# Patient Record
Sex: FEMALE | Race: WHITE | NOT HISPANIC OR LATINO | Employment: OTHER | URBAN - METROPOLITAN AREA
[De-identification: names, ages, dates, MRNs, and addresses within clinical notes are randomized per-mention and may not be internally consistent; named-entity substitution may affect disease eponyms.]

---

## 2017-04-07 DIAGNOSIS — F41.1 GENERALIZED ANXIETY DISORDER: ICD-10-CM

## 2017-04-07 NOTE — TELEPHONE ENCOUNTER
Last Written Prescription Date: 9-15-16  Last Fill Quantity: 90, # refills: 1  Last Office Visit with G primary care provider:  9-15-16        Last PHQ-9 score on record=   PHQ-9 SCORE 2/28/2017   Total Score MyChart 5 (Mild depression)   Total Score -       Thank you,  Olga Watson, [facility] [place]

## 2017-04-10 RX ORDER — CITALOPRAM HYDROBROMIDE 20 MG/1
TABLET ORAL
Qty: 90 TABLET | Refills: 1 | Status: SHIPPED | OUTPATIENT
Start: 2017-04-10 | End: 2017-06-26

## 2017-04-10 NOTE — TELEPHONE ENCOUNTER
Med for BRIAN.     Routing refill request to provider for review/approval because:  Out of RN protocol range.  Nalini Hector RN

## 2017-04-17 ENCOUNTER — OFFICE VISIT (OUTPATIENT)
Dept: FAMILY MEDICINE | Facility: CLINIC | Age: 47
End: 2017-04-17
Payer: COMMERCIAL

## 2017-04-17 VITALS
DIASTOLIC BLOOD PRESSURE: 74 MMHG | TEMPERATURE: 97.4 F | HEART RATE: 94 BPM | SYSTOLIC BLOOD PRESSURE: 120 MMHG | BODY MASS INDEX: 24.64 KG/M2 | HEIGHT: 67 IN | WEIGHT: 157 LBS | OXYGEN SATURATION: 99 %

## 2017-04-17 DIAGNOSIS — G43.011 INTRACTABLE MIGRAINE WITHOUT AURA AND WITH STATUS MIGRAINOSUS: Primary | ICD-10-CM

## 2017-04-17 PROCEDURE — 99214 OFFICE O/P EST MOD 30 MIN: CPT | Performed by: PHYSICIAN ASSISTANT

## 2017-04-17 RX ORDER — KETOROLAC TROMETHAMINE 30 MG/ML
30 INJECTION, SOLUTION INTRAMUSCULAR; INTRAVENOUS ONCE
Qty: 1 ML | Refills: 0 | OUTPATIENT
Start: 2017-04-17 | End: 2017-04-17

## 2017-04-17 RX ORDER — IBUPROFEN 800 MG/1
800 TABLET, FILM COATED ORAL
Qty: 60 TABLET | Refills: 1 | Status: SHIPPED | OUTPATIENT
Start: 2017-04-17 | End: 2019-08-22

## 2017-04-17 NOTE — PROGRESS NOTES
SUBJECTIVE:                                                    Kassie Ramirez is a 47 year old female who presents to clinic today for the following health issues:    Migraines  Kassie presents to clinic today for chief complaint of persistent migraines. Onset of symptoms was ~6 months ago averaging 1 migraine per month that lasts up to 4 days in length. Today she is suffering from a migraine has lasted for the past 6 days. Since onset of symptoms she has noticed that migraines seem to fluctuate around her menstrual period. Denies history of migraines personally or in family memebers. Describes migraine on right side of head with pain/ pressure radiating behind right eye. Notes some sensitivity to light and sound but denies visual disturbance, balance/ coordination issues, nausea, or dehydration. Migraines occasionally wake patient up from sleep with difficulty falling asleep to relieve migraine. Has tried OTC ibuprofen, naproxen, Excedrin, and vitamin B12 injection all without little relief of symptoms. Patient has never followed with neurology or had imaging done of her brain.    Problem list and histories reviewed & adjusted, as indicated.  Additional history: as documented    Patient Active Problem List   Diagnosis     CARDIOVASCULAR SCREENING; LDL GOAL LESS THAN 160     Anxiety attack     Generalized anxiety disorder     Controlled substance agreement signed     GERTRUDE (stress urinary incontinence, female)     Past Surgical History:   Procedure Laterality Date     C NONSPECIFIC PROCEDURE  1974    Umbilical Hernia Repair     HC HYSTEROS W PERMANENT FALLOPAIN IMPLANT  2009    seng - Dr. Cailin Raymundo      SLING TRANSPUBO WITHOUT ANTERIOR COLPORRHAPHY N/A 11/21/2016    Procedure: SLING TRANSPUBO WITHOUT ANTERIOR COLPORRHAPHY;  Surgeon: Hernesto Berrios MD;  Location:  OR       Social History   Substance Use Topics     Smoking status: Never Smoker     Smokeless tobacco: Never Used     Alcohol use No       Comment: 1 time per month     Family History   Problem Relation Age of Onset     Hypertension Mother      Depression Mother      Lipids Mother      CANCER Mother      Lung.       Cardiovascular Mother      Circulatory Mother      DIABETES Mother      HEART DISEASE Mother      CEREBROVASCULAR DISEASE Mother      Obesity Mother      C.A.D. Mother      Eye Disorder Father      Genetic Disorder Father      DIABETES Maternal Aunt      type 2     Hypertension Maternal Aunt      Eye Disorder Sister      Blind spots     Eye Disorder Sister      HEART DISEASE Maternal Grandfather      HEART DISEASE Sister      high cholesterol           Current Outpatient Prescriptions   Medication Sig Dispense Refill     ibuprofen (ADVIL/MOTRIN) 800 MG tablet Take 1 tablet (800 mg) by mouth 3 times daily (with meals) For at least 5 days 60 tablet 1     citalopram (CELEXA) 20 MG tablet Take 1 tablet by mouth daily 90 tablet 1     VITAMIN D, CHOLECALCIFEROL, PO Take 400 Units by mouth daily       cetirizine (ZYRTEC) 10 MG tablet Take 10 mg by mouth daily       Allergies   Allergen Reactions     Cold & Flu [Cold Defense Fighter]      See pseudoephedrine     Seasonal Allergies      Sudafed Cold-Cough [Dayquil Liquicaps]      Pseudoephedrine Rash     Rash then skins peels off        Reviewed and updated as needed this visit by clinical staff  Tobacco  Allergies  Meds  Problems  Med Hx  Surg Hx  Fam Hx  Soc Hx        Reviewed and updated as needed this visit by Provider  Tobacco  Allergies  Meds  Problems  Med Hx  Surg Hx  Fam Hx  Soc Hx          ROS:  Constitutional, HEENT, cardiovascular, pulmonary, GI, , musculoskeletal, neuro, skin, endocrine and psych systems are negative, except as otherwise noted.    This document serves as a record of the services and decisions personally performed and made by Mary Alice Colón PA-C. It was created on her behalf by Carolynn Garcia, a trained medical scribe. The creation of this document is based  "the provider's statements to the medical scribe.  Carolynn Garcia, April 17, 2017 3:14 PM    OBJECTIVE:                                                    /74  Pulse 94  Temp 97.4  F (36.3  C) (Tympanic)  Ht 5' 6.5\" (1.689 m)  Wt 157 lb (71.2 kg)  SpO2 99%  BMI 24.96 kg/m2  Body mass index is 24.96 kg/(m^2).     GENERAL: healthy, alert and no distress  EYES: Eyes grossly normal to inspection, PERRL, EOMI and conjunctivae and sclerae normal  HENT: ear canals and TM's normal, nose and mouth without ulcers or lesions  NECK: no adenopathy, no asymmetry, masses, or scars and thyroid normal to palpation  RESP: lungs clear to auscultation - no rales, rhonchi or wheezes  CV: regular rate and rhythm, normal S1 S2, no S3 or S4, no murmur, click or rub, no peripheral edema and peripheral pulses strong  NEURO: Normal strength and tone, mentation intact and speech normal, cranial nerves II-XII grossly intact, point to point motions intact, RRM intact, able to heel toe walk, able to hop on one foot, and negative Romberg   PSYCH: mentation appears normal, affect normal/bright    Ketorolac injected administered in clinic during visit today by MA.    Diagnostic Test Results:  none      ASSESSMENT/PLAN:                                                    Kassie was seen today for headache.    Diagnoses and all orders for this visit:    Intractable migraine without aura and with status migrainosus  Patient is stable and doing well after ketorolac injection. If unable to break migraine with ketorolac will have patient take 800 mg ibuprofen TID to manage migraines x 5 days beginning tonight. Advised patient to have MR done for further evaluation of symptoms due to worsening headaches from her \"baseline\".  If normal, will consider magnesium supplementation around her menstrual cycle.  -     MR Brain w/o & w Contrast; Future  -     ibuprofen (ADVIL/MOTRIN) 800 MG tablet; Take 1 tablet (800 mg) by mouth 3 times daily (with meals) " For at least 5 days  -     ketorolac (TORADOL) 30 MG/ML injection; Inject 1 mL (30 mg) into the muscle once for 1 dose    Greater than 25 minutes were spent with the patient. The majority of this time was coordinating care and counseling regarding the above diagnoses.    The information in this document, created by the medical scribe for me, accurately reflects the services I personally performed and the decisions made by me. I have reviewed and approved this document for accuracy prior to leaving the patient care area .  Mary Alice oClón PA-C April 17, 2017 3:14 PM    Mary Alice Colón PA-C  [facility] [place]

## 2017-04-17 NOTE — MR AVS SNAPSHOT
After Visit Summary   4/17/2017    Kassie Ramirez    MRN: [id number]           Patient Information     Date Of Birth          1970        Visit Information        Provider Department      4/17/2017 3:00 PM Mary Alice Colón PA-C [facility]        Today's Diagnoses     Intractable migraine without aura and with status migrainosus    -  1       Follow-ups after your visit        Future tests that were ordered for you today     Open Future Orders        Priority Expected Expires Ordered    MR Brain w/o & w Contrast Routine  4/17/2018 4/17/2017            Who to contact     If you have questions or need follow up information about today's clinic visit or your schedule please contact [facility] directly at [phone].  Normal or non-critical lab and imaging results will be communicated to you by V[employer], letter or phone within 4 business days after the clinic has received the results. If you do not hear from us within 7 days, please contact the clinic through V[employer]im[employer] or phone. If you have a critical or abnormal lab result, we will notify you by phone as soon as possible.  Submit refill requests through [employer] or call your pharmacy and they will forward the refill request to us. Please allow 3 business days for your refill to be completed.          Additional Information About Your Visit        MyChart Information     [employer] gives you secure access to your electronic health record. If you see a primary care provider, you can also send messages to your care team and make appointments. If you have questions, please call your primary care clinic.  If you do not have a primary care provider, please call [phone] and they will assist you.        Care EveryWhere ID     This is your Care EveryWhere ID. This could be used by other organizations to access your [facility] medical records  [id number]        Your Vitals Were     Pulse Temperature Height Pulse Oximetry  "BMI (Body Mass Index)       94 97.4  F (36.3  C) (Tympanic) 5' 6.5\" (1.689 m) 99% 24.96 kg/m2        Blood Pressure from Last 3 Encounters:   04/17/17 120/74   11/21/16 (!) 130/91   11/14/16 100/74    Weight from Last 3 Encounters:   04/17/17 157 lb (71.2 kg)   11/21/16 160 lb (72.6 kg)   11/14/16 160 lb 6 oz (72.7 kg)                 Today's Medication Changes          These changes are accurate as of: 4/17/17  3:21 PM.  If you have any questions, ask your nurse or doctor.               Start taking these medicines.        Dose/Directions    ibuprofen 800 MG tablet   Commonly known as:  ADVIL/MOTRIN   Used for:  Intractable migraine without aura and with status migrainosus   Started by:  Mary Alice Colón PA-C        Dose:  800 mg   Take 1 tablet (800 mg) by mouth 3 times daily (with meals) For at least 5 days   Quantity:  60 tablet   Refills:  1       ketorolac 30 MG/ML injection   Commonly known as:  TORADOL   Used for:  Intractable migraine without aura and with status migrainosus   Started by:  Mary Alice Colón PA-C        Dose:  30 mg   Inject 1 mL (30 mg) into the muscle once for 1 dose   Quantity:  1 mL   Refills:  0            Where to get your medicines      These medications were sent to [facility] [address]  [address] [place] [address]     Phone:  [phone]     ibuprofen 800 MG tablet         Some of these will need a paper prescription and others can be bought over the counter.  Ask your nurse if you have questions.     You don't need a prescription for these medications     ketorolac 30 MG/ML injection                Primary Care Provider    None Specified       No primary provider on file.        Thank you!     Thank you for choosing [facility]  for your care. Our goal is always to provide you with excellent care. Hearing back from our patients is one way we can continue to improve our services. Please " take a few minutes to complete the written survey that you may receive in the mail after your visit with us. Thank you!             Your Updated Medication List - Protect others around you: Learn how to safely use, store and throw away your medicines at www.disposemymeds.org.          This list is accurate as of: 4/17/17  3:21 PM.  Always use your most recent med list.                   Brand Name Dispense Instructions for use    cetirizine 10 MG tablet    zyrTEC     Take 10 mg by mouth daily       citalopram 20 MG tablet    celeXA    90 tablet    Take 1 tablet by mouth daily       ibuprofen 800 MG tablet    ADVIL/MOTRIN    60 tablet    Take 1 tablet (800 mg) by mouth 3 times daily (with meals) For at least 5 days       ketorolac 30 MG/ML injection    TORADOL    1 mL    Inject 1 mL (30 mg) into the muscle once for 1 dose       VITAMIN D (CHOLECALCIFEROL) PO      Take 400 Units by mouth daily

## 2017-04-17 NOTE — NURSING NOTE
The following medication was given:     MEDICATION: Ketorolac Tromethamine 60MG/2ML (30 mg/mL) (Toradol)  ROUTE: IM  SITE: Deltoid - Left  DOSE: 1 mL  LOT #: [id number]  :  [employer]  NDC: 79661-814-22  EXPIRATION DATE:  06/2018  Mahi Mehta CMA

## 2017-04-17 NOTE — NURSING NOTE
"Chief Complaint   Patient presents with     Headache     migraine monthly, lasts 4 days usually. this one has lasted 6 days. ~6 months. Has been taking OTC meds with no relief.        Initial /74  Pulse 94  Temp 97.4  F (36.3  C) (Tympanic)  Ht 5' 6.5\" (1.689 m)  Wt 157 lb (71.2 kg)  SpO2 99%  BMI 24.96 kg/m2 Estimated body mass index is 24.96 kg/(m^2) as calculated from the following:    Height as of this encounter: 5' 6.5\" (1.689 m).    Weight as of this encounter: 157 lb (71.2 kg).  Medication Reconciliation: complete   Mahi Mehta CMA      "

## 2017-04-25 PROBLEM — G43.011 INTRACTABLE MIGRAINE WITHOUT AURA AND WITH STATUS MIGRAINOSUS: Status: ACTIVE | Noted: 2017-04-25

## 2017-04-27 ENCOUNTER — HOSPITAL ENCOUNTER (OUTPATIENT)
Dept: MRI IMAGING | Facility: CLINIC | Age: 47
Discharge: HOME | End: 2017-04-27
Attending: PHYSICIAN ASSISTANT | Admitting: PHYSICIAN ASSISTANT
Payer: COMMERCIAL

## 2017-04-27 DIAGNOSIS — G43.011 INTRACTABLE MIGRAINE WITHOUT AURA AND WITH STATUS MIGRAINOSUS: ICD-10-CM

## 2017-04-27 PROCEDURE — A9585 GADOBUTROL INJECTION: HCPCS | Performed by: PHYSICIAN ASSISTANT

## 2017-04-27 PROCEDURE — 25500064 ZZH RX 255 OP 636: Performed by: PHYSICIAN ASSISTANT

## 2017-04-27 PROCEDURE — 70553 MRI BRAIN STEM W/O & W/DYE: CPT

## 2017-04-27 RX ORDER — GADOBUTROL 604.72 MG/ML
7.5 INJECTION INTRAVENOUS ONCE
Status: COMPLETED | OUTPATIENT
Start: 2017-04-27 | End: 2017-04-27

## 2017-04-27 RX ADMIN — GADOBUTROL 7.5 ML: 604.72 INJECTION INTRAVENOUS at 09:20

## 2017-04-28 DIAGNOSIS — G43.011 INTRACTABLE MIGRAINE WITHOUT AURA AND WITH STATUS MIGRAINOSUS: Primary | ICD-10-CM

## 2017-06-26 ENCOUNTER — OFFICE VISIT (OUTPATIENT)
Dept: FAMILY MEDICINE | Facility: CLINIC | Age: 47
End: 2017-06-26
Payer: COMMERCIAL

## 2017-06-26 VITALS
DIASTOLIC BLOOD PRESSURE: 68 MMHG | WEIGHT: 158 LBS | SYSTOLIC BLOOD PRESSURE: 104 MMHG | HEART RATE: 91 BPM | OXYGEN SATURATION: 98 % | TEMPERATURE: 97.8 F | BODY MASS INDEX: 25.12 KG/M2

## 2017-06-26 DIAGNOSIS — N95.9 MENOPAUSAL DISORDER: ICD-10-CM

## 2017-06-26 DIAGNOSIS — G43.829 MENSTRUAL MIGRAINE WITHOUT STATUS MIGRAINOSUS, NOT INTRACTABLE: ICD-10-CM

## 2017-06-26 DIAGNOSIS — F41.1 GENERALIZED ANXIETY DISORDER: ICD-10-CM

## 2017-06-26 DIAGNOSIS — Z78.0 MENOPAUSE: Primary | ICD-10-CM

## 2017-06-26 DIAGNOSIS — G43.011 INTRACTABLE MIGRAINE WITHOUT AURA AND WITH STATUS MIGRAINOSUS: ICD-10-CM

## 2017-06-26 PROCEDURE — 99214 OFFICE O/P EST MOD 30 MIN: CPT | Performed by: PHYSICIAN ASSISTANT

## 2017-06-26 RX ORDER — NORETHINDRONE ACETATE AND ETHINYL ESTRADIOL .02; 1 MG/1; MG/1
1 TABLET ORAL DAILY
Qty: 84 TABLET | Refills: 4 | Status: SHIPPED | OUTPATIENT
Start: 2017-06-26 | End: 2017-10-16

## 2017-06-26 RX ORDER — NORETHINDRONE ACETATE AND ETHINYL ESTRADIOL .02; 1 MG/1; MG/1
1 TABLET ORAL DAILY
Qty: 84 TABLET | Refills: 3 | Status: SHIPPED | OUTPATIENT
Start: 2017-06-26 | End: 2017-06-26

## 2017-06-26 RX ORDER — CITALOPRAM HYDROBROMIDE 20 MG/1
TABLET ORAL
Qty: 90 TABLET | Refills: 1 | Status: SHIPPED | OUTPATIENT
Start: 2017-06-26 | End: 2018-03-03

## 2017-06-26 RX ORDER — NORETHINDRONE ACETATE/ETHINYL ESTRADIOL 1MG-20MCG
KIT ORAL
Refills: 0 | COMMUNITY
Start: 2017-04-20 | End: 2017-06-26

## 2017-06-26 NOTE — PROGRESS NOTES
SUBJECTIVE:                                                    Kassie Ramirez is a 47 year old female who presents to clinic today for the following health issues:    Menopause Follow-Up  Kassie presents to clinic today with chief complaint of menopausal symptoms. She was last seen on 4/17 with chief complaint of migraines - precipitated by menstrual cycle. She also complained of moodiness and hot flashes. At that time she was advised to begin OTC magnesium supplement. The patient then followed up with [facility] [place] and was started on an oral contraceptive. She states that since starting birth control (which she takes continuously without placebo) and OTC supplement she has noticed symptoms have dramatically improved. States that in past 2 months has only had one headache and has not experienced any hot flashes. Headaches are well controlled with OTC ibuprofen. She has also noticed a decrease in her moodiness and easy agitation. She does also have PMH significant for depression and anxiety for which she continues to take 20 mg citalopram daily as prescribed.   Today Kassie does express some interest in other birth control options such as the patch versus the daily oral medication. She does not have FHX significant for breast CA but likely started on contraceptive versus hormone therapy due to cost. She does have PSH significant for essure procedure in 2009.     BRIAN-7 SCORE 4/14/2016 9/15/2016 2/28/2017   Total Score - - -   Total Score 1 (minimal anxiety) - 1 (minimal anxiety)   Total Score - 1 -     PHQ-9 SCORE 4/14/2016 9/15/2016 2/28/2017   Total Score - - -   Total Score MyChart 1 (Minimal depression) - 5 (Mild depression)   Total Score - 1 -       Problem list and histories reviewed & adjusted, as indicated.  Additional history: as documented    Patient Active Problem List   Diagnosis     CARDIOVASCULAR SCREENING; LDL GOAL LESS THAN 160     Anxiety attack     Generalized anxiety  disorder     Controlled substance agreement signed     GERTRUDE (stress urinary incontinence, female)     Intractable migraine without aura and with status migrainosus     Menstrual headache     Menopausal disorder     Past Surgical History:   Procedure Laterality Date     H KIT ESSURE  2009    essure - Dr. Cailin Raymundo      HERNIORRHAPHY UMBILICAL  1974     SLING TRANSPUBO WITHOUT ANTERIOR COLPORRHAPHY N/A 11/21/2016    Procedure: SLING TRANSPUBO WITHOUT ANTERIOR COLPORRHAPHY;  Surgeon: Hernesto Berrios MD;  Location:  OR       Social History   Substance Use Topics     Smoking status: Never Smoker     Smokeless tobacco: Never Used     Alcohol use No      Comment: 1 time per month     Family History   Problem Relation Age of Onset     Hypertension Mother      Depression Mother      Lipids Mother      CANCER Mother      Lung.       Cardiovascular Mother      Circulatory Mother      DIABETES Mother      HEART DISEASE Mother      CEREBROVASCULAR DISEASE Mother      Obesity Mother      C.A.D. Mother      Eye Disorder Father      Genetic Disorder Father      DIABETES Maternal Aunt      type 2     Hypertension Maternal Aunt      Eye Disorder Sister      Blind spots     Eye Disorder Sister      HEART DISEASE Maternal Grandfather      HEART DISEASE Sister      high cholesterol           Current Outpatient Prescriptions   Medication Sig Dispense Refill     norethindrone-ethinyl estradiol (HUE 1/20) 1-20 MG-MCG per tablet Take 1 tablet by mouth daily Only take active pills 84 tablet 4     Magnesium Oxide 140 MG CAPS Take 140 mg by mouth 2 times daily 120 capsule      citalopram (CELEXA) 20 MG tablet Take 1 tablet by mouth daily 90 tablet 1     ibuprofen (ADVIL/MOTRIN) 800 MG tablet Take 1 tablet (800 mg) by mouth 3 times daily (with meals) For at least 5 days 60 tablet 1     cetirizine (ZYRTEC) 10 MG tablet Take 10 mg by mouth daily       [DISCONTINUED] HUE 1/20 1-20 MG-MCG per tablet   0     [DISCONTINUED]  norethindrone-ethinyl estradiol (HUE 1/20) 1-20 MG-MCG per tablet Take 1 tablet by mouth daily 84 tablet 3     [DISCONTINUED] citalopram (CELEXA) 20 MG tablet Take 1 tablet by mouth daily 90 tablet 1     Allergies   Allergen Reactions     Cold & Flu [Cold Defense Fighter]      See pseudoephedrine     Seasonal Allergies      Sudafed Cold-Cough [Dayquil Liquicaps]      Pseudoephedrine Rash     Rash then skins peels off        Reviewed and updated as needed this visit by clinical staff  Tobacco  Allergies  Meds  Problems  Med Hx  Surg Hx  Fam Hx  Soc Hx        Reviewed and updated as needed this visit by Provider  Tobacco  Allergies  Meds  Problems  Med Hx  Surg Hx  Fam Hx  Soc Hx          ROS:  Constitutional, HEENT, cardiovascular, pulmonary, GI, , musculoskeletal, neuro, skin, endocrine and psych systems are negative, except as otherwise noted.    This document serves as a record of the services and decisions personally performed and made by Mary Alice Colón PA-C. It was created on her behalf by Carolynn Garcia, a trained medical scribe. The creation of this document is based the provider's statements to the medical scribe.  Carolynn Garcia, June 26, 2017 8:53 AM    OBJECTIVE:   /68  Pulse 91  Temp 97.8  F (36.6  C) (Oral)  Wt 158 lb (71.7 kg)  SpO2 98%  BMI 25.12 kg/m2  Body mass index is 25.12 kg/(m^2).     GENERAL: healthy, alert and no distress  RESP: lungs clear to auscultation - no rales, rhonchi or wheezes  CV: regular rate and rhythm, normal S1 S2, no S3 or S4, no murmur, click or rub, no peripheral edema and peripheral pulses strong  NEURO: Normal strength and tone, mentation intact and speech normal  PSYCH: mentation appears normal, affect normal/bright    Diagnostic Test Results:  none     ASSESSMENT/PLAN:   Kassie was seen today for menopausal sx.    Diagnoses and all orders for this visit:    Menopause, Intractable migraine without aura and with status migrainosus  Improved. Patient  is doing well since starting oral contraceptive and OTC magnesium supplement. Continue taking daily as prescribed. Will reevaluated and consider tapering off contraceptive in 1 year.  -     norethindrone-ethinyl estradiol (HUE 1/20) 1-20 MG-MCG per tablet; Take 1 tablet by mouth daily Only take active pills    Generalized anxiety disorder  Stable. Patient is doing well and taking citalopram daily as prescribed without side effect.   -     citalopram (CELEXA) 20 MG tablet; Take 1 tablet by mouth daily      The information in this document, created by the medical scribe for me, accurately reflects the services I personally performed and the decisions made by me. I have reviewed and approved this document for accuracy prior to leaving the patient care area .  Mary Alice Colón PA-C June 26, 2017 8:51 AM    Mary Alice Colón PA-C  [facility] [place]

## 2017-06-26 NOTE — NURSING NOTE
"Chief Complaint   Patient presents with     Menopausal Sx     going on about a year       Initial /68  Pulse 91  Temp 97.8  F (36.6  C) (Oral)  Wt 158 lb (71.7 kg)  SpO2 98%  BMI 25.12 kg/m2 Estimated body mass index is 25.12 kg/(m^2) as calculated from the following:    Height as of 4/17/17: 5' 6.5\" (1.689 m).    Weight as of this encounter: 158 lb (71.7 kg).  Medication Reconciliation: complete     Rachel Young MA      "

## 2017-06-26 NOTE — MR AVS SNAPSHOT
After Visit Summary   6/26/2017    Kassie Ramirez    MRN: [id number]           Patient Information     Date Of Birth          1970        Visit Information        Provider Department      6/26/2017 8:20 AM Mary Alice Colón PA-C [facility]        Today's Diagnoses     Menopause    -  1    Intractable migraine without aura and with status migrainosus        Generalized anxiety disorder           Follow-ups after your visit        Who to contact     If you have questions or need follow up information about today's clinic visit or your schedule please contact [facility] directly at [phone].  Normal or non-critical lab and imaging results will be communicated to you by Appratshart, letter or phone within 4 business days after the clinic has received the results. If you do not hear from us within 7 days, please contact the clinic through Appratshart or phone. If you have a critical or abnormal lab result, we will notify you by phone as soon as possible.  Submit refill requests through Music Messenger (MM) or call your pharmacy and they will forward the refill request to us. Please allow 3 business days for your refill to be completed.          Additional Information About Your Visit        MyChart Information     Music Messenger (MM) gives you secure access to your electronic health record. If you see a primary care provider, you can also send messages to your care team and make appointments. If you have questions, please call your primary care clinic.  If you do not have a primary care provider, please call [phone] and they will assist you.        Care EveryWhere ID     This is your Care EveryWhere ID. This could be used by other organizations to access your [facility] medical records  [id number]        Your Vitals Were     Pulse Temperature Pulse Oximetry BMI (Body Mass Index)          91 97.8  F (36.6  C) (Oral) 98% 25.12 kg/m2         Blood Pressure from Last 3 Encounters:   06/26/17  104/68   04/17/17 120/74   11/21/16 (!) 130/91    Weight from Last 3 Encounters:   06/26/17 158 lb (71.7 kg)   04/17/17 157 lb (71.2 kg)   11/21/16 160 lb (72.6 kg)              Today, you had the following     No orders found for display         Today's Medication Changes          These changes are accurate as of: 6/26/17  8:52 AM.  If you have any questions, ask your nurse or doctor.               Start taking these medicines.        Dose/Directions    norethindrone-ethinyl estradiol 1-20 MG-MCG per tablet   Commonly known as:  HUE 1/20   Used for:  Menopause   Started by:  Mary Alice Colón PA-C        Dose:  1 tablet   Take 1 tablet by mouth daily Only take active pills   Quantity:  84 tablet   Refills:  4         These medicines have changed or have updated prescriptions.        Dose/Directions    Magnesium Oxide 140 MG Caps   This may have changed:    - when to take this  - additional instructions   Used for:  Intractable migraine without aura and with status migrainosus   Changed by:  Mary Alice Colón PA-C        Dose:  140 mg   Take 140 mg by mouth 2 times daily   Quantity:  120 capsule   Refills:  0            Where to get your medicines      These medications were sent to [facility] [place] [id number]     Phone:  [phone]     citalopram 20 MG tablet    norethindrone-ethinyl estradiol 1-20 MG-MCG per tablet                Primary Care Provider Office Phone # Fax #    Mary Alice Colón PA-C [phone]       [address] [address]        Equal Access to Services     OBIE FOLEY AH: Hadii shelly green hadashalfreda Soomaali, waaxda luqadaha, qaybta kaalmada adeegyada, ranjan tan. So [facility] [phone].    ATENCIÓN: Si habla español, tiene a valdivia disposición servicios gratuitos de asistencia lingüística. Llame al [phone].    We  comply with applicable federal civil rights laws and [place] laws. We do not discriminate on the basis of race, color, national origin, age, disability sex, sexual orientation or gender identity.            Thank you!     Thank you for choosing [facility]  for your care. Our goal is always to provide you with excellent care. Hearing back from our patients is one way we can continue to improve our services. Please take a few minutes to complete the written survey that you may receive in the mail after your visit with us. Thank you!             Your Updated Medication List - Protect others around you: Learn how to safely use, store and throw away your medicines at www.disposemymeds.org.          This list is accurate as of: 6/26/17  8:52 AM.  Always use your most recent med list.                   Brand Name Dispense Instructions for use Diagnosis    cetirizine 10 MG tablet    zyrTEC     Take 10 mg by mouth daily        citalopram 20 MG tablet    celeXA    90 tablet    Take 1 tablet by mouth daily    Generalized anxiety disorder       ibuprofen 800 MG tablet    ADVIL/MOTRIN    60 tablet    Take 1 tablet (800 mg) by mouth 3 times daily (with meals) For at least 5 days    Intractable migraine without aura and with status migrainosus       Magnesium Oxide 140 MG Caps     120 capsule    Take 140 mg by mouth 2 times daily    Intractable migraine without aura and with status migrainosus       norethindrone-ethinyl estradiol 1-20 MG-MCG per tablet    HUE 1/20    84 tablet    Take 1 tablet by mouth daily Only take active pills    Menopause

## 2017-10-16 DIAGNOSIS — Z78.0 MENOPAUSE: ICD-10-CM

## 2017-10-16 NOTE — TELEPHONE ENCOUNTER
HUE 1/20 1-20 MG-MCG per tablet      Last Written Prescription Date: 6/26/17  Last Fill Quantity: 84,  # refills: 4   Last Office Visit with [facility], [facility] or [facility] prescribing provider: 6/26/17

## 2017-10-18 RX ORDER — NORETHINDRONE ACETATE/ETHINYL ESTRADIOL 1MG-20MCG
KIT ORAL
Qty: 84 TABLET | Refills: 2 | Status: SHIPPED | OUTPATIENT
Start: 2017-10-18 | End: 2018-07-25

## 2017-10-18 NOTE — TELEPHONE ENCOUNTER
This had been deactivated in [facility] pharmacy system.   Reordered per pharmacy request.    Nalini Hector RN  [facility]

## 2017-11-18 ENCOUNTER — HEALTH MAINTENANCE LETTER (OUTPATIENT)
Age: 47
End: 2017-11-18

## 2017-11-25 ENCOUNTER — HEALTH MAINTENANCE LETTER (OUTPATIENT)
Age: 47
End: 2017-11-25

## 2018-03-03 DIAGNOSIS — F41.1 GENERALIZED ANXIETY DISORDER: ICD-10-CM

## 2018-03-06 NOTE — TELEPHONE ENCOUNTER
"Requested Prescriptions   Pending Prescriptions Disp Refills     citalopram (CELEXA) 20 MG tablet [Pharmacy Med Name: CITALOPRAM HYDROBROMIDE 20MG TABS] 90 tablet 1        Last Written Prescription Date:  6.26.17  Last Fill Quantity: 90,  # refills: 1   Last office visit: 6/26/2017 with prescribing provider:  6.26.17   Future Office Visit:    PHQ-9 SCORE 9/1/2017   Total Score -   Total Score MyChart 2 (Minimal depression)   Total Score 2          Sig: TAKE ONE TABLET BY MOUTH EVERY DAY    SSRIs Protocol Passed    3/5/2018 11:10 AM       Passed - Recent (12 mo) or future (30 days) visit within the authorizing provider's specialty    Patient had office visit in the last year or has a visit in the next 30 days with authorizing provider.  See \"Patient Info\" tab in inbasket, or \"Choose Columns\" in Meds & Orders section of the refill encounter.            Passed - Patient is age 18 or older       Passed - No active pregnancy on record       Passed - No positive pregnancy test in last 12 months          "

## 2018-03-07 ENCOUNTER — MYC MEDICAL ADVICE (OUTPATIENT)
Dept: FAMILY MEDICINE | Facility: CLINIC | Age: 48
End: 2018-03-07

## 2018-03-08 RX ORDER — CITALOPRAM HYDROBROMIDE 20 MG/1
TABLET ORAL
Qty: 90 TABLET | Refills: 0 | Status: SHIPPED | OUTPATIENT
Start: 2018-03-08 | End: 2018-06-21

## 2018-03-08 ASSESSMENT — ANXIETY QUESTIONNAIRES
7. FEELING AFRAID AS IF SOMETHING AWFUL MIGHT HAPPEN: NOT AT ALL
7. FEELING AFRAID AS IF SOMETHING AWFUL MIGHT HAPPEN: NOT AT ALL
5. BEING SO RESTLESS THAT IT IS HARD TO SIT STILL: NOT AT ALL
6. BECOMING EASILY ANNOYED OR IRRITABLE: NOT AT ALL
GAD7 TOTAL SCORE: 0
3. WORRYING TOO MUCH ABOUT DIFFERENT THINGS: NOT AT ALL
4. TROUBLE RELAXING: NOT AT ALL
2. NOT BEING ABLE TO STOP OR CONTROL WORRYING: NOT AT ALL
1. FEELING NERVOUS, ANXIOUS, OR ON EDGE: NOT AT ALL
GAD7 TOTAL SCORE: 0
GAD7 TOTAL SCORE: 0

## 2018-03-08 ASSESSMENT — PATIENT HEALTH QUESTIONNAIRE - PHQ9
SUM OF ALL RESPONSES TO PHQ QUESTIONS 1-9: 2
SUM OF ALL RESPONSES TO PHQ QUESTIONS 1-9: 2
10. IF YOU CHECKED OFF ANY PROBLEMS, HOW DIFFICULT HAVE THESE PROBLEMS MADE IT FOR YOU TO DO YOUR WORK, TAKE CARE OF THINGS AT HOME, OR GET ALONG WITH OTHER PEOPLE: NOT DIFFICULT AT ALL

## 2018-03-08 NOTE — TELEPHONE ENCOUNTER
PHQ-9 sent via [employer]    Noreen Allison, BS, RN, N  [facility]) [phone]

## 2018-03-08 NOTE — TELEPHONE ENCOUNTER
PHQ-9 SCORE 2/28/2017 9/1/2017 3/8/2018   Total Score - - -   Total Score MyChart 5 (Mild depression) 2 (Minimal depression) 2 (Minimal depression)   Total Score - 2 2       BRIAN-7 SCORE 2/28/2017 9/1/2017 3/8/2018   Total Score - - -   Total Score 1 (minimal anxiety) 2 (minimal anxiety) 0 (minimal anxiety)   Total Score - 2 0         Prescription approved per [facility] [place].  Patient advised follow up is due in June 2018.    Noreen Allison, BS, RN, PHN  [facility]) [phone]

## 2018-03-09 ASSESSMENT — PATIENT HEALTH QUESTIONNAIRE - PHQ9: SUM OF ALL RESPONSES TO PHQ QUESTIONS 1-9: 2

## 2018-03-09 ASSESSMENT — ANXIETY QUESTIONNAIRES: GAD7 TOTAL SCORE: 0

## 2018-05-16 ENCOUNTER — OFFICE VISIT (OUTPATIENT)
Dept: FAMILY MEDICINE | Facility: CLINIC | Age: 48
End: 2018-05-16
Payer: COMMERCIAL

## 2018-05-16 VITALS
HEART RATE: 97 BPM | DIASTOLIC BLOOD PRESSURE: 70 MMHG | WEIGHT: 163.5 LBS | HEIGHT: 67 IN | BODY MASS INDEX: 25.66 KG/M2 | TEMPERATURE: 97.8 F | SYSTOLIC BLOOD PRESSURE: 106 MMHG | OXYGEN SATURATION: 98 %

## 2018-05-16 DIAGNOSIS — Z12.31 VISIT FOR SCREENING MAMMOGRAM: ICD-10-CM

## 2018-05-16 DIAGNOSIS — G43.009 MIGRAINE WITHOUT AURA AND WITHOUT STATUS MIGRAINOSUS, NOT INTRACTABLE: Primary | ICD-10-CM

## 2018-05-16 PROCEDURE — 99214 OFFICE O/P EST MOD 30 MIN: CPT | Performed by: PHYSICIAN ASSISTANT

## 2018-05-16 RX ORDER — SUMATRIPTAN 100 MG/1
100 TABLET, FILM COATED ORAL
Qty: 9 TABLET | Refills: 1 | Status: SHIPPED | OUTPATIENT
Start: 2018-05-16 | End: 2019-06-27

## 2018-05-16 NOTE — PROGRESS NOTES
SUBJECTIVE:   Kassie Ramirez is a 48 year old female who presents to clinic today for the following health issues:    Migraine  Kassie presents to clinic requesting a prescription of sumatriptan to treat her migraine symptoms. She reports that she currently gets migraine headaches approximately once per month (possibly with her menstrual cycle) and they can last up to four days. She is currently treating her headaches with ibuprofen 400-600 mg and tylenol but has not had sufficient relief from her symptoms. She has had a brain MRI from 04/2017 which was normal. She was seen for migraines last year and was prescribed oral contraceptives and magnesium which helped initially but her current migraines have been unchanged since when she was seen on 04/07/17. She has been treated by chiropractic medicine, massage therapy, and acupuncture without relief. Her sister has migraine headaches and reports that sumatriptan has worked well for her. She does not get aura with her migraines but is sensitive to light and sound.    Headaches symptoms:  Stable     Frequency: 1x/month     Duration of headaches: Up to four days    Able to do normal daily activities/work with migraines: No    Rescue/Relief medication:ibuprofen (Advil, Motrin) and Tylenol              Effectiveness: minor relief    Preventative medication: None    Neurologic complications: No new stroke-like symptoms, loss of vision or speech, numbness or weakness    In the past 4 weeks, how often have you gone to Urgent Care or the emergency room because of your headaches?  0    Problem list and histories reviewed & adjusted, as indicated.  Additional history: as documented    Patient Active Problem List   Diagnosis     CARDIOVASCULAR SCREENING; LDL GOAL LESS THAN 160     Anxiety attack     Generalized anxiety disorder     Controlled substance agreement signed     GERTRUDE (stress urinary incontinence, female)     Intractable migraine without aura and with status  migrainosus     Menstrual headache     Menopausal disorder     Past Surgical History:   Procedure Laterality Date     H KIT ESSURE  2009    essure - Dr. Cailin Raymundo      HERNIORRHAPHY UMBILICAL  1974     SLING TRANSPUBO WITHOUT ANTERIOR COLPORRHAPHY N/A 11/21/2016    Procedure: SLING TRANSPUBO WITHOUT ANTERIOR COLPORRHAPHY;  Surgeon: Hernesto Berrios MD;  Location:  OR       Social History   Substance Use Topics     Smoking status: Never Smoker     Smokeless tobacco: Never Used     Alcohol use No      Comment: 1 time per month     Family History   Problem Relation Age of Onset     Hypertension Mother      Depression Mother      Lipids Mother      CANCER Mother      Lung.       Cardiovascular Mother      Circulatory Mother      DIABETES Mother      HEART DISEASE Mother      CEREBROVASCULAR DISEASE Mother      Obesity Mother      C.A.D. Mother      Eye Disorder Father      Genetic Disorder Father      DIABETES Maternal Aunt      type 2     Hypertension Maternal Aunt      Eye Disorder Sister      Blind spots     Eye Disorder Sister      HEART DISEASE Maternal Grandfather      HEART DISEASE Sister      high cholesterol           Current Outpatient Prescriptions   Medication Sig Dispense Refill     cetirizine (ZYRTEC) 10 MG tablet Take 10 mg by mouth daily       citalopram (CELEXA) 20 MG tablet TAKE ONE TABLET BY MOUTH EVERY DAY 90 tablet 0     ibuprofen (ADVIL/MOTRIN) 800 MG tablet Take 1 tablet (800 mg) by mouth 3 times daily (with meals) For at least 5 days 60 tablet 1     HUE 1/20 1-20 MG-MCG per tablet TAKE ONE TABLET BY MOUTH EVERY DAY. TAKE ONLY ACTIVE TABLETS 84 tablet 2     Magnesium Oxide 140 MG CAPS Take 140 mg by mouth 2 times daily 120 capsule      SUMAtriptan (IMITREX) 100 MG tablet Take 1 tablet (100 mg) by mouth at onset of headache for migraine May repeat in 2 hours. Max 2 tablets/24 hours. 9 tablet 1     Allergies   Allergen Reactions     Cold & Flu [Cold Defense Fighter]      See  "pseudoephedrine     Seasonal Allergies      Sudafed Cold-Cough [Dayquil Liquicaps]      Pseudoephedrine Rash     Rash then skins peels off        Reviewed and updated as needed this visit by clinical staff  Tobacco  Allergies  Meds  Problems  Med Hx  Surg Hx  Fam Hx  Soc Hx        Reviewed and updated as needed this visit by Provider  Tobacco  Allergies  Meds  Problems  Med Hx  Surg Hx  Fam Hx  Soc Hx          ROS:  Constitutional, HEENT, cardiovascular, pulmonary, GI, , musculoskeletal, neuro, skin, endocrine and psych systems are negative, except as otherwise noted.    This document serves as a record of the services and decisions personally performed and made by Mary Alice Colón PA-C. It was created on her behalf by Marty Cates, a trained medical scribe. The creation of this document is based on the provider's statements to the medical scribe.  Marty Cates 9:49 AM May 16, 2018    OBJECTIVE:   /70  Pulse 97  Temp 97.8  F (36.6  C) (Tympanic)  Ht 5' 6.5\" (1.689 m)  Wt 163 lb 8 oz (74.2 kg)  SpO2 98%  BMI 25.99 kg/m2  Body mass index is 25.99 kg/(m^2).  GENERAL: healthy, alert and no distress  RESP: lungs clear to auscultation - no rales, rhonchi or wheezes  CV: regular rate and rhythm, normal S1 S2, no S3 or S4, no murmur, click or rub, no peripheral edema and peripheral pulses strong  SKIN: no suspicious lesions or rashes  NEURO: cranial nerves II-XII grossly intact  PSYCH: mentation appears normal, affect normal/bright    Diagnostic Test Results:  none     ASSESSMENT/PLAN:   Kassie was seen today for medication request.    Diagnoses and all orders for this visit:    Migraine without aura and without status migrainosus, not intractable  Start sumatriptan to treat migraine headaches as needed. Discussed guidelines of medication with patient and advised of possible side effect of drowsiness. Recommended cutting tablet in half if drowsiness occurs. Follow up if symptoms " persist or worsen.  -     SUMAtriptan (IMITREX) 100 MG tablet; Take 1 tablet (100 mg) by mouth at onset of headache for migraine May repeat in 2 hours. Max 2 tablets/24 hours.    Visit for screening mammogram  Mammogram ordered for future date, patient will schedule.  -     *MA Screening Digital Bilateral; Future    The information in this document, created by the medical scribe for me, accurately reflects the services I personally performed and the decisions made by me. I have reviewed and approved this document for accuracy prior to leaving the patient care area.  May 16, 2018 9:49 AM    Mary Alice Colón PA-C  [facility] [place]

## 2018-05-16 NOTE — MR AVS SNAPSHOT
After Visit Summary   5/16/2018    Kassie Ramirez    MRN: [id number]           Patient Information     Date Of Birth          1970        Visit Information        Provider Department      5/16/2018 9:40 AM Mary Alice Colón PA-C [facility]        Today's Diagnoses     Migraine without aura and without status migrainosus, not intractable    -  1    Visit for screening mammogram           Follow-ups after your visit        Follow-up notes from your care team     Return in about 1 month (around 6/16/2018) for Physical Exam, Routine Visit -PAP .      Future tests that were ordered for you today     Open Future Orders        Priority Expected Expires Ordered    *MA Screening Digital Bilateral Routine  5/16/2019 5/16/2018            Who to contact     If you have questions or need follow up information about today's clinic visit or your schedule please contact [facility] directly at [phone].  Normal or non-critical lab and imaging results will be communicated to you by MyChart, letter or phone within 4 business days after the clinic has received the results. If you do not hear from us within 7 days, please contact the clinic through PublikDemandhart or phone. If you have a critical or abnormal lab result, we will notify you by phone as soon as possible.  Submit refill requests through [employer] or call your pharmacy and they will forward the refill request to us. Please allow 3 business days for your refill to be completed.          Additional Information About Your Visit        MyChart Information     [employer] gives you secure access to your electronic health record. If you see a primary care provider, you can also send messages to your care team and make appointments. If you have questions, please call your primary care clinic.  If you do not have a primary care provider, please call [phone] and they will assist you.        Care EveryWhere ID     This is your Care  "EveryWhere ID. This could be used by other organizations to access your [facility] medical records  [id number]        Your Vitals Were     Pulse Temperature Height Pulse Oximetry BMI (Body Mass Index)       97 97.8  F (36.6  C) (Tympanic) 5' 6.5\" (1.689 m) 98% 25.99 kg/m2        Blood Pressure from Last 3 Encounters:   05/16/18 106/70   06/26/17 104/68   04/17/17 120/74    Weight from Last 3 Encounters:   05/16/18 163 lb 8 oz (74.2 kg)   06/26/17 158 lb (71.7 kg)   04/17/17 157 lb (71.2 kg)                 Today's Medication Changes          These changes are accurate as of 5/16/18 10:01 AM.  If you have any questions, ask your nurse or doctor.               Start taking these medicines.        Dose/Directions    SUMAtriptan 100 MG tablet   Commonly known as:  IMITREX   Used for:  Migraine without aura and without status migrainosus, not intractable   Started by:  Mary Alice Colón PA-C        Dose:  100 mg   Take 1 tablet (100 mg) by mouth at onset of headache for migraine May repeat in 2 hours. Max 2 tablets/24 hours.   Quantity:  9 tablet   Refills:  1            Where to get your medicines      These medications were sent to [facility] [place] [address]     Phone:  [phone]     SUMAtriptan 100 MG tablet                Primary Care Provider Office Phone # Fax #    Mary Alice Colón PA-C [phone]       [address] [place] [address]        Equal Access to Services     OBIE FOLEY AH: Dakota milner Solouis, waaxda luqadaha, qaybta kaalmada ranjan barros. So [facility] [phone].    ATENCIÓN: Si habla español, tiene a valdivia disposición servicios gratuitos de asistencia lingüística. Wang al [phone].    We comply with applicable federal civil rights laws and [place] laws. We do not discriminate on the basis of race, color, national origin, " age, disability, sex, sexual orientation, or gender identity.            Thank you!     Thank you for choosing [facility]  for your care. Our goal is always to provide you with excellent care. Hearing back from our patients is one way we can continue to improve our services. Please take a few minutes to complete the written survey that you may receive in the mail after your visit with us. Thank you!             Your Updated Medication List - Protect others around you: Learn how to safely use, store and throw away your medicines at www.disposemymeds.org.          This list is accurate as of 5/16/18 10:01 AM.  Always use your most recent med list.                   Brand Name Dispense Instructions for use Diagnosis    cetirizine 10 MG tablet    zyrTEC     Take 10 mg by mouth daily        citalopram 20 MG tablet    celeXA    90 tablet    TAKE ONE TABLET BY MOUTH EVERY DAY    Generalized anxiety disorder       ibuprofen 800 MG tablet    ADVIL/MOTRIN    60 tablet    Take 1 tablet (800 mg) by mouth 3 times daily (with meals) For at least 5 days    Intractable migraine without aura and with status migrainosus       HUE 1/20 1-20 MG-MCG per tablet   Generic drug:  norethindrone-ethinyl estradiol     84 tablet    TAKE ONE TABLET BY MOUTH EVERY DAY. TAKE ONLY ACTIVE TABLETS    Menopause       Magnesium Oxide 140 MG Caps     120 capsule    Take 140 mg by mouth 2 times daily    Intractable migraine without aura and with status migrainosus       SUMAtriptan 100 MG tablet    IMITREX    9 tablet    Take 1 tablet (100 mg) by mouth at onset of headache for migraine May repeat in 2 hours. Max 2 tablets/24 hours.    Migraine without aura and without status migrainosus, not intractable

## 2018-06-21 DIAGNOSIS — F41.1 GENERALIZED ANXIETY DISORDER: ICD-10-CM

## 2018-06-21 NOTE — TELEPHONE ENCOUNTER
"Requested Prescriptions   Pending Prescriptions Disp Refills     citalopram (CELEXA) 20 MG tablet [Pharmacy Med Name: CITALOPRAM HYDROBROMIDE 20MG TABS] 90 tablet 0    Last Written Prescription Date:  03/08/2018  Last Fill Quantity: 90 tablet,  # refills: 0   Last office visit: 5/16/2018 with prescribing provider:  Mary Alice Colón   Future Office Visit:     Sig: TAKE ONE TABLET BY MOUTH EVERY DAY    SSRIs Protocol Passed    6/21/2018  4:10 PM       Passed - Recent (12 mo) or future (30 days) visit within the authorizing provider's specialty    Patient had office visit in the last 12 months or has a visit in the next 30 days with authorizing provider or within the authorizing provider's specialty.  See \"Patient Info\" tab in inbasket, or \"Choose Columns\" in Meds & Orders section of the refill encounter.           Passed - Patient is age 18 or older       Passed - No active pregnancy on record       Passed - No positive pregnancy test in last 12 months        PHQ-9 SCORE 2/28/2017 9/1/2017 3/8/2018   Total Score - - -   Total Score MyChart 5 (Mild depression) 2 (Minimal depression) 2 (Minimal depression)   Total Score - 2 2     BRIAN-7 SCORE 2/28/2017 9/1/2017 3/8/2018   Total Score - - -   Total Score 1 (minimal anxiety) 2 (minimal anxiety) 0 (minimal anxiety)   Total Score - 2 0         "

## 2018-06-22 RX ORDER — CITALOPRAM HYDROBROMIDE 20 MG/1
TABLET ORAL
Qty: 90 TABLET | Refills: 0 | Status: SHIPPED | OUTPATIENT
Start: 2018-06-22 | End: 2018-07-25

## 2018-06-22 NOTE — TELEPHONE ENCOUNTER
Prescription approved per [facility] [place].  Nalini Hector RN  [facility]

## 2018-07-25 ENCOUNTER — OFFICE VISIT (OUTPATIENT)
Dept: FAMILY MEDICINE | Facility: CLINIC | Age: 48
End: 2018-07-25
Payer: COMMERCIAL

## 2018-07-25 VITALS
WEIGHT: 168.25 LBS | SYSTOLIC BLOOD PRESSURE: 112 MMHG | HEIGHT: 67 IN | DIASTOLIC BLOOD PRESSURE: 76 MMHG | TEMPERATURE: 98 F | HEART RATE: 97 BPM | BODY MASS INDEX: 26.41 KG/M2 | OXYGEN SATURATION: 98 %

## 2018-07-25 DIAGNOSIS — Z78.0 MENOPAUSE: ICD-10-CM

## 2018-07-25 DIAGNOSIS — G43.829 MENSTRUAL MIGRAINE WITHOUT STATUS MIGRAINOSUS, NOT INTRACTABLE: ICD-10-CM

## 2018-07-25 DIAGNOSIS — Z12.4 SCREENING FOR MALIGNANT NEOPLASM OF CERVIX: ICD-10-CM

## 2018-07-25 DIAGNOSIS — Z00.00 ROUTINE GENERAL MEDICAL EXAMINATION AT A HEALTH CARE FACILITY: Primary | ICD-10-CM

## 2018-07-25 DIAGNOSIS — Z13.0 SCREENING FOR DEFICIENCY ANEMIA: ICD-10-CM

## 2018-07-25 DIAGNOSIS — Z13.220 LIPID SCREENING: ICD-10-CM

## 2018-07-25 DIAGNOSIS — Z11.4 SCREENING FOR HIV (HUMAN IMMUNODEFICIENCY VIRUS): ICD-10-CM

## 2018-07-25 DIAGNOSIS — N39.3 SUI (STRESS URINARY INCONTINENCE, FEMALE): ICD-10-CM

## 2018-07-25 DIAGNOSIS — Z13.1 SCREENING FOR DIABETES MELLITUS: ICD-10-CM

## 2018-07-25 DIAGNOSIS — F41.1 GENERALIZED ANXIETY DISORDER: ICD-10-CM

## 2018-07-25 DIAGNOSIS — E78.2 MIXED HYPERLIPIDEMIA: ICD-10-CM

## 2018-07-25 DIAGNOSIS — R53.83 OTHER FATIGUE: ICD-10-CM

## 2018-07-25 DIAGNOSIS — Z12.31 VISIT FOR SCREENING MAMMOGRAM: ICD-10-CM

## 2018-07-25 PROCEDURE — 99396 PREV VISIT EST AGE 40-64: CPT | Performed by: PHYSICIAN ASSISTANT

## 2018-07-25 PROCEDURE — 87624 HPV HI-RISK TYP POOLED RSLT: CPT | Performed by: PHYSICIAN ASSISTANT

## 2018-07-25 PROCEDURE — 99213 OFFICE O/P EST LOW 20 MIN: CPT | Mod: 25 | Performed by: PHYSICIAN ASSISTANT

## 2018-07-25 PROCEDURE — G0145 SCR C/V CYTO,THINLAYER,RESCR: HCPCS | Performed by: PHYSICIAN ASSISTANT

## 2018-07-25 RX ORDER — CITALOPRAM HYDROBROMIDE 20 MG/1
20 TABLET ORAL DAILY
Qty: 90 TABLET | Refills: 0 | Status: SHIPPED | OUTPATIENT
Start: 2018-07-25 | End: 2019-01-12

## 2018-07-25 RX ORDER — NORETHINDRONE ACETATE AND ETHINYL ESTRADIOL .02; 1 MG/1; MG/1
TABLET ORAL
Qty: 84 TABLET | Refills: 4 | Status: SHIPPED | OUTPATIENT
Start: 2018-07-25 | End: 2019-06-27

## 2018-07-25 ASSESSMENT — ANXIETY QUESTIONNAIRES
2. NOT BEING ABLE TO STOP OR CONTROL WORRYING: NOT AT ALL
6. BECOMING EASILY ANNOYED OR IRRITABLE: SEVERAL DAYS
5. BEING SO RESTLESS THAT IT IS HARD TO SIT STILL: NOT AT ALL
IF YOU CHECKED OFF ANY PROBLEMS ON THIS QUESTIONNAIRE, HOW DIFFICULT HAVE THESE PROBLEMS MADE IT FOR YOU TO DO YOUR WORK, TAKE CARE OF THINGS AT HOME, OR GET ALONG WITH OTHER PEOPLE: NOT DIFFICULT AT ALL
GAD7 TOTAL SCORE: 2
7. FEELING AFRAID AS IF SOMETHING AWFUL MIGHT HAPPEN: NOT AT ALL
1. FEELING NERVOUS, ANXIOUS, OR ON EDGE: SEVERAL DAYS
3. WORRYING TOO MUCH ABOUT DIFFERENT THINGS: NOT AT ALL

## 2018-07-25 ASSESSMENT — PATIENT HEALTH QUESTIONNAIRE - PHQ9: 5. POOR APPETITE OR OVEREATING: NOT AT ALL

## 2018-07-25 NOTE — LETTER
August 1, 2018    Kassie Ramirez  [address] [facility] [address]    Dear Kassie,  We are happy to inform you that your PAP smear result from 07/25/18 is normal.  We are now able to do a follow up test on PAP smears. The DNA test is for HPV (Human Papilloma Virus). Cervical cancer is closely linked with certain types of HPV. Your results showed no evidence of high risk HPV.  Therefore we recommend you return in 5 years for your next pap smear and HPV test.  You will still need to return to the clinic every year for an annual exam and other preventive tests.  Please contact the clinic at [phone] with any questions.  Sincerely,    Mary Alice Colón PA-C/nelly

## 2018-07-25 NOTE — MR AVS SNAPSHOT
After Visit Summary   7/25/2018    Kassie Ramirez    MRN: [id number]           Patient Information     Date Of Birth          1970        Visit Information        Provider Department      7/25/2018 11:20 AM Mary Alice Colón PA-C [facility]        Today's Diagnoses     Routine general medical examination at a health care facility    -  1    Menopause        Other fatigue        Menstrual migraine without status migrainosus, not intractable        Generalized anxiety disorder        GERTRUDE (stress urinary incontinence, female)        Visit for screening mammogram        Screening for malignant neoplasm of cervix        Screening for HIV (human immunodeficiency virus)        Screening for deficiency anemia        Screening for diabetes mellitus        Lipid screening          Care Instructions      Preventive Health Recommendations  Female Ages 40 to 49    Yearly exam:     See your health care provider every year in order to  1. Review health changes.   2. Discuss preventive care.    3. Review your medicines if your doctor prescribed any.      Get a Pap test every three years (unless you have an abnormal result and your provider advises testing more often).      If you get Pap tests with HPV test, you only need to test every 5 years, unless you have an abnormal result. You do not need a Pap test if your uterus was removed (hysterectomy) and you have not had cancer.      You should be tested each year for STDs (sexually transmitted diseases), if you're at risk.     Ask your doctor if you should have a mammogram.      Have a colonoscopy (test for colon cancer) if someone in your family has had colon cancer or polyps before age 50.       Have a cholesterol test every 5 years.       Have a diabetes test (fasting glucose) after age 45. If you are at risk for diabetes, you should have this test every 3 years.    Shots: Get a flu shot each year. Get a tetanus shot every 10 years.      Nutrition:     Eat at least 5 servings of fruits and vegetables each day.    Eat whole-grain bread, whole-wheat pasta and brown rice instead of white grains and rice.    Get adequate Calcium and Vitamin D.      Lifestyle    Exercise at least 150 minutes a week (an average of 30 minutes a day, 5 days a week). This will help you control your weight and prevent disease.    Limit alcohol to one drink per day.    No smoking.     Wear sunscreen to prevent skin cancer.    See your dentist every six months for an exam and cleaning.          Follow-ups after your visit        Future tests that were ordered for you today     Open Future Orders        Priority Expected Expires Ordered    TSH with free T4 reflex Routine 10/23/2018 7/25/2019 7/25/2018    Ferritin Routine 10/23/2018 7/25/2019 7/25/2018    Vitamin B12 Routine 10/23/2018 7/25/2019 7/25/2018    Vitamin D Deficiency Routine 10/23/2018 7/25/2019 7/25/2018    MA SCREENING DIGITAL BILAT - Future  (s+30) Routine  7/25/2019 7/25/2018    HIV Screening Routine 10/23/2018 7/25/2019 7/25/2018    Comprehensive metabolic panel Routine 10/23/2018 7/25/2019 7/25/2018    Lipid panel reflex to direct LDL Fasting Routine 10/23/2018 7/25/2019 7/25/2018    CBC with platelets Routine 10/23/2018 7/25/2019 7/25/2018            Who to contact     If you have questions or need follow up information about today's clinic visit or your schedule please contact [facility] directly at [phone].  Normal or non-critical lab and imaging results will be communicated to you by MyChart, letter or phone within 4 business days after the clinic has received the results. If you do not hear from us within 7 days, please contact the clinic through angelcamhart or phone. If you have a critical or abnormal lab result, we will notify you by phone as soon as possible.  Submit refill requests through [employer] or call your pharmacy and they will forward the refill request to us. Please allow 3  "business days for your refill to be completed.          Additional Information About Your Visit        MyChart Information     [employer] gives you secure access to your electronic health record. If you see a primary care provider, you can also send messages to your care team and make appointments. If you have questions, please call your primary care clinic.  If you do not have a primary care provider, please call [phone] and they will assist you.        Care EveryWhere ID     This is your Care EveryWhere ID. This could be used by other organizations to access your [facility] medical records  [id number]        Your Vitals Were     Pulse Temperature Height Pulse Oximetry BMI (Body Mass Index)       97 98  F (36.7  C) (Tympanic) 5' 6.5\" (1.689 m) 98% 26.75 kg/m2        Blood Pressure from Last 3 Encounters:   07/25/18 112/76   05/16/18 106/70   06/26/17 104/68    Weight from Last 3 Encounters:   07/25/18 168 lb 4 oz (76.3 kg)   05/16/18 163 lb 8 oz (74.2 kg)   06/26/17 158 lb (71.7 kg)              We Performed the Following     HPV High Risk Types DNA Cervical     Pap imaged thin layer screen with HPV - recommended age 30 - 65 years (select HPV order below)          Today's Medication Changes          These changes are accurate as of 7/25/18 12:00 PM.  If you have any questions, ask your nurse or doctor.               These medicines have changed or have updated prescriptions.        Dose/Directions    citalopram 20 MG tablet   Commonly known as:  celeXA   This may have changed:  See the new instructions.   Used for:  Generalized anxiety disorder   Changed by:  Mary Alice Colón PA-C        Dose:  20 mg   Take 1 tablet (20 mg) by mouth daily   Quantity:  90 tablet   Refills:  0            Where to get your medicines      These medications were sent to [facility] - [place], [place] - [address] [place] [address]     Phone:  [phone]     citalopram " 20 MG tablet    norethindrone-ethinyl estradiol 1-20 MG-MCG per tablet                Primary Care Provider Office Phone # Fax #    Mary Alice Colón PA-C [phone] [address] [address]        Equal Access to Services     OBIE FOLEY : Hadii aad ku hadasho Soomaali, waaxda luqadaha, qaybta kaalmada adeegyada, waxay kristinain hayrandn oscar enriquezheidinicolasa tan. So [facility] [phone].    ATENCIÓN: Si habla español, tiene a valdivia disposición servicios gratuitos de asistencia lingüística. [facility] [phone].    We comply with applicable federal civil rights laws and [place] laws. We do not discriminate on the basis of race, color, national origin, age, disability, sex, sexual orientation, or gender identity.            Thank you!     Thank you for choosing [facility]  for your care. Our goal is always to provide you with excellent care. Hearing back from our patients is one way we can continue to improve our services. Please take a few minutes to complete the written survey that you may receive in the mail after your visit with us. Thank you!             Your Updated Medication List - Protect others around you: Learn how to safely use, store and throw away your medicines at www.disposemymeds.org.          This list is accurate as of 7/25/18 12:00 PM.  Always use your most recent med list.                   Brand Name Dispense Instructions for use Diagnosis    cetirizine 10 MG tablet    zyrTEC     Take 10 mg by mouth daily        citalopram 20 MG tablet    celeXA    90 tablet    Take 1 tablet (20 mg) by mouth daily    Generalized anxiety disorder       ibuprofen 800 MG tablet    ADVIL/MOTRIN    60 tablet    Take 1 tablet (800 mg) by mouth 3 times daily (with meals) For at least 5 days    Intractable migraine without aura and with status migrainosus       Magnesium Oxide 140 MG Caps     120 capsule    Take 140 mg by mouth 2 times daily    Intractable migraine without  aura and with status migrainosus       norethindrone-ethinyl estradiol 1-20 MG-MCG per tablet    HUE 1/20    84 tablet    TAKE ONE TABLET BY MOUTH EVERY DAY. TAKE ONLY ACTIVE TABLETS    Menopause       SUMAtriptan 100 MG tablet    IMITREX    9 tablet    Take 1 tablet (100 mg) by mouth at onset of headache for migraine May repeat in 2 hours. Max 2 tablets/24 hours.    Migraine without aura and without status migrainosus, not intractable

## 2018-07-26 ASSESSMENT — PATIENT HEALTH QUESTIONNAIRE - PHQ9: SUM OF ALL RESPONSES TO PHQ QUESTIONS 1-9: 6

## 2018-07-26 ASSESSMENT — ANXIETY QUESTIONNAIRES: GAD7 TOTAL SCORE: 2

## 2018-07-27 DIAGNOSIS — R53.83 OTHER FATIGUE: ICD-10-CM

## 2018-07-27 DIAGNOSIS — Z11.4 SCREENING FOR HIV (HUMAN IMMUNODEFICIENCY VIRUS): ICD-10-CM

## 2018-07-27 DIAGNOSIS — Z13.1 SCREENING FOR DIABETES MELLITUS: ICD-10-CM

## 2018-07-27 DIAGNOSIS — Z13.0 SCREENING FOR DEFICIENCY ANEMIA: ICD-10-CM

## 2018-07-27 DIAGNOSIS — Z78.0 MENOPAUSE: ICD-10-CM

## 2018-07-27 DIAGNOSIS — Z13.220 LIPID SCREENING: ICD-10-CM

## 2018-07-27 LAB
ALBUMIN SERPL-MCNC: 4 G/DL (ref 3.4–5)
ALP SERPL-CCNC: 57 U/L (ref 40–150)
ALT SERPL W P-5'-P-CCNC: 19 U/L (ref 0–50)
ANION GAP SERPL CALCULATED.3IONS-SCNC: 9 MMOL/L (ref 3–14)
AST SERPL W P-5'-P-CCNC: 11 U/L (ref 0–45)
BILIRUB SERPL-MCNC: 0.5 MG/DL (ref 0.2–1.3)
BUN SERPL-MCNC: 20 MG/DL (ref 7–30)
CALCIUM SERPL-MCNC: 9 MG/DL (ref 8.5–10.1)
CHLORIDE SERPL-SCNC: 103 MMOL/L (ref 94–109)
CHOLEST SERPL-MCNC: 233 MG/DL
CO2 SERPL-SCNC: 25 MMOL/L (ref 20–32)
COPATH REPORT: NORMAL
CREAT SERPL-MCNC: 1.02 MG/DL (ref 0.52–1.04)
DEPRECATED CALCIDIOL+CALCIFEROL SERPL-MC: 37 UG/L (ref 20–75)
ERYTHROCYTE [DISTWIDTH] IN BLOOD BY AUTOMATED COUNT: 12.5 % (ref 10–15)
FERRITIN SERPL-MCNC: 50 NG/ML (ref 8–252)
GFR SERPL CREATININE-BSD FRML MDRD: 58 ML/MIN/1.7M2
GLUCOSE SERPL-MCNC: 84 MG/DL (ref 70–99)
HCT VFR BLD AUTO: 42 % (ref 35–47)
HDLC SERPL-MCNC: 38 MG/DL
HGB BLD-MCNC: 14.6 G/DL (ref 11.7–15.7)
HIV 1+2 AB+HIV1 P24 AG SERPL QL IA: NONREACTIVE
LDLC SERPL CALC-MCNC: ABNORMAL MG/DL
LDLC SERPL DIRECT ASSAY-MCNC: 116 MG/DL
MCH RBC QN AUTO: 30.9 PG (ref 26.5–33)
MCHC RBC AUTO-ENTMCNC: 34.8 G/DL (ref 31.5–36.5)
MCV RBC AUTO: 89 FL (ref 78–100)
NONHDLC SERPL-MCNC: 195 MG/DL
PAP: NORMAL
PLATELET # BLD AUTO: 204 10E9/L (ref 150–450)
POTASSIUM SERPL-SCNC: 4.2 MMOL/L (ref 3.4–5.3)
PROT SERPL-MCNC: 7.4 G/DL (ref 6.8–8.8)
RBC # BLD AUTO: 4.72 10E12/L (ref 3.8–5.2)
SODIUM SERPL-SCNC: 137 MMOL/L (ref 133–144)
TRIGL SERPL-MCNC: 523 MG/DL
TSH SERPL DL<=0.005 MIU/L-ACNC: 2.04 MU/L (ref 0.4–4)
VIT B12 SERPL-MCNC: 457 PG/ML (ref 193–986)
WBC # BLD AUTO: 6.5 10E9/L (ref 4–11)

## 2018-07-27 PROCEDURE — 83721 ASSAY OF BLOOD LIPOPROTEIN: CPT | Performed by: PHYSICIAN ASSISTANT

## 2018-07-27 PROCEDURE — 36415 COLL VENOUS BLD VENIPUNCTURE: CPT | Performed by: PHYSICIAN ASSISTANT

## 2018-07-27 PROCEDURE — 82728 ASSAY OF FERRITIN: CPT | Performed by: PHYSICIAN ASSISTANT

## 2018-07-27 PROCEDURE — 84443 ASSAY THYROID STIM HORMONE: CPT | Performed by: PHYSICIAN ASSISTANT

## 2018-07-27 PROCEDURE — 80061 LIPID PANEL: CPT | Performed by: PHYSICIAN ASSISTANT

## 2018-07-27 PROCEDURE — 82607 VITAMIN B-12: CPT | Performed by: PHYSICIAN ASSISTANT

## 2018-07-27 PROCEDURE — 85027 COMPLETE CBC AUTOMATED: CPT | Performed by: PHYSICIAN ASSISTANT

## 2018-07-27 PROCEDURE — 80053 COMPREHEN METABOLIC PANEL: CPT | Performed by: PHYSICIAN ASSISTANT

## 2018-07-27 PROCEDURE — 82306 VITAMIN D 25 HYDROXY: CPT | Performed by: PHYSICIAN ASSISTANT

## 2018-07-27 PROCEDURE — 87389 HIV-1 AG W/HIV-1&-2 AB AG IA: CPT | Performed by: PHYSICIAN ASSISTANT

## 2018-07-30 PROBLEM — E78.2 MIXED HYPERLIPIDEMIA: Status: ACTIVE | Noted: 2018-07-30

## 2018-07-30 LAB
FINAL DIAGNOSIS: NORMAL
HPV HR 12 DNA CVX QL NAA+PROBE: NEGATIVE
HPV16 DNA SPEC QL NAA+PROBE: NEGATIVE
HPV18 DNA SPEC QL NAA+PROBE: NEGATIVE
SPECIMEN DESCRIPTION: NORMAL
SPECIMEN SOURCE CVX/VAG CYTO: NORMAL

## 2018-07-30 RX ORDER — OMEGA-3 FATTY ACIDS/FISH OIL 300-1000MG
2 CAPSULE ORAL 2 TIMES DAILY
Qty: 90 CAPSULE | COMMUNITY
Start: 2018-07-30 | End: 2019-06-27

## 2018-07-30 NOTE — PROGRESS NOTES
Triage: please call pt and discuss high cholesterol results and inquire on significant diet changes.  She should start fish oil 2 grams twice daily and recheck with a fasting lab only appt in 3 months.    Kassie  I have reviewed your recent labs. Here are the results:    -Liver and gallbladder tests are normal. (ALT,AST, Alk phos, bilirubin), kidney function is mildly decreased, but stable (Cr, GFR), Sodium is normal, Potassium is normal, Calcium is normal, Glucose is normal (diabetes screening test).   -Your cholesterol has significantly worsened since it was checked 2 years ago.  Any significant diet changes?  LDL(bad) cholesterol level is elevated, HDL(good) cholesterol level is low and your triglycerides are elevated which can increase your heart disease risk.  A diet high in fat and simple carbohydrates, genetics and being overweight can contribute to this. ADVISE: Exercise, a low fat, low carbohydrate diet, weight control, and omega-3 fatty acids (fish oil) 8793-3694 mg daily are helpful to improve this.  Rechecking your fasting cholesterol panel in 6 months is recommended (Lipid w/ LDL reflex, DX: hyperlipidemia)  -TSH (thyroid stimulating hormone) level is normal which indicates normal thyroid function.  -Normal red blood cell (hgb) levels, normal white blood cell count and normal platelet levels.  -Vitamin D level is low-normal, 1000 IU daily in diet or supplements is recommended.   -Ferritin (iron) level is normal.  -B12 level is normal  -HIV screening is normal.    For additional lab test information, labtestsonline.org is an excellent reference.    If you have any questions please do not hesitate to contact our office via phone ([phone]) or MyChart.    Mary Alice Colón, MS, PA-C  [facility]

## 2018-11-26 ENCOUNTER — HEALTH MAINTENANCE LETTER (OUTPATIENT)
Age: 48
End: 2018-11-26

## 2019-01-27 ENCOUNTER — OFFICE VISIT (OUTPATIENT)
Dept: URGENT CARE | Facility: URGENT CARE | Age: 49
End: 2019-01-27
Payer: COMMERCIAL

## 2019-01-27 VITALS
SYSTOLIC BLOOD PRESSURE: 132 MMHG | TEMPERATURE: 97.5 F | HEART RATE: 64 BPM | OXYGEN SATURATION: 96 % | DIASTOLIC BLOOD PRESSURE: 90 MMHG

## 2019-01-27 DIAGNOSIS — J20.9 ACUTE BRONCHITIS WITH SYMPTOMS > 10 DAYS: Primary | ICD-10-CM

## 2019-01-27 DIAGNOSIS — R05.9 COUGH: ICD-10-CM

## 2019-01-27 PROCEDURE — 99214 OFFICE O/P EST MOD 30 MIN: CPT | Performed by: FAMILY MEDICINE

## 2019-01-27 RX ORDER — BENZONATATE 200 MG/1
200 CAPSULE ORAL 3 TIMES DAILY PRN
Qty: 21 CAPSULE | Refills: 0 | Status: SHIPPED | OUTPATIENT
Start: 2019-01-27 | End: 2019-06-27

## 2019-01-27 RX ORDER — CEFDINIR 300 MG/1
300 CAPSULE ORAL 2 TIMES DAILY
Qty: 20 CAPSULE | Refills: 0 | Status: SHIPPED | OUTPATIENT
Start: 2019-01-27 | End: 2019-06-27

## 2019-01-27 ASSESSMENT — ENCOUNTER SYMPTOMS: COUGH: 1

## 2019-01-27 NOTE — PATIENT INSTRUCTIONS
1. Ibuprofen 400 mg 3 times per day  2. Increase fluids, electrolytes gatorade  3. RTC 48-72 hours if not improved.

## 2019-01-27 NOTE — PROGRESS NOTES
SUBJECTIVE:   Kassie Ramirez is a 48 year old female presenting with a chief complaint of   Chief Complaint   Patient presents with     Urgent Care     URI     Cold x3 weeks. Cough, low grade fever, fatigue, coughing phlegm. Sx x1 week       She is an established patient of [facility].    URI Adult    Onset of symptoms was 3 week(s) ago.  Course of illness is worsening.    Severity moderate  Current and Associated symptoms: runny nose, stuffy nose, cough - productive and fatigue  Treatment measures tried include Tylenol/Ibuprofen.  Predisposing factors include None.        Review of Systems   Respiratory: Positive for cough.    All other systems reviewed and are negative.      Past Medical History:   Diagnosis Date     Anxiety attack 9/16/2014     Encounter for Essure implantation 2009     Generalized anxiety disorder 9/16/2014    zoloft = flat emotions     Menopausal disorder     started on OCPs by menopause center 3/2017 (takes active continuously)     Menstrual headache     helped by OCPs and magnesium     GERTRUDE (stress urinary incontinence, female)     sling procedure 2016     Family History   Problem Relation Age of Onset     Hypertension Mother      Depression Mother      Lipids Mother      Cardiovascular Mother      Circulatory Mother      Diabetes Mother      Heart Disease Mother      Cerebrovascular Disease Mother      Obesity Mother      C.A.D. Mother      Lung Cancer Mother         smoker     Eye Disorder Father         cone dystrophy     Macular Degeneration Father      Diabetes Maternal Aunt         type 2     Hypertension Maternal Aunt      Heart Disease Maternal Grandfather      Heart Disease Sister         high cholesterol       Macular Degeneration Sister      Current Outpatient Medications   Medication Sig Dispense Refill     benzonatate (TESSALON) 200 MG capsule Take 1 capsule (200 mg) by mouth 3 times daily as needed for cough 21 capsule 0     cefdinir (OMNICEF) 300 MG capsule Take 1 capsule (300  mg) by mouth 2 times daily for 10 days 20 capsule 0     cetirizine (ZYRTEC) 10 MG tablet Take 10 mg by mouth daily       citalopram (CELEXA) 20 MG tablet TAKE ONE TABLET BY MOUTH EVERY DAY 90 tablet 0     ibuprofen (ADVIL/MOTRIN) 800 MG tablet Take 1 tablet (800 mg) by mouth 3 times daily (with meals) For at least 5 days 60 tablet 1     norethindrone-ethinyl estradiol (HUE 1/20) 1-20 MG-MCG per tablet TAKE ONE TABLET BY MOUTH EVERY DAY. TAKE ONLY ACTIVE TABLETS 84 tablet 4     omega 3 1000 MG CAPS Take 2 g by mouth 2 times daily 90 capsule      Magnesium Oxide 140 MG CAPS Take 140 mg by mouth 2 times daily 120 capsule      SUMAtriptan (IMITREX) 100 MG tablet Take 1 tablet (100 mg) by mouth at onset of headache for migraine May repeat in 2 hours. Max 2 tablets/24 hours. (Patient not taking: Reported on 1/27/2019) 9 tablet 1     Social History     Tobacco Use     Smoking status: Never Smoker     Smokeless tobacco: Never Used   Substance Use Topics     Alcohol use: No     Alcohol/week: 0.0 oz     Comment: 1 time per month       OBJECTIVE  /90 (BP Location: Right arm, Patient Position: Chair, Cuff Size: Adult Regular)   Pulse 64   Temp 97.5  F (36.4  C) (Oral)   SpO2 96%     Physical Exam   Constitutional: She is oriented to person, place, and time. She appears well-developed.   HENT:   Head: Normocephalic.   Right Ear: External ear normal.   Nose: Nose normal.   Eyes: EOM are normal. Pupils are equal, round, and reactive to light.   Neck: Normal range of motion. Neck supple.   Cardiovascular: Normal rate and regular rhythm.   Pulmonary/Chest: Effort normal.   Coarse breath sounds bilaterally with rhonchi throughout lung fields   Abdominal: Soft. Bowel sounds are normal.   Musculoskeletal: Normal range of motion.   Neurological: She is alert and oriented to person, place, and time.   Skin: Skin is warm.   Nursing note and vitals reviewed.      Labs:  No results found for this or any previous visit (from the  past 24 hour(s)).    X-Ray was not done.    ASSESSMENT:      ICD-10-CM    1. Acute bronchitis with symptoms > 10 days J20.9 benzonatate (TESSALON) 200 MG capsule     cefdinir (OMNICEF) 300 MG capsule   2. Cough R05       Has been ill for several weeks and not seeming to improve.  Exam consistent with bronchitis however could certainly be an early pneumonia she does have abnormal breath sounds in addition her O2 sats are 96%  Medical Decision Making:    Differential Diagnosis:  URI Adult/Peds:  Bronchitis-viral, Bronchospasm, Croup, Pneumonia, Sinusitis, Viral syndrome and Viral upper respiratory illness    Serious Comorbid Conditions:  Adult:  None    PLAN: Fluids    URI Adult:  Tylenol and Ibuprofen    Followup:    If not improving or if condition worsens, follow up with your Primary Care Provider    Patient Instructions   1. Ibuprofen 400 mg 3 times per day  2. Increase fluids, electrolytes gatorade  3. RTC 48-72 hours if not improved.

## 2019-04-25 DIAGNOSIS — F41.1 GENERALIZED ANXIETY DISORDER: ICD-10-CM

## 2019-04-25 RX ORDER — CITALOPRAM HYDROBROMIDE 20 MG/1
TABLET ORAL
Qty: 90 TABLET | Refills: 0 | Status: SHIPPED | OUTPATIENT
Start: 2019-04-25 | End: 2019-08-22

## 2019-04-25 NOTE — TELEPHONE ENCOUNTER
"Requested Prescriptions   Pending Prescriptions Disp Refills     citalopram (CELEXA) 20 MG tablet [Pharmacy Med Name: CITALOPRAM HYDROBROMIDE 20MG TABS] 90 tablet 0     Sig: TAKE ONE TABLET BY MOUTH EVERY DAY       Last Refill:   citalopram (CELEXA) 20 MG tablet 90 tablet 0 1/16/2019  No   Sig: TAKE ONE TABLET BY MOUTH EVERY DAY     SSRIs Protocol Passed - 4/25/2019  8:04 AM        Passed - Recent (12 mo) or future (30 days) visit within the authorizing provider's specialty     Patient had office visit in the last 12 months or has a visit in the next 30 days with authorizing provider or within the authorizing provider's specialty.  See \"Patient Info\" tab in inbasket, or \"Choose Columns\" in Meds & Orders section of the refill encounter.      LOV: 07/25/2018          Passed - Medication is active on med list        Passed - Patient is age 18 or older        Passed - No active pregnancy on record        Passed - No positive pregnancy test in last 12 months        PHQ-9 SCORE 3/8/2018 7/25/2018 1/15/2019   PHQ-9 Total Score - - -   PHQ-9 Total Score MyChart 2 (Minimal depression) - 2 (Minimal depression)   PHQ-9 Total Score 2 6 2     BRIAN-7 SCORE 3/8/2018 7/25/2018 1/15/2019   Total Score - - -   Total Score 0 (minimal anxiety) - 1 (minimal anxiety)   Total Score 0 2 1     Refilled per RN Protocol.   Due for PX after 07/25/2019    Twyla Hudson RN  [facility]    "

## 2019-06-24 ENCOUNTER — NURSE TRIAGE (OUTPATIENT)
Dept: FAMILY MEDICINE | Facility: CLINIC | Age: 49
End: 2019-06-24

## 2019-06-24 NOTE — TELEPHONE ENCOUNTER
Requesting a same day today with LP for crackling in lungs.      Please triage and advise.       Shonda Molina

## 2019-06-26 NOTE — TELEPHONE ENCOUNTER
Attempt #2  Called patient @ # below - Left a non-detailed message with a female to have patient call back and speak with any triage nurse.    Twyla Hudson RN  [facility]

## 2019-06-27 ENCOUNTER — OFFICE VISIT (OUTPATIENT)
Dept: FAMILY MEDICINE | Facility: CLINIC | Age: 49
End: 2019-06-27
Payer: COMMERCIAL

## 2019-06-27 ENCOUNTER — ANCILLARY PROCEDURE (OUTPATIENT)
Dept: GENERAL RADIOLOGY | Facility: CLINIC | Age: 49
End: 2019-06-27
Attending: FAMILY MEDICINE
Payer: COMMERCIAL

## 2019-06-27 VITALS
WEIGHT: 172 LBS | HEART RATE: 107 BPM | BODY MASS INDEX: 27 KG/M2 | TEMPERATURE: 97.9 F | SYSTOLIC BLOOD PRESSURE: 102 MMHG | HEIGHT: 67 IN | OXYGEN SATURATION: 96 % | DIASTOLIC BLOOD PRESSURE: 76 MMHG

## 2019-06-27 DIAGNOSIS — R05.9 COUGH: Primary | ICD-10-CM

## 2019-06-27 DIAGNOSIS — R05.9 COUGH: ICD-10-CM

## 2019-06-27 DIAGNOSIS — T17.908A ASPIRATION INTO AIRWAY, INITIAL ENCOUNTER: ICD-10-CM

## 2019-06-27 PROCEDURE — 71046 X-RAY EXAM CHEST 2 VIEWS: CPT | Mod: FY

## 2019-06-27 PROCEDURE — 99214 OFFICE O/P EST MOD 30 MIN: CPT | Performed by: FAMILY MEDICINE

## 2019-06-27 RX ORDER — AZITHROMYCIN 250 MG/1
TABLET, FILM COATED ORAL
Qty: 6 TABLET | Refills: 0 | Status: SHIPPED | OUTPATIENT
Start: 2019-06-27 | End: 2019-08-22

## 2019-06-27 ASSESSMENT — MIFFLIN-ST. JEOR: SCORE: 1429.88

## 2019-06-27 NOTE — PATIENT INSTRUCTIONS
Return in about 1 month (around 7/27/2019) for recheck on aspiration , Physical Exam, Lab Work.                Thank you for choosing [facility]  for your Health Care. It was a pleasure seeing you at your visit today. Please contact us with any questions or concerns you may have.                   Felisha Simpson MD                                  To reach your [facility] care team after hours call:   [phone]    Our clinic hours are:     Monday- 7:30 am - 7:00 pm                             Tuesday through Friday- 7:30 am - 5:00 pm                                        Saturday- 8:00 am - 12:00 pm                  Phone:  [phone]    Our pharmacy hours are:     Monday  8:00 am to 7:00 pm      Tuesday through Friday 8:00am to 6:00pm                        Saturday - 9:00 am to 1:00 pm      Sunday : Closed.              Phone:  [phone]      There is also information available at our web site:  [email or web address]    If your provider ordered any lab tests and you do not receive the results within 10 business days, please call the clinic.    If you need a medication refill please contact your pharmacy.  Please allow 2 business days for your refill to be completed.    Our clinic offers telephone visits and e visits.  Please ask one of your team members to explain more.      Use [email or web address] (secure email communication and access to your chart) to send your primary care provider a message or make an appointment. Ask someone on your Team how to sign up for griddigt.

## 2019-06-27 NOTE — TELEPHONE ENCOUNTER
Called patient @ # below -     Stated she aspirated on some rice ~6 weeks ago and she thinks some rice are still in her lungs.   Patient now has a cough (nonproductive) and anytime she lies down she can hear crackling in her lungs.     DENIES: fevers, CP, SOB, Difficulty Breathing, coughing up blood, dizziness/lightheadedness, HA, difficulty swallowing    Advised OV - scheduled for 1040am today with MD NASIM    Advised patient that if new or worsening symptoms appear (reviewed new & worsening symptoms) to call the clinic or be seen in the the ER  Patient stated an understanding and agreed with plan.    Twyla Hudson RN  [facility]

## 2019-06-27 NOTE — PROGRESS NOTES
SUBJECTIVE:                                                    Kassie Ramirez is a 49 year old female who presents to clinic today for the following health issues:    Triaged today by Mikki Hudson RN  Stated she aspirated on some rice ~6 weeks ago and she thinks some rice are still in her lungs.   Patient now has a cough (nonproductive), but moist  and anytime she lies down she can hear crackling in her lungs.      DENIES: fevers, CP, SOB, Difficulty Breathing, coughing up blood, dizziness/lightheadedness, HA, difficulty swallowing.   No fevers, chills or sweats.     Patient Active Problem List   Diagnosis     CARDIOVASCULAR SCREENING; LDL GOAL LESS THAN 160     Anxiety attack     Generalized anxiety disorder     Controlled substance agreement signed     GERTRUDE (stress urinary incontinence, female)     Intractable migraine without aura and with status migrainosus     Menstrual headache     Menopausal disorder     Mixed hyperlipidemia       Current Outpatient Medications   Medication Sig Dispense Refill     cetirizine (ZYRTEC) 10 MG tablet Take 10 mg by mouth daily       citalopram (CELEXA) 20 MG tablet TAKE ONE TABLET BY MOUTH EVERY DAY (Patient taking differently: TAKING 1/2 TABLET DAILY) 90 tablet 0     ibuprofen (ADVIL/MOTRIN) 800 MG tablet Take 1 tablet (800 mg) by mouth 3 times daily (with meals) For at least 5 days 60 tablet 1     Magnesium Oxide 140 MG CAPS Take 140 mg by mouth 2 times daily 120 capsule           Allergies   Allergen Reactions     Cold & Flu [Cold Defense Fighter]      See pseudoephedrine     Seasonal Allergies      Sudafed Cold-Cough [Dayquil Liquicaps]      Pseudoephedrine Rash     Rash then skins peels off             Problem list and histories reviewed & adjusted, as indicated.  Additional history: as documented    Reviewed and updated as needed this visit by clinical staff  Tobacco  Allergies  Meds  Med Hx  Surg Hx  Fam Hx  Soc Hx      Reviewed and updated as needed this visit by  "Provider         ROS:   ROS: 12 point ROS neg other than the symptoms noted above    OBJECTIVE:                                                    /76 (BP Location: Left arm, Patient Position: Chair, Cuff Size: Adult Regular)   Pulse 107   Temp 97.9  F (36.6  C) (Oral)   Ht 1.689 m (5' 6.5\")   Wt 78 kg (172 lb)   LMP  (LMP Unknown)   SpO2 96%   BMI 27.35 kg/m    Body mass index is 27.35 kg/m .   GENERAL: healthy, alert, well nourished, well hydrated, no distress  HENT: ear canals- normal; TMs- normal; Nose- normal; Mouth- no ulcers, no lesions  NECK: no tenderness, no adenopathy, no asymmetry, no masses, no stiffness; thyroid- normal to palpation  RESP: lungs clear to auscultation - no rales, no rhonchi, no wheezes, even with forced expiration and cough   CV: regular rates and rhythm, normal S1 S2, no S3 or S4 and no murmur, no click or rub -  ABDOMEN: soft, no tenderness, no  hepatosplenomegaly, no masses, normal bowel sounds  MS: extremities- no gross deformities noted, no edema    Diagnostic test results:  Diagnostic Test Results:  Labs reviewed in Epic  cxr pa/lat: ? Some right peribronchial infiltrate /inflammation with some whitening of the right > left peribronchial tissues wiliam on lateral view with ? Some mild extension to the RML.      No results found for this or any previous visit (from the past 24 hour(s)).     ASSESSMENT/PLAN:                                                        ICD-10-CM    1. Cough R05 XR Chest 2 Views   2. Aspiration into airway, initial encounter T17.908A azithromycin (ZITHROMAX) 250 MG tablet     PULMONARY MEDICINE REFERRAL     Please, call or return to clinic or go to the ER immediately if signs or symptoms worsen or fail to improve as anticipated.   Suspect some degree of aspiration.  Will cover with azithromycin for now since not alcohol related. And rice is relatively inert.     Discussed with pt that if symptoms don't resolve with azithromycin that pt may need " bronchoscopy.  She is prepared for that. She'll call for pulmonary medicine appt and will cancel it if symptoms fully resolve in the next 1-2 weeks.    See Patient Instructions  Return in about 1 month (around 7/27/2019) for recheck on aspiration , Physical Exam, Lab Work.with Mary Alice Colón PA-C , pt's PCP.          Felisha Simpson MD    [facility]

## 2019-07-30 ENCOUNTER — MYC MEDICAL ADVICE (OUTPATIENT)
Dept: FAMILY MEDICINE | Facility: CLINIC | Age: 49
End: 2019-07-30

## 2019-08-20 ENCOUNTER — TRANSFERRED RECORDS (OUTPATIENT)
Dept: HEALTH INFORMATION MANAGEMENT | Facility: CLINIC | Age: 49
End: 2019-08-20

## 2019-08-22 ENCOUNTER — OFFICE VISIT (OUTPATIENT)
Dept: FAMILY MEDICINE | Facility: CLINIC | Age: 49
End: 2019-08-22
Payer: COMMERCIAL

## 2019-08-22 ENCOUNTER — TRANSFERRED RECORDS (OUTPATIENT)
Dept: HEALTH INFORMATION MANAGEMENT | Facility: CLINIC | Age: 49
End: 2019-08-22

## 2019-08-22 VITALS
WEIGHT: 170 LBS | BODY MASS INDEX: 26.68 KG/M2 | SYSTOLIC BLOOD PRESSURE: 136 MMHG | HEART RATE: 105 BPM | OXYGEN SATURATION: 98 % | TEMPERATURE: 98.1 F | HEIGHT: 67 IN | DIASTOLIC BLOOD PRESSURE: 82 MMHG

## 2019-08-22 DIAGNOSIS — Z01.818 PREOP GENERAL PHYSICAL EXAM: Primary | ICD-10-CM

## 2019-08-22 DIAGNOSIS — R93.89 ABNORMAL CT OF THE CHEST: ICD-10-CM

## 2019-08-22 DIAGNOSIS — R06.02 SOB (SHORTNESS OF BREATH): ICD-10-CM

## 2019-08-22 LAB
ERYTHROCYTE [DISTWIDTH] IN BLOOD BY AUTOMATED COUNT: 12.4 % (ref 10–15)
HCG UR QL: NEGATIVE
HCT VFR BLD AUTO: 39 % (ref 35–47)
HGB BLD-MCNC: 13.7 G/DL (ref 11.7–15.7)
MCH RBC QN AUTO: 30.8 PG (ref 26.5–33)
MCHC RBC AUTO-ENTMCNC: 35.1 G/DL (ref 31.5–36.5)
MCV RBC AUTO: 88 FL (ref 78–100)
PLATELET # BLD AUTO: 167 10E9/L (ref 150–450)
RBC # BLD AUTO: 4.45 10E12/L (ref 3.8–5.2)
WBC # BLD AUTO: 5.7 10E9/L (ref 4–11)

## 2019-08-22 PROCEDURE — 99214 OFFICE O/P EST MOD 30 MIN: CPT | Performed by: PHYSICIAN ASSISTANT

## 2019-08-22 PROCEDURE — 81025 URINE PREGNANCY TEST: CPT | Performed by: PHYSICIAN ASSISTANT

## 2019-08-22 PROCEDURE — 85027 COMPLETE CBC AUTOMATED: CPT | Performed by: PHYSICIAN ASSISTANT

## 2019-08-22 PROCEDURE — 93000 ELECTROCARDIOGRAM COMPLETE: CPT | Performed by: PHYSICIAN ASSISTANT

## 2019-08-22 PROCEDURE — 80053 COMPREHEN METABOLIC PANEL: CPT | Performed by: PHYSICIAN ASSISTANT

## 2019-08-22 PROCEDURE — 36415 COLL VENOUS BLD VENIPUNCTURE: CPT | Performed by: PHYSICIAN ASSISTANT

## 2019-08-22 RX ORDER — ALBUTEROL SULFATE 0.83 MG/ML
2.5 SOLUTION RESPIRATORY (INHALATION) EVERY 6 HOURS PRN
Qty: 30 VIAL | Refills: 1 | Status: ON HOLD | OUTPATIENT
Start: 2019-08-22 | End: 2019-09-19

## 2019-08-22 ASSESSMENT — MIFFLIN-ST. JEOR: SCORE: 1420.8

## 2019-08-22 NOTE — PROGRESS NOTES
[address] [address]  [phone]  Dept: [phone]    PRE-OP EVALUATION:  Today's date: 2019    Kassie Ramirez (: 1970) presents for pre-operative evaluation assessment as requested by Dr. Mitchell.  She requires evaluation and anesthesia risk assessment prior to undergoing surgery/procedure for treatment of chronic cough/abnormal CT scan .    Proposed Surgery/ Procedure: Endobronchial Ultrasound/bronchoscopy  Date of Surgery/ Procedure: 2019  Time of Surgery/ Procedure: 10am  Hospital/Surgical Facility: [facility]   Fax number for surgical facility: [phone]  Primary Physician: Mary Alice Colón  Type of Anesthesia Anticipated: to be determined    Patient has a Health Care Directive or Living Will:  YES, will bring in a copy.     1. NO - Do you have a history of heart attack, stroke, stent, bypass or surgery on an artery in the head, neck, heart or legs?  2. YES - DO YOU EVER HAVE ANY PAIN OR DISCOMFORT IN YOUR CHEST? Currently from coughing/fullness sensation of chest  3. NO - Do you have a history of  Heart Failure?  4. NO - Are you troubled by shortness of breath when: walking on the level, up a slight hill or at night?  5. NO - Do you currently have a cold, bronchitis or other respiratory infection?  6. YES - DO YOU HAVE A COUGH, SHORTNESS OF BREATH OR WHEEZING? Cough and Wheezing   7. NO - Do you sometimes get pains in the calves of your legs when you walk?  8. NO - Do you or anyone in your family have previous history of blood clots?  9. NO - Do you or does anyone in your family have a serious bleeding problem such as prolonged bleeding following surgeries or cuts?  10. NO - Have you ever had problems with anemia or been told to take iron pills?  11. NO - Have you had any abnormal blood loss such as black, tarry or bloody stools, or abnormal vaginal bleeding?  12. NO - Have you ever had a blood  "transfusion?  13. NO - Have you or any of your relatives ever had problems with anesthesia?  14. NO - Do you have sleep apnea, excessive snoring or daytime drowsiness?  15. NO - Do you have any prosthetic heart valves?  16. NO - Do you have prosthetic joints?  17. NO - Is there any chance that you may be pregnant?      HPI:     HPI related to upcoming procedure: Patient reports that earlier this summer she had an aspiration of rice which caused a constant cough.  She was evaluated by our office and treated with azithromycin.  She had slight improvement of her symptoms at that time.  She was evaluated by pulmonology due to persistence of her symptoms and CT showed lymph node enlargement that is concerning for possible sarcoidosis versus malignancy.  She is pursuing endoscopic bronchial ultrasound/bronchoscopy for further evaluation.  She has never been a smoker but was exposed to secondhand smoke as a child and [place] farm with many chemical exposures.  No family history of sarcoidosis.    During her evaluation her \"asthma test \"was very abnormal.  She states that she had a good response to a nebulizer treatment but was never offered one to use at home.  She would like this for symptomatic relief if possible.      MEDICAL HISTORY:     Patient Active Problem List    Diagnosis Date Noted     Mixed hyperlipidemia 07/30/2018     Priority: Medium     Menstrual headache      Priority: Medium     helped by OCPs and magnesium       Menopausal disorder      Priority: Medium     started on OCPs by menopause center 3/2017 (takes active continuously)       Intractable migraine without aura and with status migrainosus 04/25/2017     Priority: Medium     GERTRUDE (stress urinary incontinence, female)      Priority: Medium     Dr Berrios       Controlled substance agreement signed 03/13/2015     Priority: Medium     Patient is followed by Rachel Jones NP, NP for ongoing prescription of a controlled medicine. "   Medicine: Xanax 0.5mg  Maximum use per month: 20tablets/2 months.    Expected duration: undermined.    Medication agreement on file: YES - signed: 9/2014.  Clinic visit recommended: every 6 months             CARDIOVASCULAR SCREENING; LDL GOAL LESS THAN 160 09/16/2014     Priority: Medium     Anxiety attack 09/16/2014     Priority: Medium     Generalized anxiety disorder 09/16/2014     Priority: Medium      Past Medical History:   Diagnosis Date     Anxiety attack 9/16/2014     Encounter for Essure implantation 2009     Generalized anxiety disorder 9/16/2014    zoloft = flat emotions     Menopausal disorder     started on OCPs by menopause center 3/2017 (takes active continuously)     Menstrual headache     helped by OCPs and magnesium     GERTRUDE (stress urinary incontinence, female)     sling procedure 2016     Past Surgical History:   Procedure Laterality Date     H KIT ESSURE  2009    essure - Dr. Cailin Raymundo      HERNIORRHAPHY UMBILICAL  1974     SLING TRANSPUBO WITHOUT ANTERIOR COLPORRHAPHY N/A 11/21/2016    Procedure: SLING TRANSPUBO WITHOUT ANTERIOR COLPORRHAPHY;  Surgeon: Hernesto Berrios MD;  Location:  OR     Current Outpatient Medications   Medication Sig Dispense Refill     albuterol (PROVENTIL) (2.5 MG/3ML) 0.083% neb solution Take 1 vial (2.5 mg) by nebulization every 6 hours as needed for shortness of breath / dyspnea or wheezing 30 vial 1     cetirizine (ZYRTEC) 10 MG tablet Take 10 mg by mouth daily       order for DME Equipment being ordered: Nebulizer 1 each 0     OTC products: None, except as noted above    Allergies   Allergen Reactions     Cold & Flu [Cold Defense Fighter]      See pseudoephedrine     Seasonal Allergies      Sudafed Cold-Cough [Dayquil Liquicaps]      Pseudoephedrine Rash     Rash then skins peels off       Latex Allergy: NO    Social History     Tobacco Use     Smoking status: Never Smoker     Smokeless tobacco: Never Used   Substance Use Topics     Alcohol use: No      "Alcohol/week: 0.0 oz     Comment: 1 time per month     History   Drug Use No       REVIEW OF SYSTEMS:   Constitutional, neuro, ENT, endocrine, pulmonary, cardiac, gastrointestinal, genitourinary, musculoskeletal, integument and psychiatric systems are negative, except as otherwise noted.    EXAM:   /82 (BP Location: Right arm, Cuff Size: Adult Regular)   Pulse 105   Temp 98.1  F (36.7  C) (Oral)   Ht 1.689 m (5' 6.5\")   Wt 77.1 kg (170 lb)   SpO2 98%   BMI 27.03 kg/m      GENERAL APPEARANCE: healthy, alert and no distress     EYES: EOMI, PERRL     HENT: ear canals and TM's normal and nose and mouth without ulcers or lesions     NECK: no adenopathy, no asymmetry, masses, or scars and thyroid normal to palpation     RESP: lungs clear to auscultation - no rales, rhonchi or wheezes     CV: regular rates and rhythm, normal S1 S2, no S3 or S4 and no murmur, click or rub     ABDOMEN:  soft, nontender, no HSM or masses and bowel sounds normal     MS: extremities normal- no gross deformities noted, no evidence of inflammation in joints, FROM in all extremities.     SKIN: no suspicious lesions or rashes     NEURO: Normal strength and tone, sensory exam grossly normal, mentation intact and speech normal     PSYCH: mentation appears normal. and affect normal/bright     LYMPHATICS: No cervical adenopathy    DIAGNOSTICS:     EKG: appears normal, NSR, normal axis, normal intervals, no acute ST/T changes c/w ischemia, no LVH by voltage criteria, there are no prior tracings available  Labs Resulted Today:   Results for orders placed or performed in visit on 08/22/19   CBC with platelets   Result Value Ref Range    WBC 5.7 4.0 - 11.0 10e9/L    RBC Count 4.45 3.8 - 5.2 10e12/L    Hemoglobin 13.7 11.7 - 15.7 g/dL    Hematocrit 39.0 35.0 - 47.0 %    MCV 88 78 - 100 fl    MCH 30.8 26.5 - 33.0 pg    MCHC 35.1 31.5 - 36.5 g/dL    RDW 12.4 10.0 - 15.0 %    Platelet Count 167 150 - 450 10e9/L   Comprehensive metabolic panel "   Result Value Ref Range    Sodium 140 133 - 144 mmol/L    Potassium 4.2 3.4 - 5.3 mmol/L    Chloride 109 94 - 109 mmol/L    Carbon Dioxide PENDING 20 - 32 mmol/L    Anion Gap PENDING 3 - 14 mmol/L    Glucose PENDING 70 - 99 mg/dL    Urea Nitrogen PENDING 7 - 30 mg/dL    Creatinine PENDING 0.52 - 1.04 mg/dL    GFR Estimate PENDING >60 mL/min/[1.73_m2]    GFR Estimate If Black PENDING >60 mL/min/[1.73_m2]    Calcium PENDING 8.5 - 10.1 mg/dL    Bilirubin Total PENDING 0.2 - 1.3 mg/dL    Albumin PENDING 3.4 - 5.0 g/dL    Protein Total PENDING 6.8 - 8.8 g/dL    Alkaline Phosphatase PENDING 40 - 150 U/L    ALT PENDING 0 - 50 U/L    AST PENDING 0 - 45 U/L   HCG Qual, Urine ([id number])   Result Value Ref Range    HCG Qual Urine Negative NEG^Negative       Recent Labs   Lab Test 07/27/18  0921 11/14/16  1009   HGB 14.6 14.1    207    140   POTASSIUM 4.2 3.9   CR 1.02 0.96        IMPRESSION:   Reason for surgery/procedure: Abnormal CT scan and chronic cough  Diagnosis/reason for consult: Preoperative clearance    The proposed surgical procedure is considered INTERMEDIATE risk.    REVISED CARDIAC RISK INDEX  The patient has the following serious cardiovascular risks for perioperative complications such as (MI, PE, VFib and 3  AV Block):  No serious cardiac risks  INTERPRETATION: 0 risks: Class I (very low risk - 0.4% complication rate)    The patient has the following additional risks for perioperative complications:  No identified additional risks    Kassie was seen today for pre-op exam.    Diagnoses and all orders for this visit:    Preop general physical exam  -     CBC with platelets  -     Comprehensive metabolic panel  -     HCG Qual, Urine ([id number])  -     EKG 12-lead complete w/read - Clinics    SOB (shortness of breath)  We will prescribe the patient albuterol for symptomatic relief of her cough as well as a home nebulizer machine.  -     order for DME; Equipment being ordered: Nebulizer  -     albuterol  (PROVENTIL) (2.5 MG/3ML) 0.083% neb solution; Take 1 vial (2.5 mg) by nebulization every 6 hours as needed for shortness of breath / dyspnea or wheezing    Abnormal CT of the chest          RECOMMENDATIONS:     --Patient is on no chronic medications    APPROVAL GIVEN to proceed with proposed procedure, without further diagnostic evaluation       Signed Electronically by: ALEXANDER oHugh MD, FAAFP    [address]  [address] [phone] Fax      Copy of this evaluation report is provided to requesting physician.    [place] Preop Guidelines    Revised Cardiac Risk Index

## 2019-08-23 LAB
ALBUMIN SERPL-MCNC: 3.9 G/DL (ref 3.4–5)
ALP SERPL-CCNC: 76 U/L (ref 40–150)
ALT SERPL W P-5'-P-CCNC: 31 U/L (ref 0–50)
ANION GAP SERPL CALCULATED.3IONS-SCNC: 8 MMOL/L (ref 3–14)
AST SERPL W P-5'-P-CCNC: 21 U/L (ref 0–45)
BILIRUB SERPL-MCNC: 0.6 MG/DL (ref 0.2–1.3)
BUN SERPL-MCNC: 18 MG/DL (ref 7–30)
CALCIUM SERPL-MCNC: 9 MG/DL (ref 8.5–10.1)
CHLORIDE SERPL-SCNC: 109 MMOL/L (ref 94–109)
CO2 SERPL-SCNC: 23 MMOL/L (ref 20–32)
CREAT SERPL-MCNC: 1.16 MG/DL (ref 0.52–1.04)
GFR SERPL CREATININE-BSD FRML MDRD: 55 ML/MIN/{1.73_M2}
GLUCOSE SERPL-MCNC: 90 MG/DL (ref 70–99)
POTASSIUM SERPL-SCNC: 4.2 MMOL/L (ref 3.4–5.3)
PROT SERPL-MCNC: 6.8 G/DL (ref 6.8–8.8)
SODIUM SERPL-SCNC: 140 MMOL/L (ref 133–144)

## 2019-08-26 NOTE — RESULT ENCOUNTER NOTE
Kassie  I have reviewed your recent labs. Here are the results:    -Normal red blood cell (hgb) levels, normal white blood cell count and normal platelet levels.  -Liver and gallbladder tests are normal (ALT,AST, Alk phos, bilirubin), kidney function is decreased, but stable(Cr, GFR), sodium is normal, potassium is normal, calcium is normal, glucose is normal.    Good luck with your procedure!        If you have any questions please do not hesitate to contact our office via phone ([phone]) or [employer].    Mary Alice Colón, MS, PA-C  [facility]

## 2019-08-28 ENCOUNTER — TRANSFERRED RECORDS (OUTPATIENT)
Dept: HEALTH INFORMATION MANAGEMENT | Facility: CLINIC | Age: 49
End: 2019-08-28

## 2019-08-30 ENCOUNTER — MYC MEDICAL ADVICE (OUTPATIENT)
Dept: FAMILY MEDICINE | Facility: CLINIC | Age: 49
End: 2019-08-30

## 2019-08-30 NOTE — TELEPHONE ENCOUNTER
Routing to PCP for further review/recommendations/orders.    Twyla Hudson RN  [facility]

## 2019-09-03 NOTE — TELEPHONE ENCOUNTER
Called patient.  She heard from Dr. Horton personally.  Is on a prednisone burst with plan for f/u later this month for a breathing test.  Do not think it is cancer at this point in time.  No PET per Dr. Horton.    Advised pt to let me know if she runs into issues with scheduling in the future or if she wants a different referral.      Mary Alice Colón MS, PA-C

## 2019-09-18 ENCOUNTER — TELEPHONE (OUTPATIENT)
Dept: FAMILY MEDICINE | Facility: CLINIC | Age: 49
End: 2019-09-18

## 2019-09-18 ENCOUNTER — APPOINTMENT (OUTPATIENT)
Dept: CARDIOLOGY | Facility: CLINIC | Age: 49
End: 2019-09-18
Attending: PHYSICIAN ASSISTANT
Payer: COMMERCIAL

## 2019-09-18 ENCOUNTER — APPOINTMENT (OUTPATIENT)
Dept: GENERAL RADIOLOGY | Facility: CLINIC | Age: 49
End: 2019-09-18
Attending: EMERGENCY MEDICINE
Payer: COMMERCIAL

## 2019-09-18 ENCOUNTER — HOSPITAL ENCOUNTER (OUTPATIENT)
Facility: CLINIC | Age: 49
Setting detail: OBSERVATION
Discharge: HOME | End: 2019-09-19
Attending: EMERGENCY MEDICINE | Admitting: HOSPITALIST
Payer: COMMERCIAL

## 2019-09-18 ENCOUNTER — APPOINTMENT (OUTPATIENT)
Dept: CT IMAGING | Facility: CLINIC | Age: 49
End: 2019-09-18
Attending: PHYSICIAN ASSISTANT
Payer: COMMERCIAL

## 2019-09-18 DIAGNOSIS — R79.89 ELEVATED TROPONIN: ICD-10-CM

## 2019-09-18 DIAGNOSIS — I47.10 SVT (SUPRAVENTRICULAR TACHYCARDIA) (H): ICD-10-CM

## 2019-09-18 DIAGNOSIS — D86.9 SARCOIDOSIS: Primary | ICD-10-CM

## 2019-09-18 DIAGNOSIS — R93.89 ABNORMAL CT OF THE CHEST: ICD-10-CM

## 2019-09-18 DIAGNOSIS — R06.02 SOB (SHORTNESS OF BREATH): Primary | ICD-10-CM

## 2019-09-18 LAB
ANION GAP SERPL CALCULATED.3IONS-SCNC: 6 MMOL/L (ref 3–14)
BASOPHILS # BLD AUTO: 0.1 10E9/L (ref 0–0.2)
BASOPHILS NFR BLD AUTO: 1 %
BUN SERPL-MCNC: 13 MG/DL (ref 7–30)
CALCIUM SERPL-MCNC: 9.2 MG/DL (ref 8.5–10.1)
CHLORIDE SERPL-SCNC: 108 MMOL/L (ref 94–109)
CO2 SERPL-SCNC: 26 MMOL/L (ref 20–32)
CREAT SERPL-MCNC: 0.96 MG/DL (ref 0.52–1.04)
D DIMER PPP FEU-MCNC: 1.4 UG/ML FEU (ref 0–0.5)
DIFFERENTIAL METHOD BLD: NORMAL
EOSINOPHIL # BLD AUTO: 0.5 10E9/L (ref 0–0.7)
EOSINOPHIL NFR BLD AUTO: 6.1 %
ERYTHROCYTE [DISTWIDTH] IN BLOOD BY AUTOMATED COUNT: 12.8 % (ref 10–15)
GFR SERPL CREATININE-BSD FRML MDRD: 69 ML/MIN/{1.73_M2}
GLUCOSE SERPL-MCNC: 122 MG/DL (ref 70–99)
HCG SERPL QL: NEGATIVE
HCT VFR BLD AUTO: 42.4 % (ref 35–47)
HGB BLD-MCNC: 14.3 G/DL (ref 11.7–15.7)
IMM GRANULOCYTES # BLD: 0 10E9/L (ref 0–0.4)
IMM GRANULOCYTES NFR BLD: 0.2 %
INTERPRETATION ECG - MUSE: NORMAL
LYMPHOCYTES # BLD AUTO: 1 10E9/L (ref 0.8–5.3)
LYMPHOCYTES NFR BLD AUTO: 12.3 %
MAGNESIUM SERPL-MCNC: 2.2 MG/DL (ref 1.6–2.3)
MCH RBC QN AUTO: 30.2 PG (ref 26.5–33)
MCHC RBC AUTO-ENTMCNC: 33.7 G/DL (ref 31.5–36.5)
MCV RBC AUTO: 90 FL (ref 78–100)
MONOCYTES # BLD AUTO: 0.9 10E9/L (ref 0–1.3)
MONOCYTES NFR BLD AUTO: 10.7 %
NEUTROPHILS # BLD AUTO: 5.6 10E9/L (ref 1.6–8.3)
NEUTROPHILS NFR BLD AUTO: 69.7 %
NRBC # BLD AUTO: 0 10*3/UL
NRBC BLD AUTO-RTO: 0 /100
PLATELET # BLD AUTO: 160 10E9/L (ref 150–450)
POTASSIUM SERPL-SCNC: 4.1 MMOL/L (ref 3.4–5.3)
RADIOLOGIST FLAGS: ABNORMAL
RBC # BLD AUTO: 4.73 10E12/L (ref 3.8–5.2)
SODIUM SERPL-SCNC: 140 MMOL/L (ref 133–144)
TROPONIN I SERPL-MCNC: 0.08 UG/L (ref 0–0.04)
TROPONIN I SERPL-MCNC: 0.09 UG/L (ref 0–0.04)
TROPONIN I SERPL-MCNC: 0.11 UG/L (ref 0–0.04)
TSH SERPL DL<=0.005 MIU/L-ACNC: 2.06 MU/L (ref 0.4–4)
WBC # BLD AUTO: 8.1 10E9/L (ref 4–11)

## 2019-09-18 PROCEDURE — 84484 ASSAY OF TROPONIN QUANT: CPT | Performed by: PHYSICIAN ASSISTANT

## 2019-09-18 PROCEDURE — 83735 ASSAY OF MAGNESIUM: CPT | Performed by: EMERGENCY MEDICINE

## 2019-09-18 PROCEDURE — 25000131 ZZH RX MED GY IP 250 OP 636 PS 637: Performed by: PHYSICIAN ASSISTANT

## 2019-09-18 PROCEDURE — 99285 EMERGENCY DEPT VISIT HI MDM: CPT | Mod: 25

## 2019-09-18 PROCEDURE — 93306 TTE W/DOPPLER COMPLETE: CPT

## 2019-09-18 PROCEDURE — 96361 HYDRATE IV INFUSION ADD-ON: CPT

## 2019-09-18 PROCEDURE — 93005 ELECTROCARDIOGRAM TRACING: CPT | Mod: 76

## 2019-09-18 PROCEDURE — 25000128 H RX IP 250 OP 636: Performed by: HOSPITALIST

## 2019-09-18 PROCEDURE — 25000128 H RX IP 250 OP 636: Performed by: EMERGENCY MEDICINE

## 2019-09-18 PROCEDURE — 85025 COMPLETE CBC W/AUTO DIFF WBC: CPT | Performed by: EMERGENCY MEDICINE

## 2019-09-18 PROCEDURE — G0378 HOSPITAL OBSERVATION PER HR: HCPCS

## 2019-09-18 PROCEDURE — 84484 ASSAY OF TROPONIN QUANT: CPT | Performed by: EMERGENCY MEDICINE

## 2019-09-18 PROCEDURE — 71046 X-RAY EXAM CHEST 2 VIEWS: CPT

## 2019-09-18 PROCEDURE — 80048 BASIC METABOLIC PNL TOTAL CA: CPT | Performed by: EMERGENCY MEDICINE

## 2019-09-18 PROCEDURE — 84703 CHORIONIC GONADOTROPIN ASSAY: CPT | Performed by: EMERGENCY MEDICINE

## 2019-09-18 PROCEDURE — 71275 CT ANGIOGRAPHY CHEST: CPT

## 2019-09-18 PROCEDURE — 96374 THER/PROPH/DIAG INJ IV PUSH: CPT

## 2019-09-18 PROCEDURE — 85379 FIBRIN DEGRADATION QUANT: CPT | Performed by: EMERGENCY MEDICINE

## 2019-09-18 PROCEDURE — 93005 ELECTROCARDIOGRAM TRACING: CPT

## 2019-09-18 PROCEDURE — 99220 ZZC INITIAL OBSERVATION CARE,LEVL III: CPT | Performed by: PHYSICIAN ASSISTANT

## 2019-09-18 PROCEDURE — 84443 ASSAY THYROID STIM HORMONE: CPT | Performed by: EMERGENCY MEDICINE

## 2019-09-18 PROCEDURE — 93306 TTE W/DOPPLER COMPLETE: CPT | Mod: 26 | Performed by: INTERNAL MEDICINE

## 2019-09-18 PROCEDURE — 36415 COLL VENOUS BLD VENIPUNCTURE: CPT | Performed by: PHYSICIAN ASSISTANT

## 2019-09-18 RX ORDER — ADENOSINE 3 MG/ML
6 INJECTION, SOLUTION INTRAVENOUS ONCE
Status: COMPLETED | OUTPATIENT
Start: 2019-09-18 | End: 2019-09-18

## 2019-09-18 RX ORDER — AMOXICILLIN 250 MG
1 CAPSULE ORAL 2 TIMES DAILY PRN
Status: DISCONTINUED | OUTPATIENT
Start: 2019-09-18 | End: 2019-09-19 | Stop reason: HOSPADM

## 2019-09-18 RX ORDER — IOPAMIDOL 755 MG/ML
65 INJECTION, SOLUTION INTRAVASCULAR ONCE
Status: COMPLETED | OUTPATIENT
Start: 2019-09-18 | End: 2019-09-18

## 2019-09-18 RX ORDER — AMOXICILLIN 250 MG
2 CAPSULE ORAL 2 TIMES DAILY PRN
Status: DISCONTINUED | OUTPATIENT
Start: 2019-09-18 | End: 2019-09-19 | Stop reason: HOSPADM

## 2019-09-18 RX ORDER — NALOXONE HYDROCHLORIDE 0.4 MG/ML
.1-.4 INJECTION, SOLUTION INTRAMUSCULAR; INTRAVENOUS; SUBCUTANEOUS
Status: DISCONTINUED | OUTPATIENT
Start: 2019-09-18 | End: 2019-09-19 | Stop reason: HOSPADM

## 2019-09-18 RX ORDER — POLYETHYLENE GLYCOL 3350 17 G/17G
17 POWDER, FOR SOLUTION ORAL DAILY PRN
Status: DISCONTINUED | OUTPATIENT
Start: 2019-09-18 | End: 2019-09-19 | Stop reason: HOSPADM

## 2019-09-18 RX ORDER — PREDNISONE 20 MG/1
40 TABLET ORAL DAILY
Status: DISCONTINUED | OUTPATIENT
Start: 2019-09-18 | End: 2019-09-19 | Stop reason: HOSPADM

## 2019-09-18 RX ORDER — ACETAMINOPHEN 325 MG/1
650 TABLET ORAL EVERY 4 HOURS PRN
Status: DISCONTINUED | OUTPATIENT
Start: 2019-09-18 | End: 2019-09-19 | Stop reason: HOSPADM

## 2019-09-18 RX ORDER — ACETAMINOPHEN 650 MG/1
650 SUPPOSITORY RECTAL EVERY 4 HOURS PRN
Status: DISCONTINUED | OUTPATIENT
Start: 2019-09-18 | End: 2019-09-19 | Stop reason: HOSPADM

## 2019-09-18 RX ORDER — ONDANSETRON 2 MG/ML
4 INJECTION INTRAMUSCULAR; INTRAVENOUS EVERY 6 HOURS PRN
Status: DISCONTINUED | OUTPATIENT
Start: 2019-09-18 | End: 2019-09-19 | Stop reason: HOSPADM

## 2019-09-18 RX ORDER — ONDANSETRON 4 MG/1
4 TABLET, ORALLY DISINTEGRATING ORAL EVERY 6 HOURS PRN
Status: DISCONTINUED | OUTPATIENT
Start: 2019-09-18 | End: 2019-09-19 | Stop reason: HOSPADM

## 2019-09-18 RX ADMIN — IOPAMIDOL 65 ML: 755 INJECTION, SOLUTION INTRAVENOUS at 14:26

## 2019-09-18 RX ADMIN — ADENOSINE 6 MG: 3 INJECTION, SOLUTION INTRAVENOUS at 08:56

## 2019-09-18 RX ADMIN — PREDNISONE 40 MG: 20 TABLET ORAL at 16:15

## 2019-09-18 RX ADMIN — SODIUM CHLORIDE 1000 ML: 9 INJECTION, SOLUTION INTRAVENOUS at 09:59

## 2019-09-18 ASSESSMENT — MIFFLIN-ST. JEOR: SCORE: 1428.74

## 2019-09-18 ASSESSMENT — ENCOUNTER SYMPTOMS
ABDOMINAL PAIN: 0
UNEXPECTED WEIGHT CHANGE: 0
SHORTNESS OF BREATH: 1
COUGH: 1
PALPITATIONS: 1
DIARRHEA: 0
CONSTIPATION: 0
DYSURIA: 0
WEAKNESS: 0
HEADACHES: 0

## 2019-09-18 NOTE — PLAN OF CARE
"PRIMARY DIAGNOSIS: SOB/PALPITATIONS  OUTPATIENT/OBSERVATION GOALS TO BE MET BEFORE DISCHARGE:  1. Ruled out acute coronary syndrome (negative or stable Troponin):  No - continue serial troponin's.  2. Pain Status: Pain free.  3. Appropriate provocative testing performed: Yes  - Stress Test Procedure: Echo  - Interpretation of cardiac rhythm per telemetry tech: Sinus Tachy    4. Cleared by Consultants (if applicable):No.  5. Return to near baseline physical activity: Yes  Discharge Planner Nurse   Safe discharge environment identified: Yes  Barriers to discharge: No       Entered by: Alaina Kwon 09/18/2019 4:00 PM  /81 (BP Location: Right arm)   Pulse 105   Temp 98.6  F (37  C) (Oral)   Resp 18   Ht 1.702 m (5' 7\")   Wt 77.1 kg (170 lb)   LMP 09/11/2019   SpO2 96%   BMI 26.63 kg/m    A&Ox4. Tachycardic, HR in low 100s. Continues remote tele. Continues serial troponin's. VSS otherwise. LS clear/equal bilaterally. Infrequent dry cough. Up independently in room. Denies SOB. Denies nausea. BS active x4, tolerating regular diet, passing flatus. Voiding in adequate amt's. ECHO results pending. Po Prednisone started. Pt. reports feeling fatigued, wishes to rest at this time. Cardiology to consult. Will continue to monitor.    Please review provider order for any additional goals.   Nurse to notify provider when observation goals have been met and patient is ready for discharge.    "

## 2019-09-18 NOTE — ED PROVIDER NOTES
History     Chief Complaint:    Palpitations    The history is provided by the EMS personnel and the patient.      Kassie Ramirez is a 49 year old female with a history of hyperlipidemia and anxiety who arrived via EMS and presents with palpitations and shortness of breath. Per EMS, the patient has been experiencing shortness of breath and palpitations over the last 4 months. She has had a lung biopsy done, but results were normal. Last night her symptoms returned and she also felt pain in her left rib cage. Her symptoms reappeared again this morning around 0600. The patient's symptoms tend to come and go; they are not constant. These episodes typically last approximately 20 minutes at a time. The patient also has a cough, but it is not productive.She has never been hospitalized for something like this before. The patient denies any headaches, vision problems, abdominal pain, diarrhea, constipation, limb weakness, dysuria, leg swelling, recent weight loss, or bowel or urination problems. Her last menstrual cycle was a week ago and she denies any chance of pregnancy. The patient also denies any recent international travel. Of note: the patient aspirated some esther back in February. She was on prednisone for awhile, which did help her symptoms. In addition, her father had an autoimmune disease.    Allergies:  Cold & Flu [Cold Defense Fighter]  Seasonal Allergies  Sudafed Cold-Cough [Dayquil Liquicaps]  Pseudoephedrine    Medications:    Celexa  Proventil  Zyrtec    Past Medical History:    Mixed hyperlipidemia  Anxiety attack  Essure implantation  Generalized anxiety disorder  Menopausal disorder  Stress urinary incontinence  Menstrual headache    Past Surgical History:    Essure implantation  Herniorrhaphy umbilical  Sling transpubo without anterior colporrhaphy  Endobronchial ultrasound  Endobronchial ultrasound    Family History:    Hypertension - mother  Depression - mother  Lipids - mother  Cardiovascular -  "mother  Circulatory - mother  Diabetes - mother  Heart disease - mother  Cerebrovascular disease - mother  Obesity - mother  Coronary artery disease - mother  Lung cancer - mother  Cone dystrophy - father  Macular degeneration - father  Heart disease - sister  High cholesterol - sister  Macular degeneration - sister    Social History:  Negative for tobacco use.  Positive for alcohol use - 1 time/month.  Negative for drug use.  Marital Status:   [2]     Review of Systems   Constitutional: Negative for unexpected weight change.   Eyes: Negative for visual disturbance.   Respiratory: Positive for cough and shortness of breath.    Cardiovascular: Positive for chest pain and palpitations. Negative for leg swelling.   Gastrointestinal: Negative for abdominal pain, constipation and diarrhea.        Denies bowel issues   Genitourinary: Negative for dysuria.        Denies urinary issues   Neurological: Negative for weakness (limb) and headaches.   All other systems reviewed and are negative.      Physical Exam     Patient Vitals for the past 24 hrs:   BP Temp Temp src Pulse Heart Rate Resp SpO2 Height Weight   09/18/19 0945 109/74 -- -- 109 109 28 98 % -- --   09/18/19 0930 104/74 -- -- 110 109 22 100 % -- --   09/18/19 0900 -- -- -- 112 111 14 99 % -- --   09/18/19 0845 107/88 -- -- 170 168 15 97 % -- --   09/18/19 0833 (!) 126/94 97.5  F (36.4  C) Oral 174 174 12 98 % 1.702 m (5' 7\") 77.1 kg (170 lb)     Physical Exam  General: Patient is awake, alert and interactive when I enter the room  Head: The scalp, face, and head appear normal  Eyes: The pupils are equal, round, and reactive to light. Conjunctivae and sclerae are normal  ENT: External acoustic canals are normal. The oropharynx is normal without erythema. Uvula is in the midline  Neck: Normal range of motion. No anterior cervical lymphadenopathy noted  CV: Tachycardic rate. S1/S2. No murmurs.   Resp: Lungs are clear without wheezes or rales. No respiratory " distress.   GI: Abdomen is soft, no rigidity, guarding, or rebound. No distension. No tenderness to palpation in any quadrant.   MS: Normal tone. Joints grossly normal without effusions. No asymmetric leg swelling, calf or thigh tenderness.    Skin: No rash or lesions noted. Normal capillary refill noted  Neuro: Speech is normal and fluent. Face is symmetric. Moving all extremities.   Psych:  Normal affect.  Appropriate interactions.    Emergency Department Course     ECG (8:1:54):  Rate 175 bpm. NH interval 118. QRS duration 84. QT/QTc 266/467. P-R-T axes 4 51 -3. Abnormal ECG. Probable supraventricular tachycardia. Inferior and anterior T wave abnormality is nonspecific. Interpreted at 0830 by Leroy Harding MD.    ECG (8:37:52):  Rate 174 bpm. NH interval *. QRS duration 74. QT/QTc 266/452. P-R-T axes * 31 -25. Supraventricular tachycardia. Nonspecific ST and T wave abnormality. Abnormal ECG. No significant change compared to EKG dated 8/22/19. Interpreted at 0910 by Leroy Harding MD.    ECG (8:56:38):  Rate 114 bpm. NH interval 138. QRS duration 64. QT/QTc 316/435. P-R-T axes 40 52 17. Sinus tachycardia. Nonspecific ST and T wave abnormality. Abnormal ECG. No significant change compared to EKG dated 8/22/19. Interpreted at 0910 by Leroy Harding MD.    ECG (8:57:24):  Rate 111 bpm. NH interval 146. QRS duration 66. QT/QTc 328/446. P-R-T axes 41 32 -14. Sinus tachycardia. Nonspecific ST and T wave abnormality. Abnormal ECG. No significant change compared to EKG dated 8/22/19. Interpreted at 0910 by Leroy Harding MD.    Imaging:  Radiology findings were communicated with the patient who voiced understanding of the findings.    XR  Chest 2 Views:   Recommend CT chest with IV contrast to evaluate an  enlarged appearing left hilum and rule out hilar adenopathy. Heart  size and pulmonary vessels within normal limits. No acute appearing  infiltrate or pleural effusion, as per  radiology.     Laboratory:  Laboratory findings were communicated with the patient who voiced understanding of the findings.    CBC: AWNL. (WBC 8.1, HGB 14.3, )   BMP: Glucose 122 H o/w WNL (Creatinine 0.96)  Troponin I (0933): 0.079 H  TSH with free T4 reflex: 2.06  Magnesium: 2.2  HCG qualitative: Negative  D dimer quantitative: 1.4 H    Interventions:  0856: Adenocard 6 mg IV   0959: 0.9% sodium chloride BOLUS 1 L IV    Emergency Department Course:  Past medical records, nursing notes, and vitals reviewed.    0831: I performed an exam of the patient as documented above.     EKG obtained in the ED, see results above.     IV was inserted and blood was drawn for laboratory testing, results above.    The patient was sent for a chest x-ray while in the emergency department, results above.     0857: Chemical cardioversion with adenosine successful.    0932: Patient rechecked and updated.  Patient is feeling improved and her heart rate is around 105.    0952: I discussed the findings of the x-ray with radiology.    0956: Patient rechecked and updated.    1005: I spoke with imaging regarding and urgent result.    I personally reviewed the laboratory, imaging, and EKG results with the Patient and answered all related questions prior to admission.     Findings and plan explained to the Patient who consents to admission.     1035: Discussed the patient with WARREN Jackson, who will admit the patient to a observation bed for further monitoring, evaluation, and treatment.     Impression & Plan     Medical Decision Making:  Patient is a 49-year-old female who presents to the emergency department today with racing heartbeat and palpitations that began last night.  Initial EKG reveals the patient is in supraventricular tachycardia at a rate of 170.  The patient was placed on the cardiac monitor and defibrillator pads were placed.  IV access was established.  Patient was then chemically cardioverted with  adenosine successfully.  Blood work revealed a elevated troponin.  But otherwise her electrolytes were within normal limits.  Given that the patient has a elevated troponin and the patient has been undergoing work-up for possible malignancy I feel the best course of action to admit the patient overnight for observation.  The patient did have a chest x-ray which revealed a significant perihilar mass which is been known about her previous work-ups.  I did call and discussed the case with the reading radiologist and referred them to previous CT chest with IV contrast done on August 20 with subsequent bronchoscopy which revealed a negative biopsy at that time.  At this point during her evaluation I did not feel strongly about pursuing a CT with contrast.  However later during her evaluation her d-dimer did become positive at this point further investigation given her dysrhythmia and abnormal blood test and a CT of her chest to evaluate for PE was warranted.  This is negative.  Patient will be admitted for observation for further evaluation and treatment.    Discharge Diagnosis:    ICD-10-CM    1. SVT (supraventricular tachycardia) (H) I47.1 D dimer quantitative     D dimer quantitative     CANCELED: D dimer quantitative   2. Elevated troponin R74.8      Disposition:  Admitted to observation bed.    Scribe Disclosure:  Hazel BOLAÑOS, am serving as a scribe at 8:48 AM on 9/18/2019 to document services personally performed by Leroy Harding MD based on my observations and the provider's statements to me.     Leroy Harding MD  9/18/2019   [facility] EMERGENCY DEPARTMENT       Leroy Harding MD  09/18/19 8472

## 2019-09-18 NOTE — ED NOTES
[facility]  ED Nurse Handoff Report    Kassie Ramirez is a 49 year old female   ED Chief complaint: Palpitations  . ED Diagnosis:   Final diagnoses:   SVT (supraventricular tachycardia) (H)   Elevated troponin     Allergies:   Allergies   Allergen Reactions     Cold & Flu [Cold Defense Fighter]      See pseudoephedrine     Seasonal Allergies      Sudafed Cold-Cough [Dayquil Liquicaps]      Pseudoephedrine Rash     Rash then skins peels off        Code Status: Full Code  Activity level - Baseline/Home:  Independent. Activity Level - Current:   Independent. Lift room needed: No. Bariatric: No   Needed: No   Isolation: No. Infection: Not Applicable.     Vital Signs:   Vitals:    09/18/19 0845 09/18/19 0900 09/18/19 0930 09/18/19 0945   BP: 107/88  104/74 109/74   Pulse: 170 112 110 109   Resp: 15 14 22 28   Temp:       TempSrc:       SpO2: 97% 99% 100% 98%   Weight:       Height:           Cardiac Rhythm:  ,      Pain level:    Patient confused: No. Patient Falls Risk: Yes.   Elimination Status: not yet   Patient Report - Initial Complaint: palpitations/svt. Focused Assessment:  49 year old female with a history of hyperlipidemia and anxiety who arrived via EMS and presents with with palpitations and shortness of breath. Per EMS, the patient has been experiencing shortness of breath and palpitations over the last 4 months. She has had a lung biopsy done, but results were normal. Last night her symptoms returned and she also felt pain in her left rib cage. Her symptoms reappeared again this morning around 0600. The patient's symptoms tend to come and go; they are not constant. These episodes typically last approximately 20 minutes at a time. The patient also has a cough, but it is not productive.She has never been hospitalized for something like this before. The patient denies any headaches, vision problems, abdominal pain, diarrhea, constipation, limb weakness, dysuria, leg swelling, recent  weight loss, or bowel or urination problems. Her last menstrual cycle was a week ago and she denies any chance of pregnancy. The patient also denies any recent international travel. Of note: the patient aspirated some esther back in February. She was on prednisone for awhile, which did help her symptoms. In addition, her father had an autoimmune disease.  Tests Performed: labs, imaging. Abnormal Results:   Labs Ordered and Resulted from Time of ED Arrival Up to the Time of Departure from the ED   BASIC METABOLIC PANEL - Abnormal; Notable for the following components:       Result Value    Glucose 122 (*)     All other components within normal limits   TROPONIN I - Abnormal; Notable for the following components:    Troponin I ES 0.079 (*)     All other components within normal limits   CBC WITH PLATELETS DIFFERENTIAL   TSH WITH FREE T4 REFLEX   MAGNESIUM   HCG QUALITATIVE   D DIMER QUANTITATIVE   CARDIAC CONTINUOUS MONITORING   PULSE OXIMETRY NURSING   PERIPHERAL IV CATHETER   PATIENT CARE ORDER     XR Chest 2 Views   Preliminary Result   Abnormal   IMPRESSION: Recommend CT chest with IV contrast to evaluate an   enlarged appearing left hilum and rule out hilar adenopathy. Heart   size and pulmonary vessels within normal limits. No acute appearing   infiltrate or pleural effusion.      [Access Center: Prominent left hilum. CT chest with IV contrast   recommended for further evaluation.]      This report will be copied to the [place] to ensure a   provider acknowledges the finding. Access Center is available Monday   through Friday 8am-3:30 pm.         Treatments provided: see mar  Family Comments: at bedside  OBS brochure/video discussed/provided to patient:  yes  ED Medications:   Medications   0.9% sodium chloride BOLUS (1,000 mLs Intravenous New Bag 9/18/19 8561)   adenosine (ADENOCARD) injection 6 mg (6 mg Intravenous Given 9/18/19 4296)     Drips infusing:  No  For the majority of the shift, the  patient's behavior Green. Interventions performed were na.     Severe Sepsis OR Septic Shock Diagnosis Present: No      ED Nurse Name/Phone Number: Snehal Dobbs RN,   10:02 AM    RECEIVING UNIT ED HANDOFF REVIEW    Above ED Nurse Handoff Report was reviewed: Yes  Reviewed by: Remington Romo RN on September 18, 2019 at 11:47 AM

## 2019-09-18 NOTE — ED NOTES
Bed: ED33  Expected date: 9/18/19  Expected time: 8:13 AM  Means of arrival: Ambulance  Comments:  [place] ?[phone] 49 F SVT

## 2019-09-18 NOTE — H&P
History and Physical     Kassie Ramirez MRN# [id number]   YOB: 1970 Age: 49 year old      Date of Admission:  9/18/2019    Primary care provider: Mary Alice Colón          Assessment and Plan:   Kassie Ramirez is a 49 year old female with a PMH significant for known non cancerous hilar adenopathy  being worked up by a pulmonologist with suspicion for sarcoidosis and generalized anxiety disorder who presents with palpitations and difficulty breathing with SVT.    Patient was discussed with Dr. Harding, who was provider in ED. Chart review of ED work up was reviewed as well as chart review of Care Everywhere, previous visits and admissions.     #SVT  Patient recalls multiple intermittent episodes of heart palpitations associated with shortness of breath and left-sided chest discomfort.  She has associated these with her recently diagnosed lung disease (possibly sarcoidosis) but episodes have been more frequent and longer in duration.  In the ED her initial heart rate was 174 with pressure of 126/94 and she was given adenosine 6 mg with conversion to a sinus tachycardia.  Her electrolytes and TSH are normal.  D-dimer is slightly elevated at 1.4 with tachycardia and chest discomfort.  She has not been ill or started any new medications recently.  She is currently being worked up by pulmonary at the [facility] for possible sarcoidosis.  -Monitor on telemetry  -Trend troponin  -Obtain echocardiogram  -CT PE study given d dimer elevation, persistent tachycardia and chest discomfort (CT PE negative)  -Recheck electrolytes including magnesium in a.m.  -Consider event monitor at discharge    #Non cancerous hilar adenopathy   Patient reports being recently diagnosed with hilar adenopathy in February of this year.  She presented with cough and shortness of breath thought to be pneumonia initially treated with antibiotics without improvement.  She is being followed by pulmonary (  Yoan Horton at [facility]- [phone]). Recent lung biopsy was negative for cancer but there is concern for sarcoidosis.  She was started on oral steroids with improvement of her cough, shortness of breath and chest tightness but stopped these 1 week ago with recurrent symptoms.  She is scheduled to see them in 2 weeks. Chest x ray shows enlarged left hilum.  Possibly cardiac involvement with SVT??  -I have after message for the lung [place] [facility] to call me back  -Plan for follow-up in 2 weeks    Spoke with Dr. Horton who recommended restarting Prednisone 40 mg daily until he can see her on 9/27. Recommended Cardiology input given concern for cardiac sarcoid so consult placed              Social: No concerns  Code: Discussed with patientand they have chosen full code  VTE prophylaxis: Ambulation  Disposition: Observation                    Chief Complaint:   Chest pain and shortness of breath due to SVT         History of Present Illness:   Kassie Ramirez is a 49 year old female who presents with palpitations and shortness of breath.  She states last night she noted intermittent episodes of increased heart palpitations and shortness of breath.  This was associated with left-sided chest discomfort.  She went to sleep and this morning awoke to continued but worsened symptoms.  These episodes usually last about 20 minutes and then self resolved.  Since February of this year she has had an ongoing cough and difficulty breathing.  She was found to have hilar adenopathy which were biopsied and negative for cancer.  She was treated with oral steroids and has been followed by a pulmonologist at [facility] with follow-up scheduled in 2 weeks.  She stopped the steroids about 1 week ago and since stopping has noted recurrent left-sided chest discomfort, shortness of breath and nonproductive cough. She does not smoke cigarettes, drink alcohol, or drink excessive amounts of  caffeine.              Past Medical History:     Past Medical History:   Diagnosis Date     Anxiety attack 9/16/2014     Encounter for Essure implantation 2009     Generalized anxiety disorder 9/16/2014    zoloft = flat emotions     Menopausal disorder     started on OCPs by menopause center 3/2017 (takes active continuously)     Menstrual headache     helped by OCPs and magnesium     GERTRUDE (stress urinary incontinence, female)     sling procedure 2016               Past Surgical History:     Past Surgical History:   Procedure Laterality Date     H KIT ESSURE  2009    essure - Dr. 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     Lifestyle     Physical activity:     Days per week: Not on file     Minutes per session: Not on file     Stress: Not on file   Relationships     Social connections:     Talks on phone: Not on file     Gets together: Not on file     Attends Zoroastrianism  service: Not on file     Active member of club or organization: Not on file     Attends meetings of clubs or organizations: Not on file     Relationship status: Not on file     Intimate partner violence:     Fear of current or ex partner: Not on file     Emotionally abused: Not on file     Physically abused: Not on file     Forced sexual activity: Not on file   Other Topics Concern     Parent/sibling w/ CABG, MI or angioplasty before 65F 55M? No      Service Not Asked     Blood Transfusions Not Asked     Caffeine Concern Yes     Comment: MOnster energy drink.   Te - 3 cups.        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                 Family History:     Family History   Problem Relation Age of Onset     Hypertension Mother      Depression Mother      Lipids Mother      Cardiovascular Mother      Circulatory Mother      Diabetes Mother      Heart Disease Mother      Cerebrovascular Disease Mother      Obesity Mother      C.A.D. Mother      Lung Cancer Mother         smoker     Eye Disorder Father         cone dystrophy     Macular Degeneration Father      Diabetes Maternal Aunt         type 2     Hypertension Maternal Aunt      Heart Disease Maternal Grandfather      Heart Disease Sister         high cholesterol       Macular Degeneration Sister               Allergies:      Allergies   Allergen Reactions     Cold & Flu [Cold Defense Fighter]      See pseudoephedrine     Seasonal Allergies      Sudafed Cold-Cough [Dayquil Liquicaps]      Pseudoephedrine Rash     Rash then skins peels off                Medications:     Prior to Admission medications    Medication Sig Last Dose Taking? Auth Provider   albuterol (PROVENTIL) (2.5  "MG/3ML) 0.083% neb solution Take 1 vial (2.5 mg) by nebulization every 6 hours as needed for shortness of breath / dyspnea or wheezing prn Yes Mary Alice Colón PA-C   cetirizine (ZYRTEC) 10 MG tablet Take 10 mg by mouth daily as needed  prn Yes Reported, Patient   order for DME Equipment being ordered: Nebulizer   Mary Alice Colón PA-C              Review of Systems:   A Comprehensive greater than 10 system review of systems was carried out.  Pertinent positives and negatives are noted above.  Otherwise negative for contributory information.            Physical Exam:   Blood pressure 111/75, pulse 105, temperature 97.6  F (36.4  C), temperature source Oral, resp. rate 20, height 1.702 m (5' 7\"), weight 77.1 kg (170 lb), last menstrual period 09/11/2019, SpO2 99 %, not currently breastfeeding.  Exam:  GENERAL:  Comfortable.  PSYCH: pleasant, oriented, No acute distress.  HEENT:  PERRLA. Normal conjunctiva, normal hearing, nasal mucosa and Oropharynx are normal.  NECK:  Supple, no neck vein distention, adenopathy or bruits, normal thyroid.  HEART:  Normal S1, S2 with no murmur, no pericardial rub, gallops or S3 or S4.  LUNGS:  Dry cough. Clear to auscultation, normal Respiratory effort. No wheezing, rales or ronchi.  ABDOMEN:  Soft, no hepatosplenomegaly, normal bowel sounds. Non-tender, non distended.   EXTREMITIES:  No pedal edema, +2 pulses bilateral and equal.  SKIN:  Dry to touch, No rash, wound or ulcerations.  NEUROLOGIC:  CN 2-12 grossly intact,  sensation is intact with no focal deficits.               Data:     Recent Labs   Lab 09/18/19  0845   WBC 8.1   HGB 14.3   HCT 42.4   MCV 90        Recent Labs   Lab 09/18/19  0845      POTASSIUM 4.1   CHLORIDE 108   CO2 26   ANIONGAP 6   *   BUN 13   CR 0.96   GFRESTIMATED 69   GFRESTBLACK 80   AFSHAN 9.2   MAG 2.2     Recent Labs   Lab 09/18/19  0845   TROPI 0.079*         Recent Results (from the past 24 hour(s))   XR Chest 2 Views "   Result Value    Radiologist flags Prominent left hilum. CT chest with IV contrast (Urgent)    Narrative    CHEST TWO VIEWS September 18, 2019 9:17 AM     HISTORY: Shortness of breath.    COMPARISON: 6/27/2019 chest x-ray.      Impression    IMPRESSION: Recommend CT chest with IV contrast to evaluate an  enlarged appearing left hilum and rule out hilar adenopathy. Heart  size and pulmonary vessels within normal limits. No acute appearing  infiltrate or pleural effusion.    [Access Center: Prominent left hilum. CT chest with IV contrast  recommended for further evaluation.]    This report will be copied to the [place] [facility] [place] to ensure a  provider acknowledges the finding. Access Center is available Monday  through Friday 8am-3:30 pm.          Demetra Hurley PA-C

## 2019-09-18 NOTE — TELEPHONE ENCOUNTER
CTC on file for spouse, Florian    Attempt #1  Called # below - Left a non-detailed message to call back and speak with any triage nurse.    Twyla Hudson RN  [facility]

## 2019-09-18 NOTE — PLAN OF CARE
PRIMARY DIAGNOSIS: Palpitations/SOB  OUTPATIENT/OBSERVATION GOALS TO BE MET BEFORE DISCHARGE:  ADLs back to baseline: Yes    Activity and level of assistance: Ambulating independently.    Pain status: Pain free.    Return to near baseline physical activity: Yes     Discharge Planner Nurse   Safe discharge environment identified: Yes  Barriers to discharge: Yes       Entered by: Remington Romo 09/18/2019 2:37 PM     Please review provider order for any additional goals.   Nurse to notify provider when observation goals have been met and patient is ready for discharge.    RN - HR tachycardic - via telemetry. Other vitals stable. Pt denies pain. Troponin value increasing next value due to be drawn at 1645. Pt receiving bedside echo and chest CT. Plan to continue ongoing monitoring.

## 2019-09-18 NOTE — ED TRIAGE NOTES
Patient presents palpitations that got bad around 6 am. Blood sugar 113. Patient reports symptoms have been going on for the past 4 months with CHERIE GAMA at bedside.

## 2019-09-18 NOTE — PHARMACY-ADMISSION MEDICATION HISTORY
Admission medication history interview status for this patient is complete. See [facility] admission navigator for allergy information, prior to admission medications and immunization status.     Medication history interview source(s):Patient  Medication history resources (including written lists, pill bottles, clinic record): Care Everywhere records  Primary pharmacy: [facility]    Changes made to PTA medication list:  Added:  None  Deleted:  None  Changed:  Zyrtec to prn    Actions taken by pharmacist (provider contacted, etc):None     Additional medication history information:None    Medication reconciliation/reorder completed by provider prior to medication history? No    Do you take OTC medications (eg tylenol, ibuprofen, fish oil, eye/ear drops, etc)?  Yes, as listed.    For patients on insulin therapy:  No    Prior to Admission medications    Medication Sig Last Dose Taking? Auth Provider   albuterol (PROVENTIL) (2.5 MG/3ML) 0.083% neb solution Take 1 vial (2.5 mg) by nebulization every 6 hours as needed for shortness of breath / dyspnea or wheezing prn Yes Mary Alice Colón PA-C   cetirizine (ZYRTEC) 10 MG tablet Take 10 mg by mouth daily as needed  prn Yes Reported, Patient   order for DME Equipment being ordered: Nebulizer   Mary Alice Colón PA-C

## 2019-09-18 NOTE — TELEPHONE ENCOUNTER
Reason for call:  Patient reporting a symptom    Symptom or request: Patient is in Hospital     Duration (how long have symptoms been present): ongoing     Have you been treated for this before? Yes    Additional comments: patient  called because wife is in the ER and is getting admitted soon and would like to talk to Dr Colón about wife`s ongoing health and to update her.     Phone Number patient can be reached at:  Home number on file [phone] (home)    Best Time:  Any     Can we leave a detailed message on this number:  YES    Call taken on 9/18/2019 at 10:23 AM by Amanda Hartmann

## 2019-09-18 NOTE — PROGRESS NOTES
ROOM # 203  Living Situation: Home  : Florian - [phone]    Activity level at baseline: Ind  Activity level on admit: Ind      Patient registered to observation; given Patient Bill of Rights; given the opportunity to ask questions about observation status and their plan of care.  Patient has been oriented to the observation room, bathroom and call light is in place.    Discussed discharge goals and expectations with patient/family.

## 2019-09-19 ENCOUNTER — APPOINTMENT (OUTPATIENT)
Dept: CARDIOLOGY | Facility: CLINIC | Age: 49
End: 2019-09-19
Attending: PHYSICIAN ASSISTANT
Payer: COMMERCIAL

## 2019-09-19 VITALS
TEMPERATURE: 96.6 F | RESPIRATION RATE: 16 BRPM | HEART RATE: 89 BPM | OXYGEN SATURATION: 94 % | BODY MASS INDEX: 26.71 KG/M2 | SYSTOLIC BLOOD PRESSURE: 130 MMHG | DIASTOLIC BLOOD PRESSURE: 90 MMHG | HEIGHT: 67 IN | WEIGHT: 170.19 LBS

## 2019-09-19 LAB
ANION GAP SERPL CALCULATED.3IONS-SCNC: 4 MMOL/L (ref 3–14)
BUN SERPL-MCNC: 13 MG/DL (ref 7–30)
CALCIUM SERPL-MCNC: 9 MG/DL (ref 8.5–10.1)
CHLORIDE SERPL-SCNC: 106 MMOL/L (ref 94–109)
CO2 SERPL-SCNC: 26 MMOL/L (ref 20–32)
CREAT SERPL-MCNC: 0.64 MG/DL (ref 0.52–1.04)
ERYTHROCYTE [DISTWIDTH] IN BLOOD BY AUTOMATED COUNT: 12.4 % (ref 10–15)
GFR SERPL CREATININE-BSD FRML MDRD: >90 ML/MIN/{1.73_M2}
GLUCOSE SERPL-MCNC: 131 MG/DL (ref 70–99)
HCT VFR BLD AUTO: 40.5 % (ref 35–47)
HGB BLD-MCNC: 13.5 G/DL (ref 11.7–15.7)
MAGNESIUM SERPL-MCNC: 2.4 MG/DL (ref 1.6–2.3)
MCH RBC QN AUTO: 30.3 PG (ref 26.5–33)
MCHC RBC AUTO-ENTMCNC: 33.3 G/DL (ref 31.5–36.5)
MCV RBC AUTO: 91 FL (ref 78–100)
PLATELET # BLD AUTO: 135 10E9/L (ref 150–450)
POTASSIUM SERPL-SCNC: 3.8 MMOL/L (ref 3.4–5.3)
POTASSIUM SERPL-SCNC: ABNORMAL MMOL/L (ref 3.4–5.3)
RBC # BLD AUTO: 4.46 10E12/L (ref 3.8–5.2)
SODIUM SERPL-SCNC: 136 MMOL/L (ref 133–144)
WBC # BLD AUTO: 7.5 10E9/L (ref 4–11)

## 2019-09-19 PROCEDURE — 83735 ASSAY OF MAGNESIUM: CPT | Performed by: PHYSICIAN ASSISTANT

## 2019-09-19 PROCEDURE — 0296T ZIO PATCH HOLTER ADULT PEDIATRIC GREATER THAN 48 HRS: CPT

## 2019-09-19 PROCEDURE — 0298T ZIO PATCH HOLTER ADULT PEDIATRIC GREATER THAN 48 HRS: CPT | Performed by: INTERNAL MEDICINE

## 2019-09-19 PROCEDURE — G0378 HOSPITAL OBSERVATION PER HR: HCPCS

## 2019-09-19 PROCEDURE — 36415 COLL VENOUS BLD VENIPUNCTURE: CPT | Performed by: PHYSICIAN ASSISTANT

## 2019-09-19 PROCEDURE — 84132 ASSAY OF SERUM POTASSIUM: CPT | Performed by: HOSPITALIST

## 2019-09-19 PROCEDURE — 80048 BASIC METABOLIC PNL TOTAL CA: CPT | Performed by: PHYSICIAN ASSISTANT

## 2019-09-19 PROCEDURE — 25000132 ZZH RX MED GY IP 250 OP 250 PS 637: Performed by: INTERNAL MEDICINE

## 2019-09-19 PROCEDURE — 25000131 ZZH RX MED GY IP 250 OP 636 PS 637: Performed by: PHYSICIAN ASSISTANT

## 2019-09-19 PROCEDURE — 99217 ZZC OBSERVATION CARE DISCHARGE: CPT | Performed by: PHYSICIAN ASSISTANT

## 2019-09-19 PROCEDURE — 85027 COMPLETE CBC AUTOMATED: CPT | Performed by: PHYSICIAN ASSISTANT

## 2019-09-19 PROCEDURE — 99204 OFFICE O/P NEW MOD 45 MIN: CPT | Mod: 25 | Performed by: INTERNAL MEDICINE

## 2019-09-19 PROCEDURE — 36415 COLL VENOUS BLD VENIPUNCTURE: CPT | Performed by: HOSPITALIST

## 2019-09-19 RX ORDER — ALBUTEROL SULFATE 0.83 MG/ML
2.5 SOLUTION RESPIRATORY (INHALATION) EVERY 6 HOURS PRN
COMMUNITY
Start: 2019-09-19 | End: 2019-11-27

## 2019-09-19 RX ORDER — CARVEDILOL 3.12 MG/1
3.12 TABLET ORAL 2 TIMES DAILY WITH MEALS
Status: DISCONTINUED | OUTPATIENT
Start: 2019-09-19 | End: 2019-09-19 | Stop reason: HOSPADM

## 2019-09-19 RX ORDER — CARVEDILOL 3.12 MG/1
3.12 TABLET ORAL 2 TIMES DAILY WITH MEALS
Qty: 60 TABLET | Refills: 0 | Status: SHIPPED | OUTPATIENT
Start: 2019-09-19 | End: 2019-10-16

## 2019-09-19 RX ORDER — CETIRIZINE HYDROCHLORIDE 10 MG/1
10 TABLET ORAL DAILY PRN
COMMUNITY
Start: 2019-09-19 | End: 2019-11-27

## 2019-09-19 RX ORDER — PREDNISONE 20 MG/1
40 TABLET ORAL DAILY
Qty: 20 TABLET | Refills: 0 | Status: SHIPPED | OUTPATIENT
Start: 2019-09-20 | End: 2019-11-22

## 2019-09-19 RX ADMIN — CARVEDILOL 3.12 MG: 3.12 TABLET, FILM COATED ORAL at 11:49

## 2019-09-19 RX ADMIN — PREDNISONE 40 MG: 20 TABLET ORAL at 08:13

## 2019-09-19 ASSESSMENT — MIFFLIN-ST. JEOR: SCORE: 1429.63

## 2019-09-19 NOTE — PLAN OF CARE
PRIMARY DIAGNOSIS: CHEST PAIN  OUTPATIENT/OBSERVATION GOALS TO BE MET BEFORE DISCHARGE:  1. Ruled out acute coronary syndrome (negative or stable Troponin):  Trops:0.079/0.114/0.088  2. Pain Status: Pain free.  3. Appropriate provocative testing performed: Yes  - Stress Test Procedure: none   - Interpretation of cardiac rhythm per telemetry tech: ST 100s    4. Cleared by Consultants (if applicable):No  5. Return to near baseline physical activity: Yes  Pt is A&Ox4. VSS. Temp: 99.4  F (37.4  C) Temp src: Oral BP: 131/80 Pulse: 103 Heart Rate: 105 Resp: 18 SpO2: 92 % O2 Device: None (Room air)     Pt denies any pain. Pt very fatigued. Ind in room. Cardiology consult for AM. Will continue to monitor.   Discharge Planner Nurse   Safe discharge environment identified: Yes  Barriers to discharge: Yes       Entered by: Lizet Reilly 09/18/2019 9:41 PM     Please review provider order for any additional goals.   Nurse to notify provider when observation goals have been met and patient is ready for discharge.

## 2019-09-19 NOTE — PLAN OF CARE
"PRIMARY DIAGNOSIS: SOB/PALPITATIONS  OUTPATIENT/OBSERVATION GOALS TO BE MET BEFORE DISCHARGE:  1. Ruled out acute coronary syndrome (negative or stable Troponin): Yes.  2. Pain Status: Pain free.  3. Appropriate provocative testing performed: Yes  - Stress Test Procedure: Echo  - Interpretation of cardiac rhythm per telemetry tech: SR, Sinus Tachy, HR 97.  4. Cleared by Consultants (if applicable): No. Cardiology to consult.   5. Return to near baseline physical activity: Yes.    Discharge Planner Nurse   Safe discharge environment identified: Yes  Barriers to discharge: No    BP (!) 128/94 (BP Location: Right arm)   Pulse 91   Temp 95.7  F (35.4  C) (Oral)   Resp 16   Ht 1.702 m (5' 7\")   Wt 77.2 kg (170 lb 3.1 oz)   LMP 09/11/2019   SpO2 96%   BMI 26.66 kg/m      A&Ox4. HR in the 90s this AM. Continues remote tele.VSS. LS clear/equal bilaterally. Frequent dry cough. Up independently in room. Denies SOB. Denies nausea. Denies pain. BS active x4, tolerating regular diet, passing flatus. Voiding in adequate amt's. Continues po Prednisone. Cardiology to consult. Will continue to monitor.      Please review provider order for any additional goals.   Nurse to notify provider when observation goals have been met and patient is ready for discharge.  "

## 2019-09-19 NOTE — TELEPHONE ENCOUNTER
Referral placed for [facility] pulmonology.  Please call to see how far out they are scheduled.  If needed I will do a peer to peer call.      Mary Alice Colón MS, PA-C

## 2019-09-19 NOTE — PROGRESS NOTES
Pt reported being very cold but sweating. Upon assessment, pt is afebrile at 96.2F. Pt now taking shower.

## 2019-09-19 NOTE — CONSULTS
Cardiology Consultation      Kassie Ramirez MRN# [id number]   YOB: 1970 Age: 49 year old   Date of Admission: 9/18/2019     Reason for consult:  Assess for possible sarcoid, SVT           Assessment and Plan:     1. 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    We will try a small dose of carvedilol 3.125 mg twice daily if tolerated.  We may titrate this up as an outpatient or consider electrophysiology study if she does not tolerate medical therapy or has continued symptoms.      2. Assess for possible cardiac sarcoid    No gross evidence for cardiac involvement on her echocardiogram.  We did discuss that the sensitivity for early disease is limited with that test modality.    Have ordered outpatient cardiac MRI and follow-up the 2-week [facility] practice provider appointment in cardiology clinic.      3. Minor troponin elevation in the setting of sustained tachycardia    Reassuring for absence of severe obstructive coronary artery disease given the trivial troponin in the setting of sustained tachycardia.      We will follow-up patient as an outpatient.  Please call with questions.               Chief Complaint:   Palpitations           History of Present Illness:   This patient is a 49 year old female admitted with supraventricular tachycardia with a heart rate of 174 bpm.  I have reviewed the ECG from 8:37 AM 9/18/2019 and it does appear to be AVNRT.  It did break with adenosine.  The patient states that she has had paroxysms of this rhythm previously, but this did not susan so she sought further medical attention.    Of note, she has been actively undergoing work-up for lymphadenopathy suspected to be sarcoid.  Biopsy has not shown malignancy.    Are currently being consulted for possibility of cardiac sarcoid.  I do not note any  "ventricular dysfunction (systolic or diastolic) that is overtly concerning for advanced cardiac sarcoid or infiltrative cardiomyopathy.  However, the echocardiogram was technically difficult.    Troponins were trivially elevated and consistent with her tachycardia.  She has been having dyspnea that improves with steroid use.    Lab Results   Component Value Date    TROPI 0.088 (H) 2019    TROPI 0.114 (H) 2019    TROPI 0.079 (H) 2019              Physical Exam:   Vitals were reviewed  Blood pressure (!) 128/94, pulse 91, temperature 95.7  F (35.4  C), temperature source Oral, resp. rate 16, height 1.702 m (5' 7\"), weight 77.2 kg (170 lb 3.1 oz), last menstrual period 2019, SpO2 96 %, not currently breastfeeding.  Temperatures:  Current - Temp: 95.7  F (35.4  C); Max - Temp  Av  F (36.1  C)  Min: 95.7  F (35.4  C)  Max: 99.4  F (37.4  C)  Respiration range: Resp  Av.9  Min: 9  Max: 27  Pulse range: Pulse  Av.6  Min: 87  Max: 112  Blood pressure range: Systolic (24hrs), Av , Min:111 , Max:135   ; Diastolic (24hrs), Av, Min:75, Max:94    Pulse oximetry range: SpO2  Av.2 %  Min: 92 %  Max: 99 %    Intake/Output Summary (Last 24 hours) at 2019 1029  Last data filed at 2019 0800  Gross per 24 hour   Intake 360 ml   Output --   Net 360 ml     Constitutional:   awake, alert, cooperative, no apparent distress, and appears stated age     Eyes:   Lids and lashes normal, pupils equal, round and reactive to light, extra ocular muscles intact, sclera clear, conjunctiva normal     Neck:   supple, symmetrical, trachea midline, no JVD     Back:   symmetric     Lungs:   No crackles     Cardiovascular:   RRR, borderline tachy. No significant murmurs     Abdomen:   benign     Musculoskeletal:   motor strength is 5 out of 5 all extremities bilaterally     Neurologic:   Grossly nonfocal     Skin:   normal skin color, texture, turgor             Past Medical History:   I have " reviewed this patient's past medical history  Past Medical History:   Diagnosis Date     Anxiety attack 9/16/2014     Encounter for Essure implantation 2009     Generalized anxiety disorder 9/16/2014    zoloft = flat emotions     Menopausal disorder     started on OCPs by menopause center 3/2017 (takes active continuously)     Menstrual headache     helped by OCPs and magnesium     GERTRUDE (stress urinary incontinence, female)     sling procedure 2016             Past Surgical History:   I have reviewed this patient's past surgical history  Past Surgical History:   Procedure Laterality Date     H KIT ESSURE  2009    essure - Dr. aCilin Raymundo      HERNIORRHAPHY UMBILICAL  1974     SLING TRANSPUBO WITHOUT ANTERIOR COLPORRHAPHY N/A 11/21/2016    Procedure: SLING TRANSPUBO WITHOUT ANTERIOR COLPORRHAPHY;  Surgeon: Hernesto Berrios MD;  Location:  OR               Social History:   I have reviewed this patient's social history  Social History     Tobacco Use     Smoking status: Never Smoker     Smokeless tobacco: Never Used   Substance Use Topics     Alcohol use: No     Alcohol/week: 0.0 oz     Comment: 1 time per month             Family History:   I have reviewed this patient's family history  Family History   Problem Relation Age of Onset     Hypertension Mother      Depression Mother      Lipids Mother      Cardiovascular Mother      Circulatory Mother      Diabetes Mother      Heart Disease Mother      Cerebrovascular Disease Mother      Obesity Mother      C.A.D. Mother      Lung Cancer Mother         smoker     Eye Disorder Father         cone dystrophy     Macular Degeneration Father      Diabetes Maternal Aunt         type 2     Hypertension Maternal Aunt      Heart Disease Maternal Grandfather      Heart Disease Sister         high cholesterol       Macular Degeneration Sister              Allergies:     Allergies   Allergen Reactions     Cold & Flu [Cold Defense Fighter]      See pseudoephedrine     Seasonal  Allergies      Sudafed Cold-Cough [Dayquil Liquicaps]      Pseudoephedrine Rash     Rash then skins peels off              Medications:   I have reviewed this patient's current medications  Medications Prior to Admission   Medication Sig Dispense Refill Last Dose     albuterol (PROVENTIL) (2.5 MG/3ML) 0.083% neb solution Take 1 vial (2.5 mg) by nebulization every 6 hours as needed for shortness of breath / dyspnea or wheezing 30 vial 1 prn     cetirizine (ZYRTEC) 10 MG tablet Take 10 mg by mouth daily as needed    prn     order for DME Equipment being ordered: Nebulizer 1 each 0      Current Facility-Administered Medications Ordered in Epic   Medication Dose Route Frequency Last Rate Last Dose     acetaminophen (TYLENOL) Suppository 650 mg  650 mg Rectal Q4H PRN         acetaminophen (TYLENOL) tablet 650 mg  650 mg Oral Q4H PRN         carvedilol (COREG) tablet 3.125 mg  3.125 mg Oral BID w/meals         melatonin tablet 1 mg  1 mg Oral At Bedtime PRN         naloxone (NARCAN) injection 0.1-0.4 mg  0.1-0.4 mg Intravenous Q2 Min PRN         ondansetron (ZOFRAN-ODT) ODT tab 4 mg  4 mg Oral Q6H PRN        Or     ondansetron (ZOFRAN) injection 4 mg  4 mg Intravenous Q6H PRN         polyethylene glycol (MIRALAX/GLYCOLAX) Packet 17 g  17 g Oral Daily PRN         predniSONE (DELTASONE) tablet 40 mg  40 mg Oral Daily   40 mg at 09/19/19 0813     senna-docusate (SENOKOT-S/PERICOLACE) 8.6-50 MG per tablet 1 tablet  1 tablet Oral BID PRN        Or     senna-docusate (SENOKOT-S/PERICOLACE) 8.6-50 MG per tablet 2 tablet  2 tablet Oral BID PRN         No current [facility]-ordered outpatient medications on file.             Review of Systems:   The 10 point Review of Systems is negative other than noted in the HPI            Data:   All laboratory data reviewed  Results for orders placed or performed during the hospital encounter of 09/18/19 (from the past 24 hour(s))   Troponin I   Result Value Ref Range    Troponin I ES 0.114 (H)  0.000 - 0.045 ug/L   Echocardiogram Complete    Narrative    [id number]  [id number]  [id number]  [id number]^ANDRAE^DONNA^ZEE           [facility]  Echocardiography Laboratory  [address] [place] [address]        Name: BABAR VARGHESE  MRN: [id number]  : 1970  Study Date: 2019 01:25 PM  Age: 49 yrs  Gender: Female  Patient Location: [facility]  Reason For Study: Tachycardia  Ordering Physician: DONNA ABEBE  Performed By: Jacinta Hahn     BSA: 1.9 m2  Height: 67 in  Weight: 170 lb  HR: 101  BP: 111/75 mmHg  _____________________________________________________________________________  __        Procedure  Complete Portable Echo Adult.  _____________________________________________________________________________  __        Interpretation Summary     Left ventricular systolic function is normal.  The visual ejection fraction is estimated at 55-60%.  The study was technically difficult. There is no comparison study available.  _____________________________________________________________________________  __        Left Ventricle  The left ventricle is normal in size. There is concentric remodeling present.  Left ventricular systolic function is normal. The visual ejection fraction is  estimated at 55-60%. Diastolic Doppler findings (E/E' ratio and/or other  parameters) suggest left ventricular filling pressures are indeterminate. No  regional wall motion abnormalities noted.     Right Ventricle  The right ventricle is normal size. The right ventricular systolic function is  normal.     Atria  Normal left atrial size. Right atrial size is normal.     Mitral Valve  There is trace mitral regurgitation.        Tricuspid Valve  There is trace tricuspid regurgitation. IVC diameter <2.1 cm collapsing >50%  with sniff suggests a normal RA pressure of 3 mmHg. Right ventricular systolic  pressure could not be approximated due to inadequate tricuspid regurgitation.     Aortic  Valve  There is mild trileaflet aortic sclerosis. No aortic stenosis is present.     Pulmonic Valve  The pulmonic valve is not well visualized.     Vessels  The aortic root is normal size.     Pericardium  There is no pericardial effusion.        Rhythm  Sinus rhythm was noted.  _____________________________________________________________________________  __  MMode/2D Measurements & Calculations     IVSd: 1.1 cm  LVIDd: 4.1 cm  LVIDs: 2.6 cm  LVPWd: 1.2 cm  FS: 36.0 %  LV mass(C)d: 164.0 grams  LV mass(C)dI: 86.9 grams/m2  Ao root diam: 2.8 cm  LA dimension: 3.6 cm  asc Aorta Diam: 3.3 cm  LA/Ao: 1.3  LVOT diam: 2.0 cm  LVOT area: 3.0 cm2  LA Volume (BP): 42.8 ml  LA Volume Index (BP): 22.6 ml/m2  RWT: 0.60           Doppler Measurements & Calculations  MV E max shaheen: 82.7 cm/sec  MV max P.6 mmHg  MV mean PG: 3.1 mmHg  MV V2 VTI: 18.1 cm  MVA(VTI): 2.2 cm2  MV P1/2t max shaheen: 131.2 cm/sec  MV P1/2t: 67.2 msec  MVA(P1/2t): 3.3 cm2  MV dec slope: 571.9 cm/sec2  MV dec time: 0.14 sec  Ao V2 max: 127.6 cm/sec  Ao max P.0 mmHg  Ao V2 mean: 100.0 cm/sec  Ao mean P.3 mmHg  Ao V2 VTI: 23.9 cm  GEORGES(I,D): 1.6 cm2  GEORGES(V,D): 2.1 cm2  LV V1 max PG: 3.2 mmHg  LV V1 max: 88.8 cm/sec  LV V1 VTI: 13.0 cm  CO(LVOT): 4.0 l/min  CI(LVOT): 2.1 l/min/m2  SV(LVOT): 39.2 ml  SI(LVOT): 20.8 ml/m2  PA acc time: 0.13 sec  AV Shaheen Ratio (DI): 0.70  GEORGES Index (cm2/m2): 0.87  E/E' av.7  Lateral E/e': 7.3  Medial E/e': 12.1              _____________________________________________________________________________  __        Report approved by: Eyal Lucio 2019 04:15 PM      CT Chest Pulmonary Embolism w Contrast    Narrative    CT CHEST PULMONARY EMBOLISM WITH CONTRAST 2019 2:35 PM    HISTORY: PE suspected, intermediate probability, positive D-dimer.  Rule out pulmonary embolus. Cough. Palpitations and shortness of  breath. Pain in left rib cage.    TECHNIQUE: Axial images from thoracic inlet to diaphragm. 65  mL  Isovue-370. Radiation dose for this scan was reduced using automated  exposure control, adjustment of the mA and/or kV according to patient  size, or iterative reconstruction technique.    COMPARISON: 9/18/2019 chest x-ray    FINDINGS:   Chest: Prominent subcarinal and left hilar adenopathy, mild right  hilar thickening. The subcarinal adenopathy measures up to 3 cm in AP  diameter posterior to the right pulmonary artery on series 5 image 55.  Trace amount of pleural fluid. No evidence for acute pulmonary embolus  or thoracic aortic dissection. Calcified right hilar nodes and  perihilar nodule consistent with granulomatous disease.    There is a focal irregular rounded opacity at the posterior left  costophrenic angle series 5 image 99, 1.5 cm in size. This could  represent an indeterminate lung nodule or lung cancer or focal area of  infection or inflammation. Patchy lucencies and groundglass  opacities/mosaic attenuation pattern could be scattered groundglass  atelectasis or infiltrate versus scattered air trapping. There is a  indeterminate 1.1 cm nodular opacity in the posterior medial right  lower lobe series 5 image 85.    No aggressive bone lesions. Prominent splenomegaly.       Impression    IMPRESSION:  1. No evidence for acute pulmonary embolus.  2. Two irregular ill-defined nodular opacities in the lung at left  lung base and posterior medial right lower lobe, 1.5 and 1.1 cm in  size. Differential includes benign versus malignant indeterminate  pulmonary nodules. Focal areas of infection or inflammation not  excluded.  3. Prominent mediastinal and left hilar adenopathy greatest in  subcarinal region. Concerning for malignancy although nonspecific.  There is no hilar adenopathy.  4. Splenomegaly.    CEASAR GARIBAY MD   Troponin I   Result Value Ref Range    Troponin I ES 0.088 (H) 0.000 - 0.045 ug/L   Basic metabolic panel   Result Value Ref Range    Sodium 136 133 - 144 mmol/L    Potassium Canceled,  Test credited 3.4 - 5.3 mmol/L    Chloride 106 94 - 109 mmol/L    Carbon Dioxide 26 20 - 32 mmol/L    Anion Gap 4 3 - 14 mmol/L    Glucose 131 (H) 70 - 99 mg/dL    Urea Nitrogen 13 7 - 30 mg/dL    Creatinine 0.64 0.52 - 1.04 mg/dL    GFR Estimate >90 >60 mL/min/[1.73_m2]    GFR Estimate If Black >90 >60 mL/min/[1.73_m2]    Calcium 9.0 8.5 - 10.1 mg/dL   CBC with platelets   Result Value Ref Range    WBC 7.5 4.0 - 11.0 10e9/L    RBC Count 4.46 3.8 - 5.2 10e12/L    Hemoglobin 13.5 11.7 - 15.7 g/dL    Hematocrit 40.5 35.0 - 47.0 %    MCV 91 78 - 100 fl    MCH 30.3 26.5 - 33.0 pg    MCHC 33.3 31.5 - 36.5 g/dL    RDW 12.4 10.0 - 15.0 %    Platelet Count 135 (L) 150 - 450 10e9/L   Magnesium   Result Value Ref Range    Magnesium 2.4 (H) 1.6 - 2.3 mg/dL   Potassium   Result Value Ref Range    Potassium 3.8 3.4 - 5.3 mmol/L       Lab Results   Component Value Date    CHOL 233 07/27/2018     Lab Results   Component Value Date    HDL 38 07/27/2018     Lab Results   Component Value Date    LDL  07/27/2018     Cannot estimate LDL when triglyceride exceeds 400 mg/dL     07/27/2018     Lab Results   Component Value Date    TRIG 523 07/27/2018     Lab Results   Component Value Date    CHOLHDLRATIO 3.6 09/24/2015     TSH   Date Value Ref Range Status   09/18/2019 2.06 0.40 - 4.00 mU/L Final

## 2019-09-19 NOTE — PLAN OF CARE
PRIMARY DIAGNOSIS: CHEST PAIN  OUTPATIENT/OBSERVATION GOALS TO BE MET BEFORE DISCHARGE:  1. Ruled out acute coronary syndrome (negative or stable Troponin):  Trops:0.079/0.114/0.088  2. Pain Status: Pain free.  3. Appropriate provocative testing performed: Yes  - Stress Test Procedure: none   - Interpretation of cardiac rhythm per telemetry tech: SR 80-90s    4. Cleared by Consultants (if applicable):No  5. Return to near baseline physical activity: Yes  Pt is A&Ox4. VSS. Temp: 95.7  F (35.4  C) Temp src: Oral BP: 129/89 Pulse: 87 Heart Rate: 91 Resp: 16 SpO2: 96 % O2 Device: None (Room air)     Pt denies any pain. Pt very fatigued. Ind in room. Cardiology consult for AM. Will continue to monitor.   Discharge Planner Nurse   Safe discharge environment identified: Yes  Barriers to discharge: Yes       Entered by: Lizet Reilly 09/19/2019 4:59 AM     Please review provider order for any additional goals.   Nurse to notify provider when observation goals have been met and patient is ready for discharge.

## 2019-09-19 NOTE — PLAN OF CARE
Patient's After Visit Summary was reviewed with patient .   Patient verbalized understanding of After Visit Summary, recommended follow up and was given an opportunity to ask questions.   Discharge medications sent home with patient/family: YES   Discharged with spouse      OBSERVATION patient END time: 3512

## 2019-09-19 NOTE — PLAN OF CARE
PRIMARY DIAGNOSIS: CHEST PAIN  OUTPATIENT/OBSERVATION GOALS TO BE MET BEFORE DISCHARGE:  1. Ruled out acute coronary syndrome (negative or stable Troponin):  Trops:0.079/0.114/0.088  2. Pain Status: Pain free.  3. Appropriate provocative testing performed: Yes  - Stress Test Procedure: none   - Interpretation of cardiac rhythm per telemetry tech: SR 80-90s    4. Cleared by Consultants (if applicable):No  5. Return to near baseline physical activity: Yes  Pt is A&Ox4. VSS. Temp: 95.8  F (35.4  C) Temp src: Oral BP: 117/80 Pulse: 87 Heart Rate: 105 Resp: 16 SpO2: 95 % O2 Device: None (Room air)     Pt denies any pain. Pt very fatigued. Ind in room. Cardiology consult for AM. Will continue to monitor.   Discharge Planner Nurse   Safe discharge environment identified: Yes  Barriers to discharge: Yes       Entered by: Lizet Reilly 09/19/2019 1:12 AM     Please review provider order for any additional goals.   Nurse to notify provider when observation goals have been met and patient is ready for discharge.

## 2019-09-19 NOTE — TELEPHONE ENCOUNTER
Called Pulmonology clinic per providers request.  First available to be seen is December 2nd.      Routing to provider for review.    CARLOS BarlowN, RN  [employer] [facility]

## 2019-09-19 NOTE — TELEPHONE ENCOUNTER
Please contact pt and advise that I am out of the office until Tuesday, however, I will try to reach someone tomorrow to see if we can get her in sooner.    I did review the ER notes.      If needed, I can see her in a provider approval spot next week.      Mary Alice Colón, MS, PA-C

## 2019-09-19 NOTE — DISCHARGE SUMMARY
[facility] Observation Unit Discharge Summary          Kassie Ramirez MRN# [id number]   Age: 49 year old YOB: 1970     Date of Admission:  9/18/2019  Date of Discharge::  9/19/2019  Admitting Physician:  Jovany Jackson MD  Discharge Physician:  Shawnee Zuñiga PA-C  Primary Physician: Mary Alice Colón     Primary Discharge Diagnoses:   SVT     Secondary Discharge Diagnoses:   Hilar Adenopathy     Hospital Course:   For detail history, please refer to H & P from 9/18/2019. In brief, this is a 49 year old female with a PMH significant for known non cancerous hilar adenopathy currently being worked up by a pulmonologist with suspicion for sarcoidosis and generalized anxiety disorder who presented to the ED on 9/18/19 with palpitations and difficulty breathing with SVT.     #SVT  Patient recalls multiple intermittent episodes of heart palpitations associated with shortness of breath and left-sided chest discomfort.  She has associated these with her recently diagnosed lung disease (possibly sarcoidosis) but episodes have been more frequent and longer in duration.  In the ED her initial heart rate was 174 with pressure of 126/94 and she was given adenosine 6 mg with conversion to a sinus tachycardia.  Her electrolytes and TSH were normal.  D-dimer is slightly elevated at 1.4 with tachycardia and chest discomfort.  She has not been ill or started any new medications recently.  She is currently being worked up by pulmonary at the [facility] for possible sarcoidosis.  A CT chest was ordered which was negative for PE, but did reveal hilar adenopathy and pulmonary nodules.  Serial troponin levels trended down to 0.088.  Telemetry monitoring revealed NSR 80-90s.  Echocardiogram with normal LV/RV function with EF 55-60%.  Cardiology was consulted who recommended starting Coreg 3.125mg bid.  She was discharged with a Zio patch monitor.  Patient was instructed to follow up  with Cardiology in clinic at that time they will consider EP consult if she does not tolerate medical therapy.  Cardiology did not feel there was any evidence of cardiac sarcoid on patient's echocardiogram.  An outpatient cardiac MRI was ordered for her to complete.  Troponin elevation was felt to be 2/2 tachycardia.  No further stress testing was recommended.     #Non cancerous hilar adenopathy   Patient reports being recently diagnosed with hilar adenopathy in February of this year.  She presented with cough and shortness of breath thought to be pneumonia initially treated with antibiotics without improvement.  She is being followed by pulmonary (Dr. Yoan Horton at [facility]- [phone]). Recent lung biopsy was negative for cancer but there is concern for sarcoidosis.  She was started on oral steroids with improvement of her cough, shortness of breath and chest tightness but stopped these 1 week ago with recurrent symptoms.  She is scheduled to see them in 2 weeks. Chest x ray shows enlarged left hilum.  A CT chest was ordered which was negative for PE, but did reveal hilar adenopathy and pulmonary nodules.  Patient was discussed with her primary Pulmonologist, Dr. Horton who recommended restarting Prednisone 40 mg daily until he can see her on 9/27.     Procedures/Imaging:     Results for orders placed or performed during the hospital encounter of 09/18/19   XR Chest 2 Views     Value    Radiologist flags Prominent left hilum. CT chest with IV contrast (Urgent)    Narrative    CHEST TWO VIEWS September 18, 2019 9:17 AM     HISTORY: Shortness of breath.    COMPARISON: 6/27/2019 chest x-ray.      Impression    IMPRESSION: Recommend CT chest with IV contrast to evaluate an  enlarged appearing left hilum and rule out hilar adenopathy. Heart  size and pulmonary vessels within normal limits. No acute appearing  infiltrate or pleural effusion.    [Access Center: Prominent left hilum. CT chest with IV  contrast  recommended for further evaluation.]    This report will be copied to the [facility] to ensure a  provider acknowledges the finding. Access Center is available Monday  through Friday 8am-3:30 pm.     CEASAR GARIBAY MD   CT Chest Pulmonary Embolism w Contrast    Narrative    CT CHEST PULMONARY EMBOLISM WITH CONTRAST 9/18/2019 2:35 PM    HISTORY: PE suspected, intermediate probability, positive D-dimer.  Rule out pulmonary embolus. Cough. Palpitations and shortness of  breath. Pain in left rib cage.    TECHNIQUE: Axial images from thoracic inlet to diaphragm. 65 mL  Isovue-370. Radiation dose for this scan was reduced using automated  exposure control, adjustment of the mA and/or kV according to patient  size, or iterative reconstruction technique.    COMPARISON: 9/18/2019 chest x-ray    FINDINGS:   Chest: Prominent subcarinal and left hilar adenopathy, mild right  hilar thickening. The subcarinal adenopathy measures up to 3 cm in AP  diameter posterior to the right pulmonary artery on series 5 image 55.  Trace amount of pleural fluid. No evidence for acute pulmonary embolus  or thoracic aortic dissection. Calcified right hilar nodes and  perihilar nodule consistent with granulomatous disease.    There is a focal irregular rounded opacity at the posterior left  costophrenic angle series 5 image 99, 1.5 cm in size. This could  represent an indeterminate lung nodule or lung cancer or focal area of  infection or inflammation. Patchy lucencies and groundglass  opacities/mosaic attenuation pattern could be scattered groundglass  atelectasis or infiltrate versus scattered air trapping. There is a  indeterminate 1.1 cm nodular opacity in the posterior medial right  lower lobe series 5 image 85.    No aggressive bone lesions. Prominent splenomegaly.       Impression    IMPRESSION:  1. No evidence for acute pulmonary embolus.  2. Two irregular ill-defined nodular opacities in the lung at left  lung base  "and posterior medial right lower lobe, 1.5 and 1.1 cm in  size. Differential includes benign versus malignant indeterminate  pulmonary nodules. Focal areas of infection or inflammation not  excluded.  3. Prominent mediastinal and left hilar adenopathy greatest in  subcarinal region. Concerning for malignancy although nonspecific.  There is no hilar adenopathy.  4. Splenomegaly.    CEASAR GARIBAY MD     Consultations:   Cardiology    Code Status:   Full    Allergies:     Allergies   Allergen Reactions     Cold & Flu [Cold Defense Fighter]      See pseudoephedrine     Seasonal Allergies      Sudafed Cold-Cough [Dayquil Liquicaps]      Pseudoephedrine Rash     Rash then skins peels off         Subjective:   Patient denies chest pain, shortness of breath, palpitations overnight.  She denies abdominal pain, nausea, emesis, lightheadedness with ambulation.    Physical Exam:   Blood pressure 137/85, pulse 89, temperature 96.6  F (35.9  C), temperature source Oral, resp. rate 16, height 1.702 m (5' 7\"), weight 77.2 kg (170 lb 3.1 oz), last menstrual period 09/11/2019, SpO2 94 %, not currently breastfeeding.  General: Alert, interactive, NAD, sitting up in bed, pleasant and cooperative.  HEENT: AT/NC, sclera anicteric, PERRL, EOMI  Resp: clear to auscultation bilaterally, no crackles or wheezes  Cardiac: regular rate and rhythm, no murmur  Abdomen: Soft, nontender, nondistended. +BS.  No rebound or guarding.  Extremities: No LE edema  Skin: Warm and dry, no jaundice or rash  Neuro: Alert & oriented x 3, no focal deficits, moves all extremities equally     Discharge Medicatios:        Discharge Medication List as of 9/19/2019  3:41 PM      START taking these medications    Details   carvedilol (COREG) 3.125 MG tablet Take 1 tablet (3.125 mg) by mouth 2 times daily (with meals), Disp-60 tablet, R-0, E-Prescribe      predniSONE (DELTASONE) 20 MG tablet Take 2 tablets (40 mg) by mouth daily for 10 days, Disp-20 tablet, R-0, " E-Prescribe         CONTINUE these medications which have NOT CHANGED    Details   albuterol (PROVENTIL) (2.5 MG/3ML) 0.083% neb solution Take 1 vial (2.5 mg) by nebulization every 6 hours as needed for shortness of breath / dyspnea or wheezing, Historical      cetirizine (ZYRTEC) 10 MG tablet Take 1 tablet (10 mg) by mouth daily as needed for allergies, Historical      order for DME Equipment being ordered: NebulizerDisp-1 each, R-0, Local Print             Instructions Given to Patient as Discharge:     Discharge Procedure Orders   MRI Cardiac w/contrast and flow   Standing Status: Future Standing Exp. Date: 09/19/20     Order Specific Question Answer Comments   Priority Routine    Clinical Info for the Interpreting Provider Possible lung sarcoid, looking to assess if cardiac involvement.    Is the patient pregnant? No    Does the patient have a cochlear implant, pacemaker or ICD wires? If so, please contact the MRI department where the scan will be performed to verify compatibility before proceeding. NO - order as requested    Does the patient have claustrophobia? No    Procedure to be scheduled at [facility] [10]      Follow-Up with Cardiac Advanced Practice Provider   Standing Status: Future Standing Exp. Date: 09/19/20   Referral Priority: Routine   Number of Visits Requested: 1     Follow-up and recommended labs and tests    Order Comments: Please follow up with Pulmonary next week as scheduled.    Please follow up with Cardiology in clinic in two weeks.     Activity   Order Comments: Your activity upon discharge: activity as tolerated     Order Specific Question Answer Comments   Is discharge order? Yes      When to contact your care team   Order Comments: Notify for fever >101.5; black or bloody stools; severe, persistent, or worsening nausea, vomiting, abdominal pain or distention, diarrhea, constipation; chest pain, difficulty breathing, swelling; dizziness, weakness, lightheadedness,  fainting.  Proceed to the nearest emergency room for any urgent concerns.     Reason for your hospital stay   Order Comments: You were hospitalized due to an elevated heart rate which improved with IV medication.  Cardiology was consulted and you were started on Coreg.  A heart monitor was placed which you will wear for two weeks.  Please follow up with Cardiology in clinic in two weeks.  A cardiac MRI was ordered for you to complete as an outpatient.  Your case was discussed with your Pulmonologist upon admission and he recommended starting Prednisone 40mg daily until you are seen at follow up next week in clinic.  Further Prednisone dosing and any taper will be done by your Pulmonologist next week as we discussed.     Diet   Order Comments: Follow this diet upon discharge: Orders Placed This Encounter      Regular Diet Adult     Order Specific Question Answer Comments   Is discharge order? Yes        Pending Tests at Discharge:   Zio patch monitor    Discharge Disposition:   Discharged to home     Shawnee Zuñiga MS, PA-C  Hospitalist Service  Pager [phone]    >30 minutes was spent in discharge planning, care coordination, physical examination and medication reconciliation on the date of discharge, 9/19/2019

## 2019-09-19 NOTE — PLAN OF CARE
"PRIMARY DIAGNOSIS: SOB/PALPITATIONS  OUTPATIENT/OBSERVATION GOALS TO BE MET BEFORE DISCHARGE:  1. Ruled out acute coronary syndrome (negative or stable Troponin):  Yes  2. Pain Status: Pain free.  3. Appropriate provocative testing performed: Yes  - Stress Test Procedure: Echo  - Interpretation of cardiac rhythm per telemetry tech: SR, Sinus Tachy HR in 90s.     4. Cleared by Consultants (if applicable):Yes  5. Return to near baseline physical activity: Yes  Discharge Planner Nurse   Safe discharge environment identified: Yes  Barriers to discharge: No       Entered by: Alaina Kwon 09/19/2019 12:00 PM  /89 (BP Location: Right arm)   Pulse 92   Temp 96.4  F (35.8  C) (Oral)   Resp 16   Ht 1.702 m (5' 7\")   Wt 77.2 kg (170 lb 3.1 oz)   LMP 09/11/2019   SpO2 96%   BMI 26.66 kg/m    A&Ox4. HR in the 90s. Continues remote tele.VSS. LS clear/equal bilaterally. Frequent dry cough. Up independently in room. Denies SOB. Denies nausea. Denies pain. BS active x4, tolerating regular diet, passing flatus. Voiding in adequate amt's. Continues po Prednisone. Cardiology consulted, see note. Started on po carvedilol. Will continue to monitor.    Will continue to monitor.    Please review provider order for any additional goals.   Nurse to notify provider when observation goals have been met and patient is ready for discharge.    "

## 2019-09-19 NOTE — TELEPHONE ENCOUNTER
Reason for Call:  Other returning call    Detailed comments:  Florian is returning a call left for him for Janette REAL on file. His cell number is below.    Phone Number Patient can be reached at: Other phone number:  [phone]    Best Time: Anytime    Can we leave a detailed message on this number? YES    Call taken on 9/19/2019 at 8:13 AM by Malinda Harrison

## 2019-09-19 NOTE — TELEPHONE ENCOUNTER
Florian called and discussed wife's condition.   Negative of heart attack, CT negative PE    Florian inquiring about new pulmonology. Looking for a pulmonologist somewhere within the [place] network.    Florian would like any kind of input from Mary Alice Colón.  Awaiting call back for above note.    Herberth Felton RN   [facility]

## 2019-09-20 ENCOUNTER — TELEPHONE (OUTPATIENT)
Dept: FAMILY MEDICINE | Facility: CLINIC | Age: 49
End: 2019-09-20

## 2019-09-20 NOTE — TELEPHONE ENCOUNTER
Pt  Florian contacted and noted that Pt was discharge's yesterday at 5pm and will discuss with her options. Advised Mary Alice will be back on Wednesday and can have an Acute spot.   Pt  will call back to schedule after discussion.    Herberth Felton RN   [facility]

## 2019-09-20 NOTE — TELEPHONE ENCOUNTER
"  ED for acute condition Discharge Protocol    \"Hi, my name is Herberth Felton RN, a registered nurse, and I am calling from [facility].  I am calling to follow up and see how things are going for you after your recent emergency visit.\"    Tell me how you are doing now that you are home?\"  stated Pt was better      Discharge Instructions    \"Let's review your discharge instructions.  What is/are the follow-up recommendations?  Pt. Response: Cardiology was  consulted and you were started on Coreg. A heart monitor was placed which you will wear for two weeks.  Please follow up with Cardiology in clinic in two weeks. A cardiac MRI was ordered for you to complete as an  outpatient. Your case was discussed with your Pulmonologist upon admission and he recommended starting  Prednisone 40mg daily until you are seen at follow up next week in clinic. Further Prednisone dosing and any  taper will be done by your Pulmonologist next week as we discussed.    \"Has an appointment with your primary care provider been scheduled?\"  No (needed - schedule appointment and remind to bring meds) -   stated they would call back post discussion    Medications    \"Tell me what changed about your medicines when you discharged?\"    START taking:  carvedilol (COREG)  predniSONE (DELTASONE)  Start taking on: 9/20/2019  CHANGE how you take:  albuterol (PROVENTIL)  albuterol (PROVENTIL) (2.5 MG/3ML) 0.083% neb solution   9/19/2019  --   Sig - Route: Take 1 vial (2.5 mg) by nebulization every 6 hours as needed for shortness of breath / dyspnea or wheezing - Nebulization       cetirizine (zyrTEC)  cetirizine (ZYRTEC) 10 MG tablet   9/19/2019  --   Sig - Route: Take 1 tablet (10 mg) by mouth daily as needed for allergies - Oral     Post Discharge Medication Reconciliation Status: discharge medications reconciled and changed, per note/orders (see AVS).        \"What questions do you have about your " "medications?\"   None        Call Summary    \"What questions or concerns do you have about your recent visit and your follow-up care?\"     none    \"If you have questions or things don't continue to improve, we encourage you contact us through the main clinic number (give number).  Even if the clinic is not open, triage nurses are available 24/7 to help you.     We would like you to know that our clinic has extended hours (provide information).  We also have urgent care (provide details on closest location and hours/contact info)\"    \"Thank you for your time and take care!\"    Herberth Felton RN   [facility]          "

## 2019-09-20 NOTE — TELEPHONE ENCOUNTER
Pt was DC'd from [facility] on 9/19/2019 after being treated for Svt (Supraventricular Tachycardia) (H), Sarcoidosis.  Next scheduled appt with PCP is not scheduled.  Please call patient.  Thank you!  Maru Easley

## 2019-09-23 ENCOUNTER — TELEPHONE (OUTPATIENT)
Dept: CARDIOLOGY | Facility: CLINIC | Age: 49
End: 2019-09-23

## 2019-09-23 ENCOUNTER — TELEPHONE (OUTPATIENT)
Dept: FAMILY MEDICINE | Facility: CLINIC | Age: 49
End: 2019-09-23

## 2019-09-23 NOTE — TELEPHONE ENCOUNTER
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 Of note, she has been actively undergoing work-up for lymphadenopathy suspected to be sarcoid. Biopsy has not shown malignancy. Pt seen by Dr. Finn while IP and started on low dose Coreg. OP cMRI with GERTRUDIS f/u ordered. Writer attempted to call patient to discuss any post hospital d/c questions she may have, review medication changes, and confirm f/u appts, but no answer. VM left and pt instructed to call back with any medication questions, if experiencing any palpitations, chest pain, SOB, lightheadedness or concerns. RN left reminder for patient that she needs to schedule for the OP cMRI and cardiology GERTRUDIS f/u as ordered and scheduling phone number was provided. Patient advised to call clinic with any cardiac related questions or concerns prior to this gertrudis't. and my phone number was provided. CRESENCIO Alegria RN.

## 2019-09-23 NOTE — TELEPHONE ENCOUNTER
Patient notified by phone.  She verbalized understanding and agreed with plan.    Noreen Allison, BS, RN, PHN  [facility]) [phone]

## 2019-09-23 NOTE — TELEPHONE ENCOUNTER
Please call patient.  I spoke with Dr.ABBIE ZELAYA at the [facility] who is going to try to get Kassie into their Sarcoidosis subspecialists to better define her diagnosis & get a 2nd opinion.  However, keep her f/u appt with Dr. Horton until we better define this relationship.  She thought it would be less than 48 hour before she should receive a call.  If she hasn't heard from them by that time please let me know.          Mary Alice Colón MS, PA-C

## 2019-09-27 ENCOUNTER — TRANSFERRED RECORDS (OUTPATIENT)
Dept: HEALTH INFORMATION MANAGEMENT | Facility: CLINIC | Age: 49
End: 2019-09-27

## 2019-10-09 ENCOUNTER — HOSPITAL ENCOUNTER (OUTPATIENT)
Dept: CARDIOLOGY | Facility: CLINIC | Age: 49
Discharge: HOME | End: 2019-10-09
Attending: INTERNAL MEDICINE | Admitting: INTERNAL MEDICINE
Payer: COMMERCIAL

## 2019-10-09 DIAGNOSIS — I47.10 SVT (SUPRAVENTRICULAR TACHYCARDIA) (H): ICD-10-CM

## 2019-10-09 DIAGNOSIS — R79.89 ELEVATED TROPONIN: ICD-10-CM

## 2019-10-09 DIAGNOSIS — D86.9 SARCOIDOSIS: ICD-10-CM

## 2019-10-09 PROCEDURE — 75561 CARDIAC MRI FOR MORPH W/DYE: CPT | Mod: 26 | Performed by: INTERNAL MEDICINE

## 2019-10-09 PROCEDURE — 25500064 ZZH RX 255 OP 636: Performed by: INTERNAL MEDICINE

## 2019-10-09 PROCEDURE — A9585 GADOBUTROL INJECTION: HCPCS | Performed by: INTERNAL MEDICINE

## 2019-10-09 PROCEDURE — 75561 CARDIAC MRI FOR MORPH W/DYE: CPT

## 2019-10-09 RX ORDER — GADOBUTROL 604.72 MG/ML
5-65 INJECTION INTRAVENOUS ONCE
Status: COMPLETED | OUTPATIENT
Start: 2019-10-09 | End: 2019-10-09

## 2019-10-09 RX ORDER — METHYLPREDNISOLONE SODIUM SUCCINATE 125 MG/2ML
125 INJECTION, POWDER, LYOPHILIZED, FOR SOLUTION INTRAMUSCULAR; INTRAVENOUS
Status: DISCONTINUED | OUTPATIENT
Start: 2019-10-09 | End: 2019-10-10 | Stop reason: HOSPADM

## 2019-10-09 RX ORDER — ACYCLOVIR 200 MG/1
0-1 CAPSULE ORAL
Status: DISCONTINUED | OUTPATIENT
Start: 2019-10-09 | End: 2019-10-10 | Stop reason: HOSPADM

## 2019-10-09 RX ORDER — DIPHENHYDRAMINE HCL 25 MG
25 CAPSULE ORAL
Status: DISCONTINUED | OUTPATIENT
Start: 2019-10-09 | End: 2019-10-10 | Stop reason: HOSPADM

## 2019-10-09 RX ORDER — DIAZEPAM 5 MG
5 TABLET ORAL EVERY 30 MIN PRN
Status: DISCONTINUED | OUTPATIENT
Start: 2019-10-09 | End: 2019-10-10 | Stop reason: HOSPADM

## 2019-10-09 RX ORDER — DIPHENHYDRAMINE HYDROCHLORIDE 50 MG/ML
25-50 INJECTION INTRAMUSCULAR; INTRAVENOUS
Status: DISCONTINUED | OUTPATIENT
Start: 2019-10-09 | End: 2019-10-10 | Stop reason: HOSPADM

## 2019-10-09 RX ORDER — ONDANSETRON 2 MG/ML
4 INJECTION INTRAMUSCULAR; INTRAVENOUS
Status: DISCONTINUED | OUTPATIENT
Start: 2019-10-09 | End: 2019-10-10 | Stop reason: HOSPADM

## 2019-10-09 RX ADMIN — GADOBUTROL 10 ML: 604.72 INJECTION INTRAVENOUS at 12:20

## 2019-10-09 NOTE — PROGRESS NOTES
Cardiac Core protocol  T2, T2FS  T1 , T1FS  Contrast administered per weight based protocol.  Per Dr Hunt

## 2019-10-16 ENCOUNTER — OFFICE VISIT (OUTPATIENT)
Dept: CARDIOLOGY | Facility: CLINIC | Age: 49
End: 2019-10-16
Attending: INTERNAL MEDICINE
Payer: COMMERCIAL

## 2019-10-16 VITALS
WEIGHT: 171.1 LBS | SYSTOLIC BLOOD PRESSURE: 118 MMHG | HEIGHT: 67 IN | BODY MASS INDEX: 26.85 KG/M2 | HEART RATE: 88 BPM | DIASTOLIC BLOOD PRESSURE: 82 MMHG

## 2019-10-16 DIAGNOSIS — I47.10 SVT (SUPRAVENTRICULAR TACHYCARDIA) (H): ICD-10-CM

## 2019-10-16 DIAGNOSIS — D86.9 SARCOIDOSIS: ICD-10-CM

## 2019-10-16 DIAGNOSIS — R79.89 ELEVATED TROPONIN: ICD-10-CM

## 2019-10-16 PROCEDURE — 99213 OFFICE O/P EST LOW 20 MIN: CPT | Performed by: NURSE PRACTITIONER

## 2019-10-16 RX ORDER — CARVEDILOL 3.12 MG/1
3.12 TABLET ORAL 2 TIMES DAILY WITH MEALS
Qty: 180 TABLET | Refills: 3 | Status: SHIPPED | OUTPATIENT
Start: 2019-10-16 | End: 2019-11-27

## 2019-10-16 ASSESSMENT — MIFFLIN-ST. JEOR: SCORE: 1425.79

## 2019-10-16 NOTE — PROGRESS NOTES
HPI and Plan:   See dictation  #[id number]    Orders Placed This Encounter   Procedures     Follow-Up with Cardiologist       Orders Placed This Encounter   Medications     carvedilol (COREG) 3.125 MG tablet     Sig: Take 1 tablet (3.125 mg) by mouth 2 times daily (with meals)     Dispense:  180 tablet     Refill:  3       Medications Discontinued During This Encounter   Medication Reason     carvedilol (COREG) 3.125 MG tablet          Encounter Diagnoses   Name Primary?     SVT (supraventricular tachycardia) (H)      Elevated troponin      Sarcoidosis        CURRENT MEDICATIONS:  Current Outpatient Medications   Medication Sig Dispense Refill     carvedilol (COREG) 3.125 MG tablet Take 1 tablet (3.125 mg) by mouth 2 times daily (with meals) 180 tablet 3     cetirizine (ZYRTEC) 10 MG tablet Take 1 tablet (10 mg) by mouth daily as needed for allergies       predniSONE (DELTASONE) 20 MG tablet Take 2 tablets (40 mg) by mouth daily for 10 days (Patient taking differently: Take 30 mg by mouth daily ) 20 tablet 0     albuterol (PROVENTIL) (2.5 MG/3ML) 0.083% neb solution Take 1 vial (2.5 mg) by nebulization every 6 hours as needed for shortness of breath / dyspnea or wheezing       order for DME Equipment being ordered: Nebulizer (Patient not taking: Reported on 10/16/2019) 1 each 0       ALLERGIES     Allergies   Allergen Reactions     Cold & Flu [Cold Defense Fighter]      See pseudoephedrine     Seasonal Allergies      Sudafed Cold-Cough [Dayquil Liquicaps]      Pseudoephedrine Rash     Rash then skins peels off        PAST MEDICAL HISTORY:  Past Medical History:   Diagnosis Date     Anxiety attack 9/16/2014     Encounter for Essure implantation 2009     Generalized anxiety disorder 9/16/2014    zoloft = flat emotions     Menopausal disorder     started on OCPs by menopause center 3/2017 (takes active continuously)     Menstrual headache     helped by OCPs and magnesium     GERTRUDE (stress urinary incontinence, female)      sling procedure 2016     SVT (supraventricular tachycardia) (H)        PAST SURGICAL HISTORY:  Past Surgical History:   Procedure Laterality Date     H KIT CHRISTIANO  2009    christiano - Dr. Cailin Raymundo      HERNIORRHAPHY UMBILICAL  1974     SLING TRANSPUBO WITHOUT ANTERIOR COLPORRHAPHY N/A 11/21/2016    Procedure: SLING TRANSPUBO WITHOUT ANTERIOR COLPORRHAPHY;  Surgeon: Hernesto Berrios MD;  Location: [facility] OR       FAMILY HISTORY:  Family History   Problem Relation Age of Onset     Hypertension Mother      Depression Mother      Lipids Mother      Cardiovascular Mother      Circulatory Mother      Diabetes Mother      Heart Disease Mother      Cerebrovascular Disease Mother      Obesity Mother      C.A.D. 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     Lifestyle     Physical activity:     Days per week: None      "Minutes per session: None     Stress: None   Relationships     Social connections:     Talks on phone: None     Gets together: None     Attends Hoahaoism service: None     Active member of club or organization: None     Attends meetings of clubs or organizations: None     Relationship status: None     Intimate partner violence:     Fear of current or ex partner: None     Emotionally abused: None     Physically abused: None     Forced sexual activity: None   Other Topics Concern     Parent/sibling w/ CABG, MI or angioplasty before 65F 55M? No      Service Not Asked     Blood Transfusions Not Asked     Caffeine Concern Yes     Comment: MOnster energy drink.   Te - 3 cups.        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         Review of Systems:  Skin:  Negative       Eyes:  Positive for glasses readers  ENT:  Negative      Respiratory:  Negative       Cardiovascular:    chest pain;Positive for;fatigue occ chest pain  Gastroenterology: Negative      Genitourinary:  Negative      Musculoskeletal:  Negative      Neurologic:  Negative      Psychiatric:  Negative      Heme/Lymph/Imm:  Negative      Endocrine:  Negative        Physical Exam:  Vitals: /82 (BP Location: Right arm, Patient Position: Sitting, Cuff Size: Adult Regular)   Pulse 88   Ht 1.689 m (5' 6.5\")   Wt 77.6 kg (171 lb 1.6 oz)   Breastfeeding? No   BMI 27.20 kg/m      Constitutional:  cooperative;in no acute distress        Skin:  warm and dry to the touch          Head:  normocephalic        Eyes:  pupils equal and round        Lymph:      ENT:  no pallor or cyanosis, dentition good        Neck:  JVP normal;no carotid bruit        Respiratory:  clear to " auscultation;normal respiratory excursion         Cardiac: regular rhythm;normal S1 and S2     no presence of murmur          pulses full and equal                                        GI:  abdomen soft;BS normoactive        Extremities and Muscular Skeletal:  no edema              Neurological:  affect appropriate        Psych:  Alert and Oriented x 3          CC  Malachi Finn MD  [address] [place]  [place] [address]

## 2019-10-16 NOTE — LETTER
10/16/2019      Mary Alice Colón PA-C  [address] [facility] [address]      RE: Kassie OLVIN Ramirez       Dear Colleague,    I had the pleasure of seeing Kassie Ramirez in the [facility].    Service Date: 10/16/2019      HISTORY OF PRESENT ILLNESS:  This is a 49-year-old female who presents to [facility] today for a followup visit.  She is a patient of Dr. Finn's seen in our clinic for supraventricular tachycardia.      In the last few months, Kassie has had a prolonged cough.  A chest CT showed left  hilar lymphadenopathy, and she has undergone a pulmonary evaluation including a bronchoscopy.  This was found to be noncancerous but likely sarcoidosis and was placed on a steroid taper.      On 09/18/2019, she admitted to the hospital with 24 hours of prolonged palpitations with associated shortness of breath and left-sided chest pain.  In the ER, telemetry showed supraventricular tachycardia at 170 beats per minute.  She converted to sinus rhythm with adenosine.  Her troponin was mildly elevated (peaked at 0.1) which was felt to be due to the sustained tachycardia.  She was placed on low-dose carvedilol.  Echocardiogram demonstrated left ventricular ejection fraction of 55%-60% without any regional wall motion abnormalities, no significant diastolic dysfunction, normal right ventricular function and size, and no valvular pathology.  She was set up for outpatient 2 week heart monitor and cardiac MRI.  She returns today for reassessment and review of these test results.      Kassie comes in with her  today.  She feels like she has tolerated carvedilol.  She denies any significant palpitations.  She has no chest pain or shortness of breath.  Her cough has resolved.  She is on a prolonged steroid taper now and has followup with Pulmonary in 6 weeks.  She denies orthopnea, lightheadedness, dizziness or peripheral edema.      I reviewed her 2-week monitor done  on 09/19/2019.  This showed sinus rhythm with a heart rate of 89 beats per minute, minimum of 58 beats per minute, maximum 150 beats per minute.  There was no evidence of supraventricular tachycardia.  She did have rare premature atrial contractions and premature ventricular contractions.      I reviewed her cardiac MRI done on 10/09/2019.  This showed normal left ventricular ejection fraction estimated at 59% without regional wall motion abnormalities.  Normal right ventricular function and size, no significant valvular disease.  Her late gadolinium was negative for myocardial infarction, fibrosis or infiltrative disease.  They did note a lung mass measuring at 3.9 x 2 cm posterior to the left atrium.  Commentator stated it was likely a large hilar lymph node, similar to her chest CT.      PHYSICAL EXAMINATION:  Her blood pressure today is 118/82, heart rate 88 beats per minute and is regular.  Her lungs are clear.  There is no significant peripheral edema.  Weight is stable at 171 pounds, placing her BMI at 27.      IMPRESSION AND PLAN:   1.  Recently diagnosed pulmonary sarcoidosis.  She is on a prolonged steroid taper, being followed by Pulmonary closely.  Recent cardiac MRI showed no evidence of MI, fibrosis, or infiltrative disease.  Her left ventricular ejection fraction is normal.   2.  Paroxysmal supraventricular tachycardia.  She was converted to sinus rhythm with adenosine in the emergency room last month.  She is now on dose carvedilol and has appeared to be tolerating this medication.  Followup Holter monitor showed no evidence of supraventricular tachycardia.  At this time, we will continue current medical regimen.  I have recommended her to follow up with Dr. Finn in 01/2020.  She will notify our clinic with any prolonged palpitations.      Thank you for allowing me to participate in this patient's care.         ALEX SWANSON, CNP             D: 10/16/2019   T: 10/16/2019   MT: JORDYN      Name:      BABAR VARGHESE   MRN:      [id number]        Account:      [id number]   :      1970           Service Date: 10/16/2019      Document: [id number]           Outpatient Encounter Medications as of 10/16/2019   Medication Sig Dispense Refill     carvedilol (COREG) 3.125 MG tablet Take 1 tablet (3.125 mg) by mouth 2 times daily (with meals) 180 tablet 3     cetirizine (ZYRTEC) 10 MG tablet Take 1 tablet (10 mg) by mouth daily as needed for allergies       [] predniSONE (DELTASONE) 20 MG tablet Take 2 tablets (40 mg) by mouth daily for 10 days (Patient taking differently: Take 30 mg by mouth daily ) 20 tablet 0     albuterol (PROVENTIL) (2.5 MG/3ML) 0.083% neb solution Take 1 vial (2.5 mg) by nebulization every 6 hours as needed for shortness of breath / dyspnea or wheezing       order for DME Equipment being ordered: Nebulizer (Patient not taking: Reported on 10/16/2019) 1 each 0     [DISCONTINUED] carvedilol (COREG) 3.125 MG tablet Take 1 tablet (3.125 mg) by mouth 2 times daily (with meals) 60 tablet 0     No facility-administered encounter medications on file as of 10/16/2019.        Again, thank you for allowing me to participate in the care of your patient.      Sincerely,    ALEX Staples [facility]

## 2019-10-16 NOTE — PROGRESS NOTES
Service Date: 10/16/2019      HISTORY OF PRESENT ILLNESS:  This is a 49-year-old female who presents to [facility] today for a followup visit.  She is a patient of Dr. Finn's seen in our clinic for supraventricular tachycardia.      In the last few months, Kassie has had a prolonged cough.  A chest CT showed left  hilar lymphadenopathy, and she has undergone a pulmonary evaluation including a bronchoscopy.  This was found to be noncancerous but likely sarcoidosis and was placed on a steroid taper.      On 09/18/2019, she admitted to the hospital with 24 hours of prolonged palpitations with associated shortness of breath and left-sided chest pain.  In the ER, telemetry showed supraventricular tachycardia at 170 beats per minute.  She converted to sinus rhythm with adenosine.  Her troponin was mildly elevated (peaked at 0.1) which was felt to be due to the sustained tachycardia.  She was placed on low-dose carvedilol.  Echocardiogram demonstrated left ventricular ejection fraction of 55%-60% without any regional wall motion abnormalities, no significant diastolic dysfunction, normal right ventricular function and size, and no valvular pathology.  She was set up for outpatient 2 week heart monitor and cardiac MRI.  She returns today for reassessment and review of these test results.      Kassie comes in with her  today.  She feels like she has tolerated carvedilol.  She denies any significant palpitations.  She has no chest pain or shortness of breath.  Her cough has resolved.  She is on a prolonged steroid taper now and has followup with Pulmonary in 6 weeks.  She denies orthopnea, lightheadedness, dizziness or peripheral edema.      I reviewed her 2-week monitor done on 09/19/2019.  This showed sinus rhythm with a heart rate of 89 beats per minute, minimum of 58 beats per minute, maximum 150 beats per minute.  There was no evidence of supraventricular tachycardia.  She did have rare premature atrial  contractions and premature ventricular contractions.      I reviewed her cardiac MRI done on 10/09/2019.  This showed normal left ventricular ejection fraction estimated at 59% without regional wall motion abnormalities.  Normal right ventricular function and size, no significant valvular disease.  Her late gadolinium was negative for myocardial infarction, fibrosis or infiltrative disease.  They did note a lung mass measuring at 3.9 x 2 cm posterior to the left atrium.  Commentator stated it was likely a large hilar lymph node, similar to her chest CT.      PHYSICAL EXAMINATION:  Her blood pressure today is 118/82, heart rate 88 beats per minute and is regular.  Her lungs are clear.  There is no significant peripheral edema.  Weight is stable at 171 pounds, placing her BMI at 27.      IMPRESSION AND PLAN:   1.  Recently diagnosed pulmonary sarcoidosis.  She is on a prolonged steroid taper, being followed by Pulmonary closely.  Recent cardiac MRI showed no evidence of MI, fibrosis, or infiltrative disease.  Her left ventricular ejection fraction is normal.   2.  Paroxysmal supraventricular tachycardia.  She was converted to sinus rhythm with adenosine in the emergency room last month.  She is now on dose carvedilol and has appeared to be tolerating this medication.  Followup Holter monitor showed no evidence of supraventricular tachycardia.  At this time, we will continue current medical regimen.  I have recommended her to follow up with Dr. Finn in 2020.  She will notify our clinic with any prolonged palpitations.      Thank you for allowing me to participate in this patient's care.         ALEX SWANSON, CNP             D: 10/16/2019   T: 10/16/2019   MT: JORDYN      Name:     BABAR VARGHESE   MRN:      [id number]        Account:      [id number]   :      1970           Service Date: 10/16/2019      Document: [id number]

## 2019-10-16 NOTE — LETTER
10/16/2019    Mary Alice Colón PA-C  [address] [facility] [address]    RE: Kassie Ramirez       Dear Colleague,    I had the pleasure of seeing Kassie Ramirez in the [facility].    HPI and Plan:   See dictation  #[id number]    Orders Placed This Encounter   Procedures     Follow-Up with Cardiologist       Orders Placed This Encounter   Medications     carvedilol (COREG) 3.125 MG tablet     Sig: Take 1 tablet (3.125 mg) by mouth 2 times daily (with meals)     Dispense:  180 tablet     Refill:  3       Medications Discontinued During This Encounter   Medication Reason     carvedilol (COREG) 3.125 MG tablet          Encounter Diagnoses   Name Primary?     SVT (supraventricular tachycardia) (H)      Elevated troponin      Sarcoidosis        CURRENT MEDICATIONS:  Current Outpatient Medications   Medication Sig Dispense Refill     carvedilol (COREG) 3.125 MG tablet Take 1 tablet (3.125 mg) by mouth 2 times daily (with meals) 180 tablet 3     cetirizine (ZYRTEC) 10 MG tablet Take 1 tablet (10 mg) by mouth daily as needed for allergies       predniSONE (DELTASONE) 20 MG tablet Take 2 tablets (40 mg) by mouth daily for 10 days (Patient taking differently: Take 30 mg by mouth daily ) 20 tablet 0     albuterol (PROVENTIL) (2.5 MG/3ML) 0.083% neb solution Take 1 vial (2.5 mg) by nebulization every 6 hours as needed for shortness of breath / dyspnea or wheezing       order for DME Equipment being ordered: Nebulizer (Patient not taking: Reported on 10/16/2019) 1 each 0       ALLERGIES     Allergies   Allergen Reactions     Cold & Flu [Cold Defense Fighter]      See pseudoephedrine     Seasonal Allergies      Sudafed Cold-Cough [Dayquil Liquicaps]      Pseudoephedrine Rash     Rash then skins peels off        PAST MEDICAL HISTORY:  Past Medical History:   Diagnosis Date     Anxiety attack 9/16/2014     Encounter for Essure implantation 2009     Generalized anxiety disorder 9/16/2014     zoloft = flat emotions     Menopausal disorder     started on OCPs by menopause center 3/2017 (takes active continuously)     Menstrual headache     helped by OCPs and magnesium     GERTRUDE (stress urinary incontinence, female)     sling procedure 2016     SVT (supraventricular tachycardia) (H)        PAST SURGICAL HISTORY:  Past Surgical History:   Procedure Laterality Date     H KIT ESSURE  2009    essure - Dr. Cailin Raymundo      HERNIORRHAPHY UMBILICAL  1974     SLING TRANSPUBO WITHOUT ANTERIOR COLPORRHAPHY N/A 11/21/2016    Procedure: SLING TRANSPUBO WITHOUT ANTERIOR COLPORRHAPHY;  Surgeon: Hernesto Berrios MD;  Location: [facility] OR       FAMILY HISTORY:  Family History   Problem Relation Age of Onset     Hypertension Mother      Depression Mother      Lipids Mother      Cardiovascular Mother      Circulatory Mother      Diabetes Mother      Heart Disease Mother      Cerebrovascular Disease Mother      Obesity Mother      C.A.D. 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     Lifestyle     Physical activity:     Days per week: None     Minutes per session: None     Stress: None   Relationships     Social connections:     Talks on phone: None     Gets together: None     Attends Restoration service: None     Active member of club or organization: None     Attends meetings of clubs or organizations: None     Relationship status: None     Intimate partner violence:     Fear of current or ex partner: None     Emotionally abused: None     Physically abused: None     Forced sexual activity: None   Other Topics Concern     Parent/sibling w/ CABG, MI or angioplasty before 65F 55M? No      Service Not Asked     Blood Transfusions Not Asked     Caffeine Concern Yes     Comment: MOnster energy drink.   Te - 3 cups.        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         Review of Systems:  Skin:  Negative       Eyes:  Positive for glasses readers  ENT:  Negative      Respiratory:  Negative       Cardiovascular:    chest pain;Positive for;fatigue occ chest pain  Gastroenterology: Negative      Genitourinary:  Negative      Musculoskeletal:  Negative      Neurologic:  Negative      Psychiatric:  Negative      Heme/Lymph/Imm:  Negative      Endocrine:  Negative        Physical Exam:  Vitals: /82 (BP Location: Right arm, Patient Position: Sitting, Cuff Size: Adult Regular)   Pulse 88   Ht 1.689 m (5' 6.5\")   Wt 77.6 kg (171 lb 1.6 oz)   Breastfeeding? No   BMI 27.20 kg/m       Constitutional:  cooperative;in no acute distress        Skin:  " warm and dry to the touch          Head:  normocephalic        Eyes:  pupils equal and round        Lymph:      ENT:  no pallor or cyanosis, dentition good        Neck:  JVP normal;no carotid bruit        Respiratory:  clear to auscultation;normal respiratory excursion         Cardiac: regular rhythm;normal S1 and S2     no presence of murmur          pulses full and equal                                        GI:  abdomen soft;BS normoactive        Extremities and Muscular Skeletal:  no edema              Neurological:  affect appropriate        Psych:  Alert and Oriented x 3          CC  Malachi Finn MD  [address]  [place] [address]                    Thank you for allowing me to participate in the care of your patient.      Sincerely,     ALEX Staples [facility] [place]    cc:   Malachi Finn MD  [address]  [place] [address]

## 2019-10-17 ENCOUNTER — VIRTUAL VISIT (OUTPATIENT)
Dept: FAMILY MEDICINE | Facility: OTHER | Age: 49
End: 2019-10-17

## 2019-10-17 NOTE — PROGRESS NOTES
"Date: 10/17/2019 16:05:32  Clinician: Rossana Cardoza  Clinician NPI: [id number]  Patient: Kassie Ramirez  Patient : 1970  Patient Address: [facility], [place] [address]  Patient Phone: [phone]  Visit Protocol: UTI  Patient Summary:  Kassie is a 49 year old ( : 1970 ) female who initiated a Visit for a presumed bladder infection. When asked the question \"Please sign me up to receive news, health information and promotions. \", Kassie responded \"No\".   Her symptoms started 4-6 days ago and consist of urinary frequency, urgency, urinary incontinence, vaginal itching, dysuria, foul-smelling urine, feeling as if the bladder is never empty, and abdominal pain.   Symptom details     Urine color: Her urine does not have any color.     Abdominal pain: The pain is mild (1-3 on a 10 point pain scale).      Denied symptoms include chills, vaginal discharge, vomiting, nausea, and flank pain. She does not feel feverish.   Over-the-counter medications or home remedies used to relieve the current symptoms as reported by the patient (free text): Advil, cranberry juice   Precipitating events  Kassie denies having a sexually transmitted disease.  Pertinent medical history  Kassie has had a bladder infection before but has not had any in the past 12 months. Her current symptoms are similar to her previous bladder infection symptoms.   Sulfamethoxazole-trimethoprim (Bactrim DS) has been effective in treating her past bladder infections.   She has experienced problems or side effects with the following antibiotics in the past: ciprofloxacin (Cipro).  Problems or side effects as reported by the patient (free text): Nausea   Kassie has not been prescribed antibiotics to prevent frequent or repeated bladder infections in the past and does not get yeast infections when she takes antibiotics.   Kassie does not have a history of kidney stones. She has not used a catheter or been a patient in " a hospital or nursing home in the past 2 weeks.   Kassie does not smoke or use smokeless tobacco.   She denies pregnancy and denies breastfeeding. She has menstruated in the past month.   Additional information as reported by the patient (free text): Taking 30 mg prednisone Daily for sarcoidosis     MEDICATIONS: prednisone oral, ALLERGIES: Sudafed  Clinician Response:  Dear Kassie,  Based on the information you have provided, you likely have an acute urinary tract infection, also called a bladder infection. Bladder infections occur when bacteria from the outside of the body enters the urinary tract. Any part of the urinary system can be infected, but the bladder is the most common.  Medication information  I am prescribing:     Sulfamethoxazole-trimethoprim (Bactrim DS) 800-160 mg oral tablet. Take 1 tablet by mouth every 12 hours for 3 days. There are no refills with this prescription.   Certain antibiotics such as Bactrim and Ciprofloxacin can cause your skin to be more sensitive to the sun. Exposure to the sun when taking these antibiotics may cause skin rash, itching, redness, or a severe sunburn. Be sure to avoid prolonged or direct exposure to the sun, especially between 10 am and 3 pm, if possible. Wear protective clothing and use sunscreen of SPF 30 or higher when you are outdoors.  The medication I prescribed for your bladder infection is an antibiotic. Continue taking the medication until it is gone even if you feel better. If you get an upset stomach while taking antibiotics, taking the medication with food can help.   Yeast infections can be a common side effect of antibiotics. The most common symptom of a yeast infection is itchiness in and around the vagina. Other signs and symptoms include burning, redness, or a thick, white vaginal discharge that looks like cottage cheese and does not have a bad smell.  Self care  Urination helps to flush bacteria from the urinary tract. For this reason, drinking  water and urinating often helps relieve some urinary symptoms and can decrease your risk of getting bladder infections in the future.  Other steps you can take to prevent future bladder infections include:     Wipe front to back after using the bathroom    Urinate after sexual intercourse    Avoid using deodorant sprays, douches, or powders in the vaginal area     When to seek care  Please make an appointment to be seen in a clinic or urgent care if any of the following occur:     You develop new symptoms or your symptoms become worse    You have medication side effects that make it difficult to take them as prescribed    Your symptoms do not improve within 1-2 days of starting treatment    You have symptoms of a bladder infection that return shortly after completing treatment     It is possible to have an allergic reaction to an antibiotic even if you have not had one in the past. If you notice a new rash, significant swelling, or difficulty breathing, stop taking this medication immediately and go to a clinic or urgent care.   Diagnosis: Acute uncomplicated bladder infection  Diagnosis ICD: N39.0  Prescription: sulfamethoxazole-trimethoprim (Bactrim DS) 800-160 mg oral tablet 6 tablet, 3 days supply. Take 1 tablet by mouth every 12 hours for 3 days. Refills: 0, Refill as needed: no, Allow substitutions: yes  Pharmacy: [id number] - [phone] - [address] [address]

## 2019-11-06 ENCOUNTER — HEALTH MAINTENANCE LETTER (OUTPATIENT)
Age: 49
End: 2019-11-06

## 2019-11-15 ENCOUNTER — TRANSFERRED RECORDS (OUTPATIENT)
Dept: HEALTH INFORMATION MANAGEMENT | Facility: CLINIC | Age: 49
End: 2019-11-15

## 2019-11-18 ENCOUNTER — TRANSFERRED RECORDS (OUTPATIENT)
Dept: HEALTH INFORMATION MANAGEMENT | Facility: CLINIC | Age: 49
End: 2019-11-18

## 2019-11-20 ENCOUNTER — HOSPITAL ENCOUNTER (EMERGENCY)
Facility: CLINIC | Age: 49
Discharge: HOME | End: 2019-11-20
Attending: EMERGENCY MEDICINE | Admitting: EMERGENCY MEDICINE
Payer: COMMERCIAL

## 2019-11-20 VITALS
HEART RATE: 108 BPM | RESPIRATION RATE: 22 BRPM | OXYGEN SATURATION: 95 % | BODY MASS INDEX: 26.68 KG/M2 | WEIGHT: 170 LBS | SYSTOLIC BLOOD PRESSURE: 140 MMHG | HEIGHT: 67 IN | DIASTOLIC BLOOD PRESSURE: 102 MMHG | TEMPERATURE: 98 F

## 2019-11-20 DIAGNOSIS — D86.9 SARCOIDOSIS: ICD-10-CM

## 2019-11-20 LAB
ANION GAP SERPL CALCULATED.3IONS-SCNC: 8 MMOL/L (ref 3–14)
BASOPHILS # BLD AUTO: 0.1 10E9/L (ref 0–0.2)
BASOPHILS NFR BLD AUTO: 1 %
BUN SERPL-MCNC: 21 MG/DL (ref 7–30)
CALCIUM SERPL-MCNC: 9.1 MG/DL (ref 8.5–10.1)
CHLORIDE SERPL-SCNC: 104 MMOL/L (ref 94–109)
CO2 SERPL-SCNC: 26 MMOL/L (ref 20–32)
CREAT SERPL-MCNC: 0.98 MG/DL (ref 0.52–1.04)
DIFFERENTIAL METHOD BLD: ABNORMAL
EOSINOPHIL # BLD AUTO: 0.4 10E9/L (ref 0–0.7)
EOSINOPHIL NFR BLD AUTO: 3.9 %
ERYTHROCYTE [DISTWIDTH] IN BLOOD BY AUTOMATED COUNT: 15.8 % (ref 10–15)
GFR SERPL CREATININE-BSD FRML MDRD: 67 ML/MIN/{1.73_M2}
GLUCOSE SERPL-MCNC: 84 MG/DL (ref 70–99)
HCT VFR BLD AUTO: 37.8 % (ref 35–47)
HGB BLD-MCNC: 13.1 G/DL (ref 11.7–15.7)
IMM GRANULOCYTES # BLD: 0.2 10E9/L (ref 0–0.4)
IMM GRANULOCYTES NFR BLD: 2.5 %
INTERPRETATION ECG - MUSE: NORMAL
LYMPHOCYTES # BLD AUTO: 2.6 10E9/L (ref 0.8–5.3)
LYMPHOCYTES NFR BLD AUTO: 28.3 %
MCH RBC QN AUTO: 31.4 PG (ref 26.5–33)
MCHC RBC AUTO-ENTMCNC: 34.7 G/DL (ref 31.5–36.5)
MCV RBC AUTO: 91 FL (ref 78–100)
MONOCYTES # BLD AUTO: 0.6 10E9/L (ref 0–1.3)
MONOCYTES NFR BLD AUTO: 6.6 %
NEUTROPHILS # BLD AUTO: 5.3 10E9/L (ref 1.6–8.3)
NEUTROPHILS NFR BLD AUTO: 57.7 %
NRBC # BLD AUTO: 0 10*3/UL
NRBC BLD AUTO-RTO: 0 /100
PLATELET # BLD AUTO: 180 10E9/L (ref 150–450)
POTASSIUM SERPL-SCNC: 3.2 MMOL/L (ref 3.4–5.3)
RBC # BLD AUTO: 4.17 10E12/L (ref 3.8–5.2)
SODIUM SERPL-SCNC: 138 MMOL/L (ref 133–144)
WBC # BLD AUTO: 9.2 10E9/L (ref 4–11)

## 2019-11-20 PROCEDURE — 25000125 ZZHC RX 250: Performed by: EMERGENCY MEDICINE

## 2019-11-20 PROCEDURE — 94640 AIRWAY INHALATION TREATMENT: CPT

## 2019-11-20 PROCEDURE — 99284 EMERGENCY DEPT VISIT MOD MDM: CPT | Mod: 25

## 2019-11-20 PROCEDURE — 80048 BASIC METABOLIC PNL TOTAL CA: CPT | Performed by: EMERGENCY MEDICINE

## 2019-11-20 PROCEDURE — 93005 ELECTROCARDIOGRAM TRACING: CPT

## 2019-11-20 PROCEDURE — 85025 COMPLETE CBC W/AUTO DIFF WBC: CPT | Performed by: EMERGENCY MEDICINE

## 2019-11-20 PROCEDURE — 25000132 ZZH RX MED GY IP 250 OP 250 PS 637: Performed by: EMERGENCY MEDICINE

## 2019-11-20 RX ORDER — CODEINE PHOSPHATE AND GUAIFENESIN 10; 100 MG/5ML; MG/5ML
1-2 SOLUTION ORAL EVERY 4 HOURS PRN
Qty: 120 ML | Refills: 0 | Status: SHIPPED | OUTPATIENT
Start: 2019-11-20 | End: 2019-11-27

## 2019-11-20 RX ORDER — ALPRAZOLAM 0.5 MG
0.5 TABLET ORAL 3 TIMES DAILY PRN
Qty: 12 TABLET | Refills: 0 | Status: SHIPPED | OUTPATIENT
Start: 2019-11-20 | End: 2019-11-27

## 2019-11-20 RX ORDER — IPRATROPIUM BROMIDE AND ALBUTEROL SULFATE 2.5; .5 MG/3ML; MG/3ML
3 SOLUTION RESPIRATORY (INHALATION) ONCE
Status: COMPLETED | OUTPATIENT
Start: 2019-11-20 | End: 2019-11-20

## 2019-11-20 RX ORDER — IBUPROFEN 600 MG/1
600 TABLET, FILM COATED ORAL ONCE
Status: COMPLETED | OUTPATIENT
Start: 2019-11-20 | End: 2019-11-20

## 2019-11-20 RX ADMIN — IPRATROPIUM BROMIDE AND ALBUTEROL SULFATE 3 ML: .5; 3 SOLUTION RESPIRATORY (INHALATION) at 09:24

## 2019-11-20 RX ADMIN — IBUPROFEN 600 MG: 600 TABLET ORAL at 10:16

## 2019-11-20 ASSESSMENT — ENCOUNTER SYMPTOMS
FREQUENCY: 1
SHORTNESS OF BREATH: 1
FEVER: 0
COUGH: 1
DYSURIA: 1

## 2019-11-20 ASSESSMENT — MIFFLIN-ST. JEOR: SCORE: 1428.74

## 2019-11-20 NOTE — ED AVS SNAPSHOT
Emergency Department  [address] [place] [id number]  Phone:  [phone]  Fax:  [phone]                                    Kassie Ramirez   MRN: [id number]    Department:   Emergency Department   Date of Visit:  11/20/2019           After Visit Summary Signature Page    I have received my discharge instructions, and my questions have been answered. I have discussed any challenges I see with this plan with the nurse or doctor.    ..........................................................................................................................................  Patient/Patient Representative Signature      ..........................................................................................................................................  Patient Representative Print Name and Relationship to Patient    ..................................................               ................................................  Date                                   Time    ..........................................................................................................................................  Reviewed by Signature/Title    ...................................................              ..............................................  Date                                               Time          22EPIC Rev 08/18

## 2019-11-20 NOTE — ED PROVIDER NOTES
History     Chief Complaint:    Shortness of Breath      HPI   Kassie Ramirez is a 49 year old female who presents to the ED for evaluation of shortness of breath. The patient states that she has been having progressively worsening shortness of breath for the past three months; noting she used to walk three miles a day but now gets winded walking up stairs. She has had extensive workup including chest CT, most recent CT as noted below, bronchoscopy, echocardiogram, cardiac MRI, and Zio patch monitoring and her symptoms are thought to be related to sarcoidosis. Over the past couple of days in particular, she reports that he shortness of breath has continued to worsen which ultimately prompted her visit to the ED. She also complains of cough, hemorrhoids, rectal bleeding, and UTI symptoms including frequency, urgency, dysuria. She denies fevers. She reports no improvement in her shortness of breath with albuterol inhaler treatment.     CT Chest without contrast 11/18/19:   IMPRESSION:    1. Interval worsening of bulky mediastinal and bilateral hilar lymphadenopathy.     2. Near complete interval resolution of the previously seen lower lobe opacities. However, there are new interstitial opacities in the right upper lobe.     3. No other significant findings are changes. as per radiology.    CT Chest with contrast 8/28/19:   IMPRESSION:  1. No significant change in subcarinal and left hilar lymphadenopathy which splays the zaalea. No associated vascular narrowing. Findings remain indeterminate and may represent lymphoma, acute noncalcified granulomatous disease, or sarcoidosis. Metastatic disease considered less likely.   2. Stable right infrahilar soft tissue nodule which may represent a lymph node.   3. Stable focal opacities in the right middle lobe and left lung base which may represent infiltrates or focal atelectasis.   4. Evidence of prior granulomatous disease.   5. Probable splenomegaly, however the spleen  "is only partially visualized on this exam. as per radiology.    Allergies:  Cold & Flu [Cold Defense Fighter]  Seasonal Allergies  Sudafed Cold-Cough [Dayquil Liquicaps]  Pseudoephedrine      Medications:    Albuterol neb  Carvedilol  Cetirizine   Prednisone    Past Medical History:    Anxiety attack   Generalized anxiety disorder   Menopausal disorder  Essure implant  Menstrual headache   Stress urinary incontinence female   SVT   Migraine  Hyperlipidemia      Past Surgical History:    Kit Essure   Herniorrhaphy umbilical   Sling transpubo without anterior colporrhaphy     Family History:    Mother - depression, hypertension, lipids, cardiovascular, circulatory, diabetes, heart disease, obesity, coronary artery disease, lung cancer   Father - eye disorder  Sister - lipids, macular degeneration     Social History:  The patient was accompanied to the ED by herself.  Smoking Status: Never  Smokeless Tobacco: Never  Alcohol Use: No   Marital Status:       Review of Systems   Constitutional: Negative for fever.   Respiratory: Positive for cough and shortness of breath.    Genitourinary: Positive for dysuria, frequency and urgency.   All other systems reviewed and are negative.      Physical Exam     Patient Vitals for the past 24 hrs:   BP Temp Temp src Pulse Heart Rate Resp SpO2 Height Weight   11/20/19 1142 -- -- -- -- 107 22 -- -- --   11/20/19 1011 -- -- -- -- 112 15 95 % -- --   11/20/19 1000 -- -- -- -- 107 22 93 % -- --   11/20/19 0900 (!) 140/102 -- -- 108 117 15 99 % -- --   11/20/19 0851 (!) 129/103 98  F (36.7  C) Oral -- 110 16 99 % 1.702 m (5' 7\") 77.1 kg (170 lb)       Physical Exam  Constitutional: White female. Supine.   HENT: No signs of trauma.   Eyes: EOM are normal. Pupils are equal, round, and reactive to light. Eyes closed.   Neck: Normal range of motion. No JVD present. No cervical adenopathy.  Cardiovascular: Regular rhythm.  Exam reveals no gallop and no friction rub.    No murmur " heard.  Pulmonary/Chest: Bilateral breath sounds normal. No wheezes, rhonchi or rales. No stridor or respiratory distress. Mild expiratory delay.   Abdominal: Soft. No tenderness. No rebound or guarding.   Musculoskeletal: No edema. No tenderness.   Lymphadenopathy: No lymphadenopathy.   Neurological: Alert and oriented to person, place, and time. Normal strength. Coordination normal.   Skin: Skin is warm and dry. No rash noted. No erythema.   Psych: appears anxious, no overt psychosis   Emergency Department Course     ECG:  Indication: SOB  Time: 0935  Vent. Rate 106 bpm. WV interval 136. QRS duration 76. QT/QTc 352/467. P-R-T axis 39 26 22.  Sinus tachycardia. Possible left atrial enlargement.    Laboratory:  Laboratory findings were communicated with the patient who voiced understanding of the findings.    CBC: WBC: 9.2, HGB: 13.1, PLT: 180  BMP: Potassium: 3.2, o/w WNL (Creatinine: 0.98)     Interventions:  0924 Duoneb 3 mL Nebulization   1016 Advil 600 mg PO    Emergency Department Course:  Past medical records, nursing notes, and vitals reviewed.    0859 I performed an exam of the patient as documented above.     0935 EKG obtained in the ED, see results above.     IV was inserted and blood was drawn for laboratory testing, results above. Medicine administered as noted above.     0941 I consulted with Dr. Horton, pulmonary, regarding the patient's history and presentation here in the emergency department.     1128 I rechecked the patient and discussed the results of her workup thus far.     Findings and plan explained to the Patient. Patient discharged home with instructions regarding supportive care, medications, and reasons to return. The importance of close follow-up was reviewed. The patient was prescribed Xanax and Robitussin.     I personally reviewed the laboratory results with the Patient and answered all related questions prior to discharge.     Impression & Plan   Medical Decision Making:  This is a 49  year old woman who over the last several months has been having pulmonary issues. She has had two CT scans and bronchoscopy and has been diagnosed as sarcoid. She sees Dr. Horton, pulmonology, at [facility]. Patient just had a CT scan two days ago. She was worried about this and feels that she cannot do anything as she gets more short of breath when she tries to take care of her children. She has no production to her cough. No fever. She was recently started on steroid inhalers to see if that would help. On exam, she has a cough, mild tachycardia. I reviewed her lab reports and CT scans. We obtained an EKG and lab work here. Gave her one neb which did not do much. I have talked with Dr. Horton who states that she is failing her steroid therapy and will have to be moved to second line, either methotrexate or azathioprine but he does not want to start this here but requires rheumatology or [place] evaluation. He will call the patient either today or early tomorrow to help arrange this. In the meantime I will give her some PRN Xanax to use when she gets anxious and Robitussin cough medicine.     Discharge Diagnosis:    ICD-10-CM    1. Sarcoidosis D86.9        Disposition:  Discharged to home.    Discharge Medications:  Discharge Medication List as of 11/20/2019 11:36 AM      START taking these medications    Details   ALPRAZolam (XANAX) 0.5 MG tablet Take 1 tablet (0.5 mg) by mouth 3 times daily as needed for anxiety, Disp-12 tablet, R-0, Local PrintDO NOT DRIVE IF USING THIS MEDICATION!      guaiFENesin-codeine (ROBITUSSIN AC) 100-10 MG/5ML solution Take 5-10 mLs by mouth every 4 hours as needed for cough, Disp-120 mL, R-0, Local Print              Scribe Disclosure:  I,  Brian Winston, am serving as a scribe on 11/20/2019 at 8:59 AM to personally document services performed by David Lynn MD based on my observations and the provider's statements to me.       Melanie Block  11/20/2019    EMERGENCY DEPARTMENT        David Lynn MD  11/20/19 [id number]

## 2019-11-20 NOTE — ED TRIAGE NOTES
"\"I am having trouble breathing. I have a sarcoid that is worsening.\"  Exp wheezing in triage and 1-3 word sentences.   "

## 2019-11-21 ENCOUNTER — MYC MEDICAL ADVICE (OUTPATIENT)
Dept: FAMILY MEDICINE | Facility: CLINIC | Age: 49
End: 2019-11-21

## 2019-11-21 DIAGNOSIS — D86.9 SARCOIDOSIS: ICD-10-CM

## 2019-11-22 RX ORDER — PREDNISONE 20 MG/1
40 TABLET ORAL DAILY
Qty: 60 TABLET | Refills: 0 | Status: ON HOLD | OUTPATIENT
Start: 2019-11-22 | End: 2019-12-01

## 2019-11-22 NOTE — TELEPHONE ENCOUNTER
PLEASE SCHEDULE patient with me at 1040 for a 40 minute appt  next Wednesday.  This technically will double book me but it should be fine with the appts around her .      If the 840 am appt would work better (not my first choice, however!)  please block my same day for catch up.    Thanks!      Mary Alice Colón MS, PA-C

## 2019-11-22 NOTE — TELEPHONE ENCOUNTER
Forwarded to LP.  Please review patient's Mychart message and advise.    Noreen Allison, BS, RN, PHN  [facility]) [phone]

## 2019-11-22 NOTE — TELEPHONE ENCOUNTER
Attempted to call patient to schedule.  No answer.  Left message advising patient she is scheduled 11/27 at 10:40am.  Please call back if this does not work.    If patient calls back please see note below for alternate time option.    Fadia Cárdenas

## 2019-11-26 NOTE — PROGRESS NOTES
SUBJECTIVE:   Kassie Ramirez is a 49 year old female who presents to clinic today for the following health issues:      Brief summary of today  49 year old female with progressively worsening respiratory symptoms x 5 months a with repeat CT chest on 11/20/2019 showing an enlarging mediastinal mass when compared to 8/20/2019. Patient is s/p endobrachial US w/FNA on 8/28/2019 that was non-diagnostic. Surgical biopsy via cervical mediastinoscopy performed to establish diagnosis of enlarging mediastinal mass and worsening respiratory symptoms.  To date, has been treated for suspected sarcoidosis with prednisone for the last 2 to 3 months.  Her breathing does improve with steroid treatment, however, she is unable to taper off of this medication.  She had a mediastinal mass biopsy on 11/25.  As of today, she is waiting for results, but they told her it was very suspicious for lymphoma based on the intraoperative findings.  Of note, A PET scan was ordered by Dr. Horton on 8/27/19 but it was never scheduled.  She does have an appointment with [facility] on 12/6/2019.    Per op report from 11/25: mediastinal mass which was extended over the distal trachea/azalea and encircling the right and left mainstem bronchi.  Frozen section was consistent with favoring lymphoma.     Called Dr. Olvera with [facility] and preliminary pathology is EBV + diffuse large B-cell lymphoma    Symptomatically, she is very short of breath.  She feels very limited in performing any day-to-day tasks even ones that require minimal exertion.  Her shortness of breath status has slightly worsened since her biopsy on 11/25/2019, however she does not feel that it is progressively worsening.    Alprazolam does help her sleep.  She has used Robitussin with codeine with some benefit a few times as well..  Alprazolam for anxiety is helpful. She currently is taking 20 mg once daily of prednisone.     Timeline of history:      6/27/2019: Sadia presented to her primary care clinic on 6/27/2019 with a cough x 6 weeks that was suspect for aspiration after consuming rice 6 weeks prior.  Chest x-ray did show some right peribronchial infiltrate/inflammation with some mild extension into the right middle lobe.  She was treated with azithromycin and advised to follow-up with pulmonology.  She noted transient improvement of her symptoms with azithromycin.    8/20/2019 she was seen by Dr. Horton with [facility] for consultation due to persistent cough despite antibiotic treatment.   Pulmonary function tests were completed that showed minimal obstructive defect and no significant response to bronchodilators.  Exhaled nitric oxide was markedly elevated and consistent with the presence of significant airway inflammation.  CT scan was ordered based on this finding.    8/20/2019: CT scan of the lungs was completed at [facility] which showed left hilar and subcarinal mediastinal adenopathy with narrowing of the left lower lobe bronchus and a nonspecific patchy area of nodular consolidation in the medial right lower lobe.    8/27/2019: PET scan was ordered by Dr. Horton and is scanned into her [facility] chart but this was never scheduled and the patient recalls hearing about this study but did not hear anything in follow-up regarding it.  To date, this has not been completed.    8/28/2019: Bronchoscopy with endobronchial ultrasound and fine-needle aspiration was complete completed and showed nonnecrotizing granulomatous inflammation with prominent eosinophilia and negative for malignancy.  The patient reported an ongoing cough and she was treated with a 12-day prednisone taper pending final culture results.    9/18/2019: Patient was hospitalized at [facility] for supraventricular tachycardia which was successfully terminated with adenosine.  Echocardiogram showed normal LVEF.  Patient was seen in consultation by  cardiology who recommended starting Coreg and discharged with a ZIO patch monitor.  Plan was to have an outpatient cardiac MRI to evaluate for cardiac sarcoidosis.  She was discharged on 40 mg of prednisone daily.    9/27/2019: Follow-up appointment with Dr. Horton at [facility].  Cough had persistent.  Was unable to taper down off of 40 mg of prednisone without return of shortness of breath/coughing.  Repeat spirometry was normal and improved when compared to prior study.  Dr. Horton was suspicious for pulmonary sarcoidosis based on her response to prednisone.  His plan was to continue on prednisone with a slow taper and repeat CT scan in 2 months.    10/16/2019: Patient had cardiology consultation after her cardiac MRI due to concerns of cardiac sarcoidosis.  Overall, cardiac MRI showed no evidence of MI, fibrosis, or infiltrative disease.  LVEF was normal.  Follow-up Holter monitor showed no evidence of SVT.    11/18/2019: Repeat CT scan of the chest was performed at [facility].  This showed interval worsening of bulky mediastinal and bilateral lateral hilar lymphadenopathy.    11/20/2019: Patient was seen in the emergency room for progressively worsening shortness of breath.  Dr. Horton was consulted and reported that she was failing steroid therapy and would have to be moved to a second line therapy, either methotrexate but he wanted to refer her to rheumatology to initiate this.  She was given as needed alprazolam as well as Robitussin with codeine which did not help her to be able to sleep somewhat.  Nebulizers did not make any change in her symptoms.    11/25/2019: Patient underwent cervical mediastinoscopy with mediastinal mass biopsy at [facility] with Dr. Kim of [place] [facility].  Per op report from 11/25: mediastinal mass which was extended over the distal trachea/azalea and encircling the right and left mainstem bronchi.  I did call the pathology department at  [facility] and did receive a verbal preliminary report today: Called Dr. Olvera with [facility] and preliminary pathology is EBV + diffuse large B-cell lymphoma            Problem list and histories reviewed & adjusted, as indicated.  Additional history: as documented    Patient Active Problem List   Diagnosis     Anxiety attack     Generalized anxiety disorder     Controlled substance agreement signed     GERTRUDE (stress urinary incontinence, female)     Intractable migraine without aura and with status migrainosus     Menstrual headache     Menopausal disorder     Mixed hyperlipidemia     SVT (supraventricular tachycardia) (H)     Sarcoidosis     Mediastinal mass     Diffuse large B-cell lymphoma of extranodal site (H) - per preliminary pathology from [facility] 11/27/19 - mediastinal mass wrapped around azalea and bilateral main stem bronchi     Past Surgical History:   Procedure Laterality Date     H KIT ESSURE  2009    essure - Dr. Cailin Raymundo      HERNIORRHAPHY UMBILICAL  1974     SLING TRANSPUBO WITHOUT ANTERIOR COLPORRHAPHY N/A 11/21/2016    Procedure: SLING TRANSPUBO WITHOUT ANTERIOR COLPORRHAPHY;  Surgeon: Hernesto Berrios MD;  Location:  OR       Social History     Tobacco Use     Smoking status: Never Smoker     Smokeless tobacco: Never Used   Substance Use Topics     Alcohol use: No     Alcohol/week: 0.0 standard drinks     Comment: 1 time per month     Family History   Problem Relation Age of Onset     Hypertension Mother      Depression Mother      Lipids Mother      Cardiovascular Mother      Circulatory Mother      Diabetes Mother      Heart Disease Mother      Cerebrovascular Disease Mother      Obesity Mother      C.A.D. Mother      Lung Cancer Mother         smoker     Eye Disorder Father         cone dystrophy     Macular Degeneration Father      Diabetes Maternal Aunt         type 2     Hypertension Maternal Aunt      Heart Disease Maternal Grandfather       "Heart Disease Sister         high cholesterol       Macular Degeneration Sister          Current Outpatient Medications   Medication Sig Dispense Refill     ALPRAZolam (XANAX) 0.5 MG tablet Take 1 tablet (0.5 mg) by mouth 3 times daily as needed for anxiety 40 tablet 0     guaiFENesin-codeine (ROBITUSSIN AC) 100-10 MG/5ML solution Take 5-10 mLs by mouth every 4 hours as needed for cough 120 mL 0     predniSONE (DELTASONE) 20 MG tablet Take 2 tablets (40 mg) by mouth daily 60 tablet 0     Allergies   Allergen Reactions     Cold & Flu [Cold Defense Fighter]      See pseudoephedrine     Seasonal Allergies      Sudafed Cold-Cough [Dayquil Liquicaps]      Pseudoephedrine Rash     Rash then skins peels off        Reviewed and updated as needed this visit by clinical staff  Tobacco  Allergies  Meds  Med Hx  Surg Hx  Fam Hx  Soc Hx      Reviewed and updated as needed this visit by Provider         ROS:  Constitutional, HEENT, cardiovascular, pulmonary, GI, , musculoskeletal, neuro, skin, endocrine and psych systems are negative, except as otherwise noted.    OBJECTIVE:   /82 (BP Location: Right arm, Cuff Size: Adult Regular)   Pulse 122   Temp 98.6  F (37  C) (Oral)   Ht 1.702 m (5' 7\")   Wt 78.5 kg (173 lb)   LMP 11/06/2019   SpO2 97%   BMI 27.10 kg/m   Body mass index is 27.1 kg/m .    GENERAL: tired and tearful   EYES: Eyes grossly normal to inspection, PERRL and conjunctivae and sclerae normal  HENT: ear canals and TM's normal and nose and mouth without ulcers or lesions  NECK: no adenopathy  RESP: coarse breath sounds throughout, but most notable anteriorly and superiorly  CV: regular rate and rhythm, normal S1 S2, no S3 or S4, no murmur, click or rub, no peripheral edema and peripheral pulses strong  MS: no gross musculoskeletal defects noted, no edema  SKIN: no suspicious lesions or rashes  NEURO: Normal strength and tone, mentation intact and speech normal  PSYCH: mentation appears normal, " affect normal    11/25/19:    MEDIASTINUM, BIOPSY:  EBV-positive diffuse large B-cell lymphoma (NOS), characterized by:  1. Immunoglobulin-negative large B-cells by immunostains and flow cytometry  2. Non-germinal center B-cell-like phenotype (CD10-, BCL6+, MUM1+)  3. EBV-positive lymphoma cells by in situ hybridization  4. FISH studies (c-myc, bcl6, bcl2) are appended   ASSESSMENT/PLAN:   Kassie was seen today for recheck.    Diagnoses and all orders for this visit:    Diffuse large B-cell lymphoma of extranodal site (H) - per preliminary pathology from [facility] 11/27/19 - mediastinal mass wrapped around azalea and bilateral main stem bronchi, Mediastinal mass  Called and discussed preliminary pathology that I received from [facility] with [facility] oncology both Dr. Castro and Dr. Martínez who recommended that Sadia be evaluated in the [facility] emergency room and directly admitted to facilitate [facility] due to her profound symptomatology.  I did call Sadia and she agreed with this plan and I also called the ER physician on staff at the [facility] and discussed the case.  Patient will proceed there immediately.  Plan to transition care to the [facility] clinic once stabilized.    SVT (supraventricular tachycardia) (H)  No episodes of recurrence.    Situational anxiety  Alprazolam helpful.  Refilled today.  -     ALPRAZolam (XANAX) 0.5 MG tablet; Take 1 tablet (0.5 mg) by mouth 3 times daily as needed for anxiety      Return today (on 11/27/2019) for [facility] ER immediately.     [address] [address]  [email or web address]   Office: [phone]           Mary Alice Colón, MS, PA-C

## 2019-11-27 ENCOUNTER — HOSPITAL ENCOUNTER (INPATIENT)
Facility: CLINIC | Age: 49
LOS: 4 days | Discharge: HOME | End: 2019-12-01
Attending: EMERGENCY MEDICINE | Admitting: INTERNAL MEDICINE
Payer: COMMERCIAL

## 2019-11-27 ENCOUNTER — APPOINTMENT (OUTPATIENT)
Dept: GENERAL RADIOLOGY | Facility: CLINIC | Age: 49
End: 2019-11-27
Attending: EMERGENCY MEDICINE
Payer: COMMERCIAL

## 2019-11-27 ENCOUNTER — OFFICE VISIT (OUTPATIENT)
Dept: FAMILY MEDICINE | Facility: CLINIC | Age: 49
End: 2019-11-27
Payer: COMMERCIAL

## 2019-11-27 VITALS
DIASTOLIC BLOOD PRESSURE: 82 MMHG | WEIGHT: 173 LBS | TEMPERATURE: 98.6 F | HEART RATE: 122 BPM | OXYGEN SATURATION: 97 % | BODY MASS INDEX: 27.15 KG/M2 | SYSTOLIC BLOOD PRESSURE: 130 MMHG | HEIGHT: 67 IN

## 2019-11-27 DIAGNOSIS — C83.398 DIFFUSE LARGE B-CELL LYMPHOMA OF EXTRANODAL SITE: Primary | ICD-10-CM

## 2019-11-27 DIAGNOSIS — J98.59 MEDIASTINAL MASS: ICD-10-CM

## 2019-11-27 DIAGNOSIS — I47.10 SVT (SUPRAVENTRICULAR TACHYCARDIA) (H): ICD-10-CM

## 2019-11-27 DIAGNOSIS — C83.398 DIFFUSE LARGE B-CELL LYMPHOMA OF EXTRANODAL SITE: ICD-10-CM

## 2019-11-27 DIAGNOSIS — F41.8 SITUATIONAL ANXIETY: ICD-10-CM

## 2019-11-27 PROBLEM — J98.09: Status: ACTIVE | Noted: 2019-11-27

## 2019-11-27 PROBLEM — D86.9 SARCOIDOSIS: Status: ACTIVE | Noted: 2019-11-27

## 2019-11-27 LAB
ALBUMIN SERPL-MCNC: 3.2 G/DL (ref 3.4–5)
ALBUMIN SERPL-MCNC: 3.5 G/DL (ref 3.4–5)
ALP SERPL-CCNC: 76 U/L (ref 40–150)
ALP SERPL-CCNC: 82 U/L (ref 40–150)
ALT SERPL W P-5'-P-CCNC: 28 U/L (ref 0–50)
ALT SERPL W P-5'-P-CCNC: 32 U/L (ref 0–50)
ANION GAP SERPL CALCULATED.3IONS-SCNC: 8 MMOL/L (ref 3–14)
ANION GAP SERPL CALCULATED.3IONS-SCNC: 8 MMOL/L (ref 3–14)
APTT PPP: 26 SEC (ref 22–37)
AST SERPL W P-5'-P-CCNC: 19 U/L (ref 0–45)
AST SERPL W P-5'-P-CCNC: 19 U/L (ref 0–45)
BASOPHILS # BLD AUTO: 0.1 10E9/L (ref 0–0.2)
BASOPHILS # BLD AUTO: 0.1 10E9/L (ref 0–0.2)
BASOPHILS NFR BLD AUTO: 0.9 %
BASOPHILS NFR BLD AUTO: 1.1 %
BILIRUB SERPL-MCNC: 1 MG/DL (ref 0.2–1.3)
BILIRUB SERPL-MCNC: 1.1 MG/DL (ref 0.2–1.3)
BUN SERPL-MCNC: 24 MG/DL (ref 7–30)
BUN SERPL-MCNC: 25 MG/DL (ref 7–30)
CALCIUM SERPL-MCNC: 8.5 MG/DL (ref 8.5–10.1)
CALCIUM SERPL-MCNC: 9.3 MG/DL (ref 8.5–10.1)
CHLORIDE SERPL-SCNC: 105 MMOL/L (ref 94–109)
CHLORIDE SERPL-SCNC: 108 MMOL/L (ref 94–109)
CO2 BLDCOV-SCNC: 28 MMOL/L (ref 21–28)
CO2 SERPL-SCNC: 26 MMOL/L (ref 20–32)
CO2 SERPL-SCNC: 30 MMOL/L (ref 20–32)
CREAT SERPL-MCNC: 0.99 MG/DL (ref 0.52–1.04)
CREAT SERPL-MCNC: 1.21 MG/DL (ref 0.52–1.04)
DIFFERENTIAL METHOD BLD: ABNORMAL
DIFFERENTIAL METHOD BLD: ABNORMAL
EOSINOPHIL # BLD AUTO: 0.4 10E9/L (ref 0–0.7)
EOSINOPHIL # BLD AUTO: 0.5 10E9/L (ref 0–0.7)
EOSINOPHIL NFR BLD AUTO: 4 %
EOSINOPHIL NFR BLD AUTO: 5 %
ERYTHROCYTE [DISTWIDTH] IN BLOOD BY AUTOMATED COUNT: 15.7 % (ref 10–15)
ERYTHROCYTE [DISTWIDTH] IN BLOOD BY AUTOMATED COUNT: 16 % (ref 10–15)
FIBRINOGEN PPP-MCNC: 330 MG/DL (ref 200–420)
GFR SERPL CREATININE-BSD FRML MDRD: 52 ML/MIN/{1.73_M2}
GFR SERPL CREATININE-BSD FRML MDRD: 67 ML/MIN/{1.73_M2}
GLUCOSE SERPL-MCNC: 118 MG/DL (ref 70–99)
GLUCOSE SERPL-MCNC: 122 MG/DL (ref 70–99)
HCT VFR BLD AUTO: 36.1 % (ref 35–47)
HCT VFR BLD AUTO: 39.8 % (ref 35–47)
HGB BLD-MCNC: 12 G/DL (ref 11.7–15.7)
HGB BLD-MCNC: 13.4 G/DL (ref 11.7–15.7)
IMM GRANULOCYTES # BLD: 0.3 10E9/L (ref 0–0.4)
IMM GRANULOCYTES # BLD: 0.4 10E9/L (ref 0–0.4)
IMM GRANULOCYTES NFR BLD: 3.4 %
IMM GRANULOCYTES NFR BLD: 3.6 %
INR PPP: 1.02 (ref 0.86–1.14)
LACTATE BLD-SCNC: 2.3 MMOL/L (ref 0.7–2.1)
LYMPHOCYTES # BLD AUTO: 2 10E9/L (ref 0.8–5.3)
LYMPHOCYTES # BLD AUTO: 2.1 10E9/L (ref 0.8–5.3)
LYMPHOCYTES NFR BLD AUTO: 20.4 %
LYMPHOCYTES NFR BLD AUTO: 21.7 %
MAGNESIUM SERPL-MCNC: 2.1 MG/DL (ref 1.6–2.3)
MAGNESIUM SERPL-MCNC: 2.5 MG/DL (ref 1.6–2.3)
MCH RBC QN AUTO: 31.5 PG (ref 26.5–33)
MCH RBC QN AUTO: 31.8 PG (ref 26.5–33)
MCHC RBC AUTO-ENTMCNC: 33.2 G/DL (ref 31.5–36.5)
MCHC RBC AUTO-ENTMCNC: 33.7 G/DL (ref 31.5–36.5)
MCV RBC AUTO: 95 FL (ref 78–100)
MCV RBC AUTO: 95 FL (ref 78–100)
MONOCYTES # BLD AUTO: 0.7 10E9/L (ref 0–1.3)
MONOCYTES # BLD AUTO: 0.8 10E9/L (ref 0–1.3)
MONOCYTES NFR BLD AUTO: 7.2 %
MONOCYTES NFR BLD AUTO: 8 %
NEUTROPHILS # BLD AUTO: 5.7 10E9/L (ref 1.6–8.3)
NEUTROPHILS # BLD AUTO: 6.6 10E9/L (ref 1.6–8.3)
NEUTROPHILS NFR BLD AUTO: 61.6 %
NEUTROPHILS NFR BLD AUTO: 63.1 %
NRBC # BLD AUTO: 0 10*3/UL
NRBC # BLD AUTO: 0 10*3/UL
NRBC BLD AUTO-RTO: 0 /100
NRBC BLD AUTO-RTO: 0 /100
PCO2 BLDV: 32 MM HG (ref 40–50)
PH BLDV: 7.55 PH (ref 7.32–7.43)
PHOSPHATE SERPL-MCNC: 3.1 MG/DL (ref 2.5–4.5)
PLATELET # BLD AUTO: 226 10E9/L (ref 150–450)
PLATELET # BLD AUTO: 256 10E9/L (ref 150–450)
PO2 BLDV: 26 MM HG (ref 25–47)
POTASSIUM SERPL-SCNC: 3.1 MMOL/L (ref 3.4–5.3)
POTASSIUM SERPL-SCNC: 3.4 MMOL/L (ref 3.4–5.3)
PROCALCITONIN SERPL-MCNC: <0.05 NG/ML
PROT SERPL-MCNC: 6 G/DL (ref 6.8–8.8)
PROT SERPL-MCNC: 6.7 G/DL (ref 6.8–8.8)
RBC # BLD AUTO: 3.81 10E12/L (ref 3.8–5.2)
RBC # BLD AUTO: 4.21 10E12/L (ref 3.8–5.2)
SAO2 % BLDV FROM PO2: 57 %
SODIUM SERPL-SCNC: 142 MMOL/L (ref 133–144)
SODIUM SERPL-SCNC: 143 MMOL/L (ref 133–144)
URATE SERPL-MCNC: 7.1 MG/DL (ref 2.6–6)
WBC # BLD AUTO: 10.4 10E9/L (ref 4–11)
WBC # BLD AUTO: 9.2 10E9/L (ref 4–11)

## 2019-11-27 PROCEDURE — 83735 ASSAY OF MAGNESIUM: CPT | Performed by: INTERNAL MEDICINE

## 2019-11-27 PROCEDURE — 12000001 ZZH R&B MED SURG/OB UMMC

## 2019-11-27 PROCEDURE — 84550 ASSAY OF BLOOD/URIC ACID: CPT | Performed by: INTERNAL MEDICINE

## 2019-11-27 PROCEDURE — 96375 TX/PRO/DX INJ NEW DRUG ADDON: CPT | Performed by: EMERGENCY MEDICINE

## 2019-11-27 PROCEDURE — 25000132 ZZH RX MED GY IP 250 OP 250 PS 637: Performed by: INTERNAL MEDICINE

## 2019-11-27 PROCEDURE — 85730 THROMBOPLASTIN TIME PARTIAL: CPT | Performed by: INTERNAL MEDICINE

## 2019-11-27 PROCEDURE — 82803 BLOOD GASES ANY COMBINATION: CPT

## 2019-11-27 PROCEDURE — 85025 COMPLETE CBC W/AUTO DIFF WBC: CPT | Performed by: INTERNAL MEDICINE

## 2019-11-27 PROCEDURE — 99285 EMERGENCY DEPT VISIT HI MDM: CPT | Mod: 25 | Performed by: EMERGENCY MEDICINE

## 2019-11-27 PROCEDURE — 87040 BLOOD CULTURE FOR BACTERIA: CPT | Performed by: EMERGENCY MEDICINE

## 2019-11-27 PROCEDURE — 83735 ASSAY OF MAGNESIUM: CPT | Performed by: EMERGENCY MEDICINE

## 2019-11-27 PROCEDURE — 99215 OFFICE O/P EST HI 40 MIN: CPT | Performed by: PHYSICIAN ASSISTANT

## 2019-11-27 PROCEDURE — 36415 COLL VENOUS BLD VENIPUNCTURE: CPT | Performed by: INTERNAL MEDICINE

## 2019-11-27 PROCEDURE — 25800030 ZZH RX IP 258 OP 636: Performed by: EMERGENCY MEDICINE

## 2019-11-27 PROCEDURE — 85610 PROTHROMBIN TIME: CPT | Performed by: EMERGENCY MEDICINE

## 2019-11-27 PROCEDURE — 25000128 H RX IP 250 OP 636: Performed by: EMERGENCY MEDICINE

## 2019-11-27 PROCEDURE — 80053 COMPREHEN METABOLIC PANEL: CPT | Performed by: INTERNAL MEDICINE

## 2019-11-27 PROCEDURE — 71045 X-RAY EXAM CHEST 1 VIEW: CPT

## 2019-11-27 PROCEDURE — 85025 COMPLETE CBC W/AUTO DIFF WBC: CPT | Performed by: EMERGENCY MEDICINE

## 2019-11-27 PROCEDURE — 80053 COMPREHEN METABOLIC PANEL: CPT | Performed by: EMERGENCY MEDICINE

## 2019-11-27 PROCEDURE — 25800030 ZZH RX IP 258 OP 636: Performed by: INTERNAL MEDICINE

## 2019-11-27 PROCEDURE — 83605 ASSAY OF LACTIC ACID: CPT

## 2019-11-27 PROCEDURE — 84100 ASSAY OF PHOSPHORUS: CPT | Performed by: INTERNAL MEDICINE

## 2019-11-27 PROCEDURE — 85384 FIBRINOGEN ACTIVITY: CPT | Performed by: INTERNAL MEDICINE

## 2019-11-27 PROCEDURE — 96365 THER/PROPH/DIAG IV INF INIT: CPT | Performed by: EMERGENCY MEDICINE

## 2019-11-27 PROCEDURE — 93005 ELECTROCARDIOGRAM TRACING: CPT | Performed by: EMERGENCY MEDICINE

## 2019-11-27 PROCEDURE — 99223 1ST HOSP IP/OBS HIGH 75: CPT | Mod: AI | Performed by: INTERNAL MEDICINE

## 2019-11-27 PROCEDURE — 93010 ELECTROCARDIOGRAM REPORT: CPT | Mod: Z6 | Performed by: EMERGENCY MEDICINE

## 2019-11-27 PROCEDURE — 84145 PROCALCITONIN (PCT): CPT | Performed by: EMERGENCY MEDICINE

## 2019-11-27 PROCEDURE — 81001 URINALYSIS AUTO W/SCOPE: CPT | Performed by: EMERGENCY MEDICINE

## 2019-11-27 RX ORDER — KETOROLAC TROMETHAMINE 30 MG/ML
30 INJECTION, SOLUTION INTRAMUSCULAR; INTRAVENOUS ONCE
Status: COMPLETED | OUTPATIENT
Start: 2019-11-27 | End: 2019-11-27

## 2019-11-27 RX ORDER — LORAZEPAM 0.5 MG/1
.5-1 TABLET ORAL EVERY 6 HOURS PRN
Status: DISCONTINUED | OUTPATIENT
Start: 2019-11-27 | End: 2019-11-30

## 2019-11-27 RX ORDER — AMOXICILLIN 250 MG
2 CAPSULE ORAL 2 TIMES DAILY
Status: DISCONTINUED | OUTPATIENT
Start: 2019-11-27 | End: 2019-12-01 | Stop reason: HOSPADM

## 2019-11-27 RX ORDER — SODIUM CHLORIDE 9 MG/ML
INJECTION, SOLUTION INTRAVENOUS CONTINUOUS
Status: DISCONTINUED | OUTPATIENT
Start: 2019-11-27 | End: 2019-11-30

## 2019-11-27 RX ORDER — OXYCODONE HYDROCHLORIDE 5 MG/1
5-10 TABLET ORAL EVERY 4 HOURS PRN
Status: DISCONTINUED | OUTPATIENT
Start: 2019-11-27 | End: 2019-12-01 | Stop reason: HOSPADM

## 2019-11-27 RX ORDER — PREDNISONE 50 MG/1
100 TABLET ORAL DAILY
Status: DISCONTINUED | OUTPATIENT
Start: 2019-11-27 | End: 2019-11-28

## 2019-11-27 RX ORDER — ALPRAZOLAM 0.5 MG
0.5 TABLET ORAL 3 TIMES DAILY PRN
Status: DISCONTINUED | OUTPATIENT
Start: 2019-11-27 | End: 2019-12-01 | Stop reason: HOSPADM

## 2019-11-27 RX ORDER — ACETAMINOPHEN 325 MG/1
650 TABLET ORAL EVERY 4 HOURS PRN
Status: DISCONTINUED | OUTPATIENT
Start: 2019-11-27 | End: 2019-12-01 | Stop reason: HOSPADM

## 2019-11-27 RX ORDER — HYDROMORPHONE HYDROCHLORIDE 1 MG/ML
0.5 INJECTION, SOLUTION INTRAMUSCULAR; INTRAVENOUS; SUBCUTANEOUS ONCE
Status: DISCONTINUED | OUTPATIENT
Start: 2019-11-27 | End: 2019-11-27

## 2019-11-27 RX ORDER — POTASSIUM CL/LIDO/0.9 % NACL 10MEQ/0.1L
10 INTRAVENOUS SOLUTION, PIGGYBACK (ML) INTRAVENOUS
Status: DISCONTINUED | OUTPATIENT
Start: 2019-11-27 | End: 2019-12-01 | Stop reason: HOSPADM

## 2019-11-27 RX ORDER — POTASSIUM CHLORIDE 750 MG/1
20-40 TABLET, EXTENDED RELEASE ORAL
Status: DISCONTINUED | OUTPATIENT
Start: 2019-11-27 | End: 2019-12-01 | Stop reason: HOSPADM

## 2019-11-27 RX ORDER — LIDOCAINE 40 MG/G
CREAM TOPICAL
Status: DISCONTINUED | OUTPATIENT
Start: 2019-11-27 | End: 2019-11-27

## 2019-11-27 RX ORDER — ONDANSETRON 2 MG/ML
8 INJECTION INTRAMUSCULAR; INTRAVENOUS EVERY 8 HOURS PRN
Status: DISCONTINUED | OUTPATIENT
Start: 2019-11-27 | End: 2019-12-01 | Stop reason: HOSPADM

## 2019-11-27 RX ORDER — ONDANSETRON 8 MG/1
8 TABLET, ORALLY DISINTEGRATING ORAL EVERY 8 HOURS PRN
Status: DISCONTINUED | OUTPATIENT
Start: 2019-11-27 | End: 2019-12-01 | Stop reason: HOSPADM

## 2019-11-27 RX ORDER — ALPRAZOLAM 0.5 MG
0.5 TABLET ORAL 3 TIMES DAILY PRN
Qty: 40 TABLET | Refills: 0 | Status: SHIPPED | OUTPATIENT
Start: 2019-11-27 | End: 2019-12-15

## 2019-11-27 RX ORDER — ALLOPURINOL 300 MG/1
300 TABLET ORAL DAILY
Status: DISCONTINUED | OUTPATIENT
Start: 2019-11-27 | End: 2019-12-01 | Stop reason: HOSPADM

## 2019-11-27 RX ORDER — HEPARIN SODIUM,PORCINE 10 UNIT/ML
2-5 VIAL (ML) INTRAVENOUS
Status: COMPLETED | OUTPATIENT
Start: 2019-11-27 | End: 2019-12-01

## 2019-11-27 RX ORDER — ONDANSETRON 8 MG/1
8 TABLET, FILM COATED ORAL EVERY 8 HOURS PRN
Status: DISCONTINUED | OUTPATIENT
Start: 2019-11-27 | End: 2019-12-01 | Stop reason: HOSPADM

## 2019-11-27 RX ORDER — LORAZEPAM 2 MG/ML
.5-1 INJECTION INTRAMUSCULAR EVERY 6 HOURS PRN
Status: DISCONTINUED | OUTPATIENT
Start: 2019-11-27 | End: 2019-11-30

## 2019-11-27 RX ORDER — POLYETHYLENE GLYCOL 3350 17 G/17G
17 POWDER, FOR SOLUTION ORAL DAILY PRN
Status: DISCONTINUED | OUTPATIENT
Start: 2019-11-27 | End: 2019-12-01 | Stop reason: HOSPADM

## 2019-11-27 RX ORDER — AMOXICILLIN 250 MG
1 CAPSULE ORAL 2 TIMES DAILY
Status: DISCONTINUED | OUTPATIENT
Start: 2019-11-27 | End: 2019-12-01 | Stop reason: HOSPADM

## 2019-11-27 RX ORDER — POTASSIUM CHLORIDE 7.45 MG/ML
10 INJECTION INTRAVENOUS
Status: DISCONTINUED | OUTPATIENT
Start: 2019-11-27 | End: 2019-12-01 | Stop reason: HOSPADM

## 2019-11-27 RX ORDER — POTASSIUM CHLORIDE 29.8 MG/ML
20 INJECTION INTRAVENOUS
Status: DISCONTINUED | OUTPATIENT
Start: 2019-11-27 | End: 2019-12-01 | Stop reason: HOSPADM

## 2019-11-27 RX ORDER — IBUPROFEN 200 MG
800 TABLET ORAL EVERY 6 HOURS PRN
COMMUNITY
End: 2019-12-15

## 2019-11-27 RX ORDER — POTASSIUM CHLORIDE 1.5 G/1.58G
20-40 POWDER, FOR SOLUTION ORAL
Status: DISCONTINUED | OUTPATIENT
Start: 2019-11-27 | End: 2019-12-01 | Stop reason: HOSPADM

## 2019-11-27 RX ORDER — LIDOCAINE 40 MG/G
CREAM TOPICAL
Status: DISCONTINUED | OUTPATIENT
Start: 2019-11-27 | End: 2019-11-30

## 2019-11-27 RX ORDER — PROCHLORPERAZINE MALEATE 5 MG
5 TABLET ORAL EVERY 6 HOURS PRN
Status: DISCONTINUED | OUTPATIENT
Start: 2019-11-27 | End: 2019-11-30

## 2019-11-27 RX ORDER — MAGNESIUM SULFATE HEPTAHYDRATE 40 MG/ML
4 INJECTION, SOLUTION INTRAVENOUS EVERY 4 HOURS PRN
Status: DISCONTINUED | OUTPATIENT
Start: 2019-11-27 | End: 2019-12-01 | Stop reason: HOSPADM

## 2019-11-27 RX ORDER — NALOXONE HYDROCHLORIDE 0.4 MG/ML
.1-.4 INJECTION, SOLUTION INTRAMUSCULAR; INTRAVENOUS; SUBCUTANEOUS
Status: DISCONTINUED | OUTPATIENT
Start: 2019-11-27 | End: 2019-11-30

## 2019-11-27 RX ORDER — POTASSIUM CL/LIDO/0.9 % NACL 10MEQ/0.1L
10 INTRAVENOUS SOLUTION, PIGGYBACK (ML) INTRAVENOUS ONCE
Status: COMPLETED | OUTPATIENT
Start: 2019-11-27 | End: 2019-11-27

## 2019-11-27 RX ADMIN — ALPRAZOLAM 0.5 MG: 0.5 TABLET ORAL at 21:25

## 2019-11-27 RX ADMIN — ACETAMINOPHEN 650 MG: 325 TABLET, FILM COATED ORAL at 21:25

## 2019-11-27 RX ADMIN — SODIUM CHLORIDE 1000 ML: 9 INJECTION, SOLUTION INTRAVENOUS at 17:21

## 2019-11-27 RX ADMIN — ALLOPURINOL 300 MG: 300 TABLET ORAL at 21:26

## 2019-11-27 RX ADMIN — KETOROLAC TROMETHAMINE 30 MG: 30 INJECTION, SOLUTION INTRAMUSCULAR; INTRAVENOUS at 17:22

## 2019-11-27 RX ADMIN — Medication 10 MEQ: at 17:22

## 2019-11-27 RX ADMIN — SODIUM CHLORIDE: 9 INJECTION, SOLUTION INTRAVENOUS at 21:35

## 2019-11-27 ASSESSMENT — ACTIVITIES OF DAILY LIVING (ADL)
FALL_HISTORY_WITHIN_LAST_SIX_MONTHS: NO
TOILETING: 0-->INDEPENDENT
COGNITION: 0 - NO COGNITION ISSUES REPORTED
AMBULATION: 0-->INDEPENDENT
DRESS: 0-->INDEPENDENT
BATHING: 0-->INDEPENDENT
RETIRED_EATING: 0-->INDEPENDENT
RETIRED_COMMUNICATION: 0-->UNDERSTANDS/COMMUNICATES WITHOUT DIFFICULTY
TRANSFERRING: 0-->INDEPENDENT
SWALLOWING: 0-->SWALLOWS FOODS/LIQUIDS WITHOUT DIFFICULTY

## 2019-11-27 ASSESSMENT — ENCOUNTER SYMPTOMS
SHORTNESS OF BREATH: 1
FEVER: 0
DIFFICULTY URINATING: 0
ARTHRALGIAS: 0
NECK STIFFNESS: 0
COUGH: 1
COLOR CHANGE: 0
CONFUSION: 0
EYE REDNESS: 0
HEADACHES: 0
ABDOMINAL PAIN: 0

## 2019-11-27 ASSESSMENT — MIFFLIN-ST. JEOR: SCORE: 1442.35

## 2019-11-27 NOTE — ED NOTES
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  Past Medical Hx:   Past Medical History:   Diagnosis Date     Anxiety attack 9/16/2014     Encounter for Essure implantation 2009     Generalized anxiety disorder 9/16/2014    zoloft = flat emotions     Menopausal disorder     started on OCPs by menopause center 3/2017 (takes active continuously)     Menstrual headache     helped by OCPs and magnesium     GERTRUDE (stress urinary incontinence, female)     sling procedure 2016     SVT (supraventricular tachycardia) (H)       Baseline Mental status: WDL  Current Mental Status changes: at basesline    Infection present or suspected this encounter: no  Sepsis suspected: No  Isolation type: No active isolations     Activity level - Baseline/Home:  Independent  Activity Level - Current:   Independent    Bariatric equipment needed?: No    In the ED these meds were given:   Medications   sodium chloride (PF) 0.9% PF flush 3 mL (has no administration in time range)   sodium chloride (PF) 0.9% PF flush 3 mL (has no administration in time range)   0.9% sodium chloride BOLUS (has no administration in time range)   potassium chloride 10 mEq in 100 mL intermittent infusion with 10 mg lidocaine (has no administration in time range)    ketorolac (TORADOL) injection 30 mg (has no administration in time range)       Drips running?  No    Home pump  No    Current LDAs  Peripheral IV 11/20/19 Right (Active)   Number of days: 7       Peripheral IV 11/27/19 Left (Active)   Line Status Saline locked 11/27/2019  4:10 PM   Number of days: 0       Incision/Surgical Site 11/21/16 Vagina (Active)   Number of days: 1101       Labs results:   Labs Ordered and Resulted from Time of ED Arrival Up to the Time of Departure from the ED   COMPREHENSIVE METABOLIC PANEL - Abnormal; Notable for the following components:       Result Value    Potassium 3.1 (*)     Glucose 118 (*)     Creatinine 1.21 (*)     GFR Estimate 52 (*)     Protein Total 6.7 (*)     All other components within normal limits   CBC WITH PLATELETS DIFFERENTIAL - Abnormal; Notable for the following components:    RDW 15.7 (*)     All other components within normal limits   MAGNESIUM - Abnormal; Notable for the following components:    Magnesium 2.5 (*)     All other components within normal limits   ISTAT  GASES LACTATE BETTIE POCT - Abnormal; Notable for the following components:    Ph Venous 7.55 (*)     PCO2 Venous 32 (*)     Lactic Acid 2.3 (*)     All other components within normal limits   INR   PROCALCITONIN   ROUTINE UA WITH MICROSCOPIC   PERIPHERAL IV CATHETER   PULSE OXIMETRY NURSING   CARDIAC CONTINUOUS MONITORING   NURSING DRAW AND HOLD   ISTAT CG4 GASES LACTATE BETTIE NURSING POCT   BLOOD CULTURE   BLOOD CULTURE       Imaging Studies: No results found for this or any previous visit (from the past 24 hour(s)).    Recent vital signs:   BP (!) 128/98   Pulse 128   Temp 98.2  F (36.8  C) (Oral)   Resp 28   Wt 78 kg (172 lb)   LMP 11/06/2019   SpO2 92%   BMI 26.94 kg/m              Cardiac Rhythm: Other  Pt needs tele? No  Skin/wound Issues: None    Code Status: Full Code    Pain control: good    Nausea control: pt had none    Abnormal labs/tests/findings requiring intervention: low  K+    Family present during ED course? Yes   Family Comments/Social Situation comments:     Tasks needing completion: None    John Ibarra, RN  [phone] [place] ED  [phone] [facility] ED

## 2019-11-27 NOTE — PROGRESS NOTES
Received call from primary care.  This is a 49-year-old woman who has been having worsening shortness of breath for the last several weeks.  Recent CT scan showed mediastinal adenopathy.  Pathologic diagnosis is pending.    --As per patient's primary care provider, patient is very symptomatic.  I recommended to provider to send the patient to emergency room.  I also recommended the initiation of call  toER at [facility].  The patient will likely need further work-up including hematology consult and possible radiation.

## 2019-11-27 NOTE — PHARMACY-ADMISSION MEDICATION HISTORY
Admission medication history for the November 27, 2019 admission is complete.     Interview sources:  patient, [employer]    Reliability of source: good - patient knew current medications without prompting    Medication compliance: moderate - patient has been adherent to prednisone, however, she was prescribed carvedilol 3.125 mg BID on 9/19/19 but states that she never started taking it    Changes made to PTA medication list (reason)  Added:  - ibuprofen (pt states she has been taking 800 mg q6h around the clock for pain associated with her biopsy)  Deleted:   - Robitussin AC PRN (not taking per pt)  Changed: N/A    Additional medication history information:   - Patient states she is weaning off of her prednisone. She was prescribed 40 mg daily, was on 30 mg daily for some time, and is currently takes 20 mg daily which has been her dose for the last 3 days.  - Patient was prescribed carvedilol 3.125 mg BID on 9/19/19, but states she never started taking it  - Patient has only been using Xanax PRN sleep    Prior to Admission Medications   Prescriptions Last Dose Informant Patient Reported? Taking?   ALPRAZolam (XANAX) 0.5 MG tablet Past Week  No Yes   Sig: Take 1 tablet (0.5 mg) by mouth 3 times daily as needed for anxiety   ibuprofen (ADVIL/MOTRIN) 200 MG tablet 11/27/2019 at AM  Yes Yes   Sig: Take 800 mg by mouth every 6 hours as needed for mild pain   predniSONE (DELTASONE) 20 MG tablet 11/27/2019  No Yes   Sig: Take 2 tablets (40 mg) by mouth daily      Facility-Administered Medications: None       Time spent: 20 minutes    Medication history completed by:   Hermelindo Kim, Sheri  [facility]  Emergency Department: Ascom *[id number]

## 2019-11-27 NOTE — ED PROVIDER NOTES
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     Kassie Ramirez is a 49 year old female who has had progressively worsening respiratory symptoms including shortness of breath and a nonproductive cough over the last 5 months.  Patient had a repeat CT chest done on 11/20/2019 that showed an enlarging mediastinal mass when compared to imaging on 8/20/2019.  Patient underwent surgical biopsy which returned as diffuse large B-cell lymphoma.  The preliminary pathology report.  Please see patient's PCP clinic note from today for more extensive history of patient's work-up over the last 5 months.  During the course of her work-up, the patient has had PE studies that have been negative for PE.    Patient was very short of breath in clinic that has been worsening on a day-to-day basis.  Patient was sent here to the Emergency Department by her PCP for admission and to start treatment.  The patient's  reports that over the last few days the patient's shortness of breath has been significantly worsening to the point that she is unable to even walk up the stairs at home without becoming significantly short of breath.  The patient is even having difficulty talking secondary to shortness of breath in the room.  The patient reports that she has had a nonproductive cough as well over the same timeline of her shortness of breath.  She denies any leg pain or leg swelling and she denies any fevers.    I have reviewed the Medications, Allergies, Past Medical and Surgical History, and Social History in the [employer] system.    Past Medical History:   Diagnosis Date     Anxiety attack 9/16/2014     Encounter for William  implantation 2009     Generalized anxiety disorder 9/16/2014    zoloft = flat emotions     Menopausal disorder     started on OCPs by menopause center 3/2017 (takes active continuously)     Menstrual headache     helped by OCPs and magnesium     GERTRUDE (stress urinary incontinence, female)     sling procedure 2016     SVT (supraventricular tachycardia) (H)        Past Surgical History:   Procedure Laterality Date     H KIT ESSURE  2009    essure - Dr. Cailin Raymundo      HERNIORRHAPHY UMBILICAL  1974     SLING TRANSPUBO WITHOUT ANTERIOR COLPORRHAPHY N/A 11/21/2016    Procedure: SLING TRANSPUBO WITHOUT ANTERIOR COLPORRHAPHY;  Surgeon: Hernesto Berrios MD;  Location: [facility] OR       Family History   Problem Relation Age of Onset     Hypertension Mother      Depression Mother      Lipids Mother      Cardiovascular Mother      Circulatory Mother      Diabetes Mother      Heart Disease Mother      Cerebrovascular Disease Mother      Obesity Mother      C.A.D. Mother      Lung Cancer Mother         smoker     Eye Disorder Father         cone dystrophy     Macular Degeneration Father      Diabetes Maternal Aunt         type 2     Hypertension Maternal Aunt      Heart Disease Maternal Grandfather      Heart Disease Sister         high cholesterol       Macular Degeneration Sister        Social History     Tobacco Use     Smoking status: Never Smoker     Smokeless tobacco: Never Used   Substance Use Topics     Alcohol use: No     Alcohol/week: 0.0 standard drinks     Comment: 1 time per month       Current Facility-Administered Medications   Medication     potassium chloride 10 mEq in 100 mL intermittent infusion with 10 mg lidocaine     sodium chloride (PF) 0.9% PF flush 3 mL     sodium chloride (PF) 0.9% PF flush 3 mL     Current Outpatient Medications   Medication     ALPRAZolam (XANAX) 0.5 MG tablet     ibuprofen (ADVIL/MOTRIN) 200 MG tablet     predniSONE (DELTASONE) 20 MG tablet        Allergies   Allergen Reactions     Cold  & 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   HENT: Negative for congestion.    Eyes: Negative for redness.   Respiratory: Positive for cough (nonproductive) and shortness of breath.    Cardiovascular: Negative for chest pain and leg swelling.   Gastrointestinal: Negative for abdominal pain.   Genitourinary: Negative for difficulty urinating.   Musculoskeletal: Negative for arthralgias and neck stiffness.   Skin: Negative for color change.   Neurological: Negative for headaches.   Psychiatric/Behavioral: Negative for confusion.       Physical Exam   BP: (!) 128/98  Pulse: 128  Temp: 98.2  F (36.8  C)  Resp: 28  Weight: 78 kg (172 lb)  SpO2: 92 %      Physical Exam  Vitals signs and nursing note reviewed.   Constitutional:       General: She is not in acute distress.     Appearance: She is ill-appearing. She is not diaphoretic.   HENT:      Head: Atraumatic.      Mouth/Throat:      Pharynx: No oropharyngeal exudate.   Eyes:      General: No scleral icterus.     Pupils: Pupils are equal, round, and reactive to light.   Cardiovascular:      Rate and Rhythm: Tachycardia present.      Heart sounds: Normal heart sounds.   Pulmonary:      Effort: Tachypnea present. No respiratory distress.      Comments: Coarse breath sounds  Abdominal:      General: Bowel sounds are normal.      Palpations: Abdomen is soft.      Tenderness: There is no abdominal tenderness.   Musculoskeletal: Normal range of motion.         General: No tenderness.      Right lower leg: She exhibits no tenderness. No edema.      Left lower leg: She exhibits no tenderness. No edema.   Skin:     General: Skin is warm.      Capillary Refill: Capillary refill takes less than 2 seconds.      Findings: No rash.   Neurological:      Mental Status: She is alert.          ED Course   3:53 PM  The patient was seen and examined by Alexander Bunn MD in Room ED02.        Procedures             EKG Interpretation:      Interpreted by ALEXANDER BUNN MD, MD  Time reviewed: 1601  Symptoms at time of EKG: SOA   Rhythm: sinus tachycardia  Rate: 122  Axis: Normal  Ectopy: none  Conduction: normal  ST Segments/ T Waves: No acute ischemic changes  Q Waves: none  Comparison to prior: Unchanged    Clinical Impression: sinus tachycardia    Results for orders placed or performed during the hospital encounter of 11/27/19   Comprehensive metabolic panel     Status: Abnormal   Result Value Ref Range    Sodium 143 133 - 144 mmol/L    Potassium 3.1 (L) 3.4 - 5.3 mmol/L    Chloride 105 94 - 109 mmol/L    Carbon Dioxide 30 20 - 32 mmol/L    Anion Gap 8 3 - 14 mmol/L    Glucose 118 (H) 70 - 99 mg/dL    Urea Nitrogen 25 7 - 30 mg/dL    Creatinine 1.21 (H) 0.52 - 1.04 mg/dL    GFR Estimate 52 (L) >60 mL/min/[1.73_m2]    GFR Estimate If Black 61 >60 mL/min/[1.73_m2]    Calcium 9.3 8.5 - 10.1 mg/dL    Bilirubin Total 1.1 0.2 - 1.3 mg/dL    Albumin 3.5 3.4 - 5.0 g/dL    Protein Total 6.7 (L) 6.8 - 8.8 g/dL    Alkaline Phosphatase 82 40 - 150 U/L    ALT 32 0 - 50 U/L    AST 19 0 - 45 U/L   CBC with platelets differential     Status: Abnormal   Result Value Ref Range    WBC 10.4 4.0 - 11.0 10e9/L    RBC Count 4.21 3.8 - 5.2 10e12/L    Hemoglobin 13.4 11.7 - 15.7 g/dL    Hematocrit 39.8 35.0 - 47.0 %    MCV 95 78 - 100 fl    MCH 31.8 26.5 - 33.0 pg    MCHC 33.7 31.5 - 36.5 g/dL    RDW 15.7 (H) 10.0 - 15.0 %    Platelet Count 256 150 - 450 10e9/L    Diff Method Automated Method     % Neutrophils 63.1 %    % Lymphocytes 20.4 %    % Monocytes 8.0 %    % Eosinophils 4.0 %    % Basophils 0.9 %    % Immature Granulocytes 3.6 %    Nucleated RBCs 0 0 /100    Absolute Neutrophil 6.6 1.6 - 8.3 10e9/L    Absolute Lymphocytes 2.1 0.8 - 5.3 10e9/L    Absolute Monocytes 0.8 0.0 - 1.3 10e9/L    Absolute Eosinophils 0.4 0.0 -  0.7 10e9/L    Absolute Basophils 0.1 0.0 - 0.2 10e9/L    Abs Immature Granulocytes 0.4 0 - 0.4 10e9/L    Absolute Nucleated RBC 0.0    INR     Status: None   Result Value Ref Range    INR 1.02 0.86 - 1.14   Procalcitonin     Status: None   Result Value Ref Range    Procalcitonin <0.05 ng/ml   Magnesium     Status: Abnormal   Result Value Ref Range    Magnesium 2.5 (H) 1.6 - 2.3 mg/dL   ISTAT gases lactate krish POCT     Status: Abnormal   Result Value Ref Range    Ph Venous 7.55 (H) 7.32 - 7.43 pH    PCO2 Venous 32 (L) 40 - 50 mm Hg    PO2 Venous 26 25 - 47 mm Hg    Bicarbonate Venous 28 21 - 28 mmol/L    O2 Sat Venous 57 %    Lactic Acid 2.3 (H) 0.7 - 2.1 mmol/L      XR Chest Port 1 View   Preliminary Result   IMPRESSION: There is near-complete atelectasis of the right upper   lobe, new since the previous exam. This finding may be related to   progression of right hilar and mediastinal adenopathy described on the   prior CT scan. CT may be helpful for further evaluation. The left lung   is clear. Tortuous calcified thoracic aorta. Pulmonary vascularity is   within normal limits.          Critical Care time:      The Lactic acid level is elevated due to dehydration, RENAY, cancer, at this time there is no sign of severe sepsis or septic shock.       Assessments & Plan (with Medical Decision Making)   49 year old female to the emergency department for admission to the oncology service to initiate treatment for a mediastinal mass identified as diffuse B-cell lymphoma by biopsy this week.  The patient arrives to the emerge part with tachycardia.  She has oxygen saturations of 92-93%.  She has some coarse breath sounds and is tachypneic.  The patient did have a triage lactate level performed which was mildly elevated but suspect it is related to dehydration.  Patient is not febrile and has a low procalcitonin level.  She also has no symptoms of infectious process/sepsis.  Patient also has some mild acute kidney injury.  Her  acid level is mildly decreased as well.  Some potassium supplementation was provided intravenously.  She was also given.  The patient's chest radiograph reveals atelectasis of her right upper lobe.  The patient will be admitted to the oncology service for further evaluation and management.    I have reviewed the nursing notes.    I have reviewed the findings, diagnosis, plan and need for follow up with the patient.    New Prescriptions    No medications on file       Final diagnoses:   Diffuse large B-cell lymphoma of extranodal site (H)     I, Je Quigley, am serving as a trained medical scribe to document services personally performed by Alexander Forbes MD, based on the provider's statements to me.   IAlexander MD, was physically present and have reviewed and verified the accuracy of this note documented by Je Quigley.    11/27/2019   [facility], [place], EMERGENCY DEPARTMENT     Alexander Forbes MD  11/27/19 [id number]

## 2019-11-28 ENCOUNTER — APPOINTMENT (OUTPATIENT)
Dept: CARDIOLOGY | Facility: CLINIC | Age: 49
End: 2019-11-28
Attending: INTERNAL MEDICINE
Payer: COMMERCIAL

## 2019-11-28 ENCOUNTER — APPOINTMENT (OUTPATIENT)
Dept: GENERAL RADIOLOGY | Facility: CLINIC | Age: 49
End: 2019-11-28
Attending: INTERNAL MEDICINE
Payer: COMMERCIAL

## 2019-11-28 LAB
ALBUMIN SERPL-MCNC: 3.2 G/DL (ref 3.4–5)
ALBUMIN UR-MCNC: NEGATIVE MG/DL
ALP SERPL-CCNC: 74 U/L (ref 40–150)
ALT SERPL W P-5'-P-CCNC: 26 U/L (ref 0–50)
ANION GAP SERPL CALCULATED.3IONS-SCNC: 6 MMOL/L (ref 3–14)
APPEARANCE UR: ABNORMAL
APTT PPP: 30 SEC (ref 22–37)
AST SERPL W P-5'-P-CCNC: 24 U/L (ref 0–45)
BASOPHILS # BLD AUTO: 0.1 10E9/L (ref 0–0.2)
BASOPHILS NFR BLD AUTO: 1.2 %
BILIRUB DIRECT SERPL-MCNC: 0.2 MG/DL (ref 0–0.2)
BILIRUB SERPL-MCNC: 1.2 MG/DL (ref 0.2–1.3)
BILIRUB UR QL STRIP: NEGATIVE
BUN SERPL-MCNC: 22 MG/DL (ref 7–30)
CALCIUM SERPL-MCNC: 8.4 MG/DL (ref 8.5–10.1)
CALCIUM SERPL-MCNC: 8.5 MG/DL (ref 8.5–10.1)
CALCIUM SERPL-MCNC: 9.1 MG/DL (ref 8.5–10.1)
CHLORIDE SERPL-SCNC: 108 MMOL/L (ref 94–109)
CO2 SERPL-SCNC: 29 MMOL/L (ref 20–32)
COLOR UR AUTO: YELLOW
CREAT SERPL-MCNC: 0.68 MG/DL (ref 0.52–1.04)
CREAT SERPL-MCNC: 1.07 MG/DL (ref 0.52–1.04)
CREAT UR-MCNC: 114 MG/DL
DIFFERENTIAL METHOD BLD: ABNORMAL
EOSINOPHIL # BLD AUTO: 0.5 10E9/L (ref 0–0.7)
EOSINOPHIL NFR BLD AUTO: 6 %
ERYTHROCYTE [DISTWIDTH] IN BLOOD BY AUTOMATED COUNT: 16.3 % (ref 10–15)
FIBRINOGEN PPP-MCNC: 369 MG/DL (ref 200–420)
FRACT EXCRET NA UR+SERPL-RTO: 1.1 %
GFR SERPL CREATININE-BSD FRML MDRD: 61 ML/MIN/{1.73_M2}
GFR SERPL CREATININE-BSD FRML MDRD: >90 ML/MIN/{1.73_M2}
GLUCOSE SERPL-MCNC: 98 MG/DL (ref 70–99)
GLUCOSE UR STRIP-MCNC: NEGATIVE MG/DL
HCG UR QL: NEGATIVE
HCT VFR BLD AUTO: 37.2 % (ref 35–47)
HGB BLD-MCNC: 12.3 G/DL (ref 11.7–15.7)
HGB UR QL STRIP: NEGATIVE
IMM GRANULOCYTES # BLD: 0.3 10E9/L (ref 0–0.4)
IMM GRANULOCYTES NFR BLD: 3.9 %
INR PPP: 1.03 (ref 0.86–1.14)
KETONES UR STRIP-MCNC: NEGATIVE MG/DL
LACTATE BLD-SCNC: 3.6 MMOL/L (ref 0.7–2)
LDH SERPL L TO P-CCNC: 450 U/L (ref 81–234)
LEUKOCYTE ESTERASE UR QL STRIP: NEGATIVE
LYMPHOCYTES # BLD AUTO: 1.4 10E9/L (ref 0.8–5.3)
LYMPHOCYTES NFR BLD AUTO: 16.9 %
MCH RBC QN AUTO: 31.5 PG (ref 26.5–33)
MCHC RBC AUTO-ENTMCNC: 33.1 G/DL (ref 31.5–36.5)
MCV RBC AUTO: 95 FL (ref 78–100)
MONOCYTES # BLD AUTO: 0.6 10E9/L (ref 0–1.3)
MONOCYTES NFR BLD AUTO: 7.1 %
MUCOUS THREADS #/AREA URNS LPF: PRESENT /LPF
NEUTROPHILS # BLD AUTO: 5.4 10E9/L (ref 1.6–8.3)
NEUTROPHILS NFR BLD AUTO: 64.9 %
NITRATE UR QL: NEGATIVE
NRBC # BLD AUTO: 0 10*3/UL
NRBC BLD AUTO-RTO: 0 /100
PH UR STRIP: 6.5 PH (ref 5–7)
PHOSPHATE SERPL-MCNC: 3.2 MG/DL (ref 2.5–4.5)
PHOSPHATE SERPL-MCNC: 3.6 MG/DL (ref 2.5–4.5)
PHOSPHATE SERPL-MCNC: 4.4 MG/DL (ref 2.5–4.5)
PLATELET # BLD AUTO: 227 10E9/L (ref 150–450)
POTASSIUM SERPL-SCNC: 3.6 MMOL/L (ref 3.4–5.3)
POTASSIUM SERPL-SCNC: 3.6 MMOL/L (ref 3.4–5.3)
POTASSIUM SERPL-SCNC: 3.9 MMOL/L (ref 3.4–5.3)
PROT SERPL-MCNC: 6.1 G/DL (ref 6.8–8.8)
RBC # BLD AUTO: 3.9 10E12/L (ref 3.8–5.2)
RBC #/AREA URNS AUTO: 1 /HPF (ref 0–2)
SODIUM SERPL-SCNC: 144 MMOL/L (ref 133–144)
SODIUM UR-SCNC: 165 MMOL/L
SOURCE: ABNORMAL
SP GR UR STRIP: 1.02 (ref 1–1.03)
SQUAMOUS #/AREA URNS AUTO: <1 /HPF (ref 0–1)
URATE SERPL-MCNC: 4.3 MG/DL (ref 2.6–6)
URATE SERPL-MCNC: 5.2 MG/DL (ref 2.6–6)
URATE SERPL-MCNC: 6.4 MG/DL (ref 2.6–6)
UROBILINOGEN UR STRIP-MCNC: NORMAL MG/DL (ref 0–2)
WBC # BLD AUTO: 8.3 10E9/L (ref 4–11)
WBC #/AREA URNS AUTO: 1 /HPF (ref 0–5)

## 2019-11-28 PROCEDURE — 86665 EPSTEIN-BARR CAPSID VCA: CPT | Performed by: INTERNAL MEDICINE

## 2019-11-28 PROCEDURE — 86696 HERPES SIMPLEX TYPE 2 TEST: CPT | Performed by: INTERNAL MEDICINE

## 2019-11-28 PROCEDURE — 40000986 XR CHEST PORT 1 VW

## 2019-11-28 PROCEDURE — 83605 ASSAY OF LACTIC ACID: CPT | Performed by: INTERNAL MEDICINE

## 2019-11-28 PROCEDURE — 36415 COLL VENOUS BLD VENIPUNCTURE: CPT | Performed by: INTERNAL MEDICINE

## 2019-11-28 PROCEDURE — C1751 CATH, INF, PER/CENT/MIDLINE: HCPCS

## 2019-11-28 PROCEDURE — 86706 HEP B SURFACE ANTIBODY: CPT | Performed by: INTERNAL MEDICINE

## 2019-11-28 PROCEDURE — 80048 BASIC METABOLIC PNL TOTAL CA: CPT | Performed by: INTERNAL MEDICINE

## 2019-11-28 PROCEDURE — 86644 CMV ANTIBODY: CPT | Performed by: INTERNAL MEDICINE

## 2019-11-28 PROCEDURE — 87389 HIV-1 AG W/HIV-1&-2 AB AG IA: CPT | Performed by: INTERNAL MEDICINE

## 2019-11-28 PROCEDURE — 84132 ASSAY OF SERUM POTASSIUM: CPT | Performed by: INTERNAL MEDICINE

## 2019-11-28 PROCEDURE — 93308 TTE F-UP OR LMTD: CPT

## 2019-11-28 PROCEDURE — 86803 HEPATITIS C AB TEST: CPT | Performed by: INTERNAL MEDICINE

## 2019-11-28 PROCEDURE — 25800030 ZZH RX IP 258 OP 636: Performed by: INTERNAL MEDICINE

## 2019-11-28 PROCEDURE — 36592 COLLECT BLOOD FROM PICC: CPT | Performed by: INTERNAL MEDICINE

## 2019-11-28 PROCEDURE — 81001 URINALYSIS AUTO W/SCOPE: CPT | Performed by: INTERNAL MEDICINE

## 2019-11-28 PROCEDURE — 86695 HERPES SIMPLEX TYPE 1 TEST: CPT | Performed by: INTERNAL MEDICINE

## 2019-11-28 PROCEDURE — 82565 ASSAY OF CREATININE: CPT | Performed by: INTERNAL MEDICINE

## 2019-11-28 PROCEDURE — 84100 ASSAY OF PHOSPHORUS: CPT | Performed by: INTERNAL MEDICINE

## 2019-11-28 PROCEDURE — 83615 LACTATE (LD) (LDH) ENZYME: CPT | Performed by: INTERNAL MEDICINE

## 2019-11-28 PROCEDURE — 82310 ASSAY OF CALCIUM: CPT | Performed by: INTERNAL MEDICINE

## 2019-11-28 PROCEDURE — 25000132 ZZH RX MED GY IP 250 OP 250 PS 637: Performed by: INTERNAL MEDICINE

## 2019-11-28 PROCEDURE — 93321 DOPPLER ECHO F-UP/LMTD STD: CPT | Mod: 26 | Performed by: INTERNAL MEDICINE

## 2019-11-28 PROCEDURE — 85025 COMPLETE CBC W/AUTO DIFF WBC: CPT | Performed by: INTERNAL MEDICINE

## 2019-11-28 PROCEDURE — 27211417 ZZ H KIT, VPS RHYTHM STYLET

## 2019-11-28 PROCEDURE — 85384 FIBRINOGEN ACTIVITY: CPT | Performed by: INTERNAL MEDICINE

## 2019-11-28 PROCEDURE — 85610 PROTHROMBIN TIME: CPT | Performed by: INTERNAL MEDICINE

## 2019-11-28 PROCEDURE — 84550 ASSAY OF BLOOD/URIC ACID: CPT | Performed by: INTERNAL MEDICINE

## 2019-11-28 PROCEDURE — 84300 ASSAY OF URINE SODIUM: CPT | Performed by: INTERNAL MEDICINE

## 2019-11-28 PROCEDURE — 80076 HEPATIC FUNCTION PANEL: CPT | Performed by: INTERNAL MEDICINE

## 2019-11-28 PROCEDURE — 85730 THROMBOPLASTIN TIME PARTIAL: CPT | Performed by: INTERNAL MEDICINE

## 2019-11-28 PROCEDURE — 25000128 H RX IP 250 OP 636: Performed by: INTERNAL MEDICINE

## 2019-11-28 PROCEDURE — 93308 TTE F-UP OR LMTD: CPT | Mod: 26 | Performed by: INTERNAL MEDICINE

## 2019-11-28 PROCEDURE — 36569 INSJ PICC 5 YR+ W/O IMAGING: CPT

## 2019-11-28 PROCEDURE — 12000001 ZZH R&B MED SURG/OB UMMC

## 2019-11-28 PROCEDURE — 82570 ASSAY OF URINE CREATININE: CPT | Performed by: INTERNAL MEDICINE

## 2019-11-28 PROCEDURE — 81025 URINE PREGNANCY TEST: CPT | Performed by: INTERNAL MEDICINE

## 2019-11-28 PROCEDURE — 93325 DOPPLER ECHO COLOR FLOW MAPG: CPT | Mod: 26 | Performed by: INTERNAL MEDICINE

## 2019-11-28 PROCEDURE — 87340 HEPATITIS B SURFACE AG IA: CPT | Performed by: INTERNAL MEDICINE

## 2019-11-28 RX ORDER — LIDOCAINE 40 MG/G
CREAM TOPICAL
Status: DISCONTINUED | OUTPATIENT
Start: 2019-11-28 | End: 2019-12-01 | Stop reason: HOSPADM

## 2019-11-28 RX ORDER — LIDOCAINE 40 MG/G
CREAM TOPICAL
Status: DISCONTINUED | OUTPATIENT
Start: 2019-11-28 | End: 2019-11-30

## 2019-11-28 RX ADMIN — OMEPRAZOLE 40 MG: 20 CAPSULE, DELAYED RELEASE ORAL at 07:52

## 2019-11-28 RX ADMIN — ALLOPURINOL 300 MG: 300 TABLET ORAL at 07:52

## 2019-11-28 RX ADMIN — ACETAMINOPHEN 650 MG: 325 TABLET, FILM COATED ORAL at 07:22

## 2019-11-28 RX ADMIN — ALPRAZOLAM 0.5 MG: 0.5 TABLET ORAL at 07:51

## 2019-11-28 RX ADMIN — SODIUM CHLORIDE 150 MG: 9 INJECTION, SOLUTION INTRAVENOUS at 11:15

## 2019-11-28 RX ADMIN — SODIUM CHLORIDE: 9 INJECTION, SOLUTION INTRAVENOUS at 14:23

## 2019-11-28 RX ADMIN — SODIUM CHLORIDE: 9 INJECTION, SOLUTION INTRAVENOUS at 07:25

## 2019-11-28 ASSESSMENT — PAIN DESCRIPTION - DESCRIPTORS: DESCRIPTORS: ACHING

## 2019-11-28 ASSESSMENT — ACTIVITIES OF DAILY LIVING (ADL)
ADLS_ACUITY_SCORE: 10

## 2019-11-28 NOTE — H&P
[facility], [place] -- History and Physical -- Hematology / Oncology  Date of Admission:  11/27/2019 -- Date of Service (when I saw the patient): 11/27/19    ASSESSMENT & PLAN  Kassie Ramirez is a 49 year old woman with progressively worsening respiratory symptoms over the past 5 months, who is now found on recent mediastinoscopy to have likely DLBCL, and with recent imaging findings concerning for carinal and bronchial constriction, who presents with hypoxia and tachycardia, progressive dyspnea on exertion.     Mediastinal adenopathy  Likely DLBCL  Dyspnea  Presented to PCP 6/27/19 with 6 weeks of cough. CXR with R peribronchial infiltrate/inflammation with mild extension into RML. Treated with azithromycin and referred to pulmonology. Had some improvement in symptoms. Seen by pulmonology 8/20/19 for persistent cough, PFTs with minimal obstructive defect and no significant response to bronchodilators. Exhaled NO was markedly elevated and c/w significant airway inflammation. CT chest done at [facility] showed L hilar and subcarinal mediastinal adenopathy with narrowing of the left lower lobe bronchus and a nonspecific patchy area of nodular consolidation in the medial RLL. PET Scan ordered 8/27/19 but never completed. Bronchoscopy and EUS done 8/28/18 with FNA showing nonnecrotizing granulomatous inflammation with prominent eosinophilia and negative for malignancy. Received prednisone taper over 12 days for ongoing cough. Hospitalized 9/18/19 at [facility] for SVT terminated with adenosine, Echo with normal LVEF. 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 She was started on prednisone 40mg daily, but was unable to taper off the steroids without return of SOB/cough. PFTs improved at visit 9/27/19 with pulm and prednisone continued with slow taper and repeat CT planned for 2 months. Later the cardiac MRI 10/2019 showed  no e/p MI, fibrosis, or infiltrative disease, LVEF normal, and follow up holter monitor showed no e/o SVT. Repeat CT chest 11/18/19 at [facility] showed interval worsening of bulky mediastinal and bilateral hilar lymphadenopathy. Presented to ED 11/20 for worsening SOB. Pulmonology recommended rheumatology consultation for consideration for methotrexate therapy as second line therapy for steroid-resistant sarcoidosis. She was discharged from the ED to continue workup as outpt. She has been using alprazolam and dextromethorphan with codeine with some benefit. She underwent cervical mediastinoscopy with mediastinal mass biopsy at [facility]. Per op report, mediastinal mass has extended over the distal trachea/azalea and encircling the right and left mainstem bronchi. Dr. 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     She was referred to the ED by her PCP after discussion with oncology here. In the ED found to have hypokalemia, rising Cr 1.21, lactic acid 2.3, negative procal, respiratory alkalosis on VBG. CBC normal. CXR with near complete atelectasis of the RUL new since 9/2019.    At this time she has likely lymphoma with worsening respiratory status and likely airway obstruction in the RUL from mass effect on bronchi. We will treat empirically with high dose steroids while awaiting final pathology in order to formulate treatment plan.  - Per Dr. Hamilton, start prednisone 100mg daily starting tonight.    - Will hold off on rituximab for now  - Will monitor closely for any worsening respiratory compromise. If worsens, will d/w on call hematology, but may need anesthesia eval and higher dose of steroids  - Day team to request images from CT chest from [facility] for overread  - Supplemental O2 as needed  - Continue alprazolam and Robitussin with codeine PRN  - Draw infectious exposure workup, including HIV  - Recheck Echo in AM  - Oxycodone for pain control, hold off on  nsaids    RENAY  Baseline Cr around 0.64, but Cr has increased to 0.98 on 11/20 and 1.21 on 11/27.   - Check FENa and UA  - Rule out tumor lysis as below  - Monitor Cr and lytes  - IV fluids at 100 ml/hr     Risk for tumor lysis  Risk for DIC   - Add on phos, uric acid, PTT, fibrinogen for TLS/DIC screening at admission  - Uric acid 7.1, will monitor closely  - Start allopurinol prophylactically  - Follow TLS/DIC q8 for now    FEN  - IVFs: NS @100 ml/hr  - Diet: regular as tolerated  - Lytes repleted as per sliding scale, caution with RENAY    PPX  - DVT: mechanical for now in case of procedures  - Bowel: senna-colace, miralax PRN  - ID: per day team  - GI: start omeprazole with steroids    Lines/Drains: PIVs, will need PICC line in next 1-2 days  Consults: TBD  CODE: Full  DISPO: inpatient > 2 nights for work up for new likely lymphoma as well as start of treatment, and management of mediastinal mass causing respiratory compromise.    CHIEF COMPLAINT/REASON FOR ADMIT: progressive shortness of breath    HISTORY OF PRESENT ILLNESS  History obtained from chart review and discussion with the patient.     Kassie Ramirez is a 49 year old woman with progressively worsening respiratory symptoms over the past 5 months, who is now found on recent mediastinoscopy to have likely DLBCL, and with recent imaging findings concerning for carinal and bronchial constriction, who presents with hypoxia and tachycardia, progressive dyspnea on exertion.     Presented to PCP 6/27/19 with 6 weeks of cough. CXR with R peribronchial infiltrate/inflammation with mild extension into RML. Treated with azithromycin and referred to pulmonology. Had some improvement in symptoms. Seen by pulmonology 8/20/19 for persistent cough, PFTs with minimal obstructive defect and no significant response to bronchodilators. Exhaled NO was markedly elevated and c/w significant airway inflammation. CT chest done at [facility] showed L hilar and subcarinal mediastinal  adenopathy with narrowing of the left lower lobe bronchus and a nonspecific patchy area of nodular consolidation in the medial RLL. PET Scan ordered 8/27/19 but never completed. Bronchoscopy and EUS done 8/28/18 with FNA showing nonnecrotizing granulomatous inflammation with prominent eosinophilia and negative for malignancy. Received prednisone taper over 12 days for ongoing cough. Hospitalized 9/18/19 at [facility] for SVT terminated with adenosine, Echo with normal LVEF. 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 She was started on prednisone 40mg daily, but was unable to taper off the steroids without return of SOB/cough. PFTs improved at visit 9/27/19 with pulm and prednisone continued with slow taper and repeat CT planned for 2 months. Later the cardiac MRI 10/2019 showed no e/p MI, fibrosis, or infiltrative disease, LVEF normal, and follow up holter monitor showed no e/o SVT. Repeat CT chest 11/18/19 at [facility] showed interval worsening of bulky mediastinal and bilateral hilar lymphadenopathy. Presented to ED 11/20 for worsening SOB. Pulmonology recommended rheumatology consultation for consideration for methotrexate therapy as second line therapy for steroid-resistant sarcoidosis. She was discharged from the ED to continue workup as outpt. She has been using alprazolam and dextromethorphan with codeine with some benefit. She underwent cervical mediastinoscopy with mediastinal mass biopsy at [facility]. Per op report, mediastinal mass has extended over the distal trachea/azalea and encircling the right and left mainstem bronchi. Dr. 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     She was referred to the ED by her PCP after discussion with oncology here. In the ED found to have hypokalemia, rising Cr 1.21, lactic acid 2.3, negative procal, respiratory alkalosis on VBG. CBC normal.  CXR with near complete atelectasis of the RUL new since 9/2019.    Currently she is concerned about her difficulty breathing, although she is comfortable at rest with O2. She does find it difficult to use her voice, but she denies any change in her voice recently. No LE edema. No fever, chills. No weight loss, significant night sweats. No diarrhea or constipation.     PMH  Past Medical History:   Diagnosis Date     Anxiety attack 9/16/2014     Encounter for Essure implantation 2009     Generalized anxiety disorder 9/16/2014    zoloft = flat emotions     Menopausal disorder     started on OCPs by menopause center 3/2017 (takes active continuously)     Menstrual headache     helped by OCPs and magnesium     GERTRUDE (stress urinary incontinence, female)     sling procedure 2016     SVT (supraventricular tachycardia) (H)        PSH  Past Surgical History:   Procedure Laterality Date     H KIT ESSURE  2009    essure - Dr. Cailin Raymundo      HERNIORRHAPHY UMBILICAL  1974     SLING TRANSPUBO WITHOUT ANTERIOR COLPORRHAPHY N/A 11/21/2016    Procedure: SLING TRANSPUBO WITHOUT ANTERIOR COLPORRHAPHY;  Surgeon: Hernesto Berrios MD;  Location: [facility] OR       Prior to Admission MEDICATIONS  Prior to Admission Medications   Prescriptions Last Dose Informant Patient Reported? Taking?   ALPRAZolam (XANAX) 0.5 MG tablet Past Week  No Yes   Sig: Take 1 tablet (0.5 mg) by mouth 3 times daily as needed for anxiety   ibuprofen (ADVIL/MOTRIN) 200 MG tablet 11/27/2019 at AM  Yes Yes   Sig: Take 800 mg by mouth every 6 hours as needed for mild pain   predniSONE (DELTASONE) 20 MG tablet 11/27/2019  No Yes   Sig: Take 2 tablets (40 mg) by mouth daily      Facility-Administered Medications: None     Allergies   Allergies   Allergen Reactions     Cold & Flu [Cold Defense Fighter]      See pseudoephedrine     Seasonal Allergies      Sudafed Cold-Cough [Dayquil Liquicaps]      Pseudoephedrine Rash     Rash then skins peels off        Social  History  Social History     Socioeconomic History     Marital status:      Spouse name: Florian     Number of children: 2     Years of education: 16      Highest education level: Not on file   Occupational History     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     Lifestyle     Physical activity:     Days per week: Not on file     Minutes per session: Not on file     Stress: Not on file   Relationships     Social connections:     Talks on phone: Not on file     Gets together: Not on file     Attends Hoahaoism service: Not on file     Active member of club or organization: Not on file     Attends meetings of clubs or organizations: Not on file     Relationship status: Not on file     Intimate partner violence:     Fear of current or ex partner: Not on file     Emotionally abused: Not on file     Physically abused: Not on file     Forced sexual activity: Not on file   Other Topics Concern     Parent/sibling w/ CABG, MI or angioplasty before 65F 55M? No      Service Not Asked     Blood Transfusions Not Asked     Caffeine Concern Yes     Comment: MOnster energy drink.   Te - 3 cups.        Occupational Exposure Not Asked     Hobby Hazards Not Asked     Sleep Concern No     Stress Concern No     Weight Concern Not Asked     Special Diet Not Asked     Back Care Not Asked     Exercise Not Asked     Bike Helmet Not  Asked     Seat Belt Not Asked     Self-Exams Yes   Social History Narrative    Stay-at-home mom.         Family History  Family History   Problem Relation Age of Onset     Hypertension Mother      Depression Mother      Lipids Mother      Cardiovascular Mother      Circulatory Mother      Diabetes Mother      Heart Disease Mother      Cerebrovascular Disease Mother      Obesity Mother      C.A.D. Mother      Lung Cancer Mother         smoker     Eye Disorder Father         cone dystrophy     Macular Degeneration Father      Diabetes Maternal Aunt         type 2     Hypertension Maternal Aunt      Heart Disease Maternal Grandfather      Heart Disease Sister         high cholesterol       Macular Degeneration Sister      - Family history reviewed & no other pertinent oncologic/hematologic malignancy noted    ROS  Comprehensive ROS was performed and was negative unless otherwise noted in above HPI.    Physical Exam  Temp:  [98.2  F (36.8  C)-98.6  F (37  C)] 98.4  F (36.9  C)  Pulse:  [122-128] 128  Heart Rate:  [112-126] 112  Resp:  [20-28] 22  BP: (122-130)/(78-98) 122/87  SpO2:  [90 %-97 %] 90 %  172 lbs 0 oz    Constitutional: Awake, alert, cooperative, sitting up in bed, speaks in quiet voice because of breathlessness but no increased work of breathing at rest .  Eyes: PERRL, EOMI, sclera clear, conjunctiva normal.  ENT: Normocephalic, without obvious abnormality, sinuses nontender on palpation, oral pharynx with moist mucus membranes  Respiratory: Non-labored breathing, diminished left upper lobe, clear on right, wheezes bilaterally, right > left.   Cardiovascular: RRR, no murmur noted.  GI: + bowel sounds, soft, non-distended, non-tender, no masses palpated, no hepatosplenomegaly.  Skin: No concerning lesions or rash on exposed areas.  Musculoskeletal: No edema marta LEs.  Neurologic: Awake, alert & oriented x3.  Cranial nerves II-XII are grossly intact.   Psych: appropriate affect    DATA  Results for orders  placed or performed during the hospital encounter of 11/27/19 (from the past 24 hour(s))   Comprehensive metabolic panel   Result Value Ref Range    Sodium 143 133 - 144 mmol/L    Potassium 3.1 (L) 3.4 - 5.3 mmol/L    Chloride 105 94 - 109 mmol/L    Carbon Dioxide 30 20 - 32 mmol/L    Anion Gap 8 3 - 14 mmol/L    Glucose 118 (H) 70 - 99 mg/dL    Urea Nitrogen 25 7 - 30 mg/dL    Creatinine 1.21 (H) 0.52 - 1.04 mg/dL    GFR Estimate 52 (L) >60 mL/min/[1.73_m2]    GFR Estimate If Black 61 >60 mL/min/[1.73_m2]    Calcium 9.3 8.5 - 10.1 mg/dL    Bilirubin Total 1.1 0.2 - 1.3 mg/dL    Albumin 3.5 3.4 - 5.0 g/dL    Protein Total 6.7 (L) 6.8 - 8.8 g/dL    Alkaline Phosphatase 82 40 - 150 U/L    ALT 32 0 - 50 U/L    AST 19 0 - 45 U/L   CBC with platelets differential   Result Value Ref Range    WBC 10.4 4.0 - 11.0 10e9/L    RBC Count 4.21 3.8 - 5.2 10e12/L    Hemoglobin 13.4 11.7 - 15.7 g/dL    Hematocrit 39.8 35.0 - 47.0 %    MCV 95 78 - 100 fl    MCH 31.8 26.5 - 33.0 pg    MCHC 33.7 31.5 - 36.5 g/dL    RDW 15.7 (H) 10.0 - 15.0 %    Platelet Count 256 150 - 450 10e9/L    Diff Method Automated Method     % Neutrophils 63.1 %    % Lymphocytes 20.4 %    % Monocytes 8.0 %    % Eosinophils 4.0 %    % Basophils 0.9 %    % Immature Granulocytes 3.6 %    Nucleated RBCs 0 0 /100    Absolute Neutrophil 6.6 1.6 - 8.3 10e9/L    Absolute Lymphocytes 2.1 0.8 - 5.3 10e9/L    Absolute Monocytes 0.8 0.0 - 1.3 10e9/L    Absolute Eosinophils 0.4 0.0 - 0.7 10e9/L    Absolute Basophils 0.1 0.0 - 0.2 10e9/L    Abs Immature Granulocytes 0.4 0 - 0.4 10e9/L    Absolute Nucleated RBC 0.0    INR   Result Value Ref Range    INR 1.02 0.86 - 1.14   Procalcitonin   Result Value Ref Range    Procalcitonin <0.05 ng/ml   Magnesium   Result Value Ref Range    Magnesium 2.5 (H) 1.6 - 2.3 mg/dL   ISTAT gases lactate krish POCT   Result Value Ref Range    Ph Venous 7.55 (H) 7.32 - 7.43 pH    PCO2 Venous 32 (L) 40 - 50 mm Hg    PO2 Venous 26 25 - 47 mm Hg     Bicarbonate Venous 28 21 - 28 mmol/L    O2 Sat Venous 57 %    Lactic Acid 2.3 (H) 0.7 - 2.1 mmol/L   Blood culture   Result Value Ref Range    Specimen Description Blood Left Arm     Special Requests Aerobic and anaerobic bottles received     Culture Micro PENDING    XR Chest Port 1 View    Narrative    CHEST ONE VIEW PORTABLE   11/27/2019 5:12 PM     HISTORY:  Shortness of breath.    COMPARISON: Chest radiograph and chest CT performed 9/18/2019.      Impression    IMPRESSION: There is near-complete atelectasis of the right upper  lobe, new since the previous exam. This finding may be related to  progression of right hilar and mediastinal adenopathy described on the  prior CT scan. CT may be helpful for further evaluation. The left lung  is clear. Tortuous calcified thoracic aorta. Pulmonary vascularity is  within normal limits.    RYLEE PEREZ MD       PCP & Hematologist/Oncologist  Mary Alice Euceda MD  Hematology/Oncology  November 27, 2019

## 2019-11-28 NOTE — PLAN OF CARE
[id number]: [facility] and placement was verified by CXR. ECHO completed. Was wearing oxygen for comfort but states it isn't helpful so has been on RA with sats in the 90's. Dry frequent cough. SOB at rest and LORENZ. Telemetry started per MD order. Sinus tach- 's. 1 dose of Solumedrol given per orders. PET ordered and hope to complete tomorrow. Pt and  anxious to get going with chemotherapy. TLS labs q 8hrs.

## 2019-11-28 NOTE — PROGRESS NOTES
Sepsis Evaluation Progress Note    I was called to see Kassie Ramirez due to abnormal vital signs triggering the Sepsis SIRS screening alert. She is not known to have an infection.     Physical Exam   Vital Signs:  Temp: 99.1  F (37.3  C) Temp src: Oral BP: (!) 145/85 Pulse: 113 Heart Rate: 124 Resp: 24 SpO2: 95 % O2 Device: None (Room air) Oxygen Delivery: 2 LPM    Lab:  Lactic Acid   Date Value Ref Range Status   11/27/2019 2.3 (H) 0.7 - 2.1 mmol/L Final     Lactate for Sepsis Protocol   Date Value Ref Range Status   11/28/2019 3.6 (H) 0.7 - 2.0 mmol/L Final     Comment:     Significant value called to and read back by  KING BURROUGHS ON 11/28 AT 1446 BY YW         The patient is at baseline mental status.     The rest of their physical exam is significant for significant improvement in her voice, she is sitting up comfortably in bed, not on O2, feeling much better compared to yesterday or even compared to this morning. No increased wob.    Assessment & Plan   NO EVIDENCE OF SEPSIS at this time.  Vital sign, physical exam, and lab findings are likely due to malignancy, tumor lysis after steroids.    Disposition: The patient will remain on the current unit. We will continue to monitor this patient closely.  Aaron Euceda MD    Sepsis Criteria   Sepsis: 2+ SIRS criteria due to infection  Severe Sepsis: Sepsis AND 1+ new sign of acute organ dysfunction (Note: lactate >2 is organ dysfunction)  Septic Shock: Sepsis AND hypotension despite volume resuscitation with 30 ml/kg crystalloid

## 2019-11-28 NOTE — PLAN OF CARE
Pt admitted today from ED at 1920. Tachycardic per baseline. Afebrile and OVSS. Pt was put on 2L for per pt's request to promote comfort. Has back and chest pain and acetaminophen given with relief in pain. Xanax also given per pt's request. Pt did not want to take scheduled prednisone tonight as prednisone makes her tongue and feet numb. MD is aware of this. Pt and  are coping with new diagnosis of cancer and pt's  has many questions and anxious to get treatment started ASAP. Will get PICC placement for cancer treatment. Potassium replaced at ED. Awaiting lab results. Needs UA. Up by stand by assist.

## 2019-11-28 NOTE — PROGRESS NOTES
[facility], [place]    Hematology / Oncology Progress Note    Date of Service (when I saw the patient): 11/28/2019       Brief A/P:  48 yo F previously healthy, presented with SOB in June 2019 and lymphadenopathy on CAP in Aug 2019. Treated multiple times for possible sarcoidosis with transient improvement, but progressively worsening of SOB in past 2 months. Repeat CT on 11/18 worsening bulky mediastinal adenopathy. Mediastinal biopsy with EBV+ DLBCL NOS, Non GCB. FISH pending. Transferred to [facility] on 11/27 for further management. She is SOB with exertion and hoarse, but saturating well on RA. WE discussed diagnosis and that we are waiting for FISH to decide on chemotherapy treatment plan. Solumedrol today 2 mg/kg, if any worsening clinical status plan to add Rituxan but higher risk for TLS with both. Discussed Rituxan including possible infusion reactions. Discussed side effects of high dose steroids. Cr up slightly, on 100 ml/hr IVF and allopurinol. Trend TLS labs q8h. Echo normal EF. PICC placed. PET scan. Get CT imaging pushed over. Plan to start chemo tomorrow (likely DA-REPOCH).       Deedee Hamilton MD   of Medicine  Hematology, Oncology and Transplantation   Pager: [phone]        Assessment & Plan   Kassie Ramirez is a 49 year old female who was admitted on 11/27/2019.     Mediastinal adenopathy  Likely DLBCL  Dyspnea    CXR 11/27/19      Presented to PCP 6/27/19 with 6 weeks of cough. CXR with R peribronchial infiltrate/inflammation with mild extension into RML. Treated with azithromycin and referred to pulmonology. See additional details of workup from H&P.    At this time she has likely lymphoma with worsening respiratory status and likely airway obstruction in the RUL from mass effect on bronchi. We will treat empirically with high dose steroids while awaiting final pathology in order to formulate treatment plan.  - Pt declined prednisone due to prior  reaction. Will switch to IV Solu-Medrol 150 mg x 1 today.  - Consider addition of Rituximab soon  - Will monitor closely for any worsening respiratory compromise. If worsens, will d/w on call hematology, but may need anesthesia eval and higher dose of steroids  - Supplemental O2 as needed  - Continue alprazolam and Robitussin with codeine PRN  - Draw infectious exposure workup, including HIV  - Baseline PET and TTE ordered  - Oxycodone for pain control, hold off on nsaids     ID  - Start prophylaxis regimen once chemotherapy plan finalized    RENAY  Baseline Cr around 0.64, but Cr has increased to 0.98 on 11/20 and 1.21 on 11/27.   - Check FENa and UA  - Rule out tumor lysis as below  - Monitor Cr and lytes  - IV fluids at 100 ml/hr      Risk for tumor lysis  Risk for DIC   - Add on phos, uric acid, PTT, fibrinogen for TLS/DIC screening at admission  - Uric acid 7.1, will monitor closely  - Start allopurinol prophylactically  - Follow TLS/DIC q8 for now     FEN  - IVFs: NS @100 ml/hr  - Diet: regular as tolerated  - Lytes repleted as per sliding scale, caution with RENAY     PPX  - DVT: mechanical for now in case of procedures  - Bowel: senna-colace, miralax PRN  - GI: start omeprazole with steroids     Lines/Drains: PIVs, will need PICC line  Consults: TBD  CODE: Full  DISPO: inpatient > 2 nights for work up for new likely lymphoma as well as start of treatment, and management of mediastinal mass causing respiratory compromise      Ofelia Sams    Interval History   Kassie notes shortness of breath has been worsening over past several days.  Declined prednisone as she was concerned about prior reaction and would like to review with attending first.  No fevers.  Able to converse.  No new pain.    Physical Exam   Temp: 99.5  F (37.5  C) Temp src: Oral BP: (!) 142/87 Pulse: 112 Heart Rate: 112 Resp: 20 SpO2: 97 % O2 Device: Nasal cannula Oxygen Delivery: 2 LPM  Vitals:    11/27/19 1538 11/28/19 0830   Weight: 78 kg  (172 lb) 78 kg (171 lb 14.4 oz)     Vital Signs with Ranges  Temp:  [97.7  F (36.5  C)-99.5  F (37.5  C)] 99.5  F (37.5  C)  Pulse:  [] 112  Heart Rate:  [112-126] 112  Resp:  [16-28] 20  BP: (122-142)/(78-98) 142/87  SpO2:  [90 %-97 %] 97 %  I/O last 3 completed shifts:  In: 940 [I.V.:940]  Out: 600 [Urine:600]    Constitutional: Fatigue  Eyes: Sclerae anicteric  ENT: No JVD  Respiratory: Increased WOB, audible expiratory rumble transmitted from upper airways bilaterally  Cardiovascular: tachy, no murmur  GI: soft, no palpable masses  Lymph/Hematologic: no bruising  Skin: No rash  Musculoskeletal: normal muscle mass  Neurologic: non-focal  Neuropsychiatric: tearful, appropriate    Medications     - MEDICATION INSTRUCTIONS -       sodium chloride 100 mL/hr at 11/28/19 0725       allopurinol  300 mg Oral Daily     methylPREDNISolone  150 mg Intravenous Once     omeprazole  40 mg Oral QAM AC     senna-docusate  1 tablet Oral BID    Or     senna-docusate  2 tablet Oral BID     sodium chloride (PF)  3 mL Intravenous Q8H       Data   Results for orders placed or performed during the hospital encounter of 11/27/19 (from the past 24 hour(s))   Comprehensive metabolic panel   Result Value Ref Range    Sodium 143 133 - 144 mmol/L    Potassium 3.1 (L) 3.4 - 5.3 mmol/L    Chloride 105 94 - 109 mmol/L    Carbon Dioxide 30 20 - 32 mmol/L    Anion Gap 8 3 - 14 mmol/L    Glucose 118 (H) 70 - 99 mg/dL    Urea Nitrogen 25 7 - 30 mg/dL    Creatinine 1.21 (H) 0.52 - 1.04 mg/dL    GFR Estimate 52 (L) >60 mL/min/[1.73_m2]    GFR Estimate If Black 61 >60 mL/min/[1.73_m2]    Calcium 9.3 8.5 - 10.1 mg/dL    Bilirubin Total 1.1 0.2 - 1.3 mg/dL    Albumin 3.5 3.4 - 5.0 g/dL    Protein Total 6.7 (L) 6.8 - 8.8 g/dL    Alkaline Phosphatase 82 40 - 150 U/L    ALT 32 0 - 50 U/L    AST 19 0 - 45 U/L   CBC with platelets differential   Result Value Ref Range    WBC 10.4 4.0 - 11.0 10e9/L    RBC Count 4.21 3.8 - 5.2 10e12/L    Hemoglobin 13.4  11.7 - 15.7 g/dL    Hematocrit 39.8 35.0 - 47.0 %    MCV 95 78 - 100 fl    MCH 31.8 26.5 - 33.0 pg    MCHC 33.7 31.5 - 36.5 g/dL    RDW 15.7 (H) 10.0 - 15.0 %    Platelet Count 256 150 - 450 10e9/L    Diff Method Automated Method     % Neutrophils 63.1 %    % Lymphocytes 20.4 %    % Monocytes 8.0 %    % Eosinophils 4.0 %    % Basophils 0.9 %    % Immature Granulocytes 3.6 %    Nucleated RBCs 0 0 /100    Absolute Neutrophil 6.6 1.6 - 8.3 10e9/L    Absolute Lymphocytes 2.1 0.8 - 5.3 10e9/L    Absolute Monocytes 0.8 0.0 - 1.3 10e9/L    Absolute Eosinophils 0.4 0.0 - 0.7 10e9/L    Absolute Basophils 0.1 0.0 - 0.2 10e9/L    Abs Immature Granulocytes 0.4 0 - 0.4 10e9/L    Absolute Nucleated RBC 0.0    INR   Result Value Ref Range    INR 1.02 0.86 - 1.14   Procalcitonin   Result Value Ref Range    Procalcitonin <0.05 ng/ml   Magnesium   Result Value Ref Range    Magnesium 2.5 (H) 1.6 - 2.3 mg/dL   ISTAT gases lactate krish POCT   Result Value Ref Range    Ph Venous 7.55 (H) 7.32 - 7.43 pH    PCO2 Venous 32 (L) 40 - 50 mm Hg    PO2 Venous 26 25 - 47 mm Hg    Bicarbonate Venous 28 21 - 28 mmol/L    O2 Sat Venous 57 %    Lactic Acid 2.3 (H) 0.7 - 2.1 mmol/L   Blood culture   Result Value Ref Range    Specimen Description Blood Left Arm     Special Requests Aerobic and anaerobic bottles received     Culture Micro No growth after 2 hours    XR Chest Port 1 View    Narrative    CHEST ONE VIEW PORTABLE   11/27/2019 5:12 PM     HISTORY:  Shortness of breath.    COMPARISON: Chest radiograph and chest CT performed 9/18/2019.      Impression    IMPRESSION: There is near-complete atelectasis of the right upper  lobe, new since the previous exam. This finding may be related to  progression of right hilar and mediastinal adenopathy described on the  prior CT scan. CT may be helpful for further evaluation. The left lung  is clear. Tortuous calcified thoracic aorta. Pulmonary vascularity is  within normal limits.    RYLEE PEREZ MD    Blood culture   Result Value Ref Range    Specimen Description Blood Left Arm     Special Requests Aerobic and anaerobic bottles received     Culture Micro PENDING    CBC with platelets differential   Result Value Ref Range    WBC 9.2 4.0 - 11.0 10e9/L    RBC Count 3.81 3.8 - 5.2 10e12/L    Hemoglobin 12.0 11.7 - 15.7 g/dL    Hematocrit 36.1 35.0 - 47.0 %    MCV 95 78 - 100 fl    MCH 31.5 26.5 - 33.0 pg    MCHC 33.2 31.5 - 36.5 g/dL    RDW 16.0 (H) 10.0 - 15.0 %    Platelet Count 226 150 - 450 10e9/L    Diff Method Automated Method     % Neutrophils 61.6 %    % Lymphocytes 21.7 %    % Monocytes 7.2 %    % Eosinophils 5.0 %    % Basophils 1.1 %    % Immature Granulocytes 3.4 %    Nucleated RBCs 0 0 /100    Absolute Neutrophil 5.7 1.6 - 8.3 10e9/L    Absolute Lymphocytes 2.0 0.8 - 5.3 10e9/L    Absolute Monocytes 0.7 0.0 - 1.3 10e9/L    Absolute Eosinophils 0.5 0.0 - 0.7 10e9/L    Absolute Basophils 0.1 0.0 - 0.2 10e9/L    Abs Immature Granulocytes 0.3 0 - 0.4 10e9/L    Absolute Nucleated RBC 0.0    Comprehensive metabolic panel   Result Value Ref Range    Sodium 142 133 - 144 mmol/L    Potassium 3.4 3.4 - 5.3 mmol/L    Chloride 108 94 - 109 mmol/L    Carbon Dioxide 26 20 - 32 mmol/L    Anion Gap 8 3 - 14 mmol/L    Glucose 122 (H) 70 - 99 mg/dL    Urea Nitrogen 24 7 - 30 mg/dL    Creatinine 0.99 0.52 - 1.04 mg/dL    GFR Estimate 67 >60 mL/min/[1.73_m2]    GFR Estimate If Black 77 >60 mL/min/[1.73_m2]    Calcium 8.5 8.5 - 10.1 mg/dL    Bilirubin Total 1.0 0.2 - 1.3 mg/dL    Albumin 3.2 (L) 3.4 - 5.0 g/dL    Protein Total 6.0 (L) 6.8 - 8.8 g/dL    Alkaline Phosphatase 76 40 - 150 U/L    ALT 28 0 - 50 U/L    AST 19 0 - 45 U/L   Magnesium   Result Value Ref Range    Magnesium 2.1 1.6 - 2.3 mg/dL   Phosphorus   Result Value Ref Range    Phosphorus 3.1 2.5 - 4.5 mg/dL   Uric acid   Result Value Ref Range    Uric Acid 7.1 (H) 2.6 - 6.0 mg/dL   Partial thromboplastin time   Result Value Ref Range    PTT 26 22 - 37 sec    Fibrinogen activity   Result Value Ref Range    Fibrinogen 330 200 - 420 mg/dL   UA with Microscopic reflex to Culture   Result Value Ref Range    Color Urine Yellow     Appearance Urine Slightly Cloudy     Glucose Urine Negative NEG^Negative mg/dL    Bilirubin Urine Negative NEG^Negative    Ketones Urine Negative NEG^Negative mg/dL    Specific Gravity Urine 1.018 1.003 - 1.035    Blood Urine Negative NEG^Negative    pH Urine 6.5 5.0 - 7.0 pH    Protein Albumin Urine Negative NEG^Negative mg/dL    Urobilinogen mg/dL Normal 0.0 - 2.0 mg/dL    Nitrite Urine Negative NEG^Negative    Leukocyte Esterase Urine Negative NEG^Negative    Source Midstream Urine     WBC Urine 1 0 - 5 /HPF    RBC Urine 1 0 - 2 /HPF    Squamous Epithelial /HPF Urine <1 0 - 1 /HPF    Mucous Urine Present (A) NEG^Negative /LPF   Fractional excretion of sodium   Result Value Ref Range    Creatinine Urine 114 mg/dL    Sodium Urine mmol/L 165 mmol/L    %FENA 1.1 %   HCG qualitative urine   Result Value Ref Range    HCG Qual Urine Negative NEG^Negative   CBC with platelets differential   Result Value Ref Range    WBC 8.3 4.0 - 11.0 10e9/L    RBC Count 3.90 3.8 - 5.2 10e12/L    Hemoglobin 12.3 11.7 - 15.7 g/dL    Hematocrit 37.2 35.0 - 47.0 %    MCV 95 78 - 100 fl    MCH 31.5 26.5 - 33.0 pg    MCHC 33.1 31.5 - 36.5 g/dL    RDW 16.3 (H) 10.0 - 15.0 %    Platelet Count 227 150 - 450 10e9/L    Diff Method Automated Method     % Neutrophils 64.9 %    % Lymphocytes 16.9 %    % Monocytes 7.1 %    % Eosinophils 6.0 %    % Basophils 1.2 %    % Immature Granulocytes 3.9 %    Nucleated RBCs 0 0 /100    Absolute Neutrophil 5.4 1.6 - 8.3 10e9/L    Absolute Lymphocytes 1.4 0.8 - 5.3 10e9/L    Absolute Monocytes 0.6 0.0 - 1.3 10e9/L    Absolute Eosinophils 0.5 0.0 - 0.7 10e9/L    Absolute Basophils 0.1 0.0 - 0.2 10e9/L    Abs Immature Granulocytes 0.3 0 - 0.4 10e9/L    Absolute Nucleated RBC 0.0    Basic metabolic panel   Result Value Ref Range    Sodium 144 133 -  144 mmol/L    Potassium 3.6 3.4 - 5.3 mmol/L    Chloride 108 94 - 109 mmol/L    Carbon Dioxide 29 20 - 32 mmol/L    Anion Gap 6 3 - 14 mmol/L    Glucose 98 70 - 99 mg/dL    Urea Nitrogen 22 7 - 30 mg/dL    Creatinine 1.07 (H) 0.52 - 1.04 mg/dL    GFR Estimate 61 >60 mL/min/[1.73_m2]    GFR Estimate If Black 70 >60 mL/min/[1.73_m2]    Calcium 8.5 8.5 - 10.1 mg/dL   Uric acid   Result Value Ref Range    Uric Acid 6.4 (H) 2.6 - 6.0 mg/dL   Lactate Dehydrogenase   Result Value Ref Range    Lactate Dehydrogenase 450 (H) 81 - 234 U/L   Hepatic panel   Result Value Ref Range    Bilirubin Direct 0.2 0.0 - 0.2 mg/dL    Bilirubin Total 1.2 0.2 - 1.3 mg/dL    Albumin 3.2 (L) 3.4 - 5.0 g/dL    Protein Total 6.1 (L) 6.8 - 8.8 g/dL    Alkaline Phosphatase 74 40 - 150 U/L    ALT 26 0 - 50 U/L    AST 24 0 - 45 U/L   Phosphorus   Result Value Ref Range    Phosphorus 3.6 2.5 - 4.5 mg/dL   INR   Result Value Ref Range    INR 1.03 0.86 - 1.14   Fibrinogen activity   Result Value Ref Range    Fibrinogen 369 200 - 420 mg/dL   Partial thromboplastin time   Result Value Ref Range    PTT 30 22 - 37 sec

## 2019-11-28 NOTE — PROVIDER NOTIFICATION
Sepsis protocol triggered: Lactic acid resulted at 3.6, previous level was 2.3. Spoke with Dr. Euceda about the results. VSS except for tachycardia. No intervention at this time.

## 2019-11-28 NOTE — PROGRESS NOTES
Nursing Focus: Admission  D: Arrived at 1920 from ED. Patient accompanied by . Admitted for shortness of breath.    I: Admission process began.  Patient oriented to room, enviroment, call light.  Md. notified of patients arrival on unit.     A: Vital signs stable, afebrile.  Patient stable at this time.  Complaining of chest pains.     P: Implement plan of care when available. Continue to monitor patient. Nursing interventions as appropriate. Notify md with changes in pt status.    Per Mobility Level Guideline;  Bed/chair alarm No.  Patient may ambulate stand by assist  Patient requires the following assistive equipment: none  PT/OT consult orders in place No

## 2019-11-28 NOTE — PLAN OF CARE
00:00-07:00  am  AF HR remains in 100's  BP slight elevated but stable at 130's/80's  Good O2 sat 96-98% on 2L/NC  Slight LORENZ but denied SOB at resting unless having intermittent anxiety   Xanax given at bedtime and appears to be resting most of the night  Tylenol given for chest discomfort with adequate  relief   Up ad fallon  Voiding spontaneously well, good UOP .  No nausea/vomiting  Plan for PICC placement and Echo today  No acute respiratory distress overnight  Appears to be resting/sleeping comfortably  Continue w/POC, ongoing teaching, and offer support as newly diagnosis

## 2019-11-29 ENCOUNTER — APPOINTMENT (OUTPATIENT)
Dept: PET IMAGING | Facility: CLINIC | Age: 49
End: 2019-11-29
Attending: INTERNAL MEDICINE
Payer: COMMERCIAL

## 2019-11-29 LAB
ALBUMIN SERPL-MCNC: 3 G/DL (ref 3.4–5)
ALP SERPL-CCNC: 69 U/L (ref 40–150)
ALT SERPL W P-5'-P-CCNC: 25 U/L (ref 0–50)
ANION GAP SERPL CALCULATED.3IONS-SCNC: 9 MMOL/L (ref 3–14)
APTT PPP: 29 SEC (ref 22–37)
AST SERPL W P-5'-P-CCNC: 22 U/L (ref 0–45)
BASOPHILS # BLD AUTO: 0 10E9/L (ref 0–0.2)
BASOPHILS NFR BLD AUTO: 0.3 %
BILIRUB SERPL-MCNC: 1.2 MG/DL (ref 0.2–1.3)
BUN SERPL-MCNC: 14 MG/DL (ref 7–30)
CALCIUM SERPL-MCNC: 8.6 MG/DL (ref 8.5–10.1)
CALCIUM SERPL-MCNC: 9.1 MG/DL (ref 8.5–10.1)
CALCIUM SERPL-MCNC: 9.3 MG/DL (ref 8.5–10.1)
CHLORIDE SERPL-SCNC: 108 MMOL/L (ref 94–109)
CMV IGG SERPL QL IA: 4.7 AI (ref 0–0.8)
CO2 SERPL-SCNC: 25 MMOL/L (ref 20–32)
CREAT SERPL-MCNC: 0.73 MG/DL (ref 0.52–1.04)
CREAT SERPL-MCNC: 0.74 MG/DL (ref 0.52–1.04)
CREAT SERPL-MCNC: 0.83 MG/DL (ref 0.52–1.04)
DIFFERENTIAL METHOD BLD: ABNORMAL
EBV VCA IGG SER QL IA: 5.9 AI (ref 0–0.8)
EOSINOPHIL # BLD AUTO: 0 10E9/L (ref 0–0.7)
EOSINOPHIL NFR BLD AUTO: 0 %
ERYTHROCYTE [DISTWIDTH] IN BLOOD BY AUTOMATED COUNT: 15.1 % (ref 10–15)
FIBRINOGEN PPP-MCNC: 361 MG/DL (ref 200–420)
GFR SERPL CREATININE-BSD FRML MDRD: 82 ML/MIN/{1.73_M2}
GFR SERPL CREATININE-BSD FRML MDRD: >90 ML/MIN/{1.73_M2}
GFR SERPL CREATININE-BSD FRML MDRD: >90 ML/MIN/{1.73_M2}
GLUCOSE SERPL-MCNC: 120 MG/DL (ref 70–99)
HBV SURFACE AB SERPL IA-ACNC: 3.02 M[IU]/ML
HBV SURFACE AG SERPL QL IA: NONREACTIVE
HCT VFR BLD AUTO: 32.9 % (ref 35–47)
HCV AB SERPL QL IA: NONREACTIVE
HGB BLD-MCNC: 11 G/DL (ref 11.7–15.7)
HIV 1+2 AB+HIV1 P24 AG SERPL QL IA: NONREACTIVE
HSV1 IGG SERPL QL IA: 2.7 AI (ref 0–0.8)
HSV2 IGG SERPL QL IA: 1.4 AI (ref 0–0.8)
IMM GRANULOCYTES # BLD: 0.3 10E9/L (ref 0–0.4)
IMM GRANULOCYTES NFR BLD: 3.6 %
INR PPP: 1.05 (ref 0.86–1.14)
LDH SERPL L TO P-CCNC: 416 U/L (ref 81–234)
LYMPHOCYTES # BLD AUTO: 0.8 10E9/L (ref 0.8–5.3)
LYMPHOCYTES NFR BLD AUTO: 11.3 %
MCH RBC QN AUTO: 31.1 PG (ref 26.5–33)
MCHC RBC AUTO-ENTMCNC: 33.4 G/DL (ref 31.5–36.5)
MCV RBC AUTO: 93 FL (ref 78–100)
MONOCYTES # BLD AUTO: 0.3 10E9/L (ref 0–1.3)
MONOCYTES NFR BLD AUTO: 4.6 %
NEUTROPHILS # BLD AUTO: 5.8 10E9/L (ref 1.6–8.3)
NEUTROPHILS NFR BLD AUTO: 80.2 %
NRBC # BLD AUTO: 0 10*3/UL
NRBC BLD AUTO-RTO: 0 /100
PHOSPHATE SERPL-MCNC: 3.1 MG/DL (ref 2.5–4.5)
PHOSPHATE SERPL-MCNC: 3.4 MG/DL (ref 2.5–4.5)
PHOSPHATE SERPL-MCNC: 4 MG/DL (ref 2.5–4.5)
PLATELET # BLD AUTO: 236 10E9/L (ref 150–450)
POTASSIUM SERPL-SCNC: 3.3 MMOL/L (ref 3.4–5.3)
POTASSIUM SERPL-SCNC: 3.8 MMOL/L (ref 3.4–5.3)
POTASSIUM SERPL-SCNC: 3.8 MMOL/L (ref 3.4–5.3)
PROT SERPL-MCNC: 5.9 G/DL (ref 6.8–8.8)
RBC # BLD AUTO: 3.54 10E12/L (ref 3.8–5.2)
SODIUM SERPL-SCNC: 141 MMOL/L (ref 133–144)
URATE SERPL-MCNC: 4 MG/DL (ref 2.6–6)
URATE SERPL-MCNC: 4.4 MG/DL (ref 2.6–6)
URATE SERPL-MCNC: 4.5 MG/DL (ref 2.6–6)
WBC # BLD AUTO: 7.2 10E9/L (ref 4–11)

## 2019-11-29 PROCEDURE — 25000132 ZZH RX MED GY IP 250 OP 250 PS 637: Performed by: PHYSICIAN ASSISTANT

## 2019-11-29 PROCEDURE — 82565 ASSAY OF CREATININE: CPT | Performed by: INTERNAL MEDICINE

## 2019-11-29 PROCEDURE — 78816 PET IMAGE W/CT FULL BODY: CPT | Mod: PI

## 2019-11-29 PROCEDURE — 25000128 H RX IP 250 OP 636: Performed by: INTERNAL MEDICINE

## 2019-11-29 PROCEDURE — 25800030 ZZH RX IP 258 OP 636: Performed by: INTERNAL MEDICINE

## 2019-11-29 PROCEDURE — 85610 PROTHROMBIN TIME: CPT | Performed by: INTERNAL MEDICINE

## 2019-11-29 PROCEDURE — 85384 FIBRINOGEN ACTIVITY: CPT | Performed by: INTERNAL MEDICINE

## 2019-11-29 PROCEDURE — A9552 F18 FDG: HCPCS | Performed by: INTERNAL MEDICINE

## 2019-11-29 PROCEDURE — 83615 LACTATE (LD) (LDH) ENZYME: CPT | Performed by: INTERNAL MEDICINE

## 2019-11-29 PROCEDURE — 84100 ASSAY OF PHOSPHORUS: CPT | Performed by: INTERNAL MEDICINE

## 2019-11-29 PROCEDURE — 25000132 ZZH RX MED GY IP 250 OP 250 PS 637: Performed by: INTERNAL MEDICINE

## 2019-11-29 PROCEDURE — 40000802 ZZH SITE CHECK

## 2019-11-29 PROCEDURE — 80053 COMPREHEN METABOLIC PANEL: CPT | Performed by: INTERNAL MEDICINE

## 2019-11-29 PROCEDURE — 85025 COMPLETE CBC W/AUTO DIFF WBC: CPT | Performed by: INTERNAL MEDICINE

## 2019-11-29 PROCEDURE — 3E04305 INTRODUCTION OF OTHER ANTINEOPLASTIC INTO CENTRAL VEIN, PERCUTANEOUS APPROACH: ICD-10-PCS | Performed by: PHYSICIAN ASSISTANT

## 2019-11-29 PROCEDURE — 84132 ASSAY OF SERUM POTASSIUM: CPT | Performed by: INTERNAL MEDICINE

## 2019-11-29 PROCEDURE — 12000001 ZZH R&B MED SURG/OB UMMC

## 2019-11-29 PROCEDURE — 70491 CT SOFT TISSUE NECK W/DYE: CPT

## 2019-11-29 PROCEDURE — 84550 ASSAY OF BLOOD/URIC ACID: CPT | Performed by: INTERNAL MEDICINE

## 2019-11-29 PROCEDURE — 85730 THROMBOPLASTIN TIME PARTIAL: CPT | Performed by: INTERNAL MEDICINE

## 2019-11-29 PROCEDURE — 34300033 ZZH RX 343: Performed by: INTERNAL MEDICINE

## 2019-11-29 PROCEDURE — 82310 ASSAY OF CALCIUM: CPT | Performed by: INTERNAL MEDICINE

## 2019-11-29 PROCEDURE — 36592 COLLECT BLOOD FROM PICC: CPT | Performed by: INTERNAL MEDICINE

## 2019-11-29 PROCEDURE — 71260 CT THORAX DX C+: CPT

## 2019-11-29 RX ORDER — DEXTROSE MONOHYDRATE 50 MG/ML
10-20 INJECTION, SOLUTION INTRAVENOUS
Status: CANCELLED | OUTPATIENT
Start: 2019-11-30

## 2019-11-29 RX ORDER — ALLOPURINOL 300 MG/1
300 TABLET ORAL DAILY
Status: CANCELLED
Start: 2019-11-29

## 2019-11-29 RX ORDER — IOPAMIDOL 755 MG/ML
10-135 INJECTION, SOLUTION INTRAVASCULAR ONCE
Status: COMPLETED | OUTPATIENT
Start: 2019-11-29 | End: 2019-11-29

## 2019-11-29 RX ORDER — DIPHENHYDRAMINE HCL 50 MG
50 CAPSULE ORAL ONCE
Status: COMPLETED | OUTPATIENT
Start: 2019-11-29 | End: 2019-11-29

## 2019-11-29 RX ORDER — ACYCLOVIR 400 MG/1
200 TABLET ORAL 2 TIMES DAILY
Status: DISCONTINUED | OUTPATIENT
Start: 2019-11-29 | End: 2019-11-29

## 2019-11-29 RX ORDER — ACETAMINOPHEN 325 MG/1
650 TABLET ORAL ONCE
Status: COMPLETED | OUTPATIENT
Start: 2019-11-29 | End: 2019-11-29

## 2019-11-29 RX ORDER — ACYCLOVIR 400 MG/1
400 TABLET ORAL 2 TIMES DAILY
Status: DISCONTINUED | OUTPATIENT
Start: 2019-11-29 | End: 2019-12-01 | Stop reason: HOSPADM

## 2019-11-29 RX ADMIN — RITUXIMAB 700 MG: 10 INJECTION, SOLUTION INTRAVENOUS at 17:07

## 2019-11-29 RX ADMIN — POTASSIUM CHLORIDE 20 MEQ: 29.8 INJECTION, SOLUTION INTRAVENOUS at 14:27

## 2019-11-29 RX ADMIN — SODIUM CHLORIDE: 9 INJECTION, SOLUTION INTRAVENOUS at 00:27

## 2019-11-29 RX ADMIN — ACYCLOVIR 400 MG: 400 TABLET ORAL at 19:45

## 2019-11-29 RX ADMIN — ALLOPURINOL 300 MG: 300 TABLET ORAL at 10:11

## 2019-11-29 RX ADMIN — METHYLPREDNISOLONE SODIUM SUCCINATE 150 MG: 125 INJECTION, POWDER, LYOPHILIZED, FOR SOLUTION INTRAMUSCULAR; INTRAVENOUS at 13:14

## 2019-11-29 RX ADMIN — ALPRAZOLAM 0.5 MG: 0.5 TABLET ORAL at 14:31

## 2019-11-29 RX ADMIN — FLUDEOXYGLUCOSE F-18 9.88 MCI.: 500 INJECTION, SOLUTION INTRAVENOUS at 08:31

## 2019-11-29 RX ADMIN — DIPHENHYDRAMINE HYDROCHLORIDE 50 MG: 50 CAPSULE ORAL at 16:33

## 2019-11-29 RX ADMIN — ACETAMINOPHEN 650 MG: 325 TABLET, FILM COATED ORAL at 02:38

## 2019-11-29 RX ADMIN — OMEPRAZOLE 40 MG: 20 CAPSULE, DELAYED RELEASE ORAL at 10:11

## 2019-11-29 RX ADMIN — SODIUM CHLORIDE: 9 INJECTION, SOLUTION INTRAVENOUS at 10:16

## 2019-11-29 RX ADMIN — IOPAMIDOL 104 ML: 755 INJECTION, SOLUTION INTRAVENOUS at 08:38

## 2019-11-29 RX ADMIN — ACETAMINOPHEN 650 MG: 325 TABLET, FILM COATED ORAL at 16:33

## 2019-11-29 RX ADMIN — POTASSIUM CHLORIDE 20 MEQ: 29.8 INJECTION, SOLUTION INTRAVENOUS at 15:57

## 2019-11-29 ASSESSMENT — MIFFLIN-ST. JEOR: SCORE: 1432.83

## 2019-11-29 ASSESSMENT — ACTIVITIES OF DAILY LIVING (ADL)
ADLS_ACUITY_SCORE: 10

## 2019-11-29 ASSESSMENT — PAIN DESCRIPTION - DESCRIPTORS: DESCRIPTORS: SORE

## 2019-11-29 NOTE — PLAN OF CARE
Alert and oriented X 4. Diastolic BP in 90s. Heart rate 90s at rest. Tachycardic to 120s with exertion. Denies SOB at rest. Denies chest pain, dizziness or palpitations. Infrequent dry cough. On cardiac telemetry and has been in NSR most of the night. C/o sore throat. Acetaminophen given with relief. Sleeping in between cares. PET CT planned today.

## 2019-11-29 NOTE — PROGRESS NOTES
[facility] [place]  Hematology / Oncology Progress Note    Date of Service (when I saw the patient): 11/29/2019       Assessment & Plan   Kassie Ramirez is a 49 year old woman with progressively worsening respiratory symptoms over the past 5 months, who is now found on recent mediastinoscopy to have likely DLBCL, and with recent imaging findings concerning for carinal and bronchial constriction, who presents with hypoxia and tachycardia, progressive dyspnea on exertion.       Plan for today  - Start Rituxan  - FISH and slides requested from [facility]    Mediastinal adenopathy  Likely DLBCL  Dyspnea    CXR 11/27/19      Presented to PCP 6/27/19 with 6 weeks of cough. CXR with R peribronchial infiltrate/inflammation with mild extension into RML. Treated with azithromycin and referred to pulmonology. See additional details of workup from H&P.    At this time she has likely lymphoma with worsening respiratory status and likely airway obstruction in the RUL from mass effect on bronchi. Treated empirically with high dose steroids while awaiting final pathology in order to formulate treatment plan.  - Pt declined prednisone due to prior reaction. Given IV Solu-Medrol 150 mg x 1 11/28  - PICC placed 11/28  - Will start Rituximab, D1 = 11/29/19  - Further treatment plan pending, awaiting FISH results from [facility] which should be resulted on 12/2. Will likely start R-CHOP  - Will monitor closely for any worsening respiratory compromise. 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 0.73 on 11/29  - FeNa 1.1%, suggests prerenal azotemia improved with IVF  - Rule out tumor lysis as below  - Monitor Cr and lytes  - IV fluids at 100 ml/hr      Risk for tumor lysis  Risk for DIC   - Uric acid 7.1, will monitor closely  - Allopurinol  - Follow TLS/DIC q8 for now     FEN  - IVFs: NS @100 ml/hr  - Diet: regular as tolerated  - Lytes repleted as per sliding scale, caution with RENAY     PPX  - DVT: mechanical for now in case of procedures  - Bowel: senna-colace, miralax PRN  - GI: start omeprazole with steroids     Lines/Drains: PICC line 11/28  Consults: TBD  CODE: Full  DISPO: inpatient > 2 nights for work up for new likely lymphoma as well as start of treatment, and management of mediastinal mass causing respiratory compromise    Gaye Zamorano PA-C  Hematology/Oncology  Pager # [phone]  Phone # [phone]     Interval History   Kassie notes shortness of breath has improved with steroids. Ongoing dry cough. No fevers or chills. Patient denies skin changes, vision changes, headache, nasal congestion, chest pain, extremity swelling, difficulty urinating, constipation, diarrhea, or pain.       Physical Exam   Temp: 98.8  F (37.1  C) Temp src: Oral BP: (!) 141/96 Pulse: 105 Heart Rate: 107 Resp: 18 SpO2: 94 % O2 Device: None (Room air)    Vitals:    11/27/19 1538 11/28/19 0830 11/29/19 1003   Weight: 78 kg (172 lb) 78 kg (171 lb 14.4 oz) 77.5 kg (170 lb 14.4 oz)     Vital Signs with Ranges  Temp:  [97.5  F (36.4  C)-98.9  F (37.2  C)] 98.8  F (37.1  C)  Pulse:  [] 105  Heart Rate:  [] 107  Resp:  [18-20] 18  BP: (112-149)/() 141/96  SpO2:  [92 %-98 %] 94 %  I/O last 3 completed shifts:  In: 2860 [P.O.:420; I.V.:2440]  Out: 2415 [Urine:2415]    Constitutional: Fatigue  Eyes: Sclerae anicteric  ENT: No JVD  Respiratory: Increased WOB, audible expiratory rumble transmitted from upper  airways bilaterally  Cardiovascular: tachy, no murmur  GI: soft, no palpable masses  Lymph/Hematologic: no bruising  Skin: No rash  Musculoskeletal: normal muscle mass  Neurologic: non-focal  Neuropsychiatric: tearful, appropriate    Medications     - MEDICATION INSTRUCTIONS -       sodium chloride 100 mL/hr at 11/29/19 1016       acetaminophen  650 mg Oral Once     allopurinol  300 mg Oral Daily     diphenhydrAMINE  50 mg Oral Once     influenza vaccine adult (product based on age)  0.5 mL Intramuscular Prior to discharge     methylPREDNISolone  150 mg Intravenous Once     omeprazole  40 mg Oral QAM AC     riTUXimab (RITUXAN) infusion  375 mg/m2 (Order-Specific) Intravenous Once     senna-docusate  1 tablet Oral BID    Or     senna-docusate  2 tablet Oral BID     sodium chloride (PF)  10 mL Intracatheter Q7 Days     sodium chloride (PF)  10 mL Intracatheter Q7 Days     sodium chloride (PF)  3 mL Intravenous Q8H       Data   Results for orders placed or performed during the hospital encounter of 11/27/19 (from the past 24 hour(s))   Lactate for Sepsis Protocol   Result Value Ref Range    Lactate for Sepsis Protocol 3.6 (H) 0.7 - 2.0 mmol/L   Uric acid   Result Value Ref Range    Uric Acid 4.3 2.6 - 6.0 mg/dL   Phosphorus   Result Value Ref Range    Phosphorus 4.4 2.5 - 4.5 mg/dL   Creatinine   Result Value Ref Range    Creatinine 0.68 0.52 - 1.04 mg/dL    GFR Estimate >90 >60 mL/min/[1.73_m2]    GFR Estimate If Black >90 >60 mL/min/[1.73_m2]   Calcium   Result Value Ref Range    Calcium 9.1 8.5 - 10.1 mg/dL   Potassium   Result Value Ref Range    Potassium 3.9 3.4 - 5.3 mmol/L   CBC with platelets differential   Result Value Ref Range    WBC 7.2 4.0 - 11.0 10e9/L    RBC Count 3.54 (L) 3.8 - 5.2 10e12/L    Hemoglobin 11.0 (L) 11.7 - 15.7 g/dL    Hematocrit 32.9 (L) 35.0 - 47.0 %    MCV 93 78 - 100 fl    MCH 31.1 26.5 - 33.0 pg    MCHC 33.4 31.5 - 36.5 g/dL    RDW 15.1 (H) 10.0 - 15.0 %    Platelet Count 236 150 - 450  10e9/L    Diff Method Automated Method     % Neutrophils 80.2 %    % Lymphocytes 11.3 %    % Monocytes 4.6 %    % Eosinophils 0.0 %    % Basophils 0.3 %    % Immature Granulocytes 3.6 %    Nucleated RBCs 0 0 /100    Absolute Neutrophil 5.8 1.6 - 8.3 10e9/L    Absolute Lymphocytes 0.8 0.8 - 5.3 10e9/L    Absolute Monocytes 0.3 0.0 - 1.3 10e9/L    Absolute Eosinophils 0.0 0.0 - 0.7 10e9/L    Absolute Basophils 0.0 0.0 - 0.2 10e9/L    Abs Immature Granulocytes 0.3 0 - 0.4 10e9/L    Absolute Nucleated RBC 0.0    INR   Result Value Ref Range    INR 1.05 0.86 - 1.14   Lactate Dehydrogenase   Result Value Ref Range    Lactate Dehydrogenase 416 (H) 81 - 234 U/L   Partial thromboplastin time   Result Value Ref Range    PTT 29 22 - 37 sec   Fibrinogen activity   Result Value Ref Range    Fibrinogen 361 200 - 420 mg/dL   Comprehensive metabolic panel   Result Value Ref Range    Sodium 141 133 - 144 mmol/L    Potassium 3.8 3.4 - 5.3 mmol/L    Chloride 108 94 - 109 mmol/L    Carbon Dioxide 25 20 - 32 mmol/L    Anion Gap 9 3 - 14 mmol/L    Glucose 120 (H) 70 - 99 mg/dL    Urea Nitrogen 14 7 - 30 mg/dL    Creatinine 0.73 0.52 - 1.04 mg/dL    GFR Estimate >90 >60 mL/min/[1.73_m2]    GFR Estimate If Black >90 >60 mL/min/[1.73_m2]    Calcium 9.3 8.5 - 10.1 mg/dL    Bilirubin Total 1.2 0.2 - 1.3 mg/dL    Albumin 3.0 (L) 3.4 - 5.0 g/dL    Protein Total 5.9 (L) 6.8 - 8.8 g/dL    Alkaline Phosphatase 69 40 - 150 U/L    ALT 25 0 - 50 U/L    AST 22 0 - 45 U/L   Uric acid   Result Value Ref Range    Uric Acid 4.5 2.6 - 6.0 mg/dL   Phosphorus   Result Value Ref Range    Phosphorus 4.0 2.5 - 4.5 mg/dL   PET Oncology Whole Body    Narrative    Combined Report of:    PET and CT on  11/29/2019 10:25 AM :    1. PET of the neck, chest, abdomen, and pelvis.  2. PET CT Fusion for Attenuation Correction and Anatomical  Localization:    3. Diagnostic CT scan of the chest, abdomen, and pelvis with  intravenous contrast for interpretation.  3. CT of the  chest, abdomen and pelvis obtained for diagnostic  interpretation.  4. 3D MIP and PET-CT fused images were processed on an independent  workstation and archived to PACS and reviewed by a radiologist.    Technique:    1. PET: The patient received 9.98 mCi of F-18-FDG; the serum glucose  was 105 prior to administration, body weight was 78 kg. Images were  evaluated in the axial, sagittal, and coronal planes as well as the  rotational whole body MIP. Images were acquired from the Vertex to the  Feet.    UPTAKE WAS MEASURED AT 60 MINUTES.     BACKGROUND:  Liver SUV max= 3.85,   Aorta Blood SUV Max: 3.25.     2. CT: Volumetric acquisition for clinical interpretation of the  chest, abdomen, and pelvis acquired at 3 mm sections . The chest,  abdomen, and pelvis were evaluated at 5 mm sections in bone, soft  tissue, and lung windows.      The patient received 100 cc. Of Isovue 370 intravenously for the  examination.    --    3. 3D MIP and PET-CT fused images were processed on an independent  workstation and archived to PACS and reviewed by a radiologist.    INDICATION: new diagnosis of diffuse large b cell lymphoma    ADDITIONAL INFORMATION OBTAINED FROM EMR: none    COMPARISON: 9/18/2019    FINDINGS:     HEAD/NECK:  Please refer to same day head and neck pet CT.    CHEST:  Enlarging soft tissue mass measuring today 12.4 x 5.5 x 13 cm encasing  and narrowing the proximal bronchi and bilateral pulmonary arteries,  pulmonary veins and superior vena cava. There is complete obstruction  the right superior lobar bronchus with right upper lobe collapse and  narrowing of the  pulmonary artery. There is increased  metabolic activity at the level of the mass with SUV max up to 34.92.  Satellite lymphadenopathy in the superior mediastinum , the largest  measuring 3.7 x 3.8 cm in a right paratracheal position with SUV max  of 12.95. Additional lymphadenopathy just above the previous one  measuring 1.5 x 2.1 cm with SUV  max of 30.30 and left to the aortic  arch with SUV max of 12.17 and 24.92. Pulmonary opacity and  groundglass  in the right mid liver lobe, lingula and left upper lobe.  Interval resolution of largest left pulmonary nodule noted on prior  study. Stable smaller right posterior medial pulmonary nodule  measuring 1.1 cm. Left brachial picc with distal tip at the cavoatrial  junction.    ABDOMEN AND PELVIS:  The liver, gallbladder, kidneys, adrenal glands and pancreas are  within normal limits. Splenomegaly. Uterus and urinary bladder are  normal. Minimal increased FDG uptake at the ileo-cecal junction with  SUV max of 6.32 and at the ano-rectal junction with SUV max 5.12.  Nonenlarged retroperitoneal lymphadenopathy with SUV max of 11.35.     LOWER EXTREMITIES:   No abnormal masses or hypermetabolic lesions.    BONES:   There are no suspicious lytic or blastic osseous lesions.  There is no  abnormal FDG uptake in the skeleton.        Impression    IMPRESSION: In this patient with recently diagnosed DLBCL there is  progressive disease by CT criteria:  1. Enlarging soft tissue hypermetabolic mass encasing and narrowing  all the secondary  bronchi, pulmonary arteries and veins.    2. Complete obstruction of the right superior lobar bronchus with  right upper lobe collapse.  3. Multiple satellite mediastinal lymphadenopathy.  4. Pulmonary opacity and groundglass in the right mid liver lobe,  lingula and left upper lobe likely inflammatory/infective. Interval  resolution of largest left pulmonary nodule noted on prior study.  Stable smaller right posterior medial pulmonary nodule measuring 1.1  cm  5. Retroperitoneal hypermetabolic lymphadenopathy.  6. Minimal increased metabolism at the ileo-cecal junction, consider  direct visualization or close follow up.   7. Please refer to same day head and neck PET CT.      Lugano Criteria for Lymphoma response to therapy methodology  Reported as: ex.  Lugano Criteria: Progressive  disease    PET response  Used for FDG avid Lymphomas (Hodgkins & Diffuse Large B-Cell)  Complete                     <=Liver  (Deauville 1-3)  Partial                          > Liver & decreased from baseline   (Deauville 4-5)  Stable/No response     > Liver & no change from baseline  (Deauville  4-5)  Progressive                 > Liver & Increased or new FDG avid  lesion (Deauville 4-5)    CT response  Used for variable uptake Lymphomas (Follicular, Marginal zone, CLL,  Mantle Cell)  Complete                     all nodes <1.5 cm  (longest diameter)  Partial                          Shrank > 50%        (SPD*)  Stable/No response      Shrank <50%         (SPD*)  Progressive disease      New or Increased size of lesions    *SPD = (sum of up to six lesions (longest x shortest diameter)    Source: Xavi et al.  Imaging for Staging and Response Assessment in  Lymphoma.  Radiology August 2015.   Criteria

## 2019-11-29 NOTE — PROVIDER NOTIFICATION
DATE/TIME  (DOT-TD, DOT-NOW) CHEMO CHECK ACTIVITY (REGIMEN & DOSE CHECK, DAY, DOSE #, NAME OF CHEMO #1)  CHEMO DRUG #2  CHEMO DRUG #3 NAME OF RN #1 (USE DOT-ME HERE) NAME OF RN#2 (2ND RN TO LOG IN SEPARATELY)   11/29/2019  12:21 PM   Rituxan Protocol   Viraphet KING Wilkins RN     11/29/2019  4:24 PM   Rituxan double check   KING Milligan, KING     11/30/2019  10:28 AM   Protocol check   Abbey Thurman, KING Dorsey, KING   11/30/2019  3:58 PM   Vincristine Double Check Doxorubicin Double Check Cytoxan double check KING Grubbs

## 2019-11-29 NOTE — PROGRESS NOTES
"CLINICAL NUTRITION SERVICES - ASSESSMENT NOTE     Nutrition Prescription    RECOMMENDATIONS FOR MDs/PROVIDERS TO ORDER:  None at this time     Malnutrition Status:    Unable to determine due to pt unavailable during visits    Recommendations already ordered by Registered Dietitian (RD):  PRN supplements    Future/Additional Recommendations:  Encourage patient to consume at least 75% of meals TID or the equivalent with snacks/supplements.  If consuming less than this offer supplements, scheduled snacks, and/or consider ordering calorie counts to assess PO intake adequacy.  Monitor weight trends.      REASON FOR ASSESSMENT  Kassie Ramirez is a/an 49 year old female assessed by the dietitian for Admission Nutrition Risk Screen for reduced oral intake over the last month    NUTRITION HISTORY  Obtained info from chart, pt unavailable during visits. Per chart, pt with 5 month history of progressively worsening respiratory symptoms, now with likely DLBCL plan to get PET CT today and then start chemo once plan finalized.     CURRENT NUTRITION ORDERS  Diet: Regular  Intake/Tolerance: regular diet since admit late evening 11/27. 2 meals ordered yesterday and 1 so far today.    LABS  Labs reviewed    MEDICATIONS  Medications reviewed  - IVF @ 100 mL/hr    ANTHROPOMETRICS  Height: 170.2 cm (5' 7\")  Most Recent Weight: 78 kg (171 lb 14.4 oz)    IBW: 61.4 kg   BMI: Overweight BMI 25-29.9  Weight History: stable weight over the past year+  Wt Readings from Last 10 Encounters:   11/28/19 78 kg (171 lb 14.4 oz)   11/27/19 78.5 kg (173 lb)   11/20/19 77.1 kg (170 lb)   10/16/19 77.6 kg (171 lb 1.6 oz)   09/19/19 77.2 kg (170 lb 3.1 oz)   08/22/19 77.1 kg (170 lb)   06/27/19 78 kg (172 lb)   07/25/18 76.3 kg (168 lb 4 oz)   05/16/18 74.2 kg (163 lb 8 oz)   06/26/17 71.7 kg (158 lb)      Dosing Weight: 66 kg (adjusted based on actual wt 78 kg and IBW)    ASSESSED NUTRITION NEEDS  Estimated Energy Needs: [id number] kcals/day (25 " - 30 - 35 kcals/kg )  Justification: Maintenance (25-30 kcal/kg); Increased needs if/when begin chemo (30-35 kcal/kg)  Estimated Protein Needs: 79-99 grams protein/day (1.2 - 1.5 grams of pro/kg)  Justification: Increased needs with acute illness (and if/when begin chemo)  Estimated Fluid Needs: 7749-6437 mL/day (1 mL/kcal)   Justification: Maintenance, or other per provider pending fluid status    PHYSICAL FINDINGS  See malnutrition section below.    MALNUTRITION  % Intake: Unable to assess  % Weight Loss: None noted  Subcutaneous Fat Loss: Unable to assess  Muscle Loss: Unable to assess  Fluid Accumulation/Edema: None noted per chart review  Malnutrition Diagnosis: Unable to determine due to pt unavailable during visits    NUTRITION DIAGNOSIS  Predicted inadequate nutrient intake (protein-energy) related to potential for inadequate PO intake with new cancer diagnosis/treatment, especially with higher estimated needs once/if begin chemo     INTERVENTIONS  Implementation  Nutrition Education: Unable to complete due to pt unavailable   Medical food supplement therapy: PRN    Goals  Patient to consume % of nutritionally adequate meal trays TID, or the equivalent with supplements/snacks.     Monitoring/Evaluation  Progress toward goals will be monitored and evaluated per protocol.     Erika Hernandez, RD, LD   7D RD Pager: [phone]

## 2019-11-29 NOTE — PLAN OF CARE
[id number]. Mildly tachycardic. Afebrile and OVSS. Pt reporting feeling more comfortable this shift and able to breathe easier after solumedrol given on previous shift. On RA and sating well. Tele monitoring continued with sinus tachycardia and some PVCs noted. Voiding adequately. Hoping to get PET scan tomorrow to consider possible chemo treatments. Up with stand by assist.

## 2019-11-29 NOTE — PLAN OF CARE
Pt NPO this am and Whole Body PET scan completed. Plan to start Rituxan chemo this evening. Per pharmacy, Rituxan available early this evening. Pt getting another dose of Solu-Medrol, she state that her breathing is better with the steriod. Telemetry monitoring. Pt in sinus tachy with 's. Solu-Medrol given.    Potassium level 3.3 with afternoon lab. Pt will need potassium replacement.     Per Mobility Level Guideline;  Bed/chair alarm Yes.  Patient may ambulate independently  Patient requires the following assistive equipment: none   PT/OT consult orders in place No

## 2019-11-30 LAB
APTT PPP: 25 SEC (ref 22–37)
BASOPHILS # BLD AUTO: 0 10E9/L (ref 0–0.2)
BASOPHILS NFR BLD AUTO: 0.1 %
CALCIUM SERPL-MCNC: 8.5 MG/DL (ref 8.5–10.1)
CALCIUM SERPL-MCNC: 8.8 MG/DL (ref 8.5–10.1)
CALCIUM SERPL-MCNC: 9 MG/DL (ref 8.5–10.1)
CREAT SERPL-MCNC: 0.74 MG/DL (ref 0.52–1.04)
CREAT SERPL-MCNC: 0.78 MG/DL (ref 0.52–1.04)
CREAT SERPL-MCNC: 0.81 MG/DL (ref 0.52–1.04)
DIFFERENTIAL METHOD BLD: ABNORMAL
EOSINOPHIL # BLD AUTO: 0 10E9/L (ref 0–0.7)
EOSINOPHIL NFR BLD AUTO: 0 %
ERYTHROCYTE [DISTWIDTH] IN BLOOD BY AUTOMATED COUNT: 15.2 % (ref 10–15)
FIBRINOGEN PPP-MCNC: 306 MG/DL (ref 200–420)
GFR SERPL CREATININE-BSD FRML MDRD: 84 ML/MIN/{1.73_M2}
GFR SERPL CREATININE-BSD FRML MDRD: 89 ML/MIN/{1.73_M2}
GFR SERPL CREATININE-BSD FRML MDRD: >90 ML/MIN/{1.73_M2}
HCT VFR BLD AUTO: 31.2 % (ref 35–47)
HGB BLD-MCNC: 10.3 G/DL (ref 11.7–15.7)
IMM GRANULOCYTES # BLD: 0.2 10E9/L (ref 0–0.4)
IMM GRANULOCYTES NFR BLD: 2.2 %
INR PPP: 1.12 (ref 0.86–1.14)
LYMPHOCYTES # BLD AUTO: 0.8 10E9/L (ref 0.8–5.3)
LYMPHOCYTES NFR BLD AUTO: 11.2 %
MCH RBC QN AUTO: 31.1 PG (ref 26.5–33)
MCHC RBC AUTO-ENTMCNC: 33 G/DL (ref 31.5–36.5)
MCV RBC AUTO: 94 FL (ref 78–100)
MONOCYTES # BLD AUTO: 0.3 10E9/L (ref 0–1.3)
MONOCYTES NFR BLD AUTO: 4.5 %
NEUTROPHILS # BLD AUTO: 5.6 10E9/L (ref 1.6–8.3)
NEUTROPHILS NFR BLD AUTO: 82 %
NRBC # BLD AUTO: 0 10*3/UL
NRBC BLD AUTO-RTO: 0 /100
PHOSPHATE SERPL-MCNC: 2.5 MG/DL (ref 2.5–4.5)
PHOSPHATE SERPL-MCNC: 2.8 MG/DL (ref 2.5–4.5)
PHOSPHATE SERPL-MCNC: 4.1 MG/DL (ref 2.5–4.5)
PLATELET # BLD AUTO: 217 10E9/L (ref 150–450)
POTASSIUM SERPL-SCNC: 3.5 MMOL/L (ref 3.4–5.3)
POTASSIUM SERPL-SCNC: 3.6 MMOL/L (ref 3.4–5.3)
POTASSIUM SERPL-SCNC: 3.8 MMOL/L (ref 3.4–5.3)
RBC # BLD AUTO: 3.31 10E12/L (ref 3.8–5.2)
URATE SERPL-MCNC: 4.1 MG/DL (ref 2.6–6)
WBC # BLD AUTO: 6.9 10E9/L (ref 4–11)

## 2019-11-30 PROCEDURE — 84100 ASSAY OF PHOSPHORUS: CPT | Performed by: INTERNAL MEDICINE

## 2019-11-30 PROCEDURE — 82310 ASSAY OF CALCIUM: CPT | Performed by: INTERNAL MEDICINE

## 2019-11-30 PROCEDURE — 25800030 ZZH RX IP 258 OP 636: Performed by: INTERNAL MEDICINE

## 2019-11-30 PROCEDURE — 85730 THROMBOPLASTIN TIME PARTIAL: CPT | Performed by: INTERNAL MEDICINE

## 2019-11-30 PROCEDURE — 25000128 H RX IP 250 OP 636: Performed by: INTERNAL MEDICINE

## 2019-11-30 PROCEDURE — 25000132 ZZH RX MED GY IP 250 OP 250 PS 637: Performed by: PHYSICIAN ASSISTANT

## 2019-11-30 PROCEDURE — 85025 COMPLETE CBC W/AUTO DIFF WBC: CPT | Performed by: INTERNAL MEDICINE

## 2019-11-30 PROCEDURE — 84132 ASSAY OF SERUM POTASSIUM: CPT | Performed by: INTERNAL MEDICINE

## 2019-11-30 PROCEDURE — 84550 ASSAY OF BLOOD/URIC ACID: CPT | Performed by: INTERNAL MEDICINE

## 2019-11-30 PROCEDURE — 25000131 ZZH RX MED GY IP 250 OP 636 PS 637: Performed by: INTERNAL MEDICINE

## 2019-11-30 PROCEDURE — 85384 FIBRINOGEN ACTIVITY: CPT | Performed by: INTERNAL MEDICINE

## 2019-11-30 PROCEDURE — 25000132 ZZH RX MED GY IP 250 OP 250 PS 637: Performed by: INTERNAL MEDICINE

## 2019-11-30 PROCEDURE — 36592 COLLECT BLOOD FROM PICC: CPT | Performed by: INTERNAL MEDICINE

## 2019-11-30 PROCEDURE — 12000001 ZZH R&B MED SURG/OB UMMC

## 2019-11-30 PROCEDURE — 82565 ASSAY OF CREATININE: CPT | Performed by: INTERNAL MEDICINE

## 2019-11-30 PROCEDURE — 85610 PROTHROMBIN TIME: CPT | Performed by: INTERNAL MEDICINE

## 2019-11-30 RX ORDER — PROCHLORPERAZINE MALEATE 10 MG
10 TABLET ORAL EVERY 6 HOURS PRN
Status: DISCONTINUED | OUTPATIENT
Start: 2019-11-30 | End: 2019-12-01 | Stop reason: HOSPADM

## 2019-11-30 RX ORDER — NALOXONE HYDROCHLORIDE 0.4 MG/ML
.1-.4 INJECTION, SOLUTION INTRAMUSCULAR; INTRAVENOUS; SUBCUTANEOUS
Status: DISCONTINUED | OUTPATIENT
Start: 2019-11-30 | End: 2019-12-01 | Stop reason: HOSPADM

## 2019-11-30 RX ORDER — ALBUTEROL SULFATE 90 UG/1
1-2 AEROSOL, METERED RESPIRATORY (INHALATION)
Status: DISCONTINUED | OUTPATIENT
Start: 2019-11-30 | End: 2019-12-01 | Stop reason: HOSPADM

## 2019-11-30 RX ORDER — ONDANSETRON 8 MG/1
16 TABLET, FILM COATED ORAL ONCE
Status: DISCONTINUED | OUTPATIENT
Start: 2019-11-30 | End: 2019-11-30

## 2019-11-30 RX ORDER — PREDNISONE 50 MG/1
100 TABLET ORAL DAILY
Status: DISCONTINUED | OUTPATIENT
Start: 2019-11-30 | End: 2019-12-01 | Stop reason: HOSPADM

## 2019-11-30 RX ORDER — MEPERIDINE HYDROCHLORIDE 25 MG/ML
25 INJECTION INTRAMUSCULAR; INTRAVENOUS; SUBCUTANEOUS EVERY 30 MIN PRN
Status: DISCONTINUED | OUTPATIENT
Start: 2019-11-30 | End: 2019-12-01 | Stop reason: HOSPADM

## 2019-11-30 RX ORDER — EPINEPHRINE 1 MG/ML
0.3 INJECTION, SOLUTION, CONCENTRATE INTRAVENOUS EVERY 5 MIN PRN
Status: DISCONTINUED | OUTPATIENT
Start: 2019-11-30 | End: 2019-12-01 | Stop reason: HOSPADM

## 2019-11-30 RX ORDER — SODIUM CHLORIDE 9 MG/ML
1000 INJECTION, SOLUTION INTRAVENOUS CONTINUOUS PRN
Status: DISCONTINUED | OUTPATIENT
Start: 2019-11-30 | End: 2019-12-01 | Stop reason: HOSPADM

## 2019-11-30 RX ORDER — LEVOFLOXACIN 750 MG/1
750 TABLET, FILM COATED ORAL DAILY
Status: DISCONTINUED | OUTPATIENT
Start: 2019-11-30 | End: 2019-12-01 | Stop reason: HOSPADM

## 2019-11-30 RX ORDER — ALBUTEROL SULFATE 0.83 MG/ML
2.5 SOLUTION RESPIRATORY (INHALATION)
Status: DISCONTINUED | OUTPATIENT
Start: 2019-11-30 | End: 2019-12-01 | Stop reason: HOSPADM

## 2019-11-30 RX ORDER — METHYLPREDNISOLONE SODIUM SUCCINATE 125 MG/2ML
125 INJECTION, POWDER, LYOPHILIZED, FOR SOLUTION INTRAMUSCULAR; INTRAVENOUS
Status: DISCONTINUED | OUTPATIENT
Start: 2019-11-30 | End: 2019-12-01 | Stop reason: HOSPADM

## 2019-11-30 RX ORDER — ONDANSETRON 8 MG/1
16 TABLET, FILM COATED ORAL ONCE
Status: COMPLETED | OUTPATIENT
Start: 2019-11-30 | End: 2019-11-30

## 2019-11-30 RX ORDER — LORAZEPAM 2 MG/ML
.5-1 INJECTION INTRAMUSCULAR EVERY 6 HOURS PRN
Status: DISCONTINUED | OUTPATIENT
Start: 2019-11-30 | End: 2019-12-01 | Stop reason: HOSPADM

## 2019-11-30 RX ORDER — DIPHENHYDRAMINE HYDROCHLORIDE 50 MG/ML
50 INJECTION INTRAMUSCULAR; INTRAVENOUS
Status: DISCONTINUED | OUTPATIENT
Start: 2019-11-30 | End: 2019-12-01 | Stop reason: HOSPADM

## 2019-11-30 RX ORDER — LORAZEPAM 0.5 MG/1
.5-1 TABLET ORAL EVERY 6 HOURS PRN
Status: DISCONTINUED | OUTPATIENT
Start: 2019-11-30 | End: 2019-12-01 | Stop reason: HOSPADM

## 2019-11-30 RX ADMIN — FOSAPREPITANT 150 MG: 150 INJECTION, POWDER, LYOPHILIZED, FOR SOLUTION INTRAVENOUS at 15:40

## 2019-11-30 RX ADMIN — PREDNISONE 100 MG: 50 TABLET ORAL at 15:39

## 2019-11-30 RX ADMIN — SENNOSIDES AND DOCUSATE SODIUM 1 TABLET: 8.6; 5 TABLET ORAL at 19:49

## 2019-11-30 RX ADMIN — ALLOPURINOL 300 MG: 300 TABLET ORAL at 08:23

## 2019-11-30 RX ADMIN — ACYCLOVIR 400 MG: 400 TABLET ORAL at 19:49

## 2019-11-30 RX ADMIN — LEVOFLOXACIN 750 MG: 750 TABLET, FILM COATED ORAL at 15:39

## 2019-11-30 RX ADMIN — CYCLOPHOSPHAMIDE 1440 MG: 2 INJECTION, POWDER, FOR SOLUTION INTRAVENOUS; ORAL at 17:42

## 2019-11-30 RX ADMIN — SODIUM CHLORIDE: 9 INJECTION, SOLUTION INTRAVENOUS at 00:03

## 2019-11-30 RX ADMIN — SODIUM CHLORIDE 96 MG: 9 INJECTION, SOLUTION INTRAVENOUS at 16:11

## 2019-11-30 RX ADMIN — ALPRAZOLAM 0.5 MG: 0.5 TABLET ORAL at 21:55

## 2019-11-30 RX ADMIN — ACYCLOVIR 400 MG: 400 TABLET ORAL at 08:23

## 2019-11-30 RX ADMIN — ONDANSETRON HYDROCHLORIDE 16 MG: 8 TABLET, FILM COATED ORAL at 15:39

## 2019-11-30 RX ADMIN — VINCRISTINE SULFATE 2 MG: 1 INJECTION, SOLUTION INTRAVENOUS at 17:19

## 2019-11-30 RX ADMIN — OMEPRAZOLE 40 MG: 20 CAPSULE, DELAYED RELEASE ORAL at 08:23

## 2019-11-30 RX ADMIN — SODIUM CHLORIDE: 9 INJECTION, SOLUTION INTRAVENOUS at 09:44

## 2019-11-30 ASSESSMENT — ACTIVITIES OF DAILY LIVING (ADL)
ADLS_ACUITY_SCORE: 10

## 2019-11-30 ASSESSMENT — MIFFLIN-ST. JEOR: SCORE: 1427.84

## 2019-11-30 NOTE — PLAN OF CARE
[id number]: VSS on RA. Afebrile. Up independently. Denies pain and nausea. Rituxan finished infusing without incident - tolerated well. Declined evening senna. LS on R side remain coarse. Pt on tele.  at bedside this evening, no acute events. Continue to monitor w/ POC.     Per Mobility Level Guideline;  Bed/chair alarm No.  Patient may ambulate independently  Patient requires the following assistive equipment: none  PT/OT consult orders in place No

## 2019-11-30 NOTE — PLAN OF CARE
Tachycardic, on Tele. Denies pain. Pt with consistent non productive cough. Tolerating small amounts of regular diet. Voids spont with adequate UOP. Rituximab infusion currently infusing @ 150 ml/hr through PICC. Pt tolerating infusion well with brisk blood return noted throughout infusion. Significant other at bedside, supportive with cares. Cont. POC.

## 2019-11-30 NOTE — PLAN OF CARE
Kassie is doing well Day 2 Cycle 1 R-CHOP.  Tolerated Rituxan well and no s/s of TLS.  Plan to receive CHOP this afternoon.  Teaching done regarding chemotherapy and side effects, written material provided.  PICC site with scant bleeding and no pain.  Up to shower and eating well.  If pt remains stable and feels well maybe go home in am.  Per Mobility Level Guideline;  Bed/chair alarm No.  Patient may ambulate independently  Patient requires the following assistive equipment: NA  PT/OT consult orders in place No

## 2019-11-30 NOTE — PLAN OF CARE
Alert and oriented X 4. /94 (Right Arm). OVSS. Denies SOB, pain, nausea or abdominal discomfort. On cardiac telemetry and has been in NSR most of this shift. She reports that cough frequency has decreased. Ambulating to and from bathroom independently. Voiding spontaneously with adequate urine output. Sleeping in between cares.

## 2019-11-30 NOTE — PROGRESS NOTES
"[facility]IENT PROGRESS NOTE  11/30/19  7:17 AM      SUBJECTIVE:  Breathing much better today.  Slept flat last night for the first time in months.  Wheezing has improved.    A complete review of systems was performed and was negative with the exception of pertinent positives noted above.    OBJECTIVE DATA:  Blood pressure (!) 142/94, pulse 94, temperature 97.9  F (36.6  C), temperature source Oral, resp. rate 16, height 1.702 m (5' 7\"), weight 77.5 kg (170 lb 14.4 oz), last menstrual period 11/06/2019, SpO2 95 %, not currently breastfeeding.  -- General: no acute distress  -- HEENT: mucous membranes moist, no erythema; pupils equally round, reactive  -- Cardiovascular: regular rate and rhythm, no murmurs; no peripheral edema or JVD  -- Pulmonary: lungs clear bilaterally, equal diaphragmatic excursion, no wheezes or crackles  -- Gastrointestinal: abdomen soft, non-tender, non-distended; bowel sounds present; no organomegaly  -- Musculoskeletal: no swelling or erythema of joints  -- Integumentary: no rashes  -- Neurologic: alert and oriented to self, place, time; no gross abnormalities  -- Psychiatric: appropriate affect, cooperative    New, relevant results:  Creatinine 0.74  WBC 6.9  Hgb 10.3  Plt 217    ASSESSMENT:  Ms. Ramirez is a 49-year-old woman with new diagnosis of DLBCL manifesting as chronic cough, shortness of breath and large mediastinal mass who was admitted for treatment.  Her pathology was performed in the [facility] system and FISH will not return until Monday 12/2/2019, so we will start treatment today with R-CHOP.    R-CHOP regimen is as follows:  Rituximab 375 mg/m2 (given yesterday)  Doxorubicin 50 mg/m2  Vincristine 2 mg  Cyclophosphamide 750mg/m2   **All given once on Day 1.  Prednisone 100 mg daily, days 1-5    Plan by problem:  Diffuse large B-cell lymphoma, EBV+, non-GCB: R-CHOP today; IPI score is 2.  She is on prophylactic acyclovir and we will also start fluconazole and " levofloxacin today.  No signs of TLS.  Acute kidney injury, resolved    Can likely discharge tomorrow if feeling well.  Will need bone marrow biopsy Monday 12/2/2019 if still here, or early next week in clinic.    FULL CODE    Patient seen and discussed with Dr. Hamilton.    Bhavna Culp MD  Hematology-Oncology-Transplant Fellow

## 2019-12-01 VITALS
TEMPERATURE: 95.3 F | RESPIRATION RATE: 16 BRPM | HEART RATE: 97 BPM | OXYGEN SATURATION: 95 % | SYSTOLIC BLOOD PRESSURE: 154 MMHG | DIASTOLIC BLOOD PRESSURE: 95 MMHG | HEIGHT: 67 IN | BODY MASS INDEX: 26.27 KG/M2 | WEIGHT: 167.4 LBS

## 2019-12-01 LAB
APTT PPP: 22 SEC (ref 22–37)
BASOPHILS # BLD AUTO: 0 10E9/L (ref 0–0.2)
BASOPHILS NFR BLD AUTO: 0.1 %
CALCIUM SERPL-MCNC: 8.8 MG/DL (ref 8.5–10.1)
CALCIUM SERPL-MCNC: 9.4 MG/DL (ref 8.5–10.1)
CREAT SERPL-MCNC: 0.79 MG/DL (ref 0.52–1.04)
CREAT SERPL-MCNC: 0.87 MG/DL (ref 0.52–1.04)
DIFFERENTIAL METHOD BLD: ABNORMAL
EOSINOPHIL # BLD AUTO: 0 10E9/L (ref 0–0.7)
EOSINOPHIL NFR BLD AUTO: 0.2 %
ERYTHROCYTE [DISTWIDTH] IN BLOOD BY AUTOMATED COUNT: 15 % (ref 10–15)
FIBRINOGEN PPP-MCNC: 314 MG/DL (ref 200–420)
GFR SERPL CREATININE-BSD FRML MDRD: 78 ML/MIN/{1.73_M2}
GFR SERPL CREATININE-BSD FRML MDRD: 87 ML/MIN/{1.73_M2}
HCT VFR BLD AUTO: 32.6 % (ref 35–47)
HGB BLD-MCNC: 11.1 G/DL (ref 11.7–15.7)
IMM GRANULOCYTES # BLD: 0.2 10E9/L (ref 0–0.4)
IMM GRANULOCYTES NFR BLD: 2.2 %
INR PPP: 1.18 (ref 0.86–1.14)
LYMPHOCYTES # BLD AUTO: 0.8 10E9/L (ref 0.8–5.3)
LYMPHOCYTES NFR BLD AUTO: 10.2 %
MCH RBC QN AUTO: 31.4 PG (ref 26.5–33)
MCHC RBC AUTO-ENTMCNC: 34 G/DL (ref 31.5–36.5)
MCV RBC AUTO: 92 FL (ref 78–100)
MONOCYTES # BLD AUTO: 0.4 10E9/L (ref 0–1.3)
MONOCYTES NFR BLD AUTO: 4.3 %
NEUTROPHILS # BLD AUTO: 6.7 10E9/L (ref 1.6–8.3)
NEUTROPHILS NFR BLD AUTO: 83 %
NRBC # BLD AUTO: 0 10*3/UL
NRBC BLD AUTO-RTO: 0 /100
PHOSPHATE SERPL-MCNC: 2.8 MG/DL (ref 2.5–4.5)
PHOSPHATE SERPL-MCNC: 3.4 MG/DL (ref 2.5–4.5)
PLATELET # BLD AUTO: 183 10E9/L (ref 150–450)
POTASSIUM SERPL-SCNC: 3.4 MMOL/L (ref 3.4–5.3)
POTASSIUM SERPL-SCNC: 3.5 MMOL/L (ref 3.4–5.3)
RBC # BLD AUTO: 3.53 10E12/L (ref 3.8–5.2)
URATE SERPL-MCNC: 4 MG/DL (ref 2.6–6)
URATE SERPL-MCNC: 4.2 MG/DL (ref 2.6–6)
WBC # BLD AUTO: 8.1 10E9/L (ref 4–11)

## 2019-12-01 PROCEDURE — 36592 COLLECT BLOOD FROM PICC: CPT | Performed by: INTERNAL MEDICINE

## 2019-12-01 PROCEDURE — 82310 ASSAY OF CALCIUM: CPT | Performed by: INTERNAL MEDICINE

## 2019-12-01 PROCEDURE — 85384 FIBRINOGEN ACTIVITY: CPT | Performed by: INTERNAL MEDICINE

## 2019-12-01 PROCEDURE — 40000802 ZZH SITE CHECK

## 2019-12-01 PROCEDURE — 84550 ASSAY OF BLOOD/URIC ACID: CPT | Performed by: INTERNAL MEDICINE

## 2019-12-01 PROCEDURE — 85025 COMPLETE CBC W/AUTO DIFF WBC: CPT | Performed by: INTERNAL MEDICINE

## 2019-12-01 PROCEDURE — 25000131 ZZH RX MED GY IP 250 OP 636 PS 637: Performed by: INTERNAL MEDICINE

## 2019-12-01 PROCEDURE — 85610 PROTHROMBIN TIME: CPT | Performed by: INTERNAL MEDICINE

## 2019-12-01 PROCEDURE — 25000132 ZZH RX MED GY IP 250 OP 250 PS 637: Performed by: INTERNAL MEDICINE

## 2019-12-01 PROCEDURE — 84100 ASSAY OF PHOSPHORUS: CPT | Performed by: INTERNAL MEDICINE

## 2019-12-01 PROCEDURE — 82565 ASSAY OF CREATININE: CPT | Performed by: INTERNAL MEDICINE

## 2019-12-01 PROCEDURE — 25000132 ZZH RX MED GY IP 250 OP 250 PS 637: Performed by: PHYSICIAN ASSISTANT

## 2019-12-01 PROCEDURE — 85730 THROMBOPLASTIN TIME PARTIAL: CPT | Performed by: INTERNAL MEDICINE

## 2019-12-01 PROCEDURE — 25000128 H RX IP 250 OP 636: Performed by: INTERNAL MEDICINE

## 2019-12-01 PROCEDURE — 84132 ASSAY OF SERUM POTASSIUM: CPT | Performed by: INTERNAL MEDICINE

## 2019-12-01 RX ORDER — LEVOFLOXACIN 750 MG/1
750 TABLET, FILM COATED ORAL DAILY
Qty: 6 TABLET | Refills: 0 | Status: SHIPPED | OUTPATIENT
Start: 2019-12-01 | End: 2019-12-15

## 2019-12-01 RX ORDER — ACYCLOVIR 400 MG/1
400 TABLET ORAL 2 TIMES DAILY
Qty: 60 TABLET | Refills: 0 | Status: SHIPPED | OUTPATIENT
Start: 2019-12-01 | End: 2019-12-27

## 2019-12-01 RX ORDER — LORAZEPAM 0.5 MG/1
.5-1 TABLET ORAL EVERY 6 HOURS PRN
Qty: 30 TABLET | Refills: 0 | Status: SHIPPED | OUTPATIENT
Start: 2019-12-01 | End: 2020-01-17

## 2019-12-01 RX ORDER — ALLOPURINOL 300 MG/1
300 TABLET ORAL DAILY
Qty: 30 TABLET | Refills: 0 | Status: ON HOLD | OUTPATIENT
Start: 2019-12-01 | End: 2019-12-24

## 2019-12-01 RX ORDER — ONDANSETRON 8 MG/1
8 TABLET, ORALLY DISINTEGRATING ORAL EVERY 8 HOURS PRN
Qty: 30 TABLET | Refills: 0 | Status: SHIPPED | OUTPATIENT
Start: 2019-12-01 | End: 2019-12-27

## 2019-12-01 RX ORDER — PREDNISONE 50 MG/1
100 TABLET ORAL DAILY
Qty: 6 TABLET | Refills: 0 | Status: SHIPPED | OUTPATIENT
Start: 2019-12-01 | End: 2019-12-15

## 2019-12-01 RX ORDER — OMEPRAZOLE 40 MG/1
40 CAPSULE, DELAYED RELEASE ORAL
Qty: 30 CAPSULE | Refills: 0 | Status: SHIPPED | OUTPATIENT
Start: 2019-12-02 | End: 2019-12-27

## 2019-12-01 RX ORDER — PROCHLORPERAZINE MALEATE 10 MG
10 TABLET ORAL EVERY 6 HOURS PRN
Qty: 30 TABLET | Refills: 0 | Status: ON HOLD | OUTPATIENT
Start: 2019-12-01 | End: 2020-02-24

## 2019-12-01 RX ADMIN — ACYCLOVIR 400 MG: 400 TABLET ORAL at 10:02

## 2019-12-01 RX ADMIN — ONDANSETRON HYDROCHLORIDE 8 MG: 8 TABLET, FILM COATED ORAL at 09:04

## 2019-12-01 RX ADMIN — Medication 5 ML: at 13:36

## 2019-12-01 RX ADMIN — OXYCODONE HYDROCHLORIDE 5 MG: 5 TABLET ORAL at 05:57

## 2019-12-01 RX ADMIN — SENNOSIDES AND DOCUSATE SODIUM 2 TABLET: 8.6; 5 TABLET ORAL at 10:03

## 2019-12-01 RX ADMIN — ACETAMINOPHEN 650 MG: 325 TABLET, FILM COATED ORAL at 04:19

## 2019-12-01 RX ADMIN — PREDNISONE 100 MG: 50 TABLET ORAL at 10:02

## 2019-12-01 RX ADMIN — LEVOFLOXACIN 750 MG: 750 TABLET, FILM COATED ORAL at 10:02

## 2019-12-01 RX ADMIN — ALLOPURINOL 300 MG: 300 TABLET ORAL at 10:03

## 2019-12-01 RX ADMIN — OMEPRAZOLE 40 MG: 20 CAPSULE, DELAYED RELEASE ORAL at 09:06

## 2019-12-01 ASSESSMENT — PAIN DESCRIPTION - DESCRIPTORS
DESCRIPTORS: HEADACHE

## 2019-12-01 ASSESSMENT — MIFFLIN-ST. JEOR: SCORE: 1416.95

## 2019-12-01 ASSESSMENT — ACTIVITIES OF DAILY LIVING (ADL)
ADLS_ACUITY_SCORE: 10

## 2019-12-01 NOTE — PLAN OF CARE
[id number]:  Vitals stable on room air. Good urine output. Good oral intake, denies nausea. PRN xanax given for sleep/anxiety. PRN tylenol given for headache. PRN oxy given because tylenol did not decrease headache pain. Pt hopes to discharge today.     Per Mobility Level Guideline;  Bed/chair alarm No.  Patient may ambulate independently  Patient requires the following assistive equipment: none   PT/OT consult orders in place No

## 2019-12-01 NOTE — PLAN OF CARE
Kassie is Day 2 Cycle 1 s/p CHOP.  Did have nausea this am and received Zofran with relief.  Continues to c/o headache but declined Tylenol or Oxycodone.  Pt said Motrin works best to get rid of her headaches.  Explained why Motrin is not given while people are on chemotherapy.  Also said she feels tired and unwell after the Prednisone.  Slept for an hour  then felt a little better.  Plan on discharge later this afternoon. PICC removed with scant bleeding.  Did lay in bed 30 minutes after PICC removal.

## 2019-12-01 NOTE — DISCHARGE INSTRUCTIONS
[facility] Triage and after hours / weekends / holidays:  [phone]    Please call the triage or after hours line if you experience a temperature greater than or equal to 100.5, shaking chills, have uncontrolled nausea, vomiting and/or diarrhea, dizziness, shortness of breath, chest pain, bleeding, unexplained bruising, or if you have any other new/concerning symptoms, questions or concerns.      If you are having any concerning symptoms or wish to speak to a provider before your next infusion visit, please call your care coordinator or triage to notify them so we can adequately serve you.     If you need a refill on a narcotic prescription or other medication, please call before your infusion appointment.             Discharge Instructions for Chemotherapy  Your healthcare provider prescribed a type of medicine therapy for you called chemotherapy. Healthcare providers prescribe chemotherapy for many different types of illnesses, including cancer. There are many types of chemotherapy. This sheet provides general guidelines on how you can take care of yourself after your chemotherapy.  Mouth care  Don t be discouraged if you get mouth sores, even if you are following all your healthcare provider s instructions. Many people get mouth sores as a side effect of chemotherapy. Here s what you can do to prevent mouth sores:    Keep your mouth clean. Brush your teeth with a soft-bristle toothbrush after every meal.    Ask if you should use a toothpaste with fluoride, or a mixture of 1 teaspoon of salt in 8-ounces of water to brush your teeth.     Use an oral swab or special soft toothbrush if your gums bleed during regular brushing.    Don't use dental floss if it causes your gums to bleed.    Use any mouthwashes given to you as directed.    If you can t tolerate regular methods, use salt and baking soda to clean your mouth. Mix 1 teaspoon of salt and 1 teaspoon of baking soda in 1 quart of warm water. Swish and  spit.    If you wear dentures, you may be told to wear them only when you eat, ask your healthcare provider. Clean dentures twice a day and soak in antimicrobial solution when you aren't wearing them. Rinse your mouth after each meal.     Watch your mouth and tongue for white patches. This may be a sign of a type of yeast infection (thrush), a common side effect of chemotherapy. Be sure to tell your healthcare provider about these patches. Medicine can be prescribed to treat it.  Other home care  Here's what else you can do:    Try to exercise. Exercise keeps you strong and keeps your heart and lungs active. Walking and yoga are good types of exercise.     Keep clean. During chemotherapy, your body can t fight infection very well. Take short baths or showers.  ? Wash your hands before you eat and after going to the bathroom.  ? Use moisturizing soap. Chemotherapy can make your skin dry.  ? Apply moisturizing lotion several times a day to help relieve dry skin.  ? Don t take very hot or very cold showers or baths.    Don t be surprised if your chemotherapy causes slight burns to your skin--usually on the hands and feet. Some medicines used in high doses cause this to happen. Ask for a special cream to help relieve the burn and protect your skin.    Avoid people who are sick with illnesses and diseases you could catch, such as colds, flu, measles, or chicken pox as well as people who have recently had vaccinations for these illnesses.     Let your healthcare provider know if your throat is sore. You may have an infection that needs treatment.    Remember, many patients feel sick and lose their appetites during treatment. Eat small meals several times a day to keep your strength up:  ? Choose bland foods with little taste or smell if you are reacting strongly to food.  ? Be sure to cook all food thoroughly. This kills bacteria and helps you avoid infection.  ? Eat foods that are soft. Soft foods are less likely to  cause stomach irritation.  ? Try to eat a variety of foods for a well-balanced diet. Drink plenty of fluids and eat foods with fiber to avoid constipation.   When to call your healthcare provider  Call your healthcare provider right away if you have any of the following:    Unexplained bleeding    Trouble concentrating    Ongoing fatigue    Shortness of breath, wheezing, trouble breathing, or bad cough    Rapid, irregular heartbeat, or chest pain    Dizziness, lightheadedness    Constant feeling of being cold    Hives or a cut or rash that swells, turns red, feels hot or painful, or begins to ooze    Burning when you urinate    Fever of 100.4 F (38 C) or higher, or as directed by your healthcare provider   Date Last Reviewed: 5/1/2016 2000-2018 [employer]. [address] [place] [address], [place] [address]. All rights reserved. This information is not intended as a substitute for professional medical care. Always follow your healthcare professional's instructions.

## 2019-12-01 NOTE — PLAN OF CARE
([id number]) Afebrile, VSS. Dose #1 Doxorubicin, Vincristine and Cytoxan infused to PICC without complication. Pt visiting with spouse and offers no c/o any kind. Possible discharge tomorrow.

## 2019-12-01 NOTE — DISCHARGE SUMMARY
"[facility], [place]    Discharge Summary  Malignant Hematology Service    Date of Admission:  11/27/2019  Date of Discharge:  12/1/2019  Disposition: Home  Discharging Provider: Bhavna Culp  Date of Service: 12/01/19    Admission Diagnoses:  Mediastinal lymphadenopathy, likely DLBCL  Dyspnea  Acute kidney injury    Discharge Diagnoses:  Diffuse large b-cell lymphoma  Dyspnea, improving  Acute kidney injury, resolved    Presentation:  From H&P dated 11/27/2019:    \"Kassie Ramirez is a 49 year old woman with progressively worsening respiratory symptoms over the past 5 months, who is now found on recent mediastinoscopy to have likely DLBCL, and with recent imaging findings concerning for carinal and bronchial constriction, who presents with hypoxia and tachycardia, progressive dyspnea on exertion.      Presented to PCP 6/27/19 with 6 weeks of cough. CXR with R peribronchial infiltrate/inflammation with mild extension into RML. Treated with azithromycin and referred to pulmonology. Had some improvement in symptoms. Seen by pulmonology 8/20/19 for persistent cough, PFTs with minimal obstructive defect and no significant response to bronchodilators. Exhaled NO was markedly elevated and c/w significant airway inflammation. CT chest done at [facility] showed L hilar and subcarinal mediastinal adenopathy with narrowing of the left lower lobe bronchus and a nonspecific patchy area of nodular consolidation in the medial RLL. PET Scan ordered 8/27/19 but never completed. Bronchoscopy and EUS done 8/28/18 with FNA showing nonnecrotizing granulomatous inflammation with prominent eosinophilia and negative for malignancy. Received prednisone taper over 12 days for ongoing cough. Hospitalized 9/18/19 at [facility] for SVT terminated with adenosine, Echo with normal LVEF. 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 She was started " "on prednisone 40mg daily, but was unable to taper off the steroids without return of SOB/cough. PFTs improved at visit 9/27/19 with pulm and prednisone continued with slow taper and repeat CT planned for 2 months. Later the cardiac MRI 10/2019 showed no e/p MI, fibrosis, or infiltrative disease, LVEF normal, and follow up holter monitor showed no e/o SVT. Repeat CT chest 11/18/19 at [facility] showed interval worsening of bulky mediastinal and bilateral hilar lymphadenopathy. Presented to ED 11/20 for worsening SOB. Pulmonology recommended rheumatology consultation for consideration for methotrexate therapy as second line therapy for steroid-resistant sarcoidosis. She was discharged from the ED to continue workup as outpt. She has been using alprazolam and dextromethorphan with codeine with some benefit. She underwent cervical mediastinoscopy with mediastinal mass biopsy at [facility]. Per op report, mediastinal mass has extended over the distal trachea/azalea and encircling the right and left mainstem bronchi. Dr. Olvera with [facility] was contacted and preliminary pathology at time of admission is EBV+ DLBCL.      She was referred to the ED by her PCP after discussion with oncology here. In the ED found to have hypokalemia, rising Cr 1.21, lactic acid 2.3, negative procal, respiratory alkalosis on VBG. CBC normal. CXR with near complete atelectasis of the RUL new since 9/2019.     Currently she is concerned about her difficulty breathing, although she is comfortable at rest with O2. She does find it difficult to use her voice, but she denies any change in her voice recently. No LE edema. No fever, chills. No weight loss, significant night sweats. No diarrhea or constipation.\"    Hospital course:  Diagnosis of DLBCL was confirmed with the outside institution and she started on high-dose steroids immediately while we were waiting for FISH results.  We learned that FISH will not be back until Monday 12/2/2019, so " "then proceeded with R-CHOP cycle #1, which she tolerated well.    Pending Results:  These results will be followed up by Dr. Hamilton.  Unresulted Labs Ordered in the Past 30 Days of this Admission     Date and Time Order Name Status Description    11/27/2019 1619 Blood culture Preliminary     11/27/2019 1619 Blood culture Preliminary           Code status:  Full code    Physical exam:  Blood pressure (!) 140/90, pulse 110, temperature 97  F (36.1  C), temperature source Oral, resp. rate 18, height 1.702 m (5' 7\"), weight 77 kg (169 lb 12.8 oz), last menstrual period 11/06/2019, SpO2 96 %, not currently breastfeeding.  -- General: no acute distress; well-developed and well-nourished   -- HEENT: mucous membranes moist, no erythema; pupils equally round, reactive  -- Lymph: no lymphadenopathy in the cervical, submandibular, supraclavicular, axillary, or inguinal areas  -- Cardiovascular: regular rate and rhythm, no murmurs; no peripheral edema or JVD  -- Pulmonary: lungs clear bilaterally, equal diaphragmatic excursion, no wheezes or crackles  -- Gastrointestinal: abdomen soft, non-tender, non-distended; bowel sounds present; no organomegaly  -- Musculoskeletal: no swelling or erythema of joints  -- Integumentary: no rashes  -- Neurologic: alert and oriented to self, place, time; cranial nerves II-XII intact and symmetric; strength and sensation intact and symmetric; gait normal; reflexes symmetric in BLE and BUE  -- Psychiatric: appropriate affect, cooperative    Consultations:  MEDICATION HISTORY IP PHARMACY CONSULT  VASCULAR ACCESS ADULT IP CONSULT    Discharge orders:     Reason for your hospital stay    You were in the hospital for treatment of Diffuse Large B-Cell Lymphoma (DLBCL).     Adult [facility] Follow-up and recommended labs and tests    Appointments on [place] and/or [facility] (with [facility] provider or service). Call [phone] if you haven't heard regarding these appointments within 1-2 days of " discharge.     Activity    Your activity upon discharge: activity as tolerated     Full Code     Diet    Follow this diet upon discharge: Regular       Discharge medications:  Current Discharge Medication List      CONTINUE these medications which have NOT CHANGED    Details   ALPRAZolam (XANAX) 0.5 MG tablet Take 1 tablet (0.5 mg) by mouth 3 times daily as needed for anxiety  Qty: 40 tablet, Refills: 0    Associated Diagnoses: Situational anxiety      ibuprofen (ADVIL/MOTRIN) 200 MG tablet Take 800 mg by mouth every 6 hours as needed for mild pain      predniSONE (DELTASONE) 20 MG tablet Take 2 tablets (40 mg) by mouth daily  Qty: 60 tablet, Refills: 0    Associated Diagnoses: Sarcoidosis             Allergies:  Allergies   Allergen Reactions     Cold & Flu [Cold Defense Fighter]      See pseudoephedrine     Seasonal Allergies      Sudafed Cold-Cough [Dayquil Liquicaps]      Pseudoephedrine Rash     Rash then skins peels off        Patient voiced understanding and agreement with the plan.    Bhavna Culp MD  Hematology-Oncology-Transplant Fellow

## 2019-12-01 NOTE — PLAN OF CARE
Focus: Discharge  D: Discharge orders prepared and signed by provider. Discharge medications ready at hospital discharge pharmacy. PICC line removed by previous RN.    I: Reviewed discharge instructions with patient and her spouse, Florian. Assisted patient to gather personal belongings.  A: Patient and her spouse verbalized understanding of discharge instructions and readiness to discharge to home.  P: Patient left 7D at 1545. Discharge to home and follow up in clinic outpatient.

## 2019-12-02 ENCOUNTER — TELEPHONE (OUTPATIENT)
Dept: FAMILY MEDICINE | Facility: CLINIC | Age: 49
End: 2019-12-02

## 2019-12-02 DIAGNOSIS — C83.398 DIFFUSE LARGE B-CELL LYMPHOMA OF EXTRANODAL SITE: Primary | ICD-10-CM

## 2019-12-02 NOTE — TELEPHONE ENCOUNTER
"Called patient @   Telephone Information:   Mobile [phone]     ED/Discharge Protocol    \"Hi, my name is Twyla Hudson RN, a registered nurse, and I am calling on behalf of SHELBI Colón's office at [place].  I am calling to follow up and see how things are going for you after your recent visit.\"    \"I see that you were in the (ER/UC/IP) on [facility] for inpatient hospital stay on 12/1 for diffuse large B-cell lymphoma of extranodal site.    How are you doing now that you are home?\" Doing OK - little nauseous, tired.   DENIES: SOB, Difficulty Breathing, Dizziness/Lightheadedness, Numbness/Tingling, HA, Vision/Hearing Changes, vomiting, Palpitations, Fevers      Is patient experiencing symptoms that may require a hospital visit?  No    Discharge Instructions    \"Let's review your discharge instructions.  What is/are the follow-up recommendations?  Pt. Response: Follow-up with oncology    \"Were you instructed to make a follow-up appointment?\"  Pt. Response: No.       \"When you see the provider, I would recommend that you bring your discharge instructions with you.    Medications    \"How many new medications are you on since your hospitalization/ED visit?\"    2 or more - [facility] MTM referral needed  \"How many of your current medicines changed (dose, timing, name, etc.) while you were in the hospital/ED visit?\"   2 or more - [facility] MTM referral needed  \"Do you have questions about your medications?\"   No  \"Were you newly diagnosed with heart failure, COPD, diabetes or did you have a heart attack?\"   No  For patients on insulin: \"Did you start on insulin in the hospital or did you have your insulin dose changed?\"   No  Post Discharge Medication Reconciliation Status: discharge medications reconciled, continue medications without change.    Was MTM referral placed (*Make sure to put transitions as reason for referral)?   No    Call Summary    \"Do you have any questions or concerns about your condition or " "care plan at the moment?\"    No  Triage nurse advice given: Advised patient that if new or worsening symptoms appear (reviewed new & worsening symptoms) to call the clinic or be seen in the the ER  Patient stated an understanding and agreed with plan.    Patient was in ER 3 in the past year (assess appropriateness of ER visits.)      \"If you have questions or things don't continue to improve, we encourage you contact us through the main clinic number,  [phone].  Even if the clinic is not open, triage nurses are available 24/7 to help you.     We would like you to know that our clinic has extended hours (provide information).  We also have urgent care (provide details on closest location and hours/contact info)\"      \"Thank you for your time and take care!\"        Twyla Hudson RN  [facility]  "

## 2019-12-02 NOTE — TELEPHONE ENCOUNTER
Patient discharged from [facility] for inpatient hospital stay on 12/1 for diffuse large B-cell lymphoma of extranodal site.    Please contact patient to follow up; no appointment scheduled at this time.    ER / IP:  1/1    Care Coordination:  manuela Cárdenas

## 2019-12-03 ENCOUNTER — APPOINTMENT (OUTPATIENT)
Dept: LAB | Facility: CLINIC | Age: 49
End: 2019-12-03
Attending: INTERNAL MEDICINE
Payer: COMMERCIAL

## 2019-12-03 ENCOUNTER — OFFICE VISIT (OUTPATIENT)
Dept: ONCOLOGY | Facility: CLINIC | Age: 49
End: 2019-12-03
Attending: INTERNAL MEDICINE
Payer: COMMERCIAL

## 2019-12-03 VITALS
OXYGEN SATURATION: 97 % | TEMPERATURE: 97.9 F | DIASTOLIC BLOOD PRESSURE: 88 MMHG | HEART RATE: 115 BPM | BODY MASS INDEX: 26.06 KG/M2 | RESPIRATION RATE: 18 BRPM | WEIGHT: 166.4 LBS | SYSTOLIC BLOOD PRESSURE: 130 MMHG

## 2019-12-03 DIAGNOSIS — C83.398 DIFFUSE LARGE B-CELL LYMPHOMA OF EXTRANODAL SITE: ICD-10-CM

## 2019-12-03 DIAGNOSIS — C83.398 DIFFUSE LARGE B-CELL LYMPHOMA OF EXTRANODAL SITE: Primary | ICD-10-CM

## 2019-12-03 LAB
BACTERIA SPEC CULT: NO GROWTH
BACTERIA SPEC CULT: NO GROWTH
BASOPHILS # BLD AUTO: 0 10E9/L (ref 0–0.2)
BASOPHILS NFR BLD AUTO: 0 %
DIFFERENTIAL METHOD BLD: ABNORMAL
EOSINOPHIL # BLD AUTO: 0 10E9/L (ref 0–0.7)
EOSINOPHIL NFR BLD AUTO: 0 %
ERYTHROCYTE [DISTWIDTH] IN BLOOD BY AUTOMATED COUNT: 14.1 % (ref 10–15)
HCT VFR BLD AUTO: 35.5 % (ref 35–47)
HGB BLD-MCNC: 12.6 G/DL (ref 11.7–15.7)
IMM GRANULOCYTES # BLD: 0 10E9/L (ref 0–0.4)
IMM GRANULOCYTES NFR BLD: 0.2 %
LYMPHOCYTES # BLD AUTO: 0.4 10E9/L (ref 0.8–5.3)
LYMPHOCYTES NFR BLD AUTO: 5.5 %
Lab: NORMAL
Lab: NORMAL
MCH RBC QN AUTO: 31.8 PG (ref 26.5–33)
MCHC RBC AUTO-ENTMCNC: 35.5 G/DL (ref 31.5–36.5)
MCV RBC AUTO: 90 FL (ref 78–100)
MONOCYTES # BLD AUTO: 0.1 10E9/L (ref 0–1.3)
MONOCYTES NFR BLD AUTO: 1.1 %
NEUTROPHILS # BLD AUTO: 6 10E9/L (ref 1.6–8.3)
NEUTROPHILS NFR BLD AUTO: 93.2 %
NRBC # BLD AUTO: 0 10*3/UL
NRBC BLD AUTO-RTO: 0 /100
PLATELET # BLD AUTO: 293 10E9/L (ref 150–450)
PLATELET # BLD EST: ABNORMAL 10*3/UL
RBC # BLD AUTO: 3.96 10E12/L (ref 3.8–5.2)
SPECIMEN SOURCE: NORMAL
SPECIMEN SOURCE: NORMAL
WBC # BLD AUTO: 6.4 10E9/L (ref 4–11)

## 2019-12-03 PROCEDURE — 40001004 ZZHCL STATISTIC FLOW INT 9-15 ABY TC 88188: Performed by: INTERNAL MEDICINE

## 2019-12-03 PROCEDURE — 00000161 ZZHCL STATISTIC H-SPHEME PROCESS B/S: Performed by: INTERNAL MEDICINE

## 2019-12-03 PROCEDURE — 88185 FLOWCYTOMETRY/TC ADD-ON: CPT | Performed by: INTERNAL MEDICINE

## 2019-12-03 PROCEDURE — 88280 CHROMOSOME KARYOTYPE STUDY: CPT | Performed by: INTERNAL MEDICINE

## 2019-12-03 PROCEDURE — 88305 TISSUE EXAM BY PATHOLOGIST: CPT | Performed by: INTERNAL MEDICINE

## 2019-12-03 PROCEDURE — 88313 SPECIAL STAINS GROUP 2: CPT | Performed by: INTERNAL MEDICINE

## 2019-12-03 PROCEDURE — 85025 COMPLETE CBC W/AUTO DIFF WBC: CPT | Performed by: PHYSICIAN ASSISTANT

## 2019-12-03 PROCEDURE — 88275 CYTOGENETICS 100-300: CPT | Performed by: INTERNAL MEDICINE

## 2019-12-03 PROCEDURE — 88271 CYTOGENETICS DNA PROBE: CPT | Performed by: INTERNAL MEDICINE

## 2019-12-03 PROCEDURE — 88161 CYTOPATH SMEAR OTHER SOURCE: CPT | Performed by: INTERNAL MEDICINE

## 2019-12-03 PROCEDURE — 38222 DX BONE MARROW BX & ASPIR: CPT | Mod: 26 | Performed by: PATHOLOGY

## 2019-12-03 PROCEDURE — 38222 DX BONE MARROW BX & ASPIR: CPT | Performed by: INTERNAL MEDICINE

## 2019-12-03 PROCEDURE — 88184 FLOWCYTOMETRY/ TC 1 MARKER: CPT | Performed by: INTERNAL MEDICINE

## 2019-12-03 PROCEDURE — 40000951 ZZHCL STATISTIC BONE MARROW INTERP TC 85097: Performed by: INTERNAL MEDICINE

## 2019-12-03 PROCEDURE — 88237 TISSUE CULTURE BONE MARROW: CPT | Performed by: INTERNAL MEDICINE

## 2019-12-03 PROCEDURE — 38222 DX BONE MARROW BX & ASPIR: CPT | Mod: ZF | Performed by: PHYSICIAN ASSISTANT

## 2019-12-03 PROCEDURE — 88264 CHROMOSOME ANALYSIS 20-25: CPT | Performed by: INTERNAL MEDICINE

## 2019-12-03 PROCEDURE — 36415 COLL VENOUS BLD VENIPUNCTURE: CPT

## 2019-12-03 PROCEDURE — 88311 DECALCIFY TISSUE: CPT | Performed by: INTERNAL MEDICINE

## 2019-12-03 PROCEDURE — 40000611 ZZHCL STATISTIC MORPHOLOGY W/INTERP HEMEPATH TC 85060: Performed by: INTERNAL MEDICINE

## 2019-12-03 ASSESSMENT — PAIN SCALES - GENERAL: PAINLEVEL: NO PAIN (0)

## 2019-12-03 NOTE — PROGRESS NOTES
BMT ONC Adult Bone Marrow Biopsy Procedure Note  December 3, 2019  /88 (BP Location: Right arm, Patient Position: Sitting, Cuff Size: Adult Regular)   Pulse 115   Temp 97.9  F (36.6  C) (Oral)   Resp 18   Wt 75.5 kg (166 lb 6.4 oz)   LMP 11/06/2019   SpO2 97%   BMI 26.06 kg/m       Learning needs assessment complete within 12 months? YES    DIAGNOSIS: DLBCL staging     PROCEDURE: Unilateral Bone Marrow Biopsy and Unilateral Aspirate    LOCATION: [facility] 2nd Floor    Patient s identification was positively verified by verbal identification and invasive procedure safety checklist was completed. Informed consent was obtained. Patient declined premedication. Patient was placed in the prone position and prepped and draped in a sterile manner. Approximately 20 cc of 1% Lidocaine was used over the left posterior iliac spine. Following this a 3 mm incision was made. Trephine bone marrow core(s) was (were) obtained from the [facility]. Bone marrow aspirates were obtained from the [facility]. Aspirates were sent for morphology, immunophenotyping and cytogenetics. A total of approximately 15 ml of marrow was aspirated. Following this procedure a sterile dressing was applied to the bone marrow biopsy site(s). The patient was placed in the supine position to maintain pressure on the biopsy site. Post-procedure wound care instructions were given.     Complications: NO    Pre-procedural pain: 0 out of 10 on the numeric pain rating scale.     Procedural pain: 3 out of 10 on the numeric pain rating scale.     Post-procedural pain assessment: 0 out of 10 on the numeric pain rating scale.     Interventions: NO    Length of procedure:21 minutes to 45 minutes    Procedure performed by: Fay Michaels PA-C

## 2019-12-03 NOTE — NURSING NOTE
"Oncology Rooming Note    December 3, 2019 3:26 PM   Kassie Ramirez is a 49 year old female who presents for:    Chief Complaint   Patient presents with     Bone Marrow Biopsy     Pt here for BMBx h/o Diffuse large B-cell lymphoma. VS taken. Labs drawn in clinic      Initial Vitals: /88 (BP Location: Right arm, Patient Position: Sitting, Cuff Size: Adult Regular)   Pulse 115   Temp 97.9  F (36.6  C) (Oral)   Resp 18   Wt 75.5 kg (166 lb 6.4 oz)   LMP 11/06/2019   SpO2 97%   BMI 26.06 kg/m   Estimated body mass index is 26.06 kg/m  as calculated from the following:    Height as of 11/29/19: 1.702 m (5' 7\").    Weight as of this encounter: 75.5 kg (166 lb 6.4 oz). Body surface area is 1.89 meters squared.  No Pain (0) Comment: Data Unavailable   Patient's last menstrual period was 11/06/2019.  Allergies reviewed: Yes  Medications reviewed: Yes    Medications: Medication refills not needed today.  Pharmacy name entered into [employer]:    [facility] - [place], [place] - [address] [place] [address] DRUG STORE #[id number] [place] [id number] AT [address]    Clinical concerns: Patient declines Versed for biopsy. Denies any blood thinners.  Fay Michaels PA-C was notified.     Labs collected from venipuncture by MA.       BMT Teaching Flowsheet   Teaching Topic: post biopsy instructions    Person(s) involved in teaching: Patient and spouse  Motivation Level  Asks Questions: Yes  Eager to Learn: Yes  Cooperative: Yes  Receptive (willing/able to accept information): Yes    Patient demonstrates understanding of the following:   - Reason for the appointment, diagnosis and treatment plan: Yes  - Knowledge of proper use of medications and conditions for which they are ordered (with special attention to potential side effects or drug interactions): Yes  - Which situations necessitate calling provider and whom to contact: Yes    Teaching concerns addressed: " reviewed activity restrictions if received premeds, potential for bleeding and actions to take if develops any of the issues below    Proper use and care of (medical equipment, care aids, etc.) Yes  Pain management techniques: Yes  Patient instructed on hand hygiene: Yes  How and/when to access community resources: Yes    Infection Control:  Patient demonstrates understanding of the following:   Surgical procedure site care taught NA  Signs and symptoms of infection taught Yes  Wound care taught Yes  Central venous catheter care taught NA    Instructional Materials Used/Given: verbal, print out of post biopsy instructions.     Time spent with patient: 10 minutes.    Specific Concerns: KINGSLEY Adams RN

## 2019-12-03 NOTE — LETTER
12/3/2019       RE: Kassie Ramirez  [address]  [place] [address]     Dear Colleague,    Thank you for referring your patient, Kassie Ramirez, to the [facility]. Please see a copy of my visit note below.    BMT ONC Adult Bone Marrow Biopsy Procedure Note  December 3, 2019  /88 (BP Location: Right arm, Patient Position: Sitting, Cuff Size: Adult Regular)   Pulse 115   Temp 97.9  F (36.6  C) (Oral)   Resp 18   Wt 75.5 kg (166 lb 6.4 oz)   LMP 11/06/2019   SpO2 97%   BMI 26.06 kg/m        Learning needs assessment complete within 12 months? YES    DIAGNOSIS: DLBCL staging     PROCEDURE: Unilateral Bone Marrow Biopsy and Unilateral Aspirate    LOCATION: [facility] 2nd Floor    Patient s identification was positively verified by verbal identification and invasive procedure safety checklist was completed. Informed consent was obtained. Patient declined premedication. Patient was placed in the prone position and prepped and draped in a sterile manner. Approximately 20 cc of 1% Lidocaine was used over the left posterior iliac spine. Following this a 3 mm incision was made. Trephine bone marrow core(s) was (were) obtained from the IC. Bone marrow aspirates were obtained from the [facility]. Aspirates were sent for morphology, immunophenotyping and cytogenetics. A total of approximately 15 ml of marrow was aspirated. Following this procedure a sterile dressing was applied to the bone marrow biopsy site(s). The patient was placed in the supine position to maintain pressure on the biopsy site. Post-procedure wound care instructions were given.     Complications: NO    Pre-procedural pain: 0 out of 10 on the numeric pain rating scale.     Procedural pain: 3 out of 10 on the numeric pain rating scale.     Post-procedural pain assessment: 0 out of 10 on the numeric pain rating scale.     Interventions: NO    Length of procedure:21 minutes to 45 minutes  Procedure performed by: Fay  ALEXANDER Michaels    Again, thank you for allowing me to participate in the care of your patient.      Sincerely,    Fay Michaels PA-C

## 2019-12-04 LAB — COPATH REPORT: NORMAL

## 2019-12-05 ENCOUNTER — TELEPHONE (OUTPATIENT)
Dept: ONCOLOGY | Facility: CLINIC | Age: 49
End: 2019-12-05

## 2019-12-05 LAB — COPATH REPORT: NORMAL

## 2019-12-05 PROCEDURE — 00000346 ZZHCL STATISTIC REVIEW OUTSIDE SLIDES TC 88321: Performed by: INTERNAL MEDICINE

## 2019-12-05 NOTE — TELEPHONE ENCOUNTER
Kassie was recently hospitalized and diagnosed with DLBCL and received RCHOP (admit from 11/27-12/1). She had a bone marrow biopsy on 12/3. Called to see how she's been doing after her first cycle of chemotherapy and her bone marrow biopsy. She has an appt with Dr. Mark He scheduled on Monday, 12/9 and plan to discuss transfer of care to Dr. Castro at the [facility].

## 2019-12-05 NOTE — TELEPHONE ENCOUNTER
Kassie returned my phone call and is feeling fairly well after her chemotherapy, with the biggest complaint being a metallic taste. Discussed trying sour and salty foods, and could also try cold foods vs hot foods as some patients have said this can be helpful. She thought this might be helpful as she's noticed salty foods are more tasteful. Kassie has nausea mostly in the morning but this is relieved by her antiemetics. She thinks her meds will last until her appointment but I provided her the triage number and she will call for refills if she needs them in the interim.    She is aware of the potential transfer to the [facility]; Sahil Hamilton and Matthew have discussed her case and treatment plan and she is comfortable transferring care there. She would like to discuss the practicalities of this transfer but is happy to have care closer to home. No further concerns at this time and will call back if she needs anything.

## 2019-12-10 DIAGNOSIS — C83.398 DIFFUSE LARGE B-CELL LYMPHOMA OF EXTRANODAL SITE: Primary | ICD-10-CM

## 2019-12-10 LAB — COPATH REPORT: NORMAL

## 2019-12-11 ENCOUNTER — APPOINTMENT (OUTPATIENT)
Dept: LAB | Facility: CLINIC | Age: 49
End: 2019-12-11
Attending: INTERNAL MEDICINE
Payer: COMMERCIAL

## 2019-12-11 ENCOUNTER — OFFICE VISIT (OUTPATIENT)
Dept: TRANSPLANT | Facility: CLINIC | Age: 49
End: 2019-12-11
Attending: INTERNAL MEDICINE
Payer: COMMERCIAL

## 2019-12-11 VITALS
SYSTOLIC BLOOD PRESSURE: 121 MMHG | HEART RATE: 124 BPM | WEIGHT: 169.5 LBS | DIASTOLIC BLOOD PRESSURE: 76 MMHG | OXYGEN SATURATION: 98 % | TEMPERATURE: 96.1 F | BODY MASS INDEX: 26.55 KG/M2 | RESPIRATION RATE: 24 BRPM

## 2019-12-11 DIAGNOSIS — C83.398 DIFFUSE LARGE B-CELL LYMPHOMA OF EXTRANODAL SITE: ICD-10-CM

## 2019-12-11 LAB
ALBUMIN SERPL-MCNC: 3.6 G/DL (ref 3.4–5)
ALP SERPL-CCNC: 102 U/L (ref 40–150)
ALT SERPL W P-5'-P-CCNC: 32 U/L (ref 0–50)
ANION GAP SERPL CALCULATED.3IONS-SCNC: 6 MMOL/L (ref 3–14)
ANISOCYTOSIS BLD QL SMEAR: SLIGHT
AST SERPL W P-5'-P-CCNC: 18 U/L (ref 0–45)
BASOPHILS # BLD AUTO: 0.1 10E9/L (ref 0–0.2)
BASOPHILS NFR BLD AUTO: 6.6 %
BILIRUB SERPL-MCNC: 1 MG/DL (ref 0.2–1.3)
BUN SERPL-MCNC: 13 MG/DL (ref 7–30)
CALCIUM SERPL-MCNC: 9.2 MG/DL (ref 8.5–10.1)
CHLORIDE SERPL-SCNC: 104 MMOL/L (ref 94–109)
CO2 SERPL-SCNC: 26 MMOL/L (ref 20–32)
CREAT SERPL-MCNC: 0.83 MG/DL (ref 0.52–1.04)
DACRYOCYTES BLD QL SMEAR: SLIGHT
DIFFERENTIAL METHOD BLD: ABNORMAL
EOSINOPHIL # BLD AUTO: 0 10E9/L (ref 0–0.7)
EOSINOPHIL NFR BLD AUTO: 4.2 %
ERYTHROCYTE [DISTWIDTH] IN BLOOD BY AUTOMATED COUNT: 14.8 % (ref 10–15)
GFR SERPL CREATININE-BSD FRML MDRD: 82 ML/MIN/{1.73_M2}
GLUCOSE SERPL-MCNC: 143 MG/DL (ref 70–99)
HCT VFR BLD AUTO: 30.5 % (ref 35–47)
HGB BLD-MCNC: 10.5 G/DL (ref 11.7–15.7)
LYMPHOCYTES # BLD AUTO: 0.5 10E9/L (ref 0.8–5.3)
LYMPHOCYTES NFR BLD AUTO: 60 %
MCH RBC QN AUTO: 31.9 PG (ref 26.5–33)
MCHC RBC AUTO-ENTMCNC: 34.4 G/DL (ref 31.5–36.5)
MCV RBC AUTO: 93 FL (ref 78–100)
MONOCYTES # BLD AUTO: 0 10E9/L (ref 0–1.3)
MONOCYTES NFR BLD AUTO: 5.2 %
MYELOCYTES # BLD: 0 10E9/L
MYELOCYTES NFR BLD MANUAL: 0.5 %
NEUTROPHILS # BLD AUTO: 0.2 10E9/L (ref 1.6–8.3)
NEUTROPHILS NFR BLD AUTO: 23.5 %
NRBC # BLD AUTO: 0 10*3/UL
NRBC BLD AUTO-RTO: 2 /100
PLATELET # BLD AUTO: 169 10E9/L (ref 150–450)
POIKILOCYTOSIS BLD QL SMEAR: SLIGHT
POLYCHROMASIA BLD QL SMEAR: SLIGHT
POTASSIUM SERPL-SCNC: 3.2 MMOL/L (ref 3.4–5.3)
PROT SERPL-MCNC: 6.9 G/DL (ref 6.8–8.8)
RBC # BLD AUTO: 3.29 10E12/L (ref 3.8–5.2)
SODIUM SERPL-SCNC: 136 MMOL/L (ref 133–144)
WBC # BLD AUTO: 0.8 10E9/L (ref 4–11)

## 2019-12-11 PROCEDURE — 80053 COMPREHEN METABOLIC PANEL: CPT | Performed by: INTERNAL MEDICINE

## 2019-12-11 PROCEDURE — 36415 COLL VENOUS BLD VENIPUNCTURE: CPT

## 2019-12-11 PROCEDURE — G0463 HOSPITAL OUTPT CLINIC VISIT: HCPCS

## 2019-12-11 PROCEDURE — 85025 COMPLETE CBC W/AUTO DIFF WBC: CPT | Performed by: INTERNAL MEDICINE

## 2019-12-11 RX ORDER — FLUCONAZOLE 200 MG/1
200 TABLET ORAL DAILY
Qty: 7 TABLET | Refills: 0 | Status: ON HOLD | OUTPATIENT
Start: 2019-12-11 | End: 2020-01-10

## 2019-12-11 RX ORDER — LEVOFLOXACIN 250 MG/1
250 TABLET, FILM COATED ORAL DAILY
Qty: 7 TABLET | Refills: 0 | Status: ON HOLD | OUTPATIENT
Start: 2019-12-11 | End: 2019-12-24

## 2019-12-11 ASSESSMENT — PAIN SCALES - GENERAL: PAINLEVEL: MILD PAIN (3)

## 2019-12-11 NOTE — NURSING NOTE
"Oncology Rooming Note    December 11, 2019 12:23 PM   Kassie Ramirez is a 49 year old female who presents for:    Chief Complaint   Patient presents with     Blood Draw     vitals and venipuncture done by [facility]     Oncology Clinic Visit     Patient with diffuse large B-cell lymphoma      Initial Vitals: /76 (BP Location: Right arm, Patient Position: Sitting, Cuff Size: Adult Regular)   Pulse 124   Temp 96.1  F (35.6  C) (Oral)   Resp 24   Wt 76.9 kg (169 lb 8 oz)   SpO2 98%   BMI 26.55 kg/m   Estimated body mass index is 26.55 kg/m  as calculated from the following:    Height as of 11/29/19: 1.702 m (5' 7\").    Weight as of this encounter: 76.9 kg (169 lb 8 oz). Body surface area is 1.91 meters squared.  Mild Pain (3) Comment: Data Unavailable   No LMP recorded.  Allergies reviewed: Yes  Medications reviewed: Yes    Medications: Medication refills not needed today.  Pharmacy name entered into [facility]:    [facility] - [place], [place] - [address] [place] [address] DRUG STORE #[id number] [facility] [place] [address] AT [place] [address] & [facility]    Clinical concerns:       Venessa Goodrich CMA              "

## 2019-12-11 NOTE — NURSING NOTE
Chief Complaint   Patient presents with     Blood Draw     vitals and venipuncture done by KIMMY Lopez CMA on 12/11/2019 at 11:53 AM

## 2019-12-11 NOTE — PROGRESS NOTES
[facility] Clinic Consultation       Kassie Ramirez is a 49 year old female with DLBCL, EBV+ non-GCB, stage III.       Hematologic history:  Kassie Ramirez is a 48 yo F who presented with worsening dyspnea over months, found to have large mediastinal mass with biopsy +DLBCL. Presented to PCP 6/27/19 with 6 weeks of cough. CXR with R peribronchial infiltrate/inflammation with mild extension into RML. Treated with azithromycin and referred to pulmonology. Had some improvement in symptoms. Seen by pulmonology 8/20/19 for persistent cough, PFTs with minimal obstructive defect and no significant response to bronchodilators. Exhaled NO was markedly elevated and c/w significant airway inflammation. CT chest done at [facility] showed L hilar and subcarinal mediastinal adenopathy with narrowing of the left lower lobe bronchus and a nonspecific patchy area of nodular consolidation in the medial RLL. PET Scan ordered 8/27/19 but never completed. Bronchoscopy and EUS done 8/28/18 with FNA showing nonnecrotizing granulomatous inflammation with prominent eosinophilia and negative for malignancy. Received prednisone taper over 12 days for ongoing cough. Hospitalized 9/18/19 at [facility] for SVT terminated with adenosine, Echo with normal LVEF. 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 She was started on prednisone 40mg daily, but was unable to taper off the steroids without return of SOB/cough. PFTs improved at visit 9/27/19 with pulm and prednisone continued with slow taper and repeat CT planned for 2 months. Later the cardiac MRI 10/2019 showed no e/p MI, fibrosis, or infiltrative disease, LVEF normal, and follow up holter monitor showed no e/o SVT. Repeat CT chest 11/18/19 at [facility] showed interval worsening of bulky mediastinal and bilateral hilar lymphadenopathy. Presented to ED 11/20 for worsening SOB. Pulmonology recommended rheumatology consultation for  consideration for methotrexate therapy as second line therapy for steroid-resistant sarcoidosis. She was discharged from the ED to continue workup as outpt. She has been using alprazolam and dextromethorphan with codeine with some benefit. She underwent cervical mediastinoscopy with mediastinal mass biopsy at [facility]. Per op report, mediastinal mass has extended over the distal trachea/azalea and encircling the right and left mainstem bronchi. Pathology consistent with EBV+ DLBCL.      Flow cytometric immunophenotyping reported a homogenous B-cell population   characterized by: bright CD19, CD20, FMC7, CD22, CD38, and CD45 and lacking CD2, CD3, CD5, CD10, CD43, , CD23, , CD79b, , and both surface and cytoplasmic kappa and lambda. FISH studies were negative for rearrangements involving MYC, BCL6 and IGH/BCL2. PET scan on 11/29 with enlarging soft tissue mass 12.4 x 5.5 x 13 cm mass encasing and narrowing proximal bronchi and bilateral pulmonary arteries, pulmonary veins, and SVC. SUV max 34.92. Also lymphadenopathy in superior mediastinum and retroperitoneum. Pulmonary opacity and GGO in RUL,  Lingula, and LEONEL. Splenomegaly.     She received cycle #1 R-CHOP inpatient at [facility], which she tolerated well.     Date Treatment Response Toxicities/Complications   11/29/2019 [facility] cycle #1                               HPI:  Please see my entry above for hematologic history.      Kassie reports nausea, intermittent sharp chest pain, fatigue since starting chemo. Breathing is better. She had some flushing with prednisone, but no other symptoms. Finished course of antibiotics. No fevers or cold symptoms.       ASSESSMENT AND PLAN:  48 yo F with no PMH, recently diagnosed with stage III DLBCL.    # DLBCL- stage III Non-GCB and EBV+. Large mediastinal mass causing respiratory compromise. . IPI score of 2 (low-intermediate). FISH neg for high risk translocations. Non-GCB overall confers poorer prognosis, but  standard of care remains multiagent chemotherapy with curative intent. EBV+ is not clearly associated with worse prognosis in [place] studies. Tolerated 1st cycle of R-CHOP well. Plan 6 cycles of R-CHOP with interim PET scan after 2-3 cycles. Consider adding Neulasta to future cycles due to severe neutropenia. She is intermediate risk for CNS recurrence by CNS-IPI score, but non-GCB confers higher risk so would recommend adding CNS prophylaxis with IT chemo for future cycles. She lives in [place], so will plan to transition care to Dr. Castro at [facility].     # Post-obstructive PNA- completed course of Levaquin.    Ppx: ACV, PPI. Start ppx levaquin and fluc due to low ANC, can be held when ANC recovers. Counseled pt on neutropenic precautions.       Deedee Hamilton MD   of Medicine  Hematology, Oncology and Transplantation   Pager: [phone]          ROS:    10 point ROS neg other than the symptoms noted above in the HPI.        Past Medical History:   Diagnosis Date     Anxiety attack 9/16/2014     Encounter for Essure implantation 2009     Generalized anxiety disorder 9/16/2014    zoloft = flat emotions     Menopausal disorder     started on OCPs by menopause center 3/2017 (takes active continuously)     Menstrual headache     helped by OCPs and magnesium     GERTRUDE (stress urinary incontinence, female)     sling procedure 2016     SVT (supraventricular tachycardia) (H)        Past Surgical History:   Procedure Laterality Date     H KIT ESSURE  2009    essure - Dr. Cailin Raymundo      HERNIORRHAPHY UMBILICAL  1974     SLING TRANSPUBO WITHOUT ANTERIOR COLPORRHAPHY N/A 11/21/2016    Procedure: SLING TRANSPUBO WITHOUT ANTERIOR COLPORRHAPHY;  Surgeon: Hernesto Berrios MD;  Location: [facility] OR       Family History   Problem Relation Age of Onset     Hypertension Mother      Depression Mother      Lipids Mother      Cardiovascular Mother      Circulatory Mother      Diabetes Mother      Heart Disease  Mother      Cerebrovascular Disease Mother      Obesity Mother      C.A.D. Mother      Lung Cancer Mother         smoker     Eye Disorder Father         cone dystrophy     Macular Degeneration Father      Diabetes Maternal Aunt         type 2     Hypertension Maternal Aunt      Heart Disease Maternal Grandfather      Heart Disease Sister         high cholesterol       Macular Degeneration Sister        Social History     Tobacco Use     Smoking status: Never Smoker     Smokeless tobacco: Never Used   Substance Use Topics     Alcohol use: No     Alcohol/week: 0.0 standard drinks     Comment: 1 time per month     Drug use: No          Allergies   Allergen Reactions     Cold & Flu [Cold Defense Fighter]      See pseudoephedrine     Seasonal Allergies      Sudafed Cold-Cough [Dayquil Liquicaps]      Pseudoephedrine Rash     Rash then skins peels off         Current Outpatient Medications   Medication Sig Dispense Refill     acyclovir (ZOVIRAX) 400 MG tablet Take 1 tablet (400 mg) by mouth 2 times daily 60 tablet 0     allopurinol (ZYLOPRIM) 300 MG tablet Take 1 tablet (300 mg) by mouth daily 30 tablet 0     ALPRAZolam (XANAX) 0.5 MG tablet Take 1 tablet (0.5 mg) by mouth 3 times daily as needed for anxiety 40 tablet 0     ibuprofen (ADVIL/MOTRIN) 200 MG tablet Take 800 mg by mouth every 6 hours as needed for mild pain       LORazepam (ATIVAN) 0.5 MG tablet Take 1-2 tablets (0.5-1 mg) by mouth every 6 hours as needed (Breakthrough Nausea / Vomiting) 30 tablet 0     omeprazole (PRILOSEC) 40 MG DR capsule Take 1 capsule (40 mg) by mouth every morning (before breakfast) 30 capsule 0     ondansetron (ZOFRAN-ODT) 8 MG ODT tab Take 1 tablet (8 mg) by mouth every 8 hours as needed 30 tablet 0     prochlorperazine (COMPAZINE) 10 MG tablet Take 1 tablet (10 mg) by mouth every 6 hours as needed (Breakthrough Nausea/Vomiting) 30 tablet 0         Physical Exam:     Vital Signs: There were no vitals taken for this visit.        KPS:  90    General Appearance: alert and no distress  Eyes: PERRL, conjunctiva and lids normal, sclera nonicteric  Ears/Nose/M/Throat: Oral mucosa and posterior oropharynx normal, moist mucous membranes  Neck supple, non-tender, free range of motion, no adenopathy  Cardio/Vascular:regular rate and rhythm, normal S1 and S2, no murmur  Resp Effort And Auscultation: Normal - Clear to auscultation without rales, rhonchi, or wheezing.  GI: soft, nontender, bowel sounds present in all four quadrants, no hepatosplenomegaly  Lymphatics:no significant enlargement of lymph nodes globally   Musculoskeletal: Musculoskeletal normal  Edema: none  Skin: Skin color, texture, turgor normal. No rashes or lesions.  Neurologic: Gait normal. Reflexes normal and symmetric. Sensation grossly WNL.  Psych/Affect: Mood and affect are appropriate.  Vascular Access:  None       Kassie understood the above assessment and recommendations.  Multiple questions answered.  No barriers to learning identified.       Total time: 30 minutes  Counseling time: 20 minutes  Prolonged service:  no    Deedee Hamilton MD    ------------------------------------------------------------------------------------------------------------------------------------------------    Patient Care Team       Relationship Specialty Notifications Start End    Mary Alice Colón PA-C PCP - General Physician Assistant  4/17/17     Phone: [phone] Fax: [phone] [address] [facility] [address]    Mary Alice Colón PA-C Assigned PCP   9/1/19     Phone: [phone] Fax: [phone] [address] [facility] [address]

## 2019-12-14 ENCOUNTER — NURSE TRIAGE (OUTPATIENT)
Dept: NURSING | Facility: CLINIC | Age: 49
End: 2019-12-14

## 2019-12-14 ENCOUNTER — TELEPHONE (OUTPATIENT)
Dept: ONCOLOGY | Facility: CLINIC | Age: 49
End: 2019-12-14

## 2019-12-15 ENCOUNTER — APPOINTMENT (OUTPATIENT)
Dept: GENERAL RADIOLOGY | Facility: CLINIC | Age: 49
End: 2019-12-15
Attending: EMERGENCY MEDICINE
Payer: COMMERCIAL

## 2019-12-15 ENCOUNTER — HOSPITAL ENCOUNTER (INPATIENT)
Facility: CLINIC | Age: 49
LOS: 3 days | Discharge: ANOTHER HOSPITAL | End: 2019-12-18
Attending: EMERGENCY MEDICINE | Admitting: INTERNAL MEDICINE
Payer: COMMERCIAL

## 2019-12-15 DIAGNOSIS — D70.9 NEUTROPENIC FEVER (H): ICD-10-CM

## 2019-12-15 DIAGNOSIS — R50.81 NEUTROPENIC FEVER (H): ICD-10-CM

## 2019-12-15 DIAGNOSIS — N28.9 RENAL INSUFFICIENCY: ICD-10-CM

## 2019-12-15 DIAGNOSIS — J18.9 PNEUMONIA DUE TO INFECTIOUS ORGANISM, UNSPECIFIED LATERALITY, UNSPECIFIED PART OF LUNG: ICD-10-CM

## 2019-12-15 LAB
ALBUMIN SERPL-MCNC: 3 G/DL (ref 3.4–5)
ALBUMIN UR-MCNC: 100 MG/DL
ALP SERPL-CCNC: 70 U/L (ref 40–150)
ALT SERPL W P-5'-P-CCNC: 34 U/L (ref 0–50)
ANION GAP SERPL CALCULATED.3IONS-SCNC: 7 MMOL/L (ref 3–14)
ANION GAP SERPL CALCULATED.3IONS-SCNC: 9 MMOL/L (ref 3–14)
ANISOCYTOSIS BLD QL SMEAR: SLIGHT
APPEARANCE UR: CLEAR
AST SERPL W P-5'-P-CCNC: 41 U/L (ref 0–45)
BACTERIA #/AREA URNS HPF: ABNORMAL /HPF
BASOPHILS # BLD AUTO: 0 10E9/L (ref 0–0.2)
BASOPHILS NFR BLD AUTO: 2 %
BILIRUB SERPL-MCNC: 1.2 MG/DL (ref 0.2–1.3)
BILIRUB UR QL STRIP: NEGATIVE
BUN SERPL-MCNC: 11 MG/DL (ref 7–30)
BUN SERPL-MCNC: 9 MG/DL (ref 7–30)
CALCIUM SERPL-MCNC: 8.1 MG/DL (ref 8.5–10.1)
CALCIUM SERPL-MCNC: 8.7 MG/DL (ref 8.5–10.1)
CHLORIDE SERPL-SCNC: 101 MMOL/L (ref 94–109)
CHLORIDE SERPL-SCNC: 111 MMOL/L (ref 94–109)
CO2 SERPL-SCNC: 23 MMOL/L (ref 20–32)
CO2 SERPL-SCNC: 25 MMOL/L (ref 20–32)
COLOR UR AUTO: YELLOW
CREAT SERPL-MCNC: 1.07 MG/DL (ref 0.52–1.04)
CREAT SERPL-MCNC: 1.2 MG/DL (ref 0.52–1.04)
DACRYOCYTES BLD QL SMEAR: SLIGHT
DIFFERENTIAL METHOD BLD: ABNORMAL
EOSINOPHIL # BLD AUTO: 0 10E9/L (ref 0–0.7)
EOSINOPHIL NFR BLD AUTO: 1 %
ERYTHROCYTE [DISTWIDTH] IN BLOOD BY AUTOMATED COUNT: 16.2 % (ref 10–15)
FLUAV+FLUBV AG SPEC QL: NEGATIVE
FLUAV+FLUBV AG SPEC QL: NEGATIVE
GFR SERPL CREATININE-BSD FRML MDRD: 53 ML/MIN/{1.73_M2}
GFR SERPL CREATININE-BSD FRML MDRD: 61 ML/MIN/{1.73_M2}
GLUCOSE SERPL-MCNC: 130 MG/DL (ref 70–99)
GLUCOSE SERPL-MCNC: 144 MG/DL (ref 70–99)
GLUCOSE UR STRIP-MCNC: NEGATIVE MG/DL
GRAM STN SPEC: NORMAL
HCT VFR BLD AUTO: 28.5 % (ref 35–47)
HGB BLD-MCNC: 9.6 G/DL (ref 11.7–15.7)
HGB UR QL STRIP: ABNORMAL
KETONES UR STRIP-MCNC: NEGATIVE MG/DL
LACTATE BLD-SCNC: 2.7 MMOL/L (ref 0.7–2)
LACTATE BLD-SCNC: 3.1 MMOL/L (ref 0.7–2)
LACTATE SERPL-SCNC: 1.9 MMOL/L (ref 0.4–2)
LEUKOCYTE ESTERASE UR QL STRIP: NEGATIVE
LYMPHOCYTES # BLD AUTO: 0.5 10E9/L (ref 0.8–5.3)
LYMPHOCYTES NFR BLD AUTO: 40 %
Lab: NORMAL
MACROCYTES BLD QL SMEAR: PRESENT
MCH RBC QN AUTO: 31.8 PG (ref 26.5–33)
MCHC RBC AUTO-ENTMCNC: 33.7 G/DL (ref 31.5–36.5)
MCV RBC AUTO: 94 FL (ref 78–100)
METAMYELOCYTES # BLD: 0 10E9/L
METAMYELOCYTES NFR BLD MANUAL: 1 %
MICROCYTES BLD QL SMEAR: PRESENT
MONOCYTES # BLD AUTO: 0.2 10E9/L (ref 0–1.3)
MONOCYTES NFR BLD AUTO: 18 %
MUCOUS THREADS #/AREA URNS LPF: PRESENT /LPF
MYELOCYTES # BLD: 0 10E9/L
MYELOCYTES NFR BLD MANUAL: 1 %
NEUTROPHILS # BLD AUTO: 0.4 10E9/L (ref 1.6–8.3)
NEUTROPHILS NFR BLD AUTO: 37 %
NITRATE UR QL: NEGATIVE
NT-PROBNP SERPL-MCNC: 92 PG/ML (ref 0–450)
PH UR STRIP: 6 PH (ref 5–7)
PLATELET # BLD AUTO: 270 10E9/L (ref 150–450)
PLATELET # BLD EST: ABNORMAL 10*3/UL
POTASSIUM SERPL-SCNC: 3.3 MMOL/L (ref 3.4–5.3)
POTASSIUM SERPL-SCNC: 3.3 MMOL/L (ref 3.4–5.3)
POTASSIUM SERPL-SCNC: 3.8 MMOL/L (ref 3.4–5.3)
PROT SERPL-MCNC: 6.7 G/DL (ref 6.8–8.8)
RBC # BLD AUTO: 3.02 10E12/L (ref 3.8–5.2)
RBC #/AREA URNS AUTO: 15 /HPF (ref 0–2)
SODIUM SERPL-SCNC: 135 MMOL/L (ref 133–144)
SODIUM SERPL-SCNC: 141 MMOL/L (ref 133–144)
SOURCE: ABNORMAL
SP GR UR STRIP: 1.02 (ref 1–1.03)
SPECIMEN SOURCE: NORMAL
SPECIMEN SOURCE: NORMAL
SQUAMOUS #/AREA URNS AUTO: 39 /HPF (ref 0–1)
UROBILINOGEN UR STRIP-MCNC: NEGATIVE MG/DL (ref 0–2)
VARIANT LYMPHS BLD QL SMEAR: PRESENT
WBC # BLD AUTO: 1.2 10E9/L (ref 4–11)
WBC #/AREA URNS AUTO: 18 /HPF (ref 0–5)

## 2019-12-15 PROCEDURE — 99223 1ST HOSP IP/OBS HIGH 75: CPT | Performed by: INTERNAL MEDICINE

## 2019-12-15 PROCEDURE — 25000128 H RX IP 250 OP 636: Performed by: INTERNAL MEDICINE

## 2019-12-15 PROCEDURE — 25000132 ZZH RX MED GY IP 250 OP 250 PS 637: Performed by: INTERNAL MEDICINE

## 2019-12-15 PROCEDURE — 80048 BASIC METABOLIC PNL TOTAL CA: CPT | Performed by: INTERNAL MEDICINE

## 2019-12-15 PROCEDURE — 96367 TX/PROPH/DG ADDL SEQ IV INF: CPT

## 2019-12-15 PROCEDURE — 25800030 ZZH RX IP 258 OP 636: Performed by: INTERNAL MEDICINE

## 2019-12-15 PROCEDURE — 99285 EMERGENCY DEPT VISIT HI MDM: CPT | Mod: 25

## 2019-12-15 PROCEDURE — 87040 BLOOD CULTURE FOR BACTERIA: CPT | Performed by: EMERGENCY MEDICINE

## 2019-12-15 PROCEDURE — 25000132 ZZH RX MED GY IP 250 OP 250 PS 637: Performed by: EMERGENCY MEDICINE

## 2019-12-15 PROCEDURE — 25800030 ZZH RX IP 258 OP 636: Performed by: EMERGENCY MEDICINE

## 2019-12-15 PROCEDURE — 25000128 H RX IP 250 OP 636: Performed by: EMERGENCY MEDICINE

## 2019-12-15 PROCEDURE — 83605 ASSAY OF LACTIC ACID: CPT | Performed by: EMERGENCY MEDICINE

## 2019-12-15 PROCEDURE — 87070 CULTURE OTHR SPECIMN AEROBIC: CPT | Performed by: INTERNAL MEDICINE

## 2019-12-15 PROCEDURE — 80053 COMPREHEN METABOLIC PANEL: CPT | Performed by: EMERGENCY MEDICINE

## 2019-12-15 PROCEDURE — 12000000 ZZH R&B MED SURG/OB

## 2019-12-15 PROCEDURE — 96365 THER/PROPH/DIAG IV INF INIT: CPT

## 2019-12-15 PROCEDURE — 36415 COLL VENOUS BLD VENIPUNCTURE: CPT | Performed by: INTERNAL MEDICINE

## 2019-12-15 PROCEDURE — 99223 1ST HOSP IP/OBS HIGH 75: CPT | Mod: AI | Performed by: INTERNAL MEDICINE

## 2019-12-15 PROCEDURE — 36415 COLL VENOUS BLD VENIPUNCTURE: CPT | Performed by: EMERGENCY MEDICINE

## 2019-12-15 PROCEDURE — 84132 ASSAY OF SERUM POTASSIUM: CPT | Performed by: INTERNAL MEDICINE

## 2019-12-15 PROCEDURE — 81001 URINALYSIS AUTO W/SCOPE: CPT | Performed by: EMERGENCY MEDICINE

## 2019-12-15 PROCEDURE — 83605 ASSAY OF LACTIC ACID: CPT | Performed by: INTERNAL MEDICINE

## 2019-12-15 PROCEDURE — 83880 ASSAY OF NATRIURETIC PEPTIDE: CPT | Performed by: EMERGENCY MEDICINE

## 2019-12-15 PROCEDURE — 85025 COMPLETE CBC W/AUTO DIFF WBC: CPT | Performed by: EMERGENCY MEDICINE

## 2019-12-15 PROCEDURE — 87205 SMEAR GRAM STAIN: CPT | Performed by: INTERNAL MEDICINE

## 2019-12-15 PROCEDURE — 87804 INFLUENZA ASSAY W/OPTIC: CPT | Performed by: EMERGENCY MEDICINE

## 2019-12-15 PROCEDURE — 96361 HYDRATE IV INFUSION ADD-ON: CPT

## 2019-12-15 PROCEDURE — 71046 X-RAY EXAM CHEST 2 VIEWS: CPT

## 2019-12-15 RX ORDER — VANCOMYCIN HYDROCHLORIDE 1 G/200ML
1000 INJECTION, SOLUTION INTRAVENOUS ONCE
Status: COMPLETED | OUTPATIENT
Start: 2019-12-15 | End: 2019-12-15

## 2019-12-15 RX ORDER — AMOXICILLIN 250 MG
1 CAPSULE ORAL 2 TIMES DAILY PRN
Status: DISCONTINUED | OUTPATIENT
Start: 2019-12-15 | End: 2019-12-18 | Stop reason: HOSPADM

## 2019-12-15 RX ORDER — POLYETHYLENE GLYCOL 3350 17 G/17G
17 POWDER, FOR SOLUTION ORAL DAILY PRN
Status: DISCONTINUED | OUTPATIENT
Start: 2019-12-15 | End: 2019-12-18 | Stop reason: HOSPADM

## 2019-12-15 RX ORDER — LIDOCAINE 40 MG/G
CREAM TOPICAL
Status: DISCONTINUED | OUTPATIENT
Start: 2019-12-15 | End: 2019-12-18 | Stop reason: HOSPADM

## 2019-12-15 RX ORDER — LORAZEPAM 0.5 MG/1
.5-1 TABLET ORAL EVERY 6 HOURS PRN
Status: DISCONTINUED | OUTPATIENT
Start: 2019-12-15 | End: 2019-12-18 | Stop reason: HOSPADM

## 2019-12-15 RX ORDER — ACYCLOVIR 400 MG/1
400 TABLET ORAL 2 TIMES DAILY
Status: DISCONTINUED | OUTPATIENT
Start: 2019-12-15 | End: 2019-12-18 | Stop reason: HOSPADM

## 2019-12-15 RX ORDER — POTASSIUM CHLORIDE 7.45 MG/ML
10 INJECTION INTRAVENOUS
Status: DISCONTINUED | OUTPATIENT
Start: 2019-12-15 | End: 2019-12-18 | Stop reason: HOSPADM

## 2019-12-15 RX ORDER — POTASSIUM CHLORIDE 29.8 MG/ML
20 INJECTION INTRAVENOUS
Status: DISCONTINUED | OUTPATIENT
Start: 2019-12-15 | End: 2019-12-18 | Stop reason: HOSPADM

## 2019-12-15 RX ORDER — ONDANSETRON 2 MG/ML
4 INJECTION INTRAMUSCULAR; INTRAVENOUS EVERY 6 HOURS PRN
Status: DISCONTINUED | OUTPATIENT
Start: 2019-12-15 | End: 2019-12-18 | Stop reason: HOSPADM

## 2019-12-15 RX ORDER — GUAIFENESIN/DEXTROMETHORPHAN 100-10MG/5
10 SYRUP ORAL EVERY 4 HOURS PRN
Status: DISCONTINUED | OUTPATIENT
Start: 2019-12-15 | End: 2019-12-18 | Stop reason: HOSPADM

## 2019-12-15 RX ORDER — IBUPROFEN 200 MG
200 TABLET ORAL ONCE
Status: COMPLETED | OUTPATIENT
Start: 2019-12-15 | End: 2019-12-15

## 2019-12-15 RX ORDER — SODIUM CHLORIDE 9 MG/ML
INJECTION, SOLUTION INTRAVENOUS CONTINUOUS
Status: DISCONTINUED | OUTPATIENT
Start: 2019-12-15 | End: 2019-12-18 | Stop reason: HOSPADM

## 2019-12-15 RX ORDER — POTASSIUM CHLORIDE 1500 MG/1
20-40 TABLET, EXTENDED RELEASE ORAL
Status: DISCONTINUED | OUTPATIENT
Start: 2019-12-15 | End: 2019-12-18 | Stop reason: HOSPADM

## 2019-12-15 RX ORDER — ALLOPURINOL 300 MG/1
300 TABLET ORAL DAILY
Status: DISCONTINUED | OUTPATIENT
Start: 2019-12-16 | End: 2019-12-18 | Stop reason: HOSPADM

## 2019-12-15 RX ORDER — ACETAMINOPHEN 325 MG/1
325 TABLET ORAL ONCE
Status: COMPLETED | OUTPATIENT
Start: 2019-12-15 | End: 2019-12-15

## 2019-12-15 RX ORDER — PROCHLORPERAZINE 25 MG
25 SUPPOSITORY, RECTAL RECTAL EVERY 12 HOURS PRN
Status: DISCONTINUED | OUTPATIENT
Start: 2019-12-15 | End: 2019-12-18 | Stop reason: HOSPADM

## 2019-12-15 RX ORDER — POTASSIUM CL/LIDO/0.9 % NACL 10MEQ/0.1L
10 INTRAVENOUS SOLUTION, PIGGYBACK (ML) INTRAVENOUS
Status: DISCONTINUED | OUTPATIENT
Start: 2019-12-15 | End: 2019-12-18 | Stop reason: HOSPADM

## 2019-12-15 RX ORDER — ACETAMINOPHEN 500 MG
500 TABLET ORAL EVERY 4 HOURS PRN
Status: DISCONTINUED | OUTPATIENT
Start: 2019-12-15 | End: 2019-12-15

## 2019-12-15 RX ORDER — ONDANSETRON 4 MG/1
4 TABLET, ORALLY DISINTEGRATING ORAL EVERY 6 HOURS PRN
Status: DISCONTINUED | OUTPATIENT
Start: 2019-12-15 | End: 2019-12-18 | Stop reason: HOSPADM

## 2019-12-15 RX ORDER — POTASSIUM CHLORIDE 1.5 G/1.58G
20-40 POWDER, FOR SOLUTION ORAL
Status: DISCONTINUED | OUTPATIENT
Start: 2019-12-15 | End: 2019-12-18 | Stop reason: HOSPADM

## 2019-12-15 RX ORDER — ACETAMINOPHEN 325 MG/1
975 TABLET ORAL EVERY 6 HOURS
Status: COMPLETED | OUTPATIENT
Start: 2019-12-16 | End: 2019-12-16

## 2019-12-15 RX ORDER — ALBUTEROL SULFATE 0.83 MG/ML
3 SOLUTION RESPIRATORY (INHALATION)
Status: DISCONTINUED | OUTPATIENT
Start: 2019-12-15 | End: 2019-12-18 | Stop reason: HOSPADM

## 2019-12-15 RX ORDER — FLUCONAZOLE 200 MG/1
200 TABLET ORAL DAILY
Status: DISCONTINUED | OUTPATIENT
Start: 2019-12-15 | End: 2019-12-18 | Stop reason: HOSPADM

## 2019-12-15 RX ORDER — NALOXONE HYDROCHLORIDE 0.4 MG/ML
.1-.4 INJECTION, SOLUTION INTRAMUSCULAR; INTRAVENOUS; SUBCUTANEOUS
Status: DISCONTINUED | OUTPATIENT
Start: 2019-12-15 | End: 2019-12-18 | Stop reason: HOSPADM

## 2019-12-15 RX ORDER — AMOXICILLIN 250 MG
2 CAPSULE ORAL 2 TIMES DAILY PRN
Status: DISCONTINUED | OUTPATIENT
Start: 2019-12-15 | End: 2019-12-18 | Stop reason: HOSPADM

## 2019-12-15 RX ORDER — PROCHLORPERAZINE MALEATE 5 MG
10 TABLET ORAL EVERY 6 HOURS PRN
Status: DISCONTINUED | OUTPATIENT
Start: 2019-12-15 | End: 2019-12-18 | Stop reason: HOSPADM

## 2019-12-15 RX ORDER — ACETAMINOPHEN 325 MG/1
650 TABLET ORAL EVERY 4 HOURS PRN
Status: DISCONTINUED | OUTPATIENT
Start: 2019-12-15 | End: 2019-12-15

## 2019-12-15 RX ADMIN — TAZOBACTAM SODIUM AND PIPERACILLIN SODIUM 3.38 G: 375; 3 INJECTION, SOLUTION INTRAVENOUS at 08:12

## 2019-12-15 RX ADMIN — SODIUM CHLORIDE: 9 INJECTION, SOLUTION INTRAVENOUS at 13:01

## 2019-12-15 RX ADMIN — ACETAMINOPHEN 325 MG: 325 TABLET, FILM COATED ORAL at 19:44

## 2019-12-15 RX ADMIN — ACETAMINOPHEN 650 MG: 325 TABLET, FILM COATED ORAL at 18:46

## 2019-12-15 RX ADMIN — VANCOMYCIN HYDROCHLORIDE 1000 MG: 1 INJECTION, SOLUTION INTRAVENOUS at 09:04

## 2019-12-15 RX ADMIN — TAZOBACTAM SODIUM AND PIPERACILLIN SODIUM 3.38 G: 375; 3 INJECTION, SOLUTION INTRAVENOUS at 19:45

## 2019-12-15 RX ADMIN — GUAIFENESIN AND DEXTROMETHORPHAN 10 ML: 100; 10 SYRUP ORAL at 13:02

## 2019-12-15 RX ADMIN — TAZOBACTAM SODIUM AND PIPERACILLIN SODIUM 3.38 G: 375; 3 INJECTION, SOLUTION INTRAVENOUS at 13:01

## 2019-12-15 RX ADMIN — VANCOMYCIN HYDROCHLORIDE 1500 MG: 10 INJECTION, POWDER, LYOPHILIZED, FOR SOLUTION INTRAVENOUS at 14:41

## 2019-12-15 RX ADMIN — ACETAMINOPHEN 650 MG: 325 TABLET, FILM COATED ORAL at 13:02

## 2019-12-15 RX ADMIN — ENOXAPARIN SODIUM 40 MG: 40 INJECTION SUBCUTANEOUS at 13:01

## 2019-12-15 RX ADMIN — POTASSIUM CHLORIDE 20 MEQ: 1500 TABLET, EXTENDED RELEASE ORAL at 17:28

## 2019-12-15 RX ADMIN — IBUPROFEN 200 MG: 200 TABLET, FILM COATED ORAL at 19:43

## 2019-12-15 RX ADMIN — SODIUM CHLORIDE 1000 ML: 9 INJECTION, SOLUTION INTRAVENOUS at 07:41

## 2019-12-15 RX ADMIN — FILGRASTIM 480 MCG: 480 INJECTION, SOLUTION INTRAVENOUS; SUBCUTANEOUS at 17:09

## 2019-12-15 RX ADMIN — FLUCONAZOLE 200 MG: 200 TABLET ORAL at 19:43

## 2019-12-15 RX ADMIN — LORAZEPAM 0.5 MG: 0.5 TABLET ORAL at 17:28

## 2019-12-15 RX ADMIN — ACYCLOVIR 400 MG: 400 TABLET ORAL at 21:32

## 2019-12-15 RX ADMIN — POTASSIUM CHLORIDE 40 MEQ: 1500 TABLET, EXTENDED RELEASE ORAL at 14:46

## 2019-12-15 RX ADMIN — SODIUM CHLORIDE, POTASSIUM CHLORIDE, SODIUM LACTATE AND CALCIUM CHLORIDE 1000 ML: 600; 310; 30; 20 INJECTION, SOLUTION INTRAVENOUS at 08:36

## 2019-12-15 RX ADMIN — ACETAMINOPHEN 500 MG: 500 TABLET, FILM COATED ORAL at 08:31

## 2019-12-15 ASSESSMENT — ENCOUNTER SYMPTOMS
VOMITING: 0
NAUSEA: 0
COUGH: 1
ABDOMINAL PAIN: 0
FATIGUE: 1
WEAKNESS: 0
DIZZINESS: 0
DIAPHORESIS: 1
FEVER: 1
LIGHT-HEADEDNESS: 0
HEADACHES: 0
MYALGIAS: 1
SHORTNESS OF BREATH: 0
DIARRHEA: 0
CHILLS: 1
NUMBNESS: 0

## 2019-12-15 ASSESSMENT — ACTIVITIES OF DAILY LIVING (ADL)
ADLS_ACUITY_SCORE: 12

## 2019-12-15 NOTE — ED PROVIDER NOTES
History     Chief Complaint:  Fever    HPI  Kassie Ramirez is a 49 year old female who presents to the emergency department today for evaluation of fever and cough. The patient was recently diagnosed with diffuse large B-cell lymphoma and hospitalized 11/27-12/2 at the [facility]. Her diagnosis of DLBCL was confirmed and she was started on high-dose steroids during hospitalization while awaiting FISH results. She also proceeded with R-CHOP cycle #1 during this hospitalization as well. The patient presents today due to worsening cough and fever. Her cough began as nonproductive 3 days ago but has progressively worsened and is now productive today. Her temporal temperature last night was 100.7 and this morning it was 100.4. She has not taken any Tylenol at home.  Patient completed about 7 days of levaquin, but was changed to250 mg daily levofloxacin and fluconazole 200 mg daily on 12/11.  Patient just saw her oncologist several days ago and is due to see Dr Cisneros next week.    A few days ago she was having diarrhea but this has since resolved. She is still urinating normally. She denies any abdominal pain, nausea, vomiting, chest pain, shortness of breath, or any other symptoms. She did not receive the flu vaccine this year. Her 's daughter did have the stomach bug 1.5 weeks ago. No other known ill contact. On 12/11, her white count was 0.8 and absolute neutrophil was 0.2.    Most recent discharge summary below:    [facility] [place]     Discharge Summary  Malignant Hematology Service     Date of Admission:  11/27/2019  Date of Discharge:  12/1/2019  Disposition: Home  Discharging Provider: Bhavna Culp  Date of Service: 12/01/19     Admission Diagnoses:  Mediastinal lymphadenopathy, likely DLBCL  Dyspnea  Acute kidney injury     Discharge Diagnoses:  Diffuse large b-cell lymphoma  Dyspnea, improving  Acute kidney injury, resolved    Allergies:  Cold & Flu [Cold Defense  Fighter]  Seasonal Allergies  Sudafed Cold-Cough [Dayquil Liquicaps]  Pseudoephedrine    Medications:    acyclovir (ZOVIRAX) 400 MG tablet  allopurinol (ZYLOPRIM) 300 MG tablet  ALPRAZolam (XANAX) 0.5 MG tablet  fluconazole (DIFLUCAN) 200 MG tablet  levofloxacin (LEVAQUIN) 250 MG tablet  LORazepam (ATIVAN) 0.5 MG tablet  omeprazole (PRILOSEC) 40 MG DR capsule  ondansetron (ZOFRAN-ODT) 8 MG ODT tab  prochlorperazine (COMPAZINE) 10 MG tablet    Past Medical History:    Diffuse large B-cell lymphoma of extranodal site  Menopausal disorder  Menstrual headache  Stress urinary incontinence   SVT  BRIAN  Mixed hyperlipidemia  Mediastinal mass  Bronchial compression     Past Surgical History:    Kit essure  Umbilical herniorrhaphy   Sling transpubo without anterior colporrhaphy     Family History:    The patient's family history includes C.A.D. in her mother; Cardiovascular in her mother; Cerebrovascular Disease in her mother; Circulatory in her mother; Depression in her mother; Diabetes in her maternal aunt and mother; Eye Disorder in her father; Heart Disease in her maternal grandfather, mother, and sister; Hypertension in her maternal aunt and mother; Lipids in her mother; Lung Cancer in her mother; Macular Degeneration in her father and sister; Obesity in her mother.    Social History:  The patient reports that she has never smoked. She has never used smokeless tobacco. She reports that she does not drink alcohol or use drugs.   PCP: Mary Alice Colón  Marital Status:     Review of Systems   Constitutional: Positive for chills, diaphoresis, fatigue and fever.   HENT: Negative.    Respiratory: Positive for cough. Negative for shortness of breath.    Cardiovascular: Negative for chest pain.   Gastrointestinal: Negative for abdominal pain, diarrhea, nausea and vomiting.   Genitourinary: Negative.    Musculoskeletal: Positive for myalgias (generalized).   Skin: Negative for rash.   Neurological: Negative for  dizziness, weakness, light-headedness, numbness and headaches.   All other systems reviewed and are negative.  Cough, fever and chills.  Pt neutropenic, chemo 11/30 for lymphoma.    Physical Exam     Patient Vitals for the past 24 hrs:   BP Temp Temp src Pulse Heart Rate Resp SpO2   12/15/19 0830 116/69 -- -- 116 120 (!) 31 96 %   12/15/19 0815 106/69 100.7  F (38.2  C) Oral 123 122 21 94 %   12/15/19 0800 110/71 -- -- 121 121 (!) 40 96 %   12/15/19 0754 -- -- -- -- 122 22 94 %   12/15/19 0747 106/74 -- -- 125 126 29 (!) 89 %   12/15/19 0745 119/75 -- -- -- 123 27 94 %   12/15/19 0715 116/74 -- -- 129 130 21 95 %   12/15/19 0705 125/76 -- -- 130 130 27 97 %   12/15/19 0700 124/78 -- -- 129 130 28 97 %   12/15/19 0631 116/67 98.2  F (36.8  C) Oral 124 124 20 97 %     Physical Exam  GEN: patient appears tired,  at the bedside  HEAD: atraumatic, normocephalic  EYES: pupils reactive,  conjunctivae normal  ENT: TMs flat and white bilaterally, oropharynx normal with no erythema or exudate, mucus membranes dry  NECK: no cervical LAD,  No meningeal signs  RESPIRATORY: no tachypnea, breath sounds clear to auscultation (no rales, wheezes, rhonchi), mild tachypnea  CVS: normal S1/S2, no murmurs/rubs/gallops, tachycardia  ABDOMEN: soft, nontender, no masses or organomegaly, no rebound, decreased bowel sounds  BACK: no costovertebral angle tenderness  EXTREMITIES: intact pulses x 2 radial pulses, no edema  MUSCULOSKELETAL: no deformities  SKIN: warm and dry, no acute rashes   NEURO: GCS 15, cranial nerves intact.  Motor- moves all 4 extremities  Overall symmetrical exam  HEME: no bruising or petechiae/contusions  LYMPH: no lymphadenopathy    Emergency Department Course     Imaging:  Radiology findings were communicated with the patient who voiced understanding of the findings.    Chest XR,  PA & LAT  IMPRESSION: Right upper lobe collapse has resolved. Prominent  bilateral perihilar and lower lobe infiltrates. Differential  diagnosis  includes congestive heart failure and pneumonitis/pneumonia.  Results per radiology.    Laboratory:  Laboratory findings were communicated with the patient who voiced understanding of the findings.    CBC: WBC 1.2 (L), HGB 9.6 (L),   CMP: Potassium 3.3 (L), Glucose 144 (H), Creatinine 1.20 (H), GFR 53 (L), Albumin 3.0 (L), Protein total 6.7 (L) o/w WNL     0656 - Lactic Acid: 3.1 (H)  0831 - Lactic Acid: 1.9  Blood Culture x2: Pending    BNP: 92  UA: Large blood, Protein albumin 100, WBC 18 (H), RBC 15 (H), Many bacteria, Squamous epithelial 39 (H), Mucous preseent, o/w Negative  Urine culture: pending    Influenza A/B antigen: Negative    Interventions:  0741: NS 1L IV Bolus  0812: Zosyn 3.375 g IV infusion  0831: Tylenol 500mg tablet PO  0836: Lactated ringers 1,000mL IV Bolus  LR maintenance at 100cc/hr    Emergency Department Course:  Past medical records, nursing notes, and vitals reviewed.  0641: I performed an exam of the patient and obtained history, as documented above. GCS 15.    IV inserted and blood drawn.    The patient was sent for a xray while in the emergency department, findings above.    0806: I rechecked the patient.    Findings and plan explained to the Patient and spouse who consents to admission.     0838: Discussed the patient with Dr. Alvarado, who will admit the patient to a [facility] bed for further monitoring, evaluation, and treatment.     0934: I rechecked the patient.     10:02 AM awaiting on admission    /71   Pulse 112   Temp 100.7  F (38.2  C) (Oral)   Resp 25   SpO2 96%   Patient and  updated  Discussed results with patient.  Gave patient copies of all results (applicable labs, CT scans and/or ultrasounds).  Answered questions.    Impression & Plan    CMS Diagnoses:   The patient has signs of Severe Sepsis  as evidenced by:    1. 2 SIRS criteria, AND  2. Suspected infection, AND   3. Organ dysfunction: Lactic Acid > 2.0    Time severe sepsis diagnosis  confirmed:0656  12/15/19  as this was the time when Lactate resulted, and the level was > 2.0    3 Hour Severe Sepsis Bundle Completion:  1. Initial Lactic Acid Result:   Recent Labs   Lab Test 12/15/19  0831 12/15/19  0656 11/27/19  1608   LACT 1.9 3.1* 2.3*     2. Blood Cultures before Antibiotics: Yes  3. Broad Spectrum Antibiotics Administered:  yes       Anti-infectives (From admission through now)    Start     Dose/Rate Route Frequency Ordered Stop    12/15/19 0840  vancomycin (VANCOCIN) 1000 mg in dextrose 5% 200 mL PREMIX      1,000 mg  200 mL/hr over 1 Hours Intravenous ONCE 12/15/19 0839      12/15/19 0739  piperacillin-tazobactam (ZOSYN) infusion 3.375 g      3.375 g  100 mL/hr over 30 Minutes Intravenous EVERY 6 HOURS 12/15/19 0738            4. Volume of IV Fluid administered in ED: 2 L     REMINDER: Please use septic shock SmartPhrase for Lactate > 4 or a patient  requiring vasopressors after initial fluid bolus (meaning persistent hypotension)      If one the following conditions is present, a 30cc/kg bolus is recommended as part of the 6 hour bundle (IBW can be used for BMI >30, or document refusal/contraindication)    1.   initial hypotension  defined as 2 bps < 90 or map < 65 in the 6hrs before or 6hrs  after time zero.    2.  Lactate >4.                Severe Sepsis reassessment:  1. Repeat Lactic Acid Level: 1.9  2. MAP>65 after initial IVF bolus, will continue to monitor fluid status and vital signs      Medical Decision Making:  Kassie Ramirez is a very pleasant 49 year old female who was recently diagnosed with lymphoma at the [facility] and she is switching to Dr. Cisneros (oncology) locally to provide continuation of care. She did receive a dose of chemotherapy last week and she's had increasing cough and vomiting the last couple days. When she first presented, her heart rate was elevated at 124. She was 98.2 for a temperature with normal blood pressure but as she stays here her pulse is going  down but her temperature is 100.7. IV was placed and labs were sent and 2 peripheral blood cultures were sent, 1 with fluid resuscitation x2 L.   Antipyretics were administered.    Chest xray shows that she used to have an infiltrate in the right upper lobe and it has resolved. She does have prominent perihilar and lower lobe infiltrates consistent with no pneumonia.  pro-BNP does not show any evidence of CHF. Her lactic acid is elevated at 3.1 and we are rechecking. Her CMP is normal except her creatinine is elevated, consistent with dehydration. White cell count is actually improving. In the past she has been 0.8 and today it is 1.2. Her absolute neutrophils are 0.4 which is improved from her previus one at 0.2. she is urinating for us. We have given her zosyn for broad spectrum sepsis coverage and she is also given vancomycin since she was recently in the hospital and this could be a community-acquired pneumonia and/or bacteremia. Her heart rate is improving. We are treating her fever. Case has been discussed with the hospitalist Dr. Alvarado.     Repeat lactic acid just under 2.0 and improving.  2L of fluids administered and now high-flow maintenance fluids.  UA with no obvious infection.  Noted renal insufficiency.    Critical Care time was 20 minutes for this patient excluding procedures. Repeat lactic acid level improving.  Patient feeling better.    Diagnosis:    ICD-10-CM    1. Neutropenic fever (H) D70.9 UA with Microscopic    R50.81 Influenza A/B antigen     Blood culture ONE site     Nt probnp inpatient     Nt probnp inpatient     Lactic acid     CANCELED: Nt probnp inpatient   2. Renal insufficiency N28.9    3. Pneumonia due to infectious organism, unspecified laterality, unspecified part of lung J18.9        Disposition:   Admitted.      Scribe Disclosure:  Shawnee BOLAÑOS, am serving as a scribe at 6:41 AM on 12/15/2019 to document services personally performed by Rosy Nayak MD based on my  observations and the provider's statements to me.    [facility] EMERGENCY DEPARTMENT       Rosy Nayak MD  12/16/19 3710

## 2019-12-15 NOTE — TELEPHONE ENCOUNTER
is calling and states patient has had a cough for 3 days now.  states the cough is productive as of today. Patient does complain of some chest pain, but normally does have chest pain in that area due to cancerous markos. Triage guidelines recommend to call pcp immediately. Triager called out to answering service, spoke with Bhavna Culp regarding patient's symptoms. She advised for patient to be seen in clinic on Monday or Tuesday of this week, and she states she will send a message for the clinic to schedule an appointment for patient. Provider states that if patient develops a fever greater than 100.4, then she should go to ED. If patient has not heard back from  on Monday and patient continues to have a productive cough, then patient should do a walk in visit at the [facility] on Monday. Patient can have Robitussin DM for the cough and tylenol for any discomfort per Bhavna Culp. Triager called out to  and related advice to him. Caller verbalized and understands directives.    Reason for Disposition    [1] Continuous (nonstop) coughing interferes with work or school AND [2] no improvement using cough treatment per Care Advice    Additional Information    Negative: Severe difficulty breathing (e.g., struggling for each breath, speaks in single words)    Negative: Bluish (or gray) lips or face now    Negative: [1] Difficulty breathing AND [2] exposure to flames, smoke, or fumes    Negative: [1] Stridor AND [2] difficulty breathing    Negative: Sounds like a life-threatening emergency to the triager    Negative: [1] Previous asthma attacks AND [2] this feels like asthma attack    Negative: Dry (non-productive) cough (i.e., no sputum or minimal clear sputum)    Negative: Chest pain  (Exception: MILD central chest pain, present only when coughing)    Negative: Difficulty breathing    Negative: Patient sounds very sick or weak to the triager    Negative: [1] Coughed up blood AND  [2] > 1 tablespoon (15 ml) (Exception: blood-tinged sputum)    Negative: Fever > 103 F (39.4 C)    Negative: [1] Fever > 101 F (38.3 C) AND [2] age > 60    Negative: [1] Fever > 100.0 F (37.8 C) AND [2] bedridden (e.g., nursing home patient, CVA, chronic illness, recovering from surgery)    Negative: [1] Fever > 100.0 F (37.8 C) AND [2] diabetes mellitus or weak immune system (e.g., HIV positive, cancer chemo, splenectomy, chronic steroids)    Negative: Wheezing is present    Negative: SEVERE coughing spells (e.g., whooping sound after coughing, vomiting after coughing)    Protocols used: COUGH - ACUTE [id number]

## 2019-12-15 NOTE — PROGRESS NOTES
Patient alert and oriented.  Up independently in room.  PRN tylenol and robitussin prn for chest/coughing discomfort with relief of symptoms.  Sputum culture needs collection.  Lung sounds dim/clear posterior.  Bowel sounds active and audible.  Voiding well.  Patient reports last loose stool yesterday.  Potassium replacement initiated.  Intake and outputs monitored, hat in place.  Will continue to monitor.    Addendum:  Care coordinator order placed to address information regarding advanced directives.

## 2019-12-15 NOTE — LETTER
Transition Communication Hand-off for Care Transitions to Next Level of Care Provider    Name: Kassie Ramirez  : 1970  MRN #: [id number]  Primary Care Provider: Mary Alice Colón  Primary Care MD Name: colón  Primary Clinic: [address] [place] [id number]  Primary Care Clinic Name: [facility]  Reason for Hospitalization:  Renal insufficiency [N28.9]  Neutropenic fever (H) [D70.9, R50.81]  Pneumonia due to infectious organism, unspecified laterality, unspecified part of lung [J18.9]  Admit Date/Time: 12/15/2019  6:33 AM  Discharge Date: Transfer to [facility] 19  Payor Source: Payor: BCBS / Plan: [place]BS [place] / Product Type: Indemnity /     Readmission Assessment Measure (KAIN) Risk Score/category: Elevated           Reason for Communication Hand-off Referral: Multiple providers/specialties    Follow-up plan:    Future Appointments   Date Time Provider Department Center   2019  8:15 AM RH LAB DRAW 1 [facility] RID   2019  8:45 AM Castro Castro MD [facility]   2019  9:30 AM RH INFUSION CHAIR 6 [facility] R[facility]       [place] Recommendations:   Pt is readmitted after 2 weeks with PNA.  We did discuss the PNA action care plan.  She is aware of when to contact her MD.  She said she didn't have a pleasant stay at the .  She pleasantly declines need for patient advocate.  KAIN ELEVATED.  Pt will start following with Dr Castro with [facility] in [place] for her B Cell lymphoma.  She inquired about resources for her losing hair.  I gave her a few hats.  I also gave her the [employer] contact number and a packet of info on wigs.     Patient was transferred to [facility] for pulmonary consult and evaluation including bronchoscopy.     KING Mcnally MA/RN Case Manager  Inpatient Care Coordination  [facility]   [phone]    AVS/Discharge Summary is the source of truth; this is a helpful guide for  improved communication of patient story

## 2019-12-15 NOTE — CONSULTS
Consult Date:  12/15/2019      This consult has been requested by Dr. Alvarado for neutropenic fever.      Ms. Ramirez is a 49-year-old female with stage III non-GCB diffuse large B-cell lymphoma.  She received first cycle of chemotherapy with R-CHOP on 11/29/2019.  She did not get any Neulasta with it.      The patient has been admitted to the hospital because of neutropenic fever.  For the last 3-4 days, she has not been feeling well.  She has been more weak and tired.  She has a cough.  With cough, she gets some chest discomfort.  Some shortness of breath because of the cough.      The patient was recently seen in Hematology/Oncology Clinic by Dr. Deedee Hamilton on 12/11/2019.  Labs on that day had revealed WBC of 0.8 with ANC of 0.2.  The patient was started on Levaquin and fluconazole.      The patient came to the emergency room today and had multiple investigations done.   -WBC of 1.2 with ANC of 0.4.  Normal platelets.  Hemoglobin of 9.6.   -Creatinine mildly elevated at 1.20.   -Elevated lactic acid.   -UA is abnormal with WBC and RBC.  Leukocyte esterase is negative.   -Chest x-ray reveals bilateral perihilar and lower lobe infiltrates.      The patient's lymphoma has significant chest involvement.  PET scan on 11/29/2019 had revealed hypermetabolic mass encasing and narrowing all secondary bronchi pulmonary arteries and vein and complete obstruction of right superior lobe bronchus with right upper lobe collapse.  There is mediastinal lymphadenopathy.      REVIEW OF SYSTEMS:  She feels weak.  No headache.  Some dizziness.  No chest pain except when she coughs.  No abdominal pain, nausea or vomiting.  Appetite is fair.  No urinary or bowel complaints.  No bleeding.      All other review of systems negative.      ALLERGIES:  Reviewed.      MEDICATIONS:  Reviewed.      PAST MEDICAL HISTORY:   1.  Lymphoma as described above.   2.  Anxiety.   3.  Hernia repair.   4.  Colporrhaphy.         SOCIAL HISTORY:   -No history  of smoking.   -Occasional alcohol use.      FAMILY HISTORY:   -Parents are .   -She has 1 sister in good health.   -She has 2 children.      PHYSICAL EXAMINATION:   GENERAL:  She is alert, oriented x3.   VITAL SIGNS:  Reviewed.  ECOG PS of 2.   FACE:  No swelling.   EYES:  No icterus.   THROAT:  No ulcer or thrush.   NECK:  Supple, no lymphadenopathy or thyromegaly.   LUNGS:  Decreased air entry at the bases.  Few crackles.   HEART:  Regular.   GI:  Soft and nontender.  No mass.   EXTREMITIES:  No edema.  No calf swelling or tenderness.   SKIN:  No petechiae.  No rash.      LABORATORY DATA:  Reviewed.      ASSESSMENT:   1.  A 49-year-old female on chemotherapy admitted with neutropenic fever.  Her infection is secondary to pneumonia.   2.  Stage III diffuse large B-cell lymphoma, status post first cycle of R-CHOP on 2019.   3.  Neutropenia secondary to chemotherapy.   4.  Pneumonia due to neutropenia and also due to lymphoma causing bronchial obstruction.   5.  Normocytic anemia from chemotherapy.      RECOMMENDATIONS:   1.  The patient has been admitted with neutropenic fever.  This is secondary to pneumonia.  She has been started on Zosyn and vancomycin.  If patient remains stable, vancomycin can be discontinued tomorrow.  She is on prophylactic acyclovir and fluconazole, which should be continued. I explained to the patient that her symptoms will start improving in the next 24-48 hours.   2.  The patient has neutropenia.  We will start her on Neupogen.  With next cycle of chemotherapy, she should receive Neulasta.   3.  She is anemic.  For workup, we will get some labs including iron studies, vitamin B12 and folate.   4.  The patient had a few questions, which were all answered.  Oncology will continue to follow her.      Thanks for the consult.         BRETT BLOUNT MD             D: 12/15/2019   T: 12/15/2019   MT: JUANITA      Name:     BABAR VARGHESE   MRN:      [id number]        Account:        [id number]   :      1970           Consult Date:  12/15/2019      Document: [id number]

## 2019-12-15 NOTE — H&P
[facility]  Hospitalist Admission Note  Name: Kassie Ramirez    MRN: [id number]  YOB: 1970    Age: 49 year old  Date of admission: 12/15/2019  Primary care provider: Mary Alice Colón    Chief Complaint:  Fever, cough    Assessment and Plan:   Sepsis secondary to HCAP, neutropenic fever: Previous LLL infiltrate and cough treated with 5 or 7 days levofloxacin then due to persistent symptoms put on 250 mg daily levofloxacin and fluconazole 200 mg daily on 12/11.  Now presenting with 2 days of fevers at home in the 100-101 range, worsening cough productive of whitish sputum, shortness of breath, and chest x-ray with bilateral lobe infiltrates consistent with pneumonia.  Given recent admission concern for healthcare associated pneumonia and will need to initiate cover for this.  Rapid influenza antigen negative.  Urinalysis with 18 WBCs, but no urine so doubt UTI.  Neutropenia with ANC 0.4.  Initially with sepsis with tachycardia, fever, neutropenia, and elevated lactic acid at 3.1 that improved to 1.9 following 2 L of normal saline.  Started on vancomycin and Zosyn.  -Continue vancomycin, pharmacy to dose and Zosyn IV  -Blood cultures obtained  -Obtain sputum culture  -Infectious disease consult tomorrow  -Continue the fluconazole, however unclear that this is necessary.  Defer to ID if this can be stopped or not  -Daily CBC with differential for neutropenia  -Supplemental oxygen as needed  -Albuterol nebs if wheezing as needed  -Guaifenesin-dextromethorphan as needed for cough    Diffuse large B cell lymphoma: Had a mediastinal mass biopsied at [facility] that came back positive for diffuse large B-cell lymphoma.  Initially treated with high-dose steroids.  Started chemotherapy with R-CHOP at the [facility] during the 11/27 through 12/1 admission.  Has follow-up with [facility] with Dr. Castro.  -[facility] consultation  -Continue PTA allopurinol and  acyclovir    Anemia: Hg 9.6 with baseline 13-14 that has slowly been trending down the past month.  Denies bleeding.  -daily cbc    RENAY: Creatinine 1.20 with baseline 0.8.  Suspect prerenal etiology with decreased oral intake the past 2 days in the setting of infection.  -Normal saline infusion overnight  -BMP tomorrow    Hypokalemia: Potassium 3.3.  Has had poor oral intake.  -Potassium replacement protocol    Anxiety: Continue lorazepam as needed.      DVT Prophylaxis: Enoxaparin (Lovenox) SQ  Code Status: Full Code  FEN: Regular diet, normal saline at 100 ml/hr  Discharge Dispo: Home  Estimated Disch Date / # of Days until Disch: Acute inpatient for neutropenic fever and H CAP.  Anticipate minimal 2-3 night hospitalization for IV antibiotics      History of Present Illness:  Kassie Ramirez is a 49 year old female with PMH including anxiety and recently diagnosed diffuse large B-cell lymphoma who underwent first dose of R-CHOP following high-dose steroids 2 weeks ago during hospitalization of the [facility] who was also recently treated with a course of oral levofloxacin for pneumonia who presents with worsening cough, shortness of breath, and fevers.  She underwent her first round of chemotherapy [facility] and started after he did fairly well from the standpoint.  She has had some chronic issues with cough and shortness of breath and even had near collapse of her right upper lobe in the past.  Initially they thought this might be sarcoidosis.  Then eventually found to have a mediastinal mass that was biopsied at [facility] and confirmed to be diffuse B-cell lymphoma.  She says she had a left lower lobe infiltrate so she was treated with 5 or 7 days of oral levofloxacin and then due to symptoms persisting oral levofloxacin was continued at 250 mg daily dosing on 12/11.  She was also started on fluconazole 200 mg daily on 12/11.  She takes both allopurinol and  acyclovir since starting chemotherapy.  Now for the past 3 days her cough has been much more frequent almost continuous throughout the day.  It is now more productive of whitish sputum.  She has been progressively short of breath on exertion.  She was having fevers in the range of 100.5-100.7 at home.  Denies any significant chest pain or chest pressure symptoms.  Has not had any nausea, vomiting, or diarrhea.  Overall oral intake for food and fluids is been low the past 2 days.  Has not noted any dysuria or urinary frequency.  Has not had a rash.  Has a mild headache currently.    History obtained from patient, medical record, and from Dr. Nayak in the emergency department.  Fever to 100.7 degrees.  Blood pressure low 110s systolic.  Tachycardic in the 120s improved slightly following 2 L of normal saline bolus.  Initial lactic acid 3.1 consistent with sepsis down to 1.9 after fluids.  Initial labs show a potassium of 3.3, creatinine elevation at 1.20, normal LFTs except for albumin 3.0, leukopenia at 1.2 with neutropenia and ANC of 0.4, hemoglobin 9.6, platelet count 270.  Rapid influenza antigen testing negative.  Urinalysis with 18 WBCs and large blood, but negative leukocyte esterase and nitrates.  Chest x-ray showing worsening bilateral perihilar and lower lobe infiltrates concerning for pneumonia.  She was given vancomycin and Zosyn along with acetaminophen.  Admit inpatient.     Past Medical History reviewed:  Diffuse large B-cell lymphoma  Anxiety    Past Surgical History reviewed:  Past Surgical History:   Procedure Laterality Date     H KIT ESSURE  2009    [facility] - Dr. Cailin Raymundo      HERNIORRHAPHY UMBILICAL  1974     SLING TRANSPUBO WITHOUT ANTERIOR COLPORRHAPHY N/A 11/21/2016    Procedure: SLING TRANSPUBO WITHOUT ANTERIOR COLPORRHAPHY;  Surgeon: Hernesto Berrios MD;  Location:  OR     Social History reviewed:  Social History     Tobacco Use     Smoking status: Never Smoker     Smokeless  tobacco: Never Used   Substance Use Topics     Alcohol use: No     Alcohol/week: 0.0 standard drinks     Comment: 1 time per month     Social History     Social History Narrative    Stay-at-home mom.       Family History reviewed:  Family History   Problem Relation Age of Onset     Hypertension Mother      Depression Mother      Lipids Mother      Cardiovascular Mother      Circulatory Mother      Diabetes Mother      Heart Disease Mother      Cerebrovascular Disease Mother      Obesity Mother      C.A.D. Mother      Lung Cancer Mother         smoker     Eye Disorder Father         cone dystrophy     Macular Degeneration Father      Diabetes Maternal Aunt         type 2     Hypertension Maternal Aunt      Heart Disease Maternal Grandfather      Heart Disease Sister         high cholesterol       Macular Degeneration Sister      Allergies:  Allergies   Allergen Reactions     Cold & Flu [Cold Defense Fighter]      See pseudoephedrine     Seasonal Allergies      Sudafed Cold-Cough [Dayquil Liquicaps]      Pseudoephedrine Rash     Rash then skins peels off      Medications:  Prior to Admission medications    Medication Sig Last Dose Taking? Auth Provider   acyclovir (ZOVIRAX) 400 MG tablet Take 1 tablet (400 mg) by mouth 2 times daily 12/15/2019 at am Yes Bhavna Culp MD   allopurinol (ZYLOPRIM) 300 MG tablet Take 1 tablet (300 mg) by mouth daily 12/15/2019 at am Yes Bhavna Culp MD   fluconazole (DIFLUCAN) 200 MG tablet Take 1 tablet (200 mg) by mouth daily 12/14/2019 at pm Yes Deedee Hamilton MD   levofloxacin (LEVAQUIN) 250 MG tablet Take 1 tablet (250 mg) by mouth daily for 7 days 12/14/2019 at Unknown time Yes Deedee Hamilton MD   LORazepam (ATIVAN) 0.5 MG tablet Take 1-2 tablets (0.5-1 mg) by mouth every 6 hours as needed (Breakthrough Nausea / Vomiting) prn Yes Bhavna Culp MD   omeprazole (PRILOSEC) 40 MG DR capsule Take 1 capsule (40 mg) by mouth every morning (before breakfast) 12/15/2019 at am Yes  Bhavna Culp MD   ondansetron (ZOFRAN-ODT) 8 MG ODT tab Take 1 tablet (8 mg) by mouth every 8 hours as needed prn Yes Bhavna Culp MD   prochlorperazine (COMPAZINE) 10 MG tablet Take 1 tablet (10 mg) by mouth every 6 hours as needed (Breakthrough Nausea/Vomiting) prn Yes Bhavna Culp MD   SENNA PO Take 1 tablet by mouth every 72 hours as needed prn Yes Unknown, Entered By History     Review of Systems:  A Comprehensive greater than 10 system review of systems was carried out.  Pertinent positives and negatives are noted above.  Otherwise negative.     Physical Exam:  Blood pressure 133/67, pulse 109, temperature 97.5  F (36.4  C), temperature source Oral, resp. rate 22, SpO2 91 %, not currently breastfeeding.  Wt Readings from Last 1 Encounters:   12/11/19 76.9 kg (169 lb 8 oz)     Exam:  Constitutional: Awake, NAD, but appears unwell  Eyes: sclera white   HEENT: atraumatic, dry mucous membranes  Respiratory: Bilateral crackles, no wheeze  Cardiovascular: Regular tachycardia without murmur  GI: non-tender, not distended, bowel sounds present  Skin: no rash or lesions, acyanotic  Musculoskeletal/extremities: atraumatic, no major deformities. No edema  Neurologic: A&O, speech clear, strength and light touch sensation grossly normal  Psychiatric: calm, cooperative, normal affect    Lab and imaging data personally reviewed:  Labs:  No results for input(s): PH, PHV, PO2, PO2V, SAT, PCO2, PCO2V, HCO3, HCO3V in the last 168 hours.  Recent Labs   Lab 12/15/19  0656 12/11/19  1151   WBC 1.2* 0.8*   HGB 9.6* 10.5*   HCT 28.5* 30.5*   MCV 94 93    169     Recent Labs   Lab 12/15/19  0656 12/11/19  1151    136   POTASSIUM 3.3* 3.2*   CHLORIDE 101 104   CO2 25 26   ANIONGAP 9 6   * 143*   BUN 11 13   CR 1.20* 0.83   GFRESTIMATED 53* 82   GFRESTBLACK 61 >90   AFSHAN 8.7 9.2   PROTTOTAL 6.7* 6.9   ALBUMIN 3.0* 3.6   BILITOTAL 1.2 1.0   ALKPHOS 70 102   AST 41 18   ALT 34 32     Recent Labs   Lab  12/15/19  0831 12/15/19  0656   LACT 1.9 3.1*     Recent Labs   Lab 12/15/19  0823   COLOR Yellow   APPEARANCE Clear   URINEGLC Negative   URINEBILI Negative   URINEKETONE Negative   SG 1.025   UBLD Large*   URINEPH 6.0   PROTEIN 100*   NITRITE Negative   LEUKEST Negative   RBCU 15*   WBCU 18*     Rapid influenza antigen negative    Imaging:  Recent Results (from the past 24 hour(s))   Chest XR,  PA & LAT    Narrative    CHEST TWO VIEWS  12/15/2019 7:33 AM     HISTORY: Fever.    COMPARISON: 11/28/2019 chest x-ray.      Impression    IMPRESSION: Right upper lobe collapse has resolved. Prominent  bilateral perihilar and lower lobe infiltrates. Differential diagnosis  includes congestive heart failure and pneumonitis/pneumonia.    MD Todd FIERRO MD  Hospitalist  [facility]

## 2019-12-15 NOTE — PHARMACY-ADMISSION MEDICATION HISTORY
Admission medication history interview status for this patient is complete. See [facility] admission navigator for allergy information, prior to admission medications and immunization status.     Medication history interview source(s):Patient and Family  Medication history resources (including written lists, pill bottles, clinic record): paper med list  Primary pharmacy: [facility] in [place].    Changes made to PTA medication list:  Added: senna prn  Deleted: xanax, ibuprofen, levaquin 750mg, prednisone  Changed: none    Actions taken by pharmacist (provider contacted, etc):None     Additional medication history information:None    Medication reconciliation/reorder completed by provider prior to medication history?  No     Do you take OTC medications (eg tylenol, ibuprofen, fish oil, eye/ear drops, etc)? No     For patients on insulin therapy: No      Prior to Admission medications    Medication Sig Last Dose Taking? Auth Provider   acyclovir (ZOVIRAX) 400 MG tablet Take 1 tablet (400 mg) by mouth 2 times daily 12/15/2019 at am Yes Bhavna Culp MD   allopurinol (ZYLOPRIM) 300 MG tablet Take 1 tablet (300 mg) by mouth daily 12/15/2019 at am Yes Bhavna Culp MD   fluconazole (DIFLUCAN) 200 MG tablet Take 1 tablet (200 mg) by mouth daily 12/14/2019 at pm Yes Deedee Hamilton MD   levofloxacin (LEVAQUIN) 250 MG tablet Take 1 tablet (250 mg) by mouth daily for 7 days 12/14/2019 at Unknown time Yes Deedee Hamilton MD   LORazepam (ATIVAN) 0.5 MG tablet Take 1-2 tablets (0.5-1 mg) by mouth every 6 hours as needed (Breakthrough Nausea / Vomiting) prn Yes Bhavna Culp MD   omeprazole (PRILOSEC) 40 MG DR capsule Take 1 capsule (40 mg) by mouth every morning (before breakfast) 12/15/2019 at am Yes Bhavna Culp MD   ondansetron (ZOFRAN-ODT) 8 MG ODT tab Take 1 tablet (8 mg) by mouth every 8 hours as needed prn Yes Bhavna Culp MD   prochlorperazine (COMPAZINE) 10 MG tablet Take 1 tablet (10 mg) by mouth  every 6 hours as needed (Breakthrough Nausea/Vomiting) prn Yes Bhavna Culp MD   SENNA PO Take 1 tablet by mouth every 72 hours as needed prn Yes Unknown, Entered By History

## 2019-12-15 NOTE — ED NOTES
[facility]  ED Nurse Handoff Report    Kassie Ramirez is a 49 year old female   ED Chief complaint: No chief complaint on file.  . ED Diagnosis:   Final diagnoses:   Neutropenic fever (H)   Renal insufficiency   Pneumonia due to infectious organism, unspecified laterality, unspecified part of lung     Allergies:   Allergies   Allergen Reactions     Cold & Flu [Cold Defense Fighter]      See pseudoephedrine     Seasonal Allergies      Sudafed Cold-Cough [Dayquil Liquicaps]      Pseudoephedrine Rash     Rash then skins peels off        Code Status: Full Code  Activity level - Baseline/Home:  Independent. Activity Level - Current:   Stand by Assist. Lift room needed: No. Bariatric: No   Needed: No   Isolation: yes - neutropenic. Infection: Not Applicable.     Vital Signs:   Vitals:    12/15/19 0800 12/15/19 0815 12/15/19 0830 12/15/19 0900   BP: 110/71 106/69 116/69 112/67   Pulse: 121 123 116 121   Resp: (!) 40 21 (!) 31 30   Temp:  100.7  F (38.2  C)     TempSrc:  Oral     SpO2: 96% 94% 96% 94%       Cardiac Rhythm:  ST,      Pain level:  0/10  Patient confused: No. Patient Falls Risk: Yes.   Elimination Status: Has voided   Patient Report - Initial Complaint: Cough, fever and chills.  Pt neutropenic, chemo 11/30 for lymphoma.  Focused Assessment:  Respiratory - Respiratory WDL: -WDL except; cough; rhythm/pattern  Rhythm/Pattern, Respiratory: dyspnea on exertion  Breath Sounds: All Fields  Cough Frequency: frequent  Cough Type: good; productive  Secretions Amount: small  Secretions Color: cloudy  Secretions Characteristics: thick   Respiratory Secretions Assessment - Secretions Amount: small  Secretions Color: cloudy  Secretions Characteristics: thick   Head To Toe Assessment - All Lung Fields Breath Sounds: Posterior:; crackles   Musculoskeletal - Musculoskeletal WDL: -WDL except; mobility  General Mobility: generalized weakness   Tests Performed: Labs and UA   Abnormal Results:    Abnormal Labs Reviewed   CBC WITH PLATELETS DIFFERENTIAL - Abnormal; Notable for the following components:       Result Value    WBC 1.2 (*)     RBC Count 3.02 (*)     Hemoglobin 9.6 (*)     Hematocrit 28.5 (*)     RDW 16.2 (*)     Absolute Neutrophil 0.4 (*)     Absolute Lymphocytes 0.5 (*)     All other components within normal limits   COMPREHENSIVE METABOLIC PANEL - Abnormal; Notable for the following components:    Potassium 3.3 (*)     Glucose 144 (*)     Creatinine 1.20 (*)     GFR Estimate 53 (*)     Albumin 3.0 (*)     Protein Total 6.7 (*)     All other components within normal limits   LACTIC ACID WHOLE BLOOD - Abnormal; Notable for the following components:    Lactic Acid 3.1 (*)     All other components within normal limits   ROUTINE UA WITH MICROSCOPIC - Abnormal; Notable for the following components:    Blood Urine Large (*)     Protein Albumin Urine 100 (*)     WBC Urine 18 (*)     RBC Urine 15 (*)     Bacteria Urine Many (*)     Squamous Epithelial /HPF Urine 39 (*)     Mucous Urine Present (*)     All other components within normal limits     Treatments provided: 1L NS, 1L LR, Zosyn, Vanco and APAP  Family Comments:  at bedside. Calm, pleasant and helpful.  OBS brochure/video discussed/provided to patient:  N/A  ED Medications:   Medications   lactated ringers BOLUS 1,000 mL (1,000 mLs Intravenous New Bag 12/15/19 0836)   piperacillin-tazobactam (ZOSYN) infusion 3.375 g (0 g Intravenous Stopped 12/15/19 0836)   acetaminophen (TYLENOL) tablet 500 mg (500 mg Oral Given 12/15/19 0831)   vancomycin (VANCOCIN) 1000 mg in dextrose 5% 200 mL PREMIX (1,000 mg Intravenous New Bag 12/15/19 0904)   0.9% sodium chloride BOLUS (0 mLs Intravenous Stopped 12/15/19 0833)     Drips infusing:  No  For the majority of the shift, the patient's behavior Green. Interventions performed were N/A.     Severe Sepsis OR Septic Shock Diagnosis Present: No      ED Nurse Name/Phone Number: Rosibel James RN,   9:31  AM  RECEIVING UNIT ED HANDOFF REVIEW    Above ED Nurse Handoff Report was reviewed: Yes  Reviewed by: Hortencia Liu RN on December 15, 2019 at 9:52 AM

## 2019-12-15 NOTE — TELEPHONE ENCOUNTER
Received a call from RN triage re: patient having productive cough.  She has not been febrile.  Next appointment is 12/27.  I will message clinic schedulers to see if we can get her into clinic Monday 12/16 for NICK visit.  In the meantime, I advised the RN to have her come to the Emergency Department if she develops fever of 100.4F or greater.  She can do to clinic if this happens during clinic hours.    Bhavna Culp MD  Hematology-Oncology-Transplant Fellow

## 2019-12-15 NOTE — PHARMACY-VANCOMYCIN DOSING SERVICE
Pharmacy Vancomycin Initial Note  Date of Service December 15, 2019  Patient's  1970  49 year old, female    Indication: Healthcare-Associated Pneumonia    Current estimated CrCl = Estimated Creatinine Clearance: 60.6 mL/min (A) (based on SCr of 1.2 mg/dL (H)).    Creatinine for last 3 days  12/15/2019:  6:56 AM Creatinine 1.20 mg/dL    Recent Vancomycin Level(s) for last 3 days  No results found for requested labs within last 72 hours.      Vancomycin IV Administrations (past 72 hours)                   vancomycin (VANCOCIN) 1000 mg in dextrose 5% 200 mL PREMIX (mg) 1,000 mg New Bag 12/15/19 0904                Nephrotoxins and other renal medications (From now, onward)    Start     Dose/Rate Route Frequency Ordered Stop    12/15/19 2100  acyclovir (ZOVIRAX) tablet 400 mg      400 mg Oral 2 TIMES DAILY 12/15/19 1123      12/15/19 1400  piperacillin-tazobactam (ZOSYN) infusion 3.375 g      3.375 g  100 mL/hr over 30 Minutes Intravenous EVERY 6 HOURS 12/15/19 1123      12/15/19 1400  vancomycin 1500 mg in 0.9% NaCl 250 ml intermittent infusion 1,500 mg      1,500 mg  over 90 Minutes Intravenous EVERY 12 HOURS 12/15/19 1205            Contrast Orders - past 72 hours (72h ago, onward)    None            Plan:  1.  Start vancomycin  1500 mg IV q12h.   2.  Goal Trough Level: 15-20 mg/L.  3.  Pharmacy will check trough levels as appropriate in 1-3 Days.    4. Serum creatinine levels will be ordered daily for the first week of therapy and at least twice weekly for subsequent weeks.    5. [place] method utilized to dose vancomycin therapy: Method 2.    Edda Miller [facility]

## 2019-12-16 ENCOUNTER — APPOINTMENT (OUTPATIENT)
Dept: CT IMAGING | Facility: CLINIC | Age: 49
End: 2019-12-16
Attending: HOSPITALIST
Payer: COMMERCIAL

## 2019-12-16 PROBLEM — N17.9 ACUTE KIDNEY FAILURE, UNSPECIFIED (H): Status: ACTIVE | Noted: 2019-12-16

## 2019-12-16 LAB
ANION GAP SERPL CALCULATED.3IONS-SCNC: 8 MMOL/L (ref 3–14)
ANISOCYTOSIS BLD QL SMEAR: SLIGHT
BASOPHILS # BLD AUTO: 0 10E9/L (ref 0–0.2)
BASOPHILS NFR BLD AUTO: 0 %
BUN SERPL-MCNC: 10 MG/DL (ref 7–30)
CALCIUM SERPL-MCNC: 8.4 MG/DL (ref 8.5–10.1)
CHLORIDE SERPL-SCNC: 113 MMOL/L (ref 94–109)
CO2 SERPL-SCNC: 23 MMOL/L (ref 20–32)
CREAT SERPL-MCNC: 1.28 MG/DL (ref 0.52–1.04)
DIFFERENTIAL METHOD BLD: ABNORMAL
EOSINOPHIL # BLD AUTO: 0.2 10E9/L (ref 0–0.7)
EOSINOPHIL NFR BLD AUTO: 5 %
ERYTHROCYTE [DISTWIDTH] IN BLOOD BY AUTOMATED COUNT: 16.8 % (ref 10–15)
FERRITIN SERPL-MCNC: 885 NG/ML (ref 8–252)
FOLATE SERPL-MCNC: 26 NG/ML
GFR SERPL CREATININE-BSD FRML MDRD: 49 ML/MIN/{1.73_M2}
GLUCOSE SERPL-MCNC: 102 MG/DL (ref 70–99)
HCT VFR BLD AUTO: 27.2 % (ref 35–47)
HGB BLD-MCNC: 8.7 G/DL (ref 11.7–15.7)
IRON SATN MFR SERPL: 21 % (ref 15–46)
IRON SERPL-MCNC: 39 UG/DL (ref 35–180)
LACTATE BLD-SCNC: 3.2 MMOL/L (ref 0.7–2)
LDH SERPL L TO P-CCNC: 596 U/L (ref 81–234)
LYMPHOCYTES # BLD AUTO: 0.8 10E9/L (ref 0.8–5.3)
LYMPHOCYTES NFR BLD AUTO: 23 %
MACROCYTES BLD QL SMEAR: PRESENT
MCH RBC QN AUTO: 31.3 PG (ref 26.5–33)
MCHC RBC AUTO-ENTMCNC: 32 G/DL (ref 31.5–36.5)
MCV RBC AUTO: 98 FL (ref 78–100)
METAMYELOCYTES # BLD: 0.1 10E9/L
METAMYELOCYTES NFR BLD MANUAL: 4 %
MICROCYTES BLD QL SMEAR: PRESENT
MONOCYTES # BLD AUTO: 0.2 10E9/L (ref 0–1.3)
MONOCYTES NFR BLD AUTO: 6 %
MYELOCYTES # BLD: 0.1 10E9/L
MYELOCYTES NFR BLD MANUAL: 2 %
NEUTROPHILS # BLD AUTO: 2.1 10E9/L (ref 1.6–8.3)
NEUTROPHILS NFR BLD AUTO: 60 %
OVALOCYTES BLD QL SMEAR: SLIGHT
PLATELET # BLD AUTO: 201 10E9/L (ref 150–450)
PLATELET # BLD EST: ABNORMAL 10*3/UL
POTASSIUM SERPL-SCNC: 3.7 MMOL/L (ref 3.4–5.3)
PROCALCITONIN SERPL-MCNC: 6.67 NG/ML
RBC # BLD AUTO: 2.78 10E12/L (ref 3.8–5.2)
RETICS # AUTO: 72 10E9/L (ref 25–95)
RETICS/RBC NFR AUTO: 2.6 % (ref 0.5–2)
SODIUM SERPL-SCNC: 144 MMOL/L (ref 133–144)
TIBC SERPL-MCNC: 190 UG/DL (ref 240–430)
VIT B12 SERPL-MCNC: 1100 PG/ML (ref 193–986)
WBC # BLD AUTO: 3.5 10E9/L (ref 4–11)

## 2019-12-16 PROCEDURE — 12000000 ZZH R&B MED SURG/OB

## 2019-12-16 PROCEDURE — 83540 ASSAY OF IRON: CPT | Performed by: INTERNAL MEDICINE

## 2019-12-16 PROCEDURE — 93005 ELECTROCARDIOGRAM TRACING: CPT

## 2019-12-16 PROCEDURE — 86606 ASPERGILLUS ANTIBODY: CPT | Performed by: INTERNAL MEDICINE

## 2019-12-16 PROCEDURE — 40000275 ZZH STATISTIC RCP TIME EA 10 MIN

## 2019-12-16 PROCEDURE — 87798 DETECT AGENT NOS DNA AMP: CPT | Performed by: INTERNAL MEDICINE

## 2019-12-16 PROCEDURE — 99233 SBSQ HOSP IP/OBS HIGH 50: CPT | Performed by: PHYSICIAN ASSISTANT

## 2019-12-16 PROCEDURE — 83615 LACTATE (LD) (LDH) ENZYME: CPT | Performed by: INTERNAL MEDICINE

## 2019-12-16 PROCEDURE — 82728 ASSAY OF FERRITIN: CPT | Performed by: INTERNAL MEDICINE

## 2019-12-16 PROCEDURE — 84145 PROCALCITONIN (PCT): CPT | Performed by: INTERNAL MEDICINE

## 2019-12-16 PROCEDURE — 25000128 H RX IP 250 OP 636: Performed by: INTERNAL MEDICINE

## 2019-12-16 PROCEDURE — 85025 COMPLETE CBC W/AUTO DIFF WBC: CPT | Performed by: INTERNAL MEDICINE

## 2019-12-16 PROCEDURE — 25800030 ZZH RX IP 258 OP 636: Performed by: INTERNAL MEDICINE

## 2019-12-16 PROCEDURE — 86698 HISTOPLASMA ANTIBODY: CPT | Performed by: INTERNAL MEDICINE

## 2019-12-16 PROCEDURE — 25800030 ZZH RX IP 258 OP 636: Performed by: HOSPITALIST

## 2019-12-16 PROCEDURE — 82607 VITAMIN B-12: CPT | Performed by: INTERNAL MEDICINE

## 2019-12-16 PROCEDURE — 80048 BASIC METABOLIC PNL TOTAL CA: CPT | Performed by: INTERNAL MEDICINE

## 2019-12-16 PROCEDURE — 85045 AUTOMATED RETICULOCYTE COUNT: CPT | Performed by: INTERNAL MEDICINE

## 2019-12-16 PROCEDURE — 36415 COLL VENOUS BLD VENIPUNCTURE: CPT | Performed by: INTERNAL MEDICINE

## 2019-12-16 PROCEDURE — 83550 IRON BINDING TEST: CPT | Performed by: INTERNAL MEDICINE

## 2019-12-16 PROCEDURE — 99233 SBSQ HOSP IP/OBS HIGH 50: CPT | Performed by: INTERNAL MEDICINE

## 2019-12-16 PROCEDURE — 71250 CT THORAX DX C-: CPT

## 2019-12-16 PROCEDURE — 86635 COCCIDIOIDES ANTIBODY: CPT | Performed by: INTERNAL MEDICINE

## 2019-12-16 PROCEDURE — 25000132 ZZH RX MED GY IP 250 OP 250 PS 637: Performed by: INTERNAL MEDICINE

## 2019-12-16 PROCEDURE — 86612 BLASTOMYCES ANTIBODY: CPT | Performed by: INTERNAL MEDICINE

## 2019-12-16 PROCEDURE — 82746 ASSAY OF FOLIC ACID SERUM: CPT | Performed by: INTERNAL MEDICINE

## 2019-12-16 PROCEDURE — 83605 ASSAY OF LACTIC ACID: CPT | Performed by: INTERNAL MEDICINE

## 2019-12-16 RX ADMIN — GUAIFENESIN AND DEXTROMETHORPHAN 10 ML: 100; 10 SYRUP ORAL at 20:56

## 2019-12-16 RX ADMIN — LORAZEPAM 0.5 MG: 0.5 TABLET ORAL at 15:03

## 2019-12-16 RX ADMIN — ACYCLOVIR 400 MG: 400 TABLET ORAL at 08:51

## 2019-12-16 RX ADMIN — ACYCLOVIR 400 MG: 400 TABLET ORAL at 20:16

## 2019-12-16 RX ADMIN — TAZOBACTAM SODIUM AND PIPERACILLIN SODIUM 3.38 G: 375; 3 INJECTION, SOLUTION INTRAVENOUS at 08:51

## 2019-12-16 RX ADMIN — FLUCONAZOLE 200 MG: 200 TABLET ORAL at 19:57

## 2019-12-16 RX ADMIN — SODIUM CHLORIDE: 9 INJECTION, SOLUTION INTRAVENOUS at 19:58

## 2019-12-16 RX ADMIN — ONDANSETRON HYDROCHLORIDE 4 MG: 2 INJECTION, SOLUTION INTRAMUSCULAR; INTRAVENOUS at 18:56

## 2019-12-16 RX ADMIN — TAZOBACTAM SODIUM AND PIPERACILLIN SODIUM 3.38 G: 375; 3 INJECTION, SOLUTION INTRAVENOUS at 14:32

## 2019-12-16 RX ADMIN — TAZOBACTAM SODIUM AND PIPERACILLIN SODIUM 3.38 G: 375; 3 INJECTION, SOLUTION INTRAVENOUS at 20:14

## 2019-12-16 RX ADMIN — ALLOPURINOL 300 MG: 300 TABLET ORAL at 08:51

## 2019-12-16 RX ADMIN — VANCOMYCIN HYDROCHLORIDE 1500 MG: 10 INJECTION, POWDER, LYOPHILIZED, FOR SOLUTION INTRAVENOUS at 15:07

## 2019-12-16 RX ADMIN — ENOXAPARIN SODIUM 40 MG: 40 INJECTION SUBCUTANEOUS at 11:17

## 2019-12-16 RX ADMIN — SODIUM CHLORIDE 1000 ML: 9 INJECTION, SOLUTION INTRAVENOUS at 17:33

## 2019-12-16 RX ADMIN — ACETAMINOPHEN 975 MG: 325 TABLET, FILM COATED ORAL at 14:36

## 2019-12-16 RX ADMIN — SODIUM CHLORIDE: 9 INJECTION, SOLUTION INTRAVENOUS at 01:50

## 2019-12-16 RX ADMIN — OMEPRAZOLE 40 MG: 20 CAPSULE, DELAYED RELEASE ORAL at 06:30

## 2019-12-16 RX ADMIN — TAZOBACTAM SODIUM AND PIPERACILLIN SODIUM 3.38 G: 375; 3 INJECTION, SOLUTION INTRAVENOUS at 01:50

## 2019-12-16 RX ADMIN — VANCOMYCIN HYDROCHLORIDE 1500 MG: 10 INJECTION, POWDER, LYOPHILIZED, FOR SOLUTION INTRAVENOUS at 02:57

## 2019-12-16 RX ADMIN — ACETAMINOPHEN 975 MG: 325 TABLET, FILM COATED ORAL at 00:45

## 2019-12-16 RX ADMIN — LORAZEPAM 0.5 MG: 0.5 TABLET ORAL at 01:04

## 2019-12-16 RX ADMIN — ACETAMINOPHEN 975 MG: 325 TABLET, FILM COATED ORAL at 06:32

## 2019-12-16 ASSESSMENT — ACTIVITIES OF DAILY LIVING (ADL)
ADLS_ACUITY_SCORE: 12

## 2019-12-16 NOTE — CONSULTS
Care Transition Initial Assessment - RN        Met with: Patient and .  DATA   Active Problems:    Neutropenic fever (H)    Acute kidney failure, unspecified (H)       Cognitive Status: awake, alert and oriented.  Primary Care Clinic [facility]  Primary Care MD Name: christiano  Contact information and PCP information verified: Yes  Lives With: spouse         Description of Support System: Supportive   Who is your support system?:        Insurance concerns: No Insurance issues identified  ASSESSMENT  Patient currently receives the following services:  none        Identified issues/concerns regarding health management: Pt is readmitted after 2 weeks with PNA.  We did discuss the PNA action care plan.  She is aware of when to contact her MD.  She said she didn't have a pleasant stay at the .  She pleasantly declines need for patient advocate.  KAIN ELEVATED.  Pt will start following with Dr Castro with [facility] in [place] for her B Cell lymphoma.  She inquired about resources for her losing hair.  I gave her a few hats.  I also gave her the [employer] contact number . I left a vm for Kasandra AUGUST at [facility] as well for a packet of info on wigs. I will give a hand off to her primary care clinic and ONC clinic at time of discharge.      PLAN    Patient given options and choices for discharge yes .  Patient/family is agreeable to the plan?  Yes:   Patient anticipates discharging to home with  .        Patient anticipates needs for home equipment: No  Transportation/person available to transport on day of discharge  is her  and he is aware.   Plan/Disposition: Home   Appointments: TBD      Care  (CTS) will continue to follow as needed.    Adelina Anaya RN BSN   Inpatient Care Coordination  [facility]  [phone]

## 2019-12-16 NOTE — CONSULTS
ID consult dictated IMP 1 48 yo profoundly immunosuppressed female with fever and likely pulm infection    REC sputum PJP, LDH, same zosyn discontinue vanco, if not rapidly better CT and bronch if CT directes there early on

## 2019-12-16 NOTE — CONSULTS
CLINICAL NUTRITION SERVICES  -  ASSESSMENT NOTE    Recommendations Ordered by Registered Dietitian (RD):   Continue diet as ordered  Ordered strawberry smoothie with added beneprotein   Ordered Boost Plus (vanilla)   Malnutrition:   % Weight Loss:  Weight loss does not meet criteria for malnutrition   % Intake:  </= 50% for >/= 5 days (severe malnutrition) - based on diet recall  Subcutaneous Fat Loss:  Orbital region mild depletion and Upper arm region mild depletion  Muscle Loss:  Temporal region mild depletion, Clavicle bone region mild depletion and Anterior thigh region mild depletion  Fluid Retention:  None noted    Malnutrition Diagnosis: Non-Severe malnutrition  In Context of:  Acute illness or injury  Chronic illness or disease      REASON FOR ASSESSMENT  Kassie Ramirez is a 49 year old female seen by Registered Dietitian for Admission Nutrition Risk Screen for reduced oral intake over the last month and RN Consult - Reduced oral intake over last month.    NUTRITION HISTORY  - Information obtained from patient and chart   - Pt admitted for fever and cough found to be sepsis secondary to HCAP, neutropenic fever  - History of anxiety and recently diagnosed diffuse large B-cell lymphoma   - Prior to admission pt stated that she has not had a good appetite for the last week. During this time she was eating a couple of bites of food during the day to consume something.  Typical intake:   - Breakfast: toast and a banana  - Lunch: soup, noodles and/or some type of protein (beans or chicken)   - Dinner: similar to lunch   - Pt endorses taking high protein ensure at home about every other day  - NKFA    CURRENT NUTRITION ORDERS  Diet Order:     Regular     Current Intake/Tolerance:  Per the flowsheets, pt consuming 0-75% of meals ordered during this admission. Per the meal ordering system, pt has ordered meals consistently TID since admission on 12/15. Pt states that her appetite is still minimal.     NUTRITION  "FOCUSED PHYSICAL ASSESSMENT FOR DIAGNOSING MALNUTRITION)  Yes    Observed:    Muscle wasting (refer to documentation in Malnutrition section) and Subcutaneous fat loss (refer to documentation in Malnutrition section)    Obtained from Chart/Interdisciplinary Team:  - Last BM recorded on 12/17    ANTHROPOMETRICS  Height: 5' 7\" --> taken 11/29/19  Weight: 77.4  kg   BMI of 26.74 kg/m^2  Weight Status:  Overweight BMI 25-29.9  Weight History: weight appears fairly stable with slight variations. Pt has not noticed any weight loss.   Wt Readings from Last 10 Encounters:   12/17/19 77.4 kg (170 lb 11.2 oz)   12/11/19 76.9 kg (169 lb 8 oz)   12/03/19 75.5 kg (166 lb 6.4 oz)   12/01/19 75.9 kg (167 lb 6.4 oz)   11/27/19 78.5 kg (173 lb)   11/20/19 77.1 kg (170 lb)   10/16/19 77.6 kg (171 lb 1.6 oz)   09/19/19 77.2 kg (170 lb 3.1 oz)   08/22/19 77.1 kg (170 lb)   06/27/19 78 kg (172 lb)     LABS  Labs reviewed    Electrolytes  Potassium (mmol/L)   Date Value   12/16/2019 3.7   12/15/2019 3.8   12/15/2019 3.3 (L)     Phosphorus (mg/dL)   Date Value   12/01/2019 2.8   12/01/2019 3.4   11/30/2019 2.8   11/30/2019 2.5   11/30/2019 4.1    Blood Glucose  Glucose (mg/dL)   Date Value   12/16/2019 102 (H)   12/15/2019 130 (H)   12/15/2019 144 (H)   12/11/2019 143 (H)   11/29/2019 120 (H)    Inflammatory Markers  WBC (10e9/L)   Date Value   12/16/2019 3.5 (L)   12/15/2019 1.2 (L)   12/11/2019 0.8 (LL)     Albumin (g/dL)   Date Value   12/15/2019 3.0 (L)   12/11/2019 3.6   11/29/2019 3.0 (L)      Magnesium (mg/dL)   Date Value   11/27/2019 2.1   11/27/2019 2.5 (H)   09/19/2019 2.4 (H)     Sodium (mmol/L)   Date Value   12/16/2019 144   12/15/2019 141   12/15/2019 135    Renal  Urea Nitrogen (mg/dL)   Date Value   12/16/2019 10   12/15/2019 9   12/15/2019 11   12/11/2019 13   11/29/2019 14     Creatinine (mg/dL)   Date Value   12/16/2019 1.28 (H)   12/15/2019 1.07 (H)   12/15/2019 1.20 (H)   12/11/2019 0.83   12/01/2019 0.87     " Additional  Triglycerides (mg/dL)   Date Value   07/27/2018 523 (H)   09/24/2015 123   09/16/2014 129     Ketones Urine (mg/dL)   Date Value   12/15/2019 Negative        MEDICATIONS  Medications reviewed    IVF: NaCl @ 100 mL/hr     ASSESSED NUTRITION NEEDS PER APPROVED PRACTICE GUIDELINES:    Dosing Weight 76.9 kg --> need updated weight  Estimated Energy Needs: 1503-9644 kcals (25-30 Kcal/Kg)  Justification: maintenance  Estimated Protein Needs:  grams protein (1.2-1.5 g pro/Kg)  Justification: hypercatabolism with critical illness  Estimated Fluid Needs: >1 mL/Kcal  Justification: maintenance    MALNUTRITION:  % Weight Loss:  Weight loss does not meet criteria for malnutrition   % Intake:  </= 50% for >/= 5 days (severe malnutrition) - based on diet recall  Subcutaneous Fat Loss:  Orbital region mild depletion and Upper arm region mild depletion  Muscle Loss:  Temporal region mild depletion, Clavicle bone region mild depletion and Anterior thigh region mild depletion  Fluid Retention:  None noted    Malnutrition Diagnosis: Non-Severe malnutrition  In Context of:  Acute illness or injury  Chronic illness or disease    NUTRITION DIAGNOSIS:  Inadequate oral intake related to poor appetite as evidenced by pt likely consuming <50% of nutritional needs for >5 days at home and during this admission.    NUTRITION INTERVENTIONS  Recommendations / Nutrition Prescription  Continue diet as ordered  Ordered strawberry smoothie with added beneprotein   Ordered Boost Plus (vanilla)    Implementation  Nutrition education: Provided education on the different types of oral nutritional supplements available. Encouraged her to continue ordering and trying foods with a focus on protein.   Medical Food Supplement: as above   Collaboration and Referral of Nutrition care: Discussed pt during interdisciplinary rounds this morning     Nutrition Goals  Pt to consume >50% of meals ordered + >/=2 oral nutritional supplements per day      MONITORING AND EVALUATION:  Progress towards goals will be monitored and evaluated per protocol and Practice Guidelines    Brigitte Desai RD, LD  Clinical Dietitian

## 2019-12-16 NOTE — ACP (ADVANCE CARE PLANNING)
[facility] HEALTH SERVICES Progress Note   [facility] Advance Directive Education    Responded to a consult order for Advance Care Planning (ACP) education for Kassie Ramirez.  ACP education and materials provided.  Kassie reported that she would likely name her spouse as her primary healthcare agent but was uncertain about a first alternate.    Oriented her to  services.    She shared that her father  last February, after being his primary caregiver for 16 years.  Offered grief and emotional support through reflective listening and validation of feelings.  Kassie reflected on her father's personality and shared a few memories of him.    Kassie reported that she was recently diagnosed with large B-cell lymphoma and processed her thoughts and feelings about starting her cancer treatment.  She acknowledged that it is difficult for her to be a recipient of care when she has been a caregiver for so many years.    This author and other chaplains remain available per pt/family request.    Nabor Ruggiero M.Div., [facility]  Staff   Pager [phone]

## 2019-12-16 NOTE — PLAN OF CARE
Patient hospitalized for Fever related to pneumonia on 12/15. IV vancomycin, IV zosyn, and PO diflucan.    Pertinent assessments: Alert and oriented. Denies pain. Lung sounds with fine crackles and diminished, LORENZ, frequent cough. Patient declining cough medicine. Bowel sounds active and audible, LBM this evening, passing gas, denies nausea. Regular diet ordered, no appetite. Voiding without difficulty, menses started this evening. Up SBA. Last vitals: /79 (BP Location: Left arm)   Pulse 109   Temp 100.4  F (38  C) (Oral)   Resp 20   SpO2 96%  2L O2 via oxymask.    Major Shift Events: Tmax 105.0 oral, see previous charting and notes regarding intervention course. Last temp 100.4. Tylenol now scheduled. IS instruction provided and use encouraged. Patient started on subcutaneous Neupogen  Plan (Upcoming Events): Nutrition, ID, Hem/onc consulted.   Discharge/Transfer Needs: TBD. Patient comes from home with .

## 2019-12-16 NOTE — PROGRESS NOTES
[facility]  Hospitalist Progress Note  Todd Alvarado MD 12/16/19    Reason for Stay (Diagnosis): Sepsis, HCAP, neutropenic fever         Assessment and Plan:      Summary of Stay: Kassie Ramirez is a 49 year old female with PMH including anxiety and recently diagnosed diffuse large B-cell lymphoma who underwent first dose of R-CHOP following high-dose steroids 2 weeks ago during hospitalization of the [facility] who was also recently treated with a course of oral levofloxacin for pneumonia who presents with worsening cough, shortness of breath, and fevers and was admitted on 12/15/2019 with neutropenic fever and bilateral chest infiltrates concerning for HCAP.  Started on broad-spectrum IV vancomycin and Zosyn.  Also with mild RENAY and receiving IV fluids for this.  [facility] onc[facility] and infectious disease have been consulted.  Very high fever to 105 degrees first night.    Problem List/Assessment and Plan:   Sepsis secondary to HCAP, neutropenic fever: Previous LLL infiltrate and cough treated with 5 or 7 days levofloxacin then due to persistent symptoms put on 250 mg daily levofloxacin and fluconazole 200 mg daily on 12/11.  Now presenting with 2 days of fevers at home in the 100-101 range, worsening cough productive of whitish sputum, shortness of breath, and chest x-ray with bilateral lobe infiltrates consistent with pneumonia.  Given recent admission concern for healthcare associated pneumonia and will need to initiate cover for this.  Rapid influenza antigen negative.  Urinalysis with 18 WBCs, but no urine so doubt UTI.  Neutropenia with ANC 0.4.  Initially with sepsis with tachycardia, fever, neutropenia, and elevated lactic acid at 3.1 that improved to 1.9 following 2 L of normal saline.  Started on vancomycin and Zosyn.  Fever to 105  F overnight.  -Continue vancomycin, pharmacy to dose  -Continue IV Zosyn  -Blood cultures obtained, NGTD  -Urine culture, NGTD  -Infectious  disease consult   -Continue the fluconazole, however unclear that this is necessary.  Defer to ID if this can be stopped or not  -Daily CBC with differential for neutropenia  -Supplemental oxygen as needed  -Albuterol nebs if wheezing as needed  -Guaifenesin-dextromethorphan as needed for cough  -supplemental O2 as needed  -received 1 dose of neupogen and no longer neutropenic     Diffuse large B cell lymphoma: Had a mediastinal mass biopsied at [facility] that came back positive for diffuse large B-cell lymphoma.  Initially treated with high-dose steroids.  Started chemotherapy with R-CHOP at the [facility] during the 11/27 through 12/1 admission.  Has follow-up with [place] [facility] with Dr. Castro.  -[place] onc[facility] consultation  -Continue PTA allopurinol and acyclovir     Anemia: Initial hg 9.6 with baseline 13-14 that has slowly been trending down the past month.  Denies bleeding.  -daily cbc     Mild RENAY: Creatinine 1.20 with baseline 0.8.  Suspect prerenal etiology with decreased oral intake the past 2 days in the setting of infection.  -Normal saline infusion   -BMP tomorrow     Hypokalemia: Potassium 3.3 initially.  Has had poor oral intake.  -Potassium replacement protocol     Anxiety: Continue lorazepam as needed.        DVT Prophylaxis: Enoxaparin (Lovenox) SQ  Code Status: Full Code  FEN: Regular diet, normal saline at 100 ml/hr  Discharge Dispo: Home  Estimated Disch Date / # of Days until Disch: 2-4 days pending resolution of fevers and improvement in respiratory status        Interval History (Subjective):      Still coughing frequently.  Robitussin to suppress the cough, but made it feel like she could not breathe as well.  Still short of breath with minimal exertion.  Febrile as high as 105  F overnight.  Still feels quite unwell overall.  Does endorse worsening shortness of breath when laying flat.                  Physical Exam:      Last Vital Signs:  /68 (BP Location: Left arm)    Pulse 109   Temp 95.9  F (35.5  C) (Oral)   Resp 18   SpO2 93%       Intake/Output Summary (Last 24 hours) at 12/16/2019 1249  Last data filed at 12/16/2019 0627  Gross per 24 hour   Intake 2648 ml   Output 950 ml   Net 1698 ml       Constitutional: Awake, appears unwell although not in distress  Eyes: sclera white   HEENT: MMM  Respiratory: Bilateral crackles without wheeze  Cardiovascular: Regular tachycardia.  No murmur   GI: non-tender, not distended, bowel sounds present  Skin: no rash  Musculoskeletal/extremities: No significant edema  Neurologic: A&O  Psychiatric: calm, cooperative         Medications:      All current medications were reviewed with changes reflected in problem list.         Data:      All new lab and imaging data was reviewed.   Labs:  Recent Labs   Lab 12/15/19  0830 12/15/19  0656   CULT No growth after 19 hours No growth after 19 hours     Recent Labs   Lab 12/16/19  0804 12/15/19  1948 12/15/19  1337 12/15/19  0656    141  --  135   POTASSIUM 3.7 3.8 3.3* 3.3*   CHLORIDE 113* 111*  --  101   CO2 23 23  --  25   ANIONGAP 8 7  --  9   * 130*  --  144*   BUN 10 9  --  11   CR 1.28* 1.07*  --  1.20*   GFRESTIMATED 49* 61  --  53*   GFRESTBLACK 57* 70  --  61   AFSHAN 8.4* 8.1*  --  8.7     Recent Labs   Lab 12/16/19  0804 12/15/19  0656 12/11/19  1151   WBC 3.5* 1.2* 0.8*   HGB 8.7* 9.6* 10.5*   HCT 27.2* 28.5* 30.5*   MCV 98 94 93    270 169      Imaging:   None today      Todd Alvarado MD

## 2019-12-16 NOTE — PROVIDER NOTIFICATION
md paged at 1935 regarding temp of 104.7. Dr. Monreal returned paged, will add additional tylenol, ibuprofen, and labs. Nursing will monitor temps.

## 2019-12-16 NOTE — PLAN OF CARE
A&O  VSS  O2 @ 1L NC, sating mid upper 90's  Up independently  Regular diet, no appetite  IV infusing NS @ 100/hr  LS diminished with fine crackles, LORENZ  BS active, +gas. LBM 12/16/19, diarrhea  Skin: incision to lower throat/upper chest area WDL  Pt having menses  No c/o pain   Nausea/dry heaves during coughing fits, declined antiemetic  Tx: Fluconazole, Zosyn, Vanco, Lovenox, HEPA filter  Will cont to monitor

## 2019-12-16 NOTE — PROGRESS NOTES
Sepsis Evaluation Progress Note    I was called to see Kassie Ramirez due to abnormal vital signs triggering the Sepsis SIRS screening alert. She is known to have an infection.     Physical Exam   Vital Signs:  Temp: 100.2  F (37.9  C) Temp src: Oral BP: 120/74   Heart Rate: 119 Resp: 20 SpO2: 93 % O2 Device: Nasal cannula Oxygen Delivery: 1.5 LPM    Lab:  Lactic Acid   Date Value Ref Range Status   12/15/2019 2.7 (H) 0.7 - 2.0 mmol/L Final     Lactate for Sepsis Protocol   Date Value Ref Range Status   12/16/2019 3.2 (H) 0.7 - 2.0 mmol/L Final     Comment:     Significant value called to and read back by   DONNA MURPHY ([id number]) BARB 12/16/19 AT 1634 BY TB         The patient is at baseline mental status.     The rest of their physical exam is significant for     Assessment & Plan   Kassie Ramirez meets SIRS criteria but does NOT have a lactate >2 or other evidence of acute organ damage.  These vital sign, lab and physical exam findings are consistent with SEPSIS.    Sepsis Time-Zero (time Sepsis diagnosis confirmed): 5:27  12/16/19    Anti-infectives (From now, onward)    Start     Dose/Rate Route Frequency Ordered Stop    12/15/19 2100  acyclovir (ZOVIRAX) tablet 400 mg      400 mg Oral 2 TIMES DAILY 12/15/19 1123      12/15/19 1400  piperacillin-tazobactam (ZOSYN) infusion 3.375 g      3.375 g  100 mL/hr over 30 Minutes Intravenous EVERY 6 HOURS 12/15/19 1123      12/15/19 1130  fluconazole (DIFLUCAN) tablet 200 mg      200 mg Oral DAILY 12/15/19 1123          Current antibiotic coverage is appropriate for source of infection.     Disposition: The patient will remain on the current unit. We will continue to monitor this patient closely. CT chest ordered because of worsening symptoms, findings noted, infectious vs inflammatory, or pulmonary edema, she does not appear overtly volume overloaded will hold of diuresis. Vancomycin has been added, will defer ID to add antifungal/aspergillus coverage with voriconazole.      Cammie Crowder MD    Sepsis Criteria   Sepsis: 2+ SIRS criteria due to infection  Severe Sepsis: Sepsis AND 1+ new sign of acute organ dysfunction (Note: lactate >2 is organ dysfunction)  Septic Shock: Sepsis AND hypotension despite volume resuscitation with 30 ml/kg crystalloid

## 2019-12-16 NOTE — PROGRESS NOTES
Cross cover    Admit for neutropenic fever/HCAP, in setting of initiation of R-CHOP therapy for large B cell lymphoma.  pta had been on oral levofloxacin and fluconazole    Called for temp 105, despite apap 650 mg earlier.  Admit BMP noted and creat 1.2.      Currently septic with fever/tachypnea/tachycardia.  ON pip-tazo/vanco for broad coverage.  ID consulted     Plan:  Treat fever.  Give apap 325 mg x 1 then 975 mg q 6 x 3 scheduled.  Give ibuprofen 200 mg x 1 (would like to minimize in setting of mild RENAY on admission). Recheck bmp and lactic acid.      ? If some of the fever is related to underlying lymphoma

## 2019-12-16 NOTE — PROGRESS NOTES
[facility]    Hematology/Oncology Follow-up Note      Today's Date: 12/16/19  Date of Admission:  12/15/2019  Reason for Consult: DLBCL, admitted for Neutropenic fever      ASSESSMENT/PLAN:  Kassie Ramirez is a 49 year old female with:     DLBCL: Stage III, recently diagnosed and admitted from 11/21-12/1/2019 to initiate first cycle of R-CHOP chemotherapy at the [facility].  Now with neutropenic fever, see below.  She will be following up with Dr. Castro as an outpatient and is scheduled to see him for cycle 2 on 12/27/2019.  We will add Neulasta to cycle 2.    --Continue allopurinol and acyclovir  --We will follow through this hospital stay    Neutropenic fever: Noted to be neutropenic on 12/11/19 and given levofloxacin and fluconazole.  Febrile as high as 105 degrees last night and she was given Neupogen on 12/15/2019, ANC has now normalized to 2.1.  Concern for hospital-acquired pneumonia and receiving antibiotics, IVF and ID has been consulted.    --Neupogen can be discontinued    Anemia: Hemoglobin dropped to 8.7, likely secondary to chemotherapy.  Ferritin is elevated 885, but this could be due to her lymphoma.  Iron is normal at 39 and saturation index is normal at 21.  Consider blood transfusion if hemoglobin <8.0 or symptomatic or bleeding.  --Daily CBC with differential    RENAY: Likely prerenal and getting IV fluids today.  Hospital team following.    Discussed with Dr Castro and he agreed with the plan above.         INTERIM HISTORY: Patient seen in her room, resting comfortably in bed.  She continues to have shortness of breath and is wearing oxygen.  LORENZ and she is ambulating around the room.  Happy to hear about her ANC.  Fever of 105 last night and no fever this morning.  Continues with IV fluids and appetite has been poor.       MEDICATIONS:  Current Facility-Administered Medications   Medication     acetaminophen (TYLENOL) tablet 975 mg     acyclovir (ZOVIRAX) tablet 400 mg      albuterol (PROVENTIL) neb solution 2.5 mg     allopurinol (ZYLOPRIM) tablet 300 mg     enoxaparin ANTICOAGULANT (LOVENOX) injection 40 mg     fluconazole (DIFLUCAN) tablet 200 mg     guaiFENesin-dextromethorphan (ROBITUSSIN DM) 100-10 MG/5ML syrup 10 mL     lidocaine (LMX4) cream     lidocaine 1 % 0.1-1 mL     LORazepam (ATIVAN) tablet 0.5-1 mg     melatonin tablet 1 mg     naloxone (NARCAN) injection 0.1-0.4 mg     omeprazole (priLOSEC) CR capsule 40 mg     ondansetron (ZOFRAN-ODT) ODT tab 4 mg    Or     ondansetron (ZOFRAN) injection 4 mg     piperacillin-tazobactam (ZOSYN) infusion 3.375 g     polyethylene glycol (MIRALAX/GLYCOLAX) Packet 17 g     potassium chloride (KLOR-CON) Packet 20-40 mEq     potassium chloride 10 mEq in 100 mL intermittent infusion with 10 mg lidocaine     potassium chloride 10 mEq in 100 mL sterile water intermittent infusion (premix)     potassium chloride 20 mEq in 50 mL intermittent infusion     potassium chloride ER (K-DUR/KLOR-CON M) CR tablet 20-40 mEq     prochlorperazine (COMPAZINE) injection 10 mg    Or     prochlorperazine (COMPAZINE) tablet 10 mg    Or     prochlorperazine (COMPAZINE) Suppository 25 mg     senna-docusate (SENOKOT-S/PERICOLACE) 8.6-50 MG per tablet 1 tablet    Or     senna-docusate (SENOKOT-S/PERICOLACE) 8.6-50 MG per tablet 2 tablet     sodium chloride (PF) 0.9% PF flush 3 mL     sodium chloride (PF) 0.9% PF flush 3 mL     sodium chloride 0.9% infusion     vancomycin 1500 mg in 0.9% NaCl 250 ml intermittent infusion 1,500 mg         ALLERGIES:  Allergies   Allergen Reactions     Cold & Flu [Cold Defense Fighter]      See pseudoephedrine     Seasonal Allergies      Sudafed Cold-Cough [Dayquil Liquicaps]      Pseudoephedrine Rash     Rash then skins peels off          PHYSICAL EXAM:  Vital signs:  Temp: 95.9  F (35.5  C) Temp src: Oral BP: 103/68   Heart Rate: 103 Resp: 18 SpO2: 93 % O2 Device: Nasal cannula Oxygen Delivery: 1 LPM      Estimated body mass index is  "26.55 kg/m  as calculated from the following:    Height as of 11/29/19: 1.702 m (5' 7\").    Weight as of 12/11/19: 76.9 kg (169 lb 8 oz).      GENERAL:  Female, in no acute distress.  Alert and oriented x3.   HEENT:  Normocephalic, atraumatic.   LUNGS:  NC in place, mild labored breathing  SKIN: No rash on exposed skin   PSYCH: Mood stable      LABS:  CBC RESULTS:   Recent Labs   Lab Test 12/16/19  0804   WBC 3.5*   RBC 2.78*   HGB 8.7*   HCT 27.2*   MCV 98   MCH 31.3   MCHC 32.0   RDW 16.8*          Recent Labs   Lab Test 12/16/19  0804 12/15/19  1948    141   POTASSIUM 3.7 3.8   CHLORIDE 113* 111*   CO2 23 23   ANIONGAP 8 7   * 130*   BUN 10 9   CR 1.28* 1.07*   AFSHAN 8.4* 8.1*           Padmaja Sanchez PA-C  Hematology/Oncology  [facility]      "

## 2019-12-16 NOTE — PLAN OF CARE
VSS- afebrile overnight, Hr tachy. Requiring 3L to maintain SPO2. Prn ativan x1 for anxiety. Frequent cough, declined cough syrup. Generalized fatigue. Continues on ivf, zosyn, vanco and fluconazole. Ambulating SBA/independently. Regular diet, no appetite.

## 2019-12-17 LAB
ABO + RH BLD: NORMAL
ABO + RH BLD: NORMAL
ANION GAP SERPL CALCULATED.3IONS-SCNC: 9 MMOL/L (ref 3–14)
ANISOCYTOSIS BLD QL SMEAR: SLIGHT
BACTERIA SPEC CULT: NORMAL
BASOPHILS # BLD AUTO: 0.1 10E9/L (ref 0–0.2)
BASOPHILS NFR BLD AUTO: 1 %
BLD GP AB SCN SERPL QL: NORMAL
BLD PROD TYP BPU: NORMAL
BLD PROD TYP BPU: NORMAL
BLD UNIT ID BPU: 0
BLOOD BANK CMNT PATIENT-IMP: NORMAL
BLOOD PRODUCT CODE: NORMAL
BPU ID: NORMAL
BUN SERPL-MCNC: 8 MG/DL (ref 7–30)
C DIFF TOX B STL QL: NEGATIVE
CALCIUM SERPL-MCNC: 8.1 MG/DL (ref 8.5–10.1)
CHLORIDE SERPL-SCNC: 113 MMOL/L (ref 94–109)
CO2 SERPL-SCNC: 20 MMOL/L (ref 20–32)
CREAT SERPL-MCNC: 1.15 MG/DL (ref 0.52–1.04)
DIFFERENTIAL METHOD BLD: ABNORMAL
EOSINOPHIL # BLD AUTO: 0.1 10E9/L (ref 0–0.7)
EOSINOPHIL NFR BLD AUTO: 1 %
ERYTHROCYTE [DISTWIDTH] IN BLOOD BY AUTOMATED COUNT: 16.2 % (ref 10–15)
GFR SERPL CREATININE-BSD FRML MDRD: 56 ML/MIN/{1.73_M2}
GLUCOSE SERPL-MCNC: 91 MG/DL (ref 70–99)
HCT VFR BLD AUTO: 22.6 % (ref 35–47)
HGB BLD-MCNC: 7.6 G/DL (ref 11.7–15.7)
HYPOCHROMIA BLD QL: PRESENT
LYMPHOCYTES # BLD AUTO: 0.4 10E9/L (ref 0.8–5.3)
LYMPHOCYTES NFR BLD AUTO: 6 %
MACROCYTES BLD QL SMEAR: PRESENT
MCH RBC QN AUTO: 31.8 PG (ref 26.5–33)
MCHC RBC AUTO-ENTMCNC: 33.6 G/DL (ref 31.5–36.5)
MCV RBC AUTO: 95 FL (ref 78–100)
METAMYELOCYTES # BLD: 0.1 10E9/L
METAMYELOCYTES NFR BLD MANUAL: 2 %
MICROCYTES BLD QL SMEAR: PRESENT
MONOCYTES # BLD AUTO: 0.4 10E9/L (ref 0–1.3)
MONOCYTES NFR BLD AUTO: 6 %
MYELOCYTES # BLD: 0.1 10E9/L
MYELOCYTES NFR BLD MANUAL: 2 %
NEUTROPHILS # BLD AUTO: 5.2 10E9/L (ref 1.6–8.3)
NEUTROPHILS NFR BLD AUTO: 82 %
NUM BPU REQUESTED: 1
PLATELET # BLD AUTO: 222 10E9/L (ref 150–450)
PLATELET # BLD EST: ABNORMAL 10*3/UL
POTASSIUM SERPL-SCNC: 3.2 MMOL/L (ref 3.4–5.3)
POTASSIUM SERPL-SCNC: 4.4 MMOL/L (ref 3.4–5.3)
RBC # BLD AUTO: 2.39 10E12/L (ref 3.8–5.2)
SODIUM SERPL-SCNC: 142 MMOL/L (ref 133–144)
SPECIMEN EXP DATE BLD: NORMAL
SPECIMEN SOURCE: NORMAL
SPECIMEN SOURCE: NORMAL
TRANSFUSION STATUS PATIENT QL: NORMAL
TRANSFUSION STATUS PATIENT QL: NORMAL
VANCOMYCIN SERPL-MCNC: 21.1 MG/L
WBC # BLD AUTO: 6.3 10E9/L (ref 4–11)

## 2019-12-17 PROCEDURE — 86900 BLOOD TYPING SEROLOGIC ABO: CPT | Performed by: PHYSICIAN ASSISTANT

## 2019-12-17 PROCEDURE — 99233 SBSQ HOSP IP/OBS HIGH 50: CPT | Performed by: PHYSICIAN ASSISTANT

## 2019-12-17 PROCEDURE — 86850 RBC ANTIBODY SCREEN: CPT | Performed by: PHYSICIAN ASSISTANT

## 2019-12-17 PROCEDURE — 36415 COLL VENOUS BLD VENIPUNCTURE: CPT | Performed by: INTERNAL MEDICINE

## 2019-12-17 PROCEDURE — 25000132 ZZH RX MED GY IP 250 OP 250 PS 637: Performed by: INTERNAL MEDICINE

## 2019-12-17 PROCEDURE — 86923 COMPATIBILITY TEST ELECTRIC: CPT | Performed by: PHYSICIAN ASSISTANT

## 2019-12-17 PROCEDURE — 12000000 ZZH R&B MED SURG/OB

## 2019-12-17 PROCEDURE — 25000128 H RX IP 250 OP 636: Performed by: INTERNAL MEDICINE

## 2019-12-17 PROCEDURE — 85025 COMPLETE CBC W/AUTO DIFF WBC: CPT | Performed by: INTERNAL MEDICINE

## 2019-12-17 PROCEDURE — 99233 SBSQ HOSP IP/OBS HIGH 50: CPT | Performed by: INTERNAL MEDICINE

## 2019-12-17 PROCEDURE — 80048 BASIC METABOLIC PNL TOTAL CA: CPT | Performed by: INTERNAL MEDICINE

## 2019-12-17 PROCEDURE — 25800030 ZZH RX IP 258 OP 636: Performed by: INTERNAL MEDICINE

## 2019-12-17 PROCEDURE — 86901 BLOOD TYPING SEROLOGIC RH(D): CPT | Performed by: PHYSICIAN ASSISTANT

## 2019-12-17 PROCEDURE — 87449 NOS EACH ORGANISM AG IA: CPT | Performed by: INTERNAL MEDICINE

## 2019-12-17 PROCEDURE — 84132 ASSAY OF SERUM POTASSIUM: CPT | Performed by: INTERNAL MEDICINE

## 2019-12-17 PROCEDURE — 36415 COLL VENOUS BLD VENIPUNCTURE: CPT | Performed by: PHYSICIAN ASSISTANT

## 2019-12-17 PROCEDURE — 87493 C DIFF AMPLIFIED PROBE: CPT | Performed by: INTERNAL MEDICINE

## 2019-12-17 PROCEDURE — 80202 ASSAY OF VANCOMYCIN: CPT | Performed by: INTERNAL MEDICINE

## 2019-12-17 PROCEDURE — P9016 RBC LEUKOCYTES REDUCED: HCPCS | Performed by: PHYSICIAN ASSISTANT

## 2019-12-17 RX ORDER — ACETAMINOPHEN 650 MG/1
650 SUPPOSITORY RECTAL EVERY 4 HOURS PRN
Status: DISCONTINUED | OUTPATIENT
Start: 2019-12-17 | End: 2019-12-18 | Stop reason: HOSPADM

## 2019-12-17 RX ORDER — CEFAZOLIN SODIUM 1 G/50ML
1250 SOLUTION INTRAVENOUS EVERY 12 HOURS
Status: DISCONTINUED | OUTPATIENT
Start: 2019-12-18 | End: 2019-12-18

## 2019-12-17 RX ORDER — ACETAMINOPHEN 325 MG/1
650 TABLET ORAL EVERY 4 HOURS PRN
Status: DISCONTINUED | OUTPATIENT
Start: 2019-12-17 | End: 2019-12-18 | Stop reason: HOSPADM

## 2019-12-17 RX ADMIN — LORAZEPAM 1 MG: 0.5 TABLET ORAL at 22:19

## 2019-12-17 RX ADMIN — POTASSIUM CHLORIDE 20 MEQ: 1500 TABLET, EXTENDED RELEASE ORAL at 10:15

## 2019-12-17 RX ADMIN — ACYCLOVIR 400 MG: 400 TABLET ORAL at 08:18

## 2019-12-17 RX ADMIN — ACYCLOVIR 400 MG: 400 TABLET ORAL at 21:53

## 2019-12-17 RX ADMIN — TAZOBACTAM SODIUM AND PIPERACILLIN SODIUM 3.38 G: 375; 3 INJECTION, SOLUTION INTRAVENOUS at 22:18

## 2019-12-17 RX ADMIN — SODIUM CHLORIDE: 9 INJECTION, SOLUTION INTRAVENOUS at 11:12

## 2019-12-17 RX ADMIN — ALLOPURINOL 300 MG: 300 TABLET ORAL at 08:18

## 2019-12-17 RX ADMIN — OMEPRAZOLE 40 MG: 20 CAPSULE, DELAYED RELEASE ORAL at 06:39

## 2019-12-17 RX ADMIN — POTASSIUM CHLORIDE 40 MEQ: 1500 TABLET, EXTENDED RELEASE ORAL at 08:17

## 2019-12-17 RX ADMIN — TAZOBACTAM SODIUM AND PIPERACILLIN SODIUM 3.38 G: 375; 3 INJECTION, SOLUTION INTRAVENOUS at 08:17

## 2019-12-17 RX ADMIN — ENOXAPARIN SODIUM 40 MG: 40 INJECTION SUBCUTANEOUS at 10:46

## 2019-12-17 RX ADMIN — ACETAMINOPHEN 650 MG: 325 TABLET, FILM COATED ORAL at 18:29

## 2019-12-17 RX ADMIN — VANCOMYCIN HYDROCHLORIDE 1500 MG: 10 INJECTION, POWDER, LYOPHILIZED, FOR SOLUTION INTRAVENOUS at 16:02

## 2019-12-17 RX ADMIN — TAZOBACTAM SODIUM AND PIPERACILLIN SODIUM 3.38 G: 375; 3 INJECTION, SOLUTION INTRAVENOUS at 02:04

## 2019-12-17 RX ADMIN — ACETAMINOPHEN 650 MG: 325 TABLET, FILM COATED ORAL at 03:58

## 2019-12-17 RX ADMIN — TAZOBACTAM SODIUM AND PIPERACILLIN SODIUM 3.38 G: 375; 3 INJECTION, SOLUTION INTRAVENOUS at 14:32

## 2019-12-17 RX ADMIN — VANCOMYCIN HYDROCHLORIDE 1500 MG: 10 INJECTION, POWDER, LYOPHILIZED, FOR SOLUTION INTRAVENOUS at 03:07

## 2019-12-17 RX ADMIN — FLUCONAZOLE 200 MG: 200 TABLET ORAL at 21:53

## 2019-12-17 ASSESSMENT — ACTIVITIES OF DAILY LIVING (ADL)
ADLS_ACUITY_SCORE: 13
ADLS_ACUITY_SCORE: 13
ADLS_ACUITY_SCORE: 12
ADLS_ACUITY_SCORE: 13
ADLS_ACUITY_SCORE: 12
ADLS_ACUITY_SCORE: 12

## 2019-12-17 NOTE — PROGRESS NOTES
[facility]  Hospitalist Progress Note  Todd Alvarado MD 12/17/19    Reason for Stay (Diagnosis): Sepsis, HCAP, neutropenic fever         Assessment and Plan:      Summary of Stay: Kassie Ramirez is a 49 year old female with PMH including anxiety and recently diagnosed diffuse large B-cell lymphoma who underwent first dose of R-CHOP following high-dose steroids 2 weeks ago during hospitalization of the [facility] who was also recently treated with a course of oral levofloxacin for pneumonia who presents with worsening cough, shortness of breath, and fevers and was admitted on 12/15/2019 with neutropenic fever and bilateral chest infiltrates concerning for HCAP.  Started on broad-spectrum IV vancomycin and Zosyn.  Also with mild RENAY and receiving IV fluids for this.  [facility] and infectious disease have been consulted.  Very high fever to 105 degrees first night.  Still with intermittent fever and worsening hypoxia.  Requiring intermittent IV fluid boluses for elevated lactic acid.  CT chest obtained on 12/16 that shows extensive bilateral airspace opacities, either atypical infection versus pulmonary edema versus drug reaction.  Continuing on vancomycin and Zosyn.  Awaiting Fungitell testing and if positive likely start empiric Septra for PJP pneumonia.  If not improving by tomorrow we will need to consider transfer to either [facility] or [facility] for pulmonology consult and bronchoscopy.    Problem List/Assessment and Plan:   Sepsis secondary to HCAP, neutropenic fever: Previous LLL infiltrate and cough treated with 5 or 7 days levofloxacin then due to persistent symptoms put on 250 mg daily levofloxacin and fluconazole 200 mg daily on 12/11.  Now presenting with 2 days of fevers at home in the 100-101 range, worsening cough productive of whitish sputum, shortness of breath, and chest x-ray with bilateral lobe infiltrates consistent with pneumonia.   Given recent admission concern for healthcare associated pneumonia and will need to initiate cover for this.  Rapid influenza antigen negative.  Urinalysis with 18 WBCs, but no urine so doubt UTI.  Neutropenia with ANC 0.4.  Initially with sepsis with tachycardia, fever, neutropenia, and elevated lactic acid at 3.1 that improved to 1.9 following 2 L of normal saline.  Started on vancomycin and Zosyn.  Fever to 105  F overnight.  -Continue vancomycin, pharmacy to dose  -Continue IV Zosyn  -ID consulted  -Blood cultures NGTD  -Urine culture NGTD  -Sputum culture NGTD  -Continue the fluconazole, however unclear that this is necessary.  Defer to ID if this can be stopped or not  -LDH elevated but this may be from her lymphoma, Fungitell testing pending and if positive would initiate empiric Septra  -received 1 dose of neupogen and no longer neutropenic  -Having some loose stools so checking C. difficile PCR  -If not improving by tomorrow or certainly if worsening condition/hypoxia may need bronchoscopy and transfer to facility where there is this capability    Acute hypoxemic respiratory failure: Persistent hypoxia requiring 2 to 3 L of oxygen via nasal cannula.  Desaturations with movement.  CT chest from 12/16 shows diffuse bilateral pulmonary opacities which may be secondary to atypical infection versus edema versus drug reaction.  Given the fact that she is febrile and has elevated procalcitonin in the setting of recent chemotherapy, lymphoma, and neutropenia would point towards an infectious etiology.  She has required IV fluids for her persistent tachycardia and intermittent elevated lactic acid, so she may be developing some pulmonary edema however would avoid diuretics secondary to her sepsis.  -Continue supplemental oxygen   -Albuterol nebs if wheezing as needed  -Guaifenesin-dextromethorphan as needed for cough  -As above bronchoscopy may be needed to evaluate for atypical infection versus drug reaction versus  inflammatory process.     Diffuse large B cell lymphoma: Had a mediastinal mass biopsied at [facility] that came back positive for diffuse large B-cell lymphoma.  Initially treated with high-dose steroids.  Started chemotherapy with R-CHOP at the [facility] during the 11/27 through 12/1 admission.  Has follow-up with [facility] with Dr. Castro.  -[facility] consultation  -Continue PTA allopurinol and acyclovir     Anemia: Initial hg 9.6 with baseline 13-14 that has slowly been trending down the past month.  Denies bleeding.  -Hemoglobin now 7.6, 1 unit RBC transfusion ordered today by heme-onc.     Mild RENAY: Creatinine 1.20 with baseline 0.8.  Suspect prerenal etiology with decreased oral intake the past 2 days in the setting of infection.  -Normal saline infusion   -BMP tomorrow     Hypokalemia: Potassium 3.3 initially.  Has had poor oral intake.  -Potassium replacement protocol     Anxiety: Continue lorazepam as needed.        DVT Prophylaxis: Enoxaparin (Lovenox) SQ  Code Status: Full Code  FEN: Regular diet, normal saline at 100 ml/hr  Discharge Dispo: Home  Estimated Disch Date / # of Days until Disch: Likely multiple more day hospitalization.  If not improving by tomorrow we will need to consider transfer to either [facility] or [facility] for pulmonology consult and bronchoscopy.        Interval History (Subjective):      Still coughing frequently.  Robitussin to suppress the cough, but made it feel like she could not breathe as well.  Still short of breath with minimal exertion.  Febrile as high as 105  F overnight.  Still feels quite unwell overall.  Does endorse worsening shortness of breath when laying flat.                  Physical Exam:      Last Vital Signs:  /80 (BP Location: Right arm)   Pulse 105   Temp 99  F (37.2  C) (Oral)   Resp 18   Wt 77.4 kg (170 lb 11.2 oz)   SpO2 95%   BMI 26.74 kg/m        Intake/Output Summary (Last 24 hours) at  12/16/2019 1249  Last data filed at 12/16/2019 0627  Gross per 24 hour   Intake 2648 ml   Output 950 ml   Net 1698 ml       Constitutional: Awake, appears unwell although not in distress  Eyes: sclera white   HEENT: MMM  Respiratory: Bilateral crackles without wheeze  Cardiovascular: Regular tachycardia.  No murmur   GI: non-tender, not distended, bowel sounds present  Skin: no rash  Musculoskeletal/extremities: No significant edema  Neurologic: A&O  Psychiatric: calm, cooperative         Medications:      All current medications were reviewed with changes reflected in problem list.         Data:      All new lab and imaging data was reviewed.   Labs:  Recent Labs   Lab 12/15/19  1730 12/15/19  0830 12/15/19  0656   CULT Light growth  Normal ara   No growth after 2 days No growth after 2 days     Recent Labs   Lab 12/17/19  1436 12/17/19  0715 12/16/19  0804 12/15/19  1948   NA  --  142 144 141   POTASSIUM 4.4 3.2* 3.7 3.8   CHLORIDE  --  113* 113* 111*   CO2  --  20 23 23   ANIONGAP  --  9 8 7   GLC  --  91 102* 130*   BUN  --  8 10 9   CR  --  1.15* 1.28* 1.07*   GFRESTIMATED  --  56* 49* 61   GFRESTBLACK  --  64 57* 70   AFSHAN  --  8.1* 8.4* 8.1*     Recent Labs   Lab 12/17/19  0715 12/16/19  0804 12/15/19  0656   WBC 6.3 3.5* 1.2*   HGB 7.6* 8.7* 9.6*   HCT 22.6* 27.2* 28.5*   MCV 95 98 94    201 270      Imaging:   Recent Results (from the past 24 hour(s))   CT Chest w/o Contrast    Narrative    EXAM: CT CHEST W/O CONTRAST  LOCATION: [facility]  DATE/TIME: 12/16/2019 7:23 PM    INDICATION: Acute respiratory illness, > 40 years old, pneumonia, lymphoma, hypoxia, fever.  COMPARISON: PET CT 11/29/2019.  TECHNIQUE: CT chest without IV contrast. Multiplanar reformats were obtained. Dose reduction techniques were used.  CONTRAST: None.    FINDINGS:   LUNGS AND PLEURA: New extensive airspace disease, groundglass opacity, and interstitial edema throughout both hemithoraces with least disease in  right upper lobe. Evidence for remote granulomatous disease as on prior. Trace left effusion.    MEDIASTINUM/AXILLAE: Bulky confluent adenopathy of overall less volume. Accurate measurements difficult to obtain without IV contrast.    UPPER ABDOMEN: No change.    MUSCULOSKELETAL: Degenerative disease. No concerning bone lesions. No definite soft tissue masses.      Impression    IMPRESSION:   1.  Evidence for decrease in volume of patient's extensive adenopathy consistent with response to therapy for patient's lymphoma.  2.  Extensive areas of airspace disease throughout both hemithoraces with interstitial edema. Findings likely infectious or inflammatory with differential including pulmonary edema, drug reaction, among others.             Todd Alvarado MD

## 2019-12-17 NOTE — CONSULTS
Consult Date:  12/16/2019      INFECTIOUS DISEASE CONSULTATION       LOCATION:  Room 525, [facility].      REFERRING PHYSICIAN:  Dr. Alvarado.        IMPRESSION:   1.  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  No CT scan to date.  Significant fever and illness currently.   2.  Neutropenia, now resolved.  Doubt this is simple neutropenic fever, more likely a more complicated pneumonia.   3.  Lymphoma.      RECOMMENDATIONS:   1.  Significant risk and worry for opportunistic major lung infection here.  For now, Zosyn alone.  Await improvement in fever and respiratory symptoms, but very low threshold for further workup starting with a CT of the chest.  If CT of the chest shows atypical infiltrative process, probably add atypical coverage and, more importantly, larger workup probably relatively rapidly to bronchoscopy.  If instead she rapidly improves, simply treat with conventional antibiotics.   2.  Get multiple lab studies and sputum, LDH, Fungitell, fungal serology panel, etc.      HISTORY:  This 49-year-old female is seen in consultation due to pneumonia and neutropenic fever.  The patient has a history of symptoms going back to April, at which time she had a cough and respiratory symptoms, eventually had imaging which showed lymphadenopathy.  A biopsy attempt was made without a clear diagnosis and was treated as though it was a sarcoidosis with high-dose steroids generally over 40 mg, always over 20 mg for many months.  On this, she was not any preventative Septra and did not have any obvious pneumonia.  Really did not particularly get better on this treatment.  Eventually  hospitalized around Thanksgiving time with progressive symptoms and some low-grade fever at that time.  At that point, biopsy showed lymphoma.  Was started on chemotherapy with R-CHOP, which of course has also included steroids but off the high-dose steroids.  Seemed to be stable on that and was home, then started having cough and respiratory symptoms the last week, significant shortness of breath, and major fever into the 104-105 range.  At presentation, her white count was only 800 with some neutrophils present but is now up and still having major hypoxia.  Today only chest x-ray done with some slight infiltrate.      PAST MEDICAL HISTORY:  The high-dose steroids for months.  History of lymphoma, now diagnosed.      SOCIAL AND FAMILY HISTORY:  Otherwise unremarkable.  No travel.  No one else she has been around that has been ill.  No significant alcohol or tobacco use.  No animal exposures of note.      MEDICATIONS:  As listed.  Getting preventative fluconazole and Levaquin.      REVIEW OF SYSTEMS:  Largely as above.  Ongoing cough, not really improved to date major fevers, chills and sweats, which are improved today.      PHYSICAL EXAMINATION:   GENERAL:  The patient appears her stated age.  Looks mildly ill.   VITAL SIGNS:  She is hypoxic, respiratory rate of 28.  Temperature as high as 104, now 101.   HEENT:  No thrush or intraoral lesions.  Pupils reactive.   NECK:  Supple and nontender without lymphadenopathy.   HEART:  Regular rhythm, no particular murmur but quite tachycardic in the 120s.   LUNGS:  Crackles bilaterally, slight congestion, right lung in particular.   ABDOMEN:  Soft and nontender.   EXTREMITIES:  No rash or skin lesions.      LABORATORY:  Chest x-ray with possible infiltrate.  White count initially 800, but now up to 3500.      Thank you very much for the consultation.  I will follow the patient with you.         DANIELA VARGAS MD             D: 12/16/2019   T: 12/16/2019   MT: WT       Name:     BABAR VARGHESE   MRN:      [id number]        Account:       [id number]   :      1970           Consult Date:  2019      Document: [id number]       cc: Mary Alice Colón PA-C

## 2019-12-17 NOTE — PROVIDER NOTIFICATION
Pt lactic was 3.2 and procalcitonin 6.67 this evening. Do you want a repeat check in AM?       Call back received. Per MD, no interventions needed at this time.

## 2019-12-17 NOTE — PLAN OF CARE
Ambulatory Status:  Pt up SBA/ad fallon.  VS: afebrile this shift  Resp: LS diminished with fine crackles; 2.5L NC  GI:  Some nausea, denies intervention.  Poor appetite and on regular diet.  BS active.  Passing flatus.  Last BM 12/17; diarrhea- MD notified and now awaiting c diff sample.  :  WDL  Skin:  Intact  Tx:  IV vanco and zosyn; to have 1 unit PRBC this evening  Labs:  hgb 7.6; WBC 6.3; K 3.2- replaced and recheck 4.4  Disposition:  TBD

## 2019-12-17 NOTE — PHARMACY-VANCOMYCIN DOSING SERVICE
Pharmacy Vancomycin Initial Note  Date of Service 2019  Patient's  1970  49 year old, female    Indication: Community Acquired Pneumonia and Sepsis    Current estimated CrCl = Estimated Creatinine Clearance: 56.8 mL/min (A) (based on SCr of 1.28 mg/dL (H)).    Creatinine for last 3 days  12/15/2019:  6:56 AM Creatinine 1.20 mg/dL;  7:48 PM Creatinine 1.07 mg/dL  2019:  8:04 AM Creatinine 1.28 mg/dL    Recent Vancomycin Level(s) for last 3 days  No results found for requested labs within last 72 hours.      Vancomycin IV Administrations (past 72 hours)                   vancomycin 1500 mg in 0.9% NaCl 250 ml intermittent infusion 1,500 mg (mg) 1,500 mg Given 19 1507     1,500 mg Given  0257     1,500 mg Given 12/15/19 1441    vancomycin (VANCOCIN) 1000 mg in dextrose 5% 200 mL PREMIX (mg) 1,000 mg New Bag 12/15/19 0904                Nephrotoxins and other renal medications (From now, onward)    Start     Dose/Rate Route Frequency Ordered Stop    19 0300  vancomycin 1500 mg in 0.9% NaCl 250 ml intermittent infusion 1,500 mg      1,500 mg  over 90 Minutes Intravenous EVERY 12 HOURS 12/16/19 2010      12/15/19 2100  acyclovir (ZOVIRAX) tablet 400 mg      400 mg Oral 2 TIMES DAILY 12/15/19 1123      12/15/19 1400  piperacillin-tazobactam (ZOSYN) infusion 3.375 g      3.375 g  100 mL/hr over 30 Minutes Intravenous EVERY 6 HOURS 12/15/19 1123            Contrast Orders - past 72 hours (72h ago, onward)    None                Plan:  1.  Continue vancomycin  1,500 mg IV q12h now that vancomycin is to continue.   2.  Goal Trough Level: 15-20 mg/L   3.  Pharmacy will check trough levels as appropriate in 1-3 Days.    4. Serum creatinine levels will be ordered daily for the first week of therapy and at least twice weekly for subsequent weeks.    5. Hughes method utilized to dose vancomycin therapy: Method 1    Beatriz Ruff [facility]

## 2019-12-17 NOTE — PROGRESS NOTES
An EKG was completed on the pt, with no complications noted during the procedure.    Joaquim Gold, RT on 12/16/2019 at 9:35 PM

## 2019-12-17 NOTE — PLAN OF CARE
Tmax 101.1 - tylenol and ice packs given. 3L oxygen to maintain SPO2. All other VSS. LORENZ. Lung sounds diminished with fine crackles. Frequent productive cough. IVF. Independent/SBA. Denies pain. Denies nausea. IV zosyn and vanco infused.

## 2019-12-17 NOTE — PROGRESS NOTES
[facility]  Infectious Disease Progress Note          Assessment and Plan:   IMPRESSION:   1.  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  No CT scan to date.  Significant fever and illness currently.   2.  Neutropenia, now resolved.  Highly Doubt this is simple neutropenic fever, more likely a more complicated pneumonia problem.   3.  Lymphoma.  4 New diarrhea await C diff      RECOMMENDATIONS:   1.  Significant risk and worry for opportunistic major lung infection here.  For now, Zosyn alone(already atypical course).  Await improvement in fever and respiratory symptoms, very abnormal CT of the chest.  Very likely OI or ? Drug effect, low threshold for transfer to facility with pulm and possible  bronchoscopy.  If instead she rapidly improves, simply treat with conventional antibiotics(not so far occuring.   2.  Get multiple lab studies and sputum, LDH, Fungitell, fungal serology panel, etc. LDH high but has lymhoma, await fungitel ,if elevated septra empiric for PJP  3 await Cdiff for diarrhea check             Interval History:   no new complaints and about same; new diarrhea              Medications:       acyclovir  400 mg Oral BID     allopurinol  300 mg Oral Daily     enoxaparin ANTICOAGULANT  40 mg Subcutaneous Q24H     fluconazole  200 mg Oral Daily     omeprazole  40 mg Oral QAM AC     piperacillin-tazobactam  3.375 g Intravenous Q6H     sodium chloride (PF)  3 mL Intracatheter Q8H     vancomycin (VANCOCIN) IV  1,500 mg Intravenous Q12H                  Physical Exam:   Blood pressure 116/80, pulse 100, temperature 96.9  F (36.1  C), temperature source Oral,  resp. rate 20, weight 77.4 kg (170 lb 11.2 oz), SpO2 95 %, not currently breastfeeding.  Wt Readings from Last 2 Encounters:   12/17/19 77.4 kg (170 lb 11.2 oz)   12/11/19 76.9 kg (169 lb 8 oz)     Vital Signs with Ranges  Temp:  [96.9  F (36.1  C)-101.1  F (38.4  C)] 96.9  F (36.1  C)  Pulse:  [100-110] 100  Heart Rate:  [] 96  Resp:  [18-20] 20  BP: (116-135)/(74-80) 116/80  SpO2:  [93 %-98 %] 95 %    Constitutional: Awake, alert, cooperative, no apparent distress   Lungs: Congestion to auscultation bilaterally, no crackles or wheezing   Cardiovascular: Regular rate and rhythm, normal S1 and S2, and no murmur noted   Abdomen: Normal bowel sounds, soft, non-distended, non-tender   Skin: No rashes, no cyanosis, no edema   Other:           Data:   All microbiology laboratory data reviewed.  Recent Labs   Lab Test 12/17/19  0715 12/16/19  0804 12/15/19  0656   WBC 6.3 3.5* 1.2*   HGB 7.6* 8.7* 9.6*   HCT 22.6* 27.2* 28.5*   MCV 95 98 94    201 270     Recent Labs   Lab Test 12/17/19  0715 12/16/19  0804 12/15/19  1948   CR 1.15* 1.28* 1.07*     No lab results found.  Recent Labs   Lab Test 12/15/19  1730 12/15/19  0830 12/15/19  0656 11/27/19  1823 11/27/19  1708 07/26/13  1010 11/13/12  1702   CULT Light growth  Normal ara   No growth after 2 days No growth after 2 days No growth No growth >100,000 colonies/mL Escherichia coli Normal skin ara

## 2019-12-17 NOTE — PLAN OF CARE
T-max 100.6, tachy during shift. Pt. Reports dyspnea, refuses nebs as she does not feel they help her.  1.5L NC. Robitussin given for cough. Pt. Very fatigued. Provider notified of critical labs-see notes.  PIV infusing NS 100mL/hr. Regular diet-low appetite. PRN zofran given for some nausea/dryheaving following cough. Trying to get sputum culture still. Hepa filter in room. Pt. Resting in bed, will continue to monitor.

## 2019-12-17 NOTE — PROGRESS NOTES
[facility]    Hematology/Oncology Follow-up Note      Today's Date: 12/17/19  Date of Admission:  12/15/2019  Reason for Consult: DLBCL, admitted for Neutropenic fever        ASSESSMENT/PLAN:  Kassie Ramirez is a 49 year old female with:      DLBCL: Stage III, recently diagnosed and admitted from 11/21-12/1/2019 to initiate first cycle of R-CHOP chemotherapy at the [facility].  Now with neutropenic fever, see below.  She will be following up with Dr. Castro as an outpatient and is scheduled to see him for cycle 2 on 12/27/2019.  We will add Neulasta to cycle 2.    --Continue allopurinol and acyclovir  --We will follow through this hospital stay     Neutropenic fever: Noted to be neutropenic on 12/11/19 and given levofloxacin and fluconazole.  Febrile as high as 105 degrees 2 days ago and she was given Neupogen on 12/15/2019, ANC has now normalized to 5.2 and Neupogen was discontinued yesterday. Concern for hospital-acquired pneumonia and receiving antibiotics, IVF and ID following as well     Anemia: Hemoglobin dropped to 7.6, likely secondary to chemotherapy. Ferritin is elevated 885, but this could be due to her lymphoma.  Iron is normal at 39 and saturation index is normal at 21. She is now more symptomatic today and will order a blood transfusion.   --1 unit PRBCs ordered, blood consent signed, ok per pt and   --Daily CBC with differential     RENAY: Likely prerenal and getting IV fluids today. Improved today.  Hospital team following.    Discussed with Dr Up and she agreed with the plan above.       INTERIM HISTORY: Kassie was seen in her room, sitting up in bed with her  (Florian) at bedside. SOB has worsened today with little exertion. Increased weakness and fatigue as well. Appetite still poor. No bleeding or black stools.        MEDICATIONS:  Current Facility-Administered Medications   Medication     acetaminophen (TYLENOL) tablet 650 mg    Or     acetaminophen (TYLENOL)  Suppository 650 mg     acyclovir (ZOVIRAX) tablet 400 mg     albuterol (PROVENTIL) neb solution 2.5 mg     allopurinol (ZYLOPRIM) tablet 300 mg     enoxaparin ANTICOAGULANT (LOVENOX) injection 40 mg     fluconazole (DIFLUCAN) tablet 200 mg     guaiFENesin-dextromethorphan (ROBITUSSIN DM) 100-10 MG/5ML syrup 10 mL     lidocaine (LMX4) cream     lidocaine 1 % 0.1-1 mL     LORazepam (ATIVAN) tablet 0.5-1 mg     melatonin tablet 1 mg     naloxone (NARCAN) injection 0.1-0.4 mg     omeprazole (priLOSEC) CR capsule 40 mg     ondansetron (ZOFRAN-ODT) ODT tab 4 mg    Or     ondansetron (ZOFRAN) injection 4 mg     piperacillin-tazobactam (ZOSYN) infusion 3.375 g     polyethylene glycol (MIRALAX/GLYCOLAX) Packet 17 g     potassium chloride (KLOR-CON) Packet 20-40 mEq     potassium chloride 10 mEq in 100 mL intermittent infusion with 10 mg lidocaine     potassium chloride 10 mEq in 100 mL sterile water intermittent infusion (premix)     potassium chloride 20 mEq in 50 mL intermittent infusion     potassium chloride ER (K-DUR/KLOR-CON M) CR tablet 20-40 mEq     prochlorperazine (COMPAZINE) injection 10 mg    Or     prochlorperazine (COMPAZINE) tablet 10 mg    Or     prochlorperazine (COMPAZINE) Suppository 25 mg     senna-docusate (SENOKOT-S/PERICOLACE) 8.6-50 MG per tablet 1 tablet    Or     senna-docusate (SENOKOT-S/PERICOLACE) 8.6-50 MG per tablet 2 tablet     sodium chloride (PF) 0.9% PF flush 3 mL     sodium chloride (PF) 0.9% PF flush 3 mL     sodium chloride 0.9% infusion     vancomycin 1500 mg in 0.9% NaCl 250 ml intermittent infusion 1,500 mg           ALLERGIES:  Allergies   Allergen Reactions     Cold & Flu [Cold Defense Fighter]      See pseudoephedrine     Seasonal Allergies      Sudafed Cold-Cough [Dayquil Liquicaps]      Pseudoephedrine Rash     Rash then skins peels off          PHYSICAL EXAM:  Vital signs:  Temp: 96.9  F (36.1  C) Temp src: Oral BP: 116/80 Pulse: 100 Heart Rate: 96 Resp: 20 SpO2: 95 % O2 Device:  "None (Room air) Oxygen Delivery: 2 LPM   Weight: 77.4 kg (170 lb 11.2 oz)  Estimated body mass index is 26.74 kg/m  as calculated from the following:    Height as of 11/29/19: 1.702 m (5' 7\").    Weight as of this encounter: 77.4 kg (170 lb 11.2 oz).    GENERAL:  Female, in no acute distress.  Alert and oriented x3.   HEENT:  Normocephalic, atraumatic.   LUNGS:  NC in place, mild labored breathing  SKIN: No rash on exposed skin   PSYCH: Mood stable, appears tired      LABS:  CBC RESULTS:   Recent Labs   Lab Test 12/17/19  0715   WBC 6.3   RBC 2.39*   HGB 7.6*   HCT 22.6*   MCV 95   MCH 31.8   MCHC 33.6   RDW 16.2*          Recent Labs   Lab Test 12/17/19  0715 12/16/19  0804    144   POTASSIUM 3.2* 3.7   CHLORIDE 113* 113*   CO2 20 23   ANIONGAP 9 8   GLC 91 102*   BUN 8 10   CR 1.15* 1.28*   AFSHAN 8.1* 8.4*         PATHOLOGY:      IMAGING:            Padmaja Sanchez PA-C  Hematology/Oncology  [facility]      "

## 2019-12-18 ENCOUNTER — HOSPITAL ENCOUNTER (INPATIENT)
Facility: CLINIC | Age: 49
LOS: 6 days | Discharge: HOME | End: 2019-12-24
Attending: INTERNAL MEDICINE | Admitting: STUDENT IN AN ORGANIZED HEALTH CARE EDUCATION/TRAINING PROGRAM
Payer: COMMERCIAL

## 2019-12-18 VITALS
RESPIRATION RATE: 28 BRPM | BODY MASS INDEX: 26.94 KG/M2 | WEIGHT: 172 LBS | OXYGEN SATURATION: 92 % | SYSTOLIC BLOOD PRESSURE: 119 MMHG | HEART RATE: 121 BPM | DIASTOLIC BLOOD PRESSURE: 80 MMHG | TEMPERATURE: 98.7 F

## 2019-12-18 DIAGNOSIS — B59: Primary | ICD-10-CM

## 2019-12-18 PROBLEM — J96.90 RESPIRATORY FAILURE (H): Status: ACTIVE | Noted: 2019-12-18

## 2019-12-18 LAB
1,3 BETA GLUCAN SER-MCNC: >500 PG/ML
ANION GAP SERPL CALCULATED.3IONS-SCNC: 9 MMOL/L (ref 3–14)
ANISOCYTOSIS BLD QL SMEAR: SLIGHT
B-D GLUCAN INTERPRETATION (1,3): POSITIVE
BASE DEFICIT BLDV-SCNC: 0.3 MMOL/L
BASOPHILS # BLD AUTO: 0 10E9/L (ref 0–0.2)
BASOPHILS NFR BLD AUTO: 0 %
BUN SERPL-MCNC: 7 MG/DL (ref 7–30)
CALCIUM SERPL-MCNC: 8.3 MG/DL (ref 8.5–10.1)
CHLORIDE SERPL-SCNC: 109 MMOL/L (ref 94–109)
CO2 SERPL-SCNC: 21 MMOL/L (ref 20–32)
CREAT SERPL-MCNC: 0.95 MG/DL (ref 0.52–1.04)
CREAT SERPL-MCNC: 1.04 MG/DL (ref 0.52–1.04)
DIFFERENTIAL METHOD BLD: ABNORMAL
EOSINOPHIL # BLD AUTO: 0 10E9/L (ref 0–0.7)
EOSINOPHIL NFR BLD AUTO: 0 %
ERYTHROCYTE [DISTWIDTH] IN BLOOD BY AUTOMATED COUNT: 15.9 % (ref 10–15)
GFR SERPL CREATININE-BSD FRML MDRD: 63 ML/MIN/{1.73_M2}
GFR SERPL CREATININE-BSD FRML MDRD: 70 ML/MIN/{1.73_M2}
GLUCOSE SERPL-MCNC: 107 MG/DL (ref 70–99)
HCO3 BLDV-SCNC: 24 MMOL/L (ref 21–28)
HCT VFR BLD AUTO: 26.9 % (ref 35–47)
HGB BLD-MCNC: 9.2 G/DL (ref 11.7–15.7)
INTERPRETATION ECG - MUSE: NORMAL
LACTATE BLD-SCNC: 1 MMOL/L (ref 0.7–2)
LACTATE BLD-SCNC: 1.3 MMOL/L (ref 0.7–2)
LYMPHOCYTES # BLD AUTO: 1.2 10E9/L (ref 0.8–5.3)
LYMPHOCYTES NFR BLD AUTO: 16 %
MACROCYTES BLD QL SMEAR: PRESENT
MCH RBC QN AUTO: 31.7 PG (ref 26.5–33)
MCHC RBC AUTO-ENTMCNC: 34.2 G/DL (ref 31.5–36.5)
MCV RBC AUTO: 93 FL (ref 78–100)
METAMYELOCYTES # BLD: 0.2 10E9/L
METAMYELOCYTES NFR BLD MANUAL: 3 %
MICROCYTES BLD QL SMEAR: PRESENT
MONOCYTES # BLD AUTO: 0.5 10E9/L (ref 0–1.3)
MONOCYTES NFR BLD AUTO: 7 %
MYELOCYTES # BLD: 0.1 10E9/L
MYELOCYTES NFR BLD MANUAL: 1 %
NEUTROPHILS # BLD AUTO: 5.6 10E9/L (ref 1.6–8.3)
NEUTROPHILS NFR BLD AUTO: 72 %
NRBC # BLD AUTO: 0.1 10*3/UL
NRBC BLD AUTO-RTO: 1 /100
O2/TOTAL GAS SETTING VFR VENT: 3 %
OXYHGB MFR BLDV: 73 %
PCO2 BLDV: 36 MM HG (ref 40–50)
PH BLDV: 7.43 PH (ref 7.32–7.43)
PLATELET # BLD AUTO: 231 10E9/L (ref 150–450)
PLATELET # BLD AUTO: 249 10E9/L (ref 150–450)
PLATELET # BLD EST: ABNORMAL 10*3/UL
PO2 BLDV: 40 MM HG (ref 25–47)
POLYCHROMASIA BLD QL SMEAR: SLIGHT
POTASSIUM SERPL-SCNC: 3.7 MMOL/L (ref 3.4–5.3)
RBC # BLD AUTO: 2.9 10E12/L (ref 3.8–5.2)
SODIUM SERPL-SCNC: 139 MMOL/L (ref 133–144)
TOXIC GRANULES BLD QL SMEAR: PRESENT
VARIANT LYMPHS BLD QL SMEAR: PRESENT
WBC # BLD AUTO: 7.7 10E9/L (ref 4–11)

## 2019-12-18 PROCEDURE — 99223 1ST HOSP IP/OBS HIGH 75: CPT | Mod: AI | Performed by: PHYSICIAN ASSISTANT

## 2019-12-18 PROCEDURE — 85025 COMPLETE CBC W/AUTO DIFF WBC: CPT | Performed by: INTERNAL MEDICINE

## 2019-12-18 PROCEDURE — 36415 COLL VENOUS BLD VENIPUNCTURE: CPT | Performed by: INTERNAL MEDICINE

## 2019-12-18 PROCEDURE — 25800030 ZZH RX IP 258 OP 636: Performed by: INTERNAL MEDICINE

## 2019-12-18 PROCEDURE — 25000128 H RX IP 250 OP 636: Performed by: INTERNAL MEDICINE

## 2019-12-18 PROCEDURE — 99232 SBSQ HOSP IP/OBS MODERATE 35: CPT | Performed by: PHYSICIAN ASSISTANT

## 2019-12-18 PROCEDURE — 83605 ASSAY OF LACTIC ACID: CPT | Performed by: INTERNAL MEDICINE

## 2019-12-18 PROCEDURE — 25000128 H RX IP 250 OP 636: Performed by: PHYSICIAN ASSISTANT

## 2019-12-18 PROCEDURE — 40000556 ZZH STATISTIC PERIPHERAL IV START W US GUIDANCE

## 2019-12-18 PROCEDURE — 25800030 ZZH RX IP 258 OP 636: Performed by: PHYSICIAN ASSISTANT

## 2019-12-18 PROCEDURE — 25000132 ZZH RX MED GY IP 250 OP 250 PS 637: Performed by: PHYSICIAN ASSISTANT

## 2019-12-18 PROCEDURE — 80048 BASIC METABOLIC PNL TOTAL CA: CPT | Performed by: INTERNAL MEDICINE

## 2019-12-18 PROCEDURE — 36415 COLL VENOUS BLD VENIPUNCTURE: CPT | Performed by: PHYSICIAN ASSISTANT

## 2019-12-18 PROCEDURE — 25000132 ZZH RX MED GY IP 250 OP 250 PS 637: Performed by: INTERNAL MEDICINE

## 2019-12-18 PROCEDURE — 85049 AUTOMATED PLATELET COUNT: CPT | Performed by: PHYSICIAN ASSISTANT

## 2019-12-18 PROCEDURE — 83605 ASSAY OF LACTIC ACID: CPT | Performed by: PHYSICIAN ASSISTANT

## 2019-12-18 PROCEDURE — 82565 ASSAY OF CREATININE: CPT | Performed by: INTERNAL MEDICINE

## 2019-12-18 PROCEDURE — 82805 BLOOD GASES W/O2 SATURATION: CPT | Performed by: INTERNAL MEDICINE

## 2019-12-18 PROCEDURE — 99239 HOSP IP/OBS DSCHRG MGMT >30: CPT | Performed by: INTERNAL MEDICINE

## 2019-12-18 PROCEDURE — 82565 ASSAY OF CREATININE: CPT | Performed by: PHYSICIAN ASSISTANT

## 2019-12-18 PROCEDURE — 12000000 ZZH R&B MED SURG/OB

## 2019-12-18 RX ORDER — ACETAMINOPHEN 325 MG/1
650 TABLET ORAL EVERY 4 HOURS PRN
Status: DISCONTINUED | OUTPATIENT
Start: 2019-12-18 | End: 2019-12-24 | Stop reason: HOSPADM

## 2019-12-18 RX ORDER — ONDANSETRON 2 MG/ML
4 INJECTION INTRAMUSCULAR; INTRAVENOUS EVERY 6 HOURS PRN
Status: DISCONTINUED | OUTPATIENT
Start: 2019-12-18 | End: 2019-12-24 | Stop reason: HOSPADM

## 2019-12-18 RX ORDER — LIDOCAINE 40 MG/G
CREAM TOPICAL
Status: DISCONTINUED | OUTPATIENT
Start: 2019-12-18 | End: 2019-12-24 | Stop reason: HOSPADM

## 2019-12-18 RX ORDER — OXYCODONE HYDROCHLORIDE 5 MG/1
5-10 TABLET ORAL
Status: DISCONTINUED | OUTPATIENT
Start: 2019-12-18 | End: 2019-12-24 | Stop reason: HOSPADM

## 2019-12-18 RX ORDER — PIPERACILLIN SODIUM, TAZOBACTAM SODIUM 3; .375 G/15ML; G/15ML
3.38 INJECTION, POWDER, LYOPHILIZED, FOR SOLUTION INTRAVENOUS EVERY 6 HOURS
Status: DISCONTINUED | OUTPATIENT
Start: 2019-12-18 | End: 2019-12-19

## 2019-12-18 RX ORDER — LORAZEPAM 0.5 MG/1
.5-1 TABLET ORAL EVERY 6 HOURS PRN
Status: DISCONTINUED | OUTPATIENT
Start: 2019-12-18 | End: 2019-12-19

## 2019-12-18 RX ORDER — FLUCONAZOLE 100 MG/1
200 TABLET ORAL DAILY
Status: DISCONTINUED | OUTPATIENT
Start: 2019-12-18 | End: 2019-12-24 | Stop reason: HOSPADM

## 2019-12-18 RX ORDER — ACETAMINOPHEN 650 MG/1
650 SUPPOSITORY RECTAL EVERY 4 HOURS PRN
Status: DISCONTINUED | OUTPATIENT
Start: 2019-12-18 | End: 2019-12-24 | Stop reason: HOSPADM

## 2019-12-18 RX ORDER — GUAIFENESIN/DEXTROMETHORPHAN 100-10MG/5
5 SYRUP ORAL EVERY 4 HOURS PRN
Status: DISCONTINUED | OUTPATIENT
Start: 2019-12-18 | End: 2019-12-24 | Stop reason: HOSPADM

## 2019-12-18 RX ORDER — NALOXONE HYDROCHLORIDE 0.4 MG/ML
.1-.4 INJECTION, SOLUTION INTRAMUSCULAR; INTRAVENOUS; SUBCUTANEOUS
Status: DISCONTINUED | OUTPATIENT
Start: 2019-12-18 | End: 2019-12-24 | Stop reason: HOSPADM

## 2019-12-18 RX ORDER — ONDANSETRON 4 MG/1
4 TABLET, ORALLY DISINTEGRATING ORAL EVERY 6 HOURS PRN
Status: DISCONTINUED | OUTPATIENT
Start: 2019-12-18 | End: 2019-12-24 | Stop reason: HOSPADM

## 2019-12-18 RX ORDER — ALBUTEROL SULFATE 0.83 MG/ML
2.5 SOLUTION RESPIRATORY (INHALATION)
Status: DISCONTINUED | OUTPATIENT
Start: 2019-12-18 | End: 2019-12-24 | Stop reason: HOSPADM

## 2019-12-18 RX ORDER — PROCHLORPERAZINE 25 MG
25 SUPPOSITORY, RECTAL RECTAL EVERY 12 HOURS PRN
Status: DISCONTINUED | OUTPATIENT
Start: 2019-12-18 | End: 2019-12-24 | Stop reason: HOSPADM

## 2019-12-18 RX ORDER — SODIUM CHLORIDE 9 MG/ML
INJECTION, SOLUTION INTRAVENOUS CONTINUOUS
Status: DISCONTINUED | OUTPATIENT
Start: 2019-12-18 | End: 2019-12-22

## 2019-12-18 RX ORDER — PROCHLORPERAZINE MALEATE 10 MG
10 TABLET ORAL EVERY 6 HOURS PRN
Status: DISCONTINUED | OUTPATIENT
Start: 2019-12-18 | End: 2019-12-24 | Stop reason: HOSPADM

## 2019-12-18 RX ORDER — ACYCLOVIR 400 MG/1
400 TABLET ORAL 2 TIMES DAILY
Status: DISCONTINUED | OUTPATIENT
Start: 2019-12-18 | End: 2019-12-24 | Stop reason: HOSPADM

## 2019-12-18 RX ORDER — ALLOPURINOL 300 MG/1
300 TABLET ORAL DAILY
Status: DISCONTINUED | OUTPATIENT
Start: 2019-12-19 | End: 2019-12-21

## 2019-12-18 RX ADMIN — VANCOMYCIN HYDROCHLORIDE 1250 MG: 5 INJECTION, POWDER, LYOPHILIZED, FOR SOLUTION INTRAVENOUS at 09:55

## 2019-12-18 RX ADMIN — FLUCONAZOLE 200 MG: 200 TABLET ORAL at 20:05

## 2019-12-18 RX ADMIN — PIPERACILLIN AND TAZOBACTAM 3.38 G: 3; .375 INJECTION, POWDER, FOR SOLUTION INTRAVENOUS at 23:00

## 2019-12-18 RX ADMIN — ACYCLOVIR 400 MG: 400 TABLET ORAL at 20:05

## 2019-12-18 RX ADMIN — OMEPRAZOLE 40 MG: 20 CAPSULE, DELAYED RELEASE ORAL at 06:00

## 2019-12-18 RX ADMIN — ENOXAPARIN SODIUM 40 MG: 40 INJECTION SUBCUTANEOUS at 11:54

## 2019-12-18 RX ADMIN — SULFAMETHOXAZOLE AND TRIMETHOPRIM 400 MG: 80; 16 INJECTION, SOLUTION, CONCENTRATE INTRAVENOUS at 20:05

## 2019-12-18 RX ADMIN — LORAZEPAM 0.5 MG: 0.5 TABLET ORAL at 13:41

## 2019-12-18 RX ADMIN — TAZOBACTAM SODIUM AND PIPERACILLIN SODIUM 3.38 G: 375; 3 INJECTION, SOLUTION INTRAVENOUS at 11:53

## 2019-12-18 RX ADMIN — SODIUM CHLORIDE: 9 INJECTION, SOLUTION INTRAVENOUS at 16:31

## 2019-12-18 RX ADMIN — SODIUM CHLORIDE: 9 INJECTION, SOLUTION INTRAVENOUS at 06:00

## 2019-12-18 RX ADMIN — SULFAMETHOXAZOLE AND TRIMETHOPRIM 400 MG: 80; 16 INJECTION, SOLUTION, CONCENTRATE INTRAVENOUS at 12:38

## 2019-12-18 RX ADMIN — PIPERACILLIN AND TAZOBACTAM 3.38 G: 3; .375 INJECTION, POWDER, FOR SOLUTION INTRAVENOUS at 18:24

## 2019-12-18 RX ADMIN — Medication 1 LOZENGE: at 16:35

## 2019-12-18 RX ADMIN — ACYCLOVIR 400 MG: 400 TABLET ORAL at 09:49

## 2019-12-18 RX ADMIN — ALLOPURINOL 300 MG: 300 TABLET ORAL at 09:49

## 2019-12-18 RX ADMIN — TAZOBACTAM SODIUM AND PIPERACILLIN SODIUM 3.38 G: 375; 3 INJECTION, SOLUTION INTRAVENOUS at 04:07

## 2019-12-18 ASSESSMENT — ACTIVITIES OF DAILY LIVING (ADL)
ADLS_ACUITY_SCORE: 12
ADLS_ACUITY_SCORE: 12
ADLS_ACUITY_SCORE: 13
ADLS_ACUITY_SCORE: 14
ADLS_ACUITY_SCORE: 12

## 2019-12-18 NOTE — PHARMACY-VANCOMYCIN DOSING SERVICE
Pharmacy Vancomycin Note  Date of Service 2019  Patient's  1970   49 year old, female    Indication: Community Acquired Pneumonia, Healthcare-Associated Pneumonia and Sepsis  Goal Trough Level: 15-20 mg/L  Day of Therapy: 3  Current Vancomycin regimen:  1500 mg IV q12h    Current estimated CrCl = Estimated Creatinine Clearance: 63.4 mL/min (A) (based on SCr of 1.15 mg/dL (H)).    Creatinine for last 3 days  12/15/2019:  6:56 AM Creatinine 1.20 mg/dL;  7:48 PM Creatinine 1.07 mg/dL  2019:  8:04 AM Creatinine 1.28 mg/dL  2019:  7:15 AM Creatinine 1.15 mg/dL    Recent Vancomycin Levels (past 3 days)  2019:  2:36 PM Vancomycin Level 21.1 mg/L    Vancomycin IV Administrations (past 72 hours)                   vancomycin 1500 mg in 0.9% NaCl 250 ml intermittent infusion 1,500 mg (mg) 1,500 mg Given 19 1602     1,500 mg Given  0307    vancomycin 1500 mg in 0.9% NaCl 250 ml intermittent infusion 1,500 mg (mg) 1,500 mg Given 19 1507     1,500 mg Given  0257     1,500 mg Given 12/15/19 1441                Nephrotoxins and other renal medications (From now, onward)    Start     Dose/Rate Route Frequency Ordered Stop    19 0900  vancomycin (VANCOCIN) 1,250 mg in sodium chloride 0.9 % 250 mL intermittent infusion      1,250 mg  over 90 Minutes Intravenous EVERY 12 HOURS 12/17/19 2027      12/15/19 2100  acyclovir (ZOVIRAX) tablet 400 mg      400 mg Oral 2 TIMES DAILY 12/15/19 1123      12/15/19 1400  piperacillin-tazobactam (ZOSYN) infusion 3.375 g      3.375 g  100 mL/hr over 30 Minutes Intravenous EVERY 6 HOURS 12/15/19 1123               Contrast Orders - past 72 hours (72h ago, onward)    None          Interpretation of levels and current regimen:  Trough level is  Supratherapeutic    Has serum creatinine changed > 50% in last 72 hours: No    Urine output:  unable to determine    Renal Function: Stable    Plan:  1.  Decrease Dose to 1250 mg q12 - will give 5 extra hours  on interval to lower trough below 20  2.  Pharmacy will check trough levels as appropriate in 1-3 Days.    3. Serum creatinine levels will be ordered daily for the first week of therapy and at least twice weekly for subsequent weeks.      Kenya Dangelo RPH        .

## 2019-12-18 NOTE — PROGRESS NOTES
Transition Communication Hand-off for Care Transitions to Next Level of Care Provider    Name: Kassie Ramirez  : 1970  MRN #: [id number]  Primary Care Provider: Mary Alice Colón  Primary Care MD Name: colón  Primary Clinic: [address] [place] [id number]  Primary Care Clinic Name: [facility]  Reason for Hospitalization:  Renal insufficiency [N28.9]  Neutropenic fever (H) [D70.9, R50.81]  Pneumonia due to infectious organism, unspecified laterality, unspecified part of lung [J18.9]  Admit Date/Time: 12/15/2019  6:33 AM  Discharge Date: Transfer to [facility] 19  Payor Source: Payor: BCBS / Plan: [employer] [place] / Product Type: Indemnity /     Readmission Assessment Measure (KAIN) Risk Score/category: Elevated           Reason for Communication Hand-off Referral: Multiple providers/specialties    Follow-up plan:    Future Appointments   Date Time Provider Department Center   2019  8:15 AM [facility] LAB DRAW 1 [facility] RID   2019  8:45 AM Castro Castro MD [facility] [place] [facility] R[facility]   2019  9:30 AM [facility] INFUSION CHAIR 6 [facility] RID       [place] Recommendations:   Pt is readmitted after 2 weeks with PNA.  We did discuss the PNA action care plan.  She is aware of when to contact her MD.  She said she didn't have a pleasant stay at the .  She pleasantly declines need for patient advocate.  KAIN ELEVATED.  Pt will start following with Dr Castro with [facility] in [place] for her B Cell lymphoma.  She inquired about resources for her losing hair.  I gave her a few hats.  I also gave her the [employer] contact number and a packet of info on wigs.     Patient was transferred to [facility] for pulmonary consult and evaluation including bronchoscopy.     KING Mcnally MA/RN Case Manager  Inpatient Care Coordination  [facility]   [phone]    AVS/Discharge Summary is the source of truth; this is a helpful guide for  improved communication of patient story

## 2019-12-18 NOTE — DISCHARGE SUMMARY
[facility]  Discharge Summary  Hospitalist      Date of Admission:  12/15/2019  Date of Discharge:  12/18/2019  Provider:  Alyson Greer MD  Date of Service (when I last saw the patient): 12/18/19      Primary Provider: Mary Alice Colón          Discharge Diagnosis:   Discharge Diagnoses   Acute hypoxemic respiratory failure  Sepsis suspected H CAP versus neutropenic fever patient not neutropenic at this time  Elevated fungi tell suspect PJP infection  Worsening hypoxemia with no clinical improvement progressive symptoms needs pulmonary evaluation and possibly bronchoscopy  Patient to be transferred to [facility] for pulmonary consult and evaluation including bronchoscopy  Patient transfer for services not available at [facility]      Other medical issues:  Past Medical History:   Diagnosis Date     Anxiety attack 9/16/2014     Diffuse large B-cell lymphoma (H)     Diagnosed 11/2019     Encounter for Essure implantation 2009     Generalized anxiety disorder 9/16/2014    zoloft = flat emotions     Menopausal disorder     started on OCPs by menopause center 3/2017 (takes active continuously)     Menstrual headache     helped by OCPs and magnesium     GERTRUDE (stress urinary incontinence, female)     sling procedure 2016     SVT (supraventricular tachycardia) (H)           History of Present Illness   Kassie Ramirez is an 49 year old female who presented with shortness of breath and fever..  Please see the admission history and physical for full details.    Hospital Course     Kassie Ramirez was admitted on 12/15/2019. She  is a 49 year old female with PMH including anxiety and recently diagnosed diffuse large B-cell lymphoma who underwent first dose of R-CHOP following high-dose steroids 2 weeks ago during hospitalization of the [facility] who was also recently treated with a course of oral levofloxacin for pneumonia who presents with worsening  cough, shortness of breath, and fevers and was admitted on 12/15/2019 with neutropenic fever and bilateral chest infiltrates concerning for HCAP.  Started on broad-spectrum IV vancomycin and Zosyn.  Also with mild RENAY and receiving IV fluids for this.  [facility] and infectious disease have been consulted.  Very high fever to 105 degrees first night.  Still with intermittent fever and worsening hypoxia.  Requiring intermittent IV fluid boluses for elevated lactic acid.  CT chest obtained on 12/16 that shows extensive bilateral airspace opacities, either atypical infection versus pulmonary edema versus drug reaction.  Continuing on vancomycin and Zosyn.  Fungitell testing positive today, per ID started empiric Septra for PJP pneumonia.  Patient clinically not improving and has been requiring more supplemental O2 even at rest.  We will need to consider transfer to either [facility] or [facility] for pulmonology consult and possibly bronchoscopy.  Discussed with accepting hospitalist Dr. Fajardo patient will be transferred to [facility] for further evaluation.  Patient does not want to be transferred to the [facility] given proximity to her home.      The following problems were addressed during her hospitalization:    Sepsis secondary to HCAP, neutropenic fever: Previous LLL infiltrate and cough treated with 5 or 7 days levofloxacin then due to persistent symptoms put on 250 mg daily levofloxacin and fluconazole 200 mg daily on 12/11.  Presented on 15th with 2 days of fevers at home in the 100-101 range, worsening cough productive of whitish sputum, shortness of breath, and chest x-ray with bilateral lobe infiltrates consistent with pneumonia.  Given recent admission concern for healthcare associated pneumonia and will need to initiate cover for this.  Rapid influenza antigen negative.  Urinalysis with 18 WBCs, but no urine so doubt UTI.  Neutropenia with ANC 0.4.   Initially with sepsis with tachycardia, fever, neutropenia, and elevated lactic acid at 3.1 that improved to 1.9 following 2 L of normal saline.  Started on vancomycin and Zosyn.    Remains febrile.  -Initially treated with vancomycin discontinued today per ID  -Continue IV Zosyn  -ID consulted and appreciate antibiotic management  -Blood cultures NGTD  -Urine culture NGTD  -Sputum culture NGTD  -Continue the fluconazole, however unclear that this is necessary.  Defer to ID if this can be stopped or not  -LDH elevated but this may be from her lymphoma, Fungitell positive today and was started on Septra  -received 1 dose of neupogen and no longer neutropenic  -Having some loose stools C. difficile negative  -Clinically not improving and has worsening condition/hypoxia may need bronchoscopy she will be transferred to [facility].    Acute hypoxemic respiratory failure: Persistent hypoxia requiring 2 to 3 L of oxygen via nasal cannula.  Desaturations with movement.  CT chest from 12/16 shows diffuse bilateral pulmonary opacities which may be secondary to atypical infection versus edema versus drug reaction.  Given the fact that she is febrile and has elevated procalcitonin in the setting of recent chemotherapy, lymphoma, and neutropenia would point towards an infectious etiology.  She has required IV fluids for her persistent tachycardia and intermittent elevated lactic acid, so she may be developing some pulmonary edema however would avoid diuretics secondary to her sepsis.  -Continue supplemental oxygen   -Albuterol nebs if wheezing as needed  -Guaifenesin-dextromethorphan as needed for cough  -As above bronchoscopy may be needed to evaluate for atypical infection versus drug reaction versus inflammatory process.     Diffuse large B cell lymphoma: Had a mediastinal mass biopsied at [facility] that came back positive for diffuse large B-cell lymphoma.  Initially treated with high-dose steroids.  Started  chemotherapy with R-CHOP at the [facility] during the 11/27 through 12/1 admission.  Has follow-up with [facility] with Dr. Castro.  -[facility] consultation  -Continue PTA allopurinol and acyclovir     Anemia: Initial hg 9.6 with baseline 13-14 that has slowly been trending down the past month.  Denies bleeding.  -Hemoglobin now 7.6, 1 unit RBC transfusion ordered today by heme-onc.     Mild RENAY: Creatinine 1.20 with baseline 0.8.  Suspect prerenal etiology with decreased oral intake the past 2 days in the setting of infection.  -Normal saline infusion   -Creatinine improving 1.04 today     Hypokalemia: Potassium 3.3 initially.  Has had poor oral intake.  -Potassium replacement protocol     Anxiety: Continue lorazepam as needed    Significant Results and Procedures   As noted above    Pending Results   Unresulted Labs Ordered in the Past 30 Days of this Admission     Date and Time Order Name Status Description    12/16/2019 1645 Pneumocystis jirovecil by PCR In process     12/16/2019 1641 Fungal Antibodies In process     12/15/2019 0701 Blood culture ONE site Preliminary     12/15/2019 0640 Blood culture ONE site Preliminary     12/3/2019 1513 CHROMOSOME BONE MARROW In process     12/3/2019 1513 FISH In process           Code Status   Full Code       Primary Care Physician   Mary Alice Colón    Physical Exam   Temp: 100.4  F (38  C) Temp src: Oral BP: (!) 141/92 Pulse: 121 Heart Rate: 107 Resp: (!) 32 SpO2: 93 % O2 Device: Oxymizer cannula Oxygen Delivery: 5 LPM  Vitals:    12/17/19 0600 12/18/19 0400   Weight: 77.4 kg (170 lb 11.2 oz) 78 kg (172 lb)     Vital Signs with Ranges  Temp:  [96.8  F (36  C)-101.5  F (38.6  C)] 100.4  F (38  C)  Pulse:  [105-121] 121  Heart Rate:  [] 107  Resp:  [16-32] 32  BP: (116-141)/(80-92) 141/92  SpO2:  [82 %-95 %] 93 %  I/O last 3 completed shifts:  In: 2096 [I.V.:1771]  Out: 1400 [Urine:1400]    Constitutional: Awake, appears unwell although not in  distress  Eyes: sclera white   HEENT: MMM  Respiratory: Bilateral crackles without wheeze  Cardiovascular: Regular tachycardia.  No murmur   GI: non-tender, not distended, bowel sounds present  Skin: no rash  Musculoskeletal/extremities: No significant edema  Neurologic: A&O  Psychiatric: calm, cooperative      Discharge Disposition   Transferred to [facility]    Consultations This Hospital Stay   PHARMACY TO DOSE VANCO  HEMATOLOGY & ONCOLOGY IP CONSULT  CARE TRANSITION RN/SW IP CONSULT  ADVANCE DIRECTIVE IP CONSULT  NUTRITION SERVICES ADULT IP CONSULT  INFECTIOUS DISEASES IP CONSULT  CARE COORDINATOR IP CONSULT  PHARMACY TO DOSE VANCO    Time Spent on this Encounter   I, Alyson Greer MD, personally saw the patient today and spent greater than 30 minutes discharging this patient.    Discharge Orders   No discharge procedures on file.  Discharge Medications   Current Discharge Medication List      CONTINUE these medications which have NOT CHANGED    Details   acyclovir (ZOVIRAX) 400 MG tablet Take 1 tablet (400 mg) by mouth 2 times daily  Qty: 60 tablet, Refills: 0    Associated Diagnoses: Diffuse large B-cell lymphoma of extranodal site (H)      allopurinol (ZYLOPRIM) 300 MG tablet Take 1 tablet (300 mg) by mouth daily  Qty: 30 tablet, Refills: 0    Associated Diagnoses: Diffuse large B-cell lymphoma of extranodal site (H)      fluconazole (DIFLUCAN) 200 MG tablet Take 1 tablet (200 mg) by mouth daily  Qty: 7 tablet, Refills: 0    Associated Diagnoses: Diffuse large B-cell lymphoma of extranodal site (H)      levofloxacin (LEVAQUIN) 250 MG tablet Take 1 tablet (250 mg) by mouth daily for 7 days  Qty: 7 tablet, Refills: 0    Associated Diagnoses: Diffuse large B-cell lymphoma of extranodal site (H)      LORazepam (ATIVAN) 0.5 MG tablet Take 1-2 tablets (0.5-1 mg) by mouth every 6 hours as needed (Breakthrough Nausea / Vomiting)  Qty: 30 tablet, Refills: 0    Associated Diagnoses: Diffuse large B-cell  lymphoma of extranodal site (H)      omeprazole (PRILOSEC) 40 MG DR capsule Take 1 capsule (40 mg) by mouth every morning (before breakfast)  Qty: 30 capsule, Refills: 0    Associated Diagnoses: Diffuse large B-cell lymphoma of extranodal site (H)      ondansetron (ZOFRAN-ODT) 8 MG ODT tab Take 1 tablet (8 mg) by mouth every 8 hours as needed  Qty: 30 tablet, Refills: 0    Associated Diagnoses: Diffuse large B-cell lymphoma of extranodal site (H)      prochlorperazine (COMPAZINE) 10 MG tablet Take 1 tablet (10 mg) by mouth every 6 hours as needed (Breakthrough Nausea/Vomiting)  Qty: 30 tablet, Refills: 0    Associated Diagnoses: Diffuse large B-cell lymphoma of extranodal site (H)      SENNA PO Take 1 tablet by mouth every 72 hours as needed           Allergies   Allergies   Allergen Reactions     Cold & Flu [Cold Defense Fighter]      See pseudoephedrine     Seasonal Allergies      Sudafed Cold-Cough [Dayquil Liquicaps]      Pseudoephedrine Rash     Rash then skins peels off      Data   Most Recent 3 CBC's:  Recent Labs   Lab Test 12/18/19  0712 12/17/19  0715 12/16/19  0804   WBC 7.7 6.3 3.5*   HGB 9.2* 7.6* 8.7*   MCV 93 95 98    222 201      Most Recent 3 BMP's:  Recent Labs   Lab Test 12/18/19  0712 12/17/19  1436 12/17/19  0715 12/16/19  0804     --  142 144   POTASSIUM 3.7 4.4 3.2* 3.7   CHLORIDE 109  --  113* 113*   CO2 21  --  20 23   BUN 7  --  8 10   CR 1.04  --  1.15* 1.28*   ANIONGAP 9  --  9 8   AFSHAN 8.3*  --  8.1* 8.4*   *  --  91 102*     Most Recent 2 LFT's:  Recent Labs   Lab Test 12/15/19  0656 12/11/19  1151   AST 41 18   ALT 34 32   ALKPHOS 70 102   BILITOTAL 1.2 1.0     Most Recent INR's and Anticoagulation Dosing History:  Anticoagulation Dose History     Recent Dosing and Labs Latest Ref Rng & Units 11/27/2019 11/28/2019 11/29/2019 11/30/2019 12/1/2019    INR 0.86 - 1.14 1.02 1.03 1.05 1.12 1.18(H)        Most Recent 3 Troponin's:  Recent Labs   Lab Test 09/18/19  2314  09/18/19  1318 09/18/19  0845   TROPI 0.088* 0.114* 0.079*     Most Recent Cholesterol Panel:  Recent Labs   Lab Test 07/27/18  0921   CHOL 233*   LDL Cannot estimate LDL when triglyceride exceeds 400 mg/dL  116*   HDL 38*   TRIG 523*     Most Recent 6 Bacteria Isolates From Any Culture (See EPIC Reports for Culture Details):  Recent Labs   Lab Test 12/15/19  1730 12/15/19  0830 12/15/19  0656 11/27/19  1823 11/27/19  1708 07/26/13  1010   CULT Light growth  Normal ara   No growth after 3 days No growth after 3 days No growth No growth >100,000 colonies/mL Escherichia coli     Most Recent TSH, T4 and A1c Labs:  Recent Labs   Lab Test 09/18/19  0845   TSH 2.06     Results for orders placed or performed during the hospital encounter of 12/15/19   Chest XR,  PA & LAT    Narrative    CHEST TWO VIEWS  12/15/2019 7:33 AM     HISTORY: Fever.    COMPARISON: 11/28/2019 chest x-ray.      Impression    IMPRESSION: Right upper lobe collapse has resolved. Prominent  bilateral perihilar and lower lobe infiltrates. Differential diagnosis  includes congestive heart failure and pneumonitis/pneumonia.    DANIELA LUGO MD   CT Chest w/o Contrast    Narrative    EXAM: CT CHEST W/O CONTRAST  LOCATION: [facility]  DATE/TIME: 12/16/2019 7:23 PM    INDICATION: Acute respiratory illness, > 40 years old, pneumonia, lymphoma, hypoxia, fever.  COMPARISON: PET CT 11/29/2019.  TECHNIQUE: CT chest without IV contrast. Multiplanar reformats were obtained. Dose reduction techniques were used.  CONTRAST: None.    FINDINGS:   LUNGS AND PLEURA: New extensive airspace disease, groundglass opacity, and interstitial edema throughout both hemithoraces with least disease in right upper lobe. Evidence for remote granulomatous disease as on prior. Trace left effusion.    MEDIASTINUM/AXILLAE: Bulky confluent adenopathy of overall less volume. Accurate measurements difficult to obtain without IV contrast.    UPPER ABDOMEN: No  change.    MUSCULOSKELETAL: Degenerative disease. No concerning bone lesions. No definite soft tissue masses.      Impression    IMPRESSION:   1.  Evidence for decrease in volume of patient's extensive adenopathy consistent with response to therapy for patient's lymphoma.  2.  Extensive areas of airspace disease throughout both hemithoraces with interstitial edema. Findings likely infectious or inflammatory with differential including pulmonary edema, drug reaction, among others.               Disclaimer: This note consists of symbols derived from keyboarding, dictation and/or voice recognition software. As a result, there may be errors in the script that have gone undetected. Please consider this when interpreting information found in this chart.

## 2019-12-18 NOTE — PROGRESS NOTES
[facility]  Infectious Disease Progress Note          Assessment and Plan:   IMPRESSION:   1.  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  No CT scan to date.  Significant fever and illness currently.   2.  Neutropenia, now resolved.  Highly Doubt this is simple neutropenic fever, more likely a more complicated pneumonia problem.   3.  Lymphoma.  4 New diarrhea await C diff      RECOMMENDATIONS:   1.  Significant risk and worry for opportunistic major lung infection here.  For now, Zosyn alone(already atypical course).DC vanco no need for this  Not improving O2 worse, needs facility with pulm available [facility] plan  2.  Get multiple lab studies and sputum, LDH, Fungitell, fungal serology panel, etc. LDH high( has lymhoma which also elevates but is now higher despite tx), over 500 fungitel ,presumptive PJP, septra empiric for PJP IV initially  3 Neg Cdiff for diarrhea check sx tx  4 Discussed in detail will follow at [facility]  5 Likely bronch, no productivity reordered PJP sputum if we prove that dx no bronch need  6 Probably start steroids also but await pulm opinion /plan             Interval History:;   no new complaints but not better new diarrhea not C diff              Medications:       acyclovir  400 mg Oral BID     allopurinol  300 mg Oral Daily     enoxaparin ANTICOAGULANT  40 mg Subcutaneous Q24H     fluconazole  200 mg Oral Daily     omeprazole  40 mg Oral QAM AC     piperacillin-tazobactam  3.375 g Intravenous Q6H     sodium chloride (PF)  3 mL Intracatheter Q8H     sulfamethoxazole-trimethoprim (BACTRIM/SEPTRA) intermittent infusion  400 mg Intravenous Q8H                   Physical Exam:   Blood pressure (!) 141/92, pulse 121, temperature 100.4  F (38  C), temperature source Oral, resp. rate (!) 32, weight 78 kg (172 lb), SpO2 93 %, not currently breastfeeding.  Wt Readings from Last 2 Encounters:   12/18/19 78 kg (172 lb)   12/11/19 76.9 kg (169 lb 8 oz)     Vital Signs with Ranges  Temp:  [96.8  F (36  C)-101.5  F (38.6  C)] 100.4  F (38  C)  Pulse:  [105-121] 121  Heart Rate:  [] 107  Resp:  [16-32] 32  BP: (116-141)/(80-92) 141/92  SpO2:  [82 %-95 %] 93 %    Constitutional: Awake, alert, cooperative, no apparent distress   Lungs: Congestion to auscultation bilaterally, no crackles or wheezing   Cardiovascular: Regular rate and rhythm, normal S1 and S2, and no murmur noted   Abdomen: Normal bowel sounds, soft, non-distended, non-tender   Skin: No rashes, no cyanosis, no edema   Other:           Data:   All microbiology laboratory data reviewed.  Recent Labs   Lab Test 12/18/19  0712 12/17/19  0715 12/16/19  0804   WBC 7.7 6.3 3.5*   HGB 9.2* 7.6* 8.7*   HCT 26.9* 22.6* 27.2*   MCV 93 95 98    222 201     Recent Labs   Lab Test 12/18/19  0712 12/17/19  0715 12/16/19  0804   CR 1.04 1.15* 1.28*     No lab results found.  Recent Labs   Lab Test 12/15/19  1730 12/15/19  0830 12/15/19  0656 11/27/19  1823 11/27/19  1708 07/26/13  1010 11/13/12  1702   CULT Light growth  Normal ara   No growth after 3 days No growth after 3 days No growth No growth >100,000 colonies/mL Escherichia coli Normal skin ara

## 2019-12-18 NOTE — PROGRESS NOTES
[facility]    Hematology/Oncology Follow-up Note      Today's Date: 12/18/19  Date of Admission:  12/15/2019  Reason for Consult: DLBCL, admitted for Neutropenic fever        ASSESSMENT/PLAN:  Kassie Ramirez is a 49 year old female with:      DLBCL: Stage III, recently diagnosed and admitted from 11/21-12/1/2019 to initiate first cycle of R-CHOP chemotherapy at the [facility].  Neutropenic fever resolved.  She will be following up with Dr. Castro as an outpatient and is scheduled to see him for cycle 2 on 12/27/2019.  We will add Neulasta to cycle 2.    --Continue allopurinol and acyclovir  --Plan to transfer to [facility] for Pulm, see below     Neutropenic fever: Noted to be neutropenic on 12/11/19 and given levofloxacin and fluconazole. Tmax 105 a few days ago.  Neupogen was given and discontinued, ANC today 5.6.  She did have a fever at 101 this morning and has been feeling unwell.  Cultures no growth to date, likely complicated pneumonia and ID following.    Respiratory failure: Persistent hypoxia and likely complicated pneumonia, continues to be febrile.  Bronchoscopy has been discussed and will need to be transferred to [facility].  Hospitalist has initiated transfer and pending bed placement.    Anemia: Hemoglobin dropped to 7.6, likely secondary to chemotherapy. Ferritin is elevated 885, but this could be due to her lymphoma.  Iron is normal at 39 and saturation index is normal at 21.  1 unit of blood given 12/17 and hemoglobin has improved to 9.2.  Fatigue has improved, but shortness of breath persistent.  Hemoglobin may drop again.  --Daily CBC with differential  --Transfuse if hemoglobin <8.0 or symptomatic     RENAY: Resolved, creatinine 1.04.      INTERIM HISTORY: Patient was seen in her room, resting comfortably in bed.  With high flow oxygen in place and continues to have shortness of breath at rest and exertion.   is in the room with her.  Fatigue is better after the blood  transfusion, however shortness of breath persist.  Fever of 101 this morning and does have chills occasionally.  Appetite continues to be poor.       MEDICATIONS:  Current Facility-Administered Medications   Medication     acetaminophen (TYLENOL) tablet 650 mg    Or     acetaminophen (TYLENOL) Suppository 650 mg     acyclovir (ZOVIRAX) tablet 400 mg     albuterol (PROVENTIL) neb solution 2.5 mg     allopurinol (ZYLOPRIM) tablet 300 mg     enoxaparin ANTICOAGULANT (LOVENOX) injection 40 mg     fluconazole (DIFLUCAN) tablet 200 mg     guaiFENesin-dextromethorphan (ROBITUSSIN DM) 100-10 MG/5ML syrup 10 mL     lidocaine (LMX4) cream     lidocaine 1 % 0.1-1 mL     LORazepam (ATIVAN) tablet 0.5-1 mg     melatonin tablet 1 mg     naloxone (NARCAN) injection 0.1-0.4 mg     omeprazole (priLOSEC) CR capsule 40 mg     ondansetron (ZOFRAN-ODT) ODT tab 4 mg    Or     ondansetron (ZOFRAN) injection 4 mg     piperacillin-tazobactam (ZOSYN) infusion 3.375 g     polyethylene glycol (MIRALAX/GLYCOLAX) Packet 17 g     potassium chloride (KLOR-CON) Packet 20-40 mEq     potassium chloride 10 mEq in 100 mL intermittent infusion with 10 mg lidocaine     potassium chloride 10 mEq in 100 mL sterile water intermittent infusion (premix)     potassium chloride 20 mEq in 50 mL intermittent infusion     potassium chloride ER (K-DUR/KLOR-CON M) CR tablet 20-40 mEq     prochlorperazine (COMPAZINE) injection 10 mg    Or     prochlorperazine (COMPAZINE) tablet 10 mg    Or     prochlorperazine (COMPAZINE) Suppository 25 mg     senna-docusate (SENOKOT-S/PERICOLACE) 8.6-50 MG per tablet 1 tablet    Or     senna-docusate (SENOKOT-S/PERICOLACE) 8.6-50 MG per tablet 2 tablet     sodium chloride (PF) 0.9% PF flush 3 mL     sodium chloride (PF) 0.9% PF flush 3 mL     sodium chloride 0.9% infusion     sulfamethoxazole-trimethoprim (BACTRIM) 400 mg in D5W 500 mL intermittent infusion         ALLERGIES:  Allergies   Allergen Reactions     Cold & Flu [Cold  "Defense Fighter]      See pseudoephedrine     Seasonal Allergies      Sudafed Cold-Cough [Dayquil Liquicaps]      Pseudoephedrine Rash     Rash then skins peels off          PHYSICAL EXAM:  Vital signs:  Temp: 100.4  F (38  C) Temp src: Oral BP: (!) 141/92 Pulse: 121 Heart Rate: 107 Resp: (!) 32 SpO2: 93 % O2 Device: Oxymizer cannula Oxygen Delivery: 5 LPM   Weight: 78 kg (172 lb)  Estimated body mass index is 26.94 kg/m  as calculated from the following:    Height as of 11/29/19: 1.702 m (5' 7\").    Weight as of this encounter: 78 kg (172 lb).    GENERAL:  Female, in no acute distress.  Alert and oriented x3.   HEENT:  Normocephalic, atraumatic.   LUNGS:HF O2 in place, mild labored breathing  SKIN: No rash on exposed skin   PSYCH: Mood stable, appears tired      LABS:  CBC RESULTS:   Recent Labs   Lab Test 12/18/19  0712   WBC 7.7   RBC 2.90*   HGB 9.2*   HCT 26.9*   MCV 93   MCH 31.7   MCHC 34.2   RDW 15.9*          Recent Labs   Lab Test 12/18/19  0712 12/17/19  1436 12/17/19  0715     --  142   POTASSIUM 3.7 4.4 3.2*   CHLORIDE 109  --  113*   CO2 21  --  20   ANIONGAP 9  --  9   *  --  91   BUN 7  --  8   CR 1.04  --  1.15*   AFSHAN 8.3*  --  8.1*           Padmaja Sanchez PA-C  Hematology/Oncology  [facility]      "

## 2019-12-18 NOTE — PLAN OF CARE
Tmax 101 - asymptomatic, pt denied tylenol. 3L oxygen to maintain SPO2. All other VSS. LORENZ. Lung sounds diminished with fine crackles. Frequent productive cough. IVF. Independent/SBA. Denies pain. Denies nausea. IV zosyn infused.

## 2019-12-18 NOTE — H&P
[facility]    History and Physical - Hospitalist Service       Date of Admission:  12/18/2019    Assessment & Plan   Kassie Ramirez is a 49 year old female admitted on 12/18/2019. She she was transferred from [facility] for pulmonology consultation due to persistent hypoxia and fever as well as concern for opportunistic lung infection,    Acute Hypoxic Respiratory Failure  Bilateral Infiltrates with concern for opportunistic lung infection  Sepsis, resolved: Presented to [facility] 12/15 with fever and worsening respiratory symptoms.  Had recently completed a course of Levaquin for postobstructive pneumonia and had been placed on Levaquin and Diflucan by oncology for neutropenia.  Chest x-ray on admission with bilateral perihilar and lobar infiltrates concerning for pneumonia. Febrile with LA 3 on admission. She was admitted and initiated on Zosyn and vancomycin and infectious disease was consulted. Prior to her dx of B cell lymphoma she was several months of high dose steroids due to concern for sarcoidosis. She was found to elevated Fungitel and initiated on Septra 12/18 due to concern for PJP. Blood and sputum cultures NGTD. Due to persistent fever/hypoxia transfer to [facility] for pulmonology consult and consideration for possible bronch  - Admit inpatient  - Continue IV Zosyn Septra. Ok to d/c Vancomycin per ID note  - Continue oral diflucan  - ID consult  - Pulmonary consult.  - ? Steroids will defer to Pulmonology  - Continue supplemental O2, cough suppressant and prn albuterol      Diffuse Large B Cell Lymphoma  Neutropenia, resolved: Had a mediastinal mass biopsied at [facility] that came back positive for diffuse large B-cell lymphoma.  Initially treated with high-dose steroids.  Started chemotherapy with R-CHOP at the [facility] during the 11/27 through 12/1 admission.  Has follow-up with [place] oncology with Dr. Castro.  - ANC 0.4 on admission. Received Neupogen 12/15 with  improvement ANC 5.6 today  - Daily CBC   - Oncology consult    Anemia: Previously baseline 13-14. Downtrending since RCHOP  - S/p 1 unit PRBC 12/18  - Monitor CBC    RENAY: Baseline Cr 0.8  - Cr trend 1.28--10.4  - Continue IVF  - Monitor    Anxiety  - Continue PRN ativan       Diet: Combination Diet Regular Diet Adult    DVT Prophylaxis: Enoxaparin (Lovenox) SQ  Phillips Catheter: not present  Code Status: Full Code      Disposition Plan   Expected discharge: Admit inpatient  Entered: Moira Law PA-C 12/18/2019, 4:08 PM     The patient's care was discussed with the Bedside Nurse, Patient and Patient's Family.    Moira Law PA-C  [facility]    ______________________________________________________________________    Chief Complaint   Fever and Hypoxia    History is obtained from the patient  Patient,  and chart review    History of Present Illness   Kassie Ramirez is a 49 year old female with a recent diagnosis of diffuse large B-cell lymphoma status post R-CHOP 11/29 who was transferred from [facility] for ongoing treatment and evaluation of acute hypoxic respiratory failure and concern for opportunistic lung infection.    Patient dealt with multiple respiratory issues over the spring and summer months.  She was on several courses of antibiotics and steroids.  At one point there was concern she may have sarcoidosis and was treated with high-dose steroids.  She was recently diagnosed with diffuse large B-cell lymphoma following biopsy of a mediastinal mass.  She underwent R CHOP during a recent hospitalization at the [facility] 11/27-12/1.  She initially tolerated her chemotherapy well though did develop neutropenia.  She was treated with a course of Levaquin and was seen in follow-up 12/11.  She had persistent URI symptoms and neutropenia so was placed on daily Levaquin and Diflucan.  Unfortunately, her respiratory status worsened and she developed persistent fever.  She  presented to [facility] on 12/15    On arrival she was febrile, hypoxic with a lactic acid of 3.0.  She was also noted to be neutropenic.  She was initiated on vancomycin and Zosyn as well as IV fluid hydration and admitted inpatient.  She was seen by oncology and given Neupogen with resolution of neutropenia.  Infectious disease is also been consulted.  Her sputum cultures and blood cultures have returned negative.  She was continued on broad-spectrum antibiotics with minimal improvement, in fact worsening of her respiratory status and persistent fever. Fungitell testing returned positive today and she was initiated on empiric Spetra due to concern for PJP pneumonia. Due to failed clinical improvement transfer to [facility] for pulmonology consult was requested    Presently, she is evaluated in her hospital room. She is febrile up to 100.8. Currently requiring 5L o2. Feels breathless with talking. Occasional chest pain with cough. Denies N/V. Appetite is poor    Review of Systems    The 10 point Review of Systems is negative other than noted in the HPI or here.     Past Medical History    I have reviewed this patient's medical history and updated it with pertinent information if needed.   Past Medical History:   Diagnosis Date     Anxiety attack 9/16/2014     Diffuse large B-cell lymphoma (H)     Diagnosed 11/2019     Encounter for Essure implantation 2009     Generalized anxiety disorder 9/16/2014    zoloft = flat emotions     Menopausal disorder     started on OCPs by menopause center 3/2017 (takes active continuously)     Menstrual headache     helped by OCPs and magnesium     GERTRUDE (stress urinary incontinence, female)     sling procedure 2016     SVT (supraventricular tachycardia) (H)        Past Surgical History   I have reviewed this patient's surgical history and updated it with pertinent information if needed.  Past Surgical History:   Procedure Laterality Date     H KIT CHRISTIAON  2009    [address]  Cailin Block      HERNIORRHAPHY UMBILICAL  1974     SLING TRANSPUBO WITHOUT ANTERIOR COLPORRHAPHY N/A 11/21/2016    Procedure: SLING TRANSPUBO WITHOUT ANTERIOR COLPORRHAPHY;  Surgeon: Hernesto Berrios MD;  Location: [facility] OR       Social History   I have reviewed this patient's social history and updated it with pertinent information if needed.  Social History     Tobacco Use     Smoking status: Never Smoker     Smokeless tobacco: Never Used   Substance Use Topics     Alcohol use: No     Alcohol/week: 0.0 standard drinks     Comment: 1 time per month     Drug use: No       Family History   I have reviewed this patient's family history and updated it with pertinent information if needed.   Family History   Problem Relation Age of Onset     Hypertension Mother      Depression Mother      Lipids Mother      Cardiovascular Mother      Circulatory Mother      Diabetes Mother      Heart Disease Mother      Cerebrovascular Disease Mother      Obesity Mother      C.A.D. Mother      Lung Cancer Mother         smoker     Eye Disorder Father         cone dystrophy     Macular Degeneration Father      Diabetes Maternal Aunt         type 2     Hypertension Maternal Aunt      Heart Disease Maternal Grandfather      Heart Disease Sister         high cholesterol       Macular Degeneration Sister        Prior to Admission Medications   Prior to Admission Medications   Prescriptions Last Dose Informant Patient Reported? Taking?   LORazepam (ATIVAN) 0.5 MG tablet   No No   Sig: Take 1-2 tablets (0.5-1 mg) by mouth every 6 hours as needed (Breakthrough Nausea / Vomiting)   SENNA PO   Yes No   Sig: Take 1 tablet by mouth every 72 hours as needed   acyclovir (ZOVIRAX) 400 MG tablet   No No   Sig: Take 1 tablet (400 mg) by mouth 2 times daily   allopurinol (ZYLOPRIM) 300 MG tablet   No No   Sig: Take 1 tablet (300 mg) by mouth daily   fluconazole (DIFLUCAN) 200 MG tablet   No No   Sig: Take 1 tablet (200 mg) by mouth daily   levofloxacin  (LEVAQUIN) 250 MG tablet   No No   Sig: Take 1 tablet (250 mg) by mouth daily for 7 days   omeprazole (PRILOSEC) 40 MG DR capsule   No No   Sig: Take 1 capsule (40 mg) by mouth every morning (before breakfast)   ondansetron (ZOFRAN-ODT) 8 MG ODT tab   No No   Sig: Take 1 tablet (8 mg) by mouth every 8 hours as needed   prochlorperazine (COMPAZINE) 10 MG tablet   No No   Sig: Take 1 tablet (10 mg) by mouth every 6 hours as needed (Breakthrough Nausea/Vomiting)      Facility-Administered Medications: None     Allergies   Allergies   Allergen Reactions     Cold & Flu [Cold Defense Fighter]      See pseudoephedrine     Seasonal Allergies      Sudafed Cold-Cough [Dayquil Liquicaps]      Pseudoephedrine Rash     Rash then skins peels off        Physical Exam   Vital Signs: Temp: (P) 100.8  F (38.2  C) Temp src: (P) Axillary BP: (P) 130/58   Heart Rate: (P) 110   SpO2: (P) 96 % O2 Device: (P) Oxymizer cannula Oxygen Delivery: (P) 6 LPM  Weight: 0 lbs 0 oz    Constitutional: Alert, laying in bed, ill appearing  HEENT: Head normocephalic, atraumatic. Eyes sclera non icteric. Mucous membranes are mosit  Respiratory: Appears dyspneic. Can speak in full sentences. Diffuse course breath sounds. No wheezing  Cardiovascular: RRR no murmurs   GI: Non distended, normal bowels sounds, no tenderness or guarding  MSK: LE with trace edema. Dorsalis pedis pulse palpated bilaterally.   Skin/Integumen: Clear  Neuro: CN II-XII grossly intact  Psych:  Alert and oriented x 3. Normal affect      Data   Data reviewed today: I reviewed all medications, new labs and imaging results over the last 24 hours. I personally reviewed no images or EKG's today.    Recent Labs   Lab 12/18/19  0712 12/17/19  1436 12/17/19  0715 12/16/19  0804  12/15/19  0656   WBC 7.7  --  6.3 3.5*  --  1.2*   HGB 9.2*  --  7.6* 8.7*  --  9.6*   MCV 93  --  95 98  --  94     --  222 201  --  270     --  142 144   < > 135   POTASSIUM 3.7 4.4 3.2* 3.7   < > 3.3*    CHLORIDE 109  --  113* 113*   < > 101   CO2 21  --  20 23   < > 25   BUN 7  --  8 10   < > 11   CR 1.04  --  1.15* 1.28*   < > 1.20*   ANIONGAP 9  --  9 8   < > 9   AFSHAN 8.3*  --  8.1* 8.4*   < > 8.7   *  --  91 102*   < > 144*   ALBUMIN  --   --   --   --   --  3.0*   PROTTOTAL  --   --   --   --   --  6.7*   BILITOTAL  --   --   --   --   --  1.2   ALKPHOS  --   --   --   --   --  70   ALT  --   --   --   --   --  34   AST  --   --   --   --   --  41    < > = values in this interval not displayed.     No results found for this or any previous visit (from the past 24 hour(s)).

## 2019-12-18 NOTE — PLAN OF CARE
Full code.  Neutropenic precautions.  Enteric panel negative.  VSS on 2.5L NC. T max 101.3.  PRN Tylenol given.  Hgb: 7.6. 1 unit of blood given. Oncology following.  K replaced to 4.4.  Continue iv Zosyn and Vancomycin.  Tolerating a regular diet.  Up with stand by assist to independent.  Alert and oriented.  Discharge pending.

## 2019-12-18 NOTE — PROGRESS NOTES
Pt transferring to [facility] for pulmonology team. Pt sent via stretcher transport. Pt remains on 5LPM via oximyzer. Report given to EMS team. Pt's spouse aware of unit and room pt transferring to at [facility]. All belongings packed and sent with patient's spouse at time of discharge.

## 2019-12-18 NOTE — PROGRESS NOTES
Chart reviewed and consult noted for  CT chest reviewed showing diffuse bilateral infiltrates    Keep NPO past midnight  Will discuss with Dr. Horton who is taking over tomorrow about bronchoscopy and BAL  No steroids at this time    Pennie Mitchell M.D.  [facility]  Office: [phone]  Pager: [phone]

## 2019-12-19 ENCOUNTER — APPOINTMENT (OUTPATIENT)
Dept: GENERAL RADIOLOGY | Facility: CLINIC | Age: 49
End: 2019-12-19
Attending: PHYSICIAN ASSISTANT
Payer: COMMERCIAL

## 2019-12-19 ENCOUNTER — PATIENT OUTREACH (OUTPATIENT)
Dept: CARE COORDINATION | Facility: CLINIC | Age: 49
End: 2019-12-19

## 2019-12-19 DIAGNOSIS — R50.81 NEUTROPENIC FEVER (H): ICD-10-CM

## 2019-12-19 DIAGNOSIS — D70.9 NEUTROPENIC FEVER (H): ICD-10-CM

## 2019-12-19 DIAGNOSIS — C83.398 DIFFUSE LARGE B-CELL LYMPHOMA OF EXTRANODAL SITE: Primary | ICD-10-CM

## 2019-12-19 DIAGNOSIS — C83.398 DIFFUSE LARGE B-CELL LYMPHOMA OF EXTRANODAL SITE: ICD-10-CM

## 2019-12-19 LAB
ANION GAP SERPL CALCULATED.3IONS-SCNC: 4 MMOL/L (ref 3–14)
ANISOCYTOSIS BLD QL SMEAR: SLIGHT
APTT PPP: 42 SEC (ref 22–37)
ASPERGILLUS AB TITR SER CF: NORMAL {TITER}
B DERMAT AB SER-ACNC: 0.1 IV
BASE EXCESS BLDA CALC-SCNC: 1.1 MMOL/L
BASOPHILS # BLD AUTO: 0 10E9/L (ref 0–0.2)
BASOPHILS NFR BLD AUTO: 0 %
BLASTS # BLD: 0.1 10E9/L
BLASTS BLD QL SMEAR: 1 %
BUN SERPL-MCNC: 5 MG/DL (ref 7–30)
CALCIUM SERPL-MCNC: 8.2 MG/DL (ref 8.5–10.1)
CHLORIDE SERPL-SCNC: 105 MMOL/L (ref 94–109)
CO2 SERPL-SCNC: 27 MMOL/L (ref 20–32)
COCCIDIOIDES AB TITR SER CF: NORMAL {TITER}
CREAT SERPL-MCNC: 1.01 MG/DL (ref 0.52–1.04)
DIFFERENTIAL METHOD BLD: ABNORMAL
EOSINOPHIL # BLD AUTO: 0 10E9/L (ref 0–0.7)
EOSINOPHIL NFR BLD AUTO: 0 %
ERYTHROCYTE [DISTWIDTH] IN BLOOD BY AUTOMATED COUNT: 15.9 % (ref 10–15)
GFR SERPL CREATININE-BSD FRML MDRD: 65 ML/MIN/{1.73_M2}
GLUCOSE SERPL-MCNC: 116 MG/DL (ref 70–99)
H CAPSUL MYC AB TITR SER CF: NORMAL {TITER}
H CAPSUL YST AB TITR SER CF: NORMAL {TITER}
HCO3 BLD-SCNC: 24 MMOL/L (ref 21–28)
HCT VFR BLD AUTO: 25.8 % (ref 35–47)
HGB BLD-MCNC: 8.8 G/DL (ref 11.7–15.7)
LYMPHOCYTES # BLD AUTO: 1.1 10E9/L (ref 0.8–5.3)
LYMPHOCYTES NFR BLD AUTO: 15 %
MCH RBC QN AUTO: 30.9 PG (ref 26.5–33)
MCHC RBC AUTO-ENTMCNC: 34.1 G/DL (ref 31.5–36.5)
MCV RBC AUTO: 91 FL (ref 78–100)
METAMYELOCYTES # BLD: 0.9 10E9/L
METAMYELOCYTES NFR BLD MANUAL: 13 %
MONOCYTES # BLD AUTO: 0.3 10E9/L (ref 0–1.3)
MONOCYTES NFR BLD AUTO: 4 %
MYELOCYTES # BLD: 0.2 10E9/L
MYELOCYTES NFR BLD MANUAL: 3 %
NEUTROPHILS # BLD AUTO: 4.6 10E9/L (ref 1.6–8.3)
NEUTROPHILS NFR BLD AUTO: 64 %
O2/TOTAL GAS SETTING VFR VENT: ABNORMAL %
OVALOCYTES BLD QL SMEAR: SLIGHT
OXYHGB MFR BLD: 98 % (ref 92–100)
P JIROVECII DNA SPEC QL NAA+PROBE: DETECTED
PCO2 BLD: 30 MM HG (ref 35–45)
PH BLD: 7.51 PH (ref 7.35–7.45)
PLATELET # BLD AUTO: 210 10E9/L (ref 150–450)
PLATELET # BLD EST: ABNORMAL 10*3/UL
PO2 BLD: 130 MM HG (ref 80–105)
POTASSIUM SERPL-SCNC: 2.7 MMOL/L (ref 3.4–5.3)
POTASSIUM SERPL-SCNC: 3.5 MMOL/L (ref 3.4–5.3)
RBC # BLD AUTO: 2.85 10E12/L (ref 3.8–5.2)
SODIUM SERPL-SCNC: 136 MMOL/L (ref 133–144)
SPECIMEN SOURCE: ABNORMAL
WBC # BLD AUTO: 7.2 10E9/L (ref 4–11)

## 2019-12-19 PROCEDURE — 25800030 ZZH RX IP 258 OP 636: Performed by: PHYSICIAN ASSISTANT

## 2019-12-19 PROCEDURE — 36415 COLL VENOUS BLD VENIPUNCTURE: CPT | Performed by: PHYSICIAN ASSISTANT

## 2019-12-19 PROCEDURE — 12000047 ZZH R&B IMCU

## 2019-12-19 PROCEDURE — 85730 THROMBOPLASTIN TIME PARTIAL: CPT | Performed by: INTERNAL MEDICINE

## 2019-12-19 PROCEDURE — 25000128 H RX IP 250 OP 636: Performed by: PHYSICIAN ASSISTANT

## 2019-12-19 PROCEDURE — 99207 ZZC APP CREDIT; MD BILLING SHARED VISIT: CPT | Performed by: PHYSICIAN ASSISTANT

## 2019-12-19 PROCEDURE — 85025 COMPLETE CBC W/AUTO DIFF WBC: CPT | Performed by: PHYSICIAN ASSISTANT

## 2019-12-19 PROCEDURE — 12000000 ZZH R&B MED SURG/OB

## 2019-12-19 PROCEDURE — 84132 ASSAY OF SERUM POTASSIUM: CPT | Performed by: INTERNAL MEDICINE

## 2019-12-19 PROCEDURE — 36415 COLL VENOUS BLD VENIPUNCTURE: CPT | Performed by: INTERNAL MEDICINE

## 2019-12-19 PROCEDURE — 99233 SBSQ HOSP IP/OBS HIGH 50: CPT | Performed by: INTERNAL MEDICINE

## 2019-12-19 PROCEDURE — 36600 WITHDRAWAL OF ARTERIAL BLOOD: CPT

## 2019-12-19 PROCEDURE — 80048 BASIC METABOLIC PNL TOTAL CA: CPT | Performed by: PHYSICIAN ASSISTANT

## 2019-12-19 PROCEDURE — 99232 SBSQ HOSP IP/OBS MODERATE 35: CPT | Performed by: INTERNAL MEDICINE

## 2019-12-19 PROCEDURE — 40000275 ZZH STATISTIC RCP TIME EA 10 MIN

## 2019-12-19 PROCEDURE — 71045 X-RAY EXAM CHEST 1 VIEW: CPT

## 2019-12-19 PROCEDURE — 99207 ZZC CDG-CHARGE REQUIRED MANUAL ENTRY: CPT | Performed by: INTERNAL MEDICINE

## 2019-12-19 PROCEDURE — 82805 BLOOD GASES W/O2 SATURATION: CPT | Performed by: PHYSICIAN ASSISTANT

## 2019-12-19 PROCEDURE — 25000132 ZZH RX MED GY IP 250 OP 250 PS 637: Performed by: PHYSICIAN ASSISTANT

## 2019-12-19 RX ORDER — METHYLPREDNISOLONE SODIUM SUCCINATE 125 MG/2ML
125 INJECTION, POWDER, LYOPHILIZED, FOR SOLUTION INTRAMUSCULAR; INTRAVENOUS
Status: CANCELLED
Start: 2019-12-27

## 2019-12-19 RX ORDER — MEPERIDINE HYDROCHLORIDE 25 MG/ML
25 INJECTION INTRAMUSCULAR; INTRAVENOUS; SUBCUTANEOUS
Status: CANCELLED
Start: 2019-12-27

## 2019-12-19 RX ORDER — ALBUTEROL SULFATE 0.83 MG/ML
2.5 SOLUTION RESPIRATORY (INHALATION)
Status: CANCELLED | OUTPATIENT
Start: 2019-12-27

## 2019-12-19 RX ORDER — PREDNISONE 20 MG/1
40 TABLET ORAL 2 TIMES DAILY
Status: DISCONTINUED | OUTPATIENT
Start: 2019-12-20 | End: 2019-12-24 | Stop reason: HOSPADM

## 2019-12-19 RX ORDER — LORAZEPAM 2 MG/ML
.5-1 INJECTION INTRAMUSCULAR EVERY 4 HOURS PRN
Status: DISCONTINUED | OUTPATIENT
Start: 2019-12-19 | End: 2019-12-24 | Stop reason: HOSPADM

## 2019-12-19 RX ORDER — LORAZEPAM 2 MG/ML
0.5 INJECTION INTRAMUSCULAR EVERY 4 HOURS PRN
Status: CANCELLED | OUTPATIENT
Start: 2019-12-27

## 2019-12-19 RX ORDER — POTASSIUM CHLORIDE 29.8 MG/ML
20 INJECTION INTRAVENOUS
Status: DISCONTINUED | OUTPATIENT
Start: 2019-12-19 | End: 2019-12-24 | Stop reason: HOSPADM

## 2019-12-19 RX ORDER — METHYLPREDNISOLONE SODIUM SUCCINATE 125 MG/2ML
60 INJECTION, POWDER, LYOPHILIZED, FOR SOLUTION INTRAMUSCULAR; INTRAVENOUS ONCE
Status: COMPLETED | OUTPATIENT
Start: 2019-12-19 | End: 2019-12-19

## 2019-12-19 RX ORDER — POTASSIUM CHLORIDE 1.5 G/1.58G
20-40 POWDER, FOR SOLUTION ORAL
Status: DISCONTINUED | OUTPATIENT
Start: 2019-12-19 | End: 2019-12-24 | Stop reason: HOSPADM

## 2019-12-19 RX ORDER — ALBUTEROL SULFATE 90 UG/1
1-2 AEROSOL, METERED RESPIRATORY (INHALATION)
Status: CANCELLED
Start: 2019-12-27

## 2019-12-19 RX ORDER — SODIUM CHLORIDE 9 MG/ML
1000 INJECTION, SOLUTION INTRAVENOUS CONTINUOUS PRN
Status: CANCELLED
Start: 2019-12-27

## 2019-12-19 RX ORDER — ACETAMINOPHEN 325 MG/1
650 TABLET ORAL ONCE
Status: CANCELLED | OUTPATIENT
Start: 2019-12-27

## 2019-12-19 RX ORDER — POTASSIUM CHLORIDE 7.45 MG/ML
10 INJECTION INTRAVENOUS
Status: DISCONTINUED | OUTPATIENT
Start: 2019-12-19 | End: 2019-12-24 | Stop reason: HOSPADM

## 2019-12-19 RX ORDER — LIDOCAINE HYDROCHLORIDE 40 MG/ML
4 SOLUTION TOPICAL
Status: CANCELLED | OUTPATIENT
Start: 2019-12-19

## 2019-12-19 RX ORDER — EPINEPHRINE 1 MG/ML
0.3 INJECTION, SOLUTION INTRAMUSCULAR; SUBCUTANEOUS EVERY 5 MIN PRN
Status: CANCELLED | OUTPATIENT
Start: 2019-12-27

## 2019-12-19 RX ORDER — FUROSEMIDE 10 MG/ML
40 INJECTION INTRAMUSCULAR; INTRAVENOUS ONCE
Status: COMPLETED | OUTPATIENT
Start: 2019-12-19 | End: 2019-12-19

## 2019-12-19 RX ORDER — POTASSIUM CHLORIDE 1500 MG/1
20-40 TABLET, EXTENDED RELEASE ORAL
Status: DISCONTINUED | OUTPATIENT
Start: 2019-12-19 | End: 2019-12-24 | Stop reason: HOSPADM

## 2019-12-19 RX ORDER — DIPHENHYDRAMINE HCL 25 MG
50 CAPSULE ORAL ONCE
Status: CANCELLED
Start: 2019-12-27

## 2019-12-19 RX ORDER — MORPHINE SULFATE 2 MG/ML
1-2 INJECTION, SOLUTION INTRAMUSCULAR; INTRAVENOUS EVERY 4 HOURS PRN
Status: DISCONTINUED | OUTPATIENT
Start: 2019-12-19 | End: 2019-12-24 | Stop reason: HOSPADM

## 2019-12-19 RX ORDER — POTASSIUM CL/LIDO/0.9 % NACL 10MEQ/0.1L
10 INTRAVENOUS SOLUTION, PIGGYBACK (ML) INTRAVENOUS
Status: DISCONTINUED | OUTPATIENT
Start: 2019-12-19 | End: 2019-12-24 | Stop reason: HOSPADM

## 2019-12-19 RX ORDER — DOXORUBICIN HYDROCHLORIDE 2 MG/ML
50 INJECTION, SOLUTION INTRAVENOUS ONCE
Status: CANCELLED | OUTPATIENT
Start: 2019-12-27

## 2019-12-19 RX ORDER — PREDNISONE 20 MG/1
20 TABLET ORAL DAILY
Status: DISCONTINUED | OUTPATIENT
Start: 2019-12-30 | End: 2019-12-24 | Stop reason: HOSPADM

## 2019-12-19 RX ORDER — PALONOSETRON 0.05 MG/ML
0.25 INJECTION, SOLUTION INTRAVENOUS ONCE
Status: CANCELLED | OUTPATIENT
Start: 2019-12-27

## 2019-12-19 RX ORDER — DIPHENHYDRAMINE HYDROCHLORIDE 50 MG/ML
50 INJECTION INTRAMUSCULAR; INTRAVENOUS
Status: CANCELLED
Start: 2019-12-27

## 2019-12-19 RX ORDER — MEPERIDINE HYDROCHLORIDE 25 MG/ML
25 INJECTION INTRAMUSCULAR; INTRAVENOUS; SUBCUTANEOUS EVERY 30 MIN PRN
Status: CANCELLED | OUTPATIENT
Start: 2019-12-27

## 2019-12-19 RX ORDER — LIDOCAINE 40 MG/G
CREAM TOPICAL
Status: CANCELLED | OUTPATIENT
Start: 2019-12-19

## 2019-12-19 RX ORDER — NALOXONE HYDROCHLORIDE 0.4 MG/ML
.1-.4 INJECTION, SOLUTION INTRAMUSCULAR; INTRAVENOUS; SUBCUTANEOUS
Status: CANCELLED | OUTPATIENT
Start: 2019-12-27

## 2019-12-19 RX ORDER — PREDNISONE 20 MG/1
40 TABLET ORAL DAILY
Status: DISCONTINUED | OUTPATIENT
Start: 2019-12-25 | End: 2019-12-24 | Stop reason: HOSPADM

## 2019-12-19 RX ORDER — NITROGLYCERIN 0.4 MG/1
0.4 TABLET SUBLINGUAL EVERY 5 MIN PRN
Status: DISCONTINUED | OUTPATIENT
Start: 2019-12-19 | End: 2019-12-21

## 2019-12-19 RX ORDER — EPINEPHRINE 0.3 MG/.3ML
0.3 INJECTION SUBCUTANEOUS EVERY 5 MIN PRN
Status: CANCELLED | OUTPATIENT
Start: 2019-12-27

## 2019-12-19 RX ORDER — LIDOCAINE 40 MG/G
CREAM TOPICAL
Status: DISCONTINUED | OUTPATIENT
Start: 2019-12-19 | End: 2019-12-19

## 2019-12-19 RX ADMIN — MORPHINE SULFATE 2 MG: 2 INJECTION, SOLUTION INTRAMUSCULAR; INTRAVENOUS at 18:20

## 2019-12-19 RX ADMIN — ENOXAPARIN SODIUM 40 MG: 40 INJECTION SUBCUTANEOUS at 15:50

## 2019-12-19 RX ADMIN — SULFAMETHOXAZOLE AND TRIMETHOPRIM 400 MG: 80; 16 INJECTION, SOLUTION, CONCENTRATE INTRAVENOUS at 03:34

## 2019-12-19 RX ADMIN — FUROSEMIDE 40 MG: 10 INJECTION, SOLUTION INTRAVENOUS at 09:25

## 2019-12-19 RX ADMIN — SODIUM CHLORIDE: 9 INJECTION, SOLUTION INTRAVENOUS at 03:34

## 2019-12-19 RX ADMIN — POTASSIUM CHLORIDE 40 MEQ: 1500 TABLET, EXTENDED RELEASE ORAL at 14:47

## 2019-12-19 RX ADMIN — PIPERACILLIN AND TAZOBACTAM 3.38 G: 3; .375 INJECTION, POWDER, FOR SOLUTION INTRAVENOUS at 05:43

## 2019-12-19 RX ADMIN — POTASSIUM CHLORIDE 40 MEQ: 1500 TABLET, EXTENDED RELEASE ORAL at 16:55

## 2019-12-19 RX ADMIN — METHYLPREDNISOLONE SODIUM SUCCINATE 62.5 MG: 125 INJECTION, POWDER, FOR SOLUTION INTRAMUSCULAR; INTRAVENOUS at 09:25

## 2019-12-19 RX ADMIN — ACYCLOVIR 400 MG: 400 TABLET ORAL at 21:43

## 2019-12-19 RX ADMIN — SULFAMETHOXAZOLE AND TRIMETHOPRIM 400 MG: 80; 16 INJECTION, SOLUTION, CONCENTRATE INTRAVENOUS at 13:59

## 2019-12-19 RX ADMIN — SULFAMETHOXAZOLE AND TRIMETHOPRIM 400 MG: 80; 16 INJECTION, SOLUTION, CONCENTRATE INTRAVENOUS at 20:41

## 2019-12-19 RX ADMIN — FLUCONAZOLE 200 MG: 200 TABLET ORAL at 21:43

## 2019-12-19 RX ADMIN — PIPERACILLIN AND TAZOBACTAM 3.38 G: 3; .375 INJECTION, POWDER, FOR SOLUTION INTRAVENOUS at 11:41

## 2019-12-19 RX ADMIN — MORPHINE SULFATE 2 MG: 2 INJECTION, SOLUTION INTRAMUSCULAR; INTRAVENOUS at 12:08

## 2019-12-19 RX ADMIN — LORAZEPAM 1 MG: 2 INJECTION INTRAMUSCULAR; INTRAVENOUS at 08:01

## 2019-12-19 ASSESSMENT — ACTIVITIES OF DAILY LIVING (ADL)
SWALLOWING: 0-->SWALLOWS FOODS/LIQUIDS WITHOUT DIFFICULTY
ADLS_ACUITY_SCORE: 14
TOILETING: 0-->INDEPENDENT
RETIRED_COMMUNICATION: 0-->UNDERSTANDS/COMMUNICATES WITHOUT DIFFICULTY
ADLS_ACUITY_SCORE: 14
ADLS_ACUITY_SCORE: 14
RETIRED_EATING: 0-->INDEPENDENT
ADLS_ACUITY_SCORE: 14
AMBULATION: 0-->INDEPENDENT
FALL_HISTORY_WITHIN_LAST_SIX_MONTHS: NO
TRANSFERRING: 0-->INDEPENDENT
DRESS: 0-->INDEPENDENT
COGNITION: 0 - NO COGNITION ISSUES REPORTED
BATHING: 0-->INDEPENDENT

## 2019-12-19 NOTE — PROGRESS NOTES
"SPIRITUAL HEALTH SERVICES Progress Note  [facility]    Brief intro visit to pt, per her transfer from [facility] to [facility].  When I introduced myself, pt's  said, \"We don't really need that.\"  SH team will be available if needs of pt/family change.                                                                                                                                                 Jennifer Mahmood M.A.  Staff   Pager [phone]  Phone [phone]      "

## 2019-12-19 NOTE — PROGRESS NOTES
Clinic Care Coordination Contact    Situation: Patient chart reviewed by care coordinator.  Patient was admitted to [facility] 12/15-12/18 for hypoxemia.  Patient had worsening hypoemia, suspect PJP infection.  Patient transferred to [facility] for pulmonary consult and bronchoscopy.      Background: PMH including anxiety and recently diagnosed diffuse large B-cell lymphoma who underwent first dose of R-CHOP following high-dose steroids 2 weeks ago during hospitalization of the [facility] who was also recently treated with a course of oral levofloxacin for pneumonia.    Assessment: No clinic care coordination needs identified at this time as patient is still receiving in patient treatment at [facility].    Plan/Recommendations: Rn CC will await notification of discharge and assess needs at that time.    Miryam Hadley RN  Care Coordination  Phone:  [phone]  Email: [email or web address] [place] [facility]

## 2019-12-19 NOTE — PLAN OF CARE
Dx:PCP, respiratory distress Hx:Anxiety, Lymphoma Vs:stable on 6-7L NC. Tele:Sinus rhythm. A/O:x4. Labs:K 2.7 replaced, Cdiff neg, Sputum culture + for PCP. CWMS:WDL. Pain level/controlled with:controlled with IV morphine. Up with:Ax1. Tolerating regular diet. No N/V. Passing gas. Phillips in place with adequate output. Is:using at bedside. GI: Ax4. Resp:crackles bilateral lower lobes. Plan: Will continue monitor. On IV abx and prednisone.

## 2019-12-19 NOTE — PROGRESS NOTES
[facility]    Medicine Progress Note - Hospitalist Service       Date of Admission:  12/18/2019  Assessment & Plan   Acute Hypoxic Respiratory Failure  Bilateral Infiltrates with concern for opportunistic lung infection  Sepsis, resolved: Presented to [facility] 12/15 with fever and worsening respiratory symptoms.  Had recently completed a course of Levaquin for postobstructive pneumonia and had been placed on Levaquin 250 mg and Diflucan by oncology 12/11 due to neutropenia and ongoing symptoms.  Chest x-ray on admission with bilateral perihilar and lobar infiltrates concerning for pneumonia. Febrile with LA 3 on admission. She was admitted and initiated on Zosyn and vancomycin and infectious disease was consulted. Prior to her dx of B cell lymphoma she was several months of high dose steroids due to concern for sarcoidosis. She was found to elevated Fungitel and initiated on Septra 12/18 due to concern for PJP. Blood and sputum cultures NGTD. Due to persistent fever/hypoxia transfer to [facility] for pulmonology consult and consideration for possible bronch  - Continue IV Zosyn Septra. Ok to d/c Vancomycin per ID note  - Continue oral diflucan  - ID consult  - Pulmonary consult. Potential plan for bronchoscopy. Holding steroids for now  - Worsening respiratory status 12/19. Stat CXR and ABG. Reduce fluids to 75 ml/hr. Trial IV ativan/morphine for SOB. Low threshold to move to higher level of care.   - Continue supplemental O2, cough suppressant and prn albuterol    Addendum:  - ABG PH 7.51. PO2 130 on 8L.   - CXR with edema vs infiltrate  - Work of breathing improved with IV ativan. O2 now stable on 6L  - Discussed with ICU. Did not feel patient met ICU criteria. Would not intubated for semi elective bronchoscopy.   - Discussed with Pulm if respiratory status remains stable on 6L can proceed with bronchoscopy at 3 pm  - Will give Lasix 40 mg IV x 1  - Solumedrol 60 mg IV x 1 and further recs per  "Pulm following bronch  - Transfer to [facility] for close monitoring. High risk for decompensation.  -  Florian updated via phone     > 60 min spent on this followup visit with > 50% spent on floor discussing plan of care with patient, RN, ICU attending, Pulm.         Diffuse Large B Cell Lymphoma  Neutropenia, resolved: Had a mediastinal mass biopsied at [facility] that came back positive for diffuse large B-cell lymphoma.  Initially treated with high-dose steroids.  Started chemotherapy with R-CHOP at the [facility] during the 11/27 through 12/1 admission.  Has follow-up with [place] oncology with Dr. Castro.  - ANC 0.4 on admission. Received Neupogen 12/15 with improvement ANC 5.6 today  - Daily CBC   - Oncology consult     Anemia: Previously baseline 13-14. Downtrending since RCHOP  - S/p 1 unit PRBC 12/18  - Monitor CBC     RENAY: Baseline Cr 0.8  - Cr trend 1.28--1.04--1.01  - Reduce IVF  - Monitor     Anxiety  - Continue PRN ativan      Diet: NPO for Medical/Clinical Reasons Except for: No Exceptions    DVT Prophylaxis: Enoxaparin (Lovenox) SQ  Phillips Catheter: not present  Code Status: Full Code      Disposition Plan   Expected discharge: Anticipate 2+ days  Entered: Moira Law PA-C 12/19/2019, 8:02 AM       The patient's care was discussed with the Bedside Nurse and Patient.    Moira Law PA-C  Hospitalist Service  [facility]    ______________________________________________________________________    Interval History   Feeling more SOB. Needing 6-7L Oximyzer to maintain O2 sats. Complains of \"pockets of chest pain\"      Data reviewed today: I reviewed all medications, new labs and imaging results over the last 24 hours. I personally reviewed no images or EKG's today.    Physical Exam   Vital Signs: Temp: 98.2  F (36.8  C) Temp src: Oral BP: 126/78   Heart Rate: 102 Resp: (!) 31 SpO2: 90 % O2 Device: Simple face mask Oxygen Delivery: 6 LPM  Weight: 0 lbs 0 " oz  Constitutional: Alert, tachypneic. Ill appearing  HEENT: Head normocephalic, atraumatic. Eyes sclera non icteric.   Respiratory: Increased respiratory effort. Course breath sounds throughout. Crackles R base.   Cardiovascular: Tachycardic, regular  GI: Non distended, normal bowels sounds, no tenderness or guarding  MSK: LE 1+ edema. Dorsalis pedis pulse palpated bilaterally.   Skin/Integumen: Clear  Neuro: CN II-XII grossly intact  Psych:  Alert and oriented x 3. Normal affect      Data   Recent Labs   Lab 12/19/19  0726 12/18/19  1909 12/18/19  0712 12/17/19  1436 12/17/19  0715  12/15/19  0656   WBC 7.2  --  7.7  --  6.3   < > 1.2*   HGB 8.8*  --  9.2*  --  7.6*   < > 9.6*   MCV 91  --  93  --  95   < > 94    231 249  --  222   < > 270     --  139  --  142   < > 135   POTASSIUM 2.7*  --  3.7 4.4 3.2*   < > 3.3*   CHLORIDE 105  --  109  --  113*   < > 101   CO2 27  --  21  --  20   < > 25   BUN 5*  --  7  --  8   < > 11   CR 1.01 0.95 1.04  --  1.15*   < > 1.20*   ANIONGAP 4  --  9  --  9   < > 9   AFSHAN 8.2*  --  8.3*  --  8.1*   < > 8.7   *  --  107*  --  91   < > 144*   ALBUMIN  --   --   --   --   --   --  3.0*   PROTTOTAL  --   --   --   --   --   --  6.7*   BILITOTAL  --   --   --   --   --   --  1.2   ALKPHOS  --   --   --   --   --   --  70   ALT  --   --   --   --   --   --  34   AST  --   --   --   --   --   --  41    < > = values in this interval not displayed.     Recent Results (from the past 24 hour(s))   XR Chest Port 1 View    Narrative    CHEST PORTABLE ONE VIEW  12/19/2019 8:15 AM     HISTORY: Shortness of breath, hypoxia.    COMPARISON: Chest CT on 12/16/2019      Impression    IMPRESSION: Single portable AP view of the chest was obtained.  Cardiomediastinal silhouette is within normal limits. Diffuse  bilateral perihilar predominant airspace pulmonary opacities, could  represent infection, pulmonary edema versus a drug reaction, not  significantly changed as compared to  12/16/2019 and slightly worsened  as compared to 12/15/2019.

## 2019-12-19 NOTE — PROGRESS NOTES
Service Date: 12/19/2019      SUBJECTIVE:  Ms. Ramirez is a 49-year-old female with stage III diffuse large B-cell lymphoma status post first cycle of R-CHOP on 11/29/2019.  The patient was admitted to [facility] on 11/15/2009 with neutropenic fever.  Her ANC was 0.4 on admission.  She was given Neupogen and her neutropenia has resolved.      Chest x-ray on admission had revealed bilateral pneumonia.  She had a CT chest done on 12/16/2019 which revealed extensive lymphadenopathy from lymphoma.  Lymphadenopathy was improving.  There was an extensive area of airspace disease throughout both lungs.      Because of chest lymphadenopathy and extensive airspace disease throughout the lungs, patient has been short of breath and requiring high amount of oxygen.  The patient is being followed by Infectious Disease and has been on antibiotics. There is suspicion of PJP. Bronchoscopy was recommended.  The patient was transferred from [facility] to [facility] for pulmonary evaluation and bronchoscopy.      I met with the patient.  was at bedside.  Main problem has been continuing shortness of breath.  The patient also feels weak.  She had a temperature of 102.2 last evening. Some chest discomfort.  Appetite is not good.  Overall, she does not feel good.      PHYSICAL EXAMINATION:   GENERAL:  She was alert and oriented x 3. She is short of breath.   VITAL SIGNS:  Reviewed.   Rest of the systems not examined.      LABORATORY DATA:  Reviewed.      ASSESSMENT:   1.  A 49-year-old female with stage III diffuse large B-cell lymphoma status post first cycle of R-CHOP on 11/29/2019.   2.  Shortness of breath secondary to chest lymphadenopathy and bilateral extensive airspace disease.  The patient is at risk for PJP pneumonia because of immunosuppression.   3.  Neutropenia, resolved.   4.  Fever secondary to pneumonia.   5.  Normocytic anemia from chemotherapy, medication and ongoing infection.      PLAN:   1.   The patient's main problem is shortness of breath.  Pulmonary and ID are following.  She has pneumonia.  She was getting bronchoscopy today for diagnostic purposes.  Treatment as per ID.   2.  The patient had neutropenic fever.  Neutropenia has resolved.  Fever is due to pneumonia.  She is on antibiotics, antifungal and antiviral.  ID following   3.  For lymphoma, she is due for chemotherapy this week.  Because of her acute illness, we will delay the chemotherapy to next week.   4.  The patient had a few questions, which were all answered.  No new recommendation from Oncology. Oncology will see her intermittently.Please call us with any questions or concerns.  Case was discussed with Dr. Maurer.      Total time spent 25 was minutes, more than 50% of the time was spent in counseling and coordination of care.      ADDENDUM:  Sputum is positive for PJP.  She will be treated for PJP pneumonia as per ID.         BRETT BLOUNT MD             D: 2019   T: 2019   MT: ZHANG      Name:     BABAR VARGHESE   MRN:      [id number]        Account:      [id number]   :      1970           Service Date: 2019      Document: [id number]

## 2019-12-19 NOTE — PLAN OF CARE
Patient is A&Ox4. VSS ex tachycardic, afebrile. On 5L oxygen via oxymizer cannula. Denies pain. Up SBA to bedside commode. NPO for bronchoscopy 12/19. R PIV infusing NS at 100 ml/hr. Edema in upper and lower extremities +2. Desating with minimal activity. Lactic acid fired, 1.3. RLL sounding with crackles and LLL coarse lung sounds. Calls appropriately.

## 2019-12-19 NOTE — PROGRESS NOTES
[facility]  Infectious Disease Progress Note          Assessment and Plan:   IMPRESSION:   1.  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  No CT scan to date.  Significant fever and illness currently.   2.  Neutropenia, now resolved.  Highly Doubt this is simple neutropenic fever, more likely a more complicated pneumonia problem.   3.  Lymphoma.  4 New diarrhea await C diff      RECOMMENDATIONS:   1.  Significant risk and worry for opportunistic major lung infection here.  For now, Zosyn alone(already had atypical course).DC vanco no need for this  Not improving O2 worsening, needs facility with pulm , now at [facility], pulm assistance appreciated and plans noted  2.   multiple lab studies and not producing spiutum, . LDH high( has lymhoma which also elevates but is now higher than prior despite tx), over 500 fungitel ,presumptive PJP, septra empiric for PJP IV initially  3 Neg Cdiff for diarrhea check,Ok  sx tx    4 Likely bronch, no productivity reordered PJP sputum if we prove that dx no bronch need  5 Probably start steroids empirically post bronch if PJP proven, inaddition to cytology add PJP PCR to bronch specimens(recently noted not on routine orders)             Interval History:;   no new complaints but O2 worse new diarrhea not C diff still fever saorj high dose bactrim IV              Medications:       acyclovir  400 mg Oral BID     allopurinol  300 mg Oral Daily     enoxaparin ANTICOAGULANT  40 mg Subcutaneous Q24H     fluconazole  200 mg Oral Daily     omeprazole  40 mg Oral QAM AC     piperacillin-tazobactam  3.375 g Intravenous Q6H     sodium chloride (PF)  3  mL Intracatheter Q8H     sulfamethoxazole-trimethoprim (BACTRIM/SEPTRA) intermittent infusion  400 mg Intravenous Q8H                  Physical Exam:   Blood pressure 126/78, temperature 98.2  F (36.8  C), temperature source Oral, resp. rate (!) 31, SpO2 90 %, not currently breastfeeding.  Wt Readings from Last 2 Encounters:   12/18/19 78 kg (172 lb)   12/11/19 76.9 kg (169 lb 8 oz)     Vital Signs with Ranges  Temp:  [97.7  F (36.5  C)-102.2  F (39  C)] 98.2  F (36.8  C)  Heart Rate:  [] 102  Resp:  [24-31] 31  BP: (119-137)/(58-84) 126/78  SpO2:  [82 %-96 %] 90 %    Constitutional: Awake, alert, cooperative, no apparent distress   Lungs: Congestion to auscultation bilaterally, no crackles or wheezing   Cardiovascular: Regular rate and rhythm, normal S1 and S2, and no murmur noted   Abdomen: Normal bowel sounds, soft, non-distended, non-tender   Skin: No rashes, no cyanosis, no edema   Other:           Data:   All microbiology laboratory data reviewed.  Recent Labs   Lab Test 12/19/19  0726 12/18/19  1909 12/18/19  0712 12/17/19  0715   WBC 7.2  --  7.7 6.3   HGB 8.8*  --  9.2* 7.6*   HCT 25.8*  --  26.9* 22.6*   MCV 91  --  93 95    231 249 222     Recent Labs   Lab Test 12/19/19  0726 12/18/19  1909 12/18/19  0712   CR 1.01 0.95 1.04     No lab results found.  Recent Labs   Lab Test 12/15/19  1730 12/15/19  0830 12/15/19  0656 11/27/19  1823 11/27/19  1708 07/26/13  1010 11/13/12  1702   CULT Light growth  Normal ara   No growth after 4 days No growth after 4 days No growth No growth >100,000 colonies/mL Escherichia coli Normal skin ara

## 2019-12-19 NOTE — CONSULTS
Consult Date:  12/19/2019      PULMONARY CONSULTATION      REASON FOR CONSULTATION:  Pneumonia, respiratory failure.      HISTORY OF PRESENT ILLNESS:  The patient is a 49-year-old female nonsmoker with a history of B-cell lymphoma on chemotherapy, who was admitted to [facility] yesterday with shortness of breath, hypoxia and pulmonary infiltrates.  The patient was originally seen in our clinic in August of this year.  She presented with a persistent cough since May, which she attributed to possible aspiration of rice.  Chest x-ray done 06/27/2019 was clear.  CT scan of the chest 08/20/2019 showed left hilar and subcarinal mediastinal adenopathy with narrowing of the left lower lobe bronchus and a nonspecific patchy area of nodular consolidation in the medial right lower lobe.  Bronchoscopy with EBUS was done 08/28/2019, which showed non-necrotizing granulomatous inflammation with prominent eosinophilia in 2 separate lymph node stations, negative for malignancy.  She was diagnosed with sarcoidosis and started on prednisone.  She was seen in followup by my partner, Dr. Mitchell on 11/15/2019 for evaluation of worsening cough and shortness of breath with the tapering of the prednisone.  Repeat CT scan of the chest 11/18/2019 showed interval worsening of the bulky mediastinal and bilateral hilar lymphadenopathy, near complete resolution of the lower lobe opacities, with new interstitial opacities in the right upper lobe.  It was unclear whether the patient had sarcoidosis, not responding to steroids or another process was involved.  She was referred to Thoracic Surgery and underwent mediastinoscopy on 11/25/2019 and was diagnosed with EBV-positive diffuse large B-cell lymphoma.  She was subsequently seen by Oncology and started on chemotherapy with R-CHOP at the [facility].  She presented to [facility] on 12/15/2019 with fever, shortness of breath and hypoxia.  Chest x-ray on admission showed  prominent bilateral perihilar and lower lobe infiltrates.  CT scan of the chest 12/16 showed decrease and extensive adenopathy with extensive areas of airspace disease bilaterally.  She was transferred to [facility] yesterday for pulmonary evaluation and possible bronchoscopy.  We are now consulted regarding further evaluation and management.      CURRENT MEDICATIONS:  Include acyclovir, Diflucan, Zosyn and Bactrim per Infectious Disease, allopurinol, Lovenox, Prilosec.      PAST MEDICAL HISTORY:   1.  B-cell lymphoma.   2.  History of supraventricular tachycardia.   3.  Anxiety.      ALLERGIES:  PSEUDOEPHEDRINE.      SOCIAL HISTORY:  The patient is a nonsmoker.      FAMILY HISTORY AND REVIEW OF SYSTEMS:  Noncontributory.      PHYSICAL EXAMINATION:   GENERAL:  Reveals a pleasant, somewhat fatigued appearing middle-aged female in no acute distress.   VITAL SIGNS:  She is afebrile, respiratory rate is 24-30, pulse is , blood pressure 121/85 and oxygen saturation is 92% to 94% on 6 liters per minute by simple face mask.   HEENT:  Remarkable for dry mucous membranes.   NECK:  Supple.   LUNGS:  Revealed decreased breath sounds, scattered crackles, but no wheezes.   CARDIOVASCULAR:  Revealed regular rate and rhythm.   ABDOMEN:  Obese, soft and nontender.   EXTREMITIES:  Showed trace pedal edema, but no cyanosis or clubbing.      LABORATORY DATA:  Electrolytes were normal with the exception of a potassium of 2.7, BUN 5, creatinine 1.01.  Blood gas pH 7.51, pCO2 of 30, pO2 of 130, white count 7.2, hemoglobin 8.8, hematocrit 25.8, platelet count is 210,000.        Imaging studies are as summarized above.      ASSESSMENT:  A 49-year-old female nonsmoker with recent diagnosis of B-cell lymphoma, previously on prednisone for diagnosis of sarcoidosis, now admitted with fever, infiltrates and hypoxemia.  Differential diagnosis includes opportunistic infection, specifically Pneumocystis, as well as other organisms,  chemotherapy toxicity or potentially progression of her lymphoma.  Her respiratory status is currently tenuous at 6 liters per minute oxygen to maintain an SaO2 in the low 90s.  I agree with the need for bronchoscopy, we will attempt to complete this afternoon if her respiratory status permits.      PLAN:   1.  Titrate oxygen to keep SaO2 greater than 90%.   2.  Antibiotics, acyclovir, Diflucan, Zosyn and Bactrim per ID.   3.  Steroids, Solu-Medrol.   4.  Diuresis, Lasix is ordered.   5.  Hold Lovenox for today.   6.  Bronchoscopy is scheduled for 3:30 p.m. today and endoscopy if respiratory status permits.        Case was discussed with Dr. Maurer.  The situation was reviewed at length with the patient.  I appreciate the opportunity to participate in her care.         OMAR MARIN MD             D: 2019   T: 2019   MT: RACHANA      Name:     BABAR VARGHESE   MRN:      [id number]        Account:       [id number]   :      1970           Consult Date:  2019      Document: [id number]       cc: Mary Alice Manzo MD

## 2019-12-19 NOTE — PLAN OF CARE
Transfer from [facility] 1500. Pt A/O x4, tachy, tmax 100.8. 5L 2 oxymyzer to maintain sats .90%. Cont pulse ox. Humidifier. Upper lobes clear, LLL dim/crackles, RLL fine int crackles. Needs sputum culture-cough white thick but non productive-sticks in throat. Pt cough easily exacerbated with taking of deep breathing/activity. Up SBA to BSC-gets to fatigued to ambulate to BR. Currently has menopausal menses. UE/BLE edema +2, BS present BM today. NPO bronch tomorrow, otherwise regular diet-poor appetite-takes boost shakes. R PIV infusing. Int ABX. Plan: Pulm consult for suspected PJP PNU. Bronchoscopy tomorrow

## 2019-12-19 NOTE — PROGRESS NOTES
PULMONARY CONSULT NOTE    Full consult dictated.    Active Problems:    Respiratory failure (H)           Assessment and Plan:      49-year-old female non-smoker with a history of B-cell lymphoma on chemotherapy admitted with shortness of breath, hypoxia and pulmonary infiltrates.  The patient was originally seen in August 2019. She presented with a persistent cough since May 2019 which she attributed to a possible aspiration of rice.  Chest x-ray done 6/27/2019 was clear.  CT scan of the chest 8/20/2019 showed left hilar and subcarinal mediastinal adenopathy with narrowing of the left lower lobe bronchus and a nonspecific patchy area of nodular consolidation in the medial right lower lobe.  Bronchoscopy with EBUS 8/28/2019 showed nonnecrotizing granulomatous inflammation with prominent eosinophilia, negative for malignancy.  She was diagnosed with sarcoidosis and started on prednisone.  She was seen in follow-up by my partner Dr. Mitchell on 11/15/2019 for evaluation of worsening cough and shortness of breath with tapering of the prednisone.  Repeat CT scan of the chest 11/18/2019 showed interval worsening of the bulky mediastinal and bilateral hilar lymphadenopathy, near complete resolution of the lower lobe opacities, with new interstitial opacities in the right upper lobe.  It was unclear whether the patient had sarcoidosis not responding to steroids or there was another process involved.  She was referred to thoracic surgery and underwent mediastinoscopy on 11/25/2019 and was diagnosed with EBV positive diffuse large B-cell lymphoma.  Patient was seen by oncology and started chemotherapy with R-CHOP at the [facility].  She presented to [facility] on 12/15/2019 with fever, shortness of breath and hypoxia.  Chest x-ray on admission showed prominent bilateral perihilar and lower lobe infiltrates.  CT scan of the chest 12/16 showed decrease in extensive adenopathy with extensive areas of airspace  disease bilaterally.  She is now transferred to [facility] for pulmonary evaluation and possible bronchoscopy.  Differential diagnosis includes opportunistic infection, chemotherapy toxicity or progression of lymphoma.  Respiratory status is currently tenuous 6 L/min O2 to maintain an SaO2 of 93 to 95%.  I agree with the need for bronchoscopy, will attempt to complete this afternoon if her respiratory status permits.    Diagnoses  Abnl CT/CXR  R91.8  Adenopathy  R59.9  Hypoxemia  R09.02  Pneumonia unspec J18.9  Resp fail acute J96.00  SOB   R06.02    PLAN:  1. Adjust oxygen, keep SaO2 > 90%  2. Antibiotics - Acyclovir, Diflucan, Zosyn, Bactrim per ID.  3. Steroids - Solumedrol  4. Diuresis - Lasix as ordered.  5. Hold Lovenox for today.  6. Bronchoscopy is scheduled for 3:30 PM today in Endoscopy if respiratory status permits.    Case discussed with Dr. Maurer.    Situation reviewed at length with patient.    Thanks, will follow.    Addendum: Sputum Pneumocystis jirovecii PCR positive. Bronchoscopy cancelled. Case discussed with Dr. Maurer. Updated patient and family at bedside.      Rajat Horton MD    [facility] / [facility]  [phone] (pager)  [phone] (office)

## 2019-12-19 NOTE — PROVIDER NOTIFICATION
Pt status change- requiring 8-9L Oxymask to maintain sats >91%.  Contacted Moira Law PA-C- ordered STAT ABG, CXR, fluid decrease and PRN Ativan/Morphine

## 2019-12-19 NOTE — PROGRESS NOTES
Pt A/O but quiet, sleepy. Intial safety check demonstrates sats 80' on 5l NC. 02 increased to 8L NC with increase of 032 saturation 90%. Requires Oxymask 6-7L sats 92-95%.Resps remain 30-35 activity intolerant. Tacy 100-110's. Unable to deep breath or talk without significant SOB and coughing. LS remain coarse in bases-LLL dim.  Phillips placed with great UOP. Solumedrol and IV lasix given. Edmea to BLE/UE.   at beside. Gave update to  kristan about pt status and plant o transfer to [facility] with possible bronch this afternoon if resp status permits.    Report called to [facility]

## 2019-12-20 ENCOUNTER — APPOINTMENT (OUTPATIENT)
Dept: GENERAL RADIOLOGY | Facility: CLINIC | Age: 49
End: 2019-12-20
Attending: HOSPITALIST
Payer: COMMERCIAL

## 2019-12-20 LAB
ANION GAP SERPL CALCULATED.3IONS-SCNC: 8 MMOL/L (ref 3–14)
BUN SERPL-MCNC: 8 MG/DL (ref 7–30)
CALCIUM SERPL-MCNC: 8.5 MG/DL (ref 8.5–10.1)
CHLORIDE SERPL-SCNC: 107 MMOL/L (ref 94–109)
CO2 SERPL-SCNC: 24 MMOL/L (ref 20–32)
CREAT SERPL-MCNC: 0.81 MG/DL (ref 0.52–1.04)
CREAT SERPL-MCNC: 0.82 MG/DL (ref 0.52–1.04)
ERYTHROCYTE [DISTWIDTH] IN BLOOD BY AUTOMATED COUNT: 15.7 % (ref 10–15)
GFR SERPL CREATININE-BSD FRML MDRD: 84 ML/MIN/{1.73_M2}
GFR SERPL CREATININE-BSD FRML MDRD: 84 ML/MIN/{1.73_M2}
GLUCOSE SERPL-MCNC: 163 MG/DL (ref 70–99)
HCT VFR BLD AUTO: 28.1 % (ref 35–47)
HGB BLD-MCNC: 9.7 G/DL (ref 11.7–15.7)
LACTATE BLD-SCNC: 2.7 MMOL/L (ref 0.7–2)
LACTATE BLD-SCNC: 3.2 MMOL/L (ref 0.7–2)
MCH RBC QN AUTO: 31.2 PG (ref 26.5–33)
MCHC RBC AUTO-ENTMCNC: 34.5 G/DL (ref 31.5–36.5)
MCV RBC AUTO: 90 FL (ref 78–100)
PLATELET # BLD AUTO: 235 10E9/L (ref 150–450)
POTASSIUM SERPL-SCNC: 3.5 MMOL/L (ref 3.4–5.3)
RBC # BLD AUTO: 3.11 10E12/L (ref 3.8–5.2)
SODIUM SERPL-SCNC: 139 MMOL/L (ref 133–144)
WBC # BLD AUTO: 9.4 10E9/L (ref 4–11)

## 2019-12-20 PROCEDURE — 80048 BASIC METABOLIC PNL TOTAL CA: CPT | Performed by: PHYSICIAN ASSISTANT

## 2019-12-20 PROCEDURE — 25000128 H RX IP 250 OP 636: Performed by: PHYSICIAN ASSISTANT

## 2019-12-20 PROCEDURE — 82565 ASSAY OF CREATININE: CPT | Performed by: PHYSICIAN ASSISTANT

## 2019-12-20 PROCEDURE — 25800030 ZZH RX IP 258 OP 636: Performed by: PHYSICIAN ASSISTANT

## 2019-12-20 PROCEDURE — 85027 COMPLETE CBC AUTOMATED: CPT | Performed by: PHYSICIAN ASSISTANT

## 2019-12-20 PROCEDURE — 99231 SBSQ HOSP IP/OBS SF/LOW 25: CPT | Performed by: INTERNAL MEDICINE

## 2019-12-20 PROCEDURE — 36415 COLL VENOUS BLD VENIPUNCTURE: CPT | Performed by: PHYSICIAN ASSISTANT

## 2019-12-20 PROCEDURE — 36415 COLL VENOUS BLD VENIPUNCTURE: CPT | Performed by: INTERNAL MEDICINE

## 2019-12-20 PROCEDURE — 25000132 ZZH RX MED GY IP 250 OP 250 PS 637: Performed by: PHYSICIAN ASSISTANT

## 2019-12-20 PROCEDURE — G0463 HOSPITAL OUTPT CLINIC VISIT: HCPCS

## 2019-12-20 PROCEDURE — 71045 X-RAY EXAM CHEST 1 VIEW: CPT

## 2019-12-20 PROCEDURE — 12000000 ZZH R&B MED SURG/OB

## 2019-12-20 PROCEDURE — 36415 COLL VENOUS BLD VENIPUNCTURE: CPT | Performed by: HOSPITALIST

## 2019-12-20 PROCEDURE — 25800030 ZZH RX IP 258 OP 636: Performed by: HOSPITALIST

## 2019-12-20 PROCEDURE — 99233 SBSQ HOSP IP/OBS HIGH 50: CPT | Performed by: INTERNAL MEDICINE

## 2019-12-20 PROCEDURE — 12000047 ZZH R&B IMCU

## 2019-12-20 PROCEDURE — 87040 BLOOD CULTURE FOR BACTERIA: CPT | Performed by: HOSPITALIST

## 2019-12-20 PROCEDURE — 25800030 ZZH RX IP 258 OP 636: Performed by: INTERNAL MEDICINE

## 2019-12-20 PROCEDURE — 25000131 ZZH RX MED GY IP 250 OP 636 PS 637: Performed by: PHYSICIAN ASSISTANT

## 2019-12-20 PROCEDURE — 83605 ASSAY OF LACTIC ACID: CPT | Performed by: HOSPITALIST

## 2019-12-20 PROCEDURE — 83605 ASSAY OF LACTIC ACID: CPT | Performed by: INTERNAL MEDICINE

## 2019-12-20 RX ADMIN — LORAZEPAM 1 MG: 2 INJECTION INTRAMUSCULAR; INTRAVENOUS at 23:02

## 2019-12-20 RX ADMIN — PREDNISONE 40 MG: 20 TABLET ORAL at 09:04

## 2019-12-20 RX ADMIN — MORPHINE SULFATE 2 MG: 2 INJECTION, SOLUTION INTRAMUSCULAR; INTRAVENOUS at 19:29

## 2019-12-20 RX ADMIN — FLUCONAZOLE 200 MG: 200 TABLET ORAL at 20:00

## 2019-12-20 RX ADMIN — SODIUM CHLORIDE 250 ML: 9 INJECTION, SOLUTION INTRAVENOUS at 21:40

## 2019-12-20 RX ADMIN — PREDNISONE 40 MG: 20 TABLET ORAL at 20:00

## 2019-12-20 RX ADMIN — ACYCLOVIR 400 MG: 400 TABLET ORAL at 20:00

## 2019-12-20 RX ADMIN — SODIUM CHLORIDE: 9 INJECTION, SOLUTION INTRAVENOUS at 23:03

## 2019-12-20 RX ADMIN — SULFAMETHOXAZOLE AND TRIMETHOPRIM 400 MG: 80; 16 INJECTION, SOLUTION, CONCENTRATE INTRAVENOUS at 20:19

## 2019-12-20 RX ADMIN — SULFAMETHOXAZOLE AND TRIMETHOPRIM 400 MG: 80; 16 INJECTION, SOLUTION, CONCENTRATE INTRAVENOUS at 12:12

## 2019-12-20 RX ADMIN — SULFAMETHOXAZOLE AND TRIMETHOPRIM 400 MG: 80; 16 INJECTION, SOLUTION, CONCENTRATE INTRAVENOUS at 03:43

## 2019-12-20 RX ADMIN — ACYCLOVIR 400 MG: 400 TABLET ORAL at 09:03

## 2019-12-20 RX ADMIN — ALLOPURINOL 300 MG: 300 TABLET ORAL at 09:03

## 2019-12-20 RX ADMIN — OMEPRAZOLE 40 MG: 20 CAPSULE, DELAYED RELEASE ORAL at 07:09

## 2019-12-20 RX ADMIN — ENOXAPARIN SODIUM 40 MG: 40 INJECTION SUBCUTANEOUS at 16:16

## 2019-12-20 ASSESSMENT — ACTIVITIES OF DAILY LIVING (ADL)
ADLS_ACUITY_SCORE: 14

## 2019-12-20 NOTE — PLAN OF CARE
Pt is alert and oriented x 4, AVSS continue to need 10 liters with oxymask to keep sats > 90. Lungs diminished. Weak cough. Slept most the evening/night.  Denied pain. Continue on antibiotics as ordered.  Phillips patent. Good urine output.

## 2019-12-20 NOTE — PROGRESS NOTES
[facility]  Infectious Disease Progress Note          Assessment and Plan:   IMPRESSION:   1.  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  No CT scan to date.  Significant fever and illness currently.   2.  Neutropenia, now resolved.  Highly Doubt this is simple neutropenic fever, more likely a more complicated pneumonia problem.   3.  Lymphoma.  4 New diarrhea await C diff      RECOMMENDATIONS:   1.  PJP + on sputum defines illness, compromised host so aleays possible multiple dx but for now assume dx made, all fits this dx  2 septra and steroids, T down and O2 stable looks better less tachy             Interval History:;   no new complaints  O2 better new diarrhea not C diff resolving fever saroj high dose bactrim IV PJP +              Medications:       acyclovir  400 mg Oral BID     allopurinol  300 mg Oral Daily     enoxaparin ANTICOAGULANT  40 mg Subcutaneous Q24H     fluconazole  200 mg Oral Daily     omeprazole  40 mg Oral QAM AC     predniSONE  40 mg Oral BID    Followed by     [START ON 12/25/2019] predniSONE  40 mg Oral Daily    Followed by     [START ON 12/30/2019] predniSONE  20 mg Oral Daily     sodium chloride (PF)  3 mL Intracatheter Q8H     sulfamethoxazole-trimethoprim (BACTRIM/SEPTRA) intermittent infusion  400 mg Intravenous Q8H                  Physical Exam:   Blood pressure 131/87, pulse 82, temperature 97.8  F (36.6  C), temperature source Axillary, resp. rate (!) 64, weight 76.8 kg (169 lb 5 oz), SpO2 91 %, not currently breastfeeding.  Wt Readings from Last 2 Encounters:   12/20/19 76.8 kg (169 lb 5 oz)   12/18/19 78 kg (172 lb)     Vital Signs  with Ranges  Temp:  [97.6  F (36.4  C)-98.1  F (36.7  C)] 97.8  F (36.6  C)  Pulse:  [] 82  Heart Rate:  [] 90  Resp:  [20-64] 64  BP: (111-132)/(74-94) 131/87  SpO2:  [64 %-96 %] 91 %    Constitutional: Awake, alert, cooperative, no apparent distress   Lungs: Congestion to auscultation bilaterally, no crackles or wheezing   Cardiovascular: Regular rate and rhythm, normal S1 and S2, and no murmur noted   Abdomen: Normal bowel sounds, soft, non-distended, non-tender   Skin: No rashes, no cyanosis, no edema   Other:           Data:   All microbiology laboratory data reviewed.  Recent Labs   Lab Test 12/20/19  0624 12/19/19  0726 12/18/19  1909 12/18/19  0712   WBC 9.4 7.2  --  7.7   HGB 9.7* 8.8*  --  9.2*   HCT 28.1* 25.8*  --  26.9*   MCV 90 91  --  93    210 231 249     Recent Labs   Lab Test 12/20/19  0624 12/19/19  0726 12/18/19  1909   CR 0.82 1.01 0.95     No lab results found.  Recent Labs   Lab Test 12/15/19  1730 12/15/19  0830 12/15/19  0656 11/27/19  1823 11/27/19  1708 07/26/13  1010 11/13/12  1702   CULT Light growth  Normal ara   No growth after 5 days No growth after 5 days No growth No growth >100,000 colonies/mL Escherichia coli Normal skin ara

## 2019-12-20 NOTE — PROGRESS NOTES
PULMONOLOGY PROGRESS NOTE  Date of service: 2019  [facility]    CC/Reason for Visit: pneumonia, respiratory failure.    SUBJECTIVE      HPI: Events of last night reviewed. Stable night. No new respiratory problems. States breathing is about the same today. Appetite poor.    ROS: A Problem Pertinent review of systems was negative except for items noted in HPI.    Past Medical, Family, and Social/Substance History has been reviewed: No interval changes.    OBJECTIVE   BP (!) 132/94   Pulse 78   Temp 97.8  F (36.6  C) (Axillary)   Resp 24   Wt 76.8 kg (169 lb 5 oz)   SpO2 96%   BMI 26.52 kg/m   10 L/min Oxymask.    Temp (24hrs), Av  F (36.7  C), Min:97.6  F (36.4  C), Max:99.2  F (37.3  C)       Intake/Output Summary (Last 24 hours) at 2019 0750  Last data filed at 2019 0600  Gross per 24 hour   Intake --   Output 4000 ml   Net -4000 ml     Patient Vitals for the past 96 hrs:   Weight   19 0622 76.8 kg (169 lb 5 oz)       CONSTITUTIONAL/GENERAL APPEARANCE: Alert. No Apparent Distress.  PSYCHIATRIC: Pleasant and appropriate mood and affect. Oriented x 3.  EARS, NOSE,THROAT,MOUTH: External ears and nose overall normal. Normal oral mucosa.   NECK: Neck appearance normal. No neck masses and the thyroid is not enlarged.   RESPIRATORY: Non-labored effort. Decreased BS, sl coarse bilaterally.  CARDIOVASCULAR: S1, S2, regular rate and rhythm.      LABORATORY ASSESSMENT      Recent Labs   Lab 19  0803 19  0712   PH 7.51*  --    PCO2 30*  --    PO2 130*  --    HCO3 24  --    O2PER Oximizer 3     Recent Labs   Lab 19  0624 19  0726   WBC 9.4 7.2   RBC 3.11* 2.85*   HGB 9.7* 8.8*   HCT 28.1* 25.8*   MCV 90 91   MCH 31.2 30.9   MCHC 34.5 34.1   RDW 15.7* 15.9*    210     Recent Labs   Lab 19  0624 19  2106 19  0726 19  0712     --  136  --  139   POTASSIUM 3.5 3.5 2.7*  --  3.7   CHLORIDE 107  --  105  --   109   CO2 24  --  27  --  21   ANIONGAP 8  --  4  --  9   BUN 8  --  5*  --  7   CR 0.82  --  1.01 0.95 1.04   GFRESTIMATED 84  --  65 70 63   GFRESTBLACK >90  --  75 81 73   AFSHAN 8.5  --  8.2*  --  8.3*     No lab results found in last 7 days.  No lab results found in last 7 days.    Additional labs and/or comments: Sputum Pneumocystis jirovecii PCR - positive.    IMAGING      CXR 12/19 -  IMPRESSION: Single portable AP view of the chest was obtained.  Cardiomediastinal silhouette is within normal limits. Diffuse  bilateral perihilar predominant airspace pulmonary opacities, could  represent infection, pulmonary edema versus a drug reaction, not  significantly changed as compared to 12/16/2019 and slightly worsened  as compared to 12/15/2019.    CT Chest 12/16 -  IMPRESSION:   1.  Evidence for decrease in volume of patient's extensive adenopathy consistent with response to therapy for patient's lymphoma.  2.  Extensive areas of airspace disease throughout both hemithoraces with interstitial edema. Findings likely infectious or inflammatory with differential including pulmonary edema, drug reaction, among others.    PFT & OTHER TESTING       ASSESSMENT / PLAN      Active Problems:    Respiratory failure (H)      ASSESSMENT: 48 yo female non-smoker with B-cell lymphoma on chemotherapy admitted with shortness of breath, hypoxia and pulmonary infiltrates.  The patient was originally seen in August 2019. She presented with a persistent cough since May 2019 which she attributed to a possible aspiration of rice.  Chest x-ray done 6/27/2019 was clear.  CT scan of the chest 8/20/2019 showed left hilar and subcarinal mediastinal adenopathy with narrowing of the left lower lobe bronchus and a nonspecific patchy area of nodular consolidation in the medial right lower lobe.  Bronchoscopy with EBUS 8/28/2019 showed nonnecrotizing granulomatous inflammation with prominent eosinophilia, negative for malignancy.  She was diagnosed  with sarcoidosis and started on prednisone.  She was seen in follow-up by my partner Dr. Mitchell on 11/15/2019 for evaluation of worsening cough and shortness of breath with tapering of the prednisone.  Repeat CT scan of the chest 11/18/2019 showed interval worsening of the bulky mediastinal and bilateral hilar lymphadenopathy, near complete resolution of the lower lobe opacities, with new interstitial opacities in the right upper lobe.  It was unclear whether the patient had sarcoidosis not responding to steroids or there was another process involved.  She was referred to thoracic surgery and underwent mediastinoscopy on 11/25/2019 and was diagnosed with EBV positive diffuse large B-cell lymphoma.  Patient was seen by oncology and started chemotherapy with R-CHOP at the [facility].  She presented to [facility] on 12/15/2019 with fever, shortness of breath and hypoxia.  Chest x-ray on admission showed prominent bilateral perihilar and lower lobe infiltrates.  CT scan of the chest 12/16 showed decrease in extensive adenopathy with extensive areas of airspace disease bilaterally.  She was transferred to [facility] for pulmonary evaluation and scheduled for bronchoscopy, but sputum Pneumocystis jirovecii PCR found to be positive, and so procedure was cancelled and she was started on PCP rx. Respiratory status remains somewhat tenuous on 10 lpm by Oxymask, would continue present rx for now.    Diagnoses  Abnl CT/CXR  R91.8  Adenopathy  R59.9  Hypoxemia  R09.02  Pneumonia unspec J18.9  Resp fail acute J96.00  SOB   R06.02    PLAN:  1. Adjust oxygen, keep SaO2 > 90%  2. Antibiotics - Acyclovir, Diflucan, Bactrim per ID.  3. Steroids - Prednisone.  4. Follow CXR.      Rajat Horton MD    [facility] / [facility]  [phone] (pager)  [phone] (office)

## 2019-12-20 NOTE — CONSULTS
CLINICAL NUTRITION SERVICES  -  ASSESSMENT NOTE      Future/Additional Recommendations:     Reviewed current diet order.  Provided handout of available supplements pt may order.  Encouraged her to eat small, frequent meals.  Discussed daily maco/pro goals.   may also bring food in for her.     Malnutrition:   % Weight Loss:  None noted  % Intake:  </= 50% for >/= 5 days (severe malnutrition)  Subcutaneous Fat Loss:(per NFPA 12/16) Orbital region mild depletion and Upper arm region mild depletion   Muscle Loss: (per NFPA 12/16) Temporal region mild depletion, Clavicle bone region mild depletion and Anterior thigh region mild depletion  Fluid Retention:  None noted    Malnutrition Diagnosis: Non-Severe malnutrition  In Context of:  Acute illness or injury         REASON FOR ASSESSMENT  Kassie Ramirez is a 49 year old female seen by Registered Dietitian for Admission Nutrition Risk Screen for reduced oral intake over the last month      NUTRITION HISTORY  Chart reviewed  Full Nutrition Assessment completed (12/16) at [facility]  - Prior to admission pt stated that she has not had a good appetite for the last week. During this time she was eating a couple of bites of food during the day to consume something.  Typical intake:   - Breakfast: toast and a banana  - Lunch: soup, noodles and/or some type of protein (beans or chicken)   - Dinner: similar to lunch   - Pt endorses taking high protein ensure at home about every other day      CURRENT NUTRITION ORDERS  Diet Order:     Regular    Visited with pt this afternoon  Pt reports decreased appetite  Ate fruit, yogurt, toast for breakfast - hasn't ordered any lunch yet  Notes that she is very interested in trying some supplements  Currently with O2 on - needs to eat small amounts at a time 2' to SOB      NUTRITION FOCUSED PHYSICAL ASSESSMENT FOR DIAGNOSING MALNUTRITION)  Yes              Observed:    (NFPA completed 12/16) - muscle and fat loss    Obtained from  "Chart/Interdisciplinary Team:  Stage III diffuse large B-cell lymphoma status post first cycle of R-CHOP on 11/29/2019.   12/20: WOCN - Right coccyx/ upper buttock with shallow blanchable abrasions, nearly healed.  Left side with intact blanchable erythema.  Wounds possibly related to frequent use of bedpan or commode.  Perianal area with newly resurfaced ulcerations likely related to moisture from frequent loose stools and friction.    ANTHROPOMETRICS  Height: 5'7\"  Weight:(12/20) 76.8 kg / 169 lbs 5.01 oz  Body mass index is 26.52 kg/m .  Weight Status:  Overweight BMI 25-29.9  IBW: 61.4 kg  % IBW: 125%  Weight History: wt appears stable  Wt Readings from Last 10 Encounters:   12/20/19 76.8 kg (169 lb 5 oz)   12/18/19 78 kg (172 lb)   12/11/19 76.9 kg (169 lb 8 oz)   12/03/19 75.5 kg (166 lb 6.4 oz)   12/01/19 75.9 kg (167 lb 6.4 oz)   11/27/19 78.5 kg (173 lb)   11/20/19 77.1 kg (170 lb)   10/16/19 77.6 kg (171 lb 1.6 oz)   09/19/19 77.2 kg (170 lb 3.1 oz)   08/22/19 77.1 kg (170 lb)         LABS  Labs reviewed    MEDICATIONS  Medications reviewed      ASSESSED NUTRITION NEEDS PER APPROVED PRACTICE GUIDELINES:    Dosing Weight 65.2 kg (adjusted wt for overwt)  Estimated Energy Needs: 2013-6114 kcals (25-30 Kcal/Kg)  Justification: overweight  Estimated Protein Needs: 78-98 grams protein (1.2-1.5 g pro/Kg)  Justification: preservation of lean body mass  Estimated Fluid Needs: 8938-0148  mL (1 mL/Kcal)  Justification: maintenance    MALNUTRITION:  % Weight Loss:  None noted  % Intake:  </= 50% for >/= 5 days (severe malnutrition)  Subcutaneous Fat Loss:(per NFPA 12/16) Orbital region mild depletion and Upper arm region mild depletion   Muscle Loss: (per NFPA 12/16) Temporal region mild depletion, Clavicle bone region mild depletion and Anterior thigh region mild depletion  Fluid Retention:  None noted    Malnutrition Diagnosis: Non-Severe malnutrition  In Context of:  Acute illness or injury    NUTRITION " DIAGNOSIS:  Inadequate oral intake related to decreased appetite as evidenced by pt has only eaten a small amount since admit      NUTRITION INTERVENTIONS  Recommendations / Nutrition Prescription  Regular diet  Nutrition supplements  .      Implementation  Nutrition education ---> Reviewed current diet order.  Provided handout of available supplements pt may order.  Encouraged her to eat small, frequent meals.  Discussed daily maco/pro goals.   may also bring food in for her.    .      Nutrition Goals  Pt to consume 75% meals TID.  .      MONITORING AND EVALUATION:  Progress towards goals will be monitored and evaluated per protocol and Practice Guidelines

## 2019-12-20 NOTE — PROGRESS NOTES
Pt has improving oxygenation since started on PCP treatment yesterday.    She is comfortable sitting in chair and transfer to bed.    Afrebile, O2 sat is good. VSS  Gen: AAOX3, NAD  HEENT: Supple neck, moist oral mucosa, no pallor  Resp: Crackles bilaterally in all the lung fields, normal effort of breathing  CVS: RRR, no murmur  Abd/GI: Soft, non-tender. BS- normoactive.    Skin: Warm, dry no rashes  MSK: trace pedal edema  Neuro- CN- intact. No focal deficits.     Data: reviewed.    Stage III diffuse large B-cell lymphoma status post first cycle of R-CHOP on 11/29/2019.    Further tx plan is discussed with pt. Tentative plan next cycle of RCHOP later next week.   She has plan transferring her onc tx to [facility].   Dr. Castro is rounding this weekend, who stations at [facility], will facilitate this.    Total time spent 15 minutes, more than 10 minutes spent in counseling regarding her infectious disease condition lymphoma treatment plan.

## 2019-12-20 NOTE — PROGRESS NOTES
[facility]    Medicine Progress Note - Hospitalist Service        Date of Admission:  12/18/2019  3:27 PM    Assessment & Plan:     Kassie Ramirez is a 49 year old female admitted on 12/18/2019. She she was transferred from [facility] for pulmonology consultation due to persistent hypoxia and fever as well as concern for opportunistic lung infection.     Acute Hypoxic Respiratory Failure  Bilateral Pulmonary infiltarates   Sepsis due to PJP pneumonia  -Presented to [facility] 12/15 with fever and worsening respiratory symptoms.  Had recently completed a course of Levaquin for postobstructive pneumonia and had been placed on Levaquin 250 mg and Diflucan by oncology 12/11 due to neutropenia and ongoing symptoms.  Chest x-ray on admission with bilateral perihilar and lobar infiltrates concerning for pneumonia. Febrile with LA 3 on admission. She was admitted and initiated on Zosyn and vancomycin and infectious disease was consulted. Prior to her dx of B cell lymphoma she was several months of high dose steroids due to concern for sarcoidosis. Blood and sputum cultures NGTD. Due to persistent fever/hypoxia transfer to [facility] for pulmonology consult and consideration for possible bronch  -Initially was planned for bronchoscopy however canceled as sputum now positive for PJP  -Continue Septra.   -Continue prednisone for hypoxia associated with PCP pneumonia  -Infectious disease and pulmonary following  -Respiratory status stable today, oxygen stable at 6 L via mask  -Wean oxygen as able.  -Continue C status for today      Diffuse Large B Cell Lymphoma  Neutropenia, resolved:   -Had a mediastinal mass biopsied at [facility] that came back positive for diffuse large B-cell lymphoma.    -Initially treated with high-dose steroids.  Started chemotherapy with R-CHOP at the [facility] during the 11/27 through 12/1 admission.  Has follow-up with [facility] with Dr. Castro.  - ANC 0.4 on  "admission. Received Neupogen 12/15 with improvement in ANC   -Oncology following     Anemia:   -Previously baseline 13-14.   -Downtrending since RCHOP  -S/p 1 unit PRBC 12/18  -Hb stable     RENAY:   -Baseline Cr 0.8  -Cr worsened to 1.28; but now improved  -IVF @KVO  -Encourage PO intake     Anxiety  -Continue PRN ativan     Diet: Regular Diet Adult     DVT Prophylaxis: Enoxaparin (Lovenox) SQ   Phillips Catheter: in place, indication: Strict 1-2 Hour I&O  Code Status: Full Code     Disposition Plan    Expected discharge: 2 - 3 days, recommended to TBD once hypoxia and overall clinical situation improves    Entered: Sung Maurer MD 12/20/2019, 9:19 AM        The patient's care was discussed with the Bedside Nurse and Patient.    Sung Maurer MD  Hospitalist Service  [facility]    ______________________________________________________________________    Interval History   Overall feels better.  Dyspnea improved.  Oxygen stable at 6 L.  Appetite poor.  Afebrile.  Endorses dry cough.    Data reviewed today: I reviewed all medications, new labs and imaging results over the last 24 hours. I personally reviewed no images or EKG's today.    Physical Exam   Vital signs:  Temp: 97.8  F (36.6  C) Temp src: Axillary BP: (!) 132/94 Pulse: 78 Heart Rate: 80 Resp: 24 SpO2: 96 % O2 Device: Oxymask Oxygen Delivery: 10 LPM   Weight: 76.8 kg (169 lb 5 oz)  Estimated body mass index is 26.52 kg/m  as calculated from the following:    Height as of 11/29/19: 1.702 m (5' 7\").    Weight as of this encounter: 76.8 kg (169 lb 5 oz).      Wt Readings from Last 2 Encounters:   12/20/19 76.8 kg (169 lb 5 oz)   12/18/19 78 kg (172 lb)       Gen: AAOX3, NAD  HEENT: Supple neck, moist oral mucosa, no pallor  Resp: Crackles bilaterally in all the lung fields, normal effort of breathing  CVS: RRR, no murmur  Abd/GI: Soft, non-tender. BS- normoactive.    Skin: Warm, dry no rashes  MSK: trace pedal edema  Neuro- CN- intact. No " focal deficits.       Data   Recent Labs   Lab 12/20/19  0624 12/19/19  2106 12/19/19  0726 12/18/19  1909 12/18/19  0712  12/15/19  0656   WBC 9.4  --  7.2  --  7.7   < > 1.2*   HGB 9.7*  --  8.8*  --  9.2*   < > 9.6*   MCV 90  --  91  --  93   < > 94     --  210 231 249   < > 270     --  136  --  139   < > 135   POTASSIUM 3.5 3.5 2.7*  --  3.7   < > 3.3*   CHLORIDE 107  --  105  --  109   < > 101   CO2 24  --  27  --  21   < > 25   BUN 8  --  5*  --  7   < > 11   CR 0.82  --  1.01 0.95 1.04   < > 1.20*   ANIONGAP 8  --  4  --  9   < > 9   AFSHAN 8.5  --  8.2*  --  8.3*   < > 8.7   *  --  116*  --  107*   < > 144*   ALBUMIN  --   --   --   --   --   --  3.0*   PROTTOTAL  --   --   --   --   --   --  6.7*   BILITOTAL  --   --   --   --   --   --  1.2   ALKPHOS  --   --   --   --   --   --  70   ALT  --   --   --   --   --   --  34   AST  --   --   --   --   --   --  41    < > = values in this interval not displayed.       No results found for this or any previous visit (from the past 24 hour(s)).  Medications     sodium chloride 75 mL/hr at 12/19/19 0925       acyclovir  400 mg Oral BID     allopurinol  300 mg Oral Daily     enoxaparin ANTICOAGULANT  40 mg Subcutaneous Q24H     fluconazole  200 mg Oral Daily     omeprazole  40 mg Oral QAM AC     predniSONE  40 mg Oral BID    Followed by     [START ON 12/25/2019] predniSONE  40 mg Oral Daily    Followed by     [START ON 12/30/2019] predniSONE  20 mg Oral Daily     sodium chloride (PF)  3 mL Intracatheter Q8H     sulfamethoxazole-trimethoprim (BACTRIM/SEPTRA) intermittent infusion  400 mg Intravenous Q8H

## 2019-12-20 NOTE — PROGRESS NOTES
[facility] Nurse Inpatient Wound Assessment   Reason for consultation: Evaluate and treat coccyx/buttocks      Assessment  Right coccyx/ upper buttock with shallow blanchable abrasions, nearly healed.  Left side with intact blanchable erythema.  Wounds possibly related to frequent use of bedpan or commode.  Perianal area with newly resurfaced ulcerations likely related to moisture from frequent loose stools and friction.  Pt able to reposition herself in bed but does not tolerate lying flat.  No obvious pressure injuries.      Treatment Plan  Perineal cares:  BID and prn:  1.  Cleanse with barrier wipes or Mono lotion.   2.  Apply thin glaze of barrier paste prn.    3.  Leave open to air, no dressings needed.   4.  Remind and assist pt to reposition every 1-2 hrs, side to side. Minimize time sitting on bedpan or commode.  Use chair cushion #[id number] if up to chair.  HOB as low as tolerated.     Orders Written  WO Nurse follow-up plan: signing off  Nursing to notify the Provider(s) and re-consult the [facility] Nurse if wound(s) deteriorates or new skin concern.    Patient History  According to provider note(s):  Kassie Ramirez is a 49 year old female admitted on 12/18/2019. She she was transferred from [facility] for pulmonology consultation due to persistent hypoxia and fever as well as concern for opportunistic lung infection.  Ms. Ramirez is a 49-year-old female with stage III diffuse large B-cell lymphoma status post first cycle of R-CHOP on 11/29/2019.  The patient was admitted to [facility] on 11/15/2009 with neutropenic fever.  Objective Data  Containment of urine/stool: mostly continent of bowel; indwelling catheter    Active Diet Order:  Orders Placed This Encounter      Regular Diet Adult    Output:   I/O last 3 completed shifts:  In: -   Out: 4000 [Urine:4000]    Risk Assessment:   Sensory Perception: 4-->no impairment  Moisture: 3-->occasionally moist  Activity:  3-->walks occasionally  Mobility: 3-->slightly limited  Nutrition: 3-->adequate  Friction and Shear: 3-->no apparent problem  Can Score: 19                          Labs:   Recent Labs   Lab 12/20/19  0624  12/15/19  0656   ALBUMIN  --   --  3.0*   HGB 9.7*   < > 9.6*   WBC 9.4   < > 1.2*    < > = values in this interval not displayed.       Physical Exam  Skin inspection: focused buttocks    Wound Location:  Perianal area and upper buttocks/coccyx  Date of last photo:  12-20-19      Wound History: pt reports frequent loose stools  Measurements (length x width x depth, in cm):  Scattered nickel-size superficial areas of blanchable dark pink erythema and newly resurfaced epidermis  Palpation of the wound bed: normal   Periwound skin: intact  Color: normal and consistent with surrounding tissue  Temperature: normal   Drainage: none  Description of drainage: none  Odor: none  Pain: minimal    Interventions  Current support surface: Standard  Atmos Air mattress  Current off-loading measures: Pillows  Visual inspection of wound(s) completed  Wound Care: done per plan of care  Supplies: reviewed  Education provided: importance of repositioning, plan of care, wound progress, Moisture management and Off-loading pressure  Discussed plan of care with Patient and Nurse    Karen Escalona RN

## 2019-12-21 LAB
BACTERIA SPEC CULT: NO GROWTH
BACTERIA SPEC CULT: NO GROWTH
ERYTHROCYTE [DISTWIDTH] IN BLOOD BY AUTOMATED COUNT: 15.6 % (ref 10–15)
HCT VFR BLD AUTO: 26 % (ref 35–47)
HGB BLD-MCNC: 9 G/DL (ref 11.7–15.7)
Lab: NORMAL
Lab: NORMAL
MCH RBC QN AUTO: 31.5 PG (ref 26.5–33)
MCHC RBC AUTO-ENTMCNC: 34.6 G/DL (ref 31.5–36.5)
MCV RBC AUTO: 91 FL (ref 78–100)
PLATELET # BLD AUTO: 221 10E9/L (ref 150–450)
RBC # BLD AUTO: 2.86 10E12/L (ref 3.8–5.2)
SPECIMEN SOURCE: NORMAL
SPECIMEN SOURCE: NORMAL
WBC # BLD AUTO: 10.6 10E9/L (ref 4–11)

## 2019-12-21 PROCEDURE — 25000131 ZZH RX MED GY IP 250 OP 636 PS 637: Performed by: PHYSICIAN ASSISTANT

## 2019-12-21 PROCEDURE — 12000000 ZZH R&B MED SURG/OB

## 2019-12-21 PROCEDURE — 36415 COLL VENOUS BLD VENIPUNCTURE: CPT | Performed by: INTERNAL MEDICINE

## 2019-12-21 PROCEDURE — 99232 SBSQ HOSP IP/OBS MODERATE 35: CPT | Performed by: INTERNAL MEDICINE

## 2019-12-21 PROCEDURE — 85027 COMPLETE CBC AUTOMATED: CPT | Performed by: INTERNAL MEDICINE

## 2019-12-21 PROCEDURE — 25800030 ZZH RX IP 258 OP 636: Performed by: PHYSICIAN ASSISTANT

## 2019-12-21 PROCEDURE — 99233 SBSQ HOSP IP/OBS HIGH 50: CPT | Performed by: INTERNAL MEDICINE

## 2019-12-21 PROCEDURE — 25000132 ZZH RX MED GY IP 250 OP 250 PS 637: Performed by: PHYSICIAN ASSISTANT

## 2019-12-21 PROCEDURE — 25000128 H RX IP 250 OP 636: Performed by: PHYSICIAN ASSISTANT

## 2019-12-21 RX ADMIN — PREDNISONE 40 MG: 20 TABLET ORAL at 08:16

## 2019-12-21 RX ADMIN — ACYCLOVIR 400 MG: 400 TABLET ORAL at 08:15

## 2019-12-21 RX ADMIN — SULFAMETHOXAZOLE AND TRIMETHOPRIM 400 MG: 80; 16 INJECTION, SOLUTION, CONCENTRATE INTRAVENOUS at 03:55

## 2019-12-21 RX ADMIN — PREDNISONE 40 MG: 20 TABLET ORAL at 20:57

## 2019-12-21 RX ADMIN — ACYCLOVIR 400 MG: 400 TABLET ORAL at 20:57

## 2019-12-21 RX ADMIN — ALLOPURINOL 300 MG: 300 TABLET ORAL at 08:16

## 2019-12-21 RX ADMIN — SULFAMETHOXAZOLE AND TRIMETHOPRIM 400 MG: 80; 16 INJECTION, SOLUTION, CONCENTRATE INTRAVENOUS at 21:12

## 2019-12-21 RX ADMIN — ENOXAPARIN SODIUM 40 MG: 40 INJECTION SUBCUTANEOUS at 17:47

## 2019-12-21 RX ADMIN — FLUCONAZOLE 200 MG: 200 TABLET ORAL at 20:57

## 2019-12-21 RX ADMIN — SULFAMETHOXAZOLE AND TRIMETHOPRIM 400 MG: 80; 16 INJECTION, SOLUTION, CONCENTRATE INTRAVENOUS at 12:03

## 2019-12-21 RX ADMIN — OMEPRAZOLE 40 MG: 20 CAPSULE, DELAYED RELEASE ORAL at 08:16

## 2019-12-21 ASSESSMENT — ACTIVITIES OF DAILY LIVING (ADL)
ADLS_ACUITY_SCORE: 14

## 2019-12-21 NOTE — PLAN OF CARE
VSS, O2 weened down @ 3L with oxymask, .Tele NSR . A/Ox 4. Skin integrity maintained. Pain controlled with morphine x1. Up with SBA, independently repositioning in bed. Reg diet. BS +. Flatus + Phillips catheter with good urine output. LS diminished, dry cough. IS to 750. AARON continued, IVF @ 30cc/hr. CTM.

## 2019-12-21 NOTE — PLAN OF CARE
Nasal cannula on at 4 liters at 0800. Up to BSC had BM DENIED sob  Heart rate to 105 with activity. Lung sounds bbrales. Up in chair for 2 hours. Sats maintained 95-98. Nasal cannula O2 to 3 liters .  Appetite improving inspirometer use encourage

## 2019-12-21 NOTE — PROGRESS NOTES
[facility]  Infectious Disease Progress Note          Assessment and Plan:   IMPRESSION:   1.  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  No CT scan to date.  Significant fever and illness currently.   2.  Neutropenia, now resolved.  Highly Doubt this is simple neutropenic fever, more likely a more complicated pneumonia problem.   3.  Lymphoma.  4 New diarrhea await C diff      RECOMMENDATIONS:   1.  PJP + on sputum defines illness, compromised host so aleays possible multiple dx but for now assume dx made, all fits this dx  2 septra and steroids, T down and O2 stable looks better less tachy             Interval History:;   no new complaints  O2 better new diarrhea not C diff resolving fever saroj high dose bactrim IV PJP +              Medications:       acyclovir  400 mg Oral BID     enoxaparin ANTICOAGULANT  40 mg Subcutaneous Q24H     fluconazole  200 mg Oral Daily     omeprazole  40 mg Oral QAM AC     predniSONE  40 mg Oral BID    Followed by     [START ON 12/25/2019] predniSONE  40 mg Oral Daily    Followed by     [START ON 12/30/2019] predniSONE  20 mg Oral Daily     sodium chloride (PF)  3 mL Intracatheter Q8H     sulfamethoxazole-trimethoprim (BACTRIM/SEPTRA) intermittent infusion  400 mg Intravenous Q8H                  Physical Exam:   Blood pressure 133/82, pulse 78, temperature 97.7  F (36.5  C), temperature source Oral, resp. rate 24, weight 76.8 kg (169 lb 5 oz), SpO2 98 %, not currently breastfeeding.  Wt Readings from Last 2 Encounters:   12/20/19 76.8 kg (169 lb 5 oz)   12/18/19 78 kg (172 lb)     Vital Signs with Ranges  Temp:  [97.3  F (36.3  C)-98   F (36.7  C)] 97.7  F (36.5  C)  Pulse:  [] 78  Heart Rate:  [] 76  Resp:  [18-32] 24  BP: (115-141)/() 133/82  SpO2:  [80 %-98 %] 98 %    Constitutional: Awake, alert, cooperative, no apparent distress   Lungs: Congestion to auscultation bilaterally, no crackles or wheezing   Cardiovascular: Regular rate and rhythm, normal S1 and S2, and no murmur noted   Abdomen: Normal bowel sounds, soft, non-distended, non-tender   Skin: No rashes, no cyanosis, no edema   Other:           Data:   All microbiology laboratory data reviewed.  Recent Labs   Lab Test 12/21/19  0601 12/20/19  0624 12/19/19  0726   WBC 10.6 9.4 7.2   HGB 9.0* 9.7* 8.8*   HCT 26.0* 28.1* 25.8*   MCV 91 90 91    235 210     Recent Labs   Lab Test 12/20/19  1938 12/20/19  0624 12/19/19  0726   CR 0.81 0.82 1.01     No lab results found.  Recent Labs   Lab Test 12/20/19  2243 12/20/19  2241 12/15/19  1730 12/15/19  0830 12/15/19  0656 11/27/19  1823 11/27/19  1708 07/26/13  1010 11/13/12  1702   CULT No growth after 2 hours No growth after 2 hours Light growth  Normal ara   No growth No growth No growth No growth >100,000 colonies/mL Escherichia coli Normal skin ara

## 2019-12-21 NOTE — PROGRESS NOTES
[facility]    Hematology/Oncology Follow-up Note      Today's Date: 19  Date of Admission:  12/15/2019  Reason for Consult: DLBCL, admitted for Neutropenic fever        ASSESSMENT/PLAN:  Kassie Ramirez is a 49 year old female with:      DLBCL: Stage III, recently diagnosed and admitted from -2019 to initiate first cycle of R-CHOP chemotherapy at the [facility].  Now with neutropenic fever, see below.  She will be following up with Dr. Castro as an outpatient and is scheduled to see him for cycle 2 on 2019.  We will add Neulasta to cycle 2.    --Has completed over 3 weeks of allopurinol and does not need any more at this time   I have discontinued this  - Continue acyclovir  --We will follow through this hospital stay  --She is scheduled to see me in a week to restart cycle 2 of chemotherapy  -- Her current CT scan for SOB did show good treatment response.      Neutropenic fever: Noted to be neutropenic at admission with high grade fevers   Afebrile  Temp (24hrs), Av.4  F (36.3  C), Min:95.8  F (35.4  C), Max:98  F (36.7  C)  ANC appropriately elevated in response to infection    (she did receive neupogen earlier in admission)  PJP +ve in sputum concerning for infection;  on bactrim, steroids,   Her oxygen requirement is gradually decreasing   If she declines would consider echocardiogram, bronchoscopic evaluation   Off broad spectrum antibiotics   ID following        Anemia:   -- Hgb stable at 9 g/dl  -- Daily CBC with differential     Over 35 min of time spent with more than 50% time spent in counseling and coordinating care.      INTERIM HISTORY: Kassie was seen in her room in presence of her hu     She notes that her breathing has significantly improved. She has no other complains. Her  was interested in knowing the fraction of tumor shrinkage.     MEDICATIONS:  Current Facility-Administered Medications   Medication     acetaminophen (TYLENOL) Suppository 650 mg      acetaminophen (TYLENOL) tablet 650 mg     acyclovir (ZOVIRAX) tablet 400 mg     albuterol (PROVENTIL) neb solution 2.5 mg     allopurinol (ZYLOPRIM) tablet 300 mg     benzocaine-menthol (CHLORASEPTIC) 6-10 MG lozenge 1 lozenge     enoxaparin ANTICOAGULANT (LOVENOX) injection 40 mg     fluconazole (DIFLUCAN) tablet 200 mg     guaiFENesin-dextromethorphan (ROBITUSSIN DM) 100-10 MG/5ML syrup 5 mL     lidocaine (LMX4) cream     lidocaine 1 % 0.1-1 mL     LORazepam (ATIVAN) injection 0.5-1 mg     melatonin tablet 1 mg     morphine (PF) injection 1-2 mg     naloxone (NARCAN) injection 0.1-0.4 mg     nitroGLYcerin (NITROSTAT) sublingual tablet 0.4 mg     omeprazole (priLOSEC) CR capsule 40 mg     ondansetron (ZOFRAN-ODT) ODT tab 4 mg    Or     ondansetron (ZOFRAN) injection 4 mg     oxyCODONE (ROXICODONE) tablet 5-10 mg     potassium chloride (KLOR-CON) Packet 20-40 mEq     potassium chloride 10 mEq in 100 mL intermittent infusion with 10 mg lidocaine     potassium chloride 10 mEq in 100 mL sterile water intermittent infusion (premix)     potassium chloride 20 mEq in 50 mL intermittent infusion     potassium chloride ER (K-DUR/KLOR-CON M) CR tablet 20-40 mEq     predniSONE (DELTASONE) tablet 40 mg    Followed by     [START ON 12/25/2019] predniSONE (DELTASONE) tablet 40 mg    Followed by     [START ON 12/30/2019] predniSONE (DELTASONE) tablet 20 mg     prochlorperazine (COMPAZINE) injection 10 mg    Or     prochlorperazine (COMPAZINE) tablet 10 mg    Or     prochlorperazine (COMPAZINE) Suppository 25 mg     sodium chloride (PF) 0.9% PF flush 3 mL     sodium chloride (PF) 0.9% PF flush 3 mL     sodium chloride 0.9% infusion     sulfamethoxazole-trimethoprim (BACTRIM) 400 mg in D5W 500 mL intermittent infusion           ALLERGIES:  Allergies   Allergen Reactions     Cold & Flu [Cold Defense Fighter]      See pseudoephedrine     Seasonal Allergies      Sudafed Cold-Cough [Dayquil Liquicaps]      Pseudoephedrine Rash      "Rash then skins peels off          PHYSICAL EXAM:  Vital signs:  Temp: 97.7  F (36.5  C) Temp src: Oral BP: 126/84 Pulse: 100 Heart Rate: 74 Resp: 18 SpO2: 94 % O2 Device: Nasal cannula Oxygen Delivery: 3 LPM   Weight: 76.8 kg (169 lb 5 oz)  Estimated body mass index is 26.52 kg/m  as calculated from the following:    Height as of 11/29/19: 1.702 m (5' 7\").    Weight as of this encounter: 76.8 kg (169 lb 5 oz).    GENERAL:  Female, in no acute distress.  Alert and oriented x3.   HEENT:  Normocephalic, atraumatic.   LUNGS:  NC in place, mild labored breathing  SKIN: No rash on exposed skin   PSYCH: Mood stable, appears tired      LABS:  Recent Labs   Lab Test 12/20/19  1938 12/20/19  0624 12/19/19  2106 12/19/19  0726 12/18/19  1909 12/18/19  0712 12/17/19  1436 12/17/19  0715 12/16/19  0804   NA  --  139  --  136  --  139  --  142 144   POTASSIUM  --  3.5 3.5 2.7*  --  3.7 4.4 3.2* 3.7   CHLORIDE  --  107  --  105  --  109  --  113* 113*   CO2  --  24  --  27  --  21  --  20 23   ANIONGAP  --  8  --  4  --  9  --  9 8   BUN  --  8  --  5*  --  7  --  8 10   CR 0.81 0.82  --  1.01 0.95 1.04  --  1.15* 1.28*   GLC  --  163*  --  116*  --  107*  --  91 102*   AFSHAN  --  8.5  --  8.2*  --  8.3*  --  8.1* 8.4*     Recent Labs   Lab Test 12/01/19  1340 12/01/19  0641 11/30/19  2137 11/30/19  1341 11/30/19  0522  11/27/19  2216 11/27/19  1605 09/19/19  0706 09/18/19  0845   MAG  --   --   --   --   --   --  2.1 2.5* 2.4* 2.2   PHOS 2.8 3.4 2.8 2.5 4.1   < > 3.1  --   --   --     < > = values in this interval not displayed.     Recent Labs   Lab Test 12/21/19  0601 12/20/19  0624 12/19/19  0726 12/18/19  1909 12/18/19  0712 12/17/19  0715 12/16/19  0804 12/15/19  0656   WBC 10.6 9.4 7.2  --  7.7 6.3 3.5* 1.2*   HGB 9.0* 9.7* 8.8*  --  9.2* 7.6* 8.7* 9.6*    235 210 231 249 222 201 270   MCV 91 90 91  --  93 95 98 94   NEUTROPHIL  --   --  64.0  --  72.0 82.0 60.0 37.0     Recent Labs   Lab Test 12/16/19  1743 " 12/15/19  0656 12/11/19  1151 11/29/19  0526 11/28/19  0537   BILITOTAL  --  1.2 1.0 1.2 1.2   ALKPHOS  --  70 102 69 74   ALT  --  34 32 25 26   AST  --  41 18 22 24   ALBUMIN  --  3.0* 3.6 3.0* 3.2*   *  --   --  416* 450*     TSH   Date Value Ref Range Status   09/18/2019 2.06 0.40 - 4.00 mU/L Final   07/27/2018 2.04 0.40 - 4.00 mU/L Final   09/16/2014 1.62 0.40 - 4.00 mU/L Final     Comment:     Effective 7/30/2014, the reference range for this assay has changed to reflect   new instrumentation/methodology.       No results for input(s): CEA in the last 27362 hours.  Results for orders placed or performed during the hospital encounter of 12/18/19   XR Chest Port 1 View    Narrative    CHEST PORTABLE ONE VIEW  12/19/2019 8:15 AM     HISTORY: Shortness of breath, hypoxia.    COMPARISON: Chest CT on 12/16/2019      Impression    IMPRESSION: Single portable AP view of the chest was obtained.  Cardiomediastinal silhouette is within normal limits. Diffuse  bilateral perihilar predominant airspace pulmonary opacities, could  represent infection, pulmonary edema versus a drug reaction, not  significantly changed as compared to 12/16/2019 and slightly worsened  as compared to 12/15/2019.    ÁNGEL SALDAÑA MD   XR Chest Port 1 View    Narrative    CHEST ONE VIEW PORTABLE  12/20/2019 9:10 PM     HISTORY: Tachypnea    COMPARISON: 12/19/2019.      Impression    IMPRESSION: Improvement of aeration since the prior exam. Patchy  bilateral airspace opacities appear improved but incompletely  resolved. Stable cardiac silhouette.    JOSUE HORTA MD

## 2019-12-21 NOTE — PLAN OF CARE
Oxy mask 5 liters by the 1400. Able transfer to commode this after noon with assist of one. Still sob but states her breathing is little less labored.  Does not want to try nasal cannula at this time. Lung sounds crackles and diminished.  umaña 1000 cc out. Co of loss of appetite but is taking supplements well. Denies pain. No bm passing flatus. Hypertensive at times. Sinus rhythm. NS infusing.

## 2019-12-21 NOTE — PROGRESS NOTES
Sepsis Evaluation Progress Note    I was called to see Kassie Ramirez due to abnormal vital signs triggering the Sepsis SIRS screening alert. She is known to have an infection.     Physical Exam   Vital Signs:  Temp: 97.6  F (36.4  C) Temp src: Oral BP: 128/83 Pulse: 82 Heart Rate: 104 Resp: (!) 32 SpO2: (!) 80 % O2 Device: Oxymask Oxygen Delivery: 8 LPM    Lab:  Lactic Acid   Date Value Ref Range Status   12/18/2019 1.0 0.7 - 2.0 mmol/L Final     Lactate for Sepsis Protocol   Date Value Ref Range Status   12/20/2019 2.7 (H) 0.7 - 2.0 mmol/L Final     Comment:     Significant value called to and read back by  PAKO MCCLELLAND ON 33 AT 2034 EJV         The patient is at baseline mental status.     The rest of their physical exam is significant for tachypnea.     Assessment & Plan   NO EVIDENCE OF SEPSIS at this time.  Vital sign, physical exam, and lab findings are likely due to acute respiratory failure.    Disposition: The patient will remain on the current unit. We will continue to monitor this patient closely.  Nasir Kim,     Sepsis Criteria   Sepsis: 2+ SIRS criteria due to infection  Severe Sepsis: Sepsis AND 1+ new sign of acute organ dysfunction (Note: lactate >2 is organ dysfunction)  Septic Shock: Sepsis AND hypotension despite volume resuscitation with 30 ml/kg crystalloid

## 2019-12-21 NOTE — PROGRESS NOTES
PULMONOLOGY PROGRESS NOTE  Date of service: 2019  [facility]    CC/Reason for Visit: pneumonia, respiratory failure.    SUBJECTIVE      HPI: Events reviewed since last seen. Stable night. No new respiratory problems. States breathing is better today.    ROS: A Problem Pertinent review of systems was negative except for items noted in HPI.    Past Medical, Family, and Social/Substance History has been reviewed: No interval changes.    OBJECTIVE   /86 (BP Location: Left arm)   Pulse 81   Temp 97.5  F (36.4  C) (Oral)   Resp 25   Wt 76.8 kg (169 lb 5 oz)   SpO2 94%   BMI 26.52 kg/m   3 L/NC.    Temp (24hrs), Av  F (36.7  C), Min:97.6  F (36.4  C), Max:99.2  F (37.3  C)       Intake/Output Summary (Last 24 hours) at 2019 0750  Last data filed at 2019 0600  Gross per 24 hour   Intake --   Output 4000 ml   Net -4000 ml     Patient Vitals for the past 96 hrs:   Weight   19 0622 76.8 kg (169 lb 5 oz)       CONSTITUTIONAL/GENERAL APPEARANCE: Alert. No Apparent Distress.  PSYCHIATRIC: Pleasant and appropriate mood and affect. Oriented x 3.  EARS, NOSE,THROAT,MOUTH: External ears and nose overall normal. Normal oral mucosa.   NECK: Neck appearance normal. No neck masses and the thyroid is not enlarged.   RESPIRATORY: Non-labored effort. Decreased BS, fairly clear today.  CARDIOVASCULAR: S1, S2, regular rate and rhythm.      LABORATORY ASSESSMENT      Recent Labs   Lab 19  0803 19  0712   PH 7.51*  --    PCO2 30*  --    PO2 130*  --    HCO3 24  --    O2PER Oximizer 3     Recent Labs   Lab 19  0601 19  0624   WBC 10.6 9.4   RBC 2.86* 3.11*   HGB 9.0* 9.7*   HCT 26.0* 28.1*   MCV 91 90   MCH 31.5 31.2   MCHC 34.6 34.5   RDW 15.6* 15.7*    235     Recent Labs   Lab 19  1938 19  0624 19  2106 19  0726  19  0712   NA  --  139  --  136  --  139   POTASSIUM  --  3.5 3.5 2.7*  --  3.7   CHLORIDE  --  107  --  105  --   109   CO2  --  24  --  27  --  21   ANIONGAP  --  8  --  4  --  9   BUN  --  8  --  5*  --  7   CR 0.81 0.82  --  1.01   < > 1.04   GFRESTIMATED 84 84  --  65   < > 63   GFRESTBLACK >90 >90  --  75   < > 73   AFSHAN  --  8.5  --  8.2*  --  8.3*    < > = values in this interval not displayed.     No lab results found in last 7 days.  No lab results found in last 7 days.    Additional labs and/or comments: Sputum Pneumocystis jirovecii PCR - positive.    IMAGING      CXR 12/19 -  IMPRESSION: Single portable AP view of the chest was obtained.  Cardiomediastinal silhouette is within normal limits. Diffuse  bilateral perihilar predominant airspace pulmonary opacities, could  represent infection, pulmonary edema versus a drug reaction, not  significantly changed as compared to 12/16/2019 and slightly worsened  as compared to 12/15/2019.    CT Chest 12/16 -  IMPRESSION:   1.  Evidence for decrease in volume of patient's extensive adenopathy consistent with response to therapy for patient's lymphoma.  2.  Extensive areas of airspace disease throughout both hemithoraces with interstitial edema. Findings likely infectious or inflammatory with differential including pulmonary edema, drug reaction, among others.    PFT & OTHER TESTING       ASSESSMENT / PLAN      Active Problems:    Respiratory failure (H)      ASSESSMENT: 50 yo female non-smoker with B-cell lymphoma on chemotherapy admitted with shortness of breath, hypoxia and pulmonary infiltrates.  The patient was originally seen in August 2019. She presented with a persistent cough since May 2019 which she attributed to a possible aspiration of rice.  Chest x-ray done 6/27/2019 was clear.  CT scan of the chest 8/20/2019 showed left hilar and subcarinal mediastinal adenopathy with narrowing of the left lower lobe bronchus and a nonspecific patchy area of nodular consolidation in the medial right lower lobe.  Bronchoscopy with EBUS 8/28/2019 showed nonnecrotizing granulomatous  inflammation with prominent eosinophilia, negative for malignancy.  She was diagnosed with sarcoidosis and started on prednisone.  She was seen in follow-up by my partner Dr. Mitchell on 11/15/2019 for evaluation of worsening cough and shortness of breath with tapering of the prednisone.  Repeat CT scan of the chest 11/18/2019 showed interval worsening of the bulky mediastinal and bilateral hilar lymphadenopathy, near complete resolution of the lower lobe opacities, with new interstitial opacities in the right upper lobe.  It was unclear whether the patient had sarcoidosis not responding to steroids or there was another process involved.  She was referred to thoracic surgery and underwent mediastinoscopy on 11/25/2019 and was diagnosed with EBV positive diffuse large B-cell lymphoma.  Patient was seen by oncology and started chemotherapy with R-CHOP at the [facility].  She presented to [facility] on 12/15/2019 with fever, shortness of breath and hypoxia.  Chest x-ray on admission showed prominent bilateral perihilar and lower lobe infiltrates.  CT scan of the chest 12/16 showed decrease in extensive adenopathy with extensive areas of airspace disease bilaterally.  She was transferred to [facility] for pulmonary evaluation and scheduled for bronchoscopy, but sputum Pneumocystis jirovecii PCR found to be positive, and so procedure was cancelled and she was started on PCP rx. Patient clinically improved today, with decreased O2 requirements. Would continue present rx for now.    Diagnoses  Abnl CT/CXR  R91.8  Adenopathy  R59.9  Hypoxemia  R09.02  Pneumonia unspec J18.9  Resp fail acute J96.00  SOB   R06.02    PLAN:  1. Adjust oxygen, keep SaO2 > 90%  2. Antibiotics - Acyclovir, Diflucan, Bactrim per ID.  3. Steroids - Prednisone.  4. Increase activity and IS.  5. Recheck CXR on Monday 12/23.    No further suggestions at this time. Will see pt in follow-up on Monday. Please call if needed over the  WE.      Rajat Horton MD    [facility] / [facility]  [phone] (pager)  [phone] (office)

## 2019-12-22 PROCEDURE — 25000131 ZZH RX MED GY IP 250 OP 636 PS 637: Performed by: PHYSICIAN ASSISTANT

## 2019-12-22 PROCEDURE — 99233 SBSQ HOSP IP/OBS HIGH 50: CPT | Performed by: INTERNAL MEDICINE

## 2019-12-22 PROCEDURE — 25000128 H RX IP 250 OP 636: Performed by: PHYSICIAN ASSISTANT

## 2019-12-22 PROCEDURE — 25800030 ZZH RX IP 258 OP 636: Performed by: INTERNAL MEDICINE

## 2019-12-22 PROCEDURE — 25000132 ZZH RX MED GY IP 250 OP 250 PS 637: Performed by: PHYSICIAN ASSISTANT

## 2019-12-22 PROCEDURE — 12000000 ZZH R&B MED SURG/OB

## 2019-12-22 PROCEDURE — 99232 SBSQ HOSP IP/OBS MODERATE 35: CPT | Performed by: INTERNAL MEDICINE

## 2019-12-22 PROCEDURE — 25800030 ZZH RX IP 258 OP 636: Performed by: PHYSICIAN ASSISTANT

## 2019-12-22 RX ADMIN — PREDNISONE 40 MG: 20 TABLET ORAL at 08:11

## 2019-12-22 RX ADMIN — SULFAMETHOXAZOLE AND TRIMETHOPRIM 400 MG: 80; 16 INJECTION, SOLUTION, CONCENTRATE INTRAVENOUS at 20:55

## 2019-12-22 RX ADMIN — SULFAMETHOXAZOLE AND TRIMETHOPRIM 400 MG: 80; 16 INJECTION, SOLUTION, CONCENTRATE INTRAVENOUS at 12:42

## 2019-12-22 RX ADMIN — ACYCLOVIR 400 MG: 400 TABLET ORAL at 08:11

## 2019-12-22 RX ADMIN — SODIUM CHLORIDE: 9 INJECTION, SOLUTION INTRAVENOUS at 14:10

## 2019-12-22 RX ADMIN — OMEPRAZOLE 40 MG: 20 CAPSULE, DELAYED RELEASE ORAL at 06:54

## 2019-12-22 RX ADMIN — SULFAMETHOXAZOLE AND TRIMETHOPRIM 400 MG: 80; 16 INJECTION, SOLUTION, CONCENTRATE INTRAVENOUS at 04:30

## 2019-12-22 RX ADMIN — FLUCONAZOLE 200 MG: 200 TABLET ORAL at 20:55

## 2019-12-22 RX ADMIN — ACYCLOVIR 400 MG: 400 TABLET ORAL at 20:55

## 2019-12-22 RX ADMIN — ENOXAPARIN SODIUM 40 MG: 40 INJECTION SUBCUTANEOUS at 16:20

## 2019-12-22 RX ADMIN — PREDNISONE 40 MG: 20 TABLET ORAL at 20:55

## 2019-12-22 ASSESSMENT — ACTIVITIES OF DAILY LIVING (ADL)
ADLS_ACUITY_SCORE: 14

## 2019-12-22 NOTE — PROGRESS NOTES
[facility]    Hematology/Oncology Follow-up Note      Today's Date: 19  Date of Admission:  12/15/2019  Reason for Consult: DLBCL, admitted for Neutropenic fever        ASSESSMENT/PLAN:  Kassie Ramirez is a 49 year old female with:      DLBCL: Stage III, recently diagnosed and admitted from -2019 to initiate first cycle of R-CHOP chemotherapy at the [facility].  Now with neutropenic fever, see below.  She will be following up with Dr. Castro as an outpatient and is scheduled to see him for cycle 2 on 2019.  We will add Neulasta to cycle 2.    --Has completed over 3 weeks of allopurinol and does not need any more at this time   I have discontinued this  - Continue acyclovir  --We will follow through this hospital stay  --She is scheduled to see me in Friday to restart cycle 2 of chemotherapy  -- Her current CT scan for SOB did show excellent treatment response with the first cycle. I reviewed actual images with patient and her  who was in the room.     Neutropenic fever: Noted to be neutropenic at admission with high grade fevers   Afebrile  Temp (24hrs), Av.4  F (36.3  C), Min:95.8  F (35.4  C), Max:98  F (36.7  C)  ANC appropriately elevated in response to infection    (she did receive neupogen earlier in admission)  PJP +ve in sputum concerning for infection;  on bactrim, steroids,    Off all supplemental oxygen   Off broad spectrum antibiotics   ID following    Would need to know duration of antibiotics.    We would have to decide appropriate time to restart chemotherapy.    She is being treated with curative intent and we cannot wait for full treatment completion, however we cannot jeopardize her clinical recovery either        Over 35 min of time spent with more than 50% time spent in counseling and coordinating care.      INTERIM HISTORY: Kassie was seen in her room in presence of her hu     She notes that her breathing has significantly improved. She is off  all supplemental oxygen.  She has no other complains.       MEDICATIONS:  Current Facility-Administered Medications   Medication     acetaminophen (TYLENOL) Suppository 650 mg     acetaminophen (TYLENOL) tablet 650 mg     acyclovir (ZOVIRAX) tablet 400 mg     albuterol (PROVENTIL) neb solution 2.5 mg     benzocaine-menthol (CHLORASEPTIC) 6-10 MG lozenge 1 lozenge     enoxaparin ANTICOAGULANT (LOVENOX) injection 40 mg     fluconazole (DIFLUCAN) tablet 200 mg     guaiFENesin-dextromethorphan (ROBITUSSIN DM) 100-10 MG/5ML syrup 5 mL     lidocaine (LMX4) cream     lidocaine 1 % 0.1-1 mL     LORazepam (ATIVAN) injection 0.5-1 mg     melatonin tablet 1 mg     morphine (PF) injection 1-2 mg     naloxone (NARCAN) injection 0.1-0.4 mg     omeprazole (priLOSEC) CR capsule 40 mg     ondansetron (ZOFRAN-ODT) ODT tab 4 mg    Or     ondansetron (ZOFRAN) injection 4 mg     oxyCODONE (ROXICODONE) tablet 5-10 mg     potassium chloride (KLOR-CON) Packet 20-40 mEq     potassium chloride 10 mEq in 100 mL intermittent infusion with 10 mg lidocaine     potassium chloride 10 mEq in 100 mL sterile water intermittent infusion (premix)     potassium chloride 20 mEq in 50 mL intermittent infusion     potassium chloride ER (K-DUR/KLOR-CON M) CR tablet 20-40 mEq     predniSONE (DELTASONE) tablet 40 mg    Followed by     [START ON 12/25/2019] predniSONE (DELTASONE) tablet 40 mg    Followed by     [START ON 12/30/2019] predniSONE (DELTASONE) tablet 20 mg     prochlorperazine (COMPAZINE) injection 10 mg    Or     prochlorperazine (COMPAZINE) tablet 10 mg    Or     prochlorperazine (COMPAZINE) Suppository 25 mg     sodium chloride (PF) 0.9% PF flush 3 mL     sodium chloride (PF) 0.9% PF flush 3 mL     sodium chloride 0.9% infusion     sulfamethoxazole-trimethoprim (BACTRIM) 400 mg in D5W 500 mL intermittent infusion           ALLERGIES:  Allergies   Allergen Reactions     Cold & Flu [Cold Defense Fighter]      See pseudoephedrine     Seasonal  "Allergies      Sudafed Cold-Cough [Dayquil Liquicaps]      Pseudoephedrine Rash     Rash then skins peels off          PHYSICAL EXAM:  Vital signs:  Temp: 95.4  F (35.2  C) Temp src: Oral BP: (!) 135/90 Pulse: 80 Heart Rate: 86 Resp: 18 SpO2: 93 % O2 Device: Nasal cannula Oxygen Delivery: 1.5 LPM   Weight: 76.8 kg (169 lb 5 oz)  Estimated body mass index is 26.52 kg/m  as calculated from the following:    Height as of 11/29/19: 1.702 m (5' 7\").    Weight as of this encounter: 76.8 kg (169 lb 5 oz).    GENERAL:  Female, in no acute distress.  Alert and oriented x3.   HEENT:  Normocephalic, atraumatic.   LUNGS:  NC in place, mild labored breathing  SKIN: No rash on exposed skin   PSYCH: Mood stable, appears tired      LABS: No new labs from today  Recent Labs   Lab Test 12/20/19  1938 12/20/19  0624 12/19/19  2106 12/19/19  0726 12/18/19  1909 12/18/19  0712 12/17/19  1436 12/17/19  0715 12/16/19  0804   NA  --  139  --  136  --  139  --  142 144   POTASSIUM  --  3.5 3.5 2.7*  --  3.7 4.4 3.2* 3.7   CHLORIDE  --  107  --  105  --  109  --  113* 113*   CO2  --  24  --  27  --  21  --  20 23   ANIONGAP  --  8  --  4  --  9  --  9 8   BUN  --  8  --  5*  --  7  --  8 10   CR 0.81 0.82  --  1.01 0.95 1.04  --  1.15* 1.28*   GLC  --  163*  --  116*  --  107*  --  91 102*   AFSHAN  --  8.5  --  8.2*  --  8.3*  --  8.1* 8.4*     Recent Labs   Lab Test 12/01/19  1340 12/01/19  0641 11/30/19  2137 11/30/19  1341 11/30/19  0522  11/27/19  2216 11/27/19  1605 09/19/19  0706 09/18/19  0845   MAG  --   --   --   --   --   --  2.1 2.5* 2.4* 2.2   PHOS 2.8 3.4 2.8 2.5 4.1   < > 3.1  --   --   --     < > = values in this interval not displayed.     Recent Labs   Lab Test 12/21/19  0601 12/20/19  0624 12/19/19  0726 12/18/19  1909 12/18/19  0712 12/17/19  0715 12/16/19  0804 12/15/19  0656   WBC 10.6 9.4 7.2  --  7.7 6.3 3.5* 1.2*   HGB 9.0* 9.7* 8.8*  --  9.2* 7.6* 8.7* 9.6*    235 210 231 249 222 201 270   MCV 91 90 91  --  93 " 95 98 94   NEUTROPHIL  --   --  64.0  --  72.0 82.0 60.0 37.0     Recent Labs   Lab Test 12/16/19  1743 12/15/19  0656 12/11/19  1151 11/29/19  0526 11/28/19  0537   BILITOTAL  --  1.2 1.0 1.2 1.2   ALKPHOS  --  70 102 69 74   ALT  --  34 32 25 26   AST  --  41 18 22 24   ALBUMIN  --  3.0* 3.6 3.0* 3.2*   *  --   --  416* 450*     TSH   Date Value Ref Range Status   09/18/2019 2.06 0.40 - 4.00 mU/L Final   07/27/2018 2.04 0.40 - 4.00 mU/L Final   09/16/2014 1.62 0.40 - 4.00 mU/L Final     Comment:     Effective 7/30/2014, the reference range for this assay has changed to reflect   new instrumentation/methodology.       No results for input(s): CEA in the last 79807 hours.  Results for orders placed or performed during the hospital encounter of 12/18/19   XR Chest Port 1 View    Narrative    CHEST PORTABLE ONE VIEW  12/19/2019 8:15 AM     HISTORY: Shortness of breath, hypoxia.    COMPARISON: Chest CT on 12/16/2019      Impression    IMPRESSION: Single portable AP view of the chest was obtained.  Cardiomediastinal silhouette is within normal limits. Diffuse  bilateral perihilar predominant airspace pulmonary opacities, could  represent infection, pulmonary edema versus a drug reaction, not  significantly changed as compared to 12/16/2019 and slightly worsened  as compared to 12/15/2019.    ÁNGEL SALDAÑA MD   XR Chest Port 1 View    Narrative    CHEST ONE VIEW PORTABLE  12/20/2019 9:10 PM     HISTORY: Tachypnea    COMPARISON: 12/19/2019.      Impression    IMPRESSION: Improvement of aeration since the prior exam. Patchy  bilateral airspace opacities appear improved but incompletely  resolved. Stable cardiac silhouette.    JOSUE HORTA MD

## 2019-12-22 NOTE — PROGRESS NOTES
[facility]  Infectious Disease Progress Note          Assessment and Plan:   IMPRESSION:   1.  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  No CT scan to date.  Significant fever and illness currently.   2.  Neutropenia, now resolved.  Highly Doubt this is simple neutropenic fever, more likely a more complicated pneumonia problem.   3.  Lymphoma.  4 New diarrhea await C diff      RECOMMENDATIONS:   1.  PJP + on sputum defines illness, compromised host so aleays possible multiple dx but for now assume dx made, all fits this dx  2 septra and steroids, T down and O2 stable looks better less tachy             Interval History:;   no new complaints  O2 better new diarrhea not C diff resolving fever saroj high dose bactrim IV PJP +              Medications:       acyclovir  400 mg Oral BID     enoxaparin ANTICOAGULANT  40 mg Subcutaneous Q24H     fluconazole  200 mg Oral Daily     omeprazole  40 mg Oral QAM AC     predniSONE  40 mg Oral BID    Followed by     [START ON 12/25/2019] predniSONE  40 mg Oral Daily    Followed by     [START ON 12/30/2019] predniSONE  20 mg Oral Daily     sodium chloride (PF)  3 mL Intracatheter Q8H     sulfamethoxazole-trimethoprim (BACTRIM/SEPTRA) intermittent infusion  400 mg Intravenous Q8H                  Physical Exam:   Blood pressure (!) 135/90, pulse 80, temperature 95.4  F (35.2  C), temperature source Oral, resp. rate 18, weight 76.8 kg (169 lb 5 oz), SpO2 93 %, not currently breastfeeding.  Wt Readings from Last 2 Encounters:   12/20/19 76.8 kg (169 lb 5 oz)   12/18/19 78 kg (172 lb)     Vital Signs with Ranges  Temp:  [95.4  F (35.2   C)-97.5  F (36.4  C)] 95.4  F (35.2  C)  Pulse:  [80-88] 80  Heart Rate:  [80-86] 86  Resp:  [18-26] 18  BP: (131-136)/(83-90) 135/90  SpO2:  [90 %-96 %] 93 %    Constitutional: Awake, alert, cooperative, no apparent distress   Lungs: Congestion to auscultation bilaterally, no crackles or wheezing   Cardiovascular: Regular rate and rhythm, normal S1 and S2, and no murmur noted   Abdomen: Normal bowel sounds, soft, non-distended, non-tender   Skin: No rashes, no cyanosis, no edema   Other:           Data:   All microbiology laboratory data reviewed.  Recent Labs   Lab Test 12/21/19  0601 12/20/19  0624 12/19/19  0726   WBC 10.6 9.4 7.2   HGB 9.0* 9.7* 8.8*   HCT 26.0* 28.1* 25.8*   MCV 91 90 91    235 210     Recent Labs   Lab Test 12/20/19  1938 12/20/19  0624 12/19/19  0726   CR 0.81 0.82 1.01     No lab results found.  Recent Labs   Lab Test 12/20/19  2243 12/20/19  2241 12/15/19  1730 12/15/19  0830 12/15/19  0656 11/27/19  1823 11/27/19  1708 07/26/13  1010 11/13/12  1702   CULT No growth after 1 day No growth after 1 day Light growth  Normal ara   No growth No growth No growth No growth >100,000 colonies/mL Escherichia coli Normal skin ara

## 2019-12-22 NOTE — PLAN OF CARE
/ox4. VSS on 2L O2 via NC. Tele: NSR. Denies pain. LS clear to upper lobes, fine crackles to lower lobes. BS active, +flatus. Phillips patent. Up with assist of 1 gait belt. IVF infusing NS at 30mL/hr. On Intermittent abx. Continue to monitor.

## 2019-12-22 NOTE — PROGRESS NOTES
[facility]    Medicine Progress Note - Hospitalist Service        Date of Admission:  12/18/2019  3:27 PM    Assessment & Plan:     Kassie Ramirez is a 49 year old female with history of large cell lymphoma, recent R-CHOP chemotherapy with high dose steroids, without prophylactic sulfa, who was transferred from [facility] on 12/18/2019 for pulmonology consultation due to persistent hypoxia and fever as well as concern for opportunistic lung infection.     Acute Hypoxic Respiratory Failure  Bilateral Pulmonary infiltarates   Sepsis due to PJP pneumonia  Immunocompromised, s/p R-CHOP chemo for large cell lymphoma, and prior to her dx, s/p several months of high dose steroids due to concern for sarcoidosis.    She presented on 12/15 with fever and worsening respiratory symptoms.  Had recently completed a course of Levaquin for postobstructive pneumonia and had been placed on Levaquin 250 mg and Diflucan by oncology 12/11 due to neutropenia and ongoing symptoms. On admission, febrile with lactic acid of 3, CXR showed bilateral perihilar and lobar infiltrate.   * Initially placed on Zosyn and vancomycin and infectious disease was consulted.  Blood and sputum cultures NGTD. Due to persistent fever/hypoxia transfer to [facility] for pulmonology consult and consideration for possible bronch  * 12/16 Sputum positive for PJP.   * 12/17 1,3 Beta D Glucan fungitell  * ID and pulmonary consulted    -Continue IV Septra + steroids per ID recommendations  -Currently off O2, HR nl on 12/22/19.   -Discussed with ID Dr. Berry. We can consider home on PO medications tomorrow, depending on course    Weakness/deconditioned state secondary to acute illness.   -Remove umaña with voiding trial, up and walking with assist, PT evaluation, as patient feels quite weak.     Loose stools - c.diff negative. Likely secondary to antibiotics. Not severe.   - ID aware and has not started probiotics. ? Whether this is due to her  immunocompromised state. Will discuss with them prior to discharge.       Diffuse Large B Cell Lymphoma  Neutropenia, resolved:    Had a mediastinal mass biopsied at [facility] that came back positive for diffuse large B-cell lymphoma. Started R-CHOP at the [facility] during the 11/27 through 12/1 admission.  Has follow-up with [facility] with Dr. Castro. ANC 0.4 on admission. Received Neupogen 12/15 with improvement in ANC.   * Oncology following  - Continue PTA fluconazole and acyclovir for prophylaxis   - Very good result from first round of chemotherapy and intent is for cure.   - If ID agrees, will likely resume chemotherapy on Friday, 12/27.      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 Downtrending since RCHOP  * S/p 1 unit PRBC 12/18    Recent Labs   Lab 12/21/19  0601 12/20/19  0624 12/19/19  0726 12/18/19  0712 12/17/19  0715 12/16/19  0804   HGB 9.0* 9.7* 8.8* 9.2* 7.6* 8.7*        RENAY, RESOLVED Baseline Cr 0.8. Max Cr 1.28.   Recent Labs   Lab 12/20/19  1938 12/20/19  0624 12/19/19  0726 12/18/19  1909 12/18/19  0712 12/17/19  0715   CR 0.81 0.82 1.01 0.95 1.04 1.15*        Anxiety  -Continue PRN ativan     Diet: Regular Diet Adult  Snacks/Supplements Adult: Other; pt may order nutrition supplements prn; With Meals     DVT Prophylaxis: Enoxaparin (Lovenox) SQ   Phillips Catheter: in place, indication: Strict 1-2 Hour I&O  Code Status: Full Code     Disposition Plan    Expected discharge: 1-2, recommended to TBD, PT evaluation in the AM, up and walking today. Possibly home tomorrow on orals vs TCU.     Entered: Flores Fisher MD 12/22/2019, 4:20 PM        The patient's care was discussed with the Bedside Nurse and Patient. And ID team.     Sung Maurer MD  Hospitalist Service  [facility]    ______________________________________________________________________    Interval History   Off oxygen, no longer tachycardic. Has some loose stools, not  "severe. C.diff negative. Feels really weak and asks for therapy evaluation prior to discharge. Still has cathter in place due to weakness and immobility at presentation.     Data reviewed today: I reviewed all medications, new labs and imaging results over the last 24 hours. I personally reviewed no images or EKG's today.    Physical Exam   Vital signs:  Temp: 97.4  F (36.3  C) Temp src: Oral BP: (!) 140/90 Pulse: 86 Heart Rate: 86 Resp: 18 SpO2: 90 % O2 Device: None (Room air) Oxygen Delivery: 1.5 LPM   Weight: 76.8 kg (169 lb 5 oz)  Estimated body mass index is 26.52 kg/m  as calculated from the following:    Height as of 11/29/19: 1.702 m (5' 7\").    Weight as of this encounter: 76.8 kg (169 lb 5 oz).      Wt Readings from Last 2 Encounters:   12/20/19 76.8 kg (169 lb 5 oz)   12/18/19 78 kg (172 lb)       Gen: AAOX3, NAD  HEENT: Supple neck, moist oral mucosa, no pallor  Resp: Lungs clear except for crackles in the baseline.   CVS: RRR, no murmur  Abd/GI: Soft, non-tender. BS- normoactive.    Skin: Warm, dry no rashes  MSK: trace pedal edema  Neuro- CN- intact. No focal deficits.       Data   Recent Labs   Lab 12/21/19  0601 12/20/19  1938 12/20/19  0624 12/19/19  2106 12/19/19  0726  12/18/19  0712   WBC 10.6  --  9.4  --  7.2  --  7.7   HGB 9.0*  --  9.7*  --  8.8*  --  9.2*   MCV 91  --  90  --  91  --  93     --  235  --  210   < > 249   NA  --   --  139  --  136  --  139   POTASSIUM  --   --  3.5 3.5 2.7*  --  3.7   CHLORIDE  --   --  107  --  105  --  109   CO2  --   --  24  --  27  --  21   BUN  --   --  8  --  5*  --  7   CR  --  0.81 0.82  --  1.01   < > 1.04   ANIONGAP  --   --  8  --  4  --  9   AFSHAN  --   --  8.5  --  8.2*  --  8.3*   GLC  --   --  163*  --  116*  --  107*    < > = values in this interval not displayed.       No results found for this or any previous visit (from the past 24 hour(s)).  Medications       acyclovir  400 mg Oral BID     enoxaparin ANTICOAGULANT  40 mg Subcutaneous " Q24H     fluconazole  200 mg Oral Daily     omeprazole  40 mg Oral QAM AC     predniSONE  40 mg Oral BID    Followed by     [START ON 12/25/2019] predniSONE  40 mg Oral Daily    Followed by     [START ON 12/30/2019] predniSONE  20 mg Oral Daily     sodium chloride (PF)  3 mL Intracatheter Q8H     sulfamethoxazole-trimethoprim (BACTRIM/SEPTRA) intermittent infusion  400 mg Intravenous Q8H

## 2019-12-23 ENCOUNTER — APPOINTMENT (OUTPATIENT)
Dept: GENERAL RADIOLOGY | Facility: CLINIC | Age: 49
End: 2019-12-23
Attending: INTERNAL MEDICINE
Payer: COMMERCIAL

## 2019-12-23 ENCOUNTER — APPOINTMENT (OUTPATIENT)
Dept: PHYSICAL THERAPY | Facility: CLINIC | Age: 49
End: 2019-12-23
Attending: INTERNAL MEDICINE
Payer: COMMERCIAL

## 2019-12-23 LAB
ANION GAP SERPL CALCULATED.3IONS-SCNC: 10 MMOL/L (ref 3–14)
BUN SERPL-MCNC: 16 MG/DL (ref 7–30)
CALCIUM SERPL-MCNC: 9.2 MG/DL (ref 8.5–10.1)
CHLORIDE SERPL-SCNC: 101 MMOL/L (ref 94–109)
CO2 SERPL-SCNC: 24 MMOL/L (ref 20–32)
COPATH REPORT: NORMAL
CREAT SERPL-MCNC: 0.88 MG/DL (ref 0.52–1.04)
ERYTHROCYTE [DISTWIDTH] IN BLOOD BY AUTOMATED COUNT: 15.4 % (ref 10–15)
GFR SERPL CREATININE-BSD FRML MDRD: 76 ML/MIN/{1.73_M2}
GLUCOSE SERPL-MCNC: 167 MG/DL (ref 70–99)
HCT VFR BLD AUTO: 31.3 % (ref 35–47)
HGB BLD-MCNC: 10.5 G/DL (ref 11.7–15.7)
MCH RBC QN AUTO: 30.8 PG (ref 26.5–33)
MCHC RBC AUTO-ENTMCNC: 33.5 G/DL (ref 31.5–36.5)
MCV RBC AUTO: 92 FL (ref 78–100)
PLATELET # BLD AUTO: 371 10E9/L (ref 150–450)
POTASSIUM SERPL-SCNC: 4.1 MMOL/L (ref 3.4–5.3)
RBC # BLD AUTO: 3.41 10E12/L (ref 3.8–5.2)
SODIUM SERPL-SCNC: 135 MMOL/L (ref 133–144)
WBC # BLD AUTO: 19 10E9/L (ref 4–11)

## 2019-12-23 PROCEDURE — 80048 BASIC METABOLIC PNL TOTAL CA: CPT | Performed by: INTERNAL MEDICINE

## 2019-12-23 PROCEDURE — 25000132 ZZH RX MED GY IP 250 OP 250 PS 637: Performed by: PHYSICIAN ASSISTANT

## 2019-12-23 PROCEDURE — 36415 COLL VENOUS BLD VENIPUNCTURE: CPT | Performed by: INTERNAL MEDICINE

## 2019-12-23 PROCEDURE — 12000000 ZZH R&B MED SURG/OB

## 2019-12-23 PROCEDURE — 97161 PT EVAL LOW COMPLEX 20 MIN: CPT | Mod: GP

## 2019-12-23 PROCEDURE — 97116 GAIT TRAINING THERAPY: CPT | Mod: GP

## 2019-12-23 PROCEDURE — 71046 X-RAY EXAM CHEST 2 VIEWS: CPT

## 2019-12-23 PROCEDURE — 99232 SBSQ HOSP IP/OBS MODERATE 35: CPT | Performed by: INTERNAL MEDICINE

## 2019-12-23 PROCEDURE — 25800030 ZZH RX IP 258 OP 636: Performed by: PHYSICIAN ASSISTANT

## 2019-12-23 PROCEDURE — 25000131 ZZH RX MED GY IP 250 OP 636 PS 637: Performed by: PHYSICIAN ASSISTANT

## 2019-12-23 PROCEDURE — 25000128 H RX IP 250 OP 636: Performed by: PHYSICIAN ASSISTANT

## 2019-12-23 PROCEDURE — 85027 COMPLETE CBC AUTOMATED: CPT | Performed by: INTERNAL MEDICINE

## 2019-12-23 RX ADMIN — PREDNISONE 40 MG: 20 TABLET ORAL at 08:27

## 2019-12-23 RX ADMIN — LORAZEPAM 1 MG: 2 INJECTION INTRAMUSCULAR; INTRAVENOUS at 00:00

## 2019-12-23 RX ADMIN — ACYCLOVIR 400 MG: 400 TABLET ORAL at 20:58

## 2019-12-23 RX ADMIN — FLUCONAZOLE 200 MG: 200 TABLET ORAL at 20:57

## 2019-12-23 RX ADMIN — SULFAMETHOXAZOLE AND TRIMETHOPRIM 400 MG: 80; 16 INJECTION, SOLUTION, CONCENTRATE INTRAVENOUS at 19:49

## 2019-12-23 RX ADMIN — OMEPRAZOLE 40 MG: 20 CAPSULE, DELAYED RELEASE ORAL at 06:46

## 2019-12-23 RX ADMIN — PREDNISONE 40 MG: 20 TABLET ORAL at 20:58

## 2019-12-23 RX ADMIN — ENOXAPARIN SODIUM 40 MG: 40 INJECTION SUBCUTANEOUS at 15:40

## 2019-12-23 RX ADMIN — ACYCLOVIR 400 MG: 400 TABLET ORAL at 08:27

## 2019-12-23 RX ADMIN — SULFAMETHOXAZOLE AND TRIMETHOPRIM 400 MG: 80; 16 INJECTION, SOLUTION, CONCENTRATE INTRAVENOUS at 04:08

## 2019-12-23 RX ADMIN — SULFAMETHOXAZOLE AND TRIMETHOPRIM 400 MG: 80; 16 INJECTION, SOLUTION, CONCENTRATE INTRAVENOUS at 12:27

## 2019-12-23 ASSESSMENT — ACTIVITIES OF DAILY LIVING (ADL)
ADLS_ACUITY_SCORE: 14

## 2019-12-23 NOTE — PLAN OF CARE
A&O. VSS. Weaned off of oxygen, now on room air. Ambulated frequently throughout the day, SBA. PIV SL. Phillips catheter removed, voiding well. No further bowel movements today. Tele NSR.  Plan for PT eval tomorrow to assess for discharge.

## 2019-12-23 NOTE — PROGRESS NOTES
12/23/19 1330   Quick Adds   Type of Visit Initial PT Evaluation   Living Environment   Lives With spouse;child(kimberly), dependent   Living Arrangements house   Home Accessibility stairs to enter home   Number of Stairs, Main Entrance 5   Stair Railings, Main Entrance railings on both sides of stairs   Transportation Anticipated family or friend will provide   Living Environment Comment 2 level home with bedroom and bathroom on main level   Self-Care   Usual Activity Tolerance good   Current Activity Tolerance fair   Equipment Currently Used at Home none   Activity/Exercise/Self-Care Comment Independent with all mobility at [facility].    Functional Level Prior   Ambulation 0-->independent   Transferring 0-->independent   Toileting 0-->independent   Bathing 0-->independent   Communication 0-->understands/communicates without difficulty   Fall history within last six months no   General Information   Onset of Illness/Injury or Date of Surgery - Date 12/15/19   Referring Physician Flores Fisher MD   Patient/Family Goals Statement to go home   Pertinent History of Current Problem (include personal factors and/or comorbidities that impact the POC) Patient is 50 YO F admitted with acute respiratory failure, sepsis and pneumonia. Patient has been transitioned to room air. PMH:  Diffuse Large B Cell Lymphoma undergoing chemo   Precautions/Limitations fall precautions;immunosuppressed   Weight-Bearing Status - LUE full weight-bearing   Weight-Bearing Status - RUE full weight-bearing   Weight-Bearing Status - LLE full weight-bearing   Weight-Bearing Status - RLE full weight-bearing   General Info Comments No activity orders   Cognitive Status Examination   Orientation orientation to person, place and time   Level of Consciousness alert   Follows Commands and Answers Questions 100% of the time;able to follow multistep instructions   Personal Safety and Judgment intact   Memory intact   Pain Assessment   Patient Currently in  "Pain No   Integumentary/Edema   Integumentary/Edema no deficits were identifed   Posture    Posture Not impaired   Range of Motion (ROM)   ROM Comment B LE AROM WFL   Strength   Strength Comments Generalized weakness, no focal weakness in B LEs, demonstrated anti-gravity strength on all extermities   Bed Mobility   Bed Mobility Comments Independent supine <> sit   Transfer Skills   Transfer Comments Independent sit <> stand from bed   Gait   Gait Comments Patient ambulated 10' with gait belt donned and min assist, loss of balance x1 with stepping strategy noted and min assist from PT.    Balance   Balance Comments Good sitting balance and static standing balance; Unsteady when ambulating with multiple imbalances.    Sensory Examination   Sensory Perception no deficits were identified   Coordination   Coordination no deficits were identified   General Therapy Interventions   Planned Therapy Interventions balance training;gait training;progressive activity/exercise   Clinical Impression   Criteria for Skilled Therapeutic Intervention yes, treatment indicated   PT Diagnosis impaired ambulation   Influenced by the following impairments impaired balance, generalized weakness, decreased activity tolerance   Functional limitations due to impairments increased difficulty ambulating   Clinical Presentation Evolving/Changing   Clinical Presentation Rationale fluctuating O2 sats   Clinical Decision Making (Complexity) Low complexity   Therapy Frequency Daily   Predicted Duration of Therapy Intervention (days/wks) 4 days   Anticipated Discharge Disposition Home with Assist   Risk & Benefits of therapy have been explained Yes   Patient, Family & other staff in agreement with plan of care Yes   [facility] [employer]-PAC TM \"6 Clicks\"   2016, Trustees of [facility], under license to [employer].  All rights reserved.   6 Clicks Short Forms Basic Mobility Inpatient Short Form   [facility] AM-PAC  \"6 Clicks\" V.2 Basic " Mobility Inpatient Short Form   1. Turning from your back to your side while in a flat bed without using bedrails? 4 - None   2. Moving from lying on your back to sitting on the side of a flat bed without using bedrails? 4 - None   3. Moving to and from a bed to a chair (including a wheelchair)? 4 - None   4. Standing up from a chair using your arms (e.g., wheelchair, or bedside chair)? 4 - None   5. To walk in hospital room? 3 - A Little   6. Climbing 3-5 steps with a railing? 3 - A Little   Basic Mobility Raw Score (Score out of 24.Lower scores equate to lower levels of function) 22   Total Evaluation Time   Total Evaluation Time (Minutes) 13

## 2019-12-23 NOTE — PLAN OF CARE
[id number]:  Pt is A/ox4. VSS on room air. Tele NSR. Up standby assist with walker. Dypsneic with exertion at times. Ativan given 1x. Pt still to see pt.

## 2019-12-23 NOTE — PROGRESS NOTES
Service Date: 2019      SUBJECTIVE:  Mrs. Ramirez is a 49-year-old female with diffuse large B-cell lymphoma, status post first cycle of R-CHOP on 2019.  The patient was admitted with neutropenic fever, which has resolved.  She has been short of breath.  She has PJP pneumonia.  She is on Bactrim and steroid.      The patient is improving.  Her shortness of breath is better.  She is off oxygen.  She has been able to ambulate some.  Overall, she feels better.      No headache.  No chest pain.  No nausea or vomiting.  Appetite is fair.  No urinary or bowel complaints.      PHYSICAL EXAMINATION:   GENERAL:  She was alert and oriented x3.   VITAL SIGNS:  Reviewed.   The rest of the systems not examined.      LABORATORY DATA:  Reviewed.      ASSESSMENT:    1.  A 49-year-old female with diffuse large B-cell lymphoma on chemotherapy with R-CHOP.   2.  Pneumocystis jirovecci pneumonia.   3.  Leukocytosis from steroid.      PLAN:   1.  The patient is better.  Shortness of breath is better.  As her pneumonia improves, her symptoms will continue to improve.  She will continue on Bactrim and steroid as per ID and pulmonologist.   2.  For lymphoma, she will continue on chemotherapy.  She is going to see Dr. Castro in Hematology/Oncology Clinic for resuming chemotherapy.   3.  She had a few questions, which were all answered.       We will sign off.        Please call us with any questions.         BRETT BLOUNT MD             D: 2019   T: 2019   MT: RACHANA      Name:     BABAR RAMIREZ   MRN:      [id number]        Account:      [id number]   :      1970           Service Date: 2019      Document: [id number]

## 2019-12-23 NOTE — PROVIDER NOTIFICATION
Care Coordination:    Following for discharge needs. Met with patient and explained role. Pt anticipates discharge to home with family. Currently on RA. PT says home with family.  Pt has follow up appts set for 12/27.  Pt voices no needs for discharge.   CC to follow if needs arise.        Darling Doyle RN BSN  Inpatient Care Coordination  [facility]  [phone]

## 2019-12-23 NOTE — PROGRESS NOTES
[facility]  Infectious Disease Progress Note          Assessment and Plan:   IMPRESSION:   1.  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  No CT scan to date.  Significant fever and illness currently.   2.  Neutropenia, now resolved.  Highly Doubt this is simple neutropenic fever, more likely a more complicated pneumonia problem.   3.  Lymphoma.     RECOMMENDATIONS:   1.  PJP + on sputum defines illness, compromised host so aleays possible multiple dx but for now assume dx made, all fits this dx  2. Septra ( 21 days course, 2 DS tab TID ) followed by secondary prophylaxis with 1 DS tab daily,  and steroids, T down and O2 stable looks better less tachy             Interval History:;   no new complaints  O2 better new diarrhea not C diff resolving fever saroj high dose bactrim IV PJP +              Medications:       acyclovir  400 mg Oral BID     enoxaparin ANTICOAGULANT  40 mg Subcutaneous Q24H     fluconazole  200 mg Oral Daily     omeprazole  40 mg Oral QAM AC     predniSONE  40 mg Oral BID    Followed by     [START ON 12/25/2019] predniSONE  40 mg Oral Daily    Followed by     [START ON 12/30/2019] predniSONE  20 mg Oral Daily     sodium chloride (PF)  3 mL Intracatheter Q8H     sulfamethoxazole-trimethoprim (BACTRIM/SEPTRA) intermittent infusion  400 mg Intravenous Q8H                  Physical Exam:   Blood pressure 132/83, pulse 89, temperature 97.1  F (36.2  C), temperature source Oral, resp. rate 14, weight 76.8 kg (169 lb 5 oz), SpO2 94 %, not currently breastfeeding.  Wt Readings from Last 2 Encounters:   12/20/19 76.8 kg (169 lb 5 oz)   12/18/19 78 kg (172  lb)     Vital Signs with Ranges  Temp:  [96  F (35.6  C)-98.7  F (37.1  C)] 97.1  F (36.2  C)  Pulse:  [81-89] 89  Heart Rate:  [74] 74  Resp:  [14-18] 14  BP: (131-140)/(83-90) 132/83  SpO2:  [90 %-94 %] 94 %    Constitutional: Awake, alert, cooperative, no apparent distress   Lungs: Congestion to auscultation bilaterally, no crackles or wheezing   Cardiovascular: Regular rate and rhythm, normal S1 and S2, and no murmur noted   Abdomen: Normal bowel sounds, soft, non-distended, non-tender   Skin: No rashes, no cyanosis, no edema   Other:           Data:   All microbiology laboratory data reviewed.  Recent Labs   Lab Test 12/23/19  0655 12/21/19  0601 12/20/19  0624   WBC 19.0* 10.6 9.4   HGB 10.5* 9.0* 9.7*   HCT 31.3* 26.0* 28.1*   MCV 92 91 90    221 235     Recent Labs   Lab Test 12/23/19  0655 12/20/19  1938 12/20/19  0624   CR 0.88 0.81 0.82     No lab results found.  Recent Labs   Lab Test 12/20/19  2243 12/20/19  2241 12/15/19  1730 12/15/19  0830 12/15/19  0656 11/27/19  1823 11/27/19  1708 07/26/13  1010 11/13/12  1702   CULT No growth after 2 days No growth after 2 days Light growth  Normal ara   No growth No growth No growth No growth >100,000 colonies/mL Escherichia coli Normal skin ara

## 2019-12-23 NOTE — PLAN OF CARE
A/ox4. VSS on RA. Denies pain/SOB. LS dim. BS active, +flatus. Voiding via bathroom. Up with SBA. Slight pink/redness to buttocks, applying barrier cream. Regular diet- tolerating. PIV-SL. On intermittent IV bactrim. Discharge pending PT evaluation.

## 2019-12-23 NOTE — PROGRESS NOTES
PULMONOLOGY PROGRESS NOTE  Date of service: 2019  [facility]    CC/Reason for Visit: pneumonia, respiratory failure.    SUBJECTIVE      HPI: Events of WE reviewed. Stable night. No new respiratory problems. States breathing is better today. Has been weaned off O2.    ROS: A Problem Pertinent review of systems was negative except for items noted in HPI.    Past Medical, Family, and Social/Substance History has been reviewed: No interval changes.    OBJECTIVE   /83 (BP Location: Left arm)   Pulse 89   Temp 97.1  F (36.2  C) (Oral)   Resp 14   Wt 76.8 kg (169 lb 5 oz)   SpO2 94%   BMI 26.52 kg/m   room air.    Temp (24hrs), Av  F (36.7  C), Min:97.6  F (36.4  C), Max:99.2  F (37.3  C)       Intake/Output Summary (Last 24 hours) at 2019 0750  Last data filed at 2019 0600  Gross per 24 hour   Intake --   Output 4000 ml   Net -4000 ml     Patient Vitals for the past 96 hrs:   Weight   19 0622 76.8 kg (169 lb 5 oz)       CONSTITUTIONAL/GENERAL APPEARANCE: Alert. No Apparent Distress.  PSYCHIATRIC: Pleasant and appropriate mood and affect. Oriented x 3.  EARS, NOSE,THROAT,MOUTH: External ears and nose overall normal. Normal oral mucosa.   NECK: Neck appearance normal. No neck masses and the thyroid is not enlarged.   RESPIRATORY: Non-labored effort. Decreased BS, fairly clear bilaterally.  CARDIOVASCULAR: S1, S2, regular rate and rhythm.      LABORATORY ASSESSMENT      Recent Labs   Lab 19  0803 19  0712   PH 7.51*  --    PCO2 30*  --    PO2 130*  --    HCO3 24  --    O2PER Oximizer 3     Recent Labs   Lab 19  0655 19  0601   WBC 19.0* 10.6   RBC 3.41* 2.86*   HGB 10.5* 9.0*   HCT 31.3* 26.0*   MCV 92 91   MCH 30.8 31.5   MCHC 33.5 34.6   RDW 15.4* 15.6*    221     Recent Labs   Lab 19  0655 19  1938 19  0624 196 19  0726     --  139  --  136   POTASSIUM 4.1  --  3.5 3.5 2.7*   CHLORIDE 101  --   107  --  105   CO2 24  --  24  --  27   ANIONGAP 10  --  8  --  4   BUN 16  --  8  --  5*   CR 0.88 0.81 0.82  --  1.01   GFRESTIMATED 76 84 84  --  65   GFRESTBLACK 88 >90 >90  --  75   AFSHAN 9.2  --  8.5  --  8.2*     No lab results found in last 7 days.  No lab results found in last 7 days.    Additional labs and/or comments: Sputum Pneumocystis jirovecii PCR - positive.    IMAGING      CXR 12/23 -  IMPRESSION: Since December 20, 2019, heart size is normal. Scattered  bibasilar opacities have slightly improved, though there is residual.  No pleural effusion, pneumothorax, or abnormal area of consolidation.    CXR 12/19 -  IMPRESSION: Single portable AP view of the chest was obtained.  Cardiomediastinal silhouette is within normal limits. Diffuse  bilateral perihilar predominant airspace pulmonary opacities, could  represent infection, pulmonary edema versus a drug reaction, not  significantly changed as compared to 12/16/2019 and slightly worsened  as compared to 12/15/2019.    CT Chest 12/16 -  IMPRESSION:   1.  Evidence for decrease in volume of patient's extensive adenopathy consistent with response to therapy for patient's lymphoma.  2.  Extensive areas of airspace disease throughout both hemithoraces with interstitial edema. Findings likely infectious or inflammatory with differential including pulmonary edema, drug reaction, among others.    PFT & OTHER TESTING       ASSESSMENT / PLAN      Active Problems:    Respiratory failure (H)      ASSESSMENT: 48 yo female non-smoker with B-cell lymphoma on chemotherapy admitted with shortness of breath, hypoxia and pulmonary infiltrates.  The patient was originally seen in August 2019. She presented with a persistent cough since May 2019 which she attributed to a possible aspiration of rice.  Chest x-ray done 6/27/2019 was clear.  CT scan of the chest 8/20/2019 showed left hilar and subcarinal mediastinal adenopathy with narrowing of the left lower lobe bronchus and a  nonspecific patchy area of nodular consolidation in the medial right lower lobe.  Bronchoscopy with EBUS 8/28/2019 showed nonnecrotizing granulomatous inflammation with prominent eosinophilia, negative for malignancy.  She was diagnosed with sarcoidosis and started on prednisone.  She was seen in follow-up by my partner Dr. Mitchell on 11/15/2019 for evaluation of worsening cough and shortness of breath with tapering of the prednisone.  Repeat CT scan of the chest 11/18/2019 showed interval worsening of the bulky mediastinal and bilateral hilar lymphadenopathy, near complete resolution of the lower lobe opacities, with new interstitial opacities in the right upper lobe.  It was unclear whether the patient had sarcoidosis not responding to steroids or there was another process involved.  She was referred to thoracic surgery and underwent mediastinoscopy on 11/25/2019 and was diagnosed with EBV positive diffuse large B-cell lymphoma.  Patient was seen by oncology and started chemotherapy with R-CHOP at the [facility].  She presented to [facility] on 12/15/2019 with fever, shortness of breath and hypoxia.  Chest x-ray on admission showed prominent bilateral perihilar and lower lobe infiltrates.  CT scan of the chest 12/16 showed decrease in extensive adenopathy with extensive areas of airspace disease bilaterally.  She was transferred to [facility] for pulmonary evaluation and scheduled for bronchoscopy, but sputum Pneumocystis jirovecii PCR found to be positive, and so procedure was cancelled and she was started on PCP rx. Patient is now clinically and radiographically improved. Respiratory status stable on room air.    Diagnoses  Abnl CT/CXR  R91.8  Adenopathy  R59.9  Hypoxemia  R09.02  Pneumonia unspec J18.9  Resp fail acute J96.00  SOB   R06.02    PLAN:  1. Antibiotics - Acyclovir, Diflucan, Bactrim per ID.  2. Steroids - Prednisone.  3. Increase activity and IS.  4. D/C planning.    No  further suggestions at this time. Will sign off, please call if needed.      Rajat Horton MD    [facility]  [phone] (pager)  [phone] (office)

## 2019-12-23 NOTE — PLAN OF CARE
Orders received, chart reviewed. Patient is 50 YO F admitted with acute respiratory failure and pneumonia. Patient is undergoing chemotherapy for Diffuse Large B Cell Lymphoma. She is independent with all mobility at baseline. She lives in a 2 story home, with bedroom set up on the main level, 5 steps to enter with B railings.     Discharge Planner PT   Patient plan for discharge: home with spouse  Current status: PT Eval completed and treatment initiated. Patient performing bed mobility and transfers independently. Patient presents to PT requiring CGA/min assist when ambulating due to unsteadiness and lateral path deviations. Patient with SpO2 dropping to 87% on room air at lowest with activity, improved with seated rest. At end of session, SpO2 = 93% on room air.   Barriers to return to prior living situation: decreased endurance, unsteady gait, assist for gait  Recommendations for discharge: home with +1 assist from spouse for gait and stairs  Rationale for recommendations: Patient has supportive spouse that will be able to provide assist in the home. Anticipate patient will progress independence with mobility with skilled PT intervention to safely return home at discharge.        Entered by: Lynne Dixon 12/23/2019 1:56 PM

## 2019-12-24 VITALS
BODY MASS INDEX: 26.52 KG/M2 | OXYGEN SATURATION: 95 % | WEIGHT: 169.31 LBS | DIASTOLIC BLOOD PRESSURE: 90 MMHG | SYSTOLIC BLOOD PRESSURE: 135 MMHG | RESPIRATION RATE: 16 BRPM | TEMPERATURE: 96 F | HEART RATE: 90 BPM

## 2019-12-24 LAB
CREAT SERPL-MCNC: 0.85 MG/DL (ref 0.52–1.04)
GFR SERPL CREATININE-BSD FRML MDRD: 80 ML/MIN/{1.73_M2}
PLATELET # BLD AUTO: 390 10E9/L (ref 150–450)

## 2019-12-24 PROCEDURE — 25000128 H RX IP 250 OP 636: Performed by: PHYSICIAN ASSISTANT

## 2019-12-24 PROCEDURE — 25800030 ZZH RX IP 258 OP 636: Performed by: PHYSICIAN ASSISTANT

## 2019-12-24 PROCEDURE — 82565 ASSAY OF CREATININE: CPT | Performed by: PHYSICIAN ASSISTANT

## 2019-12-24 PROCEDURE — 85049 AUTOMATED PLATELET COUNT: CPT | Performed by: PHYSICIAN ASSISTANT

## 2019-12-24 PROCEDURE — 25000131 ZZH RX MED GY IP 250 OP 636 PS 637: Performed by: PHYSICIAN ASSISTANT

## 2019-12-24 PROCEDURE — 25000132 ZZH RX MED GY IP 250 OP 250 PS 637: Performed by: INTERNAL MEDICINE

## 2019-12-24 PROCEDURE — 36415 COLL VENOUS BLD VENIPUNCTURE: CPT | Performed by: PHYSICIAN ASSISTANT

## 2019-12-24 PROCEDURE — 25000132 ZZH RX MED GY IP 250 OP 250 PS 637: Performed by: PHYSICIAN ASSISTANT

## 2019-12-24 PROCEDURE — 99239 HOSP IP/OBS DSCHRG MGMT >30: CPT | Performed by: INTERNAL MEDICINE

## 2019-12-24 RX ORDER — PREDNISONE 20 MG/1
20 TABLET ORAL DAILY
Qty: 9 TABLET | Refills: 0 | Status: ON HOLD | OUTPATIENT
Start: 2019-12-30 | End: 2020-01-10

## 2019-12-24 RX ORDER — SULFAMETHOXAZOLE/TRIMETHOPRIM 800-160 MG
1 TABLET ORAL DAILY
Qty: 30 TABLET | Refills: 0 | Status: ON HOLD | OUTPATIENT
Start: 2019-12-24 | End: 2020-01-10

## 2019-12-24 RX ORDER — SULFAMETHOXAZOLE/TRIMETHOPRIM 800-160 MG
2 TABLET ORAL 3 TIMES DAILY
Qty: 84 TABLET | Refills: 0 | Status: ON HOLD | OUTPATIENT
Start: 2019-12-24 | End: 2020-01-10

## 2019-12-24 RX ORDER — SULFAMETHOXAZOLE/TRIMETHOPRIM 800-160 MG
2 TABLET ORAL 3 TIMES DAILY
Status: DISCONTINUED | OUTPATIENT
Start: 2019-12-24 | End: 2019-12-24 | Stop reason: HOSPADM

## 2019-12-24 RX ORDER — PREDNISONE 20 MG/1
40 TABLET ORAL DAILY
Qty: 6 TABLET | Refills: 0 | Status: SHIPPED | OUTPATIENT
Start: 2019-12-25 | End: 2019-12-27

## 2019-12-24 RX ADMIN — OMEPRAZOLE 40 MG: 20 CAPSULE, DELAYED RELEASE ORAL at 07:49

## 2019-12-24 RX ADMIN — SULFAMETHOXAZOLE AND TRIMETHOPRIM 2 TABLET: 800; 160 TABLET ORAL at 10:56

## 2019-12-24 RX ADMIN — SULFAMETHOXAZOLE AND TRIMETHOPRIM 400 MG: 80; 16 INJECTION, SOLUTION, CONCENTRATE INTRAVENOUS at 04:03

## 2019-12-24 RX ADMIN — ACYCLOVIR 400 MG: 400 TABLET ORAL at 07:49

## 2019-12-24 RX ADMIN — PREDNISONE 40 MG: 20 TABLET ORAL at 07:48

## 2019-12-24 ASSESSMENT — ACTIVITIES OF DAILY LIVING (ADL)
ADLS_ACUITY_SCORE: 14

## 2019-12-24 NOTE — PROGRESS NOTES
Patient discharged at 1:11 PM to home. IV was discontinued. Pain at time of discharge was 0/10. Belongings returned to patient.  Discharge instructions and medications reviewed with patient.  Patient verbalized understanding and all questions were answered.  Prescriptions given to patient.  At time of discharge, patient condition was stable and left the unit via wheel chair, to be transported home by family.

## 2019-12-24 NOTE — PROGRESS NOTES
[facility]    Medicine Progress Note - Hospitalist Service        Date of Admission:  12/18/2019  3:27 PM    Assessment & Plan:     Kassie Ramirez is a 49 year old female with history of large cell lymphoma, recent R-CHOP chemotherapy with high dose steroids, without prophylactic sulfa, who was transferred from [facility] on 12/18/2019 for pulmonology consultation due to persistent hypoxia and fever as well as concern for opportunistic lung infection.     Acute Hypoxic Respiratory Failure  Bilateral Pulmonary infiltarates   Sepsis due to PJP pneumonia  Immunocompromised, s/p R-CHOP chemo for large cell lymphoma, and prior to her dx, s/p several months of high dose steroids due to concern for sarcoidosis.    She presented on 12/15 with fever and worsening respiratory symptoms.  Had recently completed a course of Levaquin for postobstructive pneumonia and had been placed on Levaquin 250 mg and Diflucan by oncology 12/11 due to neutropenia and ongoing symptoms. On admission, febrile with lactic acid of 3, CXR showed bilateral perihilar and lobar infiltrate.   * Initially placed on Zosyn and vancomycin and infectious disease was consulted.  Blood and sputum cultures NGTD. Due to persistent fever/hypoxia transfer to [facility] for pulmonology consult and consideration for possible bronch  * 12/16 Sputum positive for PJP.   * 12/17 1,3 Beta D Glucan fungitell  * ID and pulmonary consulted    - Continue IV Septra + steroids per ID recommendations  - Remains off O2 since 12/22. Still SOB with transient desaturation with activity.   - Discussed with ID Dr. Berry. Plan to discharge tomorrow on oral bactrim DS BID x 21 days, then once daily for prophylaxis.  - On 4/5 days of 40 mg BID > discharge on prednisone 40 mg daily x 5 days then 20 mg for 11 days. Patient will get chemotherapy on Friday, 12/27 with 5 days of prednsione. So will discharge on prednisone 40 mg daily until Friday, then order 20 mg daily to resume  after chemo until she completes 21 day course.      Weakness/deconditioned state secondary to acute illness.   - Phillips removed on 12/22.   - Per PT evaluation she can return home with +1 assist.     Loose stools - c.diff negative. Likely secondary to antibiotics. Not severe.   - ID aware and has not started probiotics. ? Whether this is due to her immunocompromised state. Will discuss with them prior to discharge.       Diffuse Large B Cell Lymphoma  Neutropenia, resolved:    Had a mediastinal mass biopsied at [facility] that came back positive for diffuse large B-cell lymphoma. Started R-CHOP at the [facility] during the 11/27 through 12/1 admission.  Has follow-up with [facility] with Dr. Castro. ANC 0.4 on admission. Received Neupogen 12/15 with improvement in ANC.   * Oncology following    - Continue PTA fluconazole and acyclovir for prophylaxis   - Very good result from first round of chemotherapy and intent is for cure.   - If ID agrees, will likely resume chemotherapy on Friday, 12/27.     Leukocytosis - secondary to recovery from chemo, neupogen and steroids.   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 Downtrending since [facility]  * S/p 1 unit PRBC 12/18    Recent Labs   Lab 12/23/19  0655 12/21/19  0601 12/20/19  0624 12/19/19  0726 12/18/19  0712 12/17/19  0715   HGB 10.5* 9.0* 9.7* 8.8* 9.2* 7.6*        RENAY, RESOLVED Baseline Cr 0.8. Max Cr 1.28.   Recent Labs   Lab 12/23/19  0655 12/20/19  1938 12/20/19  0624 12/19/19  0726 12/18/19  1909 12/18/19  0712   CR 0.88 0.81 0.82 1.01 0.95 1.04        Anxiety  -Continue PRN ativan     Diet: Regular Diet Adult  Snacks/Supplements Adult: Other; pt may order nutrition supplements prn; With Meals     DVT Prophylaxis: Enoxaparin (Lovenox) SQ   Phillips Catheter: not present  Code Status:  "Full Code     Disposition Plan    Expected discharge: Tomorrow, recommended to home with 1+ assist.   Entered: Flores Fisher MD 12/23/2019, 6:36 PM        The patient's care was discussed with the Bedside Nurse and Patient. And ID team.     Sung Maurer MD  Hospitalist Service  [facility]    ______________________________________________________________________    Interval History   Patient continues to feel quite weak and SOB with activity. Prefer to go home tomorrow, rather than today. No chest pain, remains off oxygen, afebrile. No N/V.     Data reviewed today: I reviewed all medications, new labs and imaging results over the last 24 hours. I personally reviewed no images or EKG's today.    Physical Exam   Vital signs:  Temp: 95.8  F (35.4  C) Temp src: Oral BP: (!) 129/90 Pulse: 90 Heart Rate: 74 Resp: 14 SpO2: 95 % O2 Device: None (Room air) Oxygen Delivery: 1.5 LPM   Weight: 76.8 kg (169 lb 5 oz)  Estimated body mass index is 26.52 kg/m  as calculated from the following:    Height as of 11/29/19: 1.702 m (5' 7\").    Weight as of this encounter: 76.8 kg (169 lb 5 oz).      Wt Readings from Last 2 Encounters:   12/20/19 76.8 kg (169 lb 5 oz)   12/18/19 78 kg (172 lb)       Gen: AAOX3, NAD  HEENT: Supple neck, moist oral mucosa, no pallor  Resp: Lungs clear except for crackles in the baseline.   CVS: RRR, no murmur  Abd/GI: Soft, non-tender. BS- normoactive.    Skin: Warm, dry no rashes  MSK: trace pedal edema  Neuro- CN- intact. No focal deficits.       Data   Recent Labs   Lab 12/23/19  0655 12/21/19  0601 12/20/19  1938 12/20/19  0624 12/19/19 2106 12/19/19  0726   WBC 19.0* 10.6  --  9.4  --  7.2   HGB 10.5* 9.0*  --  9.7*  --  8.8*   MCV 92 91  --  90  --  91    221  --  235  --  210     --   --  139  --  136   POTASSIUM 4.1  --   --  3.5 3.5 2.7*   CHLORIDE 101  --   --  107  --  105   CO2 24  --   --  24  --  27   BUN 16  --   --  8  --  5*   CR 0.88  --  0.81 0.82  " --  1.01   ANIONGAP 10  --   --  8  --  4   AFSHAN 9.2  --   --  8.5  --  8.2*   *  --   --  163*  --  116*       Recent Results (from the past 24 hour(s))   XR Chest 2 Views    Narrative    CHEST TWO VIEWS  12/23/2019 7:45 AM     HISTORY: 49-year-old woman with PCP pneumonia.       Impression    IMPRESSION: Since December 20, 2019, heart size is normal. Scattered  bibasilar opacities have slightly improved, though there is residual.  No pleural effusion, pneumothorax, or abnormal area of consolidation.    HARDIK MAURICIO MD     Medications       acyclovir  400 mg Oral BID     enoxaparin ANTICOAGULANT  40 mg Subcutaneous Q24H     fluconazole  200 mg Oral Daily     omeprazole  40 mg Oral QAM AC     predniSONE  40 mg Oral BID    Followed by     [START ON 12/25/2019] predniSONE  40 mg Oral Daily    Followed by     [START ON 12/30/2019] predniSONE  20 mg Oral Daily     sodium chloride (PF)  3 mL Intracatheter Q8H     sulfamethoxazole-trimethoprim (BACTRIM/SEPTRA) intermittent infusion  400 mg Intravenous Q8H

## 2019-12-24 NOTE — PLAN OF CARE
A/O x 4. Denies pain and nausea.  VSS on RA, although she does de-sat to high 80's on longer walks in halls. Independent in room.  Tele NSR.  Tolerating regular diet, good appetite. Will likely discharge  Home tomorrow on PO abx. Continue to monitor.

## 2019-12-24 NOTE — PLAN OF CARE
PT: Attempted to see pt for PT. Pt sitting up in recliner chair, dressed. Pt politely declines ambulation, strengthening, or stair trial stating she is anticipating discharge soon and states she will have plenty of support from her family/friends. Provided pt with [facility] Cancer Rehab handout and education.     Physical Therapy Discharge Summary    Reason for therapy discharge:    Discharged to home.    Progress towards therapy goal(s). See goals on Care Plan in [facility] electronic health record for goal details.  Goals not met.  Barriers to achieving goals:   discharge from facility.    Therapy recommendation(s):    Continue home exercise program.

## 2019-12-24 NOTE — DISCHARGE SUMMARY
[facility]  Hospitalist Discharge Summary       Date of Admission:  12/18/2019  Date of Discharge:  12/24/2019  Discharging Provider: Flores Fisher MD      Discharge Diagnoses   Acute Hypoxic Respiratory Failure  Bilateral Pulmonary infiltarates   Sepsis due to PJP pneumonia  Diffuse Large B Cell Lymphoma  Immunocompromised from chemo and prolonged steroids     Follow-ups Needed After Discharge   Follow-up Appointments     Follow-up and recommended labs and tests       Follow up with Dr. Castro on Friday, 12/27 for chemotherapy.   You should have K, Cr and CBC through Dr. Castro on 12/27 and the next week   for med monitoring.   Follow up with Dr. Manzo, ID doctor in 2-3 weeks.             Unresulted Labs Ordered in the Past 30 Days of this Admission     Date and Time Order Name Status Description    12/20/2019 2044 Blood culture Preliminary     12/20/2019 2044 Blood culture Preliminary     12/3/2019 1513 FISH In process       These results will be followed up by Dr. Castro and Hospitalist group will be notified if cultures turn positive. No growth to date.     Discharge Disposition   Discharged to home  Condition at discharge: Good    Hospital Course    Kassie Ramirez is a 49 year old female with history of large cell lymphoma, recent R-CHOP chemotherapy with high dose steroids, without prophylactic sulfa, who was transferred from [facility] on 12/18/2019 for pulmonology consultation due to persistent hypoxia and fever as well as concern for opportunistic lung infection.     Acute Hypoxic Respiratory Failure  Bilateral Pulmonary infiltarates   Sepsis due to PJP pneumonia  Immunocompromised, s/p R-CHOP chemo for large cell lymphoma, and prior to her dx, s/p several months of high dose steroids due to concern for sarcoidosis.     She presented on 12/15 with fever and worsening respiratory symptoms.  Had recently completed a course of Levaquin for postobstructive pneumonia and had been placed on Levaquin 250  mg and Diflucan by oncology 12/11 due to neutropenia and ongoing symptoms. On admission, febrile with lactic acid of 3, CXR showed bilateral perihilar and lobar infiltrate.   * Initially placed on Zosyn and vancomycin and infectious disease was consulted.  Blood and sputum cultures NGTD. Due to persistent fever/hypoxia transfer to [facility] for pulmonology consult and consideration for possible bronch  * 12/16 Sputum positive for PJP.   * 12/17 1,3 Beta D Glucan fungitell  * ID and pulmonary consulted     - Continue IV Septra + steroids per ID recommendations  - Remains off O2 since 12/22.  - Discussed with ID Dr. Berry. Discharge on oral bactrim DS TID to complete 21 day course (14 more days) then once daily for prophylaxis.  - She should follow up with ID, Dr. Manzo within 2-3 weeks.   - On 4/5 days of 40 mg BID > discharge on prednisone 40 mg daily x 5 days then 20 mg for 11 days. Patient will get chemotherapy on Friday, 12/27 with 5 days of prednsione. So will discharge on prednisone 40 mg daily until Friday, then order 20 mg daily to resume after chemo until she completes 21 day course. \  - NOTE: She has been on prolonged steroids prior to this and she be tapered off per Dr. Castro after she completes course for PJP.  I defer this to Dr. Castro as she is receiving steroids with chemotherapy.      Weakness/deconditioned state secondary to acute illness.   - Phillips removed on 12/22.   - Per PT evaluation she can return home with +1 assist.      Loose stools, RESOLVED - c.diff negative. Likely secondary to antibiotics. Not severe.       Diffuse Large B Cell Lymphoma  Neutropenia, resolved:    Had a mediastinal mass biopsied at [facility] that came back positive for diffuse large B-cell lymphoma. Started R-CHOP at the [facility] during the 11/27 through 12/1 admission.  Has follow-up with [place] oncology with Dr. Castro. ANC 0.4 on admission. Received Neupogen 12/15 with improvement in ANC.   * Oncology following     -  Continue PTA fluconazole and acyclovir for prophylaxis   - Very good result from first round of chemotherapy and intent is for cure.   - Plan to resume chemotherapy on Friday, 12/27.      Leukocytosis - secondary to recovery from chemo, neupogen and steroids.   Recent Labs   Lab 12/23/19  0655 12/21/19  0601 12/20/19  0624 12/19/19  0726 12/18/19  0712   WBC 19.0* 10.6 9.4 7.2 7.7      Mild hyperglycemia with steroids  Recent Labs   Lab 12/23/19  0655 12/20/19  0624 12/19/19  0726 12/18/19  0712   * 163* 116* 107*       - tapering steroids at discharge.   - Consider HA1c down the road.      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 Downtrending since RCHOP  * S/p 1 unit PRBC 12/18     Recent Labs   Lab 12/23/19  0655 12/21/19  0601 12/20/19  0624 12/19/19  0726 12/18/19  0712   HGB 10.5* 9.0* 9.7* 8.8* 9.2*         RENAY, RESOLVED Baseline Cr 0.8. Max Cr 1.28.   Recent Labs   Lab 12/24/19  0748 12/23/19  0655 12/20/19  1938 12/20/19  0624 12/19/19  0726 12/18/19  1909   CR 0.85 0.88 0.81 0.82 1.01 0.95        Anxiety  -Continue PRN ativan       Consultations This Hospital Stay   PULMONARY IP CONSULT  INFECTIOUS DISEASES IP CONSULT  HEMATOLOGY & ONCOLOGY IP CONSULT  WOUND OSTOMY CONTINENCE NURSE  IP CONSULT  PHYSICAL THERAPY ADULT IP CONSULT    Code Status   Full Code    Time Spent on this Encounter   I, Flores Fisher MD, personally saw the patient today and spent greater than 30 minutes discharging this patient.       Flores Fisher MD  [facility]  ______________________________________________________________________    Physical Exam   Vital Signs: Temp: 96  F (35.6  C) Temp src: Oral BP: (!) 135/90 Pulse: 90 Heart Rate: 85 Resp: 16 SpO2: 95 % O2 Device: None (Room air)    Weight: 169 lbs 5.01 oz  Constitutional:  NAD,   Neuropsyche:  alert and oriented, answers questions appropriately.   Respiratory:  Breathing comfortably, good air exchange, no wheezes, no  crackles.   Cardiovascular:  Regular rate and rhythm, no edema.  GI:  soft, NT/ND, BS normal  Skin/Integumen:  No acute rash, bruising or sign of bleeding.           Primary Care Physician   Mary Alice Colón    Discharge Orders      Reason for your hospital stay    You were admitted with PJP pneumonia. You are being treated with a 21 day course of antibiotics in addition to steroids. Dr. Castro should prescribe a steroid taper once you complete course of steroids prescribed at discharge.     Follow-up and recommended labs and tests     Follow up with Dr. Castro on Friday, 12/27 for chemotherapy.   You should have K, Cr and CBC through Dr. Castro on 12/27 and the next week for med monitoring.   Follow up with Dr. Manzo, ID doctor in 2-3 weeks.     Activity    Your activity upon discharge: activity as tolerated     Diet    Regular diet with supplements as recommended by nutrition.       Significant Results and Procedures   Most Recent 3 CBC's:  Recent Labs   Lab Test 12/24/19  0748 12/23/19  0655 12/21/19  0601 12/20/19  0624   WBC  --  19.0* 10.6 9.4   HGB  --  10.5* 9.0* 9.7*   MCV  --  92 91 90    371 221 235     Most Recent 3 BMP's:  Recent Labs   Lab Test 12/24/19  0748 12/23/19  0655 12/20/19  1938 12/20/19  0624 12/19/19  2106 12/19/19  0726   NA  --  135  --  139  --  136   POTASSIUM  --  4.1  --  3.5 3.5 2.7*   CHLORIDE  --  101  --  107  --  105   CO2  --  24  --  24  --  27   BUN  --  16  --  8  --  5*   CR 0.85 0.88 0.81 0.82  --  1.01   ANIONGAP  --  10  --  8  --  4   AFSHAN  --  9.2  --  8.5  --  8.2*   GLC  --  167*  --  163*  --  116*   ,   Results for orders placed or performed during the hospital encounter of 12/18/19   XR Chest Port 1 View    Narrative    CHEST PORTABLE ONE VIEW  12/19/2019 8:15 AM     HISTORY: Shortness of breath, hypoxia.    COMPARISON: Chest CT on 12/16/2019      Impression    IMPRESSION: Single portable AP view of the chest was obtained.  Cardiomediastinal silhouette is within  normal limits. Diffuse  bilateral perihilar predominant airspace pulmonary opacities, could  represent infection, pulmonary edema versus a drug reaction, not  significantly changed as compared to 12/16/2019 and slightly worsened  as compared to 12/15/2019.    ÁNGEL SALDAÑA MD   XR Chest Port 1 View    Narrative    CHEST ONE VIEW PORTABLE  12/20/2019 9:10 PM     HISTORY: Tachypnea    COMPARISON: 12/19/2019.      Impression    IMPRESSION: Improvement of aeration since the prior exam. Patchy  bilateral airspace opacities appear improved but incompletely  resolved. Stable cardiac silhouette.    JOSUE HORTA MD   XR Chest 2 Views    Narrative    CHEST TWO VIEWS  12/23/2019 7:45 AM     HISTORY: 49-year-old woman with PCP pneumonia.       Impression    IMPRESSION: Since December 20, 2019, heart size is normal. Scattered  bibasilar opacities have slightly improved, though there is residual.  No pleural effusion, pneumothorax, or abnormal area of consolidation.    HARDIK MAURICIO MD       Discharge Medications   Current Discharge Medication List      START taking these medications    Details   !! predniSONE (DELTASONE) 20 MG tablet Take 2 tablets (40 mg) by mouth daily for 3 doses  Qty: 6 tablet, Refills: 0    Associated Diagnoses: CAP (community acquired pneumonia) due to Pneumocystis jirovecii (H)      !! predniSONE (DELTASONE) 20 MG tablet Take 1 tablet (20 mg) by mouth daily for 9 days Following 5 days of steroids with chemotherapy. Then start steroid taper per Dr. Castro.  Qty: 9 tablet, Refills: 0    Associated Diagnoses: CAP (community acquired pneumonia) due to Pneumocystis jirovecii (H)      !! sulfamethoxazole-trimethoprim (BACTRIM DS/SEPTRA DS) 800-160 MG tablet Take 2 tablets by mouth 3 times daily for 14 days  Qty: 84 tablet, Refills: 0    Associated Diagnoses: CAP (community acquired pneumonia) due to Pneumocystis jirovecii (H)      !! sulfamethoxazole-trimethoprim (BACTRIM DS/SEPTRA DS) 800-160 MG tablet Take  1 tablet by mouth daily Starting 1/7.  Qty: 30 tablet, Refills: 0    Comments: (start after you complete 3x/daily course for prevention of recurrence)  Associated Diagnoses: CAP (community acquired pneumonia) due to Pneumocystis jirovecii (H)       !! - Potential duplicate medications found. Please discuss with provider.      CONTINUE these medications which have NOT CHANGED    Details   acyclovir (ZOVIRAX) 400 MG tablet Take 1 tablet (400 mg) by mouth 2 times daily  Qty: 60 tablet, Refills: 0    Associated Diagnoses: Diffuse large B-cell lymphoma of extranodal site (H)      fluconazole (DIFLUCAN) 200 MG tablet Take 1 tablet (200 mg) by mouth daily  Qty: 7 tablet, Refills: 0    Associated Diagnoses: Diffuse large B-cell lymphoma of extranodal site (H)      LORazepam (ATIVAN) 0.5 MG tablet Take 1-2 tablets (0.5-1 mg) by mouth every 6 hours as needed (Breakthrough Nausea / Vomiting)  Qty: 30 tablet, Refills: 0    Associated Diagnoses: Diffuse large B-cell lymphoma of extranodal site (H)      omeprazole (PRILOSEC) 40 MG DR capsule Take 1 capsule (40 mg) by mouth every morning (before breakfast)  Qty: 30 capsule, Refills: 0    Associated Diagnoses: Diffuse large B-cell lymphoma of extranodal site (H)      ondansetron (ZOFRAN-ODT) 8 MG ODT tab Take 1 tablet (8 mg) by mouth every 8 hours as needed  Qty: 30 tablet, Refills: 0    Associated Diagnoses: Diffuse large B-cell lymphoma of extranodal site (H)      prochlorperazine (COMPAZINE) 10 MG tablet Take 1 tablet (10 mg) by mouth every 6 hours as needed (Breakthrough Nausea/Vomiting)  Qty: 30 tablet, Refills: 0    Associated Diagnoses: Diffuse large B-cell lymphoma of extranodal site (H)      SENNA PO Take 1 tablet by mouth every 72 hours as needed         STOP taking these medications       allopurinol (ZYLOPRIM) 300 MG tablet Comments:   Reason for Stopping:         levofloxacin (LEVAQUIN) 250 MG tablet Comments:   Reason for Stopping:             Allergies   Allergies    Allergen Reactions     Cold & Flu [Cold Defense Fighter]      See pseudoephedrine     Seasonal Allergies      Sudafed Cold-Cough [Dayquil Liquicaps]      Pseudoephedrine Rash     Rash then skins peels off

## 2019-12-24 NOTE — PLAN OF CARE
No acute changes overnight. A&O. VSS. Denies pain. No SOB reported, LS clear but dim in bases. Tele NSR. Still on IV abx, plan is to discharge today on PO bactrim.

## 2019-12-26 ENCOUNTER — TELEPHONE (OUTPATIENT)
Dept: FAMILY MEDICINE | Facility: CLINIC | Age: 49
End: 2019-12-26

## 2019-12-26 DIAGNOSIS — M10.9 GOUT, UNSPECIFIED CAUSE, UNSPECIFIED CHRONICITY, UNSPECIFIED SITE: Primary | ICD-10-CM

## 2019-12-26 RX ORDER — ALLOPURINOL 300 MG/1
300 TABLET ORAL DAILY
Status: ON HOLD | COMMUNITY
Start: 2019-12-26 | End: 2020-01-10

## 2019-12-26 NOTE — TELEPHONE ENCOUNTER
"Called #   Telephone Information:   Mobile [phone]     Hospital/TCU/ED for chronic condition Discharge Protocol    \"Hi, my name is Herberth Felton RN, a registered nurse, and I am calling from [facility].  I am calling to follow up and see how things are going for you after your recent emergency visit/hospital/TCU stay.\"    Tell me how you are doing now that you are home?\" better      Discharge Instructions    \"Let's review your discharge instructions.  What is/are the follow-up recommendations?  Pt. Response: follow up with oncology tomorrow.      \"Has an appointment with your primary care provider been scheduled?\"   No (schedule appointment)  Pt has appointment with Oncology tomorrow    \"When you see the provider, I would recommend that you bring your medications with you.\"    Medications    \"Tell me what changed about your medicines when you discharged?\"    Changes to chronic meds?    0-1    \"What questions do you have about your medications?\"    None     New diagnoses of heart failure, COPD, diabetes, or MI?    No              Post Discharge Medication Reconciliation Status: discharge medications reconciled and changed, per note/orders (see AVS).    Was MTM referral placed (*Make sure to put transitions as reason for referral)?   No    Call Summary    \"What questions or concerns do you have about your recent visit and your follow-up care?\"     none    \"If you have questions or things don't continue to improve, we encourage you contact us through the main clinic number (give number).  Even if the clinic is not open, triage nurses are available 24/7 to help you.     We would like you to know that our clinic has extended hours (provide information).  We also have urgent care (provide details on closest location and hours/contact info)\"      \"Thank you for your time and take care!\"        Herberth Felton RN   [facility]    "

## 2019-12-26 NOTE — TELEPHONE ENCOUNTER
Patient discharged from [facility] for inpatient hospital stay on 12/24 for community acquired pneumonia due to pneumocystis jirovecii.    Please contact patient to follow up; no appointment scheduled at this time.    ER / IP:  1/3    Care Coordination:  mitch Cárdenas

## 2019-12-27 ENCOUNTER — HOSPITAL ENCOUNTER (OUTPATIENT)
Facility: CLINIC | Age: 49
Setting detail: SPECIMEN
Discharge: HOME | End: 2019-12-27
Attending: INTERNAL MEDICINE | Admitting: INTERNAL MEDICINE
Payer: COMMERCIAL

## 2019-12-27 ENCOUNTER — INFUSION THERAPY VISIT (OUTPATIENT)
Dept: INFUSION THERAPY | Facility: CLINIC | Age: 49
End: 2019-12-27
Attending: INTERNAL MEDICINE
Payer: COMMERCIAL

## 2019-12-27 ENCOUNTER — ONCOLOGY VISIT (OUTPATIENT)
Dept: ONCOLOGY | Facility: CLINIC | Age: 49
End: 2019-12-27
Attending: INTERNAL MEDICINE
Payer: COMMERCIAL

## 2019-12-27 VITALS
HEART RATE: 104 BPM | BODY MASS INDEX: 24.15 KG/M2 | DIASTOLIC BLOOD PRESSURE: 83 MMHG | SYSTOLIC BLOOD PRESSURE: 110 MMHG | OXYGEN SATURATION: 96 % | TEMPERATURE: 97 F | RESPIRATION RATE: 16 BRPM | WEIGHT: 154.19 LBS

## 2019-12-27 DIAGNOSIS — R50.81 NEUTROPENIC FEVER (H): Primary | ICD-10-CM

## 2019-12-27 DIAGNOSIS — M10.9 GOUT, UNSPECIFIED CAUSE, UNSPECIFIED CHRONICITY, UNSPECIFIED SITE: ICD-10-CM

## 2019-12-27 DIAGNOSIS — B59: ICD-10-CM

## 2019-12-27 DIAGNOSIS — D70.9 NEUTROPENIC FEVER (H): ICD-10-CM

## 2019-12-27 DIAGNOSIS — D70.9 NEUTROPENIC FEVER (H): Primary | ICD-10-CM

## 2019-12-27 DIAGNOSIS — C83.398 DIFFUSE LARGE B-CELL LYMPHOMA OF EXTRANODAL SITE: Primary | ICD-10-CM

## 2019-12-27 DIAGNOSIS — C83.398 DIFFUSE LARGE B-CELL LYMPHOMA OF EXTRANODAL SITE: ICD-10-CM

## 2019-12-27 DIAGNOSIS — R50.81 NEUTROPENIC FEVER (H): ICD-10-CM

## 2019-12-27 LAB
ALBUMIN SERPL-MCNC: 3.7 G/DL (ref 3.4–5)
ALP SERPL-CCNC: 74 U/L (ref 40–150)
ALT SERPL W P-5'-P-CCNC: 37 U/L (ref 0–50)
ANION GAP SERPL CALCULATED.3IONS-SCNC: 10 MMOL/L (ref 3–14)
ANISOCYTOSIS BLD QL SMEAR: SLIGHT
AST SERPL W P-5'-P-CCNC: 36 U/L (ref 0–45)
BACTERIA SPEC CULT: NO GROWTH
BACTERIA SPEC CULT: NO GROWTH
BASOPHILS # BLD AUTO: 0 10E9/L (ref 0–0.2)
BASOPHILS NFR BLD AUTO: 0 %
BILIRUB SERPL-MCNC: 0.6 MG/DL (ref 0.2–1.3)
BUN SERPL-MCNC: 24 MG/DL (ref 7–30)
CALCIUM SERPL-MCNC: 9.6 MG/DL (ref 8.5–10.1)
CHLORIDE SERPL-SCNC: 100 MMOL/L (ref 94–109)
CO2 SERPL-SCNC: 22 MMOL/L (ref 20–32)
CREAT SERPL-MCNC: 1.07 MG/DL (ref 0.52–1.04)
DACRYOCYTES BLD QL SMEAR: SLIGHT
DIFFERENTIAL METHOD BLD: ABNORMAL
EOSINOPHIL # BLD AUTO: 0.1 10E9/L (ref 0–0.7)
EOSINOPHIL NFR BLD AUTO: 1 %
ERYTHROCYTE [DISTWIDTH] IN BLOOD BY AUTOMATED COUNT: 16.3 % (ref 10–15)
GFR SERPL CREATININE-BSD FRML MDRD: 61 ML/MIN/{1.73_M2}
GLUCOSE SERPL-MCNC: 80 MG/DL (ref 70–99)
HCT VFR BLD AUTO: 36.4 % (ref 35–47)
HGB BLD-MCNC: 12.2 G/DL (ref 11.7–15.7)
LYMPHOCYTES # BLD AUTO: 1.3 10E9/L (ref 0.8–5.3)
LYMPHOCYTES NFR BLD AUTO: 13 %
Lab: NORMAL
Lab: NORMAL
MCH RBC QN AUTO: 31.1 PG (ref 26.5–33)
MCHC RBC AUTO-ENTMCNC: 33.5 G/DL (ref 31.5–36.5)
MCV RBC AUTO: 93 FL (ref 78–100)
METAMYELOCYTES # BLD: 0.8 10E9/L
METAMYELOCYTES NFR BLD MANUAL: 8 %
MONOCYTES # BLD AUTO: 0.2 10E9/L (ref 0–1.3)
MONOCYTES NFR BLD AUTO: 2 %
MYELOCYTES # BLD: 0.2 10E9/L
MYELOCYTES NFR BLD MANUAL: 2 %
NEUTROPHILS # BLD AUTO: 7.6 10E9/L (ref 1.6–8.3)
NEUTROPHILS NFR BLD AUTO: 74 %
OVALOCYTES BLD QL SMEAR: SLIGHT
PLATELET # BLD AUTO: 413 10E9/L (ref 150–450)
PLATELET # BLD EST: ABNORMAL 10*3/UL
POTASSIUM SERPL-SCNC: 4.1 MMOL/L (ref 3.4–5.3)
PROT SERPL-MCNC: 6.9 G/DL (ref 6.8–8.8)
RBC # BLD AUTO: 3.92 10E12/L (ref 3.8–5.2)
SODIUM SERPL-SCNC: 132 MMOL/L (ref 133–144)
SPECIMEN SOURCE: NORMAL
SPECIMEN SOURCE: NORMAL
WBC # BLD AUTO: 10.3 10E9/L (ref 4–11)

## 2019-12-27 PROCEDURE — 25000132 ZZH RX MED GY IP 250 OP 250 PS 637: Performed by: INTERNAL MEDICINE

## 2019-12-27 PROCEDURE — 96413 CHEMO IV INFUSION 1 HR: CPT

## 2019-12-27 PROCEDURE — 99215 OFFICE O/P EST HI 40 MIN: CPT | Performed by: INTERNAL MEDICINE

## 2019-12-27 PROCEDURE — 25800030 ZZH RX IP 258 OP 636: Performed by: INTERNAL MEDICINE

## 2019-12-27 PROCEDURE — 96375 TX/PRO/DX INJ NEW DRUG ADDON: CPT

## 2019-12-27 PROCEDURE — 36415 COLL VENOUS BLD VENIPUNCTURE: CPT

## 2019-12-27 PROCEDURE — 96411 CHEMO IV PUSH ADDL DRUG: CPT

## 2019-12-27 PROCEDURE — 96417 CHEMO IV INFUS EACH ADDL SEQ: CPT

## 2019-12-27 PROCEDURE — G0463 HOSPITAL OUTPT CLINIC VISIT: HCPCS | Mod: 25

## 2019-12-27 PROCEDURE — 96377 APPLICATON ON-BODY INJECTOR: CPT | Mod: 59

## 2019-12-27 PROCEDURE — 80053 COMPREHEN METABOLIC PANEL: CPT | Performed by: INTERNAL MEDICINE

## 2019-12-27 PROCEDURE — 96367 TX/PROPH/DG ADDL SEQ IV INF: CPT

## 2019-12-27 PROCEDURE — 25000128 H RX IP 250 OP 636: Performed by: INTERNAL MEDICINE

## 2019-12-27 PROCEDURE — 96415 CHEMO IV INFUSION ADDL HR: CPT

## 2019-12-27 PROCEDURE — 85025 COMPLETE CBC W/AUTO DIFF WBC: CPT | Performed by: INTERNAL MEDICINE

## 2019-12-27 RX ORDER — SODIUM CHLORIDE 9 MG/ML
1000 INJECTION, SOLUTION INTRAVENOUS CONTINUOUS PRN
Status: CANCELLED
Start: 2020-01-10

## 2019-12-27 RX ORDER — EPINEPHRINE 1 MG/ML
0.3 INJECTION, SOLUTION INTRAMUSCULAR; SUBCUTANEOUS EVERY 5 MIN PRN
Status: CANCELLED | OUTPATIENT
Start: 2020-01-10

## 2019-12-27 RX ORDER — PROCHLORPERAZINE MALEATE 5 MG
10 TABLET ORAL EVERY 6 HOURS PRN
Status: CANCELLED
Start: 2020-01-10

## 2019-12-27 RX ORDER — DEXAMETHASONE 0.5 MG/1
8 TABLET ORAL DAILY
Status: CANCELLED | OUTPATIENT
Start: 2020-01-11

## 2019-12-27 RX ORDER — LORAZEPAM 0.5 MG/1
.5-1 TABLET ORAL EVERY 6 HOURS PRN
Status: CANCELLED
Start: 2020-01-10

## 2019-12-27 RX ORDER — PALONOSETRON 0.05 MG/ML
0.25 INJECTION, SOLUTION INTRAVENOUS ONCE
Status: COMPLETED | OUTPATIENT
Start: 2019-12-27 | End: 2019-12-27

## 2019-12-27 RX ORDER — MEPERIDINE HYDROCHLORIDE 25 MG/ML
25 INJECTION INTRAMUSCULAR; INTRAVENOUS; SUBCUTANEOUS EVERY 30 MIN PRN
Status: CANCELLED | OUTPATIENT
Start: 2019-12-27

## 2019-12-27 RX ORDER — DIPHENHYDRAMINE HYDROCHLORIDE 50 MG/ML
50 INJECTION INTRAMUSCULAR; INTRAVENOUS
Status: CANCELLED
Start: 2019-12-27

## 2019-12-27 RX ORDER — METHYLPREDNISOLONE SODIUM SUCCINATE 125 MG/2ML
125 INJECTION, POWDER, LYOPHILIZED, FOR SOLUTION INTRAMUSCULAR; INTRAVENOUS
Status: CANCELLED
Start: 2020-01-10

## 2019-12-27 RX ORDER — LORAZEPAM 2 MG/ML
.5-1 INJECTION INTRAMUSCULAR EVERY 6 HOURS PRN
Status: CANCELLED | OUTPATIENT
Start: 2020-01-10

## 2019-12-27 RX ORDER — EPINEPHRINE 1 MG/ML
0.3 INJECTION, SOLUTION INTRAMUSCULAR; SUBCUTANEOUS EVERY 5 MIN PRN
Status: CANCELLED | OUTPATIENT
Start: 2019-12-27

## 2019-12-27 RX ORDER — DEXAMETHASONE 0.5 MG/1
12 TABLET ORAL ONCE
Status: CANCELLED | OUTPATIENT
Start: 2020-01-10

## 2019-12-27 RX ORDER — SODIUM BICARBONATE 84 MG/ML
50 INJECTION, SOLUTION INTRAVENOUS
Status: CANCELLED | OUTPATIENT
Start: 2020-01-10

## 2019-12-27 RX ORDER — DIPHENHYDRAMINE HYDROCHLORIDE 50 MG/ML
50 INJECTION INTRAMUSCULAR; INTRAVENOUS
Status: CANCELLED
Start: 2020-01-10

## 2019-12-27 RX ORDER — OMEPRAZOLE 40 MG/1
40 CAPSULE, DELAYED RELEASE ORAL
Qty: 90 CAPSULE | Refills: 3 | Status: ON HOLD | OUTPATIENT
Start: 2019-12-27 | End: 2020-01-10

## 2019-12-27 RX ORDER — MEPERIDINE HYDROCHLORIDE 25 MG/ML
25 INJECTION INTRAMUSCULAR; INTRAVENOUS; SUBCUTANEOUS EVERY 30 MIN PRN
Status: CANCELLED | OUTPATIENT
Start: 2020-01-10

## 2019-12-27 RX ORDER — DOXORUBICIN HYDROCHLORIDE 2 MG/ML
100 INJECTION, SOLUTION INTRAVENOUS ONCE
Status: COMPLETED | OUTPATIENT
Start: 2019-12-27 | End: 2019-12-27

## 2019-12-27 RX ORDER — ONDANSETRON 8 MG/1
8 TABLET, ORALLY DISINTEGRATING ORAL EVERY 8 HOURS PRN
Qty: 45 TABLET | Refills: 0 | Status: ON HOLD | OUTPATIENT
Start: 2019-12-27 | End: 2020-06-14

## 2019-12-27 RX ORDER — ALBUTEROL SULFATE 90 UG/1
1-2 AEROSOL, METERED RESPIRATORY (INHALATION)
Status: CANCELLED
Start: 2020-01-10

## 2019-12-27 RX ORDER — SODIUM CHLORIDE 9 MG/ML
1000 INJECTION, SOLUTION INTRAVENOUS CONTINUOUS PRN
Status: CANCELLED
Start: 2019-12-27

## 2019-12-27 RX ORDER — DIPHENHYDRAMINE HCL 25 MG
50 CAPSULE ORAL ONCE
Status: CANCELLED
Start: 2019-12-27

## 2019-12-27 RX ORDER — LEUCOVORIN CALCIUM 350 MG/17.5ML
25 INJECTION, POWDER, LYOPHILIZED, FOR SOLUTION INTRAMUSCULAR; INTRAVENOUS EVERY 6 HOURS
Status: CANCELLED | OUTPATIENT
Start: 2020-01-11

## 2019-12-27 RX ORDER — NALOXONE HYDROCHLORIDE 0.4 MG/ML
.1-.4 INJECTION, SOLUTION INTRAMUSCULAR; INTRAVENOUS; SUBCUTANEOUS
Status: CANCELLED | OUTPATIENT
Start: 2019-12-27

## 2019-12-27 RX ORDER — LEUCOVORIN CALCIUM 350 MG/17.5ML
50 INJECTION, POWDER, LYOPHILIZED, FOR SOLUTION INTRAMUSCULAR; INTRAVENOUS ONCE
Status: CANCELLED | OUTPATIENT
Start: 2020-01-11

## 2019-12-27 RX ORDER — METHYLPREDNISOLONE SODIUM SUCCINATE 125 MG/2ML
125 INJECTION, POWDER, LYOPHILIZED, FOR SOLUTION INTRAMUSCULAR; INTRAVENOUS
Status: CANCELLED
Start: 2019-12-27

## 2019-12-27 RX ORDER — ACYCLOVIR 400 MG/1
400 TABLET ORAL 2 TIMES DAILY
Qty: 60 TABLET | Refills: 3 | Status: ON HOLD | OUTPATIENT
Start: 2019-12-27 | End: 2020-06-14

## 2019-12-27 RX ORDER — MEPERIDINE HYDROCHLORIDE 25 MG/ML
25 INJECTION INTRAMUSCULAR; INTRAVENOUS; SUBCUTANEOUS
Status: CANCELLED
Start: 2019-12-27

## 2019-12-27 RX ORDER — ACETAMINOPHEN 325 MG/1
650 TABLET ORAL ONCE
Status: CANCELLED
Start: 2019-12-27

## 2019-12-27 RX ORDER — ALBUTEROL SULFATE 0.83 MG/ML
2.5 SOLUTION RESPIRATORY (INHALATION)
Status: CANCELLED | OUTPATIENT
Start: 2020-01-10

## 2019-12-27 RX ORDER — ONDANSETRON 4 MG/1
16 TABLET, FILM COATED ORAL ONCE
Status: CANCELLED | OUTPATIENT
Start: 2020-01-10

## 2019-12-27 RX ORDER — PREDNISONE 50 MG/1
50 TABLET ORAL 2 TIMES DAILY
Qty: 10 TABLET | Refills: 0 | Status: ON HOLD | OUTPATIENT
Start: 2019-12-27 | End: 2020-01-10

## 2019-12-27 RX ORDER — NALOXONE HYDROCHLORIDE 0.4 MG/ML
.1-.4 INJECTION, SOLUTION INTRAMUSCULAR; INTRAVENOUS; SUBCUTANEOUS
Status: CANCELLED | OUTPATIENT
Start: 2020-01-10

## 2019-12-27 RX ORDER — ALBUTEROL SULFATE 0.83 MG/ML
2.5 SOLUTION RESPIRATORY (INHALATION)
Status: CANCELLED | OUTPATIENT
Start: 2019-12-27

## 2019-12-27 RX ORDER — ACETAMINOPHEN 325 MG/1
650 TABLET ORAL ONCE
Status: COMPLETED | OUTPATIENT
Start: 2019-12-27 | End: 2019-12-27

## 2019-12-27 RX ORDER — PREDNISONE 5 MG/1
TABLET ORAL
Qty: 26 TABLET | Refills: 0 | Status: ON HOLD | OUTPATIENT
Start: 2019-12-27 | End: 2020-01-13

## 2019-12-27 RX ORDER — ALBUTEROL SULFATE 90 UG/1
1-2 AEROSOL, METERED RESPIRATORY (INHALATION)
Status: CANCELLED
Start: 2019-12-27

## 2019-12-27 RX ORDER — LORAZEPAM 2 MG/ML
0.5 INJECTION INTRAMUSCULAR EVERY 4 HOURS PRN
Status: CANCELLED
Start: 2019-12-27

## 2019-12-27 RX ORDER — PALONOSETRON 0.05 MG/ML
0.25 INJECTION, SOLUTION INTRAVENOUS ONCE
Status: CANCELLED
Start: 2019-12-27

## 2019-12-27 RX ORDER — EPINEPHRINE 0.3 MG/.3ML
0.3 INJECTION SUBCUTANEOUS EVERY 5 MIN PRN
Status: CANCELLED | OUTPATIENT
Start: 2019-12-27

## 2019-12-27 RX ORDER — DOXORUBICIN HYDROCHLORIDE 2 MG/ML
50 INJECTION, SOLUTION INTRAVENOUS ONCE
Status: CANCELLED | OUTPATIENT
Start: 2019-12-27

## 2019-12-27 RX ADMIN — ACETAMINOPHEN 650 MG: 325 TABLET ORAL at 09:55

## 2019-12-27 RX ADMIN — PALONOSETRON HYDROCHLORIDE 0.25 MG: 0.25 INJECTION, SOLUTION INTRAVENOUS at 10:04

## 2019-12-27 RX ADMIN — DIPHENHYDRAMINE HYDROCHLORIDE 50 MG: 50 INJECTION INTRAMUSCULAR; INTRAVENOUS at 10:21

## 2019-12-27 RX ADMIN — SODIUM CHLORIDE 250 ML: 9 INJECTION, SOLUTION INTRAVENOUS at 09:54

## 2019-12-27 RX ADMIN — CYCLOPHOSPHAMIDE 1500 MG: 1 INJECTION, POWDER, FOR SOLUTION INTRAVENOUS; ORAL at 12:48

## 2019-12-27 RX ADMIN — PEGFILGRASTIM 6 MG: KIT SUBCUTANEOUS at 13:44

## 2019-12-27 RX ADMIN — RITUXIMAB 700 MG: 10 INJECTION, SOLUTION INTRAVENOUS at 11:00

## 2019-12-27 RX ADMIN — DOXORUBICIN HYDROCHLORIDE 100 MG: 2 INJECTION, SOLUTION INTRAVENOUS at 12:31

## 2019-12-27 RX ADMIN — FOSAPREPITANT 150 MG: 150 INJECTION, POWDER, LYOPHILIZED, FOR SOLUTION INTRAVENOUS at 10:39

## 2019-12-27 RX ADMIN — VINCRISTINE SULFATE 2 MG: 1 INJECTION, SOLUTION INTRAVENOUS at 12:34

## 2019-12-27 ASSESSMENT — PAIN SCALES - GENERAL: PAINLEVEL: NO PAIN (0)

## 2019-12-27 NOTE — PROGRESS NOTES
Nursing Note:  Kassie Ramirez presents today for PIV labs.    Patient seen by provider today: Yes: Dr. Castro   present during visit today: Not Applicable.    Note: N/A.    Intravenous Access:  Lab draw site right forearm, Needle type IV, Gauge 22.  Labs drawn without difficulty.  Peripheral IV placed.    Discharge Plan:   [facility].    Zonia Monroy RN

## 2019-12-27 NOTE — PROGRESS NOTES
Infusion Nursing Note:  Kassie Ramirez presents today for [facility].    Patient seen by provider today: Yes: Dr Castro   present during visit today: Not Applicable.    Note: PIV inserted in FT. Pt states area proximal to insertion is uncomfortable but tolerable. Positive blood return, flushes easily, tolerates rapid bolus with no swelling.  Warm pack applied to site,  Pt indicates feels better with heat.    Intravenous Access:  Peripheral IV placed.    Treatment Conditions:  Lab Results   Component Value Date    HGB 12.2 12/27/2019     Lab Results   Component Value Date    WBC 10.3 12/27/2019      Lab Results   Component Value Date    ANEU 7.6 12/27/2019     Lab Results   Component Value Date     12/27/2019      Lab Results   Component Value Date     12/27/2019                   Lab Results   Component Value Date    POTASSIUM 4.1 12/27/2019           Lab Results   Component Value Date    MAG 2.1 11/27/2019            Lab Results   Component Value Date    CR 1.07 12/27/2019                   Lab Results   Component Value Date    AFSHAN 9.6 12/27/2019                Lab Results   Component Value Date    BILITOTAL 0.6 12/27/2019           Lab Results   Component Value Date    ALBUMIN 3.7 12/27/2019                    Lab Results   Component Value Date    ALT 37 12/27/2019           Lab Results   Component Value Date    AST 36 12/27/2019       Results reviewed, labs MET treatment parameters, ok to proceed with treatment.      Post Infusion Assessment:  ONPRO  Was placed on patient's: back of left arm.    Was placed at 1344 PM    ONPRO injector device Lot number: [id number]    Patient education included: what patient can expect after application, what colored lights mean on the device, when to remove device, when and where to call with questions or issues, all patients questions answered and that Neulasta administration will occur at 1640 12/28.    Patient tolerated administration well.    Patient  tolerated infusion without incident.  Patient tolerated injection without incident.  Blood return noted pre and post infusion.  Blood return noted during administration every 2-3 cc.Adriamycin  Site patent and intact, free from redness, edema or discomfort.  No evidence of extravasations.  Access discontinued per protocol.       Discharge Plan:   Prescription refills given for prednisone, she will  at downstairs pharmacy  Discharge instructions reviewed with: Patient.  AVS to patient via [employer].  Patient will return 1/17 for next appointment.   Patient discharged in stable condition accompanied by: self.  Departure Mode: Ambulatory.    Rossana Aguilar RN

## 2019-12-27 NOTE — NURSING NOTE
"Oncology Rooming Note    December 27, 2019 8:27 AM   Kassie Ramirez is a 49 year old female who presents for:    Chief Complaint   Patient presents with     Oncology Clinic Visit     Diffuse large B-cell lymphoma of extranodal site     Initial Vitals: /83   Pulse 104   Temp 97  F (36.1  C) (Tympanic)   Resp 16   Wt 69.9 kg (154 lb 3 oz)   SpO2 96%   BMI 24.15 kg/m   Estimated body mass index is 24.15 kg/m  as calculated from the following:    Height as of 11/29/19: 1.702 m (5' 7\").    Weight as of this encounter: 69.9 kg (154 lb 3 oz). Body surface area is 1.82 meters squared.  No Pain (0) Comment: Data Unavailable   No LMP recorded.  Allergies reviewed: Yes  Medications reviewed: Yes    Medications: Medication refills not needed today.  Pharmacy name entered into [facility]:    [facility] - [place], [place] - [address] [place] [address] DRUG STORE #[id number] [facility] [address] [address] [place] [address] & [facility]    Clinical concerns: follow up       Mary Alice Whitaker CMA              "

## 2019-12-27 NOTE — PROGRESS NOTES
[facility]  HEMATOLOGY AND ONCOLOGY    FOLLOW-UP VISIT NOTE    PATIENT NAME: Kassie Ramirez MRN # [id number]  DATE OF VISIT: Dec 27, 2019 YOB: 1970    REFERRING PROVIDER: No referring provider defined for this encounter.    CANCER TYPE: DLBCL, EBV+ non-GCB, stage III.  STAGE: III    TREATMENT SUMMARY:  DLBCL- stage III Non-GCB and EBV+. Large mediastinal mass causing respiratory compromise. . IPI score of 2 (low-intermediate). FISH neg for high risk translocations. Non-GCB overall confers poorer prognosis, but standard of care remains multiagent chemotherapy with curative intent. Plan 6 cycles of R-CHOP with interim PET scan after 2-3 cycles with neulasta support. She is intermediate risk for CNS recurrence by CNS-IPI score, but recommend CNS prophylaxis for non-GCB.  Started 1st cycle of R-CHOP on 11/29/19 well.     CURRENT INTERVENTIONS:  MR-CHOP    SUBJECTIVE   Kassie Ramirez is being followed for DLBCL, EBV+ non-GCB, stage III intermediate risk disease    Kassie was admitted last week with increasing SOB and was in acute hypoxemic respiratory failure and had neutropenic fevers. She was diagnosed with PJV pneumonia, sepsis. She responded well to bactrim and steroids and has been discharged home off all oxygen. She appeared quite comfortable off oxygen and notes that her SOB has remarkably improved. She has brought all her medications for my review.       PAST MEDICAL HISTORY     Past Medical History:   Diagnosis Date     Anxiety attack 9/16/2014     Diffuse large B-cell lymphoma (H)     Diagnosed 11/2019     Encounter for Essure implantation 2009     Generalized anxiety disorder 9/16/2014    zoloft = flat emotions     Menopausal disorder     started on OCPs by menopause center 3/2017 (takes active continuously)     Menstrual headache     helped by OCPs and magnesium     GERTRUDE (stress urinary incontinence, female)     sling procedure 2016     SVT (supraventricular tachycardia) (H)           CURRENT OUTPATIENT MEDICATIONS     Current Outpatient Medications   Medication Sig     acyclovir (ZOVIRAX) 400 MG tablet Take 1 tablet (400 mg) by mouth 2 times daily     allopurinol (ZYLOPRIM) 300 MG tablet Take 1 tablet (300 mg) by mouth daily     fluconazole (DIFLUCAN) 200 MG tablet Take 1 tablet (200 mg) by mouth daily     LORazepam (ATIVAN) 0.5 MG tablet Take 1-2 tablets (0.5-1 mg) by mouth every 6 hours as needed (Breakthrough Nausea / Vomiting)     omeprazole (PRILOSEC) 40 MG DR capsule Take 1 capsule (40 mg) by mouth every morning (before breakfast)     ondansetron (ZOFRAN-ODT) 8 MG ODT tab Take 1 tablet (8 mg) by mouth every 8 hours as needed     predniSONE (DELTASONE) 20 MG tablet Take 2 tablets (40 mg) by mouth daily for 3 doses     [START ON 12/30/2019] predniSONE (DELTASONE) 20 MG tablet Take 1 tablet (20 mg) by mouth daily for 9 days Following 5 days of steroids with chemotherapy. Then start steroid taper per Dr. Castro.     prochlorperazine (COMPAZINE) 10 MG tablet Take 1 tablet (10 mg) by mouth every 6 hours as needed (Breakthrough Nausea/Vomiting)     SENNA PO Take 1 tablet by mouth every 72 hours as needed     sulfamethoxazole-trimethoprim (BACTRIM DS/SEPTRA DS) 800-160 MG tablet Take 2 tablets by mouth 3 times daily for 14 days     sulfamethoxazole-trimethoprim (BACTRIM DS/SEPTRA DS) 800-160 MG tablet Take 1 tablet by mouth daily Starting 1/7.     No current facility-administered medications for this visit.         ALLERGIES      Allergies   Allergen Reactions     Cold & Flu [Cold Defense Fighter]      See pseudoephedrine     Seasonal Allergies      Sudafed Cold-Cough [Dayquil Liquicaps]      Pseudoephedrine Rash     Rash then skins peels off         REVIEW OF SYSTEMS   As above in the HPI, o/w complete 12-point ROS was negative.     PHYSICAL EXAM   /83   Pulse 104   Temp 97  F (36.1  C) (Tympanic)   Resp 16   Wt 69.9 kg (154 lb 3 oz)   SpO2 96%   BMI 24.15 kg/m    GEN:  NAD  HEENT: PERRL, EOMI, no icterus, injection or pallor. Oropharynx is clear.  LYMPHATICs: no cervical or supraclavicular lymphadenopathy; no other abn lymphadenopathy  PULMONARY: clear with good air entry bilaterally  CARDIOVASCULAR: regular, no murmurs, rubs, or gallops  GASTROINTESTINAL: soft, non-tender, non-distended, normal bowel sounds, no hepatosplenomegaly by percussion or palpation  MUSCULOSKELTAL: warm, well perfused, no edema  NEURO: awake, alert and oriented to time place and person, cranial nerves intact - II - XII, no focal neurologic deficits  SKIN: no rashes     LABORATORY AND IMAGING STUDIES     Recent Labs   Lab Test 12/24/19  0748 12/23/19  0655 12/20/19  1938 12/20/19  0624 12/19/19  2106 12/19/19  0726  12/18/19  0712  12/17/19  0715   NA  --  135  --  139  --  136  --  139  --  142   POTASSIUM  --  4.1  --  3.5 3.5 2.7*  --  3.7   < > 3.2*   CHLORIDE  --  101  --  107  --  105  --  109  --  113*   CO2  --  24  --  24  --  27  --  21  --  20   ANIONGAP  --  10  --  8  --  4  --  9  --  9   BUN  --  16  --  8  --  5*  --  7  --  8   CR 0.85 0.88 0.81 0.82  --  1.01   < > 1.04  --  1.15*   GLC  --  167*  --  163*  --  116*  --  107*  --  91   AFSHAN  --  9.2  --  8.5  --  8.2*  --  8.3*  --  8.1*    < > = values in this interval not displayed.     Recent Labs   Lab Test 12/01/19  1340 12/01/19  0641 11/30/19  2137 11/30/19  1341 11/30/19  0522  11/27/19  2216 11/27/19  1605 09/19/19  0706 09/18/19  0845   MAG  --   --   --   --   --   --  2.1 2.5* 2.4* 2.2   PHOS 2.8 3.4 2.8 2.5 4.1   < > 3.1  --   --   --     < > = values in this interval not displayed.     Recent Labs   Lab Test 12/24/19  0748 12/23/19  0655 12/21/19  0601 12/20/19  0624 12/19/19  0726  12/18/19  0712 12/17/19  0715 12/16/19  0804 12/15/19  0656   WBC  --  19.0* 10.6 9.4 7.2  --  7.7 6.3 3.5* 1.2*   HGB  --  10.5* 9.0* 9.7* 8.8*  --  9.2* 7.6* 8.7* 9.6*    371 221 235 210   < > 249 222 201 270   MCV  --  92 91 90 91  --   93 95 98 94   NEUTROPHIL  --   --   --   --  64.0  --  72.0 82.0 60.0 37.0    < > = values in this interval not displayed.     Recent Labs   Lab Test 12/16/19  1743 12/15/19  0656 12/11/19  1151 11/29/19  0526 11/28/19  0537   BILITOTAL  --  1.2 1.0 1.2 1.2   ALKPHOS  --  70 102 69 74   ALT  --  34 32 25 26   AST  --  41 18 22 24   ALBUMIN  --  3.0* 3.6 3.0* 3.2*   *  --   --  416* 450*     TSH   Date Value Ref Range Status   09/18/2019 2.06 0.40 - 4.00 mU/L Final   07/27/2018 2.04 0.40 - 4.00 mU/L Final   09/16/2014 1.62 0.40 - 4.00 mU/L Final     Comment:     Effective 7/30/2014, the reference range for this assay has changed to reflect   new instrumentation/methodology.       No results for input(s): CEA in the last 75582 hours.  Results for orders placed or performed during the hospital encounter of 12/18/19   XR Chest Port 1 View    Narrative    CHEST PORTABLE ONE VIEW  12/19/2019 8:15 AM     HISTORY: Shortness of breath, hypoxia.    COMPARISON: Chest CT on 12/16/2019      Impression    IMPRESSION: Single portable AP view of the chest was obtained.  Cardiomediastinal silhouette is within normal limits. Diffuse  bilateral perihilar predominant airspace pulmonary opacities, could  represent infection, pulmonary edema versus a drug reaction, not  significantly changed as compared to 12/16/2019 and slightly worsened  as compared to 12/15/2019.    ÁNGEL SALDAÑA MD   XR Chest Port 1 View    Narrative    CHEST ONE VIEW PORTABLE  12/20/2019 9:10 PM     HISTORY: Tachypnea    COMPARISON: 12/19/2019.      Impression    IMPRESSION: Improvement of aeration since the prior exam. Patchy  bilateral airspace opacities appear improved but incompletely  resolved. Stable cardiac silhouette.    JOSUE HORTA MD   XR Chest 2 Views    Narrative    CHEST TWO VIEWS  12/23/2019 7:45 AM     HISTORY: 49-year-old woman with PCP pneumonia.       Impression    IMPRESSION: Since December 20, 2019, heart size is normal.  Scattered  bibasilar opacities have slightly improved, though there is residual.  No pleural effusion, pneumothorax, or abnormal area of consolidation.    HARDIK MAURICIO MD         ASSESSMENT AND PLAN   DLBCL, EBV+ non-GCB, stage III  PJV pneumonia causing acute respiratory failure improved on bactrim and prednisone  Neutropenic fevers with first cycle or R-CHOP    Kassie has remarkably improved since my evaluation while she was inpatient last week. She is breathing comfortably and has no complains.     She continues on bactrim DS 1 bid and prednisone 40 mg daily. The plan is for 3 weeks of additional bactrim. I would start taper of her prednisone. She will get 5 days of prednisone 100 mg daily as part of R-CHOP. This will complicate our taper. I have suggested that she take 40 mg for a week, then taper it by 10 mg weekly to 10 mg, then drop down to 5 mg and then 5 mg every other day.     We will resume R-CHOP today. She had received neulasta with the last cycle. I hope that she would not have neutropenic fevers with this cycle.     I have refilled her ondansetron ODT tabs for her. She would not need more than week of allopurinol. I have refilled her acyclovir.     I reviewed CNS prophylaxis with her. I reviewed high dose cytarabine - intrathecal vs high dose methotrexate. Neither of them are appealing or exciting. She prefers high dose methotrexate. She will get this on day 15 of cycles 2, 4 and 6. I reviewed the toxicity from high dose methotrexate with her, inpatient regimen. She will need to get a 24 hr urine sample for us to calculate her creatinine clearance.     There is a significant drug drug interaction of her methotrexate with her bactrim. The plan was to switch to a prophylactic dosing after 3 weeks of therapy. She would need high dose methotrexate in 2 weeks. We will switch from bactrim to atovaquone for PCP treatment.     Additional chemotherapy teaching done by my nurse - Erin Hernandez.     Over 45  min of direct face to face time spent with patient with more than 50% time spent in counseling and coordinating care.

## 2019-12-27 NOTE — LETTER
12/27/2019         RE: Kassie Ramirez  [address]  [facility] [address]        Dear Colleague,    Thank you for referring your patient, Kassie Ramirez, to the [facility]. Please see a copy of my visit note below.    [facility]  HEMATOLOGY AND ONCOLOGY    FOLLOW-UP VISIT NOTE    PATIENT NAME: Kassie Ramirez MRN # [id number]  DATE OF VISIT: Dec 27, 2019 YOB: 1970    REFERRING PROVIDER: No referring provider defined for this encounter.    CANCER TYPE: DLBCL, EBV+ non-GCB, stage III.  STAGE: III    TREATMENT SUMMARY:  DLBCL- stage III Non-GCB and EBV+. Large mediastinal mass causing respiratory compromise. . IPI score of 2 (low-intermediate). FISH neg for high risk translocations. Non-GCB overall confers poorer prognosis, but standard of care remains multiagent chemotherapy with curative intent. Plan 6 cycles of R-CHOP with interim PET scan after 2-3 cycles with neulasta support. She is intermediate risk for CNS recurrence by CNS-IPI score, but recommend CNS prophylaxis for non-GCB.  Started 1st cycle of R-CHOP on 11/29/19 well.     CURRENT INTERVENTIONS:  MR-CHOP    SUBJECTIVE   Kassie Ramirez is being followed for DLBCL, EBV+ non-GCB, stage III intermediate risk disease    Kassie was admitted last week with increasing SOB and was in acute hypoxemic respiratory failure and had neutropenic fevers. She was diagnosed with PJV pneumonia, sepsis. She responded well to bactrim and steroids and has been discharged home off all oxygen. She appeared quite comfortable off oxygen and notes that her SOB has remarkably improved. She has brought all her medications for my review.       PAST MEDICAL HISTORY     Past Medical History:   Diagnosis Date     Anxiety attack 9/16/2014     Diffuse large B-cell lymphoma (H)     Diagnosed 11/2019     Encounter for Essure implantation 2009     Generalized anxiety disorder 9/16/2014    zoloft = flat emotions     Menopausal  disorder     started on OCPs by menopause center 3/2017 (takes active continuously)     Menstrual headache     helped by OCPs and magnesium     GERTRUDE (stress urinary incontinence, female)     sling procedure 2016     SVT (supraventricular tachycardia) (H)          CURRENT OUTPATIENT MEDICATIONS     Current Outpatient Medications   Medication Sig     acyclovir (ZOVIRAX) 400 MG tablet Take 1 tablet (400 mg) by mouth 2 times daily     allopurinol (ZYLOPRIM) 300 MG tablet Take 1 tablet (300 mg) by mouth daily     fluconazole (DIFLUCAN) 200 MG tablet Take 1 tablet (200 mg) by mouth daily     LORazepam (ATIVAN) 0.5 MG tablet Take 1-2 tablets (0.5-1 mg) by mouth every 6 hours as needed (Breakthrough Nausea / Vomiting)     omeprazole (PRILOSEC) 40 MG DR capsule Take 1 capsule (40 mg) by mouth every morning (before breakfast)     ondansetron (ZOFRAN-ODT) 8 MG ODT tab Take 1 tablet (8 mg) by mouth every 8 hours as needed     predniSONE (DELTASONE) 20 MG tablet Take 2 tablets (40 mg) by mouth daily for 3 doses     [START ON 12/30/2019] predniSONE (DELTASONE) 20 MG tablet Take 1 tablet (20 mg) by mouth daily for 9 days Following 5 days of steroids with chemotherapy. Then start steroid taper per Dr. Castro.     prochlorperazine (COMPAZINE) 10 MG tablet Take 1 tablet (10 mg) by mouth every 6 hours as needed (Breakthrough Nausea/Vomiting)     SENNA PO Take 1 tablet by mouth every 72 hours as needed     sulfamethoxazole-trimethoprim (BACTRIM DS/SEPTRA DS) 800-160 MG tablet Take 2 tablets by mouth 3 times daily for 14 days     sulfamethoxazole-trimethoprim (BACTRIM DS/SEPTRA DS) 800-160 MG tablet Take 1 tablet by mouth daily Starting 1/7.     No current facility-administered medications for this visit.         ALLERGIES      Allergies   Allergen Reactions     Cold & Flu [Cold Defense Fighter]      See pseudoephedrine     Seasonal Allergies      Sudafed Cold-Cough [Dayquil Liquicaps]      Pseudoephedrine Rash     Rash then skins peels off          REVIEW OF SYSTEMS   As above in the HPI, o/w complete 12-point ROS was negative.     PHYSICAL EXAM   /83   Pulse 104   Temp 97  F (36.1  C) (Tympanic)   Resp 16   Wt 69.9 kg (154 lb 3 oz)   SpO2 96%   BMI 24.15 kg/m     GEN: NAD  HEENT: PERRL, EOMI, no icterus, injection or pallor. Oropharynx is clear.  LYMPHATICs: no cervical or supraclavicular lymphadenopathy; no other abn lymphadenopathy  PULMONARY: clear with good air entry bilaterally  CARDIOVASCULAR: regular, no murmurs, rubs, or gallops  GASTROINTESTINAL: soft, non-tender, non-distended, normal bowel sounds, no hepatosplenomegaly by percussion or palpation  MUSCULOSKELTAL: warm, well perfused, no edema  NEURO: awake, alert and oriented to time place and person, cranial nerves intact - II - XII, no focal neurologic deficits  SKIN: no rashes     LABORATORY AND IMAGING STUDIES     Recent Labs   Lab Test 12/24/19  0748 12/23/19  0655 12/20/19  1938 12/20/19  0624 12/19/19  2106 12/19/19  0726  12/18/19  0712  12/17/19  0715   NA  --  135  --  139  --  136  --  139  --  142   POTASSIUM  --  4.1  --  3.5 3.5 2.7*  --  3.7   < > 3.2*   CHLORIDE  --  101  --  107  --  105  --  109  --  113*   CO2  --  24  --  24  --  27  --  21  --  20   ANIONGAP  --  10  --  8  --  4  --  9  --  9   BUN  --  16  --  8  --  5*  --  7  --  8   CR 0.85 0.88 0.81 0.82  --  1.01   < > 1.04  --  1.15*   GLC  --  167*  --  163*  --  116*  --  107*  --  91   AFSHAN  --  9.2  --  8.5  --  8.2*  --  8.3*  --  8.1*    < > = values in this interval not displayed.     Recent Labs   Lab Test 12/01/19  1340 12/01/19  0641 11/30/19  2137 11/30/19  1341 11/30/19  0522  11/27/19  2216 11/27/19  1605 09/19/19  0706 09/18/19  0845   MAG  --   --   --   --   --   --  2.1 2.5* 2.4* 2.2   PHOS 2.8 3.4 2.8 2.5 4.1   < > 3.1  --   --   --     < > = values in this interval not displayed.     Recent Labs   Lab Test 12/24/19  0748 12/23/19  0655 12/21/19  0601 12/20/19  0624 12/19/19  0726   12/18/19  0712 12/17/19  0715 12/16/19  0804 12/15/19  0656   WBC  --  19.0* 10.6 9.4 7.2  --  7.7 6.3 3.5* 1.2*   HGB  --  10.5* 9.0* 9.7* 8.8*  --  9.2* 7.6* 8.7* 9.6*    371 221 235 210   < > 249 222 201 270   MCV  --  92 91 90 91  --  93 95 98 94   NEUTROPHIL  --   --   --   --  64.0  --  72.0 82.0 60.0 37.0    < > = values in this interval not displayed.     Recent Labs   Lab Test 12/16/19  1743 12/15/19  0656 12/11/19  1151 11/29/19  0526 11/28/19  0537   BILITOTAL  --  1.2 1.0 1.2 1.2   ALKPHOS  --  70 102 69 74   ALT  --  34 32 25 26   AST  --  41 18 22 24   ALBUMIN  --  3.0* 3.6 3.0* 3.2*   *  --   --  416* 450*     TSH   Date Value Ref Range Status   09/18/2019 2.06 0.40 - 4.00 mU/L Final   07/27/2018 2.04 0.40 - 4.00 mU/L Final   09/16/2014 1.62 0.40 - 4.00 mU/L Final     Comment:     Effective 7/30/2014, the reference range for this assay has changed to reflect   new instrumentation/methodology.       No results for input(s): CEA in the last 49053 hours.  Results for orders placed or performed during the hospital encounter of 12/18/19   XR Chest Port 1 View    Narrative    CHEST PORTABLE ONE VIEW  12/19/2019 8:15 AM     HISTORY: Shortness of breath, hypoxia.    COMPARISON: Chest CT on 12/16/2019      Impression    IMPRESSION: Single portable AP view of the chest was obtained.  Cardiomediastinal silhouette is within normal limits. Diffuse  bilateral perihilar predominant airspace pulmonary opacities, could  represent infection, pulmonary edema versus a drug reaction, not  significantly changed as compared to 12/16/2019 and slightly worsened  as compared to 12/15/2019.    ÁNGEL SALDAÑA MD   XR Chest Port 1 View    Narrative    CHEST ONE VIEW PORTABLE  12/20/2019 9:10 PM     HISTORY: Tachypnea    COMPARISON: 12/19/2019.      Impression    IMPRESSION: Improvement of aeration since the prior exam. Patchy  bilateral airspace opacities appear improved but incompletely  resolved. Stable  cardiac silhouette.    JOSUE HORTA MD   XR Chest 2 Views    Narrative    CHEST TWO VIEWS  12/23/2019 7:45 AM     HISTORY: 49-year-old woman with PCP pneumonia.       Impression    IMPRESSION: Since December 20, 2019, heart size is normal. Scattered  bibasilar opacities have slightly improved, though there is residual.  No pleural effusion, pneumothorax, or abnormal area of consolidation.    HARDIK MAURICIO MD         ASSESSMENT AND PLAN   DLBCL, EBV+ non-GCB, stage III  PJV pneumonia causing acute respiratory failure improved on bactrim and prednisone  Neutropenic fevers with first cycle or R-CHOP    Kassie has remarkably improved since my evaluation while she was inpatient last week. She is breathing comfortably and has no complains.     She continues on bactrim DS 1 bid and prednisone 40 mg daily. The plan is for 3 weeks of additional bactrim. I would start taper of her prednisone. She will get 5 days of prednisone 100 mg daily as part of R-CHOP. This will complicate our taper. I have suggested that she take 40 mg for a week, then taper it by 10 mg weekly to 10 mg, then drop down to 5 mg and then 5 mg every other day.     We will resume R-CHOP today. She had received neulasta with the last cycle. I hope that she would not have neutropenic fevers with this cycle.     I have refilled her ondansetron ODT tabs for her. She would not need more than week of allopurinol. I have refilled her acyclovir.     I reviewed CNS prophylaxis with her. I reviewed high dose cytarabine - intrathecal vs high dose methotrexate. Neither of them are appealing or exciting. She prefers high dose methotrexate. She will get this on day 15 of cycles 2, 4 and 6. I reviewed the toxicity from high dose methotrexate with her, inpatient regimen. She will need to get a 24 hr urine sample for us to calculate her creatinine clearance.     There is a significant drug drug interaction of her methotrexate with her bactrim. The plan was to switch to a  prophylactic dosing after 3 weeks of therapy. She would need high dose methotrexate in 2 weeks. We will switch from bactrim to atovaquone for PCP treatment.     Additional chemotherapy teaching done by my nurse - Erin Hernandez.     Over 45 min of direct face to face time spent with patient with more than 50% time spent in counseling and coordinating care.        Again, thank you for allowing me to participate in the care of your patient.        Sincerely,        Castro Castro MD

## 2020-01-02 NOTE — PROGRESS NOTES
Clinic Care Coordination Contact  Care Team Conversations    Patient was discharged from [facility] on 12/24.  Patient improved and was discharged home to self care.  Patient has had close f/u with oncology.  No clinic care coordination needs identified at this time.    Miryam Hadley RN  Care Coordination  Phone:  [phone]  Email: [email or web address], [place] and [facility]

## 2020-01-06 LAB — COPATH REPORT: NORMAL

## 2020-01-06 NOTE — PROGRESS NOTES
[facility]   Procedure: port placement  Arrival date: 1/9  Arrival time: 930  NPO explained: y                 Does patient have transportation available pre and post procedure?   y  Check-in procedure explained: y  Allergies reviewed: y  Blood thinners: n  Labs:   H&P:  epic  Does patient require ?    No    If so, language?      services called to order ?    date requested:    arrival time requested:    Name of individual from  services assisting with scheduling appointment:    Previous sedation:  Last oral intake:    solid: ________  Liquids: _______

## 2020-01-08 ENCOUNTER — HOSPITAL ENCOUNTER (OUTPATIENT)
Dept: LAB | Facility: CLINIC | Age: 50
Discharge: HOME | End: 2020-01-08
Attending: INTERNAL MEDICINE | Admitting: INTERNAL MEDICINE
Payer: COMMERCIAL

## 2020-01-08 DIAGNOSIS — J98.59 MEDIASTINAL MASS: Primary | ICD-10-CM

## 2020-01-08 DIAGNOSIS — C83.398 DIFFUSE LARGE B-CELL LYMPHOMA OF EXTRANODAL SITE: ICD-10-CM

## 2020-01-08 LAB
COLLECT DURATION TIME UR: 24 H
CREAT 24H UR-MRATE: 0.91 G/(24.H) (ref 0.8–1.8)
CREAT CL 24H UR+SERPL-VRATE: 61 ML/MIN
CREAT CL/1.73 SQ M 24H UR+SERPL-ARVRAT: 58 ML/MIN/1.7M2 (ref 100–160)
CREAT SERPL-MCNC: 1.04 MG/DL (ref 0.52–1.04)
CREAT SERPL-MCNC: 1.04 MG/DL (ref 0.52–1.04)
CREAT UR-MCNC: 36 MG/DL
GFR SERPL CREATININE-BSD FRML MDRD: 63 ML/MIN/{1.73_M2}
HEIGHT IN CM: 170 CM
SPECIMEN VOL UR: 2525 ML

## 2020-01-08 PROCEDURE — 36415 COLL VENOUS BLD VENIPUNCTURE: CPT | Performed by: INTERNAL MEDICINE

## 2020-01-08 PROCEDURE — 82565 ASSAY OF CREATININE: CPT | Performed by: INTERNAL MEDICINE

## 2020-01-08 PROCEDURE — 00000095 ZZHCL STATISTIC CREATININE CLEARANCE: Performed by: INTERNAL MEDICINE

## 2020-01-08 PROCEDURE — 82570 ASSAY OF URINE CREATININE: CPT | Performed by: INTERNAL MEDICINE

## 2020-01-08 PROCEDURE — 81050 URINALYSIS VOLUME MEASURE: CPT | Performed by: INTERNAL MEDICINE

## 2020-01-09 ENCOUNTER — HOSPITAL ENCOUNTER (OUTPATIENT)
Facility: CLINIC | Age: 50
Discharge: HOME | End: 2020-01-09
Attending: RADIOLOGY | Admitting: RADIOLOGY
Payer: COMMERCIAL

## 2020-01-09 ENCOUNTER — PATIENT OUTREACH (OUTPATIENT)
Dept: ONCOLOGY | Facility: CLINIC | Age: 50
End: 2020-01-09

## 2020-01-09 ENCOUNTER — TELEPHONE (OUTPATIENT)
Facility: CLINIC | Age: 50
End: 2020-01-09

## 2020-01-09 ENCOUNTER — APPOINTMENT (OUTPATIENT)
Dept: INTERVENTIONAL RADIOLOGY/VASCULAR | Facility: CLINIC | Age: 50
End: 2020-01-09
Attending: INTERNAL MEDICINE
Payer: COMMERCIAL

## 2020-01-09 ENCOUNTER — HOSPITAL ENCOUNTER (OUTPATIENT)
Facility: CLINIC | Age: 50
End: 2020-01-09
Attending: INTERNAL MEDICINE | Admitting: INTERNAL MEDICINE
Payer: COMMERCIAL

## 2020-01-09 VITALS
OXYGEN SATURATION: 96 % | DIASTOLIC BLOOD PRESSURE: 84 MMHG | TEMPERATURE: 97.7 F | SYSTOLIC BLOOD PRESSURE: 120 MMHG | RESPIRATION RATE: 14 BRPM | HEART RATE: 104 BPM

## 2020-01-09 DIAGNOSIS — C83.398 DIFFUSE LARGE B-CELL LYMPHOMA OF EXTRANODAL SITE: ICD-10-CM

## 2020-01-09 PROCEDURE — 77001 FLUOROGUIDE FOR VEIN DEVICE: CPT

## 2020-01-09 PROCEDURE — 99152 MOD SED SAME PHYS/QHP 5/>YRS: CPT

## 2020-01-09 PROCEDURE — 25000125 ZZHC RX 250: Performed by: RADIOLOGY

## 2020-01-09 PROCEDURE — 27210820 ZZH WOUND GLUE CR3

## 2020-01-09 PROCEDURE — 25000128 H RX IP 250 OP 636

## 2020-01-09 PROCEDURE — C1788 PORT, INDWELLING, IMP: HCPCS

## 2020-01-09 PROCEDURE — 25000128 H RX IP 250 OP 636: Performed by: RADIOLOGY

## 2020-01-09 PROCEDURE — 27210908 ZZH NEEDLE CR4

## 2020-01-09 PROCEDURE — 76937 US GUIDE VASCULAR ACCESS: CPT

## 2020-01-09 PROCEDURE — 36561 INSERT TUNNELED CV CATH: CPT

## 2020-01-09 RX ORDER — LIDOCAINE 40 MG/G
CREAM TOPICAL
Status: DISCONTINUED | OUTPATIENT
Start: 2020-01-09 | End: 2020-01-09 | Stop reason: HOSPADM

## 2020-01-09 RX ORDER — DEXTROSE MONOHYDRATE 25 G/50ML
25-50 INJECTION, SOLUTION INTRAVENOUS
Status: CANCELLED | OUTPATIENT
Start: 2020-01-09

## 2020-01-09 RX ORDER — DEXTROSE MONOHYDRATE 25 G/50ML
25-50 INJECTION, SOLUTION INTRAVENOUS
Status: DISCONTINUED | OUTPATIENT
Start: 2020-01-09 | End: 2020-01-09 | Stop reason: HOSPADM

## 2020-01-09 RX ORDER — CEFAZOLIN SODIUM 2 G/100ML
INJECTION, SOLUTION INTRAVENOUS
Status: DISCONTINUED
Start: 2020-01-09 | End: 2020-01-09 | Stop reason: HOSPADM

## 2020-01-09 RX ORDER — LIDOCAINE HYDROCHLORIDE 10 MG/ML
INJECTION, SOLUTION EPIDURAL; INFILTRATION; INTRACAUDAL; PERINEURAL
Status: DISCONTINUED
Start: 2020-01-09 | End: 2020-01-09 | Stop reason: HOSPADM

## 2020-01-09 RX ORDER — LIDOCAINE HYDROCHLORIDE AND EPINEPHRINE 10; 10 MG/ML; UG/ML
INJECTION, SOLUTION INFILTRATION; PERINEURAL
Status: DISCONTINUED
Start: 2020-01-09 | End: 2020-01-09 | Stop reason: HOSPADM

## 2020-01-09 RX ORDER — NALOXONE HYDROCHLORIDE 0.4 MG/ML
.1-.4 INJECTION, SOLUTION INTRAMUSCULAR; INTRAVENOUS; SUBCUTANEOUS
Status: DISCONTINUED | OUTPATIENT
Start: 2020-01-09 | End: 2020-01-09 | Stop reason: HOSPADM

## 2020-01-09 RX ORDER — FENTANYL CITRATE 50 UG/ML
25-50 INJECTION, SOLUTION INTRAMUSCULAR; INTRAVENOUS EVERY 5 MIN PRN
Status: DISCONTINUED | OUTPATIENT
Start: 2020-01-09 | End: 2020-01-09 | Stop reason: HOSPADM

## 2020-01-09 RX ORDER — NICOTINE POLACRILEX 4 MG
15-30 LOZENGE BUCCAL
Status: DISCONTINUED | OUTPATIENT
Start: 2020-01-09 | End: 2020-01-09 | Stop reason: HOSPADM

## 2020-01-09 RX ORDER — CEFAZOLIN SODIUM 2 G/100ML
2 INJECTION, SOLUTION INTRAVENOUS
Status: COMPLETED | OUTPATIENT
Start: 2020-01-09 | End: 2020-01-09

## 2020-01-09 RX ORDER — NICOTINE POLACRILEX 4 MG
15-30 LOZENGE BUCCAL
Status: CANCELLED | OUTPATIENT
Start: 2020-01-09

## 2020-01-09 RX ORDER — HEPARIN SODIUM 1000 [USP'U]/ML
INJECTION, SOLUTION INTRAVENOUS; SUBCUTANEOUS
Status: COMPLETED
Start: 2020-01-09 | End: 2020-01-09

## 2020-01-09 RX ORDER — FLUMAZENIL 0.1 MG/ML
0.2 INJECTION, SOLUTION INTRAVENOUS
Status: DISCONTINUED | OUTPATIENT
Start: 2020-01-09 | End: 2020-01-09 | Stop reason: HOSPADM

## 2020-01-09 RX ORDER — FENTANYL CITRATE 50 UG/ML
INJECTION, SOLUTION INTRAMUSCULAR; INTRAVENOUS
Status: DISCONTINUED
Start: 2020-01-09 | End: 2020-01-09 | Stop reason: HOSPADM

## 2020-01-09 RX ADMIN — HEPARIN SODIUM 1000 UNITS: 1000 INJECTION INTRAVENOUS; SUBCUTANEOUS at 10:40

## 2020-01-09 RX ADMIN — FENTANYL CITRATE 50 MCG: 50 INJECTION, SOLUTION INTRAMUSCULAR; INTRAVENOUS at 10:24

## 2020-01-09 RX ADMIN — MIDAZOLAM 1 MG: 1 INJECTION INTRAMUSCULAR; INTRAVENOUS at 10:24

## 2020-01-09 RX ADMIN — LIDOCAINE HYDROCHLORIDE 14 ML: 10 INJECTION, SOLUTION EPIDURAL; INFILTRATION; INTRACAUDAL; PERINEURAL at 10:26

## 2020-01-09 RX ADMIN — CEFAZOLIN SODIUM 2 G: 2 INJECTION, SOLUTION INTRAVENOUS at 10:17

## 2020-01-09 NOTE — DISCHARGE INSTRUCTIONS
Going Home after Your Port Is Inserted  _______________________________________    Patient Name: Kassie Ramirez  Today's Date: January 9, 2020    The doctor who inserted your port was: Dr. Donohue at [facility]     Have your port site and/or neck wound checked on:  Next clinic appointment      Your port may be accessed right away. A nurse needs to flush your port every 30 days or after each use.     When you get home:    You should have an adult with you for the first 6 hours.    No driving or drinking alcohol until tomorrow. You may still have side effects from the medicine you received today (you may feel drowsy, unsteady or forgetful).     You may go back to your regular diet today.     If you take aspirin or Plavix, you may begin taking it again tomorrow. You may restart all other medicines today. Use pain medicine as directed.    Avoid heavy lifting or the overuse of your shoulder for three days.    Caring for the port site and/or neck wound:    Keep the port site clean and dry. Cover the site with plastic before taking a shower. Do this until the site has healed.     Keep the bandage on your port site for three days. Change it if it gets wet or dirty. After three days, you may use Band-Aids until the wound has healed.    For a neck wound, leave the tape on for three days. You may cover it with a Band-Aid for comfort.     If you have oozing or bleeding from the port site or from the cut in your neck:     Put direct pressure on the wound for 5 to 10 minutes with a gauze pad.  If you still have bleeding after 10 minutes, call your doctor.    If you are bleeding a lot and can't control it with direct pressure, call 911.    Call your doctor at  Oncology clinic if you have:    Bleeding from a wound after 10 minutes of direct pressure.    Swelling in your neck or over your port site.    Signs of infection: a fever over 100 F (37.8 C) under the tongue; the site is red, tender or  draining.

## 2020-01-09 NOTE — IP AVS SNAPSHOT
MRN:[id number]                      After Visit Summary   1/9/2020    Kassie Ramirez    MRN: [id number]           Visit Information        Department      1/9/2020  9:07 AM [facility]          Review of your medicines      UNREVIEWED medicines. Ask your doctor about these medicines       Dose / Directions   acyclovir 400 MG tablet  Commonly known as:  ZOVIRAX  Indication:  Prophylaxis of Herpes Simplex  Used for:  Diffuse large B-cell lymphoma of extranodal site (H) - per preliminary pathology from [facility] 11/27/19 - mediastinal mass wrapped around azalea and bilateral main stem bronchi      Dose:  400 mg  Take 1 tablet (400 mg) by mouth 2 times daily  Quantity:  60 tablet  Refills:  3     allopurinol 300 MG tablet  Commonly known as:  ZYLOPRIM  Used for:  Gout, unspecified cause, unspecified chronicity, unspecified site      Dose:  300 mg  Take 1 tablet (300 mg) by mouth daily  Refills:  0     fluconazole 200 MG tablet  Commonly known as:  DIFLUCAN  Used for:  Diffuse large B-cell lymphoma of extranodal site (H) - per preliminary pathology from [facility] 11/27/19 - mediastinal mass wrapped around azalea and bilateral main stem bronchi      Dose:  200 mg  Take 1 tablet (200 mg) by mouth daily  Quantity:  7 tablet  Refills:  0     LORazepam 0.5 MG tablet  Commonly known as:  ATIVAN  Used for:  Diffuse large B-cell lymphoma of extranodal site (H)      Dose:  0.5-1 mg  Take 1-2 tablets (0.5-1 mg) by mouth every 6 hours as needed (Breakthrough Nausea / Vomiting)  Quantity:  30 tablet  Refills:  0     omeprazole 40 MG DR capsule  Commonly known as:  priLOSEC  Used for:  Diffuse large B-cell lymphoma of extranodal site (H) - per preliminary pathology from [facility] 11/27/19 - mediastinal mass wrapped around azalea and bilateral main stem bronchi      Dose:  40 mg  Take 1 capsule (40 mg) by mouth every morning (before breakfast)  Quantity:  90  capsule  Refills:  3     ondansetron 8 MG ODT tab  Commonly known as:  ZOFRAN-ODT  Used for:  Diffuse large B-cell lymphoma of extranodal site (H) - per preliminary pathology from [facility] 11/27/19 - mediastinal mass wrapped around azalea and bilateral main stem bronchi      Dose:  8 mg  Take 1 tablet (8 mg) by mouth every 8 hours as needed  Quantity:  45 tablet  Refills:  0     * predniSONE 5 MG tablet  Commonly known as:  DELTASONE  Used for:  CAP (community acquired pneumonia) due to Pneumocystis jirovecii (H), Diffuse large B-cell lymphoma of extranodal site (H) - per preliminary pathology from [facility] 11/27/19 - mediastinal mass wrapped around azalea and bilateral main stem bronchi, Neutropenic fever (H), Gout, unspecified cause, unspecified chronicity, unspecified site      Start taking on:  December 27, 2019  Take 2 tablets (10 mg) by mouth daily for 7 days, THEN 1 tablet (5 mg) daily for 7 days, THEN 1 tablet (5 mg) every other day for 10 days.  Quantity:  26 tablet  Refills:  0     * predniSONE 50 MG tablet  Commonly known as:  DELTASONE  Used for:  Diffuse large B-cell lymphoma of extranodal site (H) - per preliminary pathology from [facility] 11/27/19 - mediastinal mass wrapped around azalea and bilateral main stem bronchi  Ask about: Should I take this medication?      Dose:  50 mg  Take 1 tablet (50 mg) by mouth 2 times daily for 5 days Take on Days 1 through 5. Take first dose in AM prior to chemotherapy.  Quantity:  10 tablet  Refills:  0     * predniSONE 20 MG tablet  Commonly known as:  DELTASONE  Used for:  CAP (community acquired pneumonia) due to Pneumocystis jirovecii (H)  Ask about: Should I take this medication?      Dose:  20 mg  Take 1 tablet (20 mg) by mouth daily for 9 days Following 5 days of steroids with chemotherapy. Then start steroid taper per Dr. Castro.  Quantity:  9 tablet  Refills:  0     prochlorperazine 10 MG tablet  Commonly known as:   COMPAZINE  Used for:  Diffuse large B-cell lymphoma of extranodal site (H)      Dose:  10 mg  Take 1 tablet (10 mg) by mouth every 6 hours as needed (Breakthrough Nausea/Vomiting)  Quantity:  30 tablet  Refills:  0     SENNA PO      Dose:  1 tablet  Take 1 tablet by mouth every 72 hours as needed  Refills:  0     * sulfamethoxazole-trimethoprim 800-160 MG tablet  Commonly known as:  BACTRIM DS/SEPTRA DS  Indication:  Pneumocystis  Used for:  CAP (community acquired pneumonia) due to Pneumocystis jirovecii (H)  Ask about: Should I take this medication?      Dose:  2 tablet  Take 2 tablets by mouth 3 times daily for 14 days  Quantity:  84 tablet  Refills:  0     * sulfamethoxazole-trimethoprim 800-160 MG tablet  Commonly known as:  BACTRIM DS/SEPTRA DS  Used for:  CAP (community acquired pneumonia) due to Pneumocystis jirovecii (H)      Dose:  1 tablet  Take 1 tablet by mouth daily Starting 1/7.  Quantity:  30 tablet  Refills:  0         * This list has 5 medication(s) that are the same as other medications prescribed for you. Read the directions carefully, and ask your doctor or other care provider to review them with you.                  Protect others around you: Learn how to safely use, store and throw away your medicines at www.disposemymeds.org.       Follow-ups after your visit       Your next 10 appointments already scheduled    Jan 09, 2020 10:30 AM CST  IR CHEST PORT PLACEMENT > 5 YRS OF AGE with RHIR11, CATHIRTEAM, RH IR RAD  [facility] ([facility]) [address] [place] [address] [phone]   The day before the exam:    You may eat your regular diet.    You are encouraged to drink at least 8 eight ounce glasses of clear liquids.    Please wear loose clothing, such as a sweat suit or jogging clothes. Avoid snaps, zippers and other metal. We may ask you to undress and put on a hospital gown.    Please bring any scans or X-rays taken at other  [facility], if similar tests were done. Also bring a list of your medicines, including vitamins, minerals and over-the-counter drugs. It is safest to leave personal items at home.    Someone will need to drive you to and from the hospital.    Tell your doctor in advance:    If you have allergies to x-ray contrast or iodine.    If you are or may be pregnant.    If you are taking Coumadin (or any other blood thinners) 5 days prior to the exam for any special instructions.    If you are diabetic to determine if your insulin needs have to be adjusted for the exam.    Your doctor will:    Need to do a history and physical within 30 days before this procedure.    Obtain necessary laboratory tests prior to the exam (Basic Metabolic Panel, CBCP, PTT and INR)    (No labs needed if you are having a tunneled catheter exchange or removal)    If you were given sedation, you cannot drive for 24 hours after the procedure, and an adult must be with you until then.    If you have any questions, please call the Imaging Department where you will have your exam. Directions, parking instructions, and other information are available on our website, [email or web address].     Jan 17, 2020  8:45 AM CST  Lab with Infusion with RH LAB DRAW 1  [facility] ([facility]) [facility]  [address]  [facility] [address]  [phone]      Jan 17, 2020  9:30 AM CST  Return Visit with Padmaja Sharpe PA-C  [facility] ([facility]) [facility]  [address]  [facility] [address]  [phone]      Jan 17, 2020 10:00 AM CST  Level 5 with RH INFUSION CHAIR 7  [facility] ([facility]) [facility]  [address]  [facility] [address]  [phone]      Feb 04, 2020 10:00 AM CST  (Arrive by 9:45 AM)  PET ONCOLOGY  WHOLE BODY with RHPET1  [facility] PET/CT ([facility]) [address]  [address]  [facility] [address] [phone]   How do I prepare for my exam? (Food and drink instructions)  Patients should eat a low carb, high protein meal 7 hours (or the last meal you eat) before the scan. Low carb, high protein meals consist of lean meats, seafood, beans, soy, low-fat dairy, eggs, nuts & seeds.    6 hours before your scan: Stop all food and liquids (except water). For inpatients, this includes both oral and IV (or intravenous) fluids containing dextrose.   Do not chew gum or suck on mints. If you need to take medicine with food, you may take it with a few crackers.     How do I prepare for my exam? (Other instructions)  If you have diabetes: Have your exam early in the morning. Your blood glucose will go up as the day goes by. Your glucose level must be 180 or less at the start of the exam. Please take any oral diabetic medication you need to ensure this blood glucose level is below 180, but no insulin 4 hours prior to the exam.     * If you are taking insulin in the morning take with breakfast by 6 am and schedule procedure between 12 and 2:15 pm.   * If you are taking insulin at night take nightly dose, fast overnight, schedule procedure before 10 am.   * If you take insulin both morning and night take morning dose by 6am and schedule procedure between 12 and 2:15 pm.    24 hours before your scan: Don t do any heavy exercise. (No jogging, aerobics or other workouts.) Exercise will make your pictures less accurate.     What should I wear? Please wear comfortable clothes.    How long does the exam take? Most scans will take between 2-3 hours.    What should I bring: Please bring a list of your medicines (including vitamins, minerals and over-the-counter drugs). Leave your valuables at home.    Do I need a : No  is needed.    What do I need to tell my doctor?  Tell  your doctor if you are breastfeeding or if there is any chance you may be pregnant.   If you have problems lying in small spaces (claustrophobia). If you do, your doctor may give you medicine to help you relax.    What should I do after the exam: No restrictions, you may resume normal activities.    What is this test: Your doctor has ordered a PET scan (positron emission tomography). This will take pictures of the cells and organs in your body. Your doctor may have also ordered a CT scan (computed tomography). This is another way to take pictures of your cells and organs. It is done at the same time as the PET scan. The pictures will help us understand your problem so we can make a treatment plan that fits your needs.    Who should I call with questions: Please call your Imaging Department at your exam site with any questions. Directions, parking instructions, and other information are available on our website, [email or web address].     Feb 07, 2020  7:45 AM CST  Lab with Infusion with RH LAB DRAW 1  [facility] ([facility]) [facility]  [address]  [facility] [address]  [phone]      Feb 07, 2020  8:30 AM CST  Return Visit with Castro Castro MD  [facility] ([facility]) [facility]  [address]  [facility] [address]  [phone]      Feb 07, 2020  9:00 AM CST  Level 5 with RH INFUSION CHAIR 9  [facility] ([facility]) [facility]  [address]  [facility] [address]  [phone]         Care Instructions       Further instructions from your care team         Going Home after Your Port Is Inserted  _______________________________________    Patient Name: Kassie Ramirez  Today's Date: January 9, 2020    The doctor who inserted your port was: Dr. Donohue at [facility]  [facility]     Have your port site and/or neck wound checked on:  Next clinic appointment      Your port may be accessed right away. A nurse needs to flush your port every 30 days or after each use.     When you get home:    You should have an adult with you for the first 6 hours.    No driving or drinking alcohol until tomorrow. You may still have side effects from the medicine you received today (you may feel drowsy, unsteady or forgetful).     You may go back to your regular diet today.     If you take aspirin or Plavix, you may begin taking it again tomorrow. You may restart all other medicines today. Use pain medicine as directed.    Avoid heavy lifting or the overuse of your shoulder for three days.    Caring for the port site and/or neck wound:    Keep the port site clean and dry. Cover the site with plastic before taking a shower. Do this until the site has healed.     Keep the bandage on your port site for three days. Change it if it gets wet or dirty. After three days, you may use Band-Aids until the wound has healed.    For a neck wound, leave the tape on for three days. You may cover it with a Band-Aid for comfort.     If you have oozing or bleeding from the port site or from the cut in your neck:     Put direct pressure on the wound for 5 to 10 minutes with a gauze pad.  If you still have bleeding after 10 minutes, call your doctor.    If you are bleeding a lot and can't control it with direct pressure, call 911.    Call your doctor at  Oncology clinic if you have:    Bleeding from a wound after 10 minutes of direct pressure.    Swelling in your neck or over your port site.    Signs of infection: a fever over 100 F (37.8 C) under the tongue; the site is red, tender or draining.      Additional Information About Your Visit       [employer] Information    [employer] gives you secure access to your electronic health record. If you see a primary care provider, you can also send messages to your care  team and make appointments. If you have questions, please call your primary care clinic.  If you do not have a primary care provider, please call [phone] and they will assist you.       Care EveryWhere ID    This is your Care EveryWhere ID. This could be used by other organizations to access your [place] medical records  [id number]       Your Vitals Were  Most recent update: 1/9/2020  9:32 AM    Blood Pressure   120/84 (BP Location: Left arm)    Pulse   104    Temperature   97.7  F (36.5  C) (Temporal)    Respirations   14    Pulse Oximetry   99%          Primary Care Provider Office Phone # Fax #    Mary Alice Colón PA-C [phone]      Equal Access to Services    OBIE FOLEY : Hadii shelly green hadasho Sosenaali, waaxda luqadaha, qaybta kaalmada adeegyada, ranjan cannon . [facility] [phone].    ATENCIÓN: Si habla español, tiene a valdivia disposición servicios gratuitos de asistencia lingüística. [facility] [phone].    We comply with applicable federal and state civil rights laws, including the [place] Human Rights Act. We do not discriminate on the basis of race, color, creed, Jew, national origin, marital status, age, disability, sex, sexual orientation, or gender identity.       Thank you!    Thank you for choosing [facility] for your care. Our goal is always to provide you with excellent care. Hearing back from our patients is one way we can continue to improve our services. Please take a few minutes to complete the written survey that you may receive in the mail after you visit. If you would like to speak to someone directly about your visit please contact Patient Relations at [phone]. Thank you!              Medication List      ASK your doctor about these medications          Morning Afternoon Evening Bedtime As Needed    acyclovir 400 MG tablet  Also known as:  ZOVIRAX  INSTRUCTIONS:  Take 1 tablet (400 mg) by mouth 2 times  daily  Reason for med:  Prophylaxis of Herpes Simplex                     allopurinol 300 MG tablet  Also known as:  ZYLOPRIM  INSTRUCTIONS:  Take 1 tablet (300 mg) by mouth daily                     fluconazole 200 MG tablet  Also known as:  DIFLUCAN  INSTRUCTIONS:  Take 1 tablet (200 mg) by mouth daily                     LORazepam 0.5 MG tablet  Also known as:  ATIVAN  INSTRUCTIONS:  Take 1-2 tablets (0.5-1 mg) by mouth every 6 hours as needed (Breakthrough Nausea / Vomiting)                     omeprazole 40 MG DR capsule  Also known as:  priLOSEC  INSTRUCTIONS:  Take 1 capsule (40 mg) by mouth every morning (before breakfast)                     ondansetron 8 MG ODT tab  Also known as:  ZOFRAN-ODT  INSTRUCTIONS:  Take 1 tablet (8 mg) by mouth every 8 hours as needed                     * predniSONE 5 MG tablet  Also known as:  DELTASONE  Start taking on:  December 27, 2019  INSTRUCTIONS:  Take 2 tablets (10 mg) by mouth daily for 7 days, THEN 1 tablet (5 mg) daily for 7 days, THEN 1 tablet (5 mg) every other day for 10 days.                     * predniSONE 50 MG tablet  Also known as:  DELTASONE  INSTRUCTIONS:  Take 1 tablet (50 mg) by mouth 2 times daily for 5 days Take on Days 1 through 5. Take first dose in AM prior to chemotherapy.  Ask about: Should I take this medication?                     * predniSONE 20 MG tablet  Also known as:  DELTASONE  INSTRUCTIONS:  Take 1 tablet (20 mg) by mouth daily for 9 days Following 5 days of steroids with chemotherapy. Then start steroid taper per Dr. Castro.  Ask about: Should I take this medication?                     prochlorperazine 10 MG tablet  Also known as:  COMPAZINE  INSTRUCTIONS:  Take 1 tablet (10 mg) by mouth every 6 hours as needed (Breakthrough Nausea/Vomiting)                     SENNA PO  INSTRUCTIONS:  Take 1 tablet by mouth every 72 hours as needed                     * sulfamethoxazole-trimethoprim 800-160 MG tablet  Also known as:  BACTRIM DS/SEPTRA  DS  INSTRUCTIONS:  Take 2 tablets by mouth 3 times daily for 14 days  Reason for med:  Pneumocystis  Ask about: Should I take this medication?                     * sulfamethoxazole-trimethoprim 800-160 MG tablet  Also known as:  BACTRIM DS/SEPTRA DS  INSTRUCTIONS:  Take 1 tablet by mouth daily Starting 1/7.  Doctor's comments:  (start after you complete 3x/daily course for prevention of recurrence)                        * This list has 5 medication(s) that are the same as other medications prescribed for you. Read the directions carefully, and ask your doctor or other care provider to review them with you.

## 2020-01-09 NOTE — TELEPHONE ENCOUNTER
3-Hospitalist Huddle Documentation    Acuity/Preferred Bed Type: Oncology MS5  Infection Concerns: none  Additional Specialist Needed Or Specialist Already Contacted:   None  Timely Treatments Needed: Yes: High dose methotrexate infusion planned  Important things to know/address during hospitalization: sitter not needed       Kassie Ramirez is being followed for Diffuse Large B Cell Lymphoma, EBV+ non-GCB, stage III intermediate risk disease who is coming in for high dose methotrexate  infusion as per Dr. Castro. He has order set in place. Hospitalist to enter admitting orders and ONC consult.

## 2020-01-09 NOTE — PROGRESS NOTES
Tamr received the call from patient placement confirming Kassie's admission for tomorrow (1/9/20) morning at 0800 at [facility] for inpatient infusion.     Tamr called Kassie and instructed her to go to the  for check in at 0800 tomorrow morning and she will get her bed assignment on the 5th floor. Patient verbalized understanding and is in agreement with the plan.     Maru Kim RN, BSN, MAOM  Patient Care Coordinator  [facility]  [phone]

## 2020-01-09 NOTE — PRE-PROCEDURE
GENERAL PRE-PROCEDURE:     Verbal consent obtained?: Yes    Risks and benefits: Risks, benefits and alternatives were discussed    DC Plan: Appropriate discharge home plan in place for patients who are going home after procedure   Consent given by:  Patient  Patient states understanding of procedure being performed: Yes    Patient's understanding of procedure matches consent: Yes    Procedure consent matches procedure scheduled: Yes    Appropriately NPO:  Not NPO, but emergent condition outweighs risk  ASA Class:  Class 1- healthy patient  Mallampati  :  Grade 1- soft palate, uvula, tonsillar pillars, and posterior pharyngeal wall visible  Lungs:  Lungs clear with good breath sounds bilaterally  Heart:  Normal heart sounds and rate  History & Physical reviewed:  History and physical reviewed and no updates needed  Statement of review:  I have reviewed the lab findings, diagnostic data, medications, and the plan for sedation

## 2020-01-09 NOTE — PROGRESS NOTES
"Writer received call from RN in Cath lab reporting Kassie was just in to get her port placed and the patient was wondering about getting her \"infusion\" tomorrow.     Patient reports she had labs done and wondering if they are \"good enough\" if she still needs the infusion tomorrow.       Per Dr. Castro's clinic note from 12/27/19, he recommended the patient get scheduled for inpatient infusion of high dose methotraxate on 01/10/20. Per chart review, there are no planned admissions scheduled. Patient's creatinine clearance resulted 01/08/20.    Writer routing to Dr. Castro to notify and request recommendations.    Maru Kim RN, BSN, [facility]  Patient Care Coordinator  [facility]  [phone]      "

## 2020-01-09 NOTE — IP AVS SNAPSHOT
[facility]  [address]  [facility] [address]  Phone:  [phone]                                    After Visit Summary   1/9/2020    Kassie Ramirez    MRN: [id number]           After Visit Summary Signature Page    I have received my discharge instructions, and my questions have been answered. I have discussed any challenges I see with this plan with the nurse or doctor.    ..........................................................................................................................................  Patient/Patient Representative Signature      ..........................................................................................................................................  Patient Representative Print Name and Relationship to Patient    ..................................................               ................................................  Date                                   Time    ..........................................................................................................................................  Reviewed by Signature/Title    ...................................................              ..............................................  Date                                               Time          22EPIC Rev 08/18

## 2020-01-09 NOTE — PROGRESS NOTES
Writer called Kassie and notified her that writer is working on her admission to [facility] for her infusion. Writer is waiting to hear confirmation from patient placement.     Kassie reports she would be able to arrive to [facility] at 8am, as recommended by the 5th floor charge nurse, Adelina.     Writer will call Kassie back once patient placement calls.    Maru Kim RN, BSN, [facility]  Patient Care Coordinator  [facility]  [phone]

## 2020-01-10 ENCOUNTER — HOSPITAL ENCOUNTER (INPATIENT)
Facility: CLINIC | Age: 50
LOS: 3 days | Discharge: HOME | End: 2020-01-13
Attending: INTERNAL MEDICINE | Admitting: INTERNAL MEDICINE
Payer: COMMERCIAL

## 2020-01-10 DIAGNOSIS — C83.398 DIFFUSE LARGE B-CELL LYMPHOMA OF EXTRANODAL SITE: Primary | ICD-10-CM

## 2020-01-10 PROBLEM — C85.90 LYMPHOMA (H): Status: ACTIVE | Noted: 2020-01-10

## 2020-01-10 LAB
ALBUMIN SERPL-MCNC: 3.2 G/DL (ref 3.4–5)
ALP SERPL-CCNC: 100 U/L (ref 40–150)
ALT SERPL W P-5'-P-CCNC: 36 U/L (ref 0–50)
ANION GAP SERPL CALCULATED.3IONS-SCNC: 7 MMOL/L (ref 3–14)
ANISOCYTOSIS BLD QL SMEAR: SLIGHT
AST SERPL W P-5'-P-CCNC: 22 U/L (ref 0–45)
BASOPHILS # BLD AUTO: 0 10E9/L (ref 0–0.2)
BASOPHILS NFR BLD AUTO: 0 %
BILIRUB SERPL-MCNC: 0.4 MG/DL (ref 0.2–1.3)
BUN SERPL-MCNC: 19 MG/DL (ref 7–30)
CALCIUM SERPL-MCNC: 9.2 MG/DL (ref 8.5–10.1)
CHLORIDE SERPL-SCNC: 103 MMOL/L (ref 94–109)
CO2 SERPL-SCNC: 28 MMOL/L (ref 20–32)
CREAT SERPL-MCNC: 0.85 MG/DL (ref 0.52–1.04)
DACRYOCYTES BLD QL SMEAR: SLIGHT
DIFFERENTIAL METHOD BLD: ABNORMAL
EOSINOPHIL # BLD AUTO: 0 10E9/L (ref 0–0.7)
EOSINOPHIL NFR BLD AUTO: 0 %
ERYTHROCYTE [DISTWIDTH] IN BLOOD BY AUTOMATED COUNT: 16.4 % (ref 10–15)
GFR SERPL CREATININE-BSD FRML MDRD: 80 ML/MIN/{1.73_M2}
GLUCOSE SERPL-MCNC: 104 MG/DL (ref 70–99)
HCT VFR BLD AUTO: 23.5 % (ref 35–47)
HGB BLD-MCNC: 7.7 G/DL (ref 11.7–15.7)
LACTATE BLD-SCNC: 4 MMOL/L (ref 0.7–2)
LYMPHOCYTES # BLD AUTO: 0.7 10E9/L (ref 0.8–5.3)
LYMPHOCYTES NFR BLD AUTO: 5 %
MCH RBC QN AUTO: 32 PG (ref 26.5–33)
MCHC RBC AUTO-ENTMCNC: 32.8 G/DL (ref 31.5–36.5)
MCV RBC AUTO: 98 FL (ref 78–100)
METAMYELOCYTES # BLD: 0.9 10E9/L
METAMYELOCYTES NFR BLD MANUAL: 7 %
MICROCYTES BLD QL SMEAR: PRESENT
MONOCYTES # BLD AUTO: 0.4 10E9/L (ref 0–1.3)
MONOCYTES NFR BLD AUTO: 3 %
MYELOCYTES # BLD: 0.3 10E9/L
MYELOCYTES NFR BLD MANUAL: 2 %
NEUTROPHILS # BLD AUTO: 11 10E9/L (ref 1.6–8.3)
NEUTROPHILS NFR BLD AUTO: 83 %
NRBC # BLD AUTO: 0.1 10*3/UL
NRBC BLD AUTO-RTO: 1 /100
PH UR STRIP: 8 PH (ref 5–7)
PLATELET # BLD AUTO: 82 10E9/L (ref 150–450)
PLATELET # BLD EST: ABNORMAL 10*3/UL
POTASSIUM SERPL-SCNC: 3.8 MMOL/L (ref 3.4–5.3)
PROT SERPL-MCNC: 5.8 G/DL (ref 6.8–8.8)
RBC # BLD AUTO: 2.41 10E12/L (ref 3.8–5.2)
SODIUM SERPL-SCNC: 138 MMOL/L (ref 133–144)
TOXIC GRANULES BLD QL SMEAR: PRESENT
WBC # BLD AUTO: 13.3 10E9/L (ref 4–11)

## 2020-01-10 PROCEDURE — 85025 COMPLETE CBC W/AUTO DIFF WBC: CPT | Performed by: INTERNAL MEDICINE

## 2020-01-10 PROCEDURE — 25000131 ZZH RX MED GY IP 250 OP 636 PS 637: Performed by: INTERNAL MEDICINE

## 2020-01-10 PROCEDURE — 12000000 ZZH R&B MED SURG/OB

## 2020-01-10 PROCEDURE — 83605 ASSAY OF LACTIC ACID: CPT | Performed by: INTERNAL MEDICINE

## 2020-01-10 PROCEDURE — 99223 1ST HOSP IP/OBS HIGH 75: CPT | Mod: AI | Performed by: INTERNAL MEDICINE

## 2020-01-10 PROCEDURE — 25000128 H RX IP 250 OP 636: Performed by: INTERNAL MEDICINE

## 2020-01-10 PROCEDURE — 25800029 ZZH RX IP 258 OP 250: Performed by: INTERNAL MEDICINE

## 2020-01-10 PROCEDURE — 25000125 ZZHC RX 250: Performed by: INTERNAL MEDICINE

## 2020-01-10 PROCEDURE — 99223 1ST HOSP IP/OBS HIGH 75: CPT | Performed by: INTERNAL MEDICINE

## 2020-01-10 PROCEDURE — 80053 COMPREHEN METABOLIC PANEL: CPT | Performed by: INTERNAL MEDICINE

## 2020-01-10 PROCEDURE — 81003 URINALYSIS AUTO W/O SCOPE: CPT | Performed by: INTERNAL MEDICINE

## 2020-01-10 PROCEDURE — 25800030 ZZH RX IP 258 OP 636: Performed by: INTERNAL MEDICINE

## 2020-01-10 PROCEDURE — 3E03305 INTRODUCTION OF OTHER ANTINEOPLASTIC INTO PERIPHERAL VEIN, PERCUTANEOUS APPROACH: ICD-10-PCS | Performed by: INTERNAL MEDICINE

## 2020-01-10 PROCEDURE — 25000132 ZZH RX MED GY IP 250 OP 250 PS 637: Performed by: INTERNAL MEDICINE

## 2020-01-10 RX ORDER — ONDANSETRON 2 MG/ML
4 INJECTION INTRAMUSCULAR; INTRAVENOUS EVERY 6 HOURS PRN
Status: DISCONTINUED | OUTPATIENT
Start: 2020-01-10 | End: 2020-01-10

## 2020-01-10 RX ORDER — SODIUM BICARBONATE 84 MG/ML
50 INJECTION, SOLUTION INTRAVENOUS
Status: DISCONTINUED | OUTPATIENT
Start: 2020-01-10 | End: 2020-01-13 | Stop reason: HOSPADM

## 2020-01-10 RX ORDER — METHYLPREDNISOLONE SODIUM SUCCINATE 125 MG/2ML
125 INJECTION, POWDER, LYOPHILIZED, FOR SOLUTION INTRAMUSCULAR; INTRAVENOUS
Status: DISCONTINUED | OUTPATIENT
Start: 2020-01-10 | End: 2020-01-13 | Stop reason: HOSPADM

## 2020-01-10 RX ORDER — PROCHLORPERAZINE 25 MG
25 SUPPOSITORY, RECTAL RECTAL EVERY 12 HOURS PRN
Status: DISCONTINUED | OUTPATIENT
Start: 2020-01-10 | End: 2020-01-10

## 2020-01-10 RX ORDER — LIDOCAINE 40 MG/G
CREAM TOPICAL
Status: DISCONTINUED | OUTPATIENT
Start: 2020-01-10 | End: 2020-01-13 | Stop reason: HOSPADM

## 2020-01-10 RX ORDER — POLYETHYLENE GLYCOL 3350 17 G/17G
17 POWDER, FOR SOLUTION ORAL DAILY PRN
Status: DISCONTINUED | OUTPATIENT
Start: 2020-01-10 | End: 2020-01-13 | Stop reason: HOSPADM

## 2020-01-10 RX ORDER — ALBUTEROL SULFATE 90 UG/1
1-2 AEROSOL, METERED RESPIRATORY (INHALATION)
Status: DISCONTINUED | OUTPATIENT
Start: 2020-01-10 | End: 2020-01-13 | Stop reason: HOSPADM

## 2020-01-10 RX ORDER — LORAZEPAM 0.5 MG/1
.5-1 TABLET ORAL EVERY 6 HOURS PRN
Status: DISCONTINUED | OUTPATIENT
Start: 2020-01-10 | End: 2020-01-13 | Stop reason: HOSPADM

## 2020-01-10 RX ORDER — LEUCOVORIN CALCIUM 350 MG/17.5ML
50 INJECTION, POWDER, LYOPHILIZED, FOR SOLUTION INTRAMUSCULAR; INTRAVENOUS ONCE
Status: COMPLETED | OUTPATIENT
Start: 2020-01-11 | End: 2020-01-11

## 2020-01-10 RX ORDER — ATOVAQUONE 750 MG/5ML
750 SUSPENSION ORAL AT BEDTIME
Status: DISCONTINUED | OUTPATIENT
Start: 2020-01-10 | End: 2020-01-10

## 2020-01-10 RX ORDER — DEXAMETHASONE 4 MG/1
8 TABLET ORAL DAILY
Status: COMPLETED | OUTPATIENT
Start: 2020-01-11 | End: 2020-01-12

## 2020-01-10 RX ORDER — ACYCLOVIR 400 MG/1
400 TABLET ORAL 2 TIMES DAILY
Status: DISCONTINUED | OUTPATIENT
Start: 2020-01-10 | End: 2020-01-13 | Stop reason: HOSPADM

## 2020-01-10 RX ORDER — EPINEPHRINE 1 MG/ML
0.3 INJECTION, SOLUTION, CONCENTRATE INTRAVENOUS EVERY 5 MIN PRN
Status: DISCONTINUED | OUTPATIENT
Start: 2020-01-10 | End: 2020-01-13 | Stop reason: HOSPADM

## 2020-01-10 RX ORDER — ONDANSETRON 4 MG/1
16 TABLET, ORALLY DISINTEGRATING ORAL ONCE
Status: COMPLETED | OUTPATIENT
Start: 2020-01-10 | End: 2020-01-10

## 2020-01-10 RX ORDER — HEPARIN SODIUM (PORCINE) LOCK FLUSH IV SOLN 100 UNIT/ML 100 UNIT/ML
5 SOLUTION INTRAVENOUS
Status: DISCONTINUED | OUTPATIENT
Start: 2020-01-10 | End: 2020-01-13 | Stop reason: HOSPADM

## 2020-01-10 RX ORDER — NALOXONE HYDROCHLORIDE 0.4 MG/ML
.1-.4 INJECTION, SOLUTION INTRAMUSCULAR; INTRAVENOUS; SUBCUTANEOUS
Status: DISCONTINUED | OUTPATIENT
Start: 2020-01-10 | End: 2020-01-13 | Stop reason: HOSPADM

## 2020-01-10 RX ORDER — ACETAMINOPHEN 325 MG/1
650 TABLET ORAL EVERY 4 HOURS PRN
Status: DISCONTINUED | OUTPATIENT
Start: 2020-01-10 | End: 2020-01-13 | Stop reason: HOSPADM

## 2020-01-10 RX ORDER — SULFAMETHOXAZOLE/TRIMETHOPRIM 800-160 MG
1 TABLET ORAL AT BEDTIME
Status: ON HOLD | COMMUNITY
End: 2020-01-13

## 2020-01-10 RX ORDER — HEPARIN SODIUM,PORCINE 10 UNIT/ML
5-10 VIAL (ML) INTRAVENOUS
Status: DISCONTINUED | OUTPATIENT
Start: 2020-01-10 | End: 2020-01-13 | Stop reason: HOSPADM

## 2020-01-10 RX ORDER — DEXAMETHASONE 4 MG/1
12 TABLET ORAL ONCE
Status: COMPLETED | OUTPATIENT
Start: 2020-01-10 | End: 2020-01-10

## 2020-01-10 RX ORDER — OMEPRAZOLE 40 MG/1
40 CAPSULE, DELAYED RELEASE ORAL DAILY PRN
Status: ON HOLD | COMMUNITY
End: 2020-01-13

## 2020-01-10 RX ORDER — DIPHENHYDRAMINE HYDROCHLORIDE 50 MG/ML
50 INJECTION INTRAMUSCULAR; INTRAVENOUS
Status: DISCONTINUED | OUTPATIENT
Start: 2020-01-10 | End: 2020-01-13 | Stop reason: HOSPADM

## 2020-01-10 RX ORDER — ALBUTEROL SULFATE 0.83 MG/ML
2.5 SOLUTION RESPIRATORY (INHALATION)
Status: DISCONTINUED | OUTPATIENT
Start: 2020-01-10 | End: 2020-01-13 | Stop reason: HOSPADM

## 2020-01-10 RX ORDER — LEUCOVORIN CALCIUM 350 MG/17.5ML
25 INJECTION, POWDER, LYOPHILIZED, FOR SOLUTION INTRAMUSCULAR; INTRAVENOUS EVERY 6 HOURS
Status: DISCONTINUED | OUTPATIENT
Start: 2020-01-11 | End: 2020-01-13 | Stop reason: HOSPADM

## 2020-01-10 RX ORDER — ONDANSETRON 4 MG/1
4 TABLET, ORALLY DISINTEGRATING ORAL EVERY 6 HOURS PRN
Status: DISCONTINUED | OUTPATIENT
Start: 2020-01-10 | End: 2020-01-10

## 2020-01-10 RX ORDER — LORAZEPAM 2 MG/ML
.5-1 INJECTION INTRAMUSCULAR EVERY 6 HOURS PRN
Status: DISCONTINUED | OUTPATIENT
Start: 2020-01-10 | End: 2020-01-13 | Stop reason: HOSPADM

## 2020-01-10 RX ORDER — AMOXICILLIN 250 MG
1 CAPSULE ORAL 2 TIMES DAILY PRN
Status: DISCONTINUED | OUTPATIENT
Start: 2020-01-10 | End: 2020-01-13 | Stop reason: HOSPADM

## 2020-01-10 RX ORDER — HEPARIN SODIUM,PORCINE 10 UNIT/ML
5-10 VIAL (ML) INTRAVENOUS EVERY 24 HOURS
Status: DISCONTINUED | OUTPATIENT
Start: 2020-01-10 | End: 2020-01-13 | Stop reason: HOSPADM

## 2020-01-10 RX ORDER — LORAZEPAM 0.5 MG/1
.5-1 TABLET ORAL EVERY 6 HOURS PRN
Status: DISCONTINUED | OUTPATIENT
Start: 2020-01-10 | End: 2020-01-10

## 2020-01-10 RX ORDER — PROCHLORPERAZINE MALEATE 5 MG
10 TABLET ORAL EVERY 6 HOURS PRN
Status: DISCONTINUED | OUTPATIENT
Start: 2020-01-10 | End: 2020-01-10

## 2020-01-10 RX ORDER — ATOVAQUONE 750 MG/5ML
1500 SUSPENSION ORAL DAILY
Status: DISCONTINUED | OUTPATIENT
Start: 2020-01-10 | End: 2020-01-13 | Stop reason: HOSPADM

## 2020-01-10 RX ORDER — AMOXICILLIN 250 MG
2 CAPSULE ORAL 2 TIMES DAILY PRN
Status: DISCONTINUED | OUTPATIENT
Start: 2020-01-10 | End: 2020-01-13 | Stop reason: HOSPADM

## 2020-01-10 RX ORDER — PROCHLORPERAZINE MALEATE 5 MG
10 TABLET ORAL EVERY 6 HOURS PRN
Status: DISCONTINUED | OUTPATIENT
Start: 2020-01-10 | End: 2020-01-13 | Stop reason: HOSPADM

## 2020-01-10 RX ORDER — SODIUM CHLORIDE 9 MG/ML
1000 INJECTION, SOLUTION INTRAVENOUS CONTINUOUS PRN
Status: DISCONTINUED | OUTPATIENT
Start: 2020-01-10 | End: 2020-01-13 | Stop reason: HOSPADM

## 2020-01-10 RX ORDER — NALOXONE HYDROCHLORIDE 0.4 MG/ML
.1-.4 INJECTION, SOLUTION INTRAMUSCULAR; INTRAVENOUS; SUBCUTANEOUS
Status: DISCONTINUED | OUTPATIENT
Start: 2020-01-10 | End: 2020-01-10

## 2020-01-10 RX ORDER — MEPERIDINE HYDROCHLORIDE 25 MG/ML
25 INJECTION INTRAMUSCULAR; INTRAVENOUS; SUBCUTANEOUS EVERY 30 MIN PRN
Status: DISCONTINUED | OUTPATIENT
Start: 2020-01-10 | End: 2020-01-13 | Stop reason: HOSPADM

## 2020-01-10 RX ADMIN — DEXAMETHASONE 12 MG: 4 TABLET ORAL at 14:59

## 2020-01-10 RX ADMIN — ONDANSETRON 16 MG: 4 TABLET, ORALLY DISINTEGRATING ORAL at 14:59

## 2020-01-10 RX ADMIN — SODIUM BICARBONATE: 84 INJECTION, SOLUTION INTRAVENOUS at 15:01

## 2020-01-10 RX ADMIN — SODIUM BICARBONATE: 84 INJECTION, SOLUTION INTRAVENOUS at 20:21

## 2020-01-10 RX ADMIN — ACETAMINOPHEN 650 MG: 325 TABLET, FILM COATED ORAL at 18:42

## 2020-01-10 RX ADMIN — SODIUM BICARBONATE: 84 INJECTION, SOLUTION INTRAVENOUS at 10:30

## 2020-01-10 RX ADMIN — LIDOCAINE: 40 CREAM TOPICAL at 09:50

## 2020-01-10 RX ADMIN — ACYCLOVIR 400 MG: 400 TABLET ORAL at 20:25

## 2020-01-10 RX ADMIN — METHOTREXATE SODIUM 6.37 G: 25 INJECTION, SOLUTION INTRA-ARTERIAL; INTRAMUSCULAR; INTRATHECAL; INTRAVENOUS at 16:13

## 2020-01-10 RX ADMIN — LORAZEPAM 0.5 MG: 0.5 TABLET ORAL at 12:40

## 2020-01-10 RX ADMIN — ATOVAQUONE 1500 MG: 750 SUSPENSION ORAL at 18:42

## 2020-01-10 RX ADMIN — HEPARIN, PORCINE (PF) 10 UNIT/ML INTRAVENOUS SYRINGE 5 ML: at 19:53

## 2020-01-10 ASSESSMENT — ACTIVITIES OF DAILY LIVING (ADL)
ADLS_ACUITY_SCORE: 12
ADLS_ACUITY_SCORE: 12
ADLS_ACUITY_SCORE: 14

## 2020-01-10 ASSESSMENT — MIFFLIN-ST. JEOR: SCORE: 1379.3

## 2020-01-10 NOTE — CONSULTS
[facility]    Hematology / Oncology Consultation     Date of Admission:  1/10/2020  Date of Consult (When I saw the patient): 01/10/20    Assessment & Plan   Kassie Ramirez is a 49 year old female who was admitted on 1/10/2020. I was asked to see the patient for DLBCL, EBV+ non-GCB, stage III.    Kassie presented with worsening SOB and was noted to have a large mediastinal mass causing respiratory compromise. This was diagnosed to be DLBCL, EBV+ non-GCB, stage III, intermediate risk disease. She was admitted with worsening SOB after her first cycle of R-CHOP and was diagnosed with PJV pneumonia. She has almost completed her planned 3 weeks of bactrim therapy. She has completed cycle 2 of RCHOP and has tolerated it well.     She has intermediate risk disease for CNS recurrence and has been admitted for high dose methotrexate for CNS prophylaxis.     She will get infusional methotrexate at 3.5 g/m  with leucovorin rescue. She will stay inpatient for alkalizing urine and leucovorin till methotrexate levels are 0.05 or less. I have reviewed the expected side effects on methotrexate. Mucositis is the commonest problem on this medication. I do not expect that she will have any difficulty.    Her PLT counts were lower at 82k down from 413k on restarting her 2nd cycle of R-CHOP. I will proceed with chemotherapy as planned with curative intent. We should follow CBC daily. Transfuse for PLT < 10k while inpatient or <20k if planning/anticipating discharge within 24 hrs.    ANC 11k and more than adequate.   Hgb is 7.7 g/dl - low on chemotherapy, surprising drop from 12/27/19.  Has chest pain across anteriorly which has been responding to her lorazepam. This sounds muscular pain based on response to lorazepam. I am not sure about the cause for this pain. Okay to try tramadol if lorazepam not helpful. Avoid NSAID's due to severe thrombocytopenia on chemotherapy.   Discontinue bactrim. Has completed therapy and  serious drug drug interaction with methotrexate.      Appreciate help in managing her during the course of this admission. We will continue to follow along. Please feel free to reach me with any questions.     Over 65 min of time spent with more than 50% time spent in counseling and coordinating care.      Casrto Castro MD    Code Status    Prior    Reason for Consult   Reason for consult: I was asked by Dr. Tiera Verma to evaluate this patient for DLBCL.    Primary Care Physician   Mary Alice Colón    Chief Complaint   Admission for chemotherapy    History is obtained from the patient    History of Present Illness   Kassie Ramirez is a 49 year old female who has been admitted for high dose methotrexate chemotherapy.     Cassi was in room alone. She is doing well. She has been admitted for planned chemotherapy with high dose methotrexate.     Past Medical History   I have reviewed this patient's medical history and updated it with pertinent information if needed.   Past Medical History:   Diagnosis Date     Anxiety attack 9/16/2014     Diffuse large B-cell lymphoma (H)     Diagnosed 11/2019     Encounter for Essure implantation 2009     Generalized anxiety disorder 9/16/2014    zoloft = flat emotions     Menopausal disorder     started on OCPs by menopause center 3/2017 (takes active continuously)     Menstrual headache     helped by OCPs and magnesium     GERTRUDE (stress urinary incontinence, female)     sling procedure 2016     SVT (supraventricular tachycardia) (H)        Past Surgical History   I have reviewed this patient's surgical history and updated it with pertinent information if needed.  Past Surgical History:   Procedure Laterality Date     H KIT ESSURE  2009    essure - Dr. Cailin Raymundo      HERNIORRHAPHY UMBILICAL  1974     IR CHEST PORT PLACEMENT > 5 YRS OF AGE  1/9/2020     SLING TRANSPUBO WITHOUT ANTERIOR COLPORRHAPHY N/A 11/21/2016    Procedure: SLING TRANSPUBO WITHOUT ANTERIOR COLPORRHAPHY;   Surgeon: Hernesto Berrios MD;  Location:  OR       Prior to Admission Medications   Prior to Admission Medications   Prescriptions Last Dose Informant Patient Reported? Taking?   LORazepam (ATIVAN) 0.5 MG tablet   No No   Sig: Take 1-2 tablets (0.5-1 mg) by mouth every 6 hours as needed (Breakthrough Nausea / Vomiting)   SENNA PO   Yes No   Sig: Take 1 tablet by mouth every 72 hours as needed   acyclovir (ZOVIRAX) 400 MG tablet   No No   Sig: Take 1 tablet (400 mg) by mouth 2 times daily   allopurinol (ZYLOPRIM) 300 MG tablet   Yes No   Sig: Take 1 tablet (300 mg) by mouth daily   fluconazole (DIFLUCAN) 200 MG tablet   No No   Sig: Take 1 tablet (200 mg) by mouth daily   omeprazole (PRILOSEC) 40 MG DR capsule   No No   Sig: Take 1 capsule (40 mg) by mouth every morning (before breakfast)   ondansetron (ZOFRAN-ODT) 8 MG ODT tab   No No   Sig: Take 1 tablet (8 mg) by mouth every 8 hours as needed   predniSONE (DELTASONE) 20 MG tablet   No No   Sig: Take 1 tablet (20 mg) by mouth daily for 9 days Following 5 days of steroids with chemotherapy. Then start steroid taper per Dr. Castro.   predniSONE (DELTASONE) 5 MG tablet   No No   Sig: Take 2 tablets (10 mg) by mouth daily for 7 days, THEN 1 tablet (5 mg) daily for 7 days, THEN 1 tablet (5 mg) every other day for 10 days.   predniSONE (DELTASONE) 50 MG tablet   No No   Sig: Take 1 tablet (50 mg) by mouth 2 times daily for 5 days Take on Days 1 through 5. Take first dose in AM prior to chemotherapy.   prochlorperazine (COMPAZINE) 10 MG tablet   No No   Sig: Take 1 tablet (10 mg) by mouth every 6 hours as needed (Breakthrough Nausea/Vomiting)   sulfamethoxazole-trimethoprim (BACTRIM DS/SEPTRA DS) 800-160 MG tablet   No No   Sig: Take 2 tablets by mouth 3 times daily for 14 days   sulfamethoxazole-trimethoprim (BACTRIM DS/SEPTRA DS) 800-160 MG tablet   No No   Sig: Take 1 tablet by mouth daily Starting 1/7.      Facility-Administered Medications: None     Allergies    Allergies   Allergen Reactions     Cold & Flu [Cold Defense Fighter]      See pseudoephedrine     Seasonal Allergies      Sudafed Cold-Cough [Dayquil Liquicaps]      Pseudoephedrine Rash     Rash then skins peels off        Social History   I have reviewed this patient's social history and updated it with pertinent information if needed. Kassie Ramirez  reports that she has never smoked. She has never used smokeless tobacco. She reports that she does not drink alcohol or use drugs.    Family History   I have reviewed this patient's family history and updated it with pertinent information if needed.   Family History   Problem Relation Age of Onset     Hypertension Mother      Depression Mother      Lipids Mother      Cardiovascular Mother      Circulatory Mother      Diabetes Mother      Heart Disease Mother      Cerebrovascular Disease Mother      Obesity Mother      C.A.D. Mother      Lung Cancer Mother         smoker     Eye Disorder Father         cone dystrophy     Macular Degeneration Father      Diabetes Maternal Aunt         type 2     Hypertension Maternal Aunt      Heart Disease Maternal Grandfather      Heart Disease Sister         high cholesterol       Macular Degeneration Sister        Review of Systems   The 10 point Review of Systems is negative other than noted in the HPI or here.      Physical Exam   Temp: 96.9  F (36.1  C) Temp src: Oral BP: 129/87   Heart Rate: 116 Resp: 16        Vital Signs with Ranges  Temp:  [96.9  F (36.1  C)-97.7  F (36.5  C)] 96.9  F (36.1  C)  Pulse:  [101-106] 104  Heart Rate:  [116] 116  Resp:  [13-16] 16  BP: (114-129)/(65-87) 129/87  SpO2:  [95 %-100 %] 96 %  159 lbs 1.6 oz    Port site not infected  Comfortably laying in bed in no distress  Breathing comfortably on room air.     Data   Recent Labs   Lab Test 01/10/20  1028 01/08/20  0714 01/08/20  0600 12/27/19  0820 12/24/19  0748 12/23/19  0655  12/20/19  0624 12/19/19  2106 12/19/19  0726     --   --   132*  --  135  --  139  --  136   POTASSIUM 3.8  --   --  4.1  --  4.1  --  3.5 3.5 2.7*   CHLORIDE 103  --   --  100  --  101  --  107  --  105   CO2 28  --   --  22  --  24  --  24  --  27   ANIONGAP 7  --   --  10  --  10  --  8  --  4   BUN 19  --   --  24  --  16  --  8  --  5*   CR 0.85 1.04 1.04 1.07* 0.85 0.88   < > 0.82  --  1.01   *  --   --  80  --  167*  --  163*  --  116*   AFSHAN 9.2  --   --  9.6  --  9.2  --  8.5  --  8.2*    < > = values in this interval not displayed.     Recent Labs   Lab Test 12/01/19  1340 12/01/19  0641 11/30/19  2137 11/30/19  1341 11/30/19  0522  11/27/19  2216 11/27/19  1605 09/19/19  0706 09/18/19  0845   MAG  --   --   --   --   --   --  2.1 2.5* 2.4* 2.2   PHOS 2.8 3.4 2.8 2.5 4.1   < > 3.1  --   --   --     < > = values in this interval not displayed.     Recent Labs   Lab Test 01/10/20  1028 12/27/19  0820 12/24/19  0748 12/23/19  0655 12/21/19  0601 12/20/19  0624 12/19/19  0726  12/18/19  0712 12/17/19  0715   WBC 13.3* 10.3  --  19.0* 10.6 9.4 7.2  --  7.7 6.3   HGB 7.7* 12.2  --  10.5* 9.0* 9.7* 8.8*  --  9.2* 7.6*   PLT 82* 413 390 371 221 235 210   < > 249 222   MCV 98 93  --  92 91 90 91  --  93 95   NEUTROPHIL 83.0 74.0  --   --   --   --  64.0  --  72.0 82.0    < > = values in this interval not displayed.     Recent Labs   Lab Test 01/10/20  1028 12/27/19  0820 12/16/19  1743 12/15/19  0656  11/29/19  0526 11/28/19  0537   BILITOTAL 0.4 0.6  --  1.2   < > 1.2 1.2   ALKPHOS 100 74  --  70   < > 69 74   ALT 36 37  --  34   < > 25 26   AST 22 36  --  41   < > 22 24   ALBUMIN 3.2* 3.7  --  3.0*   < > 3.0* 3.2*   LDH  --   --  596*  --   --  416* 450*    < > = values in this interval not displayed.     TSH   Date Value Ref Range Status   09/18/2019 2.06 0.40 - 4.00 mU/L Final   07/27/2018 2.04 0.40 - 4.00 mU/L Final   09/16/2014 1.62 0.40 - 4.00 mU/L Final     Comment:     Effective 7/30/2014, the reference range for this assay has changed to reflect   new  instrumentation/methodology.

## 2020-01-10 NOTE — H&P
[facility]  Hospitalist Admission Note  Name: Kassie Ramirez    MRN: [id number]  YOB: 1970    Age: 49 year old  Date of admission: 1/10/2020  Primary care provider: Mary Alice Colón    Chief Complaint:  High Dose Methotrexate for CNS Prophylaxis    Assessment and Plan:     Kassie Ramirez is a 49 year old female with PMH including DLBCL, EBV+ non-GCB Stage III with course complicated by neutropenic fever and diagnosis of PJV pneumonia who has completed 2 cycles of R-CHOP chemotherapy who was admitted on 1/10/2020 for high-dose methotrexate infusion for CNS prophylaxis.    1. DLBCL, EBV+ non-GCB Stage III: Patient follows with Dr. Ryan with [place] [facility].  She has completed cycle 2 of R-CHOP and has tolerated it well.  She has intermediate risk disease for CNS recurrence and is currently admitted for high-dose methotrexate for CNS prophylaxis.  - Oncology consulted, orders have been placed for methotrexate with leucovorin rescue  - Patient will remain inpatient for alkalizing urine and leucovorin until methotrexate levels are 0.05 or less    2.  Thrombocytopenia: Likely due to chemotherapy.  CBC will be followed daily.  No bleeding.  - Transfuse for platelets less than 10,000 or less than 20,000 if anticipating discharge within 24 hours    3.  Recent diagnosis of PJV pneumonia: Hospitalized at [facility] from 12/18-12/24 with acute hypoxic respiratory failure and sepsis due to PJV pneumonia.  Patient was treated with Bactrim.  She was to complete a 21-day course then decrease to once daily for prophylaxis.  She was also on a prednisone taper.  She had been on prolonged steroids prior to that admission taper off steroids. Her prednisone is being tapered, when she saw Dr. RYAN in clinic on 12/27 she needed to take 5 days of prednisone 100 mg daily as part of her R-CHOP.  After that Dr. Ryan had recommended to take 40 mg daily for a week then taper by 10 mg weekly to 10 mg  then dropped to 5 mg then 5 mg every other day.  - As part of her chemotherapy she was given Decadron 12 mg prior to methotrexate dose with subsequent plans for dexamethasone 8 mg daily starting 1/11 for 2 days  - I suspect she will resume steroid taper upon discharge but defer this to Dr. Castro  - She cannot take Bactrim as there is a significant interaction with methotrexate, this is been changed to atovaquone prophylactic dose (believe she needs to be on this with high-dose steroids)    Diet: Regular Diet Adult    DVT Prophylaxis: Pneumatic Compression Devices  Phillips Catheter: not present  Code Status: Full Code      Disposition Plan   Expected discharge: 2 - 3 days, recommended to prior living arrangement once chemotherapy completed and stable counts.  Entered: Tirea Verma MD 01/10/2020, 11:11 AM     The patient's care was discussed with the Bedside Nurse and Patient.    Tiera Verma MD  [facility]      History of Present Illness:  Kassie Ramirez is a 49 year old female with PMH including DLBCL, EBV+ non-GCB Stage III with course complicated by neutropenic fever and diagnosis of PJV pneumonia who has completed 2 cycles of R-CHOP chemotherapy who was admitted on 1/10/2020 for high-dose methotrexate infusion for CNS prophylaxis.  Patient was a direct admission from oncology clinic to begin high-dose methotrexate chemotherapy.    The patient reports that she is currently feeling well.  She does feel fatigued since her treatment for pneumocystis pneumonia.  She is no longer on oxygen.  She is no longer coughing or short of breath.  She denies nausea or vomiting and has been eating well.  No fevers or chills.    She is being admitted for high-dose methotrexate chemotherapy for her lymphoma.  See above for details.     Past Medical History:  Past Medical History:   Diagnosis Date     Anxiety attack 9/16/2014     Diffuse large B-cell lymphoma (H)     Diagnosed 11/2019     Encounter for William  implantation 2009     Generalized anxiety disorder 9/16/2014    zoloft = flat emotions     Menopausal disorder     started on OCPs by menopause center 3/2017 (takes active continuously)     Menstrual headache     helped by OCPs and magnesium     GERTRUDE (stress urinary incontinence, female)     sling procedure 2016     SVT (supraventricular tachycardia) (H)      Past Surgical History:  Past Surgical History:   Procedure Laterality Date     H KIT ESSURE  2009    essure - Dr. Cailin Raymundo      HERNIORRHAPHY UMBILICAL  1974     IR CHEST PORT PLACEMENT > 5 YRS OF AGE  1/9/2020     SLING TRANSPUBO WITHOUT ANTERIOR COLPORRHAPHY N/A 11/21/2016    Procedure: SLING TRANSPUBO WITHOUT ANTERIOR COLPORRHAPHY;  Surgeon: Hernesto Berrios MD;  Location: [facility] OR     Social History:  Social History     Tobacco Use     Smoking status: Never Smoker     Smokeless tobacco: Never Used   Substance Use Topics     Alcohol use: No     Alcohol/week: 0.0 standard drinks     Comment: 1 time per month     Social History     Social History Narrative    Stay-at-home mom.       Family History:  Family History   Problem Relation Age of Onset     Hypertension Mother      Depression Mother      Lipids Mother      Cardiovascular Mother      Circulatory Mother      Diabetes Mother      Heart Disease Mother      Cerebrovascular Disease Mother      Obesity Mother      C.A.D. Mother      Lung Cancer Mother         smoker     Eye Disorder Father         cone dystrophy     Macular Degeneration Father      Diabetes Maternal Aunt         type 2     Hypertension Maternal Aunt      Heart Disease Maternal Grandfather      Heart Disease Sister         high cholesterol       Macular Degeneration Sister      Allergies:  Allergies   Allergen Reactions     Cold & Flu [Cold Defense Fighter]      See pseudoephedrine     Seasonal Allergies      Sudafed Cold-Cough [Dayquil Liquicaps]      Pseudoephedrine Rash     Rash then skins peels off      Medications:  Medications Prior  "to Admission   Medication Sig Dispense Refill Last Dose     acyclovir (ZOVIRAX) 400 MG tablet Take 1 tablet (400 mg) by mouth 2 times daily 60 tablet 3 1/10/2020 at am     LORazepam (ATIVAN) 0.5 MG tablet Take 1-2 tablets (0.5-1 mg) by mouth every 6 hours as needed (Breakthrough Nausea / Vomiting) 30 tablet 0 Past Week at Unknown time     omeprazole (PRILOSEC) 40 MG DR capsule Take 40 mg by mouth daily as needed   Past Week at Unknown time     ondansetron (ZOFRAN-ODT) 8 MG ODT tab Take 1 tablet (8 mg) by mouth every 8 hours as needed 45 tablet 0 Past Week at Unknown time     predniSONE (DELTASONE) 5 MG tablet Take 2 tablets (10 mg) by mouth daily for 7 days, THEN 1 tablet (5 mg) daily for 7 days, THEN 1 tablet (5 mg) every other day for 10 days. 26 tablet 0 1/10/2020 at 5mg     prochlorperazine (COMPAZINE) 10 MG tablet Take 1 tablet (10 mg) by mouth every 6 hours as needed (Breakthrough Nausea/Vomiting) 30 tablet 0 Past Month at Unknown time     SENNA PO Take 1 tablet by mouth as needed    Taking     sulfamethoxazole-trimethoprim (BACTRIM DS/SEPTRA DS) 800-160 MG tablet Take 1 tablet by mouth At Bedtime   1/9/2020 at Unknown time     Review of Systems:  A Comprehensive greater than 10 system review of systems was carried out.  Pertinent positives and negatives are noted above.  Otherwise negative for contributory information.     Physical Exam:  Blood pressure 129/87, temperature 96.9  F (36.1  C), temperature source Oral, resp. rate 16, height 1.702 m (5' 7\"), weight 72.2 kg (159 lb 1.6 oz), not currently breastfeeding.  Wt Readings from Last 1 Encounters:   01/10/20 72.2 kg (159 lb 1.6 oz)     Exam:   General: Alert, awake, no acute distress.  HEENT: NC/AT, eyes anicteric and without injection, EOMI, face symmetric.  Dentition WNL, MMM.  Cardiac: RRR, normal S1, S2.  No murmurs/g/r.  No LE edema  Pulmonary: Normal chest rise, normal work of breathing.  Lungs CTAB without crackles or wheezing  Abdomen: soft, " non-tender, non-distended.  Normoactive BS.  No guarding or rebound tenderness.  Extremities: no deformities.  Warm, well perfused.  Skin: no rashes or lesions noted.  Warm and Dry.  Neuro: No focal deficits noted.  Speech clear.  Coordination and strength grossly normal.  Psych: Appropriate affect. Alert and oriented x3    Data Reviewed Today:  Imaging:  Results for orders placed or performed during the hospital encounter of 01/09/20   IR Chest Port Placement > 5 Yrs of Age    Narrative    PORT PLACEMENT 1/9/2020 10:50 AM    HISTORY: Neoplasm    COMPARISON: None.    DESCRIPTION OF PROCEDURE: After obtaining informed consent, the  patient was placed in a supine position on the fluoroscopy table. The  neck was prepped and draped in the usual sterile manner.     Ultrasound was used to evaluate and document patency of the internal  jugular vein. One percent lidocaine was injected for local anesthesia.  Under sterile ultrasound guidance, a puncture was made into the  internal jugular vein. A permanent copy image was obtained for the  patient's records.    A 6 Hungarian peel-away sheath was placed into the vein. The chest and  neck were anesthetized with lidocaine. A 2 cm skin incision was made  in the chest. A pocket for a port was created using blunt dissection.  The pocket was washed with antibiotic-laden saline. The pocket was  packed with antibiotic-laden gauze. A tunnel for the port was created  from the pocket to the neck. The port catheter was pulled through, and  the attached port was then placed into the pocket. The port was  secured in the pocket using 2 Ethilon around the port nozzle. The  catheter was measured to appropriate length and was placed through the  peel-away sheath. The tip was positioned in the mid to high right  atrium. The pocket was closed with three 3-0 Vicryl deep interrupted  stitches and Dermabond. The neck incision site was closed with  Dermabond.    The port was accessed, aspirated, flushed  with saline and heparin  locked. A dressing was applied.    A fluoroscopic image was saved to document tip of catheter in  satisfactory position.     I determined this patient to be an appropriate candidate for the  planned sedation and procedure and reassessed the patient immediately  prior to sedation and procedure. Moderate intravenous conscious  sedation was supervised by me. The patient was independently monitored  by a registered nurse assigned to the Department of radiology using  automated blood pressure, EKG and pulse oximetry. The patient  tolerated the procedure well. There were no immediate postprocedure  complications. The patient's vital signs were monitored by radiology  nursing staff under my supervision and remained stable throughout the  study. Radiation dose for this scan was reduced using automated  exposure control, adjustment of the mA and/or kV according to patient  size, or iterative reconstruction technique.    Maximal Sterile Barrier Technique Utilized: Cap AND mask AND sterile  gown AND sterile gloves AND sterile full body drape AND hand hygiene  AND skin preparation 2% chlorhexidine for cutaneous antisepsis (or  acceptable alternative antiseptics).     Sterile Ultrasound Technique Utilized ?Sterile gel AND sterile probe  covers.    MEDICATIONS: 1 mg Versed, 50 mg fentanyl    SEDATION TIME: 30 minutes    FLUOROSCOPY TIME: .6 minutes    RADIATION DOSE: 4 mGy    NUMBER OF SPOT FLUOROSCOPIC IMAGES:      Impression    IMPRESSION: Power port placed as above. The port is ready to use.    EDWINA SIMMONS MD     Labs:  Recent Labs   Lab 01/10/20  1028   WBC 13.3*   HGB 7.7*   HCT 23.5*   MCV 98   PLT 82*     Recent Labs   Lab 01/10/20  1028 01/08/20  0714 01/08/20  0600     --   --    POTASSIUM 3.8  --   --    CHLORIDE 103  --   --    CO2 28  --   --    ANIONGAP 7  --   --    *  --   --    BUN 19  --   --    CR 0.85 1.04 1.04   GFRESTIMATED 80  --  63   GFRESTBLACK >90  --  73   AFSHAN  9.2  --   --        Tiera Verma MD  Hospitalist  [facility]

## 2020-01-10 NOTE — PHARMACY
January 10, 2020  Ok to proceed with high dose methotrexate chemo with platelet count of 82 and hemoglobin of 7.7 per telephone order from Dr. Castro.  [place], [facility]

## 2020-01-10 NOTE — PHARMACY-ADMISSION MEDICATION HISTORY
Admission medication history interview status for this patient is complete. See [facility] admission navigator for allergy information, prior to admission medications and immunization status.     Medication history interview source(s):Patient  Medication history resources (including written lists, pill bottles, clinic record):None  Primary pharmacy:    Changes made to PTA medication list:  Added: none  Deleted: allopurinol, fluconazole  Changed: Bactrim to daily at HS, omeprazole to prn(took the first couple weeks of chemo), prednisone(finished 10mg dose yesterday, started 5mg daily this morning), senna to prn    Actions taken by pharmacist (provider contacted, etc):None     Additional medication history information:None    Medication reconciliation/reorder completed by provider prior to medication history?  No     Do you take OTC medications (eg tylenol, ibuprofen, fish oil, eye/ear drops, etc)? Yes     For patients on insulin therapy: No    Prior to Admission medications    Medication Sig Last Dose Taking? Auth Provider   acyclovir (ZOVIRAX) 400 MG tablet Take 1 tablet (400 mg) by mouth 2 times daily 1/10/2020 at am Yes Castro Castro MD   LORazepam (ATIVAN) 0.5 MG tablet Take 1-2 tablets (0.5-1 mg) by mouth every 6 hours as needed (Breakthrough Nausea / Vomiting)  Yes Bhavna Culp MD   omeprazole (PRILOSEC) 40 MG DR capsule Take 40 mg by mouth daily as needed Past Week at Unknown time Yes Unknown, Entered By History   ondansetron (ZOFRAN-ODT) 8 MG ODT tab Take 1 tablet (8 mg) by mouth every 8 hours as needed  Yes Castro Castro MD   predniSONE (DELTASONE) 5 MG tablet Take 2 tablets (10 mg) by mouth daily for 7 days, THEN 1 tablet (5 mg) daily for 7 days, THEN 1 tablet (5 mg) every other day for 10 days. 1/10/2020 at 5mg Yes Castro Castro MD   prochlorperazine (COMPAZINE) 10 MG tablet Take 1 tablet (10 mg) by mouth every 6 hours as needed (Breakthrough Nausea/Vomiting)  Yes Bhavna Culp  MD   SENNA PO Take 1 tablet by mouth as needed   Yes Unknown, Entered By History   sulfamethoxazole-trimethoprim (BACTRIM DS/SEPTRA DS) 800-160 MG tablet Take 1 tablet by mouth At Bedtime 1/9/2020 at Unknown time Yes Unknown, Entered By History

## 2020-01-11 LAB
ABO + RH BLD: NORMAL
ABO + RH BLD: NORMAL
ALBUMIN SERPL-MCNC: 2.8 G/DL (ref 3.4–5)
ALP SERPL-CCNC: 85 U/L (ref 40–150)
ALT SERPL W P-5'-P-CCNC: 43 U/L (ref 0–50)
ANION GAP SERPL CALCULATED.3IONS-SCNC: 8 MMOL/L (ref 3–14)
ANISOCYTOSIS BLD QL SMEAR: SLIGHT
ANISOCYTOSIS BLD QL SMEAR: SLIGHT
AST SERPL W P-5'-P-CCNC: 23 U/L (ref 0–45)
BASOPHILS # BLD AUTO: 0 10E9/L (ref 0–0.2)
BASOPHILS # BLD AUTO: 0 10E9/L (ref 0–0.2)
BASOPHILS NFR BLD AUTO: 0 %
BASOPHILS NFR BLD AUTO: 0 %
BILIRUB SERPL-MCNC: 0.7 MG/DL (ref 0.2–1.3)
BLD GP AB SCN SERPL QL: NORMAL
BLD PROD TYP BPU: NORMAL
BLOOD BANK CMNT PATIENT-IMP: NORMAL
BUN SERPL-MCNC: 14 MG/DL (ref 7–30)
CALCIUM SERPL-MCNC: 7.9 MG/DL (ref 8.5–10.1)
CHLORIDE SERPL-SCNC: 103 MMOL/L (ref 94–109)
CO2 SERPL-SCNC: 30 MMOL/L (ref 20–32)
CREAT SERPL-MCNC: 0.81 MG/DL (ref 0.52–1.04)
DACRYOCYTES BLD QL SMEAR: SLIGHT
DACRYOCYTES BLD QL SMEAR: SLIGHT
DIFFERENTIAL METHOD BLD: ABNORMAL
DIFFERENTIAL METHOD BLD: ABNORMAL
EOSINOPHIL # BLD AUTO: 0 10E9/L (ref 0–0.7)
EOSINOPHIL # BLD AUTO: 0 10E9/L (ref 0–0.7)
EOSINOPHIL NFR BLD AUTO: 0 %
EOSINOPHIL NFR BLD AUTO: 0 %
ERYTHROCYTE [DISTWIDTH] IN BLOOD BY AUTOMATED COUNT: 15.9 % (ref 10–15)
ERYTHROCYTE [DISTWIDTH] IN BLOOD BY AUTOMATED COUNT: 16.1 % (ref 10–15)
FIBRINOGEN PPP-MCNC: 182 MG/DL (ref 200–420)
GFR SERPL CREATININE-BSD FRML MDRD: 84 ML/MIN/{1.73_M2}
GLUCOSE SERPL-MCNC: 94 MG/DL (ref 70–99)
HCT VFR BLD AUTO: 21 % (ref 35–47)
HCT VFR BLD AUTO: 22.3 % (ref 35–47)
HGB BLD-MCNC: 6.9 G/DL (ref 11.7–15.7)
HGB BLD-MCNC: 7.3 G/DL (ref 11.7–15.7)
LACTATE BLD-SCNC: 2.6 MMOL/L (ref 0.7–2)
LDH SERPL L TO P-CCNC: 347 U/L (ref 81–234)
LYMPHOCYTES # BLD AUTO: 0.2 10E9/L (ref 0.8–5.3)
LYMPHOCYTES # BLD AUTO: 0.5 10E9/L (ref 0.8–5.3)
LYMPHOCYTES NFR BLD AUTO: 1 %
LYMPHOCYTES NFR BLD AUTO: 4 %
MAGNESIUM SERPL-MCNC: 2 MG/DL (ref 1.6–2.3)
MCH RBC QN AUTO: 31.9 PG (ref 26.5–33)
MCH RBC QN AUTO: 32.2 PG (ref 26.5–33)
MCHC RBC AUTO-ENTMCNC: 32.7 G/DL (ref 31.5–36.5)
MCHC RBC AUTO-ENTMCNC: 32.9 G/DL (ref 31.5–36.5)
MCV RBC AUTO: 97 FL (ref 78–100)
MCV RBC AUTO: 98 FL (ref 78–100)
METAMYELOCYTES # BLD: 0.1 10E9/L
METAMYELOCYTES # BLD: 0.2 10E9/L
METAMYELOCYTES NFR BLD MANUAL: 0.5 %
METAMYELOCYTES NFR BLD MANUAL: 1 %
MICROCYTES BLD QL SMEAR: PRESENT
MICROCYTES BLD QL SMEAR: PRESENT
MONOCYTES # BLD AUTO: 0 10E9/L (ref 0–1.3)
MONOCYTES # BLD AUTO: 0.1 10E9/L (ref 0–1.3)
MONOCYTES NFR BLD AUTO: 0 %
MONOCYTES NFR BLD AUTO: 1 %
MTX SERPL-SCNC: 13.11 UMOL/L
MYELOCYTES # BLD: 0.2 10E9/L
MYELOCYTES NFR BLD MANUAL: 1.5 %
NEUTROPHILS # BLD AUTO: 12.1 10E9/L (ref 1.6–8.3)
NEUTROPHILS # BLD AUTO: 16.7 10E9/L (ref 1.6–8.3)
NEUTROPHILS NFR BLD AUTO: 93 %
NEUTROPHILS NFR BLD AUTO: 98 %
NRBC # BLD AUTO: 0.1 10*3/UL
NRBC BLD AUTO-RTO: 1 /100
NUM BPU REQUESTED: 1
PH UR STRIP: 8.5 PH (ref 5–7)
PHOSPHATE SERPL-MCNC: 3.1 MG/DL (ref 2.5–4.5)
PLATELET # BLD AUTO: 114 10E9/L (ref 150–450)
PLATELET # BLD AUTO: 93 10E9/L (ref 150–450)
PLATELET # BLD EST: ABNORMAL 10*3/UL
PLATELET # BLD EST: ABNORMAL 10*3/UL
POLYCHROMASIA BLD QL SMEAR: SLIGHT
POLYCHROMASIA BLD QL SMEAR: SLIGHT
POTASSIUM SERPL-SCNC: 3.2 MMOL/L (ref 3.4–5.3)
POTASSIUM SERPL-SCNC: 3.8 MMOL/L (ref 3.4–5.3)
PROT SERPL-MCNC: 5.2 G/DL (ref 6.8–8.8)
RBC # BLD AUTO: 2.14 10E12/L (ref 3.8–5.2)
RBC # BLD AUTO: 2.29 10E12/L (ref 3.8–5.2)
RETICS # AUTO: 67.2 10E9/L (ref 25–95)
RETICS # AUTO: 76.9 10E9/L (ref 25–95)
RETICS/RBC NFR AUTO: 3.2 % (ref 0.5–2)
RETICS/RBC NFR AUTO: 3.3 % (ref 0.5–2)
SODIUM SERPL-SCNC: 141 MMOL/L (ref 133–144)
SPECIMEN EXP DATE BLD: NORMAL
TOXIC GRANULES BLD QL SMEAR: PRESENT
TOXIC GRANULES BLD QL SMEAR: PRESENT
URATE SERPL-MCNC: 5.6 MG/DL (ref 2.6–6)
WBC # BLD AUTO: 13 10E9/L (ref 4–11)
WBC # BLD AUTO: 17 10E9/L (ref 4–11)

## 2020-01-11 PROCEDURE — 40000847 ZZHCL STATISTIC MORPHOLOGY W/INTERP HISTOLOGY TC 85060: Performed by: INTERNAL MEDICINE

## 2020-01-11 PROCEDURE — 25800029 ZZH RX IP 258 OP 250: Performed by: INTERNAL MEDICINE

## 2020-01-11 PROCEDURE — 83605 ASSAY OF LACTIC ACID: CPT | Performed by: INTERNAL MEDICINE

## 2020-01-11 PROCEDURE — 25000128 H RX IP 250 OP 636: Performed by: INTERNAL MEDICINE

## 2020-01-11 PROCEDURE — 85045 AUTOMATED RETICULOCYTE COUNT: CPT | Performed by: INTERNAL MEDICINE

## 2020-01-11 PROCEDURE — 25000132 ZZH RX MED GY IP 250 OP 250 PS 637: Performed by: INTERNAL MEDICINE

## 2020-01-11 PROCEDURE — 99233 SBSQ HOSP IP/OBS HIGH 50: CPT | Performed by: INTERNAL MEDICINE

## 2020-01-11 PROCEDURE — 83010 ASSAY OF HAPTOGLOBIN QUANT: CPT | Performed by: INTERNAL MEDICINE

## 2020-01-11 PROCEDURE — 84550 ASSAY OF BLOOD/URIC ACID: CPT | Performed by: INTERNAL MEDICINE

## 2020-01-11 PROCEDURE — 86901 BLOOD TYPING SEROLOGIC RH(D): CPT | Performed by: INTERNAL MEDICINE

## 2020-01-11 PROCEDURE — 80299 QUANTITATIVE ASSAY DRUG: CPT | Performed by: INTERNAL MEDICINE

## 2020-01-11 PROCEDURE — 84132 ASSAY OF SERUM POTASSIUM: CPT | Performed by: INTERNAL MEDICINE

## 2020-01-11 PROCEDURE — 85004 AUTOMATED DIFF WBC COUNT: CPT | Performed by: INTERNAL MEDICINE

## 2020-01-11 PROCEDURE — 84100 ASSAY OF PHOSPHORUS: CPT | Performed by: INTERNAL MEDICINE

## 2020-01-11 PROCEDURE — 86923 COMPATIBILITY TEST ELECTRIC: CPT | Performed by: INTERNAL MEDICINE

## 2020-01-11 PROCEDURE — 85384 FIBRINOGEN ACTIVITY: CPT | Performed by: INTERNAL MEDICINE

## 2020-01-11 PROCEDURE — 25000131 ZZH RX MED GY IP 250 OP 636 PS 637: Performed by: INTERNAL MEDICINE

## 2020-01-11 PROCEDURE — 83735 ASSAY OF MAGNESIUM: CPT | Performed by: INTERNAL MEDICINE

## 2020-01-11 PROCEDURE — 85025 COMPLETE CBC W/AUTO DIFF WBC: CPT | Performed by: INTERNAL MEDICINE

## 2020-01-11 PROCEDURE — 86900 BLOOD TYPING SEROLOGIC ABO: CPT | Performed by: INTERNAL MEDICINE

## 2020-01-11 PROCEDURE — 99231 SBSQ HOSP IP/OBS SF/LOW 25: CPT | Performed by: INTERNAL MEDICINE

## 2020-01-11 PROCEDURE — 80053 COMPREHEN METABOLIC PANEL: CPT | Performed by: INTERNAL MEDICINE

## 2020-01-11 PROCEDURE — 83615 LACTATE (LD) (LDH) ENZYME: CPT | Performed by: INTERNAL MEDICINE

## 2020-01-11 PROCEDURE — 85060 BLOOD SMEAR INTERPRETATION: CPT | Performed by: INTERNAL MEDICINE

## 2020-01-11 PROCEDURE — 12000000 ZZH R&B MED SURG/OB

## 2020-01-11 PROCEDURE — 25000125 ZZHC RX 250: Performed by: INTERNAL MEDICINE

## 2020-01-11 PROCEDURE — 85027 COMPLETE CBC AUTOMATED: CPT | Performed by: INTERNAL MEDICINE

## 2020-01-11 PROCEDURE — 86850 RBC ANTIBODY SCREEN: CPT | Performed by: INTERNAL MEDICINE

## 2020-01-11 PROCEDURE — 81003 URINALYSIS AUTO W/O SCOPE: CPT | Performed by: INTERNAL MEDICINE

## 2020-01-11 RX ORDER — POTASSIUM CL/LIDO/0.9 % NACL 10MEQ/0.1L
10 INTRAVENOUS SOLUTION, PIGGYBACK (ML) INTRAVENOUS
Status: DISCONTINUED | OUTPATIENT
Start: 2020-01-11 | End: 2020-01-13 | Stop reason: HOSPADM

## 2020-01-11 RX ORDER — POTASSIUM CHLORIDE 7.45 MG/ML
10 INJECTION INTRAVENOUS
Status: DISCONTINUED | OUTPATIENT
Start: 2020-01-11 | End: 2020-01-13 | Stop reason: HOSPADM

## 2020-01-11 RX ORDER — POTASSIUM CHLORIDE 1.5 G/1.58G
20-40 POWDER, FOR SOLUTION ORAL
Status: DISCONTINUED | OUTPATIENT
Start: 2020-01-11 | End: 2020-01-13 | Stop reason: HOSPADM

## 2020-01-11 RX ORDER — MAGNESIUM SULFATE HEPTAHYDRATE 40 MG/ML
4 INJECTION, SOLUTION INTRAVENOUS EVERY 4 HOURS PRN
Status: DISCONTINUED | OUTPATIENT
Start: 2020-01-11 | End: 2020-01-13 | Stop reason: HOSPADM

## 2020-01-11 RX ORDER — POTASSIUM CHLORIDE 1500 MG/1
20-40 TABLET, EXTENDED RELEASE ORAL
Status: DISCONTINUED | OUTPATIENT
Start: 2020-01-11 | End: 2020-01-13 | Stop reason: HOSPADM

## 2020-01-11 RX ORDER — POTASSIUM CHLORIDE 29.8 MG/ML
20 INJECTION INTRAVENOUS
Status: DISCONTINUED | OUTPATIENT
Start: 2020-01-11 | End: 2020-01-13 | Stop reason: HOSPADM

## 2020-01-11 RX ADMIN — ATOVAQUONE 1500 MG: 750 SUSPENSION ORAL at 09:01

## 2020-01-11 RX ADMIN — LEUCOVORIN CALCIUM 50 MG: 350 INJECTION, POWDER, LYOPHILIZED, FOR SOLUTION INTRAMUSCULAR; INTRAVENOUS at 16:11

## 2020-01-11 RX ADMIN — SODIUM BICARBONATE: 84 INJECTION, SOLUTION INTRAVENOUS at 13:43

## 2020-01-11 RX ADMIN — ACYCLOVIR 400 MG: 400 TABLET ORAL at 09:01

## 2020-01-11 RX ADMIN — SODIUM BICARBONATE: 84 INJECTION, SOLUTION INTRAVENOUS at 18:03

## 2020-01-11 RX ADMIN — LEUCOVORIN CALCIUM 25 MG: 350 INJECTION, POWDER, LYOPHILIZED, FOR SOLUTION INTRAMUSCULAR; INTRAVENOUS at 22:35

## 2020-01-11 RX ADMIN — POTASSIUM CHLORIDE 40 MEQ: 1500 TABLET, EXTENDED RELEASE ORAL at 13:54

## 2020-01-11 RX ADMIN — DEXAMETHASONE 8 MG: 4 TABLET ORAL at 09:01

## 2020-01-11 RX ADMIN — POTASSIUM CHLORIDE 20 MEQ: 1500 TABLET, EXTENDED RELEASE ORAL at 18:03

## 2020-01-11 RX ADMIN — SODIUM BICARBONATE: 84 INJECTION, SOLUTION INTRAVENOUS at 22:32

## 2020-01-11 RX ADMIN — LORAZEPAM 0.5 MG: 0.5 TABLET ORAL at 22:37

## 2020-01-11 RX ADMIN — SODIUM BICARBONATE: 84 INJECTION, SOLUTION INTRAVENOUS at 09:00

## 2020-01-11 RX ADMIN — SODIUM BICARBONATE: 84 INJECTION, SOLUTION INTRAVENOUS at 00:37

## 2020-01-11 RX ADMIN — SODIUM BICARBONATE: 84 INJECTION, SOLUTION INTRAVENOUS at 04:14

## 2020-01-11 RX ADMIN — ACYCLOVIR 400 MG: 400 TABLET ORAL at 22:34

## 2020-01-11 ASSESSMENT — ACTIVITIES OF DAILY LIVING (ADL)
ADLS_ACUITY_SCORE: 12
COGNITION: 0 - NO COGNITION ISSUES REPORTED
TOILETING: 0-->INDEPENDENT
BATHING: 0-->INDEPENDENT
ADLS_ACUITY_SCORE: 12
ADLS_ACUITY_SCORE: 12
AMBULATION: 0-->INDEPENDENT
ADLS_ACUITY_SCORE: 12
RETIRED_COMMUNICATION: 0-->UNDERSTANDS/COMMUNICATES WITHOUT DIFFICULTY
ADLS_ACUITY_SCORE: 12
FALL_HISTORY_WITHIN_LAST_SIX_MONTHS: NO
DRESS: 0-->INDEPENDENT
ADLS_ACUITY_SCORE: 12
TRANSFERRING: 0-->INDEPENDENT
RETIRED_EATING: 0-->INDEPENDENT
SWALLOWING: 0-->SWALLOWS FOODS/LIQUIDS WITHOUT DIFFICULTY

## 2020-01-11 ASSESSMENT — MIFFLIN-ST. JEOR: SCORE: 1404.82

## 2020-01-11 NOTE — PROGRESS NOTES
Cross cover    I was called with a follow-up lactic acid level of 2.6 after having value at 4.0 just 3 hours ago.    At that time, the patient was seen by Dr. Dwyer, and I refer the reader to his note.  I think it makes sense that the patient is being treated currently with high-dose methotrexate and bicarbonate infusion for renal protection.  The elevated lactic acid level is presumably related to the bicarbonate infusion.    Spoke with the bedside nurse and reviewed the vital signs.  Patient is not demonstrating any change in status.  We will continue to follow clinically.    CARLOS

## 2020-01-11 NOTE — PLAN OF CARE
Pt direct admit from home for Chemo therapy  PORT placed yesterday- lidocaine and accessed this AM with no complications- was still very tender  Tolerating sodium bicarbonate well  Tolerating methotrexate well  Urine pH sent per orders  Independent in the room  Complains of some chest pain that is related to the lymphoma- Ativan helps her the most at home with this, gave 0.5 which did help  Headache after zofran- she reports that this does happen occasionally gave tylenol   Tolerating a diet with no nausea at this time

## 2020-01-11 NOTE — PLAN OF CARE
Patient alert and oriented x4. Up independent in room. Port infusing IV fluids, serial urine pH levels, 8.0, 8.5. Methotrexate level will be checked this AM. Trigger lactic protocol while chemo infusing, lactic level drawn after chemo finished, resulted 4.0, rechecked 4 hours late resulted 2.6, MD aware, see note. Denies pain throughout shift. Discharge pending.

## 2020-01-11 NOTE — CONSULTS
BRIEF NUTRITION NOTE      REASON FOR NUTRITION CONSULT:  RN consult: Takes boost at home would like to be able to continue as able. Here for chemo    ASSESSMENT:  Defer complete nutrition assessment. Briefly spoke with patient in lounge - oral nutrition supplements once per day at home - will add Vanilla Boost at 10 am. No other nutrition needs identified by patient.    FOLLOW UP:   Will follow per protocol      Marisol Sheth RDN, LD, CNSC  Pager - 3rd floor/ICU: [phone]  Pager - All other floors: [phone]  Pager - Weekend/holiday: [phone]  Office: [phone]

## 2020-01-11 NOTE — PROGRESS NOTES
01/11/20 0700   Significant Event   Significant Event Critical result/value  (Hgb 6.9 )   checo GAMA

## 2020-01-11 NOTE — PROGRESS NOTES
[facility]  Hospitalist Progress Note  Marty Quintero MD 01/11/20    Reason for Stay (Diagnosis): High-dose methotrexate for diffuse large B-cell lymphoma         Assessment and Plan:      Summary of Stay: Kassie Ramirez is a 49 year old female with PMH including DLBCL, EBV+ non-GCB Stage III with course complicated by neutropenic fever and diagnosis of PJV pneumonia who has completed 2 cycles of R-CHOP chemotherapy who was admitted on 1/10/2020 for high-dose methotrexate infusion for CNS prophylaxis.  Oncology is directing this care.  Her hemoglobin has drifted without evidence of overt blood loss.  Transfusing 1 unit on 1/11.     1. DLBCL, EBV+ non-GCB Stage III: Patient follows with Dr. Ryan with [facility].  She has completed cycle 2 of R-CHOP and has tolerated it well.  She has intermediate risk disease for CNS recurrence and is currently admitted for high-dose methotrexate for CNS prophylaxis.  - Oncology consulted, orders have been placed for methotrexate with leucovorin rescue  - Patient will remain inpatient for alkalizing urine and leucovorin until methotrexate levels are 0.05 or less     2.  Thrombocytopenia, anemia: Likely due to chemotherapy.  CBC will be followed daily.  No bleeding.  - Transfuse for platelets less than 10,000 or less than 20,000 if anticipating discharge within 24 hours  --Transfusing 1 unit red cells on 1/11 for hemoglobin of 6.9.     3.  Recent diagnosis of PJV pneumonia: Hospitalized at [facility] from 12/18-12/24 with acute hypoxic respiratory failure and sepsis due to PJV pneumonia.  Patient was treated with Bactrim.  She was to complete a 21-day course then decrease to once daily for prophylaxis.  She was also on a prednisone taper.  She had been on prolonged steroids prior to that admission taper off steroids. Her prednisone is being tapered, when she saw Dr. RYAN in clinic on 12/27 she needed to take 5 days of prednisone 100 mg daily as  "part of her R-CHOP.  After that Dr. Castro had recommended to take 40 mg daily for a week then taper by 10 mg weekly to 10 mg then dropped to 5 mg then 5 mg every other day.  - Steroids as per heme-onc.  Currently on Decadron  - She cannot take Bactrim as there is a significant interaction with methotrexate, this is been changed to atovaquone prophylactic dose (believe she needs to be on this with high-dose steroids)    4.  Hypokalemia: Replace per protocol.     Diet: Regular Diet Adult    DVT Prophylaxis: Pneumatic Compression Devices  Phillips Catheter: not present  Code Status: Full Code             Interval History (Subjective):      I assumed care today, history reviewed  Hemoglobin down to 6.9.  Patient denies any bleeding issues.  Discussed transfusion and she is agreeable.  Unclear if she needs irradiated blood.  I have requested that her oncology group be paged to clarify this.  Blood ordered.  Feels tired, otherwise no focal complaints                  Physical Exam:      Last Vital Signs:  BP (P) 128/86   Temp (P) 97.4  F (36.3  C) (Oral)   Resp (P) 18   Ht 1.702 m (5' 7.01\")   Wt 74.7 kg (164 lb 11.2 oz)   SpO2 (P) 97%   BMI 25.79 kg/m      I/O last 3 completed shifts:  In: 3654 [I.V.:3654]  Out: 3400 [Urine:3400]    General: Alert, awake, no acute distress.  HEENT: NC/AT, eyes anicteric, external occular movements intact, face symmetric.  Dentition WNL, MM moist.  Cardiac: RRR, S1, S2.  No murmurs appreciated.  Pulmonary: Normal chest rise, normal work of breathing.  Lungs CTA BL  Abdomen: soft, non-tender, non-distended.  Bowel Sounds Present.  No guarding.  Extremities: no deformities.  Warm, well perfused.  Skin: no rashes or lesions noted.  Warm and Dry.  Neuro: No focal deficits noted.  Speech clear.  Coordination and strength grossly normal.  Psych: Appropriate affect.         Medications:      All current medications were reviewed with changes reflected in problem list.         Data:      All new " lab and imaging data was reviewed.   Labs:  Recent Labs   Lab 01/11/20  0615      POTASSIUM 3.2*   CHLORIDE 103   CO2 30   ANIONGAP 8   GLC 94   BUN 14   CR 0.81   GFRESTIMATED 84   GFRESTBLACK >90   AFSHAN 7.9*     Recent Labs   Lab 01/11/20  0615   WBC 13.0*   HGB 6.9*   HCT 21.0*   MCV 98   PLT 93*      Imaging:   Recent Results (from the past 48 hour(s))   IR Chest Port Placement > 5 Yrs of Age    Narrative    PORT PLACEMENT 1/9/2020 10:50 AM    HISTORY: Neoplasm    COMPARISON: None.    DESCRIPTION OF PROCEDURE: After obtaining informed consent, the  patient was placed in a supine position on the fluoroscopy table. The  neck was prepped and draped in the usual sterile manner.     Ultrasound was used to evaluate and document patency of the internal  jugular vein. One percent lidocaine was injected for local anesthesia.  Under sterile ultrasound guidance, a puncture was made into the  internal jugular vein. A permanent copy image was obtained for the  patient's records.    A 6 Kazakh peel-away sheath was placed into the vein. The chest and  neck were anesthetized with lidocaine. A 2 cm skin incision was made  in the chest. A pocket for a port was created using blunt dissection.  The pocket was washed with antibiotic-laden saline. The pocket was  packed with antibiotic-laden gauze. A tunnel for the port was created  from the pocket to the neck. The port catheter was pulled through, and  the attached port was then placed into the pocket. The port was  secured in the pocket using 2 Ethilon around the port nozzle. The  catheter was measured to appropriate length and was placed through the  peel-away sheath. The tip was positioned in the mid to high right  atrium. The pocket was closed with three 3-0 Vicryl deep interrupted  stitches and Dermabond. The neck incision site was closed with  Dermabond.    The port was accessed, aspirated, flushed with saline and heparin  locked. A dressing was applied.    A fluoroscopic  image was saved to document tip of catheter in  satisfactory position.     I determined this patient to be an appropriate candidate for the  planned sedation and procedure and reassessed the patient immediately  prior to sedation and procedure. Moderate intravenous conscious  sedation was supervised by me. The patient was independently monitored  by a registered nurse assigned to the Department of radiology using  automated blood pressure, EKG and pulse oximetry. The patient  tolerated the procedure well. There were no immediate postprocedure  complications. The patient's vital signs were monitored by radiology  nursing staff under my supervision and remained stable throughout the  study. Radiation dose for this scan was reduced using automated  exposure control, adjustment of the mA and/or kV according to patient  size, or iterative reconstruction technique.    Maximal Sterile Barrier Technique Utilized: Cap AND mask AND sterile  gown AND sterile gloves AND sterile full body drape AND hand hygiene  AND skin preparation 2% chlorhexidine for cutaneous antisepsis (or  acceptable alternative antiseptics).     Sterile Ultrasound Technique Utilized ?Sterile gel AND sterile probe  covers.    MEDICATIONS: 1 mg Versed, 50 mg fentanyl    SEDATION TIME: 30 minutes    FLUOROSCOPY TIME: .6 minutes    RADIATION DOSE: 4 mGy    NUMBER OF SPOT FLUOROSCOPIC IMAGES:      Impression    IMPRESSION: Power port placed as above. The port is ready to use.    MD Marty CLAYTON MD , MD.

## 2020-01-11 NOTE — PROGRESS NOTES
[facility]    Hematology / Oncology Consultation     Date of Admission:  1/10/2020  Date of  Folow up : 01/11/20        HPI: This is a 49 year old woman with Stage III DLBCL, EBV+ non-GCB. She has completed cycle 2 of RCHOP and has tolerated it well.     She has intermediate risk disease for CNS recurrence and has been admitted for high dose methotrexate for CNS prophylaxis.     The pt completed high does methotrexate on 1/10/2020 evening.  which she tolerated well.    She denies nausea, vomiting, blood in nstool, fatigue, sob.    Past Medical History   I have reviewed this patient's medical history and updated it with pertinent information if needed.   Past Medical History:   Diagnosis Date     Anxiety attack 9/16/2014     Diffuse large B-cell lymphoma (H)     Diagnosed 11/2019     Encounter for Essure implantation 2009     Generalized anxiety disorder 9/16/2014    zoloft = flat emotions     Menopausal disorder     started on OCPs by menopause center 3/2017 (takes active continuously)     Menstrual headache     helped by OCPs and magnesium     GERTRUDE (stress urinary incontinence, female)     sling procedure 2016     SVT (supraventricular tachycardia) (H)        Past Surgical History   I have reviewed this patient's surgical history and updated it with pertinent information if needed.  Past Surgical History:   Procedure Laterality Date     H KIT ESSURE  2009    essure - Dr. Cailin Raymundo      HERNIORRHAPHY UMBILICAL  1974     IR CHEST PORT PLACEMENT > 5 YRS OF AGE  1/9/2020     SLING TRANSPUBO WITHOUT ANTERIOR COLPORRHAPHY N/A 11/21/2016    Procedure: SLING TRANSPUBO WITHOUT ANTERIOR COLPORRHAPHY;  Surgeon: Hernesto Berrios MD;  Location: [facility] OR       Prior to Admission Medications   Prior to Admission Medications   Prescriptions Last Dose Informant Patient Reported? Taking?   LORazepam (ATIVAN) 0.5 MG tablet   No Yes   Sig: Take 1-2 tablets (0.5-1 mg) by mouth every 6 hours as needed (Breakthrough  "Nausea / Vomiting)   SENNA PO   Yes Yes   Sig: Take 1 tablet by mouth as needed    acyclovir (ZOVIRAX) 400 MG tablet 1/10/2020 at am  No Yes   Sig: Take 1 tablet (400 mg) by mouth 2 times daily   omeprazole (PRILOSEC) 40 MG DR capsule Past Week at Unknown time  Yes Yes   Sig: Take 40 mg by mouth daily as needed   ondansetron (ZOFRAN-ODT) 8 MG ODT tab   No Yes   Sig: Take 1 tablet (8 mg) by mouth every 8 hours as needed   predniSONE (DELTASONE) 5 MG tablet 1/10/2020 at 5mg  No Yes   Sig: Take 2 tablets (10 mg) by mouth daily for 7 days, THEN 1 tablet (5 mg) daily for 7 days, THEN 1 tablet (5 mg) every other day for 10 days.   prochlorperazine (COMPAZINE) 10 MG tablet   No Yes   Sig: Take 1 tablet (10 mg) by mouth every 6 hours as needed (Breakthrough Nausea/Vomiting)   sulfamethoxazole-trimethoprim (BACTRIM DS/SEPTRA DS) 800-160 MG tablet 1/9/2020 at Unknown time  Yes Yes   Sig: Take 1 tablet by mouth At Bedtime      Facility-Administered Medications: None     Allergies   Allergies   Allergen Reactions     Cold & Flu [Cold Defense Fighter]      See pseudoephedrine     Seasonal Allergies      Sudafed Cold-Cough [Dayquil Liquicaps]      Pseudoephedrine Rash     Rash then skins peels off    Review of Systems   The 10 point Review of Systems is negative other than noted in the HPI or here.      Physical Exam   BP (P) 128/86   Temp (P) 97.4  F (36.3  C) (Oral)   Resp (P) 18   Ht 1.702 m (5' 7.01\")   Wt 74.7 kg (164 lb 11.2 oz)   SpO2 (P) 97%   BMI 25.79 kg/m    Port site not infected  Comfortably laying in bed in no distress  Breathing comfortably on room air.     Data   Results for BABAR VARGHESE (MRN [id number]) as of 1/11/2020 20:39   Ref. Range 1/11/2020 02:55 1/11/2020 06:15   Sodium Latest Ref Range: 133 - 144 mmol/L  141   Potassium Latest Ref Range: 3.4 - 5.3 mmol/L  3.2 (L)   Chloride Latest Ref Range: 94 - 109 mmol/L  103   Carbon Dioxide Latest Ref Range: 20 - 32 mmol/L  30   Urea Nitrogen Latest Ref " Range: 7 - 30 mg/dL  14   Creatinine Latest Ref Range: 0.52 - 1.04 mg/dL  0.81   GFR Estimate Latest Ref Range: >60 mL/min/1.73_m2  84   GFR Estimate If Black Latest Ref Range: >60 mL/min/1.73_m2  >90   Calcium Latest Ref Range: 8.5 - 10.1 mg/dL  7.9 (L)   Anion Gap Latest Ref Range: 3 - 14 mmol/L  8   Magnesium Latest Ref Range: 1.6 - 2.3 mg/dL  2.0   Phosphorus Latest Ref Range: 2.5 - 4.5 mg/dL  3.1   Albumin Latest Ref Range: 3.4 - 5.0 g/dL  2.8 (L)   Protein Total Latest Ref Range: 6.8 - 8.8 g/dL  5.2 (L)   Bilirubin Total Latest Ref Range: 0.2 - 1.3 mg/dL  0.7   Alkaline Phosphatase Latest Ref Range: 40 - 150 U/L  85   ALT Latest Ref Range: 0 - 50 U/L  43   AST Latest Ref Range: 0 - 45 U/L  23   Glucose Latest Ref Range: 70 - 99 mg/dL  94   WBC Latest Ref Range: 4.0 - 11.0 10e9/L  13.0 (H)   Hemoglobin Latest Ref Range: 11.7 - 15.7 g/dL  6.9 (LL)   Hematocrit Latest Ref Range: 35.0 - 47.0 %  21.0 (L)   Platelet Count Latest Ref Range: 150 - 450 10e9/L  93 (L)   RBC Count Latest Ref Range: 3.8 - 5.2 10e12/L  2.14 (L)   MCV Latest Ref Range: 78 - 100 fl  98   MCH Latest Ref Range: 26.5 - 33.0 pg  32.2   MCHC Latest Ref Range: 31.5 - 36.5 g/dL  32.9   RDW Latest Ref Range: 10.0 - 15.0 %  15.9 (H)   Diff Method Unknown  Manual Differential   % Neutrophils Latest Units: %  93.0   % Lymphocytes Latest Units: %  4.0   % Monocytes Latest Units: %  1.0   % Eosinophils Latest Units: %  0.0   % Basophils Latest Units: %  0.0   % Metamyelocytes Latest Units: %  0.5   % Myelocytes Latest Units: %  1.5   Nucleated RBCs Latest Ref Range: 0 /100  1 (H)   Absolute Neutrophil Latest Ref Range: 1.6 - 8.3 10e9/L  12.1 (H)   Absolute Lymphocytes Latest Ref Range: 0.8 - 5.3 10e9/L  0.5 (L)   Absolute Monocytes Latest Ref Range: 0.0 - 1.3 10e9/L  0.1   Absolute Eosinophils Latest Ref Range: 0.0 - 0.7 10e9/L  0.0   Absolute Basophils Latest Ref Range: 0.0 - 0.2 10e9/L  0.0   Absolute Metamyelocytes Latest Ref Range: 0 10e9/L  0.1 (H)    Absolute Myelocytes Latest Ref Range: 0 10e9/L  0.2 (H)   Absolute Nucleated RBC Unknown  0.1   Anisocytosis Unknown  Slight   Polychromasia Unknown  Slight   Teardrop Cells Unknown  Slight   Microcytes Unknown  Present   Toxic Granulation Unknown  Present   Platelet Estimate Unknown  Automated count confirmed.  Platelet morphology is normal.   % Retic Latest Ref Range: 0.5 - 2.0 %  3.2 (H)   Absolute Retic Latest Ref Range: 25 - 95 10e9/L  67.2       Current Facility-Administered Medications   Medication     acetaminophen (TYLENOL) tablet 650 mg     acyclovir (ZOVIRAX) tablet 400 mg     albuterol (PROAIR HFA/PROVENTIL HFA/VENTOLIN HFA) 108 (90 Base) MCG/ACT inhaler 1-2 puff     albuterol (PROVENTIL) neb solution 2.5 mg     atovaquone (MEPRON) suspension 1,500 mg     dexamethasone (DECADRON) tablet 8 mg     diphenhydrAMINE (BENADRYL) injection 50 mg     EPINEPHrine PF (ADRENALIN) injection 0.3 mg     heparin 100 UNIT/ML injection 5 mL     heparin lock flush 10 UNIT/ML injection 5-10 mL     heparin lock flush 10 UNIT/ML injection 5-10 mL     leucovorin calcium injection 25 mg     lidocaine (LMX4) cream     lidocaine (LMX4) cream     lidocaine 1 % 0.1-1 mL     lidocaine 1 % 0.1-1 mL     LORazepam (ATIVAN) injection 0.5-1 mg     LORazepam (ATIVAN) tablet 0.5-1 mg     magnesium sulfate 4 g in 100 mL sterile water (premade)     MEDICATION INSTRUCTION     melatonin tablet 1 mg     meperidine (DEMEROL) injection 25 mg     methylPREDNISolone sodium succinate (solu-MEDROL) injection 125 mg     NaCl 0.45 % 1,000 mL with sodium bicarbonate 100 mEq/L infusion     naloxone (NARCAN) injection 0.1-0.4 mg     polyethylene glycol (MIRALAX/GLYCOLAX) Packet 17 g     potassium chloride (KLOR-CON) Packet 20-40 mEq     potassium chloride 10 mEq in 100 mL intermittent infusion with 10 mg lidocaine     potassium chloride 10 mEq in 100 mL sterile water intermittent infusion (premix)     potassium chloride 20 mEq in 50 mL intermittent  infusion     potassium chloride ER (K-DUR/KLOR-CON M) CR tablet 20-40 mEq     potassium phosphate 15 mmol in D5W 250 mL intermittent infusion     potassium phosphate 20 mmol in D5W 250 mL intermittent infusion     potassium phosphate 20 mmol in D5W 500 mL intermittent infusion     potassium phosphate 25 mmol in D5W 500 mL intermittent infusion     prochlorperazine (COMPAZINE) injection 10 mg     prochlorperazine (COMPAZINE) tablet 10 mg     senna-docusate (SENOKOT-S/PERICOLACE) 8.6-50 MG per tablet 1 tablet    Or     senna-docusate (SENOKOT-S/PERICOLACE) 8.6-50 MG per tablet 2 tablet     sodium bicarbonate 8.4 % intermittent infusion 50 mEq     sodium chloride (PF) 0.9% PF flush 10-20 mL     sodium chloride (PF) 0.9% PF flush 10-20 mL     sodium chloride (PF) 0.9% PF flush 3 mL     sodium chloride (PF) 0.9% PF flush 3 mL     sodium chloride 0.9% infusion       A/P:    1.  Stage III DLBCL , No GCB :  The pt is here to received her high dose methotrexate for CNS PPX which she tolerated well. Agree with supportive care. Follow  methorexate level as per protocol.    -- continue acyclovir   I discussed with pt that   Her degree of severe anemia is not correlated with her chemotherapy regimen ( Pt has received  Only 2 cycles of RCHOP. C2 day1 of RCHOP was 12/27/19).     -- I will order stool occult blood. If continue to have severe anemia, would recommend GI evaluation.    -- May need to modify chemo regimen too. Will inform Dr. Castro.

## 2020-01-11 NOTE — PROGRESS NOTES
MD text paged...Patient triggered lactic protocol while chemo running, drawn as soon as chemo finished, result of 4.0. Thanks.    Spoke with MD on phone, new orders placed.

## 2020-01-11 NOTE — PROGRESS NOTES
Sepsis Evaluation Progress Note    I was called to see Kassie Ramirez due to abnormal vital signs triggering the Sepsis SIRS screening alert. She is not known to have an infection.     Physical Exam   Vital Signs:  Temp: 98.6  F (37  C) Temp src: Oral BP: 117/81   Heart Rate: 107 Resp: 16 SpO2: 96 % O2 Device: None (Room air)      Lab:  Lactic Acid   Date Value Ref Range Status   01/10/2020 4.0 (HH) 0.7 - 2.0 mmol/L Final     Comment:     Critical Value called to and read back by  TANG SanchezRN) IN [facility] ON 01.10.2020 AT 2048 BY        Lactate for Sepsis Protocol   Date Value Ref Range Status   12/20/2019 2.7 (H) 0.7 - 2.0 mmol/L Final     Comment:     Significant value called to and read back by  PAKO MCCLELLAND ON 33 AT 2034 EJV         The patient is at baseline mental status.     The rest of their physical exam is significant for patient was getting obstructive infusion, currently on bicarb infusion, triggered and a lactic acid was checked and came back 4.    Assessment & Plan   NO EVIDENCE OF SEPSIS at this time.  Vital sign, physical exam, and lab findings are likely due to This is due to methotrexate infusion no sign of infection.    Disposition: The patient will remain on the current unit. We will continue to monitor this patient closely.  Felipe Dwyer MD    Sepsis Criteria   Sepsis: 2+ SIRS criteria due to infection  Severe Sepsis: Sepsis AND 1+ new sign of acute organ dysfunction (Note: lactate >2 is organ dysfunction)  Septic Shock: Sepsis AND hypotension despite volume resuscitation with 30 ml/kg crystalloid

## 2020-01-11 NOTE — PLAN OF CARE
Pt VSS  Denies pain   Up in the halls walking this shift  Tolerating IV fluid sodium bicarbonate  Feeling more sleepy today  1st dose of Leucovorin today  Continue with every 8 hrs urine pH and every day methotrexate level   Hgb this Am 6.8, recheck 7.3 going to hold off on the transfusion at this time due to only 1 line access and unable to stop sodium bicarbonate currently to run the blood  Denies nausea  PORT in place with great blood return

## 2020-01-11 NOTE — PROVIDER NOTIFICATION
Page to MD Dr WADE Martínez on-call for Oncology today to verify if blood transfusion needs to irradiated.   Call back from  who states that the blood does not need to be irradiated.

## 2020-01-11 NOTE — PROVIDER NOTIFICATION
MD text paged...  Pt had triggered lactic protocol last shift, result 4.0, MD on ordered recheck at 0000, recheck result 2.6.     Spoke with MD, see note.

## 2020-01-12 LAB
ANION GAP SERPL CALCULATED.3IONS-SCNC: 6 MMOL/L (ref 3–14)
ANISOCYTOSIS BLD QL SMEAR: SLIGHT
BASOPHILS # BLD AUTO: 0 10E9/L (ref 0–0.2)
BASOPHILS NFR BLD AUTO: 0 %
BLD PROD TYP BPU: NORMAL
BLD UNIT ID BPU: 0
BLOOD PRODUCT CODE: NORMAL
BPU ID: NORMAL
BUN SERPL-MCNC: 11 MG/DL (ref 7–30)
CALCIUM SERPL-MCNC: 8.2 MG/DL (ref 8.5–10.1)
CHLORIDE SERPL-SCNC: 106 MMOL/L (ref 94–109)
CO2 SERPL-SCNC: 31 MMOL/L (ref 20–32)
CREAT SERPL-MCNC: 0.73 MG/DL (ref 0.52–1.04)
DACRYOCYTES BLD QL SMEAR: SLIGHT
DIFFERENTIAL METHOD BLD: ABNORMAL
EOSINOPHIL # BLD AUTO: 0 10E9/L (ref 0–0.7)
EOSINOPHIL NFR BLD AUTO: 0 %
ERYTHROCYTE [DISTWIDTH] IN BLOOD BY AUTOMATED COUNT: 16.7 % (ref 10–15)
GFR SERPL CREATININE-BSD FRML MDRD: >90 ML/MIN/{1.73_M2}
GLUCOSE SERPL-MCNC: 121 MG/DL (ref 70–99)
HCT VFR BLD AUTO: 21.3 % (ref 35–47)
HEMOCCULT STL QL: NEGATIVE
HGB BLD-MCNC: 6.7 G/DL (ref 11.7–15.7)
LYMPHOCYTES # BLD AUTO: 0.4 10E9/L (ref 0.8–5.3)
LYMPHOCYTES NFR BLD AUTO: 5 %
MCH RBC QN AUTO: 31.3 PG (ref 26.5–33)
MCHC RBC AUTO-ENTMCNC: 31.5 G/DL (ref 31.5–36.5)
MCV RBC AUTO: 100 FL (ref 78–100)
MICROCYTES BLD QL SMEAR: PRESENT
MONOCYTES # BLD AUTO: 0.1 10E9/L (ref 0–1.3)
MONOCYTES NFR BLD AUTO: 1 %
MTX SERPL-SCNC: 0.32 UMOL/L
MYELOCYTES # BLD: 0.1 10E9/L
MYELOCYTES NFR BLD MANUAL: 1 %
NEUTROPHILS # BLD AUTO: 7.5 10E9/L (ref 1.6–8.3)
NEUTROPHILS NFR BLD AUTO: 93 %
PH UR STRIP: 8 PH (ref 5–7)
PH UR STRIP: 8 PH (ref 5–7)
PH UR STRIP: 8.5 PH (ref 5–7)
PLATELET # BLD AUTO: 100 10E9/L (ref 150–450)
PLATELET # BLD EST: ABNORMAL 10*3/UL
POLYCHROMASIA BLD QL SMEAR: SLIGHT
POTASSIUM SERPL-SCNC: 2.9 MMOL/L (ref 3.4–5.3)
POTASSIUM SERPL-SCNC: 3.8 MMOL/L (ref 3.4–5.3)
RBC # BLD AUTO: 2.14 10E12/L (ref 3.8–5.2)
SODIUM SERPL-SCNC: 143 MMOL/L (ref 133–144)
TOXIC GRANULES BLD QL SMEAR: PRESENT
TRANSFUSION STATUS PATIENT QL: NORMAL
TRANSFUSION STATUS PATIENT QL: NORMAL
WBC # BLD AUTO: 8.1 10E9/L (ref 4–11)

## 2020-01-12 PROCEDURE — 25000131 ZZH RX MED GY IP 250 OP 636 PS 637: Performed by: INTERNAL MEDICINE

## 2020-01-12 PROCEDURE — 25800029 ZZH RX IP 258 OP 250: Performed by: INTERNAL MEDICINE

## 2020-01-12 PROCEDURE — 84132 ASSAY OF SERUM POTASSIUM: CPT | Performed by: INTERNAL MEDICINE

## 2020-01-12 PROCEDURE — 80299 QUANTITATIVE ASSAY DRUG: CPT | Performed by: INTERNAL MEDICINE

## 2020-01-12 PROCEDURE — 85025 COMPLETE CBC W/AUTO DIFF WBC: CPT | Performed by: INTERNAL MEDICINE

## 2020-01-12 PROCEDURE — 25000132 ZZH RX MED GY IP 250 OP 250 PS 637: Performed by: INTERNAL MEDICINE

## 2020-01-12 PROCEDURE — 99231 SBSQ HOSP IP/OBS SF/LOW 25: CPT | Performed by: INTERNAL MEDICINE

## 2020-01-12 PROCEDURE — 81003 URINALYSIS AUTO W/O SCOPE: CPT | Performed by: INTERNAL MEDICINE

## 2020-01-12 PROCEDURE — 12000000 ZZH R&B MED SURG/OB

## 2020-01-12 PROCEDURE — 99233 SBSQ HOSP IP/OBS HIGH 50: CPT | Performed by: INTERNAL MEDICINE

## 2020-01-12 PROCEDURE — 82272 OCCULT BLD FECES 1-3 TESTS: CPT | Performed by: INTERNAL MEDICINE

## 2020-01-12 PROCEDURE — 40000556 ZZH STATISTIC PERIPHERAL IV START W US GUIDANCE

## 2020-01-12 PROCEDURE — 25000128 H RX IP 250 OP 636: Performed by: INTERNAL MEDICINE

## 2020-01-12 PROCEDURE — P9016 RBC LEUKOCYTES REDUCED: HCPCS | Performed by: INTERNAL MEDICINE

## 2020-01-12 PROCEDURE — 25000125 ZZHC RX 250: Performed by: INTERNAL MEDICINE

## 2020-01-12 PROCEDURE — 80048 BASIC METABOLIC PNL TOTAL CA: CPT | Performed by: INTERNAL MEDICINE

## 2020-01-12 RX ADMIN — DEXAMETHASONE 8 MG: 4 TABLET ORAL at 09:07

## 2020-01-12 RX ADMIN — PROCHLORPERAZINE EDISYLATE 10 MG: 5 INJECTION, SOLUTION INTRAMUSCULAR; INTRAVENOUS at 09:07

## 2020-01-12 RX ADMIN — SODIUM BICARBONATE: 84 INJECTION, SOLUTION INTRAVENOUS at 02:20

## 2020-01-12 RX ADMIN — ATOVAQUONE 1500 MG: 750 SUSPENSION ORAL at 09:12

## 2020-01-12 RX ADMIN — LEUCOVORIN CALCIUM 25 MG: 350 INJECTION, POWDER, LYOPHILIZED, FOR SOLUTION INTRAMUSCULAR; INTRAVENOUS at 04:20

## 2020-01-12 RX ADMIN — LEUCOVORIN CALCIUM 25 MG: 350 INJECTION, POWDER, LYOPHILIZED, FOR SOLUTION INTRAMUSCULAR; INTRAVENOUS at 10:08

## 2020-01-12 RX ADMIN — SODIUM BICARBONATE: 84 INJECTION, SOLUTION INTRAVENOUS at 06:16

## 2020-01-12 RX ADMIN — ACYCLOVIR 400 MG: 400 TABLET ORAL at 22:11

## 2020-01-12 RX ADMIN — POTASSIUM CHLORIDE 40 MEQ: 1500 TABLET, EXTENDED RELEASE ORAL at 08:57

## 2020-01-12 RX ADMIN — LEUCOVORIN CALCIUM 25 MG: 350 INJECTION, POWDER, LYOPHILIZED, FOR SOLUTION INTRAMUSCULAR; INTRAVENOUS at 15:57

## 2020-01-12 RX ADMIN — SODIUM BICARBONATE: 84 INJECTION, SOLUTION INTRAVENOUS at 15:56

## 2020-01-12 RX ADMIN — ACYCLOVIR 400 MG: 400 TABLET ORAL at 08:57

## 2020-01-12 RX ADMIN — POTASSIUM CHLORIDE 40 MEQ: 1500 TABLET, EXTENDED RELEASE ORAL at 06:59

## 2020-01-12 RX ADMIN — SODIUM BICARBONATE: 84 INJECTION, SOLUTION INTRAVENOUS at 20:18

## 2020-01-12 RX ADMIN — LEUCOVORIN CALCIUM 25 MG: 350 INJECTION, POWDER, LYOPHILIZED, FOR SOLUTION INTRAMUSCULAR; INTRAVENOUS at 22:12

## 2020-01-12 RX ADMIN — SODIUM BICARBONATE: 84 INJECTION, SOLUTION INTRAVENOUS at 11:52

## 2020-01-12 ASSESSMENT — ACTIVITIES OF DAILY LIVING (ADL)
ADLS_ACUITY_SCORE: 12

## 2020-01-12 ASSESSMENT — MIFFLIN-ST. JEOR: SCORE: 1426.6

## 2020-01-12 NOTE — PLAN OF CARE
Up indep in room. No c/o pain. Feels better after blood given. Bicarb infusing at 250cc/hr. Appetite fair to good. Next urin pH at 1830.

## 2020-01-12 NOTE — PROGRESS NOTES
[facility]  Hospitalist Progress Note  Marty Quintero MD 01/12/2020    Reason for Stay (Diagnosis): High-dose methotrexate for diffuse large B-cell lymphoma         Assessment and Plan:      Summary of Stay: Kassie Ramirez is a 49 year old female with PMH including DLBCL, EBV+ non-GCB Stage III with course complicated by neutropenic fever and diagnosis of PJV pneumonia who has completed 2 cycles of R-CHOP chemotherapy who was admitted on 1/10/2020 for high-dose methotrexate infusion for CNS prophylaxis.  Oncology is directing this care.  Her hemoglobin has drifted without evidence of overt blood loss.  Transfusing 1 unit on 1/12.     1. DLBCL, EBV+ non-GCB Stage III: Patient follows with Dr. Ryan with [facility].  She has completed cycle 2 of R-CHOP and has tolerated it well.  She has intermediate risk disease for CNS recurrence and is currently admitted for high-dose methotrexate for CNS prophylaxis.  - Oncology consulted, orders have been placed for methotrexate with leucovorin rescue  - Patient will remain inpatient for alkalizing urine and leucovorin until methotrexate levels are 0.05 or less     2.  Thrombocytopenia, anemia: Likely due to chemotherapy.  CBC will be followed daily.  No bleeding.  - Transfuse for platelets less than 10,000 or less than 20,000 if anticipating discharge within 24 hours  --Transfusing 1 unit red cells on 1/12 for hemoglobin of 6.7.     3.  Recent diagnosis of PJV pneumonia: Hospitalized at [facility] from 12/18-12/24 with acute hypoxic respiratory failure and sepsis due to PJV pneumonia.  Patient was treated with Bactrim.  She was to complete a 21-day course then decrease to once daily for prophylaxis.  She was also on a prednisone taper.  She had been on prolonged steroids prior to that admission taper off steroids. Her prednisone is being tapered, when she saw Dr. RYAN in clinic on 12/27 she needed to take 5 days of prednisone 100 mg daily  "as part of her R-CHOP.  After that Dr. Castro had recommended to take 40 mg daily for a week then taper by 10 mg weekly to 10 mg then dropped to 5 mg then 5 mg every other day.  - Steroids as per heme-onc.  Currently on Decadron  - She cannot take Bactrim as there is a significant interaction with methotrexate, this is been changed to atovaquone prophylactic dose (believe she needs to be on this with high-dose steroids)    4.  Hypokalemia: Replace per protocol.     Diet: Regular Diet Adult    DVT Prophylaxis: Pneumatic Compression Devices  Phillips Catheter: not present  Code Status: Full Code             Interval History (Subjective):        Hemoglobin down to 6.7. didn't get blood yesterday, will transfuse today.   Potassium quite low, replacing.  Feels tired/fatigued, otherwise no focal complaints                  Physical Exam:      Last Vital Signs:  /89 (BP Location: Left arm)   Temp 98.4  F (36.9  C) (Oral)   Resp 20   Ht 1.702 m (5' 7.01\")   Wt 76.9 kg (169 lb 8 oz)   SpO2 97%   BMI 26.54 kg/m      I/O last 3 completed shifts:  In: 1713 [I.V.:1713]  Out: 4900 [Urine:4900]    General: Alert, awake, no acute distress.  Looks worn out.  HEENT: NC/AT, eyes anicteric  Cardiac: RRR, S1, S2.  No murmurs appreciated.  Pulmonary: Normal chest rise, normal work of breathing.  Lungs CTA BL  Extremities: no deformities.  Warm, well perfused.  Skin: no rashes or lesions noted.  Warm and Dry.  Neuro: No focal deficits noted.  Speech clear.  Coordination and strength grossly normal.  Psych: Appropriate affect.         Medications:      All current medications were reviewed with changes reflected in problem list.         Data:      All new lab and imaging data was reviewed.   Labs:  Recent Labs   Lab 01/12/20  0617      POTASSIUM 2.9*   CHLORIDE 106   CO2 31   ANIONGAP 6   *   BUN 11   CR 0.73   GFRESTIMATED >90   GFRESTBLACK >90   AFSHAN 8.2*     Recent Labs   Lab 01/12/20  0617   WBC 8.1   HGB 6.7*   HCT " 21.3*      *      Imaging:   Recent Results (from the past 48 hour(s))   IR Chest Port Placement > 5 Yrs of Age    Narrative    PORT PLACEMENT 1/9/2020 10:50 AM    HISTORY: Neoplasm    COMPARISON: None.    DESCRIPTION OF PROCEDURE: After obtaining informed consent, the  patient was placed in a supine position on the fluoroscopy table. The  neck was prepped and draped in the usual sterile manner.     Ultrasound was used to evaluate and document patency of the internal  jugular vein. One percent lidocaine was injected for local anesthesia.  Under sterile ultrasound guidance, a puncture was made into the  internal jugular vein. A permanent copy image was obtained for the  patient's records.    A 6 English peel-away sheath was placed into the vein. The chest and  neck were anesthetized with lidocaine. A 2 cm skin incision was made  in the chest. A pocket for a port was created using blunt dissection.  The pocket was washed with antibiotic-laden saline. The pocket was  packed with antibiotic-laden gauze. A tunnel for the port was created  from the pocket to the neck. The port catheter was pulled through, and  the attached port was then placed into the pocket. The port was  secured in the pocket using 2 Ethilon around the port nozzle. The  catheter was measured to appropriate length and was placed through the  peel-away sheath. The tip was positioned in the mid to high right  atrium. The pocket was closed with three 3-0 Vicryl deep interrupted  stitches and Dermabond. The neck incision site was closed with  Dermabond.    The port was accessed, aspirated, flushed with saline and heparin  locked. A dressing was applied.    A fluoroscopic image was saved to document tip of catheter in  satisfactory position.     I determined this patient to be an appropriate candidate for the  planned sedation and procedure and reassessed the patient immediately  prior to sedation and procedure. Moderate intravenous  conscious  sedation was supervised by me. The patient was independently monitored  by a registered nurse assigned to the Department of radiology using  automated blood pressure, EKG and pulse oximetry. The patient  tolerated the procedure well. There were no immediate postprocedure  complications. The patient's vital signs were monitored by radiology  nursing staff under my supervision and remained stable throughout the  study. Radiation dose for this scan was reduced using automated  exposure control, adjustment of the mA and/or kV according to patient  size, or iterative reconstruction technique.    Maximal Sterile Barrier Technique Utilized: Cap AND mask AND sterile  gown AND sterile gloves AND sterile full body drape AND hand hygiene  AND skin preparation 2% chlorhexidine for cutaneous antisepsis (or  acceptable alternative antiseptics).     Sterile Ultrasound Technique Utilized ?Sterile gel AND sterile probe  covers.    MEDICATIONS: 1 mg Versed, 50 mg fentanyl    SEDATION TIME: 30 minutes    FLUOROSCOPY TIME: .6 minutes    RADIATION DOSE: 4 mGy    NUMBER OF SPOT FLUOROSCOPIC IMAGES:      Impression    IMPRESSION: Power port placed as above. The port is ready to use.    MD Marty CLAYTON MD , MD.

## 2020-01-12 NOTE — PROGRESS NOTES
[facility]    Hematology / Oncology Consultation     Date of Admission:  1/10/2020  Date of  Folow up : 01/12//20        HPI: This is a 49 year old woman with Stage III DLBCL, EBV+ non-GCB. She has completed cycle 2 of RCHOP and has tolerated it well.     She has intermediate risk disease for CNS recurrence and has been admitted for high dose methotrexate for CNS prophylaxis.     The pt completed high does methotrexate on 1/10/2020 evening.  which she tolerated well.  Interval Hx: The pt reports significant improvement in fatigue after blood transfusion today. Stool occult was negative.    Past Medical History     Past Medical History:   Diagnosis Date     Anxiety attack 9/16/2014     Diffuse large B-cell lymphoma (H)     Diagnosed 11/2019     Encounter for Essure implantation 2009     Generalized anxiety disorder 9/16/2014    zoloft = flat emotions     Menopausal disorder     started on OCPs by menopause center 3/2017 (takes active continuously)     Menstrual headache     helped by OCPs and magnesium     GERTRUDE (stress urinary incontinence, female)     sling procedure 2016     SVT (supraventricular tachycardia) (H)        Past Surgical History   I have reviewed this patient's surgical history and updated it with pertinent information if needed.  Past Surgical History:   Procedure Laterality Date     H KIT ESSURE  2009    essrickey - Dr. Cailin Raymundo      HERNIORRHAPHY UMBILICAL  1974     IR CHEST PORT PLACEMENT > 5 YRS OF AGE  1/9/2020     SLING TRANSPUBO WITHOUT ANTERIOR COLPORRHAPHY N/A 11/21/2016    Procedure: SLING TRANSPUBO WITHOUT ANTERIOR COLPORRHAPHY;  Surgeon: Hernesto Berrios MD;  Location: [facility] OR       Prior to Admission Medications   Prior to Admission Medications   Prescriptions Last Dose Informant Patient Reported? Taking?   LORazepam (ATIVAN) 0.5 MG tablet   No Yes   Sig: Take 1-2 tablets (0.5-1 mg) by mouth every 6 hours as needed (Breakthrough Nausea / Vomiting)   SENNA PO   Yes Yes  "  Sig: Take 1 tablet by mouth as needed    acyclovir (ZOVIRAX) 400 MG tablet 1/10/2020 at am  No Yes   Sig: Take 1 tablet (400 mg) by mouth 2 times daily   omeprazole (PRILOSEC) 40 MG DR capsule Past Week at Unknown time  Yes Yes   Sig: Take 40 mg by mouth daily as needed   ondansetron (ZOFRAN-ODT) 8 MG ODT tab   No Yes   Sig: Take 1 tablet (8 mg) by mouth every 8 hours as needed   predniSONE (DELTASONE) 5 MG tablet 1/10/2020 at 5mg  No Yes   Sig: Take 2 tablets (10 mg) by mouth daily for 7 days, THEN 1 tablet (5 mg) daily for 7 days, THEN 1 tablet (5 mg) every other day for 10 days.   prochlorperazine (COMPAZINE) 10 MG tablet   No Yes   Sig: Take 1 tablet (10 mg) by mouth every 6 hours as needed (Breakthrough Nausea/Vomiting)   sulfamethoxazole-trimethoprim (BACTRIM DS/SEPTRA DS) 800-160 MG tablet 1/9/2020 at Unknown time  Yes Yes   Sig: Take 1 tablet by mouth At Bedtime      Facility-Administered Medications: None     Allergies   Allergies   Allergen Reactions     Cold & Flu [Cold Defense Fighter]      See pseudoephedrine     Seasonal Allergies      Sudafed Cold-Cough [Dayquil Liquicaps]      Pseudoephedrine Rash     Rash then skins peels off    Review of Systems   The 10 point Review of Systems is negative other than noted in the HPI or here.      Physical Exam   /85   Temp 96.7  F (35.9  C) (Oral)   Resp 18   Ht 1.702 m (5' 7.01\")   Wt 76.9 kg (169 lb 8 oz)   SpO2 96%   BMI 26.54 kg/m    Port site not infected  Comfortably laying in bed in no distress  Breathing comfortably on room air.     Data    Results for BABAR VARGHESE (MRN [id number]) as of 1/12/2020 20:28   Ref. Range 1/12/2020 06:17 1/12/2020 10:55   Sodium Latest Ref Range: 133 - 144 mmol/L 143    Potassium Latest Ref Range: 3.4 - 5.3 mmol/L 2.9 (L)    Chloride Latest Ref Range: 94 - 109 mmol/L 106    Carbon Dioxide Latest Ref Range: 20 - 32 mmol/L 31    Urea Nitrogen Latest Ref Range: 7 - 30 mg/dL 11    Creatinine Latest Ref Range: " 0.52 - 1.04 mg/dL 0.73    GFR Estimate Latest Ref Range: >60 mL/min/1.73_m2 >90    GFR Estimate If Black Latest Ref Range: >60 mL/min/1.73_m2 >90    Calcium Latest Ref Range: 8.5 - 10.1 mg/dL 8.2 (L)    Anion Gap Latest Ref Range: 3 - 14 mmol/L 6    Glucose Latest Ref Range: 70 - 99 mg/dL 121 (H)    WBC Latest Ref Range: 4.0 - 11.0 10e9/L 8.1    Hemoglobin Latest Ref Range: 11.7 - 15.7 g/dL 6.7 (LL)    Hematocrit Latest Ref Range: 35.0 - 47.0 % 21.3 (L)    Platelet Count Latest Ref Range: 150 - 450 10e9/L 100 (L)    RBC Count Latest Ref Range: 3.8 - 5.2 10e12/L 2.14 (L)    MCV Latest Ref Range: 78 - 100 fl 100    MCH Latest Ref Range: 26.5 - 33.0 pg 31.3    MCHC Latest Ref Range: 31.5 - 36.5 g/dL 31.5    RDW Latest Ref Range: 10.0 - 15.0 % 16.7 (H)    Diff Method Unknown Manual Differential    % Neutrophils Latest Units: % 93.0    % Lymphocytes Latest Units: % 5.0    % Monocytes Latest Units: % 1.0    % Eosinophils Latest Units: % 0.0    % Basophils Latest Units: % 0.0    % Myelocytes Latest Units: % 1.0    Absolute Neutrophil Latest Ref Range: 1.6 - 8.3 10e9/L 7.5    Absolute Lymphocytes Latest Ref Range: 0.8 - 5.3 10e9/L 0.4 (L)    Absolute Monocytes Latest Ref Range: 0.0 - 1.3 10e9/L 0.1    Absolute Eosinophils Latest Ref Range: 0.0 - 0.7 10e9/L 0.0    Absolute Basophils Latest Ref Range: 0.0 - 0.2 10e9/L 0.0    Absolute Myelocytes Latest Ref Range: 0 10e9/L 0.1 (H)    Anisocytosis Unknown Slight    Polychromasia Unknown Slight    Teardrop Cells Unknown Slight    Microcytes Unknown Present    Toxic Granulation Unknown Present    Platelet Estimate Unknown Automated count confirmed.  Platelet morphology is normal.    pH Urine Latest Ref Range: 5.0 - 7.0 pH  8.0 (H)   Methotrexate Level Latest Units: umol/L 0.32        Current Facility-Administered Medications   Medication     acetaminophen (TYLENOL) tablet 650 mg     acyclovir (ZOVIRAX) tablet 400 mg     albuterol (PROAIR HFA/PROVENTIL HFA/VENTOLIN HFA) 108 (90  Base) MCG/ACT inhaler 1-2 puff     albuterol (PROVENTIL) neb solution 2.5 mg     atovaquone (MEPRON) suspension 1,500 mg     diphenhydrAMINE (BENADRYL) injection 50 mg     EPINEPHrine PF (ADRENALIN) injection 0.3 mg     heparin 100 UNIT/ML injection 5 mL     heparin lock flush 10 UNIT/ML injection 5-10 mL     heparin lock flush 10 UNIT/ML injection 5-10 mL     leucovorin calcium injection 25 mg     lidocaine (LMX4) cream     lidocaine (LMX4) cream     lidocaine 1 % 0.1-1 mL     lidocaine 1 % 0.1-1 mL     LORazepam (ATIVAN) injection 0.5-1 mg     LORazepam (ATIVAN) tablet 0.5-1 mg     magnesium sulfate 4 g in 100 mL sterile water (premade)     MEDICATION INSTRUCTION     melatonin tablet 1 mg     meperidine (DEMEROL) injection 25 mg     methylPREDNISolone sodium succinate (solu-MEDROL) injection 125 mg     NaCl 0.45 % 1,000 mL with sodium bicarbonate 100 mEq/L infusion     naloxone (NARCAN) injection 0.1-0.4 mg     polyethylene glycol (MIRALAX/GLYCOLAX) Packet 17 g     potassium chloride (KLOR-CON) Packet 20-40 mEq     potassium chloride 10 mEq in 100 mL intermittent infusion with 10 mg lidocaine     potassium chloride 10 mEq in 100 mL sterile water intermittent infusion (premix)     potassium chloride 20 mEq in 50 mL intermittent infusion     potassium chloride ER (K-DUR/KLOR-CON M) CR tablet 20-40 mEq     potassium phosphate 15 mmol in D5W 250 mL intermittent infusion     potassium phosphate 20 mmol in D5W 250 mL intermittent infusion     potassium phosphate 20 mmol in D5W 500 mL intermittent infusion     potassium phosphate 25 mmol in D5W 500 mL intermittent infusion     prochlorperazine (COMPAZINE) injection 10 mg     prochlorperazine (COMPAZINE) tablet 10 mg     senna-docusate (SENOKOT-S/PERICOLACE) 8.6-50 MG per tablet 1 tablet    Or     senna-docusate (SENOKOT-S/PERICOLACE) 8.6-50 MG per tablet 2 tablet     sodium bicarbonate 8.4 % intermittent infusion 50 mEq     sodium chloride (PF) 0.9% PF flush 10-20 mL      sodium chloride (PF) 0.9% PF flush 10-20 mL     sodium chloride (PF) 0.9% PF flush 3 mL     sodium chloride (PF) 0.9% PF flush 3 mL     sodium chloride 0.9% infusion       A/P:    1.  Stage III DLBCL , No GCB :  The pt is here to received her high dose methotrexate for CNS PPX which she tolerated well. Agree with supportive care.  Day 4  of High dose methotrexate today. Follow  methorexate level as per protocol.    -- continue acyclovir     I discussed with pt that  her degree of severe anemia is not correlated with her chemotherapy regimen ( Pt has received  Only 2 cycles of RCHOP. C2 day1 of RCHOP was 12/27/19 and today is Cycle 1 day 4 of high dos methotexare).     -- If  She continue to have severe anemia,  May need GI evaluation.    -- May need to modify chemo regimen  ( completion of total of 6 cycles of R CHOP and high dose mrthotrexate as CNS PPX  After completion of  6 cycles of RCHOP . Will inform Dr. Castro.

## 2020-01-13 VITALS
WEIGHT: 169.5 LBS | OXYGEN SATURATION: 97 % | HEIGHT: 67 IN | HEART RATE: 94 BPM | RESPIRATION RATE: 24 BRPM | TEMPERATURE: 96.3 F | SYSTOLIC BLOOD PRESSURE: 135 MMHG | BODY MASS INDEX: 26.6 KG/M2 | DIASTOLIC BLOOD PRESSURE: 79 MMHG

## 2020-01-13 LAB
ANISOCYTOSIS BLD QL SMEAR: SLIGHT
BASOPHILS # BLD AUTO: 0 10E9/L (ref 0–0.2)
BASOPHILS NFR BLD AUTO: 0 %
COPATH REPORT: NORMAL
DACRYOCYTES BLD QL SMEAR: SLIGHT
DIFFERENTIAL METHOD BLD: ABNORMAL
EOSINOPHIL # BLD AUTO: 0 10E9/L (ref 0–0.7)
EOSINOPHIL NFR BLD AUTO: 0 %
ERYTHROCYTE [DISTWIDTH] IN BLOOD BY AUTOMATED COUNT: 17 % (ref 10–15)
HAPTOGLOB SERPL-MCNC: 155 MG/DL (ref 32–197)
HCT VFR BLD AUTO: 22.6 % (ref 35–47)
HGB BLD-MCNC: 7.3 G/DL (ref 11.7–15.7)
LYMPHOCYTES # BLD AUTO: 0.6 10E9/L (ref 0.8–5.3)
LYMPHOCYTES NFR BLD AUTO: 12 %
MCH RBC QN AUTO: 30.8 PG (ref 26.5–33)
MCHC RBC AUTO-ENTMCNC: 32.3 G/DL (ref 31.5–36.5)
MCV RBC AUTO: 95 FL (ref 78–100)
MICROCYTES BLD QL SMEAR: PRESENT
MONOCYTES # BLD AUTO: 0.1 10E9/L (ref 0–1.3)
MONOCYTES NFR BLD AUTO: 1 %
MTX SERPL-SCNC: 0.07 UMOL/L
NEUTROPHILS # BLD AUTO: 4.4 10E9/L (ref 1.6–8.3)
NEUTROPHILS NFR BLD AUTO: 87 %
OVALOCYTES BLD QL SMEAR: SLIGHT
PH UR STRIP: 8 PH (ref 5–7)
PH UR STRIP: 8.5 PH (ref 5–7)
PLATELET # BLD AUTO: 111 10E9/L (ref 150–450)
PLATELET # BLD EST: ABNORMAL 10*3/UL
POLYCHROMASIA BLD QL SMEAR: SLIGHT
RBC # BLD AUTO: 2.37 10E12/L (ref 3.8–5.2)
TOXIC GRANULES BLD QL SMEAR: PRESENT
WBC # BLD AUTO: 5 10E9/L (ref 4–11)

## 2020-01-13 PROCEDURE — 99233 SBSQ HOSP IP/OBS HIGH 50: CPT | Performed by: INTERNAL MEDICINE

## 2020-01-13 PROCEDURE — 81003 URINALYSIS AUTO W/O SCOPE: CPT | Performed by: INTERNAL MEDICINE

## 2020-01-13 PROCEDURE — 25800029 ZZH RX IP 258 OP 250: Performed by: INTERNAL MEDICINE

## 2020-01-13 PROCEDURE — 25000128 H RX IP 250 OP 636: Performed by: INTERNAL MEDICINE

## 2020-01-13 PROCEDURE — 80299 QUANTITATIVE ASSAY DRUG: CPT | Performed by: INTERNAL MEDICINE

## 2020-01-13 PROCEDURE — 99239 HOSP IP/OBS DSCHRG MGMT >30: CPT | Performed by: INTERNAL MEDICINE

## 2020-01-13 PROCEDURE — 25000132 ZZH RX MED GY IP 250 OP 250 PS 637: Performed by: INTERNAL MEDICINE

## 2020-01-13 PROCEDURE — 25000125 ZZHC RX 250: Performed by: INTERNAL MEDICINE

## 2020-01-13 PROCEDURE — 85025 COMPLETE CBC W/AUTO DIFF WBC: CPT | Performed by: INTERNAL MEDICINE

## 2020-01-13 RX ORDER — FAMOTIDINE 20 MG/1
20 TABLET, FILM COATED ORAL 2 TIMES DAILY
Qty: 60 TABLET | Refills: 0 | Status: ON HOLD | OUTPATIENT
Start: 2020-01-13 | End: 2020-02-21

## 2020-01-13 RX ORDER — ATOVAQUONE 750 MG/5ML
1500 SUSPENSION ORAL DAILY
Qty: 210 ML | Refills: 0 | Status: SHIPPED | OUTPATIENT
Start: 2020-01-14 | End: 2020-01-29

## 2020-01-13 RX ADMIN — HEPARIN 5 ML: 100 SYRINGE at 14:39

## 2020-01-13 RX ADMIN — ATOVAQUONE 1500 MG: 750 SUSPENSION ORAL at 09:25

## 2020-01-13 RX ADMIN — ACYCLOVIR 400 MG: 400 TABLET ORAL at 09:25

## 2020-01-13 RX ADMIN — LEUCOVORIN CALCIUM 25 MG: 350 INJECTION, POWDER, LYOPHILIZED, FOR SOLUTION INTRAMUSCULAR; INTRAVENOUS at 04:25

## 2020-01-13 RX ADMIN — LEUCOVORIN CALCIUM 25 MG: 350 INJECTION, POWDER, LYOPHILIZED, FOR SOLUTION INTRAMUSCULAR; INTRAVENOUS at 10:30

## 2020-01-13 RX ADMIN — SODIUM BICARBONATE: 84 INJECTION, SOLUTION INTRAVENOUS at 04:25

## 2020-01-13 RX ADMIN — SODIUM BICARBONATE: 84 INJECTION, SOLUTION INTRAVENOUS at 08:56

## 2020-01-13 RX ADMIN — SODIUM BICARBONATE: 84 INJECTION, SOLUTION INTRAVENOUS at 00:25

## 2020-01-13 ASSESSMENT — ACTIVITIES OF DAILY LIVING (ADL)
ADLS_ACUITY_SCORE: 12

## 2020-01-13 NOTE — CONSULTS
Introduced self and service to pt and  after receiving consult to provide resources related to talking to children about cancer/hospitalizations. Kassie and her  shared that they have been talking to the kids, ages 11 and 12, about the cancer and the treatments. The kids, at this point, seem to be handling the journey well, but parents are appropriately concerned for their. This Child Life Specialist encouraged them in their communication with the kids and provided them with some resources to support their family. This child life specialist also encouraged parents to seek counseling services for the kids to they have a 3rd party support person for the emotions and fears they are facing. Parents are both very receptive. Child Life will be available for future support for the kids as needed.

## 2020-01-13 NOTE — DISCHARGE SUMMARY
[facility] [place]  Discharge Summary  Name: Kassie Ramirez    MRN: [id number]  YOB: 1970    Age: 49 year old  Date of Discharge:  1/13/2020  Date of Admission: 1/10/2020  Primary Care Provider: Mary Alice Colón  Discharge Physician:  Cruzito Quintero MD  Discharging Service:  Hospitalist      Hospital Course/Discharge Diagnoses:  Kassie Ramirez is a 49 year old female with PMH including DLBCL, EBV+ non-GCB Stage III with course complicated by neutropenic fever and diagnosis of PJV pneumonia who has completed 2 cycles of R-CHOP chemotherapy who was admitted on 1/10/2020 for high-dose methotrexate infusion for CNS prophylaxis.  Oncology is directing this care.  Her hemoglobin has drifted without evidence of overt blood loss.  Transfused 1 unit on 1/12 with significant symptomatic benefit.    Today she has been cleared for discharge home by oncology.  Dr. castro felt that we could stop her prednisone as she had tapered down to 5 mg previously.  Due to methotrexate use with transition from Bactrim to atovaquone for PCP prophylaxis as noted below.  Also, due to medication interaction her PPI was changed to an H2 blocker.     1. DLBCL, EBV+ non-GCB Stage III: Patient follows with Dr. Castro with [place] [facility].  She has completed cycle 2 of R-CHOP and has tolerated it well.  She has intermediate risk disease for CNS recurrence and was admitted for high-dose methotrexate for CNS prophylaxis.     2.  Thrombocytopenia, anemia: Likely due to chemotherapy.  CBC will be followed daily.  No bleeding.  - Transfuse for platelets less than 10,000 or less than 20,000 if anticipating discharge within 24 hours  --Transfused 1 unit red cells on 1/12 for hemoglobin of 6.7.     3.  Recent diagnosis of PJV pneumonia: Hospitalized at [facility] from 12/18-12/24 with acute hypoxic respiratory failure and sepsis due to PJV pneumonia.  Patient was treated with Bactrim.  She was to complete a 21-day course  then decrease to once daily for prophylaxis.  She was also on a prednisone taper.  She had been on prolonged steroids prior to that admission taper off steroids. Her prednisone is being tapered, when she saw Dr. CASTRO in clinic on 12/27 she needed to take 5 days of prednisone 100 mg daily as part of her R-CHOP.  After that Dr. Castro had recommended to take 40 mg daily for a week then taper by 10 mg weekly to 10 mg then dropped to 5 mg then 5 mg every other day.  - Steroids as per heme-onc.    Stopping steroids upon discharge.  - She cannot take Bactrim as there is a significant interaction with methotrexate, this is been changed to atovaquone prophylactic dose (believe she needs to be on this with high-dose steroids)     4.  Hypokalemia: Replaced per protocol.       Discharge Disposition:  Discharged to home     Allergies:  Allergies   Allergen Reactions     Cold & Flu [Cold Defense Fighter]      See pseudoephedrine     Seasonal Allergies      Sudafed Cold-Cough [Dayquil Liquicaps]      Pseudoephedrine Rash     Rash then skins peels off         Discharge Medications:        Review of your medicines      START taking      Dose / Directions   atovaquone 750 MG/5ML suspension  Commonly known as:  MEPRON  Indication:  pjp  Used for:  Diffuse large B-cell lymphoma of extranodal site (H) - per preliminary pathology from [facility] 11/27/19 - mediastinal mass wrapped around azalea and bilateral main stem bronchi      Dose:  1,500 mg  Start taking on:  January 14, 2020  Take 10 mLs (1,500 mg) by mouth daily Please discuss with your oncologist if/when it is okay to transition back to Bactrim.  Quantity:  210 mL  Refills:  0     famotidine 20 MG tablet  Commonly known as:  PEPCID  Used for:  Diffuse large B-cell lymphoma of extranodal site (H) - per preliminary pathology from [facility] 11/27/19 - mediastinal mass wrapped around azalea and bilateral main stem bronchi      Dose:  20 mg  Take 1 tablet (20 mg) by  mouth 2 times daily  Quantity:  60 tablet  Refills:  0        CONTINUE these medicines which have NOT CHANGED      Dose / Directions   acyclovir 400 MG tablet  Commonly known as:  ZOVIRAX  Indication:  Prophylaxis of Herpes Simplex  Used for:  Diffuse large B-cell lymphoma of extranodal site (H) - per preliminary pathology from [facility] 11/27/19 - mediastinal mass wrapped around azalea and bilateral main stem bronchi      Dose:  400 mg  Take 1 tablet (400 mg) by mouth 2 times daily  Quantity:  60 tablet  Refills:  3     LORazepam 0.5 MG tablet  Commonly known as:  ATIVAN  Used for:  Diffuse large B-cell lymphoma of extranodal site (H)      Dose:  0.5-1 mg  Take 1-2 tablets (0.5-1 mg) by mouth every 6 hours as needed (Breakthrough Nausea / Vomiting)  Quantity:  30 tablet  Refills:  0     ondansetron 8 MG ODT tab  Commonly known as:  ZOFRAN-ODT  Used for:  Diffuse large B-cell lymphoma of extranodal site (H) - per preliminary pathology from [facility] 11/27/19 - mediastinal mass wrapped around azalea and bilateral main stem bronchi      Dose:  8 mg  Take 1 tablet (8 mg) by mouth every 8 hours as needed  Quantity:  45 tablet  Refills:  0     prochlorperazine 10 MG tablet  Commonly known as:  COMPAZINE  Used for:  Diffuse large B-cell lymphoma of extranodal site (H)      Dose:  10 mg  Take 1 tablet (10 mg) by mouth every 6 hours as needed (Breakthrough Nausea/Vomiting)  Quantity:  30 tablet  Refills:  0     SENNA PO      Dose:  1 tablet  Take 1 tablet by mouth as needed  Refills:  0        STOP taking    omeprazole 40 MG DR capsule  Commonly known as:  priLOSEC        predniSONE 5 MG tablet  Commonly known as:  DELTASONE        sulfamethoxazole-trimethoprim 800-160 MG tablet  Commonly known as:  BACTRIM DS/SEPTRA DS              Where to get your medicines      These medications were sent to [facility] [place], [place] - [id number] [facility]  "[id number]    Phone:  [phone]     atovaquone 750 MG/5ML suspension    famotidine 20 MG tablet         Condition on Discharge:  Discharge condition: Stable       Code status on discharge: Full Code     History of Illness:  See detailed admission note for full details.    Physical Exam:  Vital signs:  Temp: 96.3  F (35.7  C) Temp src: Axillary BP: 135/79 Pulse: 94 Heart Rate: 86 Resp: 24 SpO2: 97 % O2 Device: None (Room air)   Height: 170.2 cm (5' 7.01\") Weight: 76.9 kg (169 lb 8 oz)  Estimated body mass index is 26.54 kg/m  as calculated from the following:    Height as of this encounter: 1.702 m (5' 7.01\").    Weight as of this encounter: 76.9 kg (169 lb 8 oz).    Wt Readings from Last 1 Encounters:   01/12/20 76.9 kg (169 lb 8 oz)     General: Alert, awake, no acute distress.  HEENT: NC/AT, eyes anicteric, external occular movements intact, face symmetric.  Dentition WNL, MM moist.  Cardiac: RRR, S1, S2.  No murmurs appreciated.  Pulmonary: Normal chest rise, normal work of breathing.  Lungs CTA BL  Abdomen: soft, non-tender, non-distended.  Bowel Sounds Present.  No guarding.  Extremities: no deformities.  Warm, well perfused.  Skin: no rashes or lesions noted.  Warm and Dry.  Neuro: No focal deficits noted.  Speech clear.  Coordination and strength grossly normal.  Psych: Appropriate affect.    Procedures other than Imaging:  No results found for this or any previous visit (from the past 24 hour(s)).    Imaging:  Results for orders placed or performed during the hospital encounter of 01/09/20   IR Chest Port Placement > 5 Yrs of Age    Narrative    PORT PLACEMENT 1/9/2020 10:50 AM    HISTORY: Neoplasm    COMPARISON: None.    DESCRIPTION OF PROCEDURE: After obtaining informed consent, the  patient was placed in a supine position on the fluoroscopy table. The  neck was prepped and draped in the usual sterile manner.     Ultrasound was used to evaluate and document patency of the internal  jugular vein. One percent " lidocaine was injected for local anesthesia.  Under sterile ultrasound guidance, a puncture was made into the  internal jugular vein. A permanent copy image was obtained for the  patient's records.    A 6 Azeri peel-away sheath was placed into the vein. The chest and  neck were anesthetized with lidocaine. A 2 cm skin incision was made  in the chest. A pocket for a port was created using blunt dissection.  The pocket was washed with antibiotic-laden saline. The pocket was  packed with antibiotic-laden gauze. A tunnel for the port was created  from the pocket to the neck. The port catheter was pulled through, and  the attached port was then placed into the pocket. The port was  secured in the pocket using 2 Ethilon around the port nozzle. The  catheter was measured to appropriate length and was placed through the  peel-away sheath. The tip was positioned in the mid to high right  atrium. The pocket was closed with three 3-0 Vicryl deep interrupted  stitches and Dermabond. The neck incision site was closed with  Dermabond.    The port was accessed, aspirated, flushed with saline and heparin  locked. A dressing was applied.    A fluoroscopic image was saved to document tip of catheter in  satisfactory position.     I determined this patient to be an appropriate candidate for the  planned sedation and procedure and reassessed the patient immediately  prior to sedation and procedure. Moderate intravenous conscious  sedation was supervised by me. The patient was independently monitored  by a registered nurse assigned to the Department of radiology using  automated blood pressure, EKG and pulse oximetry. The patient  tolerated the procedure well. There were no immediate postprocedure  complications. The patient's vital signs were monitored by radiology  nursing staff under my supervision and remained stable throughout the  study. Radiation dose for this scan was reduced using automated  exposure control, adjustment of the  mA and/or kV according to patient  size, or iterative reconstruction technique.    Maximal Sterile Barrier Technique Utilized: Cap AND mask AND sterile  gown AND sterile gloves AND sterile full body drape AND hand hygiene  AND skin preparation 2% chlorhexidine for cutaneous antisepsis (or  acceptable alternative antiseptics).     Sterile Ultrasound Technique Utilized ?Sterile gel AND sterile probe  covers.    MEDICATIONS: 1 mg Versed, 50 mg fentanyl    SEDATION TIME: 30 minutes    FLUOROSCOPY TIME: .6 minutes    RADIATION DOSE: 4 mGy    NUMBER OF SPOT FLUOROSCOPIC IMAGES:      Impression    IMPRESSION: Power port placed as above. The port is ready to use.    EDWINA SIMMONS MD        Consultations:  Consultation during this admission received from oncology.       Recent Lab Results:  Recent Labs   Lab 01/13/20  0640 01/12/20  0617 01/11/20  1044   WBC 5.0 8.1 17.0*   HGB 7.3* 6.7* 7.3*   HCT 22.6* 21.3* 22.3*   MCV 95 100 97   * 100* 114*          Lab Results   Component Value Date     01/12/2020     01/11/2020     01/10/2020    Lab Results   Component Value Date    CHLORIDE 106 01/12/2020    CHLORIDE 103 01/11/2020    CHLORIDE 103 01/10/2020    Lab Results   Component Value Date    BUN 11 01/12/2020    BUN 14 01/11/2020    BUN 19 01/10/2020      Lab Results   Component Value Date    POTASSIUM 3.8 01/12/2020    POTASSIUM 2.9 01/12/2020    POTASSIUM 3.8 01/11/2020    Lab Results   Component Value Date    CO2 31 01/12/2020    CO2 30 01/11/2020    CO2 28 01/10/2020    Lab Results   Component Value Date    CR 0.73 01/12/2020    CR 0.81 01/11/2020    CR 0.85 01/10/2020             Pending Results:    Unresulted Labs Ordered in the Past 30 Days of this Admission     No orders found from 12/11/2019 to 1/11/2020.           Discharge Instructions and Follow-Up:   Discharge Procedure Orders   Reason for your hospital stay   Order Comments: You were admitted for methotrexate infusion.  You did require  a blood transfusion while you were here.     Follow-up and recommended labs and tests    Order Comments: Follow-up with your oncology clinic later this week as planned.     Activity   Order Comments: Your activity upon discharge: activity as tolerated     Order Specific Question Answer Comments   Is discharge order? Yes      Diet   Order Comments: Follow this diet upon discharge: Orders Placed This Encounter      Snacks/Supplements Adult: Boost Plus; Between Meals      Regular Diet Adult     Order Specific Question Answer Comments   Is discharge order? Yes        Total time spent in face to face contact with the patient and coordinating discharge was:  50 Minutes.

## 2020-01-13 NOTE — PLAN OF CARE
Patient alert and oriented x4. Up independent in room. Denies pain and nausea. Urine pH resulted 8.0. Continuing leucovorin pushes. Methotrexate level will be drawn this morning. Possible discharge if criteria met.

## 2020-01-13 NOTE — PLAN OF CARE
AVS reviewed with patient and spouse. All questions answered. Filled scripts for Mepron and Pepcid sent with patient at discharge. Port heparin locked and de-accessed. Plan to have chemo infusion on Friday. All belongings packed and sent with patient at time of discharge. Pt declined wheelchair escort to personal vehicle. Pt's spouse to drive patient home. Pt stable at time of discharge.

## 2020-01-13 NOTE — PROGRESS NOTES
"SPIRITUAL HEALTH SERVICES  SPIRITUAL ASSESSMENT Progress Note  [facility] S[facility] 5    PRIMARY FOCUS:     Emotional/spiritual/Yarsanism distress    Support for coping    ILLNESS CIRCUMSTANCES:   Saw pt Kassie per staff referral for emotional support for pt.  This author is familiar with pt from a previous admission.  Assessed emotional/spiritual needs and resources while offering reflective conversation, which integrated elements of illness and family narratives.     Context of Serious Illness/Symptom(s) - Kassie reported that she was admitted for \"preventative chemo\" for lymphoma.    Persons/Resources Involved - She named her , friends, and neighbors.  Kassie has two children, ages 13 (daughter) and 11 (son).     DISTRESS:     Emotional/Existential/Relational Distress -   o Kassie shared that she has told more people about her diagnosis since her last admission, and neighbors have been supportive by helping with meals.  o Kassie expressed concern about her children because both of their grandmothers and a great aunt  of cancer: \"They have not had good experiences with cancer, and I want them to have a good outcome.\"  She is planning to meet with the Family/Child Life Specialist.  Kassie described her children as \"prefectionists\" and she has talked with them about their expectations of themselves in the midst of her treatment.  o Her father  about a year ago; Kassie was his primary caregiver the last 16 years of his life.    Spiritual/Restorationist Distress - none expressed.    Social/Cultural/Economic Distress - none identified.    SPIRIT (Coping):     Zoroastrian/Ai - Samaritan    Spiritual Practice(s) - not discussed; pt declined communion when [facility]  [facility] shortly after this author left her room.    Emotional/Existential/Relational Connections - none mentioned beyond family and friends.    SENSE-MAKING:    Goals of Care - Kassie reported that she will have two more " inpatient chemo infusions.  Reviewed how she can request further  support during future admissions.    Meaning/Sense-Making - Kassie reflected briefly on how she is coping with her cancer treatment and named significant people within her support network.    PLAN: This author and other chaplains remain available per pt/family request.    Nabor Ruggiero M.Div., [facility]  Staff   Pager [phone]

## 2020-01-14 ENCOUNTER — TELEPHONE (OUTPATIENT)
Dept: FAMILY MEDICINE | Facility: CLINIC | Age: 50
End: 2020-01-14

## 2020-01-14 NOTE — TELEPHONE ENCOUNTER
Patient admitted for chemo treatment, no triage call necessary as patient is being followed by oncology.    CARLOS BarlowN, RN  [employer] Triage

## 2020-01-14 NOTE — TELEPHONE ENCOUNTER
Patient discharged from [facility] for inpatient hospital stay on 1/13 for diffuse large b cell lymphoma of extranodal site.    Please contact patient to follow up; no appointment scheduled at this time.    ER / IP:  1/4    Care Coordination:  Not a candidate      Fadia Cárdenas

## 2020-01-14 NOTE — PROGRESS NOTES
[facility]  Hematology-Oncology Follow-up Note      Today's Date: 01/13/20  Date of Admission:  1/10/2020  Reason for Consult: Diffuse large B cell lymphoma      ASSESSMENT:  Kassie Ramirez is a 49 year old female with DLBCL, EBV+ non-GCB, stage III intermediate risk disease admitted for intermediate dose methotrexate    Kassie has tolerated this well and without difficulty. She has cytopenias as expected. She did receive a PRBC transfusion yesterday. Her Hgb is 7.3 today.     She has been tapered off the steroids started during treatment of her PJV pneumonia. I would not restart her prednisone at this time. We will take her off bactrim and switch to atovaquone given the drug interaction with methotrexate     She is scheduled to see Padmaja in clinic this Friday for her cycle 3 of outpatient chemotherapy. We will continue as long as she is feeling well.     I do not feel that she needs any further PRBC's for now. We will be following this as outpatient. She does not have LORENZ, though she has not been active as inpatient. Hopefully she should be able to handle this current hemoglobin given relatively young age.     Recent Labs   Lab Test 01/13/20  0640 01/12/20  0617 01/11/20  0615   METHOTREXATE 0.07 0.32 13.11       RECOMMENDATIONS:  - Her methotrexate level is down to 0.07 and okay to discharge home  - Encouraged increased oral intake at home  - She will be followed on Friday for continued outpatient chemotherapy   - She does not need PRBC for her Hgb of 7.3 g/dl  - She does not need prednisone at this time.   - She will be discharged on atovaquone for PJV pneumonia prophylaxis  - She knows to call us if feeling unwell.     I have explained above to patient and extensively reviewed it with her nurse Mayra and also spoke to Dr. Quintero.     Over 35 min of time spent with more than 50% time spent in counseling and coordinating care.      Castro Castro  Adj Assistant  "Professor,  Division of Hematology, Oncology & Transplantation  [facility].      INTERIM HISTORY:  She is feeling well. She has no new concerns. She was seen in the room with her  and nurse - Mayra.      MEDICATIONS:  No current facility-administered medications for this encounter.      Current Outpatient Medications   Medication     acyclovir (ZOVIRAX) 400 MG tablet     [START ON 2020] atovaquone (MEPRON) 750 MG/5ML suspension     famotidine (PEPCID) 20 MG tablet     LORazepam (ATIVAN) 0.5 MG tablet     ondansetron (ZOFRAN-ODT) 8 MG ODT tab     prochlorperazine (COMPAZINE) 10 MG tablet     SENNA PO       ALLERGIES:  Allergies   Allergen Reactions     Cold & Flu [Cold Defense Fighter]      See pseudoephedrine     Seasonal Allergies      Sudafed Cold-Cough [Dayquil Liquicaps]      Pseudoephedrine Rash     Rash then skins peels off          PHYSICAL EXAM:  Vital signs:  Temp: 96.3  F (35.7  C) Temp src: Axillary BP: 135/79 Pulse: 94 Heart Rate: 86 Resp: 24 SpO2: 97 % O2 Device: None (Room air)   Height: 170.2 cm (5' 7.01\") Weight: 76.9 kg (169 lb 8 oz)  Estimated body mass index is 26.54 kg/m  as calculated from the following:    Height as of this encounter: 1.702 m (5' 7.01\").    Weight as of this encounter: 76.9 kg (169 lb 8 oz).    ECO  GENERAL/CONSTITUTIONAL: No acute distress.  EYES: Pupils are equal, round, and react to light and accommodation. Extraocular movements intact.  No scleral icterus.  ENT/MOUTH: Neck supple. Oropharynx clear, no mucositis.  LYMPH: No anterior cervical, posterior cervical, supraclavicular, axillary or inguinal adenopathy.   RESPIRATORY: Clear to auscultation bilaterally. No crackles or wheezing.   CARDIOVASCULAR: Regular rate and rhythm without murmurs, gallops, or rubs.  GASTROINTESTINAL: No hepatosplenomegaly, masses, or tenderness. The patient has normal bowel sounds. No guarding.  No distention.  MUSCULOSKELETAL: Warm and well-perfused, no cyanosis, " clubbing, or edema.  NEUROLOGIC: Cranial nerves II-XII are intact. Alert, oriented, answers questions appropriately.  INTEGUMENTARY: No rashes or jaundice.  GAIT: Steady, does not use assistive device      LABS:  Recent Labs   Lab Test 01/12/20  1605 01/12/20  0617 01/11/20  2030 01/11/20  0615 01/10/20  1028 01/08/20  0714 01/08/20  0600 12/27/19  0820  12/23/19  0655   NA  --  143  --  141 138  --   --  132*  --  135   POTASSIUM 3.8 2.9* 3.8 3.2* 3.8  --   --  4.1  --  4.1   CHLORIDE  --  106  --  103 103  --   --  100  --  101   CO2  --  31  --  30 28  --   --  22  --  24   ANIONGAP  --  6  --  8 7  --   --  10  --  10   BUN  --  11  --  14 19  --   --  24  --  16   CR  --  0.73  --  0.81 0.85 1.04 1.04 1.07*   < > 0.88   GLC  --  121*  --  94 104*  --   --  80  --  167*   AFSHAN  --  8.2*  --  7.9* 9.2  --   --  9.6  --  9.2    < > = values in this interval not displayed.     Recent Labs   Lab Test 01/11/20  0615 12/01/19  1340 12/01/19  0641 11/30/19  2137 11/30/19  1341  11/27/19  2216 11/27/19  1605 09/19/19  0706 09/18/19  0845   MAG 2.0  --   --   --   --   --  2.1 2.5* 2.4* 2.2   PHOS 3.1 2.8 3.4 2.8 2.5   < > 3.1  --   --   --     < > = values in this interval not displayed.     Recent Labs   Lab Test 01/13/20  0640 01/12/20  0617 01/11/20  1044 01/11/20  0615 01/10/20  1028   WBC 5.0 8.1 17.0* 13.0* 13.3*   HGB 7.3* 6.7* 7.3* 6.9* 7.7*   * 100* 114* 93* 82*   MCV 95 100 97 98 98   NEUTROPHIL 87.0 93.0 98.0 93.0 83.0     Recent Labs   Lab Test 01/11/20  1044 01/11/20  0615 01/10/20  1028 12/27/19  0820 12/16/19  1743  11/29/19  0526   BILITOTAL  --  0.7 0.4 0.6  --    < > 1.2   ALKPHOS  --  85 100 74  --    < > 69   ALT  --  43 36 37  --    < > 25   AST  --  23 22 36  --    < > 22   ALBUMIN  --  2.8* 3.2* 3.7  --    < > 3.0*   *  --   --   --  596*  --  416*    < > = values in this interval not displayed.     TSH   Date Value Ref Range Status   09/18/2019 2.06 0.40 - 4.00 mU/L Final    07/27/2018 2.04 0.40 - 4.00 mU/L Final   09/16/2014 1.62 0.40 - 4.00 mU/L Final     Comment:     Effective 7/30/2014, the reference range for this assay has changed to reflect   new instrumentation/methodology.         PATHOLOGY:  Lab Results   Component Value Date    PATH  01/11/2020     Patient Name: BABAR VARGHESE  MR#: [id number]  Specimen #: [id number]  Collected: 1/11/2020  Received: 1/13/2020  Reported: 1/13/2020 10:20  Ordering Phy(s): KIRTI RAYA  Additional Phy(s): KD HAHN    For improved result formatting, select 'View Enhanced Report Format' under   Linked Documents section.    TEST(S):  Peripheral Smear Morphology    FINAL DIAGNOSIS:  Peripheral blood.  - Leukocytosis with absolute neutrophilia with mild left shift.    Reticulocytosis, normochromic normocytic  anemia, and thrombocytopenia.    COMMENT:  Morphologically, there is no diagnostic evidence of hemolysis, however,   correlation with clinical findings and  currently pending serologic studies (haptoglobin, etc.) is required.    Given the clinical history, the anemia  may be multifactorial.  Clinical correlation is required.    Electronically signed out by:    Pawel Casarez M.D.    CLINICAL HISTORY:  Diffuse large B-cell lymphoma.  Progressive anemia.  Completed cycle 2   RCHOP.  On high dose methotrexate.  ?  Hemolysis.    PERIPHERAL BLOOD DATA:    PERIPHERAL BLOOD DATA  Patient Value (Reference Range >18 year old female)  13.0 . . . WBC   (4.0-11.0 x 10*9/L)  2.14 . . . RBC   (3.8-5.2 x 10*12/L)  6.9 . . . HGB   (11.7-15.7 g/dL)  21.0 . . . HCT   (35.0-47.0 %)  98 . . . MCV   (78-100fL)  32.2 . .  .MCH   (26.5-33.0 pg)  32.9 . . . MCHC   (31.5-36.5 g/dL)  15.9 . . . RDW   (10.0-15.0 %)  93 . . . PLT   (150-450 x 10*9/L)  3.2 . . . Retic   (0.5-2.0%)    PERIPHERAL BLOOD DIFFERENTIAL  (Reference ranges >18 year old female)    Percent  93. . Neutrophils, segmented and bands   (40 - 75)  4. . Lymphocytes   (20 -  48)  1. . Monocytes   (0 - 12)  0. . Eosinophils   (0 - 6)  0. . Basophils   (0 - 2)  1. . Metamyelocytes  1. . Myelocytes    Absolute  12.1. . Neutrophils, segmented and bands    (1.6 - 8.3 x 10*9/L)  0.5. . Lymphocytes    (0.8 - 5.3 x 10*9/L)  0.1. . Monocytes    (0 -1.3 x 10*9/L)  0. . Eosinophils    (0 - 0.7 x 10*9/L)  0. . Basophils    (0 - 0.2 x 10*9/L)    PERIPHERAL MORPHOLOGY:    ERYTHROCYTES: Show mild anisopoikilocytosis.  There are small number of   teardrop forms.  There is no  significant fragmentation.    LEUKOCYTES: Granulocytes appear predominantly mature and segmented with a   mild left shift.  Lymphocytes are  small.  Monocytes appear mature.  No obvious dysplastic changes are seen.    No blasts are identified.    PLATELETS: Appear reduced without artifactual clumping or satellite   formation.    CPT Codes:  A: [id number]    COLLECTION SITE:  Client:  [facility]  Location:  [facility] ()         IMAGING:  Results for orders placed or performed during the hospital encounter of 01/09/20   IR Chest Port Placement > 5 Yrs of Age    Narrative    PORT PLACEMENT 1/9/2020 10:50 AM    HISTORY: Neoplasm    COMPARISON: None.    DESCRIPTION OF PROCEDURE: After obtaining informed consent, the  patient was placed in a supine position on the fluoroscopy table. The  neck was prepped and draped in the usual sterile manner.     Ultrasound was used to evaluate and document patency of the internal  jugular vein. One percent lidocaine was injected for local anesthesia.  Under sterile ultrasound guidance, a puncture was made into the  internal jugular vein. A permanent copy image was obtained for the  patient's records.    A 6 Cuban peel-away sheath was placed into the vein. The chest and  neck were anesthetized with lidocaine. A 2 cm skin incision was made  in the chest. A pocket for a port was created using blunt dissection.  The pocket was washed with antibiotic-laden saline. The pocket was  packed with  antibiotic-laden gauze. A tunnel for the port was created  from the pocket to the neck. The port catheter was pulled through, and  the attached port was then placed into the pocket. The port was  secured in the pocket using 2 Ethilon around the port nozzle. The  catheter was measured to appropriate length and was placed through the  peel-away sheath. The tip was positioned in the mid to high right  atrium. The pocket was closed with three 3-0 Vicryl deep interrupted  stitches and Dermabond. The neck incision site was closed with  Dermabond.    The port was accessed, aspirated, flushed with saline and heparin  locked. A dressing was applied.    A fluoroscopic image was saved to document tip of catheter in  satisfactory position.     I determined this patient to be an appropriate candidate for the  planned sedation and procedure and reassessed the patient immediately  prior to sedation and procedure. Moderate intravenous conscious  sedation was supervised by me. The patient was independently monitored  by a registered nurse assigned to the Department of radiology using  automated blood pressure, EKG and pulse oximetry. The patient  tolerated the procedure well. There were no immediate postprocedure  complications. The patient's vital signs were monitored by radiology  nursing staff under my supervision and remained stable throughout the  study. Radiation dose for this scan was reduced using automated  exposure control, adjustment of the mA and/or kV according to patient  size, or iterative reconstruction technique.    Maximal Sterile Barrier Technique Utilized: Cap AND mask AND sterile  gown AND sterile gloves AND sterile full body drape AND hand hygiene  AND skin preparation 2% chlorhexidine for cutaneous antisepsis (or  acceptable alternative antiseptics).     Sterile Ultrasound Technique Utilized ?Sterile gel AND sterile probe  covers.    MEDICATIONS: 1 mg Versed, 50 mg fentanyl    SEDATION TIME: 30  minutes    FLUOROSCOPY TIME: .6 minutes    RADIATION DOSE: 4 mGy    NUMBER OF SPOT FLUOROSCOPIC IMAGES:      Impression    IMPRESSION: Power port placed as above. The port is ready to use.    EDWINA SIMMONS MD

## 2020-01-15 DIAGNOSIS — C83.398 DIFFUSE LARGE B-CELL LYMPHOMA OF EXTRANODAL SITE: ICD-10-CM

## 2020-01-15 DIAGNOSIS — Z53.9 ERRONEOUS ENCOUNTER--DISREGARD: Primary | ICD-10-CM

## 2020-01-15 RX ORDER — METHYLPREDNISOLONE SODIUM SUCCINATE 125 MG/2ML
125 INJECTION, POWDER, LYOPHILIZED, FOR SOLUTION INTRAMUSCULAR; INTRAVENOUS
Status: CANCELLED
Start: 2020-02-07

## 2020-01-15 RX ORDER — MEPERIDINE HYDROCHLORIDE 25 MG/ML
25 INJECTION INTRAMUSCULAR; INTRAVENOUS; SUBCUTANEOUS EVERY 30 MIN PRN
Status: CANCELLED | OUTPATIENT
Start: 2020-02-07

## 2020-01-15 RX ORDER — SODIUM CHLORIDE 9 MG/ML
1000 INJECTION, SOLUTION INTRAVENOUS CONTINUOUS PRN
Status: CANCELLED
Start: 2020-02-21

## 2020-01-15 RX ORDER — METHYLPREDNISOLONE SODIUM SUCCINATE 125 MG/2ML
125 INJECTION, POWDER, LYOPHILIZED, FOR SOLUTION INTRAMUSCULAR; INTRAVENOUS
Status: CANCELLED
Start: 2020-02-21

## 2020-01-15 RX ORDER — DEXAMETHASONE 0.5 MG/1
8 TABLET ORAL DAILY
Status: CANCELLED | OUTPATIENT
Start: 2020-02-22

## 2020-01-15 RX ORDER — DIPHENHYDRAMINE HYDROCHLORIDE 50 MG/ML
50 INJECTION INTRAMUSCULAR; INTRAVENOUS
Status: CANCELLED
Start: 2020-02-07

## 2020-01-15 RX ORDER — NALOXONE HYDROCHLORIDE 0.4 MG/ML
.1-.4 INJECTION, SOLUTION INTRAMUSCULAR; INTRAVENOUS; SUBCUTANEOUS
Status: CANCELLED | OUTPATIENT
Start: 2020-01-17

## 2020-01-15 RX ORDER — NALOXONE HYDROCHLORIDE 0.4 MG/ML
.1-.4 INJECTION, SOLUTION INTRAMUSCULAR; INTRAVENOUS; SUBCUTANEOUS
Status: CANCELLED | OUTPATIENT
Start: 2020-02-07

## 2020-01-15 RX ORDER — ALBUTEROL SULFATE 0.83 MG/ML
2.5 SOLUTION RESPIRATORY (INHALATION)
Status: CANCELLED | OUTPATIENT
Start: 2020-02-07

## 2020-01-15 RX ORDER — MEPERIDINE HYDROCHLORIDE 25 MG/ML
25 INJECTION INTRAMUSCULAR; INTRAVENOUS; SUBCUTANEOUS EVERY 30 MIN PRN
Status: CANCELLED | OUTPATIENT
Start: 2020-01-17

## 2020-01-15 RX ORDER — NALOXONE HYDROCHLORIDE 0.4 MG/ML
.1-.4 INJECTION, SOLUTION INTRAMUSCULAR; INTRAVENOUS; SUBCUTANEOUS
Status: CANCELLED | OUTPATIENT
Start: 2020-02-21

## 2020-01-15 RX ORDER — ALBUTEROL SULFATE 0.83 MG/ML
2.5 SOLUTION RESPIRATORY (INHALATION)
Status: CANCELLED | OUTPATIENT
Start: 2020-01-17

## 2020-01-15 RX ORDER — EPINEPHRINE 1 MG/ML
0.3 INJECTION, SOLUTION INTRAMUSCULAR; SUBCUTANEOUS EVERY 5 MIN PRN
Status: CANCELLED | OUTPATIENT
Start: 2020-01-17

## 2020-01-15 RX ORDER — ALBUTEROL SULFATE 90 UG/1
1-2 AEROSOL, METERED RESPIRATORY (INHALATION)
Status: CANCELLED
Start: 2020-01-17

## 2020-01-15 RX ORDER — DIPHENHYDRAMINE HYDROCHLORIDE 50 MG/ML
50 INJECTION INTRAMUSCULAR; INTRAVENOUS
Status: CANCELLED
Start: 2020-01-17

## 2020-01-15 RX ORDER — LORAZEPAM 2 MG/ML
0.5 INJECTION INTRAMUSCULAR EVERY 4 HOURS PRN
Status: CANCELLED
Start: 2020-01-17

## 2020-01-15 RX ORDER — PROCHLORPERAZINE MALEATE 5 MG
10 TABLET ORAL EVERY 6 HOURS PRN
Status: CANCELLED
Start: 2020-02-21

## 2020-01-15 RX ORDER — EPINEPHRINE 0.3 MG/.3ML
0.3 INJECTION SUBCUTANEOUS EVERY 5 MIN PRN
Status: CANCELLED | OUTPATIENT
Start: 2020-01-17

## 2020-01-15 RX ORDER — ACETAMINOPHEN 325 MG/1
650 TABLET ORAL ONCE
Status: CANCELLED
Start: 2020-02-07

## 2020-01-15 RX ORDER — DOXORUBICIN HYDROCHLORIDE 2 MG/ML
50 INJECTION, SOLUTION INTRAVENOUS ONCE
Status: CANCELLED | OUTPATIENT
Start: 2020-01-17

## 2020-01-15 RX ORDER — DIPHENHYDRAMINE HCL 25 MG
50 CAPSULE ORAL ONCE
Status: CANCELLED
Start: 2020-02-07

## 2020-01-15 RX ORDER — LEUCOVORIN CALCIUM 350 MG/17.5ML
25 INJECTION, POWDER, LYOPHILIZED, FOR SOLUTION INTRAMUSCULAR; INTRAVENOUS EVERY 6 HOURS
Status: CANCELLED | OUTPATIENT
Start: 2020-02-22

## 2020-01-15 RX ORDER — EPINEPHRINE 0.3 MG/.3ML
0.3 INJECTION SUBCUTANEOUS EVERY 5 MIN PRN
Status: CANCELLED | OUTPATIENT
Start: 2020-02-07

## 2020-01-15 RX ORDER — EPINEPHRINE 1 MG/ML
0.3 INJECTION, SOLUTION INTRAMUSCULAR; SUBCUTANEOUS EVERY 5 MIN PRN
Status: CANCELLED | OUTPATIENT
Start: 2020-02-07

## 2020-01-15 RX ORDER — PALONOSETRON 0.05 MG/ML
0.25 INJECTION, SOLUTION INTRAVENOUS ONCE
Status: CANCELLED
Start: 2020-02-07

## 2020-01-15 RX ORDER — DEXAMETHASONE 0.5 MG/1
12 TABLET ORAL ONCE
Status: CANCELLED | OUTPATIENT
Start: 2020-02-21

## 2020-01-15 RX ORDER — SODIUM BICARBONATE 84 MG/ML
50 INJECTION, SOLUTION INTRAVENOUS
Status: CANCELLED | OUTPATIENT
Start: 2020-02-21

## 2020-01-15 RX ORDER — EPINEPHRINE 1 MG/ML
0.3 INJECTION, SOLUTION INTRAMUSCULAR; SUBCUTANEOUS EVERY 5 MIN PRN
Status: CANCELLED | OUTPATIENT
Start: 2020-02-21

## 2020-01-15 RX ORDER — METHYLPREDNISOLONE SODIUM SUCCINATE 125 MG/2ML
125 INJECTION, POWDER, LYOPHILIZED, FOR SOLUTION INTRAMUSCULAR; INTRAVENOUS
Status: CANCELLED
Start: 2020-01-17

## 2020-01-15 RX ORDER — LORAZEPAM 2 MG/ML
0.5 INJECTION INTRAMUSCULAR EVERY 4 HOURS PRN
Status: CANCELLED
Start: 2020-02-07

## 2020-01-15 RX ORDER — ACETAMINOPHEN 325 MG/1
650 TABLET ORAL ONCE
Status: CANCELLED
Start: 2020-01-17

## 2020-01-15 RX ORDER — ONDANSETRON 4 MG/1
16 TABLET, FILM COATED ORAL ONCE
Status: CANCELLED | OUTPATIENT
Start: 2020-02-21

## 2020-01-15 RX ORDER — ALBUTEROL SULFATE 90 UG/1
1-2 AEROSOL, METERED RESPIRATORY (INHALATION)
Status: CANCELLED
Start: 2020-02-07

## 2020-01-15 RX ORDER — LORAZEPAM 2 MG/ML
.5-1 INJECTION INTRAMUSCULAR EVERY 6 HOURS PRN
Status: CANCELLED | OUTPATIENT
Start: 2020-02-21

## 2020-01-15 RX ORDER — LORAZEPAM 0.5 MG/1
.5-1 TABLET ORAL EVERY 6 HOURS PRN
Status: CANCELLED
Start: 2020-02-21

## 2020-01-15 RX ORDER — SODIUM CHLORIDE 9 MG/ML
1000 INJECTION, SOLUTION INTRAVENOUS CONTINUOUS PRN
Status: CANCELLED
Start: 2020-02-07

## 2020-01-15 RX ORDER — DOXORUBICIN HYDROCHLORIDE 2 MG/ML
50 INJECTION, SOLUTION INTRAVENOUS ONCE
Status: CANCELLED | OUTPATIENT
Start: 2020-02-07

## 2020-01-15 RX ORDER — LEUCOVORIN CALCIUM 350 MG/17.5ML
50 INJECTION, POWDER, LYOPHILIZED, FOR SOLUTION INTRAMUSCULAR; INTRAVENOUS ONCE
Status: CANCELLED | OUTPATIENT
Start: 2020-02-22

## 2020-01-15 RX ORDER — DIPHENHYDRAMINE HCL 25 MG
50 CAPSULE ORAL ONCE
Status: CANCELLED
Start: 2020-01-17

## 2020-01-15 RX ORDER — MEPERIDINE HYDROCHLORIDE 25 MG/ML
25 INJECTION INTRAMUSCULAR; INTRAVENOUS; SUBCUTANEOUS EVERY 30 MIN PRN
Status: CANCELLED | OUTPATIENT
Start: 2020-02-21

## 2020-01-15 RX ORDER — SODIUM CHLORIDE 9 MG/ML
1000 INJECTION, SOLUTION INTRAVENOUS CONTINUOUS PRN
Status: CANCELLED
Start: 2020-01-17

## 2020-01-15 RX ORDER — DIPHENHYDRAMINE HYDROCHLORIDE 50 MG/ML
50 INJECTION INTRAMUSCULAR; INTRAVENOUS
Status: CANCELLED
Start: 2020-02-21

## 2020-01-15 RX ORDER — MEPERIDINE HYDROCHLORIDE 25 MG/ML
25 INJECTION INTRAMUSCULAR; INTRAVENOUS; SUBCUTANEOUS
Status: CANCELLED
Start: 2020-01-17

## 2020-01-15 RX ORDER — ALBUTEROL SULFATE 0.83 MG/ML
2.5 SOLUTION RESPIRATORY (INHALATION)
Status: CANCELLED | OUTPATIENT
Start: 2020-02-21

## 2020-01-15 RX ORDER — MEPERIDINE HYDROCHLORIDE 25 MG/ML
25 INJECTION INTRAMUSCULAR; INTRAVENOUS; SUBCUTANEOUS
Status: CANCELLED
Start: 2020-02-07

## 2020-01-15 RX ORDER — PALONOSETRON 0.05 MG/ML
0.25 INJECTION, SOLUTION INTRAVENOUS ONCE
Status: CANCELLED
Start: 2020-01-17

## 2020-01-15 RX ORDER — ALBUTEROL SULFATE 90 UG/1
1-2 AEROSOL, METERED RESPIRATORY (INHALATION)
Status: CANCELLED
Start: 2020-02-21

## 2020-01-16 NOTE — PROGRESS NOTES
Oncology/Hematology Visit Note    Jan 17, 2020    Reason for visit: Follow up DLBCL     Oncology HPI: Kassie Ramirez is a 49 year old female with DLBCL.  She developed a cough in early 2019 and symptoms were progressive for over 5 months.  CXR and CT chest did reveal some lymphadenopathy and she was initially treated with antibiotics.  Bronchoscopy revealed nonnecrotizing granulomatous inflammation, but negative for malignancy.  Follow-up CT November 2019 revealed worsening of the mediastinal and bilateral hilar lymphadenopathy.  Mediastinoscopy obtained and preliminary pathology was EBV positive DLBCL.  FISH was negative for high risk translocations.  She presented to the ED on 11/27/2019 with worsening SOB and was admitted at the [facility] and R CHOP cycle 1 was initiated on 11/29/2019.  She was admitted at [facility] on 12/15/2019 for neutropenic fever with sepsis and suspecting hospital-acquired pneumonia, PJV pneumonia, blood cultures were negative.  She established care with Dr. Castro on 12/27/2019 and she received MR-CHOP cycle 2 same day.  Plan for a total of 6 cycles with Neulasta support of MR-CHOP with PET scan after 2-3 cycles.    She is here today for MR CHOP cycle 3.     Interval History: Sadia is here unaccompanied today.  She has completed 2 cycles of chemotherapy now and she is overall improving.  She still has some heaviness in pain in her chest with shortness of breath, but significantly improved since starting chemo.  She is unable to take ibuprofen and Tylenol has not been helping.  She denies any pleuritic chest pain or hemoptysis, but she is looking for some more relief in the pressure in her chest.  She denies fever, chills, new cough, bleeding, vomiting, diarrhea.    Review of Systems:  See interval hx. Denies fevers, chills, HA, dizziness, n/t, changes in vision, cough, sore throat, abdominal pain, N/V, diarrhea, changes in urination, bleeding, bruising, rash.     PMHx and  Social Hx reviewed per EPIC.      Medications:  Current Outpatient Medications   Medication Sig Dispense Refill     acyclovir (ZOVIRAX) 400 MG tablet Take 1 tablet (400 mg) by mouth 2 times daily 60 tablet 3     atovaquone (MEPRON) 750 MG/5ML suspension Take 10 mLs (1,500 mg) by mouth daily Please discuss with your oncologist if/when it is okay to transition back to Bactrim. 210 mL 0     famotidine (PEPCID) 20 MG tablet Take 1 tablet (20 mg) by mouth 2 times daily 60 tablet 0     LORazepam (ATIVAN) 0.5 MG tablet Take 1-2 tablets (0.5-1 mg) by mouth every 6 hours as needed (Breakthrough Nausea / Vomiting) 30 tablet 0     ondansetron (ZOFRAN-ODT) 8 MG ODT tab Take 1 tablet (8 mg) by mouth every 8 hours as needed 45 tablet 0     prochlorperazine (COMPAZINE) 10 MG tablet Take 1 tablet (10 mg) by mouth every 6 hours as needed (Breakthrough Nausea/Vomiting) 30 tablet 0     SENNA PO Take 1 tablet by mouth as needed          Allergies   Allergen Reactions     Cold & Flu [Cold Defense Fighter]      See pseudoephedrine     Seasonal Allergies      Sudafed Cold-Cough [Dayquil Liquicaps]      Pseudoephedrine Rash     Rash then skins peels off        EXAM:    /89   Pulse 125   Temp 97  F (36.1  C) (Oral)   Resp 16   Wt 72.1 kg (159 lb)   SpO2 99%   BMI 24.90 kg/m      GENERAL:  Female, in no acute distress.  Alert and oriented x3.   HEENT:  Normocephalic, atraumatic.  PERRL, oropharynx clear with no sores or thrush.   LYMPH NODES:  No palpable pre/post-auricular, cervical, axillary lymphadenopathy appreciated.  CV:  RRR, No murmurs, gallops, or rubs.   LUNGS:  Clear to auscultation bilaterally.   ABDOMEN:  Soft, nontender and nondistended.  Bowel sounds heard x4.  No apparent hepatosplenomegaly.   EXTREMITIES:  No clubbing, cyanosis, or edema.   SKIN: No rash, port without erythema, pus or tenderness.   PSYCH: Mood stable      Labs:   Results for BABAR VARGHESE (MRN [id number]) as of 1/17/2020 13:45   1/17/2020  08:40   Sodium 139   Potassium 3.2 (L)   Chloride 105   Carbon Dioxide 26   Urea Nitrogen 18   Creatinine 1.11 (H)   GFR Estimate 58 (L)   GFR Estimate If Black 67   Calcium 9.0   Anion Gap 8   Albumin 3.5   Protein Total 6.3 (L)   Bilirubin Total 1.2   Alkaline Phosphatase 65   ALT 96 (H)   AST 29   Glucose 103 (H)   WBC 5.7   Hemoglobin 7.9 (L)   Hematocrit 23.2 (L)   Platelet Count 110 (L)   RBC Count 2.52 (L)   MCV 92   MCH 31.3   MCHC 34.1   RDW 16.5 (H)   Diff Method Automated Method   % Neutrophils 74.7   % Lymphocytes 10.8   % Monocytes 9.4   % Eosinophils 0.3   % Basophils 0.3   % Immature Granulocytes 4.5   Nucleated RBCs 0   Absolute Neutrophil 4.3   Absolute Lymphocytes 0.6 (L)   Absolute Monocytes 0.5   Absolute Eosinophils 0.0   Absolute Basophils 0.0   Abs Immature Granulocytes 0.3   Absolute Nucleated RBC 0.0       Imaging: n/a    Impression/Plan: Kassie Ramirez is a 49 year old female with DLBCL currently undergoing curative intent chemotherapy with MR-CHOP.    DLBCL: Stage III, EBV+, non-GCB, IPI score 2 (low-intermediate), FISH negative for high risk translocations.  R CHOP cycle 1 completed inpatient on 11/29/2019 and HD methotrexate was added to cycle 2 for CNS prophylaxis, given on 12/27/2019.  She has lost some of her hair and overall her breathing has significantly improved, but she continues to have some heaviness in her chest.  She is tolerating chemotherapy fairly well with fatigue.  We will continue to watch her closely.  Plan for PET scan after today's cycle to evaluate response.  She will call the clinic with any questions or concerns.  --2/4/2020 PET scan  --2/7/2020 Dr. Castro, MR-CHOP cycle 4    Heme: Hemoglobin 7.9 and secondary to chemotherapy.  This is actually improved from a few days ago.  No active bleeding.  Consider blood transfusion if hemoglobin <7.0 or bleeding.  Platelets down to 110, but this is stable.  Consider platelet transfusion if platelets <20 or bleeding.  WBC  and ANC normal today.  She will continue Neulasta support.    Fatigue: Secondary to chemotherapy.  She continues to overall feel better since starting chemotherapy.  We will continue to monitor closely.  If persistent, would consider cancer rehab.  Weight is about stable within the last month.    FEN: Hypokalemia at 3.2 and potassium replacement given in infusion today.  Prescription for potassium sent to pharmacy as well.  Electrolytes are otherwise normal.  Albumin has improved.    Renal: Creatinine has risen to 1.11 and will give a little IV fluids and infusion.  She will push fluids at home as well.        Chart documentation with Dragon Voice recognition Software. Although reviewed after completion, some words and grammatical errors may remain.      Padmaja Sanchez PA-C  Hematology/Oncology  [facility]

## 2020-01-17 ENCOUNTER — TELEPHONE (OUTPATIENT)
Dept: ONCOLOGY | Facility: CLINIC | Age: 50
End: 2020-01-17

## 2020-01-17 ENCOUNTER — HOSPITAL ENCOUNTER (OUTPATIENT)
Facility: CLINIC | Age: 50
Setting detail: SPECIMEN
Discharge: HOME | End: 2020-01-17
Attending: PHYSICIAN ASSISTANT | Admitting: INTERNAL MEDICINE
Payer: COMMERCIAL

## 2020-01-17 ENCOUNTER — ONCOLOGY VISIT (OUTPATIENT)
Dept: ONCOLOGY | Facility: CLINIC | Age: 50
End: 2020-01-17
Attending: PHYSICIAN ASSISTANT
Payer: COMMERCIAL

## 2020-01-17 ENCOUNTER — INFUSION THERAPY VISIT (OUTPATIENT)
Dept: INFUSION THERAPY | Facility: CLINIC | Age: 50
End: 2020-01-17
Attending: PHYSICIAN ASSISTANT
Payer: COMMERCIAL

## 2020-01-17 VITALS
HEART RATE: 125 BPM | OXYGEN SATURATION: 99 % | SYSTOLIC BLOOD PRESSURE: 122 MMHG | TEMPERATURE: 97 F | RESPIRATION RATE: 16 BRPM | BODY MASS INDEX: 24.9 KG/M2 | DIASTOLIC BLOOD PRESSURE: 89 MMHG | WEIGHT: 159 LBS

## 2020-01-17 DIAGNOSIS — C83.398 DIFFUSE LARGE B-CELL LYMPHOMA OF EXTRANODAL SITE: Primary | ICD-10-CM

## 2020-01-17 DIAGNOSIS — E87.6 HYPOKALEMIA: ICD-10-CM

## 2020-01-17 LAB
ALBUMIN SERPL-MCNC: 3.5 G/DL (ref 3.4–5)
ALP SERPL-CCNC: 65 U/L (ref 40–150)
ALT SERPL W P-5'-P-CCNC: 96 U/L (ref 0–50)
ANION GAP SERPL CALCULATED.3IONS-SCNC: 8 MMOL/L (ref 3–14)
AST SERPL W P-5'-P-CCNC: 29 U/L (ref 0–45)
BASOPHILS # BLD AUTO: 0 10E9/L (ref 0–0.2)
BASOPHILS NFR BLD AUTO: 0.3 %
BILIRUB SERPL-MCNC: 1.2 MG/DL (ref 0.2–1.3)
BUN SERPL-MCNC: 18 MG/DL (ref 7–30)
CALCIUM SERPL-MCNC: 9 MG/DL (ref 8.5–10.1)
CHLORIDE SERPL-SCNC: 105 MMOL/L (ref 94–109)
CO2 SERPL-SCNC: 26 MMOL/L (ref 20–32)
CREAT SERPL-MCNC: 1.11 MG/DL (ref 0.52–1.04)
DIFFERENTIAL METHOD BLD: ABNORMAL
EOSINOPHIL # BLD AUTO: 0 10E9/L (ref 0–0.7)
EOSINOPHIL NFR BLD AUTO: 0.3 %
ERYTHROCYTE [DISTWIDTH] IN BLOOD BY AUTOMATED COUNT: 16.5 % (ref 10–15)
GFR SERPL CREATININE-BSD FRML MDRD: 58 ML/MIN/{1.73_M2}
GLUCOSE SERPL-MCNC: 103 MG/DL (ref 70–99)
HCT VFR BLD AUTO: 23.2 % (ref 35–47)
HGB BLD-MCNC: 7.9 G/DL (ref 11.7–15.7)
IMM GRANULOCYTES # BLD: 0.3 10E9/L (ref 0–0.4)
IMM GRANULOCYTES NFR BLD: 4.5 %
LYMPHOCYTES # BLD AUTO: 0.6 10E9/L (ref 0.8–5.3)
LYMPHOCYTES NFR BLD AUTO: 10.8 %
MCH RBC QN AUTO: 31.3 PG (ref 26.5–33)
MCHC RBC AUTO-ENTMCNC: 34.1 G/DL (ref 31.5–36.5)
MCV RBC AUTO: 92 FL (ref 78–100)
MONOCYTES # BLD AUTO: 0.5 10E9/L (ref 0–1.3)
MONOCYTES NFR BLD AUTO: 9.4 %
NEUTROPHILS # BLD AUTO: 4.3 10E9/L (ref 1.6–8.3)
NEUTROPHILS NFR BLD AUTO: 74.7 %
NRBC # BLD AUTO: 0 10*3/UL
NRBC BLD AUTO-RTO: 0 /100
PLATELET # BLD AUTO: 110 10E9/L (ref 150–450)
POTASSIUM SERPL-SCNC: 3.2 MMOL/L (ref 3.4–5.3)
PROT SERPL-MCNC: 6.3 G/DL (ref 6.8–8.8)
RBC # BLD AUTO: 2.52 10E12/L (ref 3.8–5.2)
SODIUM SERPL-SCNC: 139 MMOL/L (ref 133–144)
WBC # BLD AUTO: 5.7 10E9/L (ref 4–11)

## 2020-01-17 PROCEDURE — 99214 OFFICE O/P EST MOD 30 MIN: CPT | Performed by: PHYSICIAN ASSISTANT

## 2020-01-17 PROCEDURE — 25000128 H RX IP 250 OP 636: Performed by: INTERNAL MEDICINE

## 2020-01-17 PROCEDURE — 25000128 H RX IP 250 OP 636: Performed by: PHYSICIAN ASSISTANT

## 2020-01-17 PROCEDURE — 80053 COMPREHEN METABOLIC PANEL: CPT | Performed by: INTERNAL MEDICINE

## 2020-01-17 PROCEDURE — G0463 HOSPITAL OUTPT CLINIC VISIT: HCPCS | Mod: 25

## 2020-01-17 PROCEDURE — 96367 TX/PROPH/DG ADDL SEQ IV INF: CPT

## 2020-01-17 PROCEDURE — 96411 CHEMO IV PUSH ADDL DRUG: CPT

## 2020-01-17 PROCEDURE — 96377 APPLICATON ON-BODY INJECTOR: CPT | Mod: XS

## 2020-01-17 PROCEDURE — 25000132 ZZH RX MED GY IP 250 OP 250 PS 637: Performed by: INTERNAL MEDICINE

## 2020-01-17 PROCEDURE — 25800030 ZZH RX IP 258 OP 636: Performed by: INTERNAL MEDICINE

## 2020-01-17 PROCEDURE — 96417 CHEMO IV INFUS EACH ADDL SEQ: CPT

## 2020-01-17 PROCEDURE — 85025 COMPLETE CBC W/AUTO DIFF WBC: CPT | Performed by: INTERNAL MEDICINE

## 2020-01-17 PROCEDURE — 96375 TX/PRO/DX INJ NEW DRUG ADDON: CPT

## 2020-01-17 PROCEDURE — 96413 CHEMO IV INFUSION 1 HR: CPT

## 2020-01-17 RX ORDER — HEPARIN SODIUM,PORCINE 10 UNIT/ML
5 VIAL (ML) INTRAVENOUS
Status: CANCELLED | OUTPATIENT
Start: 2020-01-17

## 2020-01-17 RX ORDER — OXYCODONE HYDROCHLORIDE 5 MG/1
2.5 TABLET ORAL EVERY 6 HOURS PRN
Qty: 30 TABLET | Refills: 0 | Status: ON HOLD | OUTPATIENT
Start: 2020-01-17 | End: 2020-02-21

## 2020-01-17 RX ORDER — ACETAMINOPHEN 325 MG/1
650 TABLET ORAL ONCE
Status: COMPLETED | OUTPATIENT
Start: 2020-01-17 | End: 2020-01-17

## 2020-01-17 RX ORDER — HEPARIN SODIUM (PORCINE) LOCK FLUSH IV SOLN 100 UNIT/ML 100 UNIT/ML
5 SOLUTION INTRAVENOUS
Status: CANCELLED | OUTPATIENT
Start: 2020-01-17

## 2020-01-17 RX ORDER — LORAZEPAM 0.5 MG/1
.5-1 TABLET ORAL EVERY 6 HOURS PRN
Qty: 30 TABLET | Refills: 0 | Status: SHIPPED | OUTPATIENT
Start: 2020-01-17 | End: 2020-02-19

## 2020-01-17 RX ORDER — PREDNISONE 50 MG/1
50 TABLET ORAL 2 TIMES DAILY
Qty: 10 TABLET | Refills: 0 | Status: ON HOLD | OUTPATIENT
Start: 2020-01-17 | End: 2020-02-21

## 2020-01-17 RX ORDER — DOXORUBICIN HYDROCHLORIDE 2 MG/ML
100 INJECTION, SOLUTION INTRAVENOUS ONCE
Status: COMPLETED | OUTPATIENT
Start: 2020-01-17 | End: 2020-01-17

## 2020-01-17 RX ORDER — HEPARIN SODIUM (PORCINE) LOCK FLUSH IV SOLN 100 UNIT/ML 100 UNIT/ML
5 SOLUTION INTRAVENOUS
Status: DISCONTINUED | OUTPATIENT
Start: 2020-01-17 | End: 2020-01-17 | Stop reason: HOSPADM

## 2020-01-17 RX ORDER — PALONOSETRON 0.05 MG/ML
0.25 INJECTION, SOLUTION INTRAVENOUS ONCE
Status: COMPLETED | OUTPATIENT
Start: 2020-01-17 | End: 2020-01-17

## 2020-01-17 RX ORDER — POTASSIUM CHLORIDE 1500 MG/1
20 TABLET, EXTENDED RELEASE ORAL DAILY
Qty: 30 TABLET | Refills: 3 | Status: SHIPPED | OUTPATIENT
Start: 2020-01-17 | End: 2020-02-07

## 2020-01-17 RX ADMIN — VINCRISTINE SULFATE 2 MG: 1 INJECTION, SOLUTION INTRAVENOUS at 11:26

## 2020-01-17 RX ADMIN — DIPHENHYDRAMINE HYDROCHLORIDE 50 MG: 50 INJECTION INTRAMUSCULAR; INTRAVENOUS at 11:07

## 2020-01-17 RX ADMIN — PALONOSETRON 0.25 MG: 0.05 INJECTION, SOLUTION INTRAVENOUS at 10:39

## 2020-01-17 RX ADMIN — CYCLOPHOSPHAMIDE 1500 MG: 500 INJECTION, POWDER, FOR SOLUTION INTRAVENOUS; ORAL at 11:29

## 2020-01-17 RX ADMIN — FOSAPREPITANT 150 MG: 150 INJECTION, POWDER, LYOPHILIZED, FOR SOLUTION INTRAVENOUS at 10:43

## 2020-01-17 RX ADMIN — RITUXIMAB 700 MG: 10 INJECTION, SOLUTION INTRAVENOUS at 12:19

## 2020-01-17 RX ADMIN — SODIUM CHLORIDE 250 ML: 9 INJECTION, SOLUTION INTRAVENOUS at 10:29

## 2020-01-17 RX ADMIN — ACETAMINOPHEN 650 MG: 325 TABLET ORAL at 10:27

## 2020-01-17 RX ADMIN — PEGFILGRASTIM 6 MG: KIT SUBCUTANEOUS at 13:47

## 2020-01-17 RX ADMIN — Medication 5 ML: at 13:48

## 2020-01-17 RX ADMIN — DOXORUBICIN HYDROCHLORIDE 100 MG: 2 INJECTION, SOLUTION INTRAVENOUS at 11:17

## 2020-01-17 ASSESSMENT — PAIN SCALES - GENERAL: PAINLEVEL: MODERATE PAIN (4)

## 2020-01-17 NOTE — LETTER
1/17/2020         RE: Kassie Ramirez  [address]  [facility] [address]        Dear Colleague,    Thank you for referring your patient, Kassie Ramirez, to the [facility]. Please see a copy of my visit note below.    Oncology/Hematology Visit Note    Jan 17, 2020    Reason for visit: Follow up DLBCL     Oncology HPI: Kassie Ramirez is a 49 year old female with DLBCL.  She developed a cough in early 2019 and symptoms were progressive for over 5 months.  CXR and CT chest did reveal some lymphadenopathy and she was initially treated with antibiotics.  Bronchoscopy revealed nonnecrotizing granulomatous inflammation, but negative for malignancy.  Follow-up CT November 2019 revealed worsening of the mediastinal and bilateral hilar lymphadenopathy.  Mediastinoscopy obtained and preliminary pathology was EBV positive DLBCL.  FISH was negative for high risk translocations.  She presented to the ED on 11/27/2019 with worsening SOB and was admitted at the [facility] and R CHOP cycle 1 was initiated on 11/29/2019.  She was admitted at [facility] on 12/15/2019 for neutropenic fever with sepsis and suspecting hospital-acquired pneumonia, PJV pneumonia, blood cultures were negative.  She established care with Dr. Castro on 12/27/2019 and she received MR-CHOP cycle 2 same day.  Plan for a total of 6 cycles with Neulasta support of MR-CHOP with PET scan after 2-3 cycles.    She is here today for MR CHOP cycle 3.     Interval History: Sadia is here unaccompanied today.  She has completed 2 cycles of chemotherapy now and she is overall improving.  She still has some heaviness in pain in her chest with shortness of breath, but significantly improved since starting chemo.  She is unable to take ibuprofen and Tylenol has not been helping.  She denies any pleuritic chest pain or hemoptysis, but she is looking for some more relief in the pressure in her chest.  She denies fever, chills, new cough,  bleeding, vomiting, diarrhea.    Review of Systems:  See interval hx. Denies fevers, chills, HA, dizziness, n/t, changes in vision, cough, sore throat, abdominal pain, N/V, diarrhea, changes in urination, bleeding, bruising, rash.     PMHx and Social Hx reviewed per EPIC.      Medications:  Current Outpatient Medications   Medication Sig Dispense Refill     acyclovir (ZOVIRAX) 400 MG tablet Take 1 tablet (400 mg) by mouth 2 times daily 60 tablet 3     atovaquone (MEPRON) 750 MG/5ML suspension Take 10 mLs (1,500 mg) by mouth daily Please discuss with your oncologist if/when it is okay to transition back to Bactrim. 210 mL 0     famotidine (PEPCID) 20 MG tablet Take 1 tablet (20 mg) by mouth 2 times daily 60 tablet 0     LORazepam (ATIVAN) 0.5 MG tablet Take 1-2 tablets (0.5-1 mg) by mouth every 6 hours as needed (Breakthrough Nausea / Vomiting) 30 tablet 0     ondansetron (ZOFRAN-ODT) 8 MG ODT tab Take 1 tablet (8 mg) by mouth every 8 hours as needed 45 tablet 0     prochlorperazine (COMPAZINE) 10 MG tablet Take 1 tablet (10 mg) by mouth every 6 hours as needed (Breakthrough Nausea/Vomiting) 30 tablet 0     SENNA PO Take 1 tablet by mouth as needed          Allergies   Allergen Reactions     Cold & Flu [Cold Defense Fighter]      See pseudoephedrine     Seasonal Allergies      Sudafed Cold-Cough [Dayquil Liquicaps]      Pseudoephedrine Rash     Rash then skins peels off        EXAM:    /89   Pulse 125   Temp 97  F (36.1  C) (Oral)   Resp 16   Wt 72.1 kg (159 lb)   SpO2 99%   BMI 24.90 kg/m       GENERAL:   Female, in no acute distress.  Alert and oriented x3.   HEENT:  Normocephalic, atraumatic.  PERRL, oropharynx clear with no sores or thrush.   LYMPH NODES:  No palpable pre/post-auricular, cervical, axillary lymphadenopathy appreciated.  CV:  RRR, No murmurs, gallops, or rubs.   LUNGS:  Clear to auscultation bilaterally.   ABDOMEN:  Soft, nontender and nondistended.  Bowel sounds heard x4.  No apparent  hepatosplenomegaly.   EXTREMITIES:  No clubbing, cyanosis, or edema.   SKIN: No rash, port without erythema, pus or tenderness.   PSYCH: Mood stable      Labs:   Results for BABAR VARGHESE (MRN [id number]) as of 1/17/2020 13:45   1/17/2020 08:40   Sodium 139   Potassium 3.2 (L)   Chloride 105   Carbon Dioxide 26   Urea Nitrogen 18   Creatinine 1.11 (H)   GFR Estimate 58 (L)   GFR Estimate If Black 67   Calcium 9.0   Anion Gap 8   Albumin 3.5   Protein Total 6.3 (L)   Bilirubin Total 1.2   Alkaline Phosphatase 65   ALT 96 (H)   AST 29   Glucose 103 (H)   WBC 5.7   Hemoglobin 7.9 (L)   Hematocrit 23.2 (L)   Platelet Count 110 (L)   RBC Count 2.52 (L)   MCV 92   MCH 31.3   MCHC 34.1   RDW 16.5 (H)   Diff Method Automated Method   % Neutrophils 74.7   % Lymphocytes 10.8   % Monocytes 9.4   % Eosinophils 0.3   % Basophils 0.3   % Immature Granulocytes 4.5   Nucleated RBCs 0   Absolute Neutrophil 4.3   Absolute Lymphocytes 0.6 (L)   Absolute Monocytes 0.5   Absolute Eosinophils 0.0   Absolute Basophils 0.0   Abs Immature Granulocytes 0.3   Absolute Nucleated RBC 0.0       Imaging: n/a    Impression/Plan: Babar Varghese is a 49 year old female with DLBCL currently undergoing curative intent chemotherapy with MR-CHOP.    DLBCL: Stage III, EBV+, non-GCB, IPI score 2 (low-intermediate), FISH negative for high risk translocations.  R CHOP cycle 1 completed inpatient on 11/29/2019 and HD methotrexate was added to cycle 2 for CNS prophylaxis, given on 12/27/2019.  She has lost some of her hair and overall her breathing has significantly improved, but she continues to have some heaviness in her chest.  She is tolerating chemotherapy fairly well with fatigue.  We will continue to watch her closely.  Plan for PET scan after today's cycle to evaluate response.  She will call the clinic with any questions or concerns.  --2/4/2020 PET scan  --2/7/2020 Dr. Castro, MR-CHOP cycle 4    Heme: Hemoglobin 7.9 and secondary to  chemotherapy.  This is actually improved from a few days ago.  No active bleeding.  Consider blood transfusion if hemoglobin <7.0 or bleeding.  Platelets down to 110, but this is stable.  Consider platelet transfusion if platelets <20 or bleeding.  WBC and ANC normal today.  She will continue Neulasta support.    Fatigue: Secondary to chemotherapy.  She continues to overall feel better since starting chemotherapy.  We will continue to monitor closely.  If persistent, would consider cancer rehab.  Weight is about stable within the last month.    FEN: Hypokalemia at 3.2 and potassium replacement given in infusion today.  Prescription for potassium sent to pharmacy as well.  Electrolytes are otherwise normal.  Albumin has improved.    Renal: Creatinine has risen to 1.11 and will give a little IV fluids and infusion.  She will push fluids at home as well.        Chart documentation with Dragon Voice recognition Software. Although reviewed after completion, some words and grammatical errors may remain.      Padmaja Sanchez PA-C  Hematology/Oncology  [facility]                  Again, thank you for allowing me to participate in the care of your patient.        Sincerely,        Padmaja Sanchez PA-C

## 2020-01-17 NOTE — PROGRESS NOTES
Infusion Nursing Note:  Kassie Ramirez presents today for port labs.     present during visit today: Not Applicable.    Note: N/A.    Intravenous Access:  Lab draw site R chest, Needle type jones, Gauge 20.  Labs drawn without difficulty.  Implanted Port.    Treatment Conditions:  NA    Post Lab Assessment:  Patient tolerated blood collection   Site patent and intact, free from redness, edema or discomfort.  No evidence of extravasations.  Access (remains for infusion use today)     Discharge Plan:   Patient and/or family verbalized understanding of  instructions and all questions answered.  Patient  to lobby in stable condition accompanied by: self.  Patient to see provider today: Yes: Padmaja  Departure Mode: Ambulatory.  Rossana Aguilar, RN, RN

## 2020-01-17 NOTE — PROGRESS NOTES
Infusion Nursing Note:  Kassie Ramirez presents today for Cycle 3, Day 1 RCHOP and on-pro.    Patient seen by provider today: Yes: WARREN Powers   present during visit today: Not Applicable.    Note: Potassium 3.2, script for Kdur filled today. Hgb 7.9, Padmaja aware, no further intervention needed today.     Rapid rituxan infused.     Intravenous Access:  Implanted Port.  Accessed in fast track.    Treatment Conditions:  Echo 11/27/19 EF 60-65%  Lab Results   Component Value Date    HGB 7.9 01/17/2020     Lab Results   Component Value Date    WBC 5.7 01/17/2020      Lab Results   Component Value Date    ANEU 4.3 01/17/2020     Lab Results   Component Value Date     01/17/2020      Lab Results   Component Value Date     01/17/2020                   Lab Results   Component Value Date    POTASSIUM 3.2 01/17/2020           Lab Results   Component Value Date    MAG 2.0 01/11/2020            Lab Results   Component Value Date    CR 1.11 01/17/2020                   Lab Results   Component Value Date    AFSHAN 9.0 01/17/2020                Lab Results   Component Value Date    BILITOTAL 1.2 01/17/2020           Lab Results   Component Value Date    ALBUMIN 3.5 01/17/2020                    Lab Results   Component Value Date    ALT 96 01/17/2020           Lab Results   Component Value Date    AST 29 01/17/2020       Results reviewed, labs MET treatment parameters, ok to proceed with treatment.      Post Infusion Assessment:  Patient tolerated infusion without incident.  Blood return noted pre and post infusion.  Blood return noted during administration every 3 ml during adriamycin administration and per protocol during VIncristine administration. .  Site patent and intact, free from redness, edema or discomfort.  No evidence of extravasations.  Access discontinued per protocol.     ONPRO  Was placed on patient's: back of right arm.    Was placed at 1355 PM    ONPRO injector device Lot number:  [id number]    Patient education included: all patients questions answered.    Patient tolerated administration well.        Discharge Plan:   Prescription refills given for Potassium, Ativan, Oxycodone and Prednisone.  Copy of AVS reviewed with patient and/or family.  Patient will return 2/7 for next appointment.  Patient discharged in stable condition accompanied by: self.  Departure Mode: Ambulatory.    Kassie Villarreal RN

## 2020-01-17 NOTE — NURSING NOTE
"Oncology Rooming Note    January 17, 2020 9:21 AM   Kassie Ramirez is a 49 year old female who presents for:    Chief Complaint   Patient presents with     Oncology Clinic Visit     Diffuse large B-cell lymphoma of extranodal site     Initial Vitals: /89   Pulse 125   Temp 97  F (36.1  C) (Oral)   Resp 16   Wt 72.1 kg (159 lb)   SpO2 99%   BMI 24.90 kg/m   Estimated body mass index is 24.9 kg/m  as calculated from the following:    Height as of 1/10/20: 1.702 m (5' 7.01\").    Weight as of this encounter: 72.1 kg (159 lb). Body surface area is 1.85 meters squared.  Moderate Pain (4) Comment: Data Unavailable   No LMP recorded.  Allergies reviewed: Yes  Medications reviewed: Yes    Medications: Medication refills not needed today.  Pharmacy name entered into TapZen:    [facility] [place], [place] - [address] [place] [address] DRUG STORE #[id number] [facility] [place] [address] [place] [address] & [facility]    Clinical concerns: f/u       Lynne Webster CMA              "

## 2020-01-17 NOTE — TELEPHONE ENCOUNTER
PA Initiation    Medication: Oxycodone 5mg tablets  Insurance Company: [id number]-[phone] - Phone [phone] Fax [phone]  Pharmacy Filling the Rx: [facility] [place], [place] - [address] [address]  Filling Pharmacy Phone:    Filling Pharmacy Fax:    Start Date: 1/17/2020

## 2020-01-22 NOTE — TELEPHONE ENCOUNTER
Prior Authorization Approval    Authorization Effective Date: 1/17/2020  Authorization Expiration Date: 7/17/2020  Medication: Oxycodone 5mg tablets  Approved Dose/Quantity: up to 12 tablets per day  Reference #: (Key: [id number])   Insurance Company: [employer] - Phone [phone] Fax [phone]  Expected CoPay: $5.10     CoPay Card Available: No    Foundation Assistance Needed:  n/a  Which Pharmacy is filling the prescription (Not needed for infusion/clinic administered): [facility] [address] [address]  Pharmacy Notified: Yes  Patient Notified: Yes

## 2020-01-23 ENCOUNTER — PATIENT OUTREACH (OUTPATIENT)
Dept: ONCOLOGY | Facility: CLINIC | Age: 50
End: 2020-01-23

## 2020-01-23 NOTE — PROGRESS NOTES
"Kassie called in to clinic reporting she has had a headache since yesterday. She has attempted to \"sleep it off\" and it has not dissipated.     Kassie describes the headache as constant pounding, from the \"ears, up to the top of the head.\" Kassie rated her pain an 8/10. She denies fever.     She did have some nausea, for which she took Zofran, and it alleviated it.     She also has been staying well hydrated. Kassie reports she normally takes ibuprofen for headaches but is wondering if that is okay to take.     Writer consulted with WARREN Giang. She recommended Kassie take 500mg Tylenol, 25mg Benadryl, and 10mg Compazine. She was instructed to call in to clinic again or consider going to the ED if her headache does not improve.     Kassie verbalized understanding and is in agreement with the plan.     Maru Kim RN, BSN, [facility]  Patient Care Coordinator  [facility]  [phone]      "

## 2020-01-28 ENCOUNTER — PATIENT OUTREACH (OUTPATIENT)
Dept: ONCOLOGY | Facility: CLINIC | Age: 50
End: 2020-01-28

## 2020-01-28 DIAGNOSIS — C83.398 DIFFUSE LARGE B-CELL LYMPHOMA OF EXTRANODAL SITE: Primary | ICD-10-CM

## 2020-01-28 NOTE — PROGRESS NOTES
Per WARREN Powers:  Please schedule her to see me with CBC/CMP and IVF. If infusion has openings tomorrow morning, I could see her from 8am-9am. Doubt a chemo will be added this late for tomorrow.   Lab orders have been entered. Pt has been scheduled for port labs at 7:45, appt with WARREN Powers, at 8:00, and infusion appt at 9:00.  Pt has been notified with this information, and pt agrees with plan. Advised pt that if her symptoms worsen, she should contact the clinic prior to her appt tomorrow. Patient verbalized understanding and agreement with plan.  Pt was instructed to call the clinic with any questions, concerns, or worsening symptoms.   Carmen Freire, RN, BSN, OCN, CBCN

## 2020-01-28 NOTE — PROGRESS NOTES
Pt called the clinic. States that she is concerned that she is anemic. States that last week she had severe fatigue. This week, the fatigue seems to have improved slightly. However, pt still reports shortness of breath with activity, especially when going up and down stairs. Pt also states that she called in last week with a headache. She took the tylenol, benadryl, and compazine as directed last week. She took those medications for a few days, and the headache went away. However, the headache is back again today. Pt rates headache pain at 5-6 out of 10. Denies nausea/vomiting. Denies visual changes. Pt does c/o dizziness if she gets up out of bed too fast. Pt also c/o mouth sores on the roof of her mouth. The sores seem to be getting better. Pt has been using baking soda/salt/water mouth rinse every 2 hours. However, the sores do make it more difficult to eat. She has been keeping up with fluid intake and drinking 6 ounces of water every hour.   Pt is wondering if she should be taking iron supplement? Or other recommendations from provider?  Will route to WARREN Powers, for advice. OK to leave a message if unable to reach pt.  Carmen Freire, RN, BSN, OCN, CBCN

## 2020-01-28 NOTE — PROGRESS NOTES
Oncology/Hematology Visit Note    Jan 29, 2020    Reason for visit: Follow up DLBCL     Oncology HPI: Kassie Ramirez is a 49 year old female with DLBCL.  She developed a cough in early 2019 and symptoms were progressive for over 5 months.  CXR and CT chest did reveal some lymphadenopathy and she was initially treated with antibiotics.  Bronchoscopy revealed nonnecrotizing granulomatous inflammation, but negative for malignancy.  Follow-up CT November 2019 revealed worsening of the mediastinal and bilateral hilar lymphadenopathy.  Mediastinoscopy obtained and preliminary pathology was EBV positive DLBCL.  FISH was negative for high risk translocations.  She presented to the ED on 11/27/2019 with worsening SOB and was admitted at the [facility] and R CHOP cycle 1 was initiated on 11/29/2019.  She was admitted at [facility] on 12/15/2019 for neutropenic fever with sepsis and suspecting hospital-acquired pneumonia, PJV pneumonia, blood cultures were negative.  She established care with Dr. Castro on 12/27/2019 and she received MR-CHOP cycle 2 same day. RCHOP C3 was 1/17/2020. Plan for a total of 6 cycles with Neulasta support of MR-CHOP with PET scan after 2-3 cycles.    She is here today as an add-on for headache, fatigue, SOB.     Interval History: Sadia is here unaccompanied today.  She has had increased fatigue and shortness of breath and sleeping a lot throughout the day.  She feels similar to when she needed blood in the hospital.  Minimal thing such as having a conversation, taking a shower and eating have made her short of breath.  She denies any fever, chills, nausea, vomiting, diarrhea or bleeding.  Appetite has been poor, however she has been trying to eat as much protein as possible and does boost/Ensure.    Review of Systems: See interval hx. Denies fevers, chills, HA, n/t, changes in vision, cough, sore throat, abdominal pain, N/V, diarrhea, changes in urination, bleeding, bruising, rash.       PMHx and Social Hx reviewed per EPIC.      Medications:  Current Outpatient Medications   Medication Sig Dispense Refill     acyclovir (ZOVIRAX) 400 MG tablet Take 1 tablet (400 mg) by mouth 2 times daily 60 tablet 3     famotidine (PEPCID) 20 MG tablet Take 1 tablet (20 mg) by mouth 2 times daily 60 tablet 0     LORazepam (ATIVAN) 0.5 MG tablet Take 1-2 tablets (0.5-1 mg) by mouth every 6 hours as needed (Breakthrough Nausea / Vomiting) 30 tablet 0     ondansetron (ZOFRAN-ODT) 8 MG ODT tab Take 1 tablet (8 mg) by mouth every 8 hours as needed 45 tablet 0     potassium chloride ER (K-DUR/KLOR-CON M) 20 MEQ CR tablet Take 1 tablet (20 mEq) by mouth daily 30 tablet 3     prochlorperazine (COMPAZINE) 10 MG tablet Take 1 tablet (10 mg) by mouth every 6 hours as needed (Breakthrough Nausea/Vomiting) 30 tablet 0     SENNA PO Take 1 tablet by mouth as needed          Allergies   Allergen Reactions     Cold & Flu [Cold Defense Fighter]      See pseudoephedrine     Seasonal Allergies      Sudafed Cold-Cough [Dayquil Liquicaps]      Pseudoephedrine Rash     Rash then skins peels off        EXAM:    /79   Pulse 125   Temp 98.7  F (37.1  C) (Tympanic)   Resp 16   Wt 70.8 kg (156 lb)   SpO2 100%   BMI 24.43 kg/m      GENERAL:  Female, in no acute distress.  Alert and oriented x3.   HEENT:  Normocephalic, atraumatic.  PERRL, oropharynx clear with no sores or thrush.   LYMPH NODES:  No palpable pre/post-auricular, cervical, axillary lymphadenopathy appreciated.  CV:  Tachycardic, No murmurs, gallops, or rubs.   LUNGS:  Clear to auscultation bilaterally.   ABDOMEN:  Soft, nontender and nondistended.  Bowel sounds heard x4.  No apparent hepatosplenomegaly.   EXTREMITIES:  No clubbing, cyanosis, or edema.   SKIN: No rash, port without erythema, pus or tenderness. Pale in color.  PSYCH: Tearful, appears tired      Labs:   Results for BABAR VARGHESE (MRN [id number]) as of 1/29/2020 08:48   1/29/2020 07:45   Sodium  140   Potassium 3.1 (L)   Chloride 108   Carbon Dioxide 26   Urea Nitrogen 9   Creatinine 0.92   GFR Estimate 72   GFR Estimate If Black 84   Calcium 9.5   Anion Gap 6   Albumin 3.5   Protein Total 6.2 (L)   Bilirubin Total 0.7   Alkaline Phosphatase 75   ALT 24   AST 23   Glucose 115 (H)   WBC 10.6   Hemoglobin 6.1 (LL)   Hematocrit 18.9 (L)   Platelet Count 165   RBC Count 1.97 (L)   MCV 96   MCH 31.0   MCHC 32.3   RDW 19.9 (H)   Diff Method Manual Differential   % Neutrophils 77.0   % Lymphocytes 10.0   % Monocytes 3.0   % Eosinophils 2.0   % Basophils 0.0   % Metamyelocytes 4.0   % Myelocytes 3.0   % Promyelocytes 1.0   Absolute Neutrophil 8.2   Absolute Lymphocytes 1.1   Absolute Monocytes 0.3   Absolute Eosinophils 0.2   Absolute Basophils 0.0   Absolute Metamyelocytes 0.4 (H)   Absolute Myelocytes 0.3 (H)   Absolute Promyelocytes 0.1 (H)   Anisocytosis Moderate   Polychromasia Slight   Toxic Granulation Present   Platelet Estimate Automated count confirmed.  Platelet morphology is normal.       Imaging: n/a    Impression/Plan: Kassie Ramirez is a 49 year old female with DLBCL currently undergoing curative intent chemotherapy with MR-CHOP.    DLBCL: Stage III, EBV+, non-GCB, IPI score 2 (low-intermediate), FISH negative for high risk translocations.  R CHOP cycle 1 completed inpatient on 11/29/2019 and HD methotrexate was added to cycle 2 for CNS prophylaxis, given on 1/20/2020.  She has struggled with fatigue more with the addition of methotrexate.  R CHOP cycle 3 on 1/17/2020 and she has had severe fatigue and persistent shortness of breath over the last week or so.  Hemoglobin is 6.1 and plan for 2 units of blood today.  This is likely the cause of her severe fatigue and shortness of breath.  We will need to watch her closely and I will see her on a weekly basis with CBC/CMP. We will continue to watch her closely.  Plan for PET scan after today's cycle to evaluate response.  She will call the clinic with  any questions or concerns.  --2/4/2020 Padmaja, CBC/CMP, PET scan  --2/7/2020 Dr. Castro, MR-CHOP cycle 4    Heme: Hemoglobin 6.1 and secondary to chemotherapy, plan for 2 units of blood today.  WBC, ANC and platelets are normal today.  Transfuse if hemoglobin <7.0 or platelet <20 or bleeding.  She will continue Neulasta support.    Fatigue: Secondary to chemotherapy and anemia, see above.    FEN: Hypokalemia at 3.1 and she has been taking K-Dur 20 meq daily, recommend she increase that to 40meq daily. We will check weekly.  Appetite has been poor, but she has been drinking boost and eating as much protein as possible.  We briefly discussed seeing Darling, however she would like to wait and see how she feels after blood transfusion.  I will see her next week.    Renal: Creatinine normalized to 0.92.  We will give some fluids in addition to blood today.    Mood: She was very tearful during our visit today, which is to be expected.  She has been feeling very overwhelmed with everything.  I suggested she meet with Madi Garcia and she is open to this and we will schedule next available.      Chart documentation with Dragon Voice recognition Software. Although reviewed after completion, some words and grammatical errors may remain.      Padmaja Sanchez PA-C  Hematology/Oncology  [facility]

## 2020-01-29 ENCOUNTER — INFUSION THERAPY VISIT (OUTPATIENT)
Dept: INFUSION THERAPY | Facility: CLINIC | Age: 50
End: 2020-01-29
Attending: PHYSICIAN ASSISTANT
Payer: COMMERCIAL

## 2020-01-29 ENCOUNTER — HOSPITAL ENCOUNTER (OUTPATIENT)
Facility: CLINIC | Age: 50
Setting detail: SPECIMEN
Discharge: HOME | End: 2020-01-29
Attending: PHYSICIAN ASSISTANT | Admitting: PHYSICIAN ASSISTANT
Payer: COMMERCIAL

## 2020-01-29 ENCOUNTER — NURSE TRIAGE (OUTPATIENT)
Dept: NURSING | Facility: CLINIC | Age: 50
End: 2020-01-29

## 2020-01-29 ENCOUNTER — ONCOLOGY VISIT (OUTPATIENT)
Dept: ONCOLOGY | Facility: CLINIC | Age: 50
End: 2020-01-29
Attending: PHYSICIAN ASSISTANT
Payer: COMMERCIAL

## 2020-01-29 VITALS
SYSTOLIC BLOOD PRESSURE: 113 MMHG | DIASTOLIC BLOOD PRESSURE: 79 MMHG | BODY MASS INDEX: 24.43 KG/M2 | OXYGEN SATURATION: 100 % | HEART RATE: 125 BPM | TEMPERATURE: 98.7 F | RESPIRATION RATE: 16 BRPM | WEIGHT: 156 LBS

## 2020-01-29 VITALS
DIASTOLIC BLOOD PRESSURE: 84 MMHG | SYSTOLIC BLOOD PRESSURE: 123 MMHG | TEMPERATURE: 98.6 F | RESPIRATION RATE: 18 BRPM | OXYGEN SATURATION: 98 %

## 2020-01-29 DIAGNOSIS — C83.398 DIFFUSE LARGE B-CELL LYMPHOMA OF EXTRANODAL SITE: Primary | ICD-10-CM

## 2020-01-29 DIAGNOSIS — D64.81 ANEMIA DUE TO CHEMOTHERAPY: ICD-10-CM

## 2020-01-29 DIAGNOSIS — C83.398 DIFFUSE LARGE B-CELL LYMPHOMA OF EXTRANODAL SITE: ICD-10-CM

## 2020-01-29 DIAGNOSIS — D64.9 SEVERE ANEMIA: ICD-10-CM

## 2020-01-29 DIAGNOSIS — T45.1X5A ANEMIA DUE TO CHEMOTHERAPY: ICD-10-CM

## 2020-01-29 LAB
ABO + RH BLD: NORMAL
ABO + RH BLD: NORMAL
ALBUMIN SERPL-MCNC: 3.5 G/DL (ref 3.4–5)
ALP SERPL-CCNC: 75 U/L (ref 40–150)
ALT SERPL W P-5'-P-CCNC: 24 U/L (ref 0–50)
ANION GAP SERPL CALCULATED.3IONS-SCNC: 6 MMOL/L (ref 3–14)
ANISOCYTOSIS BLD QL SMEAR: ABNORMAL
AST SERPL W P-5'-P-CCNC: 23 U/L (ref 0–45)
BASOPHILS # BLD AUTO: 0 10E9/L (ref 0–0.2)
BASOPHILS NFR BLD AUTO: 0 %
BILIRUB SERPL-MCNC: 0.7 MG/DL (ref 0.2–1.3)
BLD GP AB SCN SERPL QL: NORMAL
BLD PROD TYP BPU: NORMAL
BLD UNIT ID BPU: 0
BLD UNIT ID BPU: 0
BLOOD BANK CMNT PATIENT-IMP: NORMAL
BLOOD PRODUCT CODE: NORMAL
BLOOD PRODUCT CODE: NORMAL
BPU ID: NORMAL
BPU ID: NORMAL
BUN SERPL-MCNC: 9 MG/DL (ref 7–30)
CALCIUM SERPL-MCNC: 9.5 MG/DL (ref 8.5–10.1)
CHLORIDE SERPL-SCNC: 108 MMOL/L (ref 94–109)
CO2 SERPL-SCNC: 26 MMOL/L (ref 20–32)
CREAT SERPL-MCNC: 0.92 MG/DL (ref 0.52–1.04)
DIFFERENTIAL METHOD BLD: ABNORMAL
EOSINOPHIL # BLD AUTO: 0.2 10E9/L (ref 0–0.7)
EOSINOPHIL NFR BLD AUTO: 2 %
ERYTHROCYTE [DISTWIDTH] IN BLOOD BY AUTOMATED COUNT: 19.9 % (ref 10–15)
GFR SERPL CREATININE-BSD FRML MDRD: 72 ML/MIN/{1.73_M2}
GLUCOSE SERPL-MCNC: 115 MG/DL (ref 70–99)
HCT VFR BLD AUTO: 18.9 % (ref 35–47)
HGB BLD-MCNC: 6.1 G/DL (ref 11.7–15.7)
LYMPHOCYTES # BLD AUTO: 1.1 10E9/L (ref 0.8–5.3)
LYMPHOCYTES NFR BLD AUTO: 10 %
MCH RBC QN AUTO: 31 PG (ref 26.5–33)
MCHC RBC AUTO-ENTMCNC: 32.3 G/DL (ref 31.5–36.5)
MCV RBC AUTO: 96 FL (ref 78–100)
METAMYELOCYTES # BLD: 0.4 10E9/L
METAMYELOCYTES NFR BLD MANUAL: 4 %
MONOCYTES # BLD AUTO: 0.3 10E9/L (ref 0–1.3)
MONOCYTES NFR BLD AUTO: 3 %
MYELOCYTES # BLD: 0.3 10E9/L
MYELOCYTES NFR BLD MANUAL: 3 %
NEUTROPHILS # BLD AUTO: 8.2 10E9/L (ref 1.6–8.3)
NEUTROPHILS NFR BLD AUTO: 77 %
NUM BPU REQUESTED: 2
PLATELET # BLD AUTO: 165 10E9/L (ref 150–450)
PLATELET # BLD EST: ABNORMAL 10*3/UL
POLYCHROMASIA BLD QL SMEAR: SLIGHT
POTASSIUM SERPL-SCNC: 3.1 MMOL/L (ref 3.4–5.3)
PROMYELOCYTES # BLD MANUAL: 0.1 10E9/L
PROMYELOCYTES NFR BLD MANUAL: 1 %
PROT SERPL-MCNC: 6.2 G/DL (ref 6.8–8.8)
RBC # BLD AUTO: 1.97 10E12/L (ref 3.8–5.2)
SODIUM SERPL-SCNC: 140 MMOL/L (ref 133–144)
SPECIMEN EXP DATE BLD: NORMAL
TOXIC GRANULES BLD QL SMEAR: PRESENT
TRANSFUSION STATUS PATIENT QL: NORMAL
WBC # BLD AUTO: 10.6 10E9/L (ref 4–11)

## 2020-01-29 PROCEDURE — 86923 COMPATIBILITY TEST ELECTRIC: CPT | Performed by: PHYSICIAN ASSISTANT

## 2020-01-29 PROCEDURE — 36430 TRANSFUSION BLD/BLD COMPNT: CPT

## 2020-01-29 PROCEDURE — 85025 COMPLETE CBC W/AUTO DIFF WBC: CPT | Performed by: PHYSICIAN ASSISTANT

## 2020-01-29 PROCEDURE — 86900 BLOOD TYPING SEROLOGIC ABO: CPT | Performed by: PHYSICIAN ASSISTANT

## 2020-01-29 PROCEDURE — P9016 RBC LEUKOCYTES REDUCED: HCPCS

## 2020-01-29 PROCEDURE — 86901 BLOOD TYPING SEROLOGIC RH(D): CPT | Performed by: PHYSICIAN ASSISTANT

## 2020-01-29 PROCEDURE — G0463 HOSPITAL OUTPT CLINIC VISIT: HCPCS | Mod: 25

## 2020-01-29 PROCEDURE — 86850 RBC ANTIBODY SCREEN: CPT | Performed by: PHYSICIAN ASSISTANT

## 2020-01-29 PROCEDURE — 80053 COMPREHEN METABOLIC PANEL: CPT | Performed by: PHYSICIAN ASSISTANT

## 2020-01-29 PROCEDURE — 99214 OFFICE O/P EST MOD 30 MIN: CPT | Performed by: PHYSICIAN ASSISTANT

## 2020-01-29 RX ORDER — HEPARIN SODIUM,PORCINE 10 UNIT/ML
5 VIAL (ML) INTRAVENOUS
Status: CANCELLED | OUTPATIENT
Start: 2020-01-29

## 2020-01-29 RX ORDER — HEPARIN SODIUM (PORCINE) LOCK FLUSH IV SOLN 100 UNIT/ML 100 UNIT/ML
5 SOLUTION INTRAVENOUS
Status: CANCELLED | OUTPATIENT
Start: 2020-01-29

## 2020-01-29 ASSESSMENT — PAIN SCALES - GENERAL: PAINLEVEL: SEVERE PAIN (6)

## 2020-01-29 NOTE — PATIENT INSTRUCTIONS
Scheduled adry guallpa   Scheduled labs prior to pet scan ramu pt will arrive at 9:30  Scheduled appt with nicole guallpa   Did not have chair space for possible blood which nicole knew and we will deal with later

## 2020-01-29 NOTE — NURSING NOTE
"Oncology Rooming Note    January 29, 2020 8:00 AM   Kassie Ramirez is a 49 year old female who presents for:    Chief Complaint   Patient presents with     Oncology Clinic Visit     Diffuse large B-cell lymphoma, lymphoma     Initial Vitals: /79   Pulse 125   Temp 98.7  F (37.1  C) (Tympanic)   Resp 16   Wt 70.8 kg (156 lb)   SpO2 100%   BMI 24.43 kg/m   Estimated body mass index is 24.43 kg/m  as calculated from the following:    Height as of 1/10/20: 1.702 m (5' 7.01\").    Weight as of this encounter: 70.8 kg (156 lb). Body surface area is 1.83 meters squared.  Severe Pain (6) Comment: Data Unavailable   No LMP recorded.  Allergies reviewed: Yes  Medications reviewed: Yes    Medications: Medication refills not needed today.  Pharmacy name entered into Visual[employer]:    [facility] [place] - [place], [place] - [address] [place] [address] DRUG STORE #[id number] [facility] [address] [place] [address] & [facility]    Clinical concerns: follow up       Mary Alice Whitaker CMA              "

## 2020-01-29 NOTE — PROGRESS NOTES
Nursing Note:  Kassie Ramirez presents today for Port labs.    Patient seen by provider today: Yes: Padmaja KELLEY   present during visit today: Not Applicable.    Note: N/A.    Intravenous Access:  Lab draw site right port, Needle type Power, Gauge 20.  Labs drawn without difficulty.  Implanted Port.    Discharge Plan:   Patient was sent to [facility] for Padmaja KELLEY appointment.    Zonia Monroy RN

## 2020-01-29 NOTE — LETTER
1/29/2020         RE: Kassie Ramirez  [address]  [facility] [address]        Dear Colleague,    Thank you for referring your patient, Kassie Ramirez, to the [facility]. Please see a copy of my visit note below.    Oncology/Hematology Visit Note    Jan 29, 2020    Reason for visit: Follow up DLBCL     Oncology HPI: Kassie Ramirez is a 49 year old female with DLBCL.  She developed a cough in early 2019 and symptoms were progressive for over 5 months.  CXR and CT chest did reveal some lymphadenopathy and she was initially treated with antibiotics.  Bronchoscopy revealed nonnecrotizing granulomatous inflammation, but negative for malignancy.  Follow-up CT November 2019 revealed worsening of the mediastinal and bilateral hilar lymphadenopathy.  Mediastinoscopy obtained and preliminary pathology was EBV positive DLBCL.  FISH was negative for high risk translocations.  She presented to the ED on 11/27/2019 with worsening SOB and was admitted at the [facility] and R CHOP cycle 1 was initiated on 11/29/2019.  She was admitted at [facility] on 12/15/2019 for neutropenic fever with sepsis and suspecting hospital-acquired pneumonia, PJV pneumonia, blood cultures were negative.  She established care with Dr. Castro on 12/27/2019 and she received MR-CHOP cycle 2 same day. RCHOP C3 was 1/17/2020. Plan for a total of 6 cycles with Neulasta support of MR-CHOP with PET scan after 2-3 cycles.    She is here today as an add-on for headache, fatigue, SOB.     Interval History: Sadia is here unaccompanied today.  She has had increased fatigue and shortness of breath and sleeping a lot throughout the day.  She feels similar to when she needed blood in the hospital.  Minimal thing such as having a conversation, taking a shower and eating have made her short of breath.  She denies any fever, chills, nausea, vomiting, diarrhea or bleeding.  Appetite has been poor, however she has been trying to  eat as much protein as possible and does boost/Ensure.    Review of Systems: See interval hx. Denies fevers, chills, HA, n/t, changes in vision, cough, sore throat, abdominal pain, N/V, diarrhea, changes in urination, bleeding, bruising, rash.      PMHx and Social Hx reviewed per EPIC.      Medications:  Current Outpatient Medications   Medication Sig Dispense Refill     acyclovir (ZOVIRAX) 400 MG tablet Take 1 tablet (400 mg) by mouth 2 times daily 60 tablet 3     famotidine (PEPCID) 20 MG tablet Take 1 tablet (20 mg) by mouth 2 times daily 60 tablet 0     LORazepam (ATIVAN) 0.5 MG tablet Take 1-2 tablets (0.5-1 mg) by mouth every 6 hours as needed (Breakthrough Nausea / Vomiting) 30 tablet 0     ondansetron (ZOFRAN-ODT) 8 MG ODT tab Take 1 tablet (8 mg) by mouth every 8 hours as needed 45 tablet 0     potassium chloride ER (K-DUR/KLOR-CON M) 20 MEQ CR tablet Take 1 tablet (20 mEq) by mouth daily 30 tablet 3     prochlorperazine (COMPAZINE) 10 MG tablet Take 1 tablet (10 mg) by mouth every 6 hours as needed (Breakthrough Nausea/Vomiting) 30 tablet 0     SENNA PO Take 1 tablet by mouth as needed          Allergies   Allergen Reactions     Cold & Flu [Cold Defense Fighter]      See pseudoephedrine     Seasonal Allergies      Sudafed Cold-Cough [Dayquil Liquicaps]      Pseudoephedrine Rash     Rash then skins peels off        EXAM:    /79   Pulse 125   Temp 98.7  F (37.1  C) (Tympanic)   Resp 16   Wt 70.8 kg (156 lb)   SpO2 100%   BMI 24.43 kg/m       GENERAL:  Female, in no acute distress.  Alert and oriented x3.   HEENT:  Normocephalic, atraumatic.  PERRL, oropharynx clear with no sores or thrush.   LYMPH NODES:  No palpable pre/post-auricular, cervical, axillary lymphadenopathy appreciated.  CV:  RRR, No murmurs, gallops, or rubs.   LUNGS:  Clear to auscultation bilaterally.   ABDOMEN:  Soft, nontender and nondistended.  Bowel sounds heard x4.  No apparent hepatosplenomegaly.   EXTREMITIES:  No  clubbing, cyanosis, or edema.   SKIN: No rash, port without erythema, pus or tenderness.   PSYCH: Mood stable      Labs:   Results for BABAR VARGHESE (MRN [id number]) as of 1/29/2020 08:48   1/29/2020 07:45   Sodium 140   Potassium 3.1 (L)   Chloride 108   Carbon Dioxide 26   Urea Nitrogen 9   Creatinine 0.92   GFR Estimate 72   GFR Estimate If Black 84   Calcium 9.5   Anion Gap 6   Albumin 3.5   Protein Total 6.2 (L)   Bilirubin Total 0.7   Alkaline Phosphatase 75   ALT 24   AST 23   Glucose 115 (H)   WBC 10.6   Hemoglobin 6.1 (LL)   Hematocrit 18.9 (L)   Platelet Count 165   RBC Count 1.97 (L)   MCV 96   MCH 31.0   MCHC 32.3   RDW 19.9 (H)   Diff Method Manual Differential   % Neutrophils 77.0   % Lymphocytes 10.0   % Monocytes 3.0   % Eosinophils 2.0   % Basophils 0.0   % Metamyelocytes 4.0   % Myelocytes 3.0   % Promyelocytes 1.0   Absolute Neutrophil 8.2   Absolute Lymphocytes 1.1   Absolute Monocytes 0.3   Absolute Eosinophils 0.2   Absolute Basophils 0.0   Absolute Metamyelocytes 0.4 (H)   Absolute Myelocytes 0.3 (H)   Absolute Promyelocytes 0.1 (H)   Anisocytosis Moderate   Polychromasia Slight   Toxic Granulation Present   Platelet Estimate Automated count confirmed.  Platelet morphology is normal.       Imaging: n/a    Impression/Plan: Babar Varghese is a 49 year old female with DLBCL currently undergoing curative intent chemotherapy with MR-CHOP.    DLBCL: Stage III, EBV+, non-GCB, IPI score 2 (low-intermediate), FISH negative for high risk translocations.  R CHOP cycle 1 completed inpatient on 11/29/2019 and HD methotrexate was added to cycle 2 for CNS prophylaxis, given on 1/20/2020.  She has struggled with fatigue more with the addition of methotrexate.  R CHOP cycle 3 on 1/17/2020 and she has had severe fatigue and persistent shortness of breath over the last week or so.  Hemoglobin is 6.1 and plan for 2 units of blood today.  This is likely the cause of her severe fatigue and shortness of  breath.  We will need to watch her closely and I will see her on a weekly basis with CBC/CMP. We will continue to watch her closely.  Plan for PET scan after today's cycle to evaluate response.  She will call the clinic with any questions or concerns.  --2/4/2020 Padmaja, CBC/CMP, PET scan  --2/7/2020 Dr. Castro, MR-CHOP cycle 4    Heme: Hemoglobin 6.1 and secondary to chemotherapy, plan for 2 units of blood today.  WBC, ANC and platelets are normal today.  Transfuse if hemoglobin <7.0 or platelet <20 or bleeding.  She will continue Neulasta support.    Fatigue: Secondary to chemotherapy and anemia, see above.    FEN: Hypokalemia at 3.1 and she has been taking K-Dur 20 meq daily, recommend she increase that to 40meq daily. We will check weekly.  Appetite has been poor, but she has been drinking boost and eating as much protein as possible.  We briefly discussed seeing Darling, however she would like to wait and see how she feels after blood transfusion.  I will see her next week.    Renal: Creatinine normalized to 0.92.  We will give some fluids in addition to blood today.    Mood: She was very tearful during our visit today, which is to be expected.  She has been feeling very overwhelmed with everything.  I suggested she meet with Madi Garcia and she is open to this and we will schedule next available.      Chart documentation with Dragon Voice recognition Software. Although reviewed after completion, some words and grammatical errors may remain.      Padmaja Sanchez PA-C  Hematology/Oncology  [facility]                  Again, thank you for allowing me to participate in the care of your patient.        Sincerely,        Padmaja Sanchez PA-C

## 2020-01-29 NOTE — PROGRESS NOTES
Infusion Nursing Note:  Kassie Ramirez presents today for 2 untis PRBCs.    Patient seen by provider today: Yes: WARREN Powers   present during visit today: Not Applicable.    Note: N/A.    Intravenous Access:  Implanted Port.    Treatment Conditions:  Lab Results   Component Value Date    HGB 6.1 01/29/2020     Lab Results   Component Value Date    WBC 10.6 01/29/2020      Lab Results   Component Value Date    ANEU 8.2 01/29/2020     Lab Results   Component Value Date     01/29/2020      Results reviewed, labs MET treatment parameters, ok to proceed with treatment.  Blood transfusion consent signed 1/29/20.      Post Infusion Assessment:  Patient tolerated infusion without incident.  Blood return noted pre and post infusion.  Site patent and intact, free from redness, edema or discomfort.  No evidence of extravasations.  Access discontinued per protocol.       Discharge Plan:   Discharge instructions reviewed with: Patient.  Patient and/or family verbalized understanding of discharge instructions and all questions answered.  AVS to patient via [facility].  Patient will return 2/4/20 for next appointment.   Patient discharged in stable condition accompanied by: self.  Departure Mode: Ambulatory.    Alison Schroeder RN

## 2020-01-30 NOTE — PROGRESS NOTES
"Palliative Care Outpatient Clinic Consultation Note    Patient:  Kassie Ramirez    Chief Complaint:   Kassie Ramirez 49 year old female who is presenting to the palliative medicine clinic today unaccompanied at the request of Padamja Sanchez for a palliative care consultation secondary to Diffuse B cell Lymphoma.   The patient's primary care provider is:  Mary Alice Colón.     History of Present Illness:  This patient's cancer history was reviewed as per the chart.  In brief, Kassie Ramirez had evaluation for persistent cough in 2019, which was ultimately evaluated with a bronchoscopy (unrevealing), then follow up CT 11/2019 which showed progressive lymphadenopathy.  Mediastinoscopy was done which showed EBV positive DLBCL.  She received R-CHOP and then was admitted with sepsis.  She has since continued on R-CHOP.      For symptoms, she has had little appetite, fatigue which is markedly helped by transfusions, pain.     Pain has been in the mid-chest, \"like there was an orange in there,\" the anterior chest pain is getting better slowly since her mediastinoscopy in Nov, now nearly gone, now has a pain in the left lower \"lung\" posteriorly, achy, movement makes it worse. Mild.  No cough, fever, dyspnea.     Taking Ativan daily. Anxiety is escalated.     Also has history of SVT, reports that her HR was 170 a few nights ago, broke with vagal, that she was on a medication for this previously but that in the midst of \"all this cancer stuff,\" it seems to have gotten dropped.  She was well connected with Cardiology, has seen them in the past few months.     Patient's Disease Understanding: She knows that she has a plan for 6 R-CHOP, alternating with methotrexate, expected to have \"full recovery,\" will have PET prior to next scan.     Coping:  Has a history of anxiety for which she was taking Celexa and counseling up until a year ago.     Social History  Living Situation:  and 13 year old daughter and 11 " "year old son.   Support System: Many friends, in-laws.   Spiritual Background: \"Not so much,\"   Social History     Tobacco Use     Smoking status: Never Smoker     Smokeless tobacco: Never Used   Substance Use Topics     Alcohol use: No     Alcohol/week: 0.0 standard drinks     Comment: 1 time per month     Drug use: No       Family History  Patient's Involvement with Prior History of Serious Illness in Family: Father  16 years after sustaining a spinal cord injury, after electing DNR/DNI.     Advance Care Planning:  Advance Directive:    Not on file.       REVIEW OF SYSTEMS:   ROS: 10 point ROS neg other than the symptoms noted above in the HPI and here:  Palliative Symptom Review (0=no symptom/no concern, 1=mild, 2=moderate, 3=severe):      Pain: see above.       Fatigue: see above.       Nausea: 1- needing Zofran, Compazine on days 5-7 which helps enough      Constipation: 1 - senna every other day      Diarrhea: 0      Depressive Symptoms: 0      Anxiety: 2      Drowsiness: 2 - improved with transfusion      Poor Appetite: 2 - no appetite.       Shortness of Breath: 0           Physical Exam:   Vitals were reviewed  /87   Pulse 110   Temp 97.3  F (36.3  C) (Tympanic)   Resp 16   Wt 71.4 kg (157 lb 8 oz)   SpO2 98%   BMI 24.66 kg/m    General: Alert, comfortable appearing female in no acute distress.   Eyes: Pupils equal, sclera clear.   ENT: MMM.   Cardiac: Tachycardic, regular rhythm, no murmurs.    Resp: CTAB, unlabored on room air.   Back: Mild TTP of the left inferior posterior ribs, without any obvious abnormalities.   Abd: Soft, non-distended, non-tender to palpation, active bowel sounds.   Neuro: No facial asymmetry.  Spontaneous movements grossly non-focal.  Normal gait.   Psych: Alert, appropriately interactive, full affect, normal sensorium.   Skin: No jaundice or ecchymoses noted on limited examination, including to the back.         Data Reviewed:  LABS:   Lab Results   Component " "Value Date    WBC 10.6 01/29/2020    HGB 6.1 (LL) 01/29/2020    HCT 18.9 (L) 01/29/2020     01/29/2020     01/29/2020    POTASSIUM 3.1 (L) 01/29/2020    CHLORIDE 108 01/29/2020    CO2 26 01/29/2020    BUN 9 01/29/2020    CR 0.92 01/29/2020     (H) 01/29/2020    DD 1.4 (H) 09/18/2019    NTBNPI 92 12/15/2019    TROPI 0.088 (H) 09/18/2019    AST 23 01/29/2020    ALT 24 01/29/2020    ALKPHOS 75 01/29/2020    BILITOTAL 0.7 01/29/2020    INR 1.18 (H) 12/01/2019     IMAGING:   PET 11/29/19  \"IMPRESSION: In this patient with recently diagnosed DLBCL there is  extensive bulky disease by PET/CT criteria:  1. Enlarging soft tissue hypermetabolic mass encasing and narrowing  all the secondary  bronchi, pulmonary arteries and veins.    2. Complete obstruction of the right superior lobar bronchus with  right upper lobe collapse.  3. Multiple satellite mediastinal lymphadenopathy.  4. Pulmonary opacity and groundglass in the right mid liver lobe,  lingula and left upper lobe likely inflammatory/infective. Interval  resolution of largest left pulmonary nodule noted on prior study.  Stable smaller right posterior medial pulmonary nodule measuring 1.1  cm  5. Retroperitoneal hypermetabolic lymphadenopathy.  6. Minimal increased metabolism at the ileo-cecal junction, consider  direct visualization or close follow up.   7. See dedicated neuroradiology report for the results of the high  resolution PET CT of the neck. \"    Last EKG showed QTc of 468ms.     [place]  - Use of controlled substances consistent with history.     Impressions:    Kassie Ramirez is a 49 year old woman with DLBCL currently on R-CHOP therapy who has anxiety (present prior to cancer diagnosis, now escalated), and low appetite.  Her back pain is of unclear etiology to me after reviewing scans.  Given that it is reproducible, this is most likely MSK related.     She did describe an SVT event a few nights ago which resolved with a vagal maneuver.  " I suspect that her carvedilol was held during one of her hospitalizations and not restarted.  She has been asked to speak with her Cardiology team today regarding restarting a control medication for her SVT history.     Recommendations & Counselin. Sertraline 25mg daily.  After one week, increase to 50mg daily. Chosen despite history of responding to celexa because of QT prolongation on last EKG.   2. Marinol 2.5mg twice daily  3. Tylenol 1000mg up to three times daily for pain  4. Lidocaine patch, okay to use for 12 hours, take off for 12 hours  5. Please contact your Cardiologist today to discuss restarting your medications for SVT.         RTC 1-2 months for a follow up.     Thank you for involving me in the care of this patient.     Shawnee Jimenez MD / Palliative Medicine / Pager [phone] / After-Hours Answering Service [phone] / Main Palliative Clinic - [facility] [phone] / [facility] Inpatient Team Consult Pager [phone] (answered 8am-430pm M-F)    Additional History Reviewed:   Allergies   Allergen Reactions     Cold & Flu [Cold Defense Fighter]      See pseudoephedrine     Seasonal Allergies      Sudafed Cold-Cough [Dayquil Liquicaps]      Pseudoephedrine Rash     Rash then skins peels off      Current Outpatient Medications   Medication Sig Dispense Refill     acyclovir (ZOVIRAX) 400 MG tablet Take 1 tablet (400 mg) by mouth 2 times daily 60 tablet 3     famotidine (PEPCID) 20 MG tablet Take 1 tablet (20 mg) by mouth 2 times daily 60 tablet 0     LORazepam (ATIVAN) 0.5 MG tablet Take 1-2 tablets (0.5-1 mg) by mouth every 6 hours as needed (Breakthrough Nausea / Vomiting) 30 tablet 0     ondansetron (ZOFRAN-ODT) 8 MG ODT tab Take 1 tablet (8 mg) by mouth every 8 hours as needed 45 tablet 0     potassium chloride ER (K-DUR/KLOR-CON M) 20 MEQ CR tablet Take 1 tablet (20 mEq) by mouth daily 30 tablet 3     prochlorperazine (COMPAZINE) 10 MG tablet Take 1 tablet (10 mg) by  mouth every 6 hours as needed (Breakthrough Nausea/Vomiting) 30 tablet 0     SENNA PO Take 1 tablet by mouth as needed        Past Medical History:   Diagnosis Date     Anxiety attack 9/16/2014     Diffuse large B-cell lymphoma (H)     Diagnosed 11/2019     Encounter for Essure implantation 2009     Generalized anxiety disorder 9/16/2014    zoloft = flat emotions     Menopausal disorder     started on OCPs by menopause center 3/2017 (takes active continuously)     Menstrual headache     helped by OCPs and magnesium     GERTRUDE (stress urinary incontinence, female)     sling procedure 2016     SVT (supraventricular tachycardia) (H)      Past Surgical History:   Procedure Laterality Date     H KIT ESSURE  2009    essure - Dr. Cailin Raymundo      HERNIORRHAPHY UMBILICAL  1974     IR CHEST PORT PLACEMENT > 5 YRS OF AGE  1/9/2020     SLING TRANSPUBO WITHOUT ANTERIOR COLPORRHAPHY N/A 11/21/2016    Procedure: SLING TRANSPUBO WITHOUT ANTERIOR COLPORRHAPHY;  Surgeon: Hernesto Berrios MD;  Location: [facility] OR     Family History   Problem Relation Age of Onset     Hypertension Mother      Depression Mother      Lipids Mother      Cardiovascular Mother      Circulatory Mother      Diabetes Mother      Heart Disease Mother      Cerebrovascular Disease Mother      Obesity Mother      C.A.D. Mother      Lung Cancer Mother         smoker     Eye Disorder Father         cone dystrophy     Macular Degeneration Father      Diabetes Maternal Aunt         type 2     Hypertension Maternal Aunt      Heart Disease Maternal Grandfather      Heart Disease Sister         high cholesterol       Macular Degeneration Sister        Face to face time: 50 minutes, with >50% of time devoted to patient counseling.

## 2020-01-30 NOTE — TELEPHONE ENCOUNTER
"Kassie calling. She was in clinic today and received a blood transfusion.She is now tachycardic with her HR in the 120's. She reports that it was in the 170's, and she was experiencing \"shortness of breath, palpitations, it hurt to lay down\". She was on her way to the ED, but her HR dropped to the 120's and she decided to call the nurse line instead of going to the ED.     Paged on-call provider, Dr. Manzo, to call the patient back directly at: [phone].  FNA advised patient to phone back FNA in 20 minutes if no response from on call MD and patient agreed.    Arti Sauceda, RN, BSN  [facility]    Reason for Disposition    [1] Heart beating very rapidly (e.g., > 140 / minute) AND [2] not present now  (Exception: during exercise)    Additional Information    Negative: Passed out (i.e., lost consciousness, collapsed and was not responding)    Negative: Shock suspected (e.g., cold/pale/clammy skin, too weak to stand, low BP, rapid pulse)    Negative: Difficult to awaken or acting confused (e.g., disoriented, slurred speech)    Negative: Visible sweat on face or sweat dripping down face    Negative: Unable to walk, or can only walk with assistance (e.g., requires support)    Negative: [1] Received SHOCK from implantable cardiac defibrillator AND [2] persisting symptoms (i.e., palpitations, lightheadedness)    Negative: Sounds like a life-threatening emergency to the triager    Negative: Chest pain    Negative: Difficulty breathing    Negative: Dizziness, lightheadedness, or weakness    Negative: [1] Heart beating very rapidly (e.g., > 140 / minute) AND [2] present now  (Exception: during exercise)    Negative: Heart beating very slowly (e.g., < 50 / minute)  (Exception: athlete)    Negative: New or worsened shortness of breath with activity (dyspnea on exertion)    Negative: Patient sounds very sick or weak to the triager    Protocols used: HEART RATE AND HEARTBEAT [id number]      "

## 2020-01-31 ENCOUNTER — ONCOLOGY VISIT (OUTPATIENT)
Dept: ONCOLOGY | Facility: CLINIC | Age: 50
End: 2020-01-31
Attending: PHYSICIAN ASSISTANT
Payer: COMMERCIAL

## 2020-01-31 VITALS
TEMPERATURE: 97.3 F | OXYGEN SATURATION: 98 % | WEIGHT: 157.5 LBS | DIASTOLIC BLOOD PRESSURE: 87 MMHG | BODY MASS INDEX: 24.66 KG/M2 | RESPIRATION RATE: 16 BRPM | SYSTOLIC BLOOD PRESSURE: 128 MMHG | HEART RATE: 110 BPM

## 2020-01-31 DIAGNOSIS — R63.0 DECREASED APPETITE: ICD-10-CM

## 2020-01-31 DIAGNOSIS — F41.9 ANXIETY: ICD-10-CM

## 2020-01-31 DIAGNOSIS — C83.398 DIFFUSE LARGE B-CELL LYMPHOMA OF EXTRANODAL SITE: Primary | ICD-10-CM

## 2020-01-31 DIAGNOSIS — Z51.5 ENCOUNTER FOR PALLIATIVE CARE: ICD-10-CM

## 2020-01-31 PROCEDURE — 99215 OFFICE O/P EST HI 40 MIN: CPT | Performed by: INTERNAL MEDICINE

## 2020-01-31 PROCEDURE — G0463 HOSPITAL OUTPT CLINIC VISIT: HCPCS

## 2020-01-31 RX ORDER — SERTRALINE HYDROCHLORIDE 25 MG/1
TABLET, FILM COATED ORAL
Qty: 60 TABLET | Refills: 0 | Status: ON HOLD | OUTPATIENT
Start: 2020-01-31 | End: 2020-02-21

## 2020-01-31 RX ORDER — DRONABINOL 2.5 MG/1
2.5 CAPSULE ORAL
Qty: 60 CAPSULE | Refills: 0 | Status: ON HOLD | OUTPATIENT
Start: 2020-01-31 | End: 2020-02-24

## 2020-01-31 ASSESSMENT — PAIN SCALES - GENERAL: PAINLEVEL: MODERATE PAIN (4)

## 2020-01-31 NOTE — NURSING NOTE
"Oncology Rooming Note    January 31, 2020 8:01 AM   Kassie Ramirez is a 49 year old female who presents for:    Chief Complaint   Patient presents with     Oncology Clinic Visit     Diffuse large B-cell lymphoma of extranodal site, Lymphoma     Initial Vitals: /87   Pulse 110   Temp 97.3  F (36.3  C) (Tympanic)   Resp 16   Wt 71.4 kg (157 lb 8 oz)   SpO2 98%   BMI 24.66 kg/m   Estimated body mass index is 24.66 kg/m  as calculated from the following:    Height as of 1/10/20: 1.702 m (5' 7.01\").    Weight as of this encounter: 71.4 kg (157 lb 8 oz). Body surface area is 1.84 meters squared.  Moderate Pain (4) Comment: Data Unavailable   No LMP recorded.  Allergies reviewed: Yes  Medications reviewed: Yes    Medications: Medication refills not needed today.  Pharmacy name entered into [facility]:    [facility] - [place], [place] - [address] [place] [address] DRUG STORE #[id number] [facility] - [place], [place] [address] [facility] [address] [facility] access hospital    Clinical concerns: New Patient       Mary Alice Whitaker CMA              "

## 2020-01-31 NOTE — LETTER
"    1/31/2020         RE: Kassie Ramirez  [address]  [facility] [address]        Dear Colleague,    Thank you for referring your patient, Kassie Ramirez, to the [facility]. Please see a copy of my visit note below.    Palliative Care Outpatient Clinic Consultation Note    Patient:  Kassie Ramirez    Chief Complaint:   Kassie Ramirez 49 year old female who is presenting to the palliative medicine clinic today unaccompanied at the request of Padmaja Sanchez for a palliative care consultation secondary to Diffuse B cell Lymphoma.   The patient's primary care provider is:  Mary Alice Colón.     History of Present Illness:  This patient's cancer history was reviewed as per the chart.  In brief, Kassie Ramirez had evaluation for persistent cough in 2019, which was ultimately evaluated with a bronchoscopy (unrevealing), then follow up CT 11/2019 which showed progressive lymphadenopathy.  Mediastinoscopy was done which showed EBV positive DLBCL.  She received R-CHOP and then was admitted with sepsis.  She has since continued on R-CHOP.      For symptoms, she has had little appetite, fatigue which is markedly helped by transfusions, pain.     Pain has been in the mid-chest, \"like there was an orange in there,\" the anterior chest pain is getting better slowly since her mediastinoscopy in Nov, now nearly gone, now has a pain in the left lower \"lung\" posteriorly, achy, movement makes it worse. Mild.  No cough, fever, dyspnea.     Taking Ativan daily. Anxiety is escalated.     Also has history of SVT, reports that her HR was 170 a few nights ago, broke with vagal, that she was on a medication for this previously but that in the midst of \"all this cancer stuff,\" it seems to have gotten dropped.  She was well connected with Cardiology, has seen them in the past few months.     Patient's Disease Understanding: She knows that she has a plan for 6 R-CHOP, alternating with methotrexate, " "expected to have \"full recovery,\" will have PET prior to next scan.     Coping:  Has a history of anxiety for which she was taking Celexa and counseling up until a year ago.     Social History  Living Situation:  and 13 year old daughter and 11 year old son.   Support System: Many friends, in-laws.   Spiritual Background: \"Not so much,\"   Social History     Tobacco Use     Smoking status: Never Smoker     Smokeless tobacco: Never Used   Substance Use Topics     Alcohol use: No     Alcohol/week: 0.0 standard drinks     Comment: 1 time per month     Drug use: No       Family History  Patient's Involvement with Prior History of Serious Illness in Family: Father  16 years after sustaining a spinal cord injury, after electing DNR/DNI.     Advance Care Planning:  Advance Directive:    Not on file.       REVIEW OF SYSTEMS:   ROS: 10 point ROS neg other than the symptoms noted above in the HPI and here:  Palliative Symptom Review (0=no symptom/no concern, 1=mild, 2=moderate, 3=severe):      Pain: see above.       Fatigue: see above.       Nausea: 1- needing Zofran, Compazine on days 5-7 which helps enough      Constipation: 1 - senna every other day      Diarrhea: 0      Depressive Symptoms: 0      Anxiety: 2      Drowsiness: 2 - improved with transfusion      Poor Appetite: 2 - no appetite.       Shortness of Breath: 0           Physical Exam:   Vitals were reviewed  /87   Pulse 110   Temp 97.3  F (36.3  C) (Tympanic)   Resp 16   Wt 71.4 kg (157 lb 8 oz)   SpO2 98%   BMI 24.66 kg/m     General: Alert, comfortable appearing female in no acute distress.   Eyes: Pupils equal, sclera clear.   ENT: MMM.   Cardiac: Tachycardic, regular rhythm, no murmurs.    Resp: CTAB, unlabored on room air.   Back: Mild TTP of the left inferior posterior ribs, without any obvious abnormalities.   Abd: Soft, non-distended, non-tender to palpation, active bowel sounds.   Neuro: No facial asymmetry.  Spontaneous movements " "grossly non-focal.  Normal gait.   Psych: Alert, appropriately interactive, full affect, normal sensorium.   Skin: No jaundice or ecchymoses noted on limited examination, including to the back.         Data Reviewed:  LABS:   Lab Results   Component Value Date    WBC 10.6 01/29/2020    HGB 6.1 (LL) 01/29/2020    HCT 18.9 (L) 01/29/2020     01/29/2020     01/29/2020    POTASSIUM 3.1 (L) 01/29/2020    CHLORIDE 108 01/29/2020    CO2 26 01/29/2020    BUN 9 01/29/2020    CR 0.92 01/29/2020     (H) 01/29/2020    DD 1.4 (H) 09/18/2019    NTBNPI 92 12/15/2019    TROPI 0.088 (H) 09/18/2019    AST 23 01/29/2020    ALT 24 01/29/2020    ALKPHOS 75 01/29/2020    BILITOTAL 0.7 01/29/2020    INR 1.18 (H) 12/01/2019     IMAGING:   PET 11/29/19  \"IMPRESSION: In this patient with recently diagnosed DLBCL there is  extensive bulky disease by PET/CT criteria:  1. Enlarging soft tissue hypermetabolic mass encasing and narrowing  all the secondary  bronchi, pulmonary arteries and veins.    2. Complete obstruction of the right superior lobar bronchus with  right upper lobe collapse.  3. Multiple satellite mediastinal lymphadenopathy.  4. Pulmonary opacity and groundglass in the right mid liver lobe,  lingula and left upper lobe likely inflammatory/infective. Interval  resolution of largest left pulmonary nodule noted on prior study.  Stable smaller right posterior medial pulmonary nodule measuring 1.1  cm  5. Retroperitoneal hypermetabolic lymphadenopathy.  6. Minimal increased metabolism at the ileo-cecal junction, consider  direct visualization or close follow up.   7. See dedicated neuroradiology report for the results of the high  resolution PET CT of the neck. \"    Last EKG showed QTc of 468ms.     [place]  - Use of controlled substances consistent with history.     Impressions:    Kassie Ramirez is a 49 year old woman with DLBCL currently on R-CHOP therapy who has anxiety (present prior to cancer diagnosis, now " escalated), and low appetite.  Her back pain is of unclear etiology to me after reviewing scans.  Given that it is reproducible, this is most likely MSK related.     She did describe an SVT event a few nights ago which resolved with a vagal maneuver.  I suspect that her carvedilol was held during one of her hospitalizations and not restarted.  She has been asked to speak with her Cardiology team today regarding restarting a control medication for her SVT history.     Recommendations & Counselin. Sertraline 25mg daily.  After one week, increase to 50mg daily. Chosen despite history of responding to celexa because of QT prolongation on last EKG.   2. Marinol 2.5mg twice daily  3. Tylenol 1000mg up to three times daily for pain  4. Lidocaine patch, okay to use for 12 hours, take off for 12 hours  5. Please contact your Cardiologist today to discuss restarting your medications for SVT.         RTC 1-2 months for a follow up.     Thank you for involving me in the care of this patient.     Shawnee Jimenez MD / Palliative Medicine / Pager [phone] / After-Hours Answering Service [phone] / Main Palliative Clinic - [facility] [phone] / [facility] Inpatient Team Consult Pager [phone] (answered 8am-430pm M-F)    Additional History Reviewed:   Allergies   Allergen Reactions     Cold & Flu [Cold Defense Fighter]      See pseudoephedrine     Seasonal Allergies      Sudafed Cold-Cough [Dayquil Liquicaps]      Pseudoephedrine Rash     Rash then skins peels off      Current Outpatient Medications   Medication Sig Dispense Refill     acyclovir (ZOVIRAX) 400 MG tablet Take 1 tablet (400 mg) by mouth 2 times daily 60 tablet 3     famotidine (PEPCID) 20 MG tablet Take 1 tablet (20 mg) by mouth 2 times daily 60 tablet 0     LORazepam (ATIVAN) 0.5 MG tablet Take 1-2 tablets (0.5-1 mg) by mouth every 6 hours as needed (Breakthrough Nausea / Vomiting) 30 tablet 0     ondansetron (ZOFRAN-ODT) 8 MG ODT tab  Take 1 tablet (8 mg) by mouth every 8 hours as needed 45 tablet 0     potassium chloride ER (K-DUR/KLOR-CON M) 20 MEQ CR tablet Take 1 tablet (20 mEq) by mouth daily 30 tablet 3     prochlorperazine (COMPAZINE) 10 MG tablet Take 1 tablet (10 mg) by mouth every 6 hours as needed (Breakthrough Nausea/Vomiting) 30 tablet 0     SENNA PO Take 1 tablet by mouth as needed        Past Medical History:   Diagnosis Date     Anxiety attack 9/16/2014     Diffuse large B-cell lymphoma ([facility])     Diagnosed 11/2019     Encounter for Essure implantation 2009     Generalized anxiety disorder 9/16/2014    zoloft = flat emotions     Menopausal disorder     started on OCPs by menopause center 3/2017 (takes active continuously)     Menstrual headache     helped by OCPs and magnesium     GERTRUDE (stress urinary incontinence, female)     sling procedure 2016     SVT (supraventricular tachycardia) (H)      Past Surgical History:   Procedure Laterality Date     H KIT ESSURE  2009    essure - Dr. Cailin Raymundo      HERNIORRHAPHY UMBILICAL  1974     IR CHEST PORT PLACEMENT > 5 YRS OF AGE  1/9/2020     SLING TRANSPUBO WITHOUT ANTERIOR COLPORRHAPHY N/A 11/21/2016    Procedure: SLING TRANSPUBO WITHOUT ANTERIOR COLPORRHAPHY;  Surgeon: Hernesto Berrios MD;  Location: [facility] OR     Family History   Problem Relation Age of Onset     Hypertension Mother      Depression Mother      Lipids Mother      Cardiovascular Mother      Circulatory Mother      Diabetes Mother      Heart Disease Mother      Cerebrovascular Disease Mother      Obesity Mother      C.A.D. Mother      Lung Cancer Mother         smoker     Eye Disorder Father         cone dystrophy     Macular Degeneration Father      Diabetes Maternal Aunt         type 2     Hypertension Maternal Aunt      Heart Disease Maternal Grandfather      Heart Disease Sister         high cholesterol       Macular Degeneration Sister        Face to face time: 50 minutes, with >50% of time devoted to patient  counseling.     Again, thank you for allowing me to participate in the care of your patient.        Sincerely,        Shawnee Jimenez MD

## 2020-01-31 NOTE — PATIENT INSTRUCTIONS
Thank you for coming into the Palliative Care Clinic today.     1. Sertraline 25mg daily, increase to 50mg after 1 week.   2. Marinol 2.5mg twice daily  3. Tylenol 1000mg up to three times daily for pain  4. Lidocaine patch, okay to use for 12 hours, take off for 12 hours  5. Please contact your Cardiologist today to discuss restarting your medications for your fast heart rate.     Return to clinic in 1-2 months for a follow up.     You can reach the Palliative Care Team during business hours at the following number: [phone] (Palliative Clinic Nurse Line).     To reach the Palliative Care Provider on-call after-hours or on holidays and weekends, call: [phone].  Please note that we are not able to provide pain medication refills on evenings or weekends.     ===================================================================

## 2020-01-31 NOTE — PROGRESS NOTES
Oncology/Hematology Visit Note    Feb 4, 2020    Reason for visit: Follow up DLBCL     Oncology HPI: Kassie Ramirez is a 49 year old female with DLBCL.  She developed a cough in early 2019 and symptoms were progressive for over 5 months.  CXR and CT chest did reveal some lymphadenopathy and she was initially treated with antibiotics.  Bronchoscopy revealed nonnecrotizing granulomatous inflammation, but negative for malignancy.  Follow-up CT November 2019 revealed worsening of the mediastinal and bilateral hilar lymphadenopathy.  Mediastinoscopy obtained and preliminary pathology was EBV positive DLBCL.  FISH was negative for high risk translocations.  She presented to the ED on 11/27/2019 with worsening SOB and was admitted at the [facility] and R CHOP cycle 1 was initiated on 11/29/2019.  She was admitted at [facility] on 12/15/2019 for neutropenic fever with sepsis and suspecting hospital-acquired pneumonia, PJV pneumonia, blood cultures were negative.  She established care with Dr. Castro on 12/27/2019 and she received MR-CHOP cycle 2 same day. RCHOP C3 was 1/17/2020. Plan for a total of 6 cycles with Neulasta support of MR-CHOP with PET scan after 2-3 cycles.     She was seen in clinic on 1/29/2020 for severe fatigue and shortness of breath and noted to have a hemoglobin of 6.1.  She was given 2 units of blood. She is here today for close follow up.    Interval History: Sadia is here unaccompanied today. She is feeling much better today compared to last week when she received 2 units of blood.  Her fatigue is significantly better.  She continues to have some chest pain that is been ongoing for the last 3 weeks.  It is constant and worse with inspiration, movement, coughing.  Denies fever, chills, hemoptysis, palpitations, SOB, lower extremity swelling or pain.  Appetite slowly improving.  No other complaints.    Review of Systems: See interval hx. Denies fevers, chills, HA, dizziness, n/t, sore throat,  bdominal pain, N/V, diarrhea, changes in urination, bleeding, bruising, rash.      PMHx and Social Hx reviewed per EPIC.      Medications:  Current Outpatient Medications   Medication Sig Dispense Refill     acyclovir (ZOVIRAX) 400 MG tablet Take 1 tablet (400 mg) by mouth 2 times daily 60 tablet 3     dronabinol (MARINOL) 2.5 MG capsule Take 1 capsule (2.5 mg) by mouth 2 times daily (before meals) 60 capsule 0     famotidine (PEPCID) 20 MG tablet Take 1 tablet (20 mg) by mouth 2 times daily 60 tablet 0     LORazepam (ATIVAN) 0.5 MG tablet Take 1-2 tablets (0.5-1 mg) by mouth every 6 hours as needed (Breakthrough Nausea / Vomiting) 30 tablet 0     ondansetron (ZOFRAN-ODT) 8 MG ODT tab Take 1 tablet (8 mg) by mouth every 8 hours as needed 45 tablet 0     potassium chloride ER (K-DUR/KLOR-CON M) 20 MEQ CR tablet Take 1 tablet (20 mEq) by mouth daily 30 tablet 3     prochlorperazine (COMPAZINE) 10 MG tablet Take 1 tablet (10 mg) by mouth every 6 hours as needed (Breakthrough Nausea/Vomiting) 30 tablet 0     SENNA PO Take 1 tablet by mouth as needed        sertraline (ZOLOFT) 25 MG tablet Start 25mg daily.  After one week, increase to 50mg daily. 60 tablet 0       Allergies   Allergen Reactions     Cold & Flu [Cold Defense Fighter]      See pseudoephedrine     Seasonal Allergies      Sudafed Cold-Cough [Dayquil Liquicaps]      Pseudoephedrine Rash     Rash then skins peels off        EXAM:    /88   Pulse 106   Temp 97.8  F (36.6  C) (Tympanic)   Resp 16   Wt 72.1 kg (159 lb)   SpO2 95%   BMI 24.90 kg/m      GENERAL:  Female, in no acute distress.  Alert and oriented x3.   HEENT:  Normocephalic, atraumatic.  PERRL, oropharynx clear with no sores or thrush.   LYMPH NODES:  No palpable pre/post-auricular, cervical, axillary lymphadenopathy appreciated.  CV:  Tachycardic, No murmurs, gallops, or rubs.   LUNGS:  Clear to auscultation bilaterally.   ABDOMEN:  Soft, nontender and nondistended.  Bowel sounds heard  x4.  No apparent hepatosplenomegaly.   EXTREMITIES:  No clubbing, cyanosis, or edema.   SKIN: No rash, port without erythema, pus or tenderness. Pale in color.  PSYCH: Mood stable      Labs:   Results for BABAR VARGHESE (MRN [id number]) as of 2/4/2020 13:42   2/4/2020 09:19   Sodium 139   Potassium 4.3   Chloride 108   Carbon Dioxide 26   Urea Nitrogen 12   Creatinine 0.98   GFR Estimate 67   GFR Estimate If Black 78   Calcium 9.6   Anion Gap 5   Albumin 3.9   Protein Total 6.7 (L)   Bilirubin Total 1.1   Alkaline Phosphatase 77   ALT 38   AST 33   Glucose 101 (H)   WBC 5.3   Hemoglobin 9.6 (L)   Hematocrit 28.8 (L)   Platelet Count 133 (L)   RBC Count 3.12 (L)   MCV 92   MCH 30.8   MCHC 33.3   RDW 19.0 (H)   Diff Method Manual Differential   % Neutrophils 60.0   % Lymphocytes 19.0   % Monocytes 10.0   % Eosinophils 1.0   % Basophils 0.0   % Metamyelocytes 3.0   % Myelocytes 7.0   Absolute Neutrophil 3.2   Absolute Lymphocytes 1.0   Absolute Monocytes 0.5   Absolute Eosinophils 0.1   Absolute Basophils 0.0   Absolute Metamyelocytes 0.2 (H)   Absolute Myelocytes 0.4 (H)   Anisocytosis Moderate   Polychromasia Slight   Teardrop Cells Slight   Microcytes Present   Macrocytes Present   Platelet Estimate Automated count confirmed.  Platelet morphology is normal.       Imaging: PENDING    Impression/Plan: Babar Varghese is a 49 year old female with DLBCL currently undergoing curative intent chemotherapy with MR-CHOP.    DLBCL: Stage III, EBV+, non-GCB, IPI score 2 (low-intermediate), FISH negative for high risk translocations.  R CHOP cycle 1 completed inpatient on 11/29/2019 and HD methotrexate was added to cycle 2 for CNS prophylaxis, given on 1/20/2020.  She has struggled with fatigue more with the addition of methotrexate.  R CHOP cycle 3 on 1/17/2020 with severe fatigue and shortness of breath, hemoglobin 6.1 and she received 2 units of blood on 1/29/2020.  Fatigue is significantly improved.  She continues to  have some constant chest pain, see below.  PET scan done prior to this appointment.  She will return in a few days to discuss PET results with Dr. Castro. She will call the clinic with any questions or concerns.  --2/7/2020 Dr. Castro, MR-CHOP cycle 4    Heme: Hemoglobin 6.1 on 1/29/2020 and she received 2 units of blood.  Hemoglobin significantly improved to 9.6.  Platelets have dropped to 133, no active bleeding.  WBC and ANC are normal.  Plan for blood transfusion if hemoglobin <7.0, bleeding or severe fatigue.  Plan for platelet transfusion if platelets <20 or bleeding. She will continue Neulasta support.    CV: Chest pain and shortness of breath, which led to her lymphoma diagnosis. Shortness of breath has significantly improved since chemotherapy, but she does have persistent chest pain.  Worse with inspiration, movement, coughing and feels a fullness in her chest.  We discussed this in great detail today.  I suspect the lymphoma is still causing her some chest discomfort, however I would suspect it would be improved with chemotherapy.  She is tachycardic today at 106, but she has been as high as 110 and 125 over the last 3 weeks.  We discussed d-dimer with or without CT PE study in great detail.  We will obtain ultrasound of lower extremities to evaluate for DVT for now.  She would like to hold off on d-dimer for now, but if chest pain does not get better over the next few days, we will obtain d-dimer and if positive, we will need CT PE study.  She is to call the clinic or go to the emergency department if increased chest pain, new shortness of breath, leg pain or swelling or any worsening symptoms.   --Addendum 2/7/2020: Ultrasound negative for DVT.    Fatigue: Secondary to chemotherapy and anemia.  Significant improvement since her blood transfusion last week.    FEN: Potassium is normalized to 4.3 and she will continue taking this 40 mEq daily.  Appetite has also improved and her nutrition status has improved.   Electrolytes are otherwise normal.  Weight is stable.    Renal: Creatinine normal at 0.98.    Mood: She was very tearful during her visit last week when she was extremely fatigued, better today.  She has an appointment to meet Madi Garcia next week, 12/12.  She met with palliative care last week and started on sertraline 25 mg daily.      Chart documentation with Dragon Voice recognition Software. Although reviewed after completion, some words and grammatical errors may remain.      Padmaja Sanchez PA-C  Hematology/Oncology  [facility]

## 2020-02-03 DIAGNOSIS — C83.398 DIFFUSE LARGE B-CELL LYMPHOMA OF EXTRANODAL SITE: Primary | ICD-10-CM

## 2020-02-04 ENCOUNTER — HOSPITAL ENCOUNTER (OUTPATIENT)
Dept: PET IMAGING | Facility: CLINIC | Age: 50
Discharge: HOME | End: 2020-02-04
Attending: INTERNAL MEDICINE | Admitting: INTERNAL MEDICINE
Payer: COMMERCIAL

## 2020-02-04 ENCOUNTER — ONCOLOGY VISIT (OUTPATIENT)
Dept: ONCOLOGY | Facility: CLINIC | Age: 50
End: 2020-02-04
Attending: PHYSICIAN ASSISTANT
Payer: COMMERCIAL

## 2020-02-04 ENCOUNTER — HOSPITAL ENCOUNTER (OUTPATIENT)
Facility: CLINIC | Age: 50
Setting detail: SPECIMEN
End: 2020-02-04
Attending: PHYSICIAN ASSISTANT
Payer: COMMERCIAL

## 2020-02-04 VITALS
WEIGHT: 159 LBS | SYSTOLIC BLOOD PRESSURE: 125 MMHG | OXYGEN SATURATION: 95 % | TEMPERATURE: 97.8 F | BODY MASS INDEX: 24.9 KG/M2 | HEART RATE: 106 BPM | RESPIRATION RATE: 16 BRPM | DIASTOLIC BLOOD PRESSURE: 88 MMHG

## 2020-02-04 DIAGNOSIS — C83.398 DIFFUSE LARGE B-CELL LYMPHOMA OF EXTRANODAL SITE: ICD-10-CM

## 2020-02-04 DIAGNOSIS — C83.398 DIFFUSE LARGE B-CELL LYMPHOMA OF EXTRANODAL SITE: Primary | ICD-10-CM

## 2020-02-04 LAB
ALBUMIN SERPL-MCNC: 3.9 G/DL (ref 3.4–5)
ALP SERPL-CCNC: 77 U/L (ref 40–150)
ALT SERPL W P-5'-P-CCNC: 38 U/L (ref 0–50)
ANION GAP SERPL CALCULATED.3IONS-SCNC: 5 MMOL/L (ref 3–14)
ANISOCYTOSIS BLD QL SMEAR: ABNORMAL
AST SERPL W P-5'-P-CCNC: 33 U/L (ref 0–45)
BASOPHILS # BLD AUTO: 0 10E9/L (ref 0–0.2)
BASOPHILS NFR BLD AUTO: 0 %
BILIRUB SERPL-MCNC: 1.1 MG/DL (ref 0.2–1.3)
BUN SERPL-MCNC: 12 MG/DL (ref 7–30)
CALCIUM SERPL-MCNC: 9.6 MG/DL (ref 8.5–10.1)
CHLORIDE SERPL-SCNC: 108 MMOL/L (ref 94–109)
CO2 SERPL-SCNC: 26 MMOL/L (ref 20–32)
CREAT SERPL-MCNC: 0.98 MG/DL (ref 0.52–1.04)
DACRYOCYTES BLD QL SMEAR: SLIGHT
DIFFERENTIAL METHOD BLD: ABNORMAL
EOSINOPHIL # BLD AUTO: 0.1 10E9/L (ref 0–0.7)
EOSINOPHIL NFR BLD AUTO: 1 %
ERYTHROCYTE [DISTWIDTH] IN BLOOD BY AUTOMATED COUNT: 19 % (ref 10–15)
GFR SERPL CREATININE-BSD FRML MDRD: 67 ML/MIN/{1.73_M2}
GLUCOSE SERPL-MCNC: 101 MG/DL (ref 70–99)
HCT VFR BLD AUTO: 28.8 % (ref 35–47)
HGB BLD-MCNC: 9.6 G/DL (ref 11.7–15.7)
LYMPHOCYTES # BLD AUTO: 1 10E9/L (ref 0.8–5.3)
LYMPHOCYTES NFR BLD AUTO: 19 %
MACROCYTES BLD QL SMEAR: PRESENT
MCH RBC QN AUTO: 30.8 PG (ref 26.5–33)
MCHC RBC AUTO-ENTMCNC: 33.3 G/DL (ref 31.5–36.5)
MCV RBC AUTO: 92 FL (ref 78–100)
METAMYELOCYTES # BLD: 0.2 10E9/L
METAMYELOCYTES NFR BLD MANUAL: 3 %
MICROCYTES BLD QL SMEAR: PRESENT
MONOCYTES # BLD AUTO: 0.5 10E9/L (ref 0–1.3)
MONOCYTES NFR BLD AUTO: 10 %
MYELOCYTES # BLD: 0.4 10E9/L
MYELOCYTES NFR BLD MANUAL: 7 %
NEUTROPHILS # BLD AUTO: 3.2 10E9/L (ref 1.6–8.3)
NEUTROPHILS NFR BLD AUTO: 60 %
PLATELET # BLD AUTO: 133 10E9/L (ref 150–450)
PLATELET # BLD EST: ABNORMAL 10*3/UL
POLYCHROMASIA BLD QL SMEAR: SLIGHT
POTASSIUM SERPL-SCNC: 4.3 MMOL/L (ref 3.4–5.3)
PROT SERPL-MCNC: 6.7 G/DL (ref 6.8–8.8)
RBC # BLD AUTO: 3.12 10E12/L (ref 3.8–5.2)
SODIUM SERPL-SCNC: 139 MMOL/L (ref 133–144)
WBC # BLD AUTO: 5.3 10E9/L (ref 4–11)

## 2020-02-04 PROCEDURE — 25000128 H RX IP 250 OP 636: Performed by: INTERNAL MEDICINE

## 2020-02-04 PROCEDURE — G0463 HOSPITAL OUTPT CLINIC VISIT: HCPCS

## 2020-02-04 PROCEDURE — 36591 DRAW BLOOD OFF VENOUS DEVICE: CPT

## 2020-02-04 PROCEDURE — A9552 F18 FDG: HCPCS | Performed by: INTERNAL MEDICINE

## 2020-02-04 PROCEDURE — 99214 OFFICE O/P EST MOD 30 MIN: CPT | Performed by: PHYSICIAN ASSISTANT

## 2020-02-04 PROCEDURE — 34300033 ZZH RX 343: Performed by: INTERNAL MEDICINE

## 2020-02-04 PROCEDURE — 85025 COMPLETE CBC W/AUTO DIFF WBC: CPT | Performed by: PHYSICIAN ASSISTANT

## 2020-02-04 PROCEDURE — 80053 COMPREHEN METABOLIC PANEL: CPT | Performed by: PHYSICIAN ASSISTANT

## 2020-02-04 PROCEDURE — 74177 CT ABD & PELVIS W/CONTRAST: CPT

## 2020-02-04 RX ORDER — LIDOCAINE/PRILOCAINE 2.5 %-2.5%
CREAM (GRAM) TOPICAL PRN
Qty: 30 G | Refills: 3 | Status: ON HOLD | OUTPATIENT
Start: 2020-02-04 | End: 2020-06-14

## 2020-02-04 RX ORDER — HEPARIN SODIUM (PORCINE) LOCK FLUSH IV SOLN 100 UNIT/ML 100 UNIT/ML
500 SOLUTION INTRAVENOUS
Status: COMPLETED | OUTPATIENT
Start: 2020-02-04 | End: 2020-02-04

## 2020-02-04 RX ORDER — IOPAMIDOL 755 MG/ML
20-135 INJECTION, SOLUTION INTRAVASCULAR ONCE
Status: COMPLETED | OUTPATIENT
Start: 2020-02-04 | End: 2020-02-04

## 2020-02-04 RX ADMIN — IOPAMIDOL 96 ML: 755 INJECTION, SOLUTION INTRAVENOUS at 10:39

## 2020-02-04 RX ADMIN — FLUDEOXYGLUCOSE F-18 12.88 MCI.: 500 INJECTION, SOLUTION INTRAVENOUS at 10:40

## 2020-02-04 RX ADMIN — Medication 500 UNITS: at 10:41

## 2020-02-04 ASSESSMENT — PAIN SCALES - GENERAL: PAINLEVEL: NO PAIN (0)

## 2020-02-04 NOTE — NURSING NOTE
"Oncology Rooming Note    February 4, 2020 2:22 PM   Kassie Ramirez is a 49 year old female who presents for:    Chief Complaint   Patient presents with     Oncology Clinic Visit     Diffuse large B-cell lymphoma of extranodal site     Initial Vitals: /88   Pulse 106   Temp 97.8  F (36.6  C) (Tympanic)   Resp 16   Wt 72.1 kg (159 lb)   SpO2 95%   BMI 24.90 kg/m   Estimated body mass index is 24.9 kg/m  as calculated from the following:    Height as of 1/10/20: 1.702 m (5' 7.01\").    Weight as of this encounter: 72.1 kg (159 lb). Body surface area is 1.85 meters squared.  No Pain (0) Comment: Data Unavailable   No LMP recorded.  Allergies reviewed: Yes  Medications reviewed: Yes    Medications: Medication refills not needed today.  Pharmacy name entered into [employer]:    [facility] - [place], [place] - [address] [place] [address] DRUG STORE #[id number] [facility] [place] [address] [facility] [address] [place] [address] & [facility]    Clinical concerns: Follow Up       Morenita Rey CMA              "

## 2020-02-04 NOTE — LETTER
2/4/2020         RE: Kassie Ramirez  [address]  [facility] [address]        Dear Colleague,    Thank you for referring your patient, Kassie Ramirez, to the [facility]. Please see a copy of my visit note below.    Oncology/Hematology Visit Note    Feb 4, 2020    Reason for visit: Follow up DLBCL     Oncology HPI: Kassie Ramirez is a 49 year old female with DLBCL.  She developed a cough in early 2019 and symptoms were progressive for over 5 months.  CXR and CT chest did reveal some lymphadenopathy and she was initially treated with antibiotics.  Bronchoscopy revealed nonnecrotizing granulomatous inflammation, but negative for malignancy.  Follow-up CT November 2019 revealed worsening of the mediastinal and bilateral hilar lymphadenopathy.  Mediastinoscopy obtained and preliminary pathology was EBV positive DLBCL.  FISH was negative for high risk translocations.  She presented to the ED on 11/27/2019 with worsening SOB and was admitted at the [facility] and R CHOP cycle 1 was initiated on 11/29/2019.  She was admitted at [facility] on 12/15/2019 for neutropenic fever with sepsis and suspecting hospital-acquired pneumonia, PJV pneumonia, blood cultures were negative.  She established care with Dr. Castro on 12/27/2019 and she received MR-CHOP cycle 2 same day. RCHOP C3 was 1/17/2020. Plan for a total of 6 cycles with Neulasta support of MR-CHOP with PET scan after 2-3 cycles.     She was seen in clinic on 1/29/2020 for severe fatigue and shortness of breath and noted to have a hemoglobin of 6.1.  She was given 2 units of blood. She is here today for close follow up.    Interval History: Sadia is here unaccompanied today. She is feeling much better today compared to last week when she received 2 units of blood.  Her fatigue is significantly better.  She continues to have some chest pain that is been ongoing for the last 3 weeks.  It is constant and worse with inspiration,  movement, coughing.  Denies fever, chills, hemoptysis, palpitations, SOB, lower extremity swelling or pain.  Appetite slowly improving.  No other complaints.    Review of Systems: See interval hx. Denies fevers, chills, HA, dizziness, n/t, sore throat, bdominal pain, N/V, diarrhea, changes in urination, bleeding, bruising, rash.      PMHx and Social Hx reviewed per EPIC.      Medications:  Current Outpatient Medications   Medication Sig Dispense Refill     acyclovir (ZOVIRAX) 400 MG tablet Take 1 tablet (400 mg) by mouth 2 times daily 60 tablet 3     dronabinol (MARINOL) 2.5 MG capsule Take 1 capsule (2.5 mg) by mouth 2 times daily (before meals) 60 capsule 0     famotidine (PEPCID) 20 MG tablet Take 1 tablet (20 mg) by mouth 2 times daily 60 tablet 0     LORazepam (ATIVAN) 0.5 MG tablet Take 1-2 tablets (0.5-1 mg) by mouth every 6 hours as needed (Breakthrough Nausea / Vomiting) 30 tablet 0     ondansetron (ZOFRAN-ODT) 8 MG ODT tab Take 1 tablet (8 mg) by mouth every 8 hours as needed 45 tablet 0     potassium chloride ER (K-DUR/KLOR-CON M) 20 MEQ CR tablet Take 1 tablet (20 mEq) by mouth daily 30 tablet 3     prochlorperazine (COMPAZINE) 10 MG tablet Take 1 tablet (10 mg) by mouth every 6 hours as needed (Breakthrough Nausea/Vomiting) 30 tablet 0     SENNA PO Take 1 tablet by mouth as needed        sertraline (ZOLOFT) 25 MG tablet Start 25mg daily.  After one week, increase to 50mg daily. 60 tablet 0       Allergies   Allergen Reactions     Cold & Flu [Cold Defense Fighter]      See pseudoephedrine     Seasonal Allergies      Sudafed Cold-Cough [Dayquil Liquicaps]      Pseudoephedrine Rash     Rash then skins peels off        EXAM:    /88   Pulse 106   Temp 97.8  F (36.6  C) (Tympanic)   Resp 16   Wt 72.1 kg (159 lb)   SpO2 95%   BMI 24.90 kg/m       GENERAL:  Female, in no acute distress.  Alert and oriented x3.   HEENT:  Normocephalic, atraumatic.  PERRL, oropharynx clear with no sores or thrush.    LYMPH NODES:  No palpable pre/post-auricular, cervical, axillary lymphadenopathy appreciated.  CV:  Tachycardic, No murmurs, gallops, or rubs.   LUNGS:  Clear to auscultation bilaterally.   ABDOMEN:  Soft, nontender and nondistended.  Bowel sounds heard x4.  No apparent hepatosplenomegaly.   EXTREMITIES:  No clubbing, cyanosis, or edema.   SKIN: No rash, port without erythema, pus or tenderness. Pale in color.  PSYCH: Mood stable      Labs:   Results for BABAR VARGHESE (MRN [id number]) as of 2/4/2020 13:42   2/4/2020 09:19   Sodium 139   Potassium 4.3   Chloride 108   Carbon Dioxide 26   Urea Nitrogen 12   Creatinine 0.98   GFR Estimate 67   GFR Estimate If Black 78   Calcium 9.6   Anion Gap 5   Albumin 3.9   Protein Total 6.7 (L)   Bilirubin Total 1.1   Alkaline Phosphatase 77   ALT 38   AST 33   Glucose 101 (H)   WBC 5.3   Hemoglobin 9.6 (L)   Hematocrit 28.8 (L)   Platelet Count 133 (L)   RBC Count 3.12 (L)   MCV 92   MCH 30.8   MCHC 33.3   RDW 19.0 (H)   Diff Method Manual Differential   % Neutrophils 60.0   % Lymphocytes 19.0   % Monocytes 10.0   % Eosinophils 1.0   % Basophils 0.0   % Metamyelocytes 3.0   % Myelocytes 7.0   Absolute Neutrophil 3.2   Absolute Lymphocytes 1.0   Absolute Monocytes 0.5   Absolute Eosinophils 0.1   Absolute Basophils 0.0   Absolute Metamyelocytes 0.2 (H)   Absolute Myelocytes 0.4 (H)   Anisocytosis Moderate   Polychromasia Slight   Teardrop Cells Slight   Microcytes Present   Macrocytes Present   Platelet Estimate Automated count confirmed.  Platelet morphology is normal.       Imaging: PENDING    Impression/Plan: Babar Varghese is a 49 year old female with DLBCL currently undergoing curative intent chemotherapy with MR-CHOP.    DLBCL: Stage III, EBV+, non-GCB, IPI score 2 (low-intermediate), FISH negative for high risk translocations.  R CHOP cycle 1 completed inpatient on 11/29/2019 and HD methotrexate was added to cycle 2 for CNS prophylaxis, given on 1/20/2020.  She  has struggled with fatigue more with the addition of methotrexate.  R CHOP cycle 3 on 1/17/2020 with severe fatigue and shortness of breath, hemoglobin 6.1 and she received 2 units of blood on 1/29/2020.  Fatigue is significantly improved.  She continues to have some constant chest pain, see below.  PET scan done prior to this appointment.  She will return in a few days to discuss PET results with Dr. Castro. She will call the clinic with any questions or concerns.  --2/7/2020 Dr. Castro, MR-CHOP cycle 4    Heme: Hemoglobin 6.1 on 1/29/2020 and she received 2 units of blood.  Hemoglobin significantly improved to 9.6.  Platelets have dropped to 133, no active bleeding.  WBC and ANC are normal.  Plan for blood transfusion if hemoglobin <7.0, bleeding or severe fatigue.  Plan for platelet transfusion if platelets <20 or bleeding. She will continue Neulasta support.    CV: Significant chest pain or shortness of breath which led to her lymphoma diagnosis,'s shortness of breath has significantly improved since chemotherapy, but she does have persistent chest pain.  Worse with inspiration, movement, coughing and feels a fullness in her chest.  We discussed this in great detail today.  I suspect the lymphoma is still causing her some chest discomfort, however I would suspect it would be improved with chemotherapy.  She is tachycardic today at 106, but she has been as high as 110 and 125 over the last 3 weeks.  We discussed d-dimer with or without CT PE study in great detail.  We will obtain ultrasound of lower extremities to evaluate for DVT for now.  She would like to hold off on d-dimer for now, but if chest pain does not get better over the next few days, we will obtain d-dimer and if positive, we will need CT PE study.  She is to call the clinic or go to the emergency department if increased chest pain, new shortness of breath, leg pain or swelling or any worsening symptoms.     Fatigue: Secondary to chemotherapy and anemia.   Significant improvement since her blood transfusion last week.    FEN: Potassium is normalized to 4.3 and she will continue taking this 40 mEq daily.  Appetite has also improved and her nutrition status has improved.  Electrolytes are otherwise normal.  Weight is stable.    Renal: Creatinine normal at 0.98.    Mood: She was very tearful during her visit last week when she was extremely fatigued, better today.  She has an appointment to meet Madi Garcia next week, 12/12.  She met with palliative care last week and started on sertraline 25 mg daily.      Chart documentation with Dragon Voice recognition Software. Although reviewed after completion, some words and grammatical errors may remain.      Padmaja Sanchez PA-C  Hematology/Oncology  [facility]                  Again, thank you for allowing me to participate in the care of your patient.        Sincerely,        Padmaja Sanchez PA-C

## 2020-02-05 ENCOUNTER — TELEPHONE (OUTPATIENT)
Dept: ONCOLOGY | Facility: CLINIC | Age: 50
End: 2020-02-05

## 2020-02-05 NOTE — TELEPHONE ENCOUNTER
Prior Authorization Retail Medication Request    Medication/Dose: Lidocaine-prilocaine cream (emla cream)requires a prior authorization  ICD code (if different than what is on RX):  c8339  Previously Tried and Failed: unknown    Rationale: unknown    Insurance Name:  [employer]  Insurance ID:  [id number]      Thank You,  Dana Martinez [facility]  [phone]

## 2020-02-05 NOTE — TELEPHONE ENCOUNTER
PA Initiation    Medication: Lidocaine-prilocaine cream (emla cream)requires a prior authorization  Insurance Company: "[employer] - Phone [phone] Fax [phone]  Pharmacy Filling the Rx: [facility] - [place], [place] - [address] [place] [address]  Filling Pharmacy Phone:    Filling Pharmacy Fax:    Start Date: 2/5/2020

## 2020-02-06 NOTE — TELEPHONE ENCOUNTER
Prior Authorization Approval    Authorization Effective Date: 2/4/2020  Authorization Expiration Date: 2/4/2021  Medication: Lidocaine-prilocaine cream (emla cream) APPROVED 2/4/2020-2/4/2021  Approved Dose/Quantity: 30g for 30 days  Reference #: (Key: [id number])   Insurance Company: [employer] - Phone [phone] Fax [phone]  Expected CoPay:       CoPay Card Available:      Foundation Assistance Needed:    Which Pharmacy is filling the prescription (Not needed for infusion/clinic administered): [facility] - [place], [place] - [address] [place] [address]  Pharmacy Notified: Yes  Patient Notified: Yes

## 2020-02-07 ENCOUNTER — HOSPITAL ENCOUNTER (OUTPATIENT)
Facility: CLINIC | Age: 50
Setting detail: SPECIMEN
End: 2020-02-07
Attending: INTERNAL MEDICINE
Payer: COMMERCIAL

## 2020-02-07 ENCOUNTER — INFUSION THERAPY VISIT (OUTPATIENT)
Dept: INFUSION THERAPY | Facility: CLINIC | Age: 50
End: 2020-02-07
Attending: INTERNAL MEDICINE
Payer: COMMERCIAL

## 2020-02-07 ENCOUNTER — ONCOLOGY VISIT (OUTPATIENT)
Dept: ONCOLOGY | Facility: CLINIC | Age: 50
End: 2020-02-07
Attending: INTERNAL MEDICINE
Payer: COMMERCIAL

## 2020-02-07 ENCOUNTER — HOSPITAL ENCOUNTER (OUTPATIENT)
Dept: ULTRASOUND IMAGING | Facility: CLINIC | Age: 50
Discharge: HOME | End: 2020-02-07
Attending: PHYSICIAN ASSISTANT | Admitting: PHYSICIAN ASSISTANT
Payer: COMMERCIAL

## 2020-02-07 VITALS
WEIGHT: 156 LBS | SYSTOLIC BLOOD PRESSURE: 131 MMHG | OXYGEN SATURATION: 97 % | RESPIRATION RATE: 16 BRPM | HEART RATE: 105 BPM | DIASTOLIC BLOOD PRESSURE: 92 MMHG | BODY MASS INDEX: 24.43 KG/M2 | TEMPERATURE: 97 F

## 2020-02-07 DIAGNOSIS — C83.398 DIFFUSE LARGE B-CELL LYMPHOMA OF EXTRANODAL SITE: Primary | ICD-10-CM

## 2020-02-07 DIAGNOSIS — E87.6 HYPOKALEMIA: ICD-10-CM

## 2020-02-07 DIAGNOSIS — C83.398 DIFFUSE LARGE B-CELL LYMPHOMA OF EXTRANODAL SITE: ICD-10-CM

## 2020-02-07 LAB
ALBUMIN SERPL-MCNC: 3.8 G/DL (ref 3.4–5)
ALP SERPL-CCNC: 73 U/L (ref 40–150)
ALT SERPL W P-5'-P-CCNC: 43 U/L (ref 0–50)
ANION GAP SERPL CALCULATED.3IONS-SCNC: 7 MMOL/L (ref 3–14)
ANISOCYTOSIS BLD QL SMEAR: ABNORMAL
AST SERPL W P-5'-P-CCNC: 42 U/L (ref 0–45)
BASOPHILS # BLD AUTO: 0.1 10E9/L (ref 0–0.2)
BASOPHILS NFR BLD AUTO: 2 %
BILIRUB SERPL-MCNC: 1.2 MG/DL (ref 0.2–1.3)
BUN SERPL-MCNC: 9 MG/DL (ref 7–30)
CALCIUM SERPL-MCNC: 9.4 MG/DL (ref 8.5–10.1)
CHLORIDE SERPL-SCNC: 110 MMOL/L (ref 94–109)
CO2 SERPL-SCNC: 24 MMOL/L (ref 20–32)
CREAT SERPL-MCNC: 0.97 MG/DL (ref 0.52–1.04)
DACRYOCYTES BLD QL SMEAR: SLIGHT
DIFFERENTIAL METHOD BLD: ABNORMAL
EOSINOPHIL # BLD AUTO: 0 10E9/L (ref 0–0.7)
EOSINOPHIL NFR BLD AUTO: 0 %
ERYTHROCYTE [DISTWIDTH] IN BLOOD BY AUTOMATED COUNT: 19.2 % (ref 10–15)
GFR SERPL CREATININE-BSD FRML MDRD: 68 ML/MIN/{1.73_M2}
GLUCOSE SERPL-MCNC: 95 MG/DL (ref 70–99)
HCT VFR BLD AUTO: 27.2 % (ref 35–47)
HGB BLD-MCNC: 8.7 G/DL (ref 11.7–15.7)
HYPOCHROMIA BLD QL: PRESENT
LYMPHOCYTES # BLD AUTO: 0.8 10E9/L (ref 0.8–5.3)
LYMPHOCYTES NFR BLD AUTO: 17 %
MACROCYTES BLD QL SMEAR: PRESENT
MCH RBC QN AUTO: 30.4 PG (ref 26.5–33)
MCHC RBC AUTO-ENTMCNC: 32 G/DL (ref 31.5–36.5)
MCV RBC AUTO: 95 FL (ref 78–100)
METAMYELOCYTES # BLD: 0.1 10E9/L
METAMYELOCYTES NFR BLD MANUAL: 2 %
MICROCYTES BLD QL SMEAR: PRESENT
MONOCYTES # BLD AUTO: 0.4 10E9/L (ref 0–1.3)
MONOCYTES NFR BLD AUTO: 8 %
NEUTROPHILS # BLD AUTO: 3.2 10E9/L (ref 1.6–8.3)
NEUTROPHILS NFR BLD AUTO: 71 %
PLATELET # BLD AUTO: 136 10E9/L (ref 150–450)
PLATELET # BLD EST: ABNORMAL 10*3/UL
POTASSIUM SERPL-SCNC: 4.1 MMOL/L (ref 3.4–5.3)
PROT SERPL-MCNC: 6.4 G/DL (ref 6.8–8.8)
RBC # BLD AUTO: 2.86 10E12/L (ref 3.8–5.2)
SODIUM SERPL-SCNC: 140 MMOL/L (ref 133–144)
WBC # BLD AUTO: 4.5 10E9/L (ref 4–11)

## 2020-02-07 PROCEDURE — 96411 CHEMO IV PUSH ADDL DRUG: CPT

## 2020-02-07 PROCEDURE — 96413 CHEMO IV INFUSION 1 HR: CPT

## 2020-02-07 PROCEDURE — 99215 OFFICE O/P EST HI 40 MIN: CPT | Performed by: INTERNAL MEDICINE

## 2020-02-07 PROCEDURE — 25000132 ZZH RX MED GY IP 250 OP 250 PS 637: Performed by: INTERNAL MEDICINE

## 2020-02-07 PROCEDURE — 25800030 ZZH RX IP 258 OP 636: Performed by: INTERNAL MEDICINE

## 2020-02-07 PROCEDURE — 25000128 H RX IP 250 OP 636: Performed by: INTERNAL MEDICINE

## 2020-02-07 PROCEDURE — 93970 EXTREMITY STUDY: CPT

## 2020-02-07 PROCEDURE — G0463 HOSPITAL OUTPT CLINIC VISIT: HCPCS | Mod: 25

## 2020-02-07 PROCEDURE — 25000128 H RX IP 250 OP 636: Performed by: PHYSICIAN ASSISTANT

## 2020-02-07 PROCEDURE — 96375 TX/PRO/DX INJ NEW DRUG ADDON: CPT

## 2020-02-07 PROCEDURE — 80053 COMPREHEN METABOLIC PANEL: CPT | Performed by: INTERNAL MEDICINE

## 2020-02-07 PROCEDURE — 96367 TX/PROPH/DG ADDL SEQ IV INF: CPT

## 2020-02-07 PROCEDURE — 85025 COMPLETE CBC W/AUTO DIFF WBC: CPT | Performed by: INTERNAL MEDICINE

## 2020-02-07 PROCEDURE — 96417 CHEMO IV INFUS EACH ADDL SEQ: CPT

## 2020-02-07 PROCEDURE — 96415 CHEMO IV INFUSION ADDL HR: CPT

## 2020-02-07 PROCEDURE — 96372 THER/PROPH/DIAG INJ SC/IM: CPT

## 2020-02-07 RX ORDER — POTASSIUM CHLORIDE 1500 MG/1
40 TABLET, EXTENDED RELEASE ORAL DAILY
Qty: 60 TABLET | Refills: 3 | Status: SHIPPED | OUTPATIENT
Start: 2020-02-07 | End: 2020-07-07

## 2020-02-07 RX ORDER — PREDNISONE 50 MG/1
50 TABLET ORAL 2 TIMES DAILY
Qty: 10 TABLET | Refills: 0 | Status: ON HOLD | OUTPATIENT
Start: 2020-02-07 | End: 2020-02-21

## 2020-02-07 RX ORDER — HEPARIN SODIUM (PORCINE) LOCK FLUSH IV SOLN 100 UNIT/ML 100 UNIT/ML
5 SOLUTION INTRAVENOUS
Status: CANCELLED | OUTPATIENT
Start: 2020-02-07

## 2020-02-07 RX ORDER — HEPARIN SODIUM (PORCINE) LOCK FLUSH IV SOLN 100 UNIT/ML 100 UNIT/ML
5 SOLUTION INTRAVENOUS
Status: DISCONTINUED | OUTPATIENT
Start: 2020-02-07 | End: 2020-02-07 | Stop reason: HOSPADM

## 2020-02-07 RX ORDER — ACETAMINOPHEN 325 MG/1
650 TABLET ORAL ONCE
Status: COMPLETED | OUTPATIENT
Start: 2020-02-07 | End: 2020-02-07

## 2020-02-07 RX ORDER — PALONOSETRON 0.05 MG/ML
0.25 INJECTION, SOLUTION INTRAVENOUS ONCE
Status: COMPLETED | OUTPATIENT
Start: 2020-02-07 | End: 2020-02-07

## 2020-02-07 RX ORDER — DOXORUBICIN HYDROCHLORIDE 2 MG/ML
100 INJECTION, SOLUTION INTRAVENOUS ONCE
Status: COMPLETED | OUTPATIENT
Start: 2020-02-07 | End: 2020-02-07

## 2020-02-07 RX ORDER — HEPARIN SODIUM,PORCINE 10 UNIT/ML
5 VIAL (ML) INTRAVENOUS
Status: CANCELLED | OUTPATIENT
Start: 2020-02-07

## 2020-02-07 RX ADMIN — CYCLOPHOSPHAMIDE 1500 MG: 1 INJECTION, POWDER, FOR SOLUTION INTRAVENOUS; ORAL at 12:40

## 2020-02-07 RX ADMIN — VINCRISTINE SULFATE 2 MG: 1 INJECTION, SOLUTION INTRAVENOUS at 12:35

## 2020-02-07 RX ADMIN — DIPHENHYDRAMINE HYDROCHLORIDE 50 MG: 50 INJECTION INTRAMUSCULAR; INTRAVENOUS at 10:04

## 2020-02-07 RX ADMIN — FOSAPREPITANT 150 MG: 150 INJECTION, POWDER, LYOPHILIZED, FOR SOLUTION INTRAVENOUS at 10:18

## 2020-02-07 RX ADMIN — PALONOSETRON 0.25 MG: 0.05 INJECTION, SOLUTION INTRAVENOUS at 10:03

## 2020-02-07 RX ADMIN — Medication 5 ML: at 13:31

## 2020-02-07 RX ADMIN — DOXORUBICIN HYDROCHLORIDE 100 MG: 2 INJECTION, SOLUTION INTRAVENOUS at 12:30

## 2020-02-07 RX ADMIN — PEGFILGRASTIM 6 MG: KIT SUBCUTANEOUS at 13:32

## 2020-02-07 RX ADMIN — ACETAMINOPHEN 650 MG: 325 TABLET ORAL at 10:03

## 2020-02-07 RX ADMIN — SODIUM CHLORIDE 250 ML: 9 INJECTION, SOLUTION INTRAVENOUS at 09:53

## 2020-02-07 RX ADMIN — RITUXIMAB 700 MG: 10 INJECTION, SOLUTION INTRAVENOUS at 10:55

## 2020-02-07 ASSESSMENT — PAIN SCALES - GENERAL: PAINLEVEL: MILD PAIN (2)

## 2020-02-07 NOTE — LETTER
2/7/2020         RE: Kassie Ramirez  [address]  [facility] [address]        Dear Colleague,    Thank you for referring your patient, Kassie Ramirez, to the [facility]. Please see a copy of my visit note below.    [facility]  HEMATOLOGY AND ONCOLOGY    FOLLOW-UP VISIT NOTE    PATIENT NAME: Kassie Ramirez MRN # [id number]  DATE OF VISIT: Feb 7, 2020 YOB: 1970    REFERRING PROVIDER: No referring provider defined for this encounter.    CANCER TYPE: DLBCL, EBV+ non-GCB, stage III.  STAGE: III    TREATMENT SUMMARY:  DLBCL- stage III Non-GCB and EBV+. Large mediastinal mass causing respiratory compromise. . IPI score of 2 (low-intermediate). FISH neg for high risk translocations. Non-GCB overall confers poorer prognosis, but standard of care remains multiagent chemotherapy with curative intent. Plan 6 cycles of R-CHOP with interim PET scan after 2-3 cycles with neulasta support. She is intermediate risk for CNS recurrence by CNS-IPI score, but recommend CNS prophylaxis for non-GCB.  Started 1st cycle of R-CHOP on 11/29/19 well.     CURRENT INTERVENTIONS:  MR-CHOP    SUBJECTIVE   Kassie Ramirez is being followed for DLBCL, EBV+ non-GCB, stage III intermediate risk disease    Kassie is accompanied by her . She has completed 3 cycles of MR-CHOP and is being seen with labs and restaging scans.     She had a very hard time on high dose methotrexate during her admission and for days later on. She had no energy and slept for several days through.     She has substernal chest pain which has been present prior to this cancer diagnosis. Incidentally this has improved since yesterday.       PAST MEDICAL HISTORY     Past Medical History:   Diagnosis Date     Anxiety attack 9/16/2014     Diffuse large B-cell lymphoma (H)     Diagnosed 11/2019     Encounter for Essure implantation 2009     Generalized anxiety disorder 9/16/2014    zoloft = flat emotions      Menopausal disorder     started on OCPs by menopause center 3/2017 (takes active continuously)     Menstrual headache     helped by OCPs and magnesium     GERTRUDE (stress urinary incontinence, female)     sling procedure 2016     SVT (supraventricular tachycardia) (H)          CURRENT OUTPATIENT MEDICATIONS     Current Outpatient Medications   Medication Sig     acyclovir (ZOVIRAX) 400 MG tablet Take 1 tablet (400 mg) by mouth 2 times daily     dronabinol (MARINOL) 2.5 MG capsule Take 1 capsule (2.5 mg) by mouth 2 times daily (before meals)     famotidine (PEPCID) 20 MG tablet Take 1 tablet (20 mg) by mouth 2 times daily     lidocaine-prilocaine (EMLA) 2.5-2.5 % external cream Apply topically as needed for moderate pain     LORazepam (ATIVAN) 0.5 MG tablet Take 1-2 tablets (0.5-1 mg) by mouth every 6 hours as needed (Breakthrough Nausea / Vomiting)     ondansetron (ZOFRAN-ODT) 8 MG ODT tab Take 1 tablet (8 mg) by mouth every 8 hours as needed     potassium chloride ER (K-DUR/KLOR-CON M) 20 MEQ CR tablet Take 1 tablet (20 mEq) by mouth daily     prochlorperazine (COMPAZINE) 10 MG tablet Take 1 tablet (10 mg) by mouth every 6 hours as needed (Breakthrough Nausea/Vomiting)     SENNA PO Take 1 tablet by mouth as needed      sertraline (ZOLOFT) 25 MG tablet Start 25mg daily.  After one week, increase to 50mg daily.     No current facility-administered medications for this visit.         ALLERGIES      Allergies   Allergen Reactions     Cold & Flu [Cold Defense Fighter]      See pseudoephedrine     Seasonal Allergies      Sudafed Cold-Cough [Dayquil Liquicaps]      Pseudoephedrine Rash     Rash then skins peels off         REVIEW OF SYSTEMS   As above in the HPI, o/w complete 12-point ROS was negative.     PHYSICAL EXAM   BP (!) 131/92   Pulse 105   Temp 97  F (36.1  C) (Oral)   Resp 16   Wt 70.8 kg (156 lb)   SpO2 97%   BMI 24.43 kg/m     GEN: NAD  HEENT: PERRL, EOMI, no icterus, injection or pallor. Oropharynx is  clear.  LYMPHATICs: no cervical or supraclavicular lymphadenopathy; no other abn lymphadenopathy  PULMONARY: clear with good air entry bilaterally  CARDIOVASCULAR: regular, no murmurs, rubs, or gallops  GASTROINTESTINAL: soft, non-tender, non-distended, normal bowel sounds, no hepatosplenomegaly by percussion or palpation  MUSCULOSKELTAL: warm, well perfused, no edema  NEURO: awake, alert and oriented to time place and person, cranial nerves intact - II - XII, no focal neurologic deficits  SKIN: no rashes     LABORATORY AND IMAGING STUDIES     Recent Labs   Lab Test 02/07/20  0752 02/04/20  0919 01/29/20  0745 01/17/20  0840 01/12/20  1605 01/12/20  0617    139 140 139  --  143   POTASSIUM 4.1 4.3 3.1* 3.2* 3.8 2.9*   CHLORIDE 110* 108 108 105  --  106   CO2 24 26 26 26  --  31   ANIONGAP 7 5 6 8  --  6   BUN 9 12 9 18  --  11   CR 0.97 0.98 0.92 1.11*  --  0.73   GLC 95 101* 115* 103*  --  121*   AFSHAN 9.4 9.6 9.5 9.0  --  8.2*     Recent Labs   Lab Test 01/11/20  0615 12/01/19  1340 12/01/19  0641 11/30/19  2137 11/30/19  1341  11/27/19  2216 11/27/19  1605 09/19/19  0706 09/18/19  0845   MAG 2.0  --   --   --   --   --  2.1 2.5* 2.4* 2.2   PHOS 3.1 2.8 3.4 2.8 2.5   < > 3.1  --   --   --     < > = values in this interval not displayed.     Recent Labs   Lab Test 02/07/20  0752 02/04/20  0919 01/29/20  0745 01/17/20  0840 01/13/20  0640   WBC 4.5 5.3 10.6 5.7 5.0   HGB 8.7* 9.6* 6.1* 7.9* 7.3*   * 133* 165 110* 111*   MCV 95 92 96 92 95   NEUTROPHIL 71.0 60.0 77.0 74.7 87.0     Recent Labs   Lab Test 02/07/20  0752 02/04/20  0919 01/29/20  0745  01/11/20  1044  12/16/19  1743  11/29/19  0526   BILITOTAL 1.2 1.1 0.7   < >  --    < >  --    < > 1.2   ALKPHOS 73 77 75   < >  --    < >  --    < > 69   ALT 43 38 24   < >  --    < >  --    < > 25   AST 42 33 23   < >  --    < >  --    < > 22   ALBUMIN 3.8 3.9 3.5   < >  --    < >  --    < > 3.0*   LDH  --   --   --   --  347*  --  596*  --  416*    < > =  values in this interval not displayed.     TSH   Date Value Ref Range Status   09/18/2019 2.06 0.40 - 4.00 mU/L Final   07/27/2018 2.04 0.40 - 4.00 mU/L Final   09/16/2014 1.62 0.40 - 4.00 mU/L Final     Comment:     Effective 7/30/2014, the reference range for this assay has changed to reflect   new instrumentation/methodology.       No results for input(s): CEA in the last 31749 hours.  Results for orders placed or performed during the hospital encounter of 02/04/20   PET Oncology Whole Body    Narrative    PET/CT WHOLE BODYDIAGNOSTIC WITH IV CONTRAST February 4, 2020 11:22 AM      HISTORY: Diffuse large B-cell lymphoma, after three cycles of  chemotherapy to assess response. Diffuse large B-cell lymphoma of  extranodal site (H).    COMPARISON EXAMS: CT chest 12/16/2019, PET/CT 11/29/2019.  FAIRVIEW: None.  OTHER: None.    TECHNIQUE: 12.9 mCi of F-18 FDG were administered  intravenously.  Following the uneventful administration of 98 mL Isovue-370  intravenous contrast and oral contrast, a PET/CT scan was performed  through the head, neck, chest, abdomen, pelvis and legs. A diagnostic  CT scan was performed of the head, neck, chest, abdomen, pelvis and  legs. Coronal, sagittal and axial images were created and fused with  the contrast enhanced CT examination. Blood glucose: 104 mg/dL. The  CT, PET and fusion images are then evaluated on a IPXI  workstation. Radiation dose for this scan was reduced using automated  exposure control, adjustment of the mA and/or kV according to patient  size, or iterative reconstruction technique.    FINDINGS: Normal physiologic uptake is identified within the salivary  glands, myocardium, kidneys, ureters and bladder.  Scattered areas of  physiologic bowel uptake are also present.    Thoracic aorta: SUV max 2.8.  Liver: SUV max 4.5.    HEAD/NECK:  Lymph nodes: No pathologic activity.     Additional findings: None.    CHEST:  Lungs: Interval aeration of the previously collapsed  right upper lobe.  There is a newly identified flat nodular opacity at the posterior  right lower lobe measuring 2 x 0.6 cm series 5 image 237 (SUV max  3.5). Another new focal ground-glass nodular opacity lateral left  lower lobe is 1.2 x 1.2 cm series 5 image 217 (SUV max 1.9). The  remainder of the lungs shows no pathologic activity. Calcified  granuloma at the medial right upper lobe again identified.    Lymph nodes: Significant interval improvement demonstrated with  decreased sizes of multifocal conglomerate adenopathy previously  demonstrated throughout the mediastinum and hilar locations. For  example, conglomerate adenopathy at the subcarinal position is now 4.7  x 1 cm, previously approximately 8.2 x 3.9 cm series 5 image 189 (SUV  max 2.8, previously 38.1). Another example of improving adenopathy at  the right hilum measures 1.9 x 1.7 cm, previously 3.1 x 3 cm series 5  image 184 (SUV max 4.3, previously 33.2). No new hypermetabolic  adenopathy is identified. There are other smaller lymph nodes that  show some continued mildly elevated metabolism.    Additional findings: Right chest port venous catheter tip at the  cavoatrial region.    ABDOMEN/PELVIS:  Hepatobiliary: No pathologic activity. Hepatic hemangioma suggested  appearing stable medial right liver series 5 image 247.    Spleen: No pathologic activity. No focal splenic lesion. Splenomegaly  is 13.5 cm in AP dimension, previously 12.7 cm.    Pancreas: No pathologic activity. Normal.    Kidneys: No pathologic activity. Normal.    Adrenals: No pathologic activity. Normal.    Reproductive: No pathologic activity. Normal.    Gastrointestinal: No pathologic activity.    Lymph nodes: No pathologic activity. No enlarged lymph nodes are seen  in the abdomen or pelvis.    Additional findings: None.    LEGS: No pathologic activity.    SKELETON:   Marrow tracer uptake consistent with therapy limits detail assessment  for focal lesions.      Impression     IMPRESSION:  1. Significantly improved adenopathy throughout the mediastinum and  hilar locations. Previously noted areas of conglomerate adenopathy  mass is significantly smaller with decreased metabolism. Some of these  regions continue to show mild hypermetabolism in the chest.  2. No new areas of adenopathy.  3. Normal aeration of the right upper lobe.  4. Two new nodular areas seen at the right lower lobe and left lower  lobe that may relate to an infectious, inflammatory, or neoplastic  etiology. These show very mild hypermetabolism. Recommend surveillance  CT chest in three to six months.  5. Splenomegaly again noted.  6. No new adenopathy otherwise seen.    JOSUE HORTA MD          ASSESSMENT AND PLAN   DLBCL, EBV+ non-GCB, stage III  PJV pneumonia causing acute respiratory failure improved on bactrim and prednisone  Neutropenic fevers with first cycle or R-CHOP    Kassie is here with her . They wanted to know the percentage of improvement. I reviewed actual images from her PET-CT scan and we compared to previous images. She seems to have a near complete PET response, though enlarged lymph nodes persist.     She had a very difficult time with high dose methotrexate and was wondering if we could do it after the end of therapy. Ie explained them the study protocol which treated patients in the way she is being managed. I explained about dosing intensity being a critical component of chemotherapy. We will try to support her better with IV fluids, PRBC transfusions, medications to support her this time around.     She had a persistent chest pain which incidentally has improved yesterday. She had extensive cardiopulmonary work up done for her SOB and she does not have any obvious cardiac or residual pulmonary disease. She has been scheduled to have lower extremity US by Padmaja for her DVT/PE to explain her CP.     Her counts are adequate and we will proceed with chemotherapy as currently planned.     Over  45 min of direct face to face time spent with patient with more than 50% time spent in counseling and coordinating care.        Again, thank you for allowing me to participate in the care of your patient.        Sincerely,        Castro Castro MD

## 2020-02-07 NOTE — PROGRESS NOTES
Nursing Note:  Kassie Ramirez presents today for Port labs.    Patient seen by provider today: Yes: Dr Castro   present during visit today: Not Applicable.    Note: N/A.    Intravenous Access:  Labs drawn without difficulty.  Implanted Port.    Discharge Plan:   Patient was sent to lobby to wait for MD appointment.    Beatriz Herring RN

## 2020-02-07 NOTE — PROGRESS NOTES
Infusion Nursing Note:  Kassie Ramirez presents today for RCHOP and on-pro.    Patient seen by provider today: Yes: Dr. Castro   present during visit today: Not Applicable.    Note: patient going to ultrasound following in fusion to r/o blood clots in lower extremeties    Intravenous Access:  Implanted Port.    Treatment Conditions:  Lab Results   Component Value Date    HGB 8.7 02/07/2020     Lab Results   Component Value Date    WBC 4.5 02/07/2020      Lab Results   Component Value Date    ANEU 3.2 02/07/2020     Lab Results   Component Value Date     02/07/2020      Lab Results   Component Value Date     02/07/2020                   Lab Results   Component Value Date    POTASSIUM 4.1 02/07/2020           Lab Results   Component Value Date    MAG 2.0 01/11/2020            Lab Results   Component Value Date    CR 0.97 02/07/2020                   Lab Results   Component Value Date    AFSHAN 9.4 02/07/2020                Lab Results   Component Value Date    BILITOTAL 1.2 02/07/2020           Lab Results   Component Value Date    ALBUMIN 3.8 02/07/2020                    Lab Results   Component Value Date    ALT 43 02/07/2020           Lab Results   Component Value Date    AST 42 02/07/2020       Results reviewed, labs MET treatment parameters, ok to proceed with treatment.      Post Infusion Assessment:  Patient tolerated infusion without incident.  Blood return noted pre and post infusion.  Blood return noted per protocol during adriamyacin and vincristine administration.  Site patent and intact, free from redness, edema or discomfort.  No evidence of extravasations.  Access discontinued per protocol.  ONPRO  Was placed on patient's: back of right arm.    Was placed at 1330 PM    ONPRO injector device Lot number: [id number]    Patient education included: what patient can expect after application, what colored lights mean on the device, when to remove device, when and where to call with questions  or issues and all patients questions answered.    Patient tolerated administration well.  .   Discharge Plan:   Prescription refills given for prednisone,ativan, acyclovir and K-dur.  Copy of AVS reviewed with patient and/or family.  Patient will return 2/25 for IVF and  2/27 for next [facility] appointment.  Patient discharged in stable condition accompanied by: .  Departure Mode: Ambulatory.    Kassie Villarreal RN

## 2020-02-07 NOTE — PROGRESS NOTES
[facility]  HEMATOLOGY AND ONCOLOGY    FOLLOW-UP VISIT NOTE    PATIENT NAME: Kassie Ramirez MRN # [id number]  DATE OF VISIT: Feb 7, 2020 YOB: 1970    REFERRING PROVIDER: No referring provider defined for this encounter.    CANCER TYPE: DLBCL, EBV+ non-GCB, stage III.  STAGE: III    TREATMENT SUMMARY:  DLBCL- stage III Non-GCB and EBV+. Large mediastinal mass causing respiratory compromise. . IPI score of 2 (low-intermediate). FISH neg for high risk translocations. Non-GCB overall confers poorer prognosis, but standard of care remains multiagent chemotherapy with curative intent. Plan 6 cycles of R-CHOP with interim PET scan after 2-3 cycles with neulasta support. She is intermediate risk for CNS recurrence by CNS-IPI score, but recommend CNS prophylaxis for non-GCB.  Started 1st cycle of R-CHOP on 11/29/19 well.     CURRENT INTERVENTIONS:  MR-CHOP    SUBJECTIVE   Kassie Ramirez is being followed for DLBCL, EBV+ non-GCB, stage III intermediate risk disease    Kassie is accompanied by her . She has completed 3 cycles of MR-CHOP and is being seen with labs and restaging scans.     She had a very hard time on high dose methotrexate during her admission and for days later on. She had no energy and slept for several days through.     She has substernal chest pain which has been present prior to this cancer diagnosis. Incidentally this has improved since yesterday.       PAST MEDICAL HISTORY     Past Medical History:   Diagnosis Date     Anxiety attack 9/16/2014     Diffuse large B-cell lymphoma (H)     Diagnosed 11/2019     Encounter for Essure implantation 2009     Generalized anxiety disorder 9/16/2014    zoloft = flat emotions     Menopausal disorder     started on OCPs by menopause center 3/2017 (takes active continuously)     Menstrual headache     helped by OCPs and magnesium     GERTRUDE (stress urinary incontinence, female)     sling procedure 2016     SVT (supraventricular  tachycardia) (H)          CURRENT OUTPATIENT MEDICATIONS     Current Outpatient Medications   Medication Sig     acyclovir (ZOVIRAX) 400 MG tablet Take 1 tablet (400 mg) by mouth 2 times daily     dronabinol (MARINOL) 2.5 MG capsule Take 1 capsule (2.5 mg) by mouth 2 times daily (before meals)     famotidine (PEPCID) 20 MG tablet Take 1 tablet (20 mg) by mouth 2 times daily     lidocaine-prilocaine (EMLA) 2.5-2.5 % external cream Apply topically as needed for moderate pain     LORazepam (ATIVAN) 0.5 MG tablet Take 1-2 tablets (0.5-1 mg) by mouth every 6 hours as needed (Breakthrough Nausea / Vomiting)     ondansetron (ZOFRAN-ODT) 8 MG ODT tab Take 1 tablet (8 mg) by mouth every 8 hours as needed     potassium chloride ER (K-DUR/KLOR-CON M) 20 MEQ CR tablet Take 1 tablet (20 mEq) by mouth daily     prochlorperazine (COMPAZINE) 10 MG tablet Take 1 tablet (10 mg) by mouth every 6 hours as needed (Breakthrough Nausea/Vomiting)     SENNA PO Take 1 tablet by mouth as needed      sertraline (ZOLOFT) 25 MG tablet Start 25mg daily.  After one week, increase to 50mg daily.     No current facility-administered medications for this visit.         ALLERGIES      Allergies   Allergen Reactions     Cold & Flu [Cold Defense Fighter]      See pseudoephedrine     Seasonal Allergies      Sudafed Cold-Cough [Dayquil Liquicaps]      Pseudoephedrine Rash     Rash then skins peels off         REVIEW OF SYSTEMS   As above in the HPI, o/w complete 12-point ROS was negative.     PHYSICAL EXAM   BP (!) 131/92   Pulse 105   Temp 97  F (36.1  C) (Oral)   Resp 16   Wt 70.8 kg (156 lb)   SpO2 97%   BMI 24.43 kg/m    GEN: NAD  HEENT: PERRL, EOMI, no icterus, injection or pallor. Oropharynx is clear.  LYMPHATICs: no cervical or supraclavicular lymphadenopathy; no other abn lymphadenopathy  PULMONARY: clear with good air entry bilaterally  CARDIOVASCULAR: regular, no murmurs, rubs, or gallops  GASTROINTESTINAL: soft, non-tender, non-distended,  normal bowel sounds, no hepatosplenomegaly by percussion or palpation  MUSCULOSKELTAL: warm, well perfused, no edema  NEURO: awake, alert and oriented to time place and person, cranial nerves intact - II - XII, no focal neurologic deficits  SKIN: no rashes     LABORATORY AND IMAGING STUDIES     Recent Labs   Lab Test 02/07/20  0752 02/04/20  0919 01/29/20  0745 01/17/20  0840 01/12/20  1605 01/12/20  0617    139 140 139  --  143   POTASSIUM 4.1 4.3 3.1* 3.2* 3.8 2.9*   CHLORIDE 110* 108 108 105  --  106   CO2 24 26 26 26  --  31   ANIONGAP 7 5 6 8  --  6   BUN 9 12 9 18  --  11   CR 0.97 0.98 0.92 1.11*  --  0.73   GLC 95 101* 115* 103*  --  121*   AFSHAN 9.4 9.6 9.5 9.0  --  8.2*     Recent Labs   Lab Test 01/11/20  0615 12/01/19  1340 12/01/19  0641 11/30/19  2137 11/30/19  1341  11/27/19  2216 11/27/19  1605 09/19/19  0706 09/18/19  0845   MAG 2.0  --   --   --   --   --  2.1 2.5* 2.4* 2.2   PHOS 3.1 2.8 3.4 2.8 2.5   < > 3.1  --   --   --     < > = values in this interval not displayed.     Recent Labs   Lab Test 02/07/20  0752 02/04/20  0919 01/29/20  0745 01/17/20  0840 01/13/20  0640   WBC 4.5 5.3 10.6 5.7 5.0   HGB 8.7* 9.6* 6.1* 7.9* 7.3*   * 133* 165 110* 111*   MCV 95 92 96 92 95   NEUTROPHIL 71.0 60.0 77.0 74.7 87.0     Recent Labs   Lab Test 02/07/20  0752 02/04/20  0919 01/29/20  0745  01/11/20  1044  12/16/19  1743  11/29/19  0526   BILITOTAL 1.2 1.1 0.7   < >  --    < >  --    < > 1.2   ALKPHOS 73 77 75   < >  --    < >  --    < > 69   ALT 43 38 24   < >  --    < >  --    < > 25   AST 42 33 23   < >  --    < >  --    < > 22   ALBUMIN 3.8 3.9 3.5   < >  --    < >  --    < > 3.0*   LDH  --   --   --   --  347*  --  596*  --  416*    < > = values in this interval not displayed.     TSH   Date Value Ref Range Status   09/18/2019 2.06 0.40 - 4.00 mU/L Final   07/27/2018 2.04 0.40 - 4.00 mU/L Final   09/16/2014 1.62 0.40 - 4.00 mU/L Final     Comment:     Effective 7/30/2014, the reference range  for this assay has changed to reflect   new instrumentation/methodology.       No results for input(s): CEA in the last 87662 hours.  Results for orders placed or performed during the hospital encounter of 02/04/20   PET Oncology Whole Body    Narrative    PET/CT WHOLE BODYDIAGNOSTIC WITH IV CONTRAST February 4, 2020 11:22 AM      HISTORY: Diffuse large B-cell lymphoma, after three cycles of  chemotherapy to assess response. Diffuse large B-cell lymphoma of  extranodal site (H).    COMPARISON EXAMS: CT chest 12/16/2019, PET/CT 11/29/2019.  FAIRVIEW: None.  OTHER: None.    TECHNIQUE: 12.9 mCi of F-18 FDG were administered  intravenously.  Following the uneventful administration of 98 mL Isovue-370  intravenous contrast and oral contrast, a PET/CT scan was performed  through the head, neck, chest, abdomen, pelvis and legs. A diagnostic  CT scan was performed of the head, neck, chest, abdomen, pelvis and  legs. Coronal, sagittal and axial images were created and fused with  the contrast enhanced CT examination. Blood glucose: 104 mg/dL. The  CT, PET and fusion images are then evaluated on a [employer]  workstation. Radiation dose for this scan was reduced using automated  exposure control, adjustment of the mA and/or kV according to patient  size, or iterative reconstruction technique.    FINDINGS: Normal physiologic uptake is identified within the salivary  glands, myocardium, kidneys, ureters and bladder.  Scattered areas of  physiologic bowel uptake are also present.    Thoracic aorta: SUV max 2.8.  Liver: SUV max 4.5.    HEAD/NECK:  Lymph nodes: No pathologic activity.     Additional findings: None.    CHEST:  Lungs: Interval aeration of the previously collapsed right upper lobe.  There is a newly identified flat nodular opacity at the posterior  right lower lobe measuring 2 x 0.6 cm series 5 image 237 (SUV max  3.5). Another new focal ground-glass nodular opacity lateral left  lower lobe is 1.2 x 1.2 cm series 5  image 217 (SUV max 1.9). The  remainder of the lungs shows no pathologic activity. Calcified  granuloma at the medial right upper lobe again identified.    Lymph nodes: Significant interval improvement demonstrated with  decreased sizes of multifocal conglomerate adenopathy previously  demonstrated throughout the mediastinum and hilar locations. For  example, conglomerate adenopathy at the subcarinal position is now 4.7  x 1 cm, previously approximately 8.2 x 3.9 cm series 5 image 189 (SUV  max 2.8, previously 38.1). Another example of improving adenopathy at  the right hilum measures 1.9 x 1.7 cm, previously 3.1 x 3 cm series 5  image 184 (SUV max 4.3, previously 33.2). No new hypermetabolic  adenopathy is identified. There are other smaller lymph nodes that  show some continued mildly elevated metabolism.    Additional findings: Right chest port venous catheter tip at the  cavoatrial region.    ABDOMEN/PELVIS:  Hepatobiliary: No pathologic activity. Hepatic hemangioma suggested  appearing stable medial right liver series 5 image 247.    Spleen: No pathologic activity. No focal splenic lesion. Splenomegaly  is 13.5 cm in AP dimension, previously 12.7 cm.    Pancreas: No pathologic activity. Normal.    Kidneys: No pathologic activity. Normal.    Adrenals: No pathologic activity. Normal.    Reproductive: No pathologic activity. Normal.    Gastrointestinal: No pathologic activity.    Lymph nodes: No pathologic activity. No enlarged lymph nodes are seen  in the abdomen or pelvis.    Additional findings: None.    LEGS: No pathologic activity.    SKELETON:   Marrow tracer uptake consistent with therapy limits detail assessment  for focal lesions.      Impression    IMPRESSION:  1. Significantly improved adenopathy throughout the mediastinum and  hilar locations. Previously noted areas of conglomerate adenopathy  mass is significantly smaller with decreased metabolism. Some of these  regions continue to show mild  hypermetabolism in the chest.  2. No new areas of adenopathy.  3. Normal aeration of the right upper lobe.  4. Two new nodular areas seen at the right lower lobe and left lower  lobe that may relate to an infectious, inflammatory, or neoplastic  etiology. These show very mild hypermetabolism. Recommend surveillance  CT chest in three to six months.  5. Splenomegaly again noted.  6. No new adenopathy otherwise seen.    JOSUE HORTA MD          ASSESSMENT AND PLAN   DLBCL, EBV+ non-GCB, stage III  PJV pneumonia causing acute respiratory failure improved on bactrim and prednisone  Neutropenic fevers with first cycle or R-CHOP    Kassie is here with her . They wanted to know the percentage of improvement. I reviewed actual images from her PET-CT scan and we compared to previous images. She seems to have a near complete PET response, though enlarged lymph nodes persist.     She had a very difficult time with high dose methotrexate and was wondering if we could do it after the end of therapy. Ie explained them the study protocol which treated patients in the way she is being managed. I explained about dosing intensity being a critical component of chemotherapy. We will try to support her better with IV fluids, PRBC transfusions, medications to support her this time around.     She had a persistent chest pain which incidentally has improved yesterday. She had extensive cardiopulmonary work up done for her SOB and she does not have any obvious cardiac or residual pulmonary disease. She has been scheduled to have lower extremity US by Padmaja for her DVT/PE to explain her CP.     Her counts are adequate and we will proceed with chemotherapy as currently planned.     Over 45 min of direct face to face time spent with patient with more than 50% time spent in counseling and coordinating care.

## 2020-02-07 NOTE — NURSING NOTE
"Oncology Rooming Note    February 7, 2020 8:17 AM   Kassie Ramirez is a 49 year old female who presents for:    Chief Complaint   Patient presents with     Oncology Clinic Visit     Diffuse large B-cell lymphoma of extranodal site (H) - per preliminary pathology from [facility] 11/27/19 - mediastinal mass wrapped around azalea and bilateral main stem bronchi      Initial Vitals: BP (!) 131/92   Pulse 105   Temp 97  F (36.1  C) (Oral)   Resp 16   Wt 70.8 kg (156 lb)   SpO2 97%   BMI 24.43 kg/m   Estimated body mass index is 24.43 kg/m  as calculated from the following:    Height as of 1/10/20: 1.702 m (5' 7.01\").    Weight as of this encounter: 70.8 kg (156 lb). Body surface area is 1.83 meters squared.  Mild Pain (2) Comment: Data Unavailable   No LMP recorded.  Allergies reviewed: Yes  Medications reviewed: Yes    Medications: Medication refills not needed today.  Pharmacy name entered into Monthlys:    [facility] - [place], [place] - [address] [place] [address] DRUG STORE #[id number] [facility] [place] [address] AT [place] [address] & [facility]    Clinical concerns: f/u       Lynne Webster CMA              "

## 2020-02-18 ENCOUNTER — PATIENT OUTREACH (OUTPATIENT)
Dept: ONCOLOGY | Facility: CLINIC | Age: 50
End: 2020-02-18

## 2020-02-18 NOTE — PROGRESS NOTES
"S-(situation): Pt called to ask about 24 hour urine collection prior to In-Pt admission for High Dose Methotrexate.     B-(background): Diffuse Large B Cell Lymphoma. Pt receiving R-Chop with High dose methotrexate of cycles 2, 4 and 6. Cycle 4 days 14-17 due on 2/21-2/23/2020 In-Pt at [facility].    A-(assessment): This RNCC contacted admissions [facility] for pre-admit arranged with Nursing House Supervisor 2/21-2/23/2020. Admissions informed this RNCC to contact admissions Thursday morning 2/20/2020 for Hospitalist and Medical Oncologist Castro Castro MD for Provider to Provider pre-admission consultation. Nursing House Supervisor will hold bed on 5th floor. Pt is to report to front Welcome desk at [facility] at 8:30 am on 2/21/2020.      R-(recommendations): RNCC instructed Pt no need for 24 hour urine prior to HD methotrexate of cycles 4 or 6, only urine testing during In-Pt treatment as outlined in treatment plan for discharge planning. Pt verbalizes understanding. Writer informed Pt of plan, arrival time at [facility] for admission. Pt agrees with no further questions or concerns at this poiint in time. Pt encouraged to contact this RNCC if any arise in the interim.      Return call from Cristy at admissions. Provider to Provider report has been completed. The Hospitalist accepted Pt for In-Pt high dose \"methotrexate as scheduled.     RNCC contacted Pt to confirm admission tomorrow morning as previously planned. Pt verbalizes understanding and will report to admit desk as scheduled. No further questions or concerns.     Erin Hernandez, CARLOSN, RN, OCN  RN Cancer Care Coordinator  [facility]  [phone]      "

## 2020-02-19 DIAGNOSIS — C83.398 DIFFUSE LARGE B-CELL LYMPHOMA OF EXTRANODAL SITE: Primary | ICD-10-CM

## 2020-02-19 RX ORDER — LORAZEPAM 0.5 MG/1
.5-1 TABLET ORAL EVERY 6 HOURS PRN
Qty: 30 TABLET | Refills: 0 | Status: SHIPPED | OUTPATIENT
Start: 2020-02-19 | End: 2020-06-28

## 2020-02-19 NOTE — TELEPHONE ENCOUNTER
Pending Prescriptions:                       Disp   Refills    LORazepam 0.5 MG PO tablet                30 tab*0            Sig: Take 1-2 tablets (0.5-1 mg) by mouth every 6           hours as needed (Breakthrough Nausea / Vomiting)          Last Written Prescription Date:  01/17/2020  Last Fill Quantity: 30,   # refills: 0  Last Office Visit: 02/07/20220  Future Office visit:    Next 5 appointments (look out 90 days)    Feb 27, 2020  8:00 AM CST  Return Visit with Padmaja Sharpe PA-C  [facility] [address] [place] [address] [address]  [facility] [id number]  [phone]   Mar 13, 2020 10:40 AM CDT  Return Visit with Shawnee Parada MD  [facility] [address] [place] [address] [address]  [facility] [address]  [phone]           Routing refill request to provider    Maru Kim RN, BSN, MAOM  Patient Care Coordinator  [facility]  [phone]

## 2020-02-19 NOTE — TELEPHONE ENCOUNTER
Signed Prescriptions:                        Disp   Refills    LORazepam 0.5 MG PO tablet                 30 tab*0        Sig: Take 1-2 tablets (0.5-1 mg) by mouth every 6 hours as           needed (Breakthrough Nausea / Vomiting)  Authorizing Provider: JOSE RICE, RN, BSN, [facility]  Patient Care Coordinator  [facility]  [phone]

## 2020-02-21 ENCOUNTER — HOSPITAL ENCOUNTER (INPATIENT)
Facility: CLINIC | Age: 50
LOS: 3 days | Discharge: HOME | End: 2020-02-24
Attending: INTERNAL MEDICINE | Admitting: INTERNAL MEDICINE
Payer: COMMERCIAL

## 2020-02-21 DIAGNOSIS — C83.398 DIFFUSE LARGE B-CELL LYMPHOMA OF EXTRANODAL SITE: ICD-10-CM

## 2020-02-21 DIAGNOSIS — I47.10 SVT (SUPRAVENTRICULAR TACHYCARDIA) (H): ICD-10-CM

## 2020-02-21 DIAGNOSIS — I50.33 ACUTE ON CHRONIC HEART FAILURE WITH PRESERVED EJECTION FRACTION (HFPEF) (H): Primary | ICD-10-CM

## 2020-02-21 DIAGNOSIS — C83.30 DIFFUSE LARGE B-CELL LYMPHOMA, UNSPECIFIED BODY REGION (H): ICD-10-CM

## 2020-02-21 LAB
ALBUMIN SERPL-MCNC: 3.6 G/DL (ref 3.4–5)
ALP SERPL-CCNC: 78 U/L (ref 40–150)
ALT SERPL W P-5'-P-CCNC: 25 U/L (ref 0–50)
ANION GAP SERPL CALCULATED.3IONS-SCNC: 3 MMOL/L (ref 3–14)
ANISOCYTOSIS BLD QL SMEAR: ABNORMAL
AST SERPL W P-5'-P-CCNC: 21 U/L (ref 0–45)
BASOPHILS # BLD AUTO: 0.1 10E9/L (ref 0–0.2)
BASOPHILS NFR BLD AUTO: 2 %
BILIRUB SERPL-MCNC: 0.9 MG/DL (ref 0.2–1.3)
BUN SERPL-MCNC: 8 MG/DL (ref 7–30)
CALCIUM SERPL-MCNC: 9.4 MG/DL (ref 8.5–10.1)
CHLORIDE SERPL-SCNC: 110 MMOL/L (ref 94–109)
CO2 SERPL-SCNC: 27 MMOL/L (ref 20–32)
CREAT SERPL-MCNC: 0.92 MG/DL (ref 0.52–1.04)
DACRYOCYTES BLD QL SMEAR: SLIGHT
DIFFERENTIAL METHOD BLD: ABNORMAL
EOSINOPHIL # BLD AUTO: 0.1 10E9/L (ref 0–0.7)
EOSINOPHIL NFR BLD AUTO: 2 %
ERYTHROCYTE [DISTWIDTH] IN BLOOD BY AUTOMATED COUNT: 20.5 % (ref 10–15)
GFR SERPL CREATININE-BSD FRML MDRD: 72 ML/MIN/{1.73_M2}
GLUCOSE SERPL-MCNC: 99 MG/DL (ref 70–99)
HCT VFR BLD AUTO: 22 % (ref 35–47)
HGB BLD-MCNC: 7.2 G/DL (ref 11.7–15.7)
LYMPHOCYTES # BLD AUTO: 0.4 10E9/L (ref 0.8–5.3)
LYMPHOCYTES NFR BLD AUTO: 8 %
MACROCYTES BLD QL SMEAR: PRESENT
MCH RBC QN AUTO: 31.6 PG (ref 26.5–33)
MCHC RBC AUTO-ENTMCNC: 32.7 G/DL (ref 31.5–36.5)
MCV RBC AUTO: 97 FL (ref 78–100)
METAMYELOCYTES # BLD: 0.2 10E9/L
METAMYELOCYTES NFR BLD MANUAL: 4 %
MICROCYTES BLD QL SMEAR: PRESENT
MONOCYTES # BLD AUTO: 0.3 10E9/L (ref 0–1.3)
MONOCYTES NFR BLD AUTO: 5 %
NEUTROPHILS # BLD AUTO: 4.2 10E9/L (ref 1.6–8.3)
NEUTROPHILS NFR BLD AUTO: 79 %
OVALOCYTES BLD QL SMEAR: SLIGHT
PH UR STRIP: 7 PH (ref 5–7)
PH UR STRIP: 8 PH (ref 5–7)
PH UR STRIP: 8 PH (ref 5–7)
PLATELET # BLD AUTO: 109 10E9/L (ref 150–450)
PLATELET # BLD EST: ABNORMAL 10*3/UL
POTASSIUM SERPL-SCNC: 3.9 MMOL/L (ref 3.4–5.3)
PROT SERPL-MCNC: 6.1 G/DL (ref 6.8–8.8)
RBC # BLD AUTO: 2.28 10E12/L (ref 3.8–5.2)
SODIUM SERPL-SCNC: 140 MMOL/L (ref 133–144)
WBC # BLD AUTO: 5.3 10E9/L (ref 4–11)

## 2020-02-21 PROCEDURE — 25000131 ZZH RX MED GY IP 250 OP 636 PS 637: Performed by: INTERNAL MEDICINE

## 2020-02-21 PROCEDURE — 25800030 ZZH RX IP 258 OP 636: Performed by: INTERNAL MEDICINE

## 2020-02-21 PROCEDURE — 25800029 ZZH RX IP 258 OP 250: Performed by: INTERNAL MEDICINE

## 2020-02-21 PROCEDURE — 99222 1ST HOSP IP/OBS MODERATE 55: CPT | Mod: AI | Performed by: PHYSICIAN ASSISTANT

## 2020-02-21 PROCEDURE — 25000132 ZZH RX MED GY IP 250 OP 250 PS 637: Performed by: INTERNAL MEDICINE

## 2020-02-21 PROCEDURE — 80053 COMPREHEN METABOLIC PANEL: CPT | Performed by: PHYSICIAN ASSISTANT

## 2020-02-21 PROCEDURE — 25000125 ZZHC RX 250: Performed by: INTERNAL MEDICINE

## 2020-02-21 PROCEDURE — 99222 1ST HOSP IP/OBS MODERATE 55: CPT | Performed by: INTERNAL MEDICINE

## 2020-02-21 PROCEDURE — 12000000 ZZH R&B MED SURG/OB

## 2020-02-21 PROCEDURE — 3E03305 INTRODUCTION OF OTHER ANTINEOPLASTIC INTO PERIPHERAL VEIN, PERCUTANEOUS APPROACH: ICD-10-PCS | Performed by: INTERNAL MEDICINE

## 2020-02-21 PROCEDURE — 85025 COMPLETE CBC W/AUTO DIFF WBC: CPT | Performed by: PHYSICIAN ASSISTANT

## 2020-02-21 PROCEDURE — 25000128 H RX IP 250 OP 636: Performed by: INTERNAL MEDICINE

## 2020-02-21 PROCEDURE — 25000132 ZZH RX MED GY IP 250 OP 250 PS 637: Performed by: PHYSICIAN ASSISTANT

## 2020-02-21 PROCEDURE — 81003 URINALYSIS AUTO W/O SCOPE: CPT | Performed by: INTERNAL MEDICINE

## 2020-02-21 PROCEDURE — 25000125 ZZHC RX 250

## 2020-02-21 RX ORDER — DIPHENHYDRAMINE HYDROCHLORIDE 50 MG/ML
50 INJECTION INTRAMUSCULAR; INTRAVENOUS
Status: DISCONTINUED | OUTPATIENT
Start: 2020-02-21 | End: 2020-02-24 | Stop reason: HOSPADM

## 2020-02-21 RX ORDER — MEPERIDINE HYDROCHLORIDE 25 MG/ML
25 INJECTION INTRAMUSCULAR; INTRAVENOUS; SUBCUTANEOUS EVERY 30 MIN PRN
Status: DISCONTINUED | OUTPATIENT
Start: 2020-02-21 | End: 2020-02-24 | Stop reason: HOSPADM

## 2020-02-21 RX ORDER — ATOVAQUONE 750 MG/5ML
1500 SUSPENSION ORAL DAILY
Status: ON HOLD | COMMUNITY
End: 2020-02-24

## 2020-02-21 RX ORDER — LEUCOVORIN CALCIUM 350 MG/17.5ML
25 INJECTION, POWDER, LYOPHILIZED, FOR SOLUTION INTRAMUSCULAR; INTRAVENOUS EVERY 6 HOURS
Status: DISCONTINUED | OUTPATIENT
Start: 2020-02-22 | End: 2020-02-24 | Stop reason: HOSPADM

## 2020-02-21 RX ORDER — HEPARIN SODIUM,PORCINE 10 UNIT/ML
5-10 VIAL (ML) INTRAVENOUS EVERY 24 HOURS
Status: DISCONTINUED | OUTPATIENT
Start: 2020-02-21 | End: 2020-02-24 | Stop reason: HOSPADM

## 2020-02-21 RX ORDER — PROCHLORPERAZINE MALEATE 5 MG
10 TABLET ORAL EVERY 6 HOURS PRN
Status: DISCONTINUED | OUTPATIENT
Start: 2020-02-21 | End: 2020-02-21

## 2020-02-21 RX ORDER — SODIUM BICARBONATE 84 MG/ML
50 INJECTION, SOLUTION INTRAVENOUS
Status: DISCONTINUED | OUTPATIENT
Start: 2020-02-21 | End: 2020-02-24 | Stop reason: HOSPADM

## 2020-02-21 RX ORDER — LORAZEPAM 0.5 MG/1
.5-1 TABLET ORAL EVERY 6 HOURS PRN
Status: DISCONTINUED | OUTPATIENT
Start: 2020-02-21 | End: 2020-02-21

## 2020-02-21 RX ORDER — METHYLPREDNISOLONE SODIUM SUCCINATE 125 MG/2ML
125 INJECTION, POWDER, LYOPHILIZED, FOR SOLUTION INTRAMUSCULAR; INTRAVENOUS
Status: DISCONTINUED | OUTPATIENT
Start: 2020-02-21 | End: 2020-02-24 | Stop reason: HOSPADM

## 2020-02-21 RX ORDER — POTASSIUM CHLORIDE 1500 MG/1
40 TABLET, EXTENDED RELEASE ORAL DAILY
Status: DISCONTINUED | OUTPATIENT
Start: 2020-02-22 | End: 2020-02-24 | Stop reason: HOSPADM

## 2020-02-21 RX ORDER — LIDOCAINE 40 MG/G
CREAM TOPICAL
Status: COMPLETED
Start: 2020-02-21 | End: 2020-02-21

## 2020-02-21 RX ORDER — DEXAMETHASONE 4 MG/1
12 TABLET ORAL ONCE
Status: COMPLETED | OUTPATIENT
Start: 2020-02-21 | End: 2020-02-21

## 2020-02-21 RX ORDER — ONDANSETRON 4 MG/1
16 TABLET, ORALLY DISINTEGRATING ORAL ONCE
Status: COMPLETED | OUTPATIENT
Start: 2020-02-21 | End: 2020-02-21

## 2020-02-21 RX ORDER — ALBUTEROL SULFATE 90 UG/1
1-2 AEROSOL, METERED RESPIRATORY (INHALATION)
Status: DISCONTINUED | OUTPATIENT
Start: 2020-02-21 | End: 2020-02-24 | Stop reason: HOSPADM

## 2020-02-21 RX ORDER — HEPARIN SODIUM,PORCINE 10 UNIT/ML
5-10 VIAL (ML) INTRAVENOUS
Status: DISCONTINUED | OUTPATIENT
Start: 2020-02-21 | End: 2020-02-24 | Stop reason: HOSPADM

## 2020-02-21 RX ORDER — LIDOCAINE 40 MG/G
CREAM TOPICAL
Status: DISCONTINUED | OUTPATIENT
Start: 2020-02-21 | End: 2020-02-24 | Stop reason: HOSPADM

## 2020-02-21 RX ORDER — EPINEPHRINE 1 MG/ML
0.3 INJECTION, SOLUTION, CONCENTRATE INTRAVENOUS EVERY 5 MIN PRN
Status: DISCONTINUED | OUTPATIENT
Start: 2020-02-21 | End: 2020-02-24 | Stop reason: HOSPADM

## 2020-02-21 RX ORDER — ACYCLOVIR 400 MG/1
400 TABLET ORAL 2 TIMES DAILY
Status: DISCONTINUED | OUTPATIENT
Start: 2020-02-21 | End: 2020-02-24 | Stop reason: HOSPADM

## 2020-02-21 RX ORDER — DRONABINOL 2.5 MG/1
2.5 CAPSULE ORAL
Status: DISCONTINUED | OUTPATIENT
Start: 2020-02-21 | End: 2020-02-24 | Stop reason: HOSPADM

## 2020-02-21 RX ORDER — NALOXONE HYDROCHLORIDE 0.4 MG/ML
.1-.4 INJECTION, SOLUTION INTRAMUSCULAR; INTRAVENOUS; SUBCUTANEOUS
Status: DISCONTINUED | OUTPATIENT
Start: 2020-02-21 | End: 2020-02-24 | Stop reason: HOSPADM

## 2020-02-21 RX ORDER — LIDOCAINE 40 MG/G
CREAM TOPICAL
Status: DISCONTINUED | OUTPATIENT
Start: 2020-02-21 | End: 2020-02-22

## 2020-02-21 RX ORDER — FAMOTIDINE 20 MG/1
20 TABLET, FILM COATED ORAL 2 TIMES DAILY PRN
Status: ON HOLD | COMMUNITY
End: 2020-06-14

## 2020-02-21 RX ORDER — ALBUTEROL SULFATE 0.83 MG/ML
2.5 SOLUTION RESPIRATORY (INHALATION)
Status: DISCONTINUED | OUTPATIENT
Start: 2020-02-21 | End: 2020-02-24 | Stop reason: HOSPADM

## 2020-02-21 RX ORDER — SODIUM CHLORIDE 9 MG/ML
1000 INJECTION, SOLUTION INTRAVENOUS CONTINUOUS PRN
Status: DISCONTINUED | OUTPATIENT
Start: 2020-02-21 | End: 2020-02-24 | Stop reason: HOSPADM

## 2020-02-21 RX ORDER — LORAZEPAM 0.5 MG/1
.5-1 TABLET ORAL EVERY 6 HOURS PRN
Status: DISCONTINUED | OUTPATIENT
Start: 2020-02-21 | End: 2020-02-24 | Stop reason: HOSPADM

## 2020-02-21 RX ORDER — ONDANSETRON 4 MG/1
8 TABLET, ORALLY DISINTEGRATING ORAL EVERY 8 HOURS PRN
Status: DISCONTINUED | OUTPATIENT
Start: 2020-02-21 | End: 2020-02-23

## 2020-02-21 RX ORDER — DEXAMETHASONE 4 MG/1
8 TABLET ORAL DAILY
Status: COMPLETED | OUTPATIENT
Start: 2020-02-22 | End: 2020-02-23

## 2020-02-21 RX ORDER — NALOXONE HYDROCHLORIDE 0.4 MG/ML
.1-.4 INJECTION, SOLUTION INTRAMUSCULAR; INTRAVENOUS; SUBCUTANEOUS
Status: DISCONTINUED | OUTPATIENT
Start: 2020-02-21 | End: 2020-02-22

## 2020-02-21 RX ORDER — LEUCOVORIN CALCIUM 350 MG/17.5ML
50 INJECTION, POWDER, LYOPHILIZED, FOR SOLUTION INTRAMUSCULAR; INTRAVENOUS ONCE
Status: COMPLETED | OUTPATIENT
Start: 2020-02-22 | End: 2020-02-22

## 2020-02-21 RX ORDER — LORAZEPAM 2 MG/ML
.5-1 INJECTION INTRAMUSCULAR EVERY 6 HOURS PRN
Status: DISCONTINUED | OUTPATIENT
Start: 2020-02-21 | End: 2020-02-24 | Stop reason: HOSPADM

## 2020-02-21 RX ORDER — HEPARIN SODIUM (PORCINE) LOCK FLUSH IV SOLN 100 UNIT/ML 100 UNIT/ML
5 SOLUTION INTRAVENOUS
Status: DISCONTINUED | OUTPATIENT
Start: 2020-02-21 | End: 2020-02-24 | Stop reason: HOSPADM

## 2020-02-21 RX ORDER — PROCHLORPERAZINE MALEATE 5 MG
10 TABLET ORAL EVERY 6 HOURS PRN
Status: DISCONTINUED | OUTPATIENT
Start: 2020-02-21 | End: 2020-02-23

## 2020-02-21 RX ADMIN — DEXTRAN 70 AND HYPROMELLOSE 2910 1 DROP: 1; 3 SOLUTION/ DROPS OPHTHALMIC at 13:18

## 2020-02-21 RX ADMIN — ONDANSETRON 16 MG: 4 TABLET, ORALLY DISINTEGRATING ORAL at 13:56

## 2020-02-21 RX ADMIN — SODIUM BICARBONATE: 84 INJECTION, SOLUTION INTRAVENOUS at 19:58

## 2020-02-21 RX ADMIN — METHOTREXATE 6.37 G: 25 INJECTION, SOLUTION INTRA-ARTERIAL; INTRAMUSCULAR; INTRATHECAL; INTRAVENOUS at 16:31

## 2020-02-21 RX ADMIN — DEXAMETHASONE 12 MG: 4 TABLET ORAL at 13:55

## 2020-02-21 RX ADMIN — LIDOCAINE: 40 CREAM TOPICAL at 09:35

## 2020-02-21 RX ADMIN — SODIUM BICARBONATE: 84 INJECTION, SOLUTION INTRAVENOUS at 10:13

## 2020-02-21 RX ADMIN — ACYCLOVIR 400 MG: 400 TABLET ORAL at 19:59

## 2020-02-21 ASSESSMENT — ACTIVITIES OF DAILY LIVING (ADL)
ADLS_ACUITY_SCORE: 12

## 2020-02-21 ASSESSMENT — MIFFLIN-ST. JEOR: SCORE: 1343.92

## 2020-02-21 NOTE — CONSULTS
CTS identifies patient as high risk due to very high KAIN score. Currently admitted for diffuse large B-cell lymphoma, this is her 6th admission in 6 months. She has had 1 ED-only visit in 6 months. Per chart review, pt resides at home with her , Florian. Baseline mobility is not known.    Her PTA medications that can effect her KAIN score include Lorazepam. Her PMH that can effect her KAIN score include generalized anxiety disorder.      Will follow along with SW for DC planning. She has an appointment with Padmaja Sharpe on 2/27/20 at 8am.     Cristine Almanzar RN, BSN, CPHN, CTS  Inpatient Care Coordinator  [facility]  [phone]
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
## 2020-02-21 NOTE — H&P
[facility] Internal Medicine  History and Physical      Patient Name: Kassie Ramirez MRN# [id number]   Age: 49 year old YOB: 1970     Date of Admission:2/21/2020    Primary care provider: Mary Alice Colón  Date of Service: 2/21/2020         Assessment and Plan:   Kassie Ramirez is a 49 year old female with a history of Anxiety, PJV Pneumonia, Diffuse Large B-Cell Lymphoma (DLBCL) who presents as a direct admission at the request of Dr. Castro with Oncology for High Dose Methotrexate.    DLBCL, EBV+ non-GCB Stage III - initially diagnosed 11/2019.  Patient follows with Dr. Castro with [place] [facility].  She has completed cycle 4 of R-CHOP on 2/7/20 and has tolerated it well.  She has intermediate risk disease for CNS recurrence and is currently admitted for her second infusion high-dose methotrexate for CNS prophylaxis.  - Oncology consulted and have placed orders for methotrexate, leucovorin and decadron    Anemia/Thrombocytopenia - 2/2 chemotherapy.  Recheck cbc now.    Hx PJV Pneumonia - Hospitalized at [facility] from 12/18-12/24 with acute hypoxic respiratory failure and sepsis due to PJV pneumonia.  Patient not currently on steroids pta.  She was on Atovaqone in the past for prophylaxis.    - will defer to Oncology if PJV ppx needs to be resumed.    Anxiety - resume pta Sertraline    Awaiting formal pharmacy med rec at this time.    CODE: Full  Diet/IVF: regular  DVT ppx: scd    Patient discussed with Dr. German Zuñiga MS PA-C  Physician Assistant   Hospitalist Service  Pager: [phone]           Chief Complaint:   Fatigue         HPI:   49 year old female with a history of Anxiety, PJV Pneumonia, Diffuse Large B-Cell Lymphoma (DLBCL) who presents as a direct admission at the request of Dr. Castro with Oncology for High Dose Methotrexate.    Patient reports generalized fatigue, dry and watery eyes without changes in vision.  She has noticed some mild bilateral feet  neuropathy.  She denies chest pain, shortness of breath, abdominal pain, emesis, constipation, diarrhea, dysuria, fever, chills, rashes, recent illness.  Her last dose of chemotherapy was 2 weeks ago.           Past Medical History:     Past Medical History:   Diagnosis Date     Anxiety attack 9/16/2014     Diffuse large B-cell lymphoma (H)     Diagnosed 11/2019     Encounter for Essure implantation 2009     Generalized anxiety disorder 9/16/2014    zoloft = flat emotions     Menopausal disorder     started on OCPs by menopause center 3/2017 (takes active continuously)     Menstrual headache     helped by OCPs and magnesium     GERTRUDE (stress urinary incontinence, female)     sling procedure 2016     SVT (supraventricular tachycardia) (H)           Past Surgical History:     Past Surgical History:   Procedure Laterality Date     H KIT ESSURE  2009    essure - Dr. 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     Lifestyle     Physical activity:     Days per week: Not on file     Minutes per session: Not on file     Stress: Not on file   Relationships     Social connections:     Talks on phone: Not on file     Gets together: Not on file     Attends [employer] service: Not on file     Active member of club or organization: Not on file     Attends meetings of clubs or organizations: Not on file     Relationship status: Not on file     Intimate partner violence:     Fear of current or ex partner: Not on file     Emotionally abused: Not on file     Physically abused: Not on file     Forced sexual activity: Not on file   Other Topics Concern     Parent/sibling w/ CABG, MI or angioplasty before 65F 55M? No      Service Not Asked     Blood Transfusions Not Asked     Caffeine Concern Yes     Comment: MOnster energy drink.   Te - 3 cups.        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            Family History:     Family History   Problem Relation Age of Onset     Hypertension Mother      Depression Mother      Lipids Mother      Cardiovascular Mother      Circulatory Mother      Diabetes Mother      Heart Disease Mother      Cerebrovascular Disease Mother      Obesity Mother      C.A.D. Mother      Lung Cancer Mother         smoker     Eye Disorder Father         cone dystrophy     Macular Degeneration Father      Diabetes Maternal Aunt         type 2     Hypertension Maternal Aunt      Heart Disease Maternal Grandfather      Heart Disease Sister         high cholesterol       Macular Degeneration Sister           Allergies:      Allergies   Allergen  "Reactions     Cold & Flu [Cold Defense Fighter]      See pseudoephedrine     Seasonal Allergies      Sudafed Cold-Cough [Dayquil Liquicaps]      Pseudoephedrine Rash     Rash then skins peels off           Medications:     Prior to Admission medications    Medication Sig Last Dose Taking? Auth Provider   acyclovir (ZOVIRAX) 400 MG tablet Take 1 tablet (400 mg) by mouth 2 times daily   Castro Castro MD   dronabinol (MARINOL) 2.5 MG capsule Take 1 capsule (2.5 mg) by mouth 2 times daily (before meals)   Shawnee Jimenez MD   famotidine (PEPCID) 20 MG tablet Take 1 tablet (20 mg) by mouth 2 times daily   Marty Quintero MD   lidocaine-prilocaine (EMLA) 2.5-2.5 % external cream Apply topically as needed for moderate pain   Padmaja Stevens PA-C   LORazepam 0.5 MG PO tablet Take 1-2 tablets (0.5-1 mg) by mouth every 6 hours as needed (Breakthrough Nausea / Vomiting)   Padmaja Stveens PA-C   ondansetron (ZOFRAN-ODT) 8 MG ODT tab Take 1 tablet (8 mg) by mouth every 8 hours as needed   Castro Castro MD   potassium chloride ER (K-DUR/KLOR-CON M) 20 MEQ CR tablet Take 2 tablets (40 mEq) by mouth daily   Castro Castro MD   prochlorperazine (COMPAZINE) 10 MG tablet Take 1 tablet (10 mg) by mouth every 6 hours as needed (Breakthrough Nausea/Vomiting)   Bhavna Culp MD   SENNA PO Take 1 tablet by mouth as needed    Unknown, Entered By History   sertraline (ZOLOFT) 25 MG tablet Start 25mg daily.  After one week, increase to 50mg daily.   Shawnee Jimenez MD          Review of Systems:   A complete ROS was performed and is negative other than what is stated in the HPI.       Physical Exam:   Blood pressure 125/84, pulse 123, height 1.702 m (5' 7\"), weight 68.6 kg (151 lb 4.8 oz), SpO2 97 %, not currently breastfeeding. on room air  General: Alert, interactive, NAD, lying in bed  HEENT: AT/NC, sclera anicteric, PERRL, EOMI, no conjuctival erythema " noted  Chest/Resp: clear to auscultation bilaterally, no crackles or wheezes  Heart/CV: regular rate and rhythm, no murmur  Abdomen/GI: Soft, nontender, nondistended. +BS.  No rebound or guarding.  Extremities/MSK: No LE edema  Skin: Warm and dry  Neuro: Alert & oriented x 3, no focal deficits, moves all extremities equally         Labs:   ROUTINE ICU LABS (Last four results)  CMPNo lab results found in last 7 days.  CBCNo lab results found in last 7 days.  INRNo lab results found in last 7 days.  Arterial Blood GasNo lab results found in last 7 days.       Imaging/Procedures:

## 2020-02-21 NOTE — CONSULTS
[facility]    Hematology / Oncology Consultation     Date of Admission:  February 21, 2020   Date of Consult (When I saw the patient): February 21, 2020     Assessment & Plan   Kassie Ramriez is a 49 year old female who was admitted on 1/10/2020. I was asked to see the patient for DLBCL, EBV+ non-GCB, stage III.    Kassie presented with worsening SOB and was noted to have a large mediastinal mass causing respiratory compromise. This was diagnosed to be DLBCL, EBV+ non-GCB, stage III, intermediate risk disease. She was admitted with worsening SOB after her first cycle of R-CHOP and was diagnosed with PJV pneumonia. She has almost completed her planned 3 weeks of bactrim therapy. She has completed cycle 4 of M-RCHOP and has tolerated it well.     She has intermediate risk disease for CNS recurrence and has been admitted for high dose methotrexate for CNS prophylaxis.     She will get infusional methotrexate at 3.5 g/m  with leucovorin rescue. She will stay inpatient for alkalizing urine and leucovorin till methotrexate levels are 0.05 or less. I have reviewed the expected side effects on methotrexate. Mucositis is the commonest problem on this medication. I do not expect that she will have any difficulty.    Her PLT counts were lower at 109k and Hgb of 7.2 g/dl. I will proceed with chemotherapy as planned with curative intent. We should follow CBC daily. Transfuse for PLT < 10k while inpatient or <20k if planning/anticipating discharge within 24 hrs.    ANC 4.2k and more than adequate.   Hgb is 7.2 g/dl - low on chemotherapy, will plan to transfuse for Hgb < 7 g/dl. Likely she would need a transfusion this admission    Avoid NSAID's due to severe thrombocytopenia on chemotherapy.   She has been on atovaquone for treatment of PJV. She has completed therapy and can stop therapy.   We will continue to follow her methotrexate levels and discharge after levels below 0.05  Dr. Gabriel Del Rio from [facility]  Oncology is rounding over the weekend. I have reviewed her case with Dr. Del Rio and he is aware of admission. Please feel free to reach him or me with questions.      Appreciate help in managing her during the course of this admission. We will continue to follow along. Please feel free to reach me with any questions.     Over 65 min of time spent with more than 50% time spent in counseling and coordinating care.      Castro Castro MD    Code Status    Full Code    Reason for Consult   Reason for consult: I was asked by Dr. Shawnee Zuñiga to evaluate this patient for DLBCL.    Primary Care Physician   Mary Alice Colón    Chief Complaint   Admission for chemotherapy    History is obtained from the patient    History of Present Illness   Kassie Ramirez is a 49 year old female who has been admitted for high dose methotrexate chemotherapy.     Cassi was in room alone. She is doing well. She has been admitted for planned chemotherapy with high dose methotrexate. Other than her fatigue she has no new complains.     Past Medical History   I have reviewed this patient's medical history and updated it with pertinent information if needed.   Past Medical History:   Diagnosis Date     Anxiety attack 9/16/2014     Diffuse large B-cell lymphoma (H)     Diagnosed 11/2019     Encounter for Essure implantation 2009     Generalized anxiety disorder 9/16/2014    zoloft = flat emotions     Menopausal disorder     started on OCPs by menopause center 3/2017 (takes active continuously)     Menstrual headache     helped by OCPs and magnesium     GERTRUDE (stress urinary incontinence, female)     sling procedure 2016     SVT (supraventricular tachycardia) (H)        Past Surgical History   I have reviewed this patient's surgical history and updated it with pertinent information if needed.  Past Surgical History:   Procedure Laterality Date     H KIT ESSURE  2009    essure - Dr. Cailin Raymundo      HERNIORRHAPHY UMBILICAL  1974     IR  CHEST PORT PLACEMENT > 5 YRS OF AGE  1/9/2020     SLING TRANSPUBO WITHOUT ANTERIOR COLPORRHAPHY N/A 11/21/2016    Procedure: SLING TRANSPUBO WITHOUT ANTERIOR COLPORRHAPHY;  Surgeon: Hernesto Berrios MD;  Location:  OR       Prior to Admission Medications   Prior to Admission Medications   Prescriptions Last Dose Informant Patient Reported? Taking?   LORazepam 0.5 MG PO tablet Unknown at Unknown time  No No   Sig: Take 1-2 tablets (0.5-1 mg) by mouth every 6 hours as needed (Breakthrough Nausea / Vomiting)   SENNA PO Unknown at Unknown time  Yes No   Sig: Take 1 tablet by mouth as needed    acyclovir (ZOVIRAX) 400 MG tablet 2/21/2020 at am  No Yes   Sig: Take 1 tablet (400 mg) by mouth 2 times daily   atovaquone 750 MG/5ML PO suspension 2/20/2020 at Unknown time  Yes Yes   Sig: Take 1,500 mg by mouth daily   dronabinol (MARINOL) 2.5 MG capsule 2/20/2020 at pm  No Yes   Sig: Take 1 capsule (2.5 mg) by mouth 2 times daily (before meals)   famotidine 20 MG PO tablet Past Week at Unknown time  Yes Yes   Sig: Take 20 mg by mouth 2 times daily as needed   lidocaine-prilocaine (EMLA) 2.5-2.5 % external cream Unknown at Unknown time  No No   Sig: Apply topically as needed for moderate pain   ondansetron (ZOFRAN-ODT) 8 MG ODT tab Unknown at Unknown time  No No   Sig: Take 1 tablet (8 mg) by mouth every 8 hours as needed   potassium chloride ER (K-DUR/KLOR-CON M) 20 MEQ CR tablet 2/21/2020 at am  No Yes   Sig: Take 2 tablets (40 mEq) by mouth daily   prochlorperazine (COMPAZINE) 10 MG tablet Unknown at Unknown time  No No   Sig: Take 1 tablet (10 mg) by mouth every 6 hours as needed (Breakthrough Nausea/Vomiting)   sertraline 50 MG PO tablet 2/21/2020 at am  Yes Yes   Sig: Take 50 mg by mouth daily      Facility-Administered Medications: None     Allergies   Allergies   Allergen Reactions     Cold & Flu [Cold Defense Fighter]      See pseudoephedrine     Seasonal Allergies      Sudafed Cold-Cough [Dayquil Liquicaps]       Pseudoephedrine Rash     Rash then skins peels off        Social History   I have reviewed this patient's social history and updated it with pertinent information if needed. Kassie Ramirez  reports that she has never smoked. She has never used smokeless tobacco. She reports that she does not drink alcohol or use drugs.    Family History   I have reviewed this patient's family history and updated it with pertinent information if needed.   Family History   Problem Relation Age of Onset     Hypertension Mother      Depression Mother      Lipids Mother      Cardiovascular Mother      Circulatory Mother      Diabetes Mother      Heart Disease Mother      Cerebrovascular Disease Mother      Obesity Mother      C.A.D. Mother      Lung Cancer Mother         smoker     Eye Disorder Father         cone dystrophy     Macular Degeneration Father      Diabetes Maternal Aunt         type 2     Hypertension Maternal Aunt      Heart Disease Maternal Grandfather      Heart Disease Sister         high cholesterol       Macular Degeneration Sister        Review of Systems   The 10 point Review of Systems is negative other than noted in the HPI or here.      Physical Exam       BP: 125/84 Pulse: 123     SpO2: 97 %      Vital Signs with Ranges  Pulse:  [123] 123  BP: (125)/(84) 125/84  SpO2:  [97 %] 97 %  151 lbs 4.8 oz    Port site not infected  Comfortably laying in bed in no distress  Breathing comfortably on room air.     Data   Recent Labs   Lab Test 02/21/20  1025 02/07/20  0752 02/04/20  0919 01/29/20  0745 01/17/20  0840    140 139 140 139   POTASSIUM 3.9 4.1 4.3 3.1* 3.2*   CHLORIDE 110* 110* 108 108 105   CO2 27 24 26 26 26   ANIONGAP 3 7 5 6 8   BUN 8 9 12 9 18   CR 0.92 0.97 0.98 0.92 1.11*   GLC 99 95 101* 115* 103*   AFSHAN 9.4 9.4 9.6 9.5 9.0     Recent Labs   Lab Test 01/11/20  0615 12/01/19  1340 12/01/19  0641 11/30/19  2137 11/30/19  1341  11/27/19  2216 11/27/19  1605 09/19/19  0706 09/18/19  0845   MAG 2.0  --    --   --   --   --  2.1 2.5* 2.4* 2.2   PHOS 3.1 2.8 3.4 2.8 2.5   < > 3.1  --   --   --     < > = values in this interval not displayed.     Recent Labs   Lab Test 02/21/20  1025 02/07/20  0752 02/04/20 0919 01/29/20  0745 01/17/20  0840   WBC 5.3 4.5 5.3 10.6 5.7   HGB 7.2* 8.7* 9.6* 6.1* 7.9*   * 136* 133* 165 110*   MCV 97 95 92 96 92   NEUTROPHIL 79.0 71.0 60.0 77.0 74.7     Recent Labs   Lab Test 02/21/20  1025 02/07/20  0752 02/04/20 0919  01/11/20  1044  12/16/19  1743  11/29/19  0526   BILITOTAL 0.9 1.2 1.1   < >  --    < >  --    < > 1.2   ALKPHOS 78 73 77   < >  --    < >  --    < > 69   ALT 25 43 38   < >  --    < >  --    < > 25   AST 21 42 33   < >  --    < >  --    < > 22   ALBUMIN 3.6 3.8 3.9   < >  --    < >  --    < > 3.0*   LDH  --   --   --   --  347*  --  596*  --  416*    < > = values in this interval not displayed.     TSH   Date Value Ref Range Status   09/18/2019 2.06 0.40 - 4.00 mU/L Final   07/27/2018 2.04 0.40 - 4.00 mU/L Final   09/16/2014 1.62 0.40 - 4.00 mU/L Final     Comment:     Effective 7/30/2014, the reference range for this assay has changed to reflect   new instrumentation/methodology.       No results for input(s): CEA in the last 84125 hours.  Results for orders placed or performed during the hospital encounter of 02/07/20   US Lower Extremity Venous Duplex Bilateral    Narrative    US LOWER EXTREMITY VENOUS DUPLEX BILATERAL  2/7/2020 2:08 PM    HISTORY:  eval DVT, lymhoma dx; Diffuse large B-cell lymphoma of  extranodal site (H)    TECHNIQUE:  Venous Doppler US including color flow and Doppler  waveform analysis.    FINDINGS: No thrombus was observed and there was normal  compressibility, phasic flow, and augmentation.       Impression    IMPRESSION: No evidence of  right or left lower extremity deep venous  thrombosis.    DAWIT RDZ MD

## 2020-02-21 NOTE — PROGRESS NOTES
ONCOLOGY NOTE:    I called the floor this morning to update Dr. Castro that his patient was admitted.  Consult has been placed to [facility].

## 2020-02-21 NOTE — PHARMACY-ADMISSION MEDICATION HISTORY
Admission medication history interview status for this patient is complete. See [facility] admission navigator for allergy information, prior to admission medications and immunization status.     Medication history interview source(s):Patient  Medication history resources (including written lists, pill bottles, clinic record): [id number] dispense record  Primary pharmacy: [facility]    Changes made to PTA medication list:  Added: Atovaquone  Deleted:  prednisone + oxycodone  Changed: famotidine --> PRN, clarified sertraline dose    Actions taken by pharmacist (provider contacted, etc):None     Additional medication history information:None    Medication reconciliation/reorder completed by provider prior to medication history?  Yes     Do you take OTC medications (eg tylenol, ibuprofen, fish oil, eye/ear drops, etc)? No     For patients on insulin therapy: No        Prior to Admission medications    Medication Sig Last Dose Taking? Auth Provider   acyclovir (ZOVIRAX) 400 MG tablet Take 1 tablet (400 mg) by mouth 2 times daily 2020 at am Yes Castro Castro MD   atovaquone 750 MG/5ML PO suspension Take 1,500 mg by mouth daily 2020 at Unknown time Yes Unknown, Entered By History   dronabinol (MARINOL) 2.5 MG capsule Take 1 capsule (2.5 mg) by mouth 2 times daily (before meals) 2020 at pm Yes Shawnee Jimenez MD   famotidine 20 MG PO tablet Take 20 mg by mouth 2 times daily as needed Past Week at Unknown time Yes Unknown, Entered By History   potassium chloride ER (K-DUR/KLOR-CON M) 20 MEQ CR tablet Take 2 tablets (40 mEq) by mouth daily 2020 at am Yes Castro Castro MD   sertraline 50 MG PO tablet Take 50 mg by mouth daily 2020 at am Yes Unknown, Entered By History   lidocaine-prilocaine (EMLA) 2.5-2.5 % external cream Apply topically as needed for moderate pain Unknown at Unknown time  Sanchez Padmaja Sharpe, ALEXANDER   LORazepam 0.5 MG PO tablet Take 1-2 tablets (0.5-1 mg) by  mouth every 6 hours as needed (Breakthrough Nausea / Vomiting) Unknown at Unknown time  Padmaja Stevens, ALEXANDER   ondansetron (ZOFRAN-ODT) 8 MG ODT tab Take 1 tablet (8 mg) by mouth every 8 hours as needed Unknown at Unknown time  Castro Castro MD   prochlorperazine (COMPAZINE) 10 MG tablet Take 1 tablet (10 mg) by mouth every 6 hours as needed (Breakthrough Nausea/Vomiting) Unknown at Unknown time  Bhavna Culp MD   SENNA PO Take 1 tablet by mouth as needed  Unknown at Unknown time  Unknown, Entered By History

## 2020-02-22 LAB
ABO + RH BLD: NORMAL
ABO + RH BLD: NORMAL
ANION GAP SERPL CALCULATED.3IONS-SCNC: 2 MMOL/L (ref 3–14)
ANISOCYTOSIS BLD QL SMEAR: ABNORMAL
BASOPHILS # BLD AUTO: 0 10E9/L (ref 0–0.2)
BASOPHILS NFR BLD AUTO: 0 %
BLD GP AB SCN SERPL QL: NORMAL
BLD PROD TYP BPU: NORMAL
BLD PROD TYP BPU: NORMAL
BLD UNIT ID BPU: 0
BLOOD BANK CMNT PATIENT-IMP: NORMAL
BLOOD PRODUCT CODE: NORMAL
BPU ID: NORMAL
BUN SERPL-MCNC: 9 MG/DL (ref 7–30)
CALCIUM SERPL-MCNC: 8.6 MG/DL (ref 8.5–10.1)
CHLORIDE SERPL-SCNC: 107 MMOL/L (ref 94–109)
CO2 SERPL-SCNC: 33 MMOL/L (ref 20–32)
CREAT SERPL-MCNC: 0.84 MG/DL (ref 0.52–1.04)
DACRYOCYTES BLD QL SMEAR: SLIGHT
DIFFERENTIAL METHOD BLD: ABNORMAL
EOSINOPHIL # BLD AUTO: 0 10E9/L (ref 0–0.7)
EOSINOPHIL NFR BLD AUTO: 0 %
ERYTHROCYTE [DISTWIDTH] IN BLOOD BY AUTOMATED COUNT: 19.9 % (ref 10–15)
GFR SERPL CREATININE-BSD FRML MDRD: 81 ML/MIN/{1.73_M2}
GLUCOSE SERPL-MCNC: 100 MG/DL (ref 70–99)
HCT VFR BLD AUTO: 19.7 % (ref 35–47)
HGB BLD-MCNC: 6.6 G/DL (ref 11.7–15.7)
HGB BLD-MCNC: 7.7 G/DL (ref 11.7–15.7)
LYMPHOCYTES # BLD AUTO: 0.3 10E9/L (ref 0.8–5.3)
LYMPHOCYTES NFR BLD AUTO: 4 %
MACROCYTES BLD QL SMEAR: PRESENT
MCH RBC QN AUTO: 31.9 PG (ref 26.5–33)
MCHC RBC AUTO-ENTMCNC: 33.5 G/DL (ref 31.5–36.5)
MCV RBC AUTO: 95 FL (ref 78–100)
METAMYELOCYTES # BLD: 0.1 10E9/L
METAMYELOCYTES NFR BLD MANUAL: 2 %
MICROCYTES BLD QL SMEAR: PRESENT
MONOCYTES # BLD AUTO: 0.2 10E9/L (ref 0–1.3)
MONOCYTES NFR BLD AUTO: 3 %
MTX SERPL-SCNC: 25.75 UMOL/L
MYELOCYTES # BLD: 0.1 10E9/L
MYELOCYTES NFR BLD MANUAL: 2 %
NEUTROPHILS # BLD AUTO: 6.1 10E9/L (ref 1.6–8.3)
NEUTROPHILS NFR BLD AUTO: 89 %
NUM BPU REQUESTED: 1
OVALOCYTES BLD QL SMEAR: SLIGHT
PH UR STRIP: 8 PH (ref 5–7)
PH UR STRIP: 8.5 PH (ref 5–7)
PLATELET # BLD AUTO: 121 10E9/L (ref 150–450)
PLATELET # BLD EST: ABNORMAL 10*3/UL
POIKILOCYTOSIS BLD QL SMEAR: SLIGHT
POTASSIUM SERPL-SCNC: 3.1 MMOL/L (ref 3.4–5.3)
POTASSIUM SERPL-SCNC: 3.8 MMOL/L (ref 3.4–5.3)
RBC # BLD AUTO: 2.07 10E12/L (ref 3.8–5.2)
SODIUM SERPL-SCNC: 142 MMOL/L (ref 133–144)
SPECIMEN EXP DATE BLD: NORMAL
TOXIC GRANULES BLD QL SMEAR: PRESENT
TRANSFUSION STATUS PATIENT QL: NORMAL
TRANSFUSION STATUS PATIENT QL: NORMAL
WBC # BLD AUTO: 6.9 10E9/L (ref 4–11)

## 2020-02-22 PROCEDURE — 25000128 H RX IP 250 OP 636: Performed by: INTERNAL MEDICINE

## 2020-02-22 PROCEDURE — 12000000 ZZH R&B MED SURG/OB

## 2020-02-22 PROCEDURE — 80048 BASIC METABOLIC PNL TOTAL CA: CPT | Performed by: INTERNAL MEDICINE

## 2020-02-22 PROCEDURE — 25000125 ZZHC RX 250: Performed by: INTERNAL MEDICINE

## 2020-02-22 PROCEDURE — 25000131 ZZH RX MED GY IP 250 OP 636 PS 637: Performed by: INTERNAL MEDICINE

## 2020-02-22 PROCEDURE — 80299 QUANTITATIVE ASSAY DRUG: CPT | Performed by: PHYSICIAN ASSISTANT

## 2020-02-22 PROCEDURE — 25800029 ZZH RX IP 258 OP 250: Performed by: INTERNAL MEDICINE

## 2020-02-22 PROCEDURE — 86850 RBC ANTIBODY SCREEN: CPT | Performed by: INTERNAL MEDICINE

## 2020-02-22 PROCEDURE — 99232 SBSQ HOSP IP/OBS MODERATE 35: CPT | Performed by: INTERNAL MEDICINE

## 2020-02-22 PROCEDURE — 81003 URINALYSIS AUTO W/O SCOPE: CPT | Performed by: INTERNAL MEDICINE

## 2020-02-22 PROCEDURE — 86901 BLOOD TYPING SEROLOGIC RH(D): CPT | Performed by: INTERNAL MEDICINE

## 2020-02-22 PROCEDURE — 85025 COMPLETE CBC W/AUTO DIFF WBC: CPT | Performed by: INTERNAL MEDICINE

## 2020-02-22 PROCEDURE — 25000132 ZZH RX MED GY IP 250 OP 250 PS 637: Performed by: INTERNAL MEDICINE

## 2020-02-22 PROCEDURE — 85018 HEMOGLOBIN: CPT | Performed by: INTERNAL MEDICINE

## 2020-02-22 PROCEDURE — P9016 RBC LEUKOCYTES REDUCED: HCPCS | Performed by: INTERNAL MEDICINE

## 2020-02-22 PROCEDURE — 99233 SBSQ HOSP IP/OBS HIGH 50: CPT | Performed by: INTERNAL MEDICINE

## 2020-02-22 PROCEDURE — 86900 BLOOD TYPING SEROLOGIC ABO: CPT | Performed by: INTERNAL MEDICINE

## 2020-02-22 PROCEDURE — 86923 COMPATIBILITY TEST ELECTRIC: CPT | Performed by: INTERNAL MEDICINE

## 2020-02-22 PROCEDURE — 84132 ASSAY OF SERUM POTASSIUM: CPT | Performed by: INTERNAL MEDICINE

## 2020-02-22 RX ORDER — POTASSIUM CHLORIDE 1.5 G/1.58G
20-40 POWDER, FOR SOLUTION ORAL
Status: DISCONTINUED | OUTPATIENT
Start: 2020-02-22 | End: 2020-02-24 | Stop reason: HOSPADM

## 2020-02-22 RX ORDER — ONDANSETRON 4 MG/1
4 TABLET, ORALLY DISINTEGRATING ORAL EVERY 6 HOURS PRN
Status: DISCONTINUED | OUTPATIENT
Start: 2020-02-22 | End: 2020-02-24 | Stop reason: HOSPADM

## 2020-02-22 RX ORDER — METOCLOPRAMIDE 10 MG/1
10 TABLET ORAL EVERY 6 HOURS PRN
Status: DISCONTINUED | OUTPATIENT
Start: 2020-02-22 | End: 2020-02-24 | Stop reason: HOSPADM

## 2020-02-22 RX ORDER — POTASSIUM CHLORIDE 1500 MG/1
20-40 TABLET, EXTENDED RELEASE ORAL
Status: DISCONTINUED | OUTPATIENT
Start: 2020-02-22 | End: 2020-02-24 | Stop reason: HOSPADM

## 2020-02-22 RX ORDER — ONDANSETRON 2 MG/ML
4 INJECTION INTRAMUSCULAR; INTRAVENOUS EVERY 6 HOURS PRN
Status: DISCONTINUED | OUTPATIENT
Start: 2020-02-22 | End: 2020-02-24 | Stop reason: HOSPADM

## 2020-02-22 RX ORDER — MAGNESIUM SULFATE HEPTAHYDRATE 40 MG/ML
4 INJECTION, SOLUTION INTRAVENOUS EVERY 4 HOURS PRN
Status: DISCONTINUED | OUTPATIENT
Start: 2020-02-22 | End: 2020-02-24 | Stop reason: HOSPADM

## 2020-02-22 RX ORDER — POTASSIUM CHLORIDE 7.45 MG/ML
10 INJECTION INTRAVENOUS
Status: DISCONTINUED | OUTPATIENT
Start: 2020-02-22 | End: 2020-02-24 | Stop reason: HOSPADM

## 2020-02-22 RX ORDER — PROCHLORPERAZINE MALEATE 5 MG
10 TABLET ORAL EVERY 6 HOURS PRN
Status: DISCONTINUED | OUTPATIENT
Start: 2020-02-22 | End: 2020-02-24 | Stop reason: HOSPADM

## 2020-02-22 RX ORDER — POTASSIUM CHLORIDE 29.8 MG/ML
20 INJECTION INTRAVENOUS
Status: DISCONTINUED | OUTPATIENT
Start: 2020-02-22 | End: 2020-02-24 | Stop reason: HOSPADM

## 2020-02-22 RX ORDER — PROCHLORPERAZINE 25 MG
25 SUPPOSITORY, RECTAL RECTAL EVERY 12 HOURS PRN
Status: DISCONTINUED | OUTPATIENT
Start: 2020-02-22 | End: 2020-02-24 | Stop reason: HOSPADM

## 2020-02-22 RX ORDER — METOCLOPRAMIDE HYDROCHLORIDE 5 MG/ML
10 INJECTION INTRAMUSCULAR; INTRAVENOUS EVERY 6 HOURS PRN
Status: DISCONTINUED | OUTPATIENT
Start: 2020-02-22 | End: 2020-02-24 | Stop reason: HOSPADM

## 2020-02-22 RX ORDER — POTASSIUM CL/LIDO/0.9 % NACL 10MEQ/0.1L
10 INTRAVENOUS SOLUTION, PIGGYBACK (ML) INTRAVENOUS
Status: DISCONTINUED | OUTPATIENT
Start: 2020-02-22 | End: 2020-02-24 | Stop reason: HOSPADM

## 2020-02-22 RX ADMIN — DEXAMETHASONE 8 MG: 4 TABLET ORAL at 08:46

## 2020-02-22 RX ADMIN — SODIUM BICARBONATE: 84 INJECTION, SOLUTION INTRAVENOUS at 23:46

## 2020-02-22 RX ADMIN — POTASSIUM CHLORIDE 40 MEQ: 1500 TABLET, EXTENDED RELEASE ORAL at 15:14

## 2020-02-22 RX ADMIN — SODIUM BICARBONATE: 84 INJECTION, SOLUTION INTRAVENOUS at 19:56

## 2020-02-22 RX ADMIN — SODIUM BICARBONATE: 84 INJECTION, SOLUTION INTRAVENOUS at 15:06

## 2020-02-22 RX ADMIN — DEXTRAN 70 AND HYPROMELLOSE 2910 1 DROP: 1; 3 SOLUTION/ DROPS OPHTHALMIC at 09:54

## 2020-02-22 RX ADMIN — SODIUM BICARBONATE: 84 INJECTION, SOLUTION INTRAVENOUS at 08:42

## 2020-02-22 RX ADMIN — SODIUM BICARBONATE: 84 INJECTION, SOLUTION INTRAVENOUS at 00:20

## 2020-02-22 RX ADMIN — LEUCOVORIN CALCIUM 25 MG: 350 INJECTION, POWDER, LYOPHILIZED, FOR SOLUTION INTRAMUSCULAR; INTRAVENOUS at 21:01

## 2020-02-22 RX ADMIN — SODIUM BICARBONATE: 84 INJECTION, SOLUTION INTRAVENOUS at 04:01

## 2020-02-22 RX ADMIN — ACYCLOVIR 400 MG: 400 TABLET ORAL at 20:59

## 2020-02-22 RX ADMIN — LEUCOVORIN CALCIUM 50 MG: 350 INJECTION, POWDER, LYOPHILIZED, FOR SOLUTION INTRAMUSCULAR; INTRAVENOUS at 15:13

## 2020-02-22 RX ADMIN — ACYCLOVIR 400 MG: 400 TABLET ORAL at 08:46

## 2020-02-22 RX ADMIN — SERTRALINE HYDROCHLORIDE 50 MG: 50 TABLET ORAL at 08:46

## 2020-02-22 RX ADMIN — POTASSIUM CHLORIDE 40 MEQ: 1500 TABLET, EXTENDED RELEASE ORAL at 08:46

## 2020-02-22 ASSESSMENT — ACTIVITIES OF DAILY LIVING (ADL)
ADLS_ACUITY_SCORE: 12

## 2020-02-22 NOTE — PROGRESS NOTES
[facility]  Hospitalist Progress Note  Onel Porter MD 02/22/2020    Reason for Stay        Diffuse large B-cell lymphoma         Assessment and Plan:        Summary of Stay:   Kassie Ramirez is a 49 year old female with a history of Anxiety, PJV Pneumonia, Diffuse Large B-Cell Lymphoma (DLBCL) who presents as a direct admission at the request of Dr. Castro with Oncology for High Dose Methotrexate      Patient progress  Patient was admitted for IV infusion of methotrexate, leucovorin and Decadron.  She was closely monitored and followed by oncology service.  Hemoglobin dropped to 6.6 and she required blood transfusion during stay.        Problem List with Assessment and plan      1. Diffuse large B-cell lymphoma: She got high-dose methotrexate, currently getting bicarb drip.    Patient is stable except for anemia, continue current medications as per oncology team.    Continue to monitor drug levels    2. Severe anemia: Due to chemotherapy, exacerbated by fluid administration      Transfuse unit of blood.  Consent for blood transfusion signed.    Monitor hemoglobin in the evening and around the morning and transfuse for hemoglobin of 7 or less or symptomatic anemia.    3. Anxiety: Continue home medication sertraline  4. Hx PJV Pneumonia - Hospitalized at [facility] from 12/18-12/24 with acute hypoxic respiratory failure and sepsis due to PJV pneumonia.  Patient not currently on steroids pta.  She was on Atovaqone in the past for prophylaxis    LDA     Access : Peripheral     Phillips Catheter: not present        FEN :    Orders Placed This Encounter      Combination Diet Regular Diet Adult           Intake/Output Summary (Last 24 hours) at 2/22/2020 1015  Last data filed at 2/22/2020 0801  Gross per 24 hour   Intake 3725 ml   Output 5500 ml   Net -1775 ml          DVT Prophylaxis: Ambulate every shift    Code Status: Full Code    Discharge Disposition: home     Estimated Disch Date / #  "of Days until Disch:        Interval History (Subjective):        Patient is seen and examined by me today and medical record reviewed.Overnight events noted and care discussed with nursing staff.    doing well; no cp, sob, n/v/d, or abd pain.                          Physical Exam:        Last Vital Signs:  /83 (BP Location: Left arm)   Pulse 89   Temp 97.6  F (36.4  C) (Axillary)   Resp 20   Ht 1.702 m (5' 7.01\")   Wt 68.6 kg (151 lb 4.8 oz)   SpO2 99%   BMI 23.69 kg/m      I/O last 3 completed shifts:  In: 3485 [P.O.:240; I.V.:3245]  Out: 5200 [Urine:5200]    Wt Readings from Last 5 Encounters:   02/21/20 68.6 kg (151 lb 4.8 oz)   02/07/20 70.8 kg (156 lb)   02/04/20 72.1 kg (159 lb)   01/31/20 71.4 kg (157 lb 8 oz)   01/29/20 70.8 kg (156 lb)        Constitutional: Awake, alert, cooperative, no apparent distress     Respiratory: Clear to auscultation bilaterally, no crackles or wheezing   Cardiovascular: Regular rate and rhythm, normal S1 and S2, and no murmur noted   Abdomen: Normal bowel sounds, soft, non-distended, non-tender   Skin: No rashes, no cyanosis, dry to touch   Neuro: Alert with  no weakness, numbness, memory loss   Extremities: No edema, normal range of motion   Other(s):        All other systems: Negative          Medications:        All current medications were reviewed with changes reflected in problem list.         Data:      All new lab and imaging data was reviewed.      Data reviewed today: I reviewed all new labs and imaging results over the last 24 hours. I personally reviewed no images or EKG's today      Recent Labs   Lab 02/22/20  0620 02/21/20  1025   WBC 6.9 5.3   HGB 6.6* 7.2*   HCT 19.7* 22.0*   MCV 95 97   * 109*     No results for input(s): CULT in the last 168 hours.  Recent Labs   Lab 02/22/20  0620 02/21/20  1025    140   POTASSIUM 3.1* 3.9   CHLORIDE 107 110*   CO2 33* 27   ANIONGAP 2* 3   * 99   BUN 9 8   CR 0.84 0.92   GFRESTIMATED 81 72 "   GFRESTBLACK >90 84   AFSHAN 8.6 9.4   PROTTOTAL  --  6.1*   ALBUMIN  --  3.6   BILITOTAL  --  0.9   ALKPHOS  --  78   AST  --  21   ALT  --  25       Recent Labs   Lab 02/22/20  0620 02/21/20  1025   * 99       No results for input(s): INR in the last 168 hours.      No results for input(s): TROPONIN, TROPI, TROPR in the last 168 hours.    Invalid input(s): TROP, TROPONINIES    No results found for this or any previous visit (from the past 48 hour(s)).      Disclaimer: This note consists of symbols derived from keyboarding, dictation and/or voice recognition software. As a result, there may be errors in the script that have gone undetected. Please consider this when interpreting information found in this chart.

## 2020-02-22 NOTE — PLAN OF CARE
Pt VSS  Denies pain  Admit for high dose Methotrexate- now on recovery phase.   First dose of Leucovorin this shift  Hgb 6.6 this AM- 1 unit of PRBC, tolerated well, felt more energetic after the transfusion  Plan to recheck the Hgb this evening  Urine pH continues to be within range  Methotrexate level high as expected this AM

## 2020-02-22 NOTE — PLAN OF CARE
Pt VSS, tachycardia continues denies pain  PORT accessed with 2 attempts blood return noted  Sodium bicarbonate bolus prior to chemo  Urine pH 8.0  Methotrexate currently infusing  Times urine pH going forward have been ordered and patient aware of the times as she is independent in the room  Feeling slightly fatigued, plan for 1 unit of blood tomorrow- this may require some adjustments in sodium bicarbonate infusion to allow to the infusion to be on hold during the blood transfusion

## 2020-02-22 NOTE — PLAN OF CARE
Patient alert and oriented x4. Up independent in room. Port infusing. Urine pH levels 8.0 x2. Voiding adequate amounts. Denies pain and nausea. Discharge pending.

## 2020-02-23 ENCOUNTER — APPOINTMENT (OUTPATIENT)
Dept: GENERAL RADIOLOGY | Facility: CLINIC | Age: 50
End: 2020-02-23
Attending: INTERNAL MEDICINE
Payer: COMMERCIAL

## 2020-02-23 ENCOUNTER — APPOINTMENT (OUTPATIENT)
Dept: CARDIOLOGY | Facility: CLINIC | Age: 50
End: 2020-02-23
Attending: INTERNAL MEDICINE
Payer: COMMERCIAL

## 2020-02-23 LAB
ALBUMIN SERPL-MCNC: 3.1 G/DL (ref 3.4–5)
ALP SERPL-CCNC: 72 U/L (ref 40–150)
ALT SERPL W P-5'-P-CCNC: 36 U/L (ref 0–50)
ANION GAP SERPL CALCULATED.3IONS-SCNC: 2 MMOL/L (ref 3–14)
AST SERPL W P-5'-P-CCNC: 29 U/L (ref 0–45)
BASOPHILS # BLD AUTO: 0 10E9/L (ref 0–0.2)
BASOPHILS NFR BLD AUTO: 0.7 %
BILIRUB SERPL-MCNC: 0.7 MG/DL (ref 0.2–1.3)
BUN SERPL-MCNC: 9 MG/DL (ref 7–30)
CALCIUM SERPL-MCNC: 8.5 MG/DL (ref 8.5–10.1)
CHLORIDE SERPL-SCNC: 111 MMOL/L (ref 94–109)
CO2 SERPL-SCNC: 31 MMOL/L (ref 20–32)
CREAT SERPL-MCNC: 0.84 MG/DL (ref 0.52–1.04)
DIFFERENTIAL METHOD BLD: ABNORMAL
EOSINOPHIL # BLD AUTO: 0 10E9/L (ref 0–0.7)
EOSINOPHIL NFR BLD AUTO: 0.2 %
ERYTHROCYTE [DISTWIDTH] IN BLOOD BY AUTOMATED COUNT: 20 % (ref 10–15)
GFR SERPL CREATININE-BSD FRML MDRD: 80 ML/MIN/{1.73_M2}
GLUCOSE SERPL-MCNC: 91 MG/DL (ref 70–99)
HCT VFR BLD AUTO: 22.7 % (ref 35–47)
HGB BLD-MCNC: 7.1 G/DL (ref 11.7–15.7)
IMM GRANULOCYTES # BLD: 0.2 10E9/L (ref 0–0.4)
IMM GRANULOCYTES NFR BLD: 3.3 %
LYMPHOCYTES # BLD AUTO: 0.4 10E9/L (ref 0.8–5.3)
LYMPHOCYTES NFR BLD AUTO: 8.4 %
MCH RBC QN AUTO: 30.3 PG (ref 26.5–33)
MCHC RBC AUTO-ENTMCNC: 31.3 G/DL (ref 31.5–36.5)
MCV RBC AUTO: 97 FL (ref 78–100)
MONOCYTES # BLD AUTO: 0.2 10E9/L (ref 0–1.3)
MONOCYTES NFR BLD AUTO: 4.9 %
MTX SERPL-SCNC: 0.61 UMOL/L
NEUTROPHILS # BLD AUTO: 3.7 10E9/L (ref 1.6–8.3)
NEUTROPHILS NFR BLD AUTO: 82.5 %
NRBC # BLD AUTO: 0 10*3/UL
NRBC BLD AUTO-RTO: 0 /100
NT-PROBNP SERPL-MCNC: 2706 PG/ML (ref 0–450)
PH UR STRIP: 8 PH (ref 5–7)
PLATELET # BLD AUTO: 129 10E9/L (ref 150–450)
POTASSIUM SERPL-SCNC: 3.4 MMOL/L (ref 3.4–5.3)
PROT SERPL-MCNC: 5.3 G/DL (ref 6.8–8.8)
RBC # BLD AUTO: 2.34 10E12/L (ref 3.8–5.2)
SODIUM SERPL-SCNC: 144 MMOL/L (ref 133–144)
TROPONIN I SERPL-MCNC: 0.04 UG/L (ref 0–0.04)
TROPONIN I SERPL-MCNC: 0.07 UG/L (ref 0–0.04)
TROPONIN I SERPL-MCNC: 0.08 UG/L (ref 0–0.04)
WBC # BLD AUTO: 4.5 10E9/L (ref 4–11)

## 2020-02-23 PROCEDURE — 25000132 ZZH RX MED GY IP 250 OP 250 PS 637: Performed by: INTERNAL MEDICINE

## 2020-02-23 PROCEDURE — 99233 SBSQ HOSP IP/OBS HIGH 50: CPT | Performed by: INTERNAL MEDICINE

## 2020-02-23 PROCEDURE — 25000128 H RX IP 250 OP 636: Performed by: INTERNAL MEDICINE

## 2020-02-23 PROCEDURE — 12000000 ZZH R&B MED SURG/OB

## 2020-02-23 PROCEDURE — 25000132 ZZH RX MED GY IP 250 OP 250 PS 637

## 2020-02-23 PROCEDURE — 81003 URINALYSIS AUTO W/O SCOPE: CPT | Performed by: INTERNAL MEDICINE

## 2020-02-23 PROCEDURE — 71045 X-RAY EXAM CHEST 1 VIEW: CPT

## 2020-02-23 PROCEDURE — 40000275 ZZH STATISTIC RCP TIME EA 10 MIN

## 2020-02-23 PROCEDURE — 93325 DOPPLER ECHO COLOR FLOW MAPG: CPT | Mod: 26 | Performed by: INTERNAL MEDICINE

## 2020-02-23 PROCEDURE — 93321 DOPPLER ECHO F-UP/LMTD STD: CPT

## 2020-02-23 PROCEDURE — 25800029 ZZH RX IP 258 OP 250: Performed by: INTERNAL MEDICINE

## 2020-02-23 PROCEDURE — 80299 QUANTITATIVE ASSAY DRUG: CPT | Performed by: INTERNAL MEDICINE

## 2020-02-23 PROCEDURE — 84484 ASSAY OF TROPONIN QUANT: CPT | Performed by: INTERNAL MEDICINE

## 2020-02-23 PROCEDURE — 99222 1ST HOSP IP/OBS MODERATE 55: CPT | Mod: 25 | Performed by: INTERNAL MEDICINE

## 2020-02-23 PROCEDURE — 25000125 ZZHC RX 250: Performed by: INTERNAL MEDICINE

## 2020-02-23 PROCEDURE — 25000128 H RX IP 250 OP 636

## 2020-02-23 PROCEDURE — 25000131 ZZH RX MED GY IP 250 OP 636 PS 637: Performed by: INTERNAL MEDICINE

## 2020-02-23 PROCEDURE — 93005 ELECTROCARDIOGRAM TRACING: CPT

## 2020-02-23 PROCEDURE — 93321 DOPPLER ECHO F-UP/LMTD STD: CPT | Mod: 26 | Performed by: INTERNAL MEDICINE

## 2020-02-23 PROCEDURE — 93308 TTE F-UP OR LMTD: CPT | Mod: 26 | Performed by: INTERNAL MEDICINE

## 2020-02-23 PROCEDURE — 83880 ASSAY OF NATRIURETIC PEPTIDE: CPT | Performed by: INTERNAL MEDICINE

## 2020-02-23 PROCEDURE — 80053 COMPREHEN METABOLIC PANEL: CPT | Performed by: INTERNAL MEDICINE

## 2020-02-23 PROCEDURE — 85025 COMPLETE CBC W/AUTO DIFF WBC: CPT | Performed by: INTERNAL MEDICINE

## 2020-02-23 RX ORDER — FUROSEMIDE 10 MG/ML
INJECTION INTRAMUSCULAR; INTRAVENOUS
Status: COMPLETED
Start: 2020-02-23 | End: 2020-02-23

## 2020-02-23 RX ORDER — ASPIRIN 325 MG
TABLET ORAL
Status: COMPLETED
Start: 2020-02-23 | End: 2020-02-23

## 2020-02-23 RX ORDER — ALUMINA, MAGNESIA, AND SIMETHICONE 2400; 2400; 240 MG/30ML; MG/30ML; MG/30ML
30 SUSPENSION ORAL EVERY 4 HOURS PRN
Status: DISCONTINUED | OUTPATIENT
Start: 2020-02-23 | End: 2020-02-24 | Stop reason: HOSPADM

## 2020-02-23 RX ORDER — NITROGLYCERIN 0.4 MG/1
TABLET SUBLINGUAL
Status: COMPLETED
Start: 2020-02-23 | End: 2020-02-23

## 2020-02-23 RX ORDER — FUROSEMIDE 10 MG/ML
10 INJECTION INTRAMUSCULAR; INTRAVENOUS ONCE
Status: COMPLETED | OUTPATIENT
Start: 2020-02-23 | End: 2020-02-23

## 2020-02-23 RX ORDER — FUROSEMIDE 20 MG
20 TABLET ORAL DAILY
Status: DISCONTINUED | OUTPATIENT
Start: 2020-02-23 | End: 2020-02-24 | Stop reason: HOSPADM

## 2020-02-23 RX ORDER — NITROGLYCERIN 0.4 MG/1
0.4 TABLET SUBLINGUAL EVERY 5 MIN PRN
Status: DISCONTINUED | OUTPATIENT
Start: 2020-02-23 | End: 2020-02-24 | Stop reason: HOSPADM

## 2020-02-23 RX ORDER — SODIUM CHLORIDE 9 MG/ML
INJECTION, SOLUTION INTRAVENOUS CONTINUOUS
Status: DISCONTINUED | OUTPATIENT
Start: 2020-02-23 | End: 2020-02-23

## 2020-02-23 RX ADMIN — HEPARIN, PORCINE (PF) 10 UNIT/ML INTRAVENOUS SYRINGE 5 ML: at 06:40

## 2020-02-23 RX ADMIN — SERTRALINE HYDROCHLORIDE 50 MG: 50 TABLET ORAL at 08:59

## 2020-02-23 RX ADMIN — SODIUM BICARBONATE: 84 INJECTION, SOLUTION INTRAVENOUS at 16:52

## 2020-02-23 RX ADMIN — ALUMINUM HYDROXIDE, MAGNESIUM HYDROXIDE, AND DIMETHICONE 30 ML: 400; 400; 40 SUSPENSION ORAL at 05:47

## 2020-02-23 RX ADMIN — LEUCOVORIN CALCIUM 25 MG: 350 INJECTION, POWDER, LYOPHILIZED, FOR SOLUTION INTRAMUSCULAR; INTRAVENOUS at 03:00

## 2020-02-23 RX ADMIN — ACYCLOVIR 400 MG: 400 TABLET ORAL at 08:59

## 2020-02-23 RX ADMIN — LEUCOVORIN CALCIUM 25 MG: 350 INJECTION, POWDER, LYOPHILIZED, FOR SOLUTION INTRAMUSCULAR; INTRAVENOUS at 14:47

## 2020-02-23 RX ADMIN — HEPARIN, PORCINE (PF) 10 UNIT/ML INTRAVENOUS SYRINGE 5 ML: at 14:48

## 2020-02-23 RX ADMIN — HEPARIN, PORCINE (PF) 10 UNIT/ML INTRAVENOUS SYRINGE 5 ML: at 09:42

## 2020-02-23 RX ADMIN — FUROSEMIDE 20 MG: 20 TABLET ORAL at 16:27

## 2020-02-23 RX ADMIN — POTASSIUM CHLORIDE 40 MEQ: 1500 TABLET, EXTENDED RELEASE ORAL at 08:59

## 2020-02-23 RX ADMIN — DEXAMETHASONE 8 MG: 4 TABLET ORAL at 08:59

## 2020-02-23 RX ADMIN — HEPARIN, PORCINE (PF) 10 UNIT/ML INTRAVENOUS SYRINGE 5 ML: at 09:03

## 2020-02-23 RX ADMIN — LORAZEPAM 0.5 MG: 0.5 TABLET ORAL at 05:47

## 2020-02-23 RX ADMIN — HEPARIN, PORCINE (PF) 10 UNIT/ML INTRAVENOUS SYRINGE 5 ML: at 13:38

## 2020-02-23 RX ADMIN — ACYCLOVIR 400 MG: 400 TABLET ORAL at 20:49

## 2020-02-23 RX ADMIN — FUROSEMIDE 10 MG: 10 INJECTION INTRAMUSCULAR; INTRAVENOUS at 06:39

## 2020-02-23 RX ADMIN — SODIUM BICARBONATE: 84 INJECTION, SOLUTION INTRAVENOUS at 04:14

## 2020-02-23 RX ADMIN — LEUCOVORIN CALCIUM 25 MG: 350 INJECTION, POWDER, LYOPHILIZED, FOR SOLUTION INTRAMUSCULAR; INTRAVENOUS at 08:59

## 2020-02-23 RX ADMIN — NITROGLYCERIN 0.4 MG: 0.4 TABLET SUBLINGUAL at 06:40

## 2020-02-23 RX ADMIN — LEUCOVORIN CALCIUM 25 MG: 350 INJECTION, POWDER, LYOPHILIZED, FOR SOLUTION INTRAMUSCULAR; INTRAVENOUS at 20:49

## 2020-02-23 RX ADMIN — FUROSEMIDE 10 MG: 10 INJECTION, SOLUTION INTRAMUSCULAR; INTRAVENOUS at 06:39

## 2020-02-23 RX ADMIN — ASPIRIN 325 MG ORAL TABLET 325 MG: 325 PILL ORAL at 06:39

## 2020-02-23 ASSESSMENT — ACTIVITIES OF DAILY LIVING (ADL)
ADLS_ACUITY_SCORE: 12

## 2020-02-23 NOTE — PROGRESS NOTES
Service Date: 02/22/2020      SUBJECTIVE:  Ms. Ramirez is a 49-year-old female with stage III non-GCB diffuse large B-cell lymphoma.  The patient is currently on chemotherapy with R-CHOP, which was started in 11/2019.  She also gets CNS prophylaxis with high-dose methotrexate it.  The patient is admitted was to the hospital for high-dose methotrexate, which was given on 02/21/2020.  The patient is currently on leucovorin rescue.      I saw the patient.  Overall, her condition is stable.  She has some fatigue.  No headache.  No dizziness.  No chest pain.  No shortness of breath.  She used to have cough.  That was almost resolved.  No abdominal pain, nausea or vomiting.  No urinary or bowel complaints.  No bleeding.  No fever, chills or night sweats.  All other review of systems negative.      PHYSICAL EXAMINATION:   GENERAL:  The patient was alert and oriented x 3.   VITAL SIGNS:  Reviewed.   EYES:  No icterus.   THROAT:  No ulcer or thrush.   NECK:  Supple, no tenderness.   LUNGS:  Good air entry bilaterally.  No crackles or wheezing.   HEART:  Regular.   ABDOMEN:  Soft and nontender.  No mass.   EXTREMITIES:  No edema.  No calf swelling or tenderness.   SKIN:  No rash.      LABORATORY DATA:  Reviewed.      ASSESSMENT:   1.  A 49-year-old female with diffuse large B-cell lymphoma admitted for high-dose methotrexate.  She is currently on leucovorin rescue.   2.  Stage III diffuse large B-cell lymphoma on chemotherapy.   3.  Severe anemia from chemotherapy.   4.  Thrombocytopenia from chemotherapy.      PLAN:   1.  The patient clinically is stable.  She is tolerating the methotrexate well.  She is on leucovorin rescue.  Leucovorin will be continued as per the protocol.  Methotrexate levels are being monitored.   2.  The patient is severely anemic.  She received 1 unit of blood transfusion.  We will continue to monitor her CBC.   3.  She has mild thrombocytopenia.  She is not bleeding from any site.  We will continue  to monitor CBC.  She will be given platelet transfusion if she has bleeding or platelets below 20.   4.  She had a few questions, which were all answered.         BRETT BLOUNT MD             D: 2020   T: 2020   MT: KRISTA      Name:     BABAR VARGHESE   MRN:      [id number]        Account:      [id number]   :      1970           Service Date: 2020      Document: [id number]

## 2020-02-23 NOTE — PLAN OF CARE
Patient alert and oriented x4. Up independent. Voiding adequate amounts per order. Tolerating regular diet. IV fluids running at 250ml/hr. Urine pH at 0245 8.0. At 0500 patient called nurse into room c/o of chest pain and tightness, and SOB. Writer paged MD who arrived to room, ordered EKG, chest X-ray, Trop level, placed on tele, Ativan to be given, and GI cocktail. Trop level elevated at 0.077, 325mg Aspirin, Nitroglycerin, Lasix given, and IV fluids stopped. Patient feeling more comfortable after medications given. Echo ordered. Writer spoke with patients oncology group and updated MD on patient status. Oncologist agreeable with fluids being stopped at this time, also placed Cardiology consult. Patient  will have follow up Trop levels later today. Discharge pending.

## 2020-02-23 NOTE — PROGRESS NOTES
Service Date: 02/23/2020      SUBJECTIVE:  Ms. Ramirez is a 49-year-old female with stage III diffuse large B-cell lymphoma on chemotherapy with R-CHOP.  She also gets CNS prophylaxis with high-dose methotrexate.  The patient was admitted for high-dose methotrexate, which she received on 02/21/2020.  She is on leucovorin rescue and IV hydration.      At 3:00 am in the morning today, she started to have fluid overload. She felt her lungs were filling up with fluid.  At 6 am in the morning, she had some mid chest discomfort.  She was evaluated by the hospitalist.  Troponin is mildly elevated.  Chest x-ray reveals mildly increased interstitial opacity, likely from interstitial pulmonary edema.  Echocardiogram reveals normal ejection fraction.  There is mitral regurgitation which is new.      IV fluid has been stopped.  She has received Lasix and other medication.  Cardiology has seen the patient.      I met with the patient.  When I saw her, she was walking in the hallway.  The patient says that she feels better since the Lasix and discontinuation of IV fluid.  Her shortness of breath is better.  No chest pain.      No headache.  No dizziness.  No neck pain.  No abdominal pain, nausea or vomiting.  No urinary or bowel complaints.  No bleeding.      She had some puffiness around the eyes which has improved.  She has increased watering from the eyes and the nose.      PHYSICAL EXAMINATION:   GENERAL:  The patient is alert, oriented x 3.   VITAL SIGNS:  Reviewed.   FACE:  No swelling.  No puffiness around the eyes.   EYES:  No icterus.   THROAT:  No ulcer or thrush.   NECK:  Supple, no lymphadenopathy or tenderness.   LUNGS:  Good air entry bilaterally.  Occasional crackles.   HEART:  Regular.   ABDOMEN:  Soft and non-tender. No mass.   EXTREMITIES:  No edema.   SKIN:  No rash.      LABORATORY DATA:  Reviewed.      ASSESSMENT:   1.  A 49-year-old female with diffuse large B-cell lymphoma on chemotherapy.   2.  Chest  tightness and shortness of breath secondary to fluid overload.   3.  Anemia secondary to chemotherapy.   4.  Thrombocytopenia from chemotherapy.      PLAN:   1.  I discussed regarding her shortness of breath and chest tightness.  It is from fluid overload and anemia.  Management as per Cardiology and hospitalist.     2.  The patient received high-dose methotrexate.  She has been on leucovorin rescue and IV hydration.  IV hydration has been put on hold.  Her methotrexate level is down to 0.61.  Her methotrexate level will continue to decrease just with leucovorin rescue.  Hopefully, by tomorrow, it will be much lower, and she will be stable to be discharged home.  I told the patient to increase her oral fluid intake.     3.  She is anemic.  She should be transfused if hemoglobin is below 7.     4.  The patient had a few questions, which were all answered.  Oncology will continue to follow.  Case discussed with Dr. Porter.      Total time spent was 35 minutes, more than 50% of the time spent in counseling and coordination of care.         BRETT BLOUNT MD             D: 2020   T: 2020   MT: VESTA      Name:     BABAR VARGHESE   MRN:      [id number]        Account:      [id number]   :      1970           Service Date: 2020      Document: [id number]

## 2020-02-23 NOTE — PROGRESS NOTES
[facility]  Hospitalist Progress Note  Onel Porter MD 02/23/2020    Reason for Stay        Diffuse large B-cell lymphoma         Assessment and Plan:        Summary of Stay:   Kassie Ramirez is a 49 year old female with a history of Anxiety, PJV Pneumonia, Diffuse Large B-Cell Lymphoma (DLBCL) who presents as a direct admission at the request of Dr. Castro with Oncology for High Dose Methotrexate      Patient progress  Patient was admitted for IV infusion of methotrexate, leucovorin and Decadron.  She was closely monitored and followed by oncology service.  Hemoglobin dropped to 6.6 and she required blood transfusion during stay.  Patient developed chest pain and shortness of breath and further evaluation revealed minimally elevated troponin, elevated BNP and pulmonary edema on chest x-ray.  IV fluid was stopped and patient was treated with IV Lasix.  Cardiology team was consulted.    Plan going forward: Await cardiology recommendation and oncology service plan      Problem List with Assessment and plan      1. Diffuse large B-cell lymphoma: She got high-dose methotrexate, and currently on leucovorin rescue treatment     Continue treatment per oncology service.  Monitor methotrexate level.    Oncology service following  Addendum  Care discussed with Dr. Del Rio of oncology service.  We discussed about the need for hydration versus fluid overload.  Awaiting cardiology recommendation regarding safety of IV fluid resumption.  We will also check hemoglobin this evening and in the morning as well and transfuse if hemoglobin is drifting down.  IVF concern discussed with Dr Arita and NS at 75 ml/h is okay with close monitoring   2. Severe anemia: Due to chemotherapy, exacerbated by fluid administration      Transfuse 1 unit of blood for hemoglobin of unit of blood for hemoglobin of 6.6 on February 22.  Follow-up hemoglobin remained stable around 7.1.    Continue to monitor hemoglobin, may need  "transfusion if hemoglobin falls below 7.  Conditional transfusion order placed      3. Anxiety: Continue home medication sertraline  4. Chest tightness and shortness of breath as well as elevated troponin and BNP:    Patient was placed on telemetry, IV fluids stopped, 10 mg IV Lasix given, echocardiogram showed preserved EF.  Cardiology team already consulted to assist for further management.    May need additional Lasix, defer to cardiology team.  Currently stable  5. Hx PJV Pneumonia - Hospitalized at [facility] from 12/18-12/24 with acute hypoxic respiratory failure and sepsis due to PJV pneumonia.  Patient not currently on steroids pta.  She was on Atovaqone in the past for prophylaxis        LDA     Access : Peripheral     Phillips Catheter: not present        FEN :    Orders Placed This Encounter      Combination Diet Regular Diet Adult           Intake/Output Summary (Last 24 hours) at 2/22/2020 1015  Last data filed at 2/22/2020 0801  Gross per 24 hour   Intake 3725 ml   Output 5500 ml   Net -1775 ml          DVT Prophylaxis: Ambulate every shift    Code Status: Full Code    Discharge Disposition: home     Estimated Disch Date / # of Days until Disch: Likely home in the next  2 days if okay with oncology and cardiology team.        Interval History (Subjective):        Patient is seen and examined by me today and medical record reviewed.Overnight events noted and care discussed with nursing staff.  Patient feels better after Lasix injection.  No current complaint of chest pain or shortness of breath.                    Physical Exam:        Last Vital Signs:  /89   Pulse 87   Temp 98.3  F (36.8  C) (Oral)   Resp 16   Ht 1.702 m (5' 7.01\")   Wt 68.6 kg (151 lb 4.8 oz)   SpO2 97%   BMI 23.69 kg/m      I/O last 3 completed shifts:  In: 2951 [P.O.:240; I.V.:2411]  Out: 5525 [Urine:5525]    Wt Readings from Last 5 Encounters:   02/21/20 68.6 kg (151 lb 4.8 oz)   02/07/20 70.8 kg (156 lb) "   02/04/20 72.1 kg (159 lb)   01/31/20 71.4 kg (157 lb 8 oz)   01/29/20 70.8 kg (156 lb)        Constitutional: Awake, alert, cooperative, no apparent distress     Respiratory: Clear to auscultation bilaterally, no crackles or wheezing   Cardiovascular: Regular rate and rhythm, normal S1 and S2, and no murmur noted   Abdomen: Normal bowel sounds, soft, non-distended, non-tender   Skin: No rashes, no cyanosis, dry to touch   Neuro: Alert with  no weakness, numbness, memory loss   Extremities: No edema, normal range of motion   Other(s):        All other systems: Negative          Medications:        All current medications were reviewed with changes reflected in problem list.         Data:      All new lab and imaging data was reviewed.      Data reviewed today: I reviewed all new labs and imaging results over the last 24 hours. I personally reviewed no images or EKG's today      Recent Labs   Lab 02/23/20 0545 02/22/20  1830 02/22/20 0620 02/21/20  1025   WBC 4.5  --  6.9 5.3   HGB 7.1* 7.7* 6.6* 7.2*   HCT 22.7*  --  19.7* 22.0*   MCV 97  --  95 97   *  --  121* 109*     No results for input(s): CULT in the last 168 hours.  Recent Labs   Lab 02/23/20 0545 02/22/20 1830 02/22/20 0620 02/21/20  1025     --  142 140   POTASSIUM 3.4 3.8 3.1* 3.9   CHLORIDE 111*  --  107 110*   CO2 31  --  33* 27   ANIONGAP 2*  --  2* 3   GLC 91  --  100* 99   BUN 9  --  9 8   CR 0.84  --  0.84 0.92   GFRESTIMATED 80  --  81 72   GFRESTBLACK >90  --  >90 84   AFSHAN 8.5  --  8.6 9.4   PROTTOTAL 5.3*  --   --  6.1*   ALBUMIN 3.1*  --   --  3.6   BILITOTAL 0.7  --   --  0.9   ALKPHOS 72  --   --  78   AST 29  --   --  21   ALT 36  --   --  25       Recent Labs   Lab 02/23/20  0545 02/22/20  0620 02/21/20  1025   GLC 91 100* 99       No results for input(s): INR in the last 168 hours.      Recent Labs   Lab 02/23/20  0942 02/23/20  0530   TROPI 0.068* 0.077*       No results found for this or any previous visit (from the  past 48 hour(s)).      Disclaimer: This note consists of symbols derived from keyboarding, dictation and/or voice recognition software. As a result, there may be errors in the script that have gone undetected. Please consider this when interpreting information found in this chart.

## 2020-02-23 NOTE — PROGRESS NOTES
An EKG was completed on the pt, with no complications noted during the procedure.    Joaquim Gold RT on 2/23/2020 at 5:30 AM

## 2020-02-23 NOTE — PROGRESS NOTES
Came to see pt as she was having chest tightness and some SOB. Chart reviewed and pt examined.    Chest tightness is in midsternum.    Temp: 97.5  F (36.4  C) Temp src: Oral BP: (!) 144/93 Pulse: 95 Heart Rate: 91 Resp: 18 SpO2: 99 % O2 Device: None (Room air)       Gen - AAO x 3 in NAD.  Lungs - CTA B.  Heart - RR,S1+S2 nml, no m/g/r.    EKG - NSR @ 80 bpm.  CXR - shows pulmonary edema.      A/P -  Will monitor on telemetry, doubt ACS/PE. Her troponin is elevated at 0.077. She was given ASA, SL NTG and IV lasix with complete relief. Will monitor on tele, get serial troponins and TTE.

## 2020-02-23 NOTE — CONSULTS
[facility]    Cardiology Consultation     Date of Admission:  2/21/2020    Assessment & Plan   Kassie Ramirez is a 49 year old female who was admitted on 2/21/2020.    1.  Shortness of breath and chest tightness  This appears most likely related to fluid overload.  Patient was receiving IV fluids with her IV methotrexate and also received blood transfusion.  She also has underlying anemia.  I suspect this contributed to heart failure with preserved ejection fraction and improved with IV Lasix.  Her N-terminal proBNP was also elevated and chest x-ray shows mild pulmonary congestions consistent mild fluid overload.  I would recommend oral Lasix 20 mg daily for next few days.  Monitor BUN and creatinine.  With respect to IV hydration, I would suggest decreasing it or holding it if it is okay with oncology.  If blood transfusion is given again, she patient should also receive a low-dose of IV Lasix with it.  I would recommend continued monitoring of her blood pressure and keep it in the normal range.  She was on Coreg at some point and it may be reasonable to restart it while she is on chemo.    2.  Mild to moderate mitral regurgitation  New.  There is no valve prolapse.  Continue to monitor and repeat an echocardiogram in few months.    3.  Non-Hodgkin's lymphoma, on chemotherapy and IV methotrexate  Management per oncology.  Anemia likely from chemotherapy and the disease.    4.  Mildly elevated troponin of 0.07 and 0.06, this is unlikely acute coronary syndrome.  It is likely type II myocardial leak or ischemia.  Is likely precipitated by underlying severe anemia and mild heart failure.    Thank you for allowing us to personally care of this nice patient.  We will continue to follow with you.    Keegan Arita MD    Primary Care Physician   [facility]    Reason for Consult   Reason for consult: I was asked by hospitalist to evaluate this patient for SOB and chest tightness.    History  of Present Illness    I had the pleasure of seeing Gogo Ramirez, 49-year-old female who has been diagnosed with EBV positive stage III non-Hodgkin's lymphoma last year.  She has been on R-CHOP therapy associated with methotrexate.  She has completed 4 cycles of R-CHOP and was admitted now for IV methotrexate infusion with fluids and Decadron.  She has been responding to this therapy.  Earlier this morning, she woke up with shortness of breath, orthopnea and some chest tightness across her anterior chest.  This was quite uncomfortable for her.  An EKG was done which revealed sinus rhythm without any ischemic changes.  Initial troponin was slightly abnormal at 0.077 but the second 1 is come back at 0.068.  She has had low hemoglobin up to 6.6 and received blood transfusion.  Now hemoglobin is 7.1.    She received IV Lasix 10 mg and with that she is feeling better.  Her N-terminal proBNP is 2706.      Echocardiogram was done today which was reviewed by me and revealed normal Bystolic function ejection fraction 55%.  She had mild to moderate mitral regurgitation.  LV diastolic function was indeterminant.  Prior to initiation of chemotherapy, her EF was normal.  At that time there was no significant mitral regurgitation.      .     Patient Active Problem List   Diagnosis     Anxiety attack     Generalized anxiety disorder     Controlled substance agreement signed     GERTRUDE (stress urinary incontinence, female)     Intractable migraine without aura and with status migrainosus     Menstrual headache     Menopausal disorder     Mixed hyperlipidemia     SVT (supraventricular tachycardia) (H)     Mediastinal mass     Diffuse large B-cell lymphoma of extranodal site (H) - per preliminary pathology from [facility] 11/27/19 - mediastinal mass wrapped around azalea and bilateral main stem bronchi     Bronchial compression     Neutropenic fever (H)     Acute kidney failure, unspecified (H)     Respiratory failure (H)      Lymphoma (H)     DLBCL (diffuse large B cell lymphoma) (H)       Past Medical History   I have reviewed this patient's medical history and updated it with pertinent information if needed.   Past Medical History:   Diagnosis Date     Anxiety attack 9/16/2014     Diffuse large B-cell lymphoma (H)     Diagnosed 11/2019     Encounter for Essure implantation 2009     Generalized anxiety disorder 9/16/2014    zoloft = flat emotions     Menopausal disorder     started on OCPs by menopause center 3/2017 (takes active continuously)     Menstrual headache     helped by OCPs and magnesium     GERTRUDE (stress urinary incontinence, female)     sling procedure 2016     SVT (supraventricular tachycardia) (H)        Past Surgical History   I have reviewed this patient's surgical history and updated it with pertinent information if needed.  Past Surgical History:   Procedure Laterality Date     H KIT ESSURE  2009    essure - Dr. Cailin Raymundo      HERNIORRHAPHY UMBILICAL  1974     IR CHEST PORT PLACEMENT > 5 YRS OF AGE  1/9/2020     SLING TRANSPUBO WITHOUT ANTERIOR COLPORRHAPHY N/A 11/21/2016    Procedure: SLING TRANSPUBO WITHOUT ANTERIOR COLPORRHAPHY;  Surgeon: Hernesto Berrios MD;  Location: [facility] OR       Prior to Admission Medications   Prior to Admission Medications   Prescriptions Last Dose Informant Patient Reported? Taking?   LORazepam 0.5 MG PO tablet Unknown at Unknown time  No No   Sig: Take 1-2 tablets (0.5-1 mg) by mouth every 6 hours as needed (Breakthrough Nausea / Vomiting)   SENNA PO Unknown at Unknown time  Yes No   Sig: Take 1 tablet by mouth as needed    acyclovir (ZOVIRAX) 400 MG tablet 2/21/2020 at am  No Yes   Sig: Take 1 tablet (400 mg) by mouth 2 times daily   atovaquone 750 MG/5ML PO suspension 2/20/2020 at Unknown time  Yes Yes   Sig: Take 1,500 mg by mouth daily   dronabinol (MARINOL) 2.5 MG capsule 2/20/2020 at pm  No Yes   Sig: Take 1 capsule (2.5 mg) by mouth 2 times daily (before meals)   famotidine 20  MG PO tablet Past Week at Unknown time  Yes Yes   Sig: Take 20 mg by mouth 2 times daily as needed   lidocaine-prilocaine (EMLA) 2.5-2.5 % external cream Unknown at Unknown time  No No   Sig: Apply topically as needed for moderate pain   ondansetron (ZOFRAN-ODT) 8 MG ODT tab Unknown at Unknown time  No No   Sig: Take 1 tablet (8 mg) by mouth every 8 hours as needed   potassium chloride ER (K-DUR/KLOR-CON M) 20 MEQ CR tablet 2/21/2020 at am  No Yes   Sig: Take 2 tablets (40 mEq) by mouth daily   prochlorperazine (COMPAZINE) 10 MG tablet Unknown at Unknown time  No No   Sig: Take 1 tablet (10 mg) by mouth every 6 hours as needed (Breakthrough Nausea/Vomiting)   sertraline 50 MG PO tablet 2/21/2020 at am  Yes Yes   Sig: Take 50 mg by mouth daily      Facility-Administered Medications: None     Current Facility-Administered Medications   Medication Dose Route Frequency     acyclovir  400 mg Oral BID     dronabinol  2.5 mg Oral BID AC     heparin  5 mL Intracatheter Q28 Days     heparin lock flush  5-10 mL Intracatheter Q24H     leucovorin calcium  25 mg Intravenous Q6H     potassium chloride ER  40 mEq Oral Daily     sertraline  50 mg Oral Daily     sodium chloride (PF)  3 mL Intracatheter Q8H     Current Facility-Administered Medications   Medication Last Rate     - MEDICATION INSTRUCTIONS -       sodium chloride       Allergies   Allergies   Allergen Reactions     Cold & Flu [Cold Defense Fighter]      See pseudoephedrine     Seasonal Allergies      Sudafed Cold-Cough [Dayquil Liquicaps]      Pseudoephedrine Rash     Rash then skins peels off        Social History    reports that she has never smoked. She has never used smokeless tobacco. She reports that she does not drink alcohol or use drugs.    Family History   Family History   Problem Relation Age of Onset     Hypertension Mother      Depression Mother      Lipids Mother      Cardiovascular Mother      Circulatory Mother      Diabetes Mother      Heart Disease  "Mother      Cerebrovascular Disease Mother      Obesity Mother      C.A.D. Mother      Lung Cancer Mother         smoker     Eye Disorder Father         cone dystrophy     Macular Degeneration Father      Diabetes Maternal Aunt         type 2     Hypertension Maternal Aunt      Heart Disease Maternal Grandfather      Heart Disease Sister         high cholesterol       Macular Degeneration Sister        Review of Systems   The comprehensive 10 point Review of Systems is negative other than noted in the HPI or here.     Physical Exam   Vital Signs with Ranges  Temp:  [97.4  F (36.3  C)-98.3  F (36.8  C)] 98.3  F (36.8  C)  Pulse:  [87-95] 87  Heart Rate:  [] 91  Resp:  [16-20] 16  BP: (121-144)/(77-93) 138/89  SpO2:  [95 %-99 %] 97 %  Wt Readings from Last 4 Encounters:   02/21/20 68.6 kg (151 lb 4.8 oz)   02/07/20 70.8 kg (156 lb)   02/04/20 72.1 kg (159 lb)   01/31/20 71.4 kg (157 lb 8 oz)     I/O last 3 completed shifts:  In: 2951 [P.O.:240; I.V.:2411]  Out: 5525 [Urine:5525]      Vitals: /89   Pulse 87   Temp 98.3  F (36.8  C) (Oral)   Resp 16   Ht 1.702 m (5' 7.01\")   Wt 68.6 kg (151 lb 4.8 oz)   SpO2 97%   BMI 23.69 kg/m      Constitutional:   awake   Eyes:   extra-ocular muscles intact   ENT:   atramatic   Neck:   no jugular venous distension   Hematologic / Lymphatic:   no cervical lymphadenopathy   Back:   symmetric   Lungs:   clear to auscultation   Cardiovascular:   normal S1 and S2, no murmur   Abdomen:   soft and non-distended     Neurologic:   Motor Exam:  moves all extremities well and symmetrically   Neuropsychiatric:   Orientation: oriented to self, place, time and situation   Skin:   no lesions       Recent Labs   Lab 02/23/20  0530   TROPI 0.077*       Recent Labs   Lab 02/23/20  0545 02/23/20  0530 02/22/20  1830 02/22/20  0620 02/21/20  1025   WBC 4.5  --   --  6.9 5.3   HGB 7.1*  --  7.7* 6.6* 7.2*   MCV 97  --   --  95 97   *  --   --  121* 109*     --   --  142 " 140   POTASSIUM 3.4  --  3.8 3.1* 3.9   CHLORIDE 111*  --   --  107 110*   CO2 31  --   --  33* 27   BUN 9  --   --  9 8   CR 0.84  --   --  0.84 0.92   GFRESTIMATED 80  --   --  81 72   GFRESTBLACK >90  --   --  >90 84   ANIONGAP 2*  --   --  2* 3   AFSHAN 8.5  --   --  8.6 9.4   GLC 91  --   --  100* 99   ALBUMIN 3.1*  --   --   --  3.6   PROTTOTAL 5.3*  --   --   --  6.1*   BILITOTAL 0.7  --   --   --  0.9   ALKPHOS 72  --   --   --  78   ALT 36  --   --   --  25   AST 29  --   --   --  21   TROPI  --  0.077*  --   --   --      Recent Labs   Lab Test 07/27/18  0921 09/24/15  1033 09/16/14  1034   CHOL 233* 206* 173   HDL 38* 55 49*   LDL Cannot estimate LDL when triglyceride exceeds 400 mg/dL  116* 120 98   TRIG 523* 123 129   CHOLHDLRATIO  --  3.6 3.5     Recent Labs   Lab 02/23/20  0545 02/22/20  1830 02/22/20  0620 02/21/20  1025   WBC 4.5  --  6.9 5.3   HGB 7.1* 7.7* 6.6* 7.2*   HCT 22.7*  --  19.7* 22.0*   MCV 97  --  95 97   *  --  121* 109*     No results for input(s): PH, PHV, PO2, PO2V, SAT, PCO2, PCO2V, HCO3, HCO3V in the last 168 hours.  No results for input(s): NTBNPI, NTBNP in the last 168 hours.  No results for input(s): DD in the last 168 hours.  No results for input(s): SED, CRP in the last 168 hours.  Recent Labs   Lab 02/23/20  0545 02/22/20  0620 02/21/20  1025   * 121* 109*     No results for input(s): TSH in the last 168 hours.  Recent Labs   Lab 02/23/20  0205   URINEPH 8.0*       Imaging:  Recent Results (from the past 48 hour(s))   XR Chest Port 1 View    Narrative    EXAM: CHEST SINGLE VIEW PORTABLE  LOCATION: [facility]  DATE/TIME: 02/23/2020, 5:39 AM    INDICATION: Shortness of breath.  COMPARISON: 11/23/2019.    FINDINGS: Mildly increased interstitial opacities in the lungs. Normal size cardiac silhouette. Right anterior chest wall port with catheter entering the right internal jugular vein and distal tip in the superior vena cava.      Impression     IMPRESSION: Mildly increased interstitial opacities in the lungs, likely relating to interstitial pulmonary edema.          Echo:  No results found for this or any previous visit (from the past 4320 hour(s)).

## 2020-02-24 VITALS
RESPIRATION RATE: 16 BRPM | WEIGHT: 154.1 LBS | BODY MASS INDEX: 24.19 KG/M2 | SYSTOLIC BLOOD PRESSURE: 142 MMHG | OXYGEN SATURATION: 97 % | TEMPERATURE: 98.4 F | HEIGHT: 67 IN | HEART RATE: 88 BPM | DIASTOLIC BLOOD PRESSURE: 89 MMHG

## 2020-02-24 LAB
ANION GAP SERPL CALCULATED.3IONS-SCNC: 4 MMOL/L (ref 3–14)
BASOPHILS # BLD AUTO: 0 10E9/L (ref 0–0.2)
BASOPHILS NFR BLD AUTO: 0.9 %
BUN SERPL-MCNC: 13 MG/DL (ref 7–30)
CALCIUM SERPL-MCNC: 9 MG/DL (ref 8.5–10.1)
CHLORIDE SERPL-SCNC: 105 MMOL/L (ref 94–109)
CO2 SERPL-SCNC: 30 MMOL/L (ref 20–32)
CREAT SERPL-MCNC: 0.84 MG/DL (ref 0.52–1.04)
DIFFERENTIAL METHOD BLD: ABNORMAL
EOSINOPHIL # BLD AUTO: 0 10E9/L (ref 0–0.7)
EOSINOPHIL NFR BLD AUTO: 0.6 %
ERYTHROCYTE [DISTWIDTH] IN BLOOD BY AUTOMATED COUNT: 19.1 % (ref 10–15)
GFR SERPL CREATININE-BSD FRML MDRD: 81 ML/MIN/{1.73_M2}
GLUCOSE SERPL-MCNC: 91 MG/DL (ref 70–99)
HCT VFR BLD AUTO: 25.4 % (ref 35–47)
HGB BLD-MCNC: 8.2 G/DL (ref 11.7–15.7)
IMM GRANULOCYTES # BLD: 0.1 10E9/L (ref 0–0.4)
IMM GRANULOCYTES NFR BLD: 2.8 %
LYMPHOCYTES # BLD AUTO: 0.4 10E9/L (ref 0.8–5.3)
LYMPHOCYTES NFR BLD AUTO: 11.6 %
MCH RBC QN AUTO: 30.9 PG (ref 26.5–33)
MCHC RBC AUTO-ENTMCNC: 32.3 G/DL (ref 31.5–36.5)
MCV RBC AUTO: 96 FL (ref 78–100)
MONOCYTES # BLD AUTO: 0.1 10E9/L (ref 0–1.3)
MONOCYTES NFR BLD AUTO: 3.1 %
MTX SERPL-SCNC: 0.15 UMOL/L
NEUTROPHILS # BLD AUTO: 2.6 10E9/L (ref 1.6–8.3)
NEUTROPHILS NFR BLD AUTO: 81 %
NRBC # BLD AUTO: 0 10*3/UL
NRBC BLD AUTO-RTO: 0 /100
PH UR STRIP: 8 PH (ref 5–7)
PLATELET # BLD AUTO: 147 10E9/L (ref 150–450)
POTASSIUM SERPL-SCNC: 3.5 MMOL/L (ref 3.4–5.3)
RBC # BLD AUTO: 2.65 10E12/L (ref 3.8–5.2)
SODIUM SERPL-SCNC: 139 MMOL/L (ref 133–144)
WBC # BLD AUTO: 3.2 10E9/L (ref 4–11)

## 2020-02-24 PROCEDURE — 80299 QUANTITATIVE ASSAY DRUG: CPT | Performed by: PHYSICIAN ASSISTANT

## 2020-02-24 PROCEDURE — 85025 COMPLETE CBC W/AUTO DIFF WBC: CPT | Performed by: PHYSICIAN ASSISTANT

## 2020-02-24 PROCEDURE — 25800029 ZZH RX IP 258 OP 250: Performed by: INTERNAL MEDICINE

## 2020-02-24 PROCEDURE — 80048 BASIC METABOLIC PNL TOTAL CA: CPT | Performed by: PHYSICIAN ASSISTANT

## 2020-02-24 PROCEDURE — 25000128 H RX IP 250 OP 636: Performed by: INTERNAL MEDICINE

## 2020-02-24 PROCEDURE — 81003 URINALYSIS AUTO W/O SCOPE: CPT | Performed by: INTERNAL MEDICINE

## 2020-02-24 PROCEDURE — 99232 SBSQ HOSP IP/OBS MODERATE 35: CPT | Performed by: PHYSICIAN ASSISTANT

## 2020-02-24 PROCEDURE — 25000132 ZZH RX MED GY IP 250 OP 250 PS 637: Performed by: INTERNAL MEDICINE

## 2020-02-24 PROCEDURE — 99238 HOSP IP/OBS DSCHRG MGMT 30/<: CPT | Performed by: INTERNAL MEDICINE

## 2020-02-24 PROCEDURE — 99231 SBSQ HOSP IP/OBS SF/LOW 25: CPT | Performed by: INTERNAL MEDICINE

## 2020-02-24 PROCEDURE — 25000125 ZZHC RX 250: Performed by: INTERNAL MEDICINE

## 2020-02-24 RX ORDER — LEUCOVORIN CALCIUM 15 MG/1
15 TABLET ORAL EVERY 6 HOURS
Qty: 12 TABLET | Refills: 0 | Status: ON HOLD | OUTPATIENT
Start: 2020-02-24 | End: 2020-04-11

## 2020-02-24 RX ORDER — FUROSEMIDE 20 MG
20 TABLET ORAL DAILY
Qty: 30 TABLET | Refills: 0 | Status: ON HOLD | OUTPATIENT
Start: 2020-02-25 | End: 2020-04-08

## 2020-02-24 RX ORDER — PROCHLORPERAZINE MALEATE 10 MG
10 TABLET ORAL EVERY 6 HOURS PRN
Qty: 30 TABLET | Refills: 0 | Status: ON HOLD | OUTPATIENT
Start: 2020-02-24 | End: 2020-06-14

## 2020-02-24 RX ADMIN — ACYCLOVIR 400 MG: 400 TABLET ORAL at 08:23

## 2020-02-24 RX ADMIN — POTASSIUM CHLORIDE 40 MEQ: 1500 TABLET, EXTENDED RELEASE ORAL at 08:23

## 2020-02-24 RX ADMIN — LEUCOVORIN CALCIUM 25 MG: 350 INJECTION, POWDER, LYOPHILIZED, FOR SOLUTION INTRAMUSCULAR; INTRAVENOUS at 08:24

## 2020-02-24 RX ADMIN — HEPARIN 5 ML: 100 SYRINGE at 14:22

## 2020-02-24 RX ADMIN — FUROSEMIDE 20 MG: 20 TABLET ORAL at 08:23

## 2020-02-24 RX ADMIN — LEUCOVORIN CALCIUM 25 MG: 350 INJECTION, POWDER, LYOPHILIZED, FOR SOLUTION INTRAMUSCULAR; INTRAVENOUS at 02:39

## 2020-02-24 RX ADMIN — SERTRALINE HYDROCHLORIDE 50 MG: 50 TABLET ORAL at 08:23

## 2020-02-24 RX ADMIN — SODIUM BICARBONATE: 84 INJECTION, SOLUTION INTRAVENOUS at 06:04

## 2020-02-24 RX ADMIN — HEPARIN, PORCINE (PF) 10 UNIT/ML INTRAVENOUS SYRINGE 5 ML: at 08:24

## 2020-02-24 ASSESSMENT — ACTIVITIES OF DAILY LIVING (ADL)
ADLS_ACUITY_SCORE: 12

## 2020-02-24 ASSESSMENT — MIFFLIN-ST. JEOR: SCORE: 1356.74

## 2020-02-24 NOTE — PLAN OF CARE
Patient alert and oriented x4. Up independent in room. Voiding adequate amounts. On tele, SR. IV fluids running at 75ml/hr. Denies chest pain, and SOB. Urine pH at 0245 resulted at 8.0. Discharge pending methotrexate level.

## 2020-02-24 NOTE — PROGRESS NOTES
[facility]    Cardiology Progress Note    Primary Cardiologist: Dr. Malachi Finn    Date of Admission: 02/21/2020  Service date: 02/24/2020    Summary:  Ms. Kassie Ramirez is a very pleasant 49 year old female with a history of EBV positive stage III non-Hodgkin's lymphoma diagnosed last year on R-CHOP therapy associated with methotrexate who was admitted on 2/21/2020 for chemotherapy. Cardiology was consulted due to shortness of breath and chest tightness suspected secondary to acute HFpEF with fluid overload in the setting of IV fluids and a blood transfusion with her chemotherapy.     Assessment & Plan   1. Acute congestive heart failure with preserved ejection fraction    Developed symptoms of chest tightness and shortness of breath likely related to fluid overload in setting of receiving IV fluids with her IV methotrexate and after receiving a blood transfusion. She also has underlying anemia likely contributed.      NT proBNP was elevated to 2706 and chest x-ray showed mild pulmonary congestions consistent mild fluid overload.     Symptoms have now resolved following diuresis with IV Lasix, which was transitioned to PO Lasix 20 mg once daily. Suspect she is now near euvolemic.    Avoid IV fluids when able. If a blood transfusion is given again, would recommend giving a low-dose of IV Lasix with it.   2. Mild to moderate mitral regurgitation    There is no valve prolapse. Continue to monitor and repeat an echocardiogram in few months.  3. Non-Hodgkin's lymphoma, on chemotherapy and IV methotrexate    Anemia likely from chemotherapy and the disease.  4. Mildly elevated troponin    Troponins of 0.07, 0.06, and 0.04. Unlikely acute coronary syndrome. More likely type II leak or demand ischemia precipitated by underlying severe anemia and mild heart failure.  5.  History of paroxysmal SVT    Plan:   1. Continue p.o. lasix 20 mg once daily.  2. Remains mildly hypertensive. Consider starting  carvedilol 3.125 mg p.o. twice daily.  3. Cardiology will sign off. Please do not hesitate to call with questions.  4. Will arrange follow-up with cardiology in 1 to 2 weeks in [place] post discharge with a basic metabolic panel beforehand.    Interval History   No acute events overnight.  She reports feeling well this morning.  She denies further symptoms of chest tightness or shortness of breath.  She has not had lower extremity edema and denies orthopnea or PND.    Telemetry:   NSR with rates around 80-90 bpm    Thank you for the opportunity to participate in this pleasant patient's care.     Reynaldo Carlos NP  Text Page  (8am - 5pm, M-F)    Patient Active Problem List   Diagnosis     Anxiety attack     Generalized anxiety disorder     Controlled substance agreement signed     GERTRUDE (stress urinary incontinence, female)     Intractable migraine without aura and with status migrainosus     Menstrual headache     Menopausal disorder     Mixed hyperlipidemia     SVT (supraventricular tachycardia) (H)     Mediastinal mass     Diffuse large B-cell lymphoma of extranodal site (H) - per preliminary pathology from [facility] 11/27/19 - mediastinal mass wrapped around azalea and bilateral main stem bronchi     Bronchial compression     Neutropenic fever (H)     Acute kidney failure, unspecified (H)     Respiratory failure (H)     Lymphoma (H)     DLBCL (diffuse large B cell lymphoma) (H)     Physical Exam   Temp: 96.1  F (35.6  C) Temp src: Oral BP: (!) 138/98 Pulse: 92 Heart Rate: 94 Resp: 16 SpO2: 99 % O2 Device: None (Room air)    Vitals:    02/21/20 0851 02/24/20 0600   Weight: 68.6 kg (151 lb 4.8 oz) 69.9 kg (154 lb 1.6 oz)     Vital Signs with Ranges  Temp:  [96.1  F (35.6  C)-98.3  F (36.8  C)] 96.1  F (35.6  C)  Pulse:  [71-92] 92  Heart Rate:  [87-94] 94  Resp:  [16] 16  BP: (133-145)/() 138/98  SpO2:  [98 %-99 %] 99 %  I/O last 3 completed shifts:  In: -   Out: 2977  [Urine:5150]    Constitutional: Appears her stated age, well nourished, and in no acute distress. Her  is at the bedside.  Eyes: Pupils equal, round. Sclerae anicteric.   HEENT: Normocephalic, atraumatic.   Neck: Supple. No JVD appreciated.  Respiratory: Breathing non-labored. Lungs clear to auscultation bilaterally. No crackles or wheezes.  Cardiovascular: Regular rate and rhythm, normal S1 and S2. No murmur, rub, or gallop.  Skin: Warm, dry.   Musculoskeletal/Extremities: Moves all extremities well and symmetrically. No lower extremity edema bilaterally  Neurologic: No gross focal deficits. Alert and oriented to person, place and time.  Psychiatric: Affect appropriate. Mentation normal.    Medications     - MEDICATION INSTRUCTIONS -       IV infusion builder WITH additives 75 mL/hr at 02/24/20 0604     sodium chloride         acyclovir  400 mg Oral BID     dronabinol  2.5 mg Oral BID AC     furosemide  20 mg Oral Daily     heparin  5 mL Intracatheter Q28 Days     heparin lock flush  5-10 mL Intracatheter Q24H     leucovorin calcium  25 mg Intravenous Q6H     potassium chloride ER  40 mEq Oral Daily     sertraline  50 mg Oral Daily     sodium chloride (PF)  3 mL Intracatheter Q8H     Data   Results for orders placed or performed during the hospital encounter of 02/21/20 (from the past 24 hour(s))   Ph urine   Result Value Ref Range    pH Urine 8.0 (H) 5.0 - 7.0 pH   Troponin I   Result Value Ref Range    Troponin I ES 0.045 0.000 - 0.045 ug/L   Ph urine   Result Value Ref Range    pH Urine 8.0 (H) 5.0 - 7.0 pH   Ph urine   Result Value Ref Range    pH Urine 8.0 (H) 5.0 - 7.0 pH   CBC with platelets differential   Result Value Ref Range    WBC 3.2 (L) 4.0 - 11.0 10e9/L    RBC Count 2.65 (L) 3.8 - 5.2 10e12/L    Hemoglobin 8.2 (L) 11.7 - 15.7 g/dL    Hematocrit 25.4 (L) 35.0 - 47.0 %    MCV 96 78 - 100 fl    MCH 30.9 26.5 - 33.0 pg    MCHC 32.3 31.5 - 36.5 g/dL    RDW 19.1 (H) 10.0 - 15.0 %    Platelet Count  147 (L) 150 - 450 10e9/L    Diff Method Automated Method     % Neutrophils 81.0 %    % Lymphocytes 11.6 %    % Monocytes 3.1 %    % Eosinophils 0.6 %    % Basophils 0.9 %    % Immature Granulocytes 2.8 %    Nucleated RBCs 0 0 /100    Absolute Neutrophil 2.6 1.6 - 8.3 10e9/L    Absolute Lymphocytes 0.4 (L) 0.8 - 5.3 10e9/L    Absolute Monocytes 0.1 0.0 - 1.3 10e9/L    Absolute Eosinophils 0.0 0.0 - 0.7 10e9/L    Absolute Basophils 0.0 0.0 - 0.2 10e9/L    Abs Immature Granulocytes 0.1 0 - 0.4 10e9/L    Absolute Nucleated RBC 0.0    Basic metabolic panel   Result Value Ref Range    Sodium 139 133 - 144 mmol/L    Potassium 3.5 3.4 - 5.3 mmol/L    Chloride 105 94 - 109 mmol/L    Carbon Dioxide 30 20 - 32 mmol/L    Anion Gap 4 3 - 14 mmol/L    Glucose 91 70 - 99 mg/dL    Urea Nitrogen 13 7 - 30 mg/dL    Creatinine 0.84 0.52 - 1.04 mg/dL    GFR Estimate 81 >60 mL/min/[1.73_m2]    GFR Estimate If Black >90 >60 mL/min/[1.73_m2]    Calcium 9.0 8.5 - 10.1 mg/dL       This note was completed in part using Dragon voice recognition software. Although reviewed after completion, some word and grammatical errors may occur.

## 2020-02-24 NOTE — PROGRESS NOTES
[facility]    Hematology/Oncology Follow-up Note      Today's Date: 02/24/20  Date of Admission:  2/21/2020  Reason for Consult: DLBCL, admitted for HD methotrexate      ASSESSMENT/PLAN:  Kassie Ramirez is a 49 year old female with:     DLBCL: EBV positive, non-GSP, stage III, intermediate risk disease currently on R CHOP and high-dose methotrexate for CNS prophylaxis.  She completed R CHOP cycle 4 on 2/7/2020 and received second dose of high-dose methotrexate during this hospitalization on 2/21/2020.  Methotrexate level is acceptable for discharge today at 0.15.  I spoke with Dr. Castro and I will send her home with leucovorin 15 mg every 6 hours until we repeat her methotrexate level again in follow-up on 2/27.  Discussed this with the patient and she is in agreement with the plan.  I will see her on 2/27 for R CHOP cycle 5.  --Okay for discharge today and prescription leucovorin sent to [facility] pharmacy    SOB: Likely fluid overload given IV hydration with leucovorin and significant improvement since starting Lasix.  Anemia likely contributing as well.  Breathing is back to baseline.  Repeat echocardiogram with preserved ejection fraction.We will need to watch closely with IV hydration and I have canceled her appointment for IV fluids and our infusion center tomorrow.  I will see her on 2/27 and we will determine whether or not she would benefit from IV fluids the following week.    Heme: Pancytopenia secondary to chemotherapy, see above.  Hemoglobin dropped to 6.6 on 2/22 and she received a unit of blood same day.  Hemoglobin has recovered to 8.2 today and she is feeling well.  Platelets are stable and ANC normal.  Okay to discharge and we will follow closely this week.      Discussed with Dr Castro and he agreed with the plan above.       INTERIM HISTORY: Sadia was seen in her room, resting comfortably in bed.   at bedside.  Shortness of breath is significantly improved since  discontinuing IV fluids and adding Lasix to the regimen.  She was able to walk the halls without difficulty today.  She is doing well and ready for discharge.       MEDICATIONS:  Current Facility-Administered Medications   Medication     acyclovir (ZOVIRAX) tablet 400 mg     albuterol (PROAIR HFA/PROVENTIL HFA/VENTOLIN HFA) 108 (90 Base) MCG/ACT inhaler 1-2 puff     albuterol (PROVENTIL) neb solution 2.5 mg     alum & mag hydroxide-simethicone (MAALOX  ES) suspension 30 mL     diphenhydrAMINE (BENADRYL) injection 50 mg     dronabinol (MARINOL) capsule 2.5 mg     EPINEPHrine PF (ADRENALIN) injection 0.3 mg     furosemide (LASIX) tablet 20 mg     heparin 100 UNIT/ML injection 5 mL     heparin lock flush 10 UNIT/ML injection 5-10 mL     heparin lock flush 10 UNIT/ML injection 5-10 mL     hypromellose-dextran (ARTIFICAL TEARS) 0.1-0.3 % ophthalmic solution 1 drop     leucovorin calcium injection 25 mg     lidocaine (LMX4) cream     lidocaine 1 % 0.1-1 mL     LORazepam (ATIVAN) injection 0.5-1 mg     LORazepam (ATIVAN) tablet 0.5-1 mg     magnesium sulfate 4 g in 100 mL sterile water (premade)     MEDICATION INSTRUCTION     meperidine (DEMEROL) injection 25 mg     methylPREDNISolone sodium succinate (solu-MEDROL) injection 125 mg     metoclopramide (REGLAN) tablet 10 mg    Or     metoclopramide (REGLAN) injection 10 mg     NaCl 0.45 % 1,000 mL with sodium bicarbonate 100 mEq/L infusion     naloxone (NARCAN) injection 0.1-0.4 mg     nitroGLYcerin (NITROSTAT) sublingual tablet 0.4 mg     ondansetron (ZOFRAN-ODT) ODT tab 4 mg    Or     ondansetron (ZOFRAN) injection 4 mg     potassium chloride (KLOR-CON) Packet 20-40 mEq     potassium chloride 10 mEq in 100 mL intermittent infusion with 10 mg lidocaine     potassium chloride 10 mEq in 100 mL sterile water intermittent infusion (premix)     potassium chloride 20 mEq in 50 mL intermittent infusion     potassium chloride ER (KLOR-CON M) CR tablet 20-40 mEq     potassium  "chloride ER (KLOR-CON M) CR tablet 40 mEq     prochlorperazine (COMPAZINE) injection 10 mg    Or     prochlorperazine (COMPAZINE) tablet 10 mg    Or     prochlorperazine (COMPAZINE) Suppository 25 mg     sertraline (ZOLOFT) tablet 50 mg     sodium bicarbonate 8.4 % intermittent infusion 50 mEq     sodium chloride (PF) 0.9% PF flush 10-20 mL     sodium chloride (PF) 0.9% PF flush 10-20 mL     sodium chloride (PF) 0.9% PF flush 3 mL     sodium chloride (PF) 0.9% PF flush 3 mL     sodium chloride 0.9% infusion         ALLERGIES:  Allergies   Allergen Reactions     Cold & Flu [Cold Defense Fighter]      See pseudoephedrine     Seasonal Allergies      Sudafed Cold-Cough [Dayquil Liquicaps]      Pseudoephedrine Rash     Rash then skins peels off          PHYSICAL EXAM:  Vital signs:  Temp: 96.1  F (35.6  C) Temp src: Oral BP: (!) 138/98 Pulse: 92 Heart Rate: 94 Resp: 16 SpO2: 99 % O2 Device: None (Room air)   Height: 170.2 cm (5' 7.01\") Weight: 69.9 kg (154 lb 1.6 oz)  Estimated body mass index is 24.13 kg/m  as calculated from the following:    Height as of this encounter: 1.702 m (5' 7.01\").    Weight as of this encounter: 69.9 kg (154 lb 1.6 oz).      GENERAL:  Female, in no acute distress.  Alert and oriented x3.   HEENT:  Normocephalic, atraumatic.  PERRL, oropharynx clear with no sores or thrush. Alopecia.   LUNGS:   Nonlabored breathing  SKIN: No rash on exposed skin  PSYCH: Mood stable, smiling      LABS:  CBC RESULTS:   Recent Labs   Lab Test 02/24/20  0640   WBC 3.2*   RBC 2.65*   HGB 8.2*   HCT 25.4*   MCV 96   MCH 30.9   MCHC 32.3   RDW 19.1*   *       Recent Labs   Lab Test 02/24/20  0640 02/23/20  0545    144   POTASSIUM 3.5 3.4   CHLORIDE 105 111*   CO2 30 31   ANIONGAP 4 2*   GLC 91 91   BUN 13 9   CR 0.84 0.84   AFSHAN 9.0 8.5         Padmaja Sanchez PA-C  Hematology/Oncology  [facility]      "

## 2020-02-24 NOTE — DISCHARGE SUMMARY
[facility]  Hospitalist Discharge Summary       Date of Admission:  2/21/2020  Date of Discharge:  2/24/2020  Discharging Provider: Gonzalez Ramires MD      Discharge Diagnoses   Diffuse large B-cell lymphoma  Admission for high-dose methotrexate  Acute pulmonary edema  Chemotherapy induced pancytopenia    Follow-ups Needed After Discharge   Follow-up Appointments     Follow-up and recommended labs and tests       Follow-up with oncology and cardiology as directed.             Discharge Disposition   Discharged to home  Condition at discharge: Stable    Hospital Course     Diffuse large B cell lymphoma with admission for high-dose methotrexate for CNS prophylaxis  Patient was admitted to receive high-dose methotrexate.  She received leucovorin per oncology.  Serum methotrexate levels were monitored and when deemed appropriate, patient was discharged home.  Serum methotrexate levels were repeated on 2/27.  She will take leucovorin until that time.    Acute pulmonary edema  Patient had an episode of shortness of breath which improved after IV Lasix.  Echocardiogram was checked and showed normal EF.  Diastolic Doppler findings were indeterminate.  Cardiology was consulted for further recommendations.  Patient will be discharged on 20 mg Lasix daily.  She will follow-up with cardiology.    Chemotherapy-induced pancytopenia  Hemoglobin dropped as low as 6.6 and patient received 1 blood transfusion.  On the day of discharge, hemoglobin was stable at 8.2, WBC 3.2 and platelets 147.  Will have labs rechecked with oncology in the outpatient setting.    Consultations This Hospital Stay   HEMATOLOGY & ONCOLOGY IP CONSULT  CARE COORDINATOR IP CONSULT  HEMATOLOGY & ONCOLOGY IP CONSULT  CARDIOLOGY IP CONSULT  CARDIOLOGY IP CONSULT    Code Status   Prior    Time Spent on this Encounter   I, Gonzalez Ramires MD, personally saw the patient today and spent less than or equal to 30 minutes discharging this  patient.       Gonzalez Ramires MD  [facility]  ______________________________________________________________________    Physical Exam   Vital Signs: Temp: 98.4  F (36.9  C) Temp src: Oral BP: (!) 142/89 Pulse: 88 Heart Rate: 94 Resp: 16 SpO2: 97 % O2 Device: None (Room air)    Weight: 154 lbs 1.6 oz      General: No distress, looks comfortable.     HEENT: No scleral icterus.    Neck: Supple.    Pulmonary: Normal work of breathing. Clear to auscultation bilaterally.    Cardiovascular: Regular rate and rhythm without murmur or extra heart sounds.    Abdomen: Soft and non-tender.    Extremities: No peripheral edema.    Neurologic: Awake, alert, appropriate.    Skin: Warm and dry.    Psychiatric: Normal affect           Primary Care Physician   Mary Alice Colón    Discharge Orders      Basic metabolic panel     Methotrexate level     CBC with platelets differential     Discharge Order: F/U with Cardiac  N[facility]      Follow-Up with Cardiologist      Reason for your hospital stay    You were admitted and monitored for high-dose methotrexate infusion.  Because of all the IV fluids he received during the hospitalization, you did have an episode of shortness of breath related to excess fluid around the lungs.  This improved after Lasix was given.  We checked an echocardiogram and it shows that your heart squeeze was normal.  When the methotrexate level returned back at the appropriate value and your breathing had improved, we were able to discharge you.  You will continue to take leucovorin and Lasix on discharge.     Follow-up and recommended labs and tests     Follow-up with oncology and cardiology as directed.     Activity    Your activity upon discharge: activity as tolerated     Echocardiogram Complete    Administration of IV contrast will be tailored to this examination per the appropriate written protocol listed in the Echocardiography department Protocol Book, or by the supervising Cardiologist.  This may result in an order change.    Use of contrast is at the discretion of the supervising Cardiologist.     Diet    Follow this diet upon discharge:  Regular Diet       Significant Results and Procedures   Most Recent 3 CBC's:  Recent Labs   Lab Test 20  0640 20  0545 20  1830 20  0620   WBC 3.2* 4.5  --  6.9   HGB 8.2* 7.1* 7.7* 6.6*   MCV 96 97  --  95   * 129*  --  121*     Most Recent 3 BMP's:  Recent Labs   Lab Test 20  0640 20  0545 20  1830 20  0620    144  --  142   POTASSIUM 3.5 3.4 3.8 3.1*   CHLORIDE 105 111*  --  107   CO2 30 31  --  33*   BUN 13 9  --  9   CR 0.84 0.84  --  0.84   ANIONGAP 4 2*  --  2*   AFSHAN 9.0 8.5  --  8.6   GLC 91 91  --  100*     Most Recent 2 LFT's:  Recent Labs   Lab Test 20  0545 20  1025   AST 29 21   ALT 36 25   ALKPHOS 72 78   BILITOTAL 0.7 0.9   ,   Results for orders placed or performed during the hospital encounter of 20   XR Chest Port 1 View    Narrative    EXAM: CHEST SINGLE VIEW PORTABLE  LOCATION: [facility]  DATE/TIME: 2020, 5:39 AM    INDICATION: Shortness of breath.  COMPARISON: 2019.    FINDINGS: Mildly increased interstitial opacities in the lungs. Normal size cardiac silhouette. Right anterior chest wall port with catheter entering the right internal jugular vein and distal tip in the superior vena cava.      Impression    IMPRESSION: Mildly increased interstitial opacities in the lungs, likely relating to interstitial pulmonary edema.      Echo Limited    Narrative    [id number]  [id number]  [id number]  [id number]^JAMES^BAM^GERONIMO           [facility]  Echocardiography Laboratory  [address] [place] [address]        Name: BABAR VARGHESE  MRN: [id number]  : 1970  Study Date: 2020 08:38 AM  Age: 49 yrs  Gender: Female  Patient Location: [facility]  Reason For Study: Chest Pain  Ordering Physician: BAM RAMIREZ  Physician: Mary Alice Colón  Performed By: Cora Springer     BSA: 1.8 m2  Height: 67 in  Weight: 151 lb  BP: 144/93 mmHg  _____________________________________________________________________________  __        Procedure  Limited Portable Echo Adult.  _____________________________________________________________________________  __        Interpretation Summary     Left ventricular systolic function is normal.  LVEF 55% based on biplane 2D tracing.  Diastolic Doppler findings (E/E' ratio and/or other parameters) suggest left  ventricular filling pressures are indeterminate.  There is mild to moderate (1-2+) mitral regurgitation. This is new compared to  study from 11/19.  The study was technically adequate.  _____________________________________________________________________________  __        Left Ventricle  The left ventricle is normal in size. There is normal left ventricular wall  thickness. Left ventricular systolic function is normal. LVEF 55% based on  biplane 2D tracing. Diastolic Doppler findings (E/E' ratio and/or other  parameters) suggest left ventricular filling pressures are indeterminate. No  regional wall motion abnormalities noted.     Right Ventricle  The right ventricle is normal size. The right ventricular systolic function is  normal.     Mitral Valve  There is mild to moderate (1-2+) mitral regurgitation.     Tricuspid Valve  There is trace tricuspid regurgitation. Right ventricular systolic pressure  could not be approximated due to inadequate tricuspid regurgitation. IVC  diameter <2.1 cm collapsing >50% with sniff suggests a normal RA pressure of 3  mmHg.        Aortic Valve  The aortic valve is trileaflet. There is mild trileaflet aortic sclerosis. No  aortic regurgitation is present. No aortic stenosis is present.     Pulmonic Valve  The pulmonic valve is not well visualized.     Pericardium  There is no pericardial effusion.     Rhythm  Sinus rhythm was noted.      _____________________________________________________________________________  __  MMode/2D Measurements & Calculations  IVSd: 0.81 cm  LVIDd: 4.4 cm  LVIDs: 3.1 cm  LVPWd: 0.83 cm  FS: 30.3 %  LV mass(C)d: 114.0 grams  LV mass(C)dI: 63.5 grams/m2  RWT: 0.38           Doppler Measurements & Calculations  MV E max gunjan: 78.1 cm/sec  MV A max gunjan: 68.9 cm/sec  MV E/A: 1.1  MV dec slope: 792.1 cm/sec2  MV dec time: 0.10 sec  E/E' avg: 10.4  Lateral E/e': 9.7  Medial E/e': 11.1           _____________________________________________________________________________  __           Report approved by: Eyal Lucio 02/23/2020 10:31 AM            Discharge Medications   Discharge Medication List as of 2/24/2020  2:17 PM      START taking these medications    Details   furosemide (LASIX) 20 MG tablet Take 1 tablet (20 mg) by mouth daily, Disp-30 tablet, R-0, E-Prescribe      leucovorin 15 MG tablet Take 1 tablet (15 mg) by mouth every 6 hours for 3 days, Disp-12 tablet, R-0, E-Prescribe         CONTINUE these medications which have CHANGED    Details   prochlorperazine (COMPAZINE) 10 MG tablet Take 1 tablet (10 mg) by mouth every 6 hours as needed (Breakthrough Nausea/Vomiting), Disp-30 tablet, R-0, E-Prescribe         CONTINUE these medications which have NOT CHANGED    Details   acyclovir (ZOVIRAX) 400 MG tablet Take 1 tablet (400 mg) by mouth 2 times daily, Disp-60 tablet, R-3, E-Prescribe      famotidine 20 MG PO tablet Take 20 mg by mouth 2 times daily as needed, Historical      lidocaine-prilocaine (EMLA) 2.5-2.5 % external cream Apply topically as needed for moderate painDisp-30 g, [id number]      LORazepam 0.5 MG PO tablet Take 1-2 tablets (0.5-1 mg) by mouth every 6 hours as needed (Breakthrough Nausea / Vomiting), Disp-30 tablet, R-0, E-Prescribe      ondansetron (ZOFRAN-ODT) 8 MG ODT tab Take 1 tablet (8 mg) by mouth every 8 hours as needed, Disp-45 tablet, R-0, E-Prescribe      potassium chloride ER  (K-BAMBI/KLOR-CON M) 20 MEQ CR tablet Take 2 tablets (40 mEq) by mouth daily, Disp-60 tablet, R-3, E-Prescribe      SENNA PO Take 1 tablet by mouth as needed , Historical      sertraline 50 MG PO tablet Take 50 mg by mouth daily, Historical         STOP taking these medications       atovaquone 750 MG/5ML PO suspension Comments:   Reason for Stopping:         dronabinol (MARINOL) 2.5 MG capsule Comments:   Reason for Stopping:             Allergies   Allergies   Allergen Reactions     Cold & Flu [Cold Defense Fighter]      See pseudoephedrine     Seasonal Allergies      Sudafed Cold-Cough [Dayquil Liquicaps]      Pseudoephedrine Rash     Rash then skins peels off

## 2020-02-24 NOTE — PROVIDER NOTIFICATION
Dr oropeza paged: MTX level 0.15. Per oncology patient can discharge. Oncology sending script for leucovorin for patient to take orally at home.

## 2020-02-24 NOTE — PLAN OF CARE
Pt remains admitted for chemo  A/Ox4  No pain or nausea reported  On leucovorin   Additional dose of PO lasix given   On tele, SR  Trops trending down   Port in place, blood return noted, patent  Urine Ph WDL  Echo performed  Up independently   Regular diet  Discharge pending  Will continue to monitor

## 2020-02-24 NOTE — PLAN OF CARE
AVS discussed with patient & spouse at bedside. Follow up information provided. Educated on heart failure, importance of maintaining medications, daily weights, and sodium intake. Patient verbalizes understanding. Patient discharging with compazine & lasix filled here, and having leucovorin sent to her house. Patient discharging via private vehicle with spouse. All belongings sent with patient.

## 2020-02-25 ENCOUNTER — TELEPHONE (OUTPATIENT)
Dept: FAMILY MEDICINE | Facility: CLINIC | Age: 50
End: 2020-02-25

## 2020-02-25 ENCOUNTER — TELEPHONE (OUTPATIENT)
Dept: CARDIOLOGY | Facility: CLINIC | Age: 50
End: 2020-02-25

## 2020-02-25 LAB — INTERPRETATION ECG - MUSE: NORMAL

## 2020-02-25 NOTE — TELEPHONE ENCOUNTER
Patient was evaluated by cardiology while inpatient for SOB and chest pain-acute HF secondary to fluid overload due to IV Methotrexate and blood transfusion in presence of NHL. IV Lasix diuresed and transitioned to po prior to discharge. Mildly hypertensive this admission so started on Coreg. Writer attempted to call patient to discuss any post hospital d/c questions, review discharge medication, and confirm f/u appts, but no answer. VM left to return my phone call. RN will confirm with patient that she has an apt scheduled with [facility] Zayda Almanzar on 3/4/20, with labs prior. Will remind patient to weigh self every AM, after waking and using the restroom, but before breakfast and medications. Call clinic for a weight gain of 2 lbs overnight or 5 lbs in a week. Low Na+ diet to encouraged. Pt to be instructed to bring daily wt/BP diary and medications with to f/u OVDenise Alegria RN.

## 2020-02-25 NOTE — TELEPHONE ENCOUNTER
"Attempt # 1  Called #   Telephone Information:   Mobile [phone]     Pt noting no recommendation to see Primary, routing to PCP to advise if need to be seen for follow up.   Next 5 appointments (look out 90 days)    Feb 27, 2020  8:00 AM CST  Return Visit with Padmaja Sharpe PA-C  [facility] [address]  [facility] [address]  [phone]   Mar 13, 2020 10:40 AM CDT  Return Visit with Shawnee Parada MD  [facility] [address]  [facility] [address]  [phone]              Hospital/TCU/ED for chronic condition Discharge Protocol    \"Hi, my name is Herberth Felton RN, a registered nurse, and I am calling from [facility].  I am calling to follow up and see how things are going for you after your recent emergency visit/hospital/TCU stay.\"    Tell me how you are doing now that you are home?\" doing ok      Discharge Instructions    \"Let's review your discharge instructions.  What is/are the follow-up recommendations?  Pt. Response: Follow up with cardiology in 2 weeks and in 3 months after chemo is over.  Oncology Thursday.    \"Has an appointment with your primary care provider been scheduled?\"   No (schedule appointment)    \"When you see the provider, I would recommend that you bring your medications with you.\"    Medications    \"Tell me what changed about your medicines when you discharged?\"    Changes to chronic meds?    0-1    \"What questions do you have about your medications?\"    None     New diagnoses of heart failure, COPD, diabetes, or MI?    Yes - Care Coordination Referral needed, CHF d/t methotrexate              Post Discharge Medication Reconciliation Status: discharge medications reconciled and changed, per note/orders (see AVS).    Was MTM referral placed (*Make sure to put transitions as reason for " "referral)?   No    Call Summary    \"What questions or concerns do you have about your recent visit and your follow-up care?\"     none    \"If you have questions or things don't continue to improve, we encourage you contact us through the main clinic number (give number).  Even if the clinic is not open, triage nurses are available 24/7 to help you.     We would like you to know that our clinic has extended hours (provide information).  We also have urgent care (provide details on closest location and hours/contact info)\"      \"Thank you for your time and take care!\"       Herberth Felton RN   [facility] Triage              "

## 2020-02-25 NOTE — TELEPHONE ENCOUNTER
Patient discharged from [facility] for inpatient hospital stay on 2/24 for diffuse large b-cell lymphoma of extranodal site, acute on chronic heart failure with preserved ejection fraction.    Please contact patient to follow up; no appointment scheduled at this time.    ER / IP:  1/5    Care Coordination:  Not a candidate      Fadia Cárdenas

## 2020-02-25 NOTE — PROGRESS NOTES
Oncology/Hematology Visit Note    Feb 27, 2020    Reason for visit: Follow up DLBCL     Oncology HPI: Kassie Ramirez is a 49 year old female with DLBCL.  She developed a cough in early 2019 and symptoms were progressive for over 5 months.  CXR and CT chest did reveal some lymphadenopathy and she was initially treated with antibiotics.  Bronchoscopy revealed nonnecrotizing granulomatous inflammation, but negative for malignancy.  Follow-up CT November 2019 revealed worsening of the mediastinal and bilateral hilar lymphadenopathy.  Mediastinoscopy obtained and preliminary pathology was EBV positive DLBCL.  FISH was negative for high risk translocations.  She presented to the ED on 11/27/2019 with worsening SOB and was admitted at the [facility] and R CHOP cycle 1 was initiated on 11/29/2019.  She was admitted at [facility] on 12/15/2019 for neutropenic fever with sepsis and suspecting hospital-acquired pneumonia, PJV pneumonia, blood cultures were negative.  She established care with Dr. Castro on 12/27/2019 and she received MR-CHOP cycle 2 same day. RCHOP C3 was 1/17/2020 and PET scan on 2/4/2020 with significant improvement of her adenopathy consistent with treatment response.  She received R CHOP cycle 4 on 2/7/2020 and recently admitted for high-dose methotrexate on 2/21/2020.  She developed shortness of breath and was likely due to fluid overload, therefore IV hydration was decreased and she was given Lasix.  She was on leucovorin rescue and discharged on oral leucovorin.  Methotrexate level 0.15 at discharge.  Hemoglobin dropped to 6.6 and she was given a unit of blood.    She is here today for close follow-up, RCHOP cycle 5 and recheck methotrexate level.    Interval History: Sadia is here unaccompanied today. She is feeling much better since discharge and is tolerating the lasix well. Her SOB has significantly improved. She has been drinking and eating well. She denies chest pain, new fatigue, fever,  chills, mouth sores, vomiting, diarrhea, bleeding.    Review of Systems: See interval hx. Denies fevers, chills, HA, dizziness, n/t, changes in vision, cough, sore throat, CP, abdominal pain, N/V, diarrhea, changes in urination, bleeding, bruising, rash.      PMHx and Social Hx reviewed per EPIC.      Medications:  Current Outpatient Medications   Medication Sig Dispense Refill     acyclovir (ZOVIRAX) 400 MG tablet Take 1 tablet (400 mg) by mouth 2 times daily 60 tablet 3     famotidine 20 MG PO tablet Take 20 mg by mouth 2 times daily as needed       furosemide (LASIX) 20 MG tablet Take 1 tablet (20 mg) by mouth daily 30 tablet 0     leucovorin 15 MG tablet Take 1 tablet (15 mg) by mouth every 6 hours for 3 days 12 tablet 0     lidocaine-prilocaine (EMLA) 2.5-2.5 % external cream Apply topically as needed for moderate pain 30 g 3     LORazepam 0.5 MG PO tablet Take 1-2 tablets (0.5-1 mg) by mouth every 6 hours as needed (Breakthrough Nausea / Vomiting) 30 tablet 0     ondansetron (ZOFRAN-ODT) 8 MG ODT tab Take 1 tablet (8 mg) by mouth every 8 hours as needed 45 tablet 0     potassium chloride ER (K-DUR/KLOR-CON M) 20 MEQ CR tablet Take 2 tablets (40 mEq) by mouth daily 60 tablet 3     prochlorperazine (COMPAZINE) 10 MG tablet Take 1 tablet (10 mg) by mouth every 6 hours as needed (Breakthrough Nausea/Vomiting) 30 tablet 0     SENNA PO Take 1 tablet by mouth as needed        sertraline 50 MG PO tablet Take 50 mg by mouth daily         Allergies   Allergen Reactions     Cold & Flu [Cold Defense Fighter]      See pseudoephedrine     Seasonal Allergies      Sudafed Cold-Cough [Dayquil Liquicaps]      Pseudoephedrine Rash     Rash then skins peels off        EXAM:    BP (!) 127/93   Pulse 137   Temp 98.9  F (37.2  C) (Tympanic)   Resp 16   Wt 68.9 kg (152 lb)   SpO2 98%   BMI 23.80 kg/m      GENERAL:  Female, in no acute distress.  Alert and oriented x3.   HEENT:  Normocephalic, atraumatic.  PERRL, oropharynx  clear with no sores or thrush.   LYMPH NODES:  No palpable pre/post-auricular, cervical, axillary lymphadenopathy appreciated.  CV:  Tachycardic, No murmurs, gallops, or rubs.   LUNGS:  Clear to auscultation bilaterally.   ABDOMEN:  Soft, nontender and nondistended.  Bowel sounds heard x4.  No apparent hepatosplenomegaly.   EXTREMITIES:  No clubbing, cyanosis, or edema.   SKIN: No rash, port without erythema, pus or tenderness. Pale in color.  PSYCH: Mood stable      Labs:   Results for BABAR VARGHESE (MRN [id number]) as of 2/27/2020 08:37   2/27/2020 07:47   Sodium 138   Potassium 3.9   Chloride 106   Carbon Dioxide 26   Urea Nitrogen 20   Creatinine 0.87   GFR Estimate 77   GFR Estimate If Black 90   Calcium 10.1   Anion Gap 6   Albumin 3.9   Protein Total 7.1   Bilirubin Total 1.0   Alkaline Phosphatase 70   ALT 75 (H)   AST 36   Glucose 102 (H)   WBC 2.3 (L)   Hemoglobin 9.2 (L)   Hematocrit 27.5 (L)   Platelet Count 167   RBC Count 2.98 (L)   MCV 92   MCH 30.9   MCHC 33.5   RDW 17.5 (H)   Diff Method Automated Method   % Neutrophils 76.7   % Lymphocytes 10.5   % Monocytes 6.6   % Eosinophils 1.3   % Basophils 1.8   % Immature Granulocytes 3.1   Nucleated RBCs 0   Absolute Neutrophil 1.8   Absolute Lymphocytes 0.2 (L)   Absolute Monocytes 0.2   Absolute Eosinophils 0.0   Absolute Basophils 0.0   Abs Immature Granulocytes 0.1   Absolute Nucleated RBC 0.0       Imaging: n/a    Impression/Plan: Babar Varghese is a 49 year old female with DLBCL currently undergoing curative intent chemotherapy with MR-CHOP.    DLBCL: Stage III, EBV+, non-GCB, IPI score 2 (low-intermediate), FISH negative for high risk translocations.  R CHOP cycle 1 completed inpatient on 11/29/2019 and HD methotrexate was added to cycle 2 for CNS prophylaxis, given on 1/20/2020.  She has struggled with fatigue more with the addition of methotrexate.  R CHOP cycle 3 on 1/17/2020 with severe fatigue and shortness of breath, hemoglobin 6.1 and  she received 2 units of blood on 1/29/2020.  PET scan on 2/4/2020 with treatment response.  Methotrexate with C5 given on 2/21/2020. Labs are within treatment parameters to proceed with R-CHOP cycle 5 today.  We will support with IV fluids her blood and we will monitor closely.  Prescription Magic mouthwash sent to the pharmacy today.  She will call the clinic with any questions or concerns.  --3/18 Dr Castro, R CHOP cycle 6    CV: Tachycardic at 137 on arrival today she is not having any palpitations or anxiety symptoms.  EKG with sinus tachycardia at 111.  Computer read atrial flutter, but stat read to cardiology confirmed sinus tachycardia.  For this reason, we will proceed with chemotherapy today.  Shortness of breath has significantly improved since the start of chemotherapy and treatment response noted on PET scan.  She was admitted for shortness of breath with concern for fluid overload and currently on Lasix, she will continue this and no new symptoms today.  Ultrasound for bilateral DVT was negative on 2/7/2020.    Heme: Hemoglobin 6.1 on 1/29/2020 and she received 2 units of blood.  Hemoglobin is stable at 9.2 today.  WBC low at 2.3, but ANC is normal at 1.8.  Platelets are normal at 167.  We will continue to monitor her counts closely. Plan for blood transfusion if hemoglobin <7.0, bleeding or severe fatigue.  Plan for platelet transfusion if platelets <20 or bleeding. She will continue Neulasta support.    Fatigue: Secondary to chemotherapy and anemia.  Persistent and fluctuating with her counts and methotrexate infusion.  We will monitor closely.    FEN: Electrolytes are normal today, appetite stable and weight stable.    Mood: When she has severe fatigue and noted to be anemic, she was extremely tearful and weak.  She has met with her psychologist and she is doing much better.  No indication for Madi Garcia at this time.    Chart documentation with Dragon Voice recognition Software. Although reviewed  after completion, some words and grammatical errors may remain.      Padmaja Sanchez PA-C  Hematology/Oncology  [facility]

## 2020-02-26 NOTE — TELEPHONE ENCOUNTER
Second attempt at trying to contact pt, and again, no answer. VM left to return my phone call. CRESENCIO Alegria RN.

## 2020-02-27 ENCOUNTER — INFUSION THERAPY VISIT (OUTPATIENT)
Dept: INFUSION THERAPY | Facility: CLINIC | Age: 50
End: 2020-02-27
Attending: PHYSICIAN ASSISTANT
Payer: COMMERCIAL

## 2020-02-27 ENCOUNTER — HOSPITAL ENCOUNTER (OUTPATIENT)
Facility: CLINIC | Age: 50
Setting detail: SPECIMEN
Discharge: HOME | End: 2020-02-27
Attending: PHYSICIAN ASSISTANT | Admitting: PHYSICIAN ASSISTANT
Payer: COMMERCIAL

## 2020-02-27 ENCOUNTER — ONCOLOGY VISIT (OUTPATIENT)
Dept: ONCOLOGY | Facility: CLINIC | Age: 50
End: 2020-02-27
Attending: PHYSICIAN ASSISTANT
Payer: COMMERCIAL

## 2020-02-27 VITALS
WEIGHT: 152 LBS | OXYGEN SATURATION: 98 % | SYSTOLIC BLOOD PRESSURE: 127 MMHG | BODY MASS INDEX: 23.8 KG/M2 | RESPIRATION RATE: 16 BRPM | HEART RATE: 137 BPM | TEMPERATURE: 98.9 F | DIASTOLIC BLOOD PRESSURE: 93 MMHG

## 2020-02-27 DIAGNOSIS — C83.398 DIFFUSE LARGE B-CELL LYMPHOMA OF EXTRANODAL SITE: Primary | ICD-10-CM

## 2020-02-27 DIAGNOSIS — R00.0 TACHYCARDIA: ICD-10-CM

## 2020-02-27 DIAGNOSIS — C83.30 DIFFUSE LARGE B-CELL LYMPHOMA, UNSPECIFIED BODY REGION (H): ICD-10-CM

## 2020-02-27 LAB
ALBUMIN SERPL-MCNC: 3.9 G/DL (ref 3.4–5)
ALP SERPL-CCNC: 70 U/L (ref 40–150)
ALT SERPL W P-5'-P-CCNC: 75 U/L (ref 0–50)
ANION GAP SERPL CALCULATED.3IONS-SCNC: 6 MMOL/L (ref 3–14)
AST SERPL W P-5'-P-CCNC: 36 U/L (ref 0–45)
BASOPHILS # BLD AUTO: 0 10E9/L (ref 0–0.2)
BASOPHILS NFR BLD AUTO: 1.8 %
BILIRUB SERPL-MCNC: 1 MG/DL (ref 0.2–1.3)
BUN SERPL-MCNC: 20 MG/DL (ref 7–30)
CALCIUM SERPL-MCNC: 10.1 MG/DL (ref 8.5–10.1)
CHLORIDE SERPL-SCNC: 106 MMOL/L (ref 94–109)
CO2 SERPL-SCNC: 26 MMOL/L (ref 20–32)
CREAT SERPL-MCNC: 0.87 MG/DL (ref 0.52–1.04)
DIFFERENTIAL METHOD BLD: ABNORMAL
EOSINOPHIL # BLD AUTO: 0 10E9/L (ref 0–0.7)
EOSINOPHIL NFR BLD AUTO: 1.3 %
ERYTHROCYTE [DISTWIDTH] IN BLOOD BY AUTOMATED COUNT: 17.5 % (ref 10–15)
GFR SERPL CREATININE-BSD FRML MDRD: 77 ML/MIN/{1.73_M2}
GLUCOSE SERPL-MCNC: 102 MG/DL (ref 70–99)
HCT VFR BLD AUTO: 27.5 % (ref 35–47)
HGB BLD-MCNC: 9.2 G/DL (ref 11.7–15.7)
IMM GRANULOCYTES # BLD: 0.1 10E9/L (ref 0–0.4)
IMM GRANULOCYTES NFR BLD: 3.1 %
LYMPHOCYTES # BLD AUTO: 0.2 10E9/L (ref 0.8–5.3)
LYMPHOCYTES NFR BLD AUTO: 10.5 %
MCH RBC QN AUTO: 30.9 PG (ref 26.5–33)
MCHC RBC AUTO-ENTMCNC: 33.5 G/DL (ref 31.5–36.5)
MCV RBC AUTO: 92 FL (ref 78–100)
MONOCYTES # BLD AUTO: 0.2 10E9/L (ref 0–1.3)
MONOCYTES NFR BLD AUTO: 6.6 %
MTX SERPL-SCNC: <0.04 UMOL/L
NEUTROPHILS # BLD AUTO: 1.8 10E9/L (ref 1.6–8.3)
NEUTROPHILS NFR BLD AUTO: 76.7 %
NRBC # BLD AUTO: 0 10*3/UL
NRBC BLD AUTO-RTO: 0 /100
PLATELET # BLD AUTO: 167 10E9/L (ref 150–450)
POTASSIUM SERPL-SCNC: 3.9 MMOL/L (ref 3.4–5.3)
PROT SERPL-MCNC: 7.1 G/DL (ref 6.8–8.8)
RBC # BLD AUTO: 2.98 10E12/L (ref 3.8–5.2)
SODIUM SERPL-SCNC: 138 MMOL/L (ref 133–144)
WBC # BLD AUTO: 2.3 10E9/L (ref 4–11)

## 2020-02-27 PROCEDURE — 96367 TX/PROPH/DG ADDL SEQ IV INF: CPT

## 2020-02-27 PROCEDURE — 96417 CHEMO IV INFUS EACH ADDL SEQ: CPT

## 2020-02-27 PROCEDURE — 96413 CHEMO IV INFUSION 1 HR: CPT

## 2020-02-27 PROCEDURE — 25800030 ZZH RX IP 258 OP 636: Performed by: PHYSICIAN ASSISTANT

## 2020-02-27 PROCEDURE — 25000128 H RX IP 250 OP 636: Performed by: PHYSICIAN ASSISTANT

## 2020-02-27 PROCEDURE — 96415 CHEMO IV INFUSION ADDL HR: CPT

## 2020-02-27 PROCEDURE — 80053 COMPREHEN METABOLIC PANEL: CPT | Performed by: PHYSICIAN ASSISTANT

## 2020-02-27 PROCEDURE — 25000132 ZZH RX MED GY IP 250 OP 250 PS 637: Performed by: PHYSICIAN ASSISTANT

## 2020-02-27 PROCEDURE — G0463 HOSPITAL OUTPT CLINIC VISIT: HCPCS | Mod: 25

## 2020-02-27 PROCEDURE — 96411 CHEMO IV PUSH ADDL DRUG: CPT

## 2020-02-27 PROCEDURE — 99214 OFFICE O/P EST MOD 30 MIN: CPT | Performed by: PHYSICIAN ASSISTANT

## 2020-02-27 PROCEDURE — 96375 TX/PRO/DX INJ NEW DRUG ADDON: CPT

## 2020-02-27 PROCEDURE — 85025 COMPLETE CBC W/AUTO DIFF WBC: CPT | Performed by: PHYSICIAN ASSISTANT

## 2020-02-27 PROCEDURE — 80299 QUANTITATIVE ASSAY DRUG: CPT | Performed by: PHYSICIAN ASSISTANT

## 2020-02-27 PROCEDURE — 96377 APPLICATON ON-BODY INJECTOR: CPT | Mod: XS

## 2020-02-27 PROCEDURE — 93005 ELECTROCARDIOGRAM TRACING: CPT

## 2020-02-27 RX ORDER — DIPHENHYDRAMINE HYDROCHLORIDE AND LIDOCAINE HYDROCHLORIDE AND ALUMINUM HYDROXIDE AND MAGNESIUM HYDRO
5-10 KIT EVERY 6 HOURS PRN
Qty: 120 ML | Refills: 1 | Status: ON HOLD | OUTPATIENT
Start: 2020-02-27 | End: 2020-04-08

## 2020-02-27 RX ORDER — MEPERIDINE HYDROCHLORIDE 25 MG/ML
25 INJECTION INTRAMUSCULAR; INTRAVENOUS; SUBCUTANEOUS EVERY 30 MIN PRN
Status: CANCELLED | OUTPATIENT
Start: 2020-02-28

## 2020-02-27 RX ORDER — DIPHENHYDRAMINE HCL 25 MG
50 CAPSULE ORAL ONCE
Status: CANCELLED
Start: 2020-02-28

## 2020-02-27 RX ORDER — ACETAMINOPHEN 325 MG/1
650 TABLET ORAL ONCE
Status: COMPLETED | OUTPATIENT
Start: 2020-02-27 | End: 2020-02-27

## 2020-02-27 RX ORDER — NALOXONE HYDROCHLORIDE 0.4 MG/ML
.1-.4 INJECTION, SOLUTION INTRAMUSCULAR; INTRAVENOUS; SUBCUTANEOUS
Status: CANCELLED | OUTPATIENT
Start: 2020-02-28

## 2020-02-27 RX ORDER — SODIUM CHLORIDE 9 MG/ML
1000 INJECTION, SOLUTION INTRAVENOUS CONTINUOUS PRN
Status: CANCELLED
Start: 2020-02-28

## 2020-02-27 RX ORDER — DOXORUBICIN HYDROCHLORIDE 2 MG/ML
50 INJECTION, SOLUTION INTRAVENOUS ONCE
Status: COMPLETED | OUTPATIENT
Start: 2020-02-27 | End: 2020-02-27

## 2020-02-27 RX ORDER — DOXORUBICIN HYDROCHLORIDE 2 MG/ML
50 INJECTION, SOLUTION INTRAVENOUS ONCE
Status: CANCELLED | OUTPATIENT
Start: 2020-02-28

## 2020-02-27 RX ORDER — EPINEPHRINE 0.3 MG/.3ML
0.3 INJECTION SUBCUTANEOUS EVERY 5 MIN PRN
Status: CANCELLED | OUTPATIENT
Start: 2020-02-28

## 2020-02-27 RX ORDER — DIPHENHYDRAMINE HYDROCHLORIDE 50 MG/ML
50 INJECTION INTRAMUSCULAR; INTRAVENOUS
Status: CANCELLED
Start: 2020-02-28

## 2020-02-27 RX ORDER — PALONOSETRON 0.05 MG/ML
0.25 INJECTION, SOLUTION INTRAVENOUS ONCE
Status: CANCELLED
Start: 2020-02-28

## 2020-02-27 RX ORDER — PALONOSETRON 0.05 MG/ML
0.25 INJECTION, SOLUTION INTRAVENOUS ONCE
Status: COMPLETED | OUTPATIENT
Start: 2020-02-27 | End: 2020-02-27

## 2020-02-27 RX ORDER — EPINEPHRINE 1 MG/ML
0.3 INJECTION, SOLUTION INTRAMUSCULAR; SUBCUTANEOUS EVERY 5 MIN PRN
Status: CANCELLED | OUTPATIENT
Start: 2020-02-28

## 2020-02-27 RX ORDER — ALBUTEROL SULFATE 90 UG/1
1-2 AEROSOL, METERED RESPIRATORY (INHALATION)
Status: CANCELLED
Start: 2020-02-28

## 2020-02-27 RX ORDER — ALBUTEROL SULFATE 0.83 MG/ML
2.5 SOLUTION RESPIRATORY (INHALATION)
Status: CANCELLED | OUTPATIENT
Start: 2020-02-28

## 2020-02-27 RX ORDER — MEPERIDINE HYDROCHLORIDE 25 MG/ML
25 INJECTION INTRAMUSCULAR; INTRAVENOUS; SUBCUTANEOUS
Status: CANCELLED
Start: 2020-02-28

## 2020-02-27 RX ORDER — LORAZEPAM 2 MG/ML
0.5 INJECTION INTRAMUSCULAR EVERY 4 HOURS PRN
Status: CANCELLED
Start: 2020-02-28

## 2020-02-27 RX ORDER — PREDNISONE 50 MG/1
50 TABLET ORAL 2 TIMES DAILY
Qty: 10 TABLET | Refills: 0 | Status: ON HOLD | OUTPATIENT
Start: 2020-02-27 | End: 2020-04-08

## 2020-02-27 RX ORDER — METHYLPREDNISOLONE SODIUM SUCCINATE 125 MG/2ML
125 INJECTION, POWDER, LYOPHILIZED, FOR SOLUTION INTRAMUSCULAR; INTRAVENOUS
Status: CANCELLED
Start: 2020-02-28

## 2020-02-27 RX ORDER — DIPHENHYDRAMINE HCL 25 MG
50 CAPSULE ORAL ONCE
Status: COMPLETED | OUTPATIENT
Start: 2020-02-27 | End: 2020-02-27

## 2020-02-27 RX ORDER — ACETAMINOPHEN 325 MG/1
650 TABLET ORAL ONCE
Status: CANCELLED
Start: 2020-02-28

## 2020-02-27 RX ADMIN — DOXORUBICIN HYDROCHLORIDE 90 MG: 2 INJECTION, SOLUTION INTRAVENOUS at 11:16

## 2020-02-27 RX ADMIN — SODIUM CHLORIDE 250 ML: 9 INJECTION, SOLUTION INTRAVENOUS at 09:08

## 2020-02-27 RX ADMIN — RITUXIMAB 700 MG: 10 INJECTION, SOLUTION INTRAVENOUS at 09:41

## 2020-02-27 RX ADMIN — PALONOSETRON 0.25 MG: 0.05 INJECTION, SOLUTION INTRAVENOUS at 09:09

## 2020-02-27 RX ADMIN — VINCRISTINE SULFATE 2 MG: 1 INJECTION, SOLUTION INTRAVENOUS at 11:21

## 2020-02-27 RX ADMIN — FOSAPREPITANT 150 MG: 150 INJECTION, POWDER, LYOPHILIZED, FOR SOLUTION INTRAVENOUS at 09:09

## 2020-02-27 RX ADMIN — ACETAMINOPHEN 650 MG: 325 TABLET ORAL at 09:09

## 2020-02-27 RX ADMIN — PEGFILGRASTIM 6 MG: KIT SUBCUTANEOUS at 11:32

## 2020-02-27 RX ADMIN — DIPHENHYDRAMINE HYDROCHLORIDE 50 MG: 25 CAPSULE ORAL at 09:16

## 2020-02-27 RX ADMIN — CYCLOPHOSPHAMIDE 1365 MG: 1 INJECTION, POWDER, FOR SOLUTION INTRAVENOUS; ORAL at 11:30

## 2020-02-27 ASSESSMENT — PAIN SCALES - GENERAL: PAINLEVEL: NO PAIN (0)

## 2020-02-27 NOTE — PROGRESS NOTES
Infusion Nursing Note:  Kassie Ramirez presents today for RCHOP.    Patient seen by provider today: Yes:    present during visit today: Not Applicable.    Note: Last time patient received methotrexate and RCHOP, she suffered form mouth sores. Patient will try sucking on ice during push chemos today and magic mouthwash was ordered in case she needs it.    Intravenous Access:  Labs drawn without difficulty.  Implanted Port.    Treatment Conditions:  Lab Results   Component Value Date    HGB 9.2 02/27/2020     Lab Results   Component Value Date    WBC 2.3 02/27/2020      Lab Results   Component Value Date    ANEU 1.8 02/27/2020     Lab Results   Component Value Date     02/27/2020      Lab Results   Component Value Date     02/27/2020                   Lab Results   Component Value Date    POTASSIUM 3.9 02/27/2020           Lab Results   Component Value Date    MAG 2.0 01/11/2020            Lab Results   Component Value Date    CR 0.87 02/27/2020                   Lab Results   Component Value Date    AFSHAN 10.1 02/27/2020                Lab Results   Component Value Date    BILITOTAL 1.0 02/27/2020           Lab Results   Component Value Date    ALBUMIN 3.9 02/27/2020                    Lab Results   Component Value Date    ALT 75 02/27/2020           Lab Results   Component Value Date    AST 36 02/27/2020       Results reviewed, labs MET treatment parameters, ok to proceed with treatment.      Post Infusion Assessment:  Patient tolerated infusion without incident.  Blood return noted pre and post infusion.  Site patent and intact, free from redness, edema or discomfort.  No evidence of extravasations.  Access discontinued per protocol.       Discharge Plan:   Patient declined prescription refills.  Discharge instructions reviewed with: Patient.  Patient and/or family verbalized understanding of discharge instructions and all questions answered.  Copy of AVS reviewed with patient and/or  family.  Patient will return 3/2/2020 for next appointment.    Arti Leung RN

## 2020-02-27 NOTE — PROGRESS NOTES
Infusion Nursing Note:  Kassie Ramirez presents today for port labs.    Patient seen by provider today: Yes: Padmaja   present during visit today: Not Applicable.    Note: N/A.    Intravenous Access:  Lab draw site port, Needle type port, Gauge 20 3/4in.  Labs drawn without difficulty.  Implanted Port.    Treatment Conditions:  Not Applicable.      Post Infusion Assessment:  Patient tolerated procedure without incident.  Site patent and intact, free from redness, edema or discomfort.  No evidence of extravasations.       Discharge Plan:   To clinic for PA appointment.    TROY THOMPSON RN

## 2020-02-27 NOTE — LETTER
2/27/2020         RE: Kassie Ramirez  [address]  [place] [address]        Dear Colleague,    Thank you for referring your patient, Kassie Ramirez, to the [facility]. Please see a copy of my visit note below.    Oncology/Hematology Visit Note    Feb 27, 2020    Reason for visit: Follow up DLBCL     Oncology HPI: Kassie Ramirez is a 49 year old female with DLBCL.  She developed a cough in early 2019 and symptoms were progressive for over 5 months.  CXR and CT chest did reveal some lymphadenopathy and she was initially treated with antibiotics.  Bronchoscopy revealed nonnecrotizing granulomatous inflammation, but negative for malignancy.  Follow-up CT November 2019 revealed worsening of the mediastinal and bilateral hilar lymphadenopathy.  Mediastinoscopy obtained and preliminary pathology was EBV positive DLBCL.  FISH was negative for high risk translocations.  She presented to the ED on 11/27/2019 with worsening SOB and was admitted at the [facility] and R CHOP cycle 1 was initiated on 11/29/2019.  She was admitted at [facility] on 12/15/2019 for neutropenic fever with sepsis and suspecting hospital-acquired pneumonia, PJV pneumonia, blood cultures were negative.  She established care with Dr. Castro on 12/27/2019 and she received MR-CHOP cycle 2 same day. RCHOP C3 was 1/17/2020 and PET scan on 2/4/2020 with significant improvement of her adenopathy consistent with treatment response.  She received R CHOP cycle 4 on 2/7/2020 and recently admitted for high-dose methotrexate on 2/21/2020.  She developed shortness of breath and was likely due to fluid overload, therefore IV hydration was decreased and she was given Lasix.  She was on leucovorin rescue and discharged on oral leucovorin.  Methotrexate level 0.15 at discharge.  Hemoglobin dropped to 6.6 and she was given a unit of blood.    She is here today for close follow-up, RCHOP cycle 5 and recheck methotrexate  level.    Interval History: Sadia is here unaccompanied today. She is feeling much better since discharge and is tolerating the lasix well. Her SOB has significantly improved. She has been drinking and eating well. She denies chest pain, new fatigue, fever, chills, mouth sores, vomiting, diarrhea, bleeding.    Review of Systems: See interval hx. Denies fevers, chills, HA, dizziness, n/t, changes in vision, cough, sore throat, CP, abdominal pain, N/V, diarrhea, changes in urination, bleeding, bruising, rash.      PMHx and Social Hx reviewed per EPIC.      Medications:  Current Outpatient Medications   Medication Sig Dispense Refill     acyclovir (ZOVIRAX) 400 MG tablet Take 1 tablet (400 mg) by mouth 2 times daily 60 tablet 3     famotidine 20 MG PO tablet Take 20 mg by mouth 2 times daily as needed       furosemide (LASIX) 20 MG tablet Take 1 tablet (20 mg) by mouth daily 30 tablet 0     leucovorin 15 MG tablet Take 1 tablet (15 mg) by mouth every 6 hours for 3 days 12 tablet 0     lidocaine-prilocaine (EMLA) 2.5-2.5 % external cream Apply topically as needed for moderate pain 30 g 3     LORazepam 0.5 MG PO tablet Take 1-2 tablets (0.5-1 mg) by mouth every 6 hours as needed (Breakthrough Nausea / Vomiting) 30 tablet 0     ondansetron (ZOFRAN-ODT) 8 MG ODT tab Take 1 tablet (8 mg) by mouth every 8 hours as needed 45 tablet 0     potassium chloride ER (K-DUR/KLOR-CON M) 20 MEQ CR tablet Take 2 tablets (40 mEq) by mouth daily 60 tablet 3     prochlorperazine (COMPAZINE) 10 MG tablet Take 1 tablet (10 mg) by mouth every 6 hours as needed (Breakthrough Nausea/Vomiting) 30 tablet 0     SENNA PO Take 1 tablet by mouth as needed        sertraline 50 MG PO tablet Take 50 mg by mouth daily         Allergies   Allergen Reactions     Cold & Flu [Cold Defense Fighter]      See pseudoephedrine     Seasonal Allergies      Sudafed Cold-Cough [Dayquil Liquicaps]      Pseudoephedrine Rash     Rash then skins peels off         EXAM:    BP (!) 127/93   Pulse 137   Temp 98.9  F (37.2  C) (Tympanic)   Resp 16   Wt 68.9 kg (152 lb)   SpO2 98%   BMI 23.80 kg/m       GENERAL:  Female, in no acute distress.  Alert and oriented x3.   HEENT:  Normocephalic, atraumatic.  PERRL, oropharynx clear with no sores or thrush.   LYMPH NODES:  No palpable pre/post-auricular, cervical, axillary lymphadenopathy appreciated.  CV:  Tachycardic, No murmurs, gallops, or rubs.   LUNGS:  Clear to auscultation bilaterally.   ABDOMEN:  Soft, nontender and nondistended.  Bowel sounds heard x4.  No apparent hepatosplenomegaly.   EXTREMITIES:  No clubbing, cyanosis, or edema.   SKIN: No rash, port without erythema, pus or tenderness. Pale in color.  PSYCH: Mood stable      Labs:   Results for BABAR VARGHESE (MRN [id number]) as of 2/27/2020 08:37   2/27/2020 07:47   Sodium 138   Potassium 3.9   Chloride 106   Carbon Dioxide 26   Urea Nitrogen 20   Creatinine 0.87   GFR Estimate 77   GFR Estimate If Black 90   Calcium 10.1   Anion Gap 6   Albumin 3.9   Protein Total 7.1   Bilirubin Total 1.0   Alkaline Phosphatase 70   ALT 75 (H)   AST 36   Glucose 102 (H)   WBC 2.3 (L)   Hemoglobin 9.2 (L)   Hematocrit 27.5 (L)   Platelet Count 167   RBC Count 2.98 (L)   MCV 92   MCH 30.9   MCHC 33.5   RDW 17.5 (H)   Diff Method Automated Method   % Neutrophils 76.7   % Lymphocytes 10.5   % Monocytes 6.6   % Eosinophils 1.3   % Basophils 1.8   % Immature Granulocytes 3.1   Nucleated RBCs 0   Absolute Neutrophil 1.8   Absolute Lymphocytes 0.2 (L)   Absolute Monocytes 0.2   Absolute Eosinophils 0.0   Absolute Basophils 0.0   Abs Immature Granulocytes 0.1   Absolute Nucleated RBC 0.0       Imaging: n/a    Impression/Plan: Babar Varghese is a 49 year old female with DLBCL currently undergoing curative intent chemotherapy with MR-CHOP.    DLBCL: Stage III, EBV+, non-GCB, IPI score 2 (low-intermediate), FISH negative for high risk translocations.  R CHOP cycle 1 completed  inpatient on 11/29/2019 and HD methotrexate was added to cycle 2 for CNS prophylaxis, given on 1/20/2020.  She has struggled with fatigue more with the addition of methotrexate.  R CHOP cycle 3 on 1/17/2020 with severe fatigue and shortness of breath, hemoglobin 6.1 and she received 2 units of blood on 1/29/2020.  PET scan on 2/4/2020 with treatment response.  Methotrexate with C5 given on 2/21/2020. Labs are within treatment parameters to proceed with R-CHOP cycle 5 today.  We will support with IV fluids her blood and we will monitor closely.  Prescription Magic mouthwash sent to the pharmacy today.  She will call the clinic with any questions or concerns.  --3/18 Dr Castro, R CHOP cycle 6    CV: Tachycardic at 137 on arrival today she is not having any palpitations or anxiety symptoms.  EKG with sinus tachycardia at 111.  Computer read atrial flutter, but stat read to cardiology confirmed sinus tachycardia.  For this reason, we will proceed with chemotherapy today.  Shortness of breath has significantly improved since the start of chemotherapy and treatment response noted on PET scan.  She was admitted for shortness of breath with concern for fluid overload and currently on Lasix, she will continue this and no new symptoms today.  Ultrasound for bilateral DVT was negative on 2/7/2020.    Heme: Hemoglobin 6.1 on 1/29/2020 and she received 2 units of blood.  Hemoglobin is stable at 9.2 today.  WBC low at 2.3, but ANC is normal at 1.8.  Platelets are normal at 167.  We will continue to monitor her counts closely. Plan for blood transfusion if hemoglobin <7.0, bleeding or severe fatigue.  Plan for platelet transfusion if platelets <20 or bleeding. She will continue Neulasta support.    Fatigue: Secondary to chemotherapy and anemia.  Persistent and fluctuating with her counts and methotrexate infusion.  We will monitor closely.    FEN: Electrolytes are normal today, appetite stable and weight stable.    Mood: When she has  severe fatigue and noted to be anemic, she was extremely tearful and weak.  She has met with her psychologist and she is doing much better.  No indication for Madi Garcia at this time.    Chart documentation with Dragon Voice recognition Software. Although reviewed after completion, some words and grammatical errors may remain.      Padmaja Sanchez PA-C  Hematology/Oncology  [facility]                  Again, thank you for allowing me to participate in the care of your patient.        Sincerely,        Padmaja Sanchez PA-C

## 2020-02-27 NOTE — NURSING NOTE
"Oncology Rooming Note    February 27, 2020 8:02 AM   Kassie Ramirez is a 49 year old female who presents for:    Chief Complaint   Patient presents with     Oncology Clinic Visit     DLBCL (diffuse large B cell lymphoma)     Initial Vitals: BP (!) 127/93   Pulse 137   Temp 98.9  F (37.2  C) (Tympanic)   Resp 16   Wt 68.9 kg (152 lb)   SpO2 98%   BMI 23.80 kg/m   Estimated body mass index is 23.8 kg/m  as calculated from the following:    Height as of 2/21/20: 1.702 m (5' 7.01\").    Weight as of this encounter: 68.9 kg (152 lb). Body surface area is 1.8 meters squared.  No Pain (0) Comment: Data Unavailable   No LMP recorded.  Allergies reviewed: Yes  Medications reviewed: Yes    Medications: Medication refills not needed today.  Pharmacy name entered into [facility]:    [facility] - [place], [place] - [address] [place] [address] DRUG STORE #[id number] [facility] [place] [address] AT [place] [address] & [address]    Clinical concerns: follow up       Mary Alice Whitaker CMA              "

## 2020-02-28 RX ORDER — HEPARIN SODIUM (PORCINE) LOCK FLUSH IV SOLN 100 UNIT/ML 100 UNIT/ML
5 SOLUTION INTRAVENOUS
Status: CANCELLED | OUTPATIENT
Start: 2020-02-28

## 2020-02-28 RX ORDER — HEPARIN SODIUM,PORCINE 10 UNIT/ML
5 VIAL (ML) INTRAVENOUS
Status: CANCELLED | OUTPATIENT
Start: 2020-02-28

## 2020-02-28 NOTE — TELEPHONE ENCOUNTER
Third and final attempt at trying to contact pt, and again, no answer. VM left to return my phone call. VM left reminding pt of f/u OV as below and to bring her daily wt diary and medications to this OV. Will close this encounter. CRESENCIO Alegria RN.

## 2020-03-05 ENCOUNTER — PATIENT OUTREACH (OUTPATIENT)
Dept: ONCOLOGY | Facility: CLINIC | Age: 50
End: 2020-03-05

## 2020-03-05 ENCOUNTER — INFUSION THERAPY VISIT (OUTPATIENT)
Dept: INFUSION THERAPY | Facility: CLINIC | Age: 50
End: 2020-03-05
Attending: INTERNAL MEDICINE
Payer: COMMERCIAL

## 2020-03-05 ENCOUNTER — HOSPITAL ENCOUNTER (OUTPATIENT)
Facility: CLINIC | Age: 50
Setting detail: SPECIMEN
Discharge: HOME | End: 2020-03-05
Attending: INTERNAL MEDICINE | Admitting: INTERNAL MEDICINE
Payer: COMMERCIAL

## 2020-03-05 VITALS
SYSTOLIC BLOOD PRESSURE: 112 MMHG | DIASTOLIC BLOOD PRESSURE: 73 MMHG | TEMPERATURE: 98.1 F | RESPIRATION RATE: 16 BRPM | HEART RATE: 118 BPM | OXYGEN SATURATION: 100 %

## 2020-03-05 DIAGNOSIS — C83.398 DIFFUSE LARGE B-CELL LYMPHOMA OF EXTRANODAL SITE: Primary | ICD-10-CM

## 2020-03-05 DIAGNOSIS — C83.30 DIFFUSE LARGE B-CELL LYMPHOMA, UNSPECIFIED BODY REGION (H): ICD-10-CM

## 2020-03-05 LAB
ABO + RH BLD: NORMAL
ABO + RH BLD: NORMAL
BLD GP AB SCN SERPL QL: NORMAL
BLD PROD TYP BPU: NORMAL
BLOOD BANK CMNT PATIENT-IMP: NORMAL
DIFFERENTIAL METHOD BLD: ABNORMAL
ERYTHROCYTE [DISTWIDTH] IN BLOOD BY AUTOMATED COUNT: 17.3 % (ref 10–15)
HCT VFR BLD AUTO: 21.7 % (ref 35–47)
HGB BLD-MCNC: 7.2 G/DL (ref 11.7–15.7)
MCH RBC QN AUTO: 31.3 PG (ref 26.5–33)
MCHC RBC AUTO-ENTMCNC: 33.2 G/DL (ref 31.5–36.5)
MCV RBC AUTO: 94 FL (ref 78–100)
NUM BPU REQUESTED: 1
PLATELET # BLD AUTO: 31 10E9/L (ref 150–450)
RBC # BLD AUTO: 2.3 10E12/L (ref 3.8–5.2)
SPECIMEN EXP DATE BLD: NORMAL
WBC # BLD AUTO: 0.4 10E9/L (ref 4–11)

## 2020-03-05 PROCEDURE — 85027 COMPLETE CBC AUTOMATED: CPT

## 2020-03-05 PROCEDURE — 86923 COMPATIBILITY TEST ELECTRIC: CPT | Performed by: INTERNAL MEDICINE

## 2020-03-05 PROCEDURE — 25800030 ZZH RX IP 258 OP 636: Performed by: INTERNAL MEDICINE

## 2020-03-05 PROCEDURE — 25000128 H RX IP 250 OP 636: Performed by: PHYSICIAN ASSISTANT

## 2020-03-05 PROCEDURE — 86901 BLOOD TYPING SEROLOGIC RH(D): CPT | Performed by: INTERNAL MEDICINE

## 2020-03-05 PROCEDURE — 86850 RBC ANTIBODY SCREEN: CPT | Performed by: INTERNAL MEDICINE

## 2020-03-05 PROCEDURE — 86900 BLOOD TYPING SEROLOGIC ABO: CPT | Performed by: INTERNAL MEDICINE

## 2020-03-05 PROCEDURE — 96360 HYDRATION IV INFUSION INIT: CPT

## 2020-03-05 RX ORDER — HEPARIN SODIUM (PORCINE) LOCK FLUSH IV SOLN 100 UNIT/ML 100 UNIT/ML
5 SOLUTION INTRAVENOUS
Status: DISCONTINUED | OUTPATIENT
Start: 2020-03-05 | End: 2020-03-05 | Stop reason: HOSPADM

## 2020-03-05 RX ORDER — HEPARIN SODIUM,PORCINE 10 UNIT/ML
5 VIAL (ML) INTRAVENOUS
Status: CANCELLED | OUTPATIENT
Start: 2020-03-05

## 2020-03-05 RX ORDER — HEPARIN SODIUM (PORCINE) LOCK FLUSH IV SOLN 100 UNIT/ML 100 UNIT/ML
5 SOLUTION INTRAVENOUS
Status: CANCELLED | OUTPATIENT
Start: 2020-03-05

## 2020-03-05 RX ADMIN — SODIUM CHLORIDE 500 ML: 9 INJECTION, SOLUTION INTRAVENOUS at 14:27

## 2020-03-05 RX ADMIN — SODIUM CHLORIDE, PRESERVATIVE FREE 5 ML: 5 INJECTION INTRAVENOUS at 15:34

## 2020-03-05 NOTE — PROGRESS NOTES
S-(situation): Pt called to request sooner appointment for IVF and labs with possible blood transfusion.     B-(background): Diffuse Large B-Cell Lymphoma. She received R CHOP cycle 4 on 2/7/2020 and recently admitted for high-dose methotrexate on 2/21/2020. Pt has history of low low hemoglobin requiring transfusion. Hgb 6.1 1/29/2020 with  2 units of PRBC's; Hgb 7.1 2/23/2020    A-(assessment): Pt states she has noticed symptoms a being very tired and sleeping more, SOB and weakness. PT believes she would benefit with IVF hydration and to check a Hgb if she needs blood again.     R-(recommendations): RNCC coordinated with Charge Nurse, KING Rosas which approved space for IVF, labs and PRBC's the following day if needed. Pt informed and agrees with plan.     Erin Hernandez, CARLOSN, RN, OCN  RN Cancer Care Coordinator  [facility]  [phone]

## 2020-03-05 NOTE — PROGRESS NOTES
"Infusion Nursing Note:  Kassie Ramirez presents today for labs and IVF.    Patient seen by provider today: No   present during visit today: Not Applicable.    Note: Critical labs results WBC 0.4 and PLT 31K.  Also HGB 7.2.  Patient states she feels \"awful,\" very tired and having body aches.  Reviewed with Padmaja Sanchez.  Patient will get 1 unit PRBC tomorrow.  The ordered parameter is to give if less than 7 but per Padmaja okay to give with HGB 7.2.  Patient did get neulasta after last cycle of chemo.  Per Padmaja we will hold off on neupogen for now since she is afebrile.  Reviewed neutropenic and thrombocytopenic precautions with patient.    Intravenous Access:  Labs drawn without difficulty.  Implanted Port.    Treatment Conditions:  Lab Results   Component Value Date    HGB 7.2 03/05/2020     Lab Results   Component Value Date    WBC 0.4 03/05/2020      Lab Results   Component Value Date    ANEU 1.8 02/27/2020     Lab Results   Component Value Date    PLT 31 03/05/2020          Post Infusion Assessment:  Patient tolerated infusion without incident.  Blood return noted pre and post infusion.  Site patent and intact, free from redness, edema or discomfort.  No evidence of extravasations.  Access discontinued per protocol.       Discharge Plan:   Copy of AVS reviewed with patient and/or family.  Patient will return tomorrow for blood transfusion for next appointment.  Patient discharged in stable condition accompanied by: .  Departure Mode: Ambulatory.    Flores Cheatham RN                        "

## 2020-03-06 ENCOUNTER — INFUSION THERAPY VISIT (OUTPATIENT)
Dept: INFUSION THERAPY | Facility: CLINIC | Age: 50
End: 2020-03-06
Attending: INTERNAL MEDICINE
Payer: COMMERCIAL

## 2020-03-06 VITALS
DIASTOLIC BLOOD PRESSURE: 71 MMHG | TEMPERATURE: 98 F | RESPIRATION RATE: 16 BRPM | HEART RATE: 115 BPM | SYSTOLIC BLOOD PRESSURE: 118 MMHG | OXYGEN SATURATION: 99 %

## 2020-03-06 DIAGNOSIS — C83.398 DIFFUSE LARGE B-CELL LYMPHOMA OF EXTRANODAL SITE: Primary | ICD-10-CM

## 2020-03-06 LAB
BLD PROD TYP BPU: NORMAL
BLD UNIT ID BPU: 0
BLOOD PRODUCT CODE: NORMAL
BPU ID: NORMAL
TRANSFUSION STATUS PATIENT QL: NORMAL
TRANSFUSION STATUS PATIENT QL: NORMAL

## 2020-03-06 PROCEDURE — P9016 RBC LEUKOCYTES REDUCED: HCPCS

## 2020-03-06 PROCEDURE — 25000128 H RX IP 250 OP 636: Performed by: PHYSICIAN ASSISTANT

## 2020-03-06 PROCEDURE — 36430 TRANSFUSION BLD/BLD COMPNT: CPT

## 2020-03-06 RX ORDER — HEPARIN SODIUM (PORCINE) LOCK FLUSH IV SOLN 100 UNIT/ML 100 UNIT/ML
5 SOLUTION INTRAVENOUS
Status: DISCONTINUED | OUTPATIENT
Start: 2020-03-06 | End: 2020-03-06 | Stop reason: HOSPADM

## 2020-03-06 RX ADMIN — SODIUM CHLORIDE, PRESERVATIVE FREE 5 ML: 5 INJECTION INTRAVENOUS at 14:42

## 2020-03-06 NOTE — PROGRESS NOTES
Infusion Nursing Note:  Kassie Ramirez presents today for Blood Transfusion.    Patient seen by provider today: No   present during visit today: Not Applicable.    Note: N/A.    Intravenous Access:  Implanted Port.    Treatment Conditions:  Lab Results   Component Value Date    HGB 7.2 03/05/2020     Lab Results   Component Value Date    WBC 0.4 03/05/2020      Lab Results   Component Value Date    ANEU 1.8 02/27/2020     Lab Results   Component Value Date    PLT 31 03/05/2020      Results reviewed, labs MET treatment parameters, ok to proceed with treatment.      Post Infusion Assessment:  Patient tolerated transfusion without incident.  Site patent and intact, free from redness, edema or discomfort.  No evidence of extravasations.  Access discontinued per protocol.       Discharge Plan:   AVS to patient via MYCHART.  Patient will return 3/9 for next appointment.   Patient discharged in stable condition accompanied by: .  Departure Mode: Ambulatory.    Flores Logan RN

## 2020-03-08 DIAGNOSIS — F41.9 ANXIETY: Primary | ICD-10-CM

## 2020-03-09 ENCOUNTER — INFUSION THERAPY VISIT (OUTPATIENT)
Dept: INFUSION THERAPY | Facility: CLINIC | Age: 50
End: 2020-03-09
Attending: INTERNAL MEDICINE
Payer: COMMERCIAL

## 2020-03-09 ENCOUNTER — HOSPITAL ENCOUNTER (OUTPATIENT)
Facility: CLINIC | Age: 50
Setting detail: SPECIMEN
Discharge: HOME | End: 2020-03-09
Attending: INTERNAL MEDICINE | Admitting: NURSE PRACTITIONER
Payer: COMMERCIAL

## 2020-03-09 VITALS
OXYGEN SATURATION: 100 % | HEART RATE: 124 BPM | TEMPERATURE: 97.5 F | SYSTOLIC BLOOD PRESSURE: 133 MMHG | RESPIRATION RATE: 18 BRPM | DIASTOLIC BLOOD PRESSURE: 85 MMHG

## 2020-03-09 DIAGNOSIS — I50.33 ACUTE ON CHRONIC HEART FAILURE WITH PRESERVED EJECTION FRACTION (HFPEF) (H): ICD-10-CM

## 2020-03-09 DIAGNOSIS — C83.398 DIFFUSE LARGE B-CELL LYMPHOMA OF EXTRANODAL SITE: Primary | ICD-10-CM

## 2020-03-09 LAB
ANION GAP SERPL CALCULATED.3IONS-SCNC: 4 MMOL/L (ref 3–14)
ANISOCYTOSIS BLD QL SMEAR: ABNORMAL
BASOPHILS # BLD AUTO: 0 10E9/L (ref 0–0.2)
BASOPHILS NFR BLD AUTO: 0 %
BUN SERPL-MCNC: 12 MG/DL (ref 7–30)
CALCIUM SERPL-MCNC: 9.2 MG/DL (ref 8.5–10.1)
CHLORIDE SERPL-SCNC: 108 MMOL/L (ref 94–109)
CO2 SERPL-SCNC: 29 MMOL/L (ref 20–32)
CREAT SERPL-MCNC: 0.83 MG/DL (ref 0.52–1.04)
DACRYOCYTES BLD QL SMEAR: SLIGHT
DIFFERENTIAL METHOD BLD: ABNORMAL
DOHLE BOD BLD QL SMEAR: PRESENT
EOSINOPHIL # BLD AUTO: 0.1 10E9/L (ref 0–0.7)
EOSINOPHIL NFR BLD AUTO: 1 %
ERYTHROCYTE [DISTWIDTH] IN BLOOD BY AUTOMATED COUNT: 19.1 % (ref 10–15)
GFR SERPL CREATININE-BSD FRML MDRD: 82 ML/MIN/{1.73_M2}
GLUCOSE SERPL-MCNC: 97 MG/DL (ref 70–99)
HCT VFR BLD AUTO: 27.3 % (ref 35–47)
HGB BLD-MCNC: 8.8 G/DL (ref 11.7–15.7)
LYMPHOCYTES # BLD AUTO: 0.3 10E9/L (ref 0.8–5.3)
LYMPHOCYTES NFR BLD AUTO: 4 %
MACROCYTES BLD QL SMEAR: PRESENT
MCH RBC QN AUTO: 31 PG (ref 26.5–33)
MCHC RBC AUTO-ENTMCNC: 32.2 G/DL (ref 31.5–36.5)
MCV RBC AUTO: 96 FL (ref 78–100)
METAMYELOCYTES # BLD: 0.1 10E9/L
METAMYELOCYTES NFR BLD MANUAL: 1 %
MONOCYTES # BLD AUTO: 0.4 10E9/L (ref 0–1.3)
MONOCYTES NFR BLD AUTO: 6 %
NEUTROPHILS # BLD AUTO: 6.3 10E9/L (ref 1.6–8.3)
NEUTROPHILS NFR BLD AUTO: 88 %
NRBC # BLD AUTO: 0.1 10*3/UL
NRBC BLD AUTO-RTO: 1 /100
PLATELET # BLD AUTO: 112 10E9/L (ref 150–450)
PLATELET # BLD EST: ABNORMAL 10*3/UL
POTASSIUM SERPL-SCNC: 3.5 MMOL/L (ref 3.4–5.3)
RBC # BLD AUTO: 2.84 10E12/L (ref 3.8–5.2)
SODIUM SERPL-SCNC: 141 MMOL/L (ref 133–144)
TOXIC GRANULES BLD QL SMEAR: PRESENT
WBC # BLD AUTO: 7.2 10E9/L (ref 4–11)

## 2020-03-09 PROCEDURE — 85025 COMPLETE CBC W/AUTO DIFF WBC: CPT | Performed by: INTERNAL MEDICINE

## 2020-03-09 PROCEDURE — 80048 BASIC METABOLIC PNL TOTAL CA: CPT | Performed by: NURSE PRACTITIONER

## 2020-03-09 PROCEDURE — 96360 HYDRATION IV INFUSION INIT: CPT

## 2020-03-09 PROCEDURE — 25000128 H RX IP 250 OP 636: Performed by: INTERNAL MEDICINE

## 2020-03-09 PROCEDURE — 25800030 ZZH RX IP 258 OP 636: Performed by: INTERNAL MEDICINE

## 2020-03-09 RX ORDER — HEPARIN SODIUM,PORCINE 10 UNIT/ML
5 VIAL (ML) INTRAVENOUS
Status: CANCELLED | OUTPATIENT
Start: 2020-03-09

## 2020-03-09 RX ORDER — HEPARIN SODIUM (PORCINE) LOCK FLUSH IV SOLN 100 UNIT/ML 100 UNIT/ML
5 SOLUTION INTRAVENOUS
Status: DISCONTINUED | OUTPATIENT
Start: 2020-03-09 | End: 2020-03-09 | Stop reason: HOSPADM

## 2020-03-09 RX ORDER — HEPARIN SODIUM (PORCINE) LOCK FLUSH IV SOLN 100 UNIT/ML 100 UNIT/ML
5 SOLUTION INTRAVENOUS
Status: CANCELLED | OUTPATIENT
Start: 2020-03-09

## 2020-03-09 RX ADMIN — SODIUM CHLORIDE 1000 ML: 9 INJECTION, SOLUTION INTRAVENOUS at 14:00

## 2020-03-09 RX ADMIN — Medication 5 ML: at 15:10

## 2020-03-09 NOTE — PROGRESS NOTES
Infusion Nursing Note:  Kassie Ramirez presents today for IVF, BMP.    Patient seen by provider today: No   present during visit today: Not Applicable.    Note: arrived with ; after rest and start of IVF,  .  Indicates she's been seen by cardiology, can't find anything definite with her heart for now, on lasix. Not SOB, color is good, BP stable.    Intravenous Access:  Lab draw site R chest, Needle type jones, Gauge 20.  Labs drawn without difficulty.  Implanted Port.    Treatment Conditions:  Results reviewed, labs MET treatment parameters, ok to proceed with treatment.  BMP done    Post Infusion Assessment:  Patient tolerated infusion without incident.  Blood return noted pre and post infusion.  Site patent and intact, free from redness, edema or discomfort.  No evidence of extravasations.  Access discontinued per protocol.       Discharge Plan:   Discharge instructions reviewed with: Patient and Family.  Patient and/or family verbalized understanding of discharge instructions and all questions answered.  AVS to patient via [employer].  Patient will return 3/13 for next appointment.   Patient discharged in stable condition accompanied by: self and .  Departure Mode: Ambulatory.    Rossana Aguilar RN

## 2020-03-10 ENCOUNTER — TELEPHONE (OUTPATIENT)
Dept: CARDIOLOGY | Facility: CLINIC | Age: 50
End: 2020-03-10

## 2020-03-10 DIAGNOSIS — I47.10 SVT (SUPRAVENTRICULAR TACHYCARDIA) (H): Primary | ICD-10-CM

## 2020-03-10 NOTE — TELEPHONE ENCOUNTER
----- Message from Reynaldo Carlos NP sent at 3/10/2020 12:07 PM CDT -----  Please update the patient that her labs drawn yesterday show stable electrolytes and kidney function. Would recommend scheduling follow up with Dr. Finn within the next couple of months. It looks like she was scheduled to see Zayda Almanzar on 3/4/20 for a discharge follow up, but cancelled the appt. Thank you! Drew

## 2020-03-10 NOTE — TELEPHONE ENCOUNTER
Left message on patient's cell that BMP came back showing normal lytes and renal function. I also told her that Drew IVORY would like for her to return in two months too see Dr. Finn. I will place the order for that.

## 2020-03-16 ENCOUNTER — TELEPHONE (OUTPATIENT)
Dept: ONCOLOGY | Facility: CLINIC | Age: 50
End: 2020-03-16

## 2020-03-16 NOTE — TELEPHONE ENCOUNTER
Called patient to pre-screen for Wednesday's appointment, with COVID-19 travel and symptom questions. Patient was resting, so spoke with  Florian.    Patient negative for travel or exposure, but has been running temp of 99.4 for the last day and a half. No s/s of URI or UTI. Some fatigue. Was planning to call the clinic if it reached 100. Voices understanding to monitor temp and call if temp reaches 100.4, or with any other symptoms.  Discussed with , and sent note of this low-grade fever to Dr Castro's nurse, Erin.       Beatriz Herring RN

## 2020-03-18 ENCOUNTER — PATIENT OUTREACH (OUTPATIENT)
Dept: ONCOLOGY | Facility: CLINIC | Age: 50
End: 2020-03-18

## 2020-03-18 ENCOUNTER — HOSPITAL ENCOUNTER (OUTPATIENT)
Facility: CLINIC | Age: 50
Setting detail: SPECIMEN
End: 2020-03-18
Attending: INTERNAL MEDICINE
Payer: COMMERCIAL

## 2020-03-18 ENCOUNTER — VIRTUAL VISIT (OUTPATIENT)
Dept: ONCOLOGY | Facility: CLINIC | Age: 50
End: 2020-03-18
Attending: INTERNAL MEDICINE
Payer: COMMERCIAL

## 2020-03-18 DIAGNOSIS — C83.398 DIFFUSE LARGE B-CELL LYMPHOMA OF EXTRANODAL SITE: Primary | ICD-10-CM

## 2020-03-18 PROCEDURE — 99214 OFFICE O/P EST MOD 30 MIN: CPT | Mod: TEL | Performed by: INTERNAL MEDICINE

## 2020-03-18 RX ORDER — LORAZEPAM 2 MG/ML
.5-1 INJECTION INTRAMUSCULAR EVERY 6 HOURS PRN
Status: CANCELLED | OUTPATIENT
Start: 2020-04-08

## 2020-03-18 RX ORDER — LEUCOVORIN CALCIUM 350 MG/17.5ML
50 INJECTION, POWDER, LYOPHILIZED, FOR SOLUTION INTRAMUSCULAR; INTRAVENOUS ONCE
Status: CANCELLED | OUTPATIENT
Start: 2020-04-09

## 2020-03-18 RX ORDER — ALBUTEROL SULFATE 0.83 MG/ML
2.5 SOLUTION RESPIRATORY (INHALATION)
Status: CANCELLED | OUTPATIENT
Start: 2020-04-08

## 2020-03-18 RX ORDER — METHYLPREDNISOLONE SODIUM SUCCINATE 125 MG/2ML
125 INJECTION, POWDER, LYOPHILIZED, FOR SOLUTION INTRAMUSCULAR; INTRAVENOUS
Status: CANCELLED
Start: 2020-03-25

## 2020-03-18 RX ORDER — NALOXONE HYDROCHLORIDE 0.4 MG/ML
.1-.4 INJECTION, SOLUTION INTRAMUSCULAR; INTRAVENOUS; SUBCUTANEOUS
Status: CANCELLED | OUTPATIENT
Start: 2020-03-25

## 2020-03-18 RX ORDER — LORAZEPAM 0.5 MG/1
.5-1 TABLET ORAL EVERY 6 HOURS PRN
Status: CANCELLED
Start: 2020-04-08

## 2020-03-18 RX ORDER — SODIUM CHLORIDE 9 MG/ML
1000 INJECTION, SOLUTION INTRAVENOUS CONTINUOUS PRN
Status: CANCELLED
Start: 2020-03-25

## 2020-03-18 RX ORDER — ALBUTEROL SULFATE 0.83 MG/ML
2.5 SOLUTION RESPIRATORY (INHALATION)
Status: CANCELLED | OUTPATIENT
Start: 2020-03-25

## 2020-03-18 RX ORDER — EPINEPHRINE 0.3 MG/.3ML
0.3 INJECTION SUBCUTANEOUS EVERY 5 MIN PRN
Status: CANCELLED | OUTPATIENT
Start: 2020-03-25

## 2020-03-18 RX ORDER — DIPHENHYDRAMINE HYDROCHLORIDE 50 MG/ML
50 INJECTION INTRAMUSCULAR; INTRAVENOUS
Status: CANCELLED
Start: 2020-04-08

## 2020-03-18 RX ORDER — DEXAMETHASONE 0.5 MG/1
12 TABLET ORAL ONCE
Status: CANCELLED | OUTPATIENT
Start: 2020-04-08

## 2020-03-18 RX ORDER — DOXORUBICIN HYDROCHLORIDE 2 MG/ML
50 INJECTION, SOLUTION INTRAVENOUS ONCE
Status: CANCELLED | OUTPATIENT
Start: 2020-03-25

## 2020-03-18 RX ORDER — MEPERIDINE HYDROCHLORIDE 25 MG/ML
25 INJECTION INTRAMUSCULAR; INTRAVENOUS; SUBCUTANEOUS
Status: CANCELLED
Start: 2020-03-25

## 2020-03-18 RX ORDER — SODIUM BICARBONATE 84 MG/ML
50 INJECTION, SOLUTION INTRAVENOUS
Status: CANCELLED | OUTPATIENT
Start: 2020-04-08

## 2020-03-18 RX ORDER — MEPERIDINE HYDROCHLORIDE 25 MG/ML
25 INJECTION INTRAMUSCULAR; INTRAVENOUS; SUBCUTANEOUS EVERY 30 MIN PRN
Status: CANCELLED | OUTPATIENT
Start: 2020-04-08

## 2020-03-18 RX ORDER — PALONOSETRON 0.05 MG/ML
0.25 INJECTION, SOLUTION INTRAVENOUS ONCE
Status: CANCELLED
Start: 2020-03-25

## 2020-03-18 RX ORDER — DIPHENHYDRAMINE HCL 25 MG
50 CAPSULE ORAL ONCE
Status: CANCELLED
Start: 2020-03-25

## 2020-03-18 RX ORDER — SODIUM CHLORIDE 9 MG/ML
1000 INJECTION, SOLUTION INTRAVENOUS CONTINUOUS PRN
Status: CANCELLED
Start: 2020-04-08

## 2020-03-18 RX ORDER — EPINEPHRINE 1 MG/ML
0.3 INJECTION, SOLUTION INTRAMUSCULAR; SUBCUTANEOUS EVERY 5 MIN PRN
Status: CANCELLED | OUTPATIENT
Start: 2020-03-25

## 2020-03-18 RX ORDER — LORATADINE 10 MG/1
10 TABLET ORAL DAILY
Status: ON HOLD | COMMUNITY
End: 2020-06-14

## 2020-03-18 RX ORDER — MEPERIDINE HYDROCHLORIDE 25 MG/ML
25 INJECTION INTRAMUSCULAR; INTRAVENOUS; SUBCUTANEOUS EVERY 30 MIN PRN
Status: CANCELLED | OUTPATIENT
Start: 2020-03-25

## 2020-03-18 RX ORDER — DIPHENHYDRAMINE HYDROCHLORIDE 50 MG/ML
50 INJECTION INTRAMUSCULAR; INTRAVENOUS
Status: CANCELLED
Start: 2020-03-25

## 2020-03-18 RX ORDER — ACETAMINOPHEN 325 MG/1
650 TABLET ORAL ONCE
Status: CANCELLED
Start: 2020-03-25

## 2020-03-18 RX ORDER — DEXAMETHASONE 0.5 MG/1
8 TABLET ORAL DAILY
Status: CANCELLED | OUTPATIENT
Start: 2020-04-09

## 2020-03-18 RX ORDER — LORAZEPAM 2 MG/ML
0.5 INJECTION INTRAMUSCULAR EVERY 4 HOURS PRN
Status: CANCELLED
Start: 2020-03-25

## 2020-03-18 RX ORDER — PROCHLORPERAZINE MALEATE 5 MG
10 TABLET ORAL EVERY 6 HOURS PRN
Status: CANCELLED
Start: 2020-04-08

## 2020-03-18 RX ORDER — METHYLPREDNISOLONE SODIUM SUCCINATE 125 MG/2ML
125 INJECTION, POWDER, LYOPHILIZED, FOR SOLUTION INTRAMUSCULAR; INTRAVENOUS
Status: CANCELLED
Start: 2020-04-08

## 2020-03-18 RX ORDER — NALOXONE HYDROCHLORIDE 0.4 MG/ML
.1-.4 INJECTION, SOLUTION INTRAMUSCULAR; INTRAVENOUS; SUBCUTANEOUS
Status: CANCELLED | OUTPATIENT
Start: 2020-04-08

## 2020-03-18 RX ORDER — EPINEPHRINE 1 MG/ML
0.3 INJECTION, SOLUTION INTRAMUSCULAR; SUBCUTANEOUS EVERY 5 MIN PRN
Status: CANCELLED | OUTPATIENT
Start: 2020-04-08

## 2020-03-18 RX ORDER — LEUCOVORIN CALCIUM 350 MG/17.5ML
25 INJECTION, POWDER, LYOPHILIZED, FOR SOLUTION INTRAMUSCULAR; INTRAVENOUS EVERY 6 HOURS
Status: CANCELLED | OUTPATIENT
Start: 2020-04-09

## 2020-03-18 RX ORDER — ALBUTEROL SULFATE 90 UG/1
1-2 AEROSOL, METERED RESPIRATORY (INHALATION)
Status: CANCELLED
Start: 2020-03-25

## 2020-03-18 RX ORDER — ALBUTEROL SULFATE 90 UG/1
1-2 AEROSOL, METERED RESPIRATORY (INHALATION)
Status: CANCELLED
Start: 2020-04-08

## 2020-03-18 RX ORDER — ONDANSETRON 4 MG/1
16 TABLET, FILM COATED ORAL ONCE
Status: CANCELLED | OUTPATIENT
Start: 2020-04-08

## 2020-03-18 NOTE — PROGRESS NOTES
RNCC returned call to Angelita  at [facility]. Angelita just requesting update on Pt and where she is in her treatment plan? Today's plan is to push out chemotherapy set for today to allow Pt additional recovery time. Pt still running low grade fevers about 99.0-99.2. Pt is set to be scheduled to receive C6D1 next week.      Erin Hernandez, CARLOSN, RN, OCN  RN Cancer Care Coordinator  [facility]  [phone]

## 2020-03-18 NOTE — PROGRESS NOTES
Received call from Pedrito Goldstein case manager with [facility] who would like call back regarding pt care.    Juliana Grey RN, BSN, OCN

## 2020-03-18 NOTE — PROGRESS NOTES
"Kassie Ramirez is a 50 year old female who is being evaluated via a billable telephone visit.      The patient has been notified of following:     \"This telephone visit will be conducted via a call between you and your physician/provider. We have found that certain health care needs can be provided without the need for a physical exam.  This service lets us provide the care you need with a short phone conversation.  If a prescription is necessary we can send it directly to your pharmacy.  If lab work is needed we can place an order for that and you can then stop by our lab to have the test done at a later time.    If during the course of the call the physician/provider feels a telephone visit is not appropriate, you will not be charged for this service.\"       Kassie Ramirez complains of    Chief Complaint   Patient presents with     Oncology Clinic Visit     DLBCL (diffuse large B cell lymphoma)       I have reviewed and updated the patient's Past Medical History, Social History, Family History and Medication List.    ALLERGIES  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]; Seasonal allergies; Sudafed cold-cough [dayquil liquicaps]; and Pseudoephedrine    Lynne Webster [facility]   (MA signature, delete if no MA staff)  CANCER TYPE: DLBCL, EBV+ non-GCB, stage III.  STAGE: III     TREATMENT SUMMARY:  DLBCL- stage III Non-GCB and EBV+. Large mediastinal mass causing respiratory compromise. . IPI score of 2 (low-intermediate). FISH neg for high risk translocations. Non-GCB overall confers poorer prognosis, but standard of care remains multiagent chemotherapy with curative intent. Plan 6 cycles of R-CHOP with interim PET scan after 2-3 cycles with neulasta support. She is intermediate risk for CNS recurrence by CNS-IPI score, but recommend CNS prophylaxis for non-GCB.  Started 1st cycle of R-CHOP on 11/29/19 well.      CURRENT INTERVENTIONS:  MR-CHOP     SUBJECTIVE   Kassie Ramirez is being followed for DLBCL, EBV+ non-GCB, " stage III intermediate risk disease    I called Kassie for a follow up of her DLBCL. She has low grade fevers for the last few days. She is feeling a little better today and does not feel febrile. Her Tmax was close to 100 F. She has fatigue. She does have mild cough but unchanged.       PAST MEDICAL HISTORY     Past Medical History:   Diagnosis Date     Anxiety attack 9/16/2014     Diffuse large B-cell lymphoma (H)     Diagnosed 11/2019     Encounter for Essure implantation 2009     Generalized anxiety disorder 9/16/2014    zoloft = flat emotions     Menopausal disorder     started on OCPs by menopause center 3/2017 (takes active continuously)     Menstrual headache     helped by OCPs and magnesium     GERTRUDE (stress urinary incontinence, female)     sling procedure 2016     SVT (supraventricular tachycardia) (H)             CURRENT OUTPATIENT MEDICATIONS      Current Outpatient Medications   Medication Sig     acyclovir (ZOVIRAX) 400 MG tablet Take 1 tablet (400 mg) by mouth 2 times daily     famotidine 20 MG PO tablet Take 20 mg by mouth 2 times daily as needed     lidocaine-prilocaine (EMLA) 2.5-2.5 % external cream Apply topically as needed for moderate pain     loratadine (CLARITIN) 10 MG tablet Take 10 mg by mouth daily     LORazepam 0.5 MG PO tablet Take 1-2 tablets (0.5-1 mg) by mouth every 6 hours as needed (Breakthrough Nausea / Vomiting)     ondansetron (ZOFRAN-ODT) 8 MG ODT tab Take 1 tablet (8 mg) by mouth every 8 hours as needed     potassium chloride ER (K-DUR/KLOR-CON M) 20 MEQ CR tablet Take 2 tablets (40 mEq) by mouth daily     prochlorperazine (COMPAZINE) 10 MG tablet Take 1 tablet (10 mg) by mouth every 6 hours as needed (Breakthrough Nausea/Vomiting)     SENNA PO Take 1 tablet by mouth as needed      sertraline (ZOLOFT) 50 MG tablet Take 1 tablet (50 mg) by mouth daily     furosemide (LASIX) 20 MG tablet Take 1 tablet (20 mg) by mouth daily (Patient not taking: Reported on 3/18/2020)      leucovorin 15 MG tablet Take 1 tablet (15 mg) by mouth every 6 hours for 3 days     magic mouthwash suspension, diphenhydrAMINE, lidocaine, aluminum-magnesium & simethicone, (FIRST-MOUTHWASH BLM) compounding kit Swish and swallow 5-10 mLs in mouth every 6 hours as needed for mouth sores (Patient not taking: Reported on 3/5/2020)     No current facility-administered medications for this visit.          LABORATORY AND IMAGING STUDIES      Recent Labs   Lab Test 03/09/20  1350 02/27/20  0747 02/24/20  0640 02/23/20  0545 02/22/20  1830 02/22/20  0620    138 139 144  --  142   POTASSIUM 3.5 3.9 3.5 3.4 3.8 3.1*   CHLORIDE 108 106 105 111*  --  107   CO2 29 26 30 31  --  33*   ANIONGAP 4 6 4 2*  --  2*   BUN 12 20 13 9  --  9   CR 0.83 0.87 0.84 0.84  --  0.84   GLC 97 102* 91 91  --  100*   AFSHAN 9.2 10.1 9.0 8.5  --  8.6     Recent Labs   Lab Test 01/11/20  0615 12/01/19  1340 12/01/19  0641 11/30/19  2137 11/30/19  1341  11/27/19  2216 11/27/19  1605 09/19/19  0706 09/18/19  0845   MAG 2.0  --   --   --   --   --  2.1 2.5* 2.4* 2.2   PHOS 3.1 2.8 3.4 2.8 2.5   < > 3.1  --   --   --     < > = values in this interval not displayed.     Recent Labs   Lab Test 03/09/20  1340 03/05/20  1414 02/27/20  0747 02/24/20  0640 02/23/20  0545  02/22/20  0620   WBC 7.2 0.4* 2.3* 3.2* 4.5  --  6.9   HGB 8.8* 7.2* 9.2* 8.2* 7.1*   < > 6.6*   * 31* 167 147* 129*  --  121*   MCV 96 94 92 96 97  --  95   NEUTROPHIL 88.0  --  76.7 81.0 82.5  --  89.0    < > = values in this interval not displayed.     Recent Labs   Lab Test 02/27/20  0747 02/23/20  0545 02/21/20  1025  01/11/20  1044  12/16/19  1743  11/29/19  0526   BILITOTAL 1.0 0.7 0.9   < >  --    < >  --    < > 1.2   ALKPHOS 70 72 78   < >  --    < >  --    < > 69   ALT 75* 36 25   < >  --    < >  --    < > 25   AST 36 29 21   < >  --    < >  --    < > 22   ALBUMIN 3.9 3.1* 3.6   < >  --    < >  --    < > 3.0*   LDH  --   --   --   --  347*  --  596*  --  416*    < > =  values in this interval not displayed.     TSH   Date Value Ref Range Status   09/18/2019 2.06 0.40 - 4.00 mU/L Final   07/27/2018 2.04 0.40 - 4.00 mU/L Final   09/16/2014 1.62 0.40 - 4.00 mU/L Final     Comment:     Effective 7/30/2014, the reference range for this assay has changed to reflect   new instrumentation/methodology.         ASSESSMENT AND PLAN   DLBCL, EBV+ non-GCB, stage III  PJV pneumonia causing acute respiratory failure improved on bactrim and prednisone  Neutropenic fevers with first cycle or R-CHOP    I had a lengthy discussion with patient in presence of her  both over the phone.  She has been feeling sick for the past few days.  She has noticed a low-grade fever with temperatures around 99  F.  She denies any other localizing complaints for her fever.  She has not had any temperature over 100  F.    I would recommend that we defer her final cycle of R-CHOP by a week.  We could reassess her in a week over the phone if she has any concerns.  We could proceed with chemotherapy if everything else is stable.    She gets high-dose methotrexate as an inpatient on day 15 and would be due again with this cycle.  We will have to see the status of the COVID 19 epidemic for this.  If we have to we could delay the inpatient methotrexate by a week or 2.  She had difficulty with congestive heart failure and pulmonary edema with her last cycle of high-dose methotrexate.  She has good cardiac function and was seen by cardiology.  I would use Lasix along with her IV fluids.  Methotrexate is not cardiotoxic and I have last echocardiogram on 2/23/2020 was essentially normal.  We should be able to complete her chemotherapy as previously planned.    I would have her see Padmaja Sanchez in a month or so and I will see her in 3 months with labs and restaging scans prior to clinic visit.    I have reviewed the note as documented above.  This accurately captures the substance of my conversation with the  patient.    Over 25 min of time spent with more than 50% time spent in counseling and coordinating care.     Castro Castro    Hematologist and Medical Oncologist  [facility]

## 2020-03-25 ENCOUNTER — HOSPITAL ENCOUNTER (OUTPATIENT)
Facility: CLINIC | Age: 50
Setting detail: SPECIMEN
Discharge: HOME | End: 2020-03-25
Attending: INTERNAL MEDICINE | Admitting: INTERNAL MEDICINE
Payer: COMMERCIAL

## 2020-03-25 ENCOUNTER — INFUSION THERAPY VISIT (OUTPATIENT)
Dept: INFUSION THERAPY | Facility: CLINIC | Age: 50
End: 2020-03-25
Attending: INTERNAL MEDICINE
Payer: COMMERCIAL

## 2020-03-25 VITALS
WEIGHT: 152.2 LBS | DIASTOLIC BLOOD PRESSURE: 80 MMHG | BODY MASS INDEX: 23.83 KG/M2 | OXYGEN SATURATION: 100 % | TEMPERATURE: 97.3 F | RESPIRATION RATE: 16 BRPM | SYSTOLIC BLOOD PRESSURE: 119 MMHG | HEART RATE: 93 BPM

## 2020-03-25 DIAGNOSIS — C83.398 DIFFUSE LARGE B-CELL LYMPHOMA OF EXTRANODAL SITE: Primary | ICD-10-CM

## 2020-03-25 LAB
ALBUMIN SERPL-MCNC: 3.7 G/DL (ref 3.4–5)
ALP SERPL-CCNC: 73 U/L (ref 40–150)
ALT SERPL W P-5'-P-CCNC: 35 U/L (ref 0–50)
ANION GAP SERPL CALCULATED.3IONS-SCNC: 6 MMOL/L (ref 3–14)
AST SERPL W P-5'-P-CCNC: 33 U/L (ref 0–45)
BASOPHILS # BLD AUTO: 0.1 10E9/L (ref 0–0.2)
BASOPHILS NFR BLD AUTO: 1.7 %
BILIRUB SERPL-MCNC: 0.9 MG/DL (ref 0.2–1.3)
BUN SERPL-MCNC: 10 MG/DL (ref 7–30)
CALCIUM SERPL-MCNC: 9.4 MG/DL (ref 8.5–10.1)
CHLORIDE SERPL-SCNC: 110 MMOL/L (ref 94–109)
CO2 SERPL-SCNC: 25 MMOL/L (ref 20–32)
CREAT SERPL-MCNC: 0.88 MG/DL (ref 0.52–1.04)
DIFFERENTIAL METHOD BLD: ABNORMAL
EOSINOPHIL # BLD AUTO: 0.1 10E9/L (ref 0–0.7)
EOSINOPHIL NFR BLD AUTO: 2.4 %
ERYTHROCYTE [DISTWIDTH] IN BLOOD BY AUTOMATED COUNT: 20.4 % (ref 10–15)
GFR SERPL CREATININE-BSD FRML MDRD: 77 ML/MIN/{1.73_M2}
GLUCOSE SERPL-MCNC: 135 MG/DL (ref 70–99)
HCT VFR BLD AUTO: 26.5 % (ref 35–47)
HGB BLD-MCNC: 8.4 G/DL (ref 11.7–15.7)
IMM GRANULOCYTES # BLD: 0 10E9/L (ref 0–0.4)
IMM GRANULOCYTES NFR BLD: 1 %
LYMPHOCYTES # BLD AUTO: 1 10E9/L (ref 0.8–5.3)
LYMPHOCYTES NFR BLD AUTO: 34.5 %
MCH RBC QN AUTO: 31.3 PG (ref 26.5–33)
MCHC RBC AUTO-ENTMCNC: 31.7 G/DL (ref 31.5–36.5)
MCV RBC AUTO: 99 FL (ref 78–100)
MONOCYTES # BLD AUTO: 0.5 10E9/L (ref 0–1.3)
MONOCYTES NFR BLD AUTO: 15.9 %
NEUTROPHILS # BLD AUTO: 1.3 10E9/L (ref 1.6–8.3)
NEUTROPHILS NFR BLD AUTO: 44.5 %
NRBC # BLD AUTO: 0 10*3/UL
NRBC BLD AUTO-RTO: 0 /100
PLATELET # BLD AUTO: 133 10E9/L (ref 150–450)
POTASSIUM SERPL-SCNC: 3.9 MMOL/L (ref 3.4–5.3)
PROT SERPL-MCNC: 6.5 G/DL (ref 6.8–8.8)
RBC # BLD AUTO: 2.68 10E12/L (ref 3.8–5.2)
SODIUM SERPL-SCNC: 141 MMOL/L (ref 133–144)
WBC # BLD AUTO: 2.9 10E9/L (ref 4–11)

## 2020-03-25 PROCEDURE — 96367 TX/PROPH/DG ADDL SEQ IV INF: CPT

## 2020-03-25 PROCEDURE — 96417 CHEMO IV INFUS EACH ADDL SEQ: CPT

## 2020-03-25 PROCEDURE — 96377 APPLICATON ON-BODY INJECTOR: CPT | Mod: XS

## 2020-03-25 PROCEDURE — 85025 COMPLETE CBC W/AUTO DIFF WBC: CPT | Performed by: INTERNAL MEDICINE

## 2020-03-25 PROCEDURE — 25000132 ZZH RX MED GY IP 250 OP 250 PS 637: Performed by: INTERNAL MEDICINE

## 2020-03-25 PROCEDURE — 25800030 ZZH RX IP 258 OP 636: Performed by: INTERNAL MEDICINE

## 2020-03-25 PROCEDURE — 96411 CHEMO IV PUSH ADDL DRUG: CPT

## 2020-03-25 PROCEDURE — 96413 CHEMO IV INFUSION 1 HR: CPT

## 2020-03-25 PROCEDURE — 96415 CHEMO IV INFUSION ADDL HR: CPT

## 2020-03-25 PROCEDURE — 25000128 H RX IP 250 OP 636: Performed by: PHYSICIAN ASSISTANT

## 2020-03-25 PROCEDURE — 80053 COMPREHEN METABOLIC PANEL: CPT | Performed by: INTERNAL MEDICINE

## 2020-03-25 PROCEDURE — 96375 TX/PRO/DX INJ NEW DRUG ADDON: CPT

## 2020-03-25 PROCEDURE — 25000128 H RX IP 250 OP 636: Performed by: INTERNAL MEDICINE

## 2020-03-25 RX ORDER — ACETAMINOPHEN 325 MG/1
650 TABLET ORAL ONCE
Status: COMPLETED | OUTPATIENT
Start: 2020-03-25 | End: 2020-03-25

## 2020-03-25 RX ORDER — DOXORUBICIN HYDROCHLORIDE 2 MG/ML
50 INJECTION, SOLUTION INTRAVENOUS ONCE
Status: COMPLETED | OUTPATIENT
Start: 2020-03-25 | End: 2020-03-25

## 2020-03-25 RX ORDER — HEPARIN SODIUM (PORCINE) LOCK FLUSH IV SOLN 100 UNIT/ML 100 UNIT/ML
5 SOLUTION INTRAVENOUS
Status: CANCELLED | OUTPATIENT
Start: 2020-03-25

## 2020-03-25 RX ORDER — HEPARIN SODIUM (PORCINE) LOCK FLUSH IV SOLN 100 UNIT/ML 100 UNIT/ML
5 SOLUTION INTRAVENOUS
Status: DISCONTINUED | OUTPATIENT
Start: 2020-03-25 | End: 2020-03-25 | Stop reason: HOSPADM

## 2020-03-25 RX ORDER — HEPARIN SODIUM,PORCINE 10 UNIT/ML
5 VIAL (ML) INTRAVENOUS
Status: CANCELLED | OUTPATIENT
Start: 2020-03-25

## 2020-03-25 RX ORDER — PALONOSETRON 0.05 MG/ML
0.25 INJECTION, SOLUTION INTRAVENOUS ONCE
Status: COMPLETED | OUTPATIENT
Start: 2020-03-25 | End: 2020-03-25

## 2020-03-25 RX ORDER — PREDNISONE 50 MG/1
50 TABLET ORAL 2 TIMES DAILY
Qty: 10 TABLET | Refills: 0 | Status: ON HOLD | OUTPATIENT
Start: 2020-03-25 | End: 2020-04-08

## 2020-03-25 RX ORDER — DIPHENHYDRAMINE HCL 25 MG
50 CAPSULE ORAL ONCE
Status: COMPLETED | OUTPATIENT
Start: 2020-03-25 | End: 2020-03-25

## 2020-03-25 RX ADMIN — ACETAMINOPHEN 650 MG: 325 TABLET ORAL at 09:56

## 2020-03-25 RX ADMIN — PEGFILGRASTIM 6 MG: KIT SUBCUTANEOUS at 13:22

## 2020-03-25 RX ADMIN — CYCLOPHOSPHAMIDE 1365 MG: 1 INJECTION, POWDER, FOR SOLUTION INTRAVENOUS; ORAL at 12:44

## 2020-03-25 RX ADMIN — Medication 5 ML: at 13:24

## 2020-03-25 RX ADMIN — VINCRISTINE SULFATE 2 MG: 1 INJECTION, SOLUTION INTRAVENOUS at 12:25

## 2020-03-25 RX ADMIN — PALONOSETRON HYDROCHLORIDE 0.25 MG: 0.25 INJECTION, SOLUTION INTRAVENOUS at 10:00

## 2020-03-25 RX ADMIN — SODIUM CHLORIDE 250 ML: 9 INJECTION, SOLUTION INTRAVENOUS at 09:55

## 2020-03-25 RX ADMIN — RITUXIMAB 700 MG: 10 INJECTION, SOLUTION INTRAVENOUS at 10:28

## 2020-03-25 RX ADMIN — FOSAPREPITANT 150 MG: 150 INJECTION, POWDER, LYOPHILIZED, FOR SOLUTION INTRAVENOUS at 10:01

## 2020-03-25 RX ADMIN — DIPHENHYDRAMINE HYDROCHLORIDE 50 MG: 25 CAPSULE ORAL at 09:56

## 2020-03-25 RX ADMIN — DOXORUBICIN HYDROCHLORIDE 90 MG: 2 INJECTION, SOLUTION INTRAVENOUS at 12:15

## 2020-03-25 ASSESSMENT — PAIN SCALES - GENERAL: PAINLEVEL: NO PAIN (0)

## 2020-04-06 ENCOUNTER — TELEPHONE (OUTPATIENT)
Dept: CARDIOLOGY | Facility: CLINIC | Age: 50
End: 2020-04-06

## 2020-04-06 NOTE — TELEPHONE ENCOUNTER
Patient left message on team 3 nurse line this morning concerning her upcoming in patient for IV Methotrexate and seeking cardiology input.When she was hospitalized for IV  Methotrexate infusion on 2/21-2/24  she developed shortness of breath-Cardiology was consulted and an echo was ordered and showed normal EF. IV Lasix was given and she showed subsequent improvement and was discharged on Lasix 20 MG. She was to follow up with cardiology. I reviewed her chart and Dr. Finn is her cardiologist. I called patient to tell her that I will message Dr. Finn's nursing team for them to reach out to him for advisement concerning her upcoming weekend infusion and how to prevent any pulmonary edema.    Hospital Discharge note:      Acute pulmonary edema  Patient had an episode of shortness of breath which improved after IV Lasix.  Echocardiogram was checked and showed normal EF.  Diastolic Doppler findings were indeterminate.  Cardiology was consulted for further recommendations.  Patient will be discharged on 20 mg Lasix daily.  She will follow-up with cardiology

## 2020-04-06 NOTE — TELEPHONE ENCOUNTER
RN called patient and reviewed with her Dr. Finn's recommendations. Patient verbalized understanding and is in agreement with plan. Patient confirmed for RN she does have 20mg lasix tablets at home and will start taking for 3 days if not given IV diuretics at time of infusion. Patient aware she should call clinic should she develop any symptoms of SOB, edema or chest discomfort.       Dr. Finn's recommendation.     I saw her in the hospital and can keep her as my patient.  They may opt to do 20 mg of IV furosemide at the time of her methotrexate, alternately she can take 20 mg oral furosemide for 3 days starting with the infusion day.

## 2020-04-07 ENCOUNTER — PATIENT OUTREACH (OUTPATIENT)
Dept: ONCOLOGY | Facility: CLINIC | Age: 50
End: 2020-04-07

## 2020-04-07 NOTE — PROGRESS NOTES
S-(situation): Contacted Pt for in-patient preadmission for chemotherapy HDMTX. April 8-10, 2020.     B-(background): Diffuse Large B Cell Lymphoma. C6D1 [facility] last given on 3/25/2020.    A-(assessment): Pt states she is feeling very good and feels ready for admission for HDMTX at [facility].    R-(recommendations): RNCC contacted Admissions for In-patient chemotherapy HDMTX 4/8-4/10/2020. RNCC received confirmation Pt has been accepted for in-patient chemotherapy at 8:00 am on 4/8/2020. Pt verbalizes understanding she is to register at the front reception desk. No further questions or concerns.     Erin Hernandez, BSN, RN, OCN  RN Cancer Care Coordinator  [facility] [place]  [phone]

## 2020-04-08 ENCOUNTER — HOSPITAL ENCOUNTER (INPATIENT)
Facility: CLINIC | Age: 50
LOS: 3 days | Discharge: HOME | End: 2020-04-11
Attending: INTERNAL MEDICINE | Admitting: INTERNAL MEDICINE
Payer: COMMERCIAL

## 2020-04-08 DIAGNOSIS — C83.398 DIFFUSE LARGE B-CELL LYMPHOMA OF EXTRANODAL SITE: Primary | ICD-10-CM

## 2020-04-08 DIAGNOSIS — C83.30 DIFFUSE LARGE B-CELL LYMPHOMA, UNSPECIFIED BODY REGION (H): ICD-10-CM

## 2020-04-08 PROBLEM — T45.1X5A CHEMOTHERAPY ADVERSE REACTION: Status: ACTIVE | Noted: 2020-04-08

## 2020-04-08 LAB
ALBUMIN SERPL-MCNC: 3.6 G/DL (ref 3.4–5)
ALP SERPL-CCNC: 96 U/L (ref 40–150)
ALT SERPL W P-5'-P-CCNC: 27 U/L (ref 0–50)
ANION GAP SERPL CALCULATED.3IONS-SCNC: 3 MMOL/L (ref 3–14)
ANISOCYTOSIS BLD QL SMEAR: ABNORMAL
AST SERPL W P-5'-P-CCNC: 22 U/L (ref 0–45)
BASOPHILS # BLD AUTO: 0.1 10E9/L (ref 0–0.2)
BASOPHILS NFR BLD AUTO: 1 %
BILIRUB SERPL-MCNC: 0.7 MG/DL (ref 0.2–1.3)
BUN SERPL-MCNC: 10 MG/DL (ref 7–30)
CALCIUM SERPL-MCNC: 8.2 MG/DL (ref 8.5–10.1)
CHLORIDE SERPL-SCNC: 111 MMOL/L (ref 94–109)
CO2 SERPL-SCNC: 26 MMOL/L (ref 20–32)
CREAT SERPL-MCNC: 0.83 MG/DL (ref 0.52–1.04)
DACRYOCYTES BLD QL SMEAR: SLIGHT
DIFFERENTIAL METHOD BLD: ABNORMAL
EOSINOPHIL # BLD AUTO: 0.2 10E9/L (ref 0–0.7)
EOSINOPHIL NFR BLD AUTO: 3 %
ERYTHROCYTE [DISTWIDTH] IN BLOOD BY AUTOMATED COUNT: 19.8 % (ref 10–15)
GFR SERPL CREATININE-BSD FRML MDRD: 82 ML/MIN/{1.73_M2}
GLUCOSE SERPL-MCNC: 96 MG/DL (ref 70–99)
HCT VFR BLD AUTO: 24.9 % (ref 35–47)
HGB BLD-MCNC: 8 G/DL (ref 11.7–15.7)
LYMPHOCYTES # BLD AUTO: 0.5 10E9/L (ref 0.8–5.3)
LYMPHOCYTES NFR BLD AUTO: 10 %
MCH RBC QN AUTO: 32 PG (ref 26.5–33)
MCHC RBC AUTO-ENTMCNC: 32.1 G/DL (ref 31.5–36.5)
MCV RBC AUTO: 100 FL (ref 78–100)
METAMYELOCYTES # BLD: 0.2 10E9/L
METAMYELOCYTES NFR BLD MANUAL: 4 %
MICROCYTES BLD QL SMEAR: PRESENT
MONOCYTES # BLD AUTO: 0.2 10E9/L (ref 0–1.3)
MONOCYTES NFR BLD AUTO: 3 %
NEUTROPHILS # BLD AUTO: 4 10E9/L (ref 1.6–8.3)
NEUTROPHILS NFR BLD AUTO: 79 %
PH UR STRIP: 7 PH (ref 5–7)
PH UR STRIP: 7.5 PH (ref 5–7)
PH UR STRIP: 8 PH (ref 5–7)
PLATELET # BLD AUTO: 91 10E9/L (ref 150–450)
PLATELET # BLD EST: ABNORMAL 10*3/UL
POTASSIUM SERPL-SCNC: 4.5 MMOL/L (ref 3.4–5.3)
PROT SERPL-MCNC: 6.4 G/DL (ref 6.8–8.8)
RBC # BLD AUTO: 2.5 10E12/L (ref 3.8–5.2)
SODIUM SERPL-SCNC: 140 MMOL/L (ref 133–144)
TOXIC GRANULES BLD QL SMEAR: PRESENT
WBC # BLD AUTO: 5.1 10E9/L (ref 4–11)

## 2020-04-08 PROCEDURE — 3E04305 INTRODUCTION OF OTHER ANTINEOPLASTIC INTO CENTRAL VEIN, PERCUTANEOUS APPROACH: ICD-10-PCS | Performed by: INTERNAL MEDICINE

## 2020-04-08 PROCEDURE — 99223 1ST HOSP IP/OBS HIGH 75: CPT | Performed by: INTERNAL MEDICINE

## 2020-04-08 PROCEDURE — 81003 URINALYSIS AUTO W/O SCOPE: CPT | Performed by: INTERNAL MEDICINE

## 2020-04-08 PROCEDURE — 12000000 ZZH R&B MED SURG/OB

## 2020-04-08 PROCEDURE — 99233 SBSQ HOSP IP/OBS HIGH 50: CPT | Performed by: INTERNAL MEDICINE

## 2020-04-08 PROCEDURE — 25000128 H RX IP 250 OP 636: Performed by: INTERNAL MEDICINE

## 2020-04-08 PROCEDURE — 99207 ZZC CDG-CODE CATEGORY CHANGED: CPT | Performed by: INTERNAL MEDICINE

## 2020-04-08 PROCEDURE — 25000131 ZZH RX MED GY IP 250 OP 636 PS 637: Performed by: INTERNAL MEDICINE

## 2020-04-08 PROCEDURE — 85025 COMPLETE CBC W/AUTO DIFF WBC: CPT | Performed by: INTERNAL MEDICINE

## 2020-04-08 PROCEDURE — 25000132 ZZH RX MED GY IP 250 OP 250 PS 637: Performed by: PHYSICIAN ASSISTANT

## 2020-04-08 PROCEDURE — 25800029 ZZH RX IP 258 OP 250: Performed by: INTERNAL MEDICINE

## 2020-04-08 PROCEDURE — 80053 COMPREHEN METABOLIC PANEL: CPT | Performed by: INTERNAL MEDICINE

## 2020-04-08 PROCEDURE — 25800030 ZZH RX IP 258 OP 636: Performed by: INTERNAL MEDICINE

## 2020-04-08 PROCEDURE — 25000125 ZZHC RX 250: Performed by: INTERNAL MEDICINE

## 2020-04-08 RX ORDER — LORATADINE 10 MG/1
10 TABLET ORAL DAILY
Status: DISCONTINUED | OUTPATIENT
Start: 2020-04-09 | End: 2020-04-11 | Stop reason: HOSPADM

## 2020-04-08 RX ORDER — LEUCOVORIN CALCIUM 350 MG/17.5ML
50 INJECTION, POWDER, LYOPHILIZED, FOR SOLUTION INTRAMUSCULAR; INTRAVENOUS ONCE
Status: COMPLETED | OUTPATIENT
Start: 2020-04-09 | End: 2020-04-09

## 2020-04-08 RX ORDER — LIDOCAINE 40 MG/G
CREAM TOPICAL
Status: DISCONTINUED | OUTPATIENT
Start: 2020-04-08 | End: 2020-04-11 | Stop reason: HOSPADM

## 2020-04-08 RX ORDER — NALOXONE HYDROCHLORIDE 0.4 MG/ML
.1-.4 INJECTION, SOLUTION INTRAMUSCULAR; INTRAVENOUS; SUBCUTANEOUS
Status: DISCONTINUED | OUTPATIENT
Start: 2020-04-08 | End: 2020-04-11 | Stop reason: HOSPADM

## 2020-04-08 RX ORDER — FAMOTIDINE 20 MG/1
20 TABLET, FILM COATED ORAL 2 TIMES DAILY PRN
Status: DISCONTINUED | OUTPATIENT
Start: 2020-04-08 | End: 2020-04-11 | Stop reason: HOSPADM

## 2020-04-08 RX ORDER — PROCHLORPERAZINE MALEATE 5 MG
10 TABLET ORAL EVERY 6 HOURS PRN
Status: DISCONTINUED | OUTPATIENT
Start: 2020-04-08 | End: 2020-04-08

## 2020-04-08 RX ORDER — ONDANSETRON 4 MG/1
16 TABLET, ORALLY DISINTEGRATING ORAL ONCE
Status: COMPLETED | OUTPATIENT
Start: 2020-04-08 | End: 2020-04-08

## 2020-04-08 RX ORDER — NALOXONE HYDROCHLORIDE 0.4 MG/ML
.1-.4 INJECTION, SOLUTION INTRAMUSCULAR; INTRAVENOUS; SUBCUTANEOUS
Status: DISCONTINUED | OUTPATIENT
Start: 2020-04-08 | End: 2020-04-10

## 2020-04-08 RX ORDER — ACETAMINOPHEN 325 MG/1
650 TABLET ORAL EVERY 4 HOURS PRN
Status: DISCONTINUED | OUTPATIENT
Start: 2020-04-08 | End: 2020-04-11 | Stop reason: HOSPADM

## 2020-04-08 RX ORDER — ALBUTEROL SULFATE 90 UG/1
1-2 AEROSOL, METERED RESPIRATORY (INHALATION)
Status: DISCONTINUED | OUTPATIENT
Start: 2020-04-08 | End: 2020-04-11 | Stop reason: HOSPADM

## 2020-04-08 RX ORDER — FUROSEMIDE 20 MG
20 TABLET ORAL DAILY
Status: DISCONTINUED | OUTPATIENT
Start: 2020-04-08 | End: 2020-04-09

## 2020-04-08 RX ORDER — METHYLPREDNISOLONE SODIUM SUCCINATE 125 MG/2ML
125 INJECTION, POWDER, LYOPHILIZED, FOR SOLUTION INTRAMUSCULAR; INTRAVENOUS
Status: DISCONTINUED | OUTPATIENT
Start: 2020-04-08 | End: 2020-04-11 | Stop reason: HOSPADM

## 2020-04-08 RX ORDER — AMOXICILLIN 250 MG
2 CAPSULE ORAL 2 TIMES DAILY PRN
Status: DISCONTINUED | OUTPATIENT
Start: 2020-04-08 | End: 2020-04-11 | Stop reason: HOSPADM

## 2020-04-08 RX ORDER — LEUCOVORIN CALCIUM 350 MG/17.5ML
25 INJECTION, POWDER, LYOPHILIZED, FOR SOLUTION INTRAMUSCULAR; INTRAVENOUS EVERY 6 HOURS
Status: DISCONTINUED | OUTPATIENT
Start: 2020-04-09 | End: 2020-04-10 | Stop reason: ALTCHOICE

## 2020-04-08 RX ORDER — ALBUTEROL SULFATE 0.83 MG/ML
2.5 SOLUTION RESPIRATORY (INHALATION)
Status: DISCONTINUED | OUTPATIENT
Start: 2020-04-08 | End: 2020-04-11 | Stop reason: HOSPADM

## 2020-04-08 RX ORDER — DIPHENHYDRAMINE HYDROCHLORIDE 50 MG/ML
50 INJECTION INTRAMUSCULAR; INTRAVENOUS
Status: DISCONTINUED | OUTPATIENT
Start: 2020-04-08 | End: 2020-04-11 | Stop reason: HOSPADM

## 2020-04-08 RX ORDER — POTASSIUM CHLORIDE 1500 MG/1
40 TABLET, EXTENDED RELEASE ORAL DAILY
Status: DISCONTINUED | OUTPATIENT
Start: 2020-04-08 | End: 2020-04-11 | Stop reason: HOSPADM

## 2020-04-08 RX ORDER — PROCHLORPERAZINE MALEATE 5 MG
10 TABLET ORAL EVERY 6 HOURS PRN
Status: DISCONTINUED | OUTPATIENT
Start: 2020-04-08 | End: 2020-04-11 | Stop reason: HOSPADM

## 2020-04-08 RX ORDER — LORAZEPAM 2 MG/ML
.5-1 INJECTION INTRAMUSCULAR EVERY 6 HOURS PRN
Status: DISCONTINUED | OUTPATIENT
Start: 2020-04-08 | End: 2020-04-11 | Stop reason: HOSPADM

## 2020-04-08 RX ORDER — SODIUM BICARBONATE 84 MG/ML
50 INJECTION, SOLUTION INTRAVENOUS
Status: DISCONTINUED | OUTPATIENT
Start: 2020-04-08 | End: 2020-04-11 | Stop reason: HOSPADM

## 2020-04-08 RX ORDER — EPINEPHRINE 1 MG/ML
0.3 INJECTION, SOLUTION, CONCENTRATE INTRAVENOUS EVERY 5 MIN PRN
Status: DISCONTINUED | OUTPATIENT
Start: 2020-04-08 | End: 2020-04-11 | Stop reason: HOSPADM

## 2020-04-08 RX ORDER — SODIUM CHLORIDE 9 MG/ML
1000 INJECTION, SOLUTION INTRAVENOUS CONTINUOUS PRN
Status: DISCONTINUED | OUTPATIENT
Start: 2020-04-08 | End: 2020-04-11 | Stop reason: HOSPADM

## 2020-04-08 RX ORDER — DEXAMETHASONE 4 MG/1
8 TABLET ORAL DAILY
Status: COMPLETED | OUTPATIENT
Start: 2020-04-09 | End: 2020-04-10

## 2020-04-08 RX ORDER — ACYCLOVIR 400 MG/1
400 TABLET ORAL 2 TIMES DAILY
Status: DISCONTINUED | OUTPATIENT
Start: 2020-04-08 | End: 2020-04-11 | Stop reason: HOSPADM

## 2020-04-08 RX ORDER — AMOXICILLIN 250 MG
1 CAPSULE ORAL 2 TIMES DAILY PRN
Status: DISCONTINUED | OUTPATIENT
Start: 2020-04-08 | End: 2020-04-11 | Stop reason: HOSPADM

## 2020-04-08 RX ORDER — MEPERIDINE HYDROCHLORIDE 25 MG/ML
25 INJECTION INTRAMUSCULAR; INTRAVENOUS; SUBCUTANEOUS EVERY 30 MIN PRN
Status: DISCONTINUED | OUTPATIENT
Start: 2020-04-08 | End: 2020-04-11 | Stop reason: HOSPADM

## 2020-04-08 RX ORDER — DEXAMETHASONE 4 MG/1
12 TABLET ORAL ONCE
Status: COMPLETED | OUTPATIENT
Start: 2020-04-08 | End: 2020-04-08

## 2020-04-08 RX ORDER — LORAZEPAM 0.5 MG/1
.5-1 TABLET ORAL EVERY 6 HOURS PRN
Status: DISCONTINUED | OUTPATIENT
Start: 2020-04-08 | End: 2020-04-11 | Stop reason: HOSPADM

## 2020-04-08 RX ADMIN — SODIUM BICARBONATE: 84 INJECTION INTRAVENOUS at 15:24

## 2020-04-08 RX ADMIN — ONDANSETRON 16 MG: 4 TABLET, ORALLY DISINTEGRATING ORAL at 15:09

## 2020-04-08 RX ADMIN — DEXAMETHASONE 12 MG: 4 TABLET ORAL at 15:08

## 2020-04-08 RX ADMIN — FUROSEMIDE 20 MG: 20 TABLET ORAL at 15:09

## 2020-04-08 RX ADMIN — SODIUM BICARBONATE: 84 INJECTION, SOLUTION INTRAVENOUS at 09:18

## 2020-04-08 RX ADMIN — POTASSIUM CHLORIDE 40 MEQ: 1500 TABLET, EXTENDED RELEASE ORAL at 11:43

## 2020-04-08 RX ADMIN — METHOTREXATE 6.37 G: 25 INJECTION, SOLUTION INTRA-ARTERIAL; INTRAMUSCULAR; INTRATHECAL; INTRAVENOUS at 15:28

## 2020-04-08 RX ADMIN — SODIUM BICARBONATE: 84 INJECTION INTRAVENOUS at 21:58

## 2020-04-08 RX ADMIN — ACYCLOVIR 400 MG: 400 TABLET ORAL at 20:39

## 2020-04-08 ASSESSMENT — ACTIVITIES OF DAILY LIVING (ADL)
FALL_HISTORY_WITHIN_LAST_SIX_MONTHS: NO
AMBULATION: 0-->INDEPENDENT
SWALLOWING: 0-->SWALLOWS FOODS/LIQUIDS WITHOUT DIFFICULTY
RETIRED_EATING: 0-->INDEPENDENT
RETIRED_COMMUNICATION: 0-->UNDERSTANDS/COMMUNICATES WITHOUT DIFFICULTY
COGNITION: 0 - NO COGNITION ISSUES REPORTED
BATHING: 0-->INDEPENDENT
DRESS: 0-->INDEPENDENT
TOILETING: 0-->INDEPENDENT
ADLS_ACUITY_SCORE: 13
ADLS_ACUITY_SCORE: 13
TRANSFERRING: 0-->INDEPENDENT
ADLS_ACUITY_SCORE: 13

## 2020-04-08 ASSESSMENT — MIFFLIN-ST. JEOR: SCORE: 1323.95

## 2020-04-08 NOTE — PLAN OF CARE
To Do:  End of Shift Summary  For vital signs and complete assessments, please see documentation flowsheets.     Pertinent assessments:  A&O denies pain and nausea   Major Shift Events  methotrexate infused    Treatment Plan:  NA BICARB  PH  urine test    Discharge Readiness: {3-4 days  Expected Discharge Date:4 11  Discharge Disposition: {:home   Barriers/Criteria for discharge none

## 2020-04-08 NOTE — PROGRESS NOTES
Consult dictated.    50-year-old female with diffuse large B-cell lymphoma admitted for high-dose methotrexate.  This is the last planned chemotherapy.  She gets high-dose methotrexate for CNS prophylaxis.  She has completed 6 cycles of R-CHOP and her lymphoma has been responding. With previous methotrexate, she had pulmonary edema due to fluid overload.  She is currently on Lasix.  CBC reveals anemia and thrombocytopenia from chemotherapy.    Plan:  -Patient will continue on methotrexate and leucovorin rescue as per the treatment protocol.  -Continue oral Lasix.  She may need IV lasix depending on fluid status.  -Monitor CBC and transfuse for hemoglobin below 7 and platelet below 10.

## 2020-04-08 NOTE — PHARMACY-ADMISSION MEDICATION HISTORY
Admission medication history interview status for this patient is complete. See [facility] admission navigator for allergy information, prior to admission medications and immunization status.     Medication history interview source(s):Patient  Medication history resources (including written lists, pill bottles, clinic record):None  Primary pharmacy:    Changes made to PTA medication list:  Added:   Deleted: Prednisone, magic mouthwash, lasix,  Changed:     NOTE- Patient is not on leucovorin but I am unable to remove it from pta list- only an MD can    Actions taken by pharmacist (provider contacted, etc):sticky note for md     Additional medication history information:    Medication reconciliation/reorder completed by provider prior to medication history?  N   (Y/N)     For patients on insulin therapy: NA  (Y/N)      Prior to Admission medications    Medication Sig Last Dose Taking? Auth Provider   acyclovir (ZOVIRAX) 400 MG tablet Take 1 tablet (400 mg) by mouth 2 times daily 4/8/2020 at Unknown time Yes Castro Castro MD   famotidine 20 MG PO tablet Take 20 mg by mouth 2 times daily as needed  Yes Unknown, Entered By History   lidocaine-prilocaine (EMLA) 2.5-2.5 % external cream Apply topically as needed for moderate pain  Yes Padmaja Stevens PA-C   loratadine (CLARITIN) 10 MG tablet Take 10 mg by mouth daily 4/8/2020 at Unknown time Yes Reported, Patient   LORazepam 0.5 MG PO tablet Take 1-2 tablets (0.5-1 mg) by mouth every 6 hours as needed (Breakthrough Nausea / Vomiting) Past Week at Unknown time Yes Padmaja Stevens PA-C   ondansetron (ZOFRAN-ODT) 8 MG ODT tab Take 1 tablet (8 mg) by mouth every 8 hours as needed  Yes Castro Castro MD   potassium chloride ER (K-DUR/KLOR-CON M) 20 MEQ CR tablet Take 2 tablets (40 mEq) by mouth daily 4/7/2020 at Unknown time Yes Castro Castro MD   prochlorperazine (COMPAZINE) 10 MG tablet Take 1 tablet (10 mg) by mouth every 6 hours as  needed (Breakthrough Nausea/Vomiting)  Yes Gonzalez Ramires MD   SENNA PO Take 1 tablet by mouth as needed  Past Week at Unknown time Yes Unknown, Entered By History   sertraline (ZOLOFT) 50 MG tablet Take 1 tablet (50 mg) by mouth daily 4/8/2020 at Unknown time Yes Shawnee Parada MD   leucovorin 15 MG tablet Take 1 tablet (15 mg) by mouth every 6 hours for 3 days   Sanchez Padmaja Sharpe, PA-C

## 2020-04-08 NOTE — CONSULTS
Consult Date:  2020      This consult has been requested by Demetra Hurley PA-C for lymphoma.      Mrs. Ramirez is a 50-year-old female with stage III non-germinal center diffuse large B-cell lymphoma on chemotherapy with R-CHOP and CNS prophylaxis with high-dose methotrexate.  She has been admitted for last cycle of high-dose methotrexate for CNS prophylaxis.  She has completed her six cycle of R-CHOP.      The patient overall is doing good.  No headache.  No dizziness.  No chest pain.  No shortness of breath.  No abdominal pain.  No nausea or vomiting.  No urinary complaints.  No bowel problem.  No bleeding.  No fever, chills or night sweats.  Appetite has been good.  Overall, she feels good.      With previous cycle of high-dose methotrexate, she had developed fluid overload from IV hydration and had to be treated with Lasix.  The patient is currently on oral Lasix.      All other review of systems negative.      ALLERGIES:  REVIEWED.      MEDICATIONS:  Reviewed.      PAST MEDICAL HISTORY:   1.  Diffuse large B-cell lymphoma.   2.  Hernia repair.   3.  Colporrhaphy.   4.  Anxiety.   5.  History of PJP pneumonia.   6.  SVT.      SOCIAL HISTORY:   -No history of smoking.   -Occasional alcohol use.      FAMILY HISTORY:   -Parents are .   -She has 1 sister in good health.      PHYSICAL EXAMINATION:   GENERAL:  She is alert, oriented x3.   VITAL SIGNS:  Reviewed.  ECOG PS of 1.   FACE:  No swelling, no facial droop.   EYES:  No icterus.   THROAT:  No ulcer or thrush.   NECK:  Supple.  No lymphadenopathy or thyromegaly.   AXILLAE:  No lymphadenopathy.   LUNGS:  Good air entry bilaterally.  No crackles or wheezing.   HEART:  Regular.  No murmur.   ABDOMEN:  Soft.  Nontender.  No hepatosplenomegaly.  No mass.   EXTREMITIES:  No edema.  No calf swelling or tenderness.   SKIN:  No rash.      LABORATORY DATA:  Reviewed.      ASSESSMENT:    1.  A 50-year-old female with stage III diffuse large B-cell lymphoma  admitted for high-dose methotrexate.   2.  Anemia from chemotherapy.   3.  Thrombocytopenia from chemotherapy.   4.  History of fluid overload from IV hydration.   5.  History of pneumocystis jiroveci pneumonia from prolonged steroid.      RECOMMENDATIONS:   1.  The patient is doing very well from lymphoma.  PET scan in 2020 revealed significant improvement in her disease.  The patient will get last cycle of CNS prophylaxis with high-dose methotrexate.  In the past, she has tolerated methotrexate well.  She did have fluid overload because of IV hydration.  She is currently on Lasix.  I am expecting her to tolerate methotrexate well.     2.  Labs were reviewed with her.  Her hemoglobin is low.  She should be transfused for hemoglobin below 7.  She is also thrombocytopenic.  Overall, labs are good for treatment.  CBC will be monitored.  She should be transfused platelets if bleeding or if below 10.  I am expecting her counts to go down a little bit.  CBC will be monitored.     3.  She had a few questions, which were all answered.  This is the last planned chemotherapy.  After this, she will be getting a PET scan.  Depending on that, a decision will be made regarding any further treatment.      Thanks for the consult.         BRETT BLOUNT MD             D: 2020   T: 2020   MT: RACHANA      Name:     BABAR VARGHESE   MRN:      [id number]        Account:       [id number]   :      1970           Consult Date:  2020      Document: [id number]       cc: Mary Alice Colón PA-C

## 2020-04-08 NOTE — H&P
History and Physical     Kassie Ramirez MRN# [id number]   YOB: 1970 Age: 50 year old      Date of Admission:  4/8/2020    Primary care provider: Mary Alice Colón          Assessment and Plan:   Kassie Ramirez is a 50 year old female with a PMH significant for diffuse large B-cell lymphoma, PJP pneumonia and anxiety who presents as a direct admission at the request of Dr. Castro from oncology for high-dose methotrexate treatment.    #Diffuse large B-cell lymphoma, EBV+ non GCB stage III  #High-dose methotrexate treatment  Initially diagnosed in November 2019 and follows with Dr. Castro from [facility].  This is her third and final round of IV methotrexate which has been complicated in the past by flash pulmonary edema as discussed below.  -Oncology will place orders for methotrexate, leucovorin and Decadron  -Monitoring of methotrexate and pH levels per oncology and pharmacy  -Acyclovir for herpes prophylaxis    #Anemia/thrombocytopenia  Patient has no active bleeding with current hemoglobin of 8 and platelet count of 91.  She denies lightheadedness, dizziness, shortness of breath, palpitations and chest discomfort.  -Generally indicated to transfuse if platelets <20  -Transfuse Hgb if <7  -Will obtain type and screen    #History of flash pulmonary edema  In February of this year she was admitted for her second round of methotrexate and experienced flash pulmonary edema related to large amounts of fluids, blood transfusion and methotrexate.  Echo at that time showed normal left ventricular systolic function but indeterminant left ventricular filling pressures with mild to moderate mitral regurgitation.  EF was estimated at 55%.  Cardiology was consulted suspecting fluid overload.  She was given Lasix 10 mg IV with improvement of symptoms.   -Lasix 20 mg oral daily ordered  -Will likely need Lasix at discharge    #History of PJP pneumonia  Hospitalized at [facility] from December 18 to  December 24 with acute hypoxic respiratory failure and sepsis related to PJP. Treated with full course of Bactrim. Has been on Atovaquone for prophylaxis.   -Per last admission 2/21 Dr. Castro indicated she can stop Atovaquone due to completing treatment    #Anxiety  -Resume PTA Sertraline    #Mild to moderate mitral regurgitation  This was a new finding on echocardiogram and February.  There was no evidence of valve prolapse.  -Cardiology recommended repeating echocardiogram in May          Social: No concerns  Code: Discussed with patient and they have chosen Full code  VTE prophylaxis: PCDs  Disposition: Inpatient                    Chief Complaint:   Direct admission for high-dose methotrexate treatment         History of Present Illness:   Kassie Ramirez is a 50 year old female who presents for her third round of high-dose methotrexate treatment.  She states she overall feels well with no complaints of nausea, vomiting, abdominal pain, urinary symptoms, shortness of breath, cough, chest discomfort and fever.  She is eating and drinking well without any throat discomfort.  She has not started any new medications recently she is concerned that the last time she was here she had flash pulmonary edema related to methotrexate and fluids.             Past Medical History:     Past Medical History:   Diagnosis Date     Anxiety attack 9/16/2014     Diffuse large B-cell lymphoma (H)     Diagnosed 11/2019     Encounter for Essure implantation 2009     Generalized anxiety disorder 9/16/2014    zoloft = flat emotions     Menopausal disorder     started on OCPs by menopause center 3/2017 (takes active continuously)     Menstrual headache     helped by OCPs and magnesium     GERTRUDE (stress urinary incontinence, female)     sling procedure 2016     SVT (supraventricular tachycardia) (H)                Past Surgical History:     Past Surgical History:   Procedure Laterality Date     H KIT CHRISTIANO  2009    essrickey - Dr. 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     Lifestyle     Physical activity     Days per week: Not on file     Minutes per session: Not on file     Stress: Not on file   Relationships     Social connections     Talks on phone: Not on file     Gets together: Not on file     Attends Taoism service: Not on file     Active member of club or organization: Not on file     Attends meetings of clubs or organizations: Not on file     Relationship status: Not on file     Intimate partner violence     Fear of current or ex partner: Not on file     Emotionally abused: Not on file     Physically abused: Not on file     Forced sexual activity: Not on file   Other Topics Concern     Parent/sibling w/ CABG, MI or angioplasty before 65F 55M? No      Service Not Asked     Blood Transfusions Not Asked     Caffeine Concern Yes      Comment: MOnster energy drink.   Te - 3 cups.        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                 Family History:     Family History   Problem Relation Age of Onset     Hypertension Mother      Depression Mother      Lipids Mother      Cardiovascular Mother      Circulatory Mother      Diabetes Mother      Heart Disease Mother      Cerebrovascular Disease Mother      Obesity Mother      C.A.D. Mother      Lung Cancer Mother         smoker     Eye Disorder Father         cone dystrophy     Macular Degeneration Father      Diabetes Maternal Aunt         type 2     Hypertension Maternal Aunt      Heart Disease Maternal Grandfather      Heart Disease Sister         high cholesterol       Macular Degeneration Sister               Allergies:      Allergies   Allergen Reactions     Cold & Flu [Cold Defense Fighter]      See pseudoephedrine     Seasonal Allergies      Sudafed Cold-Cough [Dayquil Liquicaps]      Pseudoephedrine Rash     Rash then skins peels off                Medications:     Prior to Admission medications    Medication Sig Last Dose Taking? Auth Provider   acyclovir (ZOVIRAX) 400 MG tablet Take 1 tablet (400 mg) by mouth 2 times daily 4/8/2020 at Unknown time Yes Castro Castro MD   famotidine 20 MG PO tablet Take 20 mg by mouth 2 times daily as needed  Yes Unknown, Entered By History   lidocaine-prilocaine (EMLA) 2.5-2.5 % external cream Apply topically as needed for moderate pain  Yes Padmaja Stevens, PASahilC   loratadine (CLARITIN) 10 MG tablet Take 10 mg by mouth daily 4/8/2020 at Unknown time Yes Reported, Patient   LORazepam 0.5 MG PO tablet Take 1-2 tablets (0.5-1 mg) by mouth every 6 hours as  "needed (Breakthrough Nausea / Vomiting) Past Week at Unknown time Yes Padmaja Stevens PA-C   ondansetron (ZOFRAN-ODT) 8 MG ODT tab Take 1 tablet (8 mg) by mouth every 8 hours as needed  Yes Castro Catsro MD   potassium chloride ER (K-DUR/KLOR-CON M) 20 MEQ CR tablet Take 2 tablets (40 mEq) by mouth daily 4/7/2020 at Unknown time Yes Castro Castro MD   prochlorperazine (COMPAZINE) 10 MG tablet Take 1 tablet (10 mg) by mouth every 6 hours as needed (Breakthrough Nausea/Vomiting)  Yes Gonzalez Ramires MD   SENNA PO Take 1 tablet by mouth as needed  Past Week at Unknown time Yes Unknown, Entered By History   sertraline (ZOLOFT) 50 MG tablet Take 1 tablet (50 mg) by mouth daily 4/8/2020 at Unknown time Yes Shawnee Parada MD   leucovorin 15 MG tablet Take 1 tablet (15 mg) by mouth every 6 hours for 3 days   Padmaja Stevens PA-C              Review of Systems:   A Comprehensive greater than 10 system review of systems was carried out.  Pertinent positives and negatives are noted above.  Otherwise negative for contributory information.            Physical Exam:   Blood pressure (P) 113/80, temperature (P) 98.6  F (37  C), temperature source (P) Oral, resp. rate (P) 18, height (P) 1.676 m (5' 6\"), weight 68.7 kg (151 lb 8 oz), SpO2 (P) 97 %, not currently breastfeeding.  Exam:  GENERAL:  Comfortable.  PSYCH: pleasant, oriented, No acute distress.  HEENT:  PERRLA. Normal conjunctiva, normal hearing, nasal mucosa and Oropharynx are normal.  NECK:  Supple, no neck vein distention, adenopathy or bruits, normal thyroid.  HEART:  Normal S1, S2 with no murmur, no pericardial rub, gallops or S3 or S4. Right sided port free of erythema, swelling and pain.   LUNGS:  Clear to auscultation, normal Respiratory effort. No wheezing, rales or ronchi.  ABDOMEN:  Soft, no hepatosplenomegaly, normal bowel sounds. Non-tender, non distended.   EXTREMITIES:  No pedal edema, +2 pulses " bilateral and equal.  SKIN:  Dry to touch, No rash, wound or ulcerations.  NEUROLOGIC:  CN 2-12 grossly intact,  sensation is intact with no focal deficits.               Data:     Recent Labs   Lab 04/08/20  0900   WBC 5.1   HGB 8.0*   HCT 24.9*      PLT 91*     Recent Labs   Lab 04/08/20  0900      POTASSIUM 4.5   CHLORIDE 111*   CO2 26   ANIONGAP 3   GLC 96   BUN 10   CR 0.83   GFRESTIMATED 82   GFRESTBLACK >90   AFSHAN 8.2*   PROTTOTAL 6.4*   ALBUMIN 3.6   BILITOTAL 0.7   ALKPHOS 96   AST 22   ALT 27         No results found for this or any previous visit (from the past 24 hour(s)).      Demetra Hurley PA-C    This patient was seen and discussed with Dr. Kearney who agrees with the current plans as outlined above.

## 2020-04-08 NOTE — CONSULTS
CTS     Patient identifies High Risk with Very High KAIN. Patient has history of Diffuse Large B Cell Lymphoma. High KAIN related to x4 previous inpatient admissions within the last 6 months for ongoing oncology care. Her oncologist is Dr. Castro, with [facility]. Patient currently admitted 4/8 for planned inpatient chemotherapy HDMTX. Per chart review, patient lives at home with her . She has been following closely with her oncology care team.     No gaps in care identified. No handoff will be sent to her PCP care coordinator at this time.     CTS will continue to follow and assist as needed with discharge planning.     Zoila Padilla RN BSN PHN CCM   Inpatient Care Coordination   [facility]   [phone]

## 2020-04-09 LAB
ALBUMIN SERPL-MCNC: 3.3 G/DL (ref 3.4–5)
ALP SERPL-CCNC: 94 U/L (ref 40–150)
ALT SERPL W P-5'-P-CCNC: 67 U/L (ref 0–50)
ANION GAP SERPL CALCULATED.3IONS-SCNC: 6 MMOL/L (ref 3–14)
AST SERPL W P-5'-P-CCNC: 52 U/L (ref 0–45)
BILIRUB SERPL-MCNC: 1.4 MG/DL (ref 0.2–1.3)
BUN SERPL-MCNC: 15 MG/DL (ref 7–30)
CALCIUM SERPL-MCNC: 8.5 MG/DL (ref 8.5–10.1)
CHLORIDE SERPL-SCNC: 106 MMOL/L (ref 94–109)
CO2 SERPL-SCNC: 29 MMOL/L (ref 20–32)
CREAT SERPL-MCNC: 0.82 MG/DL (ref 0.52–1.04)
ERYTHROCYTE [DISTWIDTH] IN BLOOD BY AUTOMATED COUNT: 19.2 % (ref 10–15)
GFR SERPL CREATININE-BSD FRML MDRD: 83 ML/MIN/{1.73_M2}
GLUCOSE SERPL-MCNC: 131 MG/DL (ref 70–99)
HCT VFR BLD AUTO: 21.2 % (ref 35–47)
HGB BLD-MCNC: 7.4 G/DL (ref 11.7–15.7)
MCH RBC QN AUTO: 31.5 PG (ref 26.5–33)
MCHC RBC AUTO-ENTMCNC: 34.9 G/DL (ref 31.5–36.5)
MCV RBC AUTO: 90 FL (ref 78–100)
MTX SERPL-SCNC: 22.07 UMOL/L
PH UR STRIP: 8 PH (ref 5–7)
PLATELET # BLD AUTO: 122 10E9/L (ref 150–450)
POTASSIUM SERPL-SCNC: 3.1 MMOL/L (ref 3.4–5.3)
POTASSIUM SERPL-SCNC: 4 MMOL/L (ref 3.4–5.3)
PROT SERPL-MCNC: 5.7 G/DL (ref 6.8–8.8)
RBC # BLD AUTO: 2.35 10E12/L (ref 3.8–5.2)
SODIUM SERPL-SCNC: 141 MMOL/L (ref 133–144)
WBC # BLD AUTO: 8.1 10E9/L (ref 4–11)

## 2020-04-09 PROCEDURE — 25000132 ZZH RX MED GY IP 250 OP 250 PS 637: Performed by: PHYSICIAN ASSISTANT

## 2020-04-09 PROCEDURE — 86850 RBC ANTIBODY SCREEN: CPT | Performed by: PHYSICIAN ASSISTANT

## 2020-04-09 PROCEDURE — 25000132 ZZH RX MED GY IP 250 OP 250 PS 637: Performed by: INTERNAL MEDICINE

## 2020-04-09 PROCEDURE — 25800029 ZZH RX IP 258 OP 250: Performed by: INTERNAL MEDICINE

## 2020-04-09 PROCEDURE — 25000128 H RX IP 250 OP 636: Performed by: INTERNAL MEDICINE

## 2020-04-09 PROCEDURE — 84132 ASSAY OF SERUM POTASSIUM: CPT | Performed by: INTERNAL MEDICINE

## 2020-04-09 PROCEDURE — 99232 SBSQ HOSP IP/OBS MODERATE 35: CPT | Performed by: INTERNAL MEDICINE

## 2020-04-09 PROCEDURE — 25000125 ZZHC RX 250: Performed by: INTERNAL MEDICINE

## 2020-04-09 PROCEDURE — 80299 QUANTITATIVE ASSAY DRUG: CPT | Performed by: INTERNAL MEDICINE

## 2020-04-09 PROCEDURE — 85027 COMPLETE CBC AUTOMATED: CPT | Performed by: PHYSICIAN ASSISTANT

## 2020-04-09 PROCEDURE — 80053 COMPREHEN METABOLIC PANEL: CPT | Performed by: PHYSICIAN ASSISTANT

## 2020-04-09 PROCEDURE — 12000000 ZZH R&B MED SURG/OB

## 2020-04-09 PROCEDURE — 86900 BLOOD TYPING SEROLOGIC ABO: CPT | Performed by: PHYSICIAN ASSISTANT

## 2020-04-09 PROCEDURE — 86901 BLOOD TYPING SEROLOGIC RH(D): CPT | Performed by: PHYSICIAN ASSISTANT

## 2020-04-09 PROCEDURE — 86923 COMPATIBILITY TEST ELECTRIC: CPT | Performed by: PHYSICIAN ASSISTANT

## 2020-04-09 PROCEDURE — 25000131 ZZH RX MED GY IP 250 OP 636 PS 637: Performed by: INTERNAL MEDICINE

## 2020-04-09 PROCEDURE — 81003 URINALYSIS AUTO W/O SCOPE: CPT | Performed by: INTERNAL MEDICINE

## 2020-04-09 RX ORDER — POTASSIUM CHLORIDE 1500 MG/1
20-40 TABLET, EXTENDED RELEASE ORAL
Status: DISCONTINUED | OUTPATIENT
Start: 2020-04-09 | End: 2020-04-11 | Stop reason: HOSPADM

## 2020-04-09 RX ORDER — POTASSIUM CL/LIDO/0.9 % NACL 10MEQ/0.1L
10 INTRAVENOUS SOLUTION, PIGGYBACK (ML) INTRAVENOUS
Status: DISCONTINUED | OUTPATIENT
Start: 2020-04-09 | End: 2020-04-11 | Stop reason: HOSPADM

## 2020-04-09 RX ORDER — HEPARIN SODIUM,PORCINE 10 UNIT/ML
5-10 VIAL (ML) INTRAVENOUS EVERY 24 HOURS
Status: DISCONTINUED | OUTPATIENT
Start: 2020-04-09 | End: 2020-04-11 | Stop reason: HOSPADM

## 2020-04-09 RX ORDER — POTASSIUM CHLORIDE 1.5 G/1.58G
20-40 POWDER, FOR SOLUTION ORAL
Status: DISCONTINUED | OUTPATIENT
Start: 2020-04-09 | End: 2020-04-11 | Stop reason: HOSPADM

## 2020-04-09 RX ORDER — HEPARIN SODIUM (PORCINE) LOCK FLUSH IV SOLN 100 UNIT/ML 100 UNIT/ML
5 SOLUTION INTRAVENOUS
Status: DISCONTINUED | OUTPATIENT
Start: 2020-04-09 | End: 2020-04-11 | Stop reason: HOSPADM

## 2020-04-09 RX ORDER — POTASSIUM CHLORIDE 29.8 MG/ML
20 INJECTION INTRAVENOUS
Status: DISCONTINUED | OUTPATIENT
Start: 2020-04-09 | End: 2020-04-11 | Stop reason: HOSPADM

## 2020-04-09 RX ORDER — HEPARIN SODIUM,PORCINE 10 UNIT/ML
5-10 VIAL (ML) INTRAVENOUS
Status: DISCONTINUED | OUTPATIENT
Start: 2020-04-09 | End: 2020-04-11 | Stop reason: HOSPADM

## 2020-04-09 RX ORDER — FUROSEMIDE 10 MG/ML
20 INJECTION INTRAMUSCULAR; INTRAVENOUS EVERY 12 HOURS
Status: DISCONTINUED | OUTPATIENT
Start: 2020-04-09 | End: 2020-04-11 | Stop reason: HOSPADM

## 2020-04-09 RX ORDER — POTASSIUM CHLORIDE 7.45 MG/ML
10 INJECTION INTRAVENOUS
Status: DISCONTINUED | OUTPATIENT
Start: 2020-04-09 | End: 2020-04-11 | Stop reason: HOSPADM

## 2020-04-09 RX ADMIN — LORATADINE 10 MG: 10 TABLET ORAL at 09:33

## 2020-04-09 RX ADMIN — POTASSIUM CHLORIDE 20 MEQ: 1500 TABLET, EXTENDED RELEASE ORAL at 12:41

## 2020-04-09 RX ADMIN — ACYCLOVIR 400 MG: 400 TABLET ORAL at 20:58

## 2020-04-09 RX ADMIN — POTASSIUM CHLORIDE 40 MEQ: 1500 TABLET, EXTENDED RELEASE ORAL at 09:33

## 2020-04-09 RX ADMIN — FUROSEMIDE 20 MG: 20 TABLET ORAL at 14:02

## 2020-04-09 RX ADMIN — SODIUM BICARBONATE: 84 INJECTION INTRAVENOUS at 21:47

## 2020-04-09 RX ADMIN — ACYCLOVIR 400 MG: 400 TABLET ORAL at 09:33

## 2020-04-09 RX ADMIN — SODIUM BICARBONATE: 84 INJECTION INTRAVENOUS at 11:22

## 2020-04-09 RX ADMIN — SODIUM BICARBONATE: 84 INJECTION INTRAVENOUS at 02:06

## 2020-04-09 RX ADMIN — POTASSIUM CHLORIDE 40 MEQ: 1500 TABLET, EXTENDED RELEASE ORAL at 09:35

## 2020-04-09 RX ADMIN — LEUCOVORIN CALCIUM 50 MG: 350 INJECTION, POWDER, LYOPHILIZED, FOR SOLUTION INTRAMUSCULAR; INTRAVENOUS at 14:03

## 2020-04-09 RX ADMIN — LEUCOVORIN CALCIUM 25 MG: 350 INJECTION, POWDER, LYOPHILIZED, FOR SOLUTION INTRAMUSCULAR; INTRAVENOUS at 20:58

## 2020-04-09 RX ADMIN — SENNOSIDES AND DOCUSATE SODIUM 1 TABLET: 8.6; 5 TABLET ORAL at 18:41

## 2020-04-09 RX ADMIN — SODIUM BICARBONATE: 84 INJECTION INTRAVENOUS at 16:18

## 2020-04-09 RX ADMIN — SODIUM BICARBONATE: 84 INJECTION INTRAVENOUS at 06:28

## 2020-04-09 RX ADMIN — FUROSEMIDE 20 MG: 10 INJECTION, SOLUTION INTRAMUSCULAR; INTRAVENOUS at 19:24

## 2020-04-09 RX ADMIN — DEXAMETHASONE 8 MG: 4 TABLET ORAL at 09:32

## 2020-04-09 RX ADMIN — SERTRALINE HYDROCHLORIDE 50 MG: 50 TABLET ORAL at 09:33

## 2020-04-09 ASSESSMENT — ACTIVITIES OF DAILY LIVING (ADL)
ADLS_ACUITY_SCORE: 13

## 2020-04-09 ASSESSMENT — MIFFLIN-ST. JEOR: SCORE: 1333.23

## 2020-04-09 NOTE — PLAN OF CARE
End of Shift Summary  For vital signs and complete assessments, please see documentation flowsheets.     Pertinent assessments:voiding without difficulty, bowel sounds active, lung sounds clear, denies pain and nausea   Major Shift Events: uneventful shift, slept well between cares    Treatment Plan: sodium bicarb, urine ph levels, leucovorin     Discharge Readiness: 3-4 days  Expected Discharge Date: TBD  Discharge Disposition: {:home with spouse  Barriers/Criteria for discharge

## 2020-04-09 NOTE — PROVIDER NOTIFICATION
"1758 Dr. Kearney pg'd: Pt feels \"fluid overloaded like last time.\" Says she needed Lasix last admission as well. Got 20mg daily at 2pm. Thoughts?    1802 New order received per Dr. Kearney for 20mg IV Lasix BID, due to be given this evening. PO daily Lasix discontinued.  "

## 2020-04-09 NOTE — PLAN OF CARE
To Do:  End of Shift Summary  For vital signs and complete assessments, please see documentation flowsheets.     Pertinent assessments:voiding without difficulty, bowel sounds active, lung sounds clear, denies pain and nausea   Major Shift Events: uneventful shift, tolerating diet and activity  Treatment Plan: sodium bicarb, urine ph level 8.0, leucovorin push, methotrexate level 22..07    Discharge Readiness: 3-4 days  Expected Discharge Date: when methotrexate level stable

## 2020-04-09 NOTE — PROGRESS NOTES
"[facility]  Hospitalist Progress Note  Dalton Kearney MD, MD 04/09/2020  (Text Page)  Reason for Stay (Diagnosis): Diffuse large B-cell lymphoma         Assessment and Plan:      Summary of Stay: Kassie Ramirez is a 50 year old female with a PMH significant for diffuse large B-cell lymphoma, PJP pneumonia and anxiety who presents as a direct admission at the request of Dr. Castro from oncology for high-dose methotrexate treatment    Problem List:   1. Diffuse large B-cell lymphoma  2. Chronic anemia, thrombocytopenia    Earlier receive high-dose methotrexate for CNS prophylaxis  Previously completed her 6 cycle of R-CHOP  On generous IV fluid support with sodium bicarb.  On Leucovorin  Will defer to hematology service regarding management of fluids, methotrexate levels  Currently on oral Lasix.  We will continue to monitor.  Fortunately not exhibiting any signs of volume overload yet.  Low threshold in initiating intravenous diuretics if needed  Transfusion instruction as per hematology directions    DVT Prophylaxis: Pneumatic Compression Devices  Code Status: Full Code  Discharge Dispo: Home  Estimated Disch Date / # of Days until Disch: Previously planned on April 11        Interval History (Subjective):      Continuing care today.  Seen and examined.  Chart reviewed.  Tolerating earlier methotrexate infusion  No nausea or vomiting.  Still has good appetite.  No reported diarrhea, shortness of breath, chest pain.  Mental state at baseline.  Remain afebrile.  Not requiring any oxygen supplementation     # Pain Assessment:  Current Pain Score 4/8/2020   Patient currently in pain? denies   Pain score (0-10) -   Pain location -   Pain descriptors -   Kassie renee pain level was assessed and she currently denies pain.                  Physical Exam:      Last Vital Signs:  /82 (BP Location: Right arm)   Temp 98  F (36.7  C) (Oral)   Resp 20   Ht 1.676 m (5' 5.98\")   Wt 69.7 kg (153 lb 9.6 oz) "   SpO2 96%   BMI 24.80 kg/m      I/O last 3 completed shifts:  In: 6371 [P.O.:700; I.V.:4671; IV Piggyback:1000]  Out: 4400 [Urine:4400]  Wt Readings from Last 1 Encounters:   04/09/20 69.7 kg (153 lb 9.6 oz)     Vitals:    04/08/20 0813 04/09/20 0554   Weight: 68.7 kg (151 lb 8 oz) 69.7 kg (153 lb 9.6 oz)       Constitutional: Awake, alert, cooperative, no apparent distress, bald head   Respiratory: Clear to auscultation bilaterally, no crackles or wheezing   Cardiovascular: Regular rate and rhythm, normal S1 and S2, and no murmur noted   Abdomen: Normal bowel sounds, soft, non-distended, non-tender   Skin: No rashes, no cyanosis, dry to touch   Neuro: Alert and oriented x3, no weakness, spontaneous and coherent speech   Extremities: No edema, normal range of motion   Other(s): Euthymic mood, not agitated       All other systems: Negative          Medications:      All current medications were reviewed with changes reflected in problem list.         Data:      All new lab and imaging data was reviewed.   Labs:  No results for input(s): CULT in the last 168 hours.  Recent Labs   Lab 04/09/20  0645 04/08/20  0900   WBC 8.1 5.1   HGB 7.4* 8.0*   HCT 21.2* 24.9*   MCV 90 100   * 91*     Recent Labs   Lab 04/09/20  0645 04/08/20  0900    140   POTASSIUM 3.1* 4.5   CHLORIDE 106 111*   CO2 29 26   ANIONGAP 6 3   * 96   BUN 15 10   CR 0.82 0.83   GFRESTIMATED 83 82   GFRESTBLACK >90 >90   AFSHAN 8.5 8.2*   PROTTOTAL 5.7* 6.4*   ALBUMIN 3.3* 3.6   BILITOTAL 1.4* 0.7   ALKPHOS 94 96   AST 52* 22   ALT 67* 27     No results for input(s): SED, CRP in the last 168 hours.  Recent Labs   Lab 04/09/20  0645 04/08/20  0900   * 96     No results for input(s): INR in the last 168 hours.   Imaging:   No results found for this or any previous visit (from the past 48 hour(s)).

## 2020-04-09 NOTE — PROGRESS NOTES
Oncology:     Chart check: DLBCL on chemotherapy with R-CHOP and CNS prophylaxis with high-dose methotrexate.  She is completed all 6 cycles of R-CHOP, most recently 3/25/2020.  She was receiving prophylactic high-dose methotrexate inpatient currently and this will be her final dose.  I spoke to the floor RN since we are trying to limit in person visits and patient is stable and doing well today.  Hemoglobin has dropped a little to 7.4, platelets have improved to 122, she has not been having any bleeding, fever or chills.  I suspect her hemoglobin will drop below 7.0 tomorrow and she will likely require a unit of blood.  She is currently receiving IV fluids, Lasix and her methotrexate levels will be monitored.    I will see her in person tomorrow, 4/10, but please call/page us with any questions.    Padmaja Sanchez PA-C  Hematology/Oncology  [facility]

## 2020-04-10 LAB
ABO + RH BLD: NORMAL
ABO + RH BLD: NORMAL
ALBUMIN SERPL-MCNC: 3.2 G/DL (ref 3.4–5)
ALP SERPL-CCNC: 80 U/L (ref 40–150)
ALT SERPL W P-5'-P-CCNC: 60 U/L (ref 0–50)
ANION GAP SERPL CALCULATED.3IONS-SCNC: 2 MMOL/L (ref 3–14)
AST SERPL W P-5'-P-CCNC: 35 U/L (ref 0–45)
BILIRUB SERPL-MCNC: 0.7 MG/DL (ref 0.2–1.3)
BLD GP AB SCN SERPL QL: NORMAL
BLD PROD TYP BPU: NORMAL
BLD PROD TYP BPU: NORMAL
BLD UNIT ID BPU: 0
BLOOD BANK CMNT PATIENT-IMP: NORMAL
BLOOD PRODUCT CODE: NORMAL
BPU ID: NORMAL
BUN SERPL-MCNC: 17 MG/DL (ref 7–30)
CALCIUM SERPL-MCNC: 8.8 MG/DL (ref 8.5–10.1)
CHLORIDE SERPL-SCNC: 107 MMOL/L (ref 94–109)
CO2 SERPL-SCNC: 33 MMOL/L (ref 20–32)
CREAT SERPL-MCNC: 0.79 MG/DL (ref 0.52–1.04)
ERYTHROCYTE [DISTWIDTH] IN BLOOD BY AUTOMATED COUNT: 20 % (ref 10–15)
GFR SERPL CREATININE-BSD FRML MDRD: 87 ML/MIN/{1.73_M2}
GLUCOSE SERPL-MCNC: 99 MG/DL (ref 70–99)
HCT VFR BLD AUTO: 19.7 % (ref 35–47)
HGB BLD-MCNC: 6.3 G/DL (ref 11.7–15.7)
MCH RBC QN AUTO: 32.3 PG (ref 26.5–33)
MCHC RBC AUTO-ENTMCNC: 32 G/DL (ref 31.5–36.5)
MCV RBC AUTO: 101 FL (ref 78–100)
MTX SERPL-SCNC: 0.45 UMOL/L
NUM BPU REQUESTED: 1
PH UR STRIP: 8 PH (ref 5–7)
PLATELET # BLD AUTO: 136 10E9/L (ref 150–450)
POTASSIUM SERPL-SCNC: 3.4 MMOL/L (ref 3.4–5.3)
PROT SERPL-MCNC: 5.5 G/DL (ref 6.8–8.8)
RBC # BLD AUTO: 1.95 10E12/L (ref 3.8–5.2)
SODIUM SERPL-SCNC: 142 MMOL/L (ref 133–144)
SPECIMEN EXP DATE BLD: NORMAL
TRANSFUSION STATUS PATIENT QL: NORMAL
TRANSFUSION STATUS PATIENT QL: NORMAL
WBC # BLD AUTO: 5.4 10E9/L (ref 4–11)

## 2020-04-10 PROCEDURE — P9016 RBC LEUKOCYTES REDUCED: HCPCS | Performed by: PHYSICIAN ASSISTANT

## 2020-04-10 PROCEDURE — 99207 ZZC CDG-MDM COMPONENT: MEETS MODERATE - UP CODED: CPT | Performed by: INTERNAL MEDICINE

## 2020-04-10 PROCEDURE — 99233 SBSQ HOSP IP/OBS HIGH 50: CPT | Performed by: INTERNAL MEDICINE

## 2020-04-10 PROCEDURE — 25800029 ZZH RX IP 258 OP 250: Performed by: INTERNAL MEDICINE

## 2020-04-10 PROCEDURE — 25000128 H RX IP 250 OP 636: Performed by: INTERNAL MEDICINE

## 2020-04-10 PROCEDURE — 80299 QUANTITATIVE ASSAY DRUG: CPT | Performed by: INTERNAL MEDICINE

## 2020-04-10 PROCEDURE — 99233 SBSQ HOSP IP/OBS HIGH 50: CPT | Performed by: PHYSICIAN ASSISTANT

## 2020-04-10 PROCEDURE — 25000131 ZZH RX MED GY IP 250 OP 636 PS 637: Performed by: INTERNAL MEDICINE

## 2020-04-10 PROCEDURE — 25000132 ZZH RX MED GY IP 250 OP 250 PS 637: Performed by: PHYSICIAN ASSISTANT

## 2020-04-10 PROCEDURE — 12000000 ZZH R&B MED SURG/OB

## 2020-04-10 PROCEDURE — 85027 COMPLETE CBC AUTOMATED: CPT | Performed by: INTERNAL MEDICINE

## 2020-04-10 PROCEDURE — 80053 COMPREHEN METABOLIC PANEL: CPT | Performed by: INTERNAL MEDICINE

## 2020-04-10 PROCEDURE — 81003 URINALYSIS AUTO W/O SCOPE: CPT | Performed by: INTERNAL MEDICINE

## 2020-04-10 PROCEDURE — 25000125 ZZHC RX 250: Performed by: INTERNAL MEDICINE

## 2020-04-10 RX ORDER — LEUCOVORIN CALCIUM 15 MG/1
15 TABLET ORAL EVERY 6 HOURS SCHEDULED
Status: DISCONTINUED | OUTPATIENT
Start: 2020-04-10 | End: 2020-04-11 | Stop reason: HOSPADM

## 2020-04-10 RX ORDER — LEUCOVORIN CALCIUM 5 MG/1
15 TABLET ORAL EVERY 6 HOURS SCHEDULED
Status: DISCONTINUED | OUTPATIENT
Start: 2020-04-10 | End: 2020-04-10 | Stop reason: CLARIF

## 2020-04-10 RX ADMIN — SODIUM BICARBONATE: 84 INJECTION INTRAVENOUS at 22:42

## 2020-04-10 RX ADMIN — FUROSEMIDE 20 MG: 10 INJECTION, SOLUTION INTRAMUSCULAR; INTRAVENOUS at 09:19

## 2020-04-10 RX ADMIN — LEUCOVORIN CALCIUM 15 MG: 15 TABLET ORAL at 21:38

## 2020-04-10 RX ADMIN — FUROSEMIDE 20 MG: 10 INJECTION, SOLUTION INTRAMUSCULAR; INTRAVENOUS at 19:09

## 2020-04-10 RX ADMIN — ACYCLOVIR 400 MG: 400 TABLET ORAL at 21:38

## 2020-04-10 RX ADMIN — LEUCOVORIN CALCIUM 25 MG: 350 INJECTION, POWDER, LYOPHILIZED, FOR SOLUTION INTRAMUSCULAR; INTRAVENOUS at 09:45

## 2020-04-10 RX ADMIN — ACYCLOVIR 400 MG: 400 TABLET ORAL at 09:20

## 2020-04-10 RX ADMIN — LORATADINE 10 MG: 10 TABLET ORAL at 09:20

## 2020-04-10 RX ADMIN — DEXAMETHASONE 8 MG: 4 TABLET ORAL at 09:20

## 2020-04-10 RX ADMIN — SODIUM BICARBONATE: 84 INJECTION INTRAVENOUS at 13:43

## 2020-04-10 RX ADMIN — SERTRALINE HYDROCHLORIDE 50 MG: 50 TABLET ORAL at 09:20

## 2020-04-10 RX ADMIN — LEUCOVORIN CALCIUM 15 MG: 15 TABLET ORAL at 16:32

## 2020-04-10 RX ADMIN — LEUCOVORIN CALCIUM 25 MG: 350 INJECTION, POWDER, LYOPHILIZED, FOR SOLUTION INTRAMUSCULAR; INTRAVENOUS at 03:36

## 2020-04-10 RX ADMIN — SODIUM BICARBONATE: 84 INJECTION INTRAVENOUS at 19:07

## 2020-04-10 RX ADMIN — SODIUM BICARBONATE: 84 INJECTION INTRAVENOUS at 06:23

## 2020-04-10 RX ADMIN — SODIUM BICARBONATE: 84 INJECTION INTRAVENOUS at 02:17

## 2020-04-10 RX ADMIN — POTASSIUM CHLORIDE 40 MEQ: 1500 TABLET, EXTENDED RELEASE ORAL at 09:20

## 2020-04-10 ASSESSMENT — ACTIVITIES OF DAILY LIVING (ADL)
ADLS_ACUITY_SCORE: 13

## 2020-04-10 ASSESSMENT — MIFFLIN-ST. JEOR: SCORE: 1334.59

## 2020-04-10 NOTE — PLAN OF CARE
End of Shift Summary  For vital signs and complete assessments, please see documentation flowsheets.     Pertinent assessments: VSS. Afebrile. No longer complaining of pain with breathing, LS clear. No edema noted. Slept well. Denies pain and nausea.   Major Shift Events: Urine pH 8.0  Treatment Plan: IV NaBicarb, monitoring of urine PH and methotrexate levels    Discharge Readiness: medically active  Expected Discharge Date: when methotrexate level stable hoping for Saturday  Discharge Disposition: Home with spouse  Barriers/Criteria for discharge: Methotrexate levels

## 2020-04-10 NOTE — PLAN OF CARE
Patient alert and oriented x4. Up independent in room. Denies pain and SOB. Hgb 6.3, 1 unit blood given, recheck tomorrow. Fluids running at 250ml/hr. Voiding adequate amounts. Urine pH 8.0. Methotrexate level 0.45. VSS. IV leucovorin switched to PO. Possible discharge home tomorrow pending Methotrexate level.

## 2020-04-10 NOTE — PROGRESS NOTES
[facility]  Hospitalist Progress Note  Dalton Kearney MD, MD 04/10/2020  (Text Page)  Reason for Stay (Diagnosis): Diffuse large B-cell lymphoma         Assessment and Plan:      Summary of Stay: Kassie Ramirez is a 50 year old female with a PMH significant for diffuse large B-cell lymphoma, PJP pneumonia and anxiety who presents as a direct admission at the request of Dr. Castro from oncology for high-dose methotrexate treatment    Problem List:   1. Diffuse large B-cell lymphoma  2. Acute, worsening on top of chronic anemia, thrombocytopenia likely secondary to ongoing high-dose methotrexate use    Earlier receive high-dose methotrexate for CNS prophylaxis  Previously completed her 6 cycle of R-CHOP  On generous IV fluid support with sodium bicarb.  On Leucovorin  -Hemoglobin today showed critically low at 6.3.  This has been anticipated.  Packed RBC transfusion ordered and will be provided this morning.  -Hematology following with us and provided instructions regarding IV fluid management.  This will be put on hold while patient is receiving blood transfusion.  Continuation of IV Lasix.  Patient's not requiring any oxygen supplementation.    DVT Prophylaxis: Pneumatic Compression Devices  Code Status: Full Code  Discharge Dispo: Home  Estimated Disch Date / # of Days until Disch: Previously planned on April 11        Interval History (Subjective):      Continuing care today.  Seen and examined.  Chart reviewed.  Tolerating earlier methotrexate infusion  No nausea or vomiting.  Still has good appetite.  No reported diarrhea, shortness of breath, chest pain.  Mental state at baseline.  Remain afebrile.  Not requiring any oxygen supplementation     # Pain Assessment:  Current Pain Score 4/10/2020   Patient currently in pain? denies   Pain score (0-10) -   Pain location -   Pain descriptors -   Kassie renee pain level was assessed and she currently denies pain.                  Physical Exam:     "  Last Vital Signs:  /71   Pulse 100   Temp 97.7  F (36.5  C) (Oral)   Resp 16   Ht 1.676 m (5' 5.98\")   Wt 69.8 kg (153 lb 14.4 oz)   SpO2 97%   BMI 24.85 kg/m      I/O last 3 completed shifts:  In: 6199 [P.O.:1440; I.V.:4759]  Out: 7775 [Urine:7775]  Wt Readings from Last 1 Encounters:   04/10/20 69.8 kg (153 lb 14.4 oz)     Vitals:    04/08/20 0813 04/09/20 0554 04/10/20 0626   Weight: 68.7 kg (151 lb 8 oz) 69.7 kg (153 lb 9.6 oz) 69.8 kg (153 lb 14.4 oz)       Constitutional: Awake, alert, cooperative, no apparent distress, bald head   Respiratory: Clear to auscultation bilaterally, no crackles or wheezing   Cardiovascular: Regular rate and rhythm, normal S1 and S2, and no murmur noted   Abdomen: Normal bowel sounds, soft, non-distended, non-tender   Skin: No rashes, no cyanosis, dry to touch   Neuro: Alert and oriented x3, no weakness, spontaneous and coherent speech   Extremities: No edema, normal range of motion   Other(s): Euthymic mood, not agitated       All other systems: Negative          Medications:      All current medications were reviewed with changes reflected in problem list.         Data:      All new lab and imaging data was reviewed.   Labs:  No results for input(s): CULT in the last 168 hours.  Recent Labs   Lab 04/10/20  0623 04/09/20  0645 04/08/20  0900   WBC 5.4 8.1 5.1   HGB 6.3* 7.4* 8.0*   HCT 19.7* 21.2* 24.9*   * 90 100   * 122* 91*     Recent Labs   Lab 04/10/20  0623 04/09/20  1841 04/09/20  0645 04/08/20  0900     --  141 140   POTASSIUM 3.4 4.0 3.1* 4.5   CHLORIDE 107  --  106 111*   CO2 33*  --  29 26   ANIONGAP 2*  --  6 3   GLC 99  --  131* 96   BUN 17  --  15 10   CR 0.79  --  0.82 0.83   GFRESTIMATED 87  --  83 82   GFRESTBLACK >90  --  >90 >90   AFSHAN 8.8  --  8.5 8.2*   PROTTOTAL 5.5*  --  5.7* 6.4*   ALBUMIN 3.2*  --  3.3* 3.6   BILITOTAL 0.7  --  1.4* 0.7   ALKPHOS 80  --  94 96   AST 35  --  52* 22   ALT 60*  --  67* 27     No results for " input(s): SED, CRP in the last 168 hours.  Recent Labs   Lab 04/10/20  0623 04/09/20  0645 04/08/20  0900   GLC 99 131* 96     No results for input(s): INR in the last 168 hours.   Imaging:   No results found for this or any previous visit (from the past 48 hour(s)).

## 2020-04-10 NOTE — PLAN OF CARE
To Do:  End of Shift Summary  For vital signs and complete assessments, please see documentation flowsheets.     Pertinent assessments: Pt complained of pain upon inspiration and expiration. Clear lung sounds in all fields. Pain relieved with scheduled lasix and ambulation. Denies nausea.    Major Shift Events: Tolerating diet and activity well. Patient voiding without difficulty.     Treatment Plan: IV sodium bicard, monitoring of urine PH and methotrexate levels    Discharge Readiness: 3-4 days  Expected Discharge Date: when methotrexate level stable  Discharge Disposition: Home with spouse  Barriers/Criteria for discharge: Methotrexate levels

## 2020-04-10 NOTE — PROGRESS NOTES
[facility]    Hematology/Oncology Follow-up Note      Today's Date: 04/10/20  Date of Admission:  4/8/2020  Reason for Consult: DLBCL, admitted for high-dose methotrexate for CNS prophylaxis      ASSESSMENT/PLAN:  Kassie Ramirez is a 50 year old female with:    DLBCL: PET scan on 2/4/2020 3:20 cycles of R-CHOP/HD methotrexate with treatment response.  She has now completed 6 full cycles of R-CHOP, final cycle on 3/25/2020 and receiving last dose of high-dose methotrexate this admission for CNS prophylaxis.  She did have fluid overload from IV hydration after her last dose of methotrexate, currently getting IV Lasix and currently on p.o. Lasix at home.  Methotrexate level has dropped to 0.45 today and we will discontinue IV leucovorin, start oral leucovorin 15 mg every 6 hours and continue oral until methotrexate level is <0.1.  Discussed with Dr. Castro and RN, left a message with inpatient pharmacy.  Currently scheduled for CT scan in June, we will need to change that to PET scan.  I will place the new order today and reach out to our outpatient schedulers to adjust schedule.  --We will intermittently follow over the weekend, please call us with any questions.    Heme: Anemia secondary to chemotherapy and methotrexate, hemoglobin 6.3 as expected, she received 1 unit of blood this morning.  This will also improve her fatigue.  Platelets are stable at 136, no active bleeding.  --Transfuse if hemoglobin <7.0 and/or platelets <10.      Discussed with Dr Castro and he agreed with the plan above.       INTERIM HISTORY: Camille is doing okay today.  She was seen in her room, resting comfortably in bed, no family at bedside due to the COVID-19 pandemic.  Shortness of breath is improving.  She had a bowel movement yesterday.  She is walking well.  Appetite is okay.  No fever, chills, cough, vomiting, diarrhea.       MEDICATIONS:  Current Facility-Administered Medications   Medication     acetaminophen  (TYLENOL) tablet 650 mg     acyclovir (ZOVIRAX) tablet 400 mg     albuterol (PROAIR HFA/PROVENTIL HFA/VENTOLIN HFA) 108 (90 Base) MCG/ACT inhaler 1-2 puff     albuterol (PROVENTIL) neb solution 2.5 mg     diphenhydrAMINE (BENADRYL) injection 50 mg     EPINEPHrine PF (ADRENALIN) injection 0.3 mg     famotidine (PEPCID) tablet 20 mg     furosemide (LASIX) injection 20 mg     heparin 100 UNIT/ML injection 5 mL     heparin lock flush 10 UNIT/ML injection 5-10 mL     heparin lock flush 10 UNIT/ML injection 5-10 mL     leucovorin calcium injection 25 mg     lidocaine (LMX4) cream     lidocaine 1 % 0.1-1 mL     loratadine (CLARITIN) tablet 10 mg     LORazepam (ATIVAN) injection 0.5-1 mg     LORazepam (ATIVAN) tablet 0.5-1 mg     MEDICATION INSTRUCTION     melatonin tablet 1 mg     meperidine (DEMEROL) injection 25 mg     methylPREDNISolone sodium succinate (solu-MEDROL) injection 125 mg     NaCl 0.45 % 1,000 mL with sodium bicarbonate 100 mEq/L infusion     naloxone (NARCAN) injection 0.1-0.4 mg     potassium chloride (KLOR-CON) Packet 20-40 mEq     potassium chloride 10 mEq in 100 mL intermittent infusion with 10 mg lidocaine     potassium chloride 10 mEq in 100 mL sterile water intermittent infusion (premix)     potassium chloride 20 mEq in 50 mL intermittent infusion     potassium chloride ER (KLOR-CON M) CR tablet 20-40 mEq     potassium chloride ER (KLOR-CON M) CR tablet 40 mEq     prochlorperazine (COMPAZINE) injection 10 mg     prochlorperazine (COMPAZINE) tablet 10 mg     senna-docusate (SENOKOT-S/PERICOLACE) 8.6-50 MG per tablet 1 tablet    Or     senna-docusate (SENOKOT-S/PERICOLACE) 8.6-50 MG per tablet 2 tablet     sertraline (ZOLOFT) tablet 50 mg     sodium bicarbonate 8.4 % intermittent infusion 50 mEq     sodium chloride (PF) 0.9% PF flush 10-20 mL     sodium chloride (PF) 0.9% PF flush 10-20 mL     sodium chloride (PF) 0.9% PF flush 3 mL     sodium chloride 0.9% infusion         ALLERGIES:  Allergies  "  Allergen Reactions     Cold & Flu [Cold Defense Fighter]      See pseudoephedrine     Seasonal Allergies      Sudafed Cold-Cough [Dayquil Liquicaps]      Pseudoephedrine Rash     Rash then skins peels off          PHYSICAL EXAM:  Vital signs:  Temp: 98.2  F (36.8  C) Temp src: Oral BP: 122/82 Pulse: 94 Heart Rate: 94 Resp: 16 SpO2: 100 % O2 Device: None (Room air)   Height: 167.6 cm (5' 5.98\") Weight: 69.8 kg (153 lb 14.4 oz)  Estimated body mass index is 24.85 kg/m  as calculated from the following:    Height as of this encounter: 1.676 m (5' 5.98\").    Weight as of this encounter: 69.8 kg (153 lb 14.4 oz).    GENERAL:  Female, in no acute distress.  Alert and oriented x3.   HEENT:  Normocephalic, atraumatic.   LUNGS: Nonlabored breathing  SKIN: No rash on exposed skin  PSYCH: Smiling, mood stable      LABS:  CBC RESULTS:   Recent Labs   Lab Test 04/10/20  0623   WBC 5.4   RBC 1.95*   HGB 6.3*   HCT 19.7*   *   MCH 32.3   MCHC 32.0   RDW 20.0*   *       Recent Labs   Lab Test 04/10/20  0623 04/09/20  1841 04/09/20  0645     --  141   POTASSIUM 3.4 4.0 3.1*   CHLORIDE 107  --  106   CO2 33*  --  29   ANIONGAP 2*  --  6   GLC 99  --  131*   BUN 17  --  15   CR 0.79  --  0.82   AFSHAN 8.8  --  8.5         Padmaja Sanchez PA-C  Hematology/Oncology  [facility]      "

## 2020-04-10 NOTE — PROGRESS NOTES
04/10/20 0706   Significant Event   Significant Event Critical result/value  (Hbg 6.3 )   Will notify MD.    Last seen on 1/3/19. Upcomin19. Refill sent.

## 2020-04-11 VITALS
OXYGEN SATURATION: 98 % | TEMPERATURE: 97.6 F | WEIGHT: 152.5 LBS | HEART RATE: 94 BPM | BODY MASS INDEX: 24.51 KG/M2 | DIASTOLIC BLOOD PRESSURE: 89 MMHG | RESPIRATION RATE: 16 BRPM | HEIGHT: 66 IN | SYSTOLIC BLOOD PRESSURE: 140 MMHG

## 2020-04-11 DIAGNOSIS — C83.30 DIFFUSE LARGE B-CELL LYMPHOMA, UNSPECIFIED BODY REGION (H): ICD-10-CM

## 2020-04-11 LAB
BASOPHILS # BLD AUTO: 0 10E9/L (ref 0–0.2)
BASOPHILS NFR BLD AUTO: 0.6 %
DIFFERENTIAL METHOD BLD: ABNORMAL
EOSINOPHIL # BLD AUTO: 0 10E9/L (ref 0–0.7)
EOSINOPHIL NFR BLD AUTO: 0.3 %
ERYTHROCYTE [DISTWIDTH] IN BLOOD BY AUTOMATED COUNT: 19.6 % (ref 10–15)
HCT VFR BLD AUTO: 25.8 % (ref 35–47)
HGB BLD-MCNC: 8.3 G/DL (ref 11.7–15.7)
IMM GRANULOCYTES # BLD: 0 10E9/L (ref 0–0.4)
IMM GRANULOCYTES NFR BLD: 0.9 %
LYMPHOCYTES # BLD AUTO: 0.4 10E9/L (ref 0.8–5.3)
LYMPHOCYTES NFR BLD AUTO: 11.7 %
MCH RBC QN AUTO: 31.4 PG (ref 26.5–33)
MCHC RBC AUTO-ENTMCNC: 32.2 G/DL (ref 31.5–36.5)
MCV RBC AUTO: 98 FL (ref 78–100)
MONOCYTES # BLD AUTO: 0.1 10E9/L (ref 0–1.3)
MONOCYTES NFR BLD AUTO: 1.8 %
MTX SERPL-SCNC: 0.11 UMOL/L
NEUTROPHILS # BLD AUTO: 2.9 10E9/L (ref 1.6–8.3)
NEUTROPHILS NFR BLD AUTO: 84.7 %
NRBC # BLD AUTO: 0 10*3/UL
NRBC BLD AUTO-RTO: 0 /100
PH UR STRIP: 8 PH (ref 5–7)
PH UR STRIP: 8 PH (ref 5–7)
PLATELET # BLD AUTO: 151 10E9/L (ref 150–450)
RBC # BLD AUTO: 2.64 10E12/L (ref 3.8–5.2)
WBC # BLD AUTO: 3.4 10E9/L (ref 4–11)

## 2020-04-11 PROCEDURE — 80299 QUANTITATIVE ASSAY DRUG: CPT | Performed by: INTERNAL MEDICINE

## 2020-04-11 PROCEDURE — 99233 SBSQ HOSP IP/OBS HIGH 50: CPT | Performed by: INTERNAL MEDICINE

## 2020-04-11 PROCEDURE — 25000125 ZZHC RX 250: Performed by: INTERNAL MEDICINE

## 2020-04-11 PROCEDURE — 99239 HOSP IP/OBS DSCHRG MGMT >30: CPT | Performed by: INTERNAL MEDICINE

## 2020-04-11 PROCEDURE — 25000128 H RX IP 250 OP 636: Performed by: INTERNAL MEDICINE

## 2020-04-11 PROCEDURE — 85025 COMPLETE CBC W/AUTO DIFF WBC: CPT | Performed by: INTERNAL MEDICINE

## 2020-04-11 PROCEDURE — 25000132 ZZH RX MED GY IP 250 OP 250 PS 637: Performed by: PHYSICIAN ASSISTANT

## 2020-04-11 PROCEDURE — 25800029 ZZH RX IP 258 OP 250: Performed by: INTERNAL MEDICINE

## 2020-04-11 PROCEDURE — 81003 URINALYSIS AUTO W/O SCOPE: CPT | Performed by: INTERNAL MEDICINE

## 2020-04-11 RX ORDER — FUROSEMIDE 20 MG
20 TABLET ORAL DAILY
Qty: 7 TABLET | Refills: 0 | Status: ON HOLD | OUTPATIENT
Start: 2020-04-11 | End: 2020-06-15

## 2020-04-11 RX ORDER — LEUCOVORIN CALCIUM 15 MG/1
15 TABLET ORAL EVERY 6 HOURS
Qty: 8 TABLET | Refills: 0 | Status: SHIPPED | OUTPATIENT
Start: 2020-04-11 | End: 2020-04-11

## 2020-04-11 RX ORDER — LEUCOVORIN CALCIUM 15 MG/1
15 TABLET ORAL EVERY 6 HOURS
Qty: 8 TABLET | Refills: 0 | Status: ON HOLD | OUTPATIENT
Start: 2020-04-11 | End: 2020-06-15

## 2020-04-11 RX ADMIN — SODIUM BICARBONATE: 84 INJECTION INTRAVENOUS at 03:38

## 2020-04-11 RX ADMIN — FUROSEMIDE 20 MG: 10 INJECTION, SOLUTION INTRAMUSCULAR; INTRAVENOUS at 08:00

## 2020-04-11 RX ADMIN — LEUCOVORIN CALCIUM 15 MG: 15 TABLET ORAL at 03:38

## 2020-04-11 RX ADMIN — POTASSIUM CHLORIDE 40 MEQ: 1500 TABLET, EXTENDED RELEASE ORAL at 08:00

## 2020-04-11 RX ADMIN — LEUCOVORIN CALCIUM 15 MG: 15 TABLET ORAL at 11:01

## 2020-04-11 RX ADMIN — SERTRALINE HYDROCHLORIDE 50 MG: 50 TABLET ORAL at 08:00

## 2020-04-11 RX ADMIN — HEPARIN 5 ML: 100 SYRINGE at 13:32

## 2020-04-11 RX ADMIN — SODIUM BICARBONATE: 84 INJECTION INTRAVENOUS at 08:00

## 2020-04-11 RX ADMIN — LORATADINE 10 MG: 10 TABLET ORAL at 08:00

## 2020-04-11 RX ADMIN — ACETAMINOPHEN 650 MG: 325 TABLET, FILM COATED ORAL at 09:00

## 2020-04-11 RX ADMIN — ACYCLOVIR 400 MG: 400 TABLET ORAL at 08:00

## 2020-04-11 ASSESSMENT — ACTIVITIES OF DAILY LIVING (ADL)
ADLS_ACUITY_SCORE: 13

## 2020-04-11 ASSESSMENT — MIFFLIN-ST. JEOR: SCORE: 1328.24

## 2020-04-11 NOTE — PLAN OF CARE
End of Shift Summary  For vital signs and complete assessments, please see documentation flowsheets.     Pertinent assessments: LS clear, denies pain or nausea, VSS, voiding good amts  Major Shift Events: 1 PRBC 4/10 for hgb 6.3, recheck 8.3, urine pH 8.0  Treatment Plan: IV NaBicarb until Mxt <0.1, urine pH q6h, IV lasix    Discharge Readiness: medically active  Expected Discharge Date: 4/11 pending Mxt level  Discharge Disposition: Home with spouse  Barriers/Criteria for discharge: Mxt levels

## 2020-04-11 NOTE — DISCHARGE SUMMARY
[facility]  Discharge Summary  Name: Kassie Ramirez    MRN: [id number]  YOB: 1970    Age: 50 year old  Date of Discharge:  4/11/2020  Date of Admission: 4/8/2020  Primary Care Provider: Mary Alice Colón  Discharge Physician:  Dalton Kearney MD  Discharging Service:  Hospitalist      Discharge Diagnosis:  1. Diffuse large B-cell lymphoma  2. Acute, worsening on top of chronic anemia, thrombocytopenia likely secondary to ongoing high-dose methotrexate use     Other Diagnosis:  Past Medical History:   Diagnosis Date     Anxiety attack 9/16/2014     Diffuse large B-cell lymphoma (H)     Diagnosed 11/2019     Encounter for Essure implantation 2009     Generalized anxiety disorder 9/16/2014    zoloft = flat emotions     Menopausal disorder     started on OCPs by menopause center 3/2017 (takes active continuously)     Menstrual headache     helped by OCPs and magnesium     GERTRUDE (stress urinary incontinence, female)     sling procedure 2016     SVT (supraventricular tachycardia) (H)           Discharge Disposition:  Discharged to home     Allergies:  Allergies   Allergen Reactions     Cold & Flu [Cold Defense Fighter]      See pseudoephedrine     Seasonal Allergies      Sudafed Cold-Cough [Dayquil Liquicaps]      Pseudoephedrine Rash     Rash then skins peels off         Discharge Medications:   Current Discharge Medication List      START taking these medications    Details   furosemide (LASIX) 20 MG tablet Take 1 tablet (20 mg) by mouth daily for 7 days  Qty: 7 tablet, Refills: 0    Associated Diagnoses: Diffuse large B-cell lymphoma, unspecified body region (H)         CONTINUE these medications which have NOT CHANGED    Details   acyclovir (ZOVIRAX) 400 MG tablet Take 1 tablet (400 mg) by mouth 2 times daily  Qty: 60 tablet, Refills: 3    Associated Diagnoses: Diffuse large B-cell lymphoma of extranodal site (H)      famotidine 20 MG PO tablet Take 20 mg by mouth 2 times daily as needed     "  lidocaine-prilocaine (EMLA) 2.5-2.5 % external cream Apply topically as needed for moderate pain  Qty: 30 g, Refills: 3    Associated Diagnoses: Diffuse large B-cell lymphoma of extranodal site (H)      loratadine (CLARITIN) 10 MG tablet Take 10 mg by mouth daily      LORazepam 0.5 MG PO tablet Take 1-2 tablets (0.5-1 mg) by mouth every 6 hours as needed (Breakthrough Nausea / Vomiting)  Qty: 30 tablet, Refills: 0    Associated Diagnoses: Diffuse large B-cell lymphoma of extranodal site (H)      ondansetron (ZOFRAN-ODT) 8 MG ODT tab Take 1 tablet (8 mg) by mouth every 8 hours as needed  Qty: 45 tablet, Refills: 0    Associated Diagnoses: Diffuse large B-cell lymphoma of extranodal site (H)      potassium chloride ER (K-DUR/KLOR-CON M) 20 MEQ CR tablet Take 2 tablets (40 mEq) by mouth daily  Qty: 60 tablet, Refills: 3    Associated Diagnoses: Hypokalemia      prochlorperazine (COMPAZINE) 10 MG tablet Take 1 tablet (10 mg) by mouth every 6 hours as needed (Breakthrough Nausea/Vomiting)  Qty: 30 tablet, Refills: 0    Associated Diagnoses: Diffuse large B-cell lymphoma of extranodal site (H)      SENNA PO Take 1 tablet by mouth as needed       sertraline (ZOLOFT) 50 MG tablet Take 1 tablet (50 mg) by mouth daily  Qty: 30 tablet, Refills: 2    Associated Diagnoses: Anxiety      leucovorin 15 MG tablet Take 1 tablet (15 mg) by mouth every 6 hours for 3 days  Qty: 12 tablet, Refills: 0    Associated Diagnoses: Diffuse large B-cell lymphoma, unspecified body region (H)              Condition on Discharge:  Discharge condition: Stable   Discharge vitals: Blood pressure (!) 140/89, pulse 94, temperature 97.6  F (36.4  C), temperature source Axillary, resp. rate 16, height 1.676 m (5' 5.98\"), weight 69.2 kg (152 lb 8 oz), SpO2 98 %, not currently breastfeeding.   Code status on discharge: Full Code     History of Present Illness:  See detailed admission note for full details.        Significant Physical Exam Findings Day of " Discharge:  HEENT; Atraumatic, normocephalic, pinkish conjuctiva, pupils bilateral reactive   Skin: warm and moist, no rashes  Lungs: equal chest expansion, clear to auscultation, no wheezes, no stridor, no crackles,   Heart: normal rate, normal rhythm, no rubs or gallops.   Abdomen: normal bowel sounds, no tenderness, no peritoneal signs, no guarding  Extremities: no deformities, no edema   Neuro; follow commands, alert and oriented x3, spontaneous speech, coherent, moves all extremities spontaneously  Psych; no hallucination, euthymic mood, not agitated        Procedures other than Imaging:  none     Imaging:  Results for orders placed or performed during the hospital encounter of 20   XR Chest Port 1 View    Narrative    EXAM: CHEST SINGLE VIEW PORTABLE  LOCATION: [facility]  DATE/TIME: 2020, 5:39 AM    INDICATION: Shortness of breath.  COMPARISON: 2019.    FINDINGS: Mildly increased interstitial opacities in the lungs. Normal size cardiac silhouette. Right anterior chest wall port with catheter entering the right internal jugular vein and distal tip in the superior vena cava.      Impression    IMPRESSION: Mildly increased interstitial opacities in the lungs, likely relating to interstitial pulmonary edema.      Echo Limited    Narrative    [id number]  [id number]  [id number]  [id number]^JAMES^BAM^GERONIMO           [facility]  Echocardiography Laboratory  [address] [place] [address]        Name: BABAR VARGHESE  MRN: [id number]  : 1970  Study Date: 2020 08:38 AM  Age: 49 yrs  Gender: Female  Patient Location: [facility]  Reason For Study: Chest Pain  Ordering Physician: BAM RAMIREZ  Referring Physician: Mary Alice Colón  Performed By: Cora Springer     BSA: 1.8 m2  Height: 67 in  Weight: 151 lb  BP: 144/93 mmHg  _____________________________________________________________________________  __        Procedure  Limited Portable Echo  Adult.  _____________________________________________________________________________  __        Interpretation Summary     Left ventricular systolic function is normal.  LVEF 55% based on biplane 2D tracing.  Diastolic Doppler findings (E/E' ratio and/or other parameters) suggest left  ventricular filling pressures are indeterminate.  There is mild to moderate (1-2+) mitral regurgitation. This is new compared to  study from 11/19.  The study was technically adequate.  _____________________________________________________________________________  __        Left Ventricle  The left ventricle is normal in size. There is normal left ventricular wall  thickness. Left ventricular systolic function is normal. LVEF 55% based on  biplane 2D tracing. Diastolic Doppler findings (E/E' ratio and/or other  parameters) suggest left ventricular filling pressures are indeterminate. No  regional wall motion abnormalities noted.     Right Ventricle  The right ventricle is normal size. The right ventricular systolic function is  normal.     Mitral Valve  There is mild to moderate (1-2+) mitral regurgitation.     Tricuspid Valve  There is trace tricuspid regurgitation. Right ventricular systolic pressure  could not be approximated due to inadequate tricuspid regurgitation. IVC  diameter <2.1 cm collapsing >50% with sniff suggests a normal RA pressure of 3  mmHg.        Aortic Valve  The aortic valve is trileaflet. There is mild trileaflet aortic sclerosis. No  aortic regurgitation is present. No aortic stenosis is present.     Pulmonic Valve  The pulmonic valve is not well visualized.     Pericardium  There is no pericardial effusion.     Rhythm  Sinus rhythm was noted.     _____________________________________________________________________________  __  MMode/2D Measurements & Calculations  IVSd: 0.81 cm  LVIDd: 4.4 cm  LVIDs: 3.1 cm  LVPWd: 0.83 cm  FS: 30.3 %  LV mass(C)d: 114.0 grams  LV mass(C)dI: 63.5 grams/m2  RWT: 0.38            Doppler Measurements & Calculations  MV E max gunjan: 78.1 cm/sec  MV A max gunjan: 68.9 cm/sec  MV E/A: 1.1  MV dec slope: 792.1 cm/sec2  MV dec time: 0.10 sec  E/E' avg: 10.4  Lateral E/e': 9.7  Medial E/e': 11.1           _____________________________________________________________________________  __           Report approved by: Eyal Lucio 02/23/2020 10:31 AM             Consultations:  Consultation during this admission received from oncology.     Recent Lab Results:  Recent Labs   Lab 04/11/20  0555 04/10/20  0623 04/09/20  0645   WBC 3.4* 5.4 8.1   HGB 8.3* 6.3* 7.4*   HCT 25.8* 19.7* 21.2*   MCV 98 101* 90    136* 122*     No results for input(s): CULT in the last 168 hours.  Recent Labs   Lab 04/10/20  0623 04/09/20  1841 04/09/20  0645 04/08/20  0900     --  141 140   POTASSIUM 3.4 4.0 3.1* 4.5   CHLORIDE 107  --  106 111*   CO2 33*  --  29 26   ANIONGAP 2*  --  6 3   GLC 99  --  131* 96   BUN 17  --  15 10   CR 0.79  --  0.82 0.83   GFRESTIMATED 87  --  83 82   GFRESTBLACK >90  --  >90 >90   AFSHAN 8.8  --  8.5 8.2*   PROTTOTAL 5.5*  --  5.7* 6.4*   ALBUMIN 3.2*  --  3.3* 3.6   BILITOTAL 0.7  --  1.4* 0.7   ALKPHOS 80  --  94 96   AST 35  --  52* 22   ALT 60*  --  67* 27     Recent Labs   Lab 04/10/20  0623 04/09/20  0645 04/08/20  0900   GLC 99 131* 96     No results for input(s): LACT in the last 168 hours.  No results for input(s): TROPONIN, TROPI, TROPR in the last 168 hours.    Invalid input(s): TROP, TROPONINIES  Recent Labs   Lab 04/11/20  0137   URINEPH 8.0*          Pending Results:    Unresulted Labs Ordered in the Past 30 Days of this Admission     Date and Time Order Name Status Description    4/11/2020 0016 Methotrexate level In process            Discharge Instructions and Follow-Up:   Discharge diet: Orders Placed This Encounter      Combination Diet Regular Diet Adult      Diet     Discharge activity: Activity as tolerated   Discharge follow-up: 1-2 weeks with PCP, repeat  basic metabolic panel  Follow-up with your oncologist as scheduled   Outpatient therapy: None    Other instructions: None      Hospital Course:  Continuing care for this very pleasant middle-age  lady who presented as a direct admission for high-dose methotrexate administration for CNS prophylaxis with known history of diffuse large B-cell lymphoma.  She tolerated the high-dose methotrexate administration.  Remain on leucovorin.  Also receive generous IV fluid support with sodium bicarbonate.  I have deferred fluid management to oncology service.  She was requiring packed RBC transfusion for anticipated worsening anemia.  She tolerated that packed RBC transfusion.  Currently not requiring any oxygen supplementation.  Voiding freely, normal creatinine, on IV Lasix.  May be able to discontinue IV Lasix once of IV fluid support.  Plans of sending her home with 1 week course of 20 mg Lasix on a daily basis.  I have deferred further management of her leucovorin prescription upon discharge to oncology service  Plans for hospital discharge today if agreeable and no further inpatient plans from oncology service     Total time spent in face to face contact with the patient and coordinating discharge was: > 30 Minutes.

## 2020-04-11 NOTE — PROGRESS NOTES
[facility]    Hematology/Oncology Follow-up Note      Today's Date: 04/11/20  Date of Admission:  4/8/2020  Reason for Consult: DLBCL, admitted for high-dose methotrexate for CNS prophylaxis      ASSESSMENT/PLAN:  Kassie Ramirez is a 50 year old female with:    DLBCL: PET scan on 2/4/2020 3:20 cycles of R-CHOP/HD methotrexate with treatment response.  She has now completed 6 full cycles of R-CHOP, final cycle on 3/25/2020 and receiving last dose of high-dose methotrexate this admission for CNS prophylaxis.  She did have fluid overload from IV hydration after her last dose of methotrexate, currently getting IV Lasix and currently on p.o. Lasix at home.  Methotrexate level has dropped to 0.11 today and okay for discharge. I have encouraged her to take fluids orally. I have prescribed her oral leucovorin to be taken home today and tomorrow.     We have coordinated follow up as an outpatient. She will follow Padmaja Sanchez on 4/23 and I am scheduled to see her in June. I will change the CT to PET-CT as long as it can be covered. I have entered orders and sent message to our scheduling team.     Our discharge pharmacy is closed. We will have to figure out how to fill the prescription.     Over 35 min of time spent with more than 50% time spent in counseling and coordinating care.      Castro Castro    Hematologist and Medical Oncologist  [facility]          INTERIM HISTORY: Kassie is doing okay today.      She was seen in her room, resting comfortably in bed, no family at bedside due to the COVID-19 pandemic.  Shortness of breath is improving.   She is walking well.  Appetite is okay.  No fever, chills, cough, vomiting, diarrhea.        MEDICATIONS:  Current Facility-Administered Medications   Medication     acetaminophen (TYLENOL) tablet 650 mg     acyclovir (ZOVIRAX) tablet 400 mg     albuterol (PROAIR HFA/PROVENTIL HFA/VENTOLIN HFA) 108 (90 Base) MCG/ACT inhaler 1-2 puff     albuterol  (PROVENTIL) neb solution 2.5 mg     diphenhydrAMINE (BENADRYL) injection 50 mg     EPINEPHrine PF (ADRENALIN) injection 0.3 mg     famotidine (PEPCID) tablet 20 mg     furosemide (LASIX) injection 20 mg     heparin 100 UNIT/ML injection 5 mL     heparin lock flush 10 UNIT/ML injection 5-10 mL     heparin lock flush 10 UNIT/ML injection 5-10 mL     leucovorin tablet 15 mg     lidocaine (LMX4) cream     lidocaine 1 % 0.1-1 mL     loratadine (CLARITIN) tablet 10 mg     LORazepam (ATIVAN) injection 0.5-1 mg     LORazepam (ATIVAN) tablet 0.5-1 mg     MEDICATION INSTRUCTION     melatonin tablet 1 mg     meperidine (DEMEROL) injection 25 mg     methylPREDNISolone sodium succinate (solu-MEDROL) injection 125 mg     NaCl 0.45 % 1,000 mL with sodium bicarbonate 100 mEq/L infusion     naloxone (NARCAN) injection 0.1-0.4 mg     potassium chloride (KLOR-CON) Packet 20-40 mEq     potassium chloride 10 mEq in 100 mL intermittent infusion with 10 mg lidocaine     potassium chloride 10 mEq in 100 mL sterile water intermittent infusion (premix)     potassium chloride 20 mEq in 50 mL intermittent infusion     potassium chloride ER (KLOR-CON M) CR tablet 20-40 mEq     potassium chloride ER (KLOR-CON M) CR tablet 40 mEq     prochlorperazine (COMPAZINE) injection 10 mg     prochlorperazine (COMPAZINE) tablet 10 mg     senna-docusate (SENOKOT-S/PERICOLACE) 8.6-50 MG per tablet 1 tablet    Or     senna-docusate (SENOKOT-S/PERICOLACE) 8.6-50 MG per tablet 2 tablet     sertraline (ZOLOFT) tablet 50 mg     sodium bicarbonate 8.4 % intermittent infusion 50 mEq     sodium chloride (PF) 0.9% PF flush 10-20 mL     sodium chloride (PF) 0.9% PF flush 10-20 mL     sodium chloride (PF) 0.9% PF flush 3 mL     sodium chloride 0.9% infusion         ALLERGIES:  Allergies   Allergen Reactions     Cold & Flu [Cold Defense Fighter]      See pseudoephedrine     Seasonal Allergies      Sudafed Cold-Cough [Dayquil Liquicaps]      Pseudoephedrine Rash     Rash  "then skins peels off          PHYSICAL EXAM:  Vital signs:  Temp: 97.6  F (36.4  C) Temp src: Axillary BP: (!) 140/89   Heart Rate: 89 Resp: 16 SpO2: 98 % O2 Device: None (Room air)   Height: 167.6 cm (5' 5.98\") Weight: 69.2 kg (152 lb 8 oz)  Estimated body mass index is 24.63 kg/m  as calculated from the following:    Height as of this encounter: 1.676 m (5' 5.98\").    Weight as of this encounter: 69.2 kg (152 lb 8 oz).    GENERAL:  Female, in no acute distress.  Alert and oriented x3.   HEENT:  Normocephalic, atraumatic.   LUNGS: Nonlabored breathing  SKIN: No rash on exposed skin  PSYCH: Smiling, mood stable      LABS:  Recent Labs   Lab Test 04/10/20  0623 04/09/20  1841 04/09/20  0645 04/08/20  0900 03/25/20  0900 03/09/20  1350     --  141 140 141 141   POTASSIUM 3.4 4.0 3.1* 4.5 3.9 3.5   CHLORIDE 107  --  106 111* 110* 108   CO2 33*  --  29 26 25 29   ANIONGAP 2*  --  6 3 6 4   BUN 17  --  15 10 10 12   CR 0.79  --  0.82 0.83 0.88 0.83   GLC 99  --  131* 96 135* 97   AFSHAN 8.8  --  8.5 8.2* 9.4 9.2     Recent Labs   Lab Test 01/11/20  0615 12/01/19  1340 12/01/19  0641 11/30/19  2137 11/30/19  1341  11/27/19  2216 11/27/19  1605 09/19/19  0706 09/18/19  0845   MAG 2.0  --   --   --   --   --  2.1 2.5* 2.4* 2.2   PHOS 3.1 2.8 3.4 2.8 2.5   < > 3.1  --   --   --     < > = values in this interval not displayed.     Recent Labs   Lab Test 04/11/20  0555 04/10/20  0623 04/09/20  0645 04/08/20  0900 03/25/20  0900 03/09/20  1340  02/27/20  0747   WBC 3.4* 5.4 8.1 5.1 2.9* 7.2   < > 2.3*   HGB 8.3* 6.3* 7.4* 8.0* 8.4* 8.8*   < > 9.2*    136* 122* 91* 133* 112*   < > 167   MCV 98 101* 90 100 99 96   < > 92   NEUTROPHIL 84.7  --   --  79.0 44.5 88.0  --  76.7    < > = values in this interval not displayed.     Recent Labs   Lab Test 04/10/20  0623 04/09/20  0645 04/08/20  0900  01/11/20  1044  12/16/19  1743  11/29/19  0526   BILITOTAL 0.7 1.4* 0.7   < >  --    < >  --    < > 1.2   ALKPHOS 80 94 96   < >  " --    < >  --    < > 69   ALT 60* 67* 27   < >  --    < >  --    < > 25   AST 35 52* 22   < >  --    < >  --    < > 22   ALBUMIN 3.2* 3.3* 3.6   < >  --    < >  --    < > 3.0*   LDH  --   --   --   --  347*  --  596*  --  416*    < > = values in this interval not displayed.     TSH   Date Value Ref Range Status   2019 2.06 0.40 - 4.00 mU/L Final   2018 2.04 0.40 - 4.00 mU/L Final   2014 1.62 0.40 - 4.00 mU/L Final     Comment:     Effective 2014, the reference range for this assay has changed to reflect   new instrumentation/methodology.       No results for input(s): CEA in the last 67275 hours.  Results for orders placed or performed during the hospital encounter of 20   XR Chest Port 1 View    Narrative    EXAM: CHEST SINGLE VIEW PORTABLE  LOCATION: [facility]  DATE/TIME: 2020, 5:39 AM    INDICATION: Shortness of breath.  COMPARISON: 2019.    FINDINGS: Mildly increased interstitial opacities in the lungs. Normal size cardiac silhouette. Right anterior chest wall port with catheter entering the right internal jugular vein and distal tip in the superior vena cava.      Impression    IMPRESSION: Mildly increased interstitial opacities in the lungs, likely relating to interstitial pulmonary edema.      Echo Limited    Narrative    [id number]  [id number]  [id number]  [id number]^JAMES^BAM^GERONIMO           [facility]  Echocardiography Laboratory  [address] [place] [address]        Name: BABAR VARGHESE  MRN: [id number]  : 1970  Study Date: 2020 08:38 AM  Age: 49 yrs  Gender: Female  Patient Location: [facility]  Reason For Study: Chest Pain  Ordering Physician: BAM RAMIREZ  Referring Physician: Mary Alice Colón  Performed By: Cora Springer     BSA: 1.8 m2  Height: 67 in  Weight: 151 lb  BP: 144/93 mmHg  _____________________________________________________________________________  __        Procedure  Limited Portable Echo  Adult.  _____________________________________________________________________________  __        Interpretation Summary     Left ventricular systolic function is normal.  LVEF 55% based on biplane 2D tracing.  Diastolic Doppler findings (E/E' ratio and/or other parameters) suggest left  ventricular filling pressures are indeterminate.  There is mild to moderate (1-2+) mitral regurgitation. This is new compared to  study from 11/19.  The study was technically adequate.  _____________________________________________________________________________  __        Left Ventricle  The left ventricle is normal in size. There is normal left ventricular wall  thickness. Left ventricular systolic function is normal. LVEF 55% based on  biplane 2D tracing. Diastolic Doppler findings (E/E' ratio and/or other  parameters) suggest left ventricular filling pressures are indeterminate. No  regional wall motion abnormalities noted.     Right Ventricle  The right ventricle is normal size. The right ventricular systolic function is  normal.     Mitral Valve  There is mild to moderate (1-2+) mitral regurgitation.     Tricuspid Valve  There is trace tricuspid regurgitation. Right ventricular systolic pressure  could not be approximated due to inadequate tricuspid regurgitation. IVC  diameter <2.1 cm collapsing >50% with sniff suggests a normal RA pressure of 3  mmHg.        Aortic Valve  The aortic valve is trileaflet. There is mild trileaflet aortic sclerosis. No  aortic regurgitation is present. No aortic stenosis is present.     Pulmonic Valve  The pulmonic valve is not well visualized.     Pericardium  There is no pericardial effusion.     Rhythm  Sinus rhythm was noted.     _____________________________________________________________________________  __  MMode/2D Measurements & Calculations  IVSd: 0.81 cm  LVIDd: 4.4 cm  LVIDs: 3.1 cm  LVPWd: 0.83 cm  FS: 30.3 %  LV mass(C)d: 114.0 grams  LV mass(C)dI: 63.5 grams/m2  RWT: 0.38            Doppler Measurements & Calculations  MV E max gunjan: 78.1 cm/sec  MV A max gunjan: 68.9 cm/sec  MV E/A: 1.1  MV dec slope: 792.1 cm/sec2  MV dec time: 0.10 sec  E/E' avg: 10.4  Lateral E/e': 9.7  Medial E/e': 11.1           _____________________________________________________________________________  __           Report approved by: Eyal Lucio 02/23/2020 10:31 AM

## 2020-04-14 ENCOUNTER — TELEPHONE (OUTPATIENT)
Dept: FAMILY MEDICINE | Facility: CLINIC | Age: 50
End: 2020-04-14

## 2020-04-14 NOTE — TELEPHONE ENCOUNTER
Attempt # 1    Called #   Telephone Information:   Mobile [phone]       Unable to LM message     Amada Parekh RN, BSN  [facility]

## 2020-04-14 NOTE — TELEPHONE ENCOUNTER
Patient discharged from [facility] for inpatient hospital stay on 4/11 for diffuse large b-cell lymphoma of extranodal site.    Please contact patient to follow up; no appointment scheduled at this time.    ER / IP:  1/6    Care Coordination:  Not a candidate      Fadia Cárdenas

## 2020-04-15 NOTE — TELEPHONE ENCOUNTER
"    Called #   Telephone Information:   Mobile [phone]          ED/Discharge Protocol    \"Hi, my name is Amada Parekh RN, a registered nurse, and I am calling on behalf of Dr. Colón's office at [place].  I am calling to follow up and see how things are going for you after your recent visit.\"    \"I see that you were in the (ER/UC/IP) on 4/8/2020.    How are you doing now that you are home?\" I am doing well     Is patient experiencing symptoms that may require a hospital visit?  None     Discharge Instructions    \"Let's review your discharge instructions.  What is/are the follow-up recommendations?  Pt. Response: I need to see my oncologist     \"Were you instructed to make a follow-up appointment?\"  Pt. Response: Yes.  Has appointment been made?   Yes      \"When you see the provider, I would recommend that you bring your discharge instructions with you.    Medications    \"How many new medications are you on since your hospitalization/ED visit?\"    0-1  \"How many of your current medicines changed (dose, timing, name, etc.) while you were in the hospital/ED visit?\"   0-1  \"Do you have questions about your medications?\"   No  \"Were you newly diagnosed with heart failure, COPD, diabetes or did you have a heart attack?\"   No  For patients on insulin: \"Did you start on insulin in the hospital or did you have your insulin dose changed?\"   No  Post Discharge Medication Reconciliation Status: discharge medications reconciled, continue medications without change.    Was MTM referral placed (*Make sure to put transitions as reason for referral)?   No    Call Summary    \"Do you have any questions or concerns about your condition or care plan at the moment?\"    No  Triage nurse advice given: if any thing changes please call the clinic     Patient was in ER 9 in the past year (assess appropriateness of ER visits.)      \"If you have questions or things don't continue to improve, we encourage you contact us through " "the main clinic number,  [phone].  Even if the clinic is not open, triage nurses are available 24/7 to help you.     We would like you to know that our clinic has extended hours (provide information).  We also have urgent care (provide details on closest location and hours/contact info)\"      \"Thank you for your time and take care!\"        "

## 2020-04-20 ENCOUNTER — PATIENT OUTREACH (OUTPATIENT)
Dept: ONCOLOGY | Facility: CLINIC | Age: 50
End: 2020-04-20

## 2020-04-20 NOTE — PROGRESS NOTES
S-(situation): Pt well check after  6 of 6 and final cycle of chemotherapy    B-(background): Diffuse Large B Cell Lymphoma. Cycle 6/6 High dose methotrexate.     A-(assessment): Pt states she is feeling very well. She did not have any side effects, no mouth sores or issues this time around. She has started getting out and exercising and walking to build up her strength and stamina.     RNCC celebrated her last chemotherapy and how well she did. No further questions or concerns. Encouraged Pt to call if any arise in the interim.     R-(recommendations): Follow-up as scheduled with Provider WARREN Powers on 4/23/2020.      CARLOS SalcidoN, RN, OCN  RN Cancer Care Coordinator  [facility]  [phone]

## 2020-04-20 NOTE — PROGRESS NOTES
S-(situation): RNCC received call from BILLY Goldstein .     B-(background): Diffuse Large B Cell Lymphoma.   PtT completed 6/6 cycles as planned. Final in-patient high dose Methotrexate 4/8-4/10/2020.    A-(assessment): Reviewed Pt Tx plan. Follow-up visits as scheduled including PET Scan 6/16/2020 and follow-up with Oncologist Castro Castro MD 6/22/2020.    R-(recommendations): Angelita request if change in Tx plan occurs to contact her at [phone].    Erin Hernandez, CARLOSN, RN, OCN  RN Cancer Care Coordinator  [facility]  [phone]

## 2020-04-21 NOTE — PROGRESS NOTES
"Kassie Ramirez is a 50 year old female who is being evaluated via a billable video visit.      The patient has been notified of following:     \"This video visit will be conducted via a call between you and your physician/provider. We have found that certain health care needs can be provided without the need for an in-person physical exam.  This service lets us provide the care you need with a video conversation.  If a prescription is necessary we can send it directly to your pharmacy.  If lab work is needed we can place an order for that and you can then stop by our lab to have the test done at a later time.    Video visits are billed at different rates depending on your insurance coverage.  Please reach out to your insurance provider with any questions.    If during the course of the call the physician/provider feels a video visit is not appropriate, you will not be charged for this service.\"    Patient has given verbal consent for Video visit? Yes    How would you like to obtain your AVS? Sharadhart    Patient would like the video invitation sent by: Text to cell phone: [phone]    Will anyone else be joining your video visit? No     Mary Alice Whitaker [facility]      Video-Visit Details    Type of service:  Video Visit    Video Start Time: 10:14 AM  Video End Time: 10:33 AM    Originating Location (pt. Location): Other car    Distant Location (provider location):  [facility]     Mode of Communication:  Video Conference via [facility]      Padmaja Sanchez PA-C      Oncology/Hematology Visit Note    Apr 23, 2020    Reason for visit: Follow up DLBCL     Oncology HPI: Kassie Ramirez is a 50 year old female with DLBCL.  She developed a cough in early 2019 and symptoms were progressive for over 5 months.  CXR and CT chest did reveal some lymphadenopathy and she was initially treated with antibiotics.  Bronchoscopy revealed nonnecrotizing granulomatous inflammation, but negative for malignancy.  Follow-up CT " November 2019 revealed worsening of the mediastinal and bilateral hilar lymphadenopathy.  Mediastinoscopy obtained and preliminary pathology was EBV positive DLBCL.  FISH was negative for high risk translocations.  She presented to the ED on 11/27/2019 with worsening SOB and was admitted at the [facility] and R CHOP cycle 1 was initiated on 11/29/2019.  She was admitted at [facility] on 12/15/2019 for neutropenic fever with sepsis and suspecting hospital-acquired pneumonia, PJV pneumonia, blood cultures were negative.  She established care with Dr. Castro on 12/27/2019 and she received MR-CHOP cycle 2 same day. RCHOP C3 was 1/17/2020 and PET scan on 2/4/2020 with significant improvement of her adenopathy consistent with treatment response.  She completed an additional 3 cycles for total of R-CHOP x 6 cycles, final cycle completed on 3/25/2020 and third and final HD methotrexate completed on 4/8/2020.    Video visit today for close follow-up.     Interval History: Sadia is doing well. She is now done with chemo and is really happy about it. She continues to have occasional right chest discomfort that has been intermittent since starting chemo. Sx improved briefly and then she noticed it again with cycle 6. She has been using Pepcid, but not consistently. Sx are worse when she lays down after eating and occasionally with taking a deep breath. Temp of 99.5 during her shelby, but no other fever. Cough and breathing continues to be better. Occasional LORENZ, but stable. No hemoptysis or leg swelling. She continues with her ppx meds.     Review of Systems: See interval hx. Denies fever, chills, HA, dizziness, n/t, changes in vision, sore throat, abdominal pain, N/V, diarrhea, changes in urination, bleeding, bruising, rash.      PMHx and Social Hx reviewed per EPIC.      Medications:  Current Outpatient Medications   Medication Sig Dispense Refill     acyclovir (ZOVIRAX) 400 MG tablet Take 1 tablet (400 mg) by mouth 2  times daily 60 tablet 3     famotidine 20 MG PO tablet Take 20 mg by mouth 2 times daily as needed       furosemide (LASIX) 20 MG tablet Take 1 tablet (20 mg) by mouth daily for 7 days 7 tablet 0     leucovorin 15 MG tablet Take 1 tablet (15 mg) by mouth every 6 hours 8 tablet 0     lidocaine-prilocaine (EMLA) 2.5-2.5 % external cream Apply topically as needed for moderate pain 30 g 3     loratadine (CLARITIN) 10 MG tablet Take 10 mg by mouth daily       LORazepam 0.5 MG PO tablet Take 1-2 tablets (0.5-1 mg) by mouth every 6 hours as needed (Breakthrough Nausea / Vomiting) 30 tablet 0     ondansetron (ZOFRAN-ODT) 8 MG ODT tab Take 1 tablet (8 mg) by mouth every 8 hours as needed 45 tablet 0     potassium chloride ER (K-DUR/KLOR-CON M) 20 MEQ CR tablet Take 2 tablets (40 mEq) by mouth daily 60 tablet 3     prochlorperazine (COMPAZINE) 10 MG tablet Take 1 tablet (10 mg) by mouth every 6 hours as needed (Breakthrough Nausea/Vomiting) 30 tablet 0     SENNA PO Take 1 tablet by mouth as needed        sertraline (ZOLOFT) 50 MG tablet Take 1 tablet (50 mg) by mouth daily 30 tablet 2       Allergies   Allergen Reactions     Cold & Flu [Cold Defense Fighter]      See pseudoephedrine     Seasonal Allergies      Sudafed Cold-Cough [Dayquil Liquicaps]      Pseudoephedrine Rash     Rash then skins peels off        EXAM:    There were no vitals taken for this visit.   Video visit today, therefore vital signs were not obtained.    GENERAL:  Female, in no acute distress.  Alert and oriented x3. Well groomed.   HEENT:  Normocephalic, atraumatic. No conjunctival injection or eye swelling.   LUNGS:  Nonlabored breathing, no cough or audible wheezing, able to speak full sentences.  MSK: Full ROM UE.    SKIN: No rash on exposed skin.   NEURO: CN grossly intact, speech normal  PSYCH: Happy and smiling, mentation appears normal, insight and judgement intact      Labs:   Results for HALIMA BABAR OLVIN (MRN [id number]) as of 4/23/2020  11:45   4/23/2020 09:42   Sodium 138   Potassium 3.9   Chloride 106   Carbon Dioxide 25   Urea Nitrogen 24   Creatinine 0.85   GFR Estimate 80   GFR Estimate If Black >90   Calcium 8.9   Anion Gap 7   Albumin 3.6   Protein Total 6.5 (L)   Bilirubin Total 0.9   Alkaline Phosphatase 93   ALT 60 (H)   AST 37   Glucose 97   WBC 2.8 (L)   Hemoglobin 9.7 (L)   Hematocrit 29.2 (L)   Platelet Count 137 (L)   RBC Count 3.02 (L)   MCV 97   MCH 32.1   MCHC 33.2   RDW 18.9 (H)   Diff Method Automated Method   % Neutrophils 67.2   % Lymphocytes 10.8   % Monocytes 18.7   % Eosinophils 1.8   % Basophils 1.1   % Immature Granulocytes 0.4   Nucleated RBCs 0   Absolute Neutrophil 1.9   Absolute Lymphocytes 0.3 (L)   Absolute Monocytes 0.5   Absolute Eosinophils 0.1   Absolute Basophils 0.0   Abs Immature Granulocytes 0.0   Absolute Nucleated RBC 0.0       Imaging: n/a    Impression/Plan: Kassie Ramirez is a 49 year old female with DLBCL currently undergoing curative intent chemotherapy with MR-CHOP.    DLBCL: Stage III, EBV+, non-GCB, IPI score 2 (low-intermediate), FISH negative for high risk translocations.  R CHOP cycle 1 completed inpatient on 11/29/2019 and HD methotrexate was added to cycle 2 for CNS prophylaxis, given on 1/20/2020.  She has struggled with fatigue more with the addition of methotrexate. She required transfusion support for anemia and fatigue. PET scan on 2/4/2020 with treatment response. She completed RCHOP cycle 6 on 3/25 and HD methotrexate on 4/8. She is recovering well and labs are stable today. Methotrexate level pending today. We will schedule port flush in 4 weeks.   --6/16 PET  --6/22 Dr Castro    CV: She has had persistent LORENZ and intermittent chest discomfort since her diagnosis.  She had intermittent tachycardia throughout the chemotherapy process.  No vital signs obtained today since this was a video visit.  She continues to have some slight discomfort in the right chest, but no new symptoms.  She  will increase her Pepcid to daily use and stay upright after eating.  This certainly could be reactive to her recent R CHOP cycle as well.  She was instructed to go to the ED if significant shortness of breath, develop severe chest pain, hemoptysis, fever or cough.    Heme: Symptomatic anemia was intermittent and she underwent transfusion support, when needed. Last transfusion was 4/9 and Hgb improved to 9.7 today. WBC 2.8, but ANC normal at 1.9. No fever at home and feels well. Discussed persistent good hygiene and washing hands frequently, wiliam with the pandemic. Platelets 137 and no active bleeding. She will monitor her sx and if she gets more fatigue, develops SOB or bleeding, she will call and we will repeat CBC. Plan for blood transfusion if hemoglobin <7.0, bleeding or severe fatigue.  Plan for platelet transfusion if platelets <20 or bleeding.     Fatigue: Secondary to chemotherapy and anemia.  Persistent and fluctuating with her counts and methotrexate infusion. No new sx today. We will monitor closely.    FEN: Electrolytes are normal today, appetite stable.    Mood: Stable.     Chart documentation with Dragon Voice recognition Software. Although reviewed after completion, some words and grammatical errors may remain.      Padmaja Sanchez PA-C  Hematology/Oncology  [facility]

## 2020-04-22 DIAGNOSIS — C83.30 DIFFUSE LARGE B-CELL LYMPHOMA, UNSPECIFIED BODY REGION (H): Primary | ICD-10-CM

## 2020-04-23 ENCOUNTER — HOSPITAL ENCOUNTER (OUTPATIENT)
Facility: CLINIC | Age: 50
Setting detail: SPECIMEN
Discharge: HOME | End: 2020-04-23
Attending: PHYSICIAN ASSISTANT | Admitting: PHYSICIAN ASSISTANT
Payer: COMMERCIAL

## 2020-04-23 ENCOUNTER — VIRTUAL VISIT (OUTPATIENT)
Dept: ONCOLOGY | Facility: CLINIC | Age: 50
End: 2020-04-23
Attending: PHYSICIAN ASSISTANT
Payer: COMMERCIAL

## 2020-04-23 DIAGNOSIS — C83.30 DIFFUSE LARGE B-CELL LYMPHOMA, UNSPECIFIED BODY REGION (H): Primary | ICD-10-CM

## 2020-04-23 DIAGNOSIS — C83.398 DIFFUSE LARGE B-CELL LYMPHOMA OF EXTRANODAL SITE: ICD-10-CM

## 2020-04-23 LAB
ALBUMIN SERPL-MCNC: 3.6 G/DL (ref 3.4–5)
ALP SERPL-CCNC: 93 U/L (ref 40–150)
ALT SERPL W P-5'-P-CCNC: 60 U/L (ref 0–50)
ANION GAP SERPL CALCULATED.3IONS-SCNC: 7 MMOL/L (ref 3–14)
AST SERPL W P-5'-P-CCNC: 37 U/L (ref 0–45)
BASOPHILS # BLD AUTO: 0 10E9/L (ref 0–0.2)
BASOPHILS NFR BLD AUTO: 1.1 %
BILIRUB SERPL-MCNC: 0.9 MG/DL (ref 0.2–1.3)
BUN SERPL-MCNC: 24 MG/DL (ref 7–30)
CALCIUM SERPL-MCNC: 8.9 MG/DL (ref 8.5–10.1)
CHLORIDE SERPL-SCNC: 106 MMOL/L (ref 94–109)
CO2 SERPL-SCNC: 25 MMOL/L (ref 20–32)
CREAT SERPL-MCNC: 0.85 MG/DL (ref 0.52–1.04)
DIFFERENTIAL METHOD BLD: ABNORMAL
EOSINOPHIL # BLD AUTO: 0.1 10E9/L (ref 0–0.7)
EOSINOPHIL NFR BLD AUTO: 1.8 %
ERYTHROCYTE [DISTWIDTH] IN BLOOD BY AUTOMATED COUNT: 18.9 % (ref 10–15)
GFR SERPL CREATININE-BSD FRML MDRD: 80 ML/MIN/{1.73_M2}
GLUCOSE SERPL-MCNC: 97 MG/DL (ref 70–99)
HCT VFR BLD AUTO: 29.2 % (ref 35–47)
HGB BLD-MCNC: 9.7 G/DL (ref 11.7–15.7)
IMM GRANULOCYTES # BLD: 0 10E9/L (ref 0–0.4)
IMM GRANULOCYTES NFR BLD: 0.4 %
LYMPHOCYTES # BLD AUTO: 0.3 10E9/L (ref 0.8–5.3)
LYMPHOCYTES NFR BLD AUTO: 10.8 %
MCH RBC QN AUTO: 32.1 PG (ref 26.5–33)
MCHC RBC AUTO-ENTMCNC: 33.2 G/DL (ref 31.5–36.5)
MCV RBC AUTO: 97 FL (ref 78–100)
MONOCYTES # BLD AUTO: 0.5 10E9/L (ref 0–1.3)
MONOCYTES NFR BLD AUTO: 18.7 %
MTX SERPL-SCNC: <0.04 UMOL/L
NEUTROPHILS # BLD AUTO: 1.9 10E9/L (ref 1.6–8.3)
NEUTROPHILS NFR BLD AUTO: 67.2 %
NRBC # BLD AUTO: 0 10*3/UL
NRBC BLD AUTO-RTO: 0 /100
PLATELET # BLD AUTO: 137 10E9/L (ref 150–450)
POTASSIUM SERPL-SCNC: 3.9 MMOL/L (ref 3.4–5.3)
PROT SERPL-MCNC: 6.5 G/DL (ref 6.8–8.8)
RBC # BLD AUTO: 3.02 10E12/L (ref 3.8–5.2)
SODIUM SERPL-SCNC: 138 MMOL/L (ref 133–144)
WBC # BLD AUTO: 2.8 10E9/L (ref 4–11)

## 2020-04-23 PROCEDURE — 99214 OFFICE O/P EST MOD 30 MIN: CPT | Mod: 95 | Performed by: PHYSICIAN ASSISTANT

## 2020-04-23 PROCEDURE — 80053 COMPREHEN METABOLIC PANEL: CPT | Performed by: PHYSICIAN ASSISTANT

## 2020-04-23 PROCEDURE — 80299 QUANTITATIVE ASSAY DRUG: CPT | Performed by: PHYSICIAN ASSISTANT

## 2020-04-23 PROCEDURE — 85025 COMPLETE CBC W/AUTO DIFF WBC: CPT | Performed by: PHYSICIAN ASSISTANT

## 2020-04-23 PROCEDURE — 25000128 H RX IP 250 OP 636: Performed by: PHYSICIAN ASSISTANT

## 2020-04-23 PROCEDURE — 36591 DRAW BLOOD OFF VENOUS DEVICE: CPT

## 2020-04-23 RX ORDER — HEPARIN SODIUM (PORCINE) LOCK FLUSH IV SOLN 100 UNIT/ML 100 UNIT/ML
5 SOLUTION INTRAVENOUS
Status: DISCONTINUED | OUTPATIENT
Start: 2020-04-23 | End: 2020-04-23 | Stop reason: HOSPADM

## 2020-04-23 RX ORDER — CETIRIZINE HYDROCHLORIDE 10 MG/1
10 TABLET ORAL DAILY
COMMUNITY

## 2020-04-23 RX ORDER — HEPARIN SODIUM,PORCINE 10 UNIT/ML
5 VIAL (ML) INTRAVENOUS
Status: CANCELLED | OUTPATIENT
Start: 2020-04-23

## 2020-04-23 RX ORDER — HEPARIN SODIUM (PORCINE) LOCK FLUSH IV SOLN 100 UNIT/ML 100 UNIT/ML
5 SOLUTION INTRAVENOUS
Status: CANCELLED | OUTPATIENT
Start: 2020-04-23

## 2020-04-23 RX ADMIN — Medication 5 ML: at 09:45

## 2020-04-23 NOTE — PROGRESS NOTES
Nursing Note:  Kassie Ramirez presents today for port labs.    Patient seen by provider today: Yes: Padmaja KELLEY   present during visit today: Not Applicable.    Note: N/A.    Intravenous Access:  Lab draw site right port, Needle type Rueda, Gauge 20.  Labs drawn without difficulty.  Implanted Port.    Discharge Plan:   Patient was sent to home for PA video visit appointment.    Zonia Monroy RN

## 2020-05-21 ENCOUNTER — HOSPITAL ENCOUNTER (OUTPATIENT)
Facility: CLINIC | Age: 50
Setting detail: SPECIMEN
Discharge: HOME | End: 2020-05-21
Attending: PHYSICIAN ASSISTANT | Admitting: PHYSICIAN ASSISTANT
Payer: COMMERCIAL

## 2020-05-21 ENCOUNTER — ALLIED HEALTH/NURSE VISIT (OUTPATIENT)
Dept: INFUSION THERAPY | Facility: CLINIC | Age: 50
End: 2020-05-21
Attending: PHYSICIAN ASSISTANT
Payer: COMMERCIAL

## 2020-05-21 DIAGNOSIS — C83.30 DIFFUSE LARGE B-CELL LYMPHOMA, UNSPECIFIED BODY REGION (H): Primary | ICD-10-CM

## 2020-05-21 DIAGNOSIS — C83.398 DIFFUSE LARGE B-CELL LYMPHOMA OF EXTRANODAL SITE: ICD-10-CM

## 2020-05-21 LAB
ALBUMIN SERPL-MCNC: 3.8 G/DL (ref 3.4–5)
ALP SERPL-CCNC: 112 U/L (ref 40–150)
ALT SERPL W P-5'-P-CCNC: 37 U/L (ref 0–50)
ANION GAP SERPL CALCULATED.3IONS-SCNC: 3 MMOL/L (ref 3–14)
AST SERPL W P-5'-P-CCNC: 25 U/L (ref 0–45)
BASOPHILS # BLD AUTO: 0 10E9/L (ref 0–0.2)
BASOPHILS NFR BLD AUTO: 0.8 %
BILIRUB SERPL-MCNC: 0.6 MG/DL (ref 0.2–1.3)
BUN SERPL-MCNC: 19 MG/DL (ref 7–30)
CALCIUM SERPL-MCNC: 9.6 MG/DL (ref 8.5–10.1)
CHLORIDE SERPL-SCNC: 109 MMOL/L (ref 94–109)
CO2 SERPL-SCNC: 29 MMOL/L (ref 20–32)
CREAT SERPL-MCNC: 0.87 MG/DL (ref 0.52–1.04)
DIFFERENTIAL METHOD BLD: ABNORMAL
EOSINOPHIL # BLD AUTO: 0.5 10E9/L (ref 0–0.7)
EOSINOPHIL NFR BLD AUTO: 10.4 %
ERYTHROCYTE [DISTWIDTH] IN BLOOD BY AUTOMATED COUNT: 13 % (ref 10–15)
GFR SERPL CREATININE-BSD FRML MDRD: 77 ML/MIN/{1.73_M2}
GLUCOSE SERPL-MCNC: 90 MG/DL (ref 70–99)
HCT VFR BLD AUTO: 35.1 % (ref 35–47)
HGB BLD-MCNC: 11.7 G/DL (ref 11.7–15.7)
IMM GRANULOCYTES # BLD: 0 10E9/L (ref 0–0.4)
IMM GRANULOCYTES NFR BLD: 0.2 %
LYMPHOCYTES # BLD AUTO: 0.7 10E9/L (ref 0.8–5.3)
LYMPHOCYTES NFR BLD AUTO: 13.5 %
MCH RBC QN AUTO: 31 PG (ref 26.5–33)
MCHC RBC AUTO-ENTMCNC: 33.3 G/DL (ref 31.5–36.5)
MCV RBC AUTO: 93 FL (ref 78–100)
MONOCYTES # BLD AUTO: 0.6 10E9/L (ref 0–1.3)
MONOCYTES NFR BLD AUTO: 13.3 %
NEUTROPHILS # BLD AUTO: 3 10E9/L (ref 1.6–8.3)
NEUTROPHILS NFR BLD AUTO: 61.8 %
NRBC # BLD AUTO: 0 10*3/UL
NRBC BLD AUTO-RTO: 0 /100
PLATELET # BLD AUTO: 176 10E9/L (ref 150–450)
POTASSIUM SERPL-SCNC: 4 MMOL/L (ref 3.4–5.3)
PROT SERPL-MCNC: 6.5 G/DL (ref 6.8–8.8)
RBC # BLD AUTO: 3.77 10E12/L (ref 3.8–5.2)
SODIUM SERPL-SCNC: 141 MMOL/L (ref 133–144)
WBC # BLD AUTO: 4.8 10E9/L (ref 4–11)

## 2020-05-21 PROCEDURE — 25000128 H RX IP 250 OP 636: Performed by: PHYSICIAN ASSISTANT

## 2020-05-21 PROCEDURE — 85025 COMPLETE CBC W/AUTO DIFF WBC: CPT | Performed by: PHYSICIAN ASSISTANT

## 2020-05-21 PROCEDURE — 80053 COMPREHEN METABOLIC PANEL: CPT | Performed by: PHYSICIAN ASSISTANT

## 2020-05-21 PROCEDURE — 36591 DRAW BLOOD OFF VENOUS DEVICE: CPT

## 2020-05-21 RX ORDER — HEPARIN SODIUM (PORCINE) LOCK FLUSH IV SOLN 100 UNIT/ML 100 UNIT/ML
5 SOLUTION INTRAVENOUS
Status: CANCELLED | OUTPATIENT
Start: 2020-05-21

## 2020-05-21 RX ORDER — HEPARIN SODIUM (PORCINE) LOCK FLUSH IV SOLN 100 UNIT/ML 100 UNIT/ML
5 SOLUTION INTRAVENOUS
Status: DISCONTINUED | OUTPATIENT
Start: 2020-05-21 | End: 2020-05-21 | Stop reason: HOSPADM

## 2020-05-21 RX ORDER — HEPARIN SODIUM,PORCINE 10 UNIT/ML
5 VIAL (ML) INTRAVENOUS
Status: CANCELLED | OUTPATIENT
Start: 2020-05-21

## 2020-05-21 RX ADMIN — Medication 5 ML: at 13:56

## 2020-05-21 NOTE — PROGRESS NOTES
Nursing Note:  Kassie Ramirez presents today for Port draw and flush.    Patient seen by provider today: No   present during visit today: Not Applicable.    Note: N/A.    Intravenous Access:  Labs drawn without difficulty.  Implanted Port.    Discharge Plan:   Patient was sent to home.    Flores Logan RN

## 2020-06-14 ENCOUNTER — HOSPITAL ENCOUNTER (INPATIENT)
Facility: CLINIC | Age: 50
LOS: 3 days | Discharge: HOME | End: 2020-06-17
Attending: EMERGENCY MEDICINE | Admitting: INTERNAL MEDICINE
Payer: COMMERCIAL

## 2020-06-14 ENCOUNTER — APPOINTMENT (OUTPATIENT)
Dept: GENERAL RADIOLOGY | Facility: CLINIC | Age: 50
End: 2020-06-14
Attending: EMERGENCY MEDICINE
Payer: COMMERCIAL

## 2020-06-14 DIAGNOSIS — I42.9 SECONDARY CARDIOMYOPATHY (H): ICD-10-CM

## 2020-06-14 DIAGNOSIS — R00.0 TACHYCARDIA INDUCED CARDIOMYOPATHY (H): ICD-10-CM

## 2020-06-14 DIAGNOSIS — I43 TACHYCARDIA INDUCED CARDIOMYOPATHY (H): ICD-10-CM

## 2020-06-14 DIAGNOSIS — I42.8 NONISCHEMIC CARDIOMYOPATHY (H): ICD-10-CM

## 2020-06-14 DIAGNOSIS — I47.10 SVT (SUPRAVENTRICULAR TACHYCARDIA) (H): ICD-10-CM

## 2020-06-14 DIAGNOSIS — I50.21 ACUTE SYSTOLIC CONGESTIVE HEART FAILURE (H): ICD-10-CM

## 2020-06-14 DIAGNOSIS — I50.21 ACUTE SYSTOLIC CONGESTIVE HEART FAILURE (H): Primary | ICD-10-CM

## 2020-06-14 LAB
ANION GAP SERPL CALCULATED.3IONS-SCNC: 8 MMOL/L (ref 3–14)
BASOPHILS # BLD AUTO: 0 10E9/L (ref 0–0.2)
BASOPHILS NFR BLD AUTO: 1.4 %
BUN SERPL-MCNC: 25 MG/DL (ref 7–30)
CALCIUM SERPL-MCNC: 9 MG/DL (ref 8.5–10.1)
CHLORIDE SERPL-SCNC: 108 MMOL/L (ref 94–109)
CO2 SERPL-SCNC: 27 MMOL/L (ref 20–32)
CREAT SERPL-MCNC: 1.09 MG/DL (ref 0.52–1.04)
D DIMER PPP FEU-MCNC: 0.5 UG/ML FEU (ref 0–0.5)
DIFFERENTIAL METHOD BLD: ABNORMAL
EOSINOPHIL # BLD AUTO: 0.2 10E9/L (ref 0–0.7)
EOSINOPHIL NFR BLD AUTO: 7.6 %
ERYTHROCYTE [DISTWIDTH] IN BLOOD BY AUTOMATED COUNT: 12.1 % (ref 10–15)
GFR SERPL CREATININE-BSD FRML MDRD: 59 ML/MIN/{1.73_M2}
GLUCOSE SERPL-MCNC: 98 MG/DL (ref 70–99)
HCT VFR BLD AUTO: 36.9 % (ref 35–47)
HGB BLD-MCNC: 12.6 G/DL (ref 11.7–15.7)
IMM GRANULOCYTES # BLD: 0 10E9/L (ref 0–0.4)
IMM GRANULOCYTES NFR BLD: 0 %
LYMPHOCYTES # BLD AUTO: 0.7 10E9/L (ref 0.8–5.3)
LYMPHOCYTES NFR BLD AUTO: 24.8 %
MAGNESIUM SERPL-MCNC: 2.1 MG/DL (ref 1.6–2.3)
MCH RBC QN AUTO: 30.7 PG (ref 26.5–33)
MCHC RBC AUTO-ENTMCNC: 34.1 G/DL (ref 31.5–36.5)
MCV RBC AUTO: 90 FL (ref 78–100)
MONOCYTES # BLD AUTO: 0.6 10E9/L (ref 0–1.3)
MONOCYTES NFR BLD AUTO: 20.9 %
NEUTROPHILS # BLD AUTO: 1.3 10E9/L (ref 1.6–8.3)
NEUTROPHILS NFR BLD AUTO: 45.3 %
NRBC # BLD AUTO: 0 10*3/UL
NRBC BLD AUTO-RTO: 0 /100
NT-PROBNP SERPL-MCNC: 1431 PG/ML (ref 0–900)
PLATELET # BLD AUTO: 163 10E9/L (ref 150–450)
PLATELET # BLD EST: ABNORMAL 10*3/UL
POTASSIUM SERPL-SCNC: 3.4 MMOL/L (ref 3.4–5.3)
RBC # BLD AUTO: 4.11 10E12/L (ref 3.8–5.2)
RBC MORPH BLD: ABNORMAL
SARS-COV-2 PCR COMMENT: NORMAL
SARS-COV-2 RNA SPEC QL NAA+PROBE: NEGATIVE
SARS-COV-2 RNA SPEC QL NAA+PROBE: NORMAL
SODIUM SERPL-SCNC: 143 MMOL/L (ref 133–144)
SPECIMEN SOURCE: NORMAL
SPECIMEN SOURCE: NORMAL
TROPONIN I SERPL-MCNC: 0.12 UG/L (ref 0–0.04)
TSH SERPL DL<=0.005 MIU/L-ACNC: 3.4 MU/L (ref 0.4–4)
WBC # BLD AUTO: 2.8 10E9/L (ref 4–11)

## 2020-06-14 PROCEDURE — 71046 X-RAY EXAM CHEST 2 VIEWS: CPT

## 2020-06-14 PROCEDURE — C9803 HOPD COVID-19 SPEC COLLECT: HCPCS

## 2020-06-14 PROCEDURE — 96361 HYDRATE IV INFUSION ADD-ON: CPT

## 2020-06-14 PROCEDURE — 80048 BASIC METABOLIC PNL TOTAL CA: CPT | Performed by: EMERGENCY MEDICINE

## 2020-06-14 PROCEDURE — 96374 THER/PROPH/DIAG INJ IV PUSH: CPT

## 2020-06-14 PROCEDURE — 84443 ASSAY THYROID STIM HORMONE: CPT | Performed by: EMERGENCY MEDICINE

## 2020-06-14 PROCEDURE — 93005 ELECTROCARDIOGRAM TRACING: CPT | Mod: 76

## 2020-06-14 PROCEDURE — 85025 COMPLETE CBC W/AUTO DIFF WBC: CPT | Performed by: EMERGENCY MEDICINE

## 2020-06-14 PROCEDURE — 25000128 H RX IP 250 OP 636: Performed by: INTERNAL MEDICINE

## 2020-06-14 PROCEDURE — 83880 ASSAY OF NATRIURETIC PEPTIDE: CPT | Performed by: EMERGENCY MEDICINE

## 2020-06-14 PROCEDURE — U0003 INFECTIOUS AGENT DETECTION BY NUCLEIC ACID (DNA OR RNA); SEVERE ACUTE RESPIRATORY SYNDROME CORONAVIRUS 2 (SARS-COV-2) (CORONAVIRUS DISEASE [COVID-19]), AMPLIFIED PROBE TECHNIQUE, MAKING USE OF HIGH THROUGHPUT TECHNOLOGIES AS DESCRIBED BY CMS-2020-01-R: HCPCS | Performed by: EMERGENCY MEDICINE

## 2020-06-14 PROCEDURE — 25000128 H RX IP 250 OP 636: Performed by: EMERGENCY MEDICINE

## 2020-06-14 PROCEDURE — 4A023N7 MEASUREMENT OF CARDIAC SAMPLING AND PRESSURE, LEFT HEART, PERCUTANEOUS APPROACH: ICD-10-PCS | Performed by: INTERNAL MEDICINE

## 2020-06-14 PROCEDURE — 12000000 ZZH R&B MED SURG/OB

## 2020-06-14 PROCEDURE — 93005 ELECTROCARDIOGRAM TRACING: CPT

## 2020-06-14 PROCEDURE — 25000132 ZZH RX MED GY IP 250 OP 250 PS 637: Performed by: INTERNAL MEDICINE

## 2020-06-14 PROCEDURE — 99285 EMERGENCY DEPT VISIT HI MDM: CPT | Mod: 25

## 2020-06-14 PROCEDURE — 25800030 ZZH RX IP 258 OP 636: Performed by: EMERGENCY MEDICINE

## 2020-06-14 PROCEDURE — 99222 1ST HOSP IP/OBS MODERATE 55: CPT | Mod: AI | Performed by: INTERNAL MEDICINE

## 2020-06-14 PROCEDURE — 93308 TTE F-UP OR LMTD: CPT

## 2020-06-14 PROCEDURE — 84484 ASSAY OF TROPONIN QUANT: CPT | Performed by: EMERGENCY MEDICINE

## 2020-06-14 PROCEDURE — 85379 FIBRIN DEGRADATION QUANT: CPT | Performed by: EMERGENCY MEDICINE

## 2020-06-14 PROCEDURE — 83735 ASSAY OF MAGNESIUM: CPT | Performed by: EMERGENCY MEDICINE

## 2020-06-14 PROCEDURE — B2111ZZ FLUOROSCOPY OF MULTIPLE CORONARY ARTERIES USING LOW OSMOLAR CONTRAST: ICD-10-PCS | Performed by: INTERNAL MEDICINE

## 2020-06-14 RX ORDER — ACETAMINOPHEN 650 MG/1
650 SUPPOSITORY RECTAL EVERY 4 HOURS PRN
Status: DISCONTINUED | OUTPATIENT
Start: 2020-06-14 | End: 2020-06-17 | Stop reason: HOSPADM

## 2020-06-14 RX ORDER — PROCHLORPERAZINE 25 MG
25 SUPPOSITORY, RECTAL RECTAL EVERY 12 HOURS PRN
Status: DISCONTINUED | OUTPATIENT
Start: 2020-06-14 | End: 2020-06-17 | Stop reason: HOSPADM

## 2020-06-14 RX ORDER — ACETAMINOPHEN 325 MG/1
650 TABLET ORAL EVERY 4 HOURS PRN
Status: DISCONTINUED | OUTPATIENT
Start: 2020-06-14 | End: 2020-06-15

## 2020-06-14 RX ORDER — ADENOSINE 3 MG/ML
6 INJECTION, SOLUTION INTRAVENOUS ONCE
Status: COMPLETED | OUTPATIENT
Start: 2020-06-14 | End: 2020-06-14

## 2020-06-14 RX ORDER — HEPARIN SODIUM,PORCINE 10 UNIT/ML
5-10 VIAL (ML) INTRAVENOUS EVERY 24 HOURS
Status: DISCONTINUED | OUTPATIENT
Start: 2020-06-14 | End: 2020-06-17 | Stop reason: HOSPADM

## 2020-06-14 RX ORDER — AMOXICILLIN 250 MG
1 CAPSULE ORAL 2 TIMES DAILY PRN
Status: DISCONTINUED | OUTPATIENT
Start: 2020-06-14 | End: 2020-06-17 | Stop reason: HOSPADM

## 2020-06-14 RX ORDER — AMOXICILLIN 250 MG
2 CAPSULE ORAL 2 TIMES DAILY PRN
Status: DISCONTINUED | OUTPATIENT
Start: 2020-06-14 | End: 2020-06-17 | Stop reason: HOSPADM

## 2020-06-14 RX ORDER — NALOXONE HYDROCHLORIDE 0.4 MG/ML
.1-.4 INJECTION, SOLUTION INTRAMUSCULAR; INTRAVENOUS; SUBCUTANEOUS
Status: DISCONTINUED | OUTPATIENT
Start: 2020-06-14 | End: 2020-06-15

## 2020-06-14 RX ORDER — CETIRIZINE HYDROCHLORIDE 10 MG/1
10 TABLET ORAL DAILY
Status: DISCONTINUED | OUTPATIENT
Start: 2020-06-15 | End: 2020-06-17 | Stop reason: HOSPADM

## 2020-06-14 RX ORDER — CARVEDILOL 6.25 MG/1
6.25 TABLET ORAL 2 TIMES DAILY WITH MEALS
Status: DISCONTINUED | OUTPATIENT
Start: 2020-06-14 | End: 2020-06-15

## 2020-06-14 RX ORDER — BISACODYL 10 MG
10 SUPPOSITORY, RECTAL RECTAL DAILY PRN
Status: DISCONTINUED | OUTPATIENT
Start: 2020-06-14 | End: 2020-06-17 | Stop reason: HOSPADM

## 2020-06-14 RX ORDER — CALCIUM CARBONATE 500 MG/1
1000 TABLET, CHEWABLE ORAL 4 TIMES DAILY PRN
Status: DISCONTINUED | OUTPATIENT
Start: 2020-06-14 | End: 2020-06-17 | Stop reason: HOSPADM

## 2020-06-14 RX ORDER — HEPARIN SODIUM,PORCINE 10 UNIT/ML
5-10 VIAL (ML) INTRAVENOUS
Status: DISCONTINUED | OUTPATIENT
Start: 2020-06-14 | End: 2020-06-17 | Stop reason: HOSPADM

## 2020-06-14 RX ORDER — ACYCLOVIR 400 MG/1
400 TABLET ORAL DAILY
COMMUNITY
End: 2020-06-28

## 2020-06-14 RX ORDER — NITROGLYCERIN 0.4 MG/1
0.4 TABLET SUBLINGUAL EVERY 5 MIN PRN
Status: DISCONTINUED | OUTPATIENT
Start: 2020-06-14 | End: 2020-06-17 | Stop reason: HOSPADM

## 2020-06-14 RX ORDER — LIDOCAINE 40 MG/G
CREAM TOPICAL
Status: DISCONTINUED | OUTPATIENT
Start: 2020-06-14 | End: 2020-06-17 | Stop reason: HOSPADM

## 2020-06-14 RX ORDER — PROCHLORPERAZINE MALEATE 5 MG
10 TABLET ORAL EVERY 6 HOURS PRN
Status: DISCONTINUED | OUTPATIENT
Start: 2020-06-14 | End: 2020-06-17 | Stop reason: HOSPADM

## 2020-06-14 RX ORDER — ACYCLOVIR 400 MG/1
400 TABLET ORAL DAILY
Status: DISCONTINUED | OUTPATIENT
Start: 2020-06-15 | End: 2020-06-17 | Stop reason: HOSPADM

## 2020-06-14 RX ORDER — FAMOTIDINE 20 MG/1
20 TABLET, FILM COATED ORAL DAILY
Status: DISCONTINUED | OUTPATIENT
Start: 2020-06-15 | End: 2020-06-14

## 2020-06-14 RX ORDER — LIDOCAINE/PRILOCAINE 2.5 %-2.5%
1 CREAM (GRAM) TOPICAL PRN
COMMUNITY
End: 2020-09-29

## 2020-06-14 RX ORDER — LIDOCAINE 40 MG/G
CREAM TOPICAL
Status: DISCONTINUED | OUTPATIENT
Start: 2020-06-14 | End: 2020-06-16

## 2020-06-14 RX ORDER — ONDANSETRON 2 MG/ML
4 INJECTION INTRAMUSCULAR; INTRAVENOUS EVERY 6 HOURS PRN
Status: DISCONTINUED | OUTPATIENT
Start: 2020-06-14 | End: 2020-06-17 | Stop reason: HOSPADM

## 2020-06-14 RX ORDER — LORAZEPAM 0.5 MG/1
.5-1 TABLET ORAL EVERY 6 HOURS PRN
Status: DISCONTINUED | OUTPATIENT
Start: 2020-06-14 | End: 2020-06-17 | Stop reason: HOSPADM

## 2020-06-14 RX ORDER — ONDANSETRON 4 MG/1
4 TABLET, ORALLY DISINTEGRATING ORAL EVERY 6 HOURS PRN
Status: DISCONTINUED | OUTPATIENT
Start: 2020-06-14 | End: 2020-06-17 | Stop reason: HOSPADM

## 2020-06-14 RX ORDER — POLYETHYLENE GLYCOL 3350 17 G/17G
17 POWDER, FOR SOLUTION ORAL DAILY PRN
Status: DISCONTINUED | OUTPATIENT
Start: 2020-06-14 | End: 2020-06-17 | Stop reason: HOSPADM

## 2020-06-14 RX ORDER — HEPARIN SODIUM (PORCINE) LOCK FLUSH IV SOLN 100 UNIT/ML 100 UNIT/ML
5 SOLUTION INTRAVENOUS
Status: DISCONTINUED | OUTPATIENT
Start: 2020-06-14 | End: 2020-06-17 | Stop reason: HOSPADM

## 2020-06-14 RX ADMIN — SODIUM CHLORIDE 250 ML: 9 INJECTION, SOLUTION INTRAVENOUS at 17:46

## 2020-06-14 RX ADMIN — Medication 5 ML: at 22:47

## 2020-06-14 RX ADMIN — Medication 5 ML: at 22:48

## 2020-06-14 RX ADMIN — CARVEDILOL 6.25 MG: 6.25 TABLET, FILM COATED ORAL at 22:47

## 2020-06-14 RX ADMIN — ADENOSINE 6 MG: 3 INJECTION, SOLUTION INTRAVENOUS at 17:38

## 2020-06-14 ASSESSMENT — ACTIVITIES OF DAILY LIVING (ADL)
FALL_HISTORY_WITHIN_LAST_SIX_MONTHS: NO
COGNITION: 0 - NO COGNITION ISSUES REPORTED
AMBULATION: 0-->INDEPENDENT
DRESS: 0-->INDEPENDENT
SWALLOWING: 0-->SWALLOWS FOODS/LIQUIDS WITHOUT DIFFICULTY
RETIRED_EATING: 0-->INDEPENDENT
RETIRED_COMMUNICATION: 0-->UNDERSTANDS/COMMUNICATES WITHOUT DIFFICULTY
BATHING: 0-->INDEPENDENT
TOILETING: 0-->INDEPENDENT
TRANSFERRING: 0-->INDEPENDENT

## 2020-06-14 ASSESSMENT — MIFFLIN-ST. JEOR: SCORE: 1347.08

## 2020-06-14 NOTE — Clinical Note
Potential access sites were evaluated for patency using ultrasound.   The right radial artery was selected. Access was obtained under with Sonosite guidance using a standard 21 gauge micropuncture needle with direct visualization of needle entry.

## 2020-06-14 NOTE — ED TRIAGE NOTES
Pt arrives with palpitations for 2 hours. EKG during triage shows SVT HR 180s. ABCs otherwise intact.

## 2020-06-14 NOTE — Clinical Note
The DP pulses are 1+ bilaterally. The PT pulses are 1+ bilaterally. The radial pulses are 2+ bilaterally.

## 2020-06-14 NOTE — ED PROVIDER NOTES
History     Chief Complaint:  Palpitations     HPI:  Kassie Ramirez is a 50 year old female who presents with palpitations.     History of B cell lymphoma off chemotherapy for the last 8 weeks and scheduled to have a PET scan 2 days from now.  While playing [employer] [place] ball with family an hour or 2 prior to arrival if she felt acute onset of palpitations and fatigue/malaise.  Does not describe associated chest discomfort, cough, fever.  She had a mild sensation of nausea but no vomiting or recent melena or hematochezia.  No history of DVT or PE.  Does have a history of SVT in the past which converted with adenosine.  She tried vagal maneuvers 8 times prior to arrival without resolution.  No recent urinary pattern change.  For the last 2 or 3 days she has taken a dose of furosemide per day as she had been told she could from her PCP or cardiologist when she feels mild symptoms of congestive heart failure.  She started taking this when she noticed some dyspnea on exertion and what sounds like orthopnea.    Allergies:  Cold & Flu [Cold Defense Fighter]  Seasonal Allergies  Sudafed Cold-Cough [Dayquil Liquicaps]  Pseudoephedrine     Medications:    Zovirax  Zyrtec  Famotidine  Lasix  Leucovorin  Claritin  Lorazepam  Zofran  Compazine  Senna  Zoloft    Past Medical History:    Anxiety attack  Diffuse large B-cell lymphoma  BRIAN  Stress urinary incontinence  Menstrual headaches  Mixed hyperlipidemia  SVT  Mediastinal mass  Respiratory failure     Past Surgical History:    Kit Essure  Herniorrhaphy umbilical  IR Chest port placement  Sling transpubo w/o anterior colporrhaphy     Family History:    Hypertension  Depression  Cardiovascular  Diabetes  Heart disease  Cerebrovascular disease  Obesity  CAD  Lung cancer  Cone dystrophy  Macular degeneration  Type 2 diabetes  High cholesterol    Social History:  Smoking status: never  Alcohol use: no  Drug use: no  PCP: Mary Alice Colón    Marital Status:       Review of  Systems:   ROS: 10 point ROS neg other than the symptoms noted above in the HPI.    Physical Exam     Vitals:  Patient Vitals for the past 24 hrs:   BP Temp Temp src Pulse Heart Rate Resp SpO2   06/14/20 1900 108/83 -- -- 116 115 -- 100 %   06/14/20 1830 110/77 -- -- 118 -- -- 97 %   06/14/20 1800 119/87 -- -- 124 -- -- 98 %   06/14/20 1745 114/81 -- -- 117 121 20 97 %   06/14/20 1723 106/75 98.1  F (36.7  C) Oral -- 180 18 99 %   06/14/20 1715 (!) 138/114 98.1  F (36.7  C) Oral 170 -- 20 98 %       Physical Exam:  Gen: No diaphoresis  HEENT:  mmm, no rhinorrhea  Neck: supple, no abnormal swelling  Lungs:  CTAB, no rales  CV: Tachycardic, no m/r/g, ppi  Abd: soft, nontender, nondistended, no rebound/masses/guarding/hsm  Ext: no peripheral edema  Skin: warm, dry, well perfused, no rashes/bruising/lesions on exposed skin  Neuro: alert, MAEE, no gross motor or sensory deficits, gait stable  Psych: Normal mood, normal affect    Emergency Department Course     ECG (17:21:28):  Rate 181 bpm. NY interval *. QRS duration 78. QT/QTc 258/448. P-R-T axes * -25 12. Supraventricular tachycardia. Nonspecific T wave abnormality. Interpreted at 1726 by Trey Yi MD.    ECG (17:40:30):  Rate 134 bpm. NY interval 138. QRS duration 70. QT/QTc 294/439. P-R-T axes 52 -7 8. Sinus tachycardia with premature supraventricular complexes. Sinus tachycardia replaced SVT post adenosine. Interpreted at 1746 by Trey Yi MD.    Imaging:  Imaging results were communicated with the patient who voiced understanding of the findings.    XR Chest, PA & LAT  Heart size and pulmonary vascularity normal. The lungs are clear. Right IJ Port-A-Cath tip distal SVC.  Per radiology.    POC US Echo, limited  Impression - mildly depressed LV systolic function     Laboratory:  Laboratory findings were communicated with the patient who voiced understanding of the findings.    CBC: WBC 2.8(L), HGB 12.6,   BMP: Creatinine 1.09 (H),  GFR 59 (L) o/w WNL  Troponin (Collected 1807): 0.117 (H)  D dimer: 0.5  Nt probnp (BNP): 1431 (H)  TSH w free T4 reflex: 3.40    Interventions:   1738 Adenocard, 6 mg, IV  1746 0.9% NS BOLUS, 250 mL, IV    Emergency Department Course:  1729  Past medical records, nursing notes, and vitals reviewed.    1735  I performed an exam of the patient and obtained history, as documented above.    1721 EKG was taken in the ED.     1740 EKG was taken in the ED.     1807 IV was inserted and blood was drawn for laboratory testing, results above.    1915 The patient was sent for a chest-X-ray while in the emergency department, results above.      I performed a POC US Echo while in the emergency department, results above.     1955 Findings and plan explained to the Patient who consents to admission. Discussed the patient with Dr. Alvarez, who will admit the patient for further monitoring, evaluation, and treatment.    I personally reviewed the laboratory & imaging results with the Patient and answered all related questions prior to admission.     Impression & Plan     Medical Decision Making:  Kassie Ramirez is a 50 year old female who presents to the emergency department today for evaluation of malaise fatigue and palpitations found to have SVT converted with adenosine after failure of vagal maneuvers.  Also has an elevated troponin no signs of ischemia on her post adenosine ECG and likely this is indicative of tachycardia induced cardiomyopathy and myocardial injury.  Will admit for further evaluation management, serial biomarkers, formal echocardiogram as a bedside shows slightly decreased left ventricular systolic function.  Patient comfortable and agreeable with that plan.    Diagnosis:    ICD-10-CM    1. SVT (supraventricular tachycardia) (H)  I47.1    2. Tachycardia induced cardiomyopathy (H)  R00.0     I43    3. Myocardial injury, initial encounter  S26.90XA         Disposition:  Admitted to Dr. Alvarez.    Sandra  Disclosure:  I, Piedad Dixon, am serving as a scribe at 5:30 PM on 6/14/2020 to document services personally performed by Trey Yi MD based on my observations and the provider's statements to me.      Piedad Dixon   6/14/2020   [facility] EMERGENCY DEPARTMENT     Trey Yi MD  06/15/20 0103

## 2020-06-15 ENCOUNTER — APPOINTMENT (OUTPATIENT)
Dept: CARDIOLOGY | Facility: CLINIC | Age: 50
End: 2020-06-15
Attending: INTERNAL MEDICINE
Payer: COMMERCIAL

## 2020-06-15 ENCOUNTER — SURGERY (OUTPATIENT)
Age: 50
End: 2020-06-15
Payer: COMMERCIAL

## 2020-06-15 PROBLEM — I50.21 ACUTE SYSTOLIC CONGESTIVE HEART FAILURE (H): Status: ACTIVE | Noted: 2020-06-14

## 2020-06-15 LAB
ANION GAP SERPL CALCULATED.3IONS-SCNC: 7 MMOL/L (ref 3–14)
BUN SERPL-MCNC: 21 MG/DL (ref 7–30)
CALCIUM SERPL-MCNC: 9.3 MG/DL (ref 8.5–10.1)
CHLORIDE SERPL-SCNC: 106 MMOL/L (ref 94–109)
CO2 SERPL-SCNC: 26 MMOL/L (ref 20–32)
CREAT SERPL-MCNC: 0.98 MG/DL (ref 0.52–1.04)
GFR SERPL CREATININE-BSD FRML MDRD: 67 ML/MIN/{1.73_M2}
GLUCOSE SERPL-MCNC: 122 MG/DL (ref 70–99)
INTERPRETATION ECG - MUSE: NORMAL
INTERPRETATION ECG - MUSE: NORMAL
LACTATE BLD-SCNC: 1.3 MMOL/L (ref 0.7–2)
MAGNESIUM SERPL-MCNC: 2.4 MG/DL (ref 1.6–2.3)
POTASSIUM SERPL-SCNC: 3.8 MMOL/L (ref 3.4–5.3)
SODIUM SERPL-SCNC: 139 MMOL/L (ref 133–144)
TROPONIN I SERPL-MCNC: 0.11 UG/L (ref 0–0.04)
TROPONIN I SERPL-MCNC: 0.14 UG/L (ref 0–0.04)

## 2020-06-15 PROCEDURE — 83735 ASSAY OF MAGNESIUM: CPT | Performed by: INTERNAL MEDICINE

## 2020-06-15 PROCEDURE — 27210795 ZZH PAD DEFIB QUICK CR4: Performed by: INTERNAL MEDICINE

## 2020-06-15 PROCEDURE — 93306 TTE W/DOPPLER COMPLETE: CPT

## 2020-06-15 PROCEDURE — 25000132 ZZH RX MED GY IP 250 OP 250 PS 637: Performed by: INTERNAL MEDICINE

## 2020-06-15 PROCEDURE — 25800030 ZZH RX IP 258 OP 636: Performed by: INTERNAL MEDICINE

## 2020-06-15 PROCEDURE — 93306 TTE W/DOPPLER COMPLETE: CPT | Mod: 26 | Performed by: INTERNAL MEDICINE

## 2020-06-15 PROCEDURE — 25000128 H RX IP 250 OP 636: Performed by: INTERNAL MEDICINE

## 2020-06-15 PROCEDURE — 99152 MOD SED SAME PHYS/QHP 5/>YRS: CPT | Performed by: INTERNAL MEDICINE

## 2020-06-15 PROCEDURE — 99223 1ST HOSP IP/OBS HIGH 75: CPT | Mod: 25 | Performed by: INTERNAL MEDICINE

## 2020-06-15 PROCEDURE — 93458 L HRT ARTERY/VENTRICLE ANGIO: CPT | Performed by: INTERNAL MEDICINE

## 2020-06-15 PROCEDURE — 25000125 ZZHC RX 250: Performed by: INTERNAL MEDICINE

## 2020-06-15 PROCEDURE — 80048 BASIC METABOLIC PNL TOTAL CA: CPT | Performed by: INTERNAL MEDICINE

## 2020-06-15 PROCEDURE — 99152 MOD SED SAME PHYS/QHP 5/>YRS: CPT | Mod: 59 | Performed by: INTERNAL MEDICINE

## 2020-06-15 PROCEDURE — C1725 CATH, TRANSLUMIN NON-LASER: HCPCS | Performed by: INTERNAL MEDICINE

## 2020-06-15 PROCEDURE — 12000000 ZZH R&B MED SURG/OB

## 2020-06-15 PROCEDURE — 84484 ASSAY OF TROPONIN QUANT: CPT | Performed by: INTERNAL MEDICINE

## 2020-06-15 PROCEDURE — 93005 ELECTROCARDIOGRAM TRACING: CPT

## 2020-06-15 PROCEDURE — 93458 L HRT ARTERY/VENTRICLE ANGIO: CPT | Mod: 26 | Performed by: INTERNAL MEDICINE

## 2020-06-15 PROCEDURE — C1894 INTRO/SHEATH, NON-LASER: HCPCS | Performed by: INTERNAL MEDICINE

## 2020-06-15 PROCEDURE — 83605 ASSAY OF LACTIC ACID: CPT | Performed by: INTERNAL MEDICINE

## 2020-06-15 PROCEDURE — 99232 SBSQ HOSP IP/OBS MODERATE 35: CPT | Performed by: INTERNAL MEDICINE

## 2020-06-15 PROCEDURE — 27210794 ZZH OR GENERAL SUPPLY STERILE: Performed by: INTERNAL MEDICINE

## 2020-06-15 RX ORDER — METOPROLOL TARTRATE 25 MG/1
25 TABLET, FILM COATED ORAL 2 TIMES DAILY
Status: DISCONTINUED | OUTPATIENT
Start: 2020-06-15 | End: 2020-06-15

## 2020-06-15 RX ORDER — LIDOCAINE HYDROCHLORIDE 10 MG/ML
INJECTION, SOLUTION EPIDURAL; INFILTRATION; INTRACAUDAL; PERINEURAL
Status: DISCONTINUED
Start: 2020-06-15 | End: 2020-06-15 | Stop reason: HOSPADM

## 2020-06-15 RX ORDER — HEPARIN SODIUM 1000 [USP'U]/ML
INJECTION, SOLUTION INTRAVENOUS; SUBCUTANEOUS
Status: DISCONTINUED
Start: 2020-06-15 | End: 2020-06-15 | Stop reason: WASHOUT

## 2020-06-15 RX ORDER — LORAZEPAM 0.5 MG/1
0.5 TABLET ORAL
Status: DISCONTINUED | OUTPATIENT
Start: 2020-06-15 | End: 2020-06-15 | Stop reason: HOSPADM

## 2020-06-15 RX ORDER — NALOXONE HYDROCHLORIDE 0.4 MG/ML
.1-.4 INJECTION, SOLUTION INTRAMUSCULAR; INTRAVENOUS; SUBCUTANEOUS
Status: DISCONTINUED | OUTPATIENT
Start: 2020-06-15 | End: 2020-06-17 | Stop reason: HOSPADM

## 2020-06-15 RX ORDER — DOBUTAMINE HYDROCHLORIDE 200 MG/100ML
2-20 INJECTION INTRAVENOUS CONTINUOUS PRN
Status: DISCONTINUED | OUTPATIENT
Start: 2020-06-15 | End: 2020-06-15 | Stop reason: HOSPADM

## 2020-06-15 RX ORDER — METOPROLOL TARTRATE 1 MG/ML
5 INJECTION, SOLUTION INTRAVENOUS
Status: COMPLETED | OUTPATIENT
Start: 2020-06-15 | End: 2020-06-15

## 2020-06-15 RX ORDER — NITROGLYCERIN 5 MG/ML
VIAL (ML) INTRAVENOUS
Status: DISCONTINUED
Start: 2020-06-15 | End: 2020-06-15 | Stop reason: HOSPADM

## 2020-06-15 RX ORDER — POTASSIUM CHLORIDE 1500 MG/1
20 TABLET, EXTENDED RELEASE ORAL
Status: COMPLETED | OUTPATIENT
Start: 2020-06-15 | End: 2020-06-15

## 2020-06-15 RX ORDER — EPTIFIBATIDE 2 MG/ML
180 INJECTION, SOLUTION INTRAVENOUS EVERY 10 MIN PRN
Status: DISCONTINUED | OUTPATIENT
Start: 2020-06-15 | End: 2020-06-15 | Stop reason: HOSPADM

## 2020-06-15 RX ORDER — CARVEDILOL 3.12 MG/1
3.12 TABLET ORAL 2 TIMES DAILY WITH MEALS
Status: DISCONTINUED | OUTPATIENT
Start: 2020-06-15 | End: 2020-06-16

## 2020-06-15 RX ORDER — METOPROLOL SUCCINATE 25 MG/1
25 TABLET, EXTENDED RELEASE ORAL 2 TIMES DAILY
Status: DISCONTINUED | OUTPATIENT
Start: 2020-06-15 | End: 2020-06-15

## 2020-06-15 RX ORDER — NALOXONE HYDROCHLORIDE 0.4 MG/ML
.2-.4 INJECTION, SOLUTION INTRAMUSCULAR; INTRAVENOUS; SUBCUTANEOUS
Status: ACTIVE | OUTPATIENT
Start: 2020-06-15 | End: 2020-06-16

## 2020-06-15 RX ORDER — POTASSIUM CHLORIDE 1500 MG/1
20-40 TABLET, EXTENDED RELEASE ORAL
Status: DISCONTINUED | OUTPATIENT
Start: 2020-06-15 | End: 2020-06-17 | Stop reason: HOSPADM

## 2020-06-15 RX ORDER — DOPAMINE HYDROCHLORIDE 160 MG/100ML
2-20 INJECTION, SOLUTION INTRAVENOUS CONTINUOUS PRN
Status: DISCONTINUED | OUTPATIENT
Start: 2020-06-15 | End: 2020-06-15 | Stop reason: HOSPADM

## 2020-06-15 RX ORDER — ACETAMINOPHEN 325 MG/1
650 TABLET ORAL EVERY 4 HOURS PRN
Status: DISCONTINUED | OUTPATIENT
Start: 2020-06-15 | End: 2020-06-17 | Stop reason: HOSPADM

## 2020-06-15 RX ORDER — MAGNESIUM SULFATE HEPTAHYDRATE 40 MG/ML
2 INJECTION, SOLUTION INTRAVENOUS DAILY PRN
Status: DISCONTINUED | OUTPATIENT
Start: 2020-06-15 | End: 2020-06-17 | Stop reason: HOSPADM

## 2020-06-15 RX ORDER — METOPROLOL TARTRATE 25 MG/1
25 TABLET, FILM COATED ORAL 2 TIMES DAILY
Qty: 60 TABLET | Refills: 1 | Status: SHIPPED | OUTPATIENT
Start: 2020-06-15 | End: 2020-06-17

## 2020-06-15 RX ORDER — SODIUM CHLORIDE 9 MG/ML
INJECTION, SOLUTION INTRAVENOUS CONTINUOUS
Status: DISCONTINUED | OUTPATIENT
Start: 2020-06-15 | End: 2020-06-15 | Stop reason: HOSPADM

## 2020-06-15 RX ORDER — POTASSIUM CL/LIDO/0.9 % NACL 10MEQ/0.1L
10 INTRAVENOUS SOLUTION, PIGGYBACK (ML) INTRAVENOUS
Status: DISCONTINUED | OUTPATIENT
Start: 2020-06-15 | End: 2020-06-17 | Stop reason: HOSPADM

## 2020-06-15 RX ORDER — VERAPAMIL HYDROCHLORIDE 2.5 MG/ML
INJECTION, SOLUTION INTRAVENOUS
Status: DISCONTINUED | OUTPATIENT
Start: 2020-06-15 | End: 2020-06-15 | Stop reason: HOSPADM

## 2020-06-15 RX ORDER — EPTIFIBATIDE 2 MG/ML
2 INJECTION, SOLUTION INTRAVENOUS CONTINUOUS PRN
Status: DISCONTINUED | OUTPATIENT
Start: 2020-06-15 | End: 2020-06-15 | Stop reason: HOSPADM

## 2020-06-15 RX ORDER — POTASSIUM CHLORIDE 1.5 G/1.58G
20-40 POWDER, FOR SOLUTION ORAL
Status: DISCONTINUED | OUTPATIENT
Start: 2020-06-15 | End: 2020-06-17 | Stop reason: HOSPADM

## 2020-06-15 RX ORDER — FUROSEMIDE 10 MG/ML
20 INJECTION INTRAMUSCULAR; INTRAVENOUS ONCE
Status: COMPLETED | OUTPATIENT
Start: 2020-06-15 | End: 2020-06-15

## 2020-06-15 RX ORDER — LORAZEPAM 2 MG/ML
0.5 INJECTION INTRAMUSCULAR
Status: DISCONTINUED | OUTPATIENT
Start: 2020-06-15 | End: 2020-06-15 | Stop reason: HOSPADM

## 2020-06-15 RX ORDER — FUROSEMIDE 20 MG
20 TABLET ORAL DAILY
Status: DISCONTINUED | OUTPATIENT
Start: 2020-06-16 | End: 2020-06-16

## 2020-06-15 RX ORDER — FENTANYL CITRATE 50 UG/ML
INJECTION, SOLUTION INTRAMUSCULAR; INTRAVENOUS
Status: DISCONTINUED
Start: 2020-06-15 | End: 2020-06-15 | Stop reason: HOSPADM

## 2020-06-15 RX ORDER — ADENOSINE 3 MG/ML
INJECTION, SOLUTION INTRAVENOUS
Status: DISCONTINUED
Start: 2020-06-15 | End: 2020-06-15 | Stop reason: WASHOUT

## 2020-06-15 RX ORDER — SODIUM CHLORIDE 9 MG/ML
INJECTION, SOLUTION INTRAVENOUS CONTINUOUS
Status: DISCONTINUED | OUTPATIENT
Start: 2020-06-15 | End: 2020-06-15

## 2020-06-15 RX ORDER — MAGNESIUM SULFATE HEPTAHYDRATE 40 MG/ML
4 INJECTION, SOLUTION INTRAVENOUS EVERY 4 HOURS PRN
Status: DISCONTINUED | OUTPATIENT
Start: 2020-06-15 | End: 2020-06-17 | Stop reason: HOSPADM

## 2020-06-15 RX ORDER — FENTANYL CITRATE 50 UG/ML
INJECTION, SOLUTION INTRAMUSCULAR; INTRAVENOUS
Status: DISCONTINUED | OUTPATIENT
Start: 2020-06-15 | End: 2020-06-15 | Stop reason: HOSPADM

## 2020-06-15 RX ORDER — FENTANYL CITRATE 50 UG/ML
25-50 INJECTION, SOLUTION INTRAMUSCULAR; INTRAVENOUS
Status: ACTIVE | OUTPATIENT
Start: 2020-06-15 | End: 2020-06-16

## 2020-06-15 RX ORDER — VERAPAMIL HYDROCHLORIDE 2.5 MG/ML
INJECTION, SOLUTION INTRAVENOUS
Status: DISCONTINUED
Start: 2020-06-15 | End: 2020-06-15 | Stop reason: HOSPADM

## 2020-06-15 RX ORDER — LIDOCAINE 40 MG/G
CREAM TOPICAL
Status: DISCONTINUED | OUTPATIENT
Start: 2020-06-15 | End: 2020-06-15 | Stop reason: HOSPADM

## 2020-06-15 RX ORDER — POTASSIUM CHLORIDE 7.45 MG/ML
10 INJECTION INTRAVENOUS
Status: DISCONTINUED | OUTPATIENT
Start: 2020-06-15 | End: 2020-06-17 | Stop reason: HOSPADM

## 2020-06-15 RX ORDER — POTASSIUM CHLORIDE 29.8 MG/ML
20 INJECTION INTRAVENOUS
Status: DISCONTINUED | OUTPATIENT
Start: 2020-06-15 | End: 2020-06-17 | Stop reason: HOSPADM

## 2020-06-15 RX ORDER — LISINOPRIL 2.5 MG/1
2.5 TABLET ORAL DAILY
Status: DISCONTINUED | OUTPATIENT
Start: 2020-06-15 | End: 2020-06-15

## 2020-06-15 RX ORDER — LISINOPRIL 2.5 MG/1
2.5 TABLET ORAL DAILY
Status: DISCONTINUED | OUTPATIENT
Start: 2020-06-16 | End: 2020-06-17 | Stop reason: HOSPADM

## 2020-06-15 RX ORDER — HEPARIN SODIUM 10000 [USP'U]/100ML
100-1000 INJECTION, SOLUTION INTRAVENOUS CONTINUOUS PRN
Status: DISCONTINUED | OUTPATIENT
Start: 2020-06-15 | End: 2020-06-15 | Stop reason: HOSPADM

## 2020-06-15 RX ORDER — IOPAMIDOL 755 MG/ML
INJECTION, SOLUTION INTRAVASCULAR
Status: DISCONTINUED | OUTPATIENT
Start: 2020-06-15 | End: 2020-06-15 | Stop reason: HOSPADM

## 2020-06-15 RX ORDER — HEPARIN SODIUM 1000 [USP'U]/ML
INJECTION, SOLUTION INTRAVENOUS; SUBCUTANEOUS
Status: DISCONTINUED | OUTPATIENT
Start: 2020-06-15 | End: 2020-06-15 | Stop reason: HOSPADM

## 2020-06-15 RX ORDER — FLUMAZENIL 0.1 MG/ML
0.2 INJECTION, SOLUTION INTRAVENOUS
Status: ACTIVE | OUTPATIENT
Start: 2020-06-15 | End: 2020-06-16

## 2020-06-15 RX ORDER — NITROGLYCERIN 5 MG/ML
VIAL (ML) INTRAVENOUS
Status: DISCONTINUED | OUTPATIENT
Start: 2020-06-15 | End: 2020-06-15 | Stop reason: HOSPADM

## 2020-06-15 RX ORDER — ATROPINE SULFATE 0.1 MG/ML
0.5 INJECTION INTRAVENOUS EVERY 5 MIN PRN
Status: ACTIVE | OUTPATIENT
Start: 2020-06-15 | End: 2020-06-16

## 2020-06-15 RX ADMIN — Medication 5 ML: at 02:04

## 2020-06-15 RX ADMIN — FENTANYL CITRATE 50 MCG: 50 INJECTION, SOLUTION INTRAMUSCULAR; INTRAVENOUS at 16:27

## 2020-06-15 RX ADMIN — POTASSIUM CHLORIDE 20 MEQ: 1500 TABLET, EXTENDED RELEASE ORAL at 10:16

## 2020-06-15 RX ADMIN — SODIUM CHLORIDE 1000 ML: 9 INJECTION, SOLUTION INTRAVENOUS at 08:59

## 2020-06-15 RX ADMIN — Medication 5 ML: at 22:36

## 2020-06-15 RX ADMIN — ASPIRIN 325 MG: 325 TABLET, COATED ORAL at 10:16

## 2020-06-15 RX ADMIN — METOPROLOL TARTRATE 5 MG: 5 INJECTION INTRAVENOUS at 07:48

## 2020-06-15 RX ADMIN — HEPARIN SODIUM 2000 UNITS: 1000 INJECTION INTRAVENOUS; SUBCUTANEOUS at 16:39

## 2020-06-15 RX ADMIN — VERAPAMIL HYDROCHLORIDE 1.25 MG: 2.5 INJECTION, SOLUTION INTRAVENOUS at 16:34

## 2020-06-15 RX ADMIN — CETIRIZINE HYDROCHLORIDE 10 MG: 10 TABLET, FILM COATED ORAL at 07:46

## 2020-06-15 RX ADMIN — Medication 5 ML: at 05:46

## 2020-06-15 RX ADMIN — ACYCLOVIR 400 MG: 400 TABLET ORAL at 07:46

## 2020-06-15 RX ADMIN — CARVEDILOL 6.25 MG: 6.25 TABLET, FILM COATED ORAL at 07:46

## 2020-06-15 RX ADMIN — IOPAMIDOL 42 ML: 755 INJECTION, SOLUTION INTRAVENOUS at 16:48

## 2020-06-15 RX ADMIN — FUROSEMIDE 20 MG: 10 INJECTION, SOLUTION INTRAMUSCULAR; INTRAVENOUS at 18:28

## 2020-06-15 RX ADMIN — LIDOCAINE HYDROCHLORIDE 1 ML: 10 INJECTION, SOLUTION INFILTRATION; PERINEURAL at 16:35

## 2020-06-15 RX ADMIN — METOPROLOL TARTRATE 25 MG: 25 TABLET, FILM COATED ORAL at 08:58

## 2020-06-15 RX ADMIN — NITROGLYCERIN 100 MCG: 5 INJECTION, SOLUTION INTRAVENOUS at 16:35

## 2020-06-15 RX ADMIN — MIDAZOLAM 1 MG: 1 INJECTION INTRAMUSCULAR; INTRAVENOUS at 16:27

## 2020-06-15 ASSESSMENT — MIFFLIN-ST. JEOR: SCORE: 1340.73

## 2020-06-15 ASSESSMENT — ACTIVITIES OF DAILY LIVING (ADL)
ADLS_ACUITY_SCORE: 13

## 2020-06-15 NOTE — ED NOTES
[facility]  ED Nurse Handoff Report    Kassie Ramirez is a 50 year old female   ED Chief complaint: Palpitations  . ED Diagnosis:   Final diagnoses:   SVT (supraventricular tachycardia) (H)   Tachycardia induced cardiomyopathy (H)   Myocardial injury, initial encounter     Allergies:   Allergies   Allergen Reactions     Cold & Flu [Cold Defense Fighter]      See pseudoephedrine     Seasonal Allergies      Sudafed Cold-Cough [Dayquil Liquicaps]      Pseudoephedrine Rash     Rash then skins peels off        Code Status: Full Code  Activity level - Baseline/Home:  Independent. Activity Level - Current:   Stand by Assist. Lift room needed: No. Bariatric: No   Needed: No   Isolation: Yes. Infection: Neutropenic.     Vital Signs:   Vitals:    06/14/20 1830 06/14/20 1900 06/14/20 1930 06/14/20 2000   BP: 110/77 108/83 109/81 127/80   Pulse: 118 116 114 113   Resp:       Temp:       TempSrc:       SpO2: 97% 100% 98% 99%       Cardiac Rhythm:  ,   Cardiac  Cardiac Rhythm: Sinus tachycardia  Pain level:    Patient confused: No. Patient Falls Risk: Yes.   Elimination Status: Due to void   Patient Report - Initial Complaint: SVT. Focused Assessment: Cardiac-  Hx of SVT. Felt palpitations and heart racing for a couple hours. Tried to bear down, but did not convert self. Cardioverted with 6 mg adenosine. Per pt, HR normally in the low 100s-120s. Tele reading ST. Hx of cancer. Port in right chest accessed and infusing TKO.  Tests Performed: EKG, Labs, Xray, Bedside US. Abnormal Results:   Labs Ordered and Resulted from Time of ED Arrival Up to the Time of Departure from the ED   CBC WITH PLATELETS DIFFERENTIAL - Abnormal; Notable for the following components:       Result Value    WBC 2.8 (*)     Absolute Neutrophil 1.3 (*)     Absolute Lymphocytes 0.7 (*)     All other components within normal limits   BASIC METABOLIC PANEL - Abnormal; Notable for the following components:    Creatinine 1.09 (*)      GFR Estimate 59 (*)     All other components within normal limits   TROPONIN I - Abnormal; Notable for the following components:    Troponin I ES 0.117 (*)     All other components within normal limits   NT PROBNP INPATIENT - Abnormal; Notable for the following components:    N-Terminal Pro BNP Inpatient 1,431 (*)     All other components within normal limits   D DIMER QUANTITATIVE   TSH WITH FREE T4 REFLEX   COVID-19 VIRUS (CORONAVIRUS) BY PCR   CARDIAC CONTINUOUS MONITORING     Chest XR,  PA & LAT   Final Result   IMPRESSION: Heart size and pulmonary vascularity normal. The lungs are clear. Right IJ Port-A-Cath tip distal SVC.      POC US ECHO LIMITED   Final Result   PROCEDURE NOTE --> Emergency Bedside Ultrasound      Procedure Name: Bedside Cardiac Ultrasound      Preformed by: Trey Yi MD      Indication - Chest Pain, Shortness of Breath, Palpitations       Probe: Phased array probe    Curvilinear parobe      Windows -     PSLA    PSSA     4 Chamber      Findings -    Contractility - mildly depressed     Pericardial Fluid - none         Chamber Size - normal    Pleural effusion - none    Pneumothorax - none                Few scattered b lines       Impression - mildly depressed LV systolic function       Images saved to PACS protocol              .   Treatments provided: 250 ml bolus NS, infusing @ 10/hr TKO, 6 mg adenosine  Family Comments: N/A  OBS brochure/video discussed/provided to patient:  N/A  ED Medications:   Medications   adenosine (ADENOCARD) injection 6 mg (6 mg Intravenous Given 6/14/20 5758)   0.9% sodium chloride BOLUS (0 mLs Intravenous Stopped 6/14/20 1842)     Drips infusing:  Yes  For the majority of the shift, the patient's behavior Green. Interventions performed were N/A.    Sepsis treatment initiated: No       ED Nurse Name/Phone Number: Moni Myles RN,   8:35 PM  RECEIVING UNIT ED HANDOFF REVIEW    Above ED Nurse Handoff Report was reviewed: Yes  Reviewed by: Kristine ALVAREZ  Ratna RN on June 14, 2020 at 9:45 PM

## 2020-06-15 NOTE — PHARMACY-ADMISSION MEDICATION HISTORY
Admission medication history interview status for this patient is complete. See [facility] admission navigator for allergy information, prior to admission medications and immunization status.     Medication history interview done via telephone during Covid-19 pandemic, indicate source(s): Patient  Medication history resources (including written lists, pill bottles, clinic record):None    Changes made to PTA medication list:  Added: none  Deleted: PRN ondansetron, Claritin, famotidine, Leucovorin, PRN compazine , PRN senna, Sertraline    Changed: Acyclovir 400 mg BID--> acyclovir 400 mg daily     Actions taken by pharmacist (provider contacted, etc):Left sticky note for provider      Additional medication history information:Note : patient is no longer on Leucovorin 15 mg, patient said that she was told to stop lasix after she was admitted on 6/14-  She took lasix 6/12 to 6/14 in the am PTA.    Medication reconciliation/reorder completed by provider prior to medication history? Yes       Prior to Admission medications    Medication Sig Last Dose Taking? Auth Provider   acyclovir (ZOVIRAX) 400 MG tablet Take 400 mg by mouth daily 6/14/2020 at 0800 Yes Unknown, Entered By History   cetirizine (ZYRTEC) 10 MG tablet Take 10 mg by mouth daily 6/14/2020 at 0800 Yes Reported, Patient   furosemide (LASIX) 20 MG tablet Take 1 tablet (20 mg) by mouth daily for 7 days 6/14/2020 at 0800 Yes Dalton Kearney MD   LORazepam 0.5 MG PO tablet Take 1-2 tablets (0.5-1 mg) by mouth every 6 hours as needed (Breakthrough Nausea / Vomiting)  Yes Padmaja Stevens, PASahilC   potassium chloride ER (K-DUR/KLOR-CON M) 20 MEQ CR tablet Take 2 tablets (40 mEq) by mouth daily 6/14/2020 at 0900 Yes Castro Castro MD          lidocaine-prilocaine (EMLA) 2.5-2.5 % external cream Apply 1 applicator topically as needed for moderate pain More than a month at Unknown time  Unknown, Entered By History

## 2020-06-15 NOTE — CONSULTS
Cardiology Consult Note-- PLEASE SEE ASSOCIATED DICTATION    Kassie Ramirez MRN#: [id number]   YOB: 1970 Age: 50 year old     Date of Admission: 6/14/2020  Consult indication: SVT         HPI and Assessment and Recommendations/Plan:      Please refer to the associated dictation: #[id number]     - was able to convert SVT to sinus tachycardia with vagal maneuvers at bedside  - no chest pain/pressure, dyspnea, just fatigue  - start metoprolol tartrate 25mg BID, can switch to succinate if it is well tolerated, now BPs on the lower side 80s/60s with MAP 65  - NS 1L cc over 3 hours, may need more  - TTE today prelim images show reduced LV function, 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  Please do not hesitate to page me with any questions or concerns.     Gautam Alcala MD, [facility]  Cardiology  Pager:  [phone]  Text Page   Zayda 15, 2020         Past Medical History:   I have reviewed this patient's past medical history  The ASCVD Risk score (Mohnton DC Jr., et al., 2013) failed to calculate for the following reasons:    The patient has a prior MI or stroke diagnosis  Past Medical History:   Diagnosis Date     Anxiety attack 9/16/2014     Diffuse large B-cell lymphoma (H)     Diagnosed 11/2019     Encounter for Essure implantation 2009     Generalized anxiety disorder 9/16/2014    zoloft = flat emotions     Menopausal disorder     started on OCPs by menopause center 3/2017 (takes active continuously)     Menstrual headache     helped by OCPs and magnesium     GERTRUDE (stress urinary incontinence, female)     sling procedure 2016     SVT (supraventricular tachycardia) (H)                Past Surgical History:   I have reviewed this patient's past surgical history   Past Surgical  History:   Procedure Laterality Date     H KIT ESSURE  2009    essure - Dr. Cailin Raymundo      HERNIORRHAPHY UMBILICAL  1974     IR CHEST PORT PLACEMENT > 5 YRS OF AGE  1/9/2020     SLING TRANSPUBO WITHOUT ANTERIOR COLPORRHAPHY N/A 11/21/2016    Procedure: SLING TRANSPUBO WITHOUT ANTERIOR COLPORRHAPHY;  Surgeon: Hernesto Berrios MD;  Location: [facility] OR             Social History:   I have reviewed this patient's social history  Social History     Tobacco Use     Smoking status: Never Smoker     Smokeless tobacco: Never Used   Substance Use Topics     Alcohol use: No     Alcohol/week: 0.0 standard drinks     Comment: 1 time per month             Family History:   I have reviewed this patient's family history  Family History   Problem Relation Age of Onset     Hypertension Mother      Depression Mother      Lipids Mother      Cardiovascular Mother      Circulatory Mother      Diabetes Mother      Heart Disease Mother      Cerebrovascular Disease Mother      Obesity Mother      C.A.D. Mother      Lung Cancer Mother         smoker     Eye Disorder Father         cone dystrophy     Macular Degeneration Father      Diabetes Maternal Aunt         type 2     Hypertension Maternal Aunt      Heart Disease Maternal Grandfather      Heart Disease Sister         high cholesterol       Macular Degeneration Sister              Allergies:   I have reviewed this patient's allergy history  Allergies   Allergen Reactions     Cold & Flu [Cold Defense Fighter]      See pseudoephedrine     Seasonal Allergies      Sudafed Cold-Cough [Dayquil Liquicaps]      Pseudoephedrine Rash     Rash then skins peels off              Medications reviewed:   Prior to admission medications:  Prior to Admission medications    Medication Sig Start Date End Date Taking? Authorizing Provider   acyclovir (ZOVIRAX) 400 MG tablet Take 400 mg by mouth daily   Yes Unknown, Entered By History   cetirizine (ZYRTEC) 10 MG tablet Take 10 mg by mouth daily   Yes  Reported, Patient   furosemide (LASIX) 20 MG tablet Take 1 tablet (20 mg) by mouth daily for 7 days 4/11/20 6/14/20 Yes Dalton Kearney MD   LORazepam 0.5 MG PO tablet Take 1-2 tablets (0.5-1 mg) by mouth every 6 hours as needed (Breakthrough Nausea / Vomiting) 2/19/20  Yes Padmaja Stevens PA-C   potassium chloride ER (K-DUR/KLOR-CON M) 20 MEQ CR tablet Take 2 tablets (40 mEq) by mouth daily 2/7/20  Yes Castro Castro MD   leucovorin 15 MG tablet Take 1 tablet (15 mg) by mouth every 6 hours 4/11/20   Castro Castro MD   lidocaine-prilocaine (EMLA) 2.5-2.5 % external cream Apply 1 applicator topically as needed for moderate pain    Unknown, Entered By History      Current medications:  Current Facility-Administered Medications Ordered in Epic   Medication Dose Route Frequency Last Rate Last Dose     0.9% sodium chloride BOLUS  500 mL Intravenous Once         acetaminophen (TYLENOL) Suppository 650 mg  650 mg Rectal Q4H PRN         acetaminophen (TYLENOL) tablet 650 mg  650 mg Oral Q4H PRN         acyclovir (ZOVIRAX) tablet 400 mg  400 mg Oral Daily   400 mg at 06/15/20 0746     bisacodyl (DULCOLAX) Suppository 10 mg  10 mg Rectal Daily PRN         calcium carbonate (TUMS) chewable tablet 1,000 mg  1,000 mg Oral 4x Daily PRN         cetirizine (zyrTEC) tablet 10 mg  10 mg Oral Daily   10 mg at 06/15/20 0746     heparin 100 UNIT/ML injection 5 mL  5 mL Intracatheter Q28 Days         heparin lock flush 10 UNIT/ML injection 5-10 mL  5-10 mL Intracatheter Q24H   5 mL at 06/14/20 2247     heparin lock flush 10 UNIT/ML injection 5-10 mL  5-10 mL Intracatheter Q1H PRN   5 mL at 06/15/20 0546     lidocaine (LMX4) cream   Topical Q1H PRN         lidocaine (LMX4) cream   Topical Q1H PRN         lidocaine 1 % 0.1-1 mL  0.1-1 mL Other Q1H PRN         lidocaine 1 % 0.1-1 mL  0.1-1 mL Other Q1H PRN         LORazepam (ATIVAN) tablet 0.5-1 mg  0.5-1 mg Oral Q6H PRN         magnesium sulfate 2 g in  water intermittent infusion  2 g Intravenous Daily PRN         magnesium sulfate 4 g in 100 mL sterile water (premade)  4 g Intravenous Q4H PRN         melatonin tablet 1 mg  1 mg Oral At Bedtime PRN         metoprolol tartrate (LOPRESSOR) tablet 25 mg  25 mg Oral BID         naloxone (NARCAN) injection 0.1-0.4 mg  0.1-0.4 mg Intravenous Q2 Min PRN         nitroGLYcerin (NITROSTAT) sublingual tablet 0.4 mg  0.4 mg Sublingual Q5 Min PRN         ondansetron (ZOFRAN-ODT) ODT tab 4 mg  4 mg Oral Q6H PRN        Or     ondansetron (ZOFRAN) injection 4 mg  4 mg Intravenous Q6H PRN         polyethylene glycol (MIRALAX) Packet 17 g  17 g Oral Daily PRN         potassium chloride (KLOR-CON) Packet 20-40 mEq  20-40 mEq Oral or Feeding Tube Q2H PRN         potassium chloride 10 mEq in 100 mL intermittent infusion with 10 mg lidocaine  10 mEq Intravenous Q1H PRN         potassium chloride 10 mEq in 100 mL sterile water intermittent infusion (premix)  10 mEq Intravenous Q1H PRN         potassium chloride 20 mEq in 50 mL intermittent infusion  20 mEq Intravenous Q1H PRN         potassium chloride ER (KLOR-CON M) CR tablet 20-40 mEq  20-40 mEq Oral Q2H PRN         prochlorperazine (COMPAZINE) injection 10 mg  10 mg Intravenous Q6H PRN        Or     prochlorperazine (COMPAZINE) tablet 10 mg  10 mg Oral Q6H PRN        Or     prochlorperazine (COMPAZINE) Suppository 25 mg  25 mg Rectal Q12H PRN         senna-docusate (SENOKOT-S/PERICOLACE) 8.6-50 MG per tablet 1 tablet  1 tablet Oral BID PRN        Or     senna-docusate (SENOKOT-S/PERICOLACE) 8.6-50 MG per tablet 2 tablet  2 tablet Oral BID PRN         sodium chloride (PF) 0.9% PF flush 10-20 mL  10-20 mL Intracatheter q1 min prn         sodium chloride (PF) 0.9% PF flush 10-20 mL  10-20 mL Intracatheter Q1H PRN         sodium chloride (PF) 0.9% PF flush 3 mL  3 mL Intracatheter q1 min prn         sodium chloride (PF) 0.9% PF flush 3 mL  3 mL Intracatheter Q8H   3 mL at 06/15/20 7023      No current [facility]-ordered outpatient medications on file.             Review of Systems:   A complete review of systems was performed and was negative except as mentioned in the HPI.          Physical Exam:   Vital signs were personally reviewed:  Temperatures:  Current - Temp: 95.8  F (35.4  C); Max - Temp  Av  F (36.1  C)  Min: 95.8  F (35.4  C)  Max: 98.1  F (36.7  C)  Respiration range: Resp  Av.3  Min: 16  Max: 20  Pulse range: Pulse  Av.1  Min: 110  Max: 170  Blood pressure range: Systolic (24hrs), Av , Min:74 , Max:138   ; Diastolic (24hrs), Av, Min:48, Max:114    Pulse oximetry range: SpO2  Av.2 %  Min: 97 %  Max: 100 %  No intake or output data in the 24 hours ending 06/15/20 0845  155 lbs 3.2 oz  Body mass index is 25.05 kg/m .   Body surface area is 1.81 meters squared.    Constitutional: appears stated age, fatigued  Eyes: sclera anicteric, conjunctiva normal, no lesions on eyelids or lashes  ENT: normocephalic, without obvious abnormality, atraumatic, external ears without lesions   Pulmonary: clear to auscultation bilaterally  Cardiovascular: JVP normal, tachycardic, regular rhythm, no murmurs, no LE swelling  Gastrointestinal: abdominal exam benign, non-tender, no rigidity, no guarding  Neurologic: awake, alert, face symmetrical, moves all extremities  Skin: no abnormal rashes or lesions on limited exam  Psychiatric: affect is normal, answers questions appropriately, oriented to self and place         Laboratory tests:   Laboratory tests personally reviewed:   CMP  Recent Labs   Lab 20  1807      POTASSIUM 3.4   CHLORIDE 108   CO2 27   ANIONGAP 8   GLC 98   BUN 25   CR 1.09*   GFRESTIMATED 59*   GFRESTBLACK 68   AFSHAN 9.0   MAG 2.1     CBC  Recent Labs   Lab 20  1807   WBC 2.8*   RBC 4.11   HGB 12.6   HCT 36.9   MCV 90   MCH 30.7   MCHC 34.1   RDW 12.1        INRNo lab results found in last 7 days.  Lab Results   Component Value Date    TROPI  0.114 (H) 06/15/2020    TROPI 0.139 (HH) 06/15/2020    TROPI 0.117 (H) 2020     Recent Labs   Lab Test 18  0921 09/24/15  1033 14  1034   CHOL 233* 206* 173   HDL 38* 55 49*   LDL Cannot estimate LDL when triglyceride exceeds 400 mg/dL  116* 120 98   TRIG 523* 123 129   CHOLHDLRATIO  --  3.6 3.5     No results found for: A1C  TSH   Date Value Ref Range Status   2020 3.40 0.40 - 4.00 mU/L Final            Imaging and Additional Data:   Additional data personally reviewed:  Recent Results (from the past 4320 hour(s))   Echo Limited    Narrative    [id number]  [id number]  [id number]  [id number]^JAMES^BAM^GERONIMO           [facility]  Echocardiography Laboratory  [address] [place] [address]        Name: BABAR VARGHESE  MRN: [id number]  : 1970  Study Date: 2020 08:38 AM  Age: 49 yrs  Gender: Female  Patient [facility]: [facility]  Reason For Study: Chest Pain  Ordering Physician: BAM RAMIREZ  Referring Physician: Mary Alice Colón  Performed By: Cora Springer     BSA: 1.8 m2  Height: 67 in  Weight: 151 lb  BP: 144/93 mmHg  _____________________________________________________________________________  __        Procedure  Limited Portable Echo Adult.  _____________________________________________________________________________  __        Interpretation Summary     Left ventricular systolic function is normal.  LVEF 55% based on biplane 2D tracing.  Diastolic Doppler findings (E/E' ratio and/or other parameters) suggest left  ventricular filling pressures are indeterminate.  There is mild to moderate (1-2+) mitral regurgitation. This is new compared to  study from .  The study was technically adequate.  _____________________________________________________________________________  __        Left Ventricle  The left ventricle is normal in size. There is normal left ventricular wall  thickness. Left ventricular systolic function is normal. LVEF 55% based  on  biplane 2D tracing. Diastolic Doppler findings (E/E' ratio and/or other  parameters) suggest left ventricular filling pressures are indeterminate. No  regional wall motion abnormalities noted.     Right Ventricle  The right ventricle is normal size. The right ventricular systolic function is  normal.     Mitral Valve  There is mild to moderate (1-2+) mitral regurgitation.     Tricuspid Valve  There is trace tricuspid regurgitation. Right ventricular systolic pressure  could not be approximated due to inadequate tricuspid regurgitation. IVC  diameter <2.1 cm collapsing >50% with sniff suggests a normal RA pressure of 3  mmHg.        Aortic Valve  The aortic valve is trileaflet. There is mild trileaflet aortic sclerosis. No  aortic regurgitation is present. No aortic stenosis is present.     Pulmonic Valve  The pulmonic valve is not well visualized.     Pericardium  There is no pericardial effusion.     Rhythm  Sinus rhythm was noted.     _____________________________________________________________________________  __  MMode/2D Measurements & Calculations  IVSd: 0.81 cm  LVIDd: 4.4 cm  LVIDs: 3.1 cm  LVPWd: 0.83 cm  FS: 30.3 %  LV mass(C)d: 114.0 grams  LV mass(C)dI: 63.5 grams/m2  RWT: 0.38           Doppler Measurements & Calculations  MV E max gunjan: 78.1 cm/sec  MV A max gunjan: 68.9 cm/sec  MV E/A: 1.1  MV dec slope: 792.1 cm/sec2  MV dec time: 0.10 sec  E/E' avg: 10.4  Lateral E/e': 9.7  Medial E/e': 11.1           _____________________________________________________________________________  __           Report approved by: Eyal Lucio 02/23/2020 10:31 AM

## 2020-06-15 NOTE — PLAN OF CARE
"VSS, w/ soft pressures. Pt was ST upper 160's at rest and was symptomatic. After 18 minutes, pt was SR/ST 90's-low 100's. MD was paged and stated that we will just monitor for now and do Adenosine if needed. MD was not concerned at this time dt this being a recurrent problem for pt and that she can usually can do vagal maneuvers to help herself convert. Trops: 0.117->0.139->->0.114. MD aware, states this is an expected finding. Will continue plan of care.     Addendum: Pt c/o palpitations, states, \"my heart is racing again\" Tele 's-160's. MD paged.    MD ordered 5mg IV Metoprolol one time dose, verbal read back. Day nurse updated with current plan.  "

## 2020-06-15 NOTE — PLAN OF CARE
VS HR remains (111-120) Tele-SR. No reports of pain. A.OX4. RT port a cath hep locked. LS-clear. Regular diet. SBA in room. Per MD nurses do not need to draw a lactic on this pt. No bed alarms on. PT calls appropriately. Plan to discharge tomorrow.

## 2020-06-15 NOTE — H&P
[facility]  History and Physical Hospitalist       Date of Admission:  6/14/2020    Assessment & Plan   Kassie Ramirez is a 50 year old female with a PMHx diffuse large B Cell  lymphoma ([facility] On[facility] Dr. Castro; chemotherapy methotrexate completed 4/8/20; PET scan 6/16), SVT, and anxiety who presents to [facility] ED on 6/14/20 with palpitations.     On arrival to the emergency room EKG confirmed supraventricular tachycardia.  Patient was given adenosine 6 mg once a.  She converted back to sinus tachycardia.  Vital signs showed a temp of 98.1.  Heart rate of 170.  Blood pressure 138/114.  Respiratory rate of 20.  Pulse ox 90% on room air.  Laboratory work-up significant for creatinine of 1.09, proBNP 1000 1431, troponin 0 0.117, and TSH of 3.40.  WBC 2.8.  D-dimer 0.5.  TSH 3.4.  Chest x-ray heart size and pulmonary vasculature were normal.  Point-of-care echo completed in the ED showed slightly reduced left ventricular function.  Admission was requested for additional cardiac monitoring.    Supraventricular tachycardia   Follows with [facility] (Dr. Finn). Initial evaluation 9/18/2019. She converted into NSR with adenosine. Started on low-dose carvedilol. Echocardiogram showed left ventricular EF 55-60% without regional wall motion abnormalities, no significant diastolic dysfunction, normal right ventricular function and size, and no valvular pathology. 2 week heart monitor without evidence of SVT. Rare premature PACs/PVCs. Cardiac MRI 10/09/2019 showed normal left ventricular ejection fraction EF 59% without regional wall motion abnormalities. Normal right ventricular function and size, no significant valvular disease. Late gadolinium was negative for myocardial infarction, fibrosis or infiltrative disease. TSH 3.40 on 6/14. Given adenosine 6 mg once in ED.   -Cardiology consult - appreciate assistance - defer to cardiology if additional cardiac imaging indicated; last echo  2/23/20.   -Carvedilol 6.25 mg BID - started 6/14 (previously recommended by Cardiology and patient had just stopped taking)   -Tele monitoring     Elevated troponin  Hx of elevated troponin with tachycardia induced demand ischemia. Troponin on admission 0.117  -Trend troponin, EKG did not show signs of acute ischemia and no chest pain; only palitations  -Cardiac monitoring     Large B-Cell Lymphoma  -Follows with [facility] [place] [facility] (Dr. Castro). Completed chemotherapy 4/8/20. Scheduled for PET scan 6/16/20. Stable at this time. Complicates cares.     Elevated pro-BNP  Unclear if formal diagnosis of heart failure. Echocardiogram and cardiac MRI did not indicate a diastolic or systolic dysfunction. Per chart review Cardiology recommended IV furosemide 210 mg with methotrexate infusion or 20 mg oral furosemide prior to infusion. Patient had been taking lasix 20 mg at home. Pro-BNP 1,431.   -Hold additional dose of diuretics  -Strict I/Os; daily weights     Pulmonary edema  - During prior hospitalizations patient was started on Lasix 20 mg oral for volume overload.  This was associated with methotrexate dosing with sodium bicarbonate.  Patient reports that she had increased pulmonary congestion and been taking Lasix for 3 to 4 days.  Reports that symptoms are back to baseline at this time.  Hold additional Lasix dosing     Acute kidney injury  -Cr baseline 0.7-0.8. Increased to 1.09. Likely due to diuretic dosing. Hold additional diuretics. Monitor renal function.     Leukopenia  -Recently completed methotrexate therapy. No signs of infection. WBC 2.8. Monitor.     Herpes prophylaxis  -Acyclovir 400 mg BID    Seasonal allergies  -Cetirizine 10 mg daily; loratadine 10 mg daily    GERD  - Famotidine 20 mg twice daily    Anxiety  -Lorazepam 0.5 to 1 mg every 6 hours as needed  -Sertraline 50 mg daily    FEN: Oral hydration; monitor; regular  Activity: As tolerated  DVT Prophylaxis: SCDs  Code Status: Full Code    Expected  discharge: Tomorrow, recommended to prior living arrangement once cardiac workup complete.    Carolynn Alvarez DO    Primary Care Physician   Mary Alice Colón    Chief Complaint   Palpitaitons  History is obtained from the patient, electronic health record and emergency department physician    History of Present Illness   Kassie Ramirez is a 50 year old female with a PMHx diffuse large B Cell  lymphoma ([facility] Dr. Castro; chemotherapy methotrexate completed 4/8/20) who presents to [facility] ED on 6/14/20 with palpitations.     Initial episode of SVT in 2019. Since than has frequent episodes of SVT she is able to control with holding her breath and valsalva maneuver. Today she developed palpitations and fatigue. HR on phone monitor reported 170s. No chest pain or pressure. No other symptoms - denies HA/vision changes/syncope, SOB, cough, fevers, dysuria/hematuria, abdominal pain/constipation. Patient is scheduled to see Cardiology 6/22/20 to discuss treatment options for SVT. No longer taking Carvedilol. It was stopped during a prior hospital stay. Focus has been on treatment of B Cell Lymphoma and now is returning to Cardiology for management.     She was started on lasix with methotrexate infusions due to edema associated with treatment. She started taking lasix the last few days due to increased SOB when laying flat. Improvement in respiratory status since starting lasix.     Vitals on assessment in ED. , Pulse Ox 99% /88. Given adenosine in ED and returned to sinus tachycardia.     Past Medical History    I have reviewed this patient's medical history and updated it with pertinent information if needed.   Past Medical History:   Diagnosis Date     Anxiety attack 9/16/2014     Diffuse large B-cell lymphoma (H)     Diagnosed 11/2019     Encounter for Essure implantation 2009     Generalized anxiety disorder 9/16/2014    zoloft = flat emotions     Menopausal disorder     started on  OCPs by menopause center 3/2017 (takes active continuously)     Menstrual headache     helped by OCPs and magnesium     GERTRUDE (stress urinary incontinence, female)     sling procedure 2016     SVT (supraventricular tachycardia) (H)      Past Surgical History   I have reviewed this patient's surgical history and updated it with pertinent information if needed.  Past Surgical History:   Procedure Laterality Date     H KIT ESSJASEN  2009    seng - Dr. Cailin Raymundo      HERNIORRHAPHY UMBILICAL  1974     IR CHEST PORT PLACEMENT > 5 YRS OF AGE  1/9/2020     SLING TRANSPUBO WITHOUT ANTERIOR COLPORRHAPHY N/A 11/21/2016    Procedure: SLING TRANSPUBO WITHOUT ANTERIOR COLPORRHAPHY;  Surgeon: Hernesto Berrios MD;  Location: [facility] OR     Prior to Admission Medications   Prior to Admission Medications   Prescriptions Last Dose Informant Patient Reported? Taking?   LORazepam 0.5 MG PO tablet   No No   Sig: Take 1-2 tablets (0.5-1 mg) by mouth every 6 hours as needed (Breakthrough Nausea / Vomiting)   SENNA PO   Yes No   Sig: Take 1 tablet by mouth as needed    acyclovir (ZOVIRAX) 400 MG tablet   No No   Sig: Take 1 tablet (400 mg) by mouth 2 times daily   cetirizine (ZYRTEC) 10 MG tablet   Yes No   Sig: Take 10 mg by mouth daily   famotidine 20 MG PO tablet   Yes No   Sig: Take 20 mg by mouth 2 times daily as needed   furosemide (LASIX) 20 MG tablet   No No   Sig: Take 1 tablet (20 mg) by mouth daily for 7 days   leucovorin 15 MG tablet   No No   Sig: Take 1 tablet (15 mg) by mouth every 6 hours   lidocaine-prilocaine (EMLA) 2.5-2.5 % external cream   No No   Sig: Apply topically as needed for moderate pain   loratadine (CLARITIN) 10 MG tablet   Yes No   Sig: Take 10 mg by mouth daily   ondansetron (ZOFRAN-ODT) 8 MG ODT tab   No No   Sig: Take 1 tablet (8 mg) by mouth every 8 hours as needed   potassium chloride ER (K-DUR/KLOR-CON M) 20 MEQ CR tablet   No No   Sig: Take 2 tablets (40 mEq) by mouth daily   prochlorperazine  (COMPAZINE) 10 MG tablet   No No   Sig: Take 1 tablet (10 mg) by mouth every 6 hours as needed (Breakthrough Nausea/Vomiting)   sertraline (ZOLOFT) 50 MG tablet   No No   Sig: Take 1 tablet (50 mg) by mouth daily      Facility-Administered Medications: None     Allergies   Allergies   Allergen Reactions     Cold & Flu [Cold Defense Fighter]      See pseudoephedrine     Seasonal Allergies      Sudafed Cold-Cough [Dayquil Liquicaps]      Pseudoephedrine Rash     Rash then skins peels off      Social History   I have reviewed this patient's social history and updated it with pertinent information if needed. Kassie Ramirez  reports that she has never smoked. She has never used smokeless tobacco. She reports that she does not drink alcohol or use drugs.    Family History   Family history reviewed with patient and is noncontributory.    Review of Systems   The 10 point Review of Systems is negative other than noted in the HPI.     Physical Exam   Temp: 98.1  F (36.7  C) Temp src: Oral BP: 108/83 Pulse: 116 Heart Rate: 115 Resp: 20 SpO2: 100 % O2 Device: None (Room air)    Vital Signs with Ranges  Temp:  [98.1  F (36.7  C)] 98.1  F (36.7  C)  Pulse:  [116-170] 116  Heart Rate:  [115-180] 115  Resp:  [18-20] 20  BP: (106-138)/() 108/83  SpO2:  [97 %-100 %] 100 %  0 lbs 0 oz    Constitutional: Awake, alert, cooperative, no apparent distress.  HEENT: AT. NC. Conjunctiva non-injected. Sclera anicteric. Pupils examined and normal. Wearing face mask.   Respiratory: No use of accessory respiratory muscles. Occasional crackles bilateral in middle/lower lung fields. No wheezing/rales/rhonchi.   Cardiovascular: Tachycardic and regular rhythm, normal S1 and S2, and no murmur noted.  GI: Soft, non-distended, non-tender, normal bowel sounds. No organomegaly.   Lymph/Hematologic: No anterior cervical or supraclavicular adenopathy.  Skin: No rashes, no cyanosis, trace edema. Diaphoretic skin on back.   Musculoskeletal: No joint  swelling, erythema or tenderness.  Neurologic: Cranial nerves 2-12 intact, normal strength and sensation.  Psychiatric: Alert, oriented to person, place and time, no obvious anxiety or depression.    Data   Data reviewed today:  I personally reviewed     Recent Labs   Lab 06/14/20  1807   WBC 2.8*   HGB 12.6   MCV 90         POTASSIUM 3.4   CHLORIDE 108   CO2 27   BUN 25   CR 1.09*   ANIONGAP 8   AFSHAN 9.0   GLC 98   TROPI 0.117*     Recent Results (from the past 24 hour(s))   POC US ECHO LIMITED    Impression    PROCEDURE NOTE --> Emergency Bedside Ultrasound    Procedure Name: Bedside Cardiac Ultrasound    Preformed by: Trey Yi MD    Indication - Chest Pain, Shortness of Breath, Palpitations     Probe: Phased array probe   Curvilinear parobe    Windows -    PSLA   PSSA    4 Chamber    Findings -   Contractility - mildly depressed    Pericardial Fluid - none       Chamber Size - normal   Pleural effusion - none   Pneumothorax - none               Few scattered b lines     Impression - mildly depressed LV systolic function     Images saved to PACS protocol       Chest XR,  PA & LAT    Narrative    EXAM: XR CHEST 2 VW  LOCATION: [facility]  DATE/TIME: 6/14/2020 7:15 PM    INDICATION: Shortness of breath.  COMPARISON: 02/23/2020      Impression    IMPRESSION: Heart size and pulmonary vascularity normal. The lungs are clear. Right IJ Port-A-Cath tip distal SVC.

## 2020-06-15 NOTE — CONSULTS
Consult Date:  06/15/2020      CARDIOLOGY CONSULTATION      CONSULT INDICATION:  SVT.      HISTORY OF PRESENT ILLNESS:      I had the opportunity to care for patient, Kassie Ramirez, today in hospital for a Cardiology consultation.  As you know, she is a 50-year-old female with a past medical history significant for diffuse large B-cell lymphoma (treated by Dr. Castro with the [facility] on chemotherapy), paroxysmal SVT, anxiety, family history of early CAD, who presents with palpitations, found to be in SVT.      The patient reports that she was in her usual state of health, which is to say quite fatigued due to ongoing chemotherapy, until yesterday afternoon when she felt acute onset palpitations, fatigue.  She was seen in the Emergency Department where she was found to be hypertensive at 138/114 mmHg, heart rate 170 beats per minute, respiratory rate 20 breaths per minute, satting high 90s on room air.  An ECG was done on 06/14/2020, which shows narrow complex tachycardia, rate 181 beats per minute, regular, left axis deviation, no clear ST segment changes, relatively low voltage, consistent with SVT.  In the Emergency Department, she was administered adenosine, which converted her to sinus tachycardia.  As part of her evaluation, she had labs done which showed potassium 3.4, creatinine 1.09, NT-proBNP 1400, troponin 0.117, CBC notable for WBC of 2.8, ANC 1.3.  D-dimer negative at 0.5.  Imaging was done, chest x-ray showed no abnormalities, bedside echocardiogram in the Emergency Department apparently showed a mildly depressed LV function.      Regarding her prior cardiac history, she was previously seen in Cardiology Clinic with Dr. Finn in 2019, subsequently with Zayda Almanzar NP, regarding her history of SVT.  Since these episodes were relatively self-limited before, though had required adenosine previously, she was treated with medical therapy.  Apparently, she had self-discontinued  carvedilol, which was recommended by Cardiology previously.      Her last echocardiogram was done on 02/24/2020 which showed LVEF 55%, mild to moderate MR. Cardiac MRI 2019 did not show evidence of sarcoidosis, LVEF 59%, RVEF 54%, no stress portion.      Earlier this morning, at approximately 6:45 a.m., she went back into this narrow complex tachycardia consistent with SVT.  Decision was made to try converting her with adenosine; however, we attempted vagal maneuvers at the bedside, which were successful in converting her back into sinus tachycardia.  Blood pressures high 80s/60s, MAP over 65.  She denies any chest pain, chest pressure, shortness of breath, dizziness, lightheadedness, palpitations.    TTE 6/15/2020 on prelim review shows LVEF 30-35%, global hypokinesis, RV function also down.       ASSESSMENT AND PLAN/RECOMMENDATIONS:     1.  Paroxysmal supraventricular tachycardia:  Has a history of supraventricular tachycardia requiring adenosine. previously.  Was managed with carvedilol outpatient, though this was self-discontinued.  Presented with supraventricular tachycardia on 06/14/2020, converted to sinus rhythm in the Emergency Department with adenosine.  Flipped back into supraventricular tachycardia on 06/15/2020 at approximately 6:45 a.m.  We were able to convert her to normal sinus rhythm with vagal maneuvers at the bedside.   2.  Mildly elevated troponin:  Initial troponin 0.117, which has peaked at 0.139, most recently 0.114. Of note, has had mildly elevated troponin during her hospitalization 2/2020. Has never had an ischemic evaluation. CAD RFs of HTN, FH of early CAD.  She denies any chest pain, chest pressure or recent anginal symptoms, though does endorse significant fatigue since starting her chemotherapy.   3.   HFrEF, LVEF 30-35% on TTE 6/15/2020 (prelim review). New diagnosis. Likely 2/2 tachycardia, though does have RFs of CAD as above.   4.  B-cell lymphoma:  Receiving chemotherapy.   Oncologist, Dr. Castro with [facility].      -- We will start metoprolol tartrate 25 mg b.i.d.  Can switch to succinate if it is well tolerated.   -- Discussed with her options for further evaluation and management of this elevated troponin and new diagnosis of HFrEF. Discussed risks/benefits of each option including conservative medical management with follow up TTE to assess for recovery, non-invasive stress testing, or coronary angiography.   -- Since she has known RFs of CAD, now with mildly elevated troponin, and also had an elevated troponin during her prior hospitalization 2020, never had ischemic evaluation, I think a coronary angiogram would be a definitive way for us to evaluate her coronary anatomy and assess whether there is an ischemic component to her HF.   -- The risks, benefits, and alternatives to cardiac catheterization, coronary angiography with possible intervention, moderate sedation, and possible blood transfusion were discussed at length. The patient was able to verbalize back the risks, benefits, and alternatives to the aforementioned procedures, and asked appropriate questions. The patient provided verbal and written consent, signifying informed consent to proceed as planned.   -- We will continue to follow.      Thank you for allowing our team to participate in the care of the patient, Babar Ramirez.  Please do not hesitate to page or call with any questions or concerns.      Gautam Alcala MD, [facility]  Cardiology  Pager:  [phone]  Text Page   Zayda 15, 2020        D: 06/15/2020   T: 06/15/2020   MT: AMARI      Name:     BABAR RAMIREZ   MRN:      [id number]        Account:       [id number]   :      1970           Consult Date:  06/15/2020      Document: [id number]

## 2020-06-15 NOTE — PROGRESS NOTES
[facility]  Hospitalist Progress Note  Brody Chandler MD 06/15/2020    Reason for Stay (Diagnosis): SVT         Assessment and Plan:      Summary of Stay: Kassie Ramirez is a 50 year old female with a PMHx diffuse large B Cell  lymphoma ([facility] Oncology Dr. Castro; chemotherapy methotrexate completed 4/8/20; PET scan 6/16), SVT, and anxiety who presents to [facility] ED on 6/14/20 with palpitations.      On arrival to the emergency room EKG confirmed supraventricular tachycardia.  Patient was given adenosine 6 mg once a.  She converted back to sinus tachycardia.  Vital signs showed a temp of 98.1.  Heart rate of 170.  Blood pressure 138/114.  Respiratory rate of 20.  Pulse ox 90% on room air.  Laboratory work-up significant for creatinine of 1.09, proBNP 1000 1431, troponin 0 0.117, and TSH of 3.40.  WBC 2.8.  D-dimer 0.5.  TSH 3.4.  Chest x-ray heart size and pulmonary vasculature were normal.  Point-of-care echo completed in the ED showed slightly reduced left ventricular function.  Admission was requested for additional cardiac monitoring.    Early this AM pt converted back into SVT.  Initial attempts at vagal maneuvers failed to convert her back to NSR.  However on cardiology evaluation and recurrent attempt at vagal maneuver she did convert to sinus rhythm.  TTE performed and shows reduced LV ftn with EF of 25%.  Plan is for angiogram today to rule out ischemic cause     Supraventricular tachycardia   Follows with [facility] (Dr. Finn). Initial evaluation 9/18/2019. She converted into NSR with adenosine. Started on low-dose carvedilol. Echocardiogram showed left ventricular EF 55-60% without regional wall motion abnormalities, no significant diastolic dysfunction, normal right ventricular function and size, and no valvular pathology. 2 week heart monitor without evidence of SVT. Rare premature PACs/PVCs. Cardiac MRI 10/09/2019 showed normal left ventricular ejection  fraction EF 59% without regional wall motion abnormalities. Normal right ventricular function and size, no significant valvular disease. Late gadolinium was negative for myocardial infarction, fibrosis or infiltrative disease. TSH 3.40 on 6/14. Given adenosine 6 mg once in ED.   -Cardiology consult - appreciate assistance - I spoke with Dr. Alcala this AM  - TTE today shows reduced EF at 25-30%  -metoprolol started per cardiology  -Tele monitoring      Elevated troponin  Hx of elevated troponin with tachycardia induced demand ischemia. Troponin on admission 0.117  -cards planning angiogram today for ischemic evaluation      Large B-Cell Lymphoma  -Follows with [facility] (Dr. Castro). Completed chemotherapy 4/8/20. Scheduled for PET scan 6/16/20. Stable at this time. Complicates cares.      Elevated pro-BNP  Unclear if formal diagnosis of heart failure. Echocardiogram and cardiac MRI did not indicate a diastolic or systolic dysfunction. Per chart review Cardiology recommended IV furosemide with methotrexate infusion or 20 mg oral furosemide prior to infusion. Patient had been taking lasix 20 mg at home. Pro-BNP 1,431.   -Hold additional dose of diuretics  -Strict I/Os; daily weights      Pulmonary edema  - During prior hospitalizations patient was started on Lasix 20 mg oral for volume overload.  This was associated with methotrexate dosing with sodium bicarbonate.  Patient reports that she had increased pulmonary congestion and been taking Lasix for 3 to 4 days.  Reports that symptoms are back to baseline at this time.  Hold additional Lasix dosing     Acute kidney injury  -Cr baseline 0.7-0.8. Increased to 1.09. Likely due to diuretic dosing. Hold additional diuretics. Monitor renal function.      Leukopenia  -Recently completed methotrexate therapy. No signs of infection. WBC 2.8. Monitor.      Herpes prophylaxis  -Acyclovir 400 mg BID     Seasonal allergies  -Cetirizine 10 mg daily; loratadine 10 mg  "daily     GERD  - Famotidine 20 mg twice daily     Anxiety  -Lorazepam 0.5 to 1 mg every 6 hours as needed  -Sertraline 50 mg daily     FEN: Oral hydration; monitor; regular  Activity: As tolerated  DVT Prophylaxis: SCDs  Code Status: Full Code  DISPO:  Await angiogram results.  Today or tomorrow likely; when OK with cardiology        Interval History (Subjective):      Recurrent symptomatic SVT this AM.  Seen by cards and aborted with vagal maneuvers.  Angiogram planned today                  Physical Exam:      Last Vital Signs:  BP (!) 84/59   Pulse 112   Temp 95.8  F (35.4  C) (Oral)   Resp 18   Ht 1.676 m (5' 6\")   Wt 70.4 kg (155 lb 3.2 oz)   SpO2 97%   BMI 25.05 kg/m      No intake or output data in the 24 hours ending 06/15/20 1200    Constitutional: Awake, alert, cooperative, no apparent distress   Respiratory: Clear to auscultation bilaterally, no crackles or wheezing   Cardiovascular: Regular rate and rhythm, normal S1 and S2, and no murmur noted   Abdomen: Normal bowel sounds, soft, non-distended, non-tender   Skin: No rashes, no cyanosis, dry to touch   Neuro: Alert and oriented x3, no weakness, numbness, memory loss   Extremities: No edema, normal range of motion   Other(s):        All other systems: Negative          Medications:      All current medications were reviewed with changes reflected in problem list.         Data:      All new lab and imaging data was reviewed.   Labs:  Recent Labs   Lab 06/14/20  1807   WBC 2.8*   HGB 12.6   HCT 36.9   MCV 90         Imaging:   Recent Results (from the past 24 hour(s))   POC US ECHO LIMITED    Impression    PROCEDURE NOTE --> Emergency Bedside Ultrasound    Procedure Name: Bedside Cardiac Ultrasound    Preformed by: Trey Yi MD    Indication - Chest Pain, Shortness of Breath, Palpitations     Probe: Phased array probe   Curvilinear parobe    Windows -    PSLA   PSSA    4 Chamber    Findings -   Contractility - mildly depressed "    Pericardial Fluid - none       Chamber Size - normal   Pleural effusion - none   Pneumothorax - none               Few scattered b lines     Impression - mildly depressed LV systolic function     Images saved to PACS protocol       Chest XR,  PA & LAT    Narrative    EXAM: XR CHEST 2 VW  LOCATION: [facility]  DATE/TIME: 2020 7:15 PM    INDICATION: Shortness of breath.  COMPARISON: 2020      Impression    IMPRESSION: Heart size and pulmonary vascularity normal. The lungs are clear. Right IJ Port-A-Cath tip distal SVC.   Echocardiogram Complete    Narrative    [id number]  [id number]  [id number]  [id number]^ROMAN^FELIBERTO^GERONIMO           [facility]  Echocardiography Laboratory  [address] [place] [address]        Name: BABAR VARGHESE  MRN: [id number]  : 1970  Study Date: 06/15/2020 09:20 AM  Age: 50 yrs  Gender: Female  Patient Location: [facility]  Reason For Study: Tachycardia  Ordering Physician: FELIBERTO OWENS  Referring Physician: Mary Alice Colón PA-C  Performed By: Cayla Katz RDCS     BSA: 1.8 m2  Height: 66 in  Weight: 155 lb  HR: 100  BP: 83/62 mmHg  _____________________________________________________________________________  __        Procedure  Complete Portable Echo Adult.  _____________________________________________________________________________  __        Interpretation Summary     The visual ejection fraction is estimated at 25-30%.  Left ventricular systolic function is moderate to severely reduced.  There has been a significant drop in left ventricular sytstolic function since  2020.  The right ventricular systolic function is mild to moderately reduced.  There is moderate (2+) mitral regurgitation.  _____________________________________________________________________________  __        Left Ventricle  The left ventricle is normal in size. There is normal left ventricular wall  thickness. Diastolic Doppler findings (E/E' ratio and/or other  parameters)  suggest left ventricular filling pressures are indeterminate. The visual  ejection fraction is estimated at 25-30%. Left ventricular systolic function  is moderate to severely reduced. There is mod-severe global hypokinesia of the  left ventricle.     Right Ventricle  The right ventricle is normal size. The right ventricular systolic function is  mild to moderately reduced.     Atria  Normal left atrial size. Right atrial size is normal. There is no color  Doppler evidence of an atrial shunt.     Mitral Valve  Thickened mitral valve anterior leaflet. There is moderate (2+) mitral  regurgitation.        Tricuspid Valve  There is trace tricuspid regurgitation.     Aortic Valve  The aortic valve is trileaflet. No aortic regurgitation is present. No  hemodynamically significant valvular aortic stenosis.     Pulmonic Valve  There is trace pulmonic valvular regurgitation.     Vessels  The aortic root is normal size. Normal size ascending aorta. IVC diameter <2.1  cm collapsing >50% with sniff suggests a normal RA pressure of 3 mmHg.     Pericardium  There is no pericardial effusion.        Rhythm  The rhythm was sinus tachycardia.  _____________________________________________________________________________  __  MMode/2D Measurements & Calculations  IVSd: 0.78 cm     LVIDd: 4.8 cm  LVIDs: 4.3 cm  LVPWd: 1.1 cm  FS: 10.5 %  LV mass(C)d: 159.6 grams  LV mass(C)dI: 88.9 grams/m2  Ao root diam: 3.0 cm  LA dimension: 3.6 cm  asc Aorta Diam: 3.2 cm  LA/Ao: 1.2  LVOT diam: 2.0 cm  LVOT area: 3.1 cm2  LA Volume (BP): 49.0 ml  LA Volume Index (BP): 27.4 ml/m2  RWT: 0.45           Doppler Measurements & Calculations  MV E max gunjan: 94.3 cm/sec  LV IVRT: 0.05 sec  MV max P.0 mmHg  MV mean PG: 3.0 mmHg  MV V2 VTI: 22.1 cm  MV P1/2t max gunjan: 115.0 cm/sec  MV P1/2t: 73.2 msec  MVA(P1/2t): 3.0 cm2  MV dec slope: 460.0 cm/sec2  MV dec time: 0.11 sec  Ao V2 max: 93.8 cm/sec  Ao max P.0 mmHg  MR ERO: 0.13 cm2  MR  volume: 15.4 ml  PA acc time: 0.11 sec  E/E' av.0  Lateral E/e': 11.7  Medial E/e': 10.3              _____________________________________________________________________________  __        Report approved by: Eyal Borrero 06/15/2020 10:57 AM

## 2020-06-15 NOTE — PROGRESS NOTES
Brief cardiology follow up note:      Discussed with Pt the findings of the coronary angiogram. Fortunately normal coronary arteries, reviewed with her the images from the procedure. LVEDP elevated at 29mmHg, will give some lasix. Due to relatively soft BPs, 90s/70s mmHg range, we are limited on guideline directed medical therapy for HF. Etiology of this HFrEF is likely tachycardia-induced, though she is on chemotherapy (R-CHOP/HD methotrexate).  - will switch to low dose carvedilol  - will start low dose lisinopril in AM  - will give some furosemide IV and then start low dose furosemide tomorrow   - pending BP response, hopefully can start low dose spironolactone  - BP too low to start Entresto   - when discharged, will need to interface with Oncology team regarding this new dx of HFrEF, as it may change the chemotherapy regimen  - CORE consult in AM  - ultimately will need reassessment with repeat TTE in about 3 mo to see if there has been improvement in LV function, may need to consider ICD at that time     Gautam Alcala MD, [facility]  Cardiology  Pager:  [phone]  Text Page   Zayda 15, 2020

## 2020-06-15 NOTE — PLAN OF CARE
Presentation/Diagnosis:SVT  History:Anxiety, SVT, GERTRUDE, Diffuse Large B cell lymphoma   Labs/Protocols:K 3.8- 20meq given, , Mag 2.4, Creat 0.98,   Vitals: HR was in the 160s this AM, cards did vasovagal response which took pt out of SVT. BP soft low 90s.   Telemetry:SR/ST  Respiratory:RA sating 97%  Neuro:A&O  GI/:WNL  Skin:intact   LDAs:Right port   Diet:NPO   Activity:IND   Teaching:pt educated on angio, video and handout given.   Plan:pt had echo today which showed low EF 25-30%, pt is to have an angio this afternooon. Per Cardiology Dr. Alcala, not to hang fluids before angio. PO  given. Cont to monitor HR. No complaints of chest pain or pain at all. Will cont with POC.

## 2020-06-16 ENCOUNTER — TELEPHONE (OUTPATIENT)
Dept: CARDIOLOGY | Facility: CLINIC | Age: 50
End: 2020-06-16

## 2020-06-16 PROBLEM — I47.10 PAROXYSMAL SVT (SUPRAVENTRICULAR TACHYCARDIA) (H): Status: ACTIVE | Noted: 2020-06-01

## 2020-06-16 LAB — INTERPRETATION ECG - MUSE: NORMAL

## 2020-06-16 PROCEDURE — 25000128 H RX IP 250 OP 636: Performed by: INTERNAL MEDICINE

## 2020-06-16 PROCEDURE — 12000000 ZZH R&B MED SURG/OB

## 2020-06-16 PROCEDURE — 25000132 ZZH RX MED GY IP 250 OP 250 PS 637: Performed by: INTERNAL MEDICINE

## 2020-06-16 PROCEDURE — 99232 SBSQ HOSP IP/OBS MODERATE 35: CPT | Performed by: INTERNAL MEDICINE

## 2020-06-16 RX ORDER — FUROSEMIDE 20 MG
20 TABLET ORAL DAILY
Status: DISCONTINUED | OUTPATIENT
Start: 2020-06-16 | End: 2020-06-16

## 2020-06-16 RX ORDER — FUROSEMIDE 10 MG/ML
40 INJECTION INTRAMUSCULAR; INTRAVENOUS ONCE
Status: COMPLETED | OUTPATIENT
Start: 2020-06-16 | End: 2020-06-16

## 2020-06-16 RX ORDER — FUROSEMIDE 20 MG
20 TABLET ORAL DAILY
Status: DISCONTINUED | OUTPATIENT
Start: 2020-06-17 | End: 2020-06-17 | Stop reason: HOSPADM

## 2020-06-16 RX ADMIN — Medication 12.5 MG: at 09:08

## 2020-06-16 RX ADMIN — CETIRIZINE HYDROCHLORIDE 10 MG: 10 TABLET, FILM COATED ORAL at 09:08

## 2020-06-16 RX ADMIN — Medication 5 ML: at 06:50

## 2020-06-16 RX ADMIN — Medication 5 ML: at 21:48

## 2020-06-16 RX ADMIN — LISINOPRIL 2.5 MG: 2.5 TABLET ORAL at 09:08

## 2020-06-16 RX ADMIN — ACYCLOVIR 400 MG: 400 TABLET ORAL at 09:08

## 2020-06-16 RX ADMIN — FUROSEMIDE 40 MG: 10 INJECTION, SOLUTION INTRAMUSCULAR; INTRAVENOUS at 06:49

## 2020-06-16 ASSESSMENT — ACTIVITIES OF DAILY LIVING (ADL)
ADLS_ACUITY_SCORE: 14
ADLS_ACUITY_SCORE: 13

## 2020-06-16 NOTE — PLAN OF CARE
A/O x 4, flat affect. VSS on RA. Denied pain. Tele ST (HR low 100's), denied CP . LS clear, no SOB reported. Port to right chest intact, hep locked and blood return noted. Up ad fallon in room, steady gait and denies dizziness. Good PO intake. Possible discharge 6/17. Will continue POC.

## 2020-06-16 NOTE — PROGRESS NOTES
[facility]  Hospitalist Progress Note  Brody Chandler MD 06/16/2020    Reason for Stay (Diagnosis): SVT, cardiomyopathy         Assessment and Plan:      Summary of Stay: Kassie Ramirez is a 50 year old female with a PMHx diffuse large B Cell  lymphoma ([facility] Oncology Dr. Castro; chemotherapy methotrexate completed 4/8/20; PET scan 6/16), SVT, and anxiety who presents to [facility] ED on 6/14/20 with palpitations.      On arrival to the emergency room EKG confirmed supraventricular tachycardia.  Patient was given adenosine 6 mg once a.  She converted back to sinus tachycardia.  Vital signs showed a temp of 98.1.  Heart rate of 170.  Blood pressure 138/114.  Respiratory rate of 20.  Pulse ox 90% on room air.  Laboratory work-up significant for creatinine of 1.09, proBNP 1000 1431, troponin 0 0.117, and TSH of 3.40.  WBC 2.8.  D-dimer 0.5.  TSH 3.4.  Chest x-ray heart size and pulmonary vasculature were normal.  Point-of-care echo completed in the ED showed slightly reduced left ventricular function.  Admission was requested for additional cardiac monitoring.     Early 6/15 AM pt converted back into SVT.  Initial attempts at vagal maneuvers failed to convert her back to NSR.  However on cardiology evaluation and recurrent attempt at vagal maneuver she did convert to sinus rhythm.  TTE performed and shows reduced LV ftn with EF of 25% (new dx of CM), which led to angiogram showing no CAD.  Cardiology has added Lasix and lisinopril in light of her CM with plans for probable discharge tomorrow     Supraventricular tachycardia   Follows with [facility] (Dr. Finn). Initial evaluation 9/18/2019. She converted into NSR with adenosine. Started on low-dose carvedilol. Echocardiogram showed left ventricular EF 55-60% without regional wall motion abnormalities, no significant diastolic dysfunction, normal right ventricular function and size, and no valvular pathology. 2 week heart  monitor without evidence of SVT. Rare premature PACs/PVCs. Cardiac MRI 10/09/2019 showed normal left ventricular ejection fraction EF 59% without regional wall motion abnormalities. Normal right ventricular function and size, no significant valvular disease. Late gadolinium was negative for myocardial infarction, fibrosis or infiltrative disease. TSH 3.40 on 6/14. Given adenosine 6 mg once in ED.   -Cardiology consult - appreciate assistance   - TTE shows reduced EF at 25-30%  -metoprolol started per cardiology  -Tele monitoring     Cardiomyopathy with acute systolic heart failure exacerbation (new dx)  - suspected to be secondary to tachycardia from SVT vs chemnotherapy  - continue b-blocker, lisinopril, diuretic     Elevated troponin  Hx of elevated troponin with tachycardia induced demand ischemia. Troponin on admission 0.117  -cardiac cath this admission shows no CAD     Large B-Cell Lymphoma  -Follows with [facility] [place] [facility] (Dr. Castro). Completed chemotherapy 4/8/20. Scheduled for PET scan 6/16/20. Stable at this time. Complicates cares.      Acute kidney injury  -Cr baseline 0.7-0.8. Increased to 1.09. Likely due to diuretic dosing. Hold additional diuretics. Monitor renal function.      Leukopenia  -Recently completed methotrexate therapy. No signs of infection. WBC 2.8. Monitor.      Herpes prophylaxis  -Acyclovir 400 mg BID     Seasonal allergies  -Cetirizine 10 mg daily; loratadine 10 mg daily     GERD  - Famotidine 20 mg twice daily     Anxiety  -Lorazepam 0.5 to 1 mg every 6 hours as needed  -Sertraline 50 mg daily     FEN: Oral hydration; monitor; regular  Activity: As tolerated  DVT Prophylaxis: SCDs  Code Status: Full Code  DISPO: Tomorrow likely; when OK with cardiology        Interval History (Subjective):      No complaints.  No sob.  Overall feels well                  Physical Exam:      Last Vital Signs:  BP 91/68 (BP Location: Left arm)   Pulse 80   Temp 97  F (36.1  C) (Oral)   Resp 16    "Ht 1.676 m (5' 6\")   Wt 70.4 kg (155 lb 3.2 oz)   SpO2 98%   BMI 25.05 kg/m        Intake/Output Summary (Last 24 hours) at 6/16/2020 1238  Last data filed at 6/15/2020 1820  Gross per 24 hour   Intake 240 ml   Output --   Net 240 ml       Constitutional: Awake, alert, cooperative, no apparent distress   Respiratory: Clear to auscultation bilaterally, no crackles or wheezing   Cardiovascular: Regular rate and rhythm, normal S1 and S2, and no murmur noted   Abdomen: Normal bowel sounds, soft, non-distended, non-tender   Skin: No rashes, no cyanosis, dry to touch   Neuro: Alert and oriented x3, no weakness, numbness, memory loss   Extremities: No edema, normal range of motion   Other(s):        All other systems: Negative          Medications:      All current medications were reviewed with changes reflected in problem list.         Data:      All new lab and imaging data was reviewed.   Labs:  Recent Labs   Lab 06/14/20  1807   WBC 2.8*   HGB 12.6   HCT 36.9   MCV 90         Imaging:   Recent Results (from the past 24 hour(s))   Cardiac Catheterization    Narrative    1. Normal Coronary Arteries  2. Elevated left heart filling pressure (LVEDP 29 mmHg)      "

## 2020-06-16 NOTE — PLAN OF CARE
"BP 91/62   Pulse 95   Temp 98.4  F (36.9  C) (Oral)   Resp 16   Ht 1.676 m (5' 6\")   Wt 70.4 kg (155 lb 3.2 oz)   SpO2 98%   BMI 25.05 kg/m      -VSS  -Got back from angio  -TR band WDL  -Pleasant  -Soft BP's but this seems to be baseline  -independent  "

## 2020-06-16 NOTE — TELEPHONE ENCOUNTER
Received CORE Consult. Pt is established with Zayda Almanzar CNP  and Dr Finn. This her is her 4th HF related admission this year. I did explain the CORE program to her. She would like more information before deciding if she wants to enroll.  Due to COVID I am unable to go to hospital. I did speak with floor RN, Dahlia, who has pt today. She will bring pt CORE folder along with our number so pt can call us.   Olga Glavez RN 12:08 PM 06/16/20

## 2020-06-16 NOTE — PROGRESS NOTES
[facility]    Cardiology Progress Note    Date of Service (when I saw the patient): 06/16/2020     Assessment & Plan   Kassie Ramirez is a 50 year old female who was admitted on 6/14/2020 after she presented with palpitations and was found to be in SVT. Past medical history of diffuse large B-cell lymphoma (treated by Dr. Castro with the [facility], on chemotherapy with R-CHOP/HD methotrexate), paroxysmal SVT and anxiety. She has a family hx of premature CAD. SVT broke with adenosine in the ER. She did have recurrent SVT on the floor which broke with vagal maneuvers. A TTE showed moderate to severely reduced LV function with an EF of 25-30% (EF was normal in 2/2020) and moderate MR. She did have a cardiac cath which showed normal coronary arteries and an elevated LVEDP at 29mmHg.    1. PSVT, no recurrence since ~8:25am on 6/15  -Continue low dose BB  -Will place an outpt EP consult    2. New non-ischemic CM with LVEF 25-30%. Likely tachy vs chemo induced or a combination of the two.   -Starting guideline directed therapy with Toprol XL 12.5mg daily (didn't use coreg due to low BP) and low dose lisinopril 2.5mg daily. Her baseline BP runs low so would base dosing/holding more on symptoms than her BP readings.  -Diuresis-Received 40mg IV lasix this AM and will start PO tomorrow. I/O and daily weights  -CORE follow up    3. Normal coronary arteries    4. B-Cell Lymphoma on chemotherapy with R-CHOP/HD methotrexate  -Will need to discuss with her oncologist if any modifications to her regimen are recommended. Dr Alcala did send Dr Castro an update message.     Probably home tomorrow if she tolerates new meds.     Follow up: awaiting CORE follow up appt. I did arrange an EP follow up on 6/22 with Dr Horan @2:45. I cancelled her outpt follow up echo that was schedule for later this week and appt with Dr Finn (arranged after a prior hospitalization). Future follow up with Dr Finn can be  arranged/directed by CORE or EP.    Stefanie López PA-C      Interval History   Feeling well, no complaints. Pt states she gets palpitations/tachy at least once a month since sept. Typically she is able to break with vagal maneuvers.    Tele shows SR/ST    Physical Exam   Temp: 97.3  F (36.3  C) Temp src: Oral BP: 100/68 Pulse: 99 Heart Rate: 97 Resp: 18 SpO2: 96 % O2 Device: None (Room air) Oxygen Delivery: 2 LPM  Vitals:    06/14/20 2203 06/15/20 0631   Weight: 71 kg (156 lb 9.6 oz) 70.4 kg (155 lb 3.2 oz)     Vital Signs with Ranges  Temp:  [97.3  F (36.3  C)-98.6  F (37  C)] 97.3  F (36.3  C)  Pulse:  [95-99] 99  Heart Rate:  [] 97  Resp:  [16-18] 18  BP: ()/(56-69) 100/68  SpO2:  [92 %-98 %] 96 %  I/O last 3 completed shifts:  In: 240 [P.O.:240]  Out: -     Constitutional     alert and oriented, in no acute distress.     Skin     warm and dry to touch    Lungs  clear to auscultation     Cardiac  regular rhythm, mild tachy, S1 normal, S2 normal, No S3 or S4, no murmurs, no rubs    Extremities and Back     no clubbing, cyanosis. No edema observed.        Neurological     no gross motor deficits noted, affect appropriate, oriented to time, person and place.      Medications       acyclovir  400 mg Oral Daily     cetirizine  10 mg Oral Daily     furosemide  20 mg Oral Daily     heparin  5 mL Intracatheter Q28 Days     heparin lock flush  5-10 mL Intracatheter Q24H     lisinopril  2.5 mg Oral Daily     metoprolol succinate ER  12.5 mg Oral Daily     sodium chloride (PF)  3 mL Intracatheter Q8H       Data   Most Recent 3 CBC's:  Recent Labs   Lab Test 06/14/20  1807 05/21/20  1307 04/23/20  0942   WBC 2.8* 4.8 2.8*   HGB 12.6 11.7 9.7*   MCV 90 93 97    176 137*     Most Recent 3 BMP's:  Recent Labs   Lab Test 06/15/20  0845 06/14/20  1807 05/21/20  1307    143 141   POTASSIUM 3.8 3.4 4.0   CHLORIDE 106 108 109   CO2 26 27 29   BUN 21 25 19   CR 0.98 1.09* 0.87   ANIONGAP 7 8 3   AFSHAN  9.3 9.0 9.6   * 98 90     Most Recent 3 Troponin's:  Recent Labs   Lab Test 06/15/20  0545 06/15/20  0200 06/14/20  1807   TROPI 0.114* 0.139* 0.117*     Most Recent 3 BNP's:  Recent Labs   Lab Test 06/14/20  1807 02/23/20  0942 12/15/19  0656   NTBNPI 1,431* 2,706* 92     Most Recent TSH and T4:  Recent Labs   Lab Test 06/14/20  1807   TSH 3.40     Echo:  The visual ejection fraction is estimated at 25-30%.  Left ventricular systolic function is moderate to severely reduced.  There has been a significant drop in left ventricular sytstolic function since  2/23/2020.  The right ventricular systolic function is mild to moderately reduced.  There is moderate (2+) mitral regurgitation.    Cath: Normal cors. LEVDP 29mmHg.

## 2020-06-16 NOTE — CONSULTS
[facility] Clinic evaluation referral received from Dr Alcala.  Patient is not currently established in the CORE Clinic. Patient is followed by Dr Finn and Zayda Almanzar CNP.   Consulting Cardiologist is Dr Alcala.     No appointment as Kassie would like more information about [facility] nd to think about. I spoke to KING Villagomez who is Kassie's day shift nurse for 6/16. Dahlia will give Kassie CORE folder along with our card and phone number,. Kassie will call [facility] after she decides.     Olga Galvez RN  CORE Clinic Care Coordinator  [facility]  [phone]      C.O.R.E. Clinic: Cardiomyopathy, Optimization, Rehabilitation, Education   The C.O.R.E. Clinic is a heart failure specialty clinic within the [facility] where you will work with your cardiologist, nurse practitioners, physician assistants and registered nurses who specialize in heart failure care. They are dedicated to helping patients with heart failure to carefully adjust medications, receive education, and learn who and when to call if symptoms develop. They specialize in helping you better understand your condition, slow the progression of your disease, improve the length and quality of your life, help you detect future heart problems before they become life threatening, and avoid hospitalizations.

## 2020-06-16 NOTE — PLAN OF CARE
"Please see flowsheets for detailed vital signs and assessments.   Neuro: A&O  Vital Signs: stable ex soft BPs, range 90s-100s systolically  Pain: pt reported generalized \"soreness\" in RUE, declined intervention and rested quietly  O2: mid 90s RA  Tele: SR  Respiratory: LS WDL  GI: WDL  : voiding w/o difficulty  Skin: Angio site C/D/I with CMS intact, pulses WNL; TR band off at 2355  Activity: independent, reinforced education to not use RUE  IVF: heparin locked port to right chest wall  Notable labs: K 3.8, Mag 2.4, EF 25-30%  Protocols: High K & Mag; Mag WNL, K replaced 6/15 AM  Consults: cardiology  Plan: telemetry monitoring, beta blocker, possible PET scan outpatient, kidney function monitoring. Continue with plan of care.   Discharge: possible discharge today     "

## 2020-06-16 NOTE — PLAN OF CARE
"MD updated: Pt is on k protocol, replace under 4.1. Lab draw on 6/15 0845 showed K=3.8. I cannot find any replacement given and no redraw was ordered. would you like me to draw K level or treat based on yesterday's lab? Or are you ok with this value? Thanks!    MD response: called writer and stated he is ok with 3.8     BP 91/68 (BP Location: Left arm)  Pulse 80  Temp 97  F (36.1  C) (Oral)  Resp 16  Ht 1.676 m (5' 6\")  Wt 70.4 kg (155 lb 3.2 oz)  SpO2 98%  BMI 25.05 kg/m    Iso: No active isolations  Diet: Diet  Advance Diet as Tolerated: Regular Diet Adult  Mental Status: A/OX4  O2: 96% RA  Mg+: 2.4 (06/15 0845)  CR: 0.98 (06/15 0845)   K: 3.8 (06/15 0845)  PLT: 163 (06/14 1807)  HGB: 12.6 (06/14 1807)  BS: 122 (06/15 0845)    Pt given information on CORE clinic per request. Port a cath hep locked. Angio site WDL, non-painful. Tele: ST   "

## 2020-06-17 ENCOUNTER — CARE COORDINATION (OUTPATIENT)
Dept: CARDIOLOGY | Facility: CLINIC | Age: 50
End: 2020-06-17

## 2020-06-17 VITALS
TEMPERATURE: 97.3 F | RESPIRATION RATE: 17 BRPM | HEIGHT: 66 IN | OXYGEN SATURATION: 97 % | HEART RATE: 80 BPM | SYSTOLIC BLOOD PRESSURE: 92 MMHG | BODY MASS INDEX: 24.54 KG/M2 | DIASTOLIC BLOOD PRESSURE: 64 MMHG | WEIGHT: 152.7 LBS

## 2020-06-17 DIAGNOSIS — I42.9 SECONDARY CARDIOMYOPATHY (H): Primary | ICD-10-CM

## 2020-06-17 LAB
ANION GAP SERPL CALCULATED.3IONS-SCNC: 6 MMOL/L (ref 3–14)
BUN SERPL-MCNC: 24 MG/DL (ref 7–30)
CALCIUM SERPL-MCNC: 9.5 MG/DL (ref 8.5–10.1)
CHLORIDE SERPL-SCNC: 106 MMOL/L (ref 94–109)
CO2 SERPL-SCNC: 29 MMOL/L (ref 20–32)
CREAT SERPL-MCNC: 0.95 MG/DL (ref 0.52–1.04)
GFR SERPL CREATININE-BSD FRML MDRD: 69 ML/MIN/{1.73_M2}
GLUCOSE SERPL-MCNC: 101 MG/DL (ref 70–99)
POTASSIUM SERPL-SCNC: 3.7 MMOL/L (ref 3.4–5.3)
SODIUM SERPL-SCNC: 141 MMOL/L (ref 133–144)

## 2020-06-17 PROCEDURE — 99239 HOSP IP/OBS DSCHRG MGMT >30: CPT | Performed by: INTERNAL MEDICINE

## 2020-06-17 PROCEDURE — 99232 SBSQ HOSP IP/OBS MODERATE 35: CPT | Performed by: INTERNAL MEDICINE

## 2020-06-17 PROCEDURE — 25000132 ZZH RX MED GY IP 250 OP 250 PS 637: Performed by: INTERNAL MEDICINE

## 2020-06-17 PROCEDURE — 25000132 ZZH RX MED GY IP 250 OP 250 PS 637: Performed by: PHYSICIAN ASSISTANT

## 2020-06-17 PROCEDURE — 80048 BASIC METABOLIC PNL TOTAL CA: CPT | Performed by: INTERNAL MEDICINE

## 2020-06-17 PROCEDURE — 25000128 H RX IP 250 OP 636: Performed by: INTERNAL MEDICINE

## 2020-06-17 RX ORDER — LISINOPRIL 2.5 MG/1
2.5 TABLET ORAL DAILY
Qty: 30 TABLET | Refills: 1 | Status: SHIPPED | OUTPATIENT
Start: 2020-06-17 | End: 2020-08-13

## 2020-06-17 RX ORDER — METOPROLOL SUCCINATE 25 MG/1
12.5 TABLET, EXTENDED RELEASE ORAL DAILY
Qty: 30 TABLET | Refills: 1 | Status: ON HOLD | OUTPATIENT
Start: 2020-06-17 | End: 2020-06-23

## 2020-06-17 RX ORDER — FUROSEMIDE 20 MG
20 TABLET ORAL DAILY
Qty: 30 TABLET | Refills: 1 | Status: SHIPPED | OUTPATIENT
Start: 2020-06-17 | End: 2020-08-13

## 2020-06-17 RX ADMIN — LISINOPRIL 2.5 MG: 2.5 TABLET ORAL at 07:32

## 2020-06-17 RX ADMIN — POTASSIUM CHLORIDE 20 MEQ: 1500 TABLET, EXTENDED RELEASE ORAL at 06:18

## 2020-06-17 RX ADMIN — Medication 12.5 MG: at 07:32

## 2020-06-17 RX ADMIN — ACYCLOVIR 400 MG: 400 TABLET ORAL at 07:32

## 2020-06-17 RX ADMIN — FUROSEMIDE 20 MG: 20 TABLET ORAL at 07:33

## 2020-06-17 RX ADMIN — HEPARIN 5 ML: 100 SYRINGE at 13:31

## 2020-06-17 RX ADMIN — Medication 5 ML: at 05:03

## 2020-06-17 RX ADMIN — CETIRIZINE HYDROCHLORIDE 10 MG: 10 TABLET, FILM COATED ORAL at 07:33

## 2020-06-17 ASSESSMENT — ACTIVITIES OF DAILY LIVING (ADL)
ADLS_ACUITY_SCORE: 14
ADLS_ACUITY_SCORE: 13

## 2020-06-17 ASSESSMENT — MIFFLIN-ST. JEOR: SCORE: 1329.39

## 2020-06-17 NOTE — PLAN OF CARE
VS on the lowe side, on BP meds and metoprolol for SVT hx. Tele SR 80s-90s. Cardiology following. EF 25%-30%, Angio done on the 15th, no intervention. On PO Lasix.  Plan for discharge today. Went over discharge paperwork with pt. Questions asked and answered Verbalized understanding. Pt was given discharge paperwork and meds. Pt waiting for ride. Her  plan to be her after 1400. Pt discharged at 1428 per ambulatory with all belongings,paperwork and meds.       Heart Failure Care Pathway  GOALS TO BE MET BEFORE DISCHARGE:    1. Decrease congestion and/or edema with diuretic therapy to achieve near      optimal volume status.            Dyspnea improved:  NA            Edema improved:     NA        Net I/O and Weights since admission:          05/18 1500 - 06/17 1459  In: 360 [P.O.:360]  Out: -   Net: 360            Vitals:    06/14/20 2203 06/15/20 0631   Weight: 71 kg (156 lb 9.6 oz) 70.4 kg (155 lb 3.2 oz)       2.  O2 sats > 92% on RA or at prior home O2 therapy level.          Current oxygenation status:       SpO2: 98 %         O2 Device: None (Room air),            Able to wean O2 this shift to keep sats > 92%: NA       Does patient use Home O2? No    3.  Tolerates ambulation and mobility near baseline: Yes        How many times did the patient ambulate with nursing staff this shift? 1    Please review the Heart Failure Care Pathway for additional HF goal outcomes.    Ciarra Espinoza RN RN  6/17/2020

## 2020-06-17 NOTE — PLAN OF CARE
VSS w/ soft pressures. A&Ox4. IND in room. Denies pain. High K protocol w/ AM check 3.7, 20mEq oral given for replacement. Tele: . Port Hep locked. Possible discharge today. Will cont poc.

## 2020-06-17 NOTE — PROGRESS NOTES
BMP, BNP ordered per protocol to be drawn at [facility] prior to her CORE Enrollment virtual visit.    Future Appointments   Date Time Provider Department Center   6/22/2020 10:00 AM [facility] [place] [facility]   6/22/2020  2:45 PM Deborah Horan MD [facility] CL[facility]   6/23/2020 12:30 PM RH LAB DRAW 1 RHCIRS [place] [facility]   6/23/2020  2:00 PM RHPET1 R[facility]ETC [place] [facility]   7/7/2020  1:50 PM Asmita Vo APRN CNP [facility] PSA CLIN       Lexi Mtz RN BSN   [facility] [place], [place]  C.O.R.E. Clinic Care Coordinator  06/17/20, 10:39 AM

## 2020-06-17 NOTE — PROGRESS NOTES
Cardiology Progress Note  ALEX Zuleta     Follows with dr. Finn       Assessment and Plan:   Admit (6/14)with acute onset of palpitations/fatigue.  Few day history of LORENZ/orthopnea and took lasix  Evidence of SVT in ED,  conversion with Adenosine with recurrence SVT and conversion with vagal manuver   New evidence of nonischemic CM, mild HF symptoms,  type II MI (trop peak 0.13)    PMH:  PSVT, Diffuse large B-cell lymphoma    Paroxysmal SVT  -initial episode 2019  -conversion in ED with Adenosine   -repeat SVT and conversion with Vagal maneuvers (6/15/20)  -no episodes past 24hours  -f/u arranged with Dr. Horan (6/22/20) at 14:45 pm with BMP    New Non-ischemic CM/ Bi-Ventricular dysfunction  -ECHO (6/14/20)  LVEF 25-30%,LVIDd 4.8 cm, mild/mod RV dysfunction, 2+MR  -LVEF 55% (2/2020)  -cMRI (9/2019) LVEF 59%, no MI, fibrosis, or infiltrative disease  -cath (6/15/20)  Normal coronaries  -etiology unclear:  Possible  Tachy-mediated vs chemo-induced  -low dose Metoprolol XR and Lisinopril started  -no spironolactone due to soft SBPs  Hemodynamically stable overnight  -weight down 1 lb  (155 lbs)  -low salt diet / daily weights  -placed on lasix 20 mg daily with KCL supplement at discharge  -CORE enrollment (7/7/20) at 13:50 pm with Cirilo Vo, NP / follow up with dr. Finn in 1 month  -will need repeat ECHO in 3 months.  May need ICD    Diffuse B-cell Lymphoma  -completed 6 cycles of  R-CHOP and HD methotrexate  -awaiting repeat PET  -WBC 2.8 / hgb 12/                Interval History:   No CP, SOB, orthopnea, or palpitations  Good appetite                Medications:       acyclovir  400 mg Oral Daily     cetirizine  10 mg Oral Daily     furosemide  20 mg Oral Daily     heparin  5 mL Intracatheter Q28 Days     heparin lock flush  5-10 mL Intracatheter Q24H     lisinopril  2.5 mg Oral Daily     metoprolol succinate ER  12.5 mg Oral Daily     sodium chloride (PF)  3 mL Intracatheter Q8H            Physical  "Exam:   Blood pressure 92/66, pulse 80, temperature 97.3  F (36.3  C), temperature source Oral, resp. rate 18, height 1.676 m (5' 6\"), weight 70.4 kg (155 lb 3.2 oz), SpO2 98 %, not currently breastfeeding.  Wt Readings from Last 3 Encounters:   06/15/20 70.4 kg (155 lb 3.2 oz)   04/11/20 69.2 kg (152 lb 8 oz)   03/25/20 69 kg (152 lb 3.2 oz)     I/O last 3 completed shifts:  In: 120 [P.O.:120]  Out: -     CONST:  Alert and oriented  NAD  LUNGS:  CTA bilaterally  CARDIO:  RRR, S1, S2  No murmurs  ABD:  Soft  +BS  EXT:  No edema  1+DP bilaterally           Data:   TELE:  SR/ST    CBC  Recent Labs   Lab 06/14/20  1807   WBC 2.8*   HGB 12.6          BMP  Recent Labs   Lab 06/17/20  0500 06/15/20  0845 06/14/20  1807    139 143   POTASSIUM 3.7 3.8 3.4   CHLORIDE 106 106 108   AFSHAN 9.5 9.3 9.0   CO2 29 26 27   BUN 24 21 25   CR 0.95 0.98 1.09*   * 122* 98     Recent Labs   Lab Test 07/27/18  0921 09/24/15  1033 09/16/14  1034   CHOL 233* 206* 173   HDL 38* 55 49*   LDL Cannot estimate LDL when triglyceride exceeds 400 mg/dL  116* 120 98   TRIG 523* 123 129   CHOLHDLRATIO  --  3.6 3.5       TROP  Lab Results   Component Value Date    TROPI 0.114 (H) 06/15/2020    TROPI 0.139 (HH) 06/15/2020    TROPI 0.117 (H) 06/14/2020    TROPI 0.045 02/23/2020    TROPI 0.068 (H) 02/23/2020       BNP  Recent Labs   Lab 06/14/20  1807   NTBNPI 1,431*           "

## 2020-06-17 NOTE — PROGRESS NOTES
Pt seen and examined.  No complaints.    Although BP is soft (SBP 90s) no symptoms from soft BP.  Has been up walking without LH sx    Await cards input this AM, but anticipate discharge home today

## 2020-06-17 NOTE — DISCHARGE SUMMARY
Admit Date:     06/14/2020   Discharge Date:     06/17/2020      PRINCIPAL FINAL DIAGNOSES:   1.  Recurrent supraventricular tachycardia.   2.  New diagnosis of cardiomyopathy this admission with acute systolic heart failure exacerbation.  Suspect cardiomyopathy either due to tachycardia mediated from recurrent SVT versus possible chemotherapy-induced related to treatment for malignancy.   3.  Minimal troponin elevation this admission, suspect due to demand ischemia in the setting of supraventricular tachycardia.  Cardiac catheterization this admission showed no evidence of coronary artery disease.      PAST MEDICAL HISTORY:   1.  Large B-cell lymphoma.  The patient follows with Dr. Castro at [facility].   2.  Seasonal allergies.   3.  Reflux disease.   4.  Anxiety.   5.  Recurrent supraventricular tachycardia.       PRINCIPAL PROCEDURES THIS ADMISSION:   1.  Cardiology consultation.   2.  Echocardiogram, which showed ejection fraction 25% to 30% with severely reduced left ventricular systolic function and 2+ mitral regurgitation.   3.  Cardiac catheterization, which showed normal coronary arteries.  No intervention was done.  Elevated left heart filling pressure noted at 29 mmHg.   4.  Chest x-ray.      REASON FOR ADMISSION:  Please see dictated history and physical.  In brief, Ms. Ramirez is a pleasant 50-year-old female with the above medical history who presented to the hospital with palpitations.  On arrival to the ER, she was noted to be in supraventricular tachycardia with a heart rate of 170 beats per minute.  The patient was provided adenosine in the ER, which converted her to sinus rhythm.  She was subsequently admitted to the Hospitalist Service.      HOSPITAL COURSE:  Recurrent supraventricular tachycardia:  Following her conversion with adenosine in the ER, the patient again had an episode of supraventricular tachycardia while hospitalized.  This converted with vagal maneuvers, which  traditionally worked for the patient in the past when she has events like this.  She has a long history of recurrent supraventricular tachycardia previously.  Cardiology was consulted to assist with her care.  Echocardiogram was performed, which showed a new diagnosis of cardiomyopathy with ejection fraction near 30% and findings of acute systolic heart failure exacerbation.  The patient responded well to gentle diuresis.  Cardiology added several new medications for the patient as detailed below in light of her new diagnosis of cardiomyopathy.  It is suspected that her cardiomyopathy is either due to tachycardia mediated from recurrent SVT versus chemotherapy mediated related to treatment of her lymphoma.  The patient was encouraged to follow-up with her oncologist to discuss chemotherapeutics to see if they potentially could have an effect on ventricular function.      The patient will follow-up with Cardiology as an outpatient including Electrophysiology for consideration of possible ablation.  They also recommend a repeat echocardiogram in 3 months to reassess left ventricular function, hoping to see improvement at that time.      The patient was discharged home today.      DISCHARGE MEDICATIONS:   1.  Acyclovir 400 mg daily.   2.  Zyrtec 10 mg daily.   3.  Lasix 20 mg daily, new medication.   4.  Lisinopril 2.5 mg daily, new medication.   5.  Ativan 0.5-1 mg every 6 hours as needed.   6.  Metoprolol extended release 12.5 mg daily, new medication.   7.  Potassium chloride 40 mEq daily.  The patient was taking this prior to admission.      FOLLOWUP INSTRUCTIONS:   1.  See primary MD in 1 week after discharge.  Check labs and electrolytes at that visit after medication changes made this admission.   2.  Recommend repeat echocardiogram in 3 months to reassess left ventricular function.   3.  Recommend a low-salt diet, 2-gram sodium a day.   4.  Weigh yourself and record the values every day.  If you gain more than  2 pounds in a day or 5 pounds in a week, contact your primary provider or Cardiology Clinic, as this can be a sign of fluid retention.      Followup for Electrophysiology and Cardiac Clinic were made on discharge.      I examined the patient on the day of discharge.  I spent greater than 30 minutes helping to coordinate this patient's discharge today.         YRIS CAZARES MD             D: 2020   T: 2020   MT: RACHANA      Name:     BABAR VARGHESE   MRN:      [id number]        Account:        [id number]   :      1970           Admit Date:     2020                                  Discharge Date: 2020      Document: [id number]       cc: Mary Alice Colón PA-C

## 2020-06-18 ENCOUNTER — CARE COORDINATION (OUTPATIENT)
Dept: CARDIOLOGY | Facility: CLINIC | Age: 50
End: 2020-06-18

## 2020-06-19 ENCOUNTER — APPOINTMENT (OUTPATIENT)
Dept: GENERAL RADIOLOGY | Facility: CLINIC | Age: 50
End: 2020-06-19
Attending: PHYSICIAN ASSISTANT
Payer: COMMERCIAL

## 2020-06-19 ENCOUNTER — HOSPITAL ENCOUNTER (OUTPATIENT)
Facility: CLINIC | Age: 50
Setting detail: OBSERVATION
Discharge: OTHER INSTITUTION | End: 2020-06-21
Attending: PHYSICIAN ASSISTANT | Admitting: INTERNAL MEDICINE
Payer: COMMERCIAL

## 2020-06-19 DIAGNOSIS — J18.9 COMMUNITY ACQUIRED PNEUMONIA OF RIGHT LOWER LOBE OF LUNG: ICD-10-CM

## 2020-06-19 DIAGNOSIS — I47.10 SVT (SUPRAVENTRICULAR TACHYCARDIA) (H): ICD-10-CM

## 2020-06-19 DIAGNOSIS — R00.0 SINUS TACHYCARDIA: ICD-10-CM

## 2020-06-19 LAB
ANION GAP SERPL CALCULATED.3IONS-SCNC: 8 MMOL/L (ref 3–14)
BASOPHILS # BLD AUTO: 0.1 10E9/L (ref 0–0.2)
BASOPHILS NFR BLD AUTO: 1.3 %
BUN SERPL-MCNC: 26 MG/DL (ref 7–30)
CALCIUM SERPL-MCNC: 9.2 MG/DL (ref 8.5–10.1)
CHLORIDE SERPL-SCNC: 104 MMOL/L (ref 94–109)
CO2 SERPL-SCNC: 27 MMOL/L (ref 20–32)
CREAT SERPL-MCNC: 0.97 MG/DL (ref 0.52–1.04)
DIFFERENTIAL METHOD BLD: ABNORMAL
ELLIPTOCYTES BLD QL SMEAR: SLIGHT
EOSINOPHIL # BLD AUTO: 0.3 10E9/L (ref 0–0.7)
EOSINOPHIL NFR BLD AUTO: 7.6 %
ERYTHROCYTE [DISTWIDTH] IN BLOOD BY AUTOMATED COUNT: 12 % (ref 10–15)
GFR SERPL CREATININE-BSD FRML MDRD: 68 ML/MIN/{1.73_M2}
GLUCOSE SERPL-MCNC: 108 MG/DL (ref 70–99)
HCT VFR BLD AUTO: 37.6 % (ref 35–47)
HGB BLD-MCNC: 12.5 G/DL (ref 11.7–15.7)
IMM GRANULOCYTES # BLD: 0 10E9/L (ref 0–0.4)
IMM GRANULOCYTES NFR BLD: 0.3 %
LACTATE BLD-SCNC: 1 MMOL/L (ref 0.7–2)
LYMPHOCYTES # BLD AUTO: 1.1 10E9/L (ref 0.8–5.3)
LYMPHOCYTES NFR BLD AUTO: 29.4 %
MAGNESIUM SERPL-MCNC: 2.5 MG/DL (ref 1.6–2.3)
MCH RBC QN AUTO: 30.2 PG (ref 26.5–33)
MCHC RBC AUTO-ENTMCNC: 33.2 G/DL (ref 31.5–36.5)
MCV RBC AUTO: 91 FL (ref 78–100)
MONOCYTES # BLD AUTO: 0.8 10E9/L (ref 0–1.3)
MONOCYTES NFR BLD AUTO: 20.8 %
NEUTROPHILS # BLD AUTO: 1.6 10E9/L (ref 1.6–8.3)
NEUTROPHILS NFR BLD AUTO: 40.6 %
NRBC # BLD AUTO: 0 10*3/UL
NRBC BLD AUTO-RTO: 0 /100
NT-PROBNP SERPL-MCNC: 1513 PG/ML (ref 0–900)
PLATELET # BLD AUTO: 210 10E9/L (ref 150–450)
PLATELET # BLD EST: ABNORMAL 10*3/UL
POTASSIUM SERPL-SCNC: 4 MMOL/L (ref 3.4–5.3)
RBC # BLD AUTO: 4.14 10E12/L (ref 3.8–5.2)
SARS-COV-2 PCR COMMENT: NORMAL
SARS-COV-2 RNA SPEC QL NAA+PROBE: NEGATIVE
SARS-COV-2 RNA SPEC QL NAA+PROBE: NORMAL
SODIUM SERPL-SCNC: 139 MMOL/L (ref 133–144)
SPECIMEN SOURCE: NORMAL
SPECIMEN SOURCE: NORMAL
TROPONIN I SERPL-MCNC: 0.09 UG/L (ref 0–0.04)
TROPONIN I SERPL-MCNC: 0.11 UG/L (ref 0–0.04)
WBC # BLD AUTO: 3.8 10E9/L (ref 4–11)

## 2020-06-19 PROCEDURE — 96375 TX/PRO/DX INJ NEW DRUG ADDON: CPT

## 2020-06-19 PROCEDURE — U0003 INFECTIOUS AGENT DETECTION BY NUCLEIC ACID (DNA OR RNA); SEVERE ACUTE RESPIRATORY SYNDROME CORONAVIRUS 2 (SARS-COV-2) (CORONAVIRUS DISEASE [COVID-19]), AMPLIFIED PROBE TECHNIQUE, MAKING USE OF HIGH THROUGHPUT TECHNOLOGIES AS DESCRIBED BY CMS-2020-01-R: HCPCS | Performed by: PHYSICIAN ASSISTANT

## 2020-06-19 PROCEDURE — 71046 X-RAY EXAM CHEST 2 VIEWS: CPT

## 2020-06-19 PROCEDURE — 84484 ASSAY OF TROPONIN QUANT: CPT | Performed by: PHYSICIAN ASSISTANT

## 2020-06-19 PROCEDURE — 83735 ASSAY OF MAGNESIUM: CPT | Performed by: PHYSICIAN ASSISTANT

## 2020-06-19 PROCEDURE — 96374 THER/PROPH/DIAG INJ IV PUSH: CPT

## 2020-06-19 PROCEDURE — 25000128 H RX IP 250 OP 636: Performed by: PHYSICIAN ASSISTANT

## 2020-06-19 PROCEDURE — 85025 COMPLETE CBC W/AUTO DIFF WBC: CPT | Performed by: PHYSICIAN ASSISTANT

## 2020-06-19 PROCEDURE — C9803 HOPD COVID-19 SPEC COLLECT: HCPCS

## 2020-06-19 PROCEDURE — 99285 EMERGENCY DEPT VISIT HI MDM: CPT | Mod: 25

## 2020-06-19 PROCEDURE — G0378 HOSPITAL OBSERVATION PER HR: HCPCS

## 2020-06-19 PROCEDURE — 80048 BASIC METABOLIC PNL TOTAL CA: CPT | Performed by: PHYSICIAN ASSISTANT

## 2020-06-19 PROCEDURE — 25800030 ZZH RX IP 258 OP 636: Performed by: INTERNAL MEDICINE

## 2020-06-19 PROCEDURE — 99220 ZZC INITIAL OBSERVATION CARE,LEVL III: CPT | Performed by: INTERNAL MEDICINE

## 2020-06-19 PROCEDURE — 84145 PROCALCITONIN (PCT): CPT | Performed by: PHYSICIAN ASSISTANT

## 2020-06-19 PROCEDURE — 83880 ASSAY OF NATRIURETIC PEPTIDE: CPT | Performed by: PHYSICIAN ASSISTANT

## 2020-06-19 PROCEDURE — 93005 ELECTROCARDIOGRAM TRACING: CPT

## 2020-06-19 PROCEDURE — 83605 ASSAY OF LACTIC ACID: CPT | Performed by: PHYSICIAN ASSISTANT

## 2020-06-19 RX ORDER — SODIUM CHLORIDE 9 MG/ML
INJECTION, SOLUTION INTRAVENOUS CONTINUOUS
Status: DISCONTINUED | OUTPATIENT
Start: 2020-06-19 | End: 2020-06-20

## 2020-06-19 RX ORDER — ADENOSINE 3 MG/ML
6 INJECTION, SOLUTION INTRAVENOUS ONCE
Status: DISCONTINUED | OUTPATIENT
Start: 2020-06-19 | End: 2020-06-19

## 2020-06-19 RX ORDER — METOPROLOL SUCCINATE 25 MG/1
25 TABLET, EXTENDED RELEASE ORAL DAILY
Status: DISCONTINUED | OUTPATIENT
Start: 2020-06-20 | End: 2020-06-21 | Stop reason: HOSPADM

## 2020-06-19 RX ORDER — LIDOCAINE/PRILOCAINE 2.5 %-2.5%
1 CREAM (GRAM) TOPICAL DAILY PRN
Status: DISCONTINUED | OUTPATIENT
Start: 2020-06-19 | End: 2020-06-21 | Stop reason: HOSPADM

## 2020-06-19 RX ORDER — POTASSIUM CHLORIDE 1500 MG/1
40 TABLET, EXTENDED RELEASE ORAL DAILY
Status: DISCONTINUED | OUTPATIENT
Start: 2020-06-20 | End: 2020-06-21 | Stop reason: HOSPADM

## 2020-06-19 RX ORDER — CEFTRIAXONE 1 G/1
1 INJECTION, POWDER, FOR SOLUTION INTRAMUSCULAR; INTRAVENOUS ONCE
Status: COMPLETED | OUTPATIENT
Start: 2020-06-19 | End: 2020-06-19

## 2020-06-19 RX ORDER — ACYCLOVIR 400 MG/1
400 TABLET ORAL DAILY
Status: DISCONTINUED | OUTPATIENT
Start: 2020-06-20 | End: 2020-06-21 | Stop reason: HOSPADM

## 2020-06-19 RX ORDER — CETIRIZINE HYDROCHLORIDE 10 MG/1
10 TABLET ORAL DAILY
Status: DISCONTINUED | OUTPATIENT
Start: 2020-06-20 | End: 2020-06-21 | Stop reason: HOSPADM

## 2020-06-19 RX ORDER — LORAZEPAM 0.5 MG/1
.5-1 TABLET ORAL EVERY 6 HOURS PRN
Status: DISCONTINUED | OUTPATIENT
Start: 2020-06-19 | End: 2020-06-21 | Stop reason: HOSPADM

## 2020-06-19 RX ORDER — ONDANSETRON 2 MG/ML
4 INJECTION INTRAMUSCULAR; INTRAVENOUS EVERY 6 HOURS PRN
Status: DISCONTINUED | OUTPATIENT
Start: 2020-06-19 | End: 2020-06-21 | Stop reason: HOSPADM

## 2020-06-19 RX ORDER — FUROSEMIDE 20 MG
20 TABLET ORAL DAILY
Status: DISCONTINUED | OUTPATIENT
Start: 2020-06-20 | End: 2020-06-21 | Stop reason: HOSPADM

## 2020-06-19 RX ORDER — NALOXONE HYDROCHLORIDE 0.4 MG/ML
.1-.4 INJECTION, SOLUTION INTRAMUSCULAR; INTRAVENOUS; SUBCUTANEOUS
Status: DISCONTINUED | OUTPATIENT
Start: 2020-06-19 | End: 2020-06-21 | Stop reason: HOSPADM

## 2020-06-19 RX ORDER — ONDANSETRON 4 MG/1
4 TABLET, ORALLY DISINTEGRATING ORAL EVERY 6 HOURS PRN
Status: DISCONTINUED | OUTPATIENT
Start: 2020-06-19 | End: 2020-06-21 | Stop reason: HOSPADM

## 2020-06-19 RX ORDER — ACETAMINOPHEN 325 MG/1
650 TABLET ORAL EVERY 4 HOURS PRN
Status: DISCONTINUED | OUTPATIENT
Start: 2020-06-19 | End: 2020-06-21 | Stop reason: HOSPADM

## 2020-06-19 RX ORDER — POLYETHYLENE GLYCOL 3350 17 G/17G
17 POWDER, FOR SOLUTION ORAL DAILY PRN
Status: DISCONTINUED | OUTPATIENT
Start: 2020-06-19 | End: 2020-06-21 | Stop reason: HOSPADM

## 2020-06-19 RX ORDER — LISINOPRIL 2.5 MG/1
2.5 TABLET ORAL DAILY
Status: DISCONTINUED | OUTPATIENT
Start: 2020-06-20 | End: 2020-06-21 | Stop reason: HOSPADM

## 2020-06-19 RX ORDER — ACETAMINOPHEN 650 MG/1
650 SUPPOSITORY RECTAL EVERY 4 HOURS PRN
Status: DISCONTINUED | OUTPATIENT
Start: 2020-06-19 | End: 2020-06-21 | Stop reason: HOSPADM

## 2020-06-19 RX ADMIN — CEFTRIAXONE 1 G: 1 INJECTION, POWDER, FOR SOLUTION INTRAMUSCULAR; INTRAVENOUS at 21:02

## 2020-06-19 RX ADMIN — SODIUM CHLORIDE: 9 INJECTION, SOLUTION INTRAVENOUS at 23:29

## 2020-06-19 ASSESSMENT — ENCOUNTER SYMPTOMS
DIARRHEA: 0
WHEEZING: 0
VOMITING: 0
PALPITATIONS: 1
COUGH: 0
FEVER: 0
FATIGUE: 1
SHORTNESS OF BREATH: 0
DIAPHORESIS: 1
ABDOMINAL PAIN: 0
CHEST TIGHTNESS: 0

## 2020-06-19 NOTE — LETTER
Transition Communication Hand-off for Care Transitions to Next Level of Care Provider    Name: Kassie Ramirez  : 1970  MRN #: [id number]  Primary Care Provider: Mary Alice Colón     Primary Clinic: [address] [place] [id number]     Reason for Hospitalization:  Sinus tachycardia [R00.0]  SVT (supraventricular tachycardia) (H) [I47.1]  Community acquired pneumonia of right lower lobe of lung [J18.9]  Admit Date/Time: 2020  6:41 PM  Discharge Date: 2020  Payor Source: Payor: BCBS / Plan: [place] [place] / Product Type: Indemnity /     Readmission Assessment Measure (KAIN) Risk Score/category: Very High        Reason for Communication Hand-off Referral: Avoidable readmission within 30 days  Other Tachycardia    Discharge Plan: Home       Concern for non-adherence with plan of care: No     Follow-up specialty is recommended: Yes. Follow up with [facility] as scheduled.     Follow-up plan:    Future Appointments   Date Time Provider Department Center   2020  2:45 PM Deborah Horan MD [facility] PSA CLIN   2020 12:30 PM RH LAB DRAW 1 RHCIRS [place] [facility]   2020  2:00 PM RHPET1 RHPETC [place] [facility]   2020  8:30 AM Castro Castro MD R[facility]CRS [place] [facility]   2020  1:50 PM Asmita Vo APRN CNP [facility] PSA CLIN   2020  2:15 PM Malachi Finn MD [facility] PSA CLIN   2020  9:45 AM RSCCECHO2 RHCVCC RS[facility]     Any outstanding tests or procedures:  Yes. PET Scan & ECHO as scheduled.      Key Recommendations:  CTS identifies patient as high risk due to very high KAIN score. Currently admitted for tachycardia, this is her 6th admission in 6 months. Per chart review, pt resides at home with her , Florian. Baseline mobility is independent. She remains up independently.      Her PMH that can effect her KAIN score includes anxiety attacks, diffuse large B-cell lymphoma, general anxiety d/o, HFrEF, menopausal d/o, paroxsmal SVT &  stress urinary incontinence. Her PTA medications that can effect her KAIN score includes Lorazepam.     Cristine Almanzar RN, BSN, CPHN, CM    AVS/Discharge Summary is the source of truth; this is a helpful guide for improved communication of patient story

## 2020-06-19 NOTE — LETTER
Transition Communication Hand-off for Care Transitions to Next Level of Care Provider    Name: Kassie Ramirez  : 1970  MRN #: [id number]    Key Recommendations:  CTS identifies patient as high risk due to very high KAIN score. Currently admitted for tachycardia, this is her 6th admission in 6 months. Per chart review, pt resides at home with her , Florian. Baseline mobility is independent. She remains up independently.      Her PMH that can effect her KAIN score includes anxiety attacks, diffuse large B-cell lymphoma, general anxiety d/o, HFrEF, menopausal d/o, paroxsmal SVT & stress urinary incontinence. Her PTA medications that can effect her KAIN score includes Lorazepam.     Cristine Almanzar RN, BSN, CPHN, CM    AVS/Discharge Summary is the source of truth; this is a helpful guide for improved communication of patient story

## 2020-06-19 NOTE — ED PROVIDER NOTES
History     Chief Complaint:  Tachycardia       HPI   Kassie Ramirez is a 50 year old female with a history of SVT who presents to the emergency department for evaluation of tachycardia. The patient has been tachycardic all day in the 110's, but has gone up to 130. The patient reports sweating. She denies urinary symptoms, fever, chills, shortness of breath, chest pain, cough, or diarrhea.    Allergies:  Cold & Flu [Cold Defense Fighter]  Seasonal Allergies  Sudafed Cold-Cough [Dayquil Liquicaps]  Pseudoephedrine     Medications:    Zovirax  Zyrtec  Lasix  Emla  Zestril  Lorazepam  Toprol  K-dur    Past Medical History:    Anxiety  Diffuse large B-cell lymphoma  HFrEF  Menopausal disorder  Menstrual headache  Paroxysmal SVT  GERTRUDE    Past Surgical History:    Coronary angiogram  Left heart cath  Kit essure  Herniorrhaphy umbilical  IR Chest Port Placement  Sling Transpubo without anterior colporrhaphy    Family History:    Hypertension  Depression  Lipids  Cardiovascular  Circulatory  Diabetes  Heart disease  Cerebrovascular disease  Obesity  Lung cancer  Cone dystrophy  Macular degeneration    Social History:  The patient presents today alone.  Smoking Status: Never Smoker  Smokeless Tobacco: Never Used  Alcohol Use: No  Drug Use: No  PCP: Mary Alice Colón      Review of Systems   Constitutional: Positive for diaphoresis and fatigue. Negative for fever.   Respiratory: Negative for cough, chest tightness, shortness of breath and wheezing.    Cardiovascular: Positive for palpitations. Negative for chest pain.   Gastrointestinal: Negative for abdominal pain, diarrhea and vomiting.   All other systems reviewed and are negative.    Physical Exam     Patient Vitals for the past 24 hrs:   BP Temp Temp src Pulse Heart Rate Resp SpO2 Weight   06/19/20 2028 -- -- -- -- 163 11 99 % --   06/19/20 1900 102/74 -- -- -- -- -- -- --   06/19/20 1847 109/79 98.7  F (37.1  C) Oral 118 -- 18 98 % 69.9 kg (154 lb)     Physical  Exam  Vitals signs and nursing note reviewed.   Constitutional:       General: She is not in acute distress.     Appearance: She is not diaphoretic.   HENT:      Head: Normocephalic and atraumatic.      Mouth/Throat:      Pharynx: No oropharyngeal exudate.   Eyes:      General: No scleral icterus.     Extraocular Movements: Extraocular movements intact.   Cardiovascular:      Rate and Rhythm: Regular rhythm. Tachycardia present.      Pulses: Normal pulses.      Heart sounds: Normal heart sounds.   Pulmonary:      Effort: Pulmonary effort is normal. No respiratory distress.      Breath sounds: Normal breath sounds.   Musculoskeletal:         General: No tenderness.      Right lower leg: Edema present.      Left lower leg: Edema present.   Skin:     General: Skin is warm.      Findings: No rash.   Neurological:      Mental Status: She is alert.       Emergency Department Course     ECG:  Time: 1851  Vent. Rate 112 bpm. IL interval 152. QRS duration 74. QT/QTc 358/488. P-R-T axis 46 -7 58.  Sinus tachycardia  Possible Left atrial enlargement  Borderline ECG    Imaging:  Radiology findings were communicated with the patient who voiced understanding of the findings.    XR Chest 2 Views:  New hazy opacity and interstitial thickening at the right base may represent pneumonia. No pleural effusion or pneumothorax. Normal heart size and mediastinal contour. Stable right IJ Port-A-Cath.  As per radiology.    Laboratory:  Laboratory findings were communicated with the patient who voiced understanding of the findings.    CBC: WBC 3.8 (L) o/w WNL. (HGB 12.5, )   BMP: Glucose 108 (H) o/w WNL (Creatinine 0.97)    Troponin I 0.091 (H)    Nt probnp inpatient (BNP) 1513 (H)    Magnesium 2.5 (H)     Symptomatic COVID-19 Virus (Coronavirus) by PCR Nasopharyngeal swab: pending     Emergency Department Course:    1842 Nursing notes and vitals reviewed.    1843 I performed an exam of the patient as documented above.     1851 EKG  obtained as noted above.     1853 IV was inserted and blood was drawn for laboratory testing, results above.     1953 The patient was sent for a XR Chest 2 Views while in the emergency department, results above.      2036 A nasopharyngeal sample was obtained for laboratory testing as documented above.    2117 I spoke with Loren Hurley of the hospitalist service from [facility] regarding patient's presentation, findings, and plan of care.    Findings and plan explained to the Patient who consents to admission. Discussed the patient with Rasta Hurley, who will admit the patient to a cardiac telemetry bed for further monitoring, evaluation, and treatment.    Impression & Plan     Medical Decision Making:  Kassie Ramirez is a 50 year old female who presents to the emergency department today with palpitations. In the ED she is initially noted to be in sinus tachycardia. She voices no further complaint. Given her recent hospitalization, consideration for underlying metabolic disturbance accounting for her symptoms. Due to the duration of her tachycardia, consideration for ischemic demand prompting troponin evaluation. Also, given recent HFrEF diagnosis and potential for recurrence due to her tachycardia, CXR was obtained. Her diagnostic labs were reassuring with a downtrending troponin at this time. Her bNP is elevated but comparable to her previous labs and she does not appear overtly volume overloaded. No acute metabolic changes or electrolyte disturbances are noted. Her CXR interestingly demonstrates an area concerning for pneumonia. This does not appear most c/w focal area of congestive heart failure at this time. Due to her recent hospitalization, it is possible this is a true pneumonia. We began to discuss outpatient management vs 23 hour observation. She began to have recurrent bouts of SVT however when Adenosine was to be administered the episodes would spontaneously terminate. After discussion, we  planned to initiate CAP treatment, COVID 19 testing obtained as precaution. Plan for 23 hour observation     Discharge Diagnosis:    ICD-10-CM    1. Community acquired pneumonia of right lower lobe of lung  J18.9    2. SVT (supraventricular tachycardia) (H)  I47.1    3. Sinus tachycardia  R00.0      Disposition:  The patient is admitted into the care of Dr. Chandler.     Scribe Disclosure:  I, Andres Ledesma, am serving as a scribe at 6:44 PM on 6/19/2020 to document services personally performed by Brian Zayas PA based on my observations and the provider's statements to me.      Brian Zayas PA-C  06/19/20 [id number]

## 2020-06-20 ENCOUNTER — APPOINTMENT (OUTPATIENT)
Dept: GENERAL RADIOLOGY | Facility: CLINIC | Age: 50
End: 2020-06-20
Attending: INTERNAL MEDICINE
Payer: COMMERCIAL

## 2020-06-20 LAB
ANION GAP SERPL CALCULATED.3IONS-SCNC: 10 MMOL/L (ref 3–14)
BASOPHILS # BLD AUTO: 0.1 10E9/L (ref 0–0.2)
BASOPHILS NFR BLD AUTO: 1.5 %
BUN SERPL-MCNC: 21 MG/DL (ref 7–30)
CALCIUM SERPL-MCNC: 8.9 MG/DL (ref 8.5–10.1)
CHLORIDE SERPL-SCNC: 108 MMOL/L (ref 94–109)
CO2 SERPL-SCNC: 23 MMOL/L (ref 20–32)
CREAT SERPL-MCNC: 0.99 MG/DL (ref 0.52–1.04)
DIFFERENTIAL METHOD BLD: ABNORMAL
ELLIPTOCYTES BLD QL SMEAR: SLIGHT
EOSINOPHIL # BLD AUTO: 0.3 10E9/L (ref 0–0.7)
EOSINOPHIL NFR BLD AUTO: 8.9 %
ERYTHROCYTE [DISTWIDTH] IN BLOOD BY AUTOMATED COUNT: 12 % (ref 10–15)
GFR SERPL CREATININE-BSD FRML MDRD: 66 ML/MIN/{1.73_M2}
GLUCOSE SERPL-MCNC: 94 MG/DL (ref 70–99)
HCT VFR BLD AUTO: 37 % (ref 35–47)
HGB BLD-MCNC: 12.2 G/DL (ref 11.7–15.7)
IMM GRANULOCYTES # BLD: 0 10E9/L (ref 0–0.4)
IMM GRANULOCYTES NFR BLD: 0 %
LYMPHOCYTES # BLD AUTO: 0.8 10E9/L (ref 0.8–5.3)
LYMPHOCYTES NFR BLD AUTO: 23.4 %
MCH RBC QN AUTO: 29.8 PG (ref 26.5–33)
MCHC RBC AUTO-ENTMCNC: 33 G/DL (ref 31.5–36.5)
MCV RBC AUTO: 91 FL (ref 78–100)
MONOCYTES # BLD AUTO: 0.7 10E9/L (ref 0–1.3)
MONOCYTES NFR BLD AUTO: 20.9 %
NEUTROPHILS # BLD AUTO: 1.5 10E9/L (ref 1.6–8.3)
NEUTROPHILS NFR BLD AUTO: 45.3 %
NRBC # BLD AUTO: 0 10*3/UL
NRBC BLD AUTO-RTO: 0 /100
PLATELET # BLD AUTO: 183 10E9/L (ref 150–450)
PLATELET # BLD EST: ABNORMAL 10*3/UL
POTASSIUM SERPL-SCNC: 3.9 MMOL/L (ref 3.4–5.3)
PROCALCITONIN SERPL-MCNC: <0.05 NG/ML
RBC # BLD AUTO: 4.09 10E12/L (ref 3.8–5.2)
SODIUM SERPL-SCNC: 141 MMOL/L (ref 133–144)
WBC # BLD AUTO: 3.3 10E9/L (ref 4–11)

## 2020-06-20 PROCEDURE — G0378 HOSPITAL OBSERVATION PER HR: HCPCS

## 2020-06-20 PROCEDURE — 25000128 H RX IP 250 OP 636: Performed by: INTERNAL MEDICINE

## 2020-06-20 PROCEDURE — 99225 ZZC SUBSEQUENT OBSERVATION CARE,LEVEL II: CPT | Performed by: INTERNAL MEDICINE

## 2020-06-20 PROCEDURE — 71046 X-RAY EXAM CHEST 2 VIEWS: CPT

## 2020-06-20 PROCEDURE — 85025 COMPLETE CBC W/AUTO DIFF WBC: CPT | Performed by: INTERNAL MEDICINE

## 2020-06-20 PROCEDURE — 99214 OFFICE O/P EST MOD 30 MIN: CPT | Performed by: INTERNAL MEDICINE

## 2020-06-20 PROCEDURE — 80048 BASIC METABOLIC PNL TOTAL CA: CPT | Performed by: INTERNAL MEDICINE

## 2020-06-20 PROCEDURE — 96361 HYDRATE IV INFUSION ADD-ON: CPT

## 2020-06-20 PROCEDURE — 25000132 ZZH RX MED GY IP 250 OP 250 PS 637: Performed by: INTERNAL MEDICINE

## 2020-06-20 PROCEDURE — 99207 ZZC CDG-CODE CATEGORY CHANGED: CPT | Performed by: INTERNAL MEDICINE

## 2020-06-20 RX ORDER — HEPARIN SODIUM,PORCINE 10 UNIT/ML
5-10 VIAL (ML) INTRAVENOUS EVERY 24 HOURS
Status: DISCONTINUED | OUTPATIENT
Start: 2020-06-20 | End: 2020-06-21 | Stop reason: HOSPADM

## 2020-06-20 RX ORDER — HEPARIN SODIUM (PORCINE) LOCK FLUSH IV SOLN 100 UNIT/ML 100 UNIT/ML
5 SOLUTION INTRAVENOUS
Status: DISCONTINUED | OUTPATIENT
Start: 2020-06-20 | End: 2020-06-21 | Stop reason: HOSPADM

## 2020-06-20 RX ORDER — HEPARIN SODIUM,PORCINE 10 UNIT/ML
5-10 VIAL (ML) INTRAVENOUS
Status: DISCONTINUED | OUTPATIENT
Start: 2020-06-20 | End: 2020-06-21 | Stop reason: HOSPADM

## 2020-06-20 RX ADMIN — METOPROLOL SUCCINATE 25 MG: 25 TABLET, FILM COATED, EXTENDED RELEASE ORAL at 12:26

## 2020-06-20 RX ADMIN — FUROSEMIDE 20 MG: 20 TABLET ORAL at 12:27

## 2020-06-20 RX ADMIN — CETIRIZINE HYDROCHLORIDE 10 MG: 10 TABLET, FILM COATED ORAL at 12:26

## 2020-06-20 RX ADMIN — POTASSIUM CHLORIDE 40 MEQ: 1500 TABLET, EXTENDED RELEASE ORAL at 12:27

## 2020-06-20 RX ADMIN — Medication 5 ML: at 14:22

## 2020-06-20 RX ADMIN — ACYCLOVIR 400 MG: 400 TABLET ORAL at 12:26

## 2020-06-20 RX ADMIN — LISINOPRIL 2.5 MG: 2.5 TABLET ORAL at 12:26

## 2020-06-20 NOTE — PLAN OF CARE
PRIMARY DIAGNOSIS: SVT  OUTPATIENT/OBSERVATION GOALS TO BE MET BEFORE DISCHARGE:  ADLs back to baseline: Yes    Activity and level of assistance: Ambulating independently.    Pain status: Pain free.    Return to near baseline physical activity: Yes     Discharge Planner Nurse   Safe discharge environment identified: Yes  Barriers to discharge: Yes, cardiology consulted see note for details.       Entered by: Mary Alice Lyn 06/20/2020 11:06 AM     Please review provider order for any additional goals.   Nurse to notify provider when observation goals have been met and patient is ready for discharge.

## 2020-06-20 NOTE — PHARMACY-ADMISSION MEDICATION HISTORY
Admission medication history interview status for this patient is complete. See [facility] admission navigator for allergy information, prior to admission medications and immunization status.     Medication history interview source:Patient (interview done via telephone during COVID-19 pandemic).  Medication history resources (including written lists, pill bottles, clinic record):None    Changes made to PTA medication list:  Added: none  Deleted: none  Changed: none     Actions taken by pharmacist (provider contacted, etc): paged provider to review metoprolol orders.     Additional medication history information:None    Medication reconciliation/reorder completed by provider prior to medication history? Yes    Prior to Admission medications    Medication Sig Last Dose Taking? Auth Provider   acyclovir (ZOVIRAX) 400 MG tablet Take 400 mg by mouth daily 6/19/2020 Yes Unknown, Entered By History   cetirizine (ZYRTEC) 10 MG tablet Take 10 mg by mouth daily 6/19/2020 Yes Reported, Patient   furosemide (LASIX) 20 MG tablet Take 1 tablet (20 mg) by mouth daily 6/19/2020 Yes Brody Chandler MD   lisinopril (ZESTRIL) 2.5 MG tablet Take 1 tablet (2.5 mg) by mouth daily 6/19/2020 Yes Brody Chandler MD   metoprolol succinate ER (TOPROL-XL) 25 MG 24 hr tablet Take 0.5 tablets (12.5 mg) by mouth daily 6/19/2020 Yes Brody Chandler MD   potassium chloride ER (K-DUR/KLOR-CON M) 20 MEQ CR tablet Take 2 tablets (40 mEq) by mouth daily 6/19/2020 Yes Castro Castro MD   lidocaine-prilocaine (EMLA) 2.5-2.5 % external cream Apply 1 applicator topically as needed for moderate pain   Unknown, Entered By History   LORazepam 0.5 MG PO tablet Take 1-2 tablets (0.5-1 mg) by mouth every 6 hours as needed (Breakthrough Nausea / Vomiting)   Padmaja Stevens PA-C

## 2020-06-20 NOTE — PLAN OF CARE
PRIMARY DIAGNOSIS: PNEUMONIA  OUTPATIENT/OBSERVATION GOALS TO BE MET BEFORE DISCHARGE:  1. Dyspnea improved and O2 sats >88% on RA or back to baseline O2 levels: No   SpO2: 93 %, O2 Device: None (Room air)    2. Tolerating oral abx or appropriate plans made outpatient infusion: No  Abx discontinued previously   3. Vitals signs normal or return to baseline: HR >100bpm    4. Short term supplemental O2 needed with activity at home: No tolerating RA without SOB    5. Tolerate oral intake to maintain hydration: Yes    6. Return to near baseline physical activity: Yes    Discharge Planner Nurse   Safe discharge environment identified: Yes  Barriers to discharge: No       Entered by: Siri Chandra 06/20/2020 4:34 AM     Please review provider order for any additional goals.   Nurse to notify provider when observation goals have been met and patient is ready for discharge.  Patient Denies SOB, HR down to 98. No chest pain complains . Independent in room, continue POC.

## 2020-06-20 NOTE — CONSULTS
CTS identifies patient as high risk due to very high KAIN score. Currently admitted for tachycardia, this is her 6th admission in 6 months. Per chart review, pt resides at home with her , Florian. Baseline mobility is independent. She remains up independently.      Her PMH that can effect her KAIN score includes anxiety attacks, diffuse large B-cell lymphoma, general anxiety d/o, HFrEF, menopausal d/o, paroxsmal SVT & stress urinary incontinence. Her PTA medications that can effect her KAIN score includes Lorazepam.     Will follow along with  for DC planning. Will give hand off to clinic RN CTS at time of discharge. She currently has an appointment with Dr. Horan on Monday, 6/22/20, Dr. Castro (Oncology) on 6/26/20 and at [facility] w/Cirilo Vo on 7/7/20.       Cristine Almanzar RN, BSN, CPHN, CTS  Inpatient Care Coordinator  [facility]  [phone]

## 2020-06-20 NOTE — PROGRESS NOTES
Discussed the case with Dr Bynum from EP. He agrees that the patient should be transferred to [facility] tomorrow in preparation for EP study / ablation on Monday.

## 2020-06-20 NOTE — CONSULTS
Consult Date:  06/20/2020      REFERRING PHYSICIAN:  Hospitalist Service.      INDICATION FOR CARDIAC CONSULTATION:  Recurrent SVT.      Dear :      It is my pleasure to see your patient, Kassie Ramirez.  She is an extremely pleasant 50-year-old patient with a diagnosis of large B-cell lymphoma who is undergoing chemotherapy with R-CHOP.  This patient was only discharged about 5 days ago when she was under the care of Dr. Alcala.  This is a patient with a new diagnosis of cardiomyopathy with an ejection fraction in the 30% range.  This is a new diagnosis of cardiomyopathy as her ejection fraction prior to this was normal.  She did undergo coronary angiography a few days ago which showed no evidence of coronary artery disease, confirming the diagnosis of cardiomyopathy.  The left ventricular end-diastolic pressure was on the higher side being at 29 mmHg.  The patient has been plagued by numerous episodes of supraventricular tachycardia.  She had multiple episodes on the last admission.  Yesterday she had at least 5 episodes of SVT and then came into the hospital.  When she came in, she had sinus tachycardia, but then had a 10-minute run of SVT.  She was about to be given adenosine and then she spontaneously converted back to sinus tachycardia.  The patient's blood pressure has been running on the lower side, so it has been difficult to give her substantial doses of beta blocker.  She was on metoprolol succinate 12.5 mg when she was admitted.  They are attempting to go to 25 mg today.  Her blood pressure, however, is on the softer side at 104/74.  Her electrolytes were normal on admission with a potassium of 3.9.  Her troponin did go up to 0.106, which is probably rate related  given that she had normal coronary arteries about 5 days ago.  Her proBNP is raised at 1513.      PAST MEDICAL HISTORY:   1.  Large B-cell lymphoma, being treated with R-CHOP by Dr. Castro at [facility].   2.  Recurrent SVT.    3.  Cardiomyopathy.   4.  Reflux disease.   5.  Anxiety.      FAMILY HISTORY:  No history of cardiomyopathy.      SOCIAL HISTORY:  She does not drink alcohol.  She does not smoke cigarettes.      ALLERGIES:     1.  INTOLERANCE TO CO-DEFENSE FIGHTER.     2.  SUDAFED APPEARS TO CAUSE A SKIN RASH.     3.  SEASONAL ALLERGIES.   4.  INTOLERANT TO DAYQUIL AND DAYQUIL LIQUID CAPS.      MEDICATIONS:   1.  Acyclovir 400 mg per day.   2.  Ceftriaxone 1 gram intravenously once.   3.  Zyrtec 10 mg orally per day.   4.  Furosemide 20 mg per day.   5.  Lisinopril 2.5 mg per day.   6.  Metoprolol succinate, initially 12.5 mg per day.  Now the dose has been increased to 25 mg of the succinate form per day.   7.  Potassium chloride 40 mEq per day.      REVIEW OF SYSTEMS:   CONSTITUTIONAL:  Negative.   EYES:  Negative.   ENT:  Negative.   CARDIOVASCULAR:  As above.   RESPIRATORY:  Nil.   GASTROINTESTINAL:  Nil.   GENITOURINARY:  Nil.   MUSCULOSKELETAL:  Nil.   NEUROLOGICAL:  Nil.   PSYCHIATRIC:  Nil.   ENDOCRINE:  Nil.   HEMATOLYMPHATIC:  Actively being treated for a large B-cell lymphoma.   ALLERGY AND IMMUNOLOGY:  As above.      PHYSICAL EXAMINATION:   GENERAL:  She is an extremely pleasant patient who is in no apparent distress.   VITAL SIGNS:  Her blood pressure is 104/74.  Her heart rate is 98 beats per minute, her temperature is 96.1, respirations are 16, O2 sats are 97%.   HEENT:  Unremarkable.   NECK:  Jugular venous pulse does not appear to be raised.  Carotids are normal with no bruits.  She has normal facial symmetry.  Her pupils are equal.  She has early alopecia.   CHEST:  Examination of the chest reveals that she has a Port-A-Cath in the right upper chest area.  Heart sounds 1 and 2 are normal with no murmurs.  Chest is clear to percussion and auscultation with no added sounds.   ABDOMEN:  Grossly unremarkable with no organomegaly or tenderness.   EXTREMITIES:  Pedal pulses are 2-3+.  No peripheral edema noted.    NEUROLOGIC:  She moves all 4 limbs appropriately with no obvious sensory or motor loss.  She is fully oriented to time, place and person with a pleasant affect.   SKIN:  Examination of the skin is unremarkable with her skin warm and dry to the touch.      LABORATORY INVESTIGATIONS:  Sodium is 141, potassium 3.9, BUN is 21, creatinine is 0.99, GFR 66.  First troponin 0.091, second troponin 0.106.  White cell count 3.3, hemoglobin 12.2, platelet count is 183,000.      IMPRESSION:   1.  Recurrent symptomatic supraventricular tachycardia.  She has now had multiple episodes and has had 2 admissions in 1 week associated with this SVT.  One of the issues is that beta blockers or calcium channel blockers cannot be used to a higher dose because her blood pressure is on the lower side.  She is quite symptomatic when she goes into SVT.   2.  Cardiomyopathy.  Previously this was felt to be possibly rate related.  However, the patient has intermittent SVT.  I would not expect that to cause cardiomyopathy.  I think it is more likely that her chemotherapeutic regimen which includes doxorubicin is the more likely culprit to be causing reduced left ventricular systolic function.      PLAN:  I am going to discuss the case with one of my EP colleagues who happens to be on-call at [facility].  I think given that this is not being treated successfully pharmacologically and given the recurrent episodes and recurrent symptomatic episodes and readmissions that an ablation procedure would be best.  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  We will see if the increased dose of metoprolol succinate, which was ordered by the Hospitalist, will be tolerable to the patient.  We will continue the beta blockers and the Ace inhibitors for the reduced left ventricular systolic function.      It has been my pleasure to be involved in the  care of this very nice patient.  We will continue to follow and I will let her know about the plan as soon as we hear from EP.         GE VELAZQUEZ MD, [facility]             D: 2020   T: 2020   MT: JONG      Name:     BABAR VARGHESE   MRN:      [id number]        Account:       [id number]   :      1970           Consult Date:  2020      Document: [id number]

## 2020-06-20 NOTE — PROGRESS NOTES
[facility]  Hospitalist Progress Note  Felipe Dwyer MD 06/20/2020    Reason for Stay (Diagnosis): Recurrent SVTs         Assessment and Plan:      Summary of Stay: Kassie Ramirez is a 50 year old female with history of B-cell lymphoma, was treated and completed chemotherapy, recurrent SVTs, recently diagnosed with cardiomyopathy believed to be tachy induced versus chemotherapy, discharged from this hospital on 6/17 and admitted on 6/19/2020 with symptomatic and recurrent supraventricular tachycardia.    Problem List:   1.  Recurrent SVT:    -She had multiple episodes of recurrent SVT since her recent admission to the hospital.   -It improves with vagal maneuvers.  -Cardiology consulted, input appreciated.  -Toprol-XL increased from 12.5 to 25 mg p.o. daily.  Blood pressure is soft and doubt if it is possible to further titrate up beta-blocker - Continue tele monitoring  -She stated there was a plan for her to see outpatient EP study on 6/22.  Given the recurrence of her symptoms, patient will stay in the hospital awaiting recommendation from cardiology.    2.  New diagnosis of cardiomyopathy.    -Recent admission to the hospital echo showed ejection fraction of 25-30%.  -Suspected  tachycardia-mediated versus possible chemotherapy mediated.  -She completed her chemo about 2 weeks ago  -She is on low-dose lisinopril and Lasix.   -Discontinued IV fluids.  3.    B-cell lymphoma.  -She was treated with chemo, has follow-up with Dr. Castro.  -There was a plan for her to hold PET scan on 6/23.  -She will follow-up with her oncologist as an outpatient    DVT Prophylaxis: Pneumatic Compression Devices  Code Status: Full Code  Discharge Dispo: Pending further recommendation from cardiology  Estimated Disch Date / # of Days until Disch: 1-2 days        Interval History (Subjective):      Patient seen and examined, assumed care today, H&P reviewed, admitted with recurrent SVT, currently sinus.  No chest  pain or palpitation no shortness of breath.                  Physical Exam:      Last Vital Signs:  /74 (BP Location: Left arm)   Pulse 100   Temp 96.1  F (35.6  C) (Oral)   Resp 16   Wt 70.4 kg (155 lb 1.6 oz)   LMP 11/06/2019   SpO2 97%   BMI 25.03 kg/m      No intake/output data recorded.  Vitals:    06/19/20 1847 06/20/20 0700   Weight: 69.9 kg (154 lb) 70.4 kg (155 lb 1.6 oz)     Current Facility-Administered Medications   Medication     acetaminophen (TYLENOL) Suppository 650 mg     acetaminophen (TYLENOL) tablet 650 mg     acyclovir (ZOVIRAX) tablet 400 mg     cetirizine (zyrTEC) tablet 10 mg     furosemide (LASIX) tablet 20 mg     lidocaine-prilocaine (EMLA) cream 1 g     lisinopril (ZESTRIL) tablet 2.5 mg     LORazepam (ATIVAN) tablet 0.5-1 mg     melatonin tablet 1 mg     metoprolol succinate ER (TOPROL-XL) 24 hr tablet 25 mg     metoprolol tartrate (LOPRESSOR) half-tab 12.5 mg     naloxone (NARCAN) injection 0.1-0.4 mg     ondansetron (ZOFRAN-ODT) ODT tab 4 mg    Or     ondansetron (ZOFRAN) injection 4 mg     polyethylene glycol (MIRALAX) Packet 17 g     potassium chloride ER (KLOR-CON M) CR tablet 40 mEq     sodium chloride 0.9% infusion       Constitutional: Awake, alert, cooperative, no apparent distress   Respiratory: Clear to auscultation bilaterally, no crackles or wheezing   Cardiovascular: Regular rate and rhythm, normal S1 and S2, and no murmur noted   Abdomen: Normal bowel sounds, soft, non-distended, non-tender   Skin: No rashes, no cyanosis, dry to touch   Neuro: Alert and oriented x3, no weakness, numbness, memory loss   Extremities: No edema, normal range of motion   Other(s):HEENT  Pink, nonicteric, moist oral mucosa       All other systems: Negative          Medications:      All current medications were reviewed with changes reflected in problem list.         Data:      All new lab and imaging data was reviewed.   Labs:  Recent Labs   Lab 06/20/20  0700 06/19/20  1853  06/17/20  0500    139 141   POTASSIUM 3.9 4.0 3.7   CHLORIDE 108 104 106   CO2 23 27 29   ANIONGAP 10 8 6   GLC 94 108* 101*   BUN 21 26 24   CR 0.99 0.97 0.95   GFRESTIMATED 66 68 69   GFRESTBLACK 77 79 80   AFSHAN 8.9 9.2 9.5     Recent Labs   Lab 06/20/20  0700 06/19/20  1853 06/14/20  1807   WBC 3.3* 3.8* 2.8*   HGB 12.2 12.5 12.6   HCT 37.0 37.6 36.9   MCV 91 91 90    210 163      Imaging:   Results for orders placed or performed during the hospital encounter of 06/19/20   XR Chest 2 Views    Narrative    EXAM: XR CHEST 2 VW  LOCATION: [facility]  DATE/TIME: 6/19/2020 7:53 PM    INDICATION: Tachycardia, sweats  COMPARISON: 06/14/2020.      Impression    IMPRESSION: New hazy opacity and interstitial thickening at the right base may represent pneumonia. No pleural effusion or pneumothorax. Normal heart size and mediastinal contour. Stable right IJ Port-A-Cath.

## 2020-06-20 NOTE — PROVIDER NOTIFICATION
Paged dr monroe: please enter heparin flush order for port. Thanks.    Order received for RN to enter order. RN entered order for heparin flushes for MD to sign.

## 2020-06-20 NOTE — PLAN OF CARE
PRIMARY DIAGNOSIS: SVT  OUTPATIENT/OBSERVATION GOALS TO BE MET BEFORE DISCHARGE:  ADLs back to baseline: Yes    Activity and level of assistance: Ambulating independently.    Pain status: Pain free.    Return to near baseline physical activity: Yes     Discharge Planner Nurse   Safe discharge environment identified: Yes  Barriers to discharge: No, needs an ablation at [facility] on Monday.       Entered by: Mary Alice Lyn 06/20/2020 2:33 PM     Please review provider order for any additional goals.   Nurse to notify provider when observation goals have been met and patient is ready for discharge.      Vss, no co pain/cp/sob.   Tele ST/SR.  Up IND, CXR done see results for details, increased metoprolol dose, IVF dc'd, port hep locked.  WBC 3.3, K+ 3.9.  Plan for ablation on Monday at [facility].  Continue poc and monitoring.

## 2020-06-20 NOTE — PROGRESS NOTES
CM aware patient is on heart failure list. Patient was admitted for recurrent SVT. Per Cardiology progress note, no CHF exacerbation at this time. Cardiology is planning for possible ablation. Ongoing discussion with EP physician to determine best course of action.     CM will continue to follow patient until discharge for any additional needs.     Cristine Almanzar RN, BSN, CPHN, CM  Inpatient Care Coordination  [facility]  [phone]

## 2020-06-20 NOTE — PLAN OF CARE
/68   Pulse 100   Temp 97.8  F (36.6  C) (Oral)   Resp 18   Wt 69.9 kg (154 lb)   LMP 11/06/2019   SpO2 97%   BMI 24.86 kg/m          Patient admitted in unit, COVID negative.  Possible pneumonia, with SVT, afebrile, Alert and oriented x3 denies SOB Lungs are clear, no coughing.. TELE ST. Port. A cath on  on her right chest wall, infusing NS at  75ml/hr. Denies pain, Skin is intact, slightly pale related to lymphoma DX, previously on chemo.  No numbness or tingling,BLE. Ambulating independent, steady gait. Continue POC.

## 2020-06-20 NOTE — H&P
Admitted:     06/19/2020      PRIMARY CARE PHYSICIAN:  Mary Alice Colón PA-C       CHIEF COMPLAINT:  Recurrent supraventricular tachycardia.      HISTORY OF PRESENT ILLNESS:  Ms. Ramirez is a very pleasant 50-year-old female with a history of recurrent supraventricular tachycardia and recent diagnosis of cardiomyopathy, believed related to tachycardia versus chemotherapy.  She also has a history of large B-cell lymphoma, undergoing treatment.  She was recently hospitalized 06/14/2020 through 06/17/2020, discharged home 2 days ago, after admission for recurrent supraventricular tachycardia.  She was seen by Cardiology that admission.  Echocardiogram was performed which demonstrated a new diagnosis of cardiomyopathy with ejection fraction near 30%.  During that admission, she was started on metoprolol for attempts at rate control and prevention of SVT.  She also was started on low-dose lisinopril and Lasix in the setting of her new diagnosis of cardiomyopathy.  She was discharged home 2 days ago with plans for Cardiology followup including electrophysiology evaluation with consideration of possible ablation in the future.      Since discharge to home, she reports she has had 5 episodes of supraventricular tachycardia.  Episodes lasted for about 15 minutes and gradually resolved after attempting some vagal maneuvers.  In the Emergency Department this evening, she had another bout of about 10 minutes of SVT that spontaneously resolved prior to a need for adenosine.      The patient was concerned about her symptoms and recurrent SVT which prompted her appearance here in the Emergency Department.      She has had no fevers or chills.  No shortness of breath or infectious symptoms.  No cough.  Workup here in the Emergency Department included labs and imaging.      On initial arrival to the ER, vital signs included blood pressure 110/80 with a heart rate of 118.  She had no fever.  Saturation 98% on room air.  Lab work  showed a white cell count of 3.8 with no left shift.  Troponin was detectable at 0.09.  BNP is 1500.  Basic metabolic panel normal.  Lactic acid was normal.  Repeat troponin was slightly high at 0.1.  COVID-19 testing is pending.  Chest x-ray was otherwise nothing that looks like bacterial infiltrate to me.  Radiology did read new hazy opacity in the right base, although on my read, I do not clearly see that.  She was given antibiotics in the ER, given concerns about this radiology read.  She has no infectious pneumonia symptoms.      ER who would like to admit the patient given her recurrent SVT and questionable pneumonia.      PAST MEDICAL HISTORY:   1.  Recurrence supraventricular tachycardia.   2.  Recent admission to the hospital 06/14/2020 through 06/17/2020 with Cardiology evaluation.  Metoprolol was started that admission along with lisinopril and Lasix in the setting of a new diagnosis of cardiomyopathy.  It was believed that cardiomyopathy was either tachycardia related due to her SVT versus potential or chemotherapy related, although favored tachycardia related.   3.  Minimal troponin elevation last admission to the hospital.  Suspected due to demand ischemia in the setting of supraventricular tachycardia.  Cardiac catheterization that admission showed no evidence of coronary artery disease.   4.  Large B-cell lymphoma.  The patient follows with Dr. Castro of [facility].   5.  Reflux disease.   6.  Anxiety.      CURRENT MEDICATIONS:   1.  Acyclovir 400 mg daily.   2.  Zyrtec 10 mg daily.   3.  Lasix 20 mg daily, last admission to the hospital.    4.  Lisinopril 2.5 mg daily, last admission to the hospital.   5.  Ativan as needed for anxiety.   6.  Metoprolol extended release 12.5 mg daily, last admission to the hospital.   7.  Potassium chloride.      ALLERGIES:  SUDAFED, PSEUDOEPHEDRINE, COLD AND FLU MEDICATION.      FAMILY HISTORY:  Reviewed.  Nothing contributory to this admission.       SOCIAL HISTORY:  The patient does not smoke.  No significant alcohol use.      REVIEW OF SYSTEMS:  See HPI for details.  Comprehensive greater than 10-point review of systems is otherwise negative besides that detailed above.      PHYSICAL EXAMINATION:   VITAL SIGNS:  Blood pressure is currently 105/75 with heart rate of 108.  No fever.  Saturation 98% on room air.   GENERAL:  The patient appears nontoxic and in no acute distress.  She is a good historian.   HEENT:  Head is atraumatic.  Sclerae are white.  Eyelids are normal.  Conjunctivae are normal.  Extraocular movements are intact.   NECK:  Supple.  No cervical or supraclavicular lymphadenopathy.   HEART:  Regular rhythm.  Slightly tachycardic.  No significant murmurs.  No lower extremity edema.   LUNGS:  Clear to auscultation bilaterally.  No intercostal retractions.  No conversational dyspnea.   ABDOMEN:  Nontender, nondistended.  Soft, no masses.  No organomegaly.   EXTREMITIES:  No edema.   SKIN:  Reveals no rashes.  No jaundice.  Skin is dry to touch.   NEUROLOGIC:  Cranial nerves II-XII are intact.  Moves all extremities appropriately.  Sensation intact to light touch in the upper and lower extremities bilaterally.   PSYCHIATRIC:  The patient is awake, alert and oriented x3.  Mood and affect are normal and appropriate.        LABORATORY AND IMAGING DATA:  Reviewed above in HPI.  Chest x-ray personally reviewed as above, perhaps showing some slight atelectasis in the right base, but no obvious bacterial infiltrate by my eye.        EKG:  I personally reviewed, and shows a sinus tachycardia with a heart rate of 112 beats per minute.  No acute ST changes or pathologic Q-waves.      IMPRESSION AND PLAN:  Ms. Ramirez is a very pleasant 50-year-old female with a history of B-cell lymphoma, history of recurrent supraventricular tachycardia, recent diagnosis of cardiomyopathy believed to be tachycardia mediated versus chemotherapy mediated, and recent  hospitalization 06/14/2020 through 06/17/2020 for recurrent SVT.  She presented to the hospital today for recurrent tachycardia.  She has had 5 episodes of SVT since her discharge 2 days ago.  Workup in the ER notable for Radiology read showing a possible infiltrate of the right lower lung.  She has no infectious symptoms.   1.  Recurrent supraventricular tachycardia:  The patient has had multiple episodes of recurrent SVT since her recent admission to the hospital.  It lasts for about 15 minutes.  It seemed to improve with the use of vagal maneuvers.  The patient is a fairly anxious about recurrent SVT; however, in the setting of her recent diagnosis of cardiomyopathy.   2.  New diagnosis of cardiomyopathy.  During last admission to the hospital:  [place] tachycardia-mediated versus possible chemotherapy mediated.  Started on lisinopril and Lasix last admission.   3.  Sinus tachycardia:  Possibly anxiety related, possibly a bit fluid down after she was diuresed last admission to the hospital.   4.  Abnormal chest x-ray.  I suspect atelectasis.      PLAN:   1.  Admit to observation.   2.  We will increase her metoprolol dose to 25 mg daily from 12.5 mg daily previously.   3.  Monitor on telemetry.   4.  We will obtain Cardiology consultation.  Perhaps her visit with Electrophysiology can be fast tracked.   5.  We will provide gentle IV fluids overnight she may be a bit on the dry side in the setting of her diuresis last admission to the hospital.   6.  For now, I am holding off on additional antibiotics.  I am not at all convinced she has pneumonia.  She has no infectious symptoms, no fever, and no left shift on her CBC.  I suspect we are seeing atelectasis on her chest x-ray.  I will perform a procalcitonin level and repeat chest x-ray in the morning.  If procalcitonin level is significantly elevated or chest x-ray shows possible infiltrate.  I would give consideration to antibiotics.   7.  COVID-19 testing performed  in the ER and is pending.  Suspect that she will be negative and if negative, can discontinue isolation.   8.  In regards to her troponin elevation, suspect this is likely a type 2 MI due to demand ischemia.  She had an angiogram last admission that showed minimal coronary artery disease and she has no chest pain.         YRIS CAZARES MD             D: 2020   T: 2020   MT: ОЛЕГ      Name:     BABAR VARGHESE   MRN:      [id number]        Account:      [id number]   :      1970        Admitted:     2020                   Document: [id number]       cc: Mary Alice Colón PA-C

## 2020-06-20 NOTE — PROGRESS NOTES
Cardiology consult dictated.  Recurrent admission for symptomatic SVT with frequent daily episodes.  Patient is been actively treated for large B-cell lymphoma with R-CHOP.  Recently diagnosed with moderate to severely reduced left ventricular systolic function on admission last week.  Coronary angiography showed normal coronary arteries confirming the diagnosis of cardiomyopathy.  It was felt in the past that possibly this was a rate related cardiomyopathy however the patient is having only intermittent episodes of SVT and the more likely culprit I believe is the chemotherapy especially because it contains doxorubicin.  Patient admitted yesterday with multiple episodes of SVT.  This SVT in the past has been adenosine sensitive and also has responded to vagal maneuvers.  Patient is markedly symptomatic when she is in SVT.  The patient cannot take higher doses of beta-blockers because of soft blood pressures.  Patient is not complaining of symptoms of congestive heart failure such as orthopnea PND or ankle edema and on physical examination she does not have evidence of raised JVP nor ankle edema nor crackles.  I think the best way to take care of  the SVT is to perform an ablation procedure given that the patient has had multiple episodes of SVT which are symptomatic and pharmacological treatment has been unsuccessful.  I will discuss the case with 1 of my EP physicians today.  Thanks

## 2020-06-20 NOTE — ED NOTES
[facility]  ED Nurse Handoff Report    Kassie Ramirez is a 50 year old female   ED Chief complaint: Tachycardia  . ED Diagnosis:   Final diagnoses:   Community acquired pneumonia of right lower lobe of lung   SVT (supraventricular tachycardia) (H)   Sinus tachycardia     Allergies:   Allergies   Allergen Reactions     Cold & Flu [Cold Defense Fighter]      See pseudoephedrine     Seasonal Allergies      Sudafed Cold-Cough [Dayquil Liquicaps]      Pseudoephedrine Rash     Rash then skins peels off        Code Status: Full Code  Activity level - Baseline/Home:  Independent. Activity Level - Current:   Independent. Lift room needed: No. Bariatric: No   Needed: No   Isolation: R/O COVID.  Infection: Not Applicable.     Vital Signs:   Vitals:    06/19/20 2028 06/19/20 2030 06/19/20 2100 06/19/20 2130   BP:  103/80 108/79 104/75   Pulse:  163 110 108   Resp: 11 29 13 13   Temp:       TempSrc:       SpO2: 99% 100% 99% 98%   Weight:           Cardiac Rhythm:  ,      Pain level: 0-10 Pain Scale: 0  Patient confused: No. Patient Falls Risk: No.   Elimination Status: Has voided   Patient Report - Initial Complaint: Pt c/o tachycardia that started this morning.  No chest pain/sob.  No cough/fevers.  H/o SVT  Focused Assessment: Cardiac - Cardiac WDL: -WDL except; rhythm  Cardiac Rhythm: tachycardic   Chest Pain Assessment - Rating (0-10): 0   Respiratory - Respiratory WDL: WDL; rhythm/pattern  Rhythm/Pattern, Respiratory: no shortness of breath reported  Breath Sounds: All Fields   Head To Toe Assessment - All Lung Fields Breath Sounds: Posterior:; clear   Tests Performed: Labs, 12 lead EKG, COVID swab  Abnormal Results:   Abnormal Labs Reviewed   CBC WITH PLATELETS DIFFERENTIAL - Abnormal; Notable for the following components:       Result Value    WBC 3.8 (*)     All other components within normal limits   BASIC METABOLIC PANEL - Abnormal; Notable for the following components:    Glucose 108 (*)      All other components within normal limits   TROPONIN I - Abnormal; Notable for the following components:    Troponin I ES 0.091 (*)     All other components within normal limits   NT PROBNP INPATIENT - Abnormal; Notable for the following components:    N-Terminal Pro BNP Inpatient 1,513 (*)     All other components within normal limits   MAGNESIUM - Abnormal; Notable for the following components:    Magnesium 2.5 (*)     All other components within normal limits   TROPONIN I - Abnormal; Notable for the following components:    Troponin I ES 0.106 (*)     All other components within normal limits     XR Chest 2 Views   Final Result   IMPRESSION: New hazy opacity and interstitial thickening at the right base may represent pneumonia. No pleural effusion or pneumothorax. Normal heart size and mediastinal contour. Stable right IJ Port-A-Cath.         Treatments provided: 1L NS, Rocephin  Family Comments: N/A  OBS brochure/video discussed/provided to patient:  yes  ED Medications:   Medications   adenosine (ADENOCARD) injection 6 mg (6 mg Intravenous Not Given 6/19/20 2146)   azithromycin (ZITHROMAX) 500 mg in sodium chloride 0.9 % 250 mL intermittent infusion (0 mg Intravenous Hold 6/19/20 2147)   cefTRIAXone (ROCEPHIN) 1 g vial to attach to  mL bag for ADULTS or NS 50 mL bag for PEDS (0 g Intravenous Stopped 6/19/20 2117)     Drips infusing:  No  For the majority of the shift, the patient's behavior Green. Interventions performed were N/A    Sepsis treatment initiated: No       ED Nurse Name/Phone Number: Rosibel James RN,   10:10 PM  RECEIVING UNIT ED HANDOFF REVIEW    Above ED Nurse Handoff Report was reviewed: Yes  Reviewed by: Siri Chandra RN on June 19, 2020 at 10:48 PM

## 2020-06-21 ENCOUNTER — HOSPITAL ENCOUNTER (INPATIENT)
Facility: CLINIC | Age: 50
LOS: 2 days | Discharge: HOME | End: 2020-06-23
Attending: INTERNAL MEDICINE | Admitting: INTERNAL MEDICINE
Payer: COMMERCIAL

## 2020-06-21 VITALS
RESPIRATION RATE: 16 BRPM | BODY MASS INDEX: 25.03 KG/M2 | DIASTOLIC BLOOD PRESSURE: 64 MMHG | HEART RATE: 100 BPM | TEMPERATURE: 97.8 F | SYSTOLIC BLOOD PRESSURE: 88 MMHG | WEIGHT: 155.1 LBS | OXYGEN SATURATION: 97 %

## 2020-06-21 DIAGNOSIS — I50.21 ACUTE SYSTOLIC CONGESTIVE HEART FAILURE (H): ICD-10-CM

## 2020-06-21 DIAGNOSIS — I47.10 SVT (SUPRAVENTRICULAR TACHYCARDIA) (H): Primary | ICD-10-CM

## 2020-06-21 DIAGNOSIS — C83.30 DIFFUSE LARGE B-CELL LYMPHOMA, UNSPECIFIED BODY REGION (H): ICD-10-CM

## 2020-06-21 PROBLEM — I48.92 ATRIAL FLUTTER (H): Status: ACTIVE | Noted: 2020-06-21

## 2020-06-21 LAB
INTERPRETATION ECG - MUSE: NORMAL
LACTATE BLD-SCNC: 1 MMOL/L (ref 0.7–2)

## 2020-06-21 PROCEDURE — 25000132 ZZH RX MED GY IP 250 OP 250 PS 637: Performed by: INTERNAL MEDICINE

## 2020-06-21 PROCEDURE — G0378 HOSPITAL OBSERVATION PER HR: HCPCS

## 2020-06-21 PROCEDURE — 21000001 ZZH R&B HEART CARE

## 2020-06-21 PROCEDURE — 99217 ZZC OBSERVATION CARE DISCHARGE: CPT | Performed by: INTERNAL MEDICINE

## 2020-06-21 PROCEDURE — 25000128 H RX IP 250 OP 636: Performed by: INTERNAL MEDICINE

## 2020-06-21 PROCEDURE — 83605 ASSAY OF LACTIC ACID: CPT | Performed by: INTERNAL MEDICINE

## 2020-06-21 PROCEDURE — 99214 OFFICE O/P EST MOD 30 MIN: CPT | Performed by: INTERNAL MEDICINE

## 2020-06-21 RX ORDER — CETIRIZINE HYDROCHLORIDE 10 MG/1
10 TABLET ORAL DAILY
Status: DISCONTINUED | OUTPATIENT
Start: 2020-06-22 | End: 2020-06-23 | Stop reason: HOSPADM

## 2020-06-21 RX ORDER — ACETAMINOPHEN 325 MG/1
650 TABLET ORAL EVERY 4 HOURS PRN
Status: DISCONTINUED | OUTPATIENT
Start: 2020-06-21 | End: 2020-06-23 | Stop reason: HOSPADM

## 2020-06-21 RX ORDER — ACETAMINOPHEN 650 MG/1
650 SUPPOSITORY RECTAL EVERY 4 HOURS PRN
Status: DISCONTINUED | OUTPATIENT
Start: 2020-06-21 | End: 2020-06-23 | Stop reason: HOSPADM

## 2020-06-21 RX ORDER — LORAZEPAM 0.5 MG/1
.5-1 TABLET ORAL EVERY 6 HOURS PRN
Status: DISCONTINUED | OUTPATIENT
Start: 2020-06-21 | End: 2020-06-23 | Stop reason: HOSPADM

## 2020-06-21 RX ORDER — ADENOSINE 3 MG/ML
6 INJECTION, SOLUTION INTRAVENOUS DAILY PRN
Status: DISCONTINUED | OUTPATIENT
Start: 2020-06-21 | End: 2020-06-23 | Stop reason: HOSPADM

## 2020-06-21 RX ORDER — ACYCLOVIR 400 MG/1
400 TABLET ORAL DAILY
Status: DISCONTINUED | OUTPATIENT
Start: 2020-06-22 | End: 2020-06-23 | Stop reason: HOSPADM

## 2020-06-21 RX ORDER — HEPARIN SODIUM,PORCINE 10 UNIT/ML
5-10 VIAL (ML) INTRAVENOUS
Status: DISCONTINUED | OUTPATIENT
Start: 2020-06-21 | End: 2020-06-23 | Stop reason: HOSPADM

## 2020-06-21 RX ORDER — HEPARIN SODIUM (PORCINE) LOCK FLUSH IV SOLN 100 UNIT/ML 100 UNIT/ML
5 SOLUTION INTRAVENOUS
Status: DISCONTINUED | OUTPATIENT
Start: 2020-06-21 | End: 2020-06-23 | Stop reason: HOSPADM

## 2020-06-21 RX ORDER — LIDOCAINE 40 MG/G
CREAM TOPICAL
Status: DISCONTINUED | OUTPATIENT
Start: 2020-06-21 | End: 2020-06-22

## 2020-06-21 RX ORDER — HEPARIN SODIUM,PORCINE 10 UNIT/ML
5-10 VIAL (ML) INTRAVENOUS EVERY 24 HOURS
Status: DISCONTINUED | OUTPATIENT
Start: 2020-06-22 | End: 2020-06-23 | Stop reason: HOSPADM

## 2020-06-21 RX ORDER — POTASSIUM CHLORIDE 1500 MG/1
40 TABLET, EXTENDED RELEASE ORAL DAILY
Status: DISCONTINUED | OUTPATIENT
Start: 2020-06-22 | End: 2020-06-23 | Stop reason: HOSPADM

## 2020-06-21 RX ORDER — NITROGLYCERIN 0.4 MG/1
0.4 TABLET SUBLINGUAL EVERY 5 MIN PRN
Status: DISCONTINUED | OUTPATIENT
Start: 2020-06-21 | End: 2020-06-23 | Stop reason: HOSPADM

## 2020-06-21 RX ORDER — ONDANSETRON 4 MG/1
4 TABLET, ORALLY DISINTEGRATING ORAL EVERY 6 HOURS PRN
Status: DISCONTINUED | OUTPATIENT
Start: 2020-06-21 | End: 2020-06-23 | Stop reason: HOSPADM

## 2020-06-21 RX ORDER — ALUMINA, MAGNESIA, AND SIMETHICONE 2400; 2400; 240 MG/30ML; MG/30ML; MG/30ML
30 SUSPENSION ORAL EVERY 4 HOURS PRN
Status: DISCONTINUED | OUTPATIENT
Start: 2020-06-21 | End: 2020-06-23 | Stop reason: HOSPADM

## 2020-06-21 RX ORDER — FUROSEMIDE 20 MG
20 TABLET ORAL DAILY
Status: DISCONTINUED | OUTPATIENT
Start: 2020-06-22 | End: 2020-06-23 | Stop reason: HOSPADM

## 2020-06-21 RX ORDER — ASPIRIN 81 MG/1
324 TABLET, CHEWABLE ORAL ONCE
Status: COMPLETED | OUTPATIENT
Start: 2020-06-21 | End: 2020-06-21

## 2020-06-21 RX ORDER — ADENOSINE 3 MG/ML
6 INJECTION, SOLUTION INTRAVENOUS ONCE
Status: DISCONTINUED | OUTPATIENT
Start: 2020-06-21 | End: 2020-06-21

## 2020-06-21 RX ORDER — LISINOPRIL 2.5 MG/1
2.5 TABLET ORAL DAILY
Status: DISCONTINUED | OUTPATIENT
Start: 2020-06-21 | End: 2020-06-23 | Stop reason: HOSPADM

## 2020-06-21 RX ORDER — ONDANSETRON 2 MG/ML
4 INJECTION INTRAMUSCULAR; INTRAVENOUS EVERY 6 HOURS PRN
Status: DISCONTINUED | OUTPATIENT
Start: 2020-06-21 | End: 2020-06-23 | Stop reason: HOSPADM

## 2020-06-21 RX ADMIN — ACYCLOVIR 400 MG: 400 TABLET ORAL at 07:57

## 2020-06-21 RX ADMIN — POTASSIUM CHLORIDE 40 MEQ: 1500 TABLET, EXTENDED RELEASE ORAL at 07:57

## 2020-06-21 RX ADMIN — METOPROLOL SUCCINATE 25 MG: 25 TABLET, FILM COATED, EXTENDED RELEASE ORAL at 07:57

## 2020-06-21 RX ADMIN — Medication 5 ML: at 13:23

## 2020-06-21 RX ADMIN — ASPIRIN 81 MG 324 MG: 81 TABLET ORAL at 17:31

## 2020-06-21 RX ADMIN — FUROSEMIDE 20 MG: 20 TABLET ORAL at 07:57

## 2020-06-21 RX ADMIN — Medication 5 ML: at 22:52

## 2020-06-21 RX ADMIN — CETIRIZINE HYDROCHLORIDE 10 MG: 10 TABLET, FILM COATED ORAL at 07:57

## 2020-06-21 ASSESSMENT — MIFFLIN-ST. JEOR: SCORE: 1324.4

## 2020-06-21 ASSESSMENT — ACTIVITIES OF DAILY LIVING (ADL)
ADLS_ACUITY_SCORE: 12
ADLS_ACUITY_SCORE: 12

## 2020-06-21 NOTE — DISCHARGE SUMMARY
Admit Date:     06/19/2020   Discharge Date:     06/21/2020      REASON FOR TRANSFER:  Electrophysiology study with possible ablation for recurrent supraventricular tachycardia.      BRIEF HISTORY AND HOSPITAL COURSE:  Babar Varghese is a 50-year-old female with a past medical history significant for recurrent symptomatic supraventricular tachycardia, cardiomyopathy believed to be tachycardic-induced versus chemotherapy, which she had been on for large B-cell lymphoma.  The patient was admitted to the hospital form  06/14/2020 to 06/17/2020 foe symptomatic SVT. At that time of discharge, the plan was to  arrange follow-up with electrophysiologist as an outpatient scheduled for 06/22.  The patient came back and readmitted  2 days later for the same reason, and Cardiology was consulted.  Her blood pressure was soft and we could not titrate up her beta blocker, just increased from 12.5 to 25 mg Toprol-XL.  She is also on low-dose lisinopril and Lasix for her cardiomyopathy.  She had echocardiogram recently showed EF of 25-30% at that time.  The patient was evaluated by cardiologist, and Dr. Baum discussed who discussed with Dr. Bynum from Electrophysiology who recommended to transfer the patient to [facility] today in preparation for an EP study/ablation on 06/22/2020.  The patient agreed with the transfer.  I discussed with the patient, as well as with cardiologist, Dr. Baum.      The patient triggered lactic acid on her way out for transfer.  This is due to her low blood pressure related to medications and tachycardia.  There is no sign of infection.  The low blood pressure is due to her underlying cardiomyopathy and BP medications.  At this point, the lactate was not drawn.  There is no sign of infection and at this point, there is no indication.   Patient was accepted by Dr. Lynch.        BRODIE FUENTES MD             D: 06/21/2020   T: 06/21/2020   MT:       Name:     BABAR VARGHESE    MRN:      [id number]        Account:        [id number]   :      1970           Admit Date:     2020                                  Discharge Date: 2020      Document: [id number]       cc: Mary Alice Colón PA-C

## 2020-06-21 NOTE — PLAN OF CARE
VSS on RA.  Tele: SR.  Denies pain or shortness of breath.  Up ind.  NPO at midnight for ablation tomorrow.  Pt had run of SVT from [id number] and converted with valsalva maneuvers. Call light within reach.  Will continue to monitor.

## 2020-06-21 NOTE — PROGRESS NOTES
Patient to be transferred to [facility] foe EP study/ablation for recurrent SVT. Discussed with cardiologist, Dr. Montesinos. I also discussed with accepting physician, Dr. Lynch.  Discharge summary will be dictated.

## 2020-06-21 NOTE — H&P
Called to give report to Kj at [phone], questions answered.  Transport set up for 1330, pt/family aware, room number 250.

## 2020-06-21 NOTE — PROGRESS NOTES
[facility] Cardiology Progress Note            Interval History:   Please see note from yesterday.  50-year-old patient with large B-cell lymphoma.  Recent diagnosis of cardiomyopathy with ejection fraction 25 to 30% range.  Suspect this is due to chemotherapy as she is been actively treated with R-CHOP.  Doubt that SVT which is intermittent would be causing a reduction left ventricular systolic function.  Recurrent symptomatic SVT with readmission within 5 days of discharge.  SVT is sensitive to adenosine and vagal maneuvers.  Multiple episodes during the day and because of lowish blood pressure we cannot give higher doses of beta-blockers or calcium channel blockers.  Was admitted with possible pneumonia but it looks like that she does not have this.  Discussed the case with Dr. Jones from  who felt that it would be reasonable to transfer the patient to [facility] today for electrophysiology study and possible ablation tomorrow.  We did increase the metoprolol succinate to 25 mg from 12.5 mg.  Blood pressures on the softer side but she is tolerating it without symptoms.  No episodes of SVT overnight on telemetry.              Medications:       acyclovir  400 mg Oral Daily     cetirizine  10 mg Oral Daily     furosemide  20 mg Oral Daily     heparin  5 mL Intracatheter Q28 Days     heparin lock flush  5-10 mL Intracatheter Q24H     lisinopril  2.5 mg Oral Daily     metoprolol succinate ER  25 mg Oral Daily     potassium chloride ER  40 mEq Oral Daily               Physical Exam:   Blood pressure 98/72, pulse 100, temperature 97.8  F (36.6  C), temperature source Oral, resp. rate 16, weight 70.4 kg (155 lb 1.6 oz), last menstrual period 11/06/2019, SpO2 97 %, not currently breastfeeding.  Rhythm: Sinus   Constitutional:   awake, alert, cooperative, no apparent distress, and appears stated age     Neck:   no jugular venous distension and no carotid bruits     Lungs:   No increased work of breathing, good air  exchange, clear to auscultation bilaterally, no crackles or wheezing and Port-A-Cath in right upper chest.     Cardiovascular:   regular rate and rhythm, normal S1 and S2, S4 present, no murmur noted and no edema            Data:          Lab Results   Component Value Date     06/20/2020     06/19/2020     06/17/2020    Lab Results   Component Value Date    CHLORIDE 108 06/20/2020    CHLORIDE 104 06/19/2020    CHLORIDE 106 06/17/2020    Lab Results   Component Value Date    BUN 21 06/20/2020    BUN 26 06/19/2020    BUN 24 06/17/2020      Lab Results   Component Value Date    POTASSIUM 3.9 06/20/2020    POTASSIUM 4.0 06/19/2020    POTASSIUM 3.7 06/17/2020    Lab Results   Component Value Date    CO2 23 06/20/2020    CO2 27 06/19/2020    CO2 29 06/17/2020    Lab Results   Component Value Date    CR 0.99 06/20/2020    CR 0.97 06/19/2020    CR 0.95 06/17/2020        Lab Results   Component Value Date    HGB 12.2 06/20/2020    HGB 12.5 06/19/2020    HGB 12.6 06/14/2020     Lab Results   Component Value Date     06/20/2020     06/19/2020     06/14/2020     Lab Results   Component Value Date    TROPI 0.106 (H) 06/19/2020    TROPI 0.091 (H) 06/19/2020    TROPI 0.114 (H) 06/15/2020     Lab Results   Component Value Date    WBC 3.3 (L) 06/20/2020    WBC 3.8 (L) 06/19/2020    WBC 2.8 (L) 06/14/2020                   Assessment and Plan:   Assessment:   Problem List  SVT.  Symptomatic with recurrent episodes and recurrent admissions.  Adenosine sensitive and sensitive to vagal maneuver suggesting possible AVNRT etiology although some PAT's can be sensitive to adenosine also.  Cardiomyopathy with severely reduced left ventricular systolic function.  Suspect this is due to cardiomyopathy rather than rate related as the SVT is intermittent and not long-lasting.      * No resolved hospital problems. *       Plan:   As per above, the patient will be transferred to [facility] and should  be kept n.p.o. overnight for EP study tomorrow and possible ablation.  We will try to continue with the higher though small dose of metoprolol succinate 25 mg/day.  We will sign off at this stage but please call if any questions or if I can be of any cardiac help.       Attestation:  I have reviewed today's vital signs, notes, medications, labs and imaging.  Care coordination / counseling time: 20 minutes  Total time: 30 minutes         Soy Baum MD, MD 6/21/2020  10:21 AM

## 2020-06-21 NOTE — PROVIDER NOTIFICATION
Paged dr monroe: bp 98/72, hr 98  please enter parameters for BP meds. Thanks.    Orders received.

## 2020-06-21 NOTE — PLAN OF CARE
VS stable, denied pain or discomfort. Independent,patient on telemetry -SR Patient will be transfer to [facility] tomorrow for ablation on Monday.

## 2020-06-21 NOTE — PLAN OF CARE
Code status: FULL   COVID-19 result: Negative     Pertinent assessment: A&Ox4. VSS. Tele: SR. EF: 25-30%. Denies pain. LS: Clear, equal bilaterally. GI: bowel sounds normoactive. : voids without difficulty. Skin: intact. Up independently. Regular diet. Port heparin locked.     Treatment plan: Tx to [facility] today. Will continue to monitor.   Discharge dispo: Today.

## 2020-06-21 NOTE — PROVIDER NOTIFICATION
TAMANNA paged sandy monroe: lactic triggered with BP 88/64, asymptomatic. did not order as she is going to [facility] now.    Per md do not order, BP is ok.

## 2020-06-21 NOTE — PLAN OF CARE
PRIMARY DIAGNOSIS: SVT  OUTPATIENT/OBSERVATION GOALS TO BE MET BEFORE DISCHARGE:  ADLs back to baseline: Yes    Activity and level of assistance: Ambulating independently.    Pain status: Pain free.    Return to near baseline physical activity: Yes     Discharge Planner Nurse   Safe discharge environment identified: Yes  Barriers to discharge: Yes, plan to transfer to [facility] today and be NPO after MN for an ablation.       Entered by: Mary Alice Lyn 06/21/2020 10:54 AM     Please review provider order for any additional goals.   Nurse to notify provider when observation goals have been met and patient is ready for discharge.

## 2020-06-21 NOTE — PROGRESS NOTES
Pt is to be transferred to [facility] (for an ablation on Monday) via transport at 1330. Vss ex low BP (pt asymptomatic, md notified and no lactic drawn), no co pain/cp/sob.  All dc education reviewed with pt in regards to: diet, activity, safety, s/s to report, medications (previous rx metoprolol returned to pharmacy), follow up appointments/care, etc.  Questions were answered and pt verbalized understanding that these instructions will change following discharge from [facility].  All belongings were sent with pt, will dc via wc by transport staff.         Addendum: OBSERVATION patient END time: 1341

## 2020-06-22 ENCOUNTER — PATIENT OUTREACH (OUTPATIENT)
Dept: CARE COORDINATION | Facility: CLINIC | Age: 50
End: 2020-06-22

## 2020-06-22 DIAGNOSIS — Z71.89 COUNSELING AND COORDINATION OF CARE: Primary | ICD-10-CM

## 2020-06-22 LAB
ANION GAP SERPL CALCULATED.3IONS-SCNC: 5 MMOL/L (ref 3–14)
B-HCG SERPL-ACNC: 1 IU/L (ref 0–5)
BUN SERPL-MCNC: 21 MG/DL (ref 7–30)
CALCIUM SERPL-MCNC: 9.6 MG/DL (ref 8.5–10.1)
CHLORIDE SERPL-SCNC: 107 MMOL/L (ref 94–109)
CO2 SERPL-SCNC: 25 MMOL/L (ref 20–32)
CREAT SERPL-MCNC: 0.87 MG/DL (ref 0.52–1.04)
ERYTHROCYTE [DISTWIDTH] IN BLOOD BY AUTOMATED COUNT: 12.1 % (ref 10–15)
GFR SERPL CREATININE-BSD FRML MDRD: 77 ML/MIN/{1.73_M2}
GLUCOSE SERPL-MCNC: 92 MG/DL (ref 70–99)
HCT VFR BLD AUTO: 37.9 % (ref 35–47)
HGB BLD-MCNC: 13.3 G/DL (ref 11.7–15.7)
INR PPP: 1.07 (ref 0.86–1.14)
MCH RBC QN AUTO: 30.5 PG (ref 26.5–33)
MCHC RBC AUTO-ENTMCNC: 35.1 G/DL (ref 31.5–36.5)
MCV RBC AUTO: 87 FL (ref 78–100)
PLATELET # BLD AUTO: 185 10E9/L (ref 150–450)
POTASSIUM SERPL-SCNC: 4.1 MMOL/L (ref 3.4–5.3)
RBC # BLD AUTO: 4.36 10E12/L (ref 3.8–5.2)
SODIUM SERPL-SCNC: 137 MMOL/L (ref 133–144)
WBC # BLD AUTO: 2.9 10E9/L (ref 4–11)

## 2020-06-22 PROCEDURE — 93621 COMP EP EVL L PAC&REC C SINS: CPT

## 2020-06-22 PROCEDURE — 99153 MOD SED SAME PHYS/QHP EA: CPT | Performed by: INTERNAL MEDICINE

## 2020-06-22 PROCEDURE — 99221 1ST HOSP IP/OBS SF/LOW 40: CPT | Mod: 25 | Performed by: INTERNAL MEDICINE

## 2020-06-22 PROCEDURE — 02583ZZ DESTRUCTION OF CONDUCTION MECHANISM, PERCUTANEOUS APPROACH: ICD-10-PCS | Performed by: INTERNAL MEDICINE

## 2020-06-22 PROCEDURE — 25000132 ZZH RX MED GY IP 250 OP 250 PS 637: Performed by: INTERNAL MEDICINE

## 2020-06-22 PROCEDURE — 84702 CHORIONIC GONADOTROPIN TEST: CPT | Performed by: INTERNAL MEDICINE

## 2020-06-22 PROCEDURE — 80048 BASIC METABOLIC PNL TOTAL CA: CPT | Performed by: INTERNAL MEDICINE

## 2020-06-22 PROCEDURE — C1894 INTRO/SHEATH, NON-LASER: HCPCS | Performed by: INTERNAL MEDICINE

## 2020-06-22 PROCEDURE — C1730 CATH, EP, 19 OR FEW ELECT: HCPCS | Performed by: INTERNAL MEDICINE

## 2020-06-22 PROCEDURE — 93005 ELECTROCARDIOGRAM TRACING: CPT

## 2020-06-22 PROCEDURE — 27210794 ZZH OR GENERAL SUPPLY STERILE: Performed by: INTERNAL MEDICINE

## 2020-06-22 PROCEDURE — 93010 ELECTROCARDIOGRAM REPORT: CPT | Performed by: INTERNAL MEDICINE

## 2020-06-22 PROCEDURE — 02K83ZZ MAP CONDUCTION MECHANISM, PERCUTANEOUS APPROACH: ICD-10-PCS | Performed by: INTERNAL MEDICINE

## 2020-06-22 PROCEDURE — 85610 PROTHROMBIN TIME: CPT | Performed by: INTERNAL MEDICINE

## 2020-06-22 PROCEDURE — 25000128 H RX IP 250 OP 636: Performed by: INTERNAL MEDICINE

## 2020-06-22 PROCEDURE — 25000125 ZZHC RX 250: Performed by: INTERNAL MEDICINE

## 2020-06-22 PROCEDURE — 25800030 ZZH RX IP 258 OP 636: Performed by: INTERNAL MEDICINE

## 2020-06-22 PROCEDURE — 99152 MOD SED SAME PHYS/QHP 5/>YRS: CPT | Performed by: INTERNAL MEDICINE

## 2020-06-22 PROCEDURE — 93653 COMPRE EP EVAL TX SVT: CPT | Performed by: INTERNAL MEDICINE

## 2020-06-22 PROCEDURE — 21000001 ZZH R&B HEART CARE

## 2020-06-22 PROCEDURE — 85027 COMPLETE CBC AUTOMATED: CPT | Performed by: INTERNAL MEDICINE

## 2020-06-22 PROCEDURE — 93613 INTRACARDIAC EPHYS 3D MAPG: CPT | Performed by: INTERNAL MEDICINE

## 2020-06-22 PROCEDURE — 4A0234Z MEASUREMENT OF CARDIAC ELECTRICAL ACTIVITY, PERCUTANEOUS APPROACH: ICD-10-PCS | Performed by: INTERNAL MEDICINE

## 2020-06-22 PROCEDURE — 99232 SBSQ HOSP IP/OBS MODERATE 35: CPT | Performed by: INTERNAL MEDICINE

## 2020-06-22 PROCEDURE — 40000852 ZZH STATISTIC HEART CATH LAB OR EP LAB

## 2020-06-22 PROCEDURE — C1887 CATHETER, GUIDING: HCPCS | Performed by: INTERNAL MEDICINE

## 2020-06-22 RX ORDER — HEPARIN SODIUM 200 [USP'U]/100ML
100-500 INJECTION, SOLUTION INTRAVENOUS CONTINUOUS PRN
Status: DISCONTINUED | OUTPATIENT
Start: 2020-06-22 | End: 2020-06-22 | Stop reason: HOSPADM

## 2020-06-22 RX ORDER — FENTANYL CITRATE 50 UG/ML
INJECTION, SOLUTION INTRAMUSCULAR; INTRAVENOUS
Status: DISCONTINUED | OUTPATIENT
Start: 2020-06-22 | End: 2020-06-22 | Stop reason: HOSPADM

## 2020-06-22 RX ORDER — HEPARIN SODIUM 200 [USP'U]/100ML
100-600 INJECTION, SOLUTION INTRAVENOUS CONTINUOUS PRN
Status: DISCONTINUED | OUTPATIENT
Start: 2020-06-22 | End: 2020-06-22 | Stop reason: HOSPADM

## 2020-06-22 RX ORDER — DOBUTAMINE HYDROCHLORIDE 200 MG/100ML
5-40 INJECTION INTRAVENOUS CONTINUOUS PRN
Status: DISCONTINUED | OUTPATIENT
Start: 2020-06-22 | End: 2020-06-22 | Stop reason: HOSPADM

## 2020-06-22 RX ORDER — SODIUM CHLORIDE, SODIUM LACTATE, POTASSIUM CHLORIDE, CALCIUM CHLORIDE 600; 310; 30; 20 MG/100ML; MG/100ML; MG/100ML; MG/100ML
INJECTION, SOLUTION INTRAVENOUS CONTINUOUS
Status: DISCONTINUED | OUTPATIENT
Start: 2020-06-22 | End: 2020-06-22 | Stop reason: HOSPADM

## 2020-06-22 RX ORDER — NALOXONE HYDROCHLORIDE 0.4 MG/ML
.1-.4 INJECTION, SOLUTION INTRAMUSCULAR; INTRAVENOUS; SUBCUTANEOUS
Status: DISCONTINUED | OUTPATIENT
Start: 2020-06-22 | End: 2020-06-23 | Stop reason: HOSPADM

## 2020-06-22 RX ORDER — LIDOCAINE 40 MG/G
CREAM TOPICAL
Status: DISCONTINUED | OUTPATIENT
Start: 2020-06-22 | End: 2020-06-22 | Stop reason: HOSPADM

## 2020-06-22 RX ORDER — CEFAZOLIN SODIUM 1 G/3ML
1 INJECTION, POWDER, FOR SOLUTION INTRAMUSCULAR; INTRAVENOUS
Status: DISCONTINUED | OUTPATIENT
Start: 2020-06-22 | End: 2020-06-22 | Stop reason: HOSPADM

## 2020-06-22 RX ORDER — OXYCODONE AND ACETAMINOPHEN 5; 325 MG/1; MG/1
1 TABLET ORAL EVERY 4 HOURS PRN
Status: DISCONTINUED | OUTPATIENT
Start: 2020-06-22 | End: 2020-06-23 | Stop reason: HOSPADM

## 2020-06-22 RX ADMIN — SODIUM CHLORIDE, POTASSIUM CHLORIDE, SODIUM LACTATE AND CALCIUM CHLORIDE: 600; 310; 30; 20 INJECTION, SOLUTION INTRAVENOUS at 15:05

## 2020-06-22 RX ADMIN — POTASSIUM CHLORIDE 40 MEQ: 1500 TABLET, EXTENDED RELEASE ORAL at 08:43

## 2020-06-22 RX ADMIN — FUROSEMIDE 20 MG: 20 TABLET ORAL at 08:43

## 2020-06-22 RX ADMIN — LISINOPRIL 2.5 MG: 2.5 TABLET ORAL at 08:43

## 2020-06-22 RX ADMIN — ACYCLOVIR 400 MG: 400 TABLET ORAL at 08:43

## 2020-06-22 RX ADMIN — Medication 5 ML: at 18:55

## 2020-06-22 RX ADMIN — Medication 5 ML: at 10:22

## 2020-06-22 RX ADMIN — CETIRIZINE HYDROCHLORIDE 10 MG: 10 TABLET, FILM COATED ORAL at 08:43

## 2020-06-22 ASSESSMENT — MIFFLIN-ST. JEOR: SCORE: 1326.22

## 2020-06-22 ASSESSMENT — ACTIVITIES OF DAILY LIVING (ADL)
ADLS_ACUITY_SCORE: 12

## 2020-06-22 NOTE — CONSULTS
[facility]    Electrophysiology Consultation     Date of Admission:  6/21/2020  Date of Consult (When I saw the patient): 06/22/20    Assessment & Plan   Kassie Ramirez is a 50 year old female who was admitted to [facility] on 6/19 with c/o tachycardia. Seen by Dr. Baum who noted multiple episodes of SVT and she was transferred to [facility] for ablation procedure after d/w Dr. Bynum. He was transferred 6/21/2020.    1. SVT -   - First occurrence 11/2019 treated with adenosine in ER  - Has had episodes ~1/month, significant increase over the last month  - No syncope, presyncope, but sx'c with palpitations and extreme fatigue.    - BB therapy limited due to soft BPs in 90-100s     PLAN:   * Discussed options of AAD (amiodarone only choice with severe CM, which is a poor choice for her given age) vs EP Study and possible ablation   * We discussed the risks, benefits and indications of proceeding with electrophysiology study and possible ablation, including but not limited to the use of conscious sedation, peripheral vessel injury and discomfort, heart attack, death, stroke, cardiac puncture and/or tamponade, pneumothorax, hybqa-xs-czhtl-arrhythmias requiring further treatment and damage to existing electrical structures that may require permanent pacemaker implantation. We reviewed that additional procedures may be required. We briefly discussed post-procedural restrictions and post-procedural discomfort. The patient voiced understanding and is willing to proceed. A consent form was signed   * Would plan to keep her until tomorrow AM per pt preference   * HOLD metoprolol prior to procedure    2. Large B-cell lymphoma -   - currently undergoing tx with R-CHOP (Dr. Castro,  Oncology)    3. New Non-ischemic Cardiomyopathy  25-30% -   - Hospitalized at [facility] 6/14-6/17/2020 with heart failure.  - Angio negative  - PTA on Lasix 20, lisinopril 2.5 and metoprolol Xl 12.5 mg daily    - On exam, no  evidence of fluid overload. BMP 6/20 was wnl     PLAN:   * Continue routine follow-up. I think we should switch her metoprolol XL to carvedilol once ablation done if BP can tolerate   * Sees [facility] Cirilo Vo 7/7     Hortencia Aragon PA-C    Code Status    Full Code    Reason for Consult   Reason for consult: We were asked by Cardiology to evaluate for SVT.    Primary Care Physician   Mary Alice Colón    Chief Complaint   SVT    History is obtained from the patient and EPIC chart.    History of Present Illness   Kassie Ramirez is a 50 year old female with h/o active large B-cell lymphoma being treated with R-CHOP, normal Amador, non-ischemic CM (thought due to doxorubicin), with EF 25-30% and recurrent episodes of SVT which has been responsive to adenosine and vagal maneuvers. BP has been soft, limiting pharmacologic therapy.      She [facility] to [facility] 6/19 with c/o tachycardia most of the day, with rates to 130s at times.    Her SVT started back in 11/2019, in ER and treated with adenosine. Was having ~1 episode/month, but has significant increase in sx'c episodes 6/2020.    No fainting/falling. Sxs are extreme fatigue and palpitations.     Past Medical History   I have reviewed this patient's medical history and updated it with pertinent information if needed.   Past Medical History:   Diagnosis Date     Anxiety attack 9/16/2014     Diffuse large B-cell lymphoma (H)     Diagnosed 11/2019     Encounter for Essure implantation 2009     Generalized anxiety disorder 9/16/2014    zoloft = flat emotions     HFrEF (heart failure with reduced ejection fraction) (H)     new diagnosis 6/14     Menopausal disorder     started on OCPs by menopause center 3/2017 (takes active continuously)     Menstrual headache     helped by OCPs and magnesium     Paroxysmal SVT (supraventricular tachycardia) (H) 06/2020     GERTRUDE (stress urinary incontinence, female)     sling procedure 2016       Past Surgical History   I  have reviewed this patient's surgical history and updated it with pertinent information if needed.  Past Surgical History:   Procedure Laterality Date     CV CORONARY ANGIOGRAM N/A 6/15/2020    Procedure: Coronary Angiogram;  Surgeon: Luis Eduardo Phillips MD;  Location:  HEART CARDIAC CATH LAB     CV LEFT HEART CATH N/A 6/15/2020    Procedure: Left Heart Cath;  Surgeon: Luis Eduardo Phillips MD;  Location:  HEART CARDIAC CATH LAB     H KIT ESSURE  2009    essrickey - Dr. Cailin Raymundo      HERNIORRHAPHY UMBILICAL  1974     IR CHEST PORT PLACEMENT > 5 YRS OF AGE  1/9/2020     SLING TRANSPUBO WITHOUT ANTERIOR COLPORRHAPHY N/A 11/21/2016    Procedure: SLING TRANSPUBO WITHOUT ANTERIOR COLPORRHAPHY;  Surgeon: Hernesto Berrios MD;  Location:  OR       Prior to Admission Medications   Prior to Admission Medications   Prescriptions Last Dose Informant Patient Reported? Taking?   LORazepam 0.5 MG PO tablet prn  No No   Sig: Take 1-2 tablets (0.5-1 mg) by mouth every 6 hours as needed (Breakthrough Nausea / Vomiting)   acyclovir (ZOVIRAX) 400 MG tablet 6/21/2020 at Unknown time  Yes Yes   Sig: Take 400 mg by mouth daily   cetirizine (ZYRTEC) 10 MG tablet 6/21/2020 at Unknown time  Yes Yes   Sig: Take 10 mg by mouth daily   furosemide (LASIX) 20 MG tablet 6/21/2020 at Unknown time  No Yes   Sig: Take 1 tablet (20 mg) by mouth daily   lidocaine-prilocaine (EMLA) 2.5-2.5 % external cream prn  Yes No   Sig: Apply 1 applicator topically as needed for moderate pain   lisinopril (ZESTRIL) 2.5 MG tablet 6/20/2020 at Unknown time  No Yes   Sig: Take 1 tablet (2.5 mg) by mouth daily   metoprolol succinate ER (TOPROL-XL) 25 MG 24 hr tablet 6/21/2020 at Unknown time  No Yes   Sig: Take 0.5 tablets (12.5 mg) by mouth daily   potassium chloride ER (K-DUR/KLOR-CON M) 20 MEQ CR tablet 6/21/2020 at Unknown time  No Yes   Sig: Take 2 tablets (40 mEq) by mouth daily      Facility-Administered Medications: None         Medications     - MEDICATION  INSTRUCTIONS -         acyclovir  400 mg Oral Daily     cetirizine  10 mg Oral Daily     furosemide  20 mg Oral Daily     heparin  5 mL Intracatheter Q28 Days     heparin lock flush  5-10 mL Intracatheter Q24H     lisinopril  2.5 mg Oral Daily     metoprolol succinate ER  12.5 mg Oral Daily     potassium chloride ER  40 mEq Oral Daily     sodium chloride (PF)  3 mL Intracatheter Q8H       Allergies   Allergies   Allergen Reactions     Cold & Flu [Cold Defense Fighter]      See pseudoephedrine     Seasonal Allergies      Sudafed Cold-Cough [Dayquil Liquicaps]      Pseudoephedrine Rash     Rash then skins peels off        Social History   I have updated and reviewed the following Social History Narrative:   Social History     Social History Narrative    Stay-at-home mom.     Lives at home.    Family History   I have reviewed this patient's family history and updated it with pertinent information if needed.   Family History   Problem Relation Age of Onset     Hypertension Mother      Depression Mother      Lipids Mother      Cardiovascular Mother      Circulatory Mother      Diabetes Mother      Heart Disease Mother      Cerebrovascular Disease Mother      Obesity Mother      C.A.D. Mother      Lung Cancer Mother         smoker     Eye Disorder Father         cone dystrophy     Macular Degeneration Father      Diabetes Maternal Aunt         type 2     Hypertension Maternal Aunt      Heart Disease Maternal Grandfather      Heart Disease Sister         high cholesterol       Macular Degeneration Sister    No Family hx of SCD      Review of Systems   The 5 point Review of Systems is negative other than noted in the HPI or here.     Physical Exam   Temp: 97.9  F (36.6  C) Temp src: Oral BP: 98/75 Pulse: 83 Heart Rate: 84 Resp: 16 SpO2: 99 % O2 Device: None (Room air)    Vital Signs with Ranges  Temp:  [97.3  F (36.3  C)-98.7  F (37.1  C)] 97.9  F (36.6  C)  Pulse:  [] 83  Heart Rate:  [] 84  Resp:  [13-16]  16  BP: ()/(61-75) 98/75  SpO2:  [97 %-99 %] 99 %  152 lbs 0 oz    Telemetry:  SR.     Constitutional: awake, alert, cooperative, no apparent distress, and appears stated age. Sleeping but easily arousable  Eyes: Sclera normal  ENT: normocephalic, atraumatic  Respiratory: CTA B  Cardiovascular: Regular. No MRG noted. No JVD  GI: BS present  Skin: Scattered bruises  Musculoskeletal: No edema  Neurologic: Awake, alert, oriented. Displayed good insight    Data   I personally reviewed the EKG tracing showing  bpm on 6/19 @ 1851. EKG on 6/15 @ 0805 showed SVT (likely AVNRT) @ 142 bpm.  Results for orders placed or performed during the hospital encounter of 06/21/20 (from the past 24 hour(s))   Lactic acid level STAT   Result Value Ref Range    Lactate for Sepsis Protocol 1.0 0.7 - 2.0 mmol/L

## 2020-06-22 NOTE — PROGRESS NOTES
Transition Communication Hand-off for Care Transitions to Next Level of Care Provider    Name: Kassie Ramirez  : 1970  MRN #: [id number]  Primary Care Provider: Mary Alice Colón     Primary Clinic: [address] [place] [id number]     Reason for Hospitalization:  Sinus tachycardia [R00.0]  SVT (supraventricular tachycardia) (H) [I47.1]  Community acquired pneumonia of right lower lobe of lung [J18.9]  Admit Date/Time: 2020  6:41 PM  Discharge Date: 2020  Payor Source: Payor: BCBS / Plan: [place]BS [place] / Product Type: Indemnity /     Readmission Assessment Measure (KAIN) Risk Score/category: Very High        Reason for Communication Hand-off Referral: Avoidable readmission within 30 days  Other Tachycardia    Discharge Plan: Home       Concern for non-adherence with plan of care: No     Follow-up specialty is recommended: Yes. Follow up with [facility] as scheduled.     Follow-up plan:    Future Appointments   Date Time Provider Department Center   2020  2:45 PM Deborah Horan MD [facility] PSA CLIN   2020 12:30 PM RH LAB DRAW 1 RHCIRS [facility]   2020  2:00 PM RHPET1 RHPETC [facility]   2020  8:30 AM Castro Castro MD [facility]CRS [facility]   2020  1:50 PM Asmita Vo APRN CNP [facility] PSA CLIN   2020  2:15 PM Malachi Finn MD [facility] PSA CLIN   2020  9:45 AM RSCCECHO2 RHCVCC [facility]     Any outstanding tests or procedures:  Yes. PET Scan & ECHO as scheduled.      Key Recommendations:  CTS identifies patient as high risk due to very high KAIN score. Currently admitted for tachycardia, this is her 6th admission in 6 months. Per chart review, pt resides at home with her , Florian. Baseline mobility is independent. She remains up independently.      Her PMH that can effect her KAIN score includes anxiety attacks, diffuse large B-cell lymphoma, general anxiety d/o, HFrEF, menopausal d/o, paroxsmal SVT &  stress urinary incontinence. Her PTA medications that can effect her KAIN score includes Lorazepam.     Cristine Almanzar RN, BSN, CPHN, CM    AVS/Discharge Summary is the source of truth; this is a helpful guide for improved communication of patient story

## 2020-06-22 NOTE — PROGRESS NOTES
1513 pt to care suites about 1500 for hold for ep lab. Consent signed. Denies pain. Pulses checked. Iv fluids started. Has call light in place. Npo since last night. neuros checked. Call light in reach.

## 2020-06-22 NOTE — PROGRESS NOTES
Pt back from ablation. Pt AO, VSS on RA ex soft BPs (asymptomatic). Denies pain. Tele NSR. Groin site soft, no bleeding/bruising/hematoma. Pedal pulses palpable, CMS intact. Bedrest until 2115. Neuros intact. Tolerating regular diet. Port heparin locked. Due to void. Plan to discharge tomorrow.

## 2020-06-22 NOTE — PLAN OF CARE
A&O x 4. VSS. RA. Tele: SR Denies pain/SOB. Up independent. Run of SVT's in 140-160s x 2, converted to SR w/valsalva maneuver.Triggered sepsis, Lactic acid 1.0. NPO at midnight for Ablation today. Will continue w/plan of care.

## 2020-06-22 NOTE — PROGRESS NOTES
Patient states she has an out patient PET scan tomorrow and needs to be sure to eat a high protein diet for dinner tonight.  She is requesting 2 chicken breasts, broccoli and 1% milk.  Writer will pass this onto on-coming nurse.

## 2020-06-22 NOTE — PRE-PROCEDURE
GENERAL PRE-PROCEDURE:   Procedure:  SVT ablation  Date/Time:  6/22/2020 3:47 PM    Written consent obtained?: Yes    Risks and benefits: Risks, benefits and alternatives were discussed    Consent given by:  Patient  Patient states understanding of procedure being performed: Yes    Patient's understanding of procedure matches consent: Yes    Procedure consent matches procedure scheduled: Yes    Expected level of sedation:  Moderate  Appropriately NPO:  Yes  ASA Class:  Class 3- Severe systemic disease, definite functional limitations  Mallampati  :  Grade 2- soft palate, base of uvula, tonsillar pillars, and portion of posterior pharyngeal wall visible  Lungs:  Lungs clear with good breath sounds bilaterally  Heart:  Normal heart sounds and rate  History & Physical reviewed:  History and physical reviewed and no updates needed  Statement of review:  I have reviewed the lab findings, diagnostic data, medications, and the plan for sedation

## 2020-06-22 NOTE — PROGRESS NOTES
[facility]  Hospitalist Progress Note  Alexander Garcia MD  06/22/2020    Assessment & Plan   Summary of Stay: Kassie Ramirez is a 50 year old female with history of B-cell lymphoma, was treated and completed chemotherapy, recurrent SVTs, recently diagnosed with cardiomyopathy believed to be tachy induced versus chemotherapy, discharged from this hospital on 6/17 and admitted on 6/19/2020 with symptomatic and recurrent supraventricular tachycardia.    Problem List:   1.  Recurrent SVT in setting of chemotherapy related cardiomyopathy (new dx)  -She had multiple episodes of recurrent SVT since her recent admission to the hospital.   -It improves with vagal maneuvers.  -Cardiology consulted, input appreciated by Dr Soy Almanzar  -was started on Toprol-XL with dose increased from 12.5 to 25 mg p.o. daily, currently on hold for EPS study.  -blood pressure was limiting factor.    -She was seen by EP with plans for EPS and SVT ablation later today   2.  New diagnosis of cardiomyopathy.    -LHC showed normal coronaries on 6/15  -Recent admission to the hospital echo showed ejection fraction of 25-30%.  -Suspected  tachycardia-mediated versus possible chemotherapy mediated.  -She completed her chemo about 2 weeks ago  -She is on low-dose lisinopril and Lasix.   -BB on hold  3.  B-cell lymphoma.  -She was treated with chemo, has follow-up with Dr. Castro.  -There was a plan for her to hold PET scan on 6/23.  -She will follow-up with her oncologist as an outpatient     DVT Prophylaxis: Pneumatic Compression Devices  Code Status: Full Code  Discharge Dispo: Pending further recommendation from cardiology  Estimated Disch Date / # of Days until Disch: anticipate on 6/23    Interval History   -- patient transferred yesterday from [facility] for ablation  -- she had brief episode of SVT that was aborted with valsalva  -- stable overnight rhythm  -- plan for EPS and SVT ablation    -Data reviewed today: I reviewed  "all new labs and imaging over the last 24 hours. I personally reviewed no images or EKG's today.    Physical Exam   Heart Rate: 84, Blood pressure 98/75, pulse 83, temperature 97.9  F (36.6  C), temperature source Oral, resp. rate 16, height 1.676 m (5' 6\"), weight 68.9 kg (152 lb), last menstrual period 11/06/2019, SpO2 99 %, not currently breastfeeding.  Vitals:    06/21/20 1415 06/22/20 0638   Weight: 68.8 kg (151 lb 9.6 oz) 68.9 kg (152 lb)     Vital Signs with Ranges  Temp:  [97.3  F (36.3  C)-98.7  F (37.1  C)] 97.9  F (36.6  C)  Pulse:  [] 83  Heart Rate:  [] 84  Resp:  [13-16] 16  BP: ()/(63-75) 98/75  SpO2:  [97 %-99 %] 99 %  I/O's Last 24 hours  I/O last 3 completed shifts:  In: 360 [P.O.:360]  Out: 300 [Urine:300]    Constitutional: Awake, alert, cooperative, no apparent distress  Respiratory: Clear to auscultation bilaterally, no crackles or wheezing  Cardiovascular: Regular rate and rhythm, normal S1 and S2, and no murmur noted  GI: Normal bowel sounds, soft, non-distended, non-tender  Skin/Integumen: No rashes, no cyanosis, no edema  Other:      Medications   All medications were reviewed.    lactated ringers       - MEDICATION INSTRUCTIONS -         acyclovir  400 mg Oral Daily     cetirizine  10 mg Oral Daily     furosemide  20 mg Oral Daily     heparin  5 mL Intracatheter Q28 Days     heparin lock flush  5-10 mL Intracatheter Q24H     lisinopril  2.5 mg Oral Daily     potassium chloride ER  40 mEq Oral Daily     sodium chloride (PF)  3 mL Intracatheter Q8H        Data   Recent Labs   Lab 06/22/20  1025 06/20/20  0700 06/19/20  2101 06/19/20  1853   WBC 2.9* 3.3*  --  3.8*   HGB 13.3 12.2  --  12.5   MCV 87 91  --  91    183  --  210   INR 1.07  --   --   --     141  --  139   POTASSIUM 4.1 3.9  --  4.0   CHLORIDE 107 108  --  104   CO2 25 23  --  27   BUN 21 21  --  26   CR 0.87 0.99  --  0.97   ANIONGAP 5 10  --  8   AFSHAN 9.6 8.9  --  9.2   GLC 92 94  --  108*   TROPI  " --   --  0.106* 0.091*       No results found for this or any previous visit (from the past 24 hour(s)).    Alexander Garcia MD  Text Page  (7am to 6pm)

## 2020-06-22 NOTE — PROGRESS NOTES
Clinic Care Coordination Contact    Situation: Handoff received from inpatient staff related to patient's hospitalization at [facility] 6/19/20 - 6/21/20 for supraventricular tachycardia.   Patient chart reviewed by care coordinator.    Background: Per handoff report, patient with very high KAIN score in setting of 6 admissions in 6 months.  Patient with history of anxiety with anxiety attacks, diffuse large B-cell lymphoma, HFrEF, paroxysmal SVT.    Assessment: Per chart review, patient transferred to [facility] today 6/22/20 for EP study/ablation related to SVT; she remains hospitalized at present time.    Plan/Recommendations: CC RN will review patient's chart again in approximately 1 business day to get updates on patient's status with plan of patient outreach following hospital discharge.    Santos House RN  Clinic Care Coordinator  [facility]  Ph: [phone]

## 2020-06-23 ENCOUNTER — PATIENT OUTREACH (OUTPATIENT)
Dept: CARE COORDINATION | Facility: CLINIC | Age: 50
End: 2020-06-23

## 2020-06-23 ENCOUNTER — HOSPITAL ENCOUNTER (OUTPATIENT)
Dept: PET IMAGING | Facility: CLINIC | Age: 50
End: 2020-06-23
Attending: INTERNAL MEDICINE
Payer: COMMERCIAL

## 2020-06-23 VITALS
BODY MASS INDEX: 24.44 KG/M2 | SYSTOLIC BLOOD PRESSURE: 94 MMHG | DIASTOLIC BLOOD PRESSURE: 68 MMHG | HEIGHT: 66 IN | TEMPERATURE: 98.3 F | HEART RATE: 94 BPM | OXYGEN SATURATION: 96 % | RESPIRATION RATE: 16 BRPM | WEIGHT: 152.1 LBS

## 2020-06-23 DIAGNOSIS — C83.30 DIFFUSE LARGE B-CELL LYMPHOMA, UNSPECIFIED BODY REGION (H): ICD-10-CM

## 2020-06-23 LAB
ANION GAP SERPL CALCULATED.3IONS-SCNC: 5 MMOL/L (ref 3–14)
BUN SERPL-MCNC: 26 MG/DL (ref 7–30)
CALCIUM SERPL-MCNC: 9.4 MG/DL (ref 8.5–10.1)
CHLORIDE SERPL-SCNC: 108 MMOL/L (ref 94–109)
CO2 SERPL-SCNC: 24 MMOL/L (ref 20–32)
CREAT SERPL-MCNC: 0.96 MG/DL (ref 0.52–1.04)
ERYTHROCYTE [DISTWIDTH] IN BLOOD BY AUTOMATED COUNT: 12.2 % (ref 10–15)
GFR SERPL CREATININE-BSD FRML MDRD: 69 ML/MIN/{1.73_M2}
GLUCOSE SERPL-MCNC: 86 MG/DL (ref 70–99)
HCT VFR BLD AUTO: 37.3 % (ref 35–47)
HGB BLD-MCNC: 12.9 G/DL (ref 11.7–15.7)
MCH RBC QN AUTO: 30.2 PG (ref 26.5–33)
MCHC RBC AUTO-ENTMCNC: 34.6 G/DL (ref 31.5–36.5)
MCV RBC AUTO: 87 FL (ref 78–100)
PLATELET # BLD AUTO: 188 10E9/L (ref 150–450)
POTASSIUM SERPL-SCNC: 4 MMOL/L (ref 3.4–5.3)
RBC # BLD AUTO: 4.27 10E12/L (ref 3.8–5.2)
SODIUM SERPL-SCNC: 137 MMOL/L (ref 133–144)
WBC # BLD AUTO: 3.4 10E9/L (ref 4–11)

## 2020-06-23 PROCEDURE — 85027 COMPLETE CBC AUTOMATED: CPT | Performed by: INTERNAL MEDICINE

## 2020-06-23 PROCEDURE — 80048 BASIC METABOLIC PNL TOTAL CA: CPT | Performed by: INTERNAL MEDICINE

## 2020-06-23 PROCEDURE — 93010 ELECTROCARDIOGRAM REPORT: CPT | Performed by: INTERNAL MEDICINE

## 2020-06-23 PROCEDURE — 99238 HOSP IP/OBS DSCHRG MGMT 30/<: CPT | Performed by: HOSPITALIST

## 2020-06-23 PROCEDURE — 70491 CT SOFT TISSUE NECK W/DYE: CPT

## 2020-06-23 PROCEDURE — 93005 ELECTROCARDIOGRAM TRACING: CPT

## 2020-06-23 PROCEDURE — A9552 F18 FDG: HCPCS | Performed by: INTERNAL MEDICINE

## 2020-06-23 PROCEDURE — 99231 SBSQ HOSP IP/OBS SF/LOW 25: CPT | Performed by: INTERNAL MEDICINE

## 2020-06-23 PROCEDURE — 25000128 H RX IP 250 OP 636: Performed by: INTERNAL MEDICINE

## 2020-06-23 PROCEDURE — 25000132 ZZH RX MED GY IP 250 OP 250 PS 637: Performed by: PHYSICIAN ASSISTANT

## 2020-06-23 PROCEDURE — 34300033 ZZH RX 343: Performed by: INTERNAL MEDICINE

## 2020-06-23 PROCEDURE — 71260 CT THORAX DX C+: CPT

## 2020-06-23 PROCEDURE — 25000132 ZZH RX MED GY IP 250 OP 250 PS 637: Performed by: INTERNAL MEDICINE

## 2020-06-23 RX ORDER — HEPARIN SODIUM (PORCINE) LOCK FLUSH IV SOLN 100 UNIT/ML 100 UNIT/ML
500 SOLUTION INTRAVENOUS ONCE
Status: COMPLETED | OUTPATIENT
Start: 2020-06-23 | End: 2020-06-23

## 2020-06-23 RX ORDER — IOPAMIDOL 755 MG/ML
82 INJECTION, SOLUTION INTRAVASCULAR ONCE
Status: COMPLETED | OUTPATIENT
Start: 2020-06-23 | End: 2020-06-23

## 2020-06-23 RX ORDER — CARVEDILOL 3.12 MG/1
1.56 TABLET ORAL 2 TIMES DAILY WITH MEALS
Qty: 30 TABLET | Refills: 0 | Status: SHIPPED | OUTPATIENT
Start: 2020-06-23 | End: 2020-07-07

## 2020-06-23 RX ORDER — CARVEDILOL 3.12 MG/1
1.56 TABLET, FILM COATED ORAL 2 TIMES DAILY WITH MEALS
Status: DISCONTINUED | OUTPATIENT
Start: 2020-06-23 | End: 2020-06-23 | Stop reason: HOSPADM

## 2020-06-23 RX ADMIN — CARVEDILOL 1.56 MG: 3.12 TABLET, FILM COATED ORAL at 10:26

## 2020-06-23 RX ADMIN — FLUDEOXYGLUCOSE F-18 14 MCI.: 500 INJECTION, SOLUTION INTRAVENOUS at 13:49

## 2020-06-23 RX ADMIN — LISINOPRIL 2.5 MG: 2.5 TABLET ORAL at 09:32

## 2020-06-23 RX ADMIN — IOPAMIDOL 91 ML: 755 INJECTION, SOLUTION INTRAVENOUS at 13:49

## 2020-06-23 RX ADMIN — Medication 5 ML: at 05:35

## 2020-06-23 RX ADMIN — CETIRIZINE HYDROCHLORIDE 10 MG: 10 TABLET, FILM COATED ORAL at 09:32

## 2020-06-23 RX ADMIN — FUROSEMIDE 20 MG: 20 TABLET ORAL at 09:32

## 2020-06-23 RX ADMIN — ACYCLOVIR 400 MG: 400 TABLET ORAL at 09:32

## 2020-06-23 RX ADMIN — POTASSIUM CHLORIDE 40 MEQ: 1500 TABLET, EXTENDED RELEASE ORAL at 09:32

## 2020-06-23 RX ADMIN — Medication 500 UNITS: at 13:50

## 2020-06-23 ASSESSMENT — ACTIVITIES OF DAILY LIVING (ADL)
ADLS_ACUITY_SCORE: 12
ADLS_ACUITY_SCORE: 12
DRESS: 0-->INDEPENDENT
RETIRED_COMMUNICATION: 0-->UNDERSTANDS/COMMUNICATES WITHOUT DIFFICULTY
BATHING: 0-->INDEPENDENT
ADLS_ACUITY_SCORE: 12
COGNITION: 0 - NO COGNITION ISSUES REPORTED
RETIRED_EATING: 0-->INDEPENDENT
FALL_HISTORY_WITHIN_LAST_SIX_MONTHS: NO
ADLS_ACUITY_SCORE: 12
TOILETING: 0-->INDEPENDENT
AMBULATION: 0-->INDEPENDENT
TRANSFERRING: 0-->INDEPENDENT
SWALLOWING: 0-->SWALLOWS FOODS/LIQUIDS WITHOUT DIFFICULTY

## 2020-06-23 ASSESSMENT — MIFFLIN-ST. JEOR: SCORE: 1326.67

## 2020-06-23 NOTE — PROGRESS NOTES
A/O, VSS. Tele SR. R groin site WDL, CMS intact, no hematoma. Denies lightheadedness, SOB, or pain. All discharge instructions, prescriptions, and personal belongings given to pt. Emphasized with pt that new coreg tablets need to be cut in half, to monitor BP daily and to notify cardiology team if coreg not well tolerated. Port access left in place per MD order as pt has PET scan today, plan for clinic to de-access when test completed. All questions answered. Pt d/c to home via family.

## 2020-06-23 NOTE — DISCHARGE INSTRUCTIONS
SVT Ablation Discharge Instructions - Femoral     After you go home:      Have an adult stay with you until tomorrow.    You may resume your normal diet.       For 24 hours - due to the sedation you received:    Relax and take it easy.    Do NOT make any important or legal decisions.    Do NOT drive or operate machines at home or at work.    Do NOT drink alcohol.    Care of Groin Puncture Site:      For the first 24 hrs - check the puncture site every 1-2 hours while awake.    For 2 days, when you cough, sneeze, laugh or move your bowels, hold your hand over the puncture site and press firmly.    Remove the bandaid after 24 hours. If there is minor oozing, apply another bandaid and remove it after 12 hours.    It is normal to have a small bruise or pea size lump at the site.    You may shower tomorrow.  Do NOT take a bath, or use a hot tub or pool for at least 3 days. Do NOT scrub the site. Do not use lotion or powder near the puncture site.    Activity:            For 1-2 days:    No stooping or squatting    Do NOT do any heavy activity such as exercise, lifting, or straining.     No housework, yard work or any activity that make you sweat    Do NOT lift more than 10 pounds    Bleeding:      If you start bleeding from the site in your groin, lie down flat and press firmly on the site for 10 minutes.     Once bleeding stops, lay flat for 2 hours.    Call [facility] as soon as you can.       Call 911 right away if you have heavy bleeding or bleeding that does not stop.      Medicines:      Stop metoprolol XL 12.5 mg daily and switch to carvedilol which helps strengthen your heart muscle better than metoprolol at that dose does.  Pills are 3.125 mg size - take 1/2 tablet (1.56 mg) twice a day      If you have pain or shortness of breath, you may take Advil (ibuprofen) or Tylenol (acetaminophen).    Follow Up Appointments:      Keep appt with Cirilo Vo from [facility] on 7/7    An appointment has been set up for  you for follow-up care (virtual, with Josselin) on 7/23 @ 12:30     Call the clinic if:      You have increased pain or a large or growing hard lump around the site.    The site is red, swollen, hot or tender.    Blood or fluid is draining from the site.    You have chills or a fever greater than 101 F (38 C).    Your leg feels numb, cool or changes color.    Increased pain in the chest and/or groin.    Increased shortness of breath    Chest pain not relieved by Tylenol or Advil    New pain in the back or belly that you cannot control with Tylenol.    Recurrent irregular or fast heart rate lasting over 2 hours.    Any questions or concerns.    Heart rhythms:    You may have some irregular heartbeats. These feel very strong. They may make you feel that the fast heart rhythm is going to start again.  Give it time. The irregular beats should occur less often.       [facility]:    [phone] ( 8am-5pm M-F)  Shonda Rangel or Marilee    [phone] [facility]MP (7 days a week)

## 2020-06-23 NOTE — LETTER
[facility] CARE COORDINATION  [facility]  [address] [place] [address], [place] [address]    June 30, 2020    Kassie Ramirez  [address]  [facility] [id number]      Dear Kassie,    I am writing to you on behalf of the Care Coordination team that works with Mary Alice Colón PA-C at [facility]. I wanted to introduce myself and provide you with my contact information for you to be able to call me with any questions or concerns. Below is a description of clinic care coordination and how I can further assist you.      The clinic care coordination team is made up of a registered nurse,  and community health worker who understand the health care system. The goal of clinic care coordination is to help you manage your health and improve access to the health care system in the most efficient manner. The team can assist you in meeting your health care goals by providing education, coordinating services, strengthening the communication among your providers and supporting you with any resource needs.    Please feel free to contact me with any questions or concerns. We are focused on providing you with the highest-quality healthcare experience possible and that all starts with you.     Sincerely,     Santos House RN  Clinic Care Coordinator  [facility]  Ph: [phone]    Enclosed: I have enclosed a copy of a 24 Hour Access Plan. This has helpful phone numbers for you to call when needed. Please keep this in an easy to access place to use as needed.

## 2020-06-23 NOTE — LETTER
Health Care Home - Access Care Plan    About Me:    Patient Name:  Kassie Ramirez    YOB: 1970  Age:                      50 year old   [place] MRN:     [id number] Telephone Information:   Home Phone [phone]   Mobile [phone]       Address:  [address] [facility] [address] Email address:  [email or web address]      Emergency Contact(s)   Name Relationship Lgl Grd Work Phone Home Phone Mobile Phone   1ALBERTO ELLER Spouse   [phone]   2. DECLINED Declined                 Health Maintenance: Routine Health maintenance Reviewed: Due/Overdue   Health Maintenance Due   Topic Date Due     HF ACTION PLAN  1970     COLORECTAL CANCER SCREENING  03/07/1980     PNEUMOCOCCAL IMMUNIZATION 19-64 HIGHEST RISK (1 of 3 - PCV13) 03/07/1989     MAMMO SCREENING  09/22/2017     PREVENTIVE CARE VISIT  07/25/2019     LIPID  07/27/2019     PHQ-2  01/01/2020     ZOSTER IMMUNIZATION (1 of 2) 03/07/2020     My Access Plan  Medical Emergency 909   Questions or concerns during clinic hours Primary Clinic Line, I will call the clinic directly: [facility] [phone]   24 Hour Appointment Line [phone] or  [phone] [place] [phone]) (toll free)   24 Hour Nurse Line [phone] (toll free)   Questions or concerns outside clinic hours 24 Hour Appointment Line, I will call the after-hours on-call line:   [facility] [phone] or [phone]) (toll-free)   Preferred Urgent Care     Preferred Hospital [facility]  [phone]   Preferred Pharmacy [facility] [address] [facility]     Behavioral Health Crisis Line The National Suicide Prevention Lifeline at 1-476.910.7425 or 919       My Care Team Members  Patient Care Team       Relationship Specialty Notifications Start End    Mary Alice Colón PA-C PCP - General Physician Assistant  4/17/17     Phone:  [phone] Fax: [phone]         [address] [facility] [address]    Mary Alice Colón PA-C Assigned PCP   9/1/19     Phone: [phone] Fax: [phone] [address] [facility] [address]    Malachi Finn MD MD Cardiology  6/16/20     Phone: [phone] Fax: [phone] [address] [place] [address]    Zayda Almanzar APRN CNP Nurse Practitioner Nurse Practitioner - Adult Health  6/16/20     Phone: [phone] Fax: [phone] [address] [place] [address]    Lexi Mtz, RN Registered Nurse Cardiology Admissions 6/17/20     [facility]    Lexi Rod RN Registered Nurse Cardiology Admissions 6/17/20     [facility]           My Medical and Care Information  Problem List   Patient Active Problem List   Diagnosis     Anxiety attack     Generalized anxiety disorder     Controlled substance agreement signed     GERTRUDE (stress urinary incontinence, female)     Intractable migraine without aura and with status migrainosus     Menstrual headache     Menopausal disorder     Mixed hyperlipidemia     SVT (supraventricular tachycardia) (H)     Mediastinal mass     Diffuse large B-cell lymphoma of extranodal site (H) - per preliminary pathology from [facility] 11/27/19 - mediastinal mass wrapped around azalea and bilateral main stem bronchi     Bronchial compression     Neutropenic fever (H)     Acute kidney failure, unspecified (H)     Respiratory failure (H)     Lymphoma (H)     DLBCL (diffuse large B cell lymphoma) (H)     Chemotherapy adverse reaction     Myocardial injury, initial encounter     Acute systolic congestive heart failure (H)     Paroxysmal SVT (supraventricular tachycardia) (H)     Tachycardia     Atrial flutter (H)     Cardiomyopathy (H)      Current Medications:  Current Outpatient Medications   Medication     carvedilol (COREG) 3.125 MG tablet     cetirizine (ZYRTEC) 10 MG tablet      furosemide (LASIX) 20 MG tablet     lidocaine-prilocaine (EMLA) 2.5-2.5 % external cream     lisinopril (ZESTRIL) 2.5 MG tablet     potassium chloride ER (K-DUR/KLOR-CON M) 20 MEQ CR tablet     No current facility-administered medications for this visit.

## 2020-06-23 NOTE — DISCHARGE SUMMARY
[facility]    Discharge Summary  Hospitalist    Date of Admission:  6/21/2020  Date of Discharge:  6/23/2020  Discharging Provider: Evangelista Otero MD  Date of Service (when I saw the patient): 06/23/20    Discharge Diagnoses   Recurrent SVT in setting of cardiomyopathy  Non ischemic cardiomyopathy, new diagnosis  B-cell lymphoma    History of Present Illness   Kassie Ramirez is a 50 year old female with history of B-cell lymphoma, was treated and completed chemotherapy, recurrent SVTs, recently diagnosed with cardiomyopathy believed to be tachy induced versus chemotherapy, discharged from this hospital on 6/17 and admitted on 6/19/2020 with symptomatic and recurrent supraventricular tachycardia.      Hospital Course   Kassie Ramirez was admitted on 6/21/2020.  The following problems were addressed during her hospitalization:    Recurrent SVT in setting of cardiomyopathy  - She had multiple episodes of recurrent SVT since her recent admission to the hospital.   - It improves with vagal maneuvers.  - Cardiology consulted, input appreciated by Dr Soy Almanzar.  - EP consulted.  - S/p SVT ablation on 6/22/20.  - Started on Toprol XL, then changed to carvedilol per cardiology recommendations.  - Discharge home today.  - She has outpatient follow-up with the [facility] and cardiology arranged.    Non ischemic cardiomyopathy, new diagnosis  - LHC showed normal coronaries on 6/15.  - Recent admission to the hospital echo showed ejection fraction of 25-30%.  - Suspected  tachycardia-mediated versus possible chemotherapy mediated.  - Continue low dose lasix and lisinopril.  - Started on carvedilol during this admission per cardiology recommendations.    B-cell lymphoma  - Completed course of chemo earlier this year.  - Has PET scan scheduled for follow-up later today.  - Follow-up with oncology after PET scan.    Evangelista Otero MD    Significant Results and Procedures   SVT ablation on  6/22 as noted above.    Pending Results   None    Code Status   Full Code       Primary Care Physician   Mary Alice Colón    Physical Exam   Temp: 98.3  F (36.8  C) Temp src: Oral BP: 94/68 Pulse: 94 Heart Rate: 75 Resp: 16 SpO2: 96 % O2 Device: None (Room air)    Vitals:    06/21/20 1415 06/22/20 0638 06/23/20 0530   Weight: 68.8 kg (151 lb 9.6 oz) 68.9 kg (152 lb) 69 kg (152 lb 1.6 oz)     Vital Signs with Ranges  Temp:  [98  F (36.7  C)-98.3  F (36.8  C)] 98.3  F (36.8  C)  Pulse:  [] 94  Heart Rate:  [75-91] 75  Resp:  [12-18] 16  BP: ()/(63-87) 94/68  SpO2:  [94 %-100 %] 96 %  I/O last 3 completed shifts:  In: -   Out: 1300 [Urine:1300]    Constitutional: awake, alert, cooperative, no apparent distress  Respiratory: clear to auscultation bilaterally, no crackles or wheezing  Cardiovascular: regular rate and rhythm, normal S1 and S2, no murmur noted  GI: normal bowel sounds, soft, non-distended, non-tender  Skin: warm, dry  Musculoskeletal: no lower extremity pitting edema present  Neurologic: awake, alert, oriented to name, place and time    Discharge Disposition   Discharged to home  Condition at discharge: Stable    Consultations This Hospital Stay   ELECTROPHYSIOLOGY IP CONSULT  SMOKING CESSATION PROGRAM IP CONSULT    Time Spent on this Encounter   I, Evangelista Otero MD, personally saw the patient today and spent less than or equal to 30 minutes discharging this patient.    Discharge Orders      Discharge Order: F/U with Cardiac  N[facility]      Follow-up and recommended labs and tests     VIRTUAL cardiac visit set up with WARREN Schwab on 7/23/2020.  Keep appt with Cirilo (from [facility]) on 7/7     Activity    Your activity upon discharge: no lifting >10 pounds, twisting or bending x 24 hours to protect R groin site.     When to contact your care team    Call [facility] nurses ([phone] = Marilee Rangel and Shonda) if you don't think you're tolerating the carvedilol after a few days.  Check BPs ~1/day and write  kelli. Cirilo can review them 7/7 and see if carvedilol should be changed.     Reason for your hospital stay    You were in the hospital due to recurrent SVT. You had an ablation procedure to prevent the SVT from happening and your medications were adjusted.     Full Code     Diet    Follow this diet upon discharge: Regular Diet Adult     Case Request EP: EP Ablation SVT     Discharge Medications   Current Discharge Medication List      START taking these medications    Details   carvedilol (COREG) 3.125 MG tablet Take 0.5 tablets (1.56 mg) by mouth 2 times daily (with meals)  Qty: 30 tablet, Refills: 0    Associated Diagnoses: Acute systolic congestive heart failure (H)         CONTINUE these medications which have NOT CHANGED    Details   acyclovir (ZOVIRAX) 400 MG tablet Take 400 mg by mouth daily      cetirizine (ZYRTEC) 10 MG tablet Take 10 mg by mouth daily      furosemide (LASIX) 20 MG tablet Take 1 tablet (20 mg) by mouth daily  Qty: 30 tablet, Refills: 1    Associated Diagnoses: Secondary cardiomyopathy (H)      lisinopril (ZESTRIL) 2.5 MG tablet Take 1 tablet (2.5 mg) by mouth daily  Qty: 30 tablet, Refills: 1    Associated Diagnoses: Secondary cardiomyopathy (H)      potassium chloride ER (K-DUR/KLOR-CON M) 20 MEQ CR tablet Take 2 tablets (40 mEq) by mouth daily  Qty: 60 tablet, Refills: 3    Associated Diagnoses: Hypokalemia      lidocaine-prilocaine (EMLA) 2.5-2.5 % external cream Apply 1 applicator topically as needed for moderate pain      LORazepam 0.5 MG PO tablet Take 1-2 tablets (0.5-1 mg) by mouth every 6 hours as needed (Breakthrough Nausea / Vomiting)  Qty: 30 tablet, Refills: 0    Associated Diagnoses: Diffuse large B-cell lymphoma of extranodal site (H)         STOP taking these medications       metoprolol succinate ER (TOPROL-XL) 25 MG 24 hr tablet Comments:   Reason for Stopping:             Allergies   Allergies   Allergen Reactions     Cold & Flu [Cold Defense Fighter]      See  pseudoephedrine     Seasonal Allergies      Sudafed Cold-Cough [Dayquil Liquicaps]      Pseudoephedrine Rash     Rash then skins peels off      Data   Most Recent 3 CBC's:  Recent Labs   Lab Test 06/23/20  0545 06/22/20  1025 06/20/20  0700   WBC 3.4* 2.9* 3.3*   HGB 12.9 13.3 12.2   MCV 87 87 91    185 183      Most Recent 3 BMP's:  Recent Labs   Lab Test 06/23/20  0545 06/22/20  1025 06/20/20  0700    137 141   POTASSIUM 4.0 4.1 3.9   CHLORIDE 108 107 108   CO2 24 25 23   BUN 26 21 21   CR 0.96 0.87 0.99   ANIONGAP 5 5 10   AFSHAN 9.4 9.6 8.9   GLC 86 92 94     Most Recent INR's and Anticoagulation Dosing History:  Anticoagulation Dose History     Recent Dosing and Labs Latest Ref Rng & Units 11/27/2019 11/28/2019 11/29/2019 11/30/2019 12/1/2019 6/22/2020    INR 0.86 - 1.14 1.02 1.03 1.05 1.12 1.18(H) 1.07        Results for orders placed or performed during the hospital encounter of 06/21/20   EP Ablation    Narrative    PROCEDURES PERFORMED:  1. Conscious sedation.  2. Cardiac fluoroscopy.  3. Electrophysiology study with left atrium recording/pacing via coronary   sinus catheter.  4. Electroanatomical mapping.  5. SVT ablation.    INDICATION FOR PROCEDURE:  Symptomatic paroxysmal SVT.    HISTORY OF PRESENT ILLNESS:  50-year-old ladywith history of B-cell   lymphoma and non-ischemic cardiomyopathy, who presents with recurrent   episodes of SVT requiring several ED visits.  She was referred for EP   study and possible ablation.    Risks and benefits of the procedure were reviewed including but not   limited to arrhythmias, pain, infection, bleeding, skin damage from   ionized radiation, kidney damage, allergic reaction to contrast dye,   injury to the neighboring vascular structures, need for emergent   cardiopulmonary resuscitation, heart attacks, strokes, death or the   possibility of needing a pacemaker. Alternatives were discussed including   doing nothing.    All questions were answered to  patient's satisfaction. The patient wished   to proceed, and consent was signed.    FLUOROSCOPY DATA:  Fluoro time:  40 seconds.  Radiation dose (AK): 4 mGy.  Dose-area product (DAP):  579 mGy.cm2.    PROCEDURE DESCRIPTION:  After written informed consent was obtain, the patient was brought to the   EP lab. After the usual sterile prep and drape, the right and left femoral   sites were locally anesthetized, and venous vascular sheaths were inserted   via modified Seldinger technique (right femoral vein 8 Nepalese x1 and 6   Nepalese x2).    With routine hemodynamic and electrocardiographic monitoring,   electrophysiology catheters were advanced under fluoroscopic guidance to   the right ventricle, right atrium, His bundle, and coronary sinus   position. Intracardiac electrograms and conduction were measured at rest   and with program stimulation.    We were unable to perform baseline electrophysiology study due to   incessant tachycardia.  Patient exhibited incessant short RP tachycardia,   which was reproducibly induced by minimal atrial pacing. 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      Using the Send the Trend mapping system, an endocardial mapping of the   right atrium was created, and the slow pathway was localized. The slow   pathway was then ablated via radiofrequency energy (4 mm tip standard RF   catheter). Accelerated junctional rhythm was noticed during ablation, and   no SVT was re-induced after ablation despite aggressive burst pacing and   program stimulation protocols (on/off Isuprel infusion).    All catheters and sheaths were removed and homeostasis obtained in the EP   lab prior to transfer to the recovery area.    MEASURED DATA:  Pre ablation: Normal sinus rhythm.  milliseconds,    milliseconds, QRS 88 milliseconds,  milliseconds, AH 69   milliseconds, HV 31 milliseconds.     Post ablation intervals on Isuprel:  milliseconds, MD  115   milliseconds, QRS 94 milliseconds,  milliseconds, AH 62   milliseconds, HV 38 milliseconds. Antegrade AV Wenckebach 370   milliseconds.  Antegrade AV node /320. No dual AV node physiology   or sustained arrhythmias were noticed after ablation.    Arrhythmia induction: Burst atrial pacing.  Short RP tachycardia, cycle   length 378 milliseconds, VA time zero milliseconds.    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 Baseline sinus rhythm.  2. Short RP tachycardia (cycle length 378 milliseconds, VA time zero   seconds) was easily and reproducibly induced with atrial burst pacing.  3. The short VA time during tachycardia ruled out an accessory pathway   mediated tachycardia.  4. Ventricular paced during tachycardia revealed VAV response and ruled   out atrial tachycardia.    Using for a 4 mm tip standard RF catheter, the slow pathway was localized   and ablated resulting in junctional ectopy. Successful ablation of the   slow AV markos pathway was evidence by:    1.  No inducible SVT despite aggressive atrial burst pacing and atrial   program stimulation on and off  Isuprel.  2.  No dual AV node physiology after ablation.    RECOMMENDATIONS:  1. Please keep patient flat for at least four hours after the procedure.  2. No strenuous activity for 5 days.  3. Follow up in cardiology clinic in a month.    COMPLICATIONS:  No immediate complications.    Francisco Javier Bynum MD

## 2020-06-23 NOTE — PLAN OF CARE
A&Ox4. Denies CP or SOB. VSS, SBP 90's (baseline), RA. Tele SR. Site soft and flat, gauze and tegaderm intact. CMS intact. Ambulates to BR independently. Plan for discharge in am.

## 2020-06-23 NOTE — PROGRESS NOTES
"[facility]    EP Progress Note    Date of Service (when I saw the patient): 06/23/2020     Assessment & Plan   Kassie Ramirez is a 50 year old female who was admitted to [facility] on 6/19 with c/o tachycardia. Seen by Dr. Baum who noted multiple episodes of sx'c SVT and she was transferred to [facility] on 6/21/2020  for ablation procedure after d/w Dr. Bynum.      1. SVT -   - First occurrence 11/2019 treated with adenosine in ER  - Has had episodes ~1/month, significant increase over the last month  - No syncope, presyncope, but sx'c with palpitations and extreme fatigue.     - BB therapy limited due to soft BPs in 90-100s  - Now s/p EP study showing AVNRT, now s/p slow pathway modification 6/222/2020 with Dr. Bynum  - EKG this AM looked fine    - Tele thus far with SR  - Groin site without tenderness  - No prolonged palpitations - feels like she \"might go into it\" but doesn't                PLAN:              * Follow-up EP Clinic with me (virtual) - 7/23/2020 @ 12:30     2. Large B-cell lymphoma -   - currently undergoing tx with [facility] (Dr. Castro,  Oncology)     3. New Non-ischemic Cardiomyopathy 25-30% -   - Hospitalized at [facility] 6/14-6/17/2020 with heart failure.  - Angio negative  - PTA on Lasix 20, lisinopril 2.5 and metoprolol Xl 12.5 mg daily     - On exam, no evidence of fluid overload. BMP 6/20 was wnl                 PLAN:              * Will stop metoprolol XL 12.5 mg daily in favor of carvedilol.  Plan very low dose (1/2 of 3.125 mg) given soft BPs in 90-10ss.              * Sees [facility] Cirilo Vo 7/7. Encouraged to check BPs before she has appt with Cirilo to see if can be uptitrated     WARREN Olson-RUBY    Interval History   Feeling \"better\" - has had episodes where she feels she \"might go into it\" but then it stops    Physical Exam   Temp: 98  F (36.7  C) Temp src: Oral BP: 98/68 Pulse: 94 Heart Rate: 91 Resp: 16 SpO2: 94 % O2 Device: Nasal cannula  "   Vitals:    06/21/20 1415 06/22/20 0638 06/23/20 0530   Weight: 68.8 kg (151 lb 9.6 oz) 68.9 kg (152 lb) 69 kg (152 lb 1.6 oz)     Vital Signs with Ranges  Temp:  [98  F (36.7  C)-98.3  F (36.8  C)] 98  F (36.7  C)  Pulse:  [] 94  Heart Rate:  [82-91] 91  Resp:  [12-18] 16  BP: ()/(63-87) 98/68  SpO2:  [94 %-100 %] 94 %  I/O last 3 completed shifts:  In: -   Out: 1300 [Urine:1300]    Telemetry: NSR    Constitutional: awake, alert, cooperative, no apparent distress, and appears stated age  Respiratory: CTA B  Cardiovascular: Regular  Musculoskeletal: No edema. R groin site soft/dry. Minimal ecchymosis. No bruit/tenderness    Medications     - MEDICATION INSTRUCTIONS -         acyclovir  400 mg Oral Daily     cetirizine  10 mg Oral Daily     furosemide  20 mg Oral Daily     heparin  5 mL Intracatheter Q28 Days     heparin lock flush  5-10 mL Intracatheter Q24H     lisinopril  2.5 mg Oral Daily     potassium chloride ER  40 mEq Oral Daily       Data   I personally reviewed the EKG tracing showing NSR 90 bpm. Nonsepcific STTW changes.  Component      Latest Ref Rng & Units 6/22/2020 6/23/2020   WBC      4.0 - 11.0 10e9/L 2.9 (L) 3.4 (L)   RBC Count      3.8 - 5.2 10e12/L 4.36 4.27   Hemoglobin      11.7 - 15.7 g/dL 13.3 12.9   Hematocrit      35.0 - 47.0 % 37.9 37.3   MCV      78 - 100 fl 87 87   MCH      26.5 - 33.0 pg 30.5 30.2   MCHC      31.5 - 36.5 g/dL 35.1 34.6   RDW      10.0 - 15.0 % 12.1 12.2   Platelet Count      150 - 450 10e9/L 185 188     Component      Latest Ref Rng & Units 6/22/2020 6/23/2020   Sodium      133 - 144 mmol/L 137 137   Potassium      3.4 - 5.3 mmol/L 4.1 4.0   Chloride      94 - 109 mmol/L 107 108   Carbon Dioxide      20 - 32 mmol/L 25 24   Anion Gap      3 - 14 mmol/L 5 5   Glucose      70 - 99 mg/dL 92 86   Urea Nitrogen      7 - 30 mg/dL 21 26   Creatinine      0.52 - 1.04 mg/dL 0.87 0.96   GFR Estimate      >60 mL/min/1.73:m2 77 69   GFR Estimate If Black      >60  mL/min/1.73:m2 89 80   Calcium      8.5 - 10.1 mg/dL 9.6 9.4

## 2020-06-23 NOTE — PROGRESS NOTES
Clinic Care Coordination Contact  [facility]/Voicemail    Referral Source: IP Handoff  SVT, ablation procedure    Clinical Data: Care Coordinator Outreach  Outreach attempted x 1.  Left message on patient's voicemail with call back information and requested return call.  Plan: Care Coordinator will try to reach patient again in 1-2 business days.    UPDATE on 6/25/20 at 1325: CC RN received return voicemail from the patient noting CC RN message and stating she is hoping to schedule a follow-up appointment with PCP Mary Alice Colón; she stated she would call back to CC RN later.    CC RN sent patient MyChart message to facilitate communication.  Informed patient of clinic phone number where she can schedule a follow-up appointment with PCP.  Inquired if patient has day/time that would work best for continued follow-up with CC RN.  Will await patient reply and/or call back.    Santos House, RN  Clinic Care Coordinator  [facility]  Ph: [phone]

## 2020-06-24 ENCOUNTER — TELEPHONE (OUTPATIENT)
Dept: CARDIOLOGY | Facility: CLINIC | Age: 50
End: 2020-06-24

## 2020-06-24 NOTE — TELEPHONE ENCOUNTER
Patient was evaluated by cardiology at [facility] for frequent episodes of palpations and fatigue, presented in ST. During hospitalization at [facility], pt had documented, frequent episodes of symptomatic SVT, some lasting 10 minutes. Medical therapy limited secondary to soft BP's. Pt recently hospitalized 6/14/20-6/17/20 for similar symptoms. Pt was diagnosed with new onset NICM with EF 25%- Suspected rate related vs chemo mediated. LHC showed normal coronaries on 6/15. Pt was transferred to [facility] on 6/21/20 for ablation. 6/22/20: Successful SVT ablation via RFV. Metoprolol discontinued and Coreg started at time of discharge. Called patient to discuss any post hospital d/c questions she may have, review medication changes, and confirm f/u appts.  Patient denied any questions regarding new medications or changes with their PTA medications. Patient denied any SOB, chest pain, palpitations, edema or light headedness. RFV cardiac cath site is without bleeding, swelling, redness or tenderness. Denies fever. RN confirmed with patient that she has a video visit appt scheduled on 7/7/20 at 1350 with NICK [facility] for [facility] enrollment. RN reminded patient to weigh self every AM, after waking and using the restroom, but before breakfast and medications. Call clinic for a weight gain of 2 lbs overnight or 5 lbs in a week. Low Na+ diet encouraged. Pt instructed to have daily wt/BP diary and medications readily available for video visit. Patient advised to call clinic with any cardiac related questions or concerns prior to his appt. Patient verbalized understanding and agreed with plan. CRESENCIO Alegria RN.

## 2020-06-25 LAB
INTERPRETATION ECG - MUSE: NORMAL
INTERPRETATION ECG - MUSE: NORMAL

## 2020-06-26 ENCOUNTER — VIRTUAL VISIT (OUTPATIENT)
Dept: ONCOLOGY | Facility: CLINIC | Age: 50
End: 2020-06-26
Attending: INTERNAL MEDICINE
Payer: COMMERCIAL

## 2020-06-26 DIAGNOSIS — C83.32 DIFFUSE LARGE B-CELL LYMPHOMA OF INTRATHORACIC LYMPH NODES (H): Primary | ICD-10-CM

## 2020-06-26 PROCEDURE — 99215 OFFICE O/P EST HI 40 MIN: CPT | Mod: 95 | Performed by: INTERNAL MEDICINE

## 2020-06-26 PROCEDURE — 40001009 ZZH VIDEO/TELEPHONE VISIT; NO CHARGE

## 2020-06-26 NOTE — PROGRESS NOTES
"Kassie Ramirez is a 50 year old female who is being evaluated via a billable video visit.      The patient has been notified of following:     \"This video visit will be conducted via a call between you and your physician/provider. We have found that certain health care needs can be provided without the need for an in-person physical exam.  This service lets us provide the care you need with a video conversation.  If a prescription is necessary we can send it directly to your pharmacy.  If lab work is needed we can place an order for that and you can then stop by our lab to have the test done at a later time.    Video visits are billed at different rates depending on your insurance coverage.  Please reach out to your insurance provider with any questions.    If during the course of the call the physician/provider feels a video visit is not appropriate, you will not be charged for this service.\"    Patient has given verbal consent for Video visit? Yes    - [facility] -[phone]    Will anyone else be joining your video visit? No      Lynne Webster CMA    "

## 2020-06-26 NOTE — LETTER
"    6/26/2020         RE: Kassie Ramirez  [address] [facility] [address]        Dear Colleague,    Thank you for referring your patient, Kassie Ramirez, to the [facility]. Please see a copy of my visit note below.    Kassie Ramirez is a 50 year old female who is being evaluated via a billable video visit.      The patient has been notified of following:     \"This video visit will be conducted via a call between you and your physician/provider. We have found that certain health care needs can be provided without the need for an in-person physical exam.  This service lets us provide the care you need with a video conversation.  If a prescription is necessary we can send it directly to your pharmacy.  If lab work is needed we can place an order for that and you can then stop by our lab to have the test done at a later time.    Video visits are billed at different rates depending on your insurance coverage.  Please reach out to your insurance provider with any questions.    If during the course of the call the physician/provider feels a video visit is not appropriate, you will not be charged for this service.\"    Patient has given verbal consent for Video visit? Yes    - [facility] -[phone]    Will anyone else be joining your video visit? No      Lynne Webster [facility]  HEMATOLOGY AND ONCOLOGY    FOLLOW-UP VISIT NOTE    PATIENT NAME: Kassie Ramirez MRN # [id number]  DATE OF VISIT: Jun 26, 2020 YOB: 1970    REFERRING PROVIDER: No referring provider defined for this encounter.    CANCER TYPE: DLBCL, EBV+ non-GCB, stage III.  STAGE: III    TREATMENT SUMMARY:  She presented with shortness of breath and cough which had been present since May 2019. Her imaging suggested pulmonary infiltrates which was attributed to aspiration pneumonia. CT scan of the chest 8/20/2019 showed left hilar and subcarinal mediastinal adenopathy with narrowing of the " left lower lobe bronchus and a nonspecific patchy area of nodular consolidation in the medial right lower lobe.  Bronchoscopy with EBUS 8/28/2019 showed nonnecrotizing granulomatous inflammation with prominent eosinophilia, negative for malignancy.  She was diagnosed with sarcoidosis and started on prednisone. She had worsening cough and shortness of breath with tapering of the prednisone.  Repeat CT scan of the chest 11/18/2019 showed interval worsening of the bulky mediastinal and bilateral hilar lymphadenopathy, near complete resolution of the lower lobe opacities, with new interstitial opacities in the right upper lobe.   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 Large mediastinal mass causing respiratory compromise. . IPI score of 2 (low-intermediate). FISH neg for high risk translocations. She received 6 cycles of R-CHOP with intermediate dose methotrexate after cycles 2, 4 and 6 from November through April 2020.      CURRENT INTERVENTIONS:  Surveillance post MR-CHOP    SUBJECTIVE   Kassie Ramirez is being followed for DLBCL, EBV+ non-GCB, stage III intermediate risk disease    Patient was reached over video for this telemedicine visit due to restrictions posed by COVID19 pandemic. Kassie is joined by her  at this visit. She has completed all of her planned cycles of MR-CHOP chemotherapy and is being seen with labs and restaging scans.     She again had shortness of breath and was noted to have tachycardia induced cardiomyopathy. She is feeling better now.         PAST MEDICAL HISTORY     Past Medical History:   Diagnosis Date     Anxiety attack 9/16/2014     Cardiomyopathy (H)      Diffuse large B-cell lymphoma (H)     Diagnosed 11/2019     Encounter for Essure implantation 2009     Generalized anxiety disorder 9/16/2014    zoloft = flat emotions     HFrEF (heart failure  with reduced ejection fraction) (H)     new diagnosis 6/14     Menopausal disorder     started on OCPs by menopause center 3/2017 (takes active continuously)     Menstrual headache     helped by OCPs and magnesium     Paroxysmal SVT (supraventricular tachycardia) (H) 06/2020     GERTRUDE (stress urinary incontinence, female)     sling procedure 2016         CURRENT OUTPATIENT MEDICATIONS     Current Outpatient Medications   Medication Sig     carvedilol (COREG) 3.125 MG tablet Take 0.5 tablets (1.56 mg) by mouth 2 times daily (with meals)     cetirizine (ZYRTEC) 10 MG tablet Take 10 mg by mouth daily     furosemide (LASIX) 20 MG tablet Take 1 tablet (20 mg) by mouth daily     lidocaine-prilocaine (EMLA) 2.5-2.5 % external cream Apply 1 applicator topically as needed for moderate pain     lisinopril (ZESTRIL) 2.5 MG tablet Take 1 tablet (2.5 mg) by mouth daily     potassium chloride ER (K-DUR/KLOR-CON M) 20 MEQ CR tablet Take 2 tablets (40 mEq) by mouth daily     No current facility-administered medications for this visit.         ALLERGIES      Allergies   Allergen Reactions     Cold & Flu [Cold Defense Fighter]      See pseudoephedrine     Seasonal Allergies      Sudafed Cold-Cough [Dayquil Liquicaps]      Pseudoephedrine Rash     Rash then skins peels off         REVIEW OF SYSTEMS   As above in the HPI, o/w complete 12-point ROS was negative.     PHYSICAL EXAM   LMP 11/06/2019   Limited physical exam during video visit due to COVID19 restrictions  Middle aged female in no acute distress  Breathing comfortably, no tachypnea  Speech and hearing normal  Pleasant mood and congruent affect  Moving all extremities, no focal neurologic deficits apparent       LABORATORY AND IMAGING STUDIES     Recent Labs   Lab Test 06/23/20  0545 06/22/20  1025 06/20/20  0700 06/19/20  1853 06/17/20  0500    137 141 139 141   POTASSIUM 4.0 4.1 3.9 4.0 3.7   CHLORIDE 108 107 108 104 106   CO2 24 25 23 27 29   ANIONGAP 5 5 10 8 6   BUN  26 21 21 26 24   CR 0.96 0.87 0.99 0.97 0.95   GLC 86 92 94 108* 101*   AFSHAN 9.4 9.6 8.9 9.2 9.5     Recent Labs   Lab Test 06/19/20  1853 06/15/20  0845 06/14/20  1807 01/11/20  0615 12/01/19  1340 12/01/19  0641 11/30/19  2137 11/30/19  1341  11/27/19  2216   MAG 2.5* 2.4* 2.1 2.0  --   --   --   --   --  2.1   PHOS  --   --   --  3.1 2.8 3.4 2.8 2.5   < > 3.1    < > = values in this interval not displayed.     Recent Labs   Lab Test 06/23/20  0545 06/22/20  1025 06/20/20  0700 06/19/20  1853 06/14/20  1807 05/21/20  1307 04/23/20  0942   WBC 3.4* 2.9* 3.3* 3.8* 2.8* 4.8 2.8*   HGB 12.9 13.3 12.2 12.5 12.6 11.7 9.7*    185 183 210 163 176 137*   MCV 87 87 91 91 90 93 97   NEUTROPHIL  --   --  45.3 40.6 45.3 61.8 67.2     Recent Labs   Lab Test 05/21/20  1307 04/23/20  0942 04/10/20  0623  01/11/20  1044  12/16/19  1743  11/29/19  0526   BILITOTAL 0.6 0.9 0.7   < >  --    < >  --    < > 1.2   ALKPHOS 112 93 80   < >  --    < >  --    < > 69   ALT 37 60* 60*   < >  --    < >  --    < > 25   AST 25 37 35   < >  --    < >  --    < > 22   ALBUMIN 3.8 3.6 3.2*   < >  --    < >  --    < > 3.0*   LDH  --   --   --   --  347*  --  596*  --  416*    < > = values in this interval not displayed.     TSH   Date Value Ref Range Status   06/14/2020 3.40 0.40 - 4.00 mU/L Final   09/18/2019 2.06 0.40 - 4.00 mU/L Final   07/27/2018 2.04 0.40 - 4.00 mU/L Final     No results for input(s): CEA in the last 92727 hours.  Results for orders placed or performed during the hospital encounter of 06/23/20   PET Oncology Whole Body    Narrative    PET / CT WHOLE BODYDIAGNOSTIC WITH IV CONTRAST 6/23/2020 3:17 PM     HISTORY: Diffuse large B-cell lymphoma, after six cycles of chemo.  Diffuse large B-cell lymphoma, unspecified body region (H).      COMPARISON EXAMS: PET/CT 2/4/2020.    TECHNIQUE:  14 mCi of F-18 FDG were administered  intravenously.   Following the uneventful administration of 91 mL Isovue-370  intravenous contrast and  oral contrast, a PET/CT scan was performed  through the head, neck, chest, abdomen, pelvis and legs.  A diagnostic  CT scan was performed of the head, neck, chest, abdomen, pelvis and  legs. Coronal, sagittal and axial images were created and fused with  the contrast enhanced CT examination.  Blood glucose: 85 mg/dL.  The  CT, PET and fusion images are then evaluated on a [employer]  workstation. Radiation dose for this scan was reduced using automated  exposure control, adjustment of the mA and/or kV according to patient  size, or iterative reconstruction technique.    FINDINGS: Normal physiologic uptake is identified within the salivary  glands, myocardium, kidneys, ureters and bladder.  Scattered areas of  physiologic bowel uptake are also present.    HEAD/NECK:  Lymph nodes: No pathologic activity.    Additional findings: None.    CHEST:  Lungs: No pathologic activity. Previously noted focal opacity at the  posterior right lower lobe is much smaller and likely represents some  minor scarring currently. Additionally, previously noted nodular  opacities at the right middle lobe and left lower lobe have also  resolved. No new or acute airspace disease identified. Calcified  granuloma at the right mid chest is stable.    Lymph nodes: Subcarinal flat conglomerate lymph nodes measures 4.6 x  1.1 cm, stable in size, series 4 image 196 (SUV max 4.1 previously  2.8). Right hilum lymph node is stable in size measuring 1.7 x 1.6 cm,  series 4 image 187 (SUV max 4, previously 4.3). Stable lymph node  anterior to the right main bronchus that is 1.2 x 1.1 cm, series 4  image 178 (SUV max 5.9, previously 5.1). No new areas of adenopathy  noted in the chest.    Additional findings: Right chest port venous catheter noted.    ABDOMEN/PELVIS:  Hepatobiliary: No pathologic activity. Hypodense nodular lesion  previously showed discontinuous nodular enhancement suggesting a  hemangioma medial right liver that is 2.1 cm, series 4  image  252. No new worrisome hepatic lesion identified.    Spleen: No pathologic activity. No focal splenic lesion. AP length of  the spleen is slightly smaller at 12.4 cm, previously 13.5 cm, series  4 image 266.    Pancreas: No pathologic activity. Normal.    Kidneys: No pathologic activity. Normal.    Adrenals: No pathologic activity. Normal.    Reproductive: No pathologic activity. Trace pelvic free fluid seen.  Cyst at the right ovary is 1.7 cm, previously 1.5 cm without abnormal  metabolism, series 4 image 403.    Gastrointestinal: No pathologic activity.     Lymph nodes: No pathologic activity. No enlarged lymph nodes  identified in the abdomen or pelvis.    Additional findings: None.    LEGS: No pathologic activity.    SKELETON:   No aggressive-appearing bony lesion identified.      Impression    IMPRESSION:  1. No change in size of lymph nodes in the chest at the mediastinum  and right hilar locations. These lymph nodes continue to have mild  elevated metabolism,  slightly higher at the subcarinal position but  otherwise the other locations are stable.  2. No new areas of adenopathy identified.   3. Previously noted pulmonary nodular opacities have resolved.  4. Mild splenomegaly is slightly smaller.    JOSUE HORTA MD        ASSESSMENT AND PLAN   DLBCL, EBV+ non-GCB, stage III  PJV pneumonia causing acute respiratory failure improved on bactrim and prednisone  Neutropenic fevers with first cycle or R-CHOP    Kassie was seen over video at this telemedicine visit and was present along with her . She has completed her planned chemotherapy in April.     She was admitted with palpitations on 6/14/20 at [facility] and was noted to have SVT with heart rate of 170 bpm. She received adenosine with conversion to sinus rhythm. She had another episode of SVT and echocardiogram done during admission revealed LVEF of 30%. She had coronary angiogram which did not show any abnormal stenosis. She was discharged  after stabilization on 6/17 but was admitted the next day. She had EP study which suggested AVNRT and had slow pathyway modification by Dr. Bynum on 6/22/20. She was discharged on carvedilol.     We reviewed her labs which are essentially normal except for leucopenia which is likely due to her recent chemotherapy. It is not a matter of concern.     She had PET-CT scan done prior to this visit. I have reviewed actual images from her scans which is essentially unchanged from her previous scan after 3 cycles of chemotherapy. Her mediastinal and hilar nodes are unchanged and continue to have FDG uptake which is little odd. I would reach out to my colleagues in interventional pulmonology to seek their opinion. She had been previously diagnosed with sarcoidosis and later had developed PJV pneumonia and so this adenopathy could be residual from her sarcoid or PJV infection.     Unless evaluation of the above nodes suggests residual disease which is highly unlikely, I would see her in 3 months with labs and restaging scans.       Video-Visit Details    Type of service:  Video Visit  Originating Location (pt. Location): Home  Distant Location (provider location):  [facility]   Platform used for Video Visit: [employer]    Over 45 min spent with patient with more than 50% time spent in counseling and coordinating care.      Castro Castro    Hematologist and Medical Oncologist  [facility]       Again, thank you for allowing me to participate in the care of your patient.        Sincerely,        Castro Castro MD

## 2020-06-26 NOTE — LETTER
"    6/26/2020         RE: Kassie Ramierz  [address] [facility] [address]        Dear Colleague,    Thank you for referring your patient, Kassie Ramirez, to the [facility]. Please see a copy of my visit note below.    Kassie Ramirez is a 50 year old female who is being evaluated via a billable video visit.      The patient has been notified of following:     \"This video visit will be conducted via a call between you and your physician/provider. We have found that certain health care needs can be provided without the need for an in-person physical exam.  This service lets us provide the care you need with a video conversation.  If a prescription is necessary we can send it directly to your pharmacy.  If lab work is needed we can place an order for that and you can then stop by our lab to have the test done at a later time.    Video visits are billed at different rates depending on your insurance coverage.  Please reach out to your insurance provider with any questions.    If during the course of the call the physician/provider feels a video visit is not appropriate, you will not be charged for this service.\"    Patient has given verbal consent for Video visit? Yes    - [facility] -[phone]    Will anyone else be joining your video visit? No      Lynne Webster [facility]  HEMATOLOGY AND ONCOLOGY    FOLLOW-UP VISIT NOTE    PATIENT NAME: Kassie Ramirez MRN # [id number]  DATE OF VISIT: Jun 26, 2020 YOB: 1970    REFERRING PROVIDER: No referring provider defined for this encounter.    CANCER TYPE: DLBCL, EBV+ non-GCB, stage III.  STAGE: III    TREATMENT SUMMARY:  She presented with shortness of breath and cough which had been present since May 2019. Her imaging suggested pulmonary infiltrates which was attributed to aspiration pneumonia. CT scan of the chest 8/20/2019 showed left hilar and subcarinal mediastinal adenopathy with narrowing of the " left lower lobe bronchus and a nonspecific patchy area of nodular consolidation in the medial right lower lobe.  Bronchoscopy with EBUS 8/28/2019 showed nonnecrotizing granulomatous inflammation with prominent eosinophilia, negative for malignancy.  She was diagnosed with sarcoidosis and started on prednisone. She had worsening cough and shortness of breath with tapering of the prednisone.  Repeat CT scan of the chest 11/18/2019 showed interval worsening of the bulky mediastinal and bilateral hilar lymphadenopathy, near complete resolution of the lower lobe opacities, with new interstitial opacities in the right upper lobe.   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 Large mediastinal mass causing respiratory compromise. . IPI score of 2 (low-intermediate). FISH neg for high risk translocations. She received 6 cycles of R-CHOP with intermediate dose methotrexate after cycles 2, 4 and 6 from November through April 2020.      CURRENT INTERVENTIONS:  Surveillance post MR-CHOP    SUBJECTIVE   Kassie Ramirez is being followed for DLBCL, EBV+ non-GCB, stage III intermediate risk disease    Patient was reached over video for this telemedicine visit due to restrictions posed by COVID19 pandemic. Kassie is joined by her  at this visit. She has completed all of her planned cycles of MR-CHOP chemotherapy and is being seen with labs and restaging scans.     She again had shortness of breath and was noted to have tachycardia induced cardiomyopathy. She is feeling better now.         PAST MEDICAL HISTORY     Past Medical History:   Diagnosis Date     Anxiety attack 9/16/2014     Cardiomyopathy (H)      Diffuse large B-cell lymphoma (H)     Diagnosed 11/2019     Encounter for Essure implantation 2009     Generalized anxiety disorder 9/16/2014    zoloft = flat emotions     HFrEF (heart failure  with reduced ejection fraction) (H)     new diagnosis 6/14     Menopausal disorder     started on OCPs by menopause center 3/2017 (takes active continuously)     Menstrual headache     helped by OCPs and magnesium     Paroxysmal SVT (supraventricular tachycardia) (H) 06/2020     GERTRUDE (stress urinary incontinence, female)     sling procedure 2016         CURRENT OUTPATIENT MEDICATIONS     Current Outpatient Medications   Medication Sig     carvedilol (COREG) 3.125 MG tablet Take 0.5 tablets (1.56 mg) by mouth 2 times daily (with meals)     cetirizine (ZYRTEC) 10 MG tablet Take 10 mg by mouth daily     furosemide (LASIX) 20 MG tablet Take 1 tablet (20 mg) by mouth daily     lidocaine-prilocaine (EMLA) 2.5-2.5 % external cream Apply 1 applicator topically as needed for moderate pain     lisinopril (ZESTRIL) 2.5 MG tablet Take 1 tablet (2.5 mg) by mouth daily     potassium chloride ER (K-DUR/KLOR-CON M) 20 MEQ CR tablet Take 2 tablets (40 mEq) by mouth daily     No current facility-administered medications for this visit.         ALLERGIES      Allergies   Allergen Reactions     Cold & Flu [Cold Defense Fighter]      See pseudoephedrine     Seasonal Allergies      Sudafed Cold-Cough [Dayquil Liquicaps]      Pseudoephedrine Rash     Rash then skins peels off         REVIEW OF SYSTEMS   As above in the HPI, o/w complete 12-point ROS was negative.     PHYSICAL EXAM   LMP 11/06/2019   Limited physical exam during video visit due to COVID19 restrictions  Middle aged female in no acute distress  Breathing comfortably, no tachypnea  Speech and hearing normal  Pleasant mood and congruent affect  Moving all extremities, no focal neurologic deficits apparent       LABORATORY AND IMAGING STUDIES     Recent Labs   Lab Test 06/23/20  0545 06/22/20  1025 06/20/20  0700 06/19/20  1853 06/17/20  0500    137 141 139 141   POTASSIUM 4.0 4.1 3.9 4.0 3.7   CHLORIDE 108 107 108 104 106   CO2 24 25 23 27 29   ANIONGAP 5 5 10 8 6   BUN  26 21 21 26 24   CR 0.96 0.87 0.99 0.97 0.95   GLC 86 92 94 108* 101*   AFSHAN 9.4 9.6 8.9 9.2 9.5     Recent Labs   Lab Test 06/19/20  1853 06/15/20  0845 06/14/20  1807 01/11/20  0615 12/01/19  1340 12/01/19  0641 11/30/19  2137 11/30/19  1341  11/27/19  2216   MAG 2.5* 2.4* 2.1 2.0  --   --   --   --   --  2.1   PHOS  --   --   --  3.1 2.8 3.4 2.8 2.5   < > 3.1    < > = values in this interval not displayed.     Recent Labs   Lab Test 06/23/20  0545 06/22/20  1025 06/20/20  0700 06/19/20  1853 06/14/20  1807 05/21/20  1307 04/23/20  0942   WBC 3.4* 2.9* 3.3* 3.8* 2.8* 4.8 2.8*   HGB 12.9 13.3 12.2 12.5 12.6 11.7 9.7*    185 183 210 163 176 137*   MCV 87 87 91 91 90 93 97   NEUTROPHIL  --   --  45.3 40.6 45.3 61.8 67.2     Recent Labs   Lab Test 05/21/20  1307 04/23/20  0942 04/10/20  0623  01/11/20  1044  12/16/19  1743  11/29/19  0526   BILITOTAL 0.6 0.9 0.7   < >  --    < >  --    < > 1.2   ALKPHOS 112 93 80   < >  --    < >  --    < > 69   ALT 37 60* 60*   < >  --    < >  --    < > 25   AST 25 37 35   < >  --    < >  --    < > 22   ALBUMIN 3.8 3.6 3.2*   < >  --    < >  --    < > 3.0*   LDH  --   --   --   --  347*  --  596*  --  416*    < > = values in this interval not displayed.     TSH   Date Value Ref Range Status   06/14/2020 3.40 0.40 - 4.00 mU/L Final   09/18/2019 2.06 0.40 - 4.00 mU/L Final   07/27/2018 2.04 0.40 - 4.00 mU/L Final     No results for input(s): CEA in the last [id number] hours.  Results for orders placed or performed during the hospital encounter of 06/23/20   PET Oncology Whole Body    Narrative    PET / CT WHOLE BODYDIAGNOSTIC WITH IV CONTRAST 6/23/2020 3:17 PM     HISTORY: Diffuse large B-cell lymphoma, after six cycles of chemo.  Diffuse large B-cell lymphoma, unspecified body region (H).      COMPARISON EXAMS: PET/CT 2/4/2020.    TECHNIQUE:  14 mCi of F-18 FDG were administered  intravenously.   Following the uneventful administration of 91 mL Isovue-370  intravenous contrast and  oral contrast, a PET/CT scan was performed  through the head, neck, chest, abdomen, pelvis and legs.  A diagnostic  CT scan was performed of the head, neck, chest, abdomen, pelvis and  legs. Coronal, sagittal and axial images were created and fused with  the contrast enhanced CT examination.  Blood glucose: 85 mg/dL.  The  CT, PET and fusion images are then evaluated on a State  workstation. Radiation dose for this scan was reduced using automated  exposure control, adjustment of the mA and/or kV according to patient  size, or iterative reconstruction technique.    FINDINGS: Normal physiologic uptake is identified within the salivary  glands, myocardium, kidneys, ureters and bladder.  Scattered areas of  physiologic bowel uptake are also present.    HEAD/NECK:  Lymph nodes: No pathologic activity.    Additional findings: None.    CHEST:  Lungs: No pathologic activity. Previously noted focal opacity at the  posterior right lower lobe is much smaller and likely represents some  minor scarring currently. Additionally, previously noted nodular  opacities at the right middle lobe and left lower lobe have also  resolved. No new or acute airspace disease identified. Calcified  granuloma at the right mid chest is stable.    Lymph nodes: Subcarinal flat conglomerate lymph nodes measures 4.6 x  1.1 cm, stable in size, series 4 image 196 (SUV max 4.1 previously  2.8). Right hilum lymph node is stable in size measuring 1.7 x 1.6 cm,  series 4 image 187 (SUV max 4, previously 4.3). Stable lymph node  anterior to the right main bronchus that is 1.2 x 1.1 cm, series 4  image 178 (SUV max 5.9, previously 5.1). No new areas of adenopathy  noted in the chest.    Additional findings: Right chest port venous catheter noted.    ABDOMEN/PELVIS:  Hepatobiliary: No pathologic activity. Hypodense nodular lesion  previously showed discontinuous nodular enhancement suggesting a  hemangioma medial right liver that is 2.1 cm, series 4  image  252. No new worrisome hepatic lesion identified.    Spleen: No pathologic activity. No focal splenic lesion. AP length of  the spleen is slightly smaller at 12.4 cm, previously 13.5 cm, series  4 image 266.    Pancreas: No pathologic activity. Normal.    Kidneys: No pathologic activity. Normal.    Adrenals: No pathologic activity. Normal.    Reproductive: No pathologic activity. Trace pelvic free fluid seen.  Cyst at the right ovary is 1.7 cm, previously 1.5 cm without abnormal  metabolism, series 4 image 403.    Gastrointestinal: No pathologic activity.     Lymph nodes: No pathologic activity. No enlarged lymph nodes  identified in the abdomen or pelvis.    Additional findings: None.    LEGS: No pathologic activity.    SKELETON:   No aggressive-appearing bony lesion identified.      Impression    IMPRESSION:  1. No change in size of lymph nodes in the chest at the mediastinum  and right hilar locations. These lymph nodes continue to have mild  elevated metabolism,  slightly higher at the subcarinal position but  otherwise the other locations are stable.  2. No new areas of adenopathy identified.   3. Previously noted pulmonary nodular opacities have resolved.  4. Mild splenomegaly is slightly smaller.    JOSUE HORTA MD        ASSESSMENT AND PLAN   DLBCL, EBV+ non-GCB, stage III  PJV pneumonia causing acute respiratory failure improved on bactrim and prednisone  Neutropenic fevers with first cycle or R-CHOP    Kassie was seen over video at this telemedicine visit and was present along with her . She has completed her planned chemotherapy in April.     She was admitted with palpitations on 6/14/20 at [facility] and was noted to have SVT with heart rate of 170 bpm. She received adenosine with conversion to sinus rhythm. She had another episode of SVT and echocardiogram done during admission revealed LVEF of 30%. She had coronary angiogram which did not show any abnormal stenosis. She was discharged  after stabilization on 6/17 but was admitted the next day. She had EP study which suggested AVNRT and had slow pathyway modification by Dr. Bynum on 6/22/20. She was discharged on carvedilol.     We reviewed her labs which are essentially normal except for leucopenia which is likely due to her recent chemotherapy. It is not a matter of concern.     She had PET-CT scan done prior to this visit. I have reviewed actual images from her scans which is essentially unchanged from her previous scan after 3 cycles of chemotherapy. Her mediastinal and hilar nodes are unchanged and continue to have FDG uptake which is little odd. I would reach out to my colleagues in interventional pulmonology to seek their opinion. She had been previously diagnosed with sarcoidosis and later had developed PJV pneumonia and so this adenopathy could be residual from her sarcoid or PJV infection.     Unless evaluation of the above nodes suggests residual disease which is highly unlikely, I would see her in 3 months with labs and restaging scans.       Video-Visit Details    Type of service:  Video Visit  Originating Location (pt. Location): Home  Distant Location (provider location):  [facility]   Platform used for Video Visit: Gemisimo    Over 45 min spent with patient with more than 50% time spent in counseling and coordinating care.      Castro Castro    Hematologist and Medical Oncologist  [facility]       Again, thank you for allowing me to participate in the care of your patient.        Sincerely,        Castro Castro MD

## 2020-06-28 NOTE — PROGRESS NOTES
[facility]  HEMATOLOGY AND ONCOLOGY    FOLLOW-UP VISIT NOTE    PATIENT NAME: Kassie Ramirez MRN # [id number]  DATE OF VISIT: Jun 26, 2020 YOB: 1970    REFERRING PROVIDER: No referring provider defined for this encounter.    CANCER TYPE: DLBCL, EBV+ non-GCB, stage III.  STAGE: III    TREATMENT SUMMARY:  She presented with shortness of breath and cough which had been present since May 2019. Her imaging suggested pulmonary infiltrates which was attributed to aspiration pneumonia. CT scan of the chest 8/20/2019 showed left hilar and subcarinal mediastinal adenopathy with narrowing of the left lower lobe bronchus and a nonspecific patchy area of nodular consolidation in the medial right lower lobe.  Bronchoscopy with EBUS 8/28/2019 showed nonnecrotizing granulomatous inflammation with prominent eosinophilia, negative for malignancy.  She was diagnosed with sarcoidosis and started on prednisone. She had worsening cough and shortness of breath with tapering of the prednisone.  Repeat CT scan of the chest 11/18/2019 showed interval worsening of the bulky mediastinal and bilateral hilar lymphadenopathy, near complete resolution of the lower lobe opacities, with new interstitial opacities in the right upper lobe.   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 Large mediastinal mass causing respiratory compromise. . IPI score of 2 (low-intermediate). FISH neg for high risk translocations. She received 6 cycles of R-CHOP with intermediate dose methotrexate after cycles 2, 4 and 6 from November through April 2020.      CURRENT INTERVENTIONS:  Surveillance post MR-CHOP    SUBJECTIVE   Kassie Ramirez is being followed for DLBCL, EBV+ non-GCB, stage III intermediate risk disease    Patient was reached over video for this telemedicine visit due to restrictions posed by [employer]  jeanie Fernando is joined by her  at this visit. She has completed all of her planned cycles of MR-CHOP chemotherapy and is being seen with labs and restaging scans.     She again had shortness of breath and was noted to have tachycardia induced cardiomyopathy. She is feeling better now.         PAST MEDICAL HISTORY     Past Medical History:   Diagnosis Date     Anxiety attack 9/16/2014     Cardiomyopathy (H)      Diffuse large B-cell lymphoma (H)     Diagnosed 11/2019     Encounter for Essure implantation 2009     Generalized anxiety disorder 9/16/2014    zoloft = flat emotions     HFrEF (heart failure with reduced ejection fraction) (H)     new diagnosis 6/14     Menopausal disorder     started on OCPs by menopause center 3/2017 (takes active continuously)     Menstrual headache     helped by OCPs and magnesium     Paroxysmal SVT (supraventricular tachycardia) (H) 06/2020     GERTRUDE (stress urinary incontinence, female)     sling procedure 2016         CURRENT OUTPATIENT MEDICATIONS     Current Outpatient Medications   Medication Sig     carvedilol (COREG) 3.125 MG tablet Take 0.5 tablets (1.56 mg) by mouth 2 times daily (with meals)     cetirizine (ZYRTEC) 10 MG tablet Take 10 mg by mouth daily     furosemide (LASIX) 20 MG tablet Take 1 tablet (20 mg) by mouth daily     lidocaine-prilocaine (EMLA) 2.5-2.5 % external cream Apply 1 applicator topically as needed for moderate pain     lisinopril (ZESTRIL) 2.5 MG tablet Take 1 tablet (2.5 mg) by mouth daily     potassium chloride ER (K-DUR/KLOR-CON M) 20 MEQ CR tablet Take 2 tablets (40 mEq) by mouth daily     No current facility-administered medications for this visit.         ALLERGIES      Allergies   Allergen Reactions     Cold & Flu [Cold Defense Fighter]      See pseudoephedrine     Seasonal Allergies      Sudafed Cold-Cough [Dayquil Liquicaps]      Pseudoephedrine Rash     Rash then skins peels off         REVIEW OF SYSTEMS   As above in the HPI, o/w  complete 12-point ROS was negative.     PHYSICAL EXAM   LMP 11/06/2019   Limited physical exam during video visit due to COVID19 restrictions  Middle aged female in no acute distress  Breathing comfortably, no tachypnea  Speech and hearing normal  Pleasant mood and congruent affect  Moving all extremities, no focal neurologic deficits apparent       LABORATORY AND IMAGING STUDIES     Recent Labs   Lab Test 06/23/20  0545 06/22/20  1025 06/20/20  0700 06/19/20  1853 06/17/20  0500    137 141 139 141   POTASSIUM 4.0 4.1 3.9 4.0 3.7   CHLORIDE 108 107 108 104 106   CO2 24 25 23 27 29   ANIONGAP 5 5 10 8 6   BUN 26 21 21 26 24   CR 0.96 0.87 0.99 0.97 0.95   GLC 86 92 94 108* 101*   AFSHAN 9.4 9.6 8.9 9.2 9.5     Recent Labs   Lab Test 06/19/20  1853 06/15/20  0845 06/14/20  1807 01/11/20  0615 12/01/19  1340 12/01/19  0641 11/30/19  2137 11/30/19  1341  11/27/19  2216   MAG 2.5* 2.4* 2.1 2.0  --   --   --   --   --  2.1   PHOS  --   --   --  3.1 2.8 3.4 2.8 2.5   < > 3.1    < > = values in this interval not displayed.     Recent Labs   Lab Test 06/23/20  0545 06/22/20  1025 06/20/20  0700 06/19/20  1853 06/14/20  1807 05/21/20  1307 04/23/20  0942   WBC 3.4* 2.9* 3.3* 3.8* 2.8* 4.8 2.8*   HGB 12.9 13.3 12.2 12.5 12.6 11.7 9.7*    185 183 210 163 176 137*   MCV 87 87 91 91 90 93 97   NEUTROPHIL  --   --  45.3 40.6 45.3 61.8 67.2     Recent Labs   Lab Test 05/21/20  1307 04/23/20  0942 04/10/20  0623  01/11/20  1044  12/16/19  1743  11/29/19  0526   BILITOTAL 0.6 0.9 0.7   < >  --    < >  --    < > 1.2   ALKPHOS 112 93 80   < >  --    < >  --    < > 69   ALT 37 60* 60*   < >  --    < >  --    < > 25   AST 25 37 35   < >  --    < >  --    < > 22   ALBUMIN 3.8 3.6 3.2*   < >  --    < >  --    < > 3.0*   LDH  --   --   --   --  347*  --  596*  --  416*    < > = values in this interval not displayed.     TSH   Date Value Ref Range Status   06/14/2020 3.40 0.40 - 4.00 mU/L Final   09/18/2019 2.06 0.40 - 4.00 mU/L  Final   07/27/2018 2.04 0.40 - 4.00 mU/L Final     No results for input(s): CEA in the last 27944 hours.  Results for orders placed or performed during the hospital encounter of 06/23/20   PET Oncology Whole Body    Narrative    PET / CT WHOLE BODYDIAGNOSTIC WITH IV CONTRAST 6/23/2020 3:17 PM     HISTORY: Diffuse large B-cell lymphoma, after six cycles of chemo.  Diffuse large B-cell lymphoma, unspecified body region (H).      COMPARISON EXAMS: PET/CT 2/4/2020.    TECHNIQUE:  14 mCi of F-18 FDG were administered  intravenously.   Following the uneventful administration of 91 mL Isovue-370  intravenous contrast and oral contrast, a PET/CT scan was performed  through the head, neck, chest, abdomen, pelvis and legs.  A diagnostic  CT scan was performed of the head, neck, chest, abdomen, pelvis and  legs. Coronal, sagittal and axial images were created and fused with  the contrast enhanced CT examination.  Blood glucose: 85 mg/dL.  The  CT, PET and fusion images are then evaluated on a [employer]  workstation. Radiation dose for this scan was reduced using automated  exposure control, adjustment of the mA and/or kV according to patient  size, or iterative reconstruction technique.    FINDINGS: Normal physiologic uptake is identified within the salivary  glands, myocardium, kidneys, ureters and bladder.  Scattered areas of  physiologic bowel uptake are also present.    HEAD/NECK:  Lymph nodes: No pathologic activity.    Additional findings: None.    CHEST:  Lungs: No pathologic activity. Previously noted focal opacity at the  posterior right lower lobe is much smaller and likely represents some  minor scarring currently. Additionally, previously noted nodular  opacities at the right middle lobe and left lower lobe have also  resolved. No new or acute airspace disease identified. Calcified  granuloma at the right mid chest is stable.    Lymph nodes: Subcarinal flat conglomerate lymph nodes measures 4.6 x  1.1 cm, stable in  size, series 4 image 196 (SUV max 4.1 previously  2.8). Right hilum lymph node is stable in size measuring 1.7 x 1.6 cm,  series 4 image 187 (SUV max 4, previously 4.3). Stable lymph node  anterior to the right main bronchus that is 1.2 x 1.1 cm, series 4  image 178 (SUV max 5.9, previously 5.1). No new areas of adenopathy  noted in the chest.    Additional findings: Right chest port venous catheter noted.    ABDOMEN/PELVIS:  Hepatobiliary: No pathologic activity. Hypodense nodular lesion  previously showed discontinuous nodular enhancement suggesting a  hemangioma medial right liver that is 2.1 cm, series 4 image  252. No new worrisome hepatic lesion identified.    Spleen: No pathologic activity. No focal splenic lesion. AP length of  the spleen is slightly smaller at 12.4 cm, previously 13.5 cm, series  4 image 266.    Pancreas: No pathologic activity. Normal.    Kidneys: No pathologic activity. Normal.    Adrenals: No pathologic activity. Normal.    Reproductive: No pathologic activity. Trace pelvic free fluid seen.  Cyst at the right ovary is 1.7 cm, previously 1.5 cm without abnormal  metabolism, series 4 image 403.    Gastrointestinal: No pathologic activity.     Lymph nodes: No pathologic activity. No enlarged lymph nodes  identified in the abdomen or pelvis.    Additional findings: None.    LEGS: No pathologic activity.    SKELETON:   No aggressive-appearing bony lesion identified.      Impression    IMPRESSION:  1. No change in size of lymph nodes in the chest at the mediastinum  and right hilar locations. These lymph nodes continue to have mild  elevated metabolism,  slightly higher at the subcarinal position but  otherwise the other locations are stable.  2. No new areas of adenopathy identified.   3. Previously noted pulmonary nodular opacities have resolved.  4. Mild splenomegaly is slightly smaller.    JOSUE HORTA MD        ASSESSMENT AND PLAN   DLBCL, EBV+ non-GCB, stage III  PJV pneumonia causing  acute respiratory failure improved on bactrim and prednisone  Neutropenic fevers with first cycle or R-CHOP    Kassie was seen over video at this telemedicine visit and was present along with her . She has completed her planned chemotherapy in April.     She was admitted with palpitations on 6/14/20 at [facility] and was noted to have SVT with heart rate of 170 bpm. She received adenosine with conversion to sinus rhythm. She had another episode of SVT and echocardiogram done during admission revealed LVEF of 30%. She had coronary angiogram which did not show any abnormal stenosis. She was discharged after stabilization on 6/17 but was admitted the next day. She had EP study which suggested AVNRT and had slow pathyway modification by Dr. Bynum on 6/22/20. She was discharged on carvedilol.     We reviewed her labs which are essentially normal except for leucopenia which is likely due to her recent chemotherapy. It is not a matter of concern.     She had PET-CT scan done prior to this visit. I have reviewed actual images from her scans which is essentially unchanged from her previous scan after 3 cycles of chemotherapy. Her mediastinal and hilar nodes are unchanged and continue to have FDG uptake which is little odd. I would reach out to my colleagues in interventional pulmonology to seek their opinion. She had been previously diagnosed with sarcoidosis and later had developed PJV pneumonia and so this adenopathy could be residual from her sarcoid or PJV infection.     Unless evaluation of the above nodes suggests residual disease which is highly unlikely, I would see her in 3 months with labs and restaging scans.       Video-Visit Details    Type of service:  Video Visit  Originating Location (pt. Location): Home  Distant Location (provider location):  [facility]   Platform used for Video Visit: Leelee    Over 45 min spent with patient with more than 50% time spent in counseling and coordinating  coby.      Castro Castro    Hematologist and Medical Oncologist  [facility]

## 2020-06-29 ENCOUNTER — TELEPHONE (OUTPATIENT)
Dept: ONCOLOGY | Facility: CLINIC | Age: 50
End: 2020-06-29

## 2020-06-29 NOTE — TELEPHONE ENCOUNTER
Cristine from [facility] calling and would like a call back to follow up on how patient's appointment went on 6/22.  Please call at [phone].    Morenita Rey CMA on 6/29/2020 at 1:21 PM

## 2020-06-29 NOTE — TELEPHONE ENCOUNTER
RNCC returned call to Angelita at [facility]. Reviewed Pt most recent hospitalization and oncology visits. No further questions or concerns at this time. Verified contact information.     CARLOS SalcidoN, RN, OCN  RN Cancer Care Coordinator  [facility]  [phone]

## 2020-06-30 NOTE — PROGRESS NOTES
Clinic Care Coordination Contact  [facility]/Voicemail    Referral Source: IP Handoff  ST, ablation procedure    Clinical Data: Care Coordinator Outreach  Outreach attempted x 2.  Voicemail and MyChart message.  Per chart review, patient read MyChart message.  No return messages or calls received from patient.      Plan: Care Coordinator will send care coordination introduction letter with care coordinator contact information and explanation of care coordination services via [employer]. Care Coordinator will do no further outreaches at this time.    Santos House, RN  Clinic Care Coordinator  [facility]  Ph: [phone]

## 2020-07-01 DIAGNOSIS — C83.398 DIFFUSE LARGE B-CELL LYMPHOMA OF EXTRANODAL SITE: ICD-10-CM

## 2020-07-01 RX ORDER — LORAZEPAM 0.5 MG/1
TABLET ORAL
Qty: 30 TABLET | Refills: 0 | Status: SHIPPED | OUTPATIENT
Start: 2020-07-01 | End: 2020-09-29

## 2020-07-01 NOTE — TELEPHONE ENCOUNTER
RECORDS STATUS - ALL OTHER DIAGNOSIS      RECORDS RECEIVED FROM: [facility]   DATE RECEIVED: 7/6/2020    NOTES STATUS DETAILS   OFFICE NOTE from referring provider Complete [facility]   OFFICE NOTE from medical oncologist Complete Virtual Visit 6/26/2020 Dr. Castro Diffuse Large B-Cell Lymphoma intrathoracic lymph nodes    DISCHARGE SUMMARY from hospital Complete Epic    6/21/2020    history of B-cell lymphoma, was treated and completed chemotherapy, recurrent SVTs, recently diagnosed with cardiomyopathy believed to be tachy induced versus chemotherapy,     6/19/2020    DISCHARGE REPORT from the ER     OPERATIVE REPORT Complete [facility]    Bronchoscopy 8/28/2019    MEDICATION LIST Complete [facility]   CLINICAL TRIAL TREATMENTS TO DATE N/A    LABS     PATHOLOGY REPORTS Complete  Epic- Pathology Consult 12/6/2019 ([facility])     Leukemia Lymphoma 12/3/2019 in [facility]    Bone Marrow Biopsy 12/3/2019    ANYTHING RELATED TO DIAGNOSIS Complete Labs last updated on 6/26/2020    GENONOMIC TESTING     TYPE:     IMAGING (NEED IMAGES & REPORT)     CT SCANS Complete      Complete- [facility]  CT Soft Tissue Neck 6/23/2020     CT Chest/Adbomen 9/11/2020      [facility]- CT Chest 11/18/2019, 8/28/2019,8/20/2019    MRI     Xray Chest Complete 6/20/2020    MAMMO     ULTRASOUND     PET Complete PET Oncology 6/23/2020      Action    Action Taken 7/1/2020 10:45am     I called pt Kassie - she confirmed that [facility] is the only other facility with records.     I called [facility]'s Radiology Dept ph: [phone]     I called [facility] Medical Records Dept and asked for the full [facility] report from 8/28/2019 ph:[phone] Opt 0- They said the full records should be in CE- there are no graphs to collect.

## 2020-07-01 NOTE — TELEPHONE ENCOUNTER
Pending Prescriptions:                       Disp   Refills    LORazepam (ATIVAN) 0.5 MG tablet [Pharmac*30 tab*0            Sig: TAKE ONE TO TWO TABLETS BY MOUTH EVERY 6 HOURS AS           NEEDED FOR BREAKTHROUGH NAUSEA / VOMITING          Last Written Prescription Date:  2020  Last Fill Quantity: 30,   # refills: 0  Last Office Visit: 2020  Future Office visit:    Next 5 appointments (look out 90 days)    Aug 20, 2020  2:15 PM CDT  Return Visit with Malachi Finn MD  [facility] ([facility]) [address] [address]  [facility] [id number] [phone]   Sep 29, 2020 10:20 AM CDT  Office Visit with Mary Alice Colón PA-C  [facility] [address] [place] [id number] [phone]           Routing refill request to provider for review.  Flores Alvarez RN on 2020 at 1:51 PM    Signed Prescriptions:                        Disp   Refills    LORazepam (ATIVAN) 0.5 MG tablet           30 tab*0        Si tablet by mouth at bedtime for sleep and anxiety.  Authorizing Provider: PADMAJA RICE    Writer informed patient that she will need to work with her PCP in the future for a better long term medication to help with sleep per direction from Padmaja.  Flores Alvarez RN on 2020 at 8:15 AM

## 2020-07-06 ENCOUNTER — PRE VISIT (OUTPATIENT)
Dept: ONCOLOGY | Facility: CLINIC | Age: 50
End: 2020-07-06

## 2020-07-06 ENCOUNTER — VIRTUAL VISIT (OUTPATIENT)
Dept: ONCOLOGY | Facility: CLINIC | Age: 50
End: 2020-07-06
Attending: INTERNAL MEDICINE
Payer: COMMERCIAL

## 2020-07-06 DIAGNOSIS — C83.32 DIFFUSE LARGE B-CELL LYMPHOMA OF INTRATHORACIC LYMPH NODES (H): Primary | ICD-10-CM

## 2020-07-06 DIAGNOSIS — R59.0 MEDIASTINAL ADENOPATHY: ICD-10-CM

## 2020-07-06 NOTE — PROGRESS NOTES
"Kassie Ramirez is a 50 year old female who is being evaluated via a billable video visit.      The patient has been notified of following:     \"This video visit will be conducted via a call between you and your physician/provider. We have found that certain health care needs can be provided without the need for an in-person physical exam.  This service lets us provide the care you need with a video conversation.  If a prescription is necessary we can send it directly to your pharmacy.  If lab work is needed we can place an order for that and you can then stop by our lab to have the test done at a later time.    Video visits are billed at different rates depending on your insurance coverage.  Please reach out to your insurance provider with any questions.    If during the course of the call the physician/provider feels a video visit is not appropriate, you will not be charged for this service.\"    Patient has given verbal consent for Video visit? Yes  How would you like to obtain your AVS? MyChart  Will anyone else be joining your video visit? No        Video-Visit Details    Type of service:  Video Visit    Video Start Time: 8:41 AM  Video End Time: 9:03 AM    Originating Location (pt. Location): Home    Distant Location (provider location):  [facility]     Platform used for Video Visit: Leelee Ferraro MD      [facility] Initial Visit    Reason for Visit  Kassie Ramirez is a 50 year old female who is referred by Dr Castro for lympadenopathy, Pneumocystis pneumonia and lymphoma  Pulmonary HPI    - she had a nagging cough in early 2019, thought she had aspirated on rice. She was also sweaty and short of breath at that time. She had a repeat PET 6/23, prior to that she was hospitalized for heart failure and felt some chest congestion.   - She has been experiencing joint pain and stiffness in knees and feet. Takes about 10 minutes to loosen up and then " they are fine. 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  The patient was seen and examined by Tasha Ferraro MD   Current Outpatient Medications   Medication     carvedilol (COREG) 3.125 MG tablet     cetirizine (ZYRTEC) 10 MG tablet     furosemide (LASIX) 20 MG tablet     lisinopril (ZESTRIL) 2.5 MG tablet     LORazepam (ATIVAN) 0.5 MG tablet     potassium chloride ER (K-DUR/KLOR-CON M) 20 MEQ CR tablet     lidocaine-prilocaine (EMLA) 2.5-2.5 % external cream     No current facility-administered medications for this visit.      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     Lifestyle     Physical activity     Days per week: Not on file     Minutes per session: Not on file     Stress: Not on file   Relationships     Social connections     Talks on phone: Not on file     Gets together: Not on file     Attends Yazidism service: Not on file     Active member of club or organization: Not on file     Attends meetings of clubs or organizations: Not on file     Relationship status: Not on file     Intimate partner violence     Fear of current or ex partner: Not on file     Emotionally abused: Not on file     Physically abused: Not on file     Forced sexual activity: Not on file   Other Topics Concern     Parent/sibling w/ CABG, MI or angioplasty before 65F 55M? No      Service Not Asked     Blood Transfusions Not Asked     Caffeine Concern Yes     Comment: MOnster energy drink.   Te - 3 cups.        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       Past Medical History:   Diagnosis Date     Anxiety attack 9/16/2014     Cardiomyopathy (H)      Diffuse large B-cell lymphoma (H)     Diagnosed 11/2019     Encounter for Essure implantation 2009     Generalized anxiety disorder 9/16/2014    zoloft = flat emotions     HFrEF (heart failure with reduced ejection fraction) (H)     new diagnosis 6/14     Menopausal disorder     started on OCPs by menopause center 3/2017 (takes active continuously)     Menstrual headache      helped by OCPs and magnesium     Paroxysmal SVT (supraventricular tachycardia) (H) 06/2020     GERTRUDE (stress urinary incontinence, female)     sling procedure 2016     Past Surgical History:   Procedure Laterality Date     CV CORONARY ANGIOGRAM N/A 6/15/2020    Procedure: Coronary Angiogram;  Surgeon: Luis Eduardo Phillips MD;  Location:  HEART CARDIAC CATH LAB     CV LEFT HEART CATH N/A 6/15/2020    Procedure: Left Heart Cath;  Surgeon: Luis Eduardo Phillips MD;  Location: [facility]     EP ABLATION SVT N/A 6/22/2020    Procedure: EP Ablation SVT;  Surgeon: Francisco Javier Bynum MD;  Location:  HEART CARDIAC CATH LAB     H KIT ESSURE  2009    essrickey - Dr. Cailin Raymundo      HERNIORRHAPHY UMBILICAL  1974     IR CHEST PORT PLACEMENT > 5 YRS OF AGE  1/9/2020     SLING TRANSPUBO WITHOUT ANTERIOR COLPORRHAPHY N/A 11/21/2016    Procedure: SLING TRANSPUBO WITHOUT ANTERIOR COLPORRHAPHY;  Surgeon: Hernesto Berrios MD;  Location: [facility] OR     Family History   Problem Relation Age of Onset     Hypertension Mother      Depression Mother      Lipids Mother      Cardiovascular Mother      Circulatory Mother      Diabetes Mother      Heart Disease Mother      Cerebrovascular Disease Mother      Obesity Mother      C.A.D. Mother      Lung Cancer Mother         smoker     Eye Disorder Father         cone dystrophy     Macular Degeneration Father      Diabetes Maternal Aunt         type 2     Hypertension Maternal Aunt      Heart Disease Maternal Grandfather      Heart Disease Sister         high cholesterol       Macular Degeneration Sister        Exam:   LMP 11/06/2019   GENERAL APPEARANCE: Well developed, well nourished, alert, and in no apparent distress.  NEURO: Mentation intact, speech normal, normal strength and tone,   PSYCH: mentation appears normal. and affect normal/bright  Results:  - My interpretation of the images relevant for this visit includes: PET CT June, Feb, Nov images reviewed. Bulky mediastinal  adenopathy nearly completed resolved. Couple of small remaining mediastinal nodes 1 cm, SUV ~4   - My interpretation of the PFT's relevant for this visit includes: mild airflow limitation on spirometry 11/29, not an obstructive pattern     Assessment and plan: Sadia is a 50-year-old female with diffuse large B-cell lymphoma, diagnosed at the end of 2019.  She had initially been diagnosed with sarcoidosis on the basis of bulky mediastinal adenopathy demonstrating granulomatous inflammation.  After several months of treatment with prednisone for presumed sarcoidosis, she developed pneumocystis pneumonia.  Then she had a second opinion and mediastinoscopy which diagnosed the lymphoma.  She completed R-CHOP with methotrexate in April 2020.  She is here today for evaluation of persistent mediastinal adenopathy.  Sarcoidosis-today we discussed that sarcoidosis is a diagnosis of exclusion so I would question whether she did actually have that.  We do not have any definite evidence of extrapulmonary involvement of sarcoidosis.  Although she demonstrated granulomatous inflammation on fine-needle aspiration of the mediastinal lymph nodes, the sensitivity of fine-needle aspiration for mediastinal adenopathy is not as good for lymphoma (about 60%) as it is for carcinoma.  To me it seems more likely that she had the lymphoma all along and was initially misdiagnosed.  Nevertheless, chemotherapy can induce a sarcoid-like reaction, that is to say granulomatous inflammation.  Given that she continues to have no extrapulmonary manifestations of inflammatory disease, I would not favor treating or considering sarcoidosis at this point.    FDG avid mediastinal adenopathy-this likely relates to residual inflammation from treatment effect (vs residual disease).  She has no constitutional symptoms or other symptoms at this point so I would favor continuing to monitor with imaging.  If the mediastinal adenopathy worsens on repeat imaging  (a PET scan in about 3 months), might be worth considering invasive biopsy.  At that time, mediastinoscopy may well be the first choice given not only her history but also the known low sensitivity of fine-needle aspiration (endobronchial ultrasound guided) for mediastinal node diagnosis.    Joint pain-today she describes joint stiffness in multiple joints without any associated synovitis that improves within 10 minutes of moving.  This does not sound concerning to me for systemic inflammatory disease.  She is wondering if it could be related to her chemotherapy and I told her I was not sure but would confer with Dr. Castro.    Follow-up with me as needed based on results of repeat imaging in 3 months (which I expect Dr Castro will be doing).

## 2020-07-06 NOTE — LETTER
"    7/6/2020         RE: Kassie Ramirez  [address]  [place] [id number]        Dear Colleague,    Thank you for referring your patient, Kassie Ramirez, to the [facility]. Please see a copy of my visit note below.    Kassie Ramirez is a 50 year old female who is being evaluated via a billable video visit.      The patient has been notified of following:     \"This video visit will be conducted via a call between you and your physician/provider. We have found that certain health care needs can be provided without the need for an in-person physical exam.  This service lets us provide the care you need with a video conversation.  If a prescription is necessary we can send it directly to your pharmacy.  If lab work is needed we can place an order for that and you can then stop by our lab to have the test done at a later time.    Video visits are billed at different rates depending on your insurance coverage.  Please reach out to your insurance provider with any questions.    If during the course of the call the physician/provider feels a video visit is not appropriate, you will not be charged for this service.\"    Patient has given verbal consent for Video visit? Yes  How would you like to obtain your AVS? MyChart  Will anyone else be joining your video visit? No        Video-Visit Details    Type of service:  Video Visit    Video Start Time: 8:41 AM  Video End Time: 9:03 AM    Originating Location (pt. Location): Home    Distant Location (provider location):  [facility]     Platform used for Video Visit: Leelee Ferraro MD      [facility] Initial Visit    Reason for Visit  Kassie Ramirez is a 50 year old female who is referred by Dr Castro for lympadenopathy, Pneumocystis pneumonia and lymphoma  Pulmonary HPI    - she had a nagging cough in early 2019, thought she had aspirated on rice. She was also sweaty and short of " breath at that time. She had a repeat PET 6/23, prior to that she was hospitalized for heart failure and felt some chest congestion.   - She has been experiencing joint pain and stiffness in knees and feet. Takes about 10 minutes to loosen up and then they are fine. 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  The patient was seen and examined by Tasha Ferraro MD   Current Outpatient Medications   Medication     carvedilol (COREG) 3.125 MG tablet     cetirizine (ZYRTEC) 10 MG tablet     furosemide (LASIX) 20 MG tablet     lisinopril (ZESTRIL) 2.5 MG tablet     LORazepam (ATIVAN) 0.5 MG tablet     potassium chloride ER (K-DUR/KLOR-CON M) 20 MEQ CR tablet     lidocaine-prilocaine (EMLA) 2.5-2.5 % external cream     No current facility-administered medications for this visit.      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     Lifestyle     Physical activity     Days per week: Not on file     Minutes per session: Not on file     Stress: Not on file   Relationships     Social connections     Talks on phone: Not on file     Gets together: Not on file     Attends Holiness service: Not on file     Active member of club or organization: Not on file     Attends meetings of clubs or organizations: Not on file     Relationship status: Not on file     Intimate partner violence     Fear of current or ex partner: Not on file     Emotionally abused: Not on file     Physically abused: Not on file     Forced sexual activity: Not on file   Other Topics Concern     Parent/sibling w/ CABG, MI or angioplasty before 65F 55M? No      Service Not Asked     Blood Transfusions Not Asked     Caffeine Concern Yes     Comment: MOnster energy drink.   Te - 3 cups.        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       Past Medical History:   Diagnosis Date     Anxiety attack 9/16/2014     Cardiomyopathy (H)      Diffuse large B-cell lymphoma (H)     Diagnosed 11/2019     Encounter for Essure implantation 2009     Generalized anxiety  disorder 9/16/2014    zoloft = flat emotions     HFrEF (heart failure with reduced ejection fraction) (H)     new diagnosis 6/14     Menopausal disorder     started on OCPs by menopause center 3/2017 (takes active continuously)     Menstrual headache     helped by OCPs and magnesium     Paroxysmal SVT (supraventricular tachycardia) (H) 06/2020     GERTRUDE (stress urinary incontinence, female)     sling procedure 2016     Past Surgical History:   Procedure Laterality Date     CV CORONARY ANGIOGRAM N/A 6/15/2020    Procedure: Coronary Angiogram;  Surgeon: Luis Eduardo Phillips MD;  Location:  HEART CARDIAC CATH LAB     CV LEFT HEART CATH N/A 6/15/2020    Procedure: Left Heart Cath;  Surgeon: Luis Eduardo Phillips MD;  Location:  HEART CARDIAC CATH LAB     EP ABLATION SVT N/A 6/22/2020    Procedure: EP Ablation SVT;  Surgeon: Francisco Javier Bynum MD;  Location:  HEART CARDIAC CATH LAB     H KIT ESSURE  2009    essrickey - Dr. Cailin Raymundo      HERNIORRHAPHY UMBILICAL  1974     IR CHEST PORT PLACEMENT > 5 YRS OF AGE  1/9/2020     SLING TRANSPUBO WITHOUT ANTERIOR COLPORRHAPHY N/A 11/21/2016    Procedure: SLING TRANSPUBO WITHOUT ANTERIOR COLPORRHAPHY;  Surgeon: Hernesto Berrios MD;  Location: [facility] OR     Family History   Problem Relation Age of Onset     Hypertension Mother      Depression Mother      Lipids Mother      Cardiovascular Mother      Circulatory Mother      Diabetes Mother      Heart Disease Mother      Cerebrovascular Disease Mother      Obesity Mother      C.A.D. Mother      Lung Cancer Mother         smoker     Eye Disorder Father         cone dystrophy     Macular Degeneration Father      Diabetes Maternal Aunt         type 2     Hypertension Maternal Aunt      Heart Disease Maternal Grandfather      Heart Disease Sister         high cholesterol       Macular Degeneration Sister        Exam:   LMP 11/06/2019   GENERAL APPEARANCE: Well developed, well nourished, alert, and in no apparent distress.  NEURO:  Mentation intact, speech normal, normal strength and tone,   PSYCH: mentation appears normal. and affect normal/bright  Results:  - My interpretation of the images relevant for this visit includes: PET CT June, Feb, Nov images reviewed. Bulky mediastinal adenopathy nearly completed resolved. Couple of small remaining mediastinal nodes 1 cm, SUV ~4   - My interpretation of the PFT's relevant for this visit includes: mild airflow limitation on spirometry 11/29, not an obstructive pattern     Assessment and plan: Sadia is a 50-year-old female with diffuse large B-cell lymphoma, diagnosed at the end of 2019.  She had initially been diagnosed with sarcoidosis on the basis of bulky mediastinal adenopathy demonstrating granulomatous inflammation.  After several months of treatment with prednisone for presumed sarcoidosis, she developed pneumocystis pneumonia.  Then she had a second opinion and mediastinoscopy which diagnosed the lymphoma.  She completed R-CHOP with methotrexate in April 2020.  She is here today for evaluation of persistent mediastinal adenopathy.  Sarcoidosis-today we discussed that sarcoidosis is a diagnosis of exclusion so I would question whether she did actually have that.  We do not have any definite evidence of extrapulmonary involvement of sarcoidosis.  Although she demonstrated granulomatous inflammation on fine-needle aspiration of the mediastinal lymph nodes, the sensitivity of fine-needle aspiration for mediastinal adenopathy is not as good for lymphoma (about 60%) as it is for carcinoma.  To me it seems more likely that she had the lymphoma all along and was initially misdiagnosed.  Nevertheless, chemotherapy can induce a sarcoid-like reaction, that is to say granulomatous inflammation.  Given that she continues to have no extrapulmonary manifestations of inflammatory disease, I would not favor treating or considering sarcoidosis at this point.    FDG avid mediastinal adenopathy-this likely  relates to residual inflammation from treatment effect (vs residual disease).  She has no constitutional symptoms or other symptoms at this point so I would favor continuing to monitor with imaging.  If the mediastinal adenopathy worsens on repeat imaging (a PET scan in about 3 months), might be worth considering invasive biopsy.  At that time, mediastinoscopy may well be the first choice given not only her history but also the known low sensitivity of fine-needle aspiration (endobronchial ultrasound guided) for mediastinal node diagnosis.    Joint pain-today she describes joint stiffness in multiple joints without any associated synovitis that improves within 10 minutes of moving.  This does not sound concerning to me for systemic inflammatory disease.  She is wondering if it could be related to her chemotherapy and I told her I was not sure but would confer with Dr. Castro.    Follow-up with me as needed based on results of repeat imaging in 3 months (which I expect Dr Castro will be doing).    Again, thank you for allowing me to participate in the care of your patient.        Sincerely,        Tasha Ferraro MD

## 2020-07-06 NOTE — LETTER
"    7/6/2020         RE: Kassie Ramirez  [address]  [facility] [address]        Dear Colleague,    Thank you for referring your patient, Kassie Ramirez, to the [facility]. Please see a copy of my visit note below.    Kassie Ramirez is a 50 year old female who is being evaluated via a billable video visit.      The patient has been notified of following:     \"This video visit will be conducted via a call between you and your physician/provider. We have found that certain health care needs can be provided without the need for an in-person physical exam.  This service lets us provide the care you need with a video conversation.  If a prescription is necessary we can send it directly to your pharmacy.  If lab work is needed we can place an order for that and you can then stop by our lab to have the test done at a later time.    Video visits are billed at different rates depending on your insurance coverage.  Please reach out to your insurance provider with any questions.    If during the course of the call the physician/provider feels a video visit is not appropriate, you will not be charged for this service.\"    Patient has given verbal consent for Video visit? Yes  How would you like to obtain your AVS? MyChart  Will anyone else be joining your video visit? No        Video-Visit Details    Type of service:  Video Visit    Video Start Time: 8:41 AM  Video End Time: 9:03 AM    Originating Location (pt. Location): Home    Distant Location (provider location):  [facility]     Platform used for Video Visit: Leelee Ferraro MD      [facility] Initial Visit    Reason for Visit  Kassie Ramirez is a 50 year old female who is referred by Dr Castro for lympadenopathy, Pneumocystis pneumonia and lymphoma  Pulmonary HPI    - she had a nagging cough in early 2019, thought she had aspirated on rice. She was also sweaty and short of " breath at that time. She had a repeat PET 6/23, prior to that she was hospitalized for heart failure and felt some chest congestion.   - She has been experiencing joint pain and stiffness in knees and feet. Takes about 10 minutes to loosen up and then they are fine. 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  The patient was seen and examined by Tasha Ferraro MD   Current Outpatient Medications   Medication     carvedilol (COREG) 3.125 MG tablet     cetirizine (ZYRTEC) 10 MG tablet     furosemide (LASIX) 20 MG tablet     lisinopril (ZESTRIL) 2.5 MG tablet     LORazepam (ATIVAN) 0.5 MG tablet     potassium chloride ER (K-DUR/KLOR-CON M) 20 MEQ CR tablet     lidocaine-prilocaine (EMLA) 2.5-2.5 % external cream     No current facility-administered medications for this visit.      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     Lifestyle     Physical activity     Days per week: Not on file     Minutes per session: Not on file     Stress: Not on file   Relationships     Social connections     Talks on phone: Not on file     Gets together: Not on file     Attends Mu-ism service: Not on file     Active member of club or organization: Not on file     Attends meetings of clubs or organizations: Not on file     Relationship status: Not on file     Intimate partner violence     Fear of current or ex partner: Not on file     Emotionally abused: Not on file     Physically abused: Not on file     Forced sexual activity: Not on file   Other Topics Concern     Parent/sibling w/ CABG, MI or angioplasty before 65F 55M? No      Service Not Asked     Blood Transfusions Not Asked     Caffeine Concern Yes     Comment: MOnster energy drink.   Te - 3 cups.        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       Past Medical History:   Diagnosis Date     Anxiety attack 9/16/2014     Cardiomyopathy (H)      Diffuse large B-cell lymphoma (H)     Diagnosed 11/2019     Encounter for Essure implantation 2009     Generalized anxiety  disorder 9/16/2014    zoloft = flat emotions     HFrEF (heart failure with reduced ejection fraction) (H)     new diagnosis 6/14     Menopausal disorder     started on OCPs by menopause center 3/2017 (takes active continuously)     Menstrual headache     helped by OCPs and magnesium     Paroxysmal SVT (supraventricular tachycardia) (H) 06/2020     GERTRUDE (stress urinary incontinence, female)     sling procedure 2016     Past Surgical History:   Procedure Laterality Date     CV CORONARY ANGIOGRAM N/A 6/15/2020    Procedure: Coronary Angiogram;  Surgeon: Luis Eduardo Phillips MD;  Location:  HEART CARDIAC CATH LAB     CV LEFT HEART CATH N/A 6/15/2020    Procedure: Left Heart Cath;  Surgeon: Luis Eduardo Phillips MD;  Location:  HEART CARDIAC CATH LAB     EP ABLATION SVT N/A 6/22/2020    Procedure: EP Ablation SVT;  Surgeon: Francisco Javier Bynum MD;  Location:  HEART CARDIAC CATH LAB     H KIT ESSURE  2009    [facility] - Dr. Cailin Raymundo      HERNIORRHAPHY UMBILICAL  1974     IR CHEST PORT PLACEMENT > 5 YRS OF AGE  1/9/2020     SLING TRANSPUBO WITHOUT ANTERIOR COLPORRHAPHY N/A 11/21/2016    Procedure: SLING TRANSPUBO WITHOUT ANTERIOR COLPORRHAPHY;  Surgeon: Hernesto Berrios MD;  Location: [facility] OR     Family History   Problem Relation Age of Onset     Hypertension Mother      Depression Mother      Lipids Mother      Cardiovascular Mother      Circulatory Mother      Diabetes Mother      Heart Disease Mother      Cerebrovascular Disease Mother      Obesity Mother      C.A.D. Mother      Lung Cancer Mother         smoker     Eye Disorder Father         cone dystrophy     Macular Degeneration Father      Diabetes Maternal Aunt         type 2     Hypertension Maternal Aunt      Heart Disease Maternal Grandfather      Heart Disease Sister         high cholesterol       Macular Degeneration Sister        Exam:   LMP 11/06/2019   GENERAL APPEARANCE: Well developed, well nourished, alert, and in no apparent distress.  NEURO:  Mentation intact, speech normal, normal strength and tone,   PSYCH: mentation appears normal. and affect normal/bright  Results:  - My interpretation of the images relevant for this visit includes: PET CT June, Feb, Nov images reviewed. Bulky mediastinal adenopathy nearly completed resolved. Couple of small remaining mediastinal nodes 1 cm, SUV ~4   - My interpretation of the PFT's relevant for this visit includes: mild airflow limitation on spirometry 11/29, not an obstructive pattern     Assessment and plan: Sadia is a 50-year-old female with diffuse large B-cell lymphoma, diagnosed at the end of 2019.  She had initially been diagnosed with sarcoidosis on the basis of bulky mediastinal adenopathy demonstrating granulomatous inflammation.  After several months of treatment with prednisone for presumed sarcoidosis, she developed pneumocystis pneumonia.  Then she had a second opinion and mediastinoscopy which diagnosed the lymphoma.  She completed R-CHOP with methotrexate in April 2020.  She is here today for evaluation of persistent mediastinal adenopathy.  Sarcoidosis-today we discussed that sarcoidosis is a diagnosis of exclusion so I would question whether she did actually have that.  We do not have any definite evidence of extrapulmonary involvement of sarcoidosis.  Although she demonstrated granulomatous inflammation on fine-needle aspiration of the mediastinal lymph nodes, the sensitivity of fine-needle aspiration for mediastinal adenopathy is not as good for lymphoma (about 60%) as it is for carcinoma.  To me it seems more likely that she had the lymphoma all along and was initially misdiagnosed.  Nevertheless, chemotherapy can induce a sarcoid-like reaction, that is to say granulomatous inflammation.  Given that she continues to have no extrapulmonary manifestations of inflammatory disease, I would not favor treating or considering sarcoidosis at this point.    FDG avid mediastinal adenopathy-this likely  relates to residual inflammation from treatment effect (vs residual disease).  She has no constitutional symptoms or other symptoms at this point so I would favor continuing to monitor with imaging.  If the mediastinal adenopathy worsens on repeat imaging (a PET scan in about 3 months), might be worth considering invasive biopsy.  At that time, mediastinoscopy may well be the first choice given not only her history but also the known low sensitivity of fine-needle aspiration (endobronchial ultrasound guided) for mediastinal node diagnosis.    Joint pain-today she describes joint stiffness in multiple joints without any associated synovitis that improves within 10 minutes of moving.  This does not sound concerning to me for systemic inflammatory disease.  She is wondering if it could be related to her chemotherapy and I told her I was not sure but would confer with Dr. Castro.    Follow-up with me as needed based on results of repeat imaging in 3 months (which I expect Dr Castro will be doing).    Again, thank you for allowing me to participate in the care of your patient.        Sincerely,        Tasha Ferraro MD

## 2020-07-07 ENCOUNTER — VIRTUAL VISIT (OUTPATIENT)
Dept: CARDIOLOGY | Facility: CLINIC | Age: 50
End: 2020-07-07
Payer: COMMERCIAL

## 2020-07-07 DIAGNOSIS — I50.21 ACUTE SYSTOLIC CONGESTIVE HEART FAILURE (H): ICD-10-CM

## 2020-07-07 DIAGNOSIS — E87.6 HYPOKALEMIA: ICD-10-CM

## 2020-07-07 DIAGNOSIS — I42.8 NONISCHEMIC CARDIOMYOPATHY (H): ICD-10-CM

## 2020-07-07 PROCEDURE — 99215 OFFICE O/P EST HI 40 MIN: CPT | Mod: 95 | Performed by: NURSE PRACTITIONER

## 2020-07-07 RX ORDER — POTASSIUM CHLORIDE 1500 MG/1
40 TABLET, EXTENDED RELEASE ORAL DAILY
Qty: 180 TABLET | Refills: 0 | Status: SHIPPED | OUTPATIENT
Start: 2020-07-07 | End: 2020-09-30

## 2020-07-07 RX ORDER — CARVEDILOL 3.12 MG/1
1.56 TABLET ORAL 2 TIMES DAILY WITH MEALS
Qty: 30 TABLET | Refills: 1 | Status: SHIPPED | OUTPATIENT
Start: 2020-07-07 | End: 2020-09-21

## 2020-07-07 NOTE — PROGRESS NOTES
"Kassie Ramirez is a 50 year old female who is being evaluated via a billable video visit.  This visit is being conducted as a virtual visit due to the emphasis on mitigation of the COVID-19 virus pandemic. The clinician has decided that the risk of an in-office visit outweighs the benefit for this patient.     The patient has been notified of following:   \"This video visit will be conducted via a call between you and your physician/provider. We have found that certain health care needs can be provided without the need for an in-person physical exam.  This service lets us provide the care you need with a video conversation.  If a prescription is necessary we can send it directly to your pharmacy.  If lab work is needed we can place an order for that and you can then stop by our lab to have the test done at a later time.  If during the course of the call the physician/provider feels a video visit is not appropriate, you will not be charged for this service.\"     Patient has given verbal consent for Video visit? Yes    Patient would like the video invitation sent by:     Video Start Time: 1:50 pm     Kassie Ramirez presents for video call.      I have reviewed and updated the patient's Past Medical History, Social History, Family History and Medication List.    Review Of Systems  Skin: negative  Eyes: negative  Ears/Nose/Throat: negative  Respiratory: No shortness of breath, dyspnea on exertion, cough, or hemoptysis  Cardiovascular: negative and positive for negative.  Low BP today 82/56.  Denies dizziness, lightheadedness.  Normal BP runs around 90's/70's.  Feels maybe she might be a little dehydrated.    Gastrointestinal: negative  Genitourinary: negative  Musculoskeletal: negative and positive joint pain d/t chemo  Neurologic: negative  Psychiatric: negative  Hematologic/Lymphatic/Immunologic: negative  Endocrine: negative  (ROS TAKEN BY KING Nathan  PRIOR TO VISIT)    Today's weight: 152 lbs  Goes up and " down 1/2 lbs.  152 is normal for her.    Took AM meds around 0800, BP was taken 0745: 82/56.    Kassie reports she took all of her medications today.  She's just been taking OTC potassium 99 mg.   Needs refills on: Coreg, Furosemide, lisinopril, Potassium Cl.       PROBLEM LIST  Patient Active Problem List   Diagnosis     Anxiety attack     Generalized anxiety disorder     Controlled substance agreement signed     GERTRUDE (stress urinary incontinence, female)     Intractable migraine without aura and with status migrainosus     Menstrual headache     Menopausal disorder     Mixed hyperlipidemia     SVT (supraventricular tachycardia) (H)     Mediastinal mass     Diffuse large B-cell lymphoma of extranodal site (H) - per preliminary pathology from [facility] 11/27/19 - mediastinal mass wrapped around azalea and bilateral main stem bronchi     Bronchial compression     Neutropenic fever (H)     Acute kidney failure, unspecified (H)     Respiratory failure (H)     Lymphoma (H)     DLBCL (diffuse large B cell lymphoma) (H)     Chemotherapy adverse reaction     Myocardial injury, initial encounter     Acute systolic congestive heart failure (H)     Paroxysmal SVT (supraventricular tachycardia) (H)     Tachycardia     Atrial flutter (H)     Cardiomyopathy (H)       ALLERGIES  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]; Seasonal allergies; Sudafed cold-cough [dayquil liquicaps]; and Pseudoephedrine    MEDICATIONS  Current Outpatient Medications   Medication Sig Dispense Refill     carvedilol (COREG) 3.125 MG tablet Take 0.5 tablets (1.56 mg) by mouth 2 times daily (with meals) 30 tablet 0     cetirizine (ZYRTEC) 10 MG tablet Take 10 mg by mouth daily       furosemide (LASIX) 20 MG tablet Take 1 tablet (20 mg) by mouth daily 30 tablet 1     lidocaine-prilocaine (EMLA) 2.5-2.5 % external cream Apply 1 applicator topically as needed for moderate pain       lisinopril (ZESTRIL) 2.5 MG tablet Take 1 tablet (2.5 mg) by mouth daily  30 tablet 1     LORazepam (ATIVAN) 0.5 MG tablet 1 tablet by mouth at bedtime for sleep and anxiety. 30 tablet 0     potassium chloride ER (K-DUR/KLOR-CON M) 20 MEQ CR tablet Take 2 tablets (40 mEq) by mouth daily 60 tablet 3       HPI:  I had the opportunity to speak to Kassie over the phone for a cardiology clinic visit.  Due to the COVID-19 pandemic, this visit was done as a video visit.     It is my pleasure to see Kassie Ramirez a pleasant 50-year-old patient with a diagnosis of large B-cell lymphoma who is undergoing chemotherapy with R-CHOP.  This patient has seen Dr. Finn, Dr. Alcala, [facility], and Fletcher.    This is a patient with a new diagnosis of cardiomyopathy ( 6/2020) with an ejection fraction in the 30% range.  This is a new diagnosis of cardiomyopathy as her ejection fraction prior to this was normal.      She did undergo coronary angiography 6/2020 which showed no evidence of coronary artery disease, confirming the diagnosis of cardiomyopathy.  The left ventricular end-diastolic pressure was on the higher side being at 29 mmHg.  The patient has been plagued by numerous episodes of supraventricular tachycardia.  She had multiple episodes at a previous admission  6/19/20 she had at least 5 episodes of SVT and then came into the hospital.  When she came in, she had sinus tachycardia, but then had a 10-minute run of SVT.  She was about to be given adenosine and then she spontaneously converted back to sinus tachycardia.  The patient's blood pressure has been running on the lower side, so it has been difficult to give her substantial doses of beta blocker.  She was on metoprolol succinate 12.5 mg when she was admitted. They attempted to go to Metoprolol 25 mg but ultimately change to carvedilol.     Her blood pressure in the hospital was on the softer side at 104/74.  Her electrolytes were normal on admission with a potassium of 3.9.  Her troponin did go up to 0.106, which is probably rate related   given that she had normal coronary arteries about 5 days ago.  Her proBNP is raised at 1513.     Patient is been actively treated for large B-cell lymphoma with R-CHOP.  Recently diagnosed with moderate to severely reduced left ventricular systolic function.  Coronary angiography showed normal coronary arteries confirming the diagnosis of cardiomyopathy.  It was felt in the past that possibly this was a rate related cardiomyopathy however the patient is having only intermittent episodes of SVT and the more likely culprit per Dr. Baum was the chemotherapy especially because it contains doxorubicin.    Patient was admitted with multiple episodes of SVT.  This SVT in the past has been adenosine sensitive and also has responded to vagal maneuvers.  Patient was markedly symptomatic when she was in SVT.     6/22/20  Underwent, S/p SVT ablation on 6/22/20. Started on Toprol XL, then changed to carvedilol per cardiology recommendations.    7/7 today [facility].   Today's weight: 152 lbs  Goes up and down 1/2 lbs.  152 is normal for her.    Took AM meds around 0800, BP was taken 0745: 82/56.    Kassie reports she took all of her medications today.  She's just been taking OTC potassium 99 mg. ( of note she ran out of KCL and starting taking OTC potassium.     She took her BP again and it was 90/70. Patient is not complaining of increase shortness of breath, PND, orthopnea, or dizziness.          PAST MEDICAL HISTORY:   1.  Large B-cell lymphoma, being treated with R-CHOP by Dr. Castro at [facility].   2.  Recurrent SVT.   3.  Cardiomyopathy.   4.  Reflux disease.   5.  Anxiety.      FAMILY HISTORY:  No history of cardiomyopathy.      SOCIAL HISTORY:  She does not drink alcohol.  She does not smoke cigarettes.      ALLERGIES:     1.  INTOLERANCE TO CO-DEFENSE FIGHTER.     2.  SUDAFED APPEARS TO CAUSE A SKIN RASH.     3.  SEASONAL ALLERGIES.   4.  INTOLERANT TO DAYQUIL AND DAYQUIL  LIQUID CAPS.      MEDICATIONS:   1. Zyrtec 10 mg orally per day.   2. Furosemide 20 mg per day.   3. Lisinopril 2.5 mg per day.   4. Carvedilol 1.25mg bid    5. Potassium chloride 40 mEq per day.     ROS:  12-pt ROS is negative except for as noted above.      PHYSICAL EXAMINATION:   General:  no apparent distress, normal body habitus, sitting upright.   Neck:  no apparent neck swelling.   Chest/Lungs:  no breathing difficulty while speaking.  No audible wheezing.  No cough during conversation.   Cardiovascular:  reported HR is regular.  No obviously elevated jugular venous pressure.     Abdomen:  no obvious abdominal distention.   Extremities:  no apparent cyanosis.   Psychiatric:  Alert and oriented x3, calm demeanor   The rest of the comprehensive physical examination is deferred due to public health emergency video visit restrictions    Assessment/Plan:       1. New Non-ischemic Cardiomyopathy 25-30%   Previously this was felt to be possibly rate related.  However, the patient had intermittent SVT.  Etiology of her cardiomyopathy, more likely her chemotherapeutic regimen which includes doxorubicin is the more likely culprit to be causing reduced left ventricular systolic function.  - Hospitalized at [facility] 6/14-6/17/2020 with heart failure.  - Angio negative  - Lasix 20 mg daily, lisinopril 2.5 and carvedilol 1.25mg bid   - restart KCL  -Return visit in 1 week with BMP  -repeat echo in August.         2. SVT -   - First occurrence 11/2019 treated with adenosine in ER  - Has had episodes ~1/month, significant increase over the last month  - No syncope, presyncope, but sx'c with palpitations and extreme fatigue.  - BB therapy limited due to soft BPs in 90-100s  - Now s/p EP study showing AVNRT, now s/p slow pathway modification 6/222/2020 with Dr. Bynum  - Tele showed SR in hospital.   -  PLAN: Follow-up EP  7/23/2020 @ 12:30    2. Large B-cell lymphoma -   - currently undergoing tx with R-CHOP (Dr. Castro,   Oncology     PLAN:    1. Return in 1 week with BMP, BNP  2. Echo in August              Video-Visit Details  Type of service:  Video Visit  Video End Time (time video stopped): 2:30PM   Originating Location (pt. Location): Home  Distant Location (provider location):  [facility]   Mode of Communication:  Video Conference via Ruiz JOHNSON CNP   [facility]  Pager: [phone]  Text Page  (7:30am - 4pm M-F)

## 2020-07-07 NOTE — LETTER
7/7/2020    Mary Alice Colón PA-C  [address] [facility] [address]    RE: Kassie Aburtoter       Dear Colleague,    I had the pleasure of seeing Kassie Ramirez in the [facility].    Kassie Ramirez is a 50 year old female who is being evaluated via a billable video visit.  This visit is being conducted as a virtual visit due to the emphasis on mitigation of the COVID-19 virus pandemic. The clinician has decided that the risk of an in-office visit outweighs the benefit for this patient.       PROBLEM LIST  Patient Active Problem List   Diagnosis     Anxiety attack     Generalized anxiety disorder     Controlled substance agreement signed     GERTRUDE (stress urinary incontinence, female)     Intractable migraine without aura and with status migrainosus     Menstrual headache     Menopausal disorder     Mixed hyperlipidemia     SVT (supraventricular tachycardia) (H)     Mediastinal mass     Diffuse large B-cell lymphoma of extranodal site (H) - per preliminary pathology from [facility] 11/27/19 - mediastinal mass wrapped around azalea and bilateral main stem bronchi     Bronchial compression     Neutropenic fever (H)     Acute kidney failure, unspecified (H)     Respiratory failure (H)     Lymphoma (H)     DLBCL (diffuse large B cell lymphoma) (H)     Chemotherapy adverse reaction     Myocardial injury, initial encounter     Acute systolic congestive heart failure (H)     Paroxysmal SVT (supraventricular tachycardia) (H)     Tachycardia     Atrial flutter (H)     Cardiomyopathy (H)       ALLERGIES  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5 mg) by mouth daily 30 tablet 1     LORazepam (ATIVAN) 0.5 MG tablet 1 tablet by mouth at bedtime for sleep and anxiety. 30 tablet 0     potassium chloride ER (K-DUR/KLOR-CON M) 20 MEQ CR tablet Take 2 tablets (40 mEq) by mouth daily 60 tablet 3       HPI:  I had the opportunity to speak to Kassie over the phone for a cardiology clinic visit.  Due to the COVID-19 pandemic, this visit was done as a video visit.     It is my pleasure to see Kassie Ramirez a pleasant 50-year-old patient with a diagnosis of large B-cell lymphoma who is undergoing chemotherapy with R-CHOP.  This patient has seen Dr. Finn, Dr. Alcala, Jaguar, and Fletcher.    This is a patient with a new diagnosis of cardiomyopathy ( 6/2020) with an ejection fraction in the 30% range.  This is a new diagnosis of cardiomyopathy as her ejection fraction prior to this was normal.      She did undergo coronary angiography 6/2020 which showed no evidence of coronary artery disease, confirming the diagnosis of cardiomyopathy.  The left ventricular end-diastolic pressure was on the higher side being at 29 mmHg.  The patient has been plagued by numerous episodes of supraventricular tachycardia.  She had multiple episodes at a previous admission  6/19/20 she had at least 5 episodes of SVT and then came into the hospital.  When she came in, she had sinus tachycardia, but then had a 10-minute run of SVT.  She was about to be given adenosine and then she spontaneously converted back to sinus tachycardia.  The patient's blood pressure has been running on the lower side, so it has been difficult to give her substantial doses of beta blocker.  She was on metoprolol succinate 12.5 mg when she was admitted. They  attempted to go to Metoprolol 25 mg but ultimately change to carvedilol.     Her blood pressure in the hospital was on the softer side at 104/74.  Her electrolytes were normal on admission with a potassium of 3.9.  Her troponin did go up to 0.106, which is probably rate related  given that she had normal coronary arteries about 5 days ago.  Her proBNP is raised at 1513.     Patient is been actively treated for large B-cell lymphoma with R-CHOP.  Recently diagnosed with moderate to severely reduced left ventricular systolic function.  Coronary angiography showed normal coronary arteries confirming the diagnosis of cardiomyopathy.  It was felt in the past that possibly this was a rate related cardiomyopathy however the patient is having only intermittent episodes of SVT and the more likely culprit per Dr. Baum was the chemotherapy especially because it contains doxorubicin.    Patient was admitted with multiple episodes of SVT.  This SVT in the past has been adenosine sensitive and also has responded to vagal maneuvers.  Patient was markedly symptomatic when she was in SVT.     6/22/20  Underwent, S/p SVT ablation on 6/22/20. Started on Toprol XL, then changed to carvedilol per cardiology recommendations.    7/7 today [facility] into [facility].   Today's weight: 152 lbs  Goes up and down 1/2 lbs.  152 is normal for her.    Took AM meds around 0800, BP was taken 0745: 82/56.    Kassie reports she took all of her medications today.  She's just been taking OTC potassium 99 mg. ( of note she ran out of KCL and starting taking OTC potassium.     She took her BP again and it was 90/70. Patient is not complaining of increase shortness of breath, PND, orthopnea, or dizziness.          PAST MEDICAL HISTORY:   1.  Large B-cell lymphoma, being treated with R-CHOP by Dr. Castro at [facility].   2.  Recurrent SVT.   3.  Cardiomyopathy.   4.  Reflux disease.   5.  Anxiety.      FAMILY HISTORY:  No history  of cardiomyopathy.      SOCIAL HISTORY:  She does not drink alcohol.  She does not smoke cigarettes.      ALLERGIES:     1.  INTOLERANCE TO CO-DEFENSE FIGHTER.     2.  SUDAFED APPEARS TO CAUSE A SKIN RASH.     3.  SEASONAL ALLERGIES.   4.  INTOLERANT TO DAYQUIL AND DAYQUIL LIQUID CAPS.      MEDICATIONS:   1. Zyrtec 10 mg orally per day.   2. Furosemide 20 mg per day.   3. Lisinopril 2.5 mg per day.   4. Carvedilol 1.25mg bid    5. Potassium chloride 40 mEq per day.     ROS:  12-pt ROS is negative except for as noted above.      PHYSICAL EXAMINATION:   General:  no apparent distress, normal body habitus, sitting upright.   Neck:  no apparent neck swelling.   Chest/Lungs:  no breathing difficulty while speaking.  No audible wheezing.  No cough during conversation.   Cardiovascular:  reported HR is regular.  No obviously elevated jugular venous pressure.     Abdomen:  no obvious abdominal distention.   Extremities:  no apparent cyanosis.   Psychiatric:  Alert and oriented x3, calm demeanor   The rest of the comprehensive physical examination is deferred due to public health emergency video visit restrictions    Assessment/Plan:       1. New Non-ischemic Cardiomyopathy 25-30%   Previously this was felt to be possibly rate related.  However, the patient had intermittent SVT.  Etiology of her cardiomyopathy, more likely her chemotherapeutic regimen which includes doxorubicin is the more likely culprit to be causing reduced left ventricular systolic function.  - Hospitalized at [facility] 6/14-6/17/2020 with heart failure.  - Angio negative  - Lasix 20 mg daily, lisinopril 2.5 and carvedilol 1.25mg bid   - restart KCL  -Return visit in 1 week with [facility]  -repeat echo in August.         2. SVT -   - First occurrence 11/2019 treated with adenosine in ER  - Has had episodes ~1/month, significant increase over the last month  - No syncope, presyncope, but sx'c with palpitations and extreme fatigue.  - BB therapy limited due to soft  BPs in 90-100s  - Now s/p EP study showing AVNRT, now s/p slow pathway modification 6/222/2020 with Dr. Bynum  - Tele showed SR in hospital.   -  PLAN: Follow-up EP  7/23/2020 @ 12:30    2. Large B-cell lymphoma -   - currently undergoing tx with R-CHOP (Dr. Castro,  Oncology     PLAN:    1. Return in 1 week with BMP, BNP  2. Echo in August       Asmita JOHNSON, SCOTTIE   [facility] - [facility]  Pager: [phone]  Text Page  (7:30am - 4pm M-F)     Thank you for allowing me to participate in the care of your patient.    Sincerely,     ALEX Mendoza CNP     [facility]

## 2020-07-09 ENCOUNTER — CARE COORDINATION (OUTPATIENT)
Dept: CARDIOLOGY | Facility: CLINIC | Age: 50
End: 2020-07-09

## 2020-07-09 NOTE — PROGRESS NOTES
Called and spoke with nursing from pt's oncology clinic asking if pt needed any upcoming lab work as she is scheduled for labs with us prior to her appt on 7/14.  RN reported she does not need labs until sept and already scheduled.      Future Appointments   Date Time Provider Department Center   7/14/2020 11:30 AM RU LAB LAB [facility] PSA CLIN   7/14/2020 12:30 PM Asmita Vo APRN CNP [facility] PSA CLIN   7/28/2020  9:45 AM RH LAB DRAW 1 [facility]   8/20/2020  2:15 PM Malachi Finn MD [facility] PSA CLIN   9/11/2020  1:00 PM [facility]   9/11/2020  2:00 PM RSCCCT1 RHSCCT [facility]   9/17/2020  9:45 AM RSCCECHO2 RHCVCC RSCC   9/18/2020  8:30 AM Castro Castro MD [facility]CR[facility] [place] R[facility]   9/21/2020 12:45 PM Trevon Pearl MD [facility] PSA CLIN   9/29/2020 10:20 AM Mary Alice Colón PA-C RVFP RV     Lexi Mtz, RN BSN   [place], [place]  [facility].O[facility] Clinic Care Coordinator  07/09/20, 12:32 PM

## 2020-07-09 NOTE — PATIENT INSTRUCTIONS
Call [facility] nurse for any questions or concerns Mon-Fri 8am-4pm:                                                [phone]                                For concerns after hours: [phone]    Medication changes:   1. No changes  Plan from today:   1. Return visit with Cirilo Vo in 1 week with blood work done prior to the visit.

## 2020-07-14 ENCOUNTER — OFFICE VISIT (OUTPATIENT)
Dept: CARDIOLOGY | Facility: CLINIC | Age: 50
End: 2020-07-14
Attending: NURSE PRACTITIONER
Payer: COMMERCIAL

## 2020-07-14 VITALS
DIASTOLIC BLOOD PRESSURE: 50 MMHG | OXYGEN SATURATION: 98 % | HEART RATE: 105 BPM | SYSTOLIC BLOOD PRESSURE: 82 MMHG | HEIGHT: 67 IN | WEIGHT: 153.3 LBS | BODY MASS INDEX: 24.06 KG/M2

## 2020-07-14 DIAGNOSIS — I42.8 NONISCHEMIC CARDIOMYOPATHY (H): ICD-10-CM

## 2020-07-14 DIAGNOSIS — E87.6 HYPOKALEMIA: ICD-10-CM

## 2020-07-14 DIAGNOSIS — I50.21 ACUTE SYSTOLIC CONGESTIVE HEART FAILURE (H): ICD-10-CM

## 2020-07-14 LAB
ANION GAP SERPL CALCULATED.3IONS-SCNC: 6 MMOL/L (ref 3–14)
BUN SERPL-MCNC: 27 MG/DL (ref 7–30)
CALCIUM SERPL-MCNC: 9.7 MG/DL (ref 8.5–10.1)
CHLORIDE SERPL-SCNC: 106 MMOL/L (ref 94–109)
CO2 SERPL-SCNC: 27 MMOL/L (ref 20–32)
CREAT SERPL-MCNC: 0.98 MG/DL (ref 0.52–1.04)
GFR SERPL CREATININE-BSD FRML MDRD: 67 ML/MIN/{1.73_M2}
GLUCOSE SERPL-MCNC: 99 MG/DL (ref 70–99)
POTASSIUM SERPL-SCNC: 3.8 MMOL/L (ref 3.4–5.3)
SODIUM SERPL-SCNC: 139 MMOL/L (ref 133–144)

## 2020-07-14 PROCEDURE — 80048 BASIC METABOLIC PNL TOTAL CA: CPT | Performed by: INTERNAL MEDICINE

## 2020-07-14 PROCEDURE — 99215 OFFICE O/P EST HI 40 MIN: CPT | Performed by: NURSE PRACTITIONER

## 2020-07-14 PROCEDURE — 36415 COLL VENOUS BLD VENIPUNCTURE: CPT | Performed by: INTERNAL MEDICINE

## 2020-07-14 PROCEDURE — 93000 ELECTROCARDIOGRAM COMPLETE: CPT | Performed by: NURSE PRACTITIONER

## 2020-07-14 ASSESSMENT — MIFFLIN-ST. JEOR: SCORE: 1340.05

## 2020-07-14 NOTE — PATIENT INSTRUCTIONS
Call C.[facility] nurse for any questions or concerns Mon-Fri 8am-4pm:                                                [phone]                                For concerns after hours: [phone]    Medication changes:   1. No changes   Plan from today:   Echo   See Dr. Pearl in September

## 2020-07-14 NOTE — LETTER
7/14/2020    Mary Alice Colón PA-C  [address] [facility] [address]    RE: Kassie Ramirez       Dear Colleague,    I had the pleasure of seeing Kassie Ramirez in the [facility].      PROBLEM LIST  Patient Active Problem List   Diagnosis     Anxiety attack     Generalized anxiety disorder     Controlled substance agreement signed     GERTRUDE (stress urinary incontinence, female)     Intractable migraine without aura and with status migrainosus     Menstrual headache     Menopausal disorder     Mixed hyperlipidemia     SVT (supraventricular tachycardia) (H)     Mediastinal mass     Diffuse large B-cell lymphoma of extranodal site (H) - per preliminary pathology from [facility] 11/27/19 - mediastinal mass wrapped around azalea and bilateral main stem bronchi     Bronchial compression     Neutropenic fever (H)     Acute kidney failure, unspecified (H)     Respiratory failure (H)     Lymphoma (H)     DLBCL (diffuse large B cell lymphoma) (H)     Chemotherapy adverse reaction     Myocardial injury, initial encounter     Acute systolic congestive heart failure (H)     Paroxysmal SVT (supraventricular tachycardia) (H)     Tachycardia     Atrial flutter (H)     Cardiomyopathy (H)     Diffuse large B-cell lymphoma (H)       ALLERGIES  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]; Seasonal allergies; Sudafed cold-cough [dayquil liquicaps]; and Pseudoephedrine    MEDICATIONS  Current Outpatient Medications   Medication Sig Dispense Refill     carvedilol (COREG) 3.125 MG tablet Take 0.5 tablets (1.56 mg) by mouth 2 times daily (with meals) 30 tablet 1     cetirizine (ZYRTEC) 10 MG tablet Take 10 mg by mouth daily       furosemide (LASIX) 20 MG tablet Take 1 tablet (20 mg) by mouth daily 30 tablet 1     lidocaine-prilocaine (EMLA) 2.5-2.5 % external cream Apply 1 applicator topically as needed for moderate pain       lisinopril (ZESTRIL) 2.5 MG tablet Take 1 tablet (2.5 mg) by mouth  daily 30 tablet 1     LORazepam (ATIVAN) 0.5 MG tablet 1 tablet by mouth at bedtime for sleep and anxiety. 30 tablet 0     potassium chloride ER (KLOR-CON M) 20 MEQ CR tablet Take 2 tablets (40 mEq) by mouth daily 180 tablet 0       HPI:  It is my pleasure to see your patient, Kassie Ramirez.  She is an pleasant 50-year-old patient with a diagnosis of large B-cell lymphoma who is undergoing chemotherapy with R-CHOP.    6/14/2020 admitted with a new diagnosis of cardiomyopathy with an ejection fraction in the 30% range. This is a new diagnosis of cardiomyopathy as her ejection fraction prior to this was normal.    She did undergo coronary angiography in June which showed no evidence of coronary artery disease, confirming the diagnosis of cardiomyopathy.  The left ventricular end-diastolic pressure was on the higher side being at 29 mmHg.  The patient has been plagued by numerous episodes of supraventricular tachycardia.  She had multiple episodes on the 6/14/20 admission.      6/19/20 she had at least 5 episodes of SVT and then came into the hospital.  When she came in, she had sinus tachycardia, but then had a 10-minute run of SVT.  She was about to be given adenosine and then she spontaneously converted back to sinus tachycardia.  The patient's blood pressure has been running on the lower side, so it has been difficult to give her substantial doses of beta blocker.  She was on metoprolol succinate 12.5 mg when she was admitted.  They attempted to go to 25 mg.  Her blood pressure, however, was on the softer side at 104/74.    Her proBNP is raised at 1513.     6/22/20 transferred to [facility]. She is now S/p SVT ablation on 6/22/20     PAST MEDICAL HISTORY:   1.  Large B-cell lymphoma, being treated with R-CHOP by Dr. Castro at [facility].   2.  Recurrent SVT.   3.  Cardiomyopathy. It was felt in the past that possibly this was a rate related cardiomyopathy however the patient is having only  intermittent episodes of SVT and the more likely culprit I believe is the chemotherapy especially because it contains doxorubicin.  4.  Reflux disease.   5.  Anxiety.   6. S/p SVT ablation on 6/22/20       FAMILY HISTORY:  No history of cardiomyopathy.      SOCIAL HISTORY:  She does not drink alcohol.  She does not smoke cigarettes. .      ALLERGIES:     1.  INTOLERANCE TO CO-DEFENSE FIGHTER.     2.  SUDAFED APPEARS TO CAUSE A SKIN RASH.     3.  SEASONAL ALLERGIES.   4.  INTOLERANT TO DAYQUIL AND DAYQUIL LIQUID CAPS.      MEDICATIONS:   1.  Zyrtec 10 mg orally per day.   2.  Lasix 20mg daily    3.  Lisinopril 2.5 mg per day.   4.  Carvedilol 1.25mg bid    5.  Potassium chloride 40 mEq per day    Today 7/14/20 she states she feels fine. Denies dizziness, lightheadedness, PND, syncope or near syncope. Denies papillations. She states the palpitations are gone. Weight stable at 151 pounds. BP at home 106/67. 103/73. 91/66. HR 90's.     ROS:  12-pt ROS is negative except for as noted above.      PHYSICAL EXAMINATION:   Weight 153 pounds, BP 70/52, 82/52  Constitutional:  Patient is pleasant, alert, cooperative, and in NAD. diaphoretic   HEENT:  NCAT. PERRLA. EOM's intact.   Neck:  JVP 6-8 cm   Chest: good air exchange, clear to auscultation bilaterally, no crackles or wheezing and Port-A-Cath in right upper chest  Cardiac: regular rate (98 bpm)  and rhythm, normal S1 and S2, S4 present, no murmur   Abdomen:  Soft, non-tender abdomen, no hepatosplenomegaly appreciated.   Extremities:  no edema   Neurological:  No gross motor or sensory deficits.   Psych: Appropriate affect.       Data:  Echocardiogram 6/15/20   The visual ejection fraction is estimated at 25-30%.  Left ventricular systolic function is moderate to severely reduced.  There has been a significant drop in left ventricular sytstolic function since  2/23/2020.  The right ventricular systolic function is mild to moderately reduced.  There is moderate (2+)  mitral regurgitation.       BMP:   Bmp 7/10/20   K+3.8 bun27 creat0.98 gfr67       EKG today NSR HR 98 bpm        Assessment/Plan:        1. New Non-ischemic Cardiomyopathy 25-30%   Previously this was felt to be possibly rate related.  However, the patient had intermittent SVT.  Etiology of her cardiomyopathy, more likely her chemotherapeutic regimen which includes doxorubicin is the more likely culprit to be causing reduced left ventricular systolic function.  - Hospitalized at [facility] 6/14-6/17/2020 with heart failure.  - coronary angiogram negative  - currently on Lasix 20 mg daily, lisinopril 2.5 and carvedilol 1.25mg bid, KCL 40 meq daily   --repeat echo in August.          2. SVT   - First occurrence 11/2019 treated with adenosine in ER  - Has had episodes ~1/month, significant increase over the last month  - No syncope, presyncope, but sx'c with palpitations and extreme fatigue.  - BB therapy limited due to soft BPs in 90-100s  - Now s/p EP study showing AVNRT, now s/p slow pathway modification 6/22/2020 with Dr. Bynum  - Tele showed SR in hospital.   -  PLAN: Follow-up EP      3 Large B-cell lymphoma   - currently undergoing tx with R-CHOP (Dr. Castro,  Oncology)     4. Hypotension   76/50 sitting, standing 70/50. Fasted for labs today.  Repeated 78/52. Repeated 82/52   Asymptomatic  BP at home  systolic.          Follow-up:   1. Echo August, bmp, bnp   2. Follow up with with Dr. Pearl consult for [facility]   3. Follow up with me in Oct.           Asmita JOHNSON, SCOTTIE   [facility] - Heart Clinic  Pager: [phone]  Text Page  (7:30am - 4pm M-F)     Thank you for allowing me to participate in the care of your patient.    Sincerely,     ALEX Mendoza CNP     [facility]

## 2020-07-28 ENCOUNTER — HOSPITAL ENCOUNTER (OUTPATIENT)
Facility: CLINIC | Age: 50
Setting detail: SPECIMEN
Discharge: HOME | End: 2020-07-28
Attending: INTERNAL MEDICINE | Admitting: PHYSICIAN ASSISTANT
Payer: COMMERCIAL

## 2020-07-28 DIAGNOSIS — C83.30 DIFFUSE LARGE B-CELL LYMPHOMA, UNSPECIFIED BODY REGION (H): ICD-10-CM

## 2020-07-28 LAB
ALBUMIN SERPL-MCNC: 4 G/DL (ref 3.4–5)
ALP SERPL-CCNC: 116 U/L (ref 40–150)
ALT SERPL W P-5'-P-CCNC: 39 U/L (ref 0–50)
ANION GAP SERPL CALCULATED.3IONS-SCNC: 8 MMOL/L (ref 3–14)
AST SERPL W P-5'-P-CCNC: 24 U/L (ref 0–45)
BASOPHILS # BLD AUTO: 0.1 10E9/L (ref 0–0.2)
BASOPHILS NFR BLD AUTO: 1.6 %
BILIRUB SERPL-MCNC: 0.7 MG/DL (ref 0.2–1.3)
BUN SERPL-MCNC: 21 MG/DL (ref 7–30)
CALCIUM SERPL-MCNC: 9.5 MG/DL (ref 8.5–10.1)
CHLORIDE SERPL-SCNC: 107 MMOL/L (ref 94–109)
CO2 SERPL-SCNC: 26 MMOL/L (ref 20–32)
CREAT SERPL-MCNC: 0.95 MG/DL (ref 0.52–1.04)
DIFFERENTIAL METHOD BLD: ABNORMAL
EOSINOPHIL # BLD AUTO: 0.4 10E9/L (ref 0–0.7)
EOSINOPHIL NFR BLD AUTO: 10.5 %
ERYTHROCYTE [DISTWIDTH] IN BLOOD BY AUTOMATED COUNT: 12 % (ref 10–15)
GFR SERPL CREATININE-BSD FRML MDRD: 69 ML/MIN/{1.73_M2}
GLUCOSE SERPL-MCNC: 99 MG/DL (ref 70–99)
HCT VFR BLD AUTO: 40.9 % (ref 35–47)
HGB BLD-MCNC: 13.9 G/DL (ref 11.7–15.7)
IMM GRANULOCYTES # BLD: 0 10E9/L (ref 0–0.4)
IMM GRANULOCYTES NFR BLD: 0 %
LYMPHOCYTES # BLD AUTO: 1.1 10E9/L (ref 0.8–5.3)
LYMPHOCYTES NFR BLD AUTO: 28.9 %
MCH RBC QN AUTO: 29.5 PG (ref 26.5–33)
MCHC RBC AUTO-ENTMCNC: 34 G/DL (ref 31.5–36.5)
MCV RBC AUTO: 87 FL (ref 78–100)
MONOCYTES # BLD AUTO: 0.6 10E9/L (ref 0–1.3)
MONOCYTES NFR BLD AUTO: 16.6 %
NEUTROPHILS # BLD AUTO: 1.6 10E9/L (ref 1.6–8.3)
NEUTROPHILS NFR BLD AUTO: 42.4 %
NRBC # BLD AUTO: 0 10*3/UL
NRBC BLD AUTO-RTO: 0 /100
PLATELET # BLD AUTO: 176 10E9/L (ref 150–450)
POTASSIUM SERPL-SCNC: 3.8 MMOL/L (ref 3.4–5.3)
PROT SERPL-MCNC: 6.6 G/DL (ref 6.8–8.8)
RBC # BLD AUTO: 4.71 10E12/L (ref 3.8–5.2)
SODIUM SERPL-SCNC: 141 MMOL/L (ref 133–144)
WBC # BLD AUTO: 3.8 10E9/L (ref 4–11)

## 2020-07-28 PROCEDURE — 85025 COMPLETE CBC W/AUTO DIFF WBC: CPT | Performed by: PHYSICIAN ASSISTANT

## 2020-07-28 PROCEDURE — 36591 DRAW BLOOD OFF VENOUS DEVICE: CPT

## 2020-07-28 PROCEDURE — 80053 COMPREHEN METABOLIC PANEL: CPT | Performed by: PHYSICIAN ASSISTANT

## 2020-07-28 NOTE — PROGRESS NOTES
Nursing Note:  Kassie Ramirez presents today for port labs.    Patient seen by provider today: No   present during visit today: Not Applicable.    Note: N/A.    Intravenous Access:  Labs drawn without difficulty.  Implanted Port.    Discharge Plan:   Patient was sent to home.    Alison Schroeder RN

## 2020-08-13 DIAGNOSIS — I42.9 SECONDARY CARDIOMYOPATHY (H): ICD-10-CM

## 2020-08-13 RX ORDER — LISINOPRIL 2.5 MG/1
2.5 TABLET ORAL DAILY
Qty: 30 TABLET | Refills: 1 | Status: SHIPPED | OUTPATIENT
Start: 2020-08-13 | End: 2020-09-21

## 2020-08-13 RX ORDER — FUROSEMIDE 20 MG
20 TABLET ORAL DAILY
Qty: 30 TABLET | Refills: 1 | Status: SHIPPED | OUTPATIENT
Start: 2020-08-13 | End: 2020-09-21

## 2020-08-13 NOTE — PROGRESS NOTES
[facility]  Medication Refill        Last Office Visit: 20 with Cirilo Vo CNP    Last Labs/EK20    Follow-up Scheduled: 20 with Dr. Pearl    Prescription Sent to: [facility]    Medication Refilled: Lasix 20 mg daily (30 tablets with 1 refill) and Lisinopril 2.5 mg daily (30 tablets with 1 refill)    Lexi Rod RN    [facility]  20 12:26 PM

## 2020-08-28 ENCOUNTER — INFUSION THERAPY VISIT (OUTPATIENT)
Dept: INFUSION THERAPY | Facility: CLINIC | Age: 50
End: 2020-08-28
Attending: INTERNAL MEDICINE
Payer: COMMERCIAL

## 2020-08-28 ENCOUNTER — HOSPITAL ENCOUNTER (OUTPATIENT)
Facility: CLINIC | Age: 50
Setting detail: SPECIMEN
End: 2020-08-28
Attending: INTERNAL MEDICINE
Payer: COMMERCIAL

## 2020-08-28 DIAGNOSIS — C83.30 DIFFUSE LARGE B-CELL LYMPHOMA (H): Primary | ICD-10-CM

## 2020-08-28 PROCEDURE — 25000128 H RX IP 250 OP 636: Performed by: INTERNAL MEDICINE

## 2020-08-28 PROCEDURE — 96523 IRRIG DRUG DELIVERY DEVICE: CPT

## 2020-08-28 RX ORDER — HEPARIN SODIUM (PORCINE) LOCK FLUSH IV SOLN 100 UNIT/ML 100 UNIT/ML
5 SOLUTION INTRAVENOUS EVERY 8 HOURS
Status: DISCONTINUED | OUTPATIENT
Start: 2020-08-28 | End: 2020-08-28 | Stop reason: HOSPADM

## 2020-08-28 RX ADMIN — Medication 5 ML: at 10:53

## 2020-08-28 NOTE — PROGRESS NOTES
Infusion Nursing Note:  Kassie Ramirez presents today for port flush.    Patient seen by provider today: No   present during visit today: Not Applicable.    Note: N/A.    Intravenous Access:  Implanted Port.    Treatment Conditions:  Not Applicable.      Post Infusion Assessment:  Patient tolerated procedure without incident.  Site patent and intact, free from redness, edema or discomfort.  No evidence of extravasations.  Access discontinued per protocol.       Discharge Plan:   Discharge instructions reviewed with: Patient.  Patient discharged in stable condition accompanied by: self.  Departure Mode: Ambulatory.  Scheduled for labs and scans on 9/11/2020.    TROY THOMPSON RN

## 2020-09-11 ENCOUNTER — HOSPITAL ENCOUNTER (OUTPATIENT)
Facility: CLINIC | Age: 50
Setting detail: SPECIMEN
Discharge: HOME | End: 2020-09-11
Attending: INTERNAL MEDICINE | Admitting: INTERNAL MEDICINE
Payer: COMMERCIAL

## 2020-09-11 ENCOUNTER — HOSPITAL ENCOUNTER (OUTPATIENT)
Dept: CT IMAGING | Facility: CLINIC | Age: 50
Discharge: HOME | End: 2020-09-11
Attending: INTERNAL MEDICINE | Admitting: INTERNAL MEDICINE
Payer: COMMERCIAL

## 2020-09-11 DIAGNOSIS — C83.32 DIFFUSE LARGE B-CELL LYMPHOMA OF INTRATHORACIC LYMPH NODES (H): ICD-10-CM

## 2020-09-11 LAB
ALBUMIN SERPL-MCNC: 4.2 G/DL (ref 3.4–5)
ALP SERPL-CCNC: 119 U/L (ref 40–150)
ALT SERPL W P-5'-P-CCNC: 32 U/L (ref 0–50)
ANION GAP SERPL CALCULATED.3IONS-SCNC: 7 MMOL/L (ref 3–14)
AST SERPL W P-5'-P-CCNC: 23 U/L (ref 0–45)
BASOPHILS # BLD AUTO: 0.1 10E9/L (ref 0–0.2)
BASOPHILS NFR BLD AUTO: 0.8 %
BILIRUB SERPL-MCNC: 0.8 MG/DL (ref 0.2–1.3)
BUN SERPL-MCNC: 24 MG/DL (ref 7–30)
CALCIUM SERPL-MCNC: 9.5 MG/DL (ref 8.5–10.1)
CHLORIDE SERPL-SCNC: 103 MMOL/L (ref 94–109)
CO2 SERPL-SCNC: 29 MMOL/L (ref 20–32)
CREAT SERPL-MCNC: 1 MG/DL (ref 0.52–1.04)
DIFFERENTIAL METHOD BLD: NORMAL
EOSINOPHIL # BLD AUTO: 0.6 10E9/L (ref 0–0.7)
EOSINOPHIL NFR BLD AUTO: 8.8 %
ERYTHROCYTE [DISTWIDTH] IN BLOOD BY AUTOMATED COUNT: 13 % (ref 10–15)
GFR SERPL CREATININE-BSD FRML MDRD: 66 ML/MIN/{1.73_M2}
GLUCOSE SERPL-MCNC: 87 MG/DL (ref 70–99)
HCT VFR BLD AUTO: 41.4 % (ref 35–47)
HGB BLD-MCNC: 14 G/DL (ref 11.7–15.7)
IMM GRANULOCYTES # BLD: 0 10E9/L (ref 0–0.4)
IMM GRANULOCYTES NFR BLD: 0.1 %
LDH SERPL L TO P-CCNC: 195 U/L (ref 81–234)
LYMPHOCYTES # BLD AUTO: 1.8 10E9/L (ref 0.8–5.3)
LYMPHOCYTES NFR BLD AUTO: 25.8 %
MCH RBC QN AUTO: 29.4 PG (ref 26.5–33)
MCHC RBC AUTO-ENTMCNC: 33.8 G/DL (ref 31.5–36.5)
MCV RBC AUTO: 87 FL (ref 78–100)
MONOCYTES # BLD AUTO: 0.8 10E9/L (ref 0–1.3)
MONOCYTES NFR BLD AUTO: 11.2 %
NEUTROPHILS # BLD AUTO: 3.8 10E9/L (ref 1.6–8.3)
NEUTROPHILS NFR BLD AUTO: 53.3 %
NRBC # BLD AUTO: 0 10*3/UL
NRBC BLD AUTO-RTO: 0 /100
PLATELET # BLD AUTO: 198 10E9/L (ref 150–450)
POTASSIUM SERPL-SCNC: 4.1 MMOL/L (ref 3.4–5.3)
PROT SERPL-MCNC: 6.8 G/DL (ref 6.8–8.8)
RBC # BLD AUTO: 4.77 10E12/L (ref 3.8–5.2)
SODIUM SERPL-SCNC: 139 MMOL/L (ref 133–144)
WBC # BLD AUTO: 7.1 10E9/L (ref 4–11)

## 2020-09-11 PROCEDURE — 83615 LACTATE (LD) (LDH) ENZYME: CPT | Performed by: INTERNAL MEDICINE

## 2020-09-11 PROCEDURE — 36591 DRAW BLOOD OFF VENOUS DEVICE: CPT

## 2020-09-11 PROCEDURE — 25000125 ZZHC RX 250: Performed by: INTERNAL MEDICINE

## 2020-09-11 PROCEDURE — 80053 COMPREHEN METABOLIC PANEL: CPT | Performed by: INTERNAL MEDICINE

## 2020-09-11 PROCEDURE — 85025 COMPLETE CBC W/AUTO DIFF WBC: CPT | Performed by: INTERNAL MEDICINE

## 2020-09-11 PROCEDURE — 71260 CT THORAX DX C+: CPT

## 2020-09-11 PROCEDURE — 25000128 H RX IP 250 OP 636: Performed by: INTERNAL MEDICINE

## 2020-09-11 RX ORDER — IOPAMIDOL 755 MG/ML
500 INJECTION, SOLUTION INTRAVASCULAR ONCE
Status: COMPLETED | OUTPATIENT
Start: 2020-09-11 | End: 2020-09-11

## 2020-09-11 RX ADMIN — SODIUM CHLORIDE 59 ML: 9 INJECTION, SOLUTION INTRAVENOUS at 14:04

## 2020-09-11 RX ADMIN — IOPAMIDOL 75 ML: 755 INJECTION, SOLUTION INTRAVENOUS at 14:04

## 2020-09-11 NOTE — PROGRESS NOTES
Nursing Note:  Kassie Ramirez presents today for Port Labs.    Patient seen by provider today: No   present during visit today: Not Applicable.    Note: N/A.    Intravenous Access:  Labs drawn without difficulty.  Implanted Port.    Discharge Plan:   Patient was sent to [place] for 1330 appointment.    Flores Logan RN

## 2020-09-17 ENCOUNTER — HOSPITAL ENCOUNTER (OUTPATIENT)
Dept: CARDIOLOGY | Facility: CLINIC | Age: 50
Discharge: HOME | End: 2020-09-17
Attending: NURSE PRACTITIONER | Admitting: NURSE PRACTITIONER
Payer: COMMERCIAL

## 2020-09-17 DIAGNOSIS — I42.8 NONISCHEMIC CARDIOMYOPATHY (H): ICD-10-CM

## 2020-09-17 DIAGNOSIS — I50.21 ACUTE SYSTOLIC CONGESTIVE HEART FAILURE (H): ICD-10-CM

## 2020-09-17 DIAGNOSIS — E87.6 HYPOKALEMIA: ICD-10-CM

## 2020-09-17 LAB
ANION GAP SERPL CALCULATED.3IONS-SCNC: 7 MMOL/L (ref 3–14)
BUN SERPL-MCNC: 21 MG/DL (ref 7–30)
CALCIUM SERPL-MCNC: 9.5 MG/DL (ref 8.5–10.1)
CHLORIDE SERPL-SCNC: 106 MMOL/L (ref 94–109)
CO2 SERPL-SCNC: 27 MMOL/L (ref 20–32)
CREAT SERPL-MCNC: 0.91 MG/DL (ref 0.52–1.04)
GFR SERPL CREATININE-BSD FRML MDRD: 73 ML/MIN/{1.73_M2}
GLUCOSE SERPL-MCNC: 91 MG/DL (ref 70–99)
NT-PROBNP SERPL-MCNC: 659 PG/ML (ref 0–125)
POTASSIUM SERPL-SCNC: 3.5 MMOL/L (ref 3.4–5.3)
SODIUM SERPL-SCNC: 140 MMOL/L (ref 133–144)

## 2020-09-17 PROCEDURE — 83880 ASSAY OF NATRIURETIC PEPTIDE: CPT | Performed by: NURSE PRACTITIONER

## 2020-09-17 PROCEDURE — 80048 BASIC METABOLIC PNL TOTAL CA: CPT | Performed by: NURSE PRACTITIONER

## 2020-09-17 PROCEDURE — 93308 TTE F-UP OR LMTD: CPT | Mod: 26 | Performed by: INTERNAL MEDICINE

## 2020-09-17 PROCEDURE — 93325 DOPPLER ECHO COLOR FLOW MAPG: CPT

## 2020-09-17 PROCEDURE — 93321 DOPPLER ECHO F-UP/LMTD STD: CPT | Mod: 26 | Performed by: INTERNAL MEDICINE

## 2020-09-17 PROCEDURE — 25500064 ZZH RX 255 OP 636: Performed by: NURSE PRACTITIONER

## 2020-09-17 PROCEDURE — 93325 DOPPLER ECHO COLOR FLOW MAPG: CPT | Mod: 26 | Performed by: INTERNAL MEDICINE

## 2020-09-17 RX ADMIN — HUMAN ALBUMIN MICROSPHERES AND PERFLUTREN 3 ML: 10; .22 INJECTION, SOLUTION INTRAVENOUS at 10:30

## 2020-09-18 ENCOUNTER — TELEPHONE (OUTPATIENT)
Dept: CARDIOLOGY | Facility: CLINIC | Age: 50
End: 2020-09-18

## 2020-09-18 ENCOUNTER — VIRTUAL VISIT (OUTPATIENT)
Dept: ONCOLOGY | Facility: CLINIC | Age: 50
End: 2020-09-18
Attending: INTERNAL MEDICINE
Payer: COMMERCIAL

## 2020-09-18 DIAGNOSIS — C83.32 DIFFUSE LARGE B-CELL LYMPHOMA OF INTRATHORACIC LYMPH NODES (H): Primary | ICD-10-CM

## 2020-09-18 PROCEDURE — 40001009 ZZH VIDEO/TELEPHONE VISIT; NO CHARGE

## 2020-09-18 PROCEDURE — 99215 OFFICE O/P EST HI 40 MIN: CPT | Mod: 95 | Performed by: INTERNAL MEDICINE

## 2020-09-18 NOTE — PROGRESS NOTES
"Kassie Ramirez is a 50 year old female who is being evaluated via a billable video visit.      The patient has been notified of following:     \"This video visit will be conducted via a call between you and your physician/provider. We have found that certain health care needs can be provided without the need for an in-person physical exam.  This service lets us provide the care you need with a video conversation.  If a prescription is necessary we can send it directly to your pharmacy.  If lab work is needed we can place an order for that and you can then stop by our lab to have the test done at a later time.    Video visits are billed at different rates depending on your insurance coverage.  Please reach out to your insurance provider with any questions.    If during the course of the call the physician/provider feels a video visit is not appropriate, you will not be charged for this service.\"    Patient has given verbal consent for Video visit? Yes  How would you like to obtain your AVS? MyChart  If you are dropped from the video visit, the video invite should be resent to: Text to cell phone: [phone]  Will anyone else be joining your video visit? No         "

## 2020-09-18 NOTE — LETTER
"    9/18/2020         RE: Kassie Ramirez  [address] [facility] [id number]        Dear Colleague,    Thank you for referring your patient, Kassie Ramirez, to the [facility]. Please see a copy of my visit note below.    Kassie Ramirez is a 50 year old female who is being evaluated via a billable video visit.      The patient has been notified of following:     \"This video visit will be conducted via a call between you and your physician/provider. We have found that certain health care needs can be provided without the need for an in-person physical exam.  This service lets us provide the care you need with a video conversation.  If a prescription is necessary we can send it directly to your pharmacy.  If lab work is needed we can place an order for that and you can then stop by our lab to have the test done at a later time.    Video visits are billed at different rates depending on your insurance coverage.  Please reach out to your insurance provider with any questions.    If during the course of the call the physician/provider feels a video visit is not appropriate, you will not be charged for this service.\"    Patient has given verbal consent for Video visit? Yes  How would you like to obtain your AVS? MyChart  If you are dropped from the video visit, the video invite should be resent to: Text to cell phone: [phone]  Will anyone else be joining your video visit? No           [facility]  HEMATOLOGY AND ONCOLOGY    FOLLOW-UP VISIT NOTE    PATIENT NAME: Kassie Ramirez MRN # [id number]  DATE OF VISIT: Sep 18, 2020 YOB: 1970    REFERRING PROVIDER: No referring provider defined for this encounter.    CANCER TYPE: DLBCL, EBV+ non-GCB, stage III.  STAGE: III    TREATMENT SUMMARY:  She presented with shortness of breath and cough which had been present since May 2019. Her imaging suggested pulmonary infiltrates which was attributed to aspiration pneumonia. " CT scan of the chest 8/20/2019 showed left hilar and subcarinal mediastinal adenopathy with narrowing of the left lower lobe bronchus and a nonspecific patchy area of nodular consolidation in the medial right lower lobe.  Bronchoscopy with EBUS 8/28/2019 showed nonnecrotizing granulomatous inflammation with prominent eosinophilia, negative for malignancy.  She was diagnosed with sarcoidosis and started on prednisone. She had worsening cough and shortness of breath with tapering of the prednisone.  Repeat CT scan of the chest 11/18/2019 showed interval worsening of the bulky mediastinal and bilateral hilar lymphadenopathy, near complete resolution of the lower lobe opacities, with new interstitial opacities in the right upper lobe.   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 Large mediastinal mass causing respiratory compromise. . IPI score of 2 (low-intermediate). FISH neg for high risk translocations. She received 6 cycles of R-CHOP with intermediate dose methotrexate after cycles 2, 4 and 6 from November through April 2020.      CURRENT INTERVENTIONS:  Surveillance post MR-CHOP    SUBJECTIVE   Kassie Ramirez is being followed for DLBCL, EBV+ non-GCB, stage III intermediate risk disease    Patient was reached over video for this telemedicine visit due to restrictions posed by COVID19 pandemic. Kassie is joined by her  at this visit. She has completed all of her planned cycles of MR-CHOP chemotherapy and is being seen with labs and restaging scans.     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 She is feeling a little better now.         PAST MEDICAL HISTORY     Past Medical History:   Diagnosis Date     Anxiety attack 9/16/2014     Cardiomyopathy (H)     non ishemic - 25-30% - Chemo related     Diffuse large B-cell  lymphoma (H)     Diagnosed 11/2019, Ronan Albarran     Encounter for Essure implantation 2009     Generalized anxiety disorder 9/16/2014    zoloft = flat emotions     HFrEF (heart failure with reduced ejection fraction) (H)     new diagnosis 6/14     Menopausal disorder     started on OCPs by menopause center 3/2017 (takes active continuously)     Menstrual headache     helped by OCPs and magnesium     Paroxysmal SVT (supraventricular tachycardia) (H) 06/2020     GERTRUDE (stress urinary incontinence, female)     sling procedure 2016         CURRENT OUTPATIENT MEDICATIONS     Current Outpatient Medications   Medication Sig     carvedilol (COREG) 3.125 MG tablet Take 0.5 tablets (1.56 mg) by mouth 2 times daily (with meals)     cetirizine (ZYRTEC) 10 MG tablet Take 10 mg by mouth daily     furosemide (LASIX) 20 MG tablet Take 1 tablet (20 mg) by mouth daily     lisinopril (ZESTRIL) 2.5 MG tablet Take 1 tablet (2.5 mg) by mouth daily     LORazepam (ATIVAN) 0.5 MG tablet 1 tablet by mouth at bedtime for sleep and anxiety.     potassium chloride ER (KLOR-CON M) 20 MEQ CR tablet Take 2 tablets (40 mEq) by mouth daily     lidocaine-prilocaine (EMLA) 2.5-2.5 % external cream Apply 1 applicator topically as needed for moderate pain     No current facility-administered medications for this visit.         ALLERGIES      Allergies   Allergen Reactions     Cold & Flu [Cold Defense Fighter]      See pseudoephedrine     Seasonal Allergies      Sudafed Cold-Cough [Dayquil Liquicaps]      Pseudoephedrine Rash     Rash then skins peels off         REVIEW OF SYSTEMS   As above in the HPI, o/w complete 12-point ROS was negative.     PHYSICAL EXAM   LMP 11/06/2019   Limited physical exam during video visit due to COVID19 restrictions  Middle aged female in no acute distress  Breathing comfortably, no tachypnea  Speech and hearing normal  Pleasant mood and congruent affect  Moving all extremities, no focal neurologic deficits apparent        LABORATORY AND IMAGING STUDIES     Recent Labs   Lab Test 09/17/20  1052 09/11/20  1307 07/28/20  0945 07/14/20  1124 06/23/20  0545    139 141 139 137   POTASSIUM 3.5 4.1 3.8 3.8 4.0   CHLORIDE 106 103 107 106 108   CO2 27 29 26 27 24   ANIONGAP 7 7 8 6 5   BUN 21 24 21 27 26   CR 0.91 1.00 0.95 0.98 0.96   GLC 91 87 99 99 86   AFSHNA 9.5 9.5 9.5 9.7 9.4     Recent Labs   Lab Test 06/19/20  1853 06/15/20  0845 06/14/20  1807 01/11/20  0615 12/01/19  1340 12/01/19  0641 11/30/19  2137 11/30/19  1341  11/27/19  2216   MAG 2.5* 2.4* 2.1 2.0  --   --   --   --   --  2.1   PHOS  --   --   --  3.1 2.8 3.4 2.8 2.5   < > 3.1    < > = values in this interval not displayed.     Recent Labs   Lab Test 09/11/20  1307 07/28/20  0945 06/23/20  0545 06/22/20  1025 06/20/20  0700 06/19/20  1853 06/14/20  1807   WBC 7.1 3.8* 3.4* 2.9* 3.3* 3.8* 2.8*   HGB 14.0 13.9 12.9 13.3 12.2 12.5 12.6    176 188 185 183 210 163   MCV 87 87 87 87 91 91 90   NEUTROPHIL 53.3 42.4  --   --  45.3 40.6 45.3     Recent Labs   Lab Test 09/11/20  1307 07/28/20  0945 05/21/20  1307  01/11/20  1044  12/16/19  1743   BILITOTAL 0.8 0.7 0.6   < >  --    < >  --    ALKPHOS 119 116 112   < >  --    < >  --    ALT 32 39 37   < >  --    < >  --    AST 23 24 25   < >  --    < >  --    ALBUMIN 4.2 4.0 3.8   < >  --    < >  --      --   --   --  347*  --  596*    < > = values in this interval not displayed.     TSH   Date Value Ref Range Status   06/14/2020 3.40 0.40 - 4.00 mU/L Final   09/18/2019 2.06 0.40 - 4.00 mU/L Final   07/27/2018 2.04 0.40 - 4.00 mU/L Final     No results for input(s): CEA in the last 18455 hours.  Results for orders placed or performed during the hospital encounter of 09/17/20   Echocardiogram Limited    Narrative    [id number]  [id number]  [id number]  [id number]^MARCUS^JUNE^E           [facility]  Echocardiography Laboratory  [address] [place] [address]        Name: BABAR VARGHESE  MRN:  [id number]  : 1970  Study Date: 2020 09:56 AM  Age: 50 yrs  Gender: Female  Patient Location: [facility]  Reason For Study: Nonischemic cardiomyopathy (H)  Ordering Physician: GARRETT VELAZQUEZ  Referring Physician: GARRETT VELAZQUEZ  Performed By: Cora Springer     BSA: 1.8 m2  Height: 66 in  Weight: 154 lb  BP: 106/67 mmHg  _____________________________________________________________________________  __        Procedure  Limited Echo Adult. Optison (NDC #6083-4405) given intravenously.  _____________________________________________________________________________  __        Interpretation Summary     LVEF 26% based on biplane 2D tracing. Moderate to severe global hypokinesis  without regional wall motion abnormalities.  There is mild (1+) mitral regurgitation.  RV function appears normal, not fully evaluated in this limited study.  No other significant valve issues.  Compared to the last echo done 3 months ago, there has been no significant  change in LV function, but the functional MR has improved.  _____________________________________________________________________________  __        Left Ventricle  The left ventricle is normal in size. There is normal left ventricular wall  thickness. LVEF 26% based on biplane 2D tracing. There is mod-severe global  hypokinesia of the left ventricle.     Mitral Valve  There is mild (1+) mitral regurgitation.     Tricuspid Valve  No tricuspid regurgitation.     Aortic Valve  The aortic valve is normal in structure and function.     _____________________________________________________________________________  __  MMode/2D Measurements & Calculations  IVSd: 0.54 cm  LVIDd: 5.4 cm  LVIDs: 4.6 cm  LVPWd: 0.58 cm     FS: 14.9 %  LV mass(C)d: 101.5 grams  LV mass(C)dI: 56.7 grams/m2  RWT: 0.21        Doppler Measurements & Calculations  MV E max gunjan: 56.6 cm/sec  MV A max gunjan: 70.6 cm/sec  MV E/A: 0.80  MV dec time: 0.07 sec  Medial E/e': 17.0            _____________________________________________________________________________  __           Report approved by: Eyal Dudley 09/17/2020 03:23 PM        CT SCAN OF THE NECK WITH CONTRAST  6/23/2020 3:19 PM      HISTORY: Diffuse large B-cell lymphoma, unspecified body region (H)     TECHNIQUE:  Axial images and coronal reformations. Radiation dose for  this scan was reduced using automated exposure control, adjustment of  the mA and/or kV according to patient size, or iterative  reconstruction technique. 91ml ISOVUE 370 IV Contrast IV.     COMPARISON: CT scan dated 11/29/2019, PET CT scan dated 6/23/2020     FINDINGS:  Visualized sinuses, nasopharynx and orbits: Normal.       Tongue, oral cavity and oropharynx:  Normal.       Hypopharynx: Normal.       Larynx and trachea: Normal.       Thyroid: Normal.     Submandibular glands: Normal.       Parotid glands: Normal.        Lymph nodes: Normal.  No enlarged or necrotic cervical lymph nodes are  identified.     Vasculature: A port catheter is seen entering the right jugular vein.         Bones: Normal.                                                                      IMPRESSION:   No enlarged or necrotic cervical lymph nodes are  identified. No change since 11/29/2019     LANA WAN MD       CT CHEST, ABDOMEN, PELVIS WITH CONTRAST 9/11/2020 2:11 PM     CLINICAL HISTORY: Follow up NHL. Enlarged hilar and subcarinal nodes.  Diffuse large B-cell lymphoma of intrathoracic lymph nodes (H).     TECHNIQUE: CT scan of the chest, abdomen, and pelvis was performed  following injection of IV contrast. Multiplanar reformats were  obtained. Dose reduction techniques were used.      CONTRAST: 75mL Isovue-370     COMPARISON: PET/CT 6/23/2020.     FINDINGS:   LUNGS AND PLEURA: No effusions. No acute airspace disease. There are a  few stable small nodules bilaterally. An example is along the medial  left lower lobe measuring 0.2 cm series 12 image 114. There are  other  stable examples.     MEDIASTINUM/AXILLAE: Elongated subcarinal lymph node is approximately  3.9 x 1.1 cm, previously 4.6 x 1.1 cm series 4 image 56. Right hilar  lymph node is now 1.3 x 0.9 cm, previously 1.7 x 1.6 cm image 52. Left  hilar stable lymph node is 1.3 x 0.8 cm, series 4 image 61. There is  increasing anterior mediastinal soft tissue. AP thickness is now 1.5  cm at the midline, previously 0.9 cm and ill-defined. Tiny hypodensity  noted within a portion of process of series 4 image 55. It is also  somewhat lobulated and its configuration suggests thymic tissue with  interval increase in size. Right chest Port-A-Cath is noted, its tip  at the low SVC. No new areas of lymph node enlargement.     HEPATOBILIARY: Hemangioma along the posterior medial dome of the right  liver is suggested and is stable measuring 1.6 cm series 4 image 112.  A few hepatic cysts appears stable. No new liver lesion is seen.  Contracted gallbladder.     PANCREAS: Normal.     SPLEEN: No new focal lesion. Stable AP length of the spleen that is  12.2 cm.     ADRENAL GLANDS: Normal.     KIDNEYS/BLADDER: Normal.     BOWEL: No acute abnormality.     PELVIC ORGANS: Bilateral fallopian tube occlusion devices. Right  ovarian cyst is 2 cm, previously 1.7 cm.     ADDITIONAL FINDINGS: No new enlarging lymph nodes identified within  the abdomen or pelvis.     MUSCULOSKELETAL: No aggressive appearing bony lesion.                                                                      IMPRESSION:  1.  Enlarged lymph nodes at the mediastinum and hilar locations again  seen. This is stable at the subcarinal, and left hilar regions. This  is slightly smaller at the right hilum.  2.  Increasing lobulated soft tissue at the anterior mediastinum.  Small focus of hypodensity within this process is noted and it has a  configuration suggesting potential thymic tissue. As such, this could  represent thymic rebound. However, recommend further attention  to this  at imaging follow-up.  3.  A few stable pulmonary nodules noted.  4.  No new disease otherwise seen.     JOSUE HORTA MD        ASSESSMENT AND PLAN   DLBCL, EBV+ non-GCB, stage III post 6 cycles of M-RCHOP  PJV pneumonia causing acute respiratory failure improved on bactrim and prednisone  Cardiomyopathy post adriamycin based chemotherapy likely also contributed by tachycardia    Kassie was seen over video at this telemedicine visit and was present along with her . She has completed her planned chemotherapy in April 2020.     She continues to have shortness of breath. Her echocardiogram still shows global hypokinesis. She is going to follow up with her cardiologist on Monday.     She is being seen with restaging scans and I have reviewed actual images from her CT scan. Her CT scan of neck does not show any abnormal lymphadenopathy. She has persistent hilar adenopathy and mediastinal mass. The mediastinal mass is a little more prominent. We would have options of observation, biopsy, PET-CT for her. She continues to have cardiac dysfunction. Her adenopathy and mass could be reactive. She had PJV infection too. I would favor a follow up in 3 months with labs and restaging PET-CT scan. Her  was very worried about the possible growth of this mass and if it would become so large that it would preclude radiation. We would have to be cautious radiating the mass in the mediastinum with her cardiac function having taken a big hit. I explained them that it would be unlikely that her cancer would progress rapidly. It is uncertain that she has definitive malignancy there.     All questions for patient and her  were answered. We should still keep the port in. We will have to bring her back in 6 weeks for flushing.     Video-Visit Details    Type of service:  Video Visit  Originating Location (pt. Location): Home  Distant Location (provider location):  [facility]   Platform used for Video  Visit: Leelee    Over 45 min spent with patient with more than 50% time spent in counseling and coordinating care.      Castro Castro    Hematologist and Medical Oncologist  [facility]       Again, thank you for allowing me to participate in the care of your patient.        Sincerely,        Castro Castro MD

## 2020-09-18 NOTE — PROGRESS NOTES
[facility]  HEMATOLOGY AND ONCOLOGY    FOLLOW-UP VISIT NOTE    PATIENT NAME: Kassie Ramirez MRN # [id number]  DATE OF VISIT: Sep 18, 2020 YOB: 1970    REFERRING PROVIDER: No referring provider defined for this encounter.    CANCER TYPE: DLBCL, EBV+ non-GCB, stage III.  STAGE: III    TREATMENT SUMMARY:  She presented with shortness of breath and cough which had been present since May 2019. Her imaging suggested pulmonary infiltrates which was attributed to aspiration pneumonia. CT scan of the chest 8/20/2019 showed left hilar and subcarinal mediastinal adenopathy with narrowing of the left lower lobe bronchus and a nonspecific patchy area of nodular consolidation in the medial right lower lobe.  Bronchoscopy with EBUS 8/28/2019 showed nonnecrotizing granulomatous inflammation with prominent eosinophilia, negative for malignancy.  She was diagnosed with sarcoidosis and started on prednisone. She had worsening cough and shortness of breath with tapering of the prednisone.  Repeat CT scan of the chest 11/18/2019 showed interval worsening of the bulky mediastinal and bilateral hilar lymphadenopathy, near complete resolution of the lower lobe opacities, with new interstitial opacities in the right upper lobe.   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 Large mediastinal mass causing respiratory compromise. . IPI score of 2 (low-intermediate). FISH neg for high risk translocations. She received 6 cycles of R-CHOP with intermediate dose methotrexate after cycles 2, 4 and 6 from November through April 2020.      CURRENT INTERVENTIONS:  Surveillance post MR-CHOP    SUBJECTIVE   Kassie Ramirez is being followed for DLBCL, EBV+ non-GCB, stage III intermediate risk disease    Patient was reached over video for this telemedicine visit due to restrictions posed by Aviir  jeanie Fernando is joined by her  at this visit. She has completed all of her planned cycles of MR-CHOP chemotherapy and is being seen with labs and restaging scans.     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 She is feeling a little better now.         PAST MEDICAL HISTORY     Past Medical History:   Diagnosis Date     Anxiety attack 9/16/2014     Cardiomyopathy (H)     non ishemic - 25-30% - Chemo related     Diffuse large B-cell lymphoma (H)     Diagnosed 11/2019, Ronan Albarran     Encounter for Essure implantation 2009     Generalized anxiety disorder 9/16/2014    zoloft = flat emotions     HFrEF (heart failure with reduced ejection fraction) (H)     new diagnosis 6/14     Menopausal disorder     started on OCPs by menopause center 3/2017 (takes active continuously)     Menstrual headache     helped by OCPs and magnesium     Paroxysmal SVT (supraventricular tachycardia) (H) 06/2020     GERTRUDE (stress urinary incontinence, female)     sling procedure 2016         CURRENT OUTPATIENT MEDICATIONS     Current Outpatient Medications   Medication Sig     carvedilol (COREG) 3.125 MG tablet Take 0.5 tablets (1.56 mg) by mouth 2 times daily (with meals)     cetirizine (ZYRTEC) 10 MG tablet Take 10 mg by mouth daily     furosemide (LASIX) 20 MG tablet Take 1 tablet (20 mg) by mouth daily     lisinopril (ZESTRIL) 2.5 MG tablet Take 1 tablet (2.5 mg) by mouth daily     LORazepam (ATIVAN) 0.5 MG tablet 1 tablet by mouth at bedtime for sleep and anxiety.     potassium chloride ER (KLOR-CON M) 20 MEQ CR tablet Take 2 tablets (40 mEq) by mouth daily     lidocaine-prilocaine (EMLA) 2.5-2.5 % external cream Apply 1 applicator topically as needed for moderate pain     No current facility-administered medications for this visit.         ALLERGIES      Allergies   Allergen Reactions     Cold & Flu [Cold Defense Fighter]      See pseudoephedrine     Seasonal  Allergies      Sudafed Cold-Cough [Dayquil Liquicaps]      Pseudoephedrine Rash     Rash then skins peels off         REVIEW OF SYSTEMS   As above in the HPI, o/w complete 12-point ROS was negative.     PHYSICAL EXAM   LMP 11/06/2019   Limited physical exam during video visit due to COVID19 restrictions  Middle aged female in no acute distress  Breathing comfortably, no tachypnea  Speech and hearing normal  Pleasant mood and congruent affect  Moving all extremities, no focal neurologic deficits apparent       LABORATORY AND IMAGING STUDIES     Recent Labs   Lab Test 09/17/20  1052 09/11/20  1307 07/28/20  0945 07/14/20  1124 06/23/20  0545    139 141 139 137   POTASSIUM 3.5 4.1 3.8 3.8 4.0   CHLORIDE 106 103 107 106 108   CO2 27 29 26 27 24   ANIONGAP 7 7 8 6 5   BUN 21 24 21 27 26   CR 0.91 1.00 0.95 0.98 0.96   GLC 91 87 99 99 86   AFSHAN 9.5 9.5 9.5 9.7 9.4     Recent Labs   Lab Test 06/19/20  1853 06/15/20  0845 06/14/20  1807 01/11/20  0615 12/01/19  1340 12/01/19  0641 11/30/19  2137 11/30/19  1341  11/27/19  2216   MAG 2.5* 2.4* 2.1 2.0  --   --   --   --   --  2.1   PHOS  --   --   --  3.1 2.8 3.4 2.8 2.5   < > 3.1    < > = values in this interval not displayed.     Recent Labs   Lab Test 09/11/20  1307 07/28/20  0945 06/23/20  0545 06/22/20  1025 06/20/20  0700 06/19/20  1853 06/14/20  1807   WBC 7.1 3.8* 3.4* 2.9* 3.3* 3.8* 2.8*   HGB 14.0 13.9 12.9 13.3 12.2 12.5 12.6    176 188 185 183 210 163   MCV 87 87 87 87 91 91 90   NEUTROPHIL 53.3 42.4  --   --  45.3 40.6 45.3     Recent Labs   Lab Test 09/11/20  1307 07/28/20  0945 05/21/20  1307  01/11/20  1044  12/16/19  1743   BILITOTAL 0.8 0.7 0.6   < >  --    < >  --    ALKPHOS 119 116 112   < >  --    < >  --    ALT 32 39 37   < >  --    < >  --    AST 23 24 25   < >  --    < >  --    ALBUMIN 4.2 4.0 3.8   < >  --    < >  --      --   --   --  347*  --  596*    < > = values in this interval not displayed.     TSH   Date Value Ref Range  Status   2020 3.40 0.40 - 4.00 mU/L Final   2019 2.06 0.40 - 4.00 mU/L Final   2018 2.04 0.40 - 4.00 mU/L Final     No results for input(s): CEA in the last 06648 hours.  Results for orders placed or performed during the hospital encounter of 20   Echocardiogram Limited    Narrative    [id number]  [id number]  [id number]  [id number]^MARCUS^GARRETT^JENSEN           [facility]  Echocardiography Laboratory  [address] [place] [address]        Name: BABAR VARGHESE  MRN: [id number]  : 1970  Study Date: 2020 09:56 AM  Age: 50 yrs  Gender: Female  Patient Location: [facility]  Reason For Study: Nonischemic cardiomyopathy (H)  Ordering Physician: GARRETT VELAZQUEZ  Referring Physician: GARRETT VELAZUQEZ  Performed By: Cora Springer     BSA: 1.8 m2  Height: 66 in  Weight: 154 lb  BP: 106/67 mmHg  _____________________________________________________________________________  __        Procedure  Limited Echo Adult. Optison (NDC #7105-0631) given intravenously.  _____________________________________________________________________________  __        Interpretation Summary     LVEF 26% based on biplane 2D tracing. Moderate to severe global hypokinesis  without regional wall motion abnormalities.  There is mild (1+) mitral regurgitation.  RV function appears normal, not fully evaluated in this limited study.  No other significant valve issues.  Compared to the last echo done 3 months ago, there has been no significant  change in LV function, but the functional MR has improved.  _____________________________________________________________________________  __        Left Ventricle  The left ventricle is normal in size. There is normal left ventricular wall  thickness. LVEF 26% based on biplane 2D tracing. There is mod-severe global  hypokinesia of the left ventricle.     Mitral Valve  There is mild (1+) mitral regurgitation.     Tricuspid Valve  No tricuspid regurgitation.     Aortic  Valve  The aortic valve is normal in structure and function.     _____________________________________________________________________________  __  MMode/2D Measurements & Calculations  IVSd: 0.54 cm  LVIDd: 5.4 cm  LVIDs: 4.6 cm  LVPWd: 0.58 cm     FS: 14.9 %  LV mass(C)d: 101.5 grams  LV mass(C)dI: 56.7 grams/m2  RWT: 0.21        Doppler Measurements & Calculations  MV E max gunjan: 56.6 cm/sec  MV A max gunjan: 70.6 cm/sec  MV E/A: 0.80  MV dec time: 0.07 sec  Medial E/e': 17.0           _____________________________________________________________________________  __           Report approved by: Eyal Dudley 09/17/2020 03:23 PM        CT SCAN OF THE NECK WITH CONTRAST  6/23/2020 3:19 PM      HISTORY: Diffuse large B-cell lymphoma, unspecified body region (H)     TECHNIQUE:  Axial images and coronal reformations. Radiation dose for  this scan was reduced using automated exposure control, adjustment of  the mA and/or kV according to patient size, or iterative  reconstruction technique. 91ml ISOVUE 370 IV Contrast IV.     COMPARISON: CT scan dated 11/29/2019, PET CT scan dated 6/23/2020     FINDINGS:  Visualized sinuses, nasopharynx and orbits: Normal.       Tongue, oral cavity and oropharynx:  Normal.       Hypopharynx: Normal.       Larynx and trachea: Normal.       Thyroid: Normal.     Submandibular glands: Normal.       Parotid glands: Normal.        Lymph nodes: Normal.  No enlarged or necrotic cervical lymph nodes are  identified.     Vasculature: A port catheter is seen entering the right jugular vein.         Bones: Normal.                                                                      IMPRESSION:   No enlarged or necrotic cervical lymph nodes are  identified. No change since 11/29/2019     LANA WAN MD       CT CHEST, ABDOMEN, PELVIS WITH CONTRAST 9/11/2020 2:11 PM     CLINICAL HISTORY: Follow up NHL. Enlarged hilar and subcarinal nodes.  Diffuse large B-cell lymphoma of intrathoracic lymph  nodes (H).     TECHNIQUE: CT scan of the chest, abdomen, and pelvis was performed  following injection of IV contrast. Multiplanar reformats were  obtained. Dose reduction techniques were used.      CONTRAST: 75mL Isovue-370     COMPARISON: PET/CT 6/23/2020.     FINDINGS:   LUNGS AND PLEURA: No effusions. No acute airspace disease. There are a  few stable small nodules bilaterally. An example is along the medial  left lower lobe measuring 0.2 cm series 12 image 114. There are other  stable examples.     MEDIASTINUM/AXILLAE: Elongated subcarinal lymph node is approximately  3.9 x 1.1 cm, previously 4.6 x 1.1 cm series 4 image 56. Right hilar  lymph node is now 1.3 x 0.9 cm, previously 1.7 x 1.6 cm image 52. Left  hilar stable lymph node is 1.3 x 0.8 cm, series 4 image 61. There is  increasing anterior mediastinal soft tissue. AP thickness is now 1.5  cm at the midline, previously 0.9 cm and ill-defined. Tiny hypodensity  noted within a portion of process of series 4 image 55. It is also  somewhat lobulated and its configuration suggests thymic tissue with  interval increase in size. Right chest Port-A-Cath is noted, its tip  at the low SVC. No new areas of lymph node enlargement.     HEPATOBILIARY: Hemangioma along the posterior medial dome of the right  liver is suggested and is stable measuring 1.6 cm series 4 image 112.  A few hepatic cysts appears stable. No new liver lesion is seen.  Contracted gallbladder.     PANCREAS: Normal.     SPLEEN: No new focal lesion. Stable AP length of the spleen that is  12.2 cm.     ADRENAL GLANDS: Normal.     KIDNEYS/BLADDER: Normal.     BOWEL: No acute abnormality.     PELVIC ORGANS: Bilateral fallopian tube occlusion devices. Right  ovarian cyst is 2 cm, previously 1.7 cm.     ADDITIONAL FINDINGS: No new enlarging lymph nodes identified within  the abdomen or pelvis.     MUSCULOSKELETAL: No aggressive appearing bony lesion.                                                                       IMPRESSION:  1.  Enlarged lymph nodes at the mediastinum and hilar locations again  seen. This is stable at the subcarinal, and left hilar regions. This  is slightly smaller at the right hilum.  2.  Increasing lobulated soft tissue at the anterior mediastinum.  Small focus of hypodensity within this process is noted and it has a  configuration suggesting potential thymic tissue. As such, this could  represent thymic rebound. However, recommend further attention to this  at imaging follow-up.  3.  A few stable pulmonary nodules noted.  4.  No new disease otherwise seen.     JOSUE HORTA MD        ASSESSMENT AND PLAN   DLBCL, EBV+ non-GCB, stage III post 6 cycles of M-RCHOP  PJV pneumonia causing acute respiratory failure improved on bactrim and prednisone  Cardiomyopathy post adriamycin based chemotherapy likely also contributed by tachycardia    Kassie was seen over video at this telemedicine visit and was present along with her . She has completed her planned chemotherapy in April 2020.     She continues to have shortness of breath. Her echocardiogram still shows global hypokinesis. She is going to follow up with her cardiologist on Monday.     She is being seen with restaging scans and I have reviewed actual images from her CT scan. Her CT scan of neck does not show any abnormal lymphadenopathy. She has persistent hilar adenopathy and mediastinal mass. The mediastinal mass is a little more prominent. We would have options of observation, biopsy, PET-CT for her. She continues to have cardiac dysfunction. Her adenopathy and mass could be reactive. She had PJV infection too. I would favor a follow up in 3 months with labs and restaging PET-CT scan. Her  was very worried about the possible growth of this mass and if it would become so large that it would preclude radiation. We would have to be cautious radiating the mass in the mediastinum with her cardiac function having taken a big hit. I  explained them that it would be unlikely that her cancer would progress rapidly. It is uncertain that she has definitive malignancy there.     All questions for patient and her  were answered. We should still keep the port in. We will have to bring her back in 6 weeks for flushing.     Video-Visit Details    Type of service:  Video Visit  Originating Location (pt. Location): Home  Distant Location (provider location):  [facility]   Platform used for Video Visit: [employer]    Over 45 min spent with patient with more than 50% time spent in counseling and coordinating care.      Castro Castro    Hematologist and Medical Oncologist  [facility]

## 2020-09-18 NOTE — LETTER
"    9/18/2020         RE: Kassie Ramirez  [address] [facility] [address]        Dear Colleague,    Thank you for referring your patient, Kassie Ramirez, to the [facility]. Please see a copy of my visit note below.    Kassie Ramirez is a 50 year old female who is being evaluated via a billable video visit.      The patient has been notified of following:     \"This video visit will be conducted via a call between you and your physician/provider. We have found that certain health care needs can be provided without the need for an in-person physical exam.  This service lets us provide the care you need with a video conversation.  If a prescription is necessary we can send it directly to your pharmacy.  If lab work is needed we can place an order for that and you can then stop by our lab to have the test done at a later time.    Video visits are billed at different rates depending on your insurance coverage.  Please reach out to your insurance provider with any questions.    If during the course of the call the physician/provider feels a video visit is not appropriate, you will not be charged for this service.\"    Patient has given verbal consent for Video visit? Yes  How would you like to obtain your AVS? MyChart  If you are dropped from the video visit, the video invite should be resent to: Text to cell phone: [phone]  Will anyone else be joining your video visit? No           [facility]  HEMATOLOGY AND ONCOLOGY    FOLLOW-UP VISIT NOTE    PATIENT NAME: Kassie Ramirez MRN # [id number]  DATE OF VISIT: Sep 18, 2020 YOB: 1970    REFERRING PROVIDER: No referring provider defined for this encounter.    CANCER TYPE: DLBCL, EBV+ non-GCB, stage III.  STAGE: III    TREATMENT SUMMARY:  She presented with shortness of breath and cough which had been present since May 2019. Her imaging suggested pulmonary infiltrates which was attributed to aspiration pneumonia. " CT scan of the chest 8/20/2019 showed left hilar and subcarinal mediastinal adenopathy with narrowing of the left lower lobe bronchus and a nonspecific patchy area of nodular consolidation in the medial right lower lobe.  Bronchoscopy with EBUS 8/28/2019 showed nonnecrotizing granulomatous inflammation with prominent eosinophilia, negative for malignancy.  She was diagnosed with sarcoidosis and started on prednisone. She had worsening cough and shortness of breath with tapering of the prednisone.  Repeat CT scan of the chest 11/18/2019 showed interval worsening of the bulky mediastinal and bilateral hilar lymphadenopathy, near complete resolution of the lower lobe opacities, with new interstitial opacities in the right upper lobe.   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 Large mediastinal mass causing respiratory compromise. . IPI score of 2 (low-intermediate). FISH neg for high risk translocations. She received 6 cycles of R-CHOP with intermediate dose methotrexate after cycles 2, 4 and 6 from November through April 2020.      CURRENT INTERVENTIONS:  Surveillance post MR-CHOP    SUBJECTIVE   Kassie Ramirez is being followed for DLBCL, EBV+ non-GCB, stage III intermediate risk disease    Patient was reached over video for this telemedicine visit due to restrictions posed by COVID19 pandemic. Kassie is joined by her  at this visit. She has completed all of her planned cycles of MR-CHOP chemotherapy and is being seen with labs and restaging scans.     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 She is feeling a little better now.         PAST MEDICAL HISTORY     Past Medical History:   Diagnosis Date     Anxiety attack 9/16/2014     Cardiomyopathy (H)     non ishemic - 25-30% - Chemo related     Diffuse large B-cell  lymphoma (H)     Diagnosed 11/2019, Ronan Albarran     Encounter for Essure implantation 2009     Generalized anxiety disorder 9/16/2014    zoloft = flat emotions     HFrEF (heart failure with reduced ejection fraction) (H)     new diagnosis 6/14     Menopausal disorder     started on OCPs by menopause center 3/2017 (takes active continuously)     Menstrual headache     helped by OCPs and magnesium     Paroxysmal SVT (supraventricular tachycardia) (H) 06/2020     GERTRUDE (stress urinary incontinence, female)     sling procedure 2016         CURRENT OUTPATIENT MEDICATIONS     Current Outpatient Medications   Medication Sig     carvedilol (COREG) 3.125 MG tablet Take 0.5 tablets (1.56 mg) by mouth 2 times daily (with meals)     cetirizine (ZYRTEC) 10 MG tablet Take 10 mg by mouth daily     furosemide (LASIX) 20 MG tablet Take 1 tablet (20 mg) by mouth daily     lisinopril (ZESTRIL) 2.5 MG tablet Take 1 tablet (2.5 mg) by mouth daily     LORazepam (ATIVAN) 0.5 MG tablet 1 tablet by mouth at bedtime for sleep and anxiety.     potassium chloride ER (KLOR-CON M) 20 MEQ CR tablet Take 2 tablets (40 mEq) by mouth daily     lidocaine-prilocaine (EMLA) 2.5-2.5 % external cream Apply 1 applicator topically as needed for moderate pain     No current facility-administered medications for this visit.         ALLERGIES      Allergies   Allergen Reactions     Cold & Flu [Cold Defense Fighter]      See pseudoephedrine     Seasonal Allergies      Sudafed Cold-Cough [Dayquil Liquicaps]      Pseudoephedrine Rash     Rash then skins peels off         REVIEW OF SYSTEMS   As above in the HPI, o/w complete 12-point ROS was negative.     PHYSICAL EXAM   LMP 11/06/2019   Limited physical exam during video visit due to COVID19 restrictions  Middle aged female in no acute distress  Breathing comfortably, no tachypnea  Speech and hearing normal  Pleasant mood and congruent affect  Moving all extremities, no focal neurologic deficits apparent        LABORATORY AND IMAGING STUDIES     Recent Labs   Lab Test 09/17/20  1052 09/11/20  1307 07/28/20  0945 07/14/20  1124 06/23/20  0545    139 141 139 137   POTASSIUM 3.5 4.1 3.8 3.8 4.0   CHLORIDE 106 103 107 106 108   CO2 27 29 26 27 24   ANIONGAP 7 7 8 6 5   BUN 21 24 21 27 26   CR 0.91 1.00 0.95 0.98 0.96   GLC 91 87 99 99 86   AFSHAN 9.5 9.5 9.5 9.7 9.4     Recent Labs   Lab Test 06/19/20  1853 06/15/20  0845 06/14/20  1807 01/11/20  0615 12/01/19  1340 12/01/19  0641 11/30/19  2137 11/30/19  1341  11/27/19  2216   MAG 2.5* 2.4* 2.1 2.0  --   --   --   --   --  2.1   PHOS  --   --   --  3.1 2.8 3.4 2.8 2.5   < > 3.1    < > = values in this interval not displayed.     Recent Labs   Lab Test 09/11/20  1307 07/28/20  0945 06/23/20  0545 06/22/20  1025 06/20/20  0700 06/19/20  1853 06/14/20  1807   WBC 7.1 3.8* 3.4* 2.9* 3.3* 3.8* 2.8*   HGB 14.0 13.9 12.9 13.3 12.2 12.5 12.6    176 188 185 183 210 163   MCV 87 87 87 87 91 91 90   NEUTROPHIL 53.3 42.4  --   --  45.3 40.6 45.3     Recent Labs   Lab Test 09/11/20  1307 07/28/20  0945 05/21/20  1307  01/11/20  1044  12/16/19  1743   BILITOTAL 0.8 0.7 0.6   < >  --    < >  --    ALKPHOS 119 116 112   < >  --    < >  --    ALT 32 39 37   < >  --    < >  --    AST 23 24 25   < >  --    < >  --    ALBUMIN 4.2 4.0 3.8   < >  --    < >  --      --   --   --  347*  --  596*    < > = values in this interval not displayed.     TSH   Date Value Ref Range Status   06/14/2020 3.40 0.40 - 4.00 mU/L Final   09/18/2019 2.06 0.40 - 4.00 mU/L Final   07/27/2018 2.04 0.40 - 4.00 mU/L Final     No results for input(s): CEA in the last 00800 hours.  Results for orders placed or performed during the hospital encounter of 09/17/20   Echocardiogram Limited    Narrative    [id number]  [id number]  [id number]  [id number]^MARCUS^JUNE^E           [facility]  Echocardiography Laboratory  [address] [place] [address]        Name: BABAR VARGHESE  MRN:  [id number]  : 1970  Study Date: 2020 09:56 AM  Age: 50 yrs  Gender: Female  Patient Location: [facility]  Reason For Study: Nonischemic cardiomyopathy (H)  Ordering Physician: GARRETT VELAZQUEZ  Referring Physician: GARRETT VELAZQUEZ  Performed By: Cora Springer     BSA: 1.8 m2  Height: 66 in  Weight: 154 lb  BP: 106/67 mmHg  _____________________________________________________________________________  __        Procedure  Limited Echo Adult. Optison (NDC #5911-2167) given intravenously.  _____________________________________________________________________________  __        Interpretation Summary     LVEF 26% based on biplane 2D tracing. Moderate to severe global hypokinesis  without regional wall motion abnormalities.  There is mild (1+) mitral regurgitation.  RV function appears normal, not fully evaluated in this limited study.  No other significant valve issues.  Compared to the last echo done 3 months ago, there has been no significant  change in LV function, but the functional MR has improved.  _____________________________________________________________________________  __        Left Ventricle  The left ventricle is normal in size. There is normal left ventricular wall  thickness. LVEF 26% based on biplane 2D tracing. There is mod-severe global  hypokinesia of the left ventricle.     Mitral Valve  There is mild (1+) mitral regurgitation.     Tricuspid Valve  No tricuspid regurgitation.     Aortic Valve  The aortic valve is normal in structure and function.     _____________________________________________________________________________  __  MMode/2D Measurements & Calculations  IVSd: 0.54 cm  LVIDd: 5.4 cm  LVIDs: 4.6 cm  LVPWd: 0.58 cm     FS: 14.9 %  LV mass(C)d: 101.5 grams  LV mass(C)dI: 56.7 grams/m2  RWT: 0.21        Doppler Measurements & Calculations  MV E max gunjan: 56.6 cm/sec  MV A max gunjan: 70.6 cm/sec  MV E/A: 0.80  MV dec time: 0.07 sec  Medial E/e': 17.0            _____________________________________________________________________________  __           Report approved by: Eyal Dudley 09/17/2020 03:23 PM        CT SCAN OF THE NECK WITH CONTRAST  6/23/2020 3:19 PM      HISTORY: Diffuse large B-cell lymphoma, unspecified body region (H)     TECHNIQUE:  Axial images and coronal reformations. Radiation dose for  this scan was reduced using automated exposure control, adjustment of  the mA and/or kV according to patient size, or iterative  reconstruction technique. 91ml ISOVUE 370 IV Contrast IV.     COMPARISON: CT scan dated 11/29/2019, PET CT scan dated 6/23/2020     FINDINGS:  Visualized sinuses, nasopharynx and orbits: Normal.       Tongue, oral cavity and oropharynx:  Normal.       Hypopharynx: Normal.       Larynx and trachea: Normal.       Thyroid: Normal.     Submandibular glands: Normal.       Parotid glands: Normal.        Lymph nodes: Normal.  No enlarged or necrotic cervical lymph nodes are  identified.     Vasculature: A port catheter is seen entering the right jugular vein.         Bones: Normal.                                                                      IMPRESSION:   No enlarged or necrotic cervical lymph nodes are  identified. No change since 11/29/2019     LANA WAN MD       CT CHEST, ABDOMEN, PELVIS WITH CONTRAST 9/11/2020 2:11 PM     CLINICAL HISTORY: Follow up NHL. Enlarged hilar and subcarinal nodes.  Diffuse large B-cell lymphoma of intrathoracic lymph nodes (H).     TECHNIQUE: CT scan of the chest, abdomen, and pelvis was performed  following injection of IV contrast. Multiplanar reformats were  obtained. Dose reduction techniques were used.      CONTRAST: 75mL Isovue-370     COMPARISON: PET/CT 6/23/2020.     FINDINGS:   LUNGS AND PLEURA: No effusions. No acute airspace disease. There are a  few stable small nodules bilaterally. An example is along the medial  left lower lobe measuring 0.2 cm series 12 image 114. There are  other  stable examples.     MEDIASTINUM/AXILLAE: Elongated subcarinal lymph node is approximately  3.9 x 1.1 cm, previously 4.6 x 1.1 cm series 4 image 56. Right hilar  lymph node is now 1.3 x 0.9 cm, previously 1.7 x 1.6 cm image 52. Left  hilar stable lymph node is 1.3 x 0.8 cm, series 4 image 61. There is  increasing anterior mediastinal soft tissue. AP thickness is now 1.5  cm at the midline, previously 0.9 cm and ill-defined. Tiny hypodensity  noted within a portion of process of series 4 image 55. It is also  somewhat lobulated and its configuration suggests thymic tissue with  interval increase in size. Right chest Port-A-Cath is noted, its tip  at the low SVC. No new areas of lymph node enlargement.     HEPATOBILIARY: Hemangioma along the posterior medial dome of the right  liver is suggested and is stable measuring 1.6 cm series 4 image 112.  A few hepatic cysts appears stable. No new liver lesion is seen.  Contracted gallbladder.     PANCREAS: Normal.     SPLEEN: No new focal lesion. Stable AP length of the spleen that is  12.2 cm.     ADRENAL GLANDS: Normal.     KIDNEYS/BLADDER: Normal.     BOWEL: No acute abnormality.     PELVIC ORGANS: Bilateral fallopian tube occlusion devices. Right  ovarian cyst is 2 cm, previously 1.7 cm.     ADDITIONAL FINDINGS: No new enlarging lymph nodes identified within  the abdomen or pelvis.     MUSCULOSKELETAL: No aggressive appearing bony lesion.                                                                      IMPRESSION:  1.  Enlarged lymph nodes at the mediastinum and hilar locations again  seen. This is stable at the subcarinal, and left hilar regions. This  is slightly smaller at the right hilum.  2.  Increasing lobulated soft tissue at the anterior mediastinum.  Small focus of hypodensity within this process is noted and it has a  configuration suggesting potential thymic tissue. As such, this could  represent thymic rebound. However, recommend further attention  to this  at imaging follow-up.  3.  A few stable pulmonary nodules noted.  4.  No new disease otherwise seen.     JOSUE HORTA MD        ASSESSMENT AND PLAN   DLBCL, EBV+ non-GCB, stage III post 6 cycles of M-RCHOP  PJV pneumonia causing acute respiratory failure improved on bactrim and prednisone  Cardiomyopathy post adriamycin based chemotherapy likely also contributed by tachycardia    Kassie was seen over video at this telemedicine visit and was present along with her . She has completed her planned chemotherapy in April 2020.     She continues to have shortness of breath. Her echocardiogram still shows global hypokinesis. She is going to follow up with her cardiologist on Monday.     She is being seen with restaging scans and I have reviewed actual images from her CT scan. Her CT scan of neck does not show any abnormal lymphadenopathy. She has persistent hilar adenopathy and mediastinal mass. The mediastinal mass is a little more prominent. We would have options of observation, biopsy, PET-CT for her. She continues to have cardiac dysfunction. Her adenopathy and mass could be reactive. She had PJV infection too. I would favor a follow up in 3 months with labs and restaging PET-CT scan. Her  was very worried about the possible growth of this mass and if it would become so large that it would preclude radiation. We would have to be cautious radiating the mass in the mediastinum with her cardiac function having taken a big hit. I explained them that it would be unlikely that her cancer would progress rapidly. It is uncertain that she has definitive malignancy there.     All questions for patient and her  were answered. We should still keep the port in. We will have to bring her back in 6 weeks for flushing.     Video-Visit Details    Type of service:  Video Visit  Originating Location (pt. Location): Home  Distant Location (provider location):  [facility]   Platform used for Video  Visit: Leelee    Over 45 min spent with patient with more than 50% time spent in counseling and coordinating care.      Castro Castro    Hematologist and Medical Oncologist  [facility]       Again, thank you for allowing me to participate in the care of your patient.        Sincerely,        Castro Castro MD

## 2020-09-21 ENCOUNTER — OFFICE VISIT (OUTPATIENT)
Dept: CARDIOLOGY | Facility: CLINIC | Age: 50
End: 2020-09-21
Payer: COMMERCIAL

## 2020-09-21 VITALS
OXYGEN SATURATION: 95 % | DIASTOLIC BLOOD PRESSURE: 69 MMHG | BODY MASS INDEX: 24.67 KG/M2 | HEART RATE: 103 BPM | SYSTOLIC BLOOD PRESSURE: 100 MMHG | WEIGHT: 157.2 LBS | HEIGHT: 67 IN

## 2020-09-21 DIAGNOSIS — I42.9 SECONDARY CARDIOMYOPATHY (H): ICD-10-CM

## 2020-09-21 DIAGNOSIS — I42.8 NONISCHEMIC CARDIOMYOPATHY (H): Primary | ICD-10-CM

## 2020-09-21 DIAGNOSIS — I50.21 ACUTE SYSTOLIC CONGESTIVE HEART FAILURE (H): ICD-10-CM

## 2020-09-21 DIAGNOSIS — I50.22 CHRONIC SYSTOLIC CONGESTIVE HEART FAILURE (H): ICD-10-CM

## 2020-09-21 PROCEDURE — 99215 OFFICE O/P EST HI 40 MIN: CPT | Performed by: INTERNAL MEDICINE

## 2020-09-21 RX ORDER — CARVEDILOL 3.12 MG/1
3.12 TABLET ORAL 2 TIMES DAILY WITH MEALS
Qty: 180 TABLET | Refills: 3 | Status: SHIPPED | OUTPATIENT
Start: 2020-09-21 | End: 2021-03-31

## 2020-09-21 RX ORDER — FUROSEMIDE 20 MG
TABLET ORAL
Qty: 180 TABLET | Refills: 3 | Status: SHIPPED | OUTPATIENT
Start: 2020-09-21 | End: 2021-08-17

## 2020-09-21 RX ORDER — LISINOPRIL 2.5 MG/1
2.5 TABLET ORAL DAILY
Qty: 180 TABLET | Refills: 3 | Status: SHIPPED | OUTPATIENT
Start: 2020-09-21 | End: 2021-02-15

## 2020-09-21 ASSESSMENT — MIFFLIN-ST. JEOR: SCORE: 1357.74

## 2020-09-21 NOTE — LETTER
9/21/2020      Mary Alice Colón PA-C  [address] [facility] [address]      RE: Kassie Ramirez       Dear Colleague,    I had the pleasure of seeing Kassie Ramirez in the [facility].    Service Date: 09/21/2020      CARDIOLOGY OFFICE PROGRESS NOTE       HISTORY OF PRESENT ILLNESS:  I had the opportunity to see Ms. Kassie Ramirez in Cardiology Clinic today at [facility] for evaluation of idiopathic cardiomyopathy and chronic systolic heart failure.  I am seeing Ms. Ramirez in the [facility] today at first time.  She is a 50-year-old woman who has a history of episodes of SVT which got worse when she started chemotherapy for treatment of non-Hodgkin lymphoma.  She has large B-cell lymphoma and started chemotherapy in 11/2019.  At that time, her left ventricular function was normal by echo and cardiac MRI.  A cardiac MRI was performed with a concern about the possibility of sarcoidosis, possibly affecting the myocardium.  The diagnosis eventually was made of lymphoma but that study certainly was helpful to demonstrate her baseline LV function.      Her echo was still normal in 02/2020 with an ejection fraction of 55%-60%.  However, in June, she was hospitalized with frequent episodes of prolonged SVT, shortness of breath and evidence of worsening cardiomyopathy.  Her echocardiogram on 06/15/2020 demonstrated an ejection fraction of 25%-30% with moderate mitral regurgitation.  Right ventricular function was depressed as well.  Unfortunately, her blood pressures were low and limited her use of medications to treat her cardiomyopathy.  She was able to undergo a successful ablation of her SVT on 06/22/2020 and has not had further problems with that issue since then.  Termination of her SVT has also eliminated the likelihood that this was all related to a rate-related cardiomyopathy.      She has been able to tolerate only carvedilol 1.625 mg twice  daily and lisinopril 2.5 mg daily, in addition to Lasix 20 mg daily.  Her echocardiogram on 09/17/2020 shows no significant improvement in left ventricular function.  Her ejection fraction was again 25%-30%.  Her left ventricular chamber size was slightly larger but her mitral regurgitation appeared improved from moderate to mild.      She was getting some episodes of lightheadedness when her blood pressure was low but recently, her blood pressures have been consistently over 100 systolic and typically up into the 110s.  Her weight has generally been stable at about 153-155 pounds.  Sometimes with extra salt in her diet,. her weight will go up to 157 pounds but comes back down within a day or two without any additional treatment.  Currently, she is taking Lasix 20 mg a day.      She seems to have good days and bad days according to her daily walks.  At times, she is more tired and achy and has to walk slowly, completing her 3-1/2-mile walk in just under 2 hours.  Other days, she can do that walk in an hour without too much difficulty.      She has not had syncope and has no chest pain, PND, orthopnea or edema.      PHYSICAL EXAMINATION:    VITAL SIGNS:  Today, her blood pressure was 100/69, heart rate 103 and weight 157 pounds on our scale.     LUNGS:  Clear.     CARDIAC:  Heart rhythm is regular.  She has no cardiac murmurs.  There are no carotid bruits, no jugular venous distention and no edema.      IMPRESSIONS:  Ms. Kassie Ramirez is a 50-year-old woman with large B-cell lymphoma who has undergone chemotherapy and completed her course of chemotherapy.  Unfortunately, she developed a cardiomyopathy and has an ejection fraction of 25%-30%.  Fortunately, the ablation of her SVT was successful and is no longer bothered by that.      I will try to very gently increase the medications to treat her cardiomyopathy.  I will try decreasing her furosemide to 10 mg a day and then increase her carvedilol to 3.125 twice a  day by adding it in gradually, first at night and then in the morning.  If she is able to tolerate that and her systolic blood pressure is still above 100, she can try increasing her lisinopril to 2.5 mg p.o. b.i.d.  I urged her to take additional Lasix 10 mg if she has weights more than 158 pounds.      I will have her follow up with us in Cardiology Clinic again in 3 months.  I will see her again in 6 months with an echocardiogram as well.      Ashley Pearl MD, FACC       cc:   Mary Alice Colón PA-C    [facility]    [address] [place] [id number]         ASHLEY PEARL MD, FACC             D: 2020   T: 2020   MT: NEO      Name:     BABAR VARGHESE   MRN:      [id number]        Account:      [id number]   :      1970           Service Date: 2020      Document: [id number]          Outpatient Encounter Medications as of 2020   Medication Sig Dispense Refill     carvedilol (COREG) 3.125 MG tablet Take 1 tablet (3.125 mg) by mouth 2 times daily (with meals) 180 tablet 3     cetirizine (ZYRTEC) 10 MG tablet Take 10 mg by mouth daily       furosemide (LASIX) 20 MG tablet 10 mg daily. Take an additional 10 mg as directed. 180 tablet 3     lisinopril (ZESTRIL) 2.5 MG tablet Take 1 tablet (2.5 mg) by mouth daily 180 tablet 3     [DISCONTINUED] LORazepam (ATIVAN) 0.5 MG tablet 1 tablet by mouth at bedtime for sleep and anxiety. 30 tablet 0     [DISCONTINUED] potassium chloride ER (KLOR-CON M) 20 MEQ CR tablet Take 2 tablets (40 mEq) by mouth daily 180 tablet 0     [DISCONTINUED] carvedilol (COREG) 3.125 MG tablet Take 0.5 tablets (1.56 mg) by mouth 2 times daily (with meals) 30 tablet 1     [DISCONTINUED] furosemide (LASIX) 20 MG tablet Take 1 tablet (20 mg) by mouth daily 30 tablet 1     [DISCONTINUED] lidocaine-prilocaine (EMLA) 2.5-2.5 % external cream Apply 1 applicator topically as needed for moderate pain       [DISCONTINUED] lisinopril (ZESTRIL) 2.5 MG tablet Take  1 tablet (2.5 mg) by mouth daily 30 tablet 1     No facility-administered encounter medications on file as of 9/21/2020.        Again, thank you for allowing me to participate in the care of your patient.      Sincerely,    ASHLEY GORDON MD     [facility]

## 2020-09-21 NOTE — PROGRESS NOTES
Service Date: 09/21/2020      CARDIOLOGY OFFICE PROGRESS NOTE       HISTORY OF PRESENT ILLNESS:  I had the opportunity to see Ms. Kassie Ramirez in Cardiology Clinic today at [facility] for evaluation of idiopathic cardiomyopathy and chronic systolic heart failure.  I am seeing Ms. Ramirez in the [facility] today at first time.  She is a 50-year-old woman who has a history of episodes of SVT which got worse when she started chemotherapy for treatment of non-Hodgkin lymphoma.  She has large B-cell lymphoma and started chemotherapy in 11/2019.  At that time, her left ventricular function was normal by echo and cardiac MRI.  A cardiac MRI was performed with a concern about the possibility of sarcoidosis, possibly affecting the myocardium.  The diagnosis eventually was made of lymphoma but that study certainly was helpful to demonstrate her baseline LV function.      Her echo was still normal in 02/2020 with an ejection fraction of 55%-60%.  However, in June, she was hospitalized with frequent episodes of prolonged SVT, shortness of breath and evidence of worsening cardiomyopathy.  Her echocardiogram on 06/15/2020 demonstrated an ejection fraction of 25%-30% with moderate mitral regurgitation.  Right ventricular function was depressed as well.  Unfortunately, her blood pressures were low and limited her use of medications to treat her cardiomyopathy.  She was able to undergo a successful ablation of her SVT on 06/22/2020 and has not had further problems with that issue since then.  Termination of her SVT has also eliminated the likelihood that this was all related to a rate-related cardiomyopathy.      She has been able to tolerate only carvedilol 1.625 mg twice daily and lisinopril 2.5 mg daily, in addition to Lasix 20 mg daily.  Her echocardiogram on 09/17/2020 shows no significant improvement in left ventricular function.  Her ejection fraction was again 25%-30%.  Her left ventricular chamber  size was slightly larger but her mitral regurgitation appeared improved from moderate to mild.      She was getting some episodes of lightheadedness when her blood pressure was low but recently, her blood pressures have been consistently over 100 systolic and typically up into the 110s.  Her weight has generally been stable at about 153-155 pounds.  Sometimes with extra salt in her diet,. her weight will go up to 157 pounds but comes back down within a day or two without any additional treatment.  Currently, she is taking Lasix 20 mg a day.      She seems to have good days and bad days according to her daily walks.  At times, she is more tired and achy and has to walk slowly, completing her 3-1/2-mile walk in just under 2 hours.  Other days, she can do that walk in an hour without too much difficulty.      She has not had syncope and has no chest pain, PND, orthopnea or edema.      PHYSICAL EXAMINATION:    VITAL SIGNS:  Today, her blood pressure was 100/69, heart rate 103 and weight 157 pounds on our scale.     LUNGS:  Clear.     CARDIAC:  Heart rhythm is regular.  She has no cardiac murmurs.  There are no carotid bruits, no jugular venous distention and no edema.      IMPRESSIONS:  Ms. aKssie Ramirez is a 50-year-old woman with large B-cell lymphoma who has undergone chemotherapy and completed her course of chemotherapy.  Unfortunately, she developed a cardiomyopathy and has an ejection fraction of 25%-30%.  Fortunately, the ablation of her SVT was successful and is no longer bothered by that.      I will try to very gently increase the medications to treat her cardiomyopathy.  I will try decreasing her furosemide to 10 mg a day and then increase her carvedilol to 3.125 twice a day by adding it in gradually, first at night and then in the morning.  If she is able to tolerate that and her systolic blood pressure is still above 100, she can try increasing her lisinopril to 2.5 mg p.o. b.i.d.  I urged her to take  additional Lasix 10 mg if she has weights more than 158 pounds.      I will have her follow up with us in Cardiology Clinic again in 3 months.  I will see her again in 6 months with an echocardiogram as well.      Ashley Pearl MD, FACC       cc:   Mary Alice Colón PA-C    [facility]    [address] [place] [address]         ASHLEY PEARL MD, FACC             D: 2020   T: 2020   MT: NEO      Name:     BABAR VARGHESE   MRN:      [id number]        Account:      [id number]   :      1970           Service Date: 2020      Document: [id number]

## 2020-09-21 NOTE — PROGRESS NOTES
HPI and Plan:   See dictation    Orders Placed This Encounter   Procedures     Basic metabolic panel     Basic metabolic panel     Follow-Up with [facility] - NICK visit     Follow-Up with [facility] - Return MD visit     CARDIAC REHAB REFERRAL     Echocardiogram Complete       Orders Placed This Encounter   Medications     carvedilol (COREG) 3.125 MG tablet     Sig: Take 1 tablet (3.125 mg) by mouth 2 times daily (with meals)     Dispense:  180 tablet     Refill:  3     lisinopril (ZESTRIL) 2.5 MG tablet     Sig: Take 1 tablet (2.5 mg) by mouth daily     Dispense:  180 tablet     Refill:  3     furosemide (LASIX) 20 MG tablet     Sig: 10 mg daily. Take an additional 10 mg as directed.     Dispense:  180 tablet     Refill:  3       Medications Discontinued During This Encounter   Medication Reason     carvedilol (COREG) 3.125 MG tablet      lisinopril (ZESTRIL) 2.5 MG tablet Reorder     furosemide (LASIX) 20 MG tablet Reorder         Encounter Diagnoses   Name Primary?     Nonischemic cardiomyopathy (H) Yes     Chronic systolic congestive heart failure (H)      Acute systolic congestive heart failure (H)      Secondary cardiomyopathy (H)        CURRENT MEDICATIONS:  Current Outpatient Medications   Medication Sig Dispense Refill     carvedilol (COREG) 3.125 MG tablet Take 1 tablet (3.125 mg) by mouth 2 times daily (with meals) 180 tablet 3     cetirizine (ZYRTEC) 10 MG tablet Take 10 mg by mouth daily       furosemide (LASIX) 20 MG tablet 10 mg daily. Take an additional 10 mg as directed. 180 tablet 3     lisinopril (ZESTRIL) 2.5 MG tablet Take 1 tablet (2.5 mg) by mouth daily 180 tablet 3     LORazepam (ATIVAN) 0.5 MG tablet 1 tablet by mouth at bedtime for sleep and anxiety. 30 tablet 0     potassium chloride ER (KLOR-CON M) 20 MEQ CR tablet Take 2 tablets (40 mEq) by mouth daily 180 tablet 0     lidocaine-prilocaine (EMLA) 2.5-2.5 % external cream Apply 1 applicator topically as needed for moderate pain          ALLERGIES     Allergies   Allergen Reactions     Cold & Flu [Cold Defense Fighter]      See pseudoephedrine     Seasonal Allergies      Sudafed Cold-Cough [Dayquil Liquicaps]      Pseudoephedrine Rash     Rash then skins peels off        PAST MEDICAL HISTORY:  Past Medical History:   Diagnosis Date     Anxiety attack 9/16/2014     Cardiomyopathy (H)     non ishemic - 25-30% - Chemo related     Diffuse large B-cell lymphoma (H)     Diagnosed 11/2019, [facility]     Encounter for Essure implantation 2009     Generalized anxiety disorder 9/16/2014    zoloft = flat emotions     HFrEF (heart failure with reduced ejection fraction) (H)     new diagnosis 6/14     Menopausal disorder     started on OCPs by menopause center 3/2017 (takes active continuously)     Menstrual headache     helped by OCPs and magnesium     Paroxysmal SVT (supraventricular tachycardia) (H) 06/2020     GERTRUDE (stress urinary incontinence, female)     sling procedure 2016       PAST SURGICAL HISTORY:  Past Surgical History:   Procedure Laterality Date     CV CORONARY ANGIOGRAM N/A 6/15/2020    Procedure: Coronary Angiogram;  Surgeon: Luis Eduardo Phillips MD;  Location:  [facility]     CV LEFT HEART CATH N/A 6/15/2020    Procedure: Left Heart Cath;  Surgeon: Luis Eduardo Phillips MD;  Location:  [facility]     EP ABLATION SVT N/A 6/22/2020    Procedure: EP Ablation SVT;  Surgeon: Francisco Javier Bynum MD;  Location:  HE[facility] CATH LAB      KIT ESSURE  2009    essure - Dr. Cailin Raymundo      HERNIORRHAPHY UMBILICAL  1974     IR CHEST PORT PLACEMENT > 5 YRS OF AGE  1/9/2020     mediastinoscopy  2019     SLING TRANSPUBO WITHOUT ANTERIOR COLPORRHAPHY N/A 11/21/2016    Procedure: SLING TRANSPUBO WITHOUT ANTERIOR COLPORRHAPHY;  Surgeon: Hernesto Berrios MD;  Location:  OR       FAMILY HISTORY:  Family History   Problem Relation Age of Onset     Hypertension Mother      Depression Mother      Lipids Mother       Cardiovascular Mother      Circulatory Mother      Diabetes Mother      Heart Disease Mother      Cerebrovascular Disease Mother      Obesity Mother      C.A.D. 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     Lifestyle     Physical activity     Days per week: None     Minutes per session: None     Stress: None   Relationships     Social connections     Talks on phone: None     Gets together: None     Attends Yazidism service: None     Active member of club or organization: None     Attends meetings of clubs or organizations: None     Relationship status: None     Intimate partner violence     Fear of current or ex partner: None     Emotionally abused: None     Physically abused: None     Forced sexual activity: None   Other Topics Concern     Parent/sibling w/ CABG, MI or angioplasty  "before 65F 55M? No      Service Not Asked     Blood Transfusions Not Asked     Caffeine Concern Yes     Comment: MOnster energy drink.   Te - 3 cups.        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        Review of Systems:  Skin:  Negative       Eyes:  Positive for glasses readers  ENT:  Positive for   low \"rumble\" left ear recently  Respiratory:  Negative       Cardiovascular:    fatigue;Positive for occ chest pain  Gastroenterology: Negative      Genitourinary:  Negative      Musculoskeletal:  Negative      Neurologic:  Negative      Psychiatric:  Positive for anxiety treated  Heme/Lymph/Imm:  Positive for allergies seasonal  Endocrine:  Positive for hot flashes;night sweats      Physical Exam:  Vitals: /69   Pulse 103   Ht 1.689 m (5' 6.5\")   Wt 71.3 kg (157 lb 3.2 oz)   LMP 11/06/2019   SpO2 95%   BMI 24.99 kg/m      Constitutional:  cooperative;in no acute distress        Skin:  warm and dry to the touch          Head:  normocephalic        Eyes:  pupils equal and round        Lymph:      ENT:  no pallor or cyanosis, dentition good        Neck:  JVP normal;no carotid bruit        Respiratory:  clear to auscultation;normal respiratory excursion         Cardiac: regular rhythm;normal S1 and S2     no presence of murmur          pulses full and equal                                        GI:  abdomen soft;BS normoactive        Extremities and Muscular Skeletal:  no edema              Neurological:  affect appropriate        Psych:  Alert and Oriented x 3        CC  No referring provider defined for this encounter.              "

## 2020-09-25 ENCOUNTER — HOSPITAL ENCOUNTER (OUTPATIENT)
Dept: CARDIAC REHAB | Facility: CLINIC | Age: 50
End: 2020-09-25
Attending: INTERNAL MEDICINE
Payer: COMMERCIAL

## 2020-09-25 DIAGNOSIS — I42.8 NONISCHEMIC CARDIOMYOPATHY (H): ICD-10-CM

## 2020-09-25 DIAGNOSIS — I50.22 CHRONIC SYSTOLIC CONGESTIVE HEART FAILURE (H): ICD-10-CM

## 2020-09-25 PROCEDURE — 40000575 ZZH STATISTIC OP CARDIAC VISIT #2

## 2020-09-25 PROCEDURE — 40000116 ZZH STATISTIC OP CR VISIT

## 2020-09-25 PROCEDURE — 93797 PHYS/QHP OP CAR RHAB WO ECG: CPT

## 2020-09-25 PROCEDURE — 93798 PHYS/QHP OP CAR RHAB W/ECG: CPT

## 2020-09-28 NOTE — PROGRESS NOTES
SUBJECTIVE:   Kassie Ramirez is a 50 year old female who presents to clinic today for the following health issues:    Nonischemic cardiomyopathy  Saw Dr. Pearl 9/21/2020.  Developed cardiomyopathy secondary to her chemotherapy.  EF 25-30%.  Recurrent SVT taht was successfully ablated on 6/22/2020.  In an effort to optimize cardiac function, Dr. Pearl is trying to gently increase her medications, however, she is plagued by hypotension and dizziness from these medications  Currently is is on carvedilol 1.625 mg twice daily, lisinopril 2.5 mg daily and furosemide 20 g daily.  She recently made these changes: decrease furosemide to 10 mg per day and increase of carvedilol to 3.125 BID (adding in gradually).  Goal to keep SBP >100.  If she tolerates these changes (as of today, is doing well), Dr. Pearl is hoping to increase lisinopril 2.5 mg from once daily to BID (will trial this later this week).  Planned follow up in 3 months with echocardiogram.  Started cardiac rehab today.  Doing twice weekly.  Gets SOB and lightheaded easily.    Diffuse large B-Cell lymphoma  Last saw Dr. Castro 9/18/2020 with virtual visit.  CT scan was completed 6/23/2020.  CT scan of neck was normal.  CT chest showed a bit more prominence of the mediastinal mass.  Dr. Castro favored close follow up with labs and restaging PET-CT scan in light of cardiac function.      Hyperlipidemia Follow-Up  Not on medication.  Overdue for follow up d/t cancer treatment.    Are you regularly taking any medication or supplement to lower your cholesterol?   No    Are you having muscle aches or other side effects that you think could be caused by your cholesterol lowering medication?  No     Recent Labs   Lab Test 07/27/18  0921 09/24/15  1033 09/16/14  1034   CHOL 233* 206* 173   HDL 38* 55 49*   LDL Cannot estimate LDL when triglyceride exceeds 400 mg/dL  116* 120 98   TRIG 523* 123 129   CHOLHDLRATIO  --  3.6 3.5     Starrodriguezdt macular  degeneration  Bilaterally.  Both sister and father had/have condition.  Kassie's is very slowly progressive.  Follows with [facility] annually.    Anxiety Follow-Up  Situational anxiety due to cancer and heart failure.  Uses lorazepam PRN.  Last RX was 7/1/2020 for #30, still has some left.  Works well.  No daily anxiety.    How are you doing with your anxiety since your last visit? No change    Are you having other symptoms that might be associated with anxiety? No    Have you had a significant life event? Health Concerns     Are you feeling depressed? No    Do you have any concerns with your use of alcohol or other drugs? No    Social History     Tobacco Use     Smoking status: Never Smoker     Smokeless tobacco: Never Used   Substance Use Topics     Alcohol use: No     Alcohol/week: 0.0 standard drinks     Comment: 1 time per month     Drug use: No     BRIAN-7 SCORE 7/25/2018 1/15/2019 9/29/2020   Total Score - - -   Total Score - 1 (minimal anxiety) -   Total Score 2 1 3     PHQ 7/25/2018 1/15/2019 9/29/2020   PHQ-9 Total Score 6 2 2   Q9: Thoughts of better off dead/self-harm past 2 weeks Not at all Not at all Not at all     Preventative care  Wondering about mammogram & colonoscopy.  Also vaccine questions.      Problem list and histories reviewed & adjusted, as indicated.  Additional history: as documented    Patient Active Problem List   Diagnosis     Situational anxiety     Generalized anxiety disorder     Controlled substance agreement signed     GERTRUDE (stress urinary incontinence, female)     Intractable migraine without aura and with status migrainosus     Menstrual headache     Menopausal disorder     Diffuse large B-cell lymphoma of extranodal site (H) - per pathology 11/27/19 - mediastinal mass wrapped around azalea and bilateral main stem bronchi- treating with Dr. Castro     DLBCL (diffuse large B cell lymphoma) (H)     Chemotherapy adverse reaction     Paroxysmal SVT (supraventricular  tachycardia) (H) - s/p ablation 6/22/2020     Nonischemic cardiomyopathy (H) - from chemotherapy - managed by Dr. Pearl     Hypertriglyceridemia     Macular degeneration of both eyes, staargartz - sees [facility] annually - very slow progression     Stargardt macular degeneration (H) bilateral - follows with [facility] Ann[facility] - very slow progression     Past Surgical History:   Procedure Laterality Date     CV CORONARY ANGIOGRAM N/A 6/15/2020    Procedure: Coronary Angiogram;  Surgeon: Luis Eduardo Phillips MD;  Location:  HEART CARDIAC CATH LAB     CV LEFT HEART CATH N/A 6/15/2020    Procedure: Left Heart Cath;  Surgeon: Luis Eduardo Phillips MD;  Location:  HEART CARDIAC CATH LAB     EP ABLATION SVT N/A 6/22/2020    Procedure: EP Ablation SVT;  Surgeon: Francisco Javier Bynum MD;  Location:  HEART CARDIAC CATH LAB     H KIT ESSURE  2009    essure - Dr. Cailin Raymundo      HERNIORRHAPHY UMBILICAL  1974     IR CHEST PORT PLACEMENT > 5 YRS OF AGE  1/9/2020     mediastinoscopy  2019     SLING TRANSPUBO WITHOUT ANTERIOR COLPORRHAPHY N/A 11/21/2016    Procedure: SLING TRANSPUBO WITHOUT ANTERIOR COLPORRHAPHY;  Surgeon: Hernesto Berrios MD;  Location: [facility] OR       Social History     Tobacco Use     Smoking status: Never Smoker     Smokeless tobacco: Never Used   Substance Use Topics     Alcohol use: No     Alcohol/week: 0.0 standard drinks     Comment: 1 time per month     Family History   Problem Relation Age of Onset     Hypertension Mother      Depression Mother      Lipids Mother      Cardiovascular Mother      Circulatory Mother      Diabetes Mother      Heart Disease Mother      Cerebrovascular Disease Mother      Obesity Mother      C.A.D. Mother      Lung Cancer Mother         smoker     Macular Degeneration Father         monique     Diabetes Maternal Aunt         type 2     Hypertension Maternal Aunt      Heart Disease Maternal Grandfather      Macular Degeneration Sister          "monique     Hyperlipidemia Sister         high cholesterol       LUNG DISEASE No family hx of          Current Outpatient Medications   Medication Sig Dispense Refill     carvedilol (COREG) 3.125 MG tablet Take 1 tablet (3.125 mg) by mouth 2 times daily (with meals) 180 tablet 3     cetirizine (ZYRTEC) 10 MG tablet Take 10 mg by mouth daily       furosemide (LASIX) 20 MG tablet 10 mg daily. Take an additional 10 mg as directed. 180 tablet 3     lisinopril (ZESTRIL) 2.5 MG tablet Take 1 tablet (2.5 mg) by mouth daily 180 tablet 3     LORazepam (ATIVAN) 0.5 MG tablet 1 tablet by mouth at bedtime for sleep and anxiety. 30 tablet 0     potassium chloride ER (KLOR-CON M) 20 MEQ CR tablet Take 2 tablets (40 mEq) by mouth daily 180 tablet 0     Allergies   Allergen Reactions     Cold & Flu [Cold Defense Fighter]      See pseudoephedrine     Seasonal Allergies      Sudafed Cold-Cough [Dayquil Liquicaps]      Pseudoephedrine Rash     Rash then skins peels off        Reviewed and updated as needed this visit by clinical staff  Tobacco  Allergies  Meds  Problems  Med Hx  Surg Hx  Fam Hx  Soc Hx        Reviewed and updated as needed this visit by Provider  Tobacco  Allergies  Meds  Problems  Med Hx  Surg Hx  Fam Hx         ROS:  Constitutional, HEENT, cardiovascular, pulmonary, GI, , musculoskeletal, neuro, skin, endocrine and psych systems are negative, except as otherwise noted.    OBJECTIVE:   /70 (BP Location: Right arm, Cuff Size: Adult Regular)   Pulse 97   Temp 96.9  F (36.1  C) (Tympanic)   Ht 1.689 m (5' 6.5\")   Wt 71.2 kg (157 lb)   LMP 11/06/2019   SpO2 99%   BMI 24.96 kg/m   Body mass index is 24.96 kg/m .     GENERAL: healthy, alert and no distress  EYES: Eyes grossly normal to inspection, PERRL and conjunctivae and sclerae normal  HENT: ear canals and TM's normal and nose and mouth without ulcers or lesions  NECK: no adenopathy  RESP: lungs clear to auscultation - no rales, rhonchi " or wheezes  CV: regular rate and rhythm, normal S1 S2, no S3 or S4, no murmur, click or rub, no peripheral edema and peripheral pulses strong  MS: no gross musculoskeletal defects noted, no edema  SKIN: no suspicious lesions or rashes  NEURO: Normal strength and tone, mentation intact and speech normal  PSYCH: mentation appears normal, affect normal/bright    Diagnostic Test Results:  Orders Only on 09/17/2020   Component Date Value Ref Range Status     N-Terminal Pro Bnp 09/17/2020 659* 0 - 125 pg/mL Final    Comment:    Reference range shown and results flagged as abnormal are for the outpatient,   non acute settings. Establishing a baseline value for each individual patient   is useful for follow-up.  Suggested inpatient cut points for confirming diagnosis of CHF in an acute   setting are:   >450 pg/mL (age 18 to less than 50)   >900 pg/mL (age 50 to less than 75)   >1800 pg/mL (75 yrs and older)  An inpatient or emergency department NT-proPBNP <300 pg/mL effectively rules   out acute CHF, with 99% negative predictive value.        Sodium 09/17/2020 140  133 - 144 mmol/L Final     Potassium 09/17/2020 3.5  3.4 - 5.3 mmol/L Final     Chloride 09/17/2020 106  94 - 109 mmol/L Final     Carbon Dioxide 09/17/2020 27  20 - 32 mmol/L Final     Anion Gap 09/17/2020 7  3 - 14 mmol/L Final     Glucose 09/17/2020 91  70 - 99 mg/dL Final     Urea Nitrogen 09/17/2020 21  7 - 30 mg/dL Final     Creatinine 09/17/2020 0.91  0.52 - 1.04 mg/dL Final     GFR Estimate 09/17/2020 73  >60 mL/min/[1.73_m2] Final    Comment: Non  GFR Calc  Starting 12/18/2018, serum creatinine based estimated GFR (eGFR) will be   calculated using the Chronic Kidney Disease Epidemiology Collaboration   (CKD-EPI) equation.       GFR Estimate If Black 09/17/2020 85  >60 mL/min/[1.73_m2] Final    Comment:  GFR Calc  Starting 12/18/2018, serum creatinine based estimated GFR (eGFR) will be   calculated using the Chronic Kidney  Disease Epidemiology Collaboration   (CKD-EPI) equation.       Calcium 09/17/2020 9.5  8.5 - 10.1 mg/dL Final   Orders Only on 09/11/2020   Component Date Value Ref Range Status     Lactate Dehydrogenase 09/11/2020 195  81 - 234 U/L Final     Sodium 09/11/2020 139  133 - 144 mmol/L Final     Potassium 09/11/2020 4.1  3.4 - 5.3 mmol/L Final     Chloride 09/11/2020 103  94 - 109 mmol/L Final     Carbon Dioxide 09/11/2020 29  20 - 32 mmol/L Final     Anion Gap 09/11/2020 7  3 - 14 mmol/L Final     Glucose 09/11/2020 87  70 - 99 mg/dL Final     Urea Nitrogen 09/11/2020 24  7 - 30 mg/dL Final     Creatinine 09/11/2020 1.00  0.52 - 1.04 mg/dL Final     GFR Estimate 09/11/2020 66  >60 mL/min/[1.73_m2] Final    Comment: Non  GFR Calc  Starting 12/18/2018, serum creatinine based estimated GFR (eGFR) will be   calculated using the Chronic Kidney Disease Epidemiology Collaboration   (CKD-EPI) equation.       GFR Estimate If Black 09/11/2020 76  >60 mL/min/[1.73_m2] Final    Comment:  GFR Calc  Starting 12/18/2018, serum creatinine based estimated GFR (eGFR) will be   calculated using the Chronic Kidney Disease Epidemiology Collaboration   (CKD-EPI) equation.       Calcium 09/11/2020 9.5  8.5 - 10.1 mg/dL Final     Bilirubin Total 09/11/2020 0.8  0.2 - 1.3 mg/dL Final     Albumin 09/11/2020 4.2  3.4 - 5.0 g/dL Final     Protein Total 09/11/2020 6.8  6.8 - 8.8 g/dL Final     Alkaline Phosphatase 09/11/2020 119  40 - 150 U/L Final     ALT 09/11/2020 32  0 - 50 U/L Final     AST 09/11/2020 23  0 - 45 U/L Final     WBC 09/11/2020 7.1  4.0 - 11.0 10e9/L Final     RBC Count 09/11/2020 4.77  3.8 - 5.2 10e12/L Final     Hemoglobin 09/11/2020 14.0  11.7 - 15.7 g/dL Final     Hematocrit 09/11/2020 41.4  35.0 - 47.0 % Final     MCV 09/11/2020 87  78 - 100 fl Final     MCH 09/11/2020 29.4  26.5 - 33.0 pg Final     MCHC 09/11/2020 33.8  31.5 - 36.5 g/dL Final     RDW 09/11/2020 13.0  10.0 - 15.0 % Final      Platelet Count 09/11/2020 198  150 - 450 10e9/L Final     Diff Method 09/11/2020 Automated Method   Final     % Neutrophils 09/11/2020 53.3  % Final     % Lymphocytes 09/11/2020 25.8  % Final     % Monocytes 09/11/2020 11.2  % Final     % Eosinophils 09/11/2020 8.8  % Final     % Basophils 09/11/2020 0.8  % Final     % Immature Granulocytes 09/11/2020 0.1  % Final     Nucleated RBCs 09/11/2020 0  0 /100 Final     Absolute Neutrophil 09/11/2020 3.8  1.6 - 8.3 10e9/L Final     Absolute Lymphocytes 09/11/2020 1.8  0.8 - 5.3 10e9/L Final     Absolute Monocytes 09/11/2020 0.8  0.0 - 1.3 10e9/L Final     Absolute Eosinophils 09/11/2020 0.6  0.0 - 0.7 10e9/L Final     Absolute Basophils 09/11/2020 0.1  0.0 - 0.2 10e9/L Final     Abs Immature Granulocytes 09/11/2020 0.0  0 - 0.4 10e9/L Final     Absolute Nucleated RBC 09/11/2020 0.0   Final         ASSESSMENT/PLAN:   Kassie was seen today for recheck.    Diagnoses and all orders for this visit:    Stargardt macular degeneration (H) bilateral - follows with [facility] - very slow progression  F/b ophthalmology    Paroxysmal SVT (supraventricular tachycardia) (H) - s/p ablation 6/22/2020  Nonischemic cardiomyopathy (H) - from chemotherapy - managed by Dr. Pearl  No recurrence of SVT since ablation.  Closely monitoring/changing medications with Dr. Pearl.  Continue follow up and cardiac rehab as prescribed.    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 Castro  Diffuse large B-cell lymphoma of intrathoracic lymph nodes (H)  Situational anxiety  Stable.  Plan follow up in 3 months as recommended.  Will refill lorazepam today.  -     LORazepam (ATIVAN) 0.5 MG tablet; 1 tablet by mouth at bedtime for sleep and anxiety.    Hypertriglyceridemia  Has not been monitored in years.  Will recheck today as triglycerides were quite abnormal and  seemed to be a huge variation from previously reported labs.  -     Lipid panel reflex to direct LDL Fasting; Future    Visit for screening mammogram  Routine screening  -     *MA Screening Digital Bilateral; Future    Screen for colon cancer  Will hold on colonoscopy for now d/t numerous PET scans and no evidence of colonic malignancy.  Complete FIT testing.    -     Fecal colorectal cancer screen (FIT); Future    Need for Streptococcus pneumoniae vaccination  -     PCV13, IM (6+ WK) - [id number]      Return in about 3 months (around 12/29/2020) for Lab Work.     [address] [address]  [email or web address]   Office: [phone]           Mary Alice Colón MS, PA-C

## 2020-09-29 ENCOUNTER — OFFICE VISIT (OUTPATIENT)
Dept: FAMILY MEDICINE | Facility: CLINIC | Age: 50
End: 2020-09-29
Payer: COMMERCIAL

## 2020-09-29 ENCOUNTER — HOSPITAL ENCOUNTER (OUTPATIENT)
Dept: CARDIAC REHAB | Facility: CLINIC | Age: 50
End: 2020-09-29
Attending: INTERNAL MEDICINE
Payer: COMMERCIAL

## 2020-09-29 VITALS
OXYGEN SATURATION: 99 % | HEART RATE: 97 BPM | TEMPERATURE: 96.9 F | HEIGHT: 67 IN | SYSTOLIC BLOOD PRESSURE: 102 MMHG | WEIGHT: 157 LBS | DIASTOLIC BLOOD PRESSURE: 70 MMHG | BODY MASS INDEX: 24.64 KG/M2

## 2020-09-29 DIAGNOSIS — Z23 NEED FOR STREPTOCOCCUS PNEUMONIAE VACCINATION: ICD-10-CM

## 2020-09-29 DIAGNOSIS — Q89.7 STARGARDT MACULAR DEGENERATION WITH ABSENT OR HYPOPLASTIC CORPUS CALLOSUM, INTELLECTUAL DISABILITY, AND DYSMORPHIC FEATURES (H): Primary | ICD-10-CM

## 2020-09-29 DIAGNOSIS — Q04.0 STARGARDT MACULAR DEGENERATION WITH ABSENT OR HYPOPLASTIC CORPUS CALLOSUM, INTELLECTUAL DISABILITY, AND DYSMORPHIC FEATURES (H): Primary | ICD-10-CM

## 2020-09-29 DIAGNOSIS — I47.10 PAROXYSMAL SVT (SUPRAVENTRICULAR TACHYCARDIA) (H): ICD-10-CM

## 2020-09-29 DIAGNOSIS — E78.1 HYPERTRIGLYCERIDEMIA: ICD-10-CM

## 2020-09-29 DIAGNOSIS — F41.8 SITUATIONAL ANXIETY: ICD-10-CM

## 2020-09-29 DIAGNOSIS — F78.A9 STARGARDT MACULAR DEGENERATION WITH ABSENT OR HYPOPLASTIC CORPUS CALLOSUM, INTELLECTUAL DISABILITY, AND DYSMORPHIC FEATURES (H): Primary | ICD-10-CM

## 2020-09-29 DIAGNOSIS — H35.53 STARGARDT MACULAR DEGENERATION WITH ABSENT OR HYPOPLASTIC CORPUS CALLOSUM, INTELLECTUAL DISABILITY, AND DYSMORPHIC FEATURES (H): Primary | ICD-10-CM

## 2020-09-29 DIAGNOSIS — Z15.1 STARGARDT MACULAR DEGENERATION WITH ABSENT OR HYPOPLASTIC CORPUS CALLOSUM, INTELLECTUAL DISABILITY, AND DYSMORPHIC FEATURES (H): Primary | ICD-10-CM

## 2020-09-29 DIAGNOSIS — C83.398 DIFFUSE LARGE B-CELL LYMPHOMA OF EXTRANODAL SITE: ICD-10-CM

## 2020-09-29 DIAGNOSIS — Z12.31 VISIT FOR SCREENING MAMMOGRAM: ICD-10-CM

## 2020-09-29 DIAGNOSIS — C83.32 DIFFUSE LARGE B-CELL LYMPHOMA OF INTRATHORACIC LYMPH NODES (H): ICD-10-CM

## 2020-09-29 DIAGNOSIS — Z12.11 SCREEN FOR COLON CANCER: ICD-10-CM

## 2020-09-29 DIAGNOSIS — I42.8 NONISCHEMIC CARDIOMYOPATHY (H): ICD-10-CM

## 2020-09-29 PROBLEM — R00.0 TACHYCARDIA: Status: RESOLVED | Noted: 2020-06-19 | Resolved: 2020-09-29

## 2020-09-29 PROBLEM — I48.92 ATRIAL FLUTTER (H): Status: RESOLVED | Noted: 2020-06-21 | Resolved: 2020-09-29

## 2020-09-29 PROBLEM — R50.81 NEUTROPENIC FEVER (H): Status: RESOLVED | Noted: 2019-12-15 | Resolved: 2020-09-29

## 2020-09-29 PROBLEM — J98.09: Status: RESOLVED | Noted: 2019-11-27 | Resolved: 2020-09-29

## 2020-09-29 PROBLEM — H35.30 MACULAR DEGENERATION OF BOTH EYES, UNSPECIFIED TYPE: Status: ACTIVE | Noted: 2020-09-29

## 2020-09-29 PROBLEM — J96.90 RESPIRATORY FAILURE (H): Status: RESOLVED | Noted: 2019-12-18 | Resolved: 2020-09-29

## 2020-09-29 PROBLEM — I50.21 ACUTE SYSTOLIC CONGESTIVE HEART FAILURE (H): Status: RESOLVED | Noted: 2020-06-14 | Resolved: 2020-09-29

## 2020-09-29 PROBLEM — N17.9 ACUTE KIDNEY FAILURE, UNSPECIFIED (H): Status: RESOLVED | Noted: 2019-12-16 | Resolved: 2020-09-29

## 2020-09-29 PROBLEM — D70.9 NEUTROPENIC FEVER (H): Status: RESOLVED | Noted: 2019-12-15 | Resolved: 2020-09-29

## 2020-09-29 PROBLEM — J98.59 MEDIASTINAL MASS: Status: RESOLVED | Noted: 2019-11-27 | Resolved: 2020-09-29

## 2020-09-29 PROBLEM — C85.90 LYMPHOMA (H): Status: RESOLVED | Noted: 2020-01-10 | Resolved: 2020-09-29

## 2020-09-29 PROBLEM — H35.30 MACULAR DEGENERATION OF BOTH EYES, UNSPECIFIED TYPE: Status: RESOLVED | Noted: 2020-09-29 | Resolved: 2020-09-29

## 2020-09-29 PROBLEM — E78.2 MIXED HYPERLIPIDEMIA: Status: RESOLVED | Noted: 2018-07-30 | Resolved: 2020-09-29

## 2020-09-29 PROCEDURE — 99214 OFFICE O/P EST MOD 30 MIN: CPT | Mod: 25 | Performed by: PHYSICIAN ASSISTANT

## 2020-09-29 PROCEDURE — 93798 PHYS/QHP OP CAR RHAB W/ECG: CPT

## 2020-09-29 PROCEDURE — 90471 IMMUNIZATION ADMIN: CPT | Performed by: PHYSICIAN ASSISTANT

## 2020-09-29 PROCEDURE — 90670 PCV13 VACCINE IM: CPT | Performed by: PHYSICIAN ASSISTANT

## 2020-09-29 PROCEDURE — 40000116 ZZH STATISTIC OP CR VISIT

## 2020-09-29 RX ORDER — LORAZEPAM 0.5 MG/1
TABLET ORAL
Qty: 30 TABLET | Refills: 0 | Status: SHIPPED | OUTPATIENT
Start: 2020-09-29 | End: 2021-01-21

## 2020-09-29 ASSESSMENT — ANXIETY QUESTIONNAIRES
IF YOU CHECKED OFF ANY PROBLEMS ON THIS QUESTIONNAIRE, HOW DIFFICULT HAVE THESE PROBLEMS MADE IT FOR YOU TO DO YOUR WORK, TAKE CARE OF THINGS AT HOME, OR GET ALONG WITH OTHER PEOPLE: SOMEWHAT DIFFICULT
6. BECOMING EASILY ANNOYED OR IRRITABLE: NOT AT ALL
1. FEELING NERVOUS, ANXIOUS, OR ON EDGE: SEVERAL DAYS
3. WORRYING TOO MUCH ABOUT DIFFERENT THINGS: SEVERAL DAYS
7. FEELING AFRAID AS IF SOMETHING AWFUL MIGHT HAPPEN: NOT AT ALL
5. BEING SO RESTLESS THAT IT IS HARD TO SIT STILL: NOT AT ALL
GAD7 TOTAL SCORE: 3
2. NOT BEING ABLE TO STOP OR CONTROL WORRYING: SEVERAL DAYS

## 2020-09-29 ASSESSMENT — PATIENT HEALTH QUESTIONNAIRE - PHQ9
5. POOR APPETITE OR OVEREATING: NOT AT ALL
SUM OF ALL RESPONSES TO PHQ QUESTIONS 1-9: 2

## 2020-09-29 ASSESSMENT — MIFFLIN-ST. JEOR: SCORE: 1356.84

## 2020-09-30 DIAGNOSIS — E87.6 HYPOKALEMIA: ICD-10-CM

## 2020-09-30 RX ORDER — POTASSIUM CHLORIDE 1500 MG/1
40 TABLET, EXTENDED RELEASE ORAL DAILY
Qty: 180 TABLET | Refills: 3 | Status: SHIPPED | OUTPATIENT
Start: 2020-09-30 | End: 2021-03-31

## 2020-09-30 ASSESSMENT — ANXIETY QUESTIONNAIRES: GAD7 TOTAL SCORE: 3

## 2020-10-01 ENCOUNTER — HOSPITAL ENCOUNTER (OUTPATIENT)
Dept: CARDIAC REHAB | Facility: CLINIC | Age: 50
End: 2020-10-01
Attending: INTERNAL MEDICINE
Payer: COMMERCIAL

## 2020-10-01 PROCEDURE — 93798 PHYS/QHP OP CAR RHAB W/ECG: CPT | Performed by: REHABILITATION PRACTITIONER

## 2020-10-01 PROCEDURE — 999N000109 HC STATISTIC OP CR VISIT: Performed by: REHABILITATION PRACTITIONER

## 2020-10-04 NOTE — PROGRESS NOTES
Triage:   Please ensure pt has received these results.    Kassie  Here are your recent results.  Great news!  Your MRI was normal.  I would like to have you try magnesium oxide supplementation to see if this will help with your headaches.  I would like you to start magnesium oxide 120-140 mg (whichever dose is easier to find) three times daily starting on day 15 of your cycle (the first day of your period is considered day #1) and continuing this until you start your next cycle.  If you are not improving then I would have you come back to see me for other preventative options.   If you have any questions please do not hesitate to contact our office via phone ([phone]) or [place].    Mary Alice Colón, MS, PA-C  [facility]   97

## 2020-10-06 ENCOUNTER — HOSPITAL ENCOUNTER (OUTPATIENT)
Dept: CARDIAC REHAB | Facility: CLINIC | Age: 50
End: 2020-10-06
Attending: INTERNAL MEDICINE
Payer: COMMERCIAL

## 2020-10-06 PROCEDURE — 999N000109 HC STATISTIC OP CR VISIT

## 2020-10-06 PROCEDURE — 93798 PHYS/QHP OP CAR RHAB W/ECG: CPT

## 2020-10-07 DIAGNOSIS — Z12.11 SCREEN FOR COLON CANCER: ICD-10-CM

## 2020-10-07 PROCEDURE — 82274 ASSAY TEST FOR BLOOD FECAL: CPT | Performed by: PHYSICIAN ASSISTANT

## 2020-10-08 ENCOUNTER — HOSPITAL ENCOUNTER (OUTPATIENT)
Dept: CARDIAC REHAB | Facility: CLINIC | Age: 50
End: 2020-10-08
Attending: INTERNAL MEDICINE
Payer: COMMERCIAL

## 2020-10-08 PROCEDURE — 999N000109 HC STATISTIC OP CR VISIT

## 2020-10-08 PROCEDURE — 93798 PHYS/QHP OP CAR RHAB W/ECG: CPT

## 2020-10-10 LAB — HEMOCCULT STL QL IA: NEGATIVE

## 2020-10-12 NOTE — RESULT ENCOUNTER NOTE
Kassie  I have reviewed your recent labs. Here are the results:    -FIT test (screening test for colon cancer) was normal. ADVISE: rechecking this test in 1 year.    If you have any questions please do not hesitate to contact our office via phone ([phone]) or MyChart.    Mary Alice Colón, MS, PA-C  [facility]

## 2020-10-13 ENCOUNTER — HOSPITAL ENCOUNTER (OUTPATIENT)
Dept: CARDIAC REHAB | Facility: CLINIC | Age: 50
End: 2020-10-13
Attending: INTERNAL MEDICINE
Payer: COMMERCIAL

## 2020-10-13 PROCEDURE — 999N000109 HC STATISTIC OP CR VISIT: Performed by: OCCUPATIONAL THERAPIST

## 2020-10-13 PROCEDURE — 93798 PHYS/QHP OP CAR RHAB W/ECG: CPT | Performed by: OCCUPATIONAL THERAPIST

## 2020-10-14 ENCOUNTER — CARE COORDINATION (OUTPATIENT)
Dept: CARDIOLOGY | Facility: CLINIC | Age: 50
End: 2020-10-14

## 2020-10-14 NOTE — PROGRESS NOTES
Called and left 2nd  w/ Cardiac Rehab requesting they fax us yesterday's therapy session and EKG strips.      Called Kassie and left  updating her that we are still waiting for cardiac rehab to send us the EKG strips from yesterday's therapy session.  Instructed her to go to ED if overnight at home she develops -130's again with the feelings she had experienced yesterday: tired, weak, low BPs.      Will follow up with Cardiac Rehab 10/15/20 as well.         KING Chavez   [facility] [place] [place]  C.O.RKYLE. Clinic Care Coordinator  10/14/20, 4:24 PM

## 2020-10-14 NOTE — PROGRESS NOTES
"Received VM on CORE RN line from Kassie wanting to report that after doing a hard workout in cardiac rehab yesterday her HR remained elevated (120-130's) for 8 hrs post workout.  She states her BP usually is low as well in cardiac rehab.  She concerned about elevated HR and would like advise from cardiology team.      Returned Kassie's call to discuss further.  After cardiac rehab she did not have palpitations or flutter feeling, but just felt \"terrible\" after workout.  She was very tired, no energy.  BP post workout at home was about 90/67, when HR was 120-130's.  She's been monitoring HR through her phone.  Yesterday in cardiac rehab her BP pre workout was in the 80's.  It improved to 110's during the workout she thought.  Her average/typical BP runs in 100's.  She's feeling better today with HR now in the 80's.     Reviewed current med regimen and she reports she is taking 1 tablet (3.125 mg) of Coreg twice a day.  Lasix 1/2 pill (10 mg) once a day.      Wt has not been > 158 lbs, so she has not needed to take any extra lasix.     10/13: 156 lbs  10/14: 156 lbs     Reviewed last scanned in cardiac rehab session (10/8/20) which shows the following:        Called and left VM w/ [facility] requesting yesterday's therapy session documentation and EKG strips to review further.    Sadia has developed cardiomyopathy w/ EF of 25-30% post chemo for large B-cell lymphoma.  Also hx of SVT which she underwent successful ablation on 6/22/2020.     She last had OV w/ Dr. Pearl on 9/21/2020, \" I will try to very gently increase the medications to treat her cardiomyopathy.  I will try decreasing her furosemide to 10 mg a day and then increase her carvedilol to 3.125 twice a day by adding it in gradually, first at night and then in the morning.  If she is able to tolerate that and her systolic blood pressure is still above 100, she can try increasing her lisinopril to 2.5 mg p.o. b.i.d.  I urged her to take " "additional Lasix 10 mg if she has weights more than 158 pounds.\"     She is due to follow up w/ Cirilo Vo CNP in Dec, 2020 (orders entered).     Next rehab session scheduled for tomorrow 10/15/2020.      Will route to Cirilo Vo CNP for further review and recommendations.      Lexi Mtz RN BSN   [place], [place]  C.ODeniseRKYLE. Clinic Care Coordinator  10/14/20, 11:06 AM      "

## 2020-10-15 ENCOUNTER — TELEPHONE (OUTPATIENT)
Dept: CARDIOLOGY | Facility: CLINIC | Age: 50
End: 2020-10-15

## 2020-10-15 ENCOUNTER — HOSPITAL ENCOUNTER (OUTPATIENT)
Facility: CLINIC | Age: 50
Setting detail: SPECIMEN
Discharge: HOME | End: 2020-10-15
Attending: INTERNAL MEDICINE | Admitting: PHYSICIAN ASSISTANT
Payer: COMMERCIAL

## 2020-10-15 DIAGNOSIS — E78.1 HYPERTRIGLYCERIDEMIA: ICD-10-CM

## 2020-10-15 DIAGNOSIS — I47.10 SVT (SUPRAVENTRICULAR TACHYCARDIA) (H): Primary | ICD-10-CM

## 2020-10-15 LAB
CHOLEST SERPL-MCNC: 238 MG/DL
HDLC SERPL-MCNC: 50 MG/DL
LDLC SERPL CALC-MCNC: 143 MG/DL
NONHDLC SERPL-MCNC: 188 MG/DL
TRIGL SERPL-MCNC: 223 MG/DL

## 2020-10-15 PROCEDURE — 80061 LIPID PANEL: CPT | Performed by: PHYSICIAN ASSISTANT

## 2020-10-15 PROCEDURE — 36591 DRAW BLOOD OFF VENOUS DEVICE: CPT

## 2020-10-15 PROCEDURE — 99207 PR NO CHARGE LOS: CPT

## 2020-10-15 NOTE — PROGRESS NOTES
Nursing Note:  Kassie Ramirez presents today for labs.    Patient seen by provider today: No   present during visit today: Not Applicable.    Note: N/A.    Intravenous Access:  Labs drawn without difficulty.  Implanted Port.    Discharge Plan:   Patient was sent home.    Arti Leung RN

## 2020-10-15 NOTE — RESULT ENCOUNTER NOTE
Dear Kassie,    Here is a summary of your recent test results:  -Cholesterol levels (LDL,HDL, Triglycerides) are okay.  ADVISE: rechecking  in 1 year.    For additional lab test information, labtestsonline.org is an excellent reference.    In addition, here is a list of due or overdue Health Maintenance reminders:    Mammogram due on 09/22/2017  Preventive Care Visit due on 07/25/2019      Please call us at [phone] (or use OpenLabel) to address the above recommendations if needed.           Thank you very much for trusting me and [facility].     Have a peaceful day.    Healthy regards,  Skyler Soriano MD (I am covering for Mary Alice KELLEY  who is away from the office today.)

## 2020-10-15 NOTE — PROGRESS NOTES
Received fax from Cardiac Rehab w/ session information from yesterday.     Resting HR: 76  Max. HR:  140  Ending HR:  140    Resting BP: 82/60  Max. BP:  112/64  Ending BP: 98/60    Rhythm strips - to be entered into chart by cardiac rehab.      Called and spoke with cardiac rehab therapist.  She reports pt did not complain on symptoms during therapy or after.  Informed her of what pt had c/o after CR when she was home.  Requested they notify Dr. Pearl today if HR remains elevated again during today's CR session w/ symptoms.     Lexi Mtz, RN MIL   [facility] [place], [place]  JAMES Clinic Care Coordinator  10/15/20, 11:05 AM

## 2020-10-15 NOTE — TELEPHONE ENCOUNTER
Spoke to Dr Horan, who looked at pt Cardiac rehab strips and reviewed Dr Bynum Ablation note and recommended that patient wear a 14 day ZioPatch and then follow up after with the results.  Spoke to pt and made her aware of this and did explain that he thought that the strips were not the same what was seen during the ablation.   That is why he is requesting a 14 day monitor to be worn before she is seen.  Explained that will try to have placed in [place] and then explained that appointment will then need to be moved out, so that we have the report.  Pt asked about rehab and this writer said that pt should go to rehab and try to be herself while wearing the monitor.  Pt is aware the techniques that can be used to try and break SVT and said that this may also work with her fast rate now.  Pt states that she was not told that her rate was high in CR, but noticed that it continued to be in the 120's and the total time was about 8 hours. Pt agrees to the monitor and this writer explained that will have  call to set her up for monitor and then move office visit out.  Order placed. Mary

## 2020-10-15 NOTE — PROGRESS NOTES
Spoke with Malinda MALIK EP. Discussed case and recent episode at Cardiac rehab. EKG showed SVT. Spoke with Sadia. She is doing okay today. She is not going to Cardiac rehab as of now. Per Malinda, offered her 10/22 8:15 am appt. Updated Malinda. She plans to review this with Dr. Horan. Asmita JOHNSON, CNP

## 2020-10-19 ENCOUNTER — HOSPITAL ENCOUNTER (OUTPATIENT)
Dept: CARDIOLOGY | Facility: CLINIC | Age: 50
Discharge: HOME | End: 2020-10-19
Attending: INTERNAL MEDICINE | Admitting: INTERNAL MEDICINE
Payer: COMMERCIAL

## 2020-10-19 ENCOUNTER — HOSPITAL ENCOUNTER (OUTPATIENT)
Dept: CARDIAC REHAB | Facility: CLINIC | Age: 50
End: 2020-10-19
Attending: INTERNAL MEDICINE
Payer: COMMERCIAL

## 2020-10-19 DIAGNOSIS — I47.10 SVT (SUPRAVENTRICULAR TACHYCARDIA) (H): ICD-10-CM

## 2020-10-19 PROCEDURE — 999N000109 HC STATISTIC OP CR VISIT

## 2020-10-19 PROCEDURE — 0298T LEADLESS EKG MONITOR 3 TO 14 DAYS: CPT | Performed by: INTERNAL MEDICINE

## 2020-10-19 PROCEDURE — 93798 PHYS/QHP OP CAR RHAB W/ECG: CPT

## 2020-10-19 PROCEDURE — 0296T LEADLESS EKG MONITOR 3 TO 14 DAYS: CPT

## 2020-10-21 ENCOUNTER — HOSPITAL ENCOUNTER (OUTPATIENT)
Dept: CARDIAC REHAB | Facility: CLINIC | Age: 50
End: 2020-10-21
Attending: INTERNAL MEDICINE
Payer: COMMERCIAL

## 2020-10-21 PROCEDURE — 93798 PHYS/QHP OP CAR RHAB W/ECG: CPT

## 2020-10-21 PROCEDURE — 999N000109 HC STATISTIC OP CR VISIT

## 2020-10-23 ENCOUNTER — HOSPITAL ENCOUNTER (OUTPATIENT)
Dept: CARDIAC REHAB | Facility: CLINIC | Age: 50
End: 2020-10-23
Attending: INTERNAL MEDICINE
Payer: COMMERCIAL

## 2020-10-23 PROCEDURE — 999N000109 HC STATISTIC OP CR VISIT

## 2020-10-23 PROCEDURE — 93798 PHYS/QHP OP CAR RHAB W/ECG: CPT

## 2020-10-26 ENCOUNTER — HOSPITAL ENCOUNTER (OUTPATIENT)
Dept: CARDIAC REHAB | Facility: CLINIC | Age: 50
End: 2020-10-26
Attending: INTERNAL MEDICINE
Payer: COMMERCIAL

## 2020-10-26 PROCEDURE — 999N000109 HC STATISTIC OP CR VISIT

## 2020-10-26 PROCEDURE — 93798 PHYS/QHP OP CAR RHAB W/ECG: CPT

## 2020-10-28 ENCOUNTER — HOSPITAL ENCOUNTER (OUTPATIENT)
Dept: CARDIAC REHAB | Facility: CLINIC | Age: 50
End: 2020-10-28
Attending: INTERNAL MEDICINE
Payer: COMMERCIAL

## 2020-10-28 PROCEDURE — 999N000109 HC STATISTIC OP CR VISIT

## 2020-10-28 PROCEDURE — 93798 PHYS/QHP OP CAR RHAB W/ECG: CPT

## 2020-11-18 ENCOUNTER — INFUSION THERAPY VISIT (OUTPATIENT)
Dept: INFUSION THERAPY | Facility: CLINIC | Age: 50
End: 2020-11-18
Attending: INTERNAL MEDICINE
Payer: COMMERCIAL

## 2020-11-18 ENCOUNTER — HOSPITAL ENCOUNTER (OUTPATIENT)
Facility: CLINIC | Age: 50
Setting detail: SPECIMEN
Discharge: HOME | End: 2020-11-18
Attending: INTERNAL MEDICINE | Admitting: INTERNAL MEDICINE
Payer: COMMERCIAL

## 2020-11-18 DIAGNOSIS — C83.398 DIFFUSE LARGE B-CELL LYMPHOMA OF EXTRANODAL SITE: ICD-10-CM

## 2020-11-18 DIAGNOSIS — C83.32 DIFFUSE LARGE B-CELL LYMPHOMA OF INTRATHORACIC LYMPH NODES (H): Primary | ICD-10-CM

## 2020-11-18 LAB
BASOPHILS # BLD AUTO: 0.1 10E9/L (ref 0–0.2)
BASOPHILS NFR BLD AUTO: 2.4 %
DIFFERENTIAL METHOD BLD: ABNORMAL
EOSINOPHIL # BLD AUTO: 0.6 10E9/L (ref 0–0.7)
EOSINOPHIL NFR BLD AUTO: 23.9 %
ERYTHROCYTE [DISTWIDTH] IN BLOOD BY AUTOMATED COUNT: 12.2 % (ref 10–15)
HCT VFR BLD AUTO: 41.4 % (ref 35–47)
HGB BLD-MCNC: 14.1 G/DL (ref 11.7–15.7)
IMM GRANULOCYTES # BLD: 0 10E9/L (ref 0–0.4)
IMM GRANULOCYTES NFR BLD: 0 %
LYMPHOCYTES # BLD AUTO: 1 10E9/L (ref 0.8–5.3)
LYMPHOCYTES NFR BLD AUTO: 40.4 %
MCH RBC QN AUTO: 30.7 PG (ref 26.5–33)
MCHC RBC AUTO-ENTMCNC: 34.1 G/DL (ref 31.5–36.5)
MCV RBC AUTO: 90 FL (ref 78–100)
MONOCYTES # BLD AUTO: 0.3 10E9/L (ref 0–1.3)
MONOCYTES NFR BLD AUTO: 11.4 %
NEUTROPHILS # BLD AUTO: 0.6 10E9/L (ref 1.6–8.3)
NEUTROPHILS NFR BLD AUTO: 21.9 %
NRBC # BLD AUTO: 0 10*3/UL
NRBC BLD AUTO-RTO: 0 /100
PLATELET # BLD AUTO: 155 10E9/L (ref 150–450)
RBC # BLD AUTO: 4.59 10E12/L (ref 3.8–5.2)
WBC # BLD AUTO: 2.6 10E9/L (ref 4–11)

## 2020-11-18 PROCEDURE — 36591 DRAW BLOOD OFF VENOUS DEVICE: CPT

## 2020-11-18 PROCEDURE — 250N000011 HC RX IP 250 OP 636: Performed by: PHYSICIAN ASSISTANT

## 2020-11-18 PROCEDURE — 85025 COMPLETE CBC W/AUTO DIFF WBC: CPT | Performed by: INTERNAL MEDICINE

## 2020-11-18 RX ORDER — HEPARIN SODIUM (PORCINE) LOCK FLUSH IV SOLN 100 UNIT/ML 100 UNIT/ML
5 SOLUTION INTRAVENOUS
Status: CANCELLED | OUTPATIENT
Start: 2020-11-18

## 2020-11-18 RX ORDER — HEPARIN SODIUM (PORCINE) LOCK FLUSH IV SOLN 100 UNIT/ML 100 UNIT/ML
5 SOLUTION INTRAVENOUS
Status: DISCONTINUED | OUTPATIENT
Start: 2020-11-18 | End: 2020-11-18 | Stop reason: HOSPADM

## 2020-11-18 RX ORDER — HEPARIN SODIUM,PORCINE 10 UNIT/ML
5 VIAL (ML) INTRAVENOUS
Status: CANCELLED | OUTPATIENT
Start: 2020-11-18

## 2020-11-18 RX ADMIN — Medication 5 ML: at 08:20

## 2020-11-18 NOTE — PROGRESS NOTES
Nursing Note:  Kassie Ramirez presents today for port labs.    Patient seen by provider today: No   present during visit today: Not Applicable.    Note: N/A.    Intravenous Access:  Lab draw site right port, Needle type Rueda, Gauge 20.  Labs drawn without difficulty.  Implanted Port.    Discharge Plan:   Patient was sent to home for 12/15/2020 appointment.    Zonia Monroy RN

## 2020-11-19 ENCOUNTER — OFFICE VISIT (OUTPATIENT)
Dept: CARDIOLOGY | Facility: CLINIC | Age: 50
End: 2020-11-19
Payer: COMMERCIAL

## 2020-11-19 VITALS
OXYGEN SATURATION: 100 % | HEART RATE: 89 BPM | SYSTOLIC BLOOD PRESSURE: 111 MMHG | HEIGHT: 66 IN | WEIGHT: 161.6 LBS | BODY MASS INDEX: 25.97 KG/M2 | DIASTOLIC BLOOD PRESSURE: 76 MMHG

## 2020-11-19 DIAGNOSIS — I42.8 NONISCHEMIC CARDIOMYOPATHY (H): ICD-10-CM

## 2020-11-19 DIAGNOSIS — I47.10 SVT (SUPRAVENTRICULAR TACHYCARDIA) (H): Primary | ICD-10-CM

## 2020-11-19 PROCEDURE — 99213 OFFICE O/P EST LOW 20 MIN: CPT | Performed by: INTERNAL MEDICINE

## 2020-11-19 ASSESSMENT — MIFFLIN-ST. JEOR: SCORE: 1369.76

## 2020-11-19 NOTE — LETTER
11/19/2020      Mary Alice Colón PA-C  [address] [facility] [address]      RE: Kassie Aburtoter       Dear Colleague,    I had the pleasure of seeing Kassie Ramirez in the [facility].    Service Date: 11/19/2020      HISTORY OF PRESENT ILLNESS:    I had the pleasure of seeing Ms. Kassie Ramirez, a delightful 50-year-old woman, in followup of SVT.      Ms. Ramirez has the following chronic medical issues:   1.  Large B-cell lymphoma with chemotherapy starting in 11/2019.   2.  Cardiomyopathy, ejection fraction 25%-30%, diagnosed in 06/2020.   3.  Recurrent SVT.  Admitted after frequent tachycardia events and underwent successful catheter ablation of typical AVNRT on 06/23/2020.   4.  Low normal BP limiting pharmacologic therapy for cardiomyopathy.      I met Ms. Ramirez during her hospitalization at [facility] last June.  She was diagnosed with cardiomyopathy (prior to initiation of chemotherapy, LV function was normal) and recurrent SVT.  She underwent successful SVT ablation (typical AVNRT) by Dr. Bynum.  She has been followed in the [facility] by Cirilo Vo NP and Dr. Pearl.      The patient is now being referred back to EP because of an episode of tachycardia in Cardiac Rehab, possibly on 10/15/2020.  On that day, her heart rate jumped to around 140 for 8 hours.  Apparently, there was a rhythm strip seen by my colleagues that I do not have today.  I do see Cardiac Rehab ECG tracings from 10/15 that show sinus or atrial tachycardia around 135-140, but I am not sure if this was during exertion and whether this is the arrhythmia of concern.  The event lasted 8 hours and spontaneously resolved.  It did not feel like her previous SVT.  She has not had other incidents.      She is otherwise doing well.  Her medical therapy for cardiomyopathy was very gradually increased due to low BP.   She is currently on carvedilol 3.125 mg b.i.d. and lisinopril 2.5 mg b.i.d.       Mrs. Ramirez distinctly recalls the sensation she had during SVT in the past.  She now only has the sensation that the tachycardia is about to start, but it does not.        PHYSICAL EXAMINATION:   VITAL SIGNS:  Blood pressure 111/76, heart rate 89 and regular, weight 73 kg, height 167 cm.   GENERAL:  She is a very pleasant woman in no distress.   NECK:  Supple without bruits.   LUNGS:  Clear.   CARDIOVASCULAR:  Regular rhythm without murmur or rub.   EXTREMITIES:  No edema.        DIAGNOSTIC STUDIES:    I reviewed her recent leadless ECG monitor that showed rare ectopic beats, no SVT.  Periods of second-degree AV block type 1 during sleep.        IMPRESSION:   1.  History of typical AVNRT.  Successful catheter ablation 2020 with no apparent recurrence.   2.  Recent episode of tachycardia in 10/2020.  I have not seen ECG documentation.  I asked the patient to consider purchasing the KardiaMobile device and gertrudis to be able to record ECG tracings should this occur again.      RECOMMENDATIONS:   1.  Follow-up at the [facility].   2.  Consider the KardiaMobile device and gertrudis.     3.  We will be happy to see her in the future on an as-needed basis.      It was my pleasure seeing Ms. Ramirez.  Please feel free to contact me with any questions.        LONNY BLANCO MD, [facility]           cc:      Mary Alice Colón PA-C   [address] [address]             D: 2020   T: 2020   MT:       Name:     BABAR RAMIREZ   MRN:      [id number]        Account:      [id number]   :      1970           Service Date: 2020      Document: [id number]            Outpatient Encounter Medications as of 2020   Medication Sig Dispense Refill     carvedilol (COREG) 3.125 MG tablet Take 1 tablet (3.125 mg) by mouth 2 times daily (with meals) 180 tablet 3     cetirizine (ZYRTEC) 10 MG tablet Take 10 mg by mouth daily       furosemide (LASIX) 20 MG tablet 10 mg  daily. Take an additional 10 mg as directed. 180 tablet 3     lisinopril (ZESTRIL) 2.5 MG tablet Take 1 tablet (2.5 mg) by mouth daily (Patient taking differently: Take 2.5 mg by mouth daily Twice a day) 180 tablet 3     LORazepam (ATIVAN) 0.5 MG tablet 1 tablet by mouth at bedtime for sleep and anxiety. 30 tablet 0     potassium chloride ER (KLOR-CON M) 20 MEQ CR tablet Take 2 tablets (40 mEq) by mouth daily 180 tablet 3     No facility-administered encounter medications on file as of 11/19/2020.        Again, thank you for allowing me to participate in the care of your patient.      Sincerely,    Deborah Horan MD     [facility]

## 2020-11-19 NOTE — PROGRESS NOTES
Service Date: 11/19/2020      HISTORY OF PRESENT ILLNESS:    I had the pleasure of seeing Ms. Kassie Ramirez, a delightful 50-year-old woman, in followup of SVT.      Ms. Ramirez has the following chronic medical issues:   1.  Large B-cell lymphoma with chemotherapy starting in 11/2019.   2.  Cardiomyopathy, ejection fraction 25%-30%, diagnosed in 06/2020.   3.  Recurrent SVT.  Admitted after frequent tachycardia events and underwent successful catheter ablation of typical AVNRT on 06/23/2020.   4.  Low normal BP limiting pharmacologic therapy for cardiomyopathy.      I met Ms. Ramirez during her hospitalization at [facility] last June.  She was diagnosed with cardiomyopathy (prior to initiation of chemotherapy, LV function was normal) and recurrent SVT.  She underwent successful SVT ablation (typical AVNRT) by Dr. Bynum.  She has been followed in the [facility] by Cirilo Vo NP and Dr. Pearl.      The patient is now being referred back to EP because of an episode of tachycardia in Cardiac Rehab, possibly on 10/15/2020.  On that day, her heart rate jumped to around 140 for 8 hours.  Apparently, there was a rhythm strip seen by my colleagues that I do not have today.  I do see Cardiac Rehab ECG tracings from 10/15 that show sinus or atrial tachycardia around 135-140, but I am not sure if this was during exertion and whether this is the arrhythmia of concern.  The event lasted 8 hours and spontaneously resolved.  It did not feel like her previous SVT.  She has not had other incidents.      She is otherwise doing well.  Her medical therapy for cardiomyopathy was very gradually increased due to low BP.   She is currently on carvedilol 3.125 mg b.i.d. and lisinopril 2.5 mg b.i.d.      Mrs. Ramirez distinctly recalls the sensation she had during SVT in the past.  She now only has the sensation that the tachycardia is about to start, but it does not.        PHYSICAL EXAMINATION:   VITAL SIGNS:  Blood pressure  111/76, heart rate 89 and regular, weight 73 kg, height 167 cm.   GENERAL:  She is a very pleasant woman in no distress.   NECK:  Supple without bruits.   LUNGS:  Clear.   CARDIOVASCULAR:  Regular rhythm without murmur or rub.   EXTREMITIES:  No edema.        DIAGNOSTIC STUDIES:    I reviewed her recent leadless ECG monitor that showed rare ectopic beats, no SVT.  Periods of second-degree AV block type 1 during sleep.        IMPRESSION:   1.  History of typical AVNRT.  Successful catheter ablation 2020 with no apparent recurrence.   2.  Recent episode of tachycardia in 10/2020.  I have not seen ECG documentation.  I asked the patient to consider purchasing the KardiaMobile device and gertrudis to be able to record ECG tracings should this occur again.      RECOMMENDATIONS:   1.  Follow-up at the [facility].   2.  Consider the KardiaMobile device and gertrudis.     3.  We will be happy to see her in the future on an as-needed basis.      It was my pleasure seeing Ms. Ramirez.  Please feel free to contact me with any questions.        LONNY BLANCO MD, FACC           cc:      Mary Alice Colón PA-C   [address] [address]             D: 2020   T: 2020   MT: NAY      Name:     BABAR RAMIREZ   MRN:      [id number]        Account:      [id number]   :      1970           Service Date: 2020      Document: [id number]

## 2020-11-19 NOTE — PROGRESS NOTES
HPI and Plan:   See dictation [id number]    Orders Placed This Encounter   Procedures     Follow-Up with Cardiac Advanced Practice Provider       No orders of the defined types were placed in this encounter.      There are no discontinued medications.      Encounter Diagnosis   Name Primary?     SVT (supraventricular tachycardia) (H) Yes       CURRENT MEDICATIONS:  Current Outpatient Medications   Medication Sig Dispense Refill     carvedilol (COREG) 3.125 MG tablet Take 1 tablet (3.125 mg) by mouth 2 times daily (with meals) 180 tablet 3     cetirizine (ZYRTEC) 10 MG tablet Take 10 mg by mouth daily       furosemide (LASIX) 20 MG tablet 10 mg daily. Take an additional 10 mg as directed. 180 tablet 3     lisinopril (ZESTRIL) 2.5 MG tablet Take 1 tablet (2.5 mg) by mouth daily (Patient taking differently: Take 2.5 mg by mouth daily Twice a day) 180 tablet 3     LORazepam (ATIVAN) 0.5 MG tablet 1 tablet by mouth at bedtime for sleep and anxiety. 30 tablet 0     potassium chloride ER (KLOR-CON M) 20 MEQ CR tablet Take 2 tablets (40 mEq) by mouth daily 180 tablet 3       ALLERGIES     Allergies   Allergen Reactions     Cold & Flu [Cold Defense Fighter]      See pseudoephedrine     Seasonal Allergies      Sudafed Cold-Cough [Dayquil Liquicaps]      Pseudoephedrine Rash     Rash then skins peels off        PAST MEDICAL HISTORY:  Past Medical History:   Diagnosis Date     Anxiety attack 9/16/2014     Cardiomyopathy (H)     non ishemic - 25-30% - Chemo related     Diffuse large B-cell lymphoma (H)     Diagnosed 11/2019, Ronan Albarran     Encounter for Essure implantation 2009     Generalized anxiety disorder 9/16/2014    zoloft = flat emotions     HFrEF (heart failure with reduced ejection fraction) (H)     new diagnosis 6/14     Menopausal disorder     started on OCPs by menopause center 3/2017 (takes active continuously)     Menstrual headache     helped by OCPs and magnesium     Paroxysmal SVT (supraventricular  tachycardia) (H) 06/2020     GERTRUDE (stress urinary incontinence, female)     sling procedure 2016       PAST SURGICAL HISTORY:  Past Surgical History:   Procedure Laterality Date     CV CORONARY ANGIOGRAM N/A 6/15/2020    Procedure: Coronary Angiogram;  Surgeon: Luis Eduardo Phillips MD;  Location:  HEART CARDIAC CATH LAB     CV LEFT HEART CATH N/A 6/15/2020    Procedure: Left Heart Cath;  Surgeon: Luis Eduardo Phillips MD;  Location:  HEART CARDIAC CATH LAB     EP ABLATION SVT N/A 6/22/2020    Procedure: EP Ablation SVT;  Surgeon: Francisco Javier Bynum MD;  Location:  HEART CARDIAC CATH LAB     H KIT ESSURE  2009    essure - Dr. Cailin Raymundo      HERNIORRHAPHY UMBILICAL  1974     IR CHEST PORT PLACEMENT > 5 YRS OF AGE  1/9/2020     mediastinoscopy  2019     SLING TRANSPUBO WITHOUT ANTERIOR COLPORRHAPHY N/A 11/21/2016    Procedure: SLING TRANSPUBO WITHOUT ANTERIOR COLPORRHAPHY;  Surgeon: Hernesto Berrios MD;  Location: [facility] OR       FAMILY HISTORY:  Family History   Problem Relation Age of Onset     Hypertension Mother      Depression Mother      Lipids Mother      Cardiovascular Mother      Circulatory Mother      Diabetes Mother      Heart Disease Mother      Cerebrovascular Disease Mother      Obesity Mother      C.A.D. 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     Lifestyle     Physical activity     Days per week: None     Minutes per session: None     Stress: None   Relationships     Social connections     Talks on phone: None     Gets together: None     Attends Jehovah's witness service: None     Active member of club or organization: None     Attends meetings of clubs or organizations: None     Relationship status: None     Intimate partner violence     Fear of current or ex partner: None     Emotionally abused: None     Physically abused: None     Forced sexual activity: None   Other Topics Concern     Parent/sibling w/ CABG, MI or angioplasty before 65F 55M? No      Service Not Asked     Blood Transfusions Not Asked     Caffeine Concern Yes     Comment: MOnster energy drink.   Te - 3 cups.        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        Review of Systems:  Skin:  Negative       Eyes:  Positive for glasses readers  ENT:  Positive for   low \"rumble\" left ear recently  Respiratory:  Negative       Cardiovascular:    fatigue;Positive for occ chest pain  Gastroenterology: Negative      Genitourinary:  Negative      Musculoskeletal:  " Negative      Neurologic:  Negative      Psychiatric:  Positive for anxiety treated  Heme/Lymph/Imm:  Positive for allergies seasonal  Endocrine:  Positive for hot flashes;night sweats

## 2020-11-19 NOTE — LETTER
11/19/2020    Mary Alice Colón PA-C  [address] [facility] [address]    RE: Kassie Ramirez       Dear Colleague,    I had the pleasure of seeing Kassie Ramirez in the [facility].    HPI and Plan:   See dictation [id number]    Orders Placed This Encounter   Procedures     Follow-Up with Cardiac Advanced Practice Provider       No orders of the defined types were placed in this encounter.      There are no discontinued medications.      Encounter Diagnosis   Name Primary?     SVT (supraventricular tachycardia) (H) Yes       CURRENT MEDICATIONS:  Current Outpatient Medications   Medication Sig Dispense Refill     carvedilol (COREG) 3.125 MG tablet Take 1 tablet (3.125 mg) by mouth 2 times daily (with meals) 180 tablet 3     cetirizine (ZYRTEC) 10 MG tablet Take 10 mg by mouth daily       furosemide (LASIX) 20 MG tablet 10 mg daily. Take an additional 10 mg as directed. 180 tablet 3     lisinopril (ZESTRIL) 2.5 MG tablet Take 1 tablet (2.5 mg) by mouth daily (Patient taking differently: Take 2.5 mg by mouth daily Twice a day) 180 tablet 3     LORazepam (ATIVAN) 0.5 MG tablet 1 tablet by mouth at bedtime for sleep and anxiety. 30 tablet 0     potassium chloride ER (KLOR-CON M) 20 MEQ CR tablet Take 2 tablets (40 mEq) by mouth daily 180 tablet 3       ALLERGIES     Allergies   Allergen Reactions     Cold & Flu [Cold Defense Fighter]      See pseudoephedrine     Seasonal Allergies      Sudafed Cold-Cough [Dayquil Liquicaps]      Pseudoephedrine Rash     Rash then skins peels off        PAST MEDICAL HISTORY:  Past Medical History:   Diagnosis Date     Anxiety attack 9/16/2014     Cardiomyopathy (H)     non ishemic - 25-30% - Chemo related     Diffuse large B-cell lymphoma (H)     Diagnosed 11/2019, Ronan Albarran     Encounter for Essure implantation 2009     Generalized anxiety disorder 9/16/2014    zoloft = flat emotions     HFrEF (heart failure with reduced ejection  fraction) (H)     new diagnosis 6/14     Menopausal disorder     started on OCPs by menopause center 3/2017 (takes active continuously)     Menstrual headache     helped by OCPs and magnesium     Paroxysmal SVT (supraventricular tachycardia) (H) 06/2020     GERTRUDE (stress urinary incontinence, female)     sling procedure 2016       PAST SURGICAL HISTORY:  Past Surgical History:   Procedure Laterality Date     CV CORONARY ANGIOGRAM N/A 6/15/2020    Procedure: Coronary Angiogram;  Surgeon: Luis Eduardo Phillips MD;  Location:  HEART CARDIAC CATH LAB     CV LEFT HEART CATH N/A 6/15/2020    Procedure: Left Heart Cath;  Surgeon: Lui sEduardo Phillips MD;  Location:  HEART CARDIAC CATH LAB     EP ABLATION SVT N/A 6/22/2020    Procedure: EP Ablation SVT;  Surgeon: Francisco Javier Bynum MD;  Location:  HEART CARDIAC CATH LAB     H KIT ESSURE  2009    essure - Dr. Cailin Raymundo      HERNIORRHAPHY UMBILICAL  1974     IR CHEST PORT PLACEMENT > 5 YRS OF AGE  1/9/2020     mediastinoscopy  2019     SLING TRANSPUBO WITHOUT ANTERIOR COLPORRHAPHY N/A 11/21/2016    Procedure: SLING TRANSPUBO WITHOUT ANTERIOR COLPORRHAPHY;  Surgeon: Hernesto Berrios MD;  Location: [facility] OR       FAMILY HISTORY:  Family History   Problem Relation Age of Onset     Hypertension Mother      Depression Mother      Lipids Mother      Cardiovascular Mother      Circulatory Mother      Diabetes Mother      Heart Disease Mother      Cerebrovascular Disease Mother      Obesity Mother      C.A.D. 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     Lifestyle     Physical activity     Days per week: None     Minutes per session: None     Stress: None   Relationships     Social connections     Talks on phone: None     Gets together: None     Attends Congregation service: None     Active member of club or organization: None     Attends meetings of clubs or organizations: None     Relationship status: None     Intimate partner violence     Fear of current or ex partner: None     Emotionally abused: None     Physically abused: None     Forced sexual activity: None   Other Topics Concern     Parent/sibling w/ CABG, MI or angioplasty before 65F 55M? No      Service Not Asked     Blood Transfusions Not Asked     Caffeine Concern Yes     Comment: MOnster energy drink.   Te - 3 cups.        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        Review of Systems:  Skin:  Negative       Eyes:  Positive for glasses  "readers  ENT:  Positive for   low \"rumble\" left ear recently  Respiratory:  Negative       Cardiovascular:    fatigue;Positive for occ chest pain  Gastroenterology: Negative      Genitourinary:  Negative      Musculoskeletal:  Negative      Neurologic:  Negative      Psychiatric:  Positive for anxiety treated  Heme/Lymph/Imm:  Positive for allergies seasonal  Endocrine:  Positive for hot flashes;night sweats                      Thank you for allowing me to participate in the care of your patient.      Sincerely,     Deborah Horan MD     [facility]    cc:   No referring provider defined for this encounter.        "

## 2020-11-29 ENCOUNTER — HEALTH MAINTENANCE LETTER (OUTPATIENT)
Age: 50
End: 2020-11-29

## 2020-12-15 ENCOUNTER — HOSPITAL ENCOUNTER (OUTPATIENT)
Dept: PET IMAGING | Facility: CLINIC | Age: 50
End: 2020-12-15
Attending: INTERNAL MEDICINE
Payer: COMMERCIAL

## 2020-12-15 ENCOUNTER — HOSPITAL ENCOUNTER (OUTPATIENT)
Facility: CLINIC | Age: 50
Setting detail: SPECIMEN
Discharge: HOME | End: 2020-12-15
Attending: INTERNAL MEDICINE | Admitting: INTERNAL MEDICINE
Payer: COMMERCIAL

## 2020-12-15 DIAGNOSIS — C83.32 DIFFUSE LARGE B-CELL LYMPHOMA OF INTRATHORACIC LYMPH NODES (H): ICD-10-CM

## 2020-12-15 LAB
ALBUMIN SERPL-MCNC: 4.3 G/DL (ref 3.4–5)
ALP SERPL-CCNC: 107 U/L (ref 40–150)
ALT SERPL W P-5'-P-CCNC: 25 U/L (ref 0–50)
ANION GAP SERPL CALCULATED.3IONS-SCNC: 2 MMOL/L (ref 3–14)
AST SERPL W P-5'-P-CCNC: 20 U/L (ref 0–45)
BASOPHILS # BLD AUTO: 0.1 10E9/L (ref 0–0.2)
BASOPHILS NFR BLD AUTO: 1.5 %
BILIRUB SERPL-MCNC: 0.8 MG/DL (ref 0.2–1.3)
BUN SERPL-MCNC: 28 MG/DL (ref 7–30)
CALCIUM SERPL-MCNC: 9.7 MG/DL (ref 8.5–10.1)
CHLORIDE SERPL-SCNC: 106 MMOL/L (ref 94–109)
CO2 SERPL-SCNC: 29 MMOL/L (ref 20–32)
CREAT SERPL-MCNC: 1.07 MG/DL (ref 0.52–1.04)
DIFFERENTIAL METHOD BLD: ABNORMAL
EOSINOPHIL # BLD AUTO: 1.1 10E9/L (ref 0–0.7)
EOSINOPHIL NFR BLD AUTO: 15.5 %
ERYTHROCYTE [DISTWIDTH] IN BLOOD BY AUTOMATED COUNT: 12 % (ref 10–15)
GFR SERPL CREATININE-BSD FRML MDRD: 60 ML/MIN/{1.73_M2}
GLUCOSE SERPL-MCNC: 93 MG/DL (ref 70–99)
HCT VFR BLD AUTO: 43.8 % (ref 35–47)
HGB BLD-MCNC: 15.2 G/DL (ref 11.7–15.7)
IMM GRANULOCYTES # BLD: 0 10E9/L (ref 0–0.4)
IMM GRANULOCYTES NFR BLD: 0.3 %
LDH SERPL L TO P-CCNC: 217 U/L (ref 81–234)
LYMPHOCYTES # BLD AUTO: 1.9 10E9/L (ref 0.8–5.3)
LYMPHOCYTES NFR BLD AUTO: 27.6 %
MCH RBC QN AUTO: 30.6 PG (ref 26.5–33)
MCHC RBC AUTO-ENTMCNC: 34.7 G/DL (ref 31.5–36.5)
MCV RBC AUTO: 88 FL (ref 78–100)
MONOCYTES # BLD AUTO: 0.6 10E9/L (ref 0–1.3)
MONOCYTES NFR BLD AUTO: 8.1 %
NEUTROPHILS # BLD AUTO: 3.2 10E9/L (ref 1.6–8.3)
NEUTROPHILS NFR BLD AUTO: 47 %
NRBC # BLD AUTO: 0 10*3/UL
NRBC BLD AUTO-RTO: 0 /100
PLATELET # BLD AUTO: 194 10E9/L (ref 150–450)
POTASSIUM SERPL-SCNC: 3.9 MMOL/L (ref 3.4–5.3)
PROT SERPL-MCNC: 7.1 G/DL (ref 6.8–8.8)
RBC # BLD AUTO: 4.96 10E12/L (ref 3.8–5.2)
SODIUM SERPL-SCNC: 137 MMOL/L (ref 133–144)
WBC # BLD AUTO: 6.8 10E9/L (ref 4–11)

## 2020-12-15 PROCEDURE — 74177 CT ABD & PELVIS W/CONTRAST: CPT | Mod: 26 | Performed by: RADIOLOGY

## 2020-12-15 PROCEDURE — 71260 CT THORAX DX C+: CPT | Mod: 26 | Performed by: RADIOLOGY

## 2020-12-15 PROCEDURE — 36591 DRAW BLOOD OFF VENOUS DEVICE: CPT

## 2020-12-15 PROCEDURE — 70491 CT SOFT TISSUE NECK W/DYE: CPT | Mod: 26 | Performed by: RADIOLOGY

## 2020-12-15 PROCEDURE — 80053 COMPREHEN METABOLIC PANEL: CPT | Performed by: INTERNAL MEDICINE

## 2020-12-15 PROCEDURE — 250N000011 HC RX IP 250 OP 636: Performed by: INTERNAL MEDICINE

## 2020-12-15 PROCEDURE — 83615 LACTATE (LD) (LDH) ENZYME: CPT | Performed by: INTERNAL MEDICINE

## 2020-12-15 PROCEDURE — 85025 COMPLETE CBC W/AUTO DIFF WBC: CPT | Performed by: INTERNAL MEDICINE

## 2020-12-15 PROCEDURE — 343N000001 HC RX 343: Performed by: INTERNAL MEDICINE

## 2020-12-15 PROCEDURE — 71260 CT THORAX DX C+: CPT

## 2020-12-15 PROCEDURE — 70491 CT SOFT TISSUE NECK W/DYE: CPT

## 2020-12-15 PROCEDURE — 78816 PET IMAGE W/CT FULL BODY: CPT | Mod: 26 | Performed by: RADIOLOGY

## 2020-12-15 PROCEDURE — A9552 F18 FDG: HCPCS | Performed by: INTERNAL MEDICINE

## 2020-12-15 RX ORDER — HEPARIN SODIUM (PORCINE) LOCK FLUSH IV SOLN 100 UNIT/ML 100 UNIT/ML
500 SOLUTION INTRAVENOUS EVERY 8 HOURS
Status: DISCONTINUED | OUTPATIENT
Start: 2020-12-15 | End: 2020-12-16 | Stop reason: HOSPADM

## 2020-12-15 RX ORDER — IOPAMIDOL 755 MG/ML
10-135 INJECTION, SOLUTION INTRAVASCULAR ONCE
Status: COMPLETED | OUTPATIENT
Start: 2020-12-15 | End: 2020-12-15

## 2020-12-15 RX ADMIN — FLUDEOXYGLUCOSE F-18 14.65 MCI.: 500 INJECTION, SOLUTION INTRAVENOUS at 09:59

## 2020-12-15 RX ADMIN — IOPAMIDOL 99 ML: 755 INJECTION, SOLUTION INTRAVENOUS at 11:11

## 2020-12-15 RX ADMIN — SODIUM CHLORIDE, PRESERVATIVE FREE 500 UNITS: 5 INJECTION INTRAVENOUS at 11:15

## 2020-12-18 ENCOUNTER — VIRTUAL VISIT (OUTPATIENT)
Dept: ONCOLOGY | Facility: CLINIC | Age: 50
End: 2020-12-18
Attending: INTERNAL MEDICINE
Payer: COMMERCIAL

## 2020-12-18 DIAGNOSIS — C83.32 DIFFUSE LARGE B-CELL LYMPHOMA OF INTRATHORACIC LYMPH NODES (H): Primary | ICD-10-CM

## 2020-12-18 DIAGNOSIS — R59.0 MEDIASTINAL ADENOPATHY: ICD-10-CM

## 2020-12-18 DIAGNOSIS — I42.9 SECONDARY CARDIOMYOPATHY (H): ICD-10-CM

## 2020-12-18 PROCEDURE — 99214 OFFICE O/P EST MOD 30 MIN: CPT | Mod: 95 | Performed by: INTERNAL MEDICINE

## 2020-12-18 PROCEDURE — 999N001193 HC VIDEO/TELEPHONE VISIT; NO CHARGE

## 2020-12-18 NOTE — PROGRESS NOTES
[facility]  HEMATOLOGY AND ONCOLOGY    FOLLOW-UP VISIT NOTE    PATIENT NAME: Kassie Ramirez MRN # [id number]  DATE OF VISIT: Dec 18, 2020 YOB: 1970    REFERRING PROVIDER: No referring provider defined for this encounter.    CANCER TYPE: DLBCL, EBV+ non-GCB, stage III.  STAGE: III    TREATMENT SUMMARY:  She presented with shortness of breath and cough which had been present since May 2019. Her imaging suggested pulmonary infiltrates which was attributed to aspiration pneumonia. CT scan of the chest 8/20/2019 showed left hilar and subcarinal mediastinal adenopathy with narrowing of the left lower lobe bronchus and a nonspecific patchy area of nodular consolidation in the medial right lower lobe.  Bronchoscopy with EBUS 8/28/2019 showed nonnecrotizing granulomatous inflammation with prominent eosinophilia, negative for malignancy.  She was diagnosed with sarcoidosis and started on prednisone. She had worsening cough and shortness of breath with tapering of the prednisone.  Repeat CT scan of the chest 11/18/2019 showed interval worsening of the bulky mediastinal and bilateral hilar lymphadenopathy, near complete resolution of the lower lobe opacities, with new interstitial opacities in the right upper lobe.   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 Large mediastinal mass causing respiratory compromise. . IPI score of 2 (low-intermediate). FISH neg for high risk translocations. She received 6 cycles of R-CHOP with intermediate dose methotrexate after cycles 2, 4 and 6 from November through April 2020.      CURRENT INTERVENTIONS:  Surveillance post MR-CHOP    SUBJECTIVE   Kassie Ramirez is being followed for DLBCL, EBV+ non-GCB, stage III intermediate risk disease    Patient was reached over video for this telemedicine visit due to restrictions posed by [employer]  jeanie Fernando is joined by her  at this visit. She has completed all of her planned cycles of MR-CHOP chemotherapy and is being seen with labs and restaging scans.     She has struggled with cardiomyopathy post adriamycin. She is feeling a lot better now. She denies any chest pain or shortness of breath.         PAST MEDICAL HISTORY     Past Medical History:   Diagnosis Date     Anxiety attack 9/16/2014     Cardiomyopathy (H)     non ishemic - 25-30% - Chemo related     Diffuse large B-cell lymphoma (H)     Diagnosed 11/2019, Ronan Albarran     Encounter for Essure implantation 2009     Generalized anxiety disorder 9/16/2014    zoloft = flat emotions     HFrEF (heart failure with reduced ejection fraction) (H)     new diagnosis 6/14     Menopausal disorder     started on OCPs by menopause center 3/2017 (takes active continuously)     Menstrual headache     helped by OCPs and magnesium     Paroxysmal SVT (supraventricular tachycardia) (H) 06/2020     GERTRUDE (stress urinary incontinence, female)     sling procedure 2016         CURRENT OUTPATIENT MEDICATIONS     Current Outpatient Medications   Medication Sig     carvedilol (COREG) 3.125 MG tablet Take 1 tablet (3.125 mg) by mouth 2 times daily (with meals)     cetirizine (ZYRTEC) 10 MG tablet Take 10 mg by mouth daily     furosemide (LASIX) 20 MG tablet 10 mg daily. Take an additional 10 mg as directed.     lisinopril (ZESTRIL) 2.5 MG tablet Take 1 tablet (2.5 mg) by mouth daily (Patient taking differently: Take 2.5 mg by mouth daily Twice a day)     LORazepam (ATIVAN) 0.5 MG tablet 1 tablet by mouth at bedtime for sleep and anxiety.     potassium chloride ER (KLOR-CON M) 20 MEQ CR tablet Take 2 tablets (40 mEq) by mouth daily     No current facility-administered medications for this visit.         ALLERGIES      Allergies   Allergen Reactions     Cold & Flu [Cold Defense Fighter]      See pseudoephedrine     Seasonal Allergies      Sudafed Cold-Cough  [Dayquil Liquicaps]      Pseudoephedrine Rash     Rash then skins peels off         REVIEW OF SYSTEMS   As above in the HPI, o/w complete 12-point ROS was negative.     PHYSICAL EXAM   LMP 11/06/2019   Limited physical exam during video visit due to COVID19 restrictions  Middle aged female in no acute distress  Breathing comfortably, no tachypnea  Speech and hearing normal  Pleasant mood and congruent affect  Moving all extremities, no focal neurologic deficits apparent       LABORATORY AND IMAGING STUDIES     Recent Labs   Lab Test 12/15/20  0938 09/17/20  1052 09/11/20  1307 07/28/20  0945 07/14/20  1124    140 139 141 139   POTASSIUM 3.9 3.5 4.1 3.8 3.8   CHLORIDE 106 106 103 107 106   CO2 29 27 29 26 27   ANIONGAP 2* 7 7 8 6   BUN 28 21 24 21 27   CR 1.07* 0.91 1.00 0.95 0.98   GLC 93 91 87 99 99   AFSHAN 9.7 9.5 9.5 9.5 9.7     Recent Labs   Lab Test 06/19/20  1853 06/15/20  0845 06/14/20  1807 01/11/20  0615 12/01/19  1340 12/01/19  0641 11/30/19  2137 11/30/19  1341 11/27/19  2216 11/27/19  2216   MAG 2.5* 2.4* 2.1 2.0  --   --   --   --   --  2.1   PHOS  --   --   --  3.1 2.8 3.4 2.8 2.5   < > 3.1    < > = values in this interval not displayed.     Recent Labs   Lab Test 12/15/20  0938 11/18/20  0815 09/11/20  1307 07/28/20  0945 06/23/20  0545 06/20/20  0700 06/20/20  0700   WBC 6.8 2.6* 7.1 3.8* 3.4*   < > 3.3*   HGB 15.2 14.1 14.0 13.9 12.9   < > 12.2    155 198 176 188   < > 183   MCV 88 90 87 87 87   < > 91   NEUTROPHIL 47.0 21.9 53.3 42.4  --   --  45.3    < > = values in this interval not displayed.     Recent Labs   Lab Test 12/15/20  0938 09/11/20  1307 07/28/20  0945 01/11/20  1044 01/11/20  1044   BILITOTAL 0.8 0.8 0.7   < >  --    ALKPHOS 107 119 116   < >  --    ALT 25 32 39   < >  --    AST 20 23 24   < >  --    ALBUMIN 4.3 4.2 4.0   < >  --     195  --   --  347*    < > = values in this interval not displayed.     TSH   Date Value Ref Range Status   06/14/2020 3.40 0.40 - 4.00  mU/L Final   09/18/2019 2.06 0.40 - 4.00 mU/L Final   07/27/2018 2.04 0.40 - 4.00 mU/L Final     No results for input(s): CEA in the last 61931 hours.  Results for orders placed or performed during the hospital encounter of 12/15/20   CT Soft Tissue Neck w Contrast    Narrative    PET CT fusion examination 12/15/2020 11:52 AM  1. Neck CT with contrast  2. PET study of the neck  3. PET CT fusion study of the neck    History:  Diffuse large B-cell lymphoma of intrathoracic lymph nodes.    Comparison: CT 6/23/2020 and 11/29/2019.    Technique: Please refer to the accompanying whole body PET-CT for  report of the dose and whole body PET-CT findings.  Regarding the neck, axial images were obtained after nonionic  intravenous contrast administration, with sagittal and coronal  reconstructions performed. Neck CT images were reviewed in bone, soft  tissue, and lung windows, with review of the fused PET-CT images as  well in multiple planes.    Findings:  Evaluation of the mucosal space demonstrates no abnormality or  abnormal metabolic uptake on PET CT in the nasopharynx, oropharynx,  hypopharynx or the glottis. The tongue base appears normal. The major  salivary glands and thyroid gland appear normal.    There is no evident cervical lymphadenopathy, and the cervical lymph  nodes are within normal limits by size criteria. No abnormal metabolic  uptake on PET CT.     Limited evaluation of the cervical vertebral column demonstrates no  evident spinal canal stenosis. The visualized paranasal sinuses and  mastoid air cells are clear. The major vascular structures in the neck  are patent.    The visualized lung apices appear clear.      Impression    Impression: In this patient with history of diffuse large B cell  lymphoma:  1. No FDG avid focus or lymphadenopathy is seen within the neck. No  significant change from prior.  2. Please refer to the whole body PET CT performed as a separate  report, for the findings of the  remainder of the body.    I have personally reviewed the examination and initial interpretation  and I agree with the findings.    DANIELLE COCHRAN MD       Combined Report of:    PET and CT on  12/15/2020 11:53 AM :     1. PET of the neck, chest, abdomen, and pelvis.  2. PET CT Fusion for Attenuation Correction and Anatomical  Localization:    3. Diagnostic CT scan of the chest, abdomen, and pelvis with  intravenous contrast for interpretation.  3. CT of the chest, abdomen and pelvis obtained for diagnostic  interpretation.  4. 3D MIP and PET-CT fused images were processed on an independent  workstation and archived to PACS and reviewed by a radiologist.     Technique:     1. PET: The patient received 14.65 mCi of F-18-FDG; the serum glucose  was 94 prior to administration, body weight was 73.3 kg. Images were  evaluated in the axial, sagittal, and coronal planes as well as the  rotational whole body MIP. Images were acquired from the Vertex to the  Feet.     UPTAKE WAS MEASURED AT 70 MINUTES.      BACKGROUND:  Liver SUV max= 4.47,   Aorta Blood SUV Max: 2.84.      2. CT: Volumetric acquisition for clinical interpretation of the  chest, abdomen, and pelvis acquired at 3 mm sections  after the  uneventful administration of intravenous contrast. The chest, abdomen,  and pelvis were evaluated at 5 mm sections in bone, soft tissue, and  lung windows.       The patient received 99 cc of Isovue 370 intravenously for the  examination.  500 mL of Breeza oral contrast was administered.  High resolution images of the neck were obtained with multiple oblique  projection reformats.     3. 3D MIP and PET-CT fused images were processed on an independent  workstation and archived to PACS and reviewed by a radiologist.     INDICATION: DLBCL - enlarging mediastinal mass; Diffuse large B-cell  lymphoma of intrathoracic lymph nodes (H)     ADDITIONAL INFORMATION OBTAINED FROM EMR: 50-year-old female with  biopsy of mediastinal  lymph nodes demonstrated in diffuse large B-cell  lymphoma with subsequent administration of 6 cycles of R-CHOP and  methotrexate.     COMPARISON: PET/CT 6/23/2020 and 2/4/2020     FINDINGS:      HEAD/NECK:  Please see dedicated neuro radiology report for details regarding the  high resolution PET/CT of the neck.     CHEST:  Changes of thymic hyperplasia. No residual FDG uptake associated with  the previously described right hilar and right paratracheal lymph  nodes. No new or concerning foci of FDG uptake or mediastinal/hilar  lymphadenopathy. Few focal areas of FDG uptake involving the  esophagus.     Right chest wall Port-A-Cath with the tip terminating at the right  cavoatrial junction. Heart size is normal. No pericardial effusion.  Thoracic aorta and main pulmonary artery diameters are within normal  limits. No central pulmonary embolus in. The left vertebral artery  arises from the aortic arch.     Central airways are clear. Bibasilar dependent atelectasis. No focal  consolidation or pleural effusion. No pneumothorax. Calcified  granulomas seen centrally in the right upper lobe. Cluster of small  pulmonary nodules in the medial left upper lobe are stable to slightly  increased in size (series 8, image [id number]). Unchanged 4 mm pulmonary  nodule in the medial left upper lobe adjacent the mediastinum (series  8, image 48). 2 mm pulmonary nodule in the left upper lobe is  increased in size (series 8, image 55). Stable 3 mm pulmonary nodule  left lower lobe (series 8, image 99).     ABDOMEN AND PELVIS:  There is no suspicious FDG uptake in the abdomen or pelvis.     Hypoattenuating focus with punctate peripheral enhancement seen in the  seventh segment (series 5, image 247), this is unchanged and likely  represents a hemangioma. Additional subcentimeter hypoattenuating  lesions are unchanged and likely represent small cysts. No new  worrisome hepatic lesion. No intrahepatic artery dilation. The  gallbladder,  pancreas, and adrenal glands are unremarkable. Borderline  enlarged spleen. Symmetric renal enhancement. No hydronephrosis or  renal calculus. The urinary bladder and ureters are unremarkable. 1.9  cm on FDG avid right ovarian/adnexal cyst. Tubal ligation. Colonic  diverticulosis without evidence of diverticulitis. Nondilated  air-filled appendix. No free air free fluid. Major intra-abdominal  vessels are patent. No abdominal or pelvic adenopathy. Normal caliber  of the abdominal aorta.     LOWER EXTREMITIES:   No abnormal masses or hypermetabolic lesions.     BONES:   There are no suspicious lytic or blastic osseous lesions.  There is no  abnormal FDG uptake in the skeleton.                                                                         IMPRESSION: In this patient with diffuse large B-cell lymphoma status  post chemotherapy there are findings compatible with complete  metabolic response by Lug[place] criteria.    1. Resolution of the FDG avid mediastinal and hilar lymph nodes.    1a. Thymic rebound most likely explanation for remaining anterior  mediastinal FDG activity.  2. Multiple sub-4 mL pulmonary nodules are slightly more prominent.   Recommend attention on follow-up.       I have personally reviewed the examination and initial interpretation  and I agree with the findings.     DANIELLE COCHRAN MD        ASSESSMENT AND PLAN   DLBCL, EBV+ non-GCB, stage III post 6 cycles of M-RCHOP  PJV pneumonia causing acute respiratory failure improved on bactrim and prednisone  Cardiomyopathy post adriamycin based chemotherapy likely also contributed by tachycardia    Kassie was seen over video at this telemedicine visit and was present along with her . She has completed her planned chemotherapy in April 2020.     She is doing much better. We reviewed all her labs and they are adequate.     I have reviewed actual images from her PET-CT scan and there is no evidence of recurrent disease in the neck or chest.  She had enlarged hilar nodes and mediastinal mass. This was likely from reactive thymic tissue. I explained her that previous PET positivity too show not necessarily imply malignancy as even infections and inflammations do  the FDG contrast and cannot be distinguished on PET. The CT portion offers the specificity.     She wonders if she can have ibuprofen intermittently for pain. Her creatinine was marginally elevated today. She can have ibuprofen as long as she is well hydrated as it can affect the renal function specially in the setting of dehydration.     I would continue following her every 3 months for now.     All questions for patient and her  were answered. We could still keep the port in and not attempt a procedure in the midst of pandemic.  I will not bring her in for port flush as per new directive by [employer] where by the port flush can be done at 3 months intervals. We would have the PORT removed after the next visit.     Video-Visit Details    Type of service:  Video Visit  Originating Location (pt. Location): Home  Distant Location (provider location):  [facility]   Platform used for Video Visit: Leelee    Over 25 min spent with patient with more than 50% time spent in counseling and coordinating care.      aCstro Castro    Hematologist and Medical Oncologist  [facility]

## 2020-12-18 NOTE — LETTER
"    12/18/2020         RE: Kassie Ramirez  [address] [facility] [id number]        Dear Colleague,    Thank you for referring your patient, Kassie Ramirez, to the [facility]. Please see a copy of my visit note below.    Kassie Ramirez is a 50 year old female who is being evaluated via a billable video visit.      The patient has been notified of following:     \"This video visit will be conducted via a call between you and your physician/provider. We have found that certain health care needs can be provided without the need for an in-person physical exam.  This service lets us provide the care you need with a video conversation.  If a prescription is necessary we can send it directly to your pharmacy.  If lab work is needed we can place an order for that and you can then stop by our lab to have the test done at a later time.    Video visits are billed at different rates depending on your insurance coverage.  Please reach out to your insurance provider with any questions.    If during the course of the call the physician/provider feels a video visit is not appropriate, you will not be charged for this service.\"    Patient has given verbal consent for Video visit? Yes  How would you like to obtain your AVS? MyChart  If you are dropped from the video visit, the video invite should be resent to: Text to cell phone: [phone]  Will anyone else be joining your video visit? No           [facility]  HEMATOLOGY AND ONCOLOGY    FOLLOW-UP VISIT NOTE    PATIENT NAME: Kassie Ramirez MRN # [id number]  DATE OF VISIT: Dec 18, 2020 YOB: 1970    REFERRING PROVIDER: No referring provider defined for this encounter.    CANCER TYPE: DLBCL, EBV+ non-GCB, stage III.  STAGE: III    TREATMENT SUMMARY:  She presented with shortness of breath and cough which had been present since May 2019. Her imaging suggested pulmonary infiltrates which was attributed " to aspiration pneumonia. CT scan of the chest 8/20/2019 showed left hilar and subcarinal mediastinal adenopathy with narrowing of the left lower lobe bronchus and a nonspecific patchy area of nodular consolidation in the medial right lower lobe.  Bronchoscopy with EBUS 8/28/2019 showed nonnecrotizing granulomatous inflammation with prominent eosinophilia, negative for malignancy.  She was diagnosed with sarcoidosis and started on prednisone. She had worsening cough and shortness of breath with tapering of the prednisone.  Repeat CT scan of the chest 11/18/2019 showed interval worsening of the bulky mediastinal and bilateral hilar lymphadenopathy, near complete resolution of the lower lobe opacities, with new interstitial opacities in the right upper lobe.   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 Large mediastinal mass causing respiratory compromise. . IPI score of 2 (low-intermediate). FISH neg for high risk translocations. She received 6 cycles of R-CHOP with intermediate dose methotrexate after cycles 2, 4 and 6 from November through April 2020.      CURRENT INTERVENTIONS:  Surveillance post MR-CHOP    SUBJECTIVE   Kassie Ramirez is being followed for DLBCL, EBV+ non-GCB, stage III intermediate risk disease    Patient was reached over video for this telemedicine visit due to restrictions posed by COVID19 pandemic. Kassie is joined by her  at this visit. She has completed all of her planned cycles of MR-CHOP chemotherapy and is being seen with labs and restaging scans.     She has struggled with cardiomyopathy post adriamycin. She is feeling a lot better now. She denies any chest pain or shortness of breath.         PAST MEDICAL HISTORY     Past Medical History:   Diagnosis Date     Anxiety attack 9/16/2014     Cardiomyopathy (H)     non ishemic - 25-30% - Chemo related      Diffuse large B-cell lymphoma (H)     Diagnosed 11/2019, [facility] Albarran     Encounter for Essure implantation 2009     Generalized anxiety disorder 9/16/2014    zoloft = flat emotions     HFrEF (heart failure with reduced ejection fraction) (H)     new diagnosis 6/14     Menopausal disorder     started on OCPs by menopause center 3/2017 (takes active continuously)     Menstrual headache     helped by OCPs and magnesium     Paroxysmal SVT (supraventricular tachycardia) (H) 06/2020     GERTRUDE (stress urinary incontinence, female)     sling procedure 2016         CURRENT OUTPATIENT MEDICATIONS     Current Outpatient Medications   Medication Sig     carvedilol (COREG) 3.125 MG tablet Take 1 tablet (3.125 mg) by mouth 2 times daily (with meals)     cetirizine (ZYRTEC) 10 MG tablet Take 10 mg by mouth daily     furosemide (LASIX) 20 MG tablet 10 mg daily. Take an additional 10 mg as directed.     lisinopril (ZESTRIL) 2.5 MG tablet Take 1 tablet (2.5 mg) by mouth daily (Patient taking differently: Take 2.5 mg by mouth daily Twice a day)     LORazepam (ATIVAN) 0.5 MG tablet 1 tablet by mouth at bedtime for sleep and anxiety.     potassium chloride ER (KLOR-CON M) 20 MEQ CR tablet Take 2 tablets (40 mEq) by mouth daily     No current facility-administered medications for this visit.         ALLERGIES      Allergies   Allergen Reactions     Cold & Flu [Cold Defense Fighter]      See pseudoephedrine     Seasonal Allergies      Sudafed Cold-Cough [Dayquil Liquicaps]      Pseudoephedrine Rash     Rash then skins peels off         REVIEW OF SYSTEMS   As above in the HPI, o/w complete 12-point ROS was negative.     PHYSICAL EXAM   LMP 11/06/2019   Limited physical exam during video visit due to COVID19 restrictions  Middle aged female in no acute distress  Breathing comfortably, no tachypnea  Speech and hearing normal  Pleasant mood and congruent affect  Moving all extremities, no focal neurologic deficits apparent       LABORATORY  AND IMAGING STUDIES     Recent Labs   Lab Test 12/15/20  0938 09/17/20  1052 09/11/20  1307 07/28/20  0945 07/14/20  1124    140 139 141 139   POTASSIUM 3.9 3.5 4.1 3.8 3.8   CHLORIDE 106 106 103 107 106   CO2 29 27 29 26 27   ANIONGAP 2* 7 7 8 6   BUN 28 21 24 21 27   CR 1.07* 0.91 1.00 0.95 0.98   GLC 93 91 87 99 99   AFSHAN 9.7 9.5 9.5 9.5 9.7     Recent Labs   Lab Test 06/19/20  1853 06/15/20  0845 06/14/20  1807 01/11/20  0615 12/01/19  1340 12/01/19  0641 11/30/19  2137 11/30/19  1341 11/27/19  2216 11/27/19  2216   MAG 2.5* 2.4* 2.1 2.0  --   --   --   --   --  2.1   PHOS  --   --   --  3.1 2.8 3.4 2.8 2.5   < > 3.1    < > = values in this interval not displayed.     Recent Labs   Lab Test 12/15/20  0938 11/18/20  0815 09/11/20  1307 07/28/20  0945 06/23/20  0545 06/20/20  0700 06/20/20  0700   WBC 6.8 2.6* 7.1 3.8* 3.4*   < > 3.3*   HGB 15.2 14.1 14.0 13.9 12.9   < > 12.2    155 198 176 188   < > 183   MCV 88 90 87 87 87   < > 91   NEUTROPHIL 47.0 21.9 53.3 42.4  --   --  45.3    < > = values in this interval not displayed.     Recent Labs   Lab Test 12/15/20  0938 09/11/20  1307 07/28/20  0945 01/11/20  1044 01/11/20  1044   BILITOTAL 0.8 0.8 0.7   < >  --    ALKPHOS 107 119 116   < >  --    ALT 25 32 39   < >  --    AST 20 23 24   < >  --    ALBUMIN 4.3 4.2 4.0   < >  --     195  --   --  347*    < > = values in this interval not displayed.     TSH   Date Value Ref Range Status   06/14/2020 3.40 0.40 - 4.00 mU/L Final   09/18/2019 2.06 0.40 - 4.00 mU/L Final   07/27/2018 2.04 0.40 - 4.00 mU/L Final     No results for input(s): CEA in the last 88179 hours.  Results for orders placed or performed during the hospital encounter of 12/15/20   CT Soft Tissue Neck w Contrast    Narrative    PET CT fusion examination 12/15/2020 11:52 AM  1. Neck CT with contrast  2. PET study of the neck  3. PET CT fusion study of the neck    History:  Diffuse large B-cell lymphoma of intrathoracic lymph  nodes.    Comparison: CT 6/23/2020 and 11/29/2019.    Technique: Please refer to the accompanying whole body PET-CT for  report of the dose and whole body PET-CT findings.  Regarding the neck, axial images were obtained after nonionic  intravenous contrast administration, with sagittal and coronal  reconstructions performed. Neck CT images were reviewed in bone, soft  tissue, and lung windows, with review of the fused PET-CT images as  well in multiple planes.    Findings:  Evaluation of the mucosal space demonstrates no abnormality or  abnormal metabolic uptake on PET CT in the nasopharynx, oropharynx,  hypopharynx or the glottis. The tongue base appears normal. The major  salivary glands and thyroid gland appear normal.    There is no evident cervical lymphadenopathy, and the cervical lymph  nodes are within normal limits by size criteria. No abnormal metabolic  uptake on PET CT.     Limited evaluation of the cervical vertebral column demonstrates no  evident spinal canal stenosis. The visualized paranasal sinuses and  mastoid air cells are clear. The major vascular structures in the neck  are patent.    The visualized lung apices appear clear.      Impression    Impression: In this patient with history of diffuse large B cell  lymphoma:  1. No FDG avid focus or lymphadenopathy is seen within the neck. No  significant change from prior.  2. Please refer to the whole body PET CT performed as a separate  report, for the findings of the remainder of the body.    I have personally reviewed the examination and initial interpretation  and I agree with the findings.    DANIELLE COCHRAN MD       Combined Report of:    PET and CT on  12/15/2020 11:53 AM :     1. PET of the neck, chest, abdomen, and pelvis.  2. PET CT Fusion for Attenuation Correction and Anatomical  Localization:    3. Diagnostic CT scan of the chest, abdomen, and pelvis with  intravenous contrast for interpretation.  3. CT of the chest, abdomen and  pelvis obtained for diagnostic  interpretation.  4. 3D MIP and PET-CT fused images were processed on an independent  workstation and archived to PACS and reviewed by a radiologist.     Technique:     1. PET: The patient received 14.65 mCi of F-18-FDG; the serum glucose  was 94 prior to administration, body weight was 73.3 kg. Images were  evaluated in the axial, sagittal, and coronal planes as well as the  rotational whole body MIP. Images were acquired from the Vertex to the  Feet.     UPTAKE WAS MEASURED AT 70 MINUTES.      BACKGROUND:  Liver SUV max= 4.47,   Aorta Blood SUV Max: 2.84.      2. CT: Volumetric acquisition for clinical interpretation of the  chest, abdomen, and pelvis acquired at 3 mm sections  after the  uneventful administration of intravenous contrast. The chest, abdomen,  and pelvis were evaluated at 5 mm sections in bone, soft tissue, and  lung windows.       The patient received 99 cc of Isovue 370 intravenously for the  examination.  500 mL of Breeza oral contrast was administered.  High resolution images of the neck were obtained with multiple oblique  projection reformats.     3. 3D MIP and PET-CT fused images were processed on an independent  workstation and archived to PACS and reviewed by a radiologist.     INDICATION: DLBCL - enlarging mediastinal mass; Diffuse large B-cell  lymphoma of intrathoracic lymph nodes (H)     ADDITIONAL INFORMATION OBTAINED FROM EMR: 50-year-old female with  biopsy of mediastinal lymph nodes demonstrated in diffuse large B-cell  lymphoma with subsequent administration of 6 cycles of R-CHOP and  methotrexate.     COMPARISON: PET/CT 6/23/2020 and 2/4/2020     FINDINGS:      HEAD/NECK:  Please see dedicated neuro radiology report for details regarding the  high resolution PET/CT of the neck.     CHEST:  Changes of thymic hyperplasia. No residual FDG uptake associated with  the previously described right hilar and right paratracheal lymph  nodes. No new or  concerning foci of FDG uptake or mediastinal/hilar  lymphadenopathy. Few focal areas of FDG uptake involving the  esophagus.     Right chest wall Port-A-Cath with the tip terminating at the right  cavoatrial junction. Heart size is normal. No pericardial effusion.  Thoracic aorta and main pulmonary artery diameters are within normal  limits. No central pulmonary embolus in. The left vertebral artery  arises from the aortic arch.     Central airways are clear. Bibasilar dependent atelectasis. No focal  consolidation or pleural effusion. No pneumothorax. Calcified  granulomas seen centrally in the right upper lobe. Cluster of small  pulmonary nodules in the medial left upper lobe are stable to slightly  increased in size (series 8, image 42-44). Unchanged 4 mm pulmonary  nodule in the medial left upper lobe adjacent the mediastinum (series  8, image 48). 2 mm pulmonary nodule in the left upper lobe is  increased in size (series 8, image 55). Stable 3 mm pulmonary nodule  left lower lobe (series 8, image 99).     ABDOMEN AND PELVIS:  There is no suspicious FDG uptake in the abdomen or pelvis.     Hypoattenuating focus with punctate peripheral enhancement seen in the  seventh segment (series 5, image 247), this is unchanged and likely  represents a hemangioma. Additional subcentimeter hypoattenuating  lesions are unchanged and likely represent small cysts. No new  worrisome hepatic lesion. No intrahepatic artery dilation. The  gallbladder, pancreas, and adrenal glands are unremarkable. Borderline  enlarged spleen. Symmetric renal enhancement. No hydronephrosis or  renal calculus. The urinary bladder and ureters are unremarkable. 1.9  cm on FDG avid right ovarian/adnexal cyst. Tubal ligation. Colonic  diverticulosis without evidence of diverticulitis. Nondilated  air-filled appendix. No free air free fluid. Major intra-abdominal  vessels are patent. No abdominal or pelvic adenopathy. Normal caliber  of the abdominal  aorta.     LOWER EXTREMITIES:   No abnormal masses or hypermetabolic lesions.     BONES:   There are no suspicious lytic or blastic osseous lesions.  There is no  abnormal FDG uptake in the skeleton.                                                                         IMPRESSION: In this patient with diffuse large B-cell lymphoma status  post chemotherapy there are findings compatible with complete  metabolic response by Lugano criteria.    1. Resolution of the FDG avid mediastinal and hilar lymph nodes.    1a. Thymic rebound most likely explanation for remaining anterior  mediastinal FDG activity.  2. Multiple sub-4 mL pulmonary nodules are slightly more prominent.   Recommend attention on follow-up.       I have personally reviewed the examination and initial interpretation  and I agree with the findings.     DANIELLE COCHRAN MD        ASSESSMENT AND PLAN   DLBCL, EBV+ non-GCB, stage III post 6 cycles of M-RCHOP  PJV pneumonia causing acute respiratory failure improved on bactrim and prednisone  Cardiomyopathy post adriamycin based chemotherapy likely also contributed by tachycardia    Kassie was seen over video at this telemedicine visit and was present along with her . She has completed her planned chemotherapy in April 2020.     She is doing much better. We reviewed all her labs and they are adequate.     I have reviewed actual images from her PET-CT scan and there is no evidence of recurrent disease in the neck or chest. She had enlarged hilar nodes and mediastinal mass. This was likely from reactive thymic tissue. I explained her that previous PET positivity too show not necessarily imply malignancy as even infections and inflammations do  the FDG contrast and cannot be distinguished on PET. The CT portion offers the specificity.     She wonders if she can have ibuprofen intermittently for pain. Her creatinine was marginally elevated today. She can have ibuprofen as long as she is well  hydrated as it can affect the renal function specially in the setting of dehydration.     I would continue following her every 3 months for now.     All questions for patient and her  were answered. We could still keep the port in and not attempt a procedure in the midst of pandemic.  I will not bring her in for port flush as per new directive by [employer] where by the port flush can be done at 3 months intervals. We would have the PORT removed after the next visit.     Video-Visit Details    Type of service:  Video Visit  Originating Location (pt. Location): Home  Distant Location (provider location):  [facility]   Platform used for Video Visit: Visualtising    Over 25 min spent with patient with more than 50% time spent in counseling and coordinating care.      Castro Castro    Hematologist and Medical Oncologist  [facility]         Again, thank you for allowing me to participate in the care of your patient.        Sincerely,        Castro Castro MD

## 2020-12-18 NOTE — LETTER
"    12/18/2020         RE: Kassie Ramirez  [address] [facility] [address]        Dear Colleague,    Thank you for referring your patient, Kassie Ramirez, to the [facility]. Please see a copy of my visit note below.    Kassie Ramirez is a 50 year old female who is being evaluated via a billable video visit.      The patient has been notified of following:     \"This video visit will be conducted via a call between you and your physician/provider. We have found that certain health care needs can be provided without the need for an in-person physical exam.  This service lets us provide the care you need with a video conversation.  If a prescription is necessary we can send it directly to your pharmacy.  If lab work is needed we can place an order for that and you can then stop by our lab to have the test done at a later time.    Video visits are billed at different rates depending on your insurance coverage.  Please reach out to your insurance provider with any questions.    If during the course of the call the physician/provider feels a video visit is not appropriate, you will not be charged for this service.\"    Patient has given verbal consent for Video visit? Yes  How would you like to obtain your AVS? MyChart  If you are dropped from the video visit, the video invite should be resent to: Text to cell phone: [phone]  Will anyone else be joining your video visit? No           [facility]  HEMATOLOGY AND ONCOLOGY    FOLLOW-UP VISIT NOTE    PATIENT NAME: Kassie Ramirez MRN # [id number]  DATE OF VISIT: Dec 18, 2020 YOB: 1970    REFERRING PROVIDER: No referring provider defined for this encounter.    CANCER TYPE: DLBCL, EBV+ non-GCB, stage III.  STAGE: III    TREATMENT SUMMARY:  She presented with shortness of breath and cough which had been present since May 2019. Her imaging suggested pulmonary infiltrates which was attributed " to aspiration pneumonia. CT scan of the chest 8/20/2019 showed left hilar and subcarinal mediastinal adenopathy with narrowing of the left lower lobe bronchus and a nonspecific patchy area of nodular consolidation in the medial right lower lobe.  Bronchoscopy with EBUS 8/28/2019 showed nonnecrotizing granulomatous inflammation with prominent eosinophilia, negative for malignancy.  She was diagnosed with sarcoidosis and started on prednisone. She had worsening cough and shortness of breath with tapering of the prednisone.  Repeat CT scan of the chest 11/18/2019 showed interval worsening of the bulky mediastinal and bilateral hilar lymphadenopathy, near complete resolution of the lower lobe opacities, with new interstitial opacities in the right upper lobe.   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 Large mediastinal mass causing respiratory compromise. . IPI score of 2 (low-intermediate). FISH neg for high risk translocations. She received 6 cycles of R-CHOP with intermediate dose methotrexate after cycles 2, 4 and 6 from November through April 2020.      CURRENT INTERVENTIONS:  Surveillance post MR-CHOP    SUBJECTIVE   Kassie Ramirez is being followed for DLBCL, EBV+ non-GCB, stage III intermediate risk disease    Patient was reached over video for this telemedicine visit due to restrictions posed by COVID19 pandemic. Kassie is joined by her  at this visit. She has completed all of her planned cycles of MR-CHOP chemotherapy and is being seen with labs and restaging scans.     She has struggled with cardiomyopathy post adriamycin. She is feeling a lot better now. She denies any chest pain or shortness of breath.         PAST MEDICAL HISTORY     Past Medical History:   Diagnosis Date     Anxiety attack 9/16/2014     Cardiomyopathy (H)     non ishemic - 25-30% - Chemo related      Diffuse large B-cell lymphoma (H)     Diagnosed 11/2019, [facility] Albarran     Encounter for Essure implantation 2009     Generalized anxiety disorder 9/16/2014    zoloft = flat emotions     HFrEF (heart failure with reduced ejection fraction) (H)     new diagnosis 6/14     Menopausal disorder     started on OCPs by menopause center 3/2017 (takes active continuously)     Menstrual headache     helped by OCPs and magnesium     Paroxysmal SVT (supraventricular tachycardia) (H) 06/2020     GERTRUDE (stress urinary incontinence, female)     sling procedure 2016         CURRENT OUTPATIENT MEDICATIONS     Current Outpatient Medications   Medication Sig     carvedilol (COREG) 3.125 MG tablet Take 1 tablet (3.125 mg) by mouth 2 times daily (with meals)     cetirizine (ZYRTEC) 10 MG tablet Take 10 mg by mouth daily     furosemide (LASIX) 20 MG tablet 10 mg daily. Take an additional 10 mg as directed.     lisinopril (ZESTRIL) 2.5 MG tablet Take 1 tablet (2.5 mg) by mouth daily (Patient taking differently: Take 2.5 mg by mouth daily Twice a day)     LORazepam (ATIVAN) 0.5 MG tablet 1 tablet by mouth at bedtime for sleep and anxiety.     potassium chloride ER (KLOR-CON M) 20 MEQ CR tablet Take 2 tablets (40 mEq) by mouth daily     No current facility-administered medications for this visit.         ALLERGIES      Allergies   Allergen Reactions     Cold & Flu [Cold Defense Fighter]      See pseudoephedrine     Seasonal Allergies      Sudafed Cold-Cough [Dayquil Liquicaps]      Pseudoephedrine Rash     Rash then skins peels off         REVIEW OF SYSTEMS   As above in the HPI, o/w complete 12-point ROS was negative.     PHYSICAL EXAM   LMP 11/06/2019   Limited physical exam during video visit due to COVID19 restrictions  Middle aged female in no acute distress  Breathing comfortably, no tachypnea  Speech and hearing normal  Pleasant mood and congruent affect  Moving all extremities, no focal neurologic deficits apparent       LABORATORY  AND IMAGING STUDIES     Recent Labs   Lab Test 12/15/20  0938 09/17/20  1052 09/11/20  1307 07/28/20  0945 07/14/20  1124    140 139 141 139   POTASSIUM 3.9 3.5 4.1 3.8 3.8   CHLORIDE 106 106 103 107 106   CO2 29 27 29 26 27   ANIONGAP 2* 7 7 8 6   BUN 28 21 24 21 27   CR 1.07* 0.91 1.00 0.95 0.98   GLC 93 91 87 99 99   AFSHAN 9.7 9.5 9.5 9.5 9.7     Recent Labs   Lab Test 06/19/20  1853 06/15/20  0845 06/14/20  1807 01/11/20  0615 12/01/19  1340 12/01/19  0641 11/30/19  2137 11/30/19  1341 11/27/19  2216 11/27/19  2216   MAG 2.5* 2.4* 2.1 2.0  --   --   --   --   --  2.1   PHOS  --   --   --  3.1 2.8 3.4 2.8 2.5   < > 3.1    < > = values in this interval not displayed.     Recent Labs   Lab Test 12/15/20  0938 11/18/20  0815 09/11/20  1307 07/28/20  0945 06/23/20  0545 06/20/20  0700 06/20/20  0700   WBC 6.8 2.6* 7.1 3.8* 3.4*   < > 3.3*   HGB 15.2 14.1 14.0 13.9 12.9   < > 12.2    155 198 176 188   < > 183   MCV 88 90 87 87 87   < > 91   NEUTROPHIL 47.0 21.9 53.3 42.4  --   --  45.3    < > = values in this interval not displayed.     Recent Labs   Lab Test 12/15/20  0938 09/11/20  1307 07/28/20  0945 01/11/20  1044 01/11/20  1044   BILITOTAL 0.8 0.8 0.7   < >  --    ALKPHOS 107 119 116   < >  --    ALT 25 32 39   < >  --    AST 20 23 24   < >  --    ALBUMIN 4.3 4.2 4.0   < >  --     195  --   --  347*    < > = values in this interval not displayed.     TSH   Date Value Ref Range Status   06/14/2020 3.40 0.40 - 4.00 mU/L Final   09/18/2019 2.06 0.40 - 4.00 mU/L Final   07/27/2018 2.04 0.40 - 4.00 mU/L Final     No results for input(s): CEA in the last 72513 hours.  Results for orders placed or performed during the hospital encounter of 12/15/20   CT Soft Tissue Neck w Contrast    Narrative    PET CT fusion examination 12/15/2020 11:52 AM  1. Neck CT with contrast  2. PET study of the neck  3. PET CT fusion study of the neck    History:  Diffuse large B-cell lymphoma of intrathoracic lymph  nodes.    Comparison: CT 6/23/2020 and 11/29/2019.    Technique: Please refer to the accompanying whole body PET-CT for  report of the dose and whole body PET-CT findings.  Regarding the neck, axial images were obtained after nonionic  intravenous contrast administration, with sagittal and coronal  reconstructions performed. Neck CT images were reviewed in bone, soft  tissue, and lung windows, with review of the fused PET-CT images as  well in multiple planes.    Findings:  Evaluation of the mucosal space demonstrates no abnormality or  abnormal metabolic uptake on PET CT in the nasopharynx, oropharynx,  hypopharynx or the glottis. The tongue base appears normal. The major  salivary glands and thyroid gland appear normal.    There is no evident cervical lymphadenopathy, and the cervical lymph  nodes are within normal limits by size criteria. No abnormal metabolic  uptake on PET CT.     Limited evaluation of the cervical vertebral column demonstrates no  evident spinal canal stenosis. The visualized paranasal sinuses and  mastoid air cells are clear. The major vascular structures in the neck  are patent.    The visualized lung apices appear clear.      Impression    Impression: In this patient with history of diffuse large B cell  lymphoma:  1. No FDG avid focus or lymphadenopathy is seen within the neck. No  significant change from prior.  2. Please refer to the whole body PET CT performed as a separate  report, for the findings of the remainder of the body.    I have personally reviewed the examination and initial interpretation  and I agree with the findings.    DANIELLE COCHRAN MD       Combined Report of:    PET and CT on  12/15/2020 11:53 AM :     1. PET of the neck, chest, abdomen, and pelvis.  2. PET CT Fusion for Attenuation Correction and Anatomical  Localization:    3. Diagnostic CT scan of the chest, abdomen, and pelvis with  intravenous contrast for interpretation.  3. CT of the chest, abdomen and  pelvis obtained for diagnostic  interpretation.  4. 3D MIP and PET-CT fused images were processed on an independent  workstation and archived to PACS and reviewed by a radiologist.     Technique:     1. PET: The patient received 14.65 mCi of F-18-FDG; the serum glucose  was 94 prior to administration, body weight was 73.3 kg. Images were  evaluated in the axial, sagittal, and coronal planes as well as the  rotational whole body MIP. Images were acquired from the Vertex to the  Feet.     UPTAKE WAS MEASURED AT 70 MINUTES.      BACKGROUND:  Liver SUV max= 4.47,   Aorta Blood SUV Max: 2.84.      2. CT: Volumetric acquisition for clinical interpretation of the  chest, abdomen, and pelvis acquired at 3 mm sections  after the  uneventful administration of intravenous contrast. The chest, abdomen,  and pelvis were evaluated at 5 mm sections in bone, soft tissue, and  lung windows.       The patient received 99 cc of Isovue 370 intravenously for the  examination.  500 mL of Breeza oral contrast was administered.  High resolution images of the neck were obtained with multiple oblique  projection reformats.     3. 3D MIP and PET-CT fused images were processed on an independent  workstation and archived to PACS and reviewed by a radiologist.     INDICATION: DLBCL - enlarging mediastinal mass; Diffuse large B-cell  lymphoma of intrathoracic lymph nodes (H)     ADDITIONAL INFORMATION OBTAINED FROM EMR: 50-year-old female with  biopsy of mediastinal lymph nodes demonstrated in diffuse large B-cell  lymphoma with subsequent administration of 6 cycles of R-CHOP and  methotrexate.     COMPARISON: PET/CT 6/23/2020 and 2/4/2020     FINDINGS:      HEAD/NECK:  Please see dedicated neuro radiology report for details regarding the  high resolution PET/CT of the neck.     CHEST:  Changes of thymic hyperplasia. No residual FDG uptake associated with  the previously described right hilar and right paratracheal lymph  nodes. No new or  concerning foci of FDG uptake or mediastinal/hilar  lymphadenopathy. Few focal areas of FDG uptake involving the  esophagus.     Right chest wall Port-A-Cath with the tip terminating at the right  cavoatrial junction. Heart size is normal. No pericardial effusion.  Thoracic aorta and main pulmonary artery diameters are within normal  limits. No central pulmonary embolus in. The left vertebral artery  arises from the aortic arch.     Central airways are clear. Bibasilar dependent atelectasis. No focal  consolidation or pleural effusion. No pneumothorax. Calcified  granulomas seen centrally in the right upper lobe. Cluster of small  pulmonary nodules in the medial left upper lobe are stable to slightly  increased in size (series 8, image 42-44). Unchanged 4 mm pulmonary  nodule in the medial left upper lobe adjacent the mediastinum (series  8, image 48). 2 mm pulmonary nodule in the left upper lobe is  increased in size (series 8, image 55). Stable 3 mm pulmonary nodule  left lower lobe (series 8, image 99).     ABDOMEN AND PELVIS:  There is no suspicious FDG uptake in the abdomen or pelvis.     Hypoattenuating focus with punctate peripheral enhancement seen in the  seventh segment (series 5, image 247), this is unchanged and likely  represents a hemangioma. Additional subcentimeter hypoattenuating  lesions are unchanged and likely represent small cysts. No new  worrisome hepatic lesion. No intrahepatic artery dilation. The  gallbladder, pancreas, and adrenal glands are unremarkable. Borderline  enlarged spleen. Symmetric renal enhancement. No hydronephrosis or  renal calculus. The urinary bladder and ureters are unremarkable. 1.9  cm on FDG avid right ovarian/adnexal cyst. Tubal ligation. Colonic  diverticulosis without evidence of diverticulitis. Nondilated  air-filled appendix. No free air free fluid. Major intra-abdominal  vessels are patent. No abdominal or pelvic adenopathy. Normal caliber  of the abdominal  aorta.     LOWER EXTREMITIES:   No abnormal masses or hypermetabolic lesions.     BONES:   There are no suspicious lytic or blastic osseous lesions.  There is no  abnormal FDG uptake in the skeleton.                                                                         IMPRESSION: In this patient with diffuse large B-cell lymphoma status  post chemotherapy there are findings compatible with complete  metabolic response by Lugano criteria.    1. Resolution of the FDG avid mediastinal and hilar lymph nodes.    1a. Thymic rebound most likely explanation for remaining anterior  mediastinal FDG activity.  2. Multiple sub-4 mL pulmonary nodules are slightly more prominent.   Recommend attention on follow-up.       I have personally reviewed the examination and initial interpretation  and I agree with the findings.     DANIELLE COCHRAN MD        ASSESSMENT AND PLAN   DLBCL, EBV+ non-GCB, stage III post 6 cycles of M-RCHOP  PJV pneumonia causing acute respiratory failure improved on bactrim and prednisone  Cardiomyopathy post adriamycin based chemotherapy likely also contributed by tachycardia    Kassie was seen over video at this telemedicine visit and was present along with her . She has completed her planned chemotherapy in April 2020.     She is doing much better. We reviewed all her labs and they are adequate.     I have reviewed actual images from her PET-CT scan and there is no evidence of recurrent disease in the neck or chest. She had enlarged hilar nodes and mediastinal mass. This was likely from reactive thymic tissue. I explained her that previous PET positivity too show not necessarily imply malignancy as even infections and inflammations do  the FDG contrast and cannot be distinguished on PET. The CT portion offers the specificity.     She wonders if she can have ibuprofen intermittently for pain. Her creatinine was marginally elevated today. She can have ibuprofen as long as she is well  hydrated as it can affect the renal function specially in the setting of dehydration.     I would continue following her every 3 months for now.     All questions for patient and her  were answered. We could still keep the port in and not attempt a procedure in the midst of pandemic.  I will not bring her in for port flush as per new directive by [employer] where by the port flush can be done at 3 months intervals. We would have the PORT removed after the next visit.     Video-Visit Details    Type of service:  Video Visit  Originating Location (pt. Location): Home  Distant Location (provider location):  [facility]   Platform used for Video Visit: Medabil    Over 25 min spent with patient with more than 50% time spent in counseling and coordinating care.      Castro Castro    Hematologist and Medical Oncologist  [facility]         Again, thank you for allowing me to participate in the care of your patient.        Sincerely,        Castro Castro MD

## 2020-12-18 NOTE — PROGRESS NOTES
"Kassie Ramirez is a 50 year old female who is being evaluated via a billable video visit.      The patient has been notified of following:     \"This video visit will be conducted via a call between you and your physician/provider. We have found that certain health care needs can be provided without the need for an in-person physical exam.  This service lets us provide the care you need with a video conversation.  If a prescription is necessary we can send it directly to your pharmacy.  If lab work is needed we can place an order for that and you can then stop by our lab to have the test done at a later time.    Video visits are billed at different rates depending on your insurance coverage.  Please reach out to your insurance provider with any questions.    If during the course of the call the physician/provider feels a video visit is not appropriate, you will not be charged for this service.\"    Patient has given verbal consent for Video visit? Yes  How would you like to obtain your AVS? MyChart  If you are dropped from the video visit, the video invite should be resent to: Text to cell phone: [phone]  Will anyone else be joining your video visit? No         "

## 2021-01-21 ENCOUNTER — MYC REFILL (OUTPATIENT)
Dept: FAMILY MEDICINE | Facility: CLINIC | Age: 51
End: 2021-01-21

## 2021-01-21 DIAGNOSIS — F41.8 SITUATIONAL ANXIETY: ICD-10-CM

## 2021-01-21 DIAGNOSIS — C83.398 DIFFUSE LARGE B-CELL LYMPHOMA OF EXTRANODAL SITE: ICD-10-CM

## 2021-01-21 DIAGNOSIS — C83.32 DIFFUSE LARGE B-CELL LYMPHOMA OF INTRATHORACIC LYMPH NODES (H): ICD-10-CM

## 2021-01-22 RX ORDER — LORAZEPAM 0.5 MG/1
TABLET ORAL
Qty: 30 TABLET | Refills: 0 | Status: SHIPPED | OUTPATIENT
Start: 2021-01-22 | End: 2021-03-09

## 2021-01-22 NOTE — TELEPHONE ENCOUNTER
LORazepam (ATIVAN) 0.5 MG tablet            Last Written Prescription Date:  9.29.20  Last Fill Quantity: 30 tablet,  # refills: 0   Last office visit: 9/29/2020 with prescribing provider:  WARREN Bray       Future Office Visit:        Routing refill request to provider for review/approval because:  Drug not on the [facility], P or [facility] refill protocol or controlled substance

## 2021-02-11 ENCOUNTER — TELEPHONE (OUTPATIENT)
Dept: LAB | Facility: CLINIC | Age: 51
End: 2021-02-11

## 2021-02-12 DIAGNOSIS — R59.0 MEDIASTINAL ADENOPATHY: ICD-10-CM

## 2021-02-12 DIAGNOSIS — I42.9 SECONDARY CARDIOMYOPATHY (H): ICD-10-CM

## 2021-02-12 DIAGNOSIS — C83.32 DIFFUSE LARGE B-CELL LYMPHOMA OF INTRATHORACIC LYMPH NODES (H): ICD-10-CM

## 2021-02-12 LAB
BASOPHILS NFR BLD AUTO: 1.1 %
DIFFERENTIAL METHOD BLD: NORMAL
EOSINOPHIL NFR BLD AUTO: 12.7 %
ERYTHROCYTE [DISTWIDTH] IN BLOOD BY AUTOMATED COUNT: 12.6 % (ref 10–15)
HCT VFR BLD AUTO: 42.2 % (ref 35–47)
HGB BLD-MCNC: 15.1 G/DL (ref 11.7–15.7)
LDH SERPL L TO P-CCNC: 243 U/L (ref 81–234)
LYMPHOCYTES NFR BLD AUTO: 28.1 %
MCH RBC QN AUTO: 31.2 PG (ref 26.5–33)
MCHC RBC AUTO-ENTMCNC: 35.8 G/DL (ref 31.5–36.5)
MCV RBC AUTO: 87 FL (ref 78–100)
MONOCYTES NFR BLD AUTO: 7.8 %
NEUTROPHILS NFR BLD AUTO: 50.3 %
PLATELET # BLD AUTO: 191 10E9/L (ref 150–450)
RBC # BLD AUTO: 4.84 10E12/L (ref 3.8–5.2)
WBC # BLD AUTO: 8.4 10E9/L (ref 4–11)

## 2021-02-12 PROCEDURE — 36415 COLL VENOUS BLD VENIPUNCTURE: CPT | Performed by: INTERNAL MEDICINE

## 2021-02-12 PROCEDURE — 80053 COMPREHEN METABOLIC PANEL: CPT | Performed by: INTERNAL MEDICINE

## 2021-02-12 PROCEDURE — 83615 LACTATE (LD) (LDH) ENZYME: CPT | Performed by: INTERNAL MEDICINE

## 2021-02-12 PROCEDURE — 85025 COMPLETE CBC W/AUTO DIFF WBC: CPT | Performed by: INTERNAL MEDICINE

## 2021-02-13 LAB
ALBUMIN SERPL-MCNC: 4.4 G/DL (ref 3.4–5)
ALP SERPL-CCNC: 104 U/L (ref 40–150)
ALT SERPL W P-5'-P-CCNC: 34 U/L (ref 0–50)
ANION GAP SERPL CALCULATED.3IONS-SCNC: 6 MMOL/L (ref 3–14)
AST SERPL W P-5'-P-CCNC: 24 U/L (ref 0–45)
BILIRUB SERPL-MCNC: 0.9 MG/DL (ref 0.2–1.3)
BUN SERPL-MCNC: 23 MG/DL (ref 7–30)
CALCIUM SERPL-MCNC: 10.9 MG/DL (ref 8.5–10.1)
CHLORIDE SERPL-SCNC: 102 MMOL/L (ref 94–109)
CO2 SERPL-SCNC: 28 MMOL/L (ref 20–32)
CREAT SERPL-MCNC: 1.06 MG/DL (ref 0.52–1.04)
GFR SERPL CREATININE-BSD FRML MDRD: 61 ML/MIN/{1.73_M2}
GLUCOSE SERPL-MCNC: 89 MG/DL (ref 70–99)
POTASSIUM SERPL-SCNC: 3.9 MMOL/L (ref 3.4–5.3)
PROT SERPL-MCNC: 7.1 G/DL (ref 6.8–8.8)
SODIUM SERPL-SCNC: 136 MMOL/L (ref 133–144)

## 2021-02-15 ENCOUNTER — OFFICE VISIT (OUTPATIENT)
Dept: CARDIOLOGY | Facility: CLINIC | Age: 51
End: 2021-02-15
Payer: COMMERCIAL

## 2021-02-15 VITALS
HEART RATE: 78 BPM | WEIGHT: 163.2 LBS | DIASTOLIC BLOOD PRESSURE: 62 MMHG | SYSTOLIC BLOOD PRESSURE: 106 MMHG | OXYGEN SATURATION: 99 % | BODY MASS INDEX: 26.34 KG/M2

## 2021-02-15 DIAGNOSIS — I42.9 SECONDARY CARDIOMYOPATHY (H): ICD-10-CM

## 2021-02-15 PROCEDURE — 99215 OFFICE O/P EST HI 40 MIN: CPT | Performed by: NURSE PRACTITIONER

## 2021-02-15 RX ORDER — LISINOPRIL 2.5 MG/1
2.5 TABLET ORAL 2 TIMES DAILY
Qty: 180 TABLET | Refills: 3 | Status: SHIPPED | OUTPATIENT
Start: 2021-02-15 | End: 2021-03-31

## 2021-02-15 NOTE — PROGRESS NOTES
CARDIOLOGY CLINIC / C.O.R.E. CLINIC VISIT  (Heart Failure Specialty)  DOS: 2.15.21    Kassie Ramirez  : 1970  MRN: [id number]    Primary Cardiologist: Dr. Pearl     Reason for Visit: HFrEF    HISTORY OF PRESENT ILLNESS:    I had the opportunity to see Ms. Kassie Ramirez in the [facility] today at [facility]   Regarding her idiopathic cardiomyopathy and chronic systolic heart failure.      Ms. Ramirez has the following chronic medical issues:   1.  Large B-cell lymphoma with chemotherapy starting in 2019.   2.  Cardiomyopathy, ejection fraction 25%-30%, diagnosed in 2020.   3.  Recurrent SVT.  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   4.  Low normal BP limiting pharmacologic therapy for cardiomyopathy.     She is a 50-year-old woman who has a history of episodes of SVT which got worse when she started chemotherapy for treatment of non-Hodgkin lymphoma.  She has large B-cell lymphoma and started chemotherapy in 2019.  At that time, her left ventricular function was normal by echo and cardiac MRI.  A cardiac MRI was performed with a concern about the possibility of sarcoidosis, possibly affecting the myocardium.  The diagnosis eventually was made of lymphoma but that study certainly was helpful to demonstrate her baseline LV function.      Her echo was still normal in 2020 with an ejection fraction of 55%-60%.  However, in , she was hospitalized with frequent episodes of prolonged SVT, shortness of breath and evidence of worsening cardiomyopathy.    Echocardiogram on 06/15/2020 demonstrated an ejection fraction of 25%-30% with moderate mitral regurgitation.  Right ventricular function was depressed as well.  Unfortunately, her blood pressures were low and limited her use of medications to treat her cardiomyopathy.    She was able to undergo a successful ablation of her SVT on 2020.  Termination of her SVT has also eliminated the likelihood that this was all related to a rate-related cardiomyopathy.      She was only able to tolerate carvedilol 1.625 mg twice daily and lisinopril 2.5 mg daily, in addition to Lasix 20 mg daily.    Her echocardiogram on 09/17/2020 showed no significant improvement in left ventricular function.  Her ejection fraction was again 25%-30%.  Her left ventricular chamber size was slightly larger but her mitral regurgitation appeared improved from moderate to mild. 9/17/20, Dr. Pearl decreased furosemide to 10 mg daily and increased carvedilol to 3.125mg bid, and then a few weeks later she increased lisinopril to 2.5mg bid. She states she did well with the transition.     I referred her back to Dr. Horan because of an episode of tachycardia in Cardiac Rehab, on 10/15/2020. On that day, her heart rate jumped to around 140 for 8 hours.  Apparently, there was a rhythm strip however Dr. Horan did not have it for the appt.   He did see Cardiac Rehab ECG tracings from 10/15 that showed sinus or atrial tachycardia around 135-140, but not sure if this was during exertion and whether this is the arrhythmia of concern.  The event lasted 8 hours and spontaneously resolved.  It did not feel like her previous SVT.  She has not had other incidents. Dr. Horan asked Sadia to consider purchasing the KardiaMobile device and gertrudis to be able to record ECG tracings should this occur again.    Zio patch was done that showed NSR, with rare PAC, PVC, early am periods of 2 degree AVB, type I.     She returns today in follow up. She states she did not purchase the KardioMobile device and just purchased a Fitbit and she has not noticed an issues with rapid HR's. She denies episodes of lightheadedness. She states she has done well. No BP from home. BP today here in clinic was 106/2, HR 78 bpm. She notices her heart rates have decreased. She continues to exercise without difficulty.     [facility] 2/12/21  creat 1.06; K+ 3.9. GFR 61 down from 73 ( 9/2020)     I have reviewed and updated the patient's Past Medical History, Social History, Family History and Medication List.             CURRENT MEDICATIONS:  Current Outpatient Medications   Medication Sig Dispense Refill     carvedilol (COREG) 3.125 MG tablet Take 1 tablet (3.125 mg) by mouth 2 times daily (with meals) 180 tablet 3     cetirizine (ZYRTEC) 10 MG tablet Take 10 mg by mouth daily       furosemide (LASIX) 20 MG tablet 10 mg daily. Take an additional 10 mg as directed. 180 tablet 3     lisinopril (ZESTRIL) 2.5 MG tablet Take 1 tablet (2.5 mg) by mouth 2 times daily 180 tablet 3     LORazepam (ATIVAN) 0.5 MG tablet 1 tablet by mouth at bedtime for sleep and anxiety. 30 tablet 0     potassium chloride ER (KLOR-CON M) 20 MEQ CR tablet Take 2 tablets (40 mEq) by mouth daily 180 tablet 3       ALLERGIES     Allergies   Allergen Reactions     Cold & Flu [Cold Defense Fighter]      See pseudoephedrine     Seasonal Allergies      Sudafed Cold-Cough [Dayquil Liquicaps]      Pseudoephedrine Rash     Rash then skins peels off        ROS:  12-pt ROS is negative except for as noted above.        PHYSICAL EXAMINATION:  Vitals: / 62; HR 78 bpm, weight 163 pounds.   Constitutional:  Patient is pleasant, alert, cooperative, and in NAD.   HEENT:  NCAT. PERRLA. EOM's intact.   Neck: no JVP  Pulmonary: clear  Cardiac: regular, normal S1/S2, no S3/S4, no murmur or rub.   Abdomen:  Soft, non-tender abdomen, no hepatosplenomegaly appreciated.   Extremities:  no edema   Neurological:  No gross motor or sensory deficits.   Psych: Appropriate affect.      DIAGNOSTIC STUDIES:  Recent Lab Results:    Results for BABAR VARGHESE (MRN [id number]) as of 2/15/2021 11:13   Ref. Range 2/12/2021 14:46   Sodium Latest Ref Range: 133 - 144 mmol/L 136   Potassium Latest Ref Range: 3.4 - 5.3 mmol/L 3.9   Chloride Latest Ref Range: 94 - 109 mmol/L 102   Carbon Dioxide Latest Ref Range: 20  - 32 mmol/L 28   Urea Nitrogen Latest Ref Range: 7 - 30 mg/dL 23   Creatinine Latest Ref Range: 0.52 - 1.04 mg/dL 1.06 (H)   GFR Estimate Latest Ref Range: >60 mL/min/1.73_m2 61   GFR Estimate If Black Latest Ref Range: >60 mL/min/1.73_m2 70   Calcium Latest Ref Range: 8.5 - 10.1 mg/dL 10.9 (H)       BMP RESULTS:  Lab Results   Component Value Date    CHOL 238 (H) 10/15/2020    HDL 50 10/15/2020     (H) 10/15/2020    TRIG 223 (H) 10/15/2020    CHOLHDLRATIO 3.6 09/24/2015          ASSESMENT /PLAN  Ms. Kassie Ramirez is a 50-year-old woman with large B-cell lymphoma who has undergone chemotherapy and completed her course of chemotherapy.  Unfortunately, she developed a cardiomyopathy and has an ejection fraction of 25%-30%.  Fortunately, the ablation of her SVT was successful and is no longer bothered by that.      Dr. Pearl inched up her HF medications as described above, and decreased her furosemide and she has done well. Her BP today is 106/62. Her GFR has decreased slightly since 9/2020, for that reason I have not increased carvedilol further. She will have a repeat echo in March.     She had a bout of ST that lasted 8 hours back in Oct. It has not occurred again. She feels well, no complaints of shortness of breath, PND, orthopnea, syncope or near syncope.        GDMT:  NYHA:I  Stage: C  Fluid status:euvolmic   ACEi/ARB: yes  ARNI: no  Beta Blocker: yes  Aldosterone antagonist: no  SGLT-2: no  SCD prophylaxis ICD:  no  Bi-V-ICD: no  Anticoagulation: no  Antiplatelet: no  Cardiac rehab: yes        Follow-up:   1.  Follow up with Dr. Pearl in March with an echocardiogram and bmp.     Thank you for the opportunity to be involved in this very pleasant patient's care please feel to contact me with any questions.    Total time: 56 minutes       Asmita JOHNSON, SCOTTIE   [facility]

## 2021-02-15 NOTE — LETTER
2/15/2021    Mary Alice Colón PA-C  [address] [address]    RE: Kassie Aburtoter       Dear Colleague,    I had the pleasure of seeing Kassie Ramirez in the [facility].      CARDIOLOGY CLINIC / C.O.R.E. CLINIC VISIT  (Heart Failure Specialty)  DOS: 2.15.21    Kassie Ramirez  : 1970  MRN: [id number]    Primary Cardiologist: Dr. Pearl     Reason for Visit: HFrEF    HISTORY OF PRESENT ILLNESS:    I had the opportunity to see Ms. Kassie Ramirez in the [facility] today at [facility]   Regarding her idiopathic cardiomyopathy and chronic systolic heart failure.      Ms. Ramirez has the following chronic medical issues:   1.  Large B-cell lymphoma with chemotherapy starting in 2019.   2.  Cardiomyopathy, ejection fraction 25%-30%, diagnosed in 2020.   3.  Recurrent SVT.  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   4.  Low normal BP limiting pharmacologic therapy for cardiomyopathy.     She is a 50-year-old woman who has a history of episodes of SVT which got worse when she started chemotherapy for treatment of non-Hodgkin lymphoma.  She has large B-cell lymphoma and started chemotherapy in 2019.  At that time, her left ventricular function was normal by echo and cardiac MRI.  A cardiac MRI was performed with a concern about the possibility of sarcoidosis, possibly affecting the myocardium.  The diagnosis eventually was made of lymphoma but that study certainly was helpful to demonstrate her baseline LV function.      Her echo was still normal in 2020 with an ejection fraction of 55%-60%.  However, in , she was hospitalized with frequent episodes of prolonged SVT, shortness of breath and evidence of worsening cardiomyopathy.    Echocardiogram on 06/15/2020 demonstrated an ejection fraction of 25%-30% with moderate mitral  regurgitation.  Right ventricular function was depressed as well.  Unfortunately, her blood pressures were low and limited her use of medications to treat her cardiomyopathy.    She was able to undergo a successful ablation of her SVT on 06/22/2020. Termination of her SVT has also eliminated the likelihood that this was all related to a rate-related cardiomyopathy.      She was only able to tolerate carvedilol 1.625 mg twice daily and lisinopril 2.5 mg daily, in addition to Lasix 20 mg daily.    Her echocardiogram on 09/17/2020 showed no significant improvement in left ventricular function.  Her ejection fraction was again 25%-30%.  Her left ventricular chamber size was slightly larger but her mitral regurgitation appeared improved from moderate to mild. 9/17/20, Dr. Pearl decreased furosemide to 10 mg daily and increased carvedilol to 3.125mg bid, and then a few weeks later she increased lisinopril to 2.5mg bid. She states she did well with the transition.     I referred her back to Dr. Horan because of an episode of tachycardia in Cardiac Rehab, on 10/15/2020. On that day, her heart rate jumped to around 140 for 8 hours.  Apparently, there was a rhythm strip however Dr. Horan did not have it for the appt.   He did see Cardiac Rehab ECG tracings from 10/15 that showed sinus or atrial tachycardia around 135-140, but not sure if this was during exertion and whether this is the arrhythmia of concern.  The event lasted 8 hours and spontaneously resolved.  It did not feel like her previous SVT.  She has not had other incidents. Dr. Horan asked Sadia to consider purchasing the KardiaMobile device and gertrudis to be able to record ECG tracings should this occur again.    Zio patch was done that showed NSR, with rare PAC, PVC, early am periods of 2 degree AVB, type I.     She returns today in follow up. She states she did not purchase the KardioMobile device and just purchased a Fitbit and she has not noticed an issues with  rapid HR's. She denies episodes of lightheadedness. She states she has done well. No BP from home. BP today here in clinic was 106/2, HR 78 bpm. She notices her heart rates have decreased. She continues to exercise without difficulty.     BMP 2/12/21 creat 1.06; K+ 3.9. GFR 61 down from 73 ( 9/2020)     I have reviewed and updated the patient's Past Medical History, Social History, Family History and Medication List.             CURRENT MEDICATIONS:  Current Outpatient Medications   Medication Sig Dispense Refill     carvedilol (COREG) 3.125 MG tablet Take 1 tablet (3.125 mg) by mouth 2 times daily (with meals) 180 tablet 3     cetirizine (ZYRTEC) 10 MG tablet Take 10 mg by mouth daily       furosemide (LASIX) 20 MG tablet 10 mg daily. Take an additional 10 mg as directed. 180 tablet 3     lisinopril (ZESTRIL) 2.5 MG tablet Take 1 tablet (2.5 mg) by mouth 2 times daily 180 tablet 3     LORazepam (ATIVAN) 0.5 MG tablet 1 tablet by mouth at bedtime for sleep and anxiety. 30 tablet 0     potassium chloride ER (KLOR-CON M) 20 MEQ CR tablet Take 2 tablets (40 mEq) by mouth daily 180 tablet 3       ALLERGIES     Allergies   Allergen Reactions     Cold & Flu [Cold Defense Fighter]      See pseudoephedrine     Seasonal Allergies      Sudafed Cold-Cough [Dayquil Liquicaps]      Pseudoephedrine Rash     Rash then skins peels off        ROS:  12-pt ROS is negative except for as noted above.        PHYSICAL EXAMINATION:  Vitals: / 62; HR 78 bpm, weight 163 pounds.   Constitutional:  Patient is pleasant, alert, cooperative, and in NAD.   HEENT:  NCAT. PERRLA. EOM's intact.   Neck: no JVP  Pulmonary: clear  Cardiac: regular, normal S1/S2, no S3/S4, no murmur or rub.   Abdomen:  Soft, non-tender abdomen, no hepatosplenomegaly appreciated.   Extremities:  no edema   Neurological:  No gross motor or sensory deficits.   Psych: Appropriate affect.      DIAGNOSTIC STUDIES:  Recent Lab Results:    Results for BABAR VARGHESE  (MRN [id number]) as of 2/15/2021 11:13   Ref. Range 2/12/2021 14:46   Sodium Latest Ref Range: 133 - 144 mmol/L 136   Potassium Latest Ref Range: 3.4 - 5.3 mmol/L 3.9   Chloride Latest Ref Range: 94 - 109 mmol/L 102   Carbon Dioxide Latest Ref Range: 20 - 32 mmol/L 28   Urea Nitrogen Latest Ref Range: 7 - 30 mg/dL 23   Creatinine Latest Ref Range: 0.52 - 1.04 mg/dL 1.06 (H)   GFR Estimate Latest Ref Range: >60 mL/min/1.73_m2 61   GFR Estimate If Black Latest Ref Range: >60 mL/min/1.73_m2 70   Calcium Latest Ref Range: 8.5 - 10.1 mg/dL 10.9 (H)       BMP RESULTS:  Lab Results   Component Value Date    CHOL 238 (H) 10/15/2020    HDL 50 10/15/2020     (H) 10/15/2020    TRIG 223 (H) 10/15/2020    CHOLHDLRATIO 3.6 09/24/2015          ASSESMENT /PLAN  Ms. Kassie Ramirez is a 50-year-old woman with large B-cell lymphoma who has undergone chemotherapy and completed her course of chemotherapy.  Unfortunately, she developed a cardiomyopathy and has an ejection fraction of 25%-30%.  Fortunately, the ablation of her SVT was successful and is no longer bothered by that.      Dr. Pearl inched up her HF medications as described above, and decreased her furosemide and she has done well. Her BP today is 106/62. Her GFR has decreased slightly since 9/2020, for that reason I have not increased carvedilol further. She will have a repeat echo in March.     She had a bout of ST that lasted 8 hours back in Oct. It has not occurred again. She feels well, no complaints of shortness of breath, PND, orthopnea, syncope or near syncope.        GDMT:  NYHA:I  Stage: C  Fluid status:euvolmic   ACEi/ARB: yes  ARNI: no  Beta Blocker: yes  Aldosterone antagonist: no  SGLT-2: no  SCD prophylaxis ICD:  no  Bi-V-ICD: no  Anticoagulation: no  Antiplatelet: no  Cardiac rehab: yes        Follow-up:   1.  Follow up with Dr. Pearl in March with an echocardiogram and bmp.     Thank you for the opportunity to be involved in this very pleasant  patient's care please feel to contact me with any questions.    Total time: 56 minutes       Asmita JOHNSON CNP   [facility]

## 2021-02-15 NOTE — PATIENT INSTRUCTIONS
Call JAMES nurse for any questions or concerns Mon-Fri 8am-4pm:                                                [phone]                                For concerns after hours: [phone]    Medication changes:   1. No change  Plan from today:   1. Echo and see Dr. Pearl in March

## 2021-03-03 ENCOUNTER — MYC MEDICAL ADVICE (OUTPATIENT)
Dept: FAMILY MEDICINE | Facility: CLINIC | Age: 51
End: 2021-03-03

## 2021-03-04 NOTE — TELEPHONE ENCOUNTER
Routing to PCP to review and advise.    Herberth QIU RN   [facility] [place] Triage

## 2021-03-08 NOTE — PROGRESS NOTES
"    Assessment & Plan     Situational anxiety  Controlled substance agreement signed  Suboptimally controlled.  Restart sertraline today in upward taper.  Renewed CSA today.  Lorazepam refilled.  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 Castro, follow up per oncology.  - LORazepam (ATIVAN) 0.5 MG tablet  Dispense: 30 tablet; Refill: 0    Nonischemic cardiomyopathy (H) - from chemotherapy - managed by Dr. Pearl  Paroxysmal SVT (supraventricular tachycardia) (H) - s/p ablation 6/22/2020  Markedly improved.  Follow up March 2022 with Dr. Pearl.         BMI:   Estimated body mass index is 26.57 kg/m  as calculated from the following:    Height as of this encounter: 1.676 m (5' 6\").    Weight as of this encounter: 74.7 kg (164 lb 9.6 oz).   Weight management plan: Discussed healthy diet and exercise guidelines      Return in about 6 weeks (around 4/20/2021) for Video visit, Medication recheck.    Mary Alice Colón PA-C  [facility]   Kassie is a 51 year old who presents for the following health issues     HPI       Anxiety Follow-Up  Patient reports that she feels like she is constantly on the brink of tears.  She was recently informed of an elevated LDH level that was inadvertently drawn during her heart labs.  This has prompted a new CT scan.  She is quite anxious regarding this study.  Currently using her lorazepam every other day, this works well but she doesn't like using it frequently.      How are you doing with your anxiety since your last visit? Worsened     Are you having other symptoms that might be associated with anxiety? Yes:  " chest tightness, short of breath    Have you had a significant life event? Health Concerns     Are you feeling depressed? Yes:  ferge of tears    Do you have any concerns with your use of alcohol or other drugs? No    Social History     Tobacco Use     Smoking status: Never Smoker     Smokeless tobacco: Never Used   Substance Use Topics     Alcohol use: No     Alcohol/week: 0.0 standard drinks     Comment: 1 time per month     Drug use: No     BRIAN-7 SCORE 7/25/2018 1/15/2019 9/29/2020   Total Score - - -   Total Score - 1 (minimal anxiety) -   Total Score 2 1 3     PHQ 7/25/2018 1/15/2019 9/29/2020   PHQ-9 Total Score 6 2 2   Q9: Thoughts of better off dead/self-harm past 2 weeks Not at all Not at all Not at all     Last PHQ-9 9/29/2020   1.  Little interest or pleasure in doing things 0   2.  Feeling down, depressed, or hopeless 1   3.  Trouble falling or staying asleep, or sleeping too much 0   4.  Feeling tired or having little energy 1   5.  Poor appetite or overeating 0   6.  Feeling bad about yourself 0   7.  Trouble concentrating 0   8.  Moving slowly or restless 0   Q9: Thoughts of better off dead/self-harm past 2 weeks 0   PHQ-9 Total Score 2   Difficulty at work, home, or with people Not difficult at all     BRIAN-7  9/29/2020   1. Feeling nervous, anxious, or on edge 1   2. Not being able to stop or control worrying 1   3. Worrying too much about different things 1   4. Trouble relaxing 0   5. Being so restless that it is hard to sit still 0   6. Becoming easily annoyed or irritable 0   7. Feeling afraid, as if something awful might happen 0   BRIAN-7 Total Score 3   If you checked any problems, how difficult have they made it for you to do your work, take care of things at home, or get along with other people? Somewhat difficult     Secondary cardiomyopathy/SVT  Doing quite well overall.  Breathing much better.  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  Cardiomyopathy secondary to  "chemotherapy - diagnosed 6/2020.  Has completed cardiac rehab.  Overall quite pleased with exercise capacity.    Diffuse Large B-Cell lymphoma  Managed by Dr. Castro.  Last VV 12/2020.  She received 6 cycles of R-CHOP with intermediate dose methotrexate after cycles 2, 4 and 6 from November through April 2020.  Last PET/CT 12/15/2020 showed no evidence of recurrent disease in neck/chest.  Due to elevation of LDH, repeat CT Chest/ABD/Pelvis scheduled for 3/13/2021.      Review of Systems   Constitutional, HEENT, cardiovascular, pulmonary, GI, , musculoskeletal, neuro, skin, endocrine and psych systems are negative, except as otherwise noted.      Objective    /64 (BP Location: Left arm, Patient Position: Sitting, Cuff Size: Adult Regular)   Pulse 95   Temp 96.8  F (36  C) (Tympanic)   Ht 1.676 m (5' 6\")   Wt 74.7 kg (164 lb 9.6 oz)   LMP 11/06/2019   SpO2 99%   BMI 26.57 kg/m    Body mass index is 26.57 kg/m .  Physical Exam   GENERAL: healthy, alert and no distress  RESP: lungs clear to auscultation - no rales, rhonchi or wheezes  CV: regular rate and rhythm, normal S1 S2, no S3 or S4, no murmur, click or rub, no peripheral edema and peripheral pulses strong  MS: no gross musculoskeletal defects noted, no edema  SKIN: no suspicious lesions or rashes  NEURO: Normal strength and tone, mentation intact and speech normal  PSYCH: mentation appears normal, affect normal/bright          "

## 2021-03-09 ENCOUNTER — OFFICE VISIT (OUTPATIENT)
Dept: FAMILY MEDICINE | Facility: CLINIC | Age: 51
End: 2021-03-09
Payer: COMMERCIAL

## 2021-03-09 VITALS
WEIGHT: 164.6 LBS | HEART RATE: 95 BPM | OXYGEN SATURATION: 99 % | SYSTOLIC BLOOD PRESSURE: 102 MMHG | TEMPERATURE: 96.8 F | BODY MASS INDEX: 26.45 KG/M2 | HEIGHT: 66 IN | DIASTOLIC BLOOD PRESSURE: 64 MMHG

## 2021-03-09 DIAGNOSIS — C83.398 DIFFUSE LARGE B-CELL LYMPHOMA OF EXTRANODAL SITE: ICD-10-CM

## 2021-03-09 DIAGNOSIS — F41.8 SITUATIONAL ANXIETY: Primary | ICD-10-CM

## 2021-03-09 DIAGNOSIS — C83.32 DIFFUSE LARGE B-CELL LYMPHOMA OF INTRATHORACIC LYMPH NODES (H): ICD-10-CM

## 2021-03-09 DIAGNOSIS — I47.10 PAROXYSMAL SVT (SUPRAVENTRICULAR TACHYCARDIA) (H): ICD-10-CM

## 2021-03-09 DIAGNOSIS — I42.8 NONISCHEMIC CARDIOMYOPATHY (H): ICD-10-CM

## 2021-03-09 DIAGNOSIS — Z79.899 CONTROLLED SUBSTANCE AGREEMENT SIGNED: ICD-10-CM

## 2021-03-09 PROCEDURE — 99214 OFFICE O/P EST MOD 30 MIN: CPT | Performed by: PHYSICIAN ASSISTANT

## 2021-03-09 RX ORDER — LORAZEPAM 0.5 MG/1
TABLET ORAL
Qty: 30 TABLET | Refills: 0 | Status: SHIPPED | OUTPATIENT
Start: 2021-03-09 | End: 2021-06-25

## 2021-03-09 RX ORDER — FERROUS SULFATE 325(65) MG
TABLET ORAL
COMMUNITY
Start: 2020-12-01 | End: 2022-01-03

## 2021-03-09 RX ORDER — CHOLECALCIFEROL (VITAMIN D3) 25 MCG
TABLET,CHEWABLE ORAL
COMMUNITY
Start: 2020-12-01 | End: 2021-12-30

## 2021-03-09 RX ORDER — LANOLIN ALCOHOL/MO/W.PET/CERES
CREAM (GRAM) TOPICAL
COMMUNITY
Start: 2020-12-01 | End: 2021-12-30

## 2021-03-09 ASSESSMENT — MIFFLIN-ST. JEOR: SCORE: 1378.37

## 2021-03-09 NOTE — LETTER
[facility] [place]  03/09/21    Patient: Kassie Ramirez  YOB: 1970  Medical Record Number: [id number]  CSN: [id number]                                                                              Non-opioid Controlled Substance Agreement    I understand that my care provider has prescribed a controlled substance to help manage my condition(s). I am taking this medicine to help me function or work. I know this is strong medicine, and that it can cause serious side effects. Controlled substances can be sedating, addicting and may cause a dependency on the drug. They can affect my ability to drive or think, and cause depression. They need to be taken exactly as prescribed. Combining controlled substances with certain medicines or chemicals (such as cocaine, sedatives and tranquilizers, sleeping pills, meth) can be dangerous or even fatal. Also, if I stop controlled substances suddenly, I may have severe withdrawal symptoms.  If not helpful, I may be asked to stop them.    The risks, benefits, and side effects of these medicine(s) were explained to me. I agree that:    1. I will take part in other treatments as advised by my care team. This may be psychiatry or counseling, physical therapy, behavioral therapy, group treatment or a referral to a pain clinic. I will reduce or stop my medicine when my care team tells me to do so.  2. I will take my medicines as prescribed. I will not change the dose or schedule unless my care team tells me to. There will be no refills if I  run out early.   I may be contactedwithout warning and asked to complete a urine drug test or pill count at any time.   3. I will keep all my appointments, and understand this is part of the monitoring of controlled substances. My care team may require an office visit for EVERY controlled substance refill. If I miss appointments or don t follow instructions, my care team may stop my medicine.  4. I will not ask other  providers to prescribe controlled substances, and I will not accept controlled substances from other people. If I need another prescribed controlled substance for a new reason, I will tell my care team within 1 business day.  5. I will use one pharmacy to fill all of my controlled substance prescriptions, and it is up to me to make sure that I do not run out of my medicines on weekends or holidays. If my care team is willing to refill my controlled substance prescription without a visit, I must request refills only during office hours, refills may take up to 3 days to process, and it may take up to 5 to 7 days for my medicine to be mailed and ready at my pharmacy. Prescriptions will not be mailed anywhere except my pharmacy.    6. I am responsible for my prescriptions. If the medicine/prescription is lost or stolen, it will not be replaced. I also agree not to share controlled substance medicines with anyone.              [facility] [place]  03/09/21  Patient:  Kassie Ramirez  YOB: 1970  Medical Record Number: [id number]  CSN: [id number]    7. I agree to not use ANY illegal or recreational drugs. This includes marijuana, cocaine, bath salts or other drugs. I agree not to use alcohol unless my care team says I may. I agree to give urine samples whenever asked. If I don t give a urine sample, the care team may stop my medicine.    8. If I enroll in the [place] Medical Marijuana program, I will tell my care team. I will also sign an agreement to share my medical records with my care team.    9. I will bring in my list of medicines (or my medicine bottles) each time I come to the clinic.   10. I will tell my care team right away if I become pregnant or have a new medical problem treated outside of my regular clinic.  11. I understand that this medicine can affect my thinking and judgment. It may be unsafe for me to drive, use machinery and do dangerous tasks. I will not do any of these  things until I know how the medicine affects me. If my dose changes, I will wait to see how it affects me. I will contact my care team if I have concerns about medicine side effects.    I understand that if I do not follow any of the conditions above, my prescriptions or treatment may be stopped.      I agree that my provider, clinic care team, and pharmacy may work with any city, state or federal law enforcement agency that investigates the misuse, sale, or other diversion of my controlled medicine. I will allow my provider to discuss my care with or share a copy of this agreement with any other treating provider, pharmacy or emergency room where I receive care. I agree to give up (waive) any right of privacy or confidentiality with respect to these consents.   I have read this agreement and have asked questions about anything I did not understand.    ____________________________________________________    ____________  ________  Patient signature                                                         Date      Time    ____________________________________________________     ____________  ________  Witness                                                          Date      Time    ____________________________________________________  Provider signature

## 2021-03-11 ENCOUNTER — CARE COORDINATION (OUTPATIENT)
Dept: ONCOLOGY | Facility: CLINIC | Age: 51
End: 2021-03-11

## 2021-03-11 DIAGNOSIS — C83.32 DIFFUSE LARGE B-CELL LYMPHOMA OF INTRATHORACIC LYMPH NODES (H): Primary | ICD-10-CM

## 2021-03-12 ENCOUNTER — INFUSION THERAPY VISIT (OUTPATIENT)
Dept: INFUSION THERAPY | Facility: CLINIC | Age: 51
End: 2021-03-12
Attending: INTERNAL MEDICINE
Payer: COMMERCIAL

## 2021-03-12 ENCOUNTER — HOSPITAL ENCOUNTER (OUTPATIENT)
Facility: CLINIC | Age: 51
Setting detail: SPECIMEN
Discharge: HOME | End: 2021-03-12
Attending: INTERNAL MEDICINE | Admitting: INTERNAL MEDICINE
Payer: COMMERCIAL

## 2021-03-12 ENCOUNTER — IMMUNIZATION (OUTPATIENT)
Dept: PEDIATRICS | Facility: CLINIC | Age: 51
End: 2021-03-12
Payer: COMMERCIAL

## 2021-03-12 ENCOUNTER — HOSPITAL ENCOUNTER (OUTPATIENT)
Dept: CT IMAGING | Facility: CLINIC | Age: 51
Discharge: HOME | End: 2021-03-12
Attending: INTERNAL MEDICINE | Admitting: INTERNAL MEDICINE
Payer: COMMERCIAL

## 2021-03-12 DIAGNOSIS — R59.0 MEDIASTINAL ADENOPATHY: ICD-10-CM

## 2021-03-12 DIAGNOSIS — I42.9 SECONDARY CARDIOMYOPATHY (H): ICD-10-CM

## 2021-03-12 DIAGNOSIS — C83.398 DIFFUSE LARGE B-CELL LYMPHOMA OF EXTRANODAL SITE: ICD-10-CM

## 2021-03-12 DIAGNOSIS — C83.32 DIFFUSE LARGE B-CELL LYMPHOMA OF INTRATHORACIC LYMPH NODES (H): ICD-10-CM

## 2021-03-12 DIAGNOSIS — C83.32 DIFFUSE LARGE B-CELL LYMPHOMA OF INTRATHORACIC LYMPH NODES (H): Primary | ICD-10-CM

## 2021-03-12 LAB
ALBUMIN SERPL-MCNC: 4.1 G/DL (ref 3.4–5)
ALP SERPL-CCNC: 97 U/L (ref 40–150)
ALT SERPL W P-5'-P-CCNC: 35 U/L (ref 0–50)
ANION GAP SERPL CALCULATED.3IONS-SCNC: 5 MMOL/L (ref 3–14)
AST SERPL W P-5'-P-CCNC: 26 U/L (ref 0–45)
BASOPHILS # BLD AUTO: 0.1 10E9/L (ref 0–0.2)
BASOPHILS NFR BLD AUTO: 1.3 %
BILIRUB SERPL-MCNC: 0.9 MG/DL (ref 0.2–1.3)
BUN SERPL-MCNC: 19 MG/DL (ref 7–30)
CALCIUM SERPL-MCNC: 9.7 MG/DL (ref 8.5–10.1)
CHLORIDE SERPL-SCNC: 107 MMOL/L (ref 94–109)
CO2 SERPL-SCNC: 26 MMOL/L (ref 20–32)
CREAT SERPL-MCNC: 0.93 MG/DL (ref 0.52–1.04)
DIFFERENTIAL METHOD BLD: ABNORMAL
EOSINOPHIL # BLD AUTO: 0.9 10E9/L (ref 0–0.7)
EOSINOPHIL NFR BLD AUTO: 14.8 %
ERYTHROCYTE [DISTWIDTH] IN BLOOD BY AUTOMATED COUNT: 12.5 % (ref 10–15)
GFR SERPL CREATININE-BSD FRML MDRD: 71 ML/MIN/{1.73_M2}
GLUCOSE SERPL-MCNC: 91 MG/DL (ref 70–99)
HCT VFR BLD AUTO: 42.2 % (ref 35–47)
HGB BLD-MCNC: 14.4 G/DL (ref 11.7–15.7)
IMM GRANULOCYTES # BLD: 0 10E9/L (ref 0–0.4)
IMM GRANULOCYTES NFR BLD: 0.2 %
LDH SERPL L TO P-CCNC: 237 U/L (ref 81–234)
LYMPHOCYTES # BLD AUTO: 1.8 10E9/L (ref 0.8–5.3)
LYMPHOCYTES NFR BLD AUTO: 29.2 %
MCH RBC QN AUTO: 30.9 PG (ref 26.5–33)
MCHC RBC AUTO-ENTMCNC: 34.1 G/DL (ref 31.5–36.5)
MCV RBC AUTO: 91 FL (ref 78–100)
MONOCYTES # BLD AUTO: 0.5 10E9/L (ref 0–1.3)
MONOCYTES NFR BLD AUTO: 7.7 %
NEUTROPHILS # BLD AUTO: 2.9 10E9/L (ref 1.6–8.3)
NEUTROPHILS NFR BLD AUTO: 46.8 %
NRBC # BLD AUTO: 0 10*3/UL
NRBC BLD AUTO-RTO: 0 /100
PLATELET # BLD AUTO: 193 10E9/L (ref 150–450)
POTASSIUM SERPL-SCNC: 4.3 MMOL/L (ref 3.4–5.3)
PROT SERPL-MCNC: 6.9 G/DL (ref 6.8–8.8)
RBC # BLD AUTO: 4.66 10E12/L (ref 3.8–5.2)
SODIUM SERPL-SCNC: 138 MMOL/L (ref 133–144)
WBC # BLD AUTO: 6.1 10E9/L (ref 4–11)

## 2021-03-12 PROCEDURE — 91300 PR COVID VAC PFIZER DIL RECON 30 MCG/0.3 ML IM: CPT

## 2021-03-12 PROCEDURE — 250N000011 HC RX IP 250 OP 636: Performed by: INTERNAL MEDICINE

## 2021-03-12 PROCEDURE — 85025 COMPLETE CBC W/AUTO DIFF WBC: CPT | Performed by: INTERNAL MEDICINE

## 2021-03-12 PROCEDURE — 250N000009 HC RX 250: Performed by: INTERNAL MEDICINE

## 2021-03-12 PROCEDURE — 80053 COMPREHEN METABOLIC PANEL: CPT | Performed by: INTERNAL MEDICINE

## 2021-03-12 PROCEDURE — 83615 LACTATE (LD) (LDH) ENZYME: CPT | Performed by: INTERNAL MEDICINE

## 2021-03-12 PROCEDURE — 71260 CT THORAX DX C+: CPT

## 2021-03-12 PROCEDURE — 36591 DRAW BLOOD OFF VENOUS DEVICE: CPT

## 2021-03-12 PROCEDURE — 0001A PR COVID VAC PFIZER DIL RECON 30 MCG/0.3 ML IM: CPT

## 2021-03-12 RX ORDER — HEPARIN SODIUM,PORCINE 10 UNIT/ML
5 VIAL (ML) INTRAVENOUS
Status: CANCELLED | OUTPATIENT
Start: 2021-03-12

## 2021-03-12 RX ORDER — HEPARIN SODIUM (PORCINE) LOCK FLUSH IV SOLN 100 UNIT/ML 100 UNIT/ML
5 SOLUTION INTRAVENOUS
Status: DISCONTINUED | OUTPATIENT
Start: 2021-03-12 | End: 2021-03-12 | Stop reason: HOSPADM

## 2021-03-12 RX ORDER — IOPAMIDOL 755 MG/ML
500 INJECTION, SOLUTION INTRAVASCULAR ONCE
Status: COMPLETED | OUTPATIENT
Start: 2021-03-12 | End: 2021-03-12

## 2021-03-12 RX ORDER — HEPARIN SODIUM (PORCINE) LOCK FLUSH IV SOLN 100 UNIT/ML 100 UNIT/ML
5 SOLUTION INTRAVENOUS
Status: CANCELLED | OUTPATIENT
Start: 2021-03-12

## 2021-03-12 RX ADMIN — IOPAMIDOL 80 ML: 755 INJECTION, SOLUTION INTRAVENOUS at 09:48

## 2021-03-12 RX ADMIN — SODIUM CHLORIDE 60 ML: 9 INJECTION, SOLUTION INTRAVENOUS at 10:00

## 2021-03-12 RX ADMIN — Medication 5 ML: at 10:00

## 2021-03-12 NOTE — PROGRESS NOTES
Nursing Note:  Kassie Ramirez presents today for port draw prior to CT.    Patient seen by provider today: No   present during visit today: Not Applicable.    Note: N/A.    Intravenous Access:  Labs drawn without difficulty.  Implanted Port.    Discharge Plan:   Patient was sent to CT for scan appointment.    Kassie Villarreal RN      Patient returned after scan for port flush and deaccess.

## 2021-03-19 ENCOUNTER — VIRTUAL VISIT (OUTPATIENT)
Dept: ONCOLOGY | Facility: CLINIC | Age: 51
End: 2021-03-19
Attending: INTERNAL MEDICINE
Payer: COMMERCIAL

## 2021-03-19 DIAGNOSIS — I42.9 SECONDARY CARDIOMYOPATHY (H): ICD-10-CM

## 2021-03-19 DIAGNOSIS — F41.9 ANXIETY: ICD-10-CM

## 2021-03-19 DIAGNOSIS — R59.0 MEDIASTINAL ADENOPATHY: ICD-10-CM

## 2021-03-19 DIAGNOSIS — C83.32 DIFFUSE LARGE B-CELL LYMPHOMA OF INTRATHORACIC LYMPH NODES (H): Primary | ICD-10-CM

## 2021-03-19 PROCEDURE — 99215 OFFICE O/P EST HI 40 MIN: CPT | Mod: 95 | Performed by: INTERNAL MEDICINE

## 2021-03-19 PROCEDURE — 999N001193 HC VIDEO/TELEPHONE VISIT; NO CHARGE

## 2021-03-19 NOTE — PROGRESS NOTES
[facility]  HEMATOLOGY AND ONCOLOGY    FOLLOW-UP VISIT NOTE    PATIENT NAME: Kassie Ramirez MRN # [id number]  DATE OF VISIT: Mar 19, 2021 YOB: 1970    REFERRING PROVIDER: No referring provider defined for this encounter.    CANCER TYPE: DLBCL, EBV+ non-GCB, stage III.  STAGE: III    TREATMENT SUMMARY:  She presented with shortness of breath and cough which had been present since May 2019. Her imaging suggested pulmonary infiltrates which was attributed to aspiration pneumonia. CT scan of the chest 8/20/2019 showed left hilar and subcarinal mediastinal adenopathy with narrowing of the left lower lobe bronchus and a nonspecific patchy area of nodular consolidation in the medial right lower lobe.  Bronchoscopy with EBUS 8/28/2019 showed nonnecrotizing granulomatous inflammation with prominent eosinophilia, negative for malignancy.  She was diagnosed with sarcoidosis and started on prednisone. She had worsening cough and shortness of breath with tapering of the prednisone.  Repeat CT scan of the chest 11/18/2019 showed interval worsening of the bulky mediastinal and bilateral hilar lymphadenopathy, near complete resolution of the lower lobe opacities, with new interstitial opacities in the right upper lobe.   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 Large mediastinal mass causing respiratory compromise. . IPI score of 2 (low-intermediate). FISH neg for high risk translocations. She received 6 cycles of R-CHOP with intermediate dose methotrexate after cycles 2, 4 and 6 from November through April 2020.      CURRENT INTERVENTIONS:  Surveillance post MR-CHOP    SUBJECTIVE   Kassie Ramirez is being followed for DLBCL, EBV+ non-GCB, stage III intermediate risk disease    Patient was reached over video for this telemedicine visit due to restrictions posed by [employer]  jeanie Fernando is joined by her  at this visit. She has completed all of her planned cycles of MR-CHOP chemotherapy and is being seen with labs and restaging scans.     She has struggled with cardiomyopathy post adriamycin. She is feeling a lot better now. She denies any chest pain or shortness of breath.     She was having anxiety and had symptoms of her lymphoma.  She has been started on sertraline and feels a lot better.      PAST MEDICAL HISTORY     Past Medical History:   Diagnosis Date     Anxiety attack 9/16/2014     Cardiomyopathy (H)     non ishemic - 25-30% - Chemo related     Diffuse large B-cell lymphoma (H)     Diagnosed 11/2019, Dr Castro, [place]     Encounter for Essure implantation 2009     Generalized anxiety disorder 9/16/2014    zoloft = flat emotions     HFrEF (heart failure with reduced ejection fraction) (H)     new diagnosis 6/14     Menopausal disorder     started on OCPs by menopause center 3/2017 (takes active continuously)     Menstrual headache     helped by OCPs and magnesium     Paroxysmal SVT (supraventricular tachycardia) (H) 06/2020     GERTRUDE (stress urinary incontinence, female)     sling procedure 2016         CURRENT OUTPATIENT MEDICATIONS     Current Outpatient Medications   Medication Sig     Calcium Carb-Cholecalciferol (CALCIUM 1000 + D) 1000-800 MG-UNIT TABS      carvedilol (COREG) 3.125 MG tablet Take 1 tablet (3.125 mg) by mouth 2 times daily (with meals)     cetirizine (ZYRTEC) 10 MG tablet Take 10 mg by mouth daily     Cyanocobalamin (B-12) 3000 MCG CAPS      ferrous sulfate (FEROSUL) 325 (65 Fe) MG tablet      folic acid (FOLVITE) 400 MCG tablet      furosemide (LASIX) 20 MG tablet 10 mg daily. Take an additional 10 mg as directed.     lisinopril (ZESTRIL) 2.5 MG tablet Take 1 tablet (2.5 mg) by mouth 2 times daily     LORazepam (ATIVAN) 0.5 MG tablet 1 tablet by mouth at bedtime for sleep and anxiety.     potassium chloride ER (KLOR-CON M) 20 MEQ CR tablet Take 2  tablets (40 mEq) by mouth daily     sertraline (ZOLOFT) 50 MG tablet Take 0.5 tablets (25 mg) by mouth daily for 4 days, THEN 1 tablet (50 mg) daily.     No current facility-administered medications for this visit.         ALLERGIES      Allergies   Allergen Reactions     Cold & Flu [Cold Defense Fighter]      See pseudoephedrine     Seasonal Allergies      Sudafed Cold-Cough [Dayquil Liquicaps]      Pseudoephedrine Rash     Rash then skins peels off         REVIEW OF SYSTEMS   As above in the HPI, o/w complete 12-point ROS was negative.     PHYSICAL EXAM   LMP 11/06/2019   Limited physical exam during video visit due to COVID19 restrictions  Middle aged female in no acute distress  Breathing comfortably, no tachypnea  Speech and hearing normal  Pleasant mood and congruent affect  Moving all extremities, no focal neurologic deficits apparent       LABORATORY AND IMAGING STUDIES     Recent Labs   Lab Test 03/12/21  0930 02/12/21  1446 12/15/20  0938 09/17/20  1052 09/11/20  1307    136 137 140 139   POTASSIUM 4.3 3.9 3.9 3.5 4.1   CHLORIDE 107 102 106 106 103   CO2 26 28 29 27 29   ANIONGAP 5 6 2* 7 7   BUN 19 23 28 21 24   CR 0.93 1.06* 1.07* 0.91 1.00   GLC 91 89 93 91 87   AFSHAN 9.7 10.9* 9.7 9.5 9.5     Recent Labs   Lab Test 06/19/20  1853 06/15/20  0845 06/14/20  1807 01/11/20  0615 12/01/19  1340 12/01/19  0641 11/30/19  2137 11/30/19  1341 11/27/19  2216 11/27/19  2216   MAG 2.5* 2.4* 2.1 2.0  --   --   --   --   --  2.1   PHOS  --   --   --  3.1 2.8 3.4 2.8 2.5   < > 3.1    < > = values in this interval not displayed.     Recent Labs   Lab Test 03/12/21  0930 02/12/21  1446 12/15/20  0938 11/18/20  0815 09/11/20  1307   WBC 6.1 8.4 6.8 2.6* 7.1   HGB 14.4 15.1 15.2 14.1 14.0    191 194 155 198   MCV 91 87 88 90 87   NEUTROPHIL 46.8 50.3 47.0 21.9 53.3     Recent Labs   Lab Test 03/12/21  0930 02/12/21  1446 12/15/20  0938   BILITOTAL 0.9 0.9 0.8   ALKPHOS 97 104 107   ALT 35 34 25   AST 26 24 20    ALBUMIN 4.1 4.4 4.3   * 243* 217     TSH   Date Value Ref Range Status   06/14/2020 3.40 0.40 - 4.00 mU/L Final   09/18/2019 2.06 0.40 - 4.00 mU/L Final   07/27/2018 2.04 0.40 - 4.00 mU/L Final     No results for input(s): CEA in the last 05096 hours.  Results for orders placed or performed during the hospital encounter of 03/12/21   CT Chest/Abdomen/Pelvis w Contrast    Narrative    CT CHEST/ABDOMEN/PELVIS WITH CONTRAST 3/12/2021 10:02 AM    CLINICAL HISTORY: DLBCL - previous hilar nodes and mediastinal mass.  Diffuse large B-cell lymphoma of intrathoracic lymph nodes (H).  Secondary cardiomyopathy (H). Mediastinal adenopathy.    TECHNIQUE: CT scan of the chest, abdomen, and pelvis was performed  following injection of IV contrast. Multiplanar reformats were  obtained. Dose reduction techniques were used.     CONTRAST: 80mL Isovue-370    COMPARISON: PET/CT 12/15/2020.    FINDINGS:   LUNGS AND PLEURA: Several indeterminate and calcified lung nodules are  present and appear stable when compared to prior exam. The largest  lesion measures 0.4 cm in the medial left upper lobe on series 13,  image 86. Calcifying granuloma inferior right upper lobe noted near  the right hilum on series 13, image 119. No new or enlarging lung  lesions.    MEDIASTINUM/AXILLAE: Heart is normal in size. The esophagus is  unremarkable. Thyroid gland appears normal in size where imaged. No  enlarged lymph nodes in the chest or axillas. Anterior mediastinal  soft tissue appears minimally changed again possibly thymic tissue on  image 54 of series 5.    HEPATOBILIARY: Probable fatty infiltration of the liver subcentimeter  indeterminate liver lesions likely represent a combination of cysts  and/or hemangiomas. A larger lesion in the medial right hepatic lobe  measures 2 cm on image 109 of series 5 with peripheral nodular  enhancement most consistent with an hemangioma. Gallbladder is  unremarkable.    PANCREAS: No significant mass, duct  dilatation, or inflammatory  change.    SPLEEN: Normal.    ADRENAL GLANDS: Normal.    KIDNEYS/BLADDER: No significant mass, stones, or hydronephrosis.  Bladder is unremarkable.    BOWEL: No bowel obstruction, diverticulitis or appendicitis.    LYMPH NODES: No enlarged abdominal or pelvic lymph nodes.    PELVIC ORGANS: The uterus, ovaries and rectum are unremarkable. No  free pelvic fluid. Probable dominant follicle or cyst right ovary on  image 258 of series 5 measures 2 cm in diameter, unchanged.  Sterilization devices noted in the fallopian tubes bilaterally.    ADDITIONAL FINDINGS: None.    MUSCULOSKELETAL: Normal.      Impression    IMPRESSION:  1.  No enlarged lymph nodes in the chest, abdomen or pelvis. Spleen  remains within normal limits for size. Thymic tissue anterior  mediastinum is stable. No interval change.    2.  Indeterminate lung nodules are stable. Some of these are benign  calcified granulomas. No new or enlarging lung lesions.    3.  Low-attenuation liver lesions are likely cysts and/or hemangiomas  as described. No interval change. No new liver lesions are evident.  Abdominal and pelvic organs are otherwise within normal limits. Stable  2 cm cyst or dominant follicle right ovary. No further follow-up  suggested.    KAMAR MOISE MD           ASSESSMENT AND PLAN   DLBCL, EBV+ non-GCB, stage III post 6 cycles of M-RCHOP  PJV pneumonia causing acute respiratory failure improved on bactrim and prednisone  Cardiomyopathy post adriamycin based chemotherapy likely also contributed by tachycardia    Kassie was seen over video at this telemedicine visit and was present along with her . She has completed her planned chemotherapy in April 2020.     She is doing much better. We reviewed all her labs and they are adequate.     I have reviewed actual images from her CT scan and there is no evidence of recurrent disease in the chest, abdomen or pelvis. She had enlarged hilar nodes and mediastinal mass,  likely from reactive thymic tissue.  This has remained stable on the current imaging study.    She had an episode of anxiety with feelings of recurrent lymphoma.  I explained her that having a malignancy is stressful.  She has been adequately treated for her non-Hodgkin's lymphoma.  It is unlikely that her disease should come back.  We are closely monitoring it.  She has been taking sertraline and is feeling a lot better.    I would continue following her every 3 months for now.  We would continue with every 3 months restaging scans for another year.    All questions for patient and her  were answered.  She is almost a year out from treatment completion.  We could have her port removed.  I will enter the orders and have it scheduled.      Video-Visit Details    Type of service:  Video Visit  Originating Location (pt. Location): Home  Distant Location (provider location):  [facility]   Platform used for Video Visit: N4MD    45 minutes spent on the date of the encounter doing chart review, history and exam, documentation and further activities as noted above     Castro Castro    Hematologist and Medical Oncologist  [facility]

## 2021-03-19 NOTE — LETTER
3/19/2021         RE: Kassie Ramirez  [address] [facility] [address]        Dear Colleague,    Thank you for referring your patient, Kassie Ramirez, to the [facility]. Please see a copy of my visit note below.    Kassie is a 51 year old who is being evaluated via a billable video visit.      How would you like to obtain your AVS? [email or web address]  If the video visit is dropped, the invitation should be resent by: Other e-mail: MyChart  Will anyone else be joining your video visit? No       Morenita Rey CMA on 3/19/2021 at 8:49 AM        [facility]  HEMATOLOGY AND ONCOLOGY    FOLLOW-UP VISIT NOTE    PATIENT NAME: Kassie Ramirez MRN # [id number]  DATE OF VISIT: Mar 19, 2021 YOB: 1970    REFERRING PROVIDER: No referring provider defined for this encounter.    CANCER TYPE: DLBCL, EBV+ non-GCB, stage III.  STAGE: III    TREATMENT SUMMARY:  She presented with shortness of breath and cough which had been present since May 2019. Her imaging suggested pulmonary infiltrates which was attributed to aspiration pneumonia. CT scan of the chest 8/20/2019 showed left hilar and subcarinal mediastinal adenopathy with narrowing of the left lower lobe bronchus and a nonspecific patchy area of nodular consolidation in the medial right lower lobe.  Bronchoscopy with EBUS 8/28/2019 showed nonnecrotizing granulomatous inflammation with prominent eosinophilia, negative for malignancy.  She was diagnosed with sarcoidosis and started on prednisone. She had worsening cough and shortness of breath with tapering of the prednisone.  Repeat CT scan of the chest 11/18/2019 showed interval worsening of the bulky mediastinal and bilateral hilar lymphadenopathy, near complete resolution of the lower lobe opacities, with new interstitial opacities in the right upper lobe.   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 Large mediastinal mass causing respiratory compromise. . IPI score of 2 (low-intermediate). FISH neg for high risk translocations. She received 6 cycles of R-CHOP with intermediate dose methotrexate after cycles 2, 4 and 6 from November through April 2020.      CURRENT INTERVENTIONS:  Surveillance post MR-CHOP    SUBJECTIVE   Kassie Ramirez is being followed for DLBCL, EBV+ non-GCB, stage III intermediate risk disease    Patient was reached over video for this telemedicine visit due to restrictions posed by COVID19 pandemic. Kassie is joined by her  at this visit. She has completed all of her planned cycles of MR-CHOP chemotherapy and is being seen with labs and restaging scans.     She has struggled with cardiomyopathy post adriamycin. She is feeling a lot better now. She denies any chest pain or shortness of breath.     She was having anxiety and had symptoms of her lymphoma.  She has been started on sertraline and feels a lot better.      PAST MEDICAL HISTORY     Past Medical History:   Diagnosis Date     Anxiety attack 9/16/2014     Cardiomyopathy (H)     non ishemic - 25-30% - Chemo related     Diffuse large B-cell lymphoma (H)     Diagnosed 11/2019, Ronan Albarran     Encounter for Essure implantation 2009     Generalized anxiety disorder 9/16/2014    zoloft = flat emotions     HFrEF (heart failure with reduced ejection fraction) (H)     new diagnosis 6/14     Menopausal disorder     started on OCPs by menopause center 3/2017 (takes active continuously)     Menstrual headache     helped by OCPs and magnesium     Paroxysmal SVT (supraventricular tachycardia) (H) 06/2020     GERTRUDE (stress urinary incontinence, female)     sling procedure 2016         CURRENT OUTPATIENT MEDICATIONS     Current Outpatient Medications   Medication Sig     Calcium Carb-Cholecalciferol (CALCIUM 1000 + D) 1000-800 MG-UNIT TABS      carvedilol  (COREG) 3.125 MG tablet Take 1 tablet (3.125 mg) by mouth 2 times daily (with meals)     cetirizine (ZYRTEC) 10 MG tablet Take 10 mg by mouth daily     Cyanocobalamin (B-12) 3000 MCG CAPS      ferrous sulfate (FEROSUL) 325 (65 Fe) MG tablet      folic acid (FOLVITE) 400 MCG tablet      furosemide (LASIX) 20 MG tablet 10 mg daily. Take an additional 10 mg as directed.     lisinopril (ZESTRIL) 2.5 MG tablet Take 1 tablet (2.5 mg) by mouth 2 times daily     LORazepam (ATIVAN) 0.5 MG tablet 1 tablet by mouth at bedtime for sleep and anxiety.     potassium chloride ER (KLOR-CON M) 20 MEQ CR tablet Take 2 tablets (40 mEq) by mouth daily     sertraline (ZOLOFT) 50 MG tablet Take 0.5 tablets (25 mg) by mouth daily for 4 days, THEN 1 tablet (50 mg) daily.     No current facility-administered medications for this visit.         ALLERGIES      Allergies   Allergen Reactions     Cold & Flu [Cold Defense Fighter]      See pseudoephedrine     Seasonal Allergies      Sudafed Cold-Cough [Dayquil Liquicaps]      Pseudoephedrine Rash     Rash then skins peels off         REVIEW OF SYSTEMS   As above in the HPI, o/w complete 12-point ROS was negative.     PHYSICAL EXAM   LMP 11/06/2019   Limited physical exam during video visit due to COVID19 restrictions  Middle aged female in no acute distress  Breathing comfortably, no tachypnea  Speech and hearing normal  Pleasant mood and congruent affect  Moving all extremities, no focal neurologic deficits apparent       LABORATORY AND IMAGING STUDIES     Recent Labs   Lab Test 03/12/21  0930 02/12/21  1446 12/15/20  0938 09/17/20  1052 09/11/20  1307    136 137 140 139   POTASSIUM 4.3 3.9 3.9 3.5 4.1   CHLORIDE 107 102 106 106 103   CO2 26 28 29 27 29   ANIONGAP 5 6 2* 7 7   BUN 19 23 28 21 24   CR 0.93 1.06* 1.07* 0.91 1.00   GLC 91 89 93 91 87   AFSHAN 9.7 10.9* 9.7 9.5 9.5     Recent Labs   Lab Test 06/19/20  1853 06/15/20  0845 06/14/20  1807 01/11/20  0615 12/01/19  1340 12/01/19  0641  11/30/19  2137 11/30/19  1341 11/27/19  2216 11/27/19  2216   MAG 2.5* 2.4* 2.1 2.0  --   --   --   --   --  2.1   PHOS  --   --   --  3.1 2.8 3.4 2.8 2.5   < > 3.1    < > = values in this interval not displayed.     Recent Labs   Lab Test 03/12/21  0930 02/12/21  1446 12/15/20  0938 11/18/20  0815 09/11/20  1307   WBC 6.1 8.4 6.8 2.6* 7.1   HGB 14.4 15.1 15.2 14.1 14.0    191 194 155 198   MCV 91 87 88 90 87   NEUTROPHIL 46.8 50.3 47.0 21.9 53.3     Recent Labs   Lab Test 03/12/21  0930 02/12/21  1446 12/15/20  0938   BILITOTAL 0.9 0.9 0.8   ALKPHOS 97 104 107   ALT 35 34 25   AST 26 24 20   ALBUMIN 4.1 4.4 4.3   * 243* 217     TSH   Date Value Ref Range Status   06/14/2020 3.40 0.40 - 4.00 mU/L Final   09/18/2019 2.06 0.40 - 4.00 mU/L Final   07/27/2018 2.04 0.40 - 4.00 mU/L Final     No results for input(s): CEA in the last 40435 hours.  Results for orders placed or performed during the hospital encounter of 03/12/21   CT Chest/Abdomen/Pelvis w Contrast    Narrative    CT CHEST/ABDOMEN/PELVIS WITH CONTRAST 3/12/2021 10:02 AM    CLINICAL HISTORY: DLBCL - previous hilar nodes and mediastinal mass.  Diffuse large B-cell lymphoma of intrathoracic lymph nodes (H).  Secondary cardiomyopathy (H). Mediastinal adenopathy.    TECHNIQUE: CT scan of the chest, abdomen, and pelvis was performed  following injection of IV contrast. Multiplanar reformats were  obtained. Dose reduction techniques were used.     CONTRAST: 80mL Isovue-370    COMPARISON: PET/CT 12/15/2020.    FINDINGS:   LUNGS AND PLEURA: Several indeterminate and calcified lung nodules are  present and appear stable when compared to prior exam. The largest  lesion measures 0.4 cm in the medial left upper lobe on series 13,  image 86. Calcifying granuloma inferior right upper lobe noted near  the right hilum on series 13, image 119. No new or enlarging lung  lesions.    MEDIASTINUM/AXILLAE: Heart is normal in size. The esophagus is  unremarkable.  Thyroid gland appears normal in size where imaged. No  enlarged lymph nodes in the chest or axillas. Anterior mediastinal  soft tissue appears minimally changed again possibly thymic tissue on  image 54 of series 5.    HEPATOBILIARY: Probable fatty infiltration of the liver subcentimeter  indeterminate liver lesions likely represent a combination of cysts  and/or hemangiomas. A larger lesion in the medial right hepatic lobe  measures 2 cm on image 109 of series 5 with peripheral nodular  enhancement most consistent with an hemangioma. Gallbladder is  unremarkable.    PANCREAS: No significant mass, duct dilatation, or inflammatory  change.    SPLEEN: Normal.    ADRENAL GLANDS: Normal.    KIDNEYS/BLADDER: No significant mass, stones, or hydronephrosis.  Bladder is unremarkable.    BOWEL: No bowel obstruction, diverticulitis or appendicitis.    LYMPH NODES: No enlarged abdominal or pelvic lymph nodes.    PELVIC ORGANS: The uterus, ovaries and rectum are unremarkable. No  free pelvic fluid. Probable dominant follicle or cyst right ovary on  image 258 of series 5 measures 2 cm in diameter, unchanged.  Sterilization devices noted in the fallopian tubes bilaterally.    ADDITIONAL FINDINGS: None.    MUSCULOSKELETAL: Normal.      Impression    IMPRESSION:  1.  No enlarged lymph nodes in the chest, abdomen or pelvis. Spleen  remains within normal limits for size. Thymic tissue anterior  mediastinum is stable. No interval change.    2.  Indeterminate lung nodules are stable. Some of these are benign  calcified granulomas. No new or enlarging lung lesions.    3.  Low-attenuation liver lesions are likely cysts and/or hemangiomas  as described. No interval change. No new liver lesions are evident.  Abdominal and pelvic organs are otherwise within normal limits. Stable  2 cm cyst or dominant follicle right ovary. No further follow-up  suggested.    KAMAR MOISE MD           ASSESSMENT AND PLAN   DLBCL, EBV+ non-GCB, stage III post  6 cycles of M-RCHOP  PJV pneumonia causing acute respiratory failure improved on bactrim and prednisone  Cardiomyopathy post adriamycin based chemotherapy likely also contributed by tachycardia    Kassie was seen over video at this telemedicine visit and was present along with her . She has completed her planned chemotherapy in April 2020.     She is doing much better. We reviewed all her labs and they are adequate.     I have reviewed actual images from her CT scan and there is no evidence of recurrent disease in the chest, abdomen or pelvis. She had enlarged hilar nodes and mediastinal mass, likely from reactive thymic tissue.  This has remained stable on the current imaging study.    She had an episode of anxiety with feelings of recurrent lymphoma.  I explained her that having a malignancy is stressful.  She has been adequately treated for her non-Hodgkin's lymphoma.  It is unlikely that her disease should come back.  We are closely monitoring it.  She has been taking sertraline and is feeling a lot better.    I would continue following her every 3 months for now.  We would continue with every 3 months restaging scans for another year.    All questions for patient and her  were answered.  She is almost a year out from treatment completion.  We could have her port removed.  I will enter the orders and have it scheduled.      Video-Visit Details    Type of service:  Video Visit  Originating Location (pt. Location): Home  Distant Location (provider location):  [facility]   Platform used for Video Visit: [employer]    45 minutes spent on the date of the encounter doing chart review, history and exam, documentation and further activities as noted above     Castro Castro    Hematologist and Medical Oncologist  [facility]         Again, thank you for allowing me to participate in the care of your patient.        Sincerely,        Castro Castro MD

## 2021-03-19 NOTE — PROGRESS NOTES
Kassie is a 51 year old who is being evaluated via a billable video visit.      How would you like to obtain your AVS? [employer]art  If the video visit is dropped, the invitation should be resent by: Other e-mail: [email or web address]  Will anyone else be joining your video visit? Sarah Beth Rey CMA on 3/19/2021 at 8:49 AM

## 2021-03-19 NOTE — LETTER
3/19/2021         RE: Kassie Ramirez  [address] [facility] [address]        Dear Colleague,    Thank you for referring your patient, Kassie Ramirez, to the [facility]. Please see a copy of my visit note below.    Kassie is a 51 year old who is being evaluated via a billable video visit.      How would you like to obtain your AVS? [employer]  If the video visit is dropped, the invitation should be resent by: Other e-mail: MyChart  Will anyone else be joining your video visit? No       Morenita Rey CMA on 3/19/2021 at 8:49 AM        [facility]  HEMATOLOGY AND ONCOLOGY    FOLLOW-UP VISIT NOTE    PATIENT NAME: Kassie Ramirez MRN # [id number]  DATE OF VISIT: Mar 19, 2021 YOB: 1970    REFERRING PROVIDER: No referring provider defined for this encounter.    CANCER TYPE: DLBCL, EBV+ non-GCB, stage III.  STAGE: III    TREATMENT SUMMARY:  She presented with shortness of breath and cough which had been present since May 2019. Her imaging suggested pulmonary infiltrates which was attributed to aspiration pneumonia. CT scan of the chest 8/20/2019 showed left hilar and subcarinal mediastinal adenopathy with narrowing of the left lower lobe bronchus and a nonspecific patchy area of nodular consolidation in the medial right lower lobe.  Bronchoscopy with EBUS 8/28/2019 showed nonnecrotizing granulomatous inflammation with prominent eosinophilia, negative for malignancy.  She was diagnosed with sarcoidosis and started on prednisone. She had worsening cough and shortness of breath with tapering of the prednisone.  Repeat CT scan of the chest 11/18/2019 showed interval worsening of the bulky mediastinal and bilateral hilar lymphadenopathy, near complete resolution of the lower lobe opacities, with new interstitial opacities in the right upper lobe.   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 Large mediastinal mass causing respiratory compromise. . IPI score of 2 (low-intermediate). FISH neg for high risk translocations. She received 6 cycles of R-CHOP with intermediate dose methotrexate after cycles 2, 4 and 6 from November through April 2020.      CURRENT INTERVENTIONS:  Surveillance post MR-CHOP    SUBJECTIVE   Kassie Ramirez is being followed for DLBCL, EBV+ non-GCB, stage III intermediate risk disease    Patient was reached over video for this telemedicine visit due to restrictions posed by COVID19 pandemic. Kassie is joined by her  at this visit. She has completed all of her planned cycles of MR-CHOP chemotherapy and is being seen with labs and restaging scans.     She has struggled with cardiomyopathy post adriamycin. She is feeling a lot better now. She denies any chest pain or shortness of breath.     She was having anxiety and had symptoms of her lymphoma.  She has been started on sertraline and feels a lot better.      PAST MEDICAL HISTORY     Past Medical History:   Diagnosis Date     Anxiety attack 9/16/2014     Cardiomyopathy (H)     non ishemic - 25-30% - Chemo related     Diffuse large B-cell lymphoma (H)     Diagnosed 11/2019, Ronan Albarran     Encounter for Essure implantation 2009     Generalized anxiety disorder 9/16/2014    zoloft = flat emotions     HFrEF (heart failure with reduced ejection fraction) (H)     new diagnosis 6/14     Menopausal disorder     started on OCPs by menopause center 3/2017 (takes active continuously)     Menstrual headache     helped by OCPs and magnesium     Paroxysmal SVT (supraventricular tachycardia) (H) 06/2020     GERTRUDE (stress urinary incontinence, female)     sling procedure 2016         CURRENT OUTPATIENT MEDICATIONS     Current Outpatient Medications   Medication Sig     Calcium Carb-Cholecalciferol (CALCIUM 1000 + D) 1000-800 MG-UNIT TABS      carvedilol  (COREG) 3.125 MG tablet Take 1 tablet (3.125 mg) by mouth 2 times daily (with meals)     cetirizine (ZYRTEC) 10 MG tablet Take 10 mg by mouth daily     Cyanocobalamin (B-12) 3000 MCG CAPS      ferrous sulfate (FEROSUL) 325 (65 Fe) MG tablet      folic acid (FOLVITE) 400 MCG tablet      furosemide (LASIX) 20 MG tablet 10 mg daily. Take an additional 10 mg as directed.     lisinopril (ZESTRIL) 2.5 MG tablet Take 1 tablet (2.5 mg) by mouth 2 times daily     LORazepam (ATIVAN) 0.5 MG tablet 1 tablet by mouth at bedtime for sleep and anxiety.     potassium chloride ER (KLOR-CON M) 20 MEQ CR tablet Take 2 tablets (40 mEq) by mouth daily     sertraline (ZOLOFT) 50 MG tablet Take 0.5 tablets (25 mg) by mouth daily for 4 days, THEN 1 tablet (50 mg) daily.     No current facility-administered medications for this visit.         ALLERGIES      Allergies   Allergen Reactions     Cold & Flu [Cold Defense Fighter]      See pseudoephedrine     Seasonal Allergies      Sudafed Cold-Cough [Dayquil Liquicaps]      Pseudoephedrine Rash     Rash then skins peels off         REVIEW OF SYSTEMS   As above in the HPI, o/w complete 12-point ROS was negative.     PHYSICAL EXAM   LMP 11/06/2019   Limited physical exam during video visit due to COVID19 restrictions  Middle aged female in no acute distress  Breathing comfortably, no tachypnea  Speech and hearing normal  Pleasant mood and congruent affect  Moving all extremities, no focal neurologic deficits apparent       LABORATORY AND IMAGING STUDIES     Recent Labs   Lab Test 03/12/21  0930 02/12/21  1446 12/15/20  0938 09/17/20  1052 09/11/20  1307    136 137 140 139   POTASSIUM 4.3 3.9 3.9 3.5 4.1   CHLORIDE 107 102 106 106 103   CO2 26 28 29 27 29   ANIONGAP 5 6 2* 7 7   BUN 19 23 28 21 24   CR 0.93 1.06* 1.07* 0.91 1.00   GLC 91 89 93 91 87   AFSHAN 9.7 10.9* 9.7 9.5 9.5     Recent Labs   Lab Test 06/19/20  1853 06/15/20  0845 06/14/20  1807 01/11/20  0615 12/01/19  1340 12/01/19  0641  11/30/19  2137 11/30/19  1341 11/27/19  2216 11/27/19  2216   MAG 2.5* 2.4* 2.1 2.0  --   --   --   --   --  2.1   PHOS  --   --   --  3.1 2.8 3.4 2.8 2.5   < > 3.1    < > = values in this interval not displayed.     Recent Labs   Lab Test 03/12/21  0930 02/12/21  1446 12/15/20  0938 11/18/20  0815 09/11/20  1307   WBC 6.1 8.4 6.8 2.6* 7.1   HGB 14.4 15.1 15.2 14.1 14.0    191 194 155 198   MCV 91 87 88 90 87   NEUTROPHIL 46.8 50.3 47.0 21.9 53.3     Recent Labs   Lab Test 03/12/21  0930 02/12/21  1446 12/15/20  0938   BILITOTAL 0.9 0.9 0.8   ALKPHOS 97 104 107   ALT 35 34 25   AST 26 24 20   ALBUMIN 4.1 4.4 4.3   * 243* 217     TSH   Date Value Ref Range Status   06/14/2020 3.40 0.40 - 4.00 mU/L Final   09/18/2019 2.06 0.40 - 4.00 mU/L Final   07/27/2018 2.04 0.40 - 4.00 mU/L Final     No results for input(s): CEA in the last 41342 hours.  Results for orders placed or performed during the hospital encounter of 03/12/21   CT Chest/Abdomen/Pelvis w Contrast    Narrative    CT CHEST/ABDOMEN/PELVIS WITH CONTRAST 3/12/2021 10:02 AM    CLINICAL HISTORY: DLBCL - previous hilar nodes and mediastinal mass.  Diffuse large B-cell lymphoma of intrathoracic lymph nodes (H).  Secondary cardiomyopathy (H). Mediastinal adenopathy.    TECHNIQUE: CT scan of the chest, abdomen, and pelvis was performed  following injection of IV contrast. Multiplanar reformats were  obtained. Dose reduction techniques were used.     CONTRAST: 80mL Isovue-370    COMPARISON: PET/CT 12/15/2020.    FINDINGS:   LUNGS AND PLEURA: Several indeterminate and calcified lung nodules are  present and appear stable when compared to prior exam. The largest  lesion measures 0.4 cm in the medial left upper lobe on series 13,  image 86. Calcifying granuloma inferior right upper lobe noted near  the right hilum on series 13, image 119. No new or enlarging lung  lesions.    MEDIASTINUM/AXILLAE: Heart is normal in size. The esophagus is  unremarkable.  Thyroid gland appears normal in size where imaged. No  enlarged lymph nodes in the chest or axillas. Anterior mediastinal  soft tissue appears minimally changed again possibly thymic tissue on  image 54 of series 5.    HEPATOBILIARY: Probable fatty infiltration of the liver subcentimeter  indeterminate liver lesions likely represent a combination of cysts  and/or hemangiomas. A larger lesion in the medial right hepatic lobe  measures 2 cm on image 109 of series 5 with peripheral nodular  enhancement most consistent with an hemangioma. Gallbladder is  unremarkable.    PANCREAS: No significant mass, duct dilatation, or inflammatory  change.    SPLEEN: Normal.    ADRENAL GLANDS: Normal.    KIDNEYS/BLADDER: No significant mass, stones, or hydronephrosis.  Bladder is unremarkable.    BOWEL: No bowel obstruction, diverticulitis or appendicitis.    LYMPH NODES: No enlarged abdominal or pelvic lymph nodes.    PELVIC ORGANS: The uterus, ovaries and rectum are unremarkable. No  free pelvic fluid. Probable dominant follicle or cyst right ovary on  image 258 of series 5 measures 2 cm in diameter, unchanged.  Sterilization devices noted in the fallopian tubes bilaterally.    ADDITIONAL FINDINGS: None.    MUSCULOSKELETAL: Normal.      Impression    IMPRESSION:  1.  No enlarged lymph nodes in the chest, abdomen or pelvis. Spleen  remains within normal limits for size. Thymic tissue anterior  mediastinum is stable. No interval change.    2.  Indeterminate lung nodules are stable. Some of these are benign  calcified granulomas. No new or enlarging lung lesions.    3.  Low-attenuation liver lesions are likely cysts and/or hemangiomas  as described. No interval change. No new liver lesions are evident.  Abdominal and pelvic organs are otherwise within normal limits. Stable  2 cm cyst or dominant follicle right ovary. No further follow-up  suggested.    KAMAR MOISE MD           ASSESSMENT AND PLAN   DLBCL, EBV+ non-GCB, stage III post  6 cycles of M-RCHOP  PJV pneumonia causing acute respiratory failure improved on bactrim and prednisone  Cardiomyopathy post adriamycin based chemotherapy likely also contributed by tachycardia    Kassie was seen over video at this telemedicine visit and was present along with her . She has completed her planned chemotherapy in April 2020.     She is doing much better. We reviewed all her labs and they are adequate.     I have reviewed actual images from her CT scan and there is no evidence of recurrent disease in the chest, abdomen or pelvis. She had enlarged hilar nodes and mediastinal mass, likely from reactive thymic tissue.  This has remained stable on the current imaging study.    She had an episode of anxiety with feelings of recurrent lymphoma.  I explained her that having a malignancy is stressful.  She has been adequately treated for her non-Hodgkin's lymphoma.  It is unlikely that her disease should come back.  We are closely monitoring it.  She has been taking sertraline and is feeling a lot better.    I would continue following her every 3 months for now.  We would continue with every 3 months restaging scans for another year.    All questions for patient and her  were answered.  She is almost a year out from treatment completion.  We could have her port removed.  I will enter the orders and have it scheduled.      Video-Visit Details    Type of service:  Video Visit  Originating Location (pt. Location): Home  Distant Location (provider location):  [facility]   Platform used for Video Visit: [employer]    45 minutes spent on the date of the encounter doing chart review, history and exam, documentation and further activities as noted above     Castro Castro    Hematologist and Medical Oncologist  [facility]         Again, thank you for allowing me to participate in the care of your patient.        Sincerely,        Castro Castro MD

## 2021-03-25 DIAGNOSIS — Z11.59 ENCOUNTER FOR SCREENING FOR OTHER VIRAL DISEASES: Primary | ICD-10-CM

## 2021-03-25 NOTE — PROGRESS NOTES
Ordering Clinic: [facility]  Procedure: port removal  Arrival date: 4/5  Arrival time: 0900  Covid-19 testing requirements explained: y  NPO explained: y                 Does patient have transportation available pre and post procedure?   y  Check-in procedure explained: y  Allergies reviewed: y  Blood thinners: n  Labs:   H&P:  epic  Does patient require ?      If so, language?      services called to order ?    date requested:    arrival time requested:    Name of individual from  services assisting with scheduling appointment:    Previous sedation:  Last oral intake:    solid: ________  Liquids: _______

## 2021-03-29 ENCOUNTER — HOSPITAL ENCOUNTER (OUTPATIENT)
Dept: CARDIOLOGY | Facility: CLINIC | Age: 51
Discharge: HOME | End: 2021-03-29
Attending: INTERNAL MEDICINE | Admitting: INTERNAL MEDICINE
Payer: COMMERCIAL

## 2021-03-29 DIAGNOSIS — I42.8 NONISCHEMIC CARDIOMYOPATHY (H): ICD-10-CM

## 2021-03-29 DIAGNOSIS — I50.22 CHRONIC SYSTOLIC CONGESTIVE HEART FAILURE (H): ICD-10-CM

## 2021-03-29 LAB
ANION GAP SERPL CALCULATED.3IONS-SCNC: 8 MMOL/L (ref 3–14)
BUN SERPL-MCNC: 14 MG/DL (ref 7–30)
CALCIUM SERPL-MCNC: 9.7 MG/DL (ref 8.5–10.1)
CHLORIDE SERPL-SCNC: 104 MMOL/L (ref 94–109)
CO2 SERPL-SCNC: 28 MMOL/L (ref 20–32)
CREAT SERPL-MCNC: 1 MG/DL (ref 0.52–1.04)
GFR SERPL CREATININE-BSD FRML MDRD: 65 ML/MIN/{1.73_M2}
GLUCOSE SERPL-MCNC: 89 MG/DL (ref 70–99)
POTASSIUM SERPL-SCNC: 3.9 MMOL/L (ref 3.4–5.3)
SODIUM SERPL-SCNC: 140 MMOL/L (ref 133–144)

## 2021-03-29 PROCEDURE — 93306 TTE W/DOPPLER COMPLETE: CPT

## 2021-03-29 PROCEDURE — 36415 COLL VENOUS BLD VENIPUNCTURE: CPT | Performed by: INTERNAL MEDICINE

## 2021-03-29 PROCEDURE — 93306 TTE W/DOPPLER COMPLETE: CPT | Mod: 26 | Performed by: INTERNAL MEDICINE

## 2021-03-29 PROCEDURE — 80048 BASIC METABOLIC PNL TOTAL CA: CPT | Performed by: INTERNAL MEDICINE

## 2021-03-31 ENCOUNTER — OFFICE VISIT (OUTPATIENT)
Dept: CARDIOLOGY | Facility: CLINIC | Age: 51
End: 2021-03-31
Attending: INTERNAL MEDICINE
Payer: COMMERCIAL

## 2021-03-31 VITALS
HEIGHT: 66 IN | DIASTOLIC BLOOD PRESSURE: 80 MMHG | HEART RATE: 75 BPM | SYSTOLIC BLOOD PRESSURE: 120 MMHG | BODY MASS INDEX: 26.03 KG/M2 | WEIGHT: 162 LBS

## 2021-03-31 DIAGNOSIS — I50.22 CHRONIC SYSTOLIC CONGESTIVE HEART FAILURE (H): ICD-10-CM

## 2021-03-31 DIAGNOSIS — I42.8 NONISCHEMIC CARDIOMYOPATHY (H): Primary | ICD-10-CM

## 2021-03-31 DIAGNOSIS — I47.10 SVT (SUPRAVENTRICULAR TACHYCARDIA) (H): ICD-10-CM

## 2021-03-31 PROCEDURE — 99215 OFFICE O/P EST HI 40 MIN: CPT | Performed by: INTERNAL MEDICINE

## 2021-03-31 RX ORDER — SPIRONOLACTONE 25 MG/1
25 TABLET ORAL DAILY
Qty: 90 TABLET | Refills: 3 | Status: SHIPPED | OUTPATIENT
Start: 2021-03-31 | End: 2022-03-21

## 2021-03-31 RX ORDER — LISINOPRIL 5 MG/1
5 TABLET ORAL 2 TIMES DAILY
Qty: 90 TABLET | Refills: 3 | Status: SHIPPED | OUTPATIENT
Start: 2021-03-31 | End: 2021-08-17

## 2021-03-31 RX ORDER — CARVEDILOL 6.25 MG/1
6.25 TABLET ORAL 2 TIMES DAILY WITH MEALS
Qty: 180 TABLET | Refills: 3 | Status: SHIPPED | OUTPATIENT
Start: 2021-03-31 | End: 2021-04-19

## 2021-03-31 ASSESSMENT — MIFFLIN-ST. JEOR: SCORE: 1366.58

## 2021-03-31 NOTE — PROGRESS NOTES
HPI and Plan:   See dictation    Orders Placed This Encounter   Procedures     MRI Cardiac w/contrast     Basic metabolic panel     Basic metabolic panel     Basic metabolic panel     Basic metabolic panel     [facility] with [facility] - NICK visit     Follow-Up with [facility] - Return MD visit       Orders Placed This Encounter   Medications     carvedilol (COREG) 6.25 MG tablet     Sig: Take 1 tablet (6.25 mg) by mouth 2 times daily (with meals)     Dispense:  180 tablet     Refill:  3     lisinopril (ZESTRIL) 5 MG tablet     Sig: Take 1 tablet (5 mg) by mouth 2 times daily     Dispense:  90 tablet     Refill:  3     spironolactone (ALDACTONE) 25 MG tablet     Sig: Take 1 tablet (25 mg) by mouth daily     Dispense:  90 tablet     Refill:  3       Medications Discontinued During This Encounter   Medication Reason     carvedilol (COREG) 3.125 MG tablet      lisinopril (ZESTRIL) 2.5 MG tablet      potassium chloride ER (KLOR-CON M) 20 MEQ CR tablet          Encounter Diagnoses   Name Primary?     Nonischemic cardiomyopathy (H) Yes     Chronic systolic congestive heart failure (H)      SVT (supraventricular tachycardia) (H)        CURRENT MEDICATIONS:  Current Outpatient Medications   Medication Sig Dispense Refill     Calcium Carb-Cholecalciferol (CALCIUM 1000 + D) 1000-800 MG-UNIT TABS        carvedilol (COREG) 6.25 MG tablet Take 1 tablet (6.25 mg) by mouth 2 times daily (with meals) 180 tablet 3     cetirizine (ZYRTEC) 10 MG tablet Take 10 mg by mouth daily       Cyanocobalamin (B-12) 3000 MCG CAPS        ferrous sulfate (FEROSUL) 325 (65 Fe) MG tablet        folic acid (FOLVITE) 400 MCG tablet        furosemide (LASIX) 20 MG tablet 10 mg daily. Take an additional 10 mg as directed. 180 tablet 3     lisinopril (ZESTRIL) 5 MG tablet Take 1 tablet (5 mg) by mouth 2 times daily 90 tablet 3     LORazepam (ATIVAN) 0.5 MG tablet 1 tablet by mouth at bedtime for sleep and anxiety. 30 tablet 0     sertraline (ZOLOFT) 50  MG tablet Take 0.5 tablets (25 mg) by mouth daily for 4 days, THEN 1 tablet (50 mg) daily. 90 tablet 0     spironolactone (ALDACTONE) 25 MG tablet Take 1 tablet (25 mg) by mouth daily 90 tablet 3       ALLERGIES     Allergies   Allergen Reactions     Cold & Flu [Cold Defense Fighter]      See pseudoephedrine     Seasonal Allergies      Sudafed Cold-Cough [Dayquil Liquicaps]      Pseudoephedrine Rash     Rash then skins peels off        PAST MEDICAL HISTORY:  Past Medical History:   Diagnosis Date     Anxiety attack 9/16/2014     Cardiomyopathy (H)     non ishemic - 25-30% - Chemo related     Diffuse large B-cell lymphoma (H)     Diagnosed 11/2019, Ronan Albarran     Encounter for Essure implantation 2009     Generalized anxiety disorder 9/16/2014    zoloft = flat emotions     HFrEF (heart failure with reduced ejection fraction) (H)     new diagnosis 6/14     Menopausal disorder     started on OCPs by menopause center 3/2017 (takes active continuously)     Menstrual headache     helped by OCPs and magnesium     Paroxysmal SVT (supraventricular tachycardia) (H) 06/2020     GERTRUDE (stress urinary incontinence, female)     sling procedure 2016       PAST SURGICAL HISTORY:  Past Surgical History:   Procedure Laterality Date     CV CORONARY ANGIOGRAM N/A 6/15/2020    Procedure: Coronary Angiogram;  Surgeon: Luis Eduardo Phillips MD;  Location:  [facility]     CV LEFT HEART CATH N/A 6/15/2020    Procedure: Left Heart Cath;  Surgeon: Luis Eduardo Phillips MD;  Location:  [facility]     EP ABLATION SVT N/A 6/22/2020    Procedure: EP Ablation SVT;  Surgeon: Francisco Javier Bynum MD;  Location:  [facility]ATH [facility]      KIT ESSURE  2009    essure - Dr. Simeon Block      HERNIORRHAPHY UMBILICAL  1974     IR CHEST PORT PLACEMENT > 5 YRS OF AGE  1/9/2020     mediastinoscopy  2019     SLING TRANSPUBO WITHOUT ANTERIOR COLPORRHAPHY N/A 11/21/2016    Procedure: SLING TRANSPUBO WITHOUT ANTERIOR COLPORRHAPHY;   Surgeon: Hrenesto Berrios MD;  Location: [facility]       FAMILY HISTORY:  Family History   Problem Relation Age of Onset     Hypertension Mother      Depression Mother      Lipids Mother      Cardiovascular Mother      Circulatory Mother      Diabetes Mother      Heart Disease Mother      Cerebrovascular Disease Mother      Obesity Mother      C.A.D. 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     Lifestyle     Physical activity     Days per week: None     Minutes per session: None     Stress: None   Relationships     Social connections     Talks on phone: None     Gets together: None     Attends [employer] service: None     Active member of club or organization: None     Attends meetings of clubs or organizations: None     Relationship status: None     Intimate partner violence     Fear of  "current or ex partner: None     Emotionally abused: None     Physically abused: None     Forced sexual activity: None   Other Topics Concern     Parent/sibling w/ CABG, MI or angioplasty before 65F 55M? No      Service Not Asked     Blood Transfusions Not Asked     Caffeine Concern Yes     Comment: MOnster energy drink.   Te - 3 cups.        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        Review of Systems:  Skin:  Negative       Eyes:  Negative      ENT:  Negative      Respiratory:  Negative       Cardiovascular:  Negative for;palpitations;chest pain;edema;syncope or near-syncope;cyanosis;exercise intolerance;fatigue;dizziness Positive for;lightheadedness    Gastroenterology: Negative      Genitourinary:  Negative      Musculoskeletal:  Negative      Neurologic:  Negative      Psychiatric:  Negative      Heme/Lymph/Imm:  Negative      Endocrine:  Negative        Physical Exam:  Vitals: /80   Pulse 75   Ht 1.676 m (5' 6\")   Wt 73.5 kg (162 lb)   LMP 11/06/2019   BMI 26.15 kg/m      Constitutional:  cooperative;in no acute distress        Skin:  warm and dry to the touch          Head:  normocephalic        Eyes:  pupils equal and round        Lymph:      ENT:  no pallor or cyanosis, dentition good        Neck:  JVP normal;no carotid bruit        Respiratory:  clear to auscultation;normal respiratory excursion         Cardiac: regular rhythm;normal S1 and S2     no presence of murmur          pulses full and equal                                        GI:  abdomen soft;BS normoactive        Extremities and Muscular Skeletal:  no edema       "        Neurological:  affect appropriate        Psych:  Alert and Oriented x 3        CC  Trevon Pearl MD  [id number]  [place] [address]

## 2021-03-31 NOTE — PROGRESS NOTES
Service Date: 03/31/2021      HISTORY OF PRESENT ILLNESS:  I had the opportunity to see Ms. Kassie Ramirez in Cardiology Clinic today at [facility] in [place] for reevaluation of idiopathic cardiomyopathy and chronic systolic heart failure.        Ms. Ramirez began having shortness of breath and cough in the fall of 2019.  Chest CT showed hilar adenopathy, which led to bronchoscopy for needle biopsy and tissue diagnosis.  Initially, this was found to be negative for malignancy and therefore assumed to be sarcoidosis.  She was started on high-dose steroid therapy.  However, her adenopathy continued to get worse and her breathing also worsened.  She got a second opinion and mediastinoscopy was performed with a larger tissue sample showing large B-cell lymphoma.  She immediately began chemotherapy at about Thanksgiving time in 2019.      During this time, she was also experiencing frequent episodes of SVT, sometimes prolonged associated with worsening shortness of breath.  Her SVT was at about 170 beats per minute.  She converted to sinus rhythm with adenosine, but her troponin was mildly elevated and she was started on low-dose carvedilol.  Her echo showed normal left ventricular function at that time.        Followup cardiac MRI was done on 10/09/2019 which was essentially normal.  There is no evidence of infiltrative disease and her left ventricular ejection fraction was 59%.  The hilar adenopathy was again noted.  Eventually, she underwent ablation for SVT and has not had recurrence of that since her ablation was performed in 06/2020.      She completed chemotherapy in 04/2020, but unfortunately her ejection fraction had gone down precipitously and was estimated at 25%-30% by echocardiogram in 06/2020.  Initially, this was suspected to be due to recurrent SVT, but after her ablation, her left ventricular function failed to improve.  She was able to tolerate only small doses of carvedilol and  lisinopril and was using a low dose of Lasix as well.        I last saw her in 09/2020, although we have seen [facility] back [facility] 6 weeks ago and she tells me that since my last visit with her, she has been doing better.  She is able to walk 3-5 miles a day without shortness of breath, even walking up hills.  She has minimal lightheadedness.  Her heart rate has come down from the 90s to the 70s and her blood pressure has come up.  In September, it was 102/70 and today it is 120/80.      We did an echocardiogram again on 03/29/2021, 2 days ago, which shows slight improvement in left ventricular function.  Her ejection fraction was reported to be 20%-25%, which is inaccurate.  I do not believe her ejection fraction at that low.  The biplane EF was reported to be 30%, although in my opinion, her ejection fraction looks more like 30%-35%.  She has no significant valvular disease.      PHYSICAL EXAMINATION:  On examination here today, her blood pressure is 120/80, heart rate 75 and weight 162 pounds and stable.  Her lungs are clear.  Heart rhythm is regular.  She has no cardiac murmurs or carotid bruits.  No edema.      IMPRESSIONS:  Ms. Kassie Ramirez is a 51-year-old woman with persistent idiopathic cardiomyopathy, likely due to chemotherapy with Adriamycin for treatment of large B-cell lymphoma.  Her chemotherapy started in 11/2019 and concluded in 04/2020.  She remains in remission at this time.  She was initially diagnosed with a cardiomyopathy in 06/2020.  She was having episodes of SVT at that time and treated with ablation, but her cardiomyopathy did not improve with that treatment.  Initially, she was able to tolerate only low doses of medications to treat her cardiomyopathy, but her blood pressure has come up and her heart rate has come down, indicative of improvement in left ventricular function since I last saw her in September.  Indeed, her echocardiogram to me does look better with an ejection  fraction of 30%-35%.      I will continue to try to treat her aggressively with medical therapy.  I will increase her carvedilol to 6.25 mg p.o. b.i.d., add spironolactone 25 mg daily, stop her potassium supplement and increase her lisinopril to 5 mg p.o. b.i.d.  I have asked her to do this all incrementally so that we do not overload her with too much medication at once and that she has a chance to tolerate each increase in medication before adding the next one.  I will check a basic metabolic panel in 1 week and again in 3 weeks to make sure her potassium and kidney function remains stable.  We may need to re-add some potassium supplement.      I will have her return in 6 weeks to continue to titrate up her medications if possible.  I will have her see me again in 3 months and repeat a cardiac MRI at that time to exclude the possibility that we may still be dealing with some sarcoid issue and to reevaluate her left ventricular function more objectively.      Forty-five minutes spent today in pre-charting, review of echo images personally, review of EKGs, patient visit and documentation.      cc:     Mary Alice Colón PA-C   [address] [address]         ASHLEY GORDON MD, formerly [facility]             D: 2021   T: 2021   MT: NAY      Name:     BABAR VARGHESE   MRN:      [id number]        Account:      [id number]   :      1970           Service Date: 2021      Document: [id number]

## 2021-03-31 NOTE — LETTER
3/31/2021    Mary Alice Colón PA-C  [address] [facility] [address]    RE: Kassie Ramirez       Dear Colleague,    I had the pleasure of seeing Kassie Ramirez in the [facility].    HPI and Plan:   See dictation    Orders Placed This Encounter   Procedures     MRI Cardiac w/contrast     Basic metabolic panel     Basic metabolic panel     Basic metabolic panel     Basic metabolic panel     [facility] with [facility] - NICK visit     Follow-Up with [facility] - Return MD visit       Orders Placed This Encounter   Medications     carvedilol (COREG) 6.25 MG tablet     Sig: Take 1 tablet (6.25 mg) by mouth 2 times daily (with meals)     Dispense:  180 tablet     Refill:  3     lisinopril (ZESTRIL) 5 MG tablet     Sig: Take 1 tablet (5 mg) by mouth 2 times daily     Dispense:  90 tablet     Refill:  3     spironolactone (ALDACTONE) 25 MG tablet     Sig: Take 1 tablet (25 mg) by mouth daily     Dispense:  90 tablet     Refill:  3       Medications Discontinued During This Encounter   Medication Reason     carvedilol (COREG) 3.125 MG tablet      lisinopril (ZESTRIL) 2.5 MG tablet      potassium chloride ER (KLOR-CON M) 20 MEQ CR tablet          Encounter Diagnoses   Name Primary?     Nonischemic cardiomyopathy (H) Yes     Chronic systolic congestive heart failure (H)      SVT (supraventricular tachycardia) (H)        CURRENT MEDICATIONS:  Current Outpatient Medications   Medication Sig Dispense Refill     Calcium Carb-Cholecalciferol (CALCIUM 1000 + D) 1000-800 MG-UNIT TABS        carvedilol (COREG) 6.25 MG tablet Take 1 tablet (6.25 mg) by mouth 2 times daily (with meals) 180 tablet 3     cetirizine (ZYRTEC) 10 MG tablet Take 10 mg by mouth daily       Cyanocobalamin (B-12) 3000 MCG CAPS        ferrous sulfate (FEROSUL) 325 (65 Fe) MG tablet        folic acid (FOLVITE) 400 MCG tablet        furosemide (LASIX) 20 MG tablet 10 mg daily. Take an  additional 10 mg as directed. 180 tablet 3     lisinopril (ZESTRIL) 5 MG tablet Take 1 tablet (5 mg) by mouth 2 times daily 90 tablet 3     LORazepam (ATIVAN) 0.5 MG tablet 1 tablet by mouth at bedtime for sleep and anxiety. 30 tablet 0     sertraline (ZOLOFT) 50 MG tablet Take 0.5 tablets (25 mg) by mouth daily for 4 days, THEN 1 tablet (50 mg) daily. 90 tablet 0     spironolactone (ALDACTONE) 25 MG tablet Take 1 tablet (25 mg) by mouth daily 90 tablet 3       ALLERGIES     Allergies   Allergen Reactions     Cold & Flu [Cold Defense Fighter]      See pseudoephedrine     Seasonal Allergies      Sudafed Cold-Cough [Dayquil Liquicaps]      Pseudoephedrine Rash     Rash then skins peels off        PAST MEDICAL HISTORY:  Past Medical History:   Diagnosis Date     Anxiety attack 9/16/2014     Cardiomyopathy (H)     non ishemic - 25-30% - Chemo related     Diffuse large B-cell lymphoma (H)     Diagnosed 11/2019, Ronan Albarran     Encounter for Essure implantation 2009     Generalized anxiety disorder 9/16/2014    zoloft = flat emotions     HFrEF (heart failure with reduced ejection fraction) (H)     new diagnosis 6/14     Menopausal disorder     started on OCPs by menopause center 3/2017 (takes active continuously)     Menstrual headache     helped by OCPs and magnesium     Paroxysmal SVT (supraventricular tachycardia) (H) 06/2020     GERTRUDE (stress urinary incontinence, female)     sling procedure 2016       PAST SURGICAL HISTORY:  Past Surgical History:   Procedure Laterality Date     CV CORONARY ANGIOGRAM N/A 6/15/2020    Procedure: Coronary Angiogram;  Surgeon: Luis Eduardo Phillips MD;  Location:  [facility] CARDI[facility] CATH LAB     CV LEFT HEART CATH N/A 6/15/2020    Procedure: Left Heart Cath;  Surgeon: Luis Eduardo Phillips MD;  Location:  HE[facility]ARDI[facility] CATH LAB     EP ABLATION SVT N/A 6/22/2020    Procedure: EP Ablation SVT;  Surgeon: Francisco Javier Bynum MD;  Location:  HEART CARDIAC CATH LAB      KIT ESSURE  2009     seng Simeon Block      HERNIORRHAPHY UMBILICAL  1974     IR CHEST PORT PLACEMENT > 5 YRS OF AGE  1/9/2020     mediastinoscopy  2019     SLING TRANSPUBO WITHOUT ANTERIOR COLPORRHAPHY N/A 11/21/2016    Procedure: SLING TRANSPUBO WITHOUT ANTERIOR COLPORRHAPHY;  Surgeon: Hernesto Berrios MD;  Location: [facility]       FAMILY HISTORY:  Family History   Problem Relation Age of Onset     Hypertension Mother      Depression Mother      Lipids Mother      Cardiovascular Mother      Circulatory Mother      Diabetes Mother      Heart Disease Mother      Cerebrovascular Disease Mother      Obesity Mother      C.A.D. 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      Comment: 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      Comment: BA in Education    Social Needs     Financial resource strain: None     Food insecurity     Worry: None     Inability: None     Transportation needs     Medical: None     Non-medical: None   Tobacco Use     Smoking status: Never Smoker     Smokeless tobacco: Never Used   Substance and Sexual Activity     Alcohol use: No     Alcohol/week: 0.0 standard drinks     Comment: 1 time per month     Drug use: No     Sexual activity: Yes     Partners: Male     Birth control/protection: Implant     Comment: Silvia     Lifestyle     Physical activity     Days per week: None     Minutes per session: None     Stress: None   Relationships     Social  "connections     Talks on phone: None     Gets together: None     Attends [employer] service: None     Active member of club or organization: None     Attends meetings of clubs or organizations: None     Relationship status: None     Intimate partner violence     Fear of current or ex partner: None     Emotionally abused: None     Physically abused: None     Forced sexual activity: None   Other Topics Concern     Parent/sibling w/ CABG, MI or angioplasty before 65F 55M? No      Service Not Asked     Blood Transfusions Not Asked     Caffeine Concern Yes     Comment: MOnster energy drink.   Te - 3 cups.        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        Review of Systems:  Skin:  Negative       Eyes:  Negative      ENT:  Negative      Respiratory:  Negative       Cardiovascular:  Negative for;palpitations;chest pain;edema;syncope or near-syncope;cyanosis;exercise intolerance;fatigue;dizziness Positive for;lightheadedness    Gastroenterology: Negative      Genitourinary:  Negative      Musculoskeletal:  Negative      Neurologic:  Negative      Psychiatric:  Negative      Heme/Lymph/Imm:  Negative      Endocrine:  Negative        Physical Exam:  Vitals: /80   Pulse 75   Ht 1.676 m (5' 6\")   Wt 73.5 kg (162 lb)   LMP 11/06/2019   BMI 26.15 kg/m      Constitutional:  cooperative;in no acute distress        Skin:  warm and dry to the touch          Head:  normocephalic        Eyes:  pupils equal and round        Lymph:      ENT:  no pallor or cyanosis, dentition good        Neck:  JVP normal;no carotid bruit        Respiratory:  clear to " auscultation;normal respiratory excursion         Cardiac: regular rhythm;normal S1 and S2     no presence of murmur          pulses full and equal                                        GI:  abdomen soft;BS normoactive        Extremities and Muscular Skeletal:  no edema              Neurological:  affect appropriate        Psych:  Alert and Oriented x 3        Thank you for allowing me to participate in the care of your patient.      Sincerely,     TREVON PEARL MD     [facility]    cc:   Trevon Pearl MD  [address] [address]

## 2021-03-31 NOTE — PATIENT INSTRUCTIONS
Increase the carvedilol to 6.25 mg twice daily. This can be done by increasing the evening dose first, then the morning dose as tolerated.     Add spironolactone 25 mg daily. Get plenty of fluids.    Stop potassium.    Blood test in a week.     If you are doing well without too much lightheadedness after 2 weeks, increase lisinopril to 5 mg twice daily.     Another blood test in 3 weeks.

## 2021-04-02 ENCOUNTER — IMMUNIZATION (OUTPATIENT)
Dept: PEDIATRICS | Facility: CLINIC | Age: 51
End: 2021-04-02
Attending: INTERNAL MEDICINE
Payer: COMMERCIAL

## 2021-04-02 ENCOUNTER — HOSPITAL ENCOUNTER (OUTPATIENT)
Dept: LAB | Facility: CLINIC | Age: 51
Discharge: HOME | End: 2021-04-02
Attending: INTERNAL MEDICINE | Admitting: INTERNAL MEDICINE
Payer: COMMERCIAL

## 2021-04-02 ENCOUNTER — NURSE TRIAGE (OUTPATIENT)
Dept: NURSING | Facility: CLINIC | Age: 51
End: 2021-04-02

## 2021-04-02 DIAGNOSIS — Z11.59 ENCOUNTER FOR SCREENING FOR OTHER VIRAL DISEASES: ICD-10-CM

## 2021-04-02 LAB
SARS-COV-2 RNA RESP QL NAA+PROBE: NORMAL
SPECIMEN SOURCE: NORMAL

## 2021-04-02 PROCEDURE — 91300 PR COVID VAC PFIZER DIL RECON 30 MCG/0.3 ML IM: CPT

## 2021-04-02 PROCEDURE — U0005 INFEC AGEN DETEC AMPLI PROBE: HCPCS | Performed by: INTERNAL MEDICINE

## 2021-04-02 PROCEDURE — U0003 INFECTIOUS AGENT DETECTION BY NUCLEIC ACID (DNA OR RNA); SEVERE ACUTE RESPIRATORY SYNDROME CORONAVIRUS 2 (SARS-COV-2) (CORONAVIRUS DISEASE [COVID-19]), AMPLIFIED PROBE TECHNIQUE, MAKING USE OF HIGH THROUGHPUT TECHNOLOGIES AS DESCRIBED BY CMS-2020-01-R: HCPCS | Performed by: INTERNAL MEDICINE

## 2021-04-02 PROCEDURE — 0002A PR COVID VAC PFIZER DIL RECON 30 MCG/0.3 ML IM: CPT

## 2021-04-02 NOTE — TELEPHONE ENCOUNTER
Triage call:   Patient states that she is supposed to have a pre procedural COVID test today - states on her mychart she no longer can see her appointment time. Advised that writer can still see her COVID test at 10:45 am in the appointment tabs. Patient declines additional questions.     Dana Fajardo RN BSN 4/2/2021 7:56 AM    Additional Information    Negative: Nursing judgment    Negative: Nursing judgment    Negative: Nursing judgment    Negative: Nursing judgment    Information only question and nurse able to answer    Protocols used: INFORMATION ONLY CALL - NO TRIAGE-A-OH

## 2021-04-03 LAB
LABORATORY COMMENT REPORT: NORMAL
SARS-COV-2 RNA RESP QL NAA+PROBE: NEGATIVE
SPECIMEN SOURCE: NORMAL

## 2021-04-05 ENCOUNTER — APPOINTMENT (OUTPATIENT)
Dept: INTERVENTIONAL RADIOLOGY/VASCULAR | Facility: CLINIC | Age: 51
End: 2021-04-05
Attending: INTERNAL MEDICINE
Payer: COMMERCIAL

## 2021-04-05 ENCOUNTER — HOSPITAL ENCOUNTER (OUTPATIENT)
Facility: CLINIC | Age: 51
Discharge: HOME | End: 2021-04-05
Attending: RADIOLOGY | Admitting: RADIOLOGY
Payer: COMMERCIAL

## 2021-04-05 VITALS
SYSTOLIC BLOOD PRESSURE: 116 MMHG | OXYGEN SATURATION: 96 % | HEART RATE: 67 BPM | TEMPERATURE: 97.7 F | RESPIRATION RATE: 12 BRPM | DIASTOLIC BLOOD PRESSURE: 80 MMHG

## 2021-04-05 DIAGNOSIS — C83.32 DIFFUSE LARGE B-CELL LYMPHOMA OF INTRATHORACIC LYMPH NODES (H): ICD-10-CM

## 2021-04-05 DIAGNOSIS — I42.9 SECONDARY CARDIOMYOPATHY (H): ICD-10-CM

## 2021-04-05 DIAGNOSIS — R59.0 MEDIASTINAL ADENOPATHY: ICD-10-CM

## 2021-04-05 DIAGNOSIS — F41.9 ANXIETY: ICD-10-CM

## 2021-04-05 PROCEDURE — 250N000011 HC RX IP 250 OP 636

## 2021-04-05 PROCEDURE — 250N000009 HC RX 250

## 2021-04-05 RX ORDER — NALOXONE HYDROCHLORIDE 0.4 MG/ML
0.2 INJECTION, SOLUTION INTRAMUSCULAR; INTRAVENOUS; SUBCUTANEOUS
Status: DISCONTINUED | OUTPATIENT
Start: 2021-04-05 | End: 2021-04-05 | Stop reason: HOSPADM

## 2021-04-05 RX ORDER — LIDOCAINE HYDROCHLORIDE AND EPINEPHRINE 10; 10 MG/ML; UG/ML
INJECTION, SOLUTION INFILTRATION; PERINEURAL
Status: COMPLETED
Start: 2021-04-05 | End: 2021-04-05

## 2021-04-05 RX ORDER — NALOXONE HYDROCHLORIDE 0.4 MG/ML
0.4 INJECTION, SOLUTION INTRAMUSCULAR; INTRAVENOUS; SUBCUTANEOUS
Status: DISCONTINUED | OUTPATIENT
Start: 2021-04-05 | End: 2021-04-05 | Stop reason: HOSPADM

## 2021-04-05 RX ORDER — FENTANYL CITRATE 50 UG/ML
INJECTION, SOLUTION INTRAMUSCULAR; INTRAVENOUS
Status: COMPLETED
Start: 2021-04-05 | End: 2021-04-05

## 2021-04-05 RX ORDER — LIDOCAINE 40 MG/G
CREAM TOPICAL
Status: DISCONTINUED | OUTPATIENT
Start: 2021-04-05 | End: 2021-04-05 | Stop reason: HOSPADM

## 2021-04-05 RX ORDER — LIDOCAINE HYDROCHLORIDE 10 MG/ML
INJECTION, SOLUTION EPIDURAL; INFILTRATION; INTRACAUDAL; PERINEURAL
Status: DISCONTINUED
Start: 2021-04-05 | End: 2021-04-05 | Stop reason: WASHOUT

## 2021-04-05 RX ORDER — LIDOCAINE HYDROCHLORIDE 10 MG/ML
INJECTION, SOLUTION EPIDURAL; INFILTRATION; INTRACAUDAL; PERINEURAL
Status: DISCONTINUED
Start: 2021-04-05 | End: 2021-04-05 | Stop reason: HOSPADM

## 2021-04-05 RX ORDER — FLUMAZENIL 0.1 MG/ML
0.2 INJECTION, SOLUTION INTRAVENOUS
Status: DISCONTINUED | OUTPATIENT
Start: 2021-04-05 | End: 2021-04-05 | Stop reason: HOSPADM

## 2021-04-05 RX ORDER — FENTANYL CITRATE 50 UG/ML
25-50 INJECTION, SOLUTION INTRAMUSCULAR; INTRAVENOUS EVERY 5 MIN PRN
Status: DISCONTINUED | OUTPATIENT
Start: 2021-04-05 | End: 2021-04-05 | Stop reason: HOSPADM

## 2021-04-05 RX ORDER — LIDOCAINE HYDROCHLORIDE AND EPINEPHRINE 10; 10 MG/ML; UG/ML
INJECTION, SOLUTION INFILTRATION; PERINEURAL
Status: DISCONTINUED
Start: 2021-04-05 | End: 2021-04-05 | Stop reason: WASHOUT

## 2021-04-05 RX ADMIN — LIDOCAINE HYDROCHLORIDE,EPINEPHRINE BITARTRATE 10 ML: 10; .01 INJECTION, SOLUTION INFILTRATION; PERINEURAL at 10:17

## 2021-04-05 RX ADMIN — LIDOCAINE HYDROCHLORIDE 3 ML: 10 INJECTION, SOLUTION EPIDURAL; INFILTRATION; INTRACAUDAL; PERINEURAL at 10:17

## 2021-04-05 RX ADMIN — MIDAZOLAM 1 MG: 1 INJECTION INTRAMUSCULAR; INTRAVENOUS at 10:16

## 2021-04-05 RX ADMIN — FENTANYL CITRATE 50 MCG: 50 INJECTION, SOLUTION INTRAMUSCULAR; INTRAVENOUS at 10:16

## 2021-04-05 NOTE — IP AVS SNAPSHOT
After Visit Summary Template Not Found    This Print Group is only intended to be used in the After Visit Summary and can only be used in a report that uses a released After Visit Summary Template.                       MRN:[id number]                      After Visit Summary   4/5/2021    Kassie Ramirez    MRN: [id number]           Visit Information        Department      4/5/2021  8:56 AM [facility] Lab          Review of your medicines      UNREVIEWED medicines. Ask your doctor about these medicines       Dose / Directions   B-12 3000 MCG Caps      Refills: 0     Calcium 1000 + D 1000-800 MG-UNIT Tabs  Generic drug: Calcium Carb-Cholecalciferol      Refills: 0     carvedilol 6.25 MG tablet  Commonly known as: COREG      Dose: 6.25 mg  Take 1 tablet (6.25 mg) by mouth 2 times daily (with meals)  Quantity: 180 tablet  Refills: 3     cetirizine 10 MG tablet  Commonly known as: zyrTEC      Dose: 10 mg  Take 10 mg by mouth daily  Refills: 0     ferrous sulfate 325 (65 Fe) MG tablet  Commonly known as: FEROSUL      Refills: 0     folic acid 400 MCG tablet  Commonly known as: FOLVITE      Refills: 0     furosemide 20 MG tablet  Commonly known as: LASIX  Used for: Secondary cardiomyopathy (H)      10 mg daily. Take an additional 10 mg as directed.  Quantity: 180 tablet  Refills: 3     lisinopril 5 MG tablet  Commonly known as: ZESTRIL      Dose: 5 mg  Take 1 tablet (5 mg) by mouth 2 times daily  Quantity: 90 tablet  Refills: 3     LORazepam 0.5 MG tablet  Commonly known as: ATIVAN  Used for: Diffuse large B-cell lymphoma of extranodal site (H) - per pathology 11/27/19 - mediastinal mass wrapped around azalea and bilateral main stem bronchi- treating with Dr. Castro, Diffuse large B-cell lymphoma of intrathoracic lymph nodes (H), Situational anxiety, Controlled substance agreement signed      1 tablet by mouth at bedtime for sleep and anxiety.  Quantity: 30 tablet  Refills: 0     sertraline 50 MG  tablet  Commonly known as: ZOLOFT  Used for: Situational anxiety      Start taking on: March 9, 2021  Take 0.5 tablets (25 mg) by mouth daily for 4 days, THEN 1 tablet (50 mg) daily.  Quantity: 90 tablet  Refills: 0     spironolactone 25 MG tablet  Commonly known as: ALDACTONE  Used for: Chronic systolic congestive heart failure (H)      Dose: 25 mg  Take 1 tablet (25 mg) by mouth daily  Quantity: 90 tablet  Refills: 3              Protect others around you: Learn how to safely use, store and throw away your medicines at www.disposemymeds.org.       Follow-ups after your visit       Your next 10 appointments already scheduled    Apr 05, 2021 10:00 AM  (Arrive by 9:00 AM)  IR PORT REMOVAL RIGHT with RHIRROGER John RH IR RAD  [facility] Interventional Radiology ([facility] ) [address] [facility] [id number] [phone]   How do I prepare for my exam? (Food and drink instructions)  Adults and Children over 2 years old: No eating for 8 hours before your procedure. You may have clear liquids up until 2 hours beforehand. These include water, clear tea, black coffee and fruit juice without pulp.  Children under 2 years old:  No eating or formula for 6 hours before your procedure. You may have clear liquids up until 2 hours beforehand. No breast milk for 4 hours before your procedure.    How do I prepare for my exam? (Other instructions)  We will call you to talk about your procedure and answer your questions. We will tell you what time to arrive. We will also ask about any medicines you are taking.  You will need to have a history and physical within 30 days before this procedure.    What should I wear: Please wear loose clothing, such as a sweat suit or jogging clothes. You will be asked to undress and put on a hospital gown.    How long does the exam take: You should expect to be at the facility for approximately 4 hours    What should I bring: Please bring any  scans or X-rays taken at other hospitals, if similar tests were done. Also bring a list of your medicines, including vitamins, minerals and over-the-counter drugs. It is safest to leave personal items at home.    Do I need a :  Yes, you may not drive or take a bus or taxi by yourself. You will need an adult to take you home. It is recommended that a responsible adult remain with you for 6 hours.    What do I need to tell my doctor:  Tell your doctor if:  * You have ever had an allergic reaction to X-ray dye (contrast fluid).  * If there's any chance you are pregnant.    What should I do after the exam:  Rest and do quiet activities for 24 hours. Avoid any heavy activity (lifting, vacuuming, exercise) on the day of the exam.  Do not make any major decisions and ensure you have a responsible adult with you for the remainder of the day.   Do not drive or use dangerous machines or tools for 24 hours.  Eat small, frequent meals to prevent nausea and vomiting.   Drink liquids as directed. Do not drink alcohol or take medicines that make you drowsy.  You should be able to return to your everyday activities the next day.    Who should I call with questions: If you have any questions, please call the Imaging Department where you will have your exam. Directions, parking instructions, and other information are available on our website, [email or web address].     Apr 07, 2021  3:30 PM  Lab visit with RV LAB  [facility] Laboratory ([facility] ) [address] [place] [id number] [phone]   Please do not eat 10-12 hours before your appointment if you are coming in fasting for labs on lipids, cholesterol, or glucose (sugar). Does not apply to pregnant women.    Water with medications is okay. Do not drink coffee or other fluids.    If you have concerns about taking your medications, please send a message by clicking on Secure Messaging, Message Your  Care Team.     Apr 22, 2021  7:45 AM  Lab visit with RI LAB  [facility] ([facility] ) [address]  [facility] [address]  [phone]   Please do not eat 10-12 hours before your appointment if you are coming in fasting for labs on lipids, cholesterol, or glucose (sugar). Does not apply to pregnant women.    Water with medications is okay. Do not drink coffee or other fluids.    If you have concerns about taking your medications, please send a message by clicking on Secure Messaging, Message Your Care Team.     Apr 28, 2021 11:00 AM  Video Visit with Mary Alice Colón PA-C  [facility] ([facility] ) [address] [place] [phone]   Please Note: This is a virtual visit; there is no need to come to the facility.       May 04, 2021  7:30 AM  LAB with RU LAB  [facility] ([facility] ) [address]  [facility] [address]  [phone]   Before you lab test (cholesterol test): Unless we tell you otherwise, you may only have water before your test. Stop all other food and drink 8 hours before the test (no juice, tea, coffee, soda, soft foods etc.) If you take medicine in the morning, take it with water.     May 11, 2021  8:00 AM  CORE Return with ALEX [facility] ([facility] ) [address]  [facility] [address]  [phone]      Jun 14, 2021 11:45 AM  Lab Central with  FAST TRACK PLUS 3  [facility] [place] ([facility] ) [facility]  [address] [place] [address] [address]  [facility] [address]  [phone]      Jun 14, 2021  1:00 PM  CT CHEST/ABDOMEN/PELVIS W CONTRAST with RSCCCT1  [facility]  Imaging ([facility] ) [address]  [facility] [address] [phone]   How do I prepare for my exam? (Food and drink instructions)  **You will have contrast for this exam.**  No Food and Drink Restrictions    How do I prepare for my exam? (Other instructions)  Please arrive 30 minutes early for your CT.  Once in the department you might be asked to drink water 15-20 minutes prior to your exam.  If indicated you may be asked to drink an oral contrast in advance of your CT.  If this is the case, the imaging team will let you know or be in contact with you prior to your appointment    If you have diabetes:  Continue to take your metformin medication on the day of your exam    What should I wear: Please wear loose clothing, such as a sweat suit or jogging clothes. Avoid snaps, zippers and other metal. We may ask you to undress and put on a hospital gown.    How long does the exam take: Most scans take less than 20 minutes.    What should I bring: Please bring any scans or X-rays taken at other hospitals, if similar tests were done. It is safest to leave personal items at home.    Do I need a : No  is needed.    What do I need to tell my doctor?  Be sure to tell your doctor:  * If you have any allergies.  * If there's any chance you are pregnant.  * If you are breastfeeding.    What should I do after the exam: No restrictions, You may resume normal activities.    What is this test: A CT (computed tomography) scan is a series of pictures that allows us to look inside your body. The scanner creates images of the body in cross sections, much like slices of bread. This helps us see any problems more clearly. You may receive contrast (X-ray dye) before or during your scan. You will be asked to drink the contrast.    Who should I call with questions: If you have any questions, please call the Imaging Department where you will have your exam. Directions,  "parking instructions, and other information is available on our website, [email or web address].     Jun 18, 2021  2:30 PM  Return Visit with Castro Castro MD  [facility] ([facility] ) [address]  [facility] [address]  [phone]      Aug 02, 2021  9:30 AM  MR MYOCARDIUM W CONTRAST with SCIMR1  [facility] ([facility] ) [address]  [place] [address]  [phone]   What is this test: MRI (magnetic resonance imaging) uses a strong magnet and radio waves to look inside the body. As MRA (magnetic resonance angiogram) does the same thing, but lets us look at your blood vessels. A computer turns the radio waves into pictures showing cross sections of the body, much like slices of bread. This helps us see any problems more clearly. You may receive fluid (called \"contrast\") before or during your scan. The fluid helps us see the pictures better. We give the fluid through an IV (small needle in your arm).    How do I prepare for my exam? (Food and drink instructions)  The day before your exam, drink extra fluids at least six 8-ounce glasses (unless your doctor wants you to limit your fluids).  Stop all caffeine 12 hours before the test. This includes coffee, tea, soda, chocolate and certain medicines (such as Anacin, Excedrin and NoDoz). Also avoid decaf coffee and tea, as these contain small amounts of caffeine.  Do not eat or drink for 3 hours before your exam. You may drink water and take your morning medicines.    What should I wear: The MRI machine uses a strong magnet. Please wear clothes without metals (snaps, zippers). A sweatsuit works well, or we may give you a hospital gown. Please remove any body piercings and hair extensions before you arrive. You will remove watches, jewelry, hairpins, wallets, " dentures, partial denture plates and hearing aids. You may wear contact lenses, and you may be able to wear your rings. We have a safe place to keep your personal items, but it is safer to leave them at home.    How long does the exam take: Most test take 30 to 60 minutes. HOWEVER, IF YOUR DOCTOR PRESCRIBES ANESTHESIA please plan on spending four to five hours in the recovery room.    What should I bring: Bring a list of your current medicines to your exam (including vitamins, minerals and over-the-counter drugs). If you are a minor (under age 18) you will need to bring a parent or legal guardian with you to the exam.    What should I do after the exam: No restrictions, you may resume normal activities.    Who should I call with questions: If you have any questions, please contact your Imaging Department exam site. Directions, parking instructions, and other information are available on our website, [email or web address].    If you are very claustrophobic, please let you doctor know. You may get a sedative medicine from your doctor before you arrive. Bring the medicine to the exam. Do not take it at home. Arrive one hour early with a . Your  must remain on site for the duration of the exam. Your medicine may make you sleepy. After the exam, you may not drive, take a bus or take a taxi by yourself.    How do I prepare if I m having sedation or anesthesia?  **IMPORTANT**  THE INSTRUCTIONS BELOW ARE ONLY FOR THOSE PATIENTS WHO HAVE BEEN TOLD THEY WILL RECEIVE SEDATION OR GENERAL ANESTHESIA DURING THEIR MRI PROCEDURE:    IF YOU WILL RECEIVE ORAL SEDATION (take medicine by mouth to help you relax during your exam):  You must get the medicine from your doctor before you arrive. Bring the medicine to the exam. Do not take it at home.  Arrive one hour early with a . Your  must remain on site for the duration of the exam. Your medicine may make you sleepy. After the exam, you may not drive, take a bus  or take a taxi by yourself.    IF YOU WILL RECEIVE SEDATION WITH AN IV OR ANESTHESIA (deeper level of sedation ordered and administered by a health professional):  Arrive 1 1/2 hours early with a . Your  must remain on site for the duration of the exam. Your medicine may make you sleepy. After the exam, you may not drive, take a bus or take a taxi by yourself.  No eating 8 hours before your exam. You may have clear liquids up until 4 hours before your exam. (Clear liquids include water, or fruit juice without pulp.)  You may spend up to four to five hours in the recovery room.        Care Instructions       Further instructions from your care team         Going Home after the   Removal of Your Port or Tunneled Dialysis Catheter  ______________________________________________________________________    Patient Name:  Kassie BAH James  Today's Date:  April 5, 2021    The doctor who removed your port or catheter was: Dr. Macias  at [facility]) in:    Interventional Radiology Department  When you get home:    No driving or drinking alcohol until tomorrow. You may still have side effects from   the medicine you received today (you may feel drowsy, unsteady or forgetful).    You should have an adult with you for the first 6 hours at home (radiology patients).    You may go back to your regular diet today.     If you take aspirin or Plavix, you may begin taking it again tomorrow. You may restart all other medicines today. Use pain medicine as directed.    Avoid heavy lifting or the overuse of your shoulder for three days.    Port site and bandage care    Keep the wound clean and dry for three days. Cover it with plastic before taking a shower.     Change the bandage if it gets wet or dirty. Use bacitracin (antibiotic cream) and clean gauze.     After three days, you may use Band-Aids until the wound has healed.    If you have oozing or bleeding from the port site or catheter (tube) site:   o Put direct  pressure on the wound for 5 to 10 minutes with a gauze pad.  If you still have bleeding after 10 minutes, call your doctor.  o If you are bleeding a lot and can t control it with direct pressure, call 911.    Call your doctor if you have:    Swelling in your neck.    Signs of infection:   o fever over 100  F (37.8 C) under the tongue  o the wound is red, tender or draining.    Additional Information About Your Visit       [employer] Information    [employer] gives you secure access to your electronic health record. If you see a primary care provider, you can also send messages to your care team and make appointments. If you have questions, please call your primary care clinic.  If you do not have a primary care provider, please call [phone] and they will assist you.       Care EveryWhere ID    This is your Care EveryWhere ID. This could be used by other organizations to access your [place] medical records  [id number]       Your Vitals Were     Last Period   11/06/2019              Primary Care Provider Office Phone # Fax #    Mary Alice Colón PA-C [phone]      Equal Access to Services    [facility] : Hadii shelly green hadasho Solouis, waaxda luqadaha, qaybta kaalmada fiorella, ranjan cannon . So [facility] [phone].    ATENCIÓN: Si habla español, tiene a valdivia disposición servicios gratuitos de asistencia lingüística. Wang al [phone].    We comply with applicable federal and state civil rights laws, including the [place] Human Rights Act. We do not discriminate on the basis of race, color, creed, Yazidism, national origin, marital status, age, disability, sex, sexual orientation, or gender identity.    If you would like an itemization of your charges they will now be available in [employer] 30 days after discharge. To access the itemized statements in [employer] go to billing/billing summary. From there select view account. There will be multiple tabs showing an overview  of your account, detail, payments, and communications. From the communications tab you can see your monthly statements, your itemized statements, and any billing letters generated for your account. If you do not have a [employer] account and need help getting access please contact [employer] at [phone].  If you would prefer to have your itemized statements mailed please contact our automated itemized bill request line at [phone] option  2.       Thank you!    Thank you for choosing [facility] for your care. Our goal is always to provide you with excellent care. Hearing back from our patients is one way we can continue to improve our services. Please take a few minutes to complete the written survey that you may receive in the mail after you visit. If you would like to speak to someone directly about your visit please contact Patient Relations at [phone]. Thank you!              Medication List      ASK your doctor about these medications          Morning Afternoon Evening Bedtime As Needed    B-12 3000 MCG Caps                     Calcium 1000 + D 1000-800 MG-UNIT Tabs  Generic drug: Calcium Carb-Cholecalciferol                     carvedilol 6.25 MG tablet  Also known as: COREG  INSTRUCTIONS: Take 1 tablet (6.25 mg) by mouth 2 times daily (with meals)                     cetirizine 10 MG tablet  Also known as: zyrTEC  INSTRUCTIONS: Take 10 mg by mouth daily                     ferrous sulfate 325 (65 Fe) MG tablet  Also known as: FEROSUL                     folic acid 400 MCG tablet  Also known as: FOLVITE                     furosemide 20 MG tablet  Also known as: LASIX  INSTRUCTIONS: 10 mg daily. Take an additional 10 mg as directed.                     lisinopril 5 MG tablet  Also known as: ZESTRIL  INSTRUCTIONS: Take 1 tablet (5 mg) by mouth 2 times daily                     LORazepam 0.5 MG tablet  Also known as: ATIVAN  INSTRUCTIONS: 1 tablet by mouth at bedtime for sleep and  anxiety.                     sertraline 50 MG tablet  Also known as: ZOLOFT  Start taking on: March 9, 2021  INSTRUCTIONS: Take 0.5 tablets (25 mg) by mouth daily for 4 days, THEN 1 tablet (50 mg) daily.                     spironolactone 25 MG tablet  Also known as: ALDACTONE  INSTRUCTIONS: Take 1 tablet (25 mg) by mouth daily

## 2021-04-05 NOTE — IP AVS SNAPSHOT
[facility]  [address]  [facility] [address]  Phone: [phone]                                    After Visit Summary   4/5/2021    Kassie Ramirez    MRN: [id number]           After Visit Summary Signature Page    I have received my discharge instructions, and my questions have been answered. I have discussed any challenges I see with this plan with the nurse or doctor.    ..........................................................................................................................................  Patient/Patient Representative Signature      ..........................................................................................................................................  Patient Representative Print Name and Relationship to Patient    ..................................................               ................................................  Date                                   Time    ..........................................................................................................................................  Reviewed by Signature/Title    ...................................................              ..............................................  Date                                               Time          22EPIC Rev 08/18

## 2021-04-05 NOTE — DISCHARGE INSTRUCTIONS
Going Home after the   Removal of Your Port or Tunneled Dialysis Catheter  ______________________________________________________________________    Patient Name:  Kassie Ramirez  Today's Date:  April 5, 2021    The doctor who removed your port or catheter was: Dr. Macias  at [facility]) in:    Interventional Radiology Department  When you get home:    No driving or drinking alcohol until tomorrow. You may still have side effects from   the medicine you received today (you may feel drowsy, unsteady or forgetful).    You should have an adult with you for the first 6 hours at home (radiology patients).    You may go back to your regular diet today.     If you take aspirin or Plavix, you may begin taking it again tomorrow. You may restart all other medicines today. Use pain medicine as directed.    Avoid heavy lifting or the overuse of your shoulder for three days.    Port site and bandage care    Keep the wound clean and dry for three days. Cover it with plastic before taking a shower.     Change the bandage if it gets wet or dirty. Use bacitracin (antibiotic cream) and clean gauze.     After three days, you may use Band-Aids until the wound has healed.    If you have oozing or bleeding from the port site or catheter (tube) site:   o Put direct pressure on the wound for 5 to 10 minutes with a gauze pad.  If you still have bleeding after 10 minutes, call your doctor.  o If you are bleeding a lot and can t control it with direct pressure, call 911.    Call your doctor if you have:    Swelling in your neck.    Signs of infection:   o fever over 100  F (37.8 C) under the tongue  o the wound is red, tender or draining.

## 2021-04-05 NOTE — SEDATION DOCUMENTATION
Post Procedure Summary:  Prior to the start of the procedure and with procedural staff participation, I verbally confirmed the patient s identity using two indicators, relevant allergies, that the procedure was appropriate and matched the consent or emergent situation, and that the correct equipment/implants were available. Immediately prior to starting the procedure I conducted the Time Out with the procedural staff and re-confirmed the patient s name, procedure, and site/side. (The Joint Commission universal protocol was followed.)  Yes       Sedatives: Fentanyl and Midazolam (Versed)    Vital signs, airway and pulse oximetry were monitored and remained stable throughout the procedure and sedation was maintained until the procedure was complete.  The patient was monitored by staff until sedation discharge criteria were met.    Patient tolerance: Patient tolerated the procedure well with no immediate complications.    Time of sedation in minutes: 15 Minutes minutes from beginning to end of physician one to one monitoring.    Site: no signs of bleeding or hematoma, dry and intact dressing.   Pain: patient denies pain.

## 2021-04-05 NOTE — PROCEDURES
RADIOLOGY POST PROCEDURE NOTE WITH SEDATION  Patient name: Kassie Ramirez  MRN: [id number]  : 1970    Pre-procedure diagnosis: Lymphoma  Post-procedure diagnosis: Same    Procedure Date/Time: 2021  10:44 AM    Procedure: Removal of Port-a-Cath  Estimated blood loss: None  Sedation: Moderate sedation was employed. The patient was monitored by a nurse at all times during the procedure under my direct supervision.    Specimen(s) collected with description: Intact port-a-cath including intravascular catheter    I determined this patient to be an appropriate candidate for the planned sedation and procedure and reassessed the patient IMMEDIATELY PRIOR to sedation and procedure.     The patient tolerated the procedure well with no immediate complications.    Significant findings: Uncomplicated removal of port-a-cath.    See imaging dictation for procedural details.    Provider name: Remington Macias MD  Assistant(s):None

## 2021-04-07 DIAGNOSIS — I50.22 CHRONIC SYSTOLIC CONGESTIVE HEART FAILURE (H): ICD-10-CM

## 2021-04-07 PROCEDURE — 80048 BASIC METABOLIC PNL TOTAL CA: CPT | Performed by: INTERNAL MEDICINE

## 2021-04-07 PROCEDURE — 36415 COLL VENOUS BLD VENIPUNCTURE: CPT | Performed by: INTERNAL MEDICINE

## 2021-04-08 LAB
ANION GAP SERPL CALCULATED.3IONS-SCNC: 4 MMOL/L (ref 3–14)
BUN SERPL-MCNC: 23 MG/DL (ref 7–30)
CALCIUM SERPL-MCNC: 10 MG/DL (ref 8.5–10.1)
CHLORIDE SERPL-SCNC: 105 MMOL/L (ref 94–109)
CO2 SERPL-SCNC: 28 MMOL/L (ref 20–32)
CREAT SERPL-MCNC: 1.14 MG/DL (ref 0.52–1.04)
GFR SERPL CREATININE-BSD FRML MDRD: 56 ML/MIN/{1.73_M2}
GLUCOSE SERPL-MCNC: 74 MG/DL (ref 70–99)
POTASSIUM SERPL-SCNC: 4.2 MMOL/L (ref 3.4–5.3)
SODIUM SERPL-SCNC: 137 MMOL/L (ref 133–144)

## 2021-04-09 ENCOUNTER — CARE COORDINATION (OUTPATIENT)
Dept: CARDIOLOGY | Facility: CLINIC | Age: 51
End: 2021-04-09

## 2021-04-09 DIAGNOSIS — I42.8 NONISCHEMIC CARDIOMYOPATHY (H): Primary | ICD-10-CM

## 2021-04-09 NOTE — PROGRESS NOTES
Call out to patient with lab results and for an update on how med titrations are going per Dr. Pearl's last note/plan.    I will continue to try to treat her aggressively with medical therapy.  I will increase her carvedilol to 6.25 mg p.o. b.i.d., add spironolactone 25 mg daily, stop her potassium supplement and increase her lisinopril to 5 mg p.o. b.i.d.  I have asked her to do this all incrementally so that we do not overload her with too much medication at once and that she has a chance to tolerate each increase in medication before adding the next one.  I will check a basic metabolic panel in 1 week and again in 3 weeks to make sure her potassium and kidney function remains stable.  We may need to re-add some potassium supplement.     Patient states she is feeling OK. She increased carvedilol to 6.25 mg in the PM and had felt fine, but once she increased the AM dose of carvedilol to 6.25 mg she began to feel lightheaded and noted her BP during the day was about 90s/60s. She reports her BP has stayed right around that range and has not really normalized and she still feels lightheaded often throughout the day. She added the spironolactone but has not yet increased the lisinopril. She is still taking lisinopril 2.5 mg BID. I told her to back down on the AM carvedilol to 3.125 mg and continue the 6.25 mg dose in the PM as long as she's tolerating that, continue spironolactone and we will see what Dr. Pearl wants to do about lisinopril. Patient to call if any concerns otherwise I told her it would be several days because Dr. Pearl is rounding this coming week. Daxa Ly, RN on 4/9/2021 at 5:01 PM      Component      Latest Ref Rng & Units 3/29/2021 4/7/2021   Sodium      133 - 144 mmol/L 140 137   Potassium      3.4 - 5.3 mmol/L 3.9 4.2   Chloride      94 - 109 mmol/L 104 105   Carbon Dioxide      20 - 32 mmol/L 28 28   Anion Gap      3 - 14 mmol/L 8 4   Glucose      70 - 99 mg/dL 89 74   Urea Nitrogen       7 - 30 mg/dL 14 23   Creatinine      0.52 - 1.04 mg/dL 1.00 1.14 (H)   GFR Estimate      >60 mL/min/1.73:m2 65 56 (L)   GFR Estimate If Black      >60 mL/min/1.73:m2 76 64   Calcium      8.5 - 10.1 mg/dL 9.7 10.0

## 2021-04-19 RX ORDER — CARVEDILOL 6.25 MG/1
TABLET ORAL
Qty: 135 TABLET | Refills: 3
Start: 2021-04-19 | End: 2021-08-17 | Stop reason: ALTCHOICE

## 2021-04-19 NOTE — TELEPHONE ENCOUNTER
Labs look ok. I agree with keeping the carvedilol at 3.215 am and 6.25 pm. Adding john and keeping lisinopril at 2.5 bid for now. She may be able to increase doses in the future if she gradually adjusts to the current doses. EE

## 2021-04-19 NOTE — PROGRESS NOTES
Sent update to pt via [employer].  Daxa Ly RN on 4/19/2021 at 5:46 PM      Trevon Pearl MD  [facility] 7 3 hours ago (2:34 PM)        Labs look ok. I agree with keeping the carvedilol at 3.215 am and 6.25 pm. Adding john and keeping lisinopril at 2.5 bid for now. She may be able to increase doses in the future if she gradually adjusts to the current doses. EE         Documentation

## 2021-04-22 DIAGNOSIS — I50.22 CHRONIC SYSTOLIC CONGESTIVE HEART FAILURE (H): ICD-10-CM

## 2021-04-22 LAB
ANION GAP SERPL CALCULATED.3IONS-SCNC: 2 MMOL/L (ref 3–14)
BUN SERPL-MCNC: 18 MG/DL (ref 7–30)
CALCIUM SERPL-MCNC: 9.2 MG/DL (ref 8.5–10.1)
CHLORIDE SERPL-SCNC: 104 MMOL/L (ref 94–109)
CO2 SERPL-SCNC: 30 MMOL/L (ref 20–32)
CREAT SERPL-MCNC: 1 MG/DL (ref 0.52–1.04)
GFR SERPL CREATININE-BSD FRML MDRD: 65 ML/MIN/{1.73_M2}
GLUCOSE SERPL-MCNC: 101 MG/DL (ref 70–99)
POTASSIUM SERPL-SCNC: 4.3 MMOL/L (ref 3.4–5.3)
SODIUM SERPL-SCNC: 136 MMOL/L (ref 133–144)

## 2021-04-22 PROCEDURE — 36415 COLL VENOUS BLD VENIPUNCTURE: CPT | Performed by: INTERNAL MEDICINE

## 2021-04-22 PROCEDURE — 80048 BASIC METABOLIC PNL TOTAL CA: CPT | Performed by: INTERNAL MEDICINE

## 2021-04-23 NOTE — PROGRESS NOTES
BMP lab noted 4/22/21. Patient is on lisinopril 2.5 mg BID, and spironolactone 25 mg was added on 3/31/21. Dr. Pearl hopes to gradually uptitrate her lisinopril to 5 mg BID and carvedilol to 6.25 mg BID, but so far pt has only tolerated carvedilol 3.215 mg in the AM, 6.25 mg in the PM and lisinopril 2.5 mg BID. Sent pt a mychart update. Will await pt response. Daxa Ly RN on 4/23/2021 at 12:24 PM      Component      Latest Ref Rng & Units 3/29/2021 4/7/2021 4/22/2021   Sodium      133 - 144 mmol/L 140 137 136   Potassium      3.4 - 5.3 mmol/L 3.9 4.2 4.3   Chloride      94 - 109 mmol/L 104 105 104   Carbon Dioxide      20 - 32 mmol/L 28 28 30   Anion Gap      3 - 14 mmol/L 8 4 2 (L)   Glucose      70 - 99 mg/dL 89 74 101 (H)   Urea Nitrogen      7 - 30 mg/dL 14 23 18   Creatinine      0.52 - 1.04 mg/dL 1.00 1.14 (H) 1.00   GFR Estimate      >60 mL/min/1.73:m2 65 56 (L) 65   GFR Estimate If Black      >60 mL/min/1.73:m2 76 64 75   Calcium      8.5 - 10.1 mg/dL 9.7 10.0 9.2

## 2021-04-26 NOTE — PROGRESS NOTES
Pt called back and left  stating that she is still taking carvedilol 3/125 mg QAM, 6.25mg QPM; sprionolactone 25 mg daily; and lisinopril 2.5 mg BID. Patient states so far this is all she's tolerating but she is doing fine on these doses. 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 Returned the call. Left  stating she can continue these doses for now, and if her BP normalizes more around 120 systolic or >, and no further lightheadedness, she can call us to consider increasing carvedilol or lisinopril at that point. Otherwise, she has an appt w/Dr. Pearl in August. Daxa Ly RN on 4/26/2021 at 11:46 AM

## 2021-04-28 ENCOUNTER — VIRTUAL VISIT (OUTPATIENT)
Dept: FAMILY MEDICINE | Facility: CLINIC | Age: 51
End: 2021-04-28
Payer: COMMERCIAL

## 2021-04-28 DIAGNOSIS — C83.398 DIFFUSE LARGE B-CELL LYMPHOMA OF EXTRANODAL SITE: ICD-10-CM

## 2021-04-28 DIAGNOSIS — Z12.31 VISIT FOR SCREENING MAMMOGRAM: Primary | ICD-10-CM

## 2021-04-28 DIAGNOSIS — F41.8 SITUATIONAL ANXIETY: ICD-10-CM

## 2021-04-28 PROCEDURE — 99214 OFFICE O/P EST MOD 30 MIN: CPT | Mod: 95 | Performed by: PHYSICIAN ASSISTANT

## 2021-04-28 SDOH — ECONOMIC STABILITY: TRANSPORTATION INSECURITY
IN THE PAST 12 MONTHS, HAS THE LACK OF TRANSPORTATION KEPT YOU FROM MEDICAL APPOINTMENTS OR FROM GETTING MEDICATIONS?: NOT ASKED

## 2021-04-28 SDOH — ECONOMIC STABILITY: INCOME INSECURITY: HOW HARD IS IT FOR YOU TO PAY FOR THE VERY BASICS LIKE FOOD, HOUSING, MEDICAL CARE, AND HEATING?: NOT ASKED

## 2021-04-28 SDOH — ECONOMIC STABILITY: FOOD INSECURITY: WITHIN THE PAST 12 MONTHS, YOU WORRIED THAT YOUR FOOD WOULD RUN OUT BEFORE YOU GOT MONEY TO BUY MORE.: NOT ASKED

## 2021-04-28 SDOH — ECONOMIC STABILITY: FOOD INSECURITY: WITHIN THE PAST 12 MONTHS, THE FOOD YOU BOUGHT JUST DIDN'T LAST AND YOU DIDN'T HAVE MONEY TO GET MORE.: NOT ASKED

## 2021-04-28 SDOH — ECONOMIC STABILITY: TRANSPORTATION INSECURITY
IN THE PAST 12 MONTHS, HAS LACK OF TRANSPORTATION KEPT YOU FROM MEETINGS, WORK, OR FROM GETTING THINGS NEEDED FOR DAILY LIVING?: NOT ASKED

## 2021-04-28 ASSESSMENT — ANXIETY QUESTIONNAIRES
3. WORRYING TOO MUCH ABOUT DIFFERENT THINGS: SEVERAL DAYS
2. NOT BEING ABLE TO STOP OR CONTROL WORRYING: NOT AT ALL
IF YOU CHECKED OFF ANY PROBLEMS ON THIS QUESTIONNAIRE, HOW DIFFICULT HAVE THESE PROBLEMS MADE IT FOR YOU TO DO YOUR WORK, TAKE CARE OF THINGS AT HOME, OR GET ALONG WITH OTHER PEOPLE: NOT DIFFICULT AT ALL
7. FEELING AFRAID AS IF SOMETHING AWFUL MIGHT HAPPEN: NOT AT ALL
5. BEING SO RESTLESS THAT IT IS HARD TO SIT STILL: NOT AT ALL
1. FEELING NERVOUS, ANXIOUS, OR ON EDGE: SEVERAL DAYS
6. BECOMING EASILY ANNOYED OR IRRITABLE: NOT AT ALL
GAD7 TOTAL SCORE: 2

## 2021-04-28 ASSESSMENT — PATIENT HEALTH QUESTIONNAIRE - PHQ9
SUM OF ALL RESPONSES TO PHQ QUESTIONS 1-9: 4
5. POOR APPETITE OR OVEREATING: NOT AT ALL

## 2021-04-28 NOTE — PROGRESS NOTES
"Kassie is a 51 year old who is being evaluated via a billable video visit.      How would you like to obtain your AVS? MyChart  If the video visit is dropped, the invitation should be resent by: Text to cell phone: [phone]  Will anyone else be joining your video visit? No    Video Start Time: 7:34    Assessment & Plan     Situational anxiety  Doing well/markedly improved.  Continue current regimen.  - sertraline (ZOLOFT) 50 MG tablet  Dispense: 90 tablet; Refill: 1    Visit for screening mammogram  Routine screening  - *MA Screening Digital Bilateral    Diffuse large B-cell lymphoma of extranodal site (H) - per pathology 11/27/19 - mediastinal mass wrapped around azalea and bilateral main stem bronchi- treating with Dr. Castro  Stable disease.  Continue to follow with Dr. Castro as scheduled in 3 months.         Return in about 6 months (around 10/28/2021) for Physical Exam, Medication recheck, Fasting labs.    Mary Alice Colón PA-C  [facility]   Kassie is a 51 year old who presents for the following health issues     HPI     Depression and Anxiety Follow-Up  Restarted sertraline 3/9/2021.  Feels like she was previously always \"treading water\" but that sensation is now gone.  Not as tearful.  Good news on last CT scan.  Had port removed.  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  Overall doing quite well     How are you doing with your depression since your last visit? Improved     How are you doing with your anxiety since your last visit?  Improved     Are you having other symptoms that might be associated with depression or anxiety? No    Have you had a significant life event? New Job    Do you have any concerns with your use of alcohol or other drugs? No    Social History     Tobacco Use     Smoking status: Never Smoker     Smokeless tobacco: Never Used   Substance Use Topics     Alcohol use: No     Alcohol/week: 0.0 standard " drinks     Comment: 1 time per month     Drug use: No     PHQ 1/15/2019 9/29/2020 4/28/2021   PHQ-9 Total Score 2 2 4   Q9: Thoughts of better off dead/self-harm past 2 weeks Not at all Not at all Not at all     BRIAN-7 SCORE 1/15/2019 9/29/2020 4/28/2021   Total Score - - -   Total Score 1 (minimal anxiety) - -   Total Score 1 3 2     Last PHQ-9 4/28/2021   1.  Little interest or pleasure in doing things 0   2.  Feeling down, depressed, or hopeless 0   3.  Trouble falling or staying asleep, or sleeping too much 1   4.  Feeling tired or having little energy 1   5.  Poor appetite or overeating 1   6.  Feeling bad about yourself 0   7.  Trouble concentrating 0   8.  Moving slowly or restless 1   Q9: Thoughts of better off dead/self-harm past 2 weeks 0   PHQ-9 Total Score 4   Difficulty at work, home, or with people Somewhat difficult     BRIAN-7  4/28/2021   1. Feeling nervous, anxious, or on edge 1   2. Not being able to stop or control worrying 0   3. Worrying too much about different things 1   4. Trouble relaxing 0   5. Being so restless that it is hard to sit still 0   6. Becoming easily annoyed or irritable 0   7. Feeling afraid, as if something awful might happen 0   BRIAN-7 Total Score 2   If you checked any problems, how difficult have they made it for you to do your work, take care of things at home, or get along with other people? Not difficult at all       Suicide Assessment Five-step Evaluation and Treatment (SAFE-T)      How many servings of fruits and vegetables do you eat daily?  4 or more    On average, how many sweetened beverages do you drink each day (Examples: soda, juice, sweet tea, etc.  Do NOT count diet or artificially sweetened beverages)?   0    How many days per week do you exercise enough to make your heart beat faster? 5    How many minutes a day do you exercise enough to make your heart beat faster? 30 - 60    How many days per week do you miss taking your medication? 0    Diffuse large B-cell  lymphoma  Doing well.  Last CT scan showed no recurrence of disease.  Had port removed.  Plans to follow with Dr. Castro every 3 months.  Feels that the risk of recurrence is low.      Review of Systems   Constitutional, HEENT, cardiovascular, pulmonary, GI, , musculoskeletal, neuro, skin, endocrine and psych systems are negative, except as otherwise noted.      Objective           Vitals:  No vitals were obtained today due to virtual visit.    Physical Exam   GENERAL: Healthy, alert and no distress  EYES: Eyes grossly normal to inspection.  No discharge or erythema, or obvious scleral/conjunctival abnormalities.  HENT: Normal cephalic/atraumatic.  External ears, nose and mouth without ulcers or lesions.  No nasal drainage visible.  NECK: No asymmetry, visible masses or scars  RESP: No audible wheeze, cough, or visible cyanosis.  No visible retractions or increased work of breathing.    SKIN: Visible skin clear. No significant rash, abnormal pigmentation or lesions.  NEURO: Cranial nerves grossly intact.  Mentation and speech appropriate for age.  PSYCH: Mentation appears normal, affect normal/bright, judgement and insight intact, normal speech and appearance well-groomed.                Video-Visit Details    Type of service:  Video Visit    Video End Time:7:40 AM    Originating Location (pt. Location): Home    Distant Location (provider location):  [facility]     Platform used for Video Visit: [employer]

## 2021-04-29 ASSESSMENT — ANXIETY QUESTIONNAIRES: GAD7 TOTAL SCORE: 2

## 2021-06-06 ENCOUNTER — HOSPITAL ENCOUNTER (EMERGENCY)
Facility: CLINIC | Age: 51
Discharge: HOME | End: 2021-06-06
Attending: EMERGENCY MEDICINE | Admitting: EMERGENCY MEDICINE
Payer: COMMERCIAL

## 2021-06-06 ENCOUNTER — APPOINTMENT (OUTPATIENT)
Dept: GENERAL RADIOLOGY | Facility: CLINIC | Age: 51
End: 2021-06-06
Attending: EMERGENCY MEDICINE
Payer: COMMERCIAL

## 2021-06-06 VITALS
OXYGEN SATURATION: 96 % | SYSTOLIC BLOOD PRESSURE: 93 MMHG | RESPIRATION RATE: 16 BRPM | WEIGHT: 158 LBS | HEIGHT: 66 IN | HEART RATE: 105 BPM | DIASTOLIC BLOOD PRESSURE: 63 MMHG | BODY MASS INDEX: 25.39 KG/M2 | TEMPERATURE: 98.7 F

## 2021-06-06 DIAGNOSIS — R05.9 COUGH: ICD-10-CM

## 2021-06-06 LAB
ANION GAP SERPL CALCULATED.3IONS-SCNC: 7 MMOL/L (ref 3–14)
BASOPHILS # BLD AUTO: 0.1 10E9/L (ref 0–0.2)
BASOPHILS NFR BLD AUTO: 1.2 %
BUN SERPL-MCNC: 17 MG/DL (ref 7–30)
CALCIUM SERPL-MCNC: 9.8 MG/DL (ref 8.5–10.1)
CHLORIDE SERPL-SCNC: 105 MMOL/L (ref 94–109)
CO2 SERPL-SCNC: 27 MMOL/L (ref 20–32)
CREAT SERPL-MCNC: 1.09 MG/DL (ref 0.52–1.04)
D DIMER PPP FEU-MCNC: 0.3 UG/ML FEU (ref 0–0.5)
DIFFERENTIAL METHOD BLD: NORMAL
EOSINOPHIL # BLD AUTO: 0.5 10E9/L (ref 0–0.7)
EOSINOPHIL NFR BLD AUTO: 10.1 %
ERYTHROCYTE [DISTWIDTH] IN BLOOD BY AUTOMATED COUNT: 12.4 % (ref 10–15)
GFR SERPL CREATININE-BSD FRML MDRD: 59 ML/MIN/{1.73_M2}
GLUCOSE SERPL-MCNC: 98 MG/DL (ref 70–99)
HCT VFR BLD AUTO: 41.7 % (ref 35–47)
HGB BLD-MCNC: 14.3 G/DL (ref 11.7–15.7)
IMM GRANULOCYTES # BLD: 0 10E9/L (ref 0–0.4)
IMM GRANULOCYTES NFR BLD: 0.4 %
INTERPRETATION ECG - MUSE: NORMAL
LABORATORY COMMENT REPORT: NORMAL
LYMPHOCYTES # BLD AUTO: 1.1 10E9/L (ref 0.8–5.3)
LYMPHOCYTES NFR BLD AUTO: 21.4 %
MCH RBC QN AUTO: 30.6 PG (ref 26.5–33)
MCHC RBC AUTO-ENTMCNC: 34.3 G/DL (ref 31.5–36.5)
MCV RBC AUTO: 89 FL (ref 78–100)
MONOCYTES # BLD AUTO: 0.7 10E9/L (ref 0–1.3)
MONOCYTES NFR BLD AUTO: 14.7 %
NEUTROPHILS # BLD AUTO: 2.6 10E9/L (ref 1.6–8.3)
NEUTROPHILS NFR BLD AUTO: 52.2 %
NRBC # BLD AUTO: 0 10*3/UL
NRBC BLD AUTO-RTO: 0 /100
NT-PROBNP SERPL-MCNC: 502 PG/ML (ref 0–900)
PLATELET # BLD AUTO: 162 10E9/L (ref 150–450)
POTASSIUM SERPL-SCNC: 3.6 MMOL/L (ref 3.4–5.3)
RBC # BLD AUTO: 4.67 10E12/L (ref 3.8–5.2)
SARS-COV-2 RNA RESP QL NAA+PROBE: NEGATIVE
SODIUM SERPL-SCNC: 139 MMOL/L (ref 133–144)
SPECIMEN SOURCE: NORMAL
TROPONIN I SERPL-MCNC: <0.015 UG/L (ref 0–0.04)
WBC # BLD AUTO: 5 10E9/L (ref 4–11)

## 2021-06-06 PROCEDURE — 99285 EMERGENCY DEPT VISIT HI MDM: CPT | Mod: 25

## 2021-06-06 PROCEDURE — C9803 HOPD COVID-19 SPEC COLLECT: HCPCS

## 2021-06-06 PROCEDURE — 93005 ELECTROCARDIOGRAM TRACING: CPT

## 2021-06-06 PROCEDURE — 94640 AIRWAY INHALATION TREATMENT: CPT

## 2021-06-06 PROCEDURE — 84484 ASSAY OF TROPONIN QUANT: CPT | Performed by: EMERGENCY MEDICINE

## 2021-06-06 PROCEDURE — 87635 SARS-COV-2 COVID-19 AMP PRB: CPT | Performed by: EMERGENCY MEDICINE

## 2021-06-06 PROCEDURE — 83880 ASSAY OF NATRIURETIC PEPTIDE: CPT | Performed by: EMERGENCY MEDICINE

## 2021-06-06 PROCEDURE — 80048 BASIC METABOLIC PNL TOTAL CA: CPT | Performed by: EMERGENCY MEDICINE

## 2021-06-06 PROCEDURE — 85025 COMPLETE CBC W/AUTO DIFF WBC: CPT | Performed by: EMERGENCY MEDICINE

## 2021-06-06 PROCEDURE — 71046 X-RAY EXAM CHEST 2 VIEWS: CPT

## 2021-06-06 PROCEDURE — 85379 FIBRIN DEGRADATION QUANT: CPT | Performed by: EMERGENCY MEDICINE

## 2021-06-06 PROCEDURE — 250N000013 HC RX MED GY IP 250 OP 250 PS 637: Performed by: EMERGENCY MEDICINE

## 2021-06-06 RX ORDER — ALBUTEROL SULFATE 90 UG/1
2 AEROSOL, METERED RESPIRATORY (INHALATION) EVERY 4 HOURS PRN
Status: DISCONTINUED | OUTPATIENT
Start: 2021-06-06 | End: 2021-06-06 | Stop reason: HOSPADM

## 2021-06-06 RX ORDER — ALBUTEROL SULFATE 90 UG/1
2 AEROSOL, METERED RESPIRATORY (INHALATION) EVERY 4 HOURS PRN
Qty: 18 G | Refills: 0 | Status: SHIPPED | OUTPATIENT
Start: 2021-06-06 | End: 2021-12-30

## 2021-06-06 RX ORDER — INHALER, ASSIST DEVICES
1 SPACER (EA) MISCELLANEOUS ONCE
Status: DISCONTINUED | OUTPATIENT
Start: 2021-06-06 | End: 2021-06-06 | Stop reason: HOSPADM

## 2021-06-06 RX ADMIN — ALBUTEROL SULFATE 2 PUFF: 90 AEROSOL, METERED RESPIRATORY (INHALATION) at 13:50

## 2021-06-06 ASSESSMENT — ENCOUNTER SYMPTOMS
SHORTNESS OF BREATH: 0
ABDOMINAL PAIN: 0
VOMITING: 0
APPETITE CHANGE: 0
NAUSEA: 0
CHILLS: 0
COUGH: 1

## 2021-06-06 ASSESSMENT — MIFFLIN-ST. JEOR: SCORE: 1348.43

## 2021-06-06 NOTE — ED PROVIDER NOTES
History   Chief Complaint:  Chest Heaviness    HPI   Kassie Ramirez is a 51 year old female with history of diffuse large B-cell lymphoma and heart failure with reduced ejection fraction who presents with chest heaviness.Two days ago, the patient developed slow-onset moderate chest heaviness with a productive cough. She states this chest heaviness is localized the center of her chest and feels better after coughing. There are no exacerbating factors. She initially thought it was her allergies, but allergy medications did not provide any relief. She denies any shortness of breath, nausea, vomiting, fevers, chills, abdominal pain, change in urination or bowel movements, leg swelling or change in her appetite. Of note, she does not have a history of DVT/PE. She does not use alcohol, tobacco or illegal drugs. She finished chemotherapy a year ago.    Review of Systems   Constitutional: Negative for appetite change and chills.   Respiratory: Positive for cough (productive). Negative for shortness of breath.         Chest heaviness   Cardiovascular: Negative for leg swelling.   Gastrointestinal: Negative for abdominal pain, nausea and vomiting.        No change in bowel movements   Genitourinary:        No change in urination   All other systems reviewed and are negative.    Allergies:  Cold & Flu   Seasonal Allergies  Sudafed Cold-Cough   Pseudoephedrine    Medications:  Coreg  Zyrtec  B-12  Ferosul  Folvite  Lasix  Zestril  Ativan  Zoloft  Aldactone    Past Medical History:    Anxiety  Cardiomyopathy  Diffuse large B-cell lymphoma  Essure implantation  Heart failure with reduced ejection fraction  Menopausal disorder  Menstrual headache  Paroxysmal SVT  GERTRUDE  Intractable migraine without aura   Chemotherapy adverse reaction  Stargardt macular degeneration, bilateral  Hypertriglyceridemia    Past Surgical History:    Coronary angiogram  Left heart cath  Ablation SVT  Herniorrhaphy umbilical  Chest port  "placement  Mediastinoscopy  Sling transpubo without anterior colporrhaphy    Family History:    Mother: Hypertension, Depression, Lipids, CAD, Lung cancer, Cerebrovascular disease  Father: Stargardt macular degeneration  Sister: Stargardt macular degeneration, Hyperlipidemia    Social History:  The patient was unaccompanied to the ED.    Physical Exam     Patient Vitals for the past 24 hrs:   BP Temp Temp src Pulse Resp SpO2 Height Weight   06/06/21 1348 93/63 -- -- 105 -- 96 % -- --   06/06/21 1100 110/71 -- -- 91 -- 93 % -- --   06/06/21 0914 111/82 98.7  F (37.1  C) Oral 101 16 96 % 1.676 m (5' 6\") 71.7 kg (158 lb)       Physical Exam  Constitutional: Well developed, nontox appearance  Head: Atraumatic.   Neck:  no stridor  Eyes: no scleral icterus  Cardiovascular: Borderline regular tachycardia, 2+ bilat radial pulses  Pulmonary/Chest: Intermittent mild cough, nml resp effort, Clear BS bilat, chest nontender  Abdominal: ND, soft, NT, no rebound or guarding   Ext: Warm, well perfused, no edema  Neurological: A&O, symmetric facies, moves ext x4  Skin: Skin is warm and dry.   Psychiatric: Behavior is normal. Thought content normal.   Nursing note and vitals reviewed.    Emergency Department Course   ECG:  ECG taken at 0919, ECG read at 0923  Normal sinus rhythm  Nonspecific T wave abnormaility  Abnormal ECG  No significant change compared to EKG dated 07/14/2020  Rate 92 bpm. SC interval 148 ms. QRS duration 78 ms. QT/QTc 396/489 ms. P-R-T axes 38 11 11.    Imaging:  XR Chest 2 Views  1. Evidence of chronic granulomatous disease of the right lung is  again noted.  2. No evidence of acute cardiopulmonary disease is seen.   Reading per radiology    Laboratory:  CBC: WBC 5.0, HGB 14.3,   BMP: GFR 59 (L) o/w WNL (Creatinine 1.09 (H))    Troponin (Collected 0929): <0.015    D Dimer (Collected 1039): 0.3    Nt probnp inpatient: 502    Asymptomatic COVID-19 Virus (Coronavirus) by PCR Nasopharyngeal swab: " Negative    Emergency Department Course:  Reviewed:  I reviewed nursing notes, vitals, past medical history and care everywhere    Assessments:  931 I obtained history and examined the patient as noted above.      I rechecked and updated the patient regarding the imaging results, laboratory results and the plan for care.    Interventions:  1350 Albuterol 2 puff Inhalation    Disposition:  The patient was discharged to home.     Impression & Plan   Covid-19  Kassie Ramirez was evaluated during a global COVID-19 pandemic, which necessitated consideration that the patient might be at risk for infection with the SARS-CoV-2 virus that causes COVID-19.   Applicable protocols for evaluation were followed during the patient's care.   COVID-19 was considered as part of the patient's evaluation. The plan for testing is:  a test was obtained during this visit.    Medical Decision Makin year old female presenting w/ cough, chest heaviness     DDx includes pneumonia, bronchitis, cough NOS, PE, recurrence of lymphoma, CHF, ACS although less likely given symptomatology.  EKG interpretation as noted above without acute ischemic findings.  Labs significant for no remarkable abnml, covid neg.  Imaging sig for no acute cardiopulmonary disease.  Interventions as noted above.  Etiology of the patient's cough may be allergic or viral.  Doubt PE given negative D-dimer.  She is advised regarding symptom control at home.  Plan for trial of over-the-counter antiallergy medication as recommended in discharge instructions.  Do not feel antibiotics are indicated at this time.  Also less likely recurrence of lymphoma given chest x-ray unchanged from previous.  At this time I feel the pt is safe for discharge.  Recommendations given regarding follow up with PCP and return to the emergency department as needed for new or worsening symptoms.  Pt counseled on all results, disposition and diagnosis.  They are understanding and agreeable to  plan. Patient discharged in stable condition.        Diagnosis:    ICD-10-CM    1. Cough  R05        Discharge Medications:  New Prescriptions    ALBUTEROL (PROAIR HFA/PROVENTIL HFA/VENTOLIN HFA) 108 (90 BASE) MCG/ACT INHALER    Inhale 2 puffs into the lungs every 4 hours as needed for shortness of breath / dyspnea or wheezing       Scribe Disclosure:  I, Ariel Rockwell, am serving as a scribe at 9:29 AM on 6/6/2021 to document services personally performed by Leroy Cochran MD based on my observations and the provider's statements to me.      Leroy Cochran MD  06/07/21 0926

## 2021-06-06 NOTE — DISCHARGE INSTRUCTIONS
You may try over-the-counter Zyrtec (cetirizine) or Benadryl as needed for persistent cough given this may be allergic in nature.    Please return to the emergency department as needed for new or worsening symptoms including chest pain, worsening shortness of breath, fever greater than 100.4  F, fainting, vomiting and unable to keep anything down, any other concerning symptoms.

## 2021-06-07 ENCOUNTER — INFUSION THERAPY VISIT (OUTPATIENT)
Dept: INFUSION THERAPY | Facility: CLINIC | Age: 51
End: 2021-06-07
Attending: PHYSICIAN ASSISTANT
Payer: COMMERCIAL

## 2021-06-07 ENCOUNTER — HOSPITAL ENCOUNTER (OUTPATIENT)
Facility: CLINIC | Age: 51
Setting detail: SPECIMEN
Discharge: HOME | End: 2021-06-07
Attending: PHYSICIAN ASSISTANT | Admitting: PHYSICIAN ASSISTANT
Payer: COMMERCIAL

## 2021-06-07 ENCOUNTER — PATIENT OUTREACH (OUTPATIENT)
Dept: ONCOLOGY | Facility: CLINIC | Age: 51
End: 2021-06-07

## 2021-06-07 ENCOUNTER — HOSPITAL ENCOUNTER (OUTPATIENT)
Dept: CT IMAGING | Facility: CLINIC | Age: 51
Discharge: HOME | End: 2021-06-07
Attending: PHYSICIAN ASSISTANT | Admitting: PHYSICIAN ASSISTANT
Payer: COMMERCIAL

## 2021-06-07 ENCOUNTER — ONCOLOGY VISIT (OUTPATIENT)
Dept: ONCOLOGY | Facility: CLINIC | Age: 51
End: 2021-06-07
Attending: PHYSICIAN ASSISTANT
Payer: COMMERCIAL

## 2021-06-07 VITALS
RESPIRATION RATE: 16 BRPM | WEIGHT: 160 LBS | SYSTOLIC BLOOD PRESSURE: 102 MMHG | HEART RATE: 108 BPM | TEMPERATURE: 100.6 F | OXYGEN SATURATION: 94 % | DIASTOLIC BLOOD PRESSURE: 72 MMHG | BODY MASS INDEX: 25.71 KG/M2 | HEIGHT: 66 IN

## 2021-06-07 DIAGNOSIS — R05.9 COUGH: ICD-10-CM

## 2021-06-07 DIAGNOSIS — N39.0 URINARY TRACT INFECTION WITHOUT HEMATURIA, SITE UNSPECIFIED: ICD-10-CM

## 2021-06-07 DIAGNOSIS — C83.32 DIFFUSE LARGE B-CELL LYMPHOMA OF INTRATHORACIC LYMPH NODES (H): ICD-10-CM

## 2021-06-07 DIAGNOSIS — R50.9 FEVER, UNSPECIFIED FEVER CAUSE: ICD-10-CM

## 2021-06-07 DIAGNOSIS — C83.32 DIFFUSE LARGE B-CELL LYMPHOMA OF INTRATHORACIC LYMPH NODES (H): Primary | ICD-10-CM

## 2021-06-07 DIAGNOSIS — C83.398 DIFFUSE LARGE B-CELL LYMPHOMA OF EXTRANODAL SITE: Primary | ICD-10-CM

## 2021-06-07 LAB
ALBUMIN SERPL-MCNC: 4.1 G/DL (ref 3.4–5)
ALBUMIN UR-MCNC: NEGATIVE MG/DL
ALP SERPL-CCNC: 97 U/L (ref 40–150)
ALT SERPL W P-5'-P-CCNC: 39 U/L (ref 0–50)
ANION GAP SERPL CALCULATED.3IONS-SCNC: 6 MMOL/L (ref 3–14)
APPEARANCE UR: CLEAR
AST SERPL W P-5'-P-CCNC: 26 U/L (ref 0–45)
BASOPHILS # BLD AUTO: 0.1 10E9/L (ref 0–0.2)
BASOPHILS NFR BLD AUTO: 1.3 %
BILIRUB SERPL-MCNC: 1 MG/DL (ref 0.2–1.3)
BILIRUB UR QL STRIP: NEGATIVE
BUN SERPL-MCNC: 17 MG/DL (ref 7–30)
CALCIUM SERPL-MCNC: 9.5 MG/DL (ref 8.5–10.1)
CHLORIDE SERPL-SCNC: 106 MMOL/L (ref 94–109)
CO2 SERPL-SCNC: 27 MMOL/L (ref 20–32)
COLOR UR AUTO: ABNORMAL
CREAT SERPL-MCNC: 1 MG/DL (ref 0.52–1.04)
DIFFERENTIAL METHOD BLD: ABNORMAL
EOSINOPHIL # BLD AUTO: 1 10E9/L (ref 0–0.7)
EOSINOPHIL NFR BLD AUTO: 19.1 %
ERYTHROCYTE [DISTWIDTH] IN BLOOD BY AUTOMATED COUNT: 12.6 % (ref 10–15)
GFR SERPL CREATININE-BSD FRML MDRD: 65 ML/MIN/{1.73_M2}
GLUCOSE SERPL-MCNC: 97 MG/DL (ref 70–99)
GLUCOSE UR STRIP-MCNC: NEGATIVE MG/DL
HCT VFR BLD AUTO: 40.8 % (ref 35–47)
HGB BLD-MCNC: 13.9 G/DL (ref 11.7–15.7)
HGB UR QL STRIP: ABNORMAL
IMM GRANULOCYTES # BLD: 0 10E9/L (ref 0–0.4)
IMM GRANULOCYTES NFR BLD: 0.2 %
KETONES UR STRIP-MCNC: NEGATIVE MG/DL
LACTATE BLD-SCNC: 1.2 MMOL/L (ref 0.7–2)
LEUKOCYTE ESTERASE UR QL STRIP: ABNORMAL
LYMPHOCYTES # BLD AUTO: 1.2 10E9/L (ref 0.8–5.3)
LYMPHOCYTES NFR BLD AUTO: 22.1 %
MCH RBC QN AUTO: 30.8 PG (ref 26.5–33)
MCHC RBC AUTO-ENTMCNC: 34.1 G/DL (ref 31.5–36.5)
MCV RBC AUTO: 90 FL (ref 78–100)
MONOCYTES # BLD AUTO: 0.8 10E9/L (ref 0–1.3)
MONOCYTES NFR BLD AUTO: 14.2 %
MUCOUS THREADS #/AREA URNS LPF: PRESENT /LPF
NEUTROPHILS # BLD AUTO: 2.4 10E9/L (ref 1.6–8.3)
NEUTROPHILS NFR BLD AUTO: 43.1 %
NITRATE UR QL: NEGATIVE
NRBC # BLD AUTO: 0 10*3/UL
NRBC BLD AUTO-RTO: 0 /100
PH UR STRIP: 6 PH (ref 5–7)
PLATELET # BLD AUTO: 161 10E9/L (ref 150–450)
POTASSIUM SERPL-SCNC: 3.6 MMOL/L (ref 3.4–5.3)
PROT SERPL-MCNC: 7.1 G/DL (ref 6.8–8.8)
RBC # BLD AUTO: 4.52 10E12/L (ref 3.8–5.2)
RBC #/AREA URNS AUTO: 1 /HPF (ref 0–2)
SODIUM SERPL-SCNC: 139 MMOL/L (ref 133–144)
SOURCE: ABNORMAL
SP GR UR STRIP: 1.01 (ref 1–1.03)
SQUAMOUS #/AREA URNS AUTO: 1 /HPF (ref 0–1)
UROBILINOGEN UR STRIP-MCNC: NORMAL MG/DL (ref 0–2)
WBC # BLD AUTO: 5.4 10E9/L (ref 4–11)
WBC #/AREA URNS AUTO: 3 /HPF (ref 0–5)

## 2021-06-07 PROCEDURE — 99215 OFFICE O/P EST HI 40 MIN: CPT | Performed by: PHYSICIAN ASSISTANT

## 2021-06-07 PROCEDURE — 71275 CT ANGIOGRAPHY CHEST: CPT

## 2021-06-07 PROCEDURE — G0463 HOSPITAL OUTPT CLINIC VISIT: HCPCS | Mod: 25

## 2021-06-07 PROCEDURE — 36415 COLL VENOUS BLD VENIPUNCTURE: CPT

## 2021-06-07 PROCEDURE — 87086 URINE CULTURE/COLONY COUNT: CPT | Performed by: PHYSICIAN ASSISTANT

## 2021-06-07 PROCEDURE — 87040 BLOOD CULTURE FOR BACTERIA: CPT | Performed by: PHYSICIAN ASSISTANT

## 2021-06-07 PROCEDURE — 83605 ASSAY OF LACTIC ACID: CPT | Performed by: PHYSICIAN ASSISTANT

## 2021-06-07 PROCEDURE — 250N000011 HC RX IP 250 OP 636: Performed by: PHYSICIAN ASSISTANT

## 2021-06-07 PROCEDURE — 80053 COMPREHEN METABOLIC PANEL: CPT | Performed by: PHYSICIAN ASSISTANT

## 2021-06-07 PROCEDURE — 81001 URINALYSIS AUTO W/SCOPE: CPT | Performed by: PHYSICIAN ASSISTANT

## 2021-06-07 PROCEDURE — 85025 COMPLETE CBC W/AUTO DIFF WBC: CPT | Performed by: PHYSICIAN ASSISTANT

## 2021-06-07 PROCEDURE — 96360 HYDRATION IV INFUSION INIT: CPT

## 2021-06-07 PROCEDURE — 87088 URINE BACTERIA CULTURE: CPT | Performed by: PHYSICIAN ASSISTANT

## 2021-06-07 PROCEDURE — 250N000009 HC RX 250: Performed by: PHYSICIAN ASSISTANT

## 2021-06-07 PROCEDURE — 258N000003 HC RX IP 258 OP 636: Performed by: PHYSICIAN ASSISTANT

## 2021-06-07 RX ORDER — IOPAMIDOL 755 MG/ML
500 INJECTION, SOLUTION INTRAVASCULAR ONCE
Status: COMPLETED | OUTPATIENT
Start: 2021-06-07 | End: 2021-06-07

## 2021-06-07 RX ADMIN — SODIUM CHLORIDE 1000 ML: 9 INJECTION, SOLUTION INTRAVENOUS at 14:35

## 2021-06-07 RX ADMIN — IOPAMIDOL 58 ML: 755 INJECTION, SOLUTION INTRAVENOUS at 16:19

## 2021-06-07 RX ADMIN — SODIUM CHLORIDE 78 ML: 9 INJECTION, SOLUTION INTRAVENOUS at 16:20

## 2021-06-07 ASSESSMENT — MIFFLIN-ST. JEOR: SCORE: 1357.51

## 2021-06-07 ASSESSMENT — PAIN SCALES - GENERAL: PAINLEVEL: NO PAIN (0)

## 2021-06-07 NOTE — NURSING NOTE
"Oncology Rooming Note    June 7, 2021 1:41 PM   Kassie Ramirez is a 51 year old female who presents for:    Chief Complaint   Patient presents with     Oncology Clinic Visit     DLBCL (diffuse large B cell lymphoma), Fullness in chest, cough, sore throat, chills x 6 days     Initial Vitals: /72   Pulse 108   Temp 100.6  F (38.1  C) (Oral)   Resp 16   Ht 1.676 m (5' 6\")   Wt 72.6 kg (160 lb)   LMP 11/06/2019   SpO2 94%   BMI 25.82 kg/m   Estimated body mass index is 25.82 kg/m  as calculated from the following:    Height as of this encounter: 1.676 m (5' 6\").    Weight as of this encounter: 72.6 kg (160 lb). Body surface area is 1.84 meters squared.  No Pain (0) Comment: Data Unavailable   Patient's last menstrual period was 11/06/2019.  Allergies reviewed: Yes  Medications reviewed: Yes    Medications: Medication refills not needed today.  Pharmacy name entered into [facility]:    [facility] - [place], [place] - [address] [place] [address] DRUG STORE #[id number] [facility] [address] [facility] [address] [facility] - [place], [place] - [address] [facility] - [place], [place] - [address]    Clinical concerns: Fullness in chest, chills,sore throat, x 6days        Mary Alice Whitaker, [facility]              "

## 2021-06-07 NOTE — PROGRESS NOTES
Infusion Nursing Note:  Kassie Ramirez presents today for IV fluids, labs.    Patient seen by provider today: Yes: WARREN Powers   present during visit today: Not Applicable.    Note: N/A.    Intravenous Access:  Lab draw site left hand, Needle type butterfly, Gauge 23.  Labs drawn without difficulty.  Peripheral IV placed.    Treatment Conditions:  Not Applicable.      Post Infusion Assessment:  Patient tolerated infusion without incident.  Blood return noted pre and post infusion.  Site patent and intact, free from redness, edema or discomfort.  No evidence of extravasations.  IV left in place for CT scan. Pt going to CT scan right after infusion appt.       Discharge Plan:   Discharge instructions reviewed with: Patient.  Patient and/or family verbalized understanding of discharge instructions and all questions answered.  Copy of AVS reviewed with patient and/or family.    Patient discharged in stable condition accompanied by: self.  Departure Mode: Ambulatory.      Morenita Hutton RN

## 2021-06-07 NOTE — PROGRESS NOTES
Oncology/Hematology Visit Note    Jun 7, 2021    Reason for visit: Follow up DLBCL, add-on for SOB/CP    Oncology HPI: Kassie Ramirez is a 51 year old female with DLBCL.  She developed a cough in early 2019 and symptoms were progressive for over 5 months.  CXR and CT chest did reveal some lymphadenopathy and she was initially treated with antibiotics.  Bronchoscopy revealed nonnecrotizing granulomatous inflammation, but negative for malignancy.  Follow-up CT November 2019 revealed worsening of the mediastinal and bilateral hilar lymphadenopathy.  Mediastinoscopy obtained and preliminary pathology was EBV positive DLBCL.  FISH was negative for high risk translocations.  She presented to the ED on 11/27/2019 with worsening SOB and was admitted at the [facility] and R CHOP cycle 1 was initiated on 11/29/2019.  She was admitted at [facility] on 12/15/2019 for neutropenic fever with sepsis and suspecting hospital-acquired pneumonia, PJV pneumonia, blood cultures were negative.  She established care with Dr. Castro on 12/27/2019 and she received MR-CHOP cycle 2 same day. RCHOP C3 was 1/17/2020 and PET scan on 2/4/2020 with significant improvement of her adenopathy consistent with treatment response.  She completed an additional 3 cycles for total of R-CHOP x 6 cycles, final cycle completed on 3/25/2020 and third and final HD methotrexate completed on 4/8/2020.  She is on surveillance and last CT on 3/12/2021 with no concerns for lymphoma.    She is being seen today as an add-on for SOB/CP.    Interval History: Kassie is here with her  today.  She has noticed a cough for about the last 6 days and she said it had been pretty dry, but recently some phlegm.  No hemoptysis.  She is feeling really rundown recently, but the cough does not affect her ability to sleep at night.  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  No other sick contacts at home.  Some headache, but no  "vision changes.  No chest pain, but the cough actually makes the symptoms a little bit better.  She has not noticed any wheezing.  No lower extremity swelling, urinary changes, abdominal pain.  No fever at home, but on arrival to the clinic, she did have a low-grade fever.    Review of Systems: See interval hx. Denies dizziness, n/t, changes in vision, abdominal pain, N/V, diarrhea, changes in urination, bleeding, bruising, rash.      PMHx and Social Hx reviewed per EPIC.      Medications:  Current Outpatient Medications   Medication Sig Dispense Refill     albuterol (PROAIR HFA/PROVENTIL HFA/VENTOLIN HFA) 108 (90 Base) MCG/ACT inhaler Inhale 2 puffs into the lungs every 4 hours as needed for shortness of breath / dyspnea or wheezing 18 g 0     carvedilol (COREG) 6.25 MG tablet Take 0.5 tablets (3.125 mg) by mouth every morning AND 1 tablet (6.25 mg) every evening. 135 tablet 3     cetirizine (ZYRTEC) 10 MG tablet Take 10 mg by mouth daily       Cyanocobalamin (B-12) 3000 MCG CAPS        ferrous sulfate (FEROSUL) 325 (65 Fe) MG tablet        folic acid (FOLVITE) 400 MCG tablet        furosemide (LASIX) 20 MG tablet 10 mg daily. Take an additional 10 mg as directed. 180 tablet 3     lisinopril (ZESTRIL) 5 MG tablet Take 1 tablet (5 mg) by mouth 2 times daily 90 tablet 3     LORazepam (ATIVAN) 0.5 MG tablet 1 tablet by mouth at bedtime for sleep and anxiety. 30 tablet 0     sertraline (ZOLOFT) 50 MG tablet Take 1 tablet (50 mg) by mouth daily 90 tablet 1     spironolactone (ALDACTONE) 25 MG tablet Take 1 tablet (25 mg) by mouth daily 90 tablet 3       Allergies   Allergen Reactions     Cold & Flu [Cold Defense Fighter]      See pseudoephedrine     Seasonal Allergies      Sudafed Cold-Cough [Dayquil Liquicaps]      Pseudoephedrine Rash     Rash then skins peels off        EXAM:    /72   Pulse 108   Temp 100.6  F (38.1  C) (Oral)   Resp 16   Ht 1.676 m (5' 6\")   Wt 72.6 kg (160 lb)   LMP 11/06/2019   SpO2 " 94%   BMI 25.82 kg/m       GENERAL:  Female, in no acute distress.  Alert and oriented x3.   HEENT:  Normocephalic, atraumatic.  PERRL, oropharynx clear with no sores or thrush.  TMs normal bilaterally.  LYMPH NODES:  No palpable pre/post-auricular, cervical, axillary lymphadenopathy appreciated.  CV:   Tachycardic, no murmurs.  LUNGS:  Clear to auscultation bilaterally.  No wheezing or rales.  ABDOMEN:  Soft, nontender and nondistended.  Bowel sounds heard x4.  No apparent hepatosplenomegaly.   EXTREMITIES:  No clubbing, cyanosis, or edema.   SKIN: No rash  PSYCH: Mood stable      Labs:   Results for BABAR VARGHESE (MRN [id number]) as of 6/7/2021 15:49   6/7/2021 14:10 6/7/2021 14:30   Sodium  139   Potassium  3.6   Chloride  106   Carbon Dioxide  27   Urea Nitrogen  17   Creatinine  1.00   GFR Estimate  65   GFR Estimate If Black  75   Calcium  9.5   Anion Gap  6   Albumin  4.1   Protein Total  7.1   Bilirubin Total  1.0   Alkaline Phosphatase  97   ALT  39   AST  26   Lactic Acid  1.2   Glucose  97   WBC  5.4   Hemoglobin  13.9   Hematocrit  40.8   Platelet Count  161   RBC Count  4.52   MCV  90   MCH  30.8   MCHC  34.1   RDW  12.6   Diff Method  Automated Method   % Neutrophils  43.1   % Lymphocytes  22.1   % Monocytes  14.2   % Eosinophils  19.1   % Basophils  1.3   % Immature Granulocytes  0.2   Nucleated RBCs  0   Absolute Neutrophil  2.4   Absolute Lymphocytes  1.2   Absolute Monocytes  0.8   Absolute Eosinophils  1.0 (H)   Absolute Basophils  0.1   Abs Immature Granulocytes  0.0   Absolute Nucleated RBC  0.0   Color Urine Light Yellow    Appearance Urine Clear    Glucose Urine Negative    Bilirubin Urine Negative    Ketones Urine Negative    Specific Gravity Urine 1.011    pH Urine 6.0    Protein Albumin Urine Negative    Urobilinogen mg/dL Normal    Nitrite Urine Negative    Blood Urine Small (A)    Leukocyte Esterase Urine Moderate (A)    Source Midstream Urine    WBC Urine 3    RBC Urine 1     Squamous Epithelial /HPF Urine 1    Mucous Urine Present (A)    BLOOD CULTURE  Rpt   URINE CULTURE AEROBIC BACTERIAL Rpt        Imaging:   CT CHEST PE ABDOMEN PELVIS W CONTRAST 6/7/2021 4:32 PM     CLINICAL HISTORY: h/o lymphoma, new cough, SOB, CP; Fever, unspecified  fever cause; Cough; Diffuse large B-cell lymphoma of intrathoracic  lymph nodes (H)  TECHNIQUE: CT angiogram chest and routine CT abdomen pelvis with IV  contrast. Arterial phase through the chest and venous phase through  the abdomen and pelvis. 2D and 3D MIP reconstructions were preformed  by the CT technologist. Dose reduction techniques were used.      CONTRAST: 58mL [id number]     COMPARISON: 3/12/2021     FINDINGS:  ANGIOGRAM CHEST: Pulmonary arteries are normal in caliber and are  negative for pulmonary emboli. Thoracic aorta is negative for  dissection.     LUNGS AND PLEURA: There is heterogeneity of the lungs. There is a  calcified nodule adjacent to the right minor fissure.     MEDIASTINUM/AXILLAE: There is a calcified right hilar lymph node.     HEPATOBILIARY: A few small hypoattenuating hepatic lesions are  present. There is a suspected hemangioma in the posterior right  hepatic lobe which does not require follow-up.     PANCREAS: Normal.     SPLEEN: Mild splenomegaly.     ADRENAL GLANDS: Normal.     KIDNEYS/BLADDER: Normal.     BOWEL: Normal.     LYMPH NODES: Normal.     PELVIC ORGANS: Essure devices are noted. There is small amount of free  fluid in the pelvis. There is a low-attenuation 23 mm right adnexal  lesion.     OTHER: None.     MUSCULOSKELETAL: Normal.                                                                      IMPRESSION:  1.  No PE.  2.  Heterogeneity of the lungs may be from low lung volumes. Atypical  infection is less likely.  3.  Mild splenomegaly.  4.  There is a small amount of free fluid in the pelvis which is  indeterminate though may be physiologic. There is a 2.3 cm  low-attenuation right adnexal lesion.  No follow-up is required.  5.  A small hypoattenuating hepatic lesions are indeterminate. These  are also seen on the comparison study.          Impression/Plan: Kassie Ramirez is a 49 year old female with DLBCL s/p curative intent chemotherapy with MR-CHOP, currently on surveillance.    SOB: New cough within the last week and she is tachycardic on exam today.  She is also febrile with low-grade temp.  Covid test was negative in the ED. She had a negative D-dimer yesterday in the ED, but given her history of lymphoma, I am getting her restaging CT stat today with PE study.  Differential diagnosis is broad, but including lymphoma recurrence, pneumonia, viral etiology, UTI.  Labs look really good and normal lactate today.  UA negative.  CT pending.    --Addendum 6/10: CT with no acute findings and eventually got in touch with the patient today to discuss results.  She has an appointment next week with Dr. Castro and I will reach out to him to see if appointment is still necessary.  We will keep scheduled for now.  Respiratory status is improved.      DLBCL: Stage III, EBV+, non-GCB, IPI score 2 (low-intermediate), FISH negative for high risk translocations.  R CHOP cycle 1 completed inpatient on 11/29/2019 and HD methotrexate was added to cycle 2 for CNS prophylaxis, given on 1/20/2020.  She has struggled with fatigue more with the addition of methotrexate. She required transfusion support for anemia and fatigue. PET scan on 2/4/2020 with treatment response. She completed RCHOP cycle 6 on 3/25/20 and HD methotrexate on 4/8/20.  She has a new cough with some shortness of breath, see above.  Plan for stat CT chest PE, abdomen/pelvis today.    Fever: Temp of 100.6 on arrival in the clinic today.  UA was actually negative, but urine culture was positive for Streptococcus agalactiae and sensitive to beta lactam antibiotics, including cephalosporins.  I discussed this with her and she is asymptomatic, but will treat with  Keflex.  Prescription sent to pharmacy.    Cough: New in the last week and a CXR in the ED with no acute findings yesterday.  Awaiting CT results today.  We briefly discussed Robitussin, but she had a pretty significant reaction to DayQuil and we will hold off in any Robitussin right now.  She can take dextromethorphan with no difficulty and she has some at home.    CV: She had persistent LORENZ and intermittent chest discomfort at the start of her lymphoma diagnosis.  Symptoms improved when she completed chemotherapy.  She is tachycardic today and she is having some cough with some shortness of breath.  Will obtain CT, see above.    Fatigue: Worsening over the last week or so with a new cough.  Labs look good.     FEN: Electrolytes are normal today, appetite stable.    Mood: Stable.     Chart documentation with Dragon Voice recognition Software. Although reviewed after completion, some words and grammatical errors may remain.      Padmaja Sanchez PA-C  Hematology/Oncology  [facility]

## 2021-06-07 NOTE — PROGRESS NOTES
S-(situation): Pt's  Florian called to update oncology team of Pt experiencing symptoms      B-(background): Diffuse large B Cell lymphoma. R-CHOP completed 6 cycles 4/8/2020.    A-(assessment): Pt's  Florian reports his wife has been feeling poorly the past week. Resting more, developed a cough, Covid negative test, feeling more chest pain and pressure with shortness of breath. Pt feeling worse Sunday and presented to the ED. Pt evaluated labs and chest X-ray, the discharged with an albuterol MDI. Pt's  asking if CT scan for next week can be moved up as it appears to be similar symptoms when she was first diagnosed. He is concerned about a return of her lymphoma.     R-(recommendations): RNCC recommmends Pt be seen by NICK  Vel Powers today and move up CT scan. RNCC huddled with NICK VEL Lin whom reviewed ED notes. Agreed Pt to be seen today and to move up CT scan as Pt is symptomatic.    RNCC contacted Pt's  with appointments and agreed to plan. No further questions or concerns.       Erin Hernandez, BSN, RN, OCN  RN Cancer Care Coordinator  [facility] [place]  [phone]

## 2021-06-07 NOTE — LETTER
6/7/2021         RE: Kassie Ramirez  [address]  [place] [address]        Dear Colleague,    Thank you for referring your patient, Kassie Ramirez, to the [facility]. Please see a copy of my visit note below.    Oncology/Hematology Visit Note    Jun 7, 2021    Reason for visit: Follow up DLBCL, add-on for SOB/CP    Oncology HPI: Kassie Ramirez is a 51 year old female with DLBCL.  She developed a cough in early 2019 and symptoms were progressive for over 5 months.  CXR and CT chest did reveal some lymphadenopathy and she was initially treated with antibiotics.  Bronchoscopy revealed nonnecrotizing granulomatous inflammation, but negative for malignancy.  Follow-up CT November 2019 revealed worsening of the mediastinal and bilateral hilar lymphadenopathy.  Mediastinoscopy obtained and preliminary pathology was EBV positive DLBCL.  FISH was negative for high risk translocations.  She presented to the ED on 11/27/2019 with worsening SOB and was admitted at the [facility] and R CHOP cycle 1 was initiated on 11/29/2019.  She was admitted at [facility] on 12/15/2019 for neutropenic fever with sepsis and suspecting hospital-acquired pneumonia, PJV pneumonia, blood cultures were negative.  She established care with Dr. Castro on 12/27/2019 and she received MR-CHOP cycle 2 same day. RCHOP C3 was 1/17/2020 and PET scan on 2/4/2020 with significant improvement of her adenopathy consistent with treatment response.  She completed an additional 3 cycles for total of R-CHOP x 6 cycles, final cycle completed on 3/25/2020 and third and final HD methotrexate completed on 4/8/2020.  She is on surveillance and last CT on 3/12/2021 with no concerns for lymphoma.     She is being seen today as an add-on for SOB/CP    Interval History: Kassie is here with her  today.  She has noticed a cough for about the last 6 days and she said it had been pretty dry, but recently  some phlegm.  No hemoptysis.  She is feeling really rundown recently, but the cough does not affect her ability to sleep at night.  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  No other sick contacts at home.  Some headache, but no vision changes.  No chest pain, but the cough actually makes the symptoms a little bit better.  She has not noticed any wheezing.  No lower extremity swelling, urinary changes, abdominal pain.  No fever at home, but on arrival to the clinic, she did have a low-grade fever.    Review of Systems: See interval hx. Denies dizziness, n/t, changes in vision, abdominal pain, N/V, diarrhea, changes in urination, bleeding, bruising, rash.      PMHx and Social Hx reviewed per EPIC.      Medications:  Current Outpatient Medications   Medication Sig Dispense Refill     albuterol (PROAIR HFA/PROVENTIL HFA/VENTOLIN HFA) 108 (90 Base) MCG/ACT inhaler Inhale 2 puffs into the lungs every 4 hours as needed for shortness of breath / dyspnea or wheezing 18 g 0     carvedilol (COREG) 6.25 MG tablet Take 0.5 tablets (3.125 mg) by mouth every morning AND 1 tablet (6.25 mg) every evening. 135 tablet 3     cetirizine (ZYRTEC) 10 MG tablet Take 10 mg by mouth daily       Cyanocobalamin (B-12) 3000 MCG CAPS        ferrous sulfate (FEROSUL) 325 (65 Fe) MG tablet        folic acid (FOLVITE) 400 MCG tablet        furosemide (LASIX) 20 MG tablet 10 mg daily. Take an additional 10 mg as directed. 180 tablet 3     lisinopril (ZESTRIL) 5 MG tablet Take 1 tablet (5 mg) by mouth 2 times daily 90 tablet 3     LORazepam (ATIVAN) 0.5 MG tablet 1 tablet by mouth at bedtime for sleep and anxiety. 30 tablet 0     sertraline (ZOLOFT) 50 MG tablet Take 1 tablet (50 mg) by mouth daily 90 tablet 1     spironolactone (ALDACTONE) 25 MG tablet Take 1 tablet (25 mg) by mouth daily 90 tablet 3       Allergies   Allergen Reactions     Cold & Flu [Cold Defense Fighter]      See pseudoephedrine      "Seasonal Allergies      Sudafed Cold-Cough [Dayquil Liquicaps]      Pseudoephedrine Rash     Rash then skins peels off        EXAM:    /72   Pulse 108   Temp 100.6  F (38.1  C) (Oral)   Resp 16   Ht 1.676 m (5' 6\")   Wt 72.6 kg (160 lb)   LMP 11/06/2019   SpO2 94%   BMI 25.82 kg/m       GENERAL:  Female, in no acute distress.  Alert and oriented x3.   HEENT:  Normocephalic, atraumatic.  PERRL, oropharynx clear with no sores or thrush.  TMs normal bilaterally.  LYMPH NODES:  No palpable pre/post-auricular, cervical, axillary lymphadenopathy appreciated.  CV:   Tachycardic, no murmurs.  LUNGS:  Clear to auscultation bilaterally.  No wheezing or rales.  ABDOMEN:  Soft, nontender and nondistended.  Bowel sounds heard x4.  No apparent hepatosplenomegaly.   EXTREMITIES:  No clubbing, cyanosis, or edema.   SKIN: No rash  PSYCH: Mood stable      Labs:   Results for BABAR VARGHESE (MRN [id number]) as of 6/7/2021 15:49   6/7/2021 14:10 6/7/2021 14:30   Sodium  139   Potassium  3.6   Chloride  106   Carbon Dioxide  27   Urea Nitrogen  17   Creatinine  1.00   GFR Estimate  65   GFR Estimate If Black  75   Calcium  9.5   Anion Gap  6   Albumin  4.1   Protein Total  7.1   Bilirubin Total  1.0   Alkaline Phosphatase  97   ALT  39   AST  26   Lactic Acid  1.2   Glucose  97   WBC  5.4   Hemoglobin  13.9   Hematocrit  40.8   Platelet Count  161   RBC Count  4.52   MCV  90   MCH  30.8   MCHC  34.1   RDW  12.6   Diff Method  Automated Method   % Neutrophils  43.1   % Lymphocytes  22.1   % Monocytes  14.2   % Eosinophils  19.1   % Basophils  1.3   % Immature Granulocytes  0.2   Nucleated RBCs  0   Absolute Neutrophil  2.4   Absolute Lymphocytes  1.2   Absolute Monocytes  0.8   Absolute Eosinophils  1.0 (H)   Absolute Basophils  0.1   Abs Immature Granulocytes  0.0   Absolute Nucleated RBC  0.0   Color Urine Light Yellow    Appearance Urine Clear    Glucose Urine Negative    Bilirubin Urine Negative    Ketones Urine " Negative    Specific Gravity Urine 1.011    pH Urine 6.0    Protein Albumin Urine Negative    Urobilinogen mg/dL Normal    Nitrite Urine Negative    Blood Urine Small (A)    Leukocyte Esterase Urine Moderate (A)    Source Midstream Urine    WBC Urine 3    RBC Urine 1    Squamous Epithelial /HPF Urine 1    Mucous Urine Present (A)    BLOOD CULTURE  Rpt   URINE CULTURE AEROBIC BACTERIAL Rpt        Imaging: PENDING    Impression/Plan: Kassie Ramirez is a 49 year old female with DLBCL s/p curative intent chemotherapy with MR-CHOP, currently on surveillance.    SOB: New cough within the last week and she is tachycardic on exam today.  She is also febrile with low-grade temp.  Covid test was negative in the ED. She had a negative D-dimer yesterday in the ED, but given her history of lymphoma, I am getting her restaging CT stat today with PE study.  Differential diagnosis is broad, but including lymphoma recurrence, pneumonia, viral etiology, UTI.  Labs look really good and normal lactate today.  UA negative.  CT pending.    DLBCL: Stage III, EBV+, non-GCB, IPI score 2 (low-intermediate), FISH negative for high risk translocations.  R CHOP cycle 1 completed inpatient on 11/29/2019 and HD methotrexate was added to cycle 2 for CNS prophylaxis, given on 1/20/2020.  She has struggled with fatigue more with the addition of methotrexate. She required transfusion support for anemia and fatigue. PET scan on 2/4/2020 with treatment response. She completed RCHOP cycle 6 on 3/25/20 and HD methotrexate on 4/8/20.  She has a new cough with some shortness of breath, see above.  Plan for stat CT chest PE, abdomen/pelvis today.    Cough: New in the last week and a CXR in the ED with no acute findings yesterday.  Awaiting CT results today.  We briefly discussed Robitussin, but she had a pretty significant reaction to DayQuil and we will hold off in any Robitussin right now.  She can take dextromethorphan with no difficulty and she has  some at home.    CV: She had persistent LORENZ and intermittent chest discomfort at the start of her lymphoma diagnosis.  Symptoms improved when she completed chemotherapy.  She is tachycardic today and she is having some cough with some shortness of breath.  Will obtain CT, see above.    Fatigue: Worsening over the last week or so with a new cough.  Labs look good.     FEN: Electrolytes are normal today, appetite stable.    Mood: Stable.     Chart documentation with Dragon Voice recognition Software. Although reviewed after completion, some words and grammatical errors may remain.      Padmaja Sanchez PA-C  Hematology/Oncology  [facility]                  Again, thank you for allowing me to participate in the care of your patient.        Sincerely,        Padmaja Sanchez PA-C

## 2021-06-08 LAB
BACTERIA SPEC CULT: ABNORMAL
Lab: ABNORMAL
SPECIMEN SOURCE: ABNORMAL

## 2021-06-10 ENCOUNTER — TELEPHONE (OUTPATIENT)
Dept: SURGERY | Facility: CLINIC | Age: 51
End: 2021-06-10

## 2021-06-10 RX ORDER — CEPHALEXIN 500 MG/1
500 CAPSULE ORAL 3 TIMES DAILY
Qty: 15 CAPSULE | Refills: 0 | Status: SHIPPED | OUTPATIENT
Start: 2021-06-10 | End: 2021-06-15

## 2021-06-10 NOTE — TELEPHONE ENCOUNTER
I have attempted to reach pt twice this week to discuss CT and UC results, but have left 2 VM's asking her to call back.       Padmaja Sanchez PA-C  Hematology/Oncology  [facility]

## 2021-06-13 LAB
BACTERIA SPEC CULT: NO GROWTH
BACTERIA SPEC CULT: NO GROWTH
SPECIMEN SOURCE: NORMAL
SPECIMEN SOURCE: NORMAL

## 2021-06-24 DIAGNOSIS — C83.398 DIFFUSE LARGE B-CELL LYMPHOMA OF EXTRANODAL SITE: ICD-10-CM

## 2021-06-24 DIAGNOSIS — Z79.899 CONTROLLED SUBSTANCE AGREEMENT SIGNED: ICD-10-CM

## 2021-06-24 DIAGNOSIS — F41.8 SITUATIONAL ANXIETY: ICD-10-CM

## 2021-06-24 DIAGNOSIS — C83.32 DIFFUSE LARGE B-CELL LYMPHOMA OF INTRATHORACIC LYMPH NODES (H): ICD-10-CM

## 2021-06-24 NOTE — TELEPHONE ENCOUNTER
Controlled Substance Refill Request for   LORazepam (ATIVAN) 0.5 MG tablet 30 tablet 0 3/9/2021       Problem List Complete:  No     PROVIDER TO CONSIDER COMPLETION OF PROBLEM LIST AND OVERVIEW/CONTROLLED SUBSTANCE AGREEMENT    Last Written Prescription Date:  3/9/2021  Last Fill Quantity: 30,   # refills: 0    THE MOST RECENT OFFICE VISIT MUST BE WITHIN THE PAST 3 MONTHS. AT LEAST ONE FACE TO FACE VISIT MUST OCCUR EVERY 6 MONTHS. ADDITIONAL VISITS CAN BE VIRTUAL.  (THIS STATEMENT SHOULD BE DELETED.)    [place] [facility] [place]: 4/28/2021    Future Office visit:     Controlled substance agreement:   Encounter-Level CSA - 09/16/2014:    Controlled Substance Agreement - Scan on 11/28/2014  2:23 PM: [facility] CONTROLLED MEDICATION AGREEMENT 09/16/14     Patient-Level CSA:    Controlled Substance Agreement - Non - Opioid - Scan on 3/14/2021  8:26 AM: NON-OPIOID CONTROLLED SUBSTANCE AGREEMENT         Last Urine Drug Screen: No results found for: CDAUT, No results found for: COMDAT, No results found for: THC13, PCP13, COC13, MAMP13, OPI13, AMP13, BZO13, TCA13, MTD13, BAR13, OXY13, PPX13, BUP13     Processing:  Rx to be electronically transmitted to pharmacy by provider      [email or web address]

## 2021-06-25 RX ORDER — LORAZEPAM 0.5 MG/1
TABLET ORAL
Qty: 30 TABLET | Refills: 0 | Status: SHIPPED | OUTPATIENT
Start: 2021-06-25 | End: 2021-11-01

## 2021-06-25 NOTE — TELEPHONE ENCOUNTER
Routing refill request to provider for review/approval because:  Drug not on the [facility] refill protocol      Denise Almanzar RN  [facility]

## 2021-08-13 ENCOUNTER — HOSPITAL ENCOUNTER (OUTPATIENT)
Dept: CARDIOLOGY | Facility: CLINIC | Age: 51
Discharge: HOME | End: 2021-08-13
Attending: INTERNAL MEDICINE | Admitting: INTERNAL MEDICINE
Payer: COMMERCIAL

## 2021-08-13 ENCOUNTER — CARE COORDINATION (OUTPATIENT)
Dept: CARDIOLOGY | Facility: CLINIC | Age: 51
End: 2021-08-13

## 2021-08-13 VITALS — DIASTOLIC BLOOD PRESSURE: 77 MMHG | HEART RATE: 80 BPM | SYSTOLIC BLOOD PRESSURE: 126 MMHG

## 2021-08-13 DIAGNOSIS — I42.8 NONISCHEMIC CARDIOMYOPATHY (H): ICD-10-CM

## 2021-08-13 PROCEDURE — 75561 CARDIAC MRI FOR MORPH W/DYE: CPT

## 2021-08-13 PROCEDURE — A9585 GADOBUTROL INJECTION: HCPCS | Performed by: INTERNAL MEDICINE

## 2021-08-13 PROCEDURE — 75561 CARDIAC MRI FOR MORPH W/DYE: CPT | Mod: 26 | Performed by: INTERNAL MEDICINE

## 2021-08-13 PROCEDURE — 255N000002 HC RX 255 OP 636: Performed by: INTERNAL MEDICINE

## 2021-08-13 RX ORDER — GADOBUTROL 604.72 MG/ML
10 INJECTION INTRAVENOUS ONCE
Status: DISCONTINUED | OUTPATIENT
Start: 2021-08-13 | End: 2021-08-13

## 2021-08-13 RX ORDER — GADOBUTROL 604.72 MG/ML
10 INJECTION INTRAVENOUS ONCE
Status: COMPLETED | OUTPATIENT
Start: 2021-08-13 | End: 2021-08-13

## 2021-08-13 RX ADMIN — GADOBUTROL 10 ML: 604.72 INJECTION INTRAVENOUS at 11:04

## 2021-08-16 ENCOUNTER — LAB (OUTPATIENT)
Dept: LAB | Facility: CLINIC | Age: 51
End: 2021-08-16
Payer: COMMERCIAL

## 2021-08-16 ENCOUNTER — HOSPITAL ENCOUNTER (OUTPATIENT)
Dept: CARDIOLOGY | Facility: CLINIC | Age: 51
Discharge: HOME | End: 2021-08-16
Attending: INTERNAL MEDICINE | Admitting: INTERNAL MEDICINE
Payer: COMMERCIAL

## 2021-08-16 DIAGNOSIS — I42.8 NONISCHEMIC CARDIOMYOPATHY (H): ICD-10-CM

## 2021-08-16 DIAGNOSIS — I42.8 NONISCHEMIC CARDIOMYOPATHY (H): Primary | ICD-10-CM

## 2021-08-16 LAB
ANION GAP SERPL CALCULATED.3IONS-SCNC: 5 MMOL/L (ref 3–14)
BUN SERPL-MCNC: 21 MG/DL (ref 7–30)
CALCIUM SERPL-MCNC: 10.1 MG/DL (ref 8.5–10.1)
CHLORIDE BLD-SCNC: 105 MMOL/L (ref 94–109)
CO2 SERPL-SCNC: 28 MMOL/L (ref 20–32)
CREAT SERPL-MCNC: 1.02 MG/DL (ref 0.52–1.04)
GFR SERPL CREATININE-BSD FRML MDRD: 64 ML/MIN/1.73M2
GLUCOSE BLD-MCNC: 89 MG/DL (ref 70–99)
POTASSIUM BLD-SCNC: 3.6 MMOL/L (ref 3.4–5.3)
SODIUM SERPL-SCNC: 138 MMOL/L (ref 133–144)

## 2021-08-16 PROCEDURE — 75557 CARDIAC MRI FOR MORPH: CPT | Mod: 26 | Performed by: INTERNAL MEDICINE

## 2021-08-16 PROCEDURE — 36415 COLL VENOUS BLD VENIPUNCTURE: CPT | Performed by: NURSE PRACTITIONER

## 2021-08-16 PROCEDURE — 80048 BASIC METABOLIC PNL TOTAL CA: CPT | Performed by: NURSE PRACTITIONER

## 2021-08-16 PROCEDURE — 75557 CARDIAC MRI FOR MORPH: CPT

## 2021-08-16 NOTE — PROGRESS NOTES
CARDIOLOGY CLINIC / C.O.R.E. CLINIC VISIT  (Heart Failure Specialty)  DOS: 8.17.21    Kassie Ramirez  : 1970  MRN: [id number]    Primary Cardiologist: Dr. Pearl     Reason for Visit: HFrEF/ [facility] return visit     HISTORY OF PRESENT ILLNESS:  I had the opportunity to see Ms. Kassie Ramirez in today at [facility], followed up on her idiopathic cardiomyopathy and chronic systolic heart failure.        Ms. Ramirez began having shortness of breath and cough in the fall of .  Chest CT showed hilar adenopathy, which led to bronchoscopy for needle biopsy and tissue diagnosis.  Initially, this was found to be negative for malignancy and therefore assumed to be sarcoidosis.  She was started on high-dose steroid therapy.  However, her adenopathy continued to get worse and her breathing also worsened.  She got a second opinion and mediastinoscopy was performed with a larger tissue sample showing large B-cell lymphoma.  She immediately began chemotherapy at about Thanksgiving time in .      During this time, she was also experiencing frequent episodes of SVT, sometimes prolonged associated with worsening shortness of breath.  Her SVT was at about 170 beats per minute.  She converted to sinus rhythm with adenosine, but her troponin was mildly elevated and she was started on low-dose carvedilol.  Her echo showed normal left ventricular function at that time.        Followup cardiac MRI was done on 10/09/2019 which was essentially normal.  There is no evidence of infiltrative disease and her left ventricular ejection fraction was 59%.  The hilar adenopathy was again noted.  Eventually, she underwent ablation for SVT and has not had recurrence of that since her ablation was performed in 2020.  She is on surveillance and CT on 3/12/2021 with no concerns for lymphoma. 6/10: CT with no acute findings.     She completed chemotherapy in 2020, but unfortunately her ejection  fraction had gone down precipitously and was estimated at 25%-30% by echocardiogram in 06/2020. Initially, this was suspected to be due to recurrent SVT, but after her ablation, her left ventricular function failed to improve.  She was able to tolerate only small doses of carvedilol and lisinopril and was using a low dose of Lasix as well.        Echocardiogram repeated again on 03/29/2021, showing slight improvement in left ventricular function.  Her ejection fraction was reported to be 20%-25%, which Dr. Pearl thought it was higher. The biplane EF was reported to be 30%, although he thought her ejection fraction looked more like 30%-35%.  She has no significant valvular disease.    3/2021 seen by Dr. Pearl. His plan was the following:  - Increased carvedilol to 6.25 mg p.o. b.i.d.   - Added spironolactone at 25 mg daily  - Stopped Potassium supplement    - Increased Lisinopril to 5 mg p.o. b.i.d.   - BMP 4/7 creat. 1.14, K+ 4.2  - BMP 4/22 creat. 1.00, K+4.3  Due to intolerance, Sadia had to decrease AM carvedilol to 3.125 mg and continued with 6.25 mg dose in the PM., she continued spironolactone, and left Lisinopril at 2.5 mg daily.      6/6/21 present to the ED with chest heaviness. Two days prior, the patient developed slow-onset moderate chest heaviness with a productive cough. She stated the chest heaviness was localized to the center of her chest and felt better after coughing. There were no exacerbating factors. She initially thought it was her allergies, but allergy medications did not provide any relief. She denied any shortness of breath, nausea, vomiting, fevers, chills, abdominal pain, change in urination or bowel movements, leg swelling or change in her appetite. Of note, she does not have a history of DVT/PE. She does not use alcohol, tobacco or illegal drugs. She finished chemotherapy a year ago  ECG was Normal sinus rhythm, Rate 92 bpm.ALBUTEROL inhaler was given to her.    6/7/21 seen by  Padmaja Sanchez PA-C. Hematology/ Oncology for SOB, fever, cough and chest pain. CT angiogram chest and routine CT abdomen pelvis with IV contrast. No PE. CMP & CBC completed. Covid test was negative in the ED. She had a negative D-dimer yesterday in the ED, but given her history of lymphoma, restaging CT done with PE study. Differential diagnosis was broad, but including lymphoma recurrence, pneumonia, viral etiology, UTI.  Labs look really good and normal lactate. UA negative. 6/10: CT with no acute findings. Some days later respiratory status improved.      8/13/21 cMRI  Showed moderately reduced LV systolic function with LVEF 39%.Late gadolinium enhancement imaging shows no MI, fibrosis or infiltrative disease.This is an improvement in LVEF.    8/17/21 [facility] to [facility].  She is accompanied by her .  She tells me that she is taking carvedilol 3.125 mg daily, lisinopril 2.5 mg at night, spironolactone 25 mg daily, furosemide 10 mg daily.  Her blood pressure readings at home are 82//80.  She states she feels better when her blood pressures greater than 100.  She likes to exercise and if her blood pressure is in the 100s she can exercise otherwise she feels poorly.  She denies shortness of breath, orthopnea, PND, syncope or near syncope.  She denies chest pain chest pressure neck or arm pain.  She denies cough.      I have reviewed and updated the patient's Past Medical History, Social History, Family History and Medication List.    CURRENT MEDICATIONS:  Current Outpatient Medications   Medication Sig Dispense Refill     albuterol (PROAIR HFA/PROVENTIL HFA/VENTOLIN HFA) 108 (90 Base) MCG/ACT inhaler Inhale 2 puffs into the lungs every 4 hours as needed for shortness of breath / dyspnea or wheezing 18 g 0     carvedilol (COREG) 6.25 MG tablet Take 0.5 tablets (3.125 mg) by mouth every morning AND 1 tablet (6.25 mg) every evening. 135 tablet 3     cetirizine (ZYRTEC) 10 MG tablet Take 10 mg by mouth  daily       Cyanocobalamin (B-12) 3000 MCG CAPS        ferrous sulfate (FEROSUL) 325 (65 Fe) MG tablet        folic acid (FOLVITE) 400 MCG tablet        furosemide (LASIX) 20 MG tablet 10 mg daily. Take an additional 10 mg as directed. 180 tablet 3     lisinopril (ZESTRIL) 5 MG tablet Take 1 tablet (5 mg) by mouth 2 times daily 90 tablet 3     LORazepam (ATIVAN) 0.5 MG tablet TAKE 1 TABLET BY MOUTH AT BEDTIME FOR SLEEP AND ANXIETY. 30 tablet 0     sertraline (ZOLOFT) 50 MG tablet Take 1 tablet (50 mg) by mouth daily 90 tablet 1     spironolactone (ALDACTONE) 25 MG tablet Take 1 tablet (25 mg) by mouth daily 90 tablet 3       ALLERGIES     Allergies   Allergen Reactions     Cold & Flu [Cold Defense Fighter]      See pseudoephedrine     Seasonal Allergies      Sudafed Cold-Cough [Dayquil Liquicaps]      Pseudoephedrine Rash     Rash then skins peels off            ROS:  12-pt ROS is negative except for as noted above.        PHYSICAL EXAMINATION:  Vitals: Blood pressure 92/70, pulse is 86, weight is 155 pounds    Constitutional:  Patient is pleasant, alert, cooperative, and in NAD.   HEENT:  NCAT. PERRLA. EOM's intact.   Neck:  JVP 6 cm at a 45 degree angle  Pulmonary: clear  Cardiac: Heart rhythm is regular.  She has no cardiac murmurs or carotid bruits  Abdomen:  Soft, non-tender abdomen, no hepatosplenomegaly appreciated.   Extremities:  No edema  Neurological:  No gross motor or sensory deficits.   Psych: Appropriate affect.      DIAGNOSTIC STUDIES:  Recent Lab Results:    BMP RESULTS: Sodium 138, potassium 3.6, BUN 21, creatinine 1.02, GFR 64      Lab Results   Component Value Date    CHOL 238 (H) 10/15/2020    HDL 50 10/15/2020     (H) 10/15/2020    TRIG 223 (H) 10/15/2020    CHOLHDLRATIO 3.6 09/24/2015              IMPRESSIONS:  Ms. Kassie Ramirez is a 51-year-old woman with persistent idiopathic cardiomyopathy, likely due to chemotherapy with Adriamycin for treatment of large B-cell lymphoma.  Her  chemotherapy started in 11/2019 and concluded in 04/2020.  She remains in remission at this time.    She was initially diagnosed with a cardiomyopathy in 06/2020.  She was having episodes of SVT at that time and treated with ablation, but her cardiomyopathy did not improve with that treatment.  Initially, she was able to tolerate only low doses of medications to treat her cardiomyopathy, but her blood pressure has come up and her heart rate has come down, indicative of improvement in left ventricular function.  3/2021 echocardiogram did look better with an ejection fraction of 30%-35%. We continued to try to treat her aggressively with medical therapy.    Dr. Pearl increased carvedilol to 6.25 mg p.o. b.i.d., added spironolactone 25 mg daily, stopped her potassium supplement and increase her lisinopril to 5 mg p.o. b.i.d.   She tolerated this regimen for a couple months but then developed what sounded like a viral infection, she decreased carvedilol to 3.125mg daily, spironolactone 25 mg daily and lisinopril 2.5mg daily.  Blood pressures that at home have been in the 80 systolic to 112 systolic.  She states she feels much better when her blood pressure is greater than 100 systolic.     8/13/21 cMRI  Showed moderately reduced LV systolic function with LVEF 39%.Late gadolinium enhancement imaging shows no MI, fibrosis or infiltrative disease.This is an improvement in LVEF.        RECOMMENDATIONS:  1.  Stop lisinopril, restart if blood pressures greater than 110 systolic  2.  Increase carvedilol to 3.125 mg twice a day  3.  Stop furosemide, call if more short of breath or sudden weight gain  4. Bmp in 1 month bc lasix stopped and on spironolactone  5.  Return to see Dr. Pearl in November with a basic metabolic panel        Thank you for the opportunity to be involved in this very pleasant patient's care please feel to contact me with any questions.    Total time: 76 minutes was spent today reviewing the chart, visiting  the patient, and documenting the visit.      Asmita JOHNSON CNP   [facility]

## 2021-08-17 ENCOUNTER — OFFICE VISIT (OUTPATIENT)
Dept: CARDIOLOGY | Facility: CLINIC | Age: 51
End: 2021-08-17
Payer: COMMERCIAL

## 2021-08-17 VITALS
DIASTOLIC BLOOD PRESSURE: 70 MMHG | HEART RATE: 86 BPM | BODY MASS INDEX: 25.34 KG/M2 | SYSTOLIC BLOOD PRESSURE: 92 MMHG | OXYGEN SATURATION: 97 % | WEIGHT: 157 LBS

## 2021-08-17 DIAGNOSIS — I42.9 SECONDARY CARDIOMYOPATHY (H): ICD-10-CM

## 2021-08-17 DIAGNOSIS — I42.8 NONISCHEMIC CARDIOMYOPATHY (H): ICD-10-CM

## 2021-08-17 PROCEDURE — 99214 OFFICE O/P EST MOD 30 MIN: CPT | Performed by: NURSE PRACTITIONER

## 2021-08-17 RX ORDER — CARVEDILOL 3.12 MG/1
3.12 TABLET ORAL 2 TIMES DAILY WITH MEALS
Qty: 180 TABLET | Refills: 3 | Status: SHIPPED | OUTPATIENT
Start: 2021-08-17 | End: 2022-06-22

## 2021-08-17 RX ORDER — FUROSEMIDE 20 MG
TABLET ORAL
Qty: 180 TABLET | Refills: 3 | COMMUNITY
Start: 2021-08-17 | End: 2021-12-30

## 2021-08-17 RX ORDER — LISINOPRIL 5 MG/1
2.5 TABLET ORAL DAILY
Qty: 90 TABLET | Refills: 3 | Status: SHIPPED | OUTPATIENT
Start: 2021-08-17 | End: 2022-06-02

## 2021-08-17 NOTE — LETTER
2021    Mary Alice Colón PA-C  [address] [address]    RE: Kassie Ramirez       Dear Colleague,    I had the pleasure of seeing Kassie Ramirez in the [facility].        CARDIOLOGY CLINIC / C.O.R.E. CLINIC VISIT  (Heart Failure Specialty)  DOS: 8.17.21    Kassie Ramirez  : 1970  MRN: [id number]    Primary Cardiologist: Dr. Pearl     Reason for Visit: HFrEF/ [facility] return visit     HISTORY OF PRESENT ILLNESS:  I had the opportunity to see Ms. Kassie Ramirez in today at [facility], followed up on her idiopathic cardiomyopathy and chronic systolic heart failure.        Ms. Ramirez began having shortness of breath and cough in the  of .  Chest CT showed hilar adenopathy, which led to bronchoscopy for needle biopsy and tissue diagnosis.  Initially, this was found to be negative for malignancy and therefore assumed to be sarcoidosis.  She was started on high-dose steroid therapy.  However, her adenopathy continued to get worse and her breathing also worsened.  She got a second opinion and mediastinoscopy was performed with a larger tissue sample showing large B-cell lymphoma.  She immediately began chemotherapy at about Thanksgiving time in .      During this time, she was also experiencing frequent episodes of SVT, sometimes prolonged associated with worsening shortness of breath.  Her SVT was at about 170 beats per minute.  She converted to sinus rhythm with adenosine, but her troponin was mildly elevated and she was started on low-dose carvedilol.  Her echo showed normal left ventricular function at that time.        Followup cardiac MRI was done on 10/09/2019 which was essentially normal.  There is no evidence of infiltrative disease and her left ventricular ejection fraction was 59%.  The hilar adenopathy was again noted.  Eventually, she underwent ablation for SVT and  has not had recurrence of that since her ablation was performed in 06/2020.  She is on surveillance and CT on 3/12/2021 with no concerns for lymphoma. 6/10: CT with no acute findings.     She completed chemotherapy in 04/2020, but unfortunately her ejection fraction had gone down precipitously and was estimated at 25%-30% by echocardiogram in 06/2020. Initially, this was suspected to be due to recurrent SVT, but after her ablation, her left ventricular function failed to improve.  She was able to tolerate only small doses of carvedilol and lisinopril and was using a low dose of Lasix as well.        Echocardiogram repeated again on 03/29/2021, showing slight improvement in left ventricular function.  Her ejection fraction was reported to be 20%-25%, which Dr. Pearl thought it was higher. The biplane EF was reported to be 30%, although he thought her ejection fraction looked more like 30%-35%.  She has no significant valvular disease.    3/2021 seen by Dr. Pearl. His plan was the following:  - Increased carvedilol to 6.25 mg p.o. b.i.d.   - Added spironolactone at 25 mg daily  - Stopped Potassium supplement    - Increased Lisinopril to 5 mg p.o. b.i.d.   - BMP 4/7 creat. 1.14, K+ 4.2  - BMP 4/22 creat. 1.00, K+4.3  Due to intolerance, Sadia had to decrease AM carvedilol to 3.125 mg and continued with 6.25 mg dose in the PM., she continued spironolactone, and left Lisinopril at 2.5 mg daily.      6/6/21 present to the ED with chest heaviness. Two days prior, the patient developed slow-onset moderate chest heaviness with a productive cough. She stated the chest heaviness was localized to the center of her chest and felt better after coughing. There were no exacerbating factors. She initially thought it was her allergies, but allergy medications did not provide any relief. She denied any shortness of breath, nausea, vomiting, fevers, chills, abdominal pain, change in urination or bowel movements, leg swelling or  change in her appetite. Of note, she does not have a history of DVT/PE. She does not use alcohol, tobacco or illegal drugs. She finished chemotherapy a year ago  ECG was Normal sinus rhythm, Rate 92 bpm.ALBUTEROL inhaler was given to her.    6/7/21 seen by Padmaja Sanchez PA-C. Hematology/ Oncology for SOB, fever, cough and chest pain. CT angiogram chest and routine CT abdomen pelvis with IV contrast. No PE. CMP & CBC completed. Covid test was negative in the ED. She had a negative D-dimer yesterday in the ED, but given her history of lymphoma, restaging CT done with PE study. Differential diagnosis was broad, but including lymphoma recurrence, pneumonia, viral etiology, UTI.  Labs look really good and normal lactate. UA negative. 6/10: CT with no acute findings. Some days later respiratory status improved.      8/13/21 cMRI  Showed moderately reduced LV systolic function with LVEF 39%.Late gadolinium enhancement imaging shows no MI, fibrosis or infiltrative disease.This is an improvement in LVEF.    8/17/21 returns to [facility].  She is accompanied by her .  She tells me that she is taking carvedilol 3.125 mg daily, lisinopril 2.5 mg at night, spironolactone 25 mg daily, furosemide 10 mg daily.  Her blood pressure readings at home are 82//80.  She states she feels better when her blood pressures greater than 100.  She likes to exercise and if her blood pressure is in the 100s she can exercise otherwise she feels poorly.  She denies shortness of breath, orthopnea, PND, syncope or near syncope.  She denies chest pain chest pressure neck or arm pain.  She denies cough.      I have reviewed and updated the patient's Past Medical History, Social History, Family History and Medication List.    CURRENT MEDICATIONS:  Current Outpatient Medications   Medication Sig Dispense Refill     albuterol (PROAIR HFA/PROVENTIL HFA/VENTOLIN HFA) 108 (90 Base) MCG/ACT inhaler Inhale 2 puffs into the lungs every 4 hours as  needed for shortness of breath / dyspnea or wheezing 18 g 0     carvedilol (COREG) 6.25 MG tablet Take 0.5 tablets (3.125 mg) by mouth every morning AND 1 tablet (6.25 mg) every evening. 135 tablet 3     cetirizine (ZYRTEC) 10 MG tablet Take 10 mg by mouth daily       Cyanocobalamin (B-12) 3000 MCG CAPS        ferrous sulfate (FEROSUL) 325 (65 Fe) MG tablet        folic acid (FOLVITE) 400 MCG tablet        furosemide (LASIX) 20 MG tablet 10 mg daily. Take an additional 10 mg as directed. 180 tablet 3     lisinopril (ZESTRIL) 5 MG tablet Take 1 tablet (5 mg) by mouth 2 times daily 90 tablet 3     LORazepam (ATIVAN) 0.5 MG tablet TAKE 1 TABLET BY MOUTH AT BEDTIME FOR SLEEP AND ANXIETY. 30 tablet 0     sertraline (ZOLOFT) 50 MG tablet Take 1 tablet (50 mg) by mouth daily 90 tablet 1     spironolactone (ALDACTONE) 25 MG tablet Take 1 tablet (25 mg) by mouth daily 90 tablet 3       ALLERGIES     Allergies   Allergen Reactions     Cold & Flu [Cold Defense Fighter]      See pseudoephedrine     Seasonal Allergies      Sudafed Cold-Cough [Dayquil Liquicaps]      Pseudoephedrine Rash     Rash then skins peels off            ROS:  12-pt ROS is negative except for as noted above.        PHYSICAL EXAMINATION:  Vitals: Blood pressure 92/70, pulse is 86, weight is 155 pounds    Constitutional:  Patient is pleasant, alert, cooperative, and in NAD.   HEENT:  NCAT. PERRLA. EOM's intact.   Neck:  JVP 6 cm at a 45 degree angle  Pulmonary: clear  Cardiac: Heart rhythm is regular.  She has no cardiac murmurs or carotid bruits  Abdomen:  Soft, non-tender abdomen, no hepatosplenomegaly appreciated.   Extremities:  No edema  Neurological:  No gross motor or sensory deficits.   Psych: Appropriate affect.      DIAGNOSTIC STUDIES:  Recent Lab Results:    BMP RESULTS: Sodium 138, potassium 3.6, BUN 21, creatinine 1.02, GFR 64      Lab Results   Component Value Date    CHOL 238 (H) 10/15/2020    HDL 50 10/15/2020     (H) 10/15/2020     TRIG 223 (H) 10/15/2020    CHOLHDLRATIO 3.6 09/24/2015              IMPRESSIONS:  Ms. Kassie Ramirez is a 51-year-old woman with persistent idiopathic cardiomyopathy, likely due to chemotherapy with Adriamycin for treatment of large B-cell lymphoma.  Her chemotherapy started in 11/2019 and concluded in 04/2020.  She remains in remission at this time.    She was initially diagnosed with a cardiomyopathy in 06/2020.  She was having episodes of SVT at that time and treated with ablation, but her cardiomyopathy did not improve with that treatment.  Initially, she was able to tolerate only low doses of medications to treat her cardiomyopathy, but her blood pressure has come up and her heart rate has come down, indicative of improvement in left ventricular function.  3/2021 echocardiogram did look better with an ejection fraction of 30%-35%. We continued to try to treat her aggressively with medical therapy.    Dr. Pearl increased carvedilol to 6.25 mg p.o. b.i.d., added spironolactone 25 mg daily, stopped her potassium supplement and increase her lisinopril to 5 mg p.o. b.i.d.   She tolerated this regimen for a couple months but then developed what sounded like a viral infection, she decreased carvedilol to 3.125mg daily, spironolactone 25 mg daily and lisinopril 2.5mg daily.  Blood pressures that at home have been in the 80 systolic to 112 systolic.  She states she feels much better when her blood pressure is greater than 100 systolic.     8/13/21 cMRI  Showed moderately reduced LV systolic function with LVEF 39%.Late gadolinium enhancement imaging shows no MI, fibrosis or infiltrative disease.This is an improvement in LVEF.        RECOMMENDATIONS:  1.  Stop lisinopril, restart if blood pressures greater than 110 systolic  2.  Increase carvedilol to 3.125 mg twice a day  3.  Stop furosemide, call if more short of breath or sudden weight gain  4. Bmp in 1 month bc lasix stopped and on spironolactone  5.  Return to see  Dr. Pearl in November with a basic metabolic panel        Thank you for the opportunity to be involved in this very pleasant patient's care please feel to contact me with any questions.    Total time: 76 minutes was spent today reviewing the chart, visiting the patient, and documenting the visit.      SCOTTIE Solares [facility]      Thank you for allowing me to participate in the care of your patient.      Sincerely,     ALEX Mendoza CNP [facility]  cc:   No referring provider defined for this encounter.

## 2021-08-17 NOTE — PATIENT INSTRUCTIONS
Call [facility] nurse for any questions or concerns Mon-Fri 8am-4pm:                                                  [phone]                                       For concerns after hours:                                                 [phone]     1. Medication changes:   2.  Plan from today:    3.  Lab results:    Component      Latest Ref Rng & Units 8/16/2021   Sodium      133 - 144 mmol/L 138   Potassium      3.4 - 5.3 mmol/L 3.6   Chloride      94 - 109 mmol/L 105   Carbon Dioxide      20 - 32 mmol/L 28   Anion Gap      3 - 14 mmol/L 5   Urea Nitrogen      7 - 30 mg/dL 21   Creatinine      0.52 - 1.04 mg/dL 1.02   Calcium      8.5 - 10.1 mg/dL 10.1   Glucose      70 - 99 mg/dL 89   GFR Estimate      >60 mL/min/1.73m2 64                   Call C.JESSICA. nurse for any questions or concerns Mon-Fri 8am-4pm:                                                [phone]                                For concerns after hours: [phone]    Medication changes:   1. Hold lisinopril.   2. Hold furosemide   3. Increase carvedilol 3.125mg twice daily   4, continue spironolactone     Plan from today:   1. Goal BP > 100  2. Call if weight goes up suddenly or short of breath.   4. Restart lisinopril at night if / consistently.   5. See Dr. Pearl in Dec.

## 2021-08-17 NOTE — NURSING NOTE
HOME BP LO/26/21  93/68  21  90/68   21  90/75 (cut back on carvedilol)  21  104/78  21  104/69  21  101/62  21   104/74  21   90/65  8/3/21   96/75  21  105/76  21  109/81  21   97/67  21  120/81  8/10/21  100/70  21  87/51  21  103/73  21  104/77  21  104/74  8/15/21  112/66  21  103/76  21  109/81    Lexi Mtz RN BSN   [place], [place]  [facility] Care Coordinator  21, 8:05 AM

## 2021-08-17 NOTE — PROGRESS NOTES
Discussed case with Dr. Pearl. No additional recommendations from the visit today. Asmita JOHNSON, CNP

## 2021-09-17 ENCOUNTER — LAB (OUTPATIENT)
Dept: LAB | Facility: CLINIC | Age: 51
End: 2021-09-17
Payer: COMMERCIAL

## 2021-09-17 DIAGNOSIS — I42.9 SECONDARY CARDIOMYOPATHY (H): ICD-10-CM

## 2021-09-17 DIAGNOSIS — I42.8 NONISCHEMIC CARDIOMYOPATHY (H): ICD-10-CM

## 2021-09-17 LAB — NT-PROBNP SERPL-MCNC: 296 PG/ML (ref 0–125)

## 2021-09-17 PROCEDURE — 36415 COLL VENOUS BLD VENIPUNCTURE: CPT

## 2021-09-17 PROCEDURE — 83880 ASSAY OF NATRIURETIC PEPTIDE: CPT

## 2021-09-17 PROCEDURE — 80048 BASIC METABOLIC PNL TOTAL CA: CPT

## 2021-09-18 LAB
ANION GAP SERPL CALCULATED.3IONS-SCNC: 4 MMOL/L (ref 3–14)
BUN SERPL-MCNC: 25 MG/DL (ref 7–30)
CALCIUM SERPL-MCNC: 9 MG/DL (ref 8.5–10.1)
CHLORIDE BLD-SCNC: 107 MMOL/L (ref 94–109)
CO2 SERPL-SCNC: 27 MMOL/L (ref 20–32)
CREAT SERPL-MCNC: 1.04 MG/DL (ref 0.52–1.04)
GFR SERPL CREATININE-BSD FRML MDRD: 62 ML/MIN/1.73M2
GLUCOSE BLD-MCNC: 88 MG/DL (ref 70–99)
POTASSIUM BLD-SCNC: 3.8 MMOL/L (ref 3.4–5.3)
SODIUM SERPL-SCNC: 138 MMOL/L (ref 133–144)

## 2021-09-19 ENCOUNTER — HEALTH MAINTENANCE LETTER (OUTPATIENT)
Age: 51
End: 2021-09-19

## 2021-09-20 ENCOUNTER — TELEPHONE (OUTPATIENT)
Dept: CARDIOLOGY | Facility: CLINIC | Age: 51
End: 2021-09-20

## 2021-09-20 ENCOUNTER — HOSPITAL ENCOUNTER (OUTPATIENT)
Dept: CT IMAGING | Facility: CLINIC | Age: 51
Discharge: HOME | End: 2021-09-20
Attending: INTERNAL MEDICINE | Admitting: INTERNAL MEDICINE
Payer: COMMERCIAL

## 2021-09-20 DIAGNOSIS — C83.32 DIFFUSE LARGE B-CELL LYMPHOMA OF INTRATHORACIC LYMPH NODES (H): ICD-10-CM

## 2021-09-20 DIAGNOSIS — I42.9 SECONDARY CARDIOMYOPATHY (H): ICD-10-CM

## 2021-09-20 DIAGNOSIS — F41.9 ANXIETY: ICD-10-CM

## 2021-09-20 DIAGNOSIS — R59.0 MEDIASTINAL ADENOPATHY: ICD-10-CM

## 2021-09-20 PROCEDURE — 74177 CT ABD & PELVIS W/CONTRAST: CPT

## 2021-09-20 PROCEDURE — 250N000011 HC RX IP 250 OP 636: Performed by: INTERNAL MEDICINE

## 2021-09-20 PROCEDURE — 250N000009 HC RX 250: Performed by: INTERNAL MEDICINE

## 2021-09-20 RX ORDER — IOPAMIDOL 755 MG/ML
77 INJECTION, SOLUTION INTRAVASCULAR ONCE
Status: COMPLETED | OUTPATIENT
Start: 2021-09-20 | End: 2021-09-20

## 2021-09-20 RX ADMIN — SODIUM CHLORIDE 62 ML: 9 INJECTION, SOLUTION INTRAVENOUS at 16:20

## 2021-09-20 RX ADMIN — IOPAMIDOL 77 ML: 755 INJECTION, SOLUTION INTRAVENOUS at 16:20

## 2021-09-20 NOTE — TELEPHONE ENCOUNTER
"Spoke to Kassie regarding BMP and BNP. Mailed out results per request.   Asked how she has been feeling. \"Really good\", no more dizziness. BPs have been running \"mid to upper 90s/70s. I am still not to  The 100s but getting closer.\".     Future Appointments   Date Time Provider Department Center   9/20/2021  4:00 PM [address]   9/24/2021  3:30 PM Castro Castro MD [facility]     Update to Cirilo Galvez, KING 3:19 PM 09/20/21      "

## 2021-09-21 NOTE — TELEPHONE ENCOUNTER
[facility]    Called Kassie and left VM requesting call back to discuss what doses of carvedilol, furosemide, lisinopril and spironolactone she is currently taking.       Future Appointments   Date Time Provider Department Center   9/24/2021  3:30 PM Castro Castro MD [facility]         Lexi Mtz RN BSN   [facility] [place], [place]  DAIJAODELL Clinic Care Coordinator  09/21/21, 10:07 AM

## 2021-09-22 NOTE — TELEPHONE ENCOUNTER
Received VM on [facility] RN line from Kassie who reports she is taking the following cardiac meds:     Carvedilol 3.125 mg BID  Spironolactone 25 mg once daily  Furosemide- NOT TAKING  Lisinopril- NOT TAKING    Lexi Mtz RN BSN   [facility] [place], [place]  JAMES Clinic Care Coordinator  09/22/21, 4:29 PM

## 2021-09-22 NOTE — TELEPHONE ENCOUNTER
Called Kassie and left  requesting call back to discuss what doses of carvedilol, furosemide, lisinopril and spironolactone she is currently taking.     KING Chavez   [place], [place]  SHAUNA. Clinic Care Coordinator  09/22/21, 11:02 AM

## 2021-09-24 ENCOUNTER — ONCOLOGY VISIT (OUTPATIENT)
Dept: ONCOLOGY | Facility: CLINIC | Age: 51
End: 2021-09-24
Attending: INTERNAL MEDICINE
Payer: COMMERCIAL

## 2021-09-24 VITALS
DIASTOLIC BLOOD PRESSURE: 82 MMHG | OXYGEN SATURATION: 97 % | HEIGHT: 66 IN | TEMPERATURE: 97.2 F | BODY MASS INDEX: 25.81 KG/M2 | RESPIRATION RATE: 16 BRPM | HEART RATE: 88 BPM | WEIGHT: 160.6 LBS | SYSTOLIC BLOOD PRESSURE: 115 MMHG

## 2021-09-24 DIAGNOSIS — G62.0 CHEMOTHERAPY-INDUCED PERIPHERAL NEUROPATHY (H): ICD-10-CM

## 2021-09-24 DIAGNOSIS — C83.32 DIFFUSE LARGE B-CELL LYMPHOMA OF INTRATHORACIC LYMPH NODES (H): Primary | ICD-10-CM

## 2021-09-24 DIAGNOSIS — T45.1X5A CHEMOTHERAPY-INDUCED PERIPHERAL NEUROPATHY (H): ICD-10-CM

## 2021-09-24 PROCEDURE — 99214 OFFICE O/P EST MOD 30 MIN: CPT | Performed by: INTERNAL MEDICINE

## 2021-09-24 RX ORDER — GABAPENTIN 300 MG/1
300 CAPSULE ORAL AT BEDTIME
Qty: 30 CAPSULE | Refills: 3 | Status: SHIPPED | OUTPATIENT
Start: 2021-09-24 | End: 2021-12-30 | Stop reason: SINTOL

## 2021-09-24 ASSESSMENT — PAIN SCALES - GENERAL: PAINLEVEL: MODERATE PAIN (5)

## 2021-09-24 ASSESSMENT — MIFFLIN-ST. JEOR: SCORE: 1360.23

## 2021-09-24 NOTE — LETTER
9/24/2021         RE: Kassie Ramirez  [address]  [facility] [id number]        Dear Colleague,    Thank you for referring your patient, Kassie Ramirez, to the [facility]. Please see a copy of my visit note below.    [facility]  HEMATOLOGY AND ONCOLOGY    FOLLOW-UP VISIT NOTE    PATIENT NAME: Kassie Ramirez MRN # [id number]  DATE OF VISIT: Sep 24, 2021 YOB: 1970    REFERRING PROVIDER: No referring provider defined for this encounter.    CANCER TYPE: DLBCL, EBV+ non-GCB, stage III.  STAGE: III    TREATMENT SUMMARY:  She presented with shortness of breath and cough which had been present since May 2019. Her imaging suggested pulmonary infiltrates which was attributed to aspiration pneumonia. CT scan of the chest 8/20/2019 showed left hilar and subcarinal mediastinal adenopathy with narrowing of the left lower lobe bronchus and a nonspecific patchy area of nodular consolidation in the medial right lower lobe.  Bronchoscopy with EBUS 8/28/2019 showed nonnecrotizing granulomatous inflammation with prominent eosinophilia, negative for malignancy.  She was diagnosed with sarcoidosis and started on prednisone. She had worsening cough and shortness of breath with tapering of the prednisone.  Repeat CT scan of the chest 11/18/2019 showed interval worsening of the bulky mediastinal and bilateral hilar lymphadenopathy, near complete resolution of the lower lobe opacities, with new interstitial opacities in the right upper lobe.   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 Large mediastinal mass causing respiratory compromise. . IPI score of 2 (low-intermediate). FISH neg for high risk translocations. She received 6 cycles of R-CHOP with intermediate dose methotrexate after cycles 2, 4 and 6 from November through April  2020.      CURRENT INTERVENTIONS:  Surveillance post MR-CHOP    SUBJECTIVE   Kassie Ramirez is being followed for DLBCL, EBV+ non-GCB, stage III intermediate risk disease    Patient was followed in person. Kassie is joined by her  at this visit. She has completed all of her planned cycles of MR-CHOP chemotherapy and is being seen with labs and restaging scans.     She had struggled with cardiomyopathy post adriamycin. She is feeling a lot better now. She denies any chest pain or shortness of breath.     She was having anxiety and had symptoms of her lymphoma.  She has been started on sertraline and feels a lot better.    She has peripheral neuropathy in her leg that extends mid calf. She has noticed spasm in her right arm occasionally.       PAST MEDICAL HISTORY     Past Medical History:   Diagnosis Date     Anxiety attack 9/16/2014     Cardiomyopathy (H)     non ishemic - 25-30% - Chemo related     Diffuse large B-cell lymphoma (H)     Diagnosed 11/2019, Ronan Albarran     Encounter for Essure implantation 2009     Generalized anxiety disorder 9/16/2014    zoloft = flat emotions     HFrEF (heart failure with reduced ejection fraction) (H)     new diagnosis 6/14     Menopausal disorder     started on OCPs by menopause center 3/2017 (takes active continuously)     Menstrual headache     helped by OCPs and magnesium     Paroxysmal SVT (supraventricular tachycardia) (H) 06/2020     GERTRUDE (stress urinary incontinence, female)     sling procedure 2016         CURRENT OUTPATIENT MEDICATIONS     Current Outpatient Medications   Medication Sig     carvedilol (COREG) 3.125 MG tablet Take 1 tablet (3.125 mg) by mouth 2 times daily (with meals)     cetirizine (ZYRTEC) 10 MG tablet Take 10 mg by mouth daily     Cyanocobalamin (B-12) 3000 MCG CAPS      folic acid (FOLVITE) 400 MCG tablet      furosemide (LASIX) 20 MG tablet 8/17/21 hold     lisinopril (ZESTRIL) 5 MG tablet Take 0.5 tablets (2.5 mg) by mouth daily  "8/17/21 hold restart if BP > 110.     LORazepam (ATIVAN) 0.5 MG tablet TAKE 1 TABLET BY MOUTH AT BEDTIME FOR SLEEP AND ANXIETY.     sertraline (ZOLOFT) 50 MG tablet Take 1 tablet (50 mg) by mouth daily     spironolactone (ALDACTONE) 25 MG tablet Take 1 tablet (25 mg) by mouth daily     albuterol (PROAIR HFA/PROVENTIL HFA/VENTOLIN HFA) 108 (90 Base) MCG/ACT inhaler Inhale 2 puffs into the lungs every 4 hours as needed for shortness of breath / dyspnea or wheezing     ferrous sulfate (FEROSUL) 325 (65 Fe) MG tablet  (Patient not taking: Reported on 8/17/2021)     No current facility-administered medications for this visit.        ALLERGIES      Allergies   Allergen Reactions     Cold & Flu [Cold Defense Fighter]      See pseudoephedrine     Seasonal Allergies      Sudafed Cold-Cough [Dayquil Liquicaps]      Pseudoephedrine Rash     Rash then skins peels off         REVIEW OF SYSTEMS   As above in the HPI, o/w complete 12-point ROS was negative.     PHYSICAL EXAM   /82   Pulse 88   Temp 97.2  F (36.2  C) (Tympanic)   Resp 16   Ht 1.676 m (5' 6\")   Wt 72.8 kg (160 lb 9.6 oz)   LMP 11/06/2019   SpO2 97%   BMI 25.92 kg/m    Limited physical exam during video visit due to COVID19 restrictions  Middle aged female in no acute distress  Breathing comfortably, no tachypnea  Speech and hearing normal  Pleasant mood and congruent affect  Moving all extremities, no focal neurologic deficits apparent       LABORATORY AND IMAGING STUDIES     Recent Labs   Lab Test 09/17/21  1513 08/16/21  1227 06/07/21  1430 06/06/21  0929 04/22/21  0731    138 139 139 136   POTASSIUM 3.8 3.6 3.6 3.6 4.3   CHLORIDE 107 105 106 105 104   CO2 27 28 27 27 30   ANIONGAP 4 5 6 7 2*   BUN 25 21 17 17 18   CR 1.04 1.02 1.00 1.09* 1.00   GLC 88 89 97 98 101*   AFSHAN 9.0 10.1 9.5 9.8 9.2     Recent Labs   Lab Test 06/19/20  1853 06/15/20  0845 06/14/20  1807 01/11/20  0615 12/01/19  1340 12/01/19  0641 11/30/19  2137 11/30/19  1341 " 11/28/19  0537 11/27/19  2216   MAG 2.5* 2.4* 2.1 2.0  --   --   --   --   --  2.1   PHOS  --   --   --  3.1 2.8 3.4 2.8 2.5   < > 3.1    < > = values in this interval not displayed.     Recent Labs   Lab Test 06/07/21  1430 06/06/21  0929 03/12/21  0930 02/12/21  1446 12/15/20  0938   WBC 5.4 5.0 6.1 8.4 6.8   HGB 13.9 14.3 14.4 15.1 15.2    162 193 191 194   MCV 90 89 91 87 88   NEUTROPHIL 43.1 52.2 46.8 50.3 47.0     Recent Labs   Lab Test 06/07/21  1430 03/12/21  0930 02/12/21  1446 12/15/20  0938 12/15/20  0938   BILITOTAL 1.0 0.9 0.9   < > 0.8   ALKPHOS 97 97 104   < > 107   ALT 39 35 34   < > 25   AST 26 26 24   < > 20   ALBUMIN 4.1 4.1 4.4   < > 4.3   LDH  --  237* 243*  --  217    < > = values in this interval not displayed.     TSH   Date Value Ref Range Status   06/14/2020 3.40 0.40 - 4.00 mU/L Final   09/18/2019 2.06 0.40 - 4.00 mU/L Final   07/27/2018 2.04 0.40 - 4.00 mU/L Final     No results for input(s): CEA in the last 14325 hours.  Results for orders placed or performed during the hospital encounter of 09/20/21   CT Chest/Abdomen/Pelvis w Contrast    Narrative    CT CHEST/ABDOMEN/PELVIS W CONTRAST 9/20/2021 4:50 PM    CLINICAL HISTORY: Non-Hodgkin lymphoma, post treatment, follow up;  Diffuse large B cell lymphoma; Diffuse large B-cell lymphoma of  intrathoracic lymph nodes (H); Secondary cardiomyopathy (H);  Mediastinal adenopathy; Anxiety    TECHNIQUE: CT scan of the chest, abdomen, and pelvis was performed  following injection of IV contrast. Multiplanar reformats were  obtained. Dose reduction techniques were used.   CONTRAST: 77mL Isovue-370    COMPARISON: 6/7/2021, 3/12/21    FINDINGS:   LUNGS AND PLEURA: Clear lungs with resolution of previously seen  heterogeneity and groundglass opacities. Stable small pulmonary  nodules including a 3 mm right lower lobe nodule (series 6, image  157), 3 mm right middle lobe nodule (series 6, image 134) 4 mm left  upper lobe nodule (series 6, image  93) and 3 mm left upper lobe nodule  (series 6, image 81). A few other small pulmonary nodules are  unchanged. No new or enlarging nodules. Stable calcified granulomas.  No pleural effusion.    MEDIASTINUM/AXILLAE: Stable anterior mediastinal soft tissue measuring  up to 1.5 cm (series 4, image 57), unchanged since at least 3/20/2021.  No new or enlarging lymph nodes. Right hilar calcified lymph nodes,  the sequela of prior granulomatous disease. No thoracic aortic  aneurysm. No coronary artery calcifications. No pericardial effusion.    HEPATOBILIARY: Stable 1.8 cm lesion with foci of peripheral  enhancement in the posterior right hepatic lobe (series 4, image 117),  likely a benign hemangioma. Other small hypodense lesions are also  stable. No new lesions in the liver.    PANCREAS: Normal.    SPLEEN: Upper normal in size measuring 13.5 cm, not significantly  changed.    ADRENAL GLANDS: Normal.    KIDNEYS/BLADDER: Normal.    BOWEL: No obstruction or inflammatory change.    PELVIC ORGANS: Essure device. No pelvic masses.    ADDITIONAL FINDINGS: No lymphadenopathy in the abdomen and pelvis. No  free fluid.    MUSCULOSKELETAL: Unremarkable.      Impression    IMPRESSION:  1.  Stable mild soft tissue thickening in the anterior mediastinum. No  new lymphadenopathy in the chest, abdomen and pelvis.   2.  Stable upper normal size of the spleen.  3.  Stable lesions in the liver, likely benign cysts and hemangiomas.  4.  Stable calcified pulmonary nodules measuring up to 5 mm since at  least 3/12/2021.    JOSS GRAVES MD         SYSTEM ID:  [id number]           ASSESSMENT AND PLAN   DLBCL, EBV+ non-GCB, stage III post 6 cycles of M-RCHOP  PJV pneumonia causing acute respiratory failure improved on bactrim and prednisone  Cardiomyopathy post adriamycin based chemotherapy likely also contributed by tachycardia    Kassie was seen in person visit and was present alone. She has completed her planned chemotherapy in April 2020.      She is doing much better. We reviewed all her labs and they are adequate.     I have reviewed actual images from her CT scan and there is no evidence of recurrent disease in the chest, abdomen or pelvis. She had enlarged hilar nodes and mediastinal mass, likely from reactive thymic tissue.  This has remained stable on the current imaging study.    She has been taking sertraline for her anxiety and is feeling a lot better.  She has peripheral neuropathy below the knees in both her legs.  I reviewed the option of duloxetine but she is already on sertraline.  I will try gabapentin 300 mg at bedtime to see if it helps her neuropathy at night.    I would continue following her every 3-4 months for now.  We would continue with every 3-4 months restaging scans until 2nd year.    All questions for patient  were answered.  She is almost a year out from treatment completion.     25 minutes spent on the date of the encounter doing chart review, history and exam, documentation and further activities as noted above     Castor Castro    Hematologist and Medical Oncologist  [facility]         Again, thank you for allowing me to participate in the care of your patient.        Sincerely,        Castro Castro MD

## 2021-09-24 NOTE — NURSING NOTE
"Oncology Rooming Note    September 24, 2021 3:16 PM   Kassie Ramirez is a 51 year old female who presents for:    Chief Complaint   Patient presents with     Oncology Clinic Visit     DLBCL (diffuse large B cell lymphoma)      Initial Vitals: /82   Pulse 88   Temp 97.2  F (36.2  C) (Tympanic)   Resp 16   Ht 1.676 m (5' 6\")   Wt 72.8 kg (160 lb 9.6 oz)   LMP 11/06/2019   SpO2 97%   BMI 25.92 kg/m   Estimated body mass index is 25.92 kg/m  as calculated from the following:    Height as of this encounter: 1.676 m (5' 6\").    Weight as of this encounter: 72.8 kg (160 lb 9.6 oz). Body surface area is 1.84 meters squared.  Moderate Pain (5) Comment: Data Unavailable   Patient's last menstrual period was 11/06/2019.  Allergies reviewed: Yes  Medications reviewed: Yes    Medications: Medication refills not needed today.  Pharmacy name entered into [facility]:    [facility] - [place], [place] - [address] [place] [address] DRUG STORE #[id number] [facility] [place] [address] [facility] [address] & [facility] - [place], [place] - [address] [facility] - [place], [place] - [address]    Clinical concerns: follow up        Juanita Calle CMA            "

## 2021-09-24 NOTE — PROGRESS NOTES
[facility]  HEMATOLOGY AND ONCOLOGY    FOLLOW-UP VISIT NOTE    PATIENT NAME: Kassie Ramirez MRN # [id number]  DATE OF VISIT: Sep 24, 2021 YOB: 1970    REFERRING PROVIDER: No referring provider defined for this encounter.    CANCER TYPE: DLBCL, EBV+ non-GCB, stage III.  STAGE: III    TREATMENT SUMMARY:  She presented with shortness of breath and cough which had been present since May 2019. Her imaging suggested pulmonary infiltrates which was attributed to aspiration pneumonia. CT scan of the chest 8/20/2019 showed left hilar and subcarinal mediastinal adenopathy with narrowing of the left lower lobe bronchus and a nonspecific patchy area of nodular consolidation in the medial right lower lobe.  Bronchoscopy with EBUS 8/28/2019 showed nonnecrotizing granulomatous inflammation with prominent eosinophilia, negative for malignancy.  She was diagnosed with sarcoidosis and started on prednisone. She had worsening cough and shortness of breath with tapering of the prednisone.  Repeat CT scan of the chest 11/18/2019 showed interval worsening of the bulky mediastinal and bilateral hilar lymphadenopathy, near complete resolution of the lower lobe opacities, with new interstitial opacities in the right upper lobe.   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 Large mediastinal mass causing respiratory compromise. . IPI score of 2 (low-intermediate). FISH neg for high risk translocations. She received 6 cycles of R-CHOP with intermediate dose methotrexate after cycles 2, 4 and 6 from November through April 2020.      CURRENT INTERVENTIONS:  Surveillance post MR-CHOP    SUBJECTIVE   Kassie Ramirez is being followed for DLBCL, EBV+ non-GCB, stage III intermediate risk disease    Patient was followed in person. Kassie is joined by her  at this visit. She has  completed all of her planned cycles of MR-CHOP chemotherapy and is being seen with labs and restaging scans.     She had struggled with cardiomyopathy post adriamycin. She is feeling a lot better now. She denies any chest pain or shortness of breath.     She was having anxiety and had symptoms of her lymphoma.  She has been started on sertraline and feels a lot better.    She has peripheral neuropathy in her leg that extends mid calf. She has noticed spasm in her right arm occasionally.       PAST MEDICAL HISTORY     Past Medical History:   Diagnosis Date     Anxiety attack 9/16/2014     Cardiomyopathy (H)     non ishemic - 25-30% - Chemo related     Diffuse large B-cell lymphoma (H)     Diagnosed 11/2019, Ronan Albarran     Encounter for Essure implantation 2009     Generalized anxiety disorder 9/16/2014    zoloft = flat emotions     HFrEF (heart failure with reduced ejection fraction) (H)     new diagnosis 6/14     Menopausal disorder     started on OCPs by menopause center 3/2017 (takes active continuously)     Menstrual headache     helped by OCPs and magnesium     Paroxysmal SVT (supraventricular tachycardia) (H) 06/2020     GERTRUDE (stress urinary incontinence, female)     sling procedure 2016         CURRENT OUTPATIENT MEDICATIONS     Current Outpatient Medications   Medication Sig     carvedilol (COREG) 3.125 MG tablet Take 1 tablet (3.125 mg) by mouth 2 times daily (with meals)     cetirizine (ZYRTEC) 10 MG tablet Take 10 mg by mouth daily     Cyanocobalamin (B-12) 3000 MCG CAPS      folic acid (FOLVITE) 400 MCG tablet      furosemide (LASIX) 20 MG tablet 8/17/21 hold     lisinopril (ZESTRIL) 5 MG tablet Take 0.5 tablets (2.5 mg) by mouth daily 8/17/21 hold restart if BP > 110.     LORazepam (ATIVAN) 0.5 MG tablet TAKE 1 TABLET BY MOUTH AT BEDTIME FOR SLEEP AND ANXIETY.     sertraline (ZOLOFT) 50 MG tablet Take 1 tablet (50 mg) by mouth daily     spironolactone (ALDACTONE) 25 MG tablet Take 1 tablet (25 mg) by  "mouth daily     albuterol (PROAIR HFA/PROVENTIL HFA/VENTOLIN HFA) 108 (90 Base) MCG/ACT inhaler Inhale 2 puffs into the lungs every 4 hours as needed for shortness of breath / dyspnea or wheezing     ferrous sulfate (FEROSUL) 325 (65 Fe) MG tablet  (Patient not taking: Reported on 8/17/2021)     No current facility-administered medications for this visit.        ALLERGIES      Allergies   Allergen Reactions     Cold & Flu [Cold Defense Fighter]      See pseudoephedrine     Seasonal Allergies      Sudafed Cold-Cough [Dayquil Liquicaps]      Pseudoephedrine Rash     Rash then skins peels off         REVIEW OF SYSTEMS   As above in the HPI, o/w complete 12-point ROS was negative.     PHYSICAL EXAM   /82   Pulse 88   Temp 97.2  F (36.2  C) (Tympanic)   Resp 16   Ht 1.676 m (5' 6\")   Wt 72.8 kg (160 lb 9.6 oz)   LMP 11/06/2019   SpO2 97%   BMI 25.92 kg/m    Limited physical exam during video visit due to COVID19 restrictions  Middle aged female in no acute distress  Breathing comfortably, no tachypnea  Speech and hearing normal  Pleasant mood and congruent affect  Moving all extremities, no focal neurologic deficits apparent       LABORATORY AND IMAGING STUDIES     Recent Labs   Lab Test 09/17/21  1513 08/16/21  1227 06/07/21  1430 06/06/21  0929 04/22/21  0731    138 139 139 136   POTASSIUM 3.8 3.6 3.6 3.6 4.3   CHLORIDE 107 105 106 105 104   CO2 27 28 27 27 30   ANIONGAP 4 5 6 7 2*   BUN 25 21 17 17 18   CR 1.04 1.02 1.00 1.09* 1.00   GLC 88 89 97 98 101*   AFSHAN 9.0 10.1 9.5 9.8 9.2     Recent Labs   Lab Test 06/19/20  1853 06/15/20  0845 06/14/20  1807 01/11/20  0615 12/01/19  1340 12/01/19  0641 11/30/19  2137 11/30/19  1341 11/28/19  0537 11/27/19  2216   MAG 2.5* 2.4* 2.1 2.0  --   --   --   --   --  2.1   PHOS  --   --   --  3.1 2.8 3.4 2.8 2.5   < > 3.1    < > = values in this interval not displayed.     Recent Labs   Lab Test 06/07/21  1430 06/06/21  0929 03/12/21  0930 02/12/21  1446 " 12/15/20  0938   WBC 5.4 5.0 6.1 8.4 6.8   HGB 13.9 14.3 14.4 15.1 15.2    162 193 191 194   MCV 90 89 91 87 88   NEUTROPHIL 43.1 52.2 46.8 50.3 47.0     Recent Labs   Lab Test 06/07/21  1430 03/12/21  0930 02/12/21  1446 12/15/20  0938 12/15/20  0938   BILITOTAL 1.0 0.9 0.9   < > 0.8   ALKPHOS 97 97 104   < > 107   ALT 39 35 34   < > 25   AST 26 26 24   < > 20   ALBUMIN 4.1 4.1 4.4   < > 4.3   LDH  --  237* 243*  --  217    < > = values in this interval not displayed.     TSH   Date Value Ref Range Status   06/14/2020 3.40 0.40 - 4.00 mU/L Final   09/18/2019 2.06 0.40 - 4.00 mU/L Final   07/27/2018 2.04 0.40 - 4.00 mU/L Final     No results for input(s): CEA in the last 91731 hours.  Results for orders placed or performed during the hospital encounter of 09/20/21   CT Chest/Abdomen/Pelvis w Contrast    Narrative    CT CHEST/ABDOMEN/PELVIS W CONTRAST 9/20/2021 4:50 PM    CLINICAL HISTORY: Non-Hodgkin lymphoma, post treatment, follow up;  Diffuse large B cell lymphoma; Diffuse large B-cell lymphoma of  intrathoracic lymph nodes (H); Secondary cardiomyopathy (H);  Mediastinal adenopathy; Anxiety    TECHNIQUE: CT scan of the chest, abdomen, and pelvis was performed  following injection of IV contrast. Multiplanar reformats were  obtained. Dose reduction techniques were used.   CONTRAST: 77mL Isovue-370    COMPARISON: 6/7/2021, 3/12/21    FINDINGS:   LUNGS AND PLEURA: Clear lungs with resolution of previously seen  heterogeneity and groundglass opacities. Stable small pulmonary  nodules including a 3 mm right lower lobe nodule (series 6, image  157), 3 mm right middle lobe nodule (series 6, image 134) 4 mm left  upper lobe nodule (series 6, image 93) and 3 mm left upper lobe nodule  (series 6, image 81). A few other small pulmonary nodules are  unchanged. No new or enlarging nodules. Stable calcified granulomas.  No pleural effusion.    MEDIASTINUM/AXILLAE: Stable anterior mediastinal soft tissue measuring  up to  1.5 cm (series 4, image 57), unchanged since at least 3/20/2021.  No new or enlarging lymph nodes. Right hilar calcified lymph nodes,  the sequela of prior granulomatous disease. No thoracic aortic  aneurysm. No coronary artery calcifications. No pericardial effusion.    HEPATOBILIARY: Stable 1.8 cm lesion with foci of peripheral  enhancement in the posterior right hepatic lobe (series 4, image 117),  likely a benign hemangioma. Other small hypodense lesions are also  stable. No new lesions in the liver.    PANCREAS: Normal.    SPLEEN: Upper normal in size measuring 13.5 cm, not significantly  changed.    ADRENAL GLANDS: Normal.    KIDNEYS/BLADDER: Normal.    BOWEL: No obstruction or inflammatory change.    PELVIC ORGANS: Essure device. No pelvic masses.    ADDITIONAL FINDINGS: No lymphadenopathy in the abdomen and pelvis. No  free fluid.    MUSCULOSKELETAL: Unremarkable.      Impression    IMPRESSION:  1.  Stable mild soft tissue thickening in the anterior mediastinum. No  new lymphadenopathy in the chest, abdomen and pelvis.   2.  Stable upper normal size of the spleen.  3.  Stable lesions in the liver, likely benign cysts and hemangiomas.  4.  Stable calcified pulmonary nodules measuring up to 5 mm since at  least 3/12/2021.    JOSS GRAVES MD         SYSTEM ID:  [id number]           ASSESSMENT AND PLAN   DLBCL, EBV+ non-GCB, stage III post 6 cycles of M-RCHOP  PJV pneumonia causing acute respiratory failure improved on bactrim and prednisone  Cardiomyopathy post adriamycin based chemotherapy likely also contributed by tachycardia    Kassie was seen in person visit and was present alone. She has completed her planned chemotherapy in April 2020.     She is doing much better. We reviewed all her labs and they are adequate.     I have reviewed actual images from her CT scan and there is no evidence of recurrent disease in the chest, abdomen or pelvis. She had enlarged hilar nodes and mediastinal mass, likely from  reactive thymic tissue.  This has remained stable on the current imaging study.    She has been taking sertraline for her anxiety and is feeling a lot better.  She has peripheral neuropathy below the knees in both her legs.  I reviewed the option of duloxetine but she is already on sertraline.  I will try gabapentin 300 mg at bedtime to see if it helps her neuropathy at night.    I would continue following her every 3-4 months for now.  We would continue with every 3-4 months restaging scans until 2nd year.    All questions for patient  were answered.  She is almost a year out from treatment completion.     25 minutes spent on the date of the encounter doing chart review, history and exam, documentation and further activities as noted above     Castro Castro    Hematologist and Medical Oncologist  [facility]

## 2021-09-30 NOTE — PATIENT INSTRUCTIONS
Per chart review, appt request order has been deferred until 10/4  Maru MCGRAW RN on 9/29/2021 at 10:36 PM

## 2021-10-01 PROBLEM — S26.90XA UNSPECIFIED INJURY OF HEART, UNSPECIFIED WITH OR WITHOUT HEMOPERICARDIUM, INITIAL ENCOUNTER: Status: RESOLVED | Noted: 2020-06-14 | Resolved: 2020-09-29

## 2021-11-01 ENCOUNTER — MYC REFILL (OUTPATIENT)
Dept: FAMILY MEDICINE | Facility: CLINIC | Age: 51
End: 2021-11-01
Payer: COMMERCIAL

## 2021-11-01 DIAGNOSIS — C83.32 DIFFUSE LARGE B-CELL LYMPHOMA OF INTRATHORACIC LYMPH NODES (H): ICD-10-CM

## 2021-11-01 DIAGNOSIS — F41.8 SITUATIONAL ANXIETY: ICD-10-CM

## 2021-11-01 DIAGNOSIS — Z79.899 CONTROLLED SUBSTANCE AGREEMENT SIGNED: ICD-10-CM

## 2021-11-01 DIAGNOSIS — C83.398 DIFFUSE LARGE B-CELL LYMPHOMA OF EXTRANODAL SITE: ICD-10-CM

## 2021-11-01 RX ORDER — LORAZEPAM 0.5 MG/1
TABLET ORAL
Qty: 30 TABLET | Refills: 0 | Status: SHIPPED | OUTPATIENT
Start: 2021-11-01 | End: 2022-01-03

## 2021-11-01 NOTE — TELEPHONE ENCOUNTER
OK for fill.  Please advise pt is due for annual fasting physical/med check for further fills.      Mary Alice Colón MBA, MS, PA-C

## 2021-11-01 NOTE — TELEPHONE ENCOUNTER
Disp Refills Start End ERIC   LORazepam (ATIVAN) 0.5 MG tablet 30 tablet 0 6/25/2021  No   Sig: TAKE 1 TABLET BY MOUTH AT BEDTIME FOR SLEEP AND ANXIETY.   Sent to pharmacy as: LORazepam 0.5 MG Oral Tablet (ATIVAN       Last office visit: 3/9/2021 with prescribing provider:     Future Office Visit:   Next 5 appointments (look out 90 days)    Jan 14, 2022  4:00 PM  Return Visit with Castro Castro MD  [facility] ([facility] ) [address]  [address]  [facility] [address]  [phone]               Encounter-Level CSA - 09/16/2014:    Controlled Substance Agreement - Scan on 11/28/2014  2:23 PM: [facility] CONTROLLED MEDICATION AGREEMENT 09/16/14     Patient-Level CSA:    Controlled Substance Agreement - Non - Opioid - Scan on 3/14/2021  8:26 AM: NON-OPIOID CONTROLLED SUBSTANCE AGREEMENT           Routing refill request to provider for review/approval because:  Drug not on the [facility] refill protocol     Amada Parekh RN, BSN  [facility]

## 2021-11-01 NOTE — LETTER
[facility] PRIOR [address] [id number] [phone]       November 15, 2021    Kassie Ramirez  [address]  [facility] [address]        To Kassie:    We have been calling you regarding a recent refill request we received. Unfortunately, we were unable to reach you. We are notifying you that you are due for Physical with fasting labs appointment prior to your next refill.  You can schedule this appointment via [facility] or by calling the clinic at [phone] Option 1.          Sincerely,    Mary Alice Colón PA-C / [email or web address]

## 2021-11-02 ENCOUNTER — DOCUMENTATION ONLY (OUTPATIENT)
Dept: CARDIOLOGY | Facility: CLINIC | Age: 51
End: 2021-11-02

## 2021-11-02 NOTE — PROGRESS NOTES
For some reason the cardiac MRI completed on 8/16/21 dropped back into Dr. Pearl's results as a new result on 10/18/21. We routed it to Dr. Pearl to re-review to make sure nothing appeared to have changed on the report from the original result date, as the result dropping back into his box suggests the report either was not signed or was edited. Per Dr. Pearl's review below, nothing appears to be different. No new recommendations. Daxa Ly RN on 11/2/2021 at 2:44 PM        Trevon Pearl MD Charles, Danielle, RN  I reviewed the cMR again, but I am not sure what changed. The report still says that it was read in August. It looks ok to me, EF is better and no indication for ICD. No sarcoid. Continue medical mgmt for now.     Trevon Pearl

## 2021-11-28 DIAGNOSIS — F41.8 SITUATIONAL ANXIETY: ICD-10-CM

## 2021-12-30 ENCOUNTER — OFFICE VISIT (OUTPATIENT)
Dept: FAMILY MEDICINE | Facility: CLINIC | Age: 51
End: 2021-12-30
Payer: COMMERCIAL

## 2021-12-30 VITALS
BODY MASS INDEX: 26.68 KG/M2 | OXYGEN SATURATION: 97 % | HEART RATE: 79 BPM | HEIGHT: 66 IN | SYSTOLIC BLOOD PRESSURE: 118 MMHG | TEMPERATURE: 97.8 F | DIASTOLIC BLOOD PRESSURE: 82 MMHG | WEIGHT: 166 LBS | RESPIRATION RATE: 14 BRPM

## 2021-12-30 DIAGNOSIS — G62.0 CHEMOTHERAPY-INDUCED PERIPHERAL NEUROPATHY (H): ICD-10-CM

## 2021-12-30 DIAGNOSIS — Z23 NEED FOR STREPTOCOCCUS PNEUMONIAE VACCINATION: ICD-10-CM

## 2021-12-30 DIAGNOSIS — Z12.11 SCREEN FOR COLON CANCER: ICD-10-CM

## 2021-12-30 DIAGNOSIS — G62.9 NEUROPATHY: ICD-10-CM

## 2021-12-30 DIAGNOSIS — I42.8 NONISCHEMIC CARDIOMYOPATHY (H): ICD-10-CM

## 2021-12-30 DIAGNOSIS — Z00.00 ROUTINE GENERAL MEDICAL EXAMINATION AT A HEALTH CARE FACILITY: Primary | ICD-10-CM

## 2021-12-30 DIAGNOSIS — Z15.1 STARGARDT MACULAR DEGENERATION WITH ABSENT OR HYPOPLASTIC CORPUS CALLOSUM, INTELLECTUAL DISABILITY, AND DYSMORPHIC FEATURES (H): ICD-10-CM

## 2021-12-30 DIAGNOSIS — Z79.899 CONTROLLED SUBSTANCE AGREEMENT SIGNED: ICD-10-CM

## 2021-12-30 DIAGNOSIS — T45.1X5A CHEMOTHERAPY-INDUCED PERIPHERAL NEUROPATHY (H): ICD-10-CM

## 2021-12-30 DIAGNOSIS — Z12.31 VISIT FOR SCREENING MAMMOGRAM: ICD-10-CM

## 2021-12-30 DIAGNOSIS — F78.A9 STARGARDT MACULAR DEGENERATION WITH ABSENT OR HYPOPLASTIC CORPUS CALLOSUM, INTELLECTUAL DISABILITY, AND DYSMORPHIC FEATURES (H): ICD-10-CM

## 2021-12-30 DIAGNOSIS — C83.398 DIFFUSE LARGE B-CELL LYMPHOMA OF EXTRANODAL SITE: ICD-10-CM

## 2021-12-30 DIAGNOSIS — F41.8 SITUATIONAL ANXIETY: ICD-10-CM

## 2021-12-30 DIAGNOSIS — H35.53 STARGARDT MACULAR DEGENERATION WITH ABSENT OR HYPOPLASTIC CORPUS CALLOSUM, INTELLECTUAL DISABILITY, AND DYSMORPHIC FEATURES (H): ICD-10-CM

## 2021-12-30 DIAGNOSIS — Q04.0 STARGARDT MACULAR DEGENERATION WITH ABSENT OR HYPOPLASTIC CORPUS CALLOSUM, INTELLECTUAL DISABILITY, AND DYSMORPHIC FEATURES (H): ICD-10-CM

## 2021-12-30 DIAGNOSIS — E78.2 MIXED HYPERLIPIDEMIA: ICD-10-CM

## 2021-12-30 DIAGNOSIS — E78.1 HYPERTRIGLYCERIDEMIA: ICD-10-CM

## 2021-12-30 DIAGNOSIS — I47.10 PAROXYSMAL SVT (SUPRAVENTRICULAR TACHYCARDIA) (H): ICD-10-CM

## 2021-12-30 DIAGNOSIS — Q89.7 STARGARDT MACULAR DEGENERATION WITH ABSENT OR HYPOPLASTIC CORPUS CALLOSUM, INTELLECTUAL DISABILITY, AND DYSMORPHIC FEATURES (H): ICD-10-CM

## 2021-12-30 LAB
BASOPHILS # BLD AUTO: 0.1 10E3/UL (ref 0–0.2)
BASOPHILS NFR BLD AUTO: 1 %
EOSINOPHIL # BLD AUTO: 1.9 10E3/UL (ref 0–0.7)
EOSINOPHIL NFR BLD AUTO: 19 %
ERYTHROCYTE [DISTWIDTH] IN BLOOD BY AUTOMATED COUNT: 12.1 % (ref 10–15)
HCT VFR BLD AUTO: 42.4 % (ref 35–47)
HGB BLD-MCNC: 15 G/DL (ref 11.7–15.7)
IMM GRANULOCYTES # BLD: 0 10E3/UL
IMM GRANULOCYTES NFR BLD: 0 %
LYMPHOCYTES # BLD AUTO: 2.7 10E3/UL (ref 0.8–5.3)
LYMPHOCYTES NFR BLD AUTO: 28 %
MCH RBC QN AUTO: 31.3 PG (ref 26.5–33)
MCHC RBC AUTO-ENTMCNC: 35.4 G/DL (ref 31.5–36.5)
MCV RBC AUTO: 88 FL (ref 78–100)
MONOCYTES # BLD AUTO: 0.6 10E3/UL (ref 0–1.3)
MONOCYTES NFR BLD AUTO: 6 %
NEUTROPHILS # BLD AUTO: 4.4 10E3/UL (ref 1.6–8.3)
NEUTROPHILS NFR BLD AUTO: 45 %
PLATELET # BLD AUTO: 220 10E3/UL (ref 150–450)
RBC # BLD AUTO: 4.8 10E6/UL (ref 3.8–5.2)
WBC # BLD AUTO: 9.7 10E3/UL (ref 4–11)

## 2021-12-30 PROCEDURE — 82607 VITAMIN B-12: CPT | Performed by: PHYSICIAN ASSISTANT

## 2021-12-30 PROCEDURE — 90472 IMMUNIZATION ADMIN EACH ADD: CPT | Performed by: PHYSICIAN ASSISTANT

## 2021-12-30 PROCEDURE — 99396 PREV VISIT EST AGE 40-64: CPT | Mod: 25 | Performed by: PHYSICIAN ASSISTANT

## 2021-12-30 PROCEDURE — 80053 COMPREHEN METABOLIC PANEL: CPT | Performed by: PHYSICIAN ASSISTANT

## 2021-12-30 PROCEDURE — 90732 PPSV23 VACC 2 YRS+ SUBQ/IM: CPT | Performed by: PHYSICIAN ASSISTANT

## 2021-12-30 PROCEDURE — 36415 COLL VENOUS BLD VENIPUNCTURE: CPT | Performed by: PHYSICIAN ASSISTANT

## 2021-12-30 PROCEDURE — 80061 LIPID PANEL: CPT | Performed by: PHYSICIAN ASSISTANT

## 2021-12-30 PROCEDURE — 90471 IMMUNIZATION ADMIN: CPT | Performed by: PHYSICIAN ASSISTANT

## 2021-12-30 PROCEDURE — 90682 RIV4 VACC RECOMBINANT DNA IM: CPT | Performed by: PHYSICIAN ASSISTANT

## 2021-12-30 PROCEDURE — 83615 LACTATE (LD) (LDH) ENZYME: CPT | Performed by: PHYSICIAN ASSISTANT

## 2021-12-30 PROCEDURE — 85025 COMPLETE CBC W/AUTO DIFF WBC: CPT | Performed by: PHYSICIAN ASSISTANT

## 2021-12-30 PROCEDURE — 99214 OFFICE O/P EST MOD 30 MIN: CPT | Mod: 25 | Performed by: PHYSICIAN ASSISTANT

## 2021-12-30 ASSESSMENT — ANXIETY QUESTIONNAIRES
1. FEELING NERVOUS, ANXIOUS, OR ON EDGE: NOT AT ALL
GAD7 TOTAL SCORE: 0
GAD7 TOTAL SCORE: 0
7. FEELING AFRAID AS IF SOMETHING AWFUL MIGHT HAPPEN: NOT AT ALL
4. TROUBLE RELAXING: NOT AT ALL
7. FEELING AFRAID AS IF SOMETHING AWFUL MIGHT HAPPEN: NOT AT ALL
GAD7 TOTAL SCORE: 0
3. WORRYING TOO MUCH ABOUT DIFFERENT THINGS: NOT AT ALL
5. BEING SO RESTLESS THAT IT IS HARD TO SIT STILL: NOT AT ALL
2. NOT BEING ABLE TO STOP OR CONTROL WORRYING: NOT AT ALL
6. BECOMING EASILY ANNOYED OR IRRITABLE: NOT AT ALL

## 2021-12-30 ASSESSMENT — PATIENT HEALTH QUESTIONNAIRE - PHQ9
SUM OF ALL RESPONSES TO PHQ QUESTIONS 1-9: 4
10. IF YOU CHECKED OFF ANY PROBLEMS, HOW DIFFICULT HAVE THESE PROBLEMS MADE IT FOR YOU TO DO YOUR WORK, TAKE CARE OF THINGS AT HOME, OR GET ALONG WITH OTHER PEOPLE: SOMEWHAT DIFFICULT
SUM OF ALL RESPONSES TO PHQ QUESTIONS 1-9: 4

## 2021-12-30 ASSESSMENT — ENCOUNTER SYMPTOMS
FEVER: 0
HEMATURIA: 0
HEADACHES: 0
CHILLS: 0
NAUSEA: 0
MYALGIAS: 1
HEARTBURN: 0
EYE PAIN: 0
SHORTNESS OF BREATH: 0
BREAST MASS: 0
DIARRHEA: 0
WEAKNESS: 1
ARTHRALGIAS: 1
SORE THROAT: 0
ABDOMINAL PAIN: 0
JOINT SWELLING: 0
CONSTIPATION: 0
FREQUENCY: 0
NERVOUS/ANXIOUS: 0
COUGH: 1
PALPITATIONS: 0
DIZZINESS: 0
HEMATOCHEZIA: 0
DYSURIA: 0
PARESTHESIAS: 0

## 2021-12-30 ASSESSMENT — MIFFLIN-ST. JEOR: SCORE: 1384.72

## 2021-12-30 ASSESSMENT — PAIN SCALES - GENERAL: PAINLEVEL: NO PAIN (0)

## 2021-12-30 NOTE — PROGRESS NOTES
SUBJECTIVE:   CC: Kassie Ramirez is an 51 year old woman who presents for preventive health visit.     Patient has been advised of split billing requirements and indicates understanding: Yes  Healthy Habits:     Getting at least 3 servings of Calcium per day:  Yes    Bi-annual eye exam:  Yes    Dental care twice a year:  Yes    Sleep apnea or symptoms of sleep apnea:  None    Diet:  Low salt    Frequency of exercise:  2-3 days/week    Duration of exercise:  15-30 minutes    Taking medications regularly:  Yes    Medication side effects:  None    PHQ-2 Total Score: 0    Additional concerns today:  No      Secondary cardiomyopathy/SVT  Doing quite well overall.  Breathing much better.  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  Has completed cardiac rehab.  Overall quite pleased with exercise capacity.  8/17/21 - last OV with [facility].  Currently taking carvedilol 3.125 mg BID (increased from once daily 8/17/2021), spironolactone 25 mg daily, furosemide 10 mg daily.  Lisinopril was discontinued 8/17/2021 (still has on hand for PRN use for systolic >110) due to symptomatic hypotension.  Lisinopril was also discontinued and she was advised to contact cardiology if worsening shortness of breath or sudden weight gain.  She denies shortness of breath, orthopnea, PND, syncope or near syncope.  She denies chest pain chest pressure neck or arm pain.  She denies cough.  She was due for follow-up with Dr. Pearl in November but this needs to be scheduled.    Are you experiencing any shortness of breath? No    Are you experiencing any swelling in your legs or feet?  No    Are you using more pillows than usual? No    Do you cough at night?  Yes    Do you check your weight daily?  No    Have you had a weight change recently?  No    Are you having any of the following side effects from your medications? (Select all that apply)  The patient does  not report symptoms of dizziness, fatigue, cough, swelling, or slow heart beat.    Since your last visit, how many times have you gone to the cardiologist, urgent care, emergency room, or hospital because of your heart failure?   2 times    Anxiety Follow-Up  Restarted sertraline 3/9/2021.    Doing quite well.    How are you doing with your anxiety since your last visit? Improved     Are you having other symptoms that might be associated with anxiety? No    Have you had a significant life event? No     Are you feeling depressed? No    Do you have any concerns with your use of alcohol or other drugs? No     Stargardt macular degeneration  Bilaterally.  Both sister and father had/have condition.  Kassie's is very slowly progressive.  Follows with [facility] annually.     Diffuse Large B-Cell lymphoma  Managed by Dr. Castro.  Last VV 12/2020.  She received 6 cycles of R-CHOP with intermediate dose methotrexate after cycles 2, 4 and 6 from November through April 2020.    Last CT chest abdomen pelvis was 9/20/2021 and was stable.  She does have some peripheral neuropathy which is uncomfortable.  Gabapentin did not prove to be helpful.  She has not had B12 levels checked.  Dr. Castro plans to follow-up every 3 to 4 months with scans every 3 to 4 months until second year.    Hyperlipidemia  Patient has history of mixed hyperlipidemia.  Has never been on statin therapy.    Recent Labs   Lab Test 12/30/21  1642 10/15/20  0805 07/27/18  0921 09/24/15  1033 09/16/14  1034   CHOL 329* 238*   < > 206* 173   HDL 50 50   < > 55 49*   * 143*   < > 120 98   TRIG 230* 223*   < > 123 129   CHOLHDLRATIO  --   --   --  3.6 3.5    < > = values in this interval not displayed.         Social History     Tobacco Use     Smoking status: Never Smoker     Smokeless tobacco: Never Used   Vaping Use     Vaping Use: Never used   Substance Use Topics     Alcohol use: No     Alcohol/week: 0.0 standard drinks     Comment: 1 time per  month     Drug use: No     BRIAN-7 SCORE 9/29/2020 4/28/2021 12/30/2021   Total Score - - -   Total Score - - 0 (minimal anxiety)   Total Score 3 2 0     PHQ 9/29/2020 4/28/2021 12/30/2021   PHQ-9 Total Score 2 4 4   Q9: Thoughts of better off dead/self-harm past 2 weeks Not at all Not at all Not at all     Last PHQ-9 12/30/2021   1.  Little interest or pleasure in doing things 0   2.  Feeling down, depressed, or hopeless 0   3.  Trouble falling or staying asleep, or sleeping too much 3   4.  Feeling tired or having little energy 1   5.  Poor appetite or overeating 0   6.  Feeling bad about yourself 0   7.  Trouble concentrating 0   8.  Moving slowly or restless 0   Q9: Thoughts of better off dead/self-harm past 2 weeks 0   PHQ-9 Total Score 4   Difficulty at work, home, or with people -     BRIAN-7  12/30/2021   1. Feeling nervous, anxious, or on edge 0   2. Not being able to stop or control worrying 0   3. Worrying too much about different things 0   4. Trouble relaxing 0   5. Being so restless that it is hard to sit still 0   6. Becoming easily annoyed or irritable 0   7. Feeling afraid, as if something awful might happen 0   BRIAN-7 Total Score 0   If you checked any problems, how difficult have they made it for you to do your work, take care of things at home, or get along with other people? -         Today's PHQ-2 Score:   PHQ-2 ( 1999 Pfizer) 12/30/2021   Q1: Little interest or pleasure in doing things 0   Q2: Feeling down, depressed or hopeless 0   PHQ-2 Score 0   PHQ-2 Total Score (12-17 Years)- Positive if 3 or more points; Administer PHQ-A if positive -   Q1: Little interest or pleasure in doing things Not at all   Q2: Feeling down, depressed or hopeless Not at all   PHQ-2 Score 0       Abuse: Current or Past (Physical, Sexual or Emotional) - NO  Do you feel safe in your environment? YES    Have you ever done Advance Care Planning? (For example, a Health Directive, POLST, or a discussion with a medical provider  or your loved ones about your wishes): Yes, advance care planning is on file.    Social History     Tobacco Use     Smoking status: Never Smoker     Smokeless tobacco: Never Used   Substance Use Topics     Alcohol use: No     Alcohol/week: 0.0 standard drinks     Comment: 1 time per month     If you drink alcohol do you typically have >3 drinks per day or >7 drinks per week? No    Alcohol Use 12/30/2021   Prescreen: >3 drinks/day or >7 drinks/week? Not Applicable   Prescreen: >3 drinks/day or >7 drinks/week? -       Reviewed orders with patient.  Reviewed health maintenance and updated orders accordingly - Yes  BP Readings from Last 3 Encounters:   12/30/21 118/82   09/24/21 115/82   08/17/21 92/70    Wt Readings from Last 3 Encounters:   12/30/21 75.3 kg (166 lb)   09/24/21 72.8 kg (160 lb 9.6 oz)   08/17/21 71.2 kg (157 lb)                  Patient Active Problem List   Diagnosis     Situational anxiety     Controlled substance agreement signed     GERTRUDE (stress urinary incontinence, female)     Intractable migraine without aura and with status migrainosus     Menstrual headache     Menopausal disorder     Diffuse large B-cell lymphoma of extranodal site (H) - per pathology 11/27/19 - mediastinal mass wrapped around azalea and bilateral main stem bronchi- treating with Dr. Castro     DLBCL (diffuse large B cell lymphoma) (H)     Chemotherapy adverse reaction     Paroxysmal SVT (supraventricular tachycardia) (H) - s/p ablation 6/22/2020     Nonischemic cardiomyopathy (H) - from chemotherapy - managed by Dr. Pearl     Hypertriglyceridemia     Stargardt macular degeneration (H) bilateral - [facility] Ann[facility] - very slow progression     Mixed hyperlipidemia - started rosuvastatin 1/2022     Past Surgical History:   Procedure Laterality Date     CV CORONARY ANGIOGRAM N/A 6/15/2020    Procedure: Coronary Angiogram;  Surgeon: Luis Eduardo Phillips MD;  Location:  [facility]ARD[facility]ATH [facility]     CV LEFT  HEART CATH N/A 6/15/2020    Procedure: Left Heart Cath;  Surgeon: Luis Eduardo Phillips MD;  Location:  HE[facility] CARDI[facility] CATH LAB     EP ABLATION SVT N/A 6/22/2020    Procedure: EP Ablation SVT;  Surgeon: Francisco Javier Bynum MD;  Location:  HEART CARDIAC CATH LAB     H KIT ESSURE  2009    essrickey - Dr. Cailin Raymundo      HERNIORRHAPHY UMBILICAL  1974     IR CHEST PORT PLACEMENT > 5 YRS OF AGE  1/9/2020     IR PORT REMOVAL RIGHT  4/5/2021     mediastinoscopy  2019     SLING TRANSPUBO WITHOUT ANTERIOR COLPORRHAPHY N/A 11/21/2016    Procedure: SLING TRANSPUBO WITHOUT ANTERIOR COLPORRHAPHY;  Surgeon: Hernesto Berrios MD;  Location: [facility] OR       Social History     Tobacco Use     Smoking status: Never Smoker     Smokeless tobacco: Never Used   Substance Use Topics     Alcohol use: No     Alcohol/week: 0.0 standard drinks     Comment: 1 time per month     Family History   Problem Relation Age of Onset     Hypertension Mother      Depression Mother      Lipids Mother      Cardiovascular Mother      Circulatory Mother      Diabetes Mother      Heart Disease Mother      Cerebrovascular Disease Mother      Obesity Mother      C.A.D. Mother      Lung Cancer Mother         smoker     Macular Degeneration Father         Stargardt     Diabetes Maternal Aunt         type 2     Hypertension Maternal Aunt      Heart Disease Maternal Grandfather      Macular Degeneration Sister         Stargardt     Hyperlipidemia Sister         high cholesterol       LUNG DISEASE No family hx of          Current Outpatient Medications   Medication Sig Dispense Refill     carvedilol (COREG) 3.125 MG tablet Take 1 tablet (3.125 mg) by mouth 2 times daily (with meals) 180 tablet 3     cetirizine (ZYRTEC) 10 MG tablet Take 10 mg by mouth daily       LORazepam (ATIVAN) 0.5 MG tablet TAKE 1 TABLET BY MOUTH AT BEDTIME FOR SLEEP AND ANXIETY. 30 tablet 0     rosuvastatin (CRESTOR) 10 MG tablet Take 1 tablet (10 mg) by mouth daily 90 tablet 3     sertraline  (ZOLOFT) 50 MG tablet Take 1 tablet (50 mg) by mouth daily 90 tablet 3     spironolactone (ALDACTONE) 25 MG tablet Take 1 tablet (25 mg) by mouth daily 90 tablet 3     lisinopril (ZESTRIL) 5 MG tablet Take 0.5 tablets (2.5 mg) by mouth daily 8/17/21 hold restart if BP > 110. 90 tablet 3       Breast Cancer Screening:  Any new diagnosis of family breast, ovarian, or bowel cancer? No    FHS-7: No flowsheet data found.    Mammogram Screening: Recommended annual mammography  Pertinent mammograms are reviewed under the imaging tab.    History of abnormal Pap smear: NO - age 30-65 PAP every 5 years with negative HPV co-testing recommended  PAP / HPV Latest Ref Rng & Units 7/25/2018 9/16/2014 3/28/2005   PAP (Historical) - NIL NIL NIL   HPV16 NEG:Negative Negative - -   HPV18 NEG:Negative Negative - -   HRHPV NEG:Negative Negative - -     Reviewed and updated as needed this visit by clinical staff  Tobacco  Allergies  Meds  Problems  Med Hx  Surg Hx  Fam Hx  Soc Hx         Reviewed and updated as needed this visit by Provider  Tobacco  Allergies  Meds  Problems  Med Hx  Surg Hx  Fam Hx            Review of Systems   Constitutional: Negative for chills and fever.   HENT: Positive for ear pain. Negative for congestion, hearing loss and sore throat.    Eyes: Negative for pain and visual disturbance.   Respiratory: Positive for cough. Negative for shortness of breath.    Cardiovascular: Negative for chest pain, palpitations and peripheral edema.   Gastrointestinal: Negative for abdominal pain, constipation, diarrhea, heartburn, hematochezia and nausea.   Breasts:  Negative for tenderness, breast mass and discharge.   Genitourinary: Negative for dysuria, frequency, genital sores, hematuria, pelvic pain, urgency, vaginal bleeding and vaginal discharge.   Musculoskeletal: Positive for arthralgias and myalgias. Negative for joint swelling.   Skin: Positive for rash.   Neurological: Positive for weakness. Negative for  "dizziness, headaches and paresthesias.   Psychiatric/Behavioral: Negative for mood changes. The patient is not nervous/anxious.           OBJECTIVE:   /82   Pulse 79   Temp 97.8  F (36.6  C) (Tympanic)   Resp 14   Ht 1.676 m (5' 6\")   Wt 75.3 kg (166 lb)   LMP 11/06/2019   SpO2 97%   BMI 26.79 kg/m    Physical Exam  GENERAL: healthy, alert and no distress  EYES: Eyes grossly normal to inspection, PERRL and conjunctivae and sclerae normal  HENT: ear canals and TM's normal, nose and mouth without ulcers or lesions  NECK: no adenopathy, no asymmetry, masses, or scars and thyroid normal to palpation  RESP: lungs clear to auscultation - no rales, rhonchi or wheezes  CV: regular rate and rhythm, normal S1 S2, no S3 or S4, no murmur, click or rub, no peripheral edema and peripheral pulses strong  ABDOMEN: soft, nontender, no hepatosplenomegaly, no masses and bowel sounds normal  MS: no gross musculoskeletal defects noted, no edema  SKIN: no suspicious lesions or rashes  NEURO: Normal strength and tone, mentation intact and speech normal  PSYCH: mentation appears normal, affect normal/bright    Diagnostic Test Results:  Results for orders placed or performed in visit on 12/30/21   Comprehensive metabolic panel     Status: Abnormal   Result Value Ref Range    Sodium 139 133 - 144 mmol/L    Potassium 3.8 3.4 - 5.3 mmol/L    Chloride 106 94 - 109 mmol/L    Carbon Dioxide (CO2) 26 20 - 32 mmol/L    Anion Gap 7 3 - 14 mmol/L    Urea Nitrogen 17 7 - 30 mg/dL    Creatinine 1.05 (H) 0.52 - 1.04 mg/dL    Calcium 9.9 8.5 - 10.1 mg/dL    Glucose 93 70 - 99 mg/dL    Alkaline Phosphatase 97 40 - 150 U/L    AST 26 0 - 45 U/L    ALT 43 0 - 50 U/L    Protein Total 7.1 6.8 - 8.8 g/dL    Albumin 4.8 3.4 - 5.0 g/dL    Bilirubin Total 0.8 0.2 - 1.3 mg/dL    GFR Estimate 64 >60 mL/min/1.73m2   Lactate Dehydrogenase     Status: Abnormal   Result Value Ref Range    Lactate Dehydrogenase 244 (H) 81 - 234 U/L   Lipid panel reflex to " direct LDL Fasting     Status: Abnormal   Result Value Ref Range    Cholesterol 329 (H) <200 mg/dL    Triglycerides 230 (H) <150 mg/dL    Direct Measure HDL 50 >=50 mg/dL    LDL Cholesterol Calculated 233 (H) <=100 mg/dL    Non HDL Cholesterol 279 (H) <130 mg/dL    Patient Fasting > 8hrs? Yes     Narrative    Cholesterol  Desirable:  <200 mg/dL    Triglycerides  Normal:  Less than 150 mg/dL  Borderline High:  150-199 mg/dL  High:  200-499 mg/dL  Very High:  Greater than or equal to 500 mg/dL    Direct Measure HDL  Female:  Greater than or equal to 50 mg/dL   Male:  Greater than or equal to 40 mg/dL    LDL Cholesterol  Desirable:  <100mg/dL  Above Desirable:  100-129 mg/dL   Borderline High:  130-159 mg/dL   High:  160-189 mg/dL   Very High:  >= 190 mg/dL    Non HDL Cholesterol  Desirable:  130 mg/dL  Above Desirable:  130-159 mg/dL  Borderline High:  160-189 mg/dL  High:  190-219 mg/dL  Very High:  Greater than or equal to 220 mg/dL   Vitamin B12     Status: Abnormal   Result Value Ref Range    Vitamin B12 1,007 (H) 193 - 986 pg/mL   CBC with platelets and differential     Status: Abnormal   Result Value Ref Range    WBC Count 9.7 4.0 - 11.0 10e3/uL    RBC Count 4.80 3.80 - 5.20 10e6/uL    Hemoglobin 15.0 11.7 - 15.7 g/dL    Hematocrit 42.4 35.0 - 47.0 %    MCV 88 78 - 100 fL    MCH 31.3 26.5 - 33.0 pg    MCHC 35.4 31.5 - 36.5 g/dL    RDW 12.1 10.0 - 15.0 %    Platelet Count 220 150 - 450 10e3/uL    % Neutrophils 45 %    % Lymphocytes 28 %    % Monocytes 6 %    % Eosinophils 19 %    % Basophils 1 %    % Immature Granulocytes 0 %    Absolute Neutrophils 4.4 1.6 - 8.3 10e3/uL    Absolute Lymphocytes 2.7 0.8 - 5.3 10e3/uL    Absolute Monocytes 0.6 0.0 - 1.3 10e3/uL    Absolute Eosinophils 1.9 (H) 0.0 - 0.7 10e3/uL    Absolute Basophils 0.1 0.0 - 0.2 10e3/uL    Absolute Immature Granulocytes 0.0 <=0.4 10e3/uL   CBC with Platelets & Differential     Status: Abnormal    Narrative    The following orders were created for  panel order CBC with Platelets & Differential.  Procedure                               Abnormality         Status                     ---------                               -----------         ------                     CBC with platelets and d...[[id number]]  Abnormal            Final result                 Please view results for these tests on the individual orders.       ASSESSMENT/PLAN:   Kassie was seen today for physical.    Diagnoses and all orders for this visit:    Routine general medical examination at a health care facility  -     INFLUENZA QUAD, RECOMBINANT, P-FREE (RIV4) (FLUBLOK)  -     REVIEW OF HEALTH MAINTENANCE PROTOCOL ORDERS    Stargardt macular degeneration (H) bilateral - follows with [facility] Annually - very slow progression  Continue annual follow-up with ophthalmology as recommended.    Nonischemic cardiomyopathy (H) - from chemotherapy - managed by Dr. Pearl  Due for follow-up with Dr. Pearl.    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 Matthew Márquez.  Continue follow-up with Dr. Castro as recommended  -     CBC with Platelets & Differential  -     Comprehensive metabolic panel  -     Lactate Dehydrogenase    Paroxysmal SVT (supraventricular tachycardia) (H) - s/p ablation 6/22/2020  No recurrence    Mixed hyperlipidemia - started rosuvastatin 1/2022  Hypertriglyceridemia  Persistent and quite elevated LDL level.  Would recommend initiation of rosuvastatin 10 mg and recheck in 3 months.  -     rosuvastatin (CRESTOR) 10 MG tablet; Take 1 tablet (10 mg) by mouth daily  -     Lipid panel reflex to direct LDL Fasting; Future  -     Hepatic panel (Albumin, ALT, AST, Bili, Alk Phos, TP); Future    Situational anxiety  CSA signed  Stable.  Doing well on daily sertraline.  Lorazepam use as needed and working well when needed.  CSA resigned today.  No refills  "needed of lorazepam today.  -     sertraline (ZOLOFT) 50 MG tablet; Take 1 tablet (50 mg) by mouth daily    Neuropathy  Screening to see if deficiency and supplementation needed.  Likely secondary to chemotherapy agents.  -     Vitamin B12    Screen for colon cancer  Routine screening  -     Adult Gastro Ref - Procedure Only; Future    Visit for screening mammogram  Routine screening  -     MA Screen Bilateral w/Jayden; Future    Need for Streptococcus pneumoniae vaccination  Vaccinated today  -     PNEUMOCOCCAL 23 VALENT        Patient has been advised of split billing requirements and indicates understanding: Yes  COUNSELING:  Reviewed preventive health counseling, as reflected in patient instructions       Regular exercise       Healthy diet/nutrition       Immunizations    Vaccinated for: Pneumococcal             Colon cancer screening    Estimated body mass index is 26.79 kg/m  as calculated from the following:    Height as of this encounter: 1.676 m (5' 6\").    Weight as of this encounter: 75.3 kg (166 lb).    Weight management plan: Discussed healthy diet and exercise guidelines    She reports that she has never smoked. She has never used smokeless tobacco.      Counseling Resources:  ATP IV Guidelines  Pooled Cohorts Equation Calculator  Breast Cancer Risk Calculator  BRCA-Related Cancer Risk Assessment: FHS-7 Tool  FRAX Risk Assessment  ICSI Preventive Guidelines  Dietary Guidelines for Americans, 2010  USDA's MyPlate  ASA Prophylaxis  Lung CA Screening    Mary Alice Colón PA-C  [facility]  "

## 2021-12-30 NOTE — LETTER
[facility] [place]  12/30/21  Patient: Kassie Ramirez  YOB: 1970  Medical Record Number: [id number]                                                                                  Non-Opioid Controlled Substance Agreement    This is an agreement between you and your provider regarding safe and appropriate use of controlled substances prescribed by your care team. Controlled substances are?medicines that can cause physical and mental dependence (abuse).     There are strict laws about having and using these medicines. We here at [facility] are  committed to working with you in your efforts to get better. To support you in this work, we'll help you schedule regular office appointments for medicine refills. If we must cancel or change your appointment for any reason, we'll make sure you have enough medicine to last until your next appointment.     As a Provider, I will:     Listen carefully to your concerns while treating you with respect.     Recommend a treatment plan that I believe is in your best interest and may involve therapies other than medicine.      Talk with you often about the possible benefits and the risk of harm of any medicine that we prescribe for you.    Assess the safety of this medicine and check how well it works.      Provide a plan on how to taper (discontinue or go off) using this medicine if the decision is made to stop its use.      ::  As a Patient, I understand controlled substances:       Are prescribed by my care provider to help me function or work and manage my condition(s).?    Are strong medicines and can cause serious side effects.       Need to be taken exactly as prescribed.?Combining controlled substances with certain medicines or chemicals (such as illegal drugs, alcohol, sedatives, sleeping pills, and benzodiazepines) can be dangerous or even fatal.? If I stop taking my medicines suddenly, I may have severe withdrawal symptoms.     The  risks, benefits, and side effects of these medicine(s) were explained to me. I agree that:    1. I will take part in other treatments as advised by my care team. This may be psychiatry or counseling, physical therapy, behavioral therapy, group treatment or a referral to specialist.    2. I will keep all my appointments and understand this is part of the monitoring of controlled substances.?My care team may require an office visit for EVERY controlled substance refill. If I miss appointments or don t follow instructions, my care team may stop my medicine    3. I will take my medicines as prescribed. I will not change the dose or schedule unless my care team tells me to. There will be no refills if I run out early.      4. I may be asked to come to the clinic and complete a urine drug test or complete a pill count. If I don t give a urine sample or participate in a pill count, the care team may stop my medicine.    5. I will only receive controlled substance prescriptions from this clinic. If I am treated by another provider, I will tell them that I am taking controlled substances and that I have a treatment agreement with this provider. I will inform my [facility] care team within one business day if I am given a prescription for any controlled substance by another healthcare provider. My [facility] care team can contact other providers and pharmacists about my use of any medicines.    6. It is up to me to make sure that I don't run out of my medicines on weekends or holidays.?If my care team is willing to refill my prescription without a visit, I must request refills only during office hours. Refills may take up to 3 business days to process. I will use one pharmacy to fill all my controlled substance prescriptions. I will notify the clinic about any changes to my insurance or medicine availability.    7. I am responsible for my prescriptions. If the medicine/prescription is lost, stolen or destroyed,  it will not be replaced.?I also agree not to share controlled substance medicines with anyone.     8. I am aware I should not use any illegal or recreational drugs. I agree not to drink alcohol unless my care team says I can.     9. If I enroll in the [place] Medical Cannabis program, I will tell my care team before my next refill.    10. I will tell my care team right away if I become pregnant, have a new medical problem treated outside of my regular clinic, or have a change in my medicines.     11. I understand that this medicine can affect my thinking, judgment and reaction time.? Alcohol and drugs affect the brain and body, which can affect the safety of my driving. Being under the influence of alcohol or drugs can affect my decision-making, behaviors, personal safety and the safety of others. Driving while impaired (DWI) can occur if a person is driving, operating or in physical control of a car, motorcycle, boat, snowmobile, ATV, motorbike, off-road vehicle or any other motor vehicle ([place] Statute 169A.20). I understand the risk if I choose to drive or operate any vehicle or machinery.    I understand that if I do not follow any of the conditions above, my prescriptions or treatment may be stopped or changed.   I agree that my provider, clinic care team and pharmacy may work with any city, state or federal law enforcement agency that investigates the misuse, sale or other diversion of my controlled medicine. I will allow my provider to discuss my care with, or share a copy of, this agreement with any other treating provider, pharmacy or emergency room where I receive care.     I have read this agreement and have asked questions about anything I did not understand.    ________________________________________________________  Patient Signature - Kassie Ramirez     ___________________                   Date     ________________________________________________________  Provider Signature - Mary Alice Colón,  PA-C       ___________________                   Date     ________________________________________________________  Witness Signature (required if provider not present while patient signing)          ___________________                   Date

## 2021-12-31 LAB
ALBUMIN SERPL-MCNC: 4.8 G/DL (ref 3.4–5)
ALP SERPL-CCNC: 97 U/L (ref 40–150)
ALT SERPL W P-5'-P-CCNC: 43 U/L (ref 0–50)
ANION GAP SERPL CALCULATED.3IONS-SCNC: 7 MMOL/L (ref 3–14)
AST SERPL W P-5'-P-CCNC: 26 U/L (ref 0–45)
BILIRUB SERPL-MCNC: 0.8 MG/DL (ref 0.2–1.3)
BUN SERPL-MCNC: 17 MG/DL (ref 7–30)
CALCIUM SERPL-MCNC: 9.9 MG/DL (ref 8.5–10.1)
CHLORIDE BLD-SCNC: 106 MMOL/L (ref 94–109)
CHOLEST SERPL-MCNC: 329 MG/DL
CO2 SERPL-SCNC: 26 MMOL/L (ref 20–32)
CREAT SERPL-MCNC: 1.05 MG/DL (ref 0.52–1.04)
FASTING STATUS PATIENT QL REPORTED: YES
GFR SERPL CREATININE-BSD FRML MDRD: 64 ML/MIN/1.73M2
GLUCOSE BLD-MCNC: 93 MG/DL (ref 70–99)
HDLC SERPL-MCNC: 50 MG/DL
LDH SERPL L TO P-CCNC: 244 U/L (ref 81–234)
LDLC SERPL CALC-MCNC: 233 MG/DL
NONHDLC SERPL-MCNC: 279 MG/DL
POTASSIUM BLD-SCNC: 3.8 MMOL/L (ref 3.4–5.3)
PROT SERPL-MCNC: 7.1 G/DL (ref 6.8–8.8)
SODIUM SERPL-SCNC: 139 MMOL/L (ref 133–144)
TRIGL SERPL-MCNC: 230 MG/DL
VIT B12 SERPL-MCNC: 1007 PG/ML (ref 193–986)

## 2021-12-31 ASSESSMENT — ANXIETY QUESTIONNAIRES: GAD7 TOTAL SCORE: 0

## 2022-01-03 PROBLEM — E78.2 MIXED HYPERLIPIDEMIA: Status: ACTIVE | Noted: 2022-01-03

## 2022-01-03 PROBLEM — T45.1X5A CHEMOTHERAPY-INDUCED PERIPHERAL NEUROPATHY (H): Status: ACTIVE | Noted: 2022-01-03

## 2022-01-03 PROBLEM — G62.0 CHEMOTHERAPY-INDUCED PERIPHERAL NEUROPATHY (H): Status: ACTIVE | Noted: 2022-01-03

## 2022-01-03 RX ORDER — ROSUVASTATIN CALCIUM 10 MG/1
10 TABLET, COATED ORAL DAILY
Qty: 90 TABLET | Refills: 3 | Status: SHIPPED | OUTPATIENT
Start: 2022-01-03 | End: 2022-12-12

## 2022-01-03 RX ORDER — LORAZEPAM 0.5 MG/1
TABLET ORAL
Qty: 30 TABLET | Refills: 0
Start: 2021-11-01 | End: 2022-01-22

## 2022-01-03 NOTE — RESULT ENCOUNTER NOTE
Kassie  I have reviewed your recent labs. Here are the results:    -LDL(bad) cholesterol level remains VERY high and your triglycerides are elevated which can increase your heart disease risk.  A diet high in fat and simple carbohydrates, genetics and being overweight can contribute to this. ADVISE: exercising 150 minutes of aerobic exercise per week (30 minutes for 5 days per week or 50 minutes for 3 days per week are options), eating a low saturated fat/low carbohydrate diet, and omega-3 fatty acids (fish oil) 4177-6140 mg daily are helpful to improve this. Current guidelines from the American Heart Association support starting a cholesterol lowering medication to lower your heart and stroke disease risk.  I am sending a prescription to your pharmacy: rosuvastatin(Crestor) 10 mg each evening.  You can call your pharmacy to let them know you would like your prescription filled and if you have concerns about this recommendation then please contact me.  Please then plan to do a fasting lab only appointment in ~3 months to see what improvement has been made.    -B12 level is high-normal (actually a good level) - no additional B12 supplementation is needed for your neuropathy.    For additional lab test information, labtestsonline.org is an excellent reference.      If you have any questions please do not hesitate to contact our office via phone ([phone]) or MyChart.    Mary Alice Colón, MS, PA-C  [facility]

## 2022-01-09 ENCOUNTER — HEALTH MAINTENANCE LETTER (OUTPATIENT)
Age: 52
End: 2022-01-09

## 2022-01-10 ENCOUNTER — HOSPITAL ENCOUNTER (OUTPATIENT)
Dept: CT IMAGING | Facility: CLINIC | Age: 52
Discharge: HOME | End: 2022-01-10
Attending: INTERNAL MEDICINE | Admitting: INTERNAL MEDICINE
Payer: COMMERCIAL

## 2022-01-10 ENCOUNTER — LAB (OUTPATIENT)
Dept: INFUSION THERAPY | Facility: CLINIC | Age: 52
End: 2022-01-10
Attending: INTERNAL MEDICINE
Payer: COMMERCIAL

## 2022-01-10 DIAGNOSIS — C83.398 DIFFUSE LARGE B-CELL LYMPHOMA OF EXTRANODAL SITE: ICD-10-CM

## 2022-01-10 DIAGNOSIS — C83.32 DIFFUSE LARGE B-CELL LYMPHOMA OF INTRATHORACIC LYMPH NODES (H): ICD-10-CM

## 2022-01-10 LAB
ALBUMIN SERPL-MCNC: 4.5 G/DL (ref 3.4–5)
ALP SERPL-CCNC: 96 U/L (ref 40–150)
ALT SERPL W P-5'-P-CCNC: 45 U/L (ref 0–50)
ANION GAP SERPL CALCULATED.3IONS-SCNC: 7 MMOL/L (ref 3–14)
AST SERPL W P-5'-P-CCNC: 25 U/L (ref 0–45)
BASOPHILS # BLD AUTO: 0.1 10E3/UL (ref 0–0.2)
BASOPHILS NFR BLD AUTO: 1 %
BILIRUB SERPL-MCNC: 0.6 MG/DL (ref 0.2–1.3)
BUN SERPL-MCNC: 21 MG/DL (ref 7–30)
CALCIUM SERPL-MCNC: 9.6 MG/DL (ref 8.5–10.1)
CHLORIDE BLD-SCNC: 109 MMOL/L (ref 94–109)
CO2 SERPL-SCNC: 24 MMOL/L (ref 20–32)
CREAT SERPL-MCNC: 0.96 MG/DL (ref 0.52–1.04)
EOSINOPHIL # BLD AUTO: 1.6 10E3/UL (ref 0–0.7)
EOSINOPHIL NFR BLD AUTO: 20 %
ERYTHROCYTE [DISTWIDTH] IN BLOOD BY AUTOMATED COUNT: 12.3 % (ref 10–15)
GFR SERPL CREATININE-BSD FRML MDRD: 71 ML/MIN/1.73M2
GLUCOSE BLD-MCNC: 96 MG/DL (ref 70–99)
HCT VFR BLD AUTO: 43 % (ref 35–47)
HGB BLD-MCNC: 14.6 G/DL (ref 11.7–15.7)
IMM GRANULOCYTES # BLD: 0 10E3/UL
IMM GRANULOCYTES NFR BLD: 0 %
LDH SERPL L TO P-CCNC: 263 U/L (ref 81–234)
LYMPHOCYTES # BLD AUTO: 2.6 10E3/UL (ref 0.8–5.3)
LYMPHOCYTES NFR BLD AUTO: 33 %
MCH RBC QN AUTO: 30.8 PG (ref 26.5–33)
MCHC RBC AUTO-ENTMCNC: 34 G/DL (ref 31.5–36.5)
MCV RBC AUTO: 91 FL (ref 78–100)
MONOCYTES # BLD AUTO: 0.6 10E3/UL (ref 0–1.3)
MONOCYTES NFR BLD AUTO: 8 %
NEUTROPHILS # BLD AUTO: 3 10E3/UL (ref 1.6–8.3)
NEUTROPHILS NFR BLD AUTO: 38 %
NRBC # BLD AUTO: 0 10E3/UL
NRBC BLD AUTO-RTO: 0 /100
PLATELET # BLD AUTO: 216 10E3/UL (ref 150–450)
POTASSIUM BLD-SCNC: 4.1 MMOL/L (ref 3.4–5.3)
PROT SERPL-MCNC: 7.3 G/DL (ref 6.8–8.8)
RBC # BLD AUTO: 4.74 10E6/UL (ref 3.8–5.2)
SODIUM SERPL-SCNC: 140 MMOL/L (ref 133–144)
WBC # BLD AUTO: 7.9 10E3/UL (ref 4–11)

## 2022-01-10 PROCEDURE — 80053 COMPREHEN METABOLIC PANEL: CPT | Performed by: INTERNAL MEDICINE

## 2022-01-10 PROCEDURE — 82040 ASSAY OF SERUM ALBUMIN: CPT | Performed by: INTERNAL MEDICINE

## 2022-01-10 PROCEDURE — 74177 CT ABD & PELVIS W/CONTRAST: CPT

## 2022-01-10 PROCEDURE — 250N000009 HC RX 250: Performed by: INTERNAL MEDICINE

## 2022-01-10 PROCEDURE — 250N000011 HC RX IP 250 OP 636: Performed by: INTERNAL MEDICINE

## 2022-01-10 PROCEDURE — 83615 LACTATE (LD) (LDH) ENZYME: CPT | Performed by: INTERNAL MEDICINE

## 2022-01-10 PROCEDURE — 85025 COMPLETE CBC W/AUTO DIFF WBC: CPT | Performed by: INTERNAL MEDICINE

## 2022-01-10 PROCEDURE — 36415 COLL VENOUS BLD VENIPUNCTURE: CPT

## 2022-01-10 RX ORDER — IOPAMIDOL 755 MG/ML
500 INJECTION, SOLUTION INTRAVASCULAR ONCE
Status: COMPLETED | OUTPATIENT
Start: 2022-01-10 | End: 2022-01-10

## 2022-01-10 RX ADMIN — IOPAMIDOL 80 ML: 755 INJECTION, SOLUTION INTRAVENOUS at 15:21

## 2022-01-10 RX ADMIN — SODIUM CHLORIDE 60 ML: 9 INJECTION, SOLUTION INTRAVENOUS at 15:23

## 2022-01-10 NOTE — PROGRESS NOTES
Nursing Note:  Kassie Ramirez presents today for labs.    Patient seen by provider today: No   present during visit today: Not Applicable.    Note: N/A.    Intravenous Access:  Labs drawn without difficulty.  Peripheral IV placed.    Discharge Plan:   Patient was sent to imaging for CT appointment.    Kassie Ramires RN

## 2022-01-14 ENCOUNTER — LAB (OUTPATIENT)
Dept: ONCOLOGY | Facility: CLINIC | Age: 52
End: 2022-01-14
Attending: INTERNAL MEDICINE
Payer: COMMERCIAL

## 2022-01-14 VITALS
BODY MASS INDEX: 27.05 KG/M2 | WEIGHT: 167.6 LBS | TEMPERATURE: 98.4 F | RESPIRATION RATE: 16 BRPM | HEART RATE: 95 BPM | DIASTOLIC BLOOD PRESSURE: 69 MMHG | OXYGEN SATURATION: 98 % | SYSTOLIC BLOOD PRESSURE: 109 MMHG

## 2022-01-14 DIAGNOSIS — R53.82 CHRONIC FATIGUE: ICD-10-CM

## 2022-01-14 DIAGNOSIS — C83.32 DIFFUSE LARGE B-CELL LYMPHOMA OF INTRATHORACIC LYMPH NODES (H): Primary | ICD-10-CM

## 2022-01-14 LAB
CK SERPL-CCNC: 160 U/L (ref 30–225)
CRP SERPL-MCNC: 14.8 MG/L (ref 0–8)
ERYTHROCYTE [SEDIMENTATION RATE] IN BLOOD BY WESTERGREN METHOD: 4 MM/HR (ref 0–30)
TSH SERPL DL<=0.005 MIU/L-ACNC: 2.29 MU/L (ref 0.4–4)
URATE SERPL-MCNC: 7.1 MG/DL (ref 2.6–6)

## 2022-01-14 PROCEDURE — 84550 ASSAY OF BLOOD/URIC ACID: CPT | Performed by: INTERNAL MEDICINE

## 2022-01-14 PROCEDURE — 99215 OFFICE O/P EST HI 40 MIN: CPT | Performed by: INTERNAL MEDICINE

## 2022-01-14 PROCEDURE — 36415 COLL VENOUS BLD VENIPUNCTURE: CPT | Performed by: INTERNAL MEDICINE

## 2022-01-14 PROCEDURE — 86431 RHEUMATOID FACTOR QUANT: CPT | Performed by: INTERNAL MEDICINE

## 2022-01-14 PROCEDURE — 84443 ASSAY THYROID STIM HORMONE: CPT | Performed by: INTERNAL MEDICINE

## 2022-01-14 PROCEDURE — G0463 HOSPITAL OUTPT CLINIC VISIT: HCPCS

## 2022-01-14 PROCEDURE — 86225 DNA ANTIBODY NATIVE: CPT | Performed by: INTERNAL MEDICINE

## 2022-01-14 PROCEDURE — 82085 ASSAY OF ALDOLASE: CPT | Performed by: INTERNAL MEDICINE

## 2022-01-14 PROCEDURE — 86038 ANTINUCLEAR ANTIBODIES: CPT | Performed by: INTERNAL MEDICINE

## 2022-01-14 PROCEDURE — 83010 ASSAY OF HAPTOGLOBIN QUANT: CPT | Performed by: INTERNAL MEDICINE

## 2022-01-14 PROCEDURE — 86140 C-REACTIVE PROTEIN: CPT | Performed by: INTERNAL MEDICINE

## 2022-01-14 PROCEDURE — 82550 ASSAY OF CK (CPK): CPT | Performed by: INTERNAL MEDICINE

## 2022-01-14 PROCEDURE — 85652 RBC SED RATE AUTOMATED: CPT | Performed by: INTERNAL MEDICINE

## 2022-01-14 ASSESSMENT — PAIN SCALES - GENERAL: PAINLEVEL: SEVERE PAIN (7)

## 2022-01-14 NOTE — LETTER
1/14/2022         RE: Kassie Ramirez  [address]  [place] [place] [address]        Dear Colleague,    Thank you for referring your patient, Kassie Ramirez, to the [facility]. Please see a copy of my visit note below.    [facility]  HEMATOLOGY AND ONCOLOGY    FOLLOW-UP VISIT NOTE    PATIENT NAME: Kassie Ramirez MRN # [id number]  DATE OF VISIT: Jan 14, 2022 YOB: 1970    REFERRING PROVIDER: No referring provider defined for this encounter.    CANCER TYPE: DLBCL, EBV+ non-GCB, stage III.  STAGE: III    TREATMENT SUMMARY:  She presented with shortness of breath and cough which had been present since May 2019. Her imaging suggested pulmonary infiltrates which was attributed to aspiration pneumonia. CT scan of the chest 8/20/2019 showed left hilar and subcarinal mediastinal adenopathy with narrowing of the left lower lobe bronchus and a nonspecific patchy area of nodular consolidation in the medial right lower lobe.  Bronchoscopy with EBUS 8/28/2019 showed nonnecrotizing granulomatous inflammation with prominent eosinophilia, negative for malignancy.  She was diagnosed with sarcoidosis and started on prednisone. She had worsening cough and shortness of breath with tapering of the prednisone.  Repeat CT scan of the chest 11/18/2019 showed interval worsening of the bulky mediastinal and bilateral hilar lymphadenopathy, near complete resolution of the lower lobe opacities, with new interstitial opacities in the right upper lobe.   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 Large mediastinal mass causing respiratory compromise. . IPI score of 2 (low-intermediate). FISH neg for high risk translocations. She received 6 cycles of R-CHOP with intermediate dose methotrexate after cycles 2, 4 and 6 from November through April  2020.      CURRENT INTERVENTIONS:  Surveillance post MR-CHOP    SUBJECTIVE   Kassie Ramirez is being followed for DLBCL, EBV+ non-GCB, stage III intermediate risk disease    Patient was followed in person. Kassie is joined by her  at this visit. She has completed all of her planned cycles of MR-CHOP chemotherapy and is being seen with labs and restaging scans.     She had struggled with cardiomyopathy post adriamycin.     She was having anxiety and had symptoms of her lymphoma. She has significant fatigue. She is a  but has no energy when she returns home. She is always tired. She has pain in her right forearm which is not her dominant arm. This has been present since last visit 3 months ago. She is very worried about disease. She has pain in her hips.       PAST MEDICAL HISTORY     Past Medical History:   Diagnosis Date     Anxiety attack 9/16/2014     Cardiomyopathy (H)     non ishemic - 25-30% - Chemo related     Diffuse large B-cell lymphoma (H)     Diagnosed 11/2019, [facility] Albarran     Encounter for Essure implantation 2009     Generalized anxiety disorder 9/16/2014    zoloft = flat emotions     HFrEF (heart failure with reduced ejection fraction) (H)     new diagnosis 6/14     Menopausal disorder     started on OCPs by menopause center 3/2017 (takes active continuously)     Menstrual headache     helped by OCPs and magnesium     Paroxysmal SVT (supraventricular tachycardia) (H) 06/2020     GERTRUDE (stress urinary incontinence, female)     sling procedure 2016         CURRENT OUTPATIENT MEDICATIONS     Current Outpatient Medications   Medication Sig     carvedilol (COREG) 3.125 MG tablet Take 1 tablet (3.125 mg) by mouth 2 times daily (with meals)     cetirizine (ZYRTEC) 10 MG tablet Take 10 mg by mouth daily     lisinopril (ZESTRIL) 5 MG tablet Take 0.5 tablets (2.5 mg) by mouth daily 8/17/21 hold restart if BP > 110.     LORazepam (ATIVAN) 0.5 MG tablet TAKE 1 TABLET BY MOUTH AT  BEDTIME FOR SLEEP AND ANXIETY.     rosuvastatin (CRESTOR) 10 MG tablet Take 1 tablet (10 mg) by mouth daily     sertraline (ZOLOFT) 50 MG tablet Take 1 tablet (50 mg) by mouth daily     spironolactone (ALDACTONE) 25 MG tablet Take 1 tablet (25 mg) by mouth daily     No current facility-administered medications for this visit.        ALLERGIES      Allergies   Allergen Reactions     Cold & Flu [Cold Defense Fighter]      See pseudoephedrine     Seasonal Allergies      Sudafed Cold-Cough [Dayquil Liquicaps]      Pseudoephedrine Rash     Rash then skins peels off         REVIEW OF SYSTEMS   As above in the HPI, o/w complete 12-point ROS was negative.     PHYSICAL EXAM   LMP 11/06/2019   Limited physical exam during video visit due to COVID19 restrictions  Middle aged female in no acute distress  Breathing comfortably, no tachypnea  Speech and hearing normal  Pleasant mood and congruent affect  Moving all extremities, no focal neurologic deficits apparent       LABORATORY AND IMAGING STUDIES     Recent Labs   Lab Test 01/10/22  1508 12/30/21  1642 09/17/21  1513 08/16/21  1227 06/07/21  1430    139 138 138 139   POTASSIUM 4.1 3.8 3.8 3.6 3.6   CHLORIDE 109 106 107 105 106   CO2 24 26 27 28 27   ANIONGAP 7 7 4 5 6   BUN 21 17 25 21 17   CR 0.96 1.05* 1.04 1.02 1.00   GLC 96 93 88 89 97   AFSHAN 9.6 9.9 9.0 10.1 9.5     Recent Labs   Lab Test 06/19/20  1853 06/15/20  0845 06/14/20  1807 01/11/20  0615 12/01/19  1340 12/01/19  0641 11/30/19  2137 11/30/19  1341 11/28/19  0537 11/27/19  2216   MAG 2.5* 2.4* 2.1 2.0  --   --   --   --   --  2.1   PHOS  --   --   --  3.1 2.8 3.4 2.8 2.5   < > 3.1    < > = values in this interval not displayed.     Recent Labs   Lab Test 01/10/22  1508 12/30/21  1646 06/07/21  1430 06/06/21  0929 03/12/21  0930   WBC 7.9 9.7 5.4 5.0 6.1   HGB 14.6 15.0 13.9 14.3 14.4    220 161 162 193   MCV 91 88 90 89 91   NEUTROPHIL 38 45 43.1 52.2 46.8     Recent Labs   Lab Test 01/10/22  1508  12/30/21  1642 06/07/21  1430 03/12/21  0930   BILITOTAL 0.6 0.8 1.0 0.9   ALKPHOS 96 97 97 97   ALT 45 43 39 35   AST 25 26 26 26   ALBUMIN 4.5 4.8 4.1 4.1   * 244*  --  237*     TSH   Date Value Ref Range Status   06/14/2020 3.40 0.40 - 4.00 mU/L Final   09/18/2019 2.06 0.40 - 4.00 mU/L Final   07/27/2018 2.04 0.40 - 4.00 mU/L Final     No results for input(s): CEA in the last 78710 hours.  Results for orders placed or performed during the hospital encounter of 01/10/22   CT Chest/Abdomen/Pelvis w Contrast    Narrative    CT CHEST/ABDOMEN/PELVIS WITH CONTRAST 1/10/2022 3:34 PM    CLINICAL HISTORY: Surveillance DLBCL - stage III, non GCB, EBV+ve.  Diffuse large B-cell lymphoma of intrathoracic lymph nodes (H).    TECHNIQUE: CT scan of the chest, abdomen, and pelvis was performed  following injection of IV contrast. Multiplanar reformats were  obtained. Dose reduction techniques were used.   CONTRAST: 80mL Isovue-370    COMPARISON: CT chest, abdomen and pelvis 9/20/2021.    FINDINGS:   LUNGS AND PLEURA: No effusions or acute airspace disease. Calcified  granuloma at the right upper lobe appears stable. Groundglass nodule  at the superior segment right lower lobe appears stable measuring 0.3  cm series 10 image 107. Stable small left upper lobe nodule measuring  0.2 cm image 118. There are several other small stable pulmonary  nodules.    MEDIASTINUM/AXILLAE: Some soft tissue thickening along the anterior  mediastinum is stable measuring AP dimension of 1.1 cm, previously 1.5  cm, series 4 image 68. This is stable at other positions as well at  the anterior mediastinum. No areas of enlarging adenopathy otherwise  seen in the chest.    CORONARY ARTERY CALCIFICATION: None.    HEPATOBILIARY: Stable small hypodense nodules in the right and left  liver. One of these appears to represent a hemangioma posteromedial  right liver measuring 1.6 cm series 4 image 116. No new worrisome  liver lesion. Gallbladder  unremarkable.    PANCREAS: Normal.    SPLEEN: No focal splenic lesion. AP length of the spleen is 11.8 cm,  stable, image 132.    ADRENAL GLANDS: Normal.    KIDNEYS/BLADDER: Normal.    BOWEL: No obstruction or inflammation. Mild ventral abdominal wall  periumbilical bulging appears stable. No evidence for appendicitis.    PELVIC ORGANS: No acute abnormality. Right ovarian cyst is 2.1 cm,  stable, image 264. Bilateral fallopian tube devices noted.    ADDITIONAL FINDINGS: No enlarged lymph nodes identified within the  abdomen or pelvis.    MUSCULOSKELETAL: No aggressive appearing bone lesion identified.      Impression    IMPRESSION:  1.  Stable soft tissue along the anterior mediastinum. No areas of new  adenopathy identified.  2.  Stable small pulmonary nodules bilaterally.  3.  Stable nodules in the liver that may be cysts and hemangiomas.  4.  Stable cystic lesion at the right ovary that can be further  evaluated with pelvic ultrasound.     JOSUE HORTA MD         SYSTEM ID:  [id number]               ASSESSMENT AND PLAN   DLBCL, EBV+ non-GCB, stage III post 6 cycles of M-RCHOP  PJV pneumonia causing acute respiratory failure improved on bactrim and prednisone  Cardiomyopathy post adriamycin based chemotherapy likely also contributed by tachycardia    Kassie was seen in person visit and was present alone. She has completed her planned chemotherapy in April 2020.     She had doing much better. However at this visit she has a number of complains. She has marked fatigue and pain in her left forearm. She feels the pain is very deep - feels in the bones. She is starting to feel weakness in that side.     I have reviewed all of the labs done prior to this clinic visit.  Labs are all completely normal including electrolytes, renal function, hepatic panel, complete blood count and differential. She does have mild elevation in LDH which is very non-specific.      I have reviewed actual images from her CT scan and there is no  "evidence of recurrent disease in the chest, abdomen or pelvis. She had enlarged hilar nodes and mediastinal mass, likely from reactive thymic tissue.  This has remained stable on the current imaging study. I reviewed her previous PET-CT scan to see if we could identify anything in her fore-arm but it was not even covered with the PET-CT scan.    I will get work up including MRI of fore-arm and rheumatologic work up given her fatigue and other symptoms.     I would continue following her every 3-4 months for now. I will see her in 2 weeks to review results. I will see her in 3 months with PET-CT scan.     All questions for patient  were answered.  She is almost a year out from treatment completion.     45 minutes spent on the date of the encounter doing chart review, history and exam, documentation and further activities as noted above     Castro Castro    Hematologist and Medical Oncologist  [facility]       Oncology Rooming Note    January 14, 2022 3:30 PM   Kassie Ramirez is a 51 year old female who presents for:    Chief Complaint   Patient presents with     Oncology Clinic Visit     Diffuse large B-cell lymphoma of intrathoracic lymph nodes      Initial Vitals: /69   Pulse 95   Temp 98.4  F (36.9  C) (Tympanic)   Resp 16   Wt 76 kg (167 lb 9.6 oz)   LMP 11/06/2019   SpO2 98%   BMI 27.05 kg/m   Estimated body mass index is 27.05 kg/m  as calculated from the following:    Height as of 12/30/21: 1.676 m (5' 6\").    Weight as of this encounter: 76 kg (167 lb 9.6 oz). Body surface area is 1.88 meters squared.  Severe Pain (7) Comment: Data Unavailable   Patient's last menstrual period was 11/06/2019.  Allergies reviewed: Yes  Medications reviewed: Yes    Medications: Medication refills not needed today.  Pharmacy name entered into [facility]:    [facility] - [place], [place] - [address] [place] [address] DRUG STORE #[id number] [place] [id number] [address] [place] [address] - [place], [place] - [address] [facility] [place] [place] [facility] [address]      Morenita Rey CMA                  Again, thank you for allowing me to participate in the care of your patient.        Sincerely,        Castro Castro MD

## 2022-01-14 NOTE — PROGRESS NOTES
Medical Assistant Note:  Kassie Ramirez presents today for blood draw.    Patient seen by provider today: Yes: .   present during visit today: Not Applicable.    Concerns: No Concerns.    Procedure:  Lab draw site: left antecub, Needle type: butterfly, Gauge: 21.    Post Assessment:  Labs drawn without difficulty: Yes.    Discharge Plan:  Departure Mode: Ambulatory.    Face to Face Time: 10.    Lynne Webster, CMA

## 2022-01-14 NOTE — PROGRESS NOTES
[facility]  HEMATOLOGY AND ONCOLOGY    FOLLOW-UP VISIT NOTE    PATIENT NAME: Kassie Ramirez MRN # [id number]  DATE OF VISIT: Jan 14, 2022 YOB: 1970    REFERRING PROVIDER: No referring provider defined for this encounter.    CANCER TYPE: DLBCL, EBV+ non-GCB, stage III.  STAGE: III    TREATMENT SUMMARY:  She presented with shortness of breath and cough which had been present since May 2019. Her imaging suggested pulmonary infiltrates which was attributed to aspiration pneumonia. CT scan of the chest 8/20/2019 showed left hilar and subcarinal mediastinal adenopathy with narrowing of the left lower lobe bronchus and a nonspecific patchy area of nodular consolidation in the medial right lower lobe.  Bronchoscopy with EBUS 8/28/2019 showed nonnecrotizing granulomatous inflammation with prominent eosinophilia, negative for malignancy.  She was diagnosed with sarcoidosis and started on prednisone. She had worsening cough and shortness of breath with tapering of the prednisone.  Repeat CT scan of the chest 11/18/2019 showed interval worsening of the bulky mediastinal and bilateral hilar lymphadenopathy, near complete resolution of the lower lobe opacities, with new interstitial opacities in the right upper lobe.   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 Large mediastinal mass causing respiratory compromise. . IPI score of 2 (low-intermediate). FISH neg for high risk translocations. She received 6 cycles of R-CHOP with intermediate dose methotrexate after cycles 2, 4 and 6 from November through April 2020.      CURRENT INTERVENTIONS:  Surveillance post MR-CHOP    SUBJECTIVE   Kassie Ramirez is being followed for DLBCL, EBV+ non-GCB, stage III intermediate risk disease    Patient was followed in person. Kassie is joined by her  at this visit. She has  completed all of her planned cycles of MR-CHOP chemotherapy and is being seen with labs and restaging scans.     She had struggled with cardiomyopathy post adriamycin.     She was having anxiety and had symptoms of her lymphoma. She has significant fatigue. She is a  but has no energy when she returns home. She is always tired. She has pain in her right forearm which is not her dominant arm. This has been present since last visit 3 months ago. She is very worried about disease. She has pain in her hips.       PAST MEDICAL HISTORY     Past Medical History:   Diagnosis Date     Anxiety attack 9/16/2014     Cardiomyopathy (H)     non ishemic - 25-30% - Chemo related     Diffuse large B-cell lymphoma (H)     Diagnosed 11/2019, Ronan Albarran     Encounter for Essure implantation 2009     Generalized anxiety disorder 9/16/2014    zoloft = flat emotions     HFrEF (heart failure with reduced ejection fraction) (H)     new diagnosis 6/14     Menopausal disorder     started on OCPs by menopause center 3/2017 (takes active continuously)     Menstrual headache     helped by OCPs and magnesium     Paroxysmal SVT (supraventricular tachycardia) (H) 06/2020     GERTRUDE (stress urinary incontinence, female)     sling procedure 2016         CURRENT OUTPATIENT MEDICATIONS     Current Outpatient Medications   Medication Sig     carvedilol (COREG) 3.125 MG tablet Take 1 tablet (3.125 mg) by mouth 2 times daily (with meals)     cetirizine (ZYRTEC) 10 MG tablet Take 10 mg by mouth daily     lisinopril (ZESTRIL) 5 MG tablet Take 0.5 tablets (2.5 mg) by mouth daily 8/17/21 hold restart if BP > 110.     LORazepam (ATIVAN) 0.5 MG tablet TAKE 1 TABLET BY MOUTH AT BEDTIME FOR SLEEP AND ANXIETY.     rosuvastatin (CRESTOR) 10 MG tablet Take 1 tablet (10 mg) by mouth daily     sertraline (ZOLOFT) 50 MG tablet Take 1 tablet (50 mg) by mouth daily     spironolactone (ALDACTONE) 25 MG tablet Take 1 tablet (25 mg) by mouth daily      No current facility-administered medications for this visit.        ALLERGIES      Allergies   Allergen Reactions     Cold & Flu [Cold Defense Fighter]      See pseudoephedrine     Seasonal Allergies      Sudafed Cold-Cough [Dayquil Liquicaps]      Pseudoephedrine Rash     Rash then skins peels off         REVIEW OF SYSTEMS   As above in the HPI, o/w complete 12-point ROS was negative.     PHYSICAL EXAM   LMP 11/06/2019   Limited physical exam during video visit due to COVID19 restrictions  Middle aged female in no acute distress  Breathing comfortably, no tachypnea  Speech and hearing normal  Pleasant mood and congruent affect  Moving all extremities, no focal neurologic deficits apparent       LABORATORY AND IMAGING STUDIES     Recent Labs   Lab Test 01/10/22  1508 12/30/21  1642 09/17/21  1513 08/16/21  1227 06/07/21  1430    139 138 138 139   POTASSIUM 4.1 3.8 3.8 3.6 3.6   CHLORIDE 109 106 107 105 106   CO2 24 26 27 28 27   ANIONGAP 7 7 4 5 6   BUN 21 17 25 21 17   CR 0.96 1.05* 1.04 1.02 1.00   GLC 96 93 88 89 97   AFSHAN 9.6 9.9 9.0 10.1 9.5     Recent Labs   Lab Test 06/19/20  1853 06/15/20  0845 06/14/20  1807 01/11/20  0615 12/01/19  1340 12/01/19  0641 11/30/19  2137 11/30/19  1341 11/28/19  0537 11/27/19  2216   MAG 2.5* 2.4* 2.1 2.0  --   --   --   --   --  2.1   PHOS  --   --   --  3.1 2.8 3.4 2.8 2.5   < > 3.1    < > = values in this interval not displayed.     Recent Labs   Lab Test 01/10/22  1508 12/30/21  1646 06/07/21  1430 06/06/21  0929 03/12/21  0930   WBC 7.9 9.7 5.4 5.0 6.1   HGB 14.6 15.0 13.9 14.3 14.4    220 161 162 193   MCV 91 88 90 89 91   NEUTROPHIL 38 45 43.1 52.2 46.8     Recent Labs   Lab Test 01/10/22  1508 12/30/21  1642 06/07/21  1430 03/12/21  0930   BILITOTAL 0.6 0.8 1.0 0.9   ALKPHOS 96 97 97 97   ALT 45 43 39 35   AST 25 26 26 26   ALBUMIN 4.5 4.8 4.1 4.1   * 244*  --  237*     TSH   Date Value Ref Range Status   06/14/2020 3.40 0.40 - 4.00 mU/L Final    09/18/2019 2.06 0.40 - 4.00 mU/L Final   07/27/2018 2.04 0.40 - 4.00 mU/L Final     No results for input(s): CEA in the last 16123 hours.  Results for orders placed or performed during the hospital encounter of 01/10/22   CT Chest/Abdomen/Pelvis w Contrast    Narrative    CT CHEST/ABDOMEN/PELVIS WITH CONTRAST 1/10/2022 3:34 PM    CLINICAL HISTORY: Surveillance DLBCL - stage III, non GCB, EBV+ve.  Diffuse large B-cell lymphoma of intrathoracic lymph nodes (H).    TECHNIQUE: CT scan of the chest, abdomen, and pelvis was performed  following injection of IV contrast. Multiplanar reformats were  obtained. Dose reduction techniques were used.   CONTRAST: 80mL Isovue-370    COMPARISON: CT chest, abdomen and pelvis 9/20/2021.    FINDINGS:   LUNGS AND PLEURA: No effusions or acute airspace disease. Calcified  granuloma at the right upper lobe appears stable. Groundglass nodule  at the superior segment right lower lobe appears stable measuring 0.3  cm series 10 image 107. Stable small left upper lobe nodule measuring  0.2 cm image 118. There are several other small stable pulmonary  nodules.    MEDIASTINUM/AXILLAE: Some soft tissue thickening along the anterior  mediastinum is stable measuring AP dimension of 1.1 cm, previously 1.5  cm, series 4 image 68. This is stable at other positions as well at  the anterior mediastinum. No areas of enlarging adenopathy otherwise  seen in the chest.    CORONARY ARTERY CALCIFICATION: None.    HEPATOBILIARY: Stable small hypodense nodules in the right and left  liver. One of these appears to represent a hemangioma posteromedial  right liver measuring 1.6 cm series 4 image 116. No new worrisome  liver lesion. Gallbladder unremarkable.    PANCREAS: Normal.    SPLEEN: No focal splenic lesion. AP length of the spleen is 11.8 cm,  stable, image 132.    ADRENAL GLANDS: Normal.    KIDNEYS/BLADDER: Normal.    BOWEL: No obstruction or inflammation. Mild ventral abdominal wall  periumbilical  bulging appears stable. No evidence for appendicitis.    PELVIC ORGANS: No acute abnormality. Right ovarian cyst is 2.1 cm,  stable, image 264. Bilateral fallopian tube devices noted.    ADDITIONAL FINDINGS: No enlarged lymph nodes identified within the  abdomen or pelvis.    MUSCULOSKELETAL: No aggressive appearing bone lesion identified.      Impression    IMPRESSION:  1.  Stable soft tissue along the anterior mediastinum. No areas of new  adenopathy identified.  2.  Stable small pulmonary nodules bilaterally.  3.  Stable nodules in the liver that may be cysts and hemangiomas.  4.  Stable cystic lesion at the right ovary that can be further  evaluated with pelvic ultrasound.     JOSUE HORTA MD         SYSTEM ID:  [id number]               ASSESSMENT AND PLAN   DLBCL, EBV+ non-GCB, stage III post 6 cycles of M-RCHOP  PJV pneumonia causing acute respiratory failure improved on bactrim and prednisone  Cardiomyopathy post adriamycin based chemotherapy likely also contributed by tachycardia    Kassie was seen in person visit and was present alone. She has completed her planned chemotherapy in April 2020.     She had doing much better. However at this visit she has a number of complains. She has marked fatigue and pain in her left forearm. She feels the pain is very deep - feels in the bones. She is starting to feel weakness in that side.     I have reviewed all of the labs done prior to this clinic visit.  Labs are all completely normal including electrolytes, renal function, hepatic panel, complete blood count and differential. She does have mild elevation in LDH which is very non-specific.      I have reviewed actual images from her CT scan and there is no evidence of recurrent disease in the chest, abdomen or pelvis. She had enlarged hilar nodes and mediastinal mass, likely from reactive thymic tissue.  This has remained stable on the current imaging study. I reviewed her previous PET-CT scan to see if we could  identify anything in her fore-arm but it was not even covered with the PET-CT scan.    I will get work up including MRI of fore-arm and rheumatologic work up given her fatigue and other symptoms.     I would continue following her every 3-4 months for now. I will see her in 2 weeks to review results. I will see her in 3 months with PET-CT scan.     All questions for patient  were answered.  She is almost a year out from treatment completion.     45 minutes spent on the date of the encounter doing chart review, history and exam, documentation and further activities as noted above     Castro Castro    Hematologist and Medical Oncologist  [facility]

## 2022-01-14 NOTE — PROGRESS NOTES
"Oncology Rooming Note    January 14, 2022 3:30 PM   Kassie Ramirez is a 51 year old female who presents for:    Chief Complaint   Patient presents with     Oncology Clinic Visit     Diffuse large B-cell lymphoma of intrathoracic lymph nodes      Initial Vitals: /69   Pulse 95   Temp 98.4  F (36.9  C) (Tympanic)   Resp 16   Wt 76 kg (167 lb 9.6 oz)   LMP 11/06/2019   SpO2 98%   BMI 27.05 kg/m   Estimated body mass index is 27.05 kg/m  as calculated from the following:    Height as of 12/30/21: 1.676 m (5' 6\").    Weight as of this encounter: 76 kg (167 lb 9.6 oz). Body surface area is 1.88 meters squared.  Severe Pain (7) Comment: Data Unavailable   Patient's last menstrual period was 11/06/2019.  Allergies reviewed: Yes  Medications reviewed: Yes    Medications: Medication refills not needed today.  Pharmacy name entered into [facility]:    [facility] - [place], [place] - [address] [place] [address] DRUG STORE #[id number] [place], [place] - [address] [facility] [address] & [facility] PH[facility]AC[facility] - [place], [place] - [address] [facility] [place], [place] - [address] FABIOLA HOMAR Rey CMA              "

## 2022-01-16 LAB — ALDOLASE SERPL-CCNC: 5.1 U/L

## 2022-01-17 ENCOUNTER — TELEPHONE (OUTPATIENT)
Dept: ONCOLOGY | Facility: CLINIC | Age: 52
End: 2022-01-17
Payer: COMMERCIAL

## 2022-01-17 LAB
ANA SER QL IF: NEGATIVE
HAPTOGLOB SERPL-MCNC: 147 MG/DL (ref 32–197)
RHEUMATOID FACT SER NEPH-ACNC: <7 IU/ML

## 2022-01-17 NOTE — PATIENT INSTRUCTIONS
MRI of Elbow 1/23/22  Return visit with Padmaja KELLEY 1/31/22  PET scan 4/5/22  Return visit with  4/15/22    Fareed Jefferson, RN, BSN.  RN Care Coordinator     [facility]   [phone]

## 2022-01-17 NOTE — TELEPHONE ENCOUNTER
"Spoke with the managers of the oncology clinics who stated at this time oncology providers are not able to prescribe Paxlovid. Also reviewed the information on the [facility] website that states, \"Due to the limited supply, our care teams are reaching out to current [facility] high-risk patients directly after they receive a positive COVID-19 test to offer them an appointment to discuss therapeutic options with one of our providers.\"    Reviewed this information with Florian and told him that if Kassie qualifies for Paxlovid therapy, she would be contacted by [place].    Charisma aLne, RN  Triage Nurse Advisor  [facility]   [facility]  "

## 2022-01-17 NOTE — TELEPHONE ENCOUNTER
Patient tested for COVID and influenza on 1/16. Currently has cough, headache, and congestion. Oxygen saturations normal, heart rate 105-110. Temperatures in the 99s. Her , Florian, called to inquire about monoclonal antibody treatment or Paxlovid. Florian stated he submitted the screening questionnaire through [facility] for monoclonal antibodies last night, but was told that although Kassie qualifies for monoclonal antibodies, there currently is too high of a demand and not enough supply. He is asking if she would qualify for Paxlovid or any other treatment.    Will route to Dr. Castro for recommendations.    Charisma Lane, RN  Triage Nurse Advisor  [facility]

## 2022-01-18 LAB — DSDNA AB SER-ACNC: <0.6 IU/ML

## 2022-01-19 DIAGNOSIS — F41.8 SITUATIONAL ANXIETY: ICD-10-CM

## 2022-01-19 DIAGNOSIS — C83.398 DIFFUSE LARGE B-CELL LYMPHOMA OF EXTRANODAL SITE: ICD-10-CM

## 2022-01-19 DIAGNOSIS — Z79.899 CONTROLLED SUBSTANCE AGREEMENT SIGNED: ICD-10-CM

## 2022-01-21 NOTE — TELEPHONE ENCOUNTER
Routing refill request to provider for review/approval because:  Drug not on the [facility] refill protocol   Aamir Guerrero RN, BSN

## 2022-01-22 RX ORDER — LORAZEPAM 0.5 MG/1
TABLET ORAL
Qty: 30 TABLET | Refills: 0 | Status: SHIPPED | OUTPATIENT
Start: 2022-01-22 | End: 2022-05-09

## 2022-01-23 ENCOUNTER — HOSPITAL ENCOUNTER (OUTPATIENT)
Dept: MRI IMAGING | Facility: CLINIC | Age: 52
Discharge: HOME | End: 2022-01-23
Attending: INTERNAL MEDICINE | Admitting: INTERNAL MEDICINE
Payer: COMMERCIAL

## 2022-01-23 DIAGNOSIS — C83.32 DIFFUSE LARGE B-CELL LYMPHOMA OF INTRATHORACIC LYMPH NODES (H): ICD-10-CM

## 2022-01-23 PROCEDURE — 73223 MRI JOINT UPR EXTR W/O&W/DYE: CPT | Mod: RT

## 2022-01-23 PROCEDURE — A9585 GADOBUTROL INJECTION: HCPCS | Performed by: INTERNAL MEDICINE

## 2022-01-23 PROCEDURE — 73223 MRI JOINT UPR EXTR W/O&W/DYE: CPT | Mod: 26 | Performed by: RADIOLOGY

## 2022-01-23 PROCEDURE — 255N000002 HC RX 255 OP 636: Performed by: INTERNAL MEDICINE

## 2022-01-23 RX ORDER — GADOBUTROL 604.72 MG/ML
0.1 INJECTION INTRAVENOUS ONCE
Status: COMPLETED | OUTPATIENT
Start: 2022-01-23 | End: 2022-01-23

## 2022-01-23 RX ADMIN — GADOBUTROL 7.5 ML: 604.72 INJECTION INTRAVENOUS at 08:41

## 2022-01-25 DIAGNOSIS — C83.32 DIFFUSE LARGE B-CELL LYMPHOMA OF INTRATHORACIC LYMPH NODES (H): Primary | ICD-10-CM

## 2022-01-25 DIAGNOSIS — R53.82 CHRONIC FATIGUE: ICD-10-CM

## 2022-01-25 DIAGNOSIS — R59.0 MEDIASTINAL ADENOPATHY: ICD-10-CM

## 2022-01-26 NOTE — PROGRESS NOTES
Kassie Ramirez presents for face to face visit.       I have reviewed and updated the patient's Past Medical History, Social History, Family History and Medication List.    Review Of Systems  Skin: negative  Eyes: negative  Ears/Nose/Throat: negative  Respiratory: negative   Cardiovascular: Fatigue  Gastrointestinal: negative  Genitourinary: negative  Musculoskeletal: Joint pain  Neurologic: negative  Psychiatric: Ativan for sleep x1/week   Hematologic/Lymphatic/Immunologic: h/o cancer   Endocrine: negative  (ROS TAKEN BY Santos Tai LPN PRIOR TO VISIT)      PROBLEM LIST  Patient Active Problem List   Diagnosis     Anxiety attack     Generalized anxiety disorder     Controlled substance agreement signed     GERTRUDE (stress urinary incontinence, female)     Intractable migraine without aura and with status migrainosus     Menstrual headache     Menopausal disorder     Mixed hyperlipidemia     SVT (supraventricular tachycardia) (H)     Mediastinal mass     Diffuse large B-cell lymphoma of extranodal site (H) - per preliminary pathology from [facility] 11/27/19 - mediastinal mass wrapped around azalea and bilateral main stem bronchi     Bronchial compression     Neutropenic fever (H)     Acute kidney failure, unspecified (H)     Respiratory failure (H)     Lymphoma (H)     DLBCL (diffuse large B cell lymphoma) (H)     Chemotherapy adverse reaction     Myocardial injury, initial encounter     Acute systolic congestive heart failure (H)     Paroxysmal SVT (supraventricular tachycardia) (H)     Tachycardia     Atrial flutter (H)     Cardiomyopathy (H)     Diffuse large B-cell lymphoma (H)       ALLERGIES  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]; Seasonal allergies; Sudafed cold-cough [dayquil liquicaps]; and Pseudoephedrine    MEDICATIONS  Current Outpatient Medications   Medication Sig Dispense Refill     carvedilol (COREG) 3.125 MG tablet Take 0.5 tablets (1.56 mg) by mouth 2 times daily (with meals) 30 tablet 1  oriented to person, place and time , short and long term memory intact     cetirizine (ZYRTEC) 10 MG tablet Take 10 mg by mouth daily       furosemide (LASIX) 20 MG tablet Take 1 tablet (20 mg) by mouth daily 30 tablet 1     lidocaine-prilocaine (EMLA) 2.5-2.5 % external cream Apply 1 applicator topically as needed for moderate pain       lisinopril (ZESTRIL) 2.5 MG tablet Take 1 tablet (2.5 mg) by mouth daily 30 tablet 1     LORazepam (ATIVAN) 0.5 MG tablet 1 tablet by mouth at bedtime for sleep and anxiety. 30 tablet 0     potassium chloride ER (KLOR-CON M) 20 MEQ CR tablet Take 2 tablets (40 mEq) by mouth daily 180 tablet 0       HPI:  It is my pleasure to see your patient, Kassie Ramirez.  She is an pleasant 50-year-old patient with a diagnosis of large B-cell lymphoma who is undergoing chemotherapy with R-CHOP.    6/14/2020 admitted with a new diagnosis of cardiomyopathy with an ejection fraction in the 30% range. This is a new diagnosis of cardiomyopathy as her ejection fraction prior to this was normal.    She did undergo coronary angiography in June which showed no evidence of coronary artery disease, confirming the diagnosis of cardiomyopathy.  The left ventricular end-diastolic pressure was on the higher side being at 29 mmHg.  The patient has been plagued by numerous episodes of supraventricular tachycardia.  She had multiple episodes on the 6/14/20 admission.      6/19/20 she had at least 5 episodes of SVT and then came into the hospital.  When she came in, she had sinus tachycardia, but then had a 10-minute run of SVT.  She was about to be given adenosine and then she spontaneously converted back to sinus tachycardia.  The patient's blood pressure has been running on the lower side, so it has been difficult to give her substantial doses of beta blocker.  She was on metoprolol succinate 12.5 mg when she was admitted.  They attempted to go to 25 mg.  Her blood pressure, however, was on the softer side at 104/74.    Her proBNP is raised at 1513.     6/22/20 transferred to  [facility]. She is now S/p SVT ablation on 6/22/20     PAST MEDICAL HISTORY:   1.  Large B-cell lymphoma, being treated with R-CHOP by Dr. Castro at [facility].   2.  Recurrent SVT.   3.  Cardiomyopathy. It was felt in the past that possibly this was a rate related cardiomyopathy however the patient is having only intermittent episodes of SVT and the more likely culprit I believe is the chemotherapy especially because it contains doxorubicin.  4.  Reflux disease.   5.  Anxiety.   6. S/p SVT ablation on 6/22/20       FAMILY HISTORY:  No history of cardiomyopathy.      SOCIAL HISTORY:  She does not drink alcohol.  She does not smoke cigarettes. .      ALLERGIES:     1.  INTOLERANCE TO CO-DEFENSE FIGHTER.     2.  SUDAFED APPEARS TO CAUSE A SKIN RASH.     3.  SEASONAL ALLERGIES.   4.  INTOLERANT TO DAYQUIL AND DAYQUIL LIQUID CAPS.      MEDICATIONS:   1.  Zyrtec 10 mg orally per day.   2.  Lasix 20mg daily    3.  Lisinopril 2.5 mg per day.   4.  Carvedilol 1.25mg bid    5.  Potassium chloride 40 mEq per day    Today 7/14/20 she states she feels fine. Denies dizziness, lightheadedness, PND, syncope or near syncope. Denies papillations. She states the palpitations are gone. Weight stable at 151 pounds. BP at home 106/67. 103/73. 91/66. HR 90's.     ROS:  12-pt ROS is negative except for as noted above.      PHYSICAL EXAMINATION:   Weight 153 pounds, BP 70/52, 82/52  Constitutional:  Patient is pleasant, alert, cooperative, and in NAD. diaphoretic   HEENT:  NCAT. PERRLA. EOM's intact.   Neck:  JVP 6-8 cm   Chest: good air exchange, clear to auscultation bilaterally, no crackles or wheezing and Port-A-Cath in right upper chest  Cardiac: regular rate (98 bpm)  and rhythm, normal S1 and S2, S4 present, no murmur   Abdomen:  Soft, non-tender abdomen, no hepatosplenomegaly appreciated.   Extremities:  no edema   Neurological:  No gross motor or sensory deficits.   Psych: Appropriate affect.        Data:  Echocardiogram 6/15/20   The visual ejection fraction is estimated at 25-30%.  Left ventricular systolic function is moderate to severely reduced.  There has been a significant drop in left ventricular sytstolic function since  2/23/2020.  The right ventricular systolic function is mild to moderately reduced.  There is moderate (2+) mitral regurgitation.       BMP:   Bmp 7/10/20   K+3.8 bun27 creat0.98 gfr67       EKG today NSR HR 98 bpm        Assessment/Plan:        1. New Non-ischemic Cardiomyopathy 25-30%   Previously this was felt to be possibly rate related.  However, the patient had intermittent SVT.  Etiology of her cardiomyopathy, more likely her chemotherapeutic regimen which includes doxorubicin is the more likely culprit to be causing reduced left ventricular systolic function.  - Hospitalized at [facility] 6/14-6/17/2020 with heart failure.  - coronary angiogram negative  - currently on Lasix 20 mg daily, lisinopril 2.5 and carvedilol 1.25mg bid, KCL 40 meq daily   --repeat echo in August.          2. SVT   - First occurrence 11/2019 treated with adenosine in ER  - Has had episodes ~1/month, significant increase over the last month  - No syncope, presyncope, but sx'c with palpitations and extreme fatigue.  - BB therapy limited due to soft BPs in 90-100s  - Now s/p EP study showing AVNRT, now s/p slow pathway modification 6/22/2020 with Dr. Bynum  - Tele showed SR in hospital.   -  PLAN: Follow-up EP      3 Large B-cell lymphoma   - currently undergoing tx with R-CHOP (Dr. Castro,  Oncology)     4. Hypotension   76/50 sitting, standing 70/50. Fasted for labs today.  Repeated 78/52. Repeated 82/52   Asymptomatic  BP at home  systolic.          Follow-up:   1. Echo August, bmp, bnp   2. Follow up with with Dr. Pearl consult for CORE MD   3. Follow up with me in Oct.           Asmita JOHNSON, [facility]  Pager: [phone]  Text Page  (7:30am - 4pm  M-F)

## 2022-03-21 DIAGNOSIS — I50.22 CHRONIC SYSTOLIC CONGESTIVE HEART FAILURE (H): ICD-10-CM

## 2022-03-21 RX ORDER — SPIRONOLACTONE 25 MG/1
25 TABLET ORAL DAILY
Qty: 90 TABLET | Refills: 0 | Status: SHIPPED | OUTPATIENT
Start: 2022-03-21 | End: 2022-06-23

## 2022-03-21 NOTE — TELEPHONE ENCOUNTER
[facility] Cardiology Refill Guideline reviewed.  Medication meets criteria for refill. Appt is due. Letter is sent to patient.

## 2022-03-31 ENCOUNTER — LAB (OUTPATIENT)
Dept: LAB | Facility: CLINIC | Age: 52
End: 2022-03-31
Payer: COMMERCIAL

## 2022-03-31 DIAGNOSIS — E78.2 MIXED HYPERLIPIDEMIA: ICD-10-CM

## 2022-03-31 DIAGNOSIS — C83.398 DIFFUSE LARGE B-CELL LYMPHOMA OF EXTRANODAL SITE: ICD-10-CM

## 2022-03-31 DIAGNOSIS — E78.1 HYPERTRIGLYCERIDEMIA: ICD-10-CM

## 2022-03-31 LAB
BASOPHILS # BLD AUTO: 0.1 10E3/UL (ref 0–0.2)
BASOPHILS NFR BLD AUTO: 1 %
EOSINOPHIL # BLD AUTO: 2.2 10E3/UL (ref 0–0.7)
EOSINOPHIL NFR BLD AUTO: 28 %
ERYTHROCYTE [DISTWIDTH] IN BLOOD BY AUTOMATED COUNT: 12.1 % (ref 10–15)
HCT VFR BLD AUTO: 45.9 % (ref 35–47)
HGB BLD-MCNC: 15.3 G/DL (ref 11.7–15.7)
IMM GRANULOCYTES # BLD: 0 10E3/UL
IMM GRANULOCYTES NFR BLD: 0 %
LDH SERPL L TO P-CCNC: 223 U/L (ref 81–234)
LYMPHOCYTES # BLD AUTO: 2.1 10E3/UL (ref 0.8–5.3)
LYMPHOCYTES NFR BLD AUTO: 28 %
MCH RBC QN AUTO: 30.5 PG (ref 26.5–33)
MCHC RBC AUTO-ENTMCNC: 33.3 G/DL (ref 31.5–36.5)
MCV RBC AUTO: 91 FL (ref 78–100)
MONOCYTES # BLD AUTO: 0.5 10E3/UL (ref 0–1.3)
MONOCYTES NFR BLD AUTO: 7 %
NEUTROPHILS # BLD AUTO: 2.8 10E3/UL (ref 1.6–8.3)
NEUTROPHILS NFR BLD AUTO: 36 %
NRBC # BLD AUTO: 0 10E3/UL
NRBC BLD AUTO-RTO: 0 /100
PLATELET # BLD AUTO: 203 10E3/UL (ref 150–450)
RBC # BLD AUTO: 5.02 10E6/UL (ref 3.8–5.2)
WBC # BLD AUTO: 7.7 10E3/UL (ref 4–11)

## 2022-03-31 PROCEDURE — 83615 LACTATE (LD) (LDH) ENZYME: CPT

## 2022-03-31 PROCEDURE — 80061 LIPID PANEL: CPT

## 2022-03-31 PROCEDURE — 84450 TRANSFERASE (AST) (SGOT): CPT

## 2022-03-31 PROCEDURE — 82248 BILIRUBIN DIRECT: CPT

## 2022-03-31 PROCEDURE — 82247 BILIRUBIN TOTAL: CPT

## 2022-03-31 PROCEDURE — 80053 COMPREHEN METABOLIC PANEL: CPT

## 2022-03-31 PROCEDURE — 84075 ASSAY ALKALINE PHOSPHATASE: CPT

## 2022-03-31 PROCEDURE — 36415 COLL VENOUS BLD VENIPUNCTURE: CPT

## 2022-03-31 PROCEDURE — 84155 ASSAY OF PROTEIN SERUM: CPT

## 2022-03-31 PROCEDURE — 85025 COMPLETE CBC W/AUTO DIFF WBC: CPT

## 2022-03-31 PROCEDURE — 84460 ALANINE AMINO (ALT) (SGPT): CPT

## 2022-04-01 LAB
ALBUMIN SERPL-MCNC: 4.5 G/DL (ref 3.4–5)
ALP SERPL-CCNC: 82 U/L (ref 40–150)
ALT SERPL W P-5'-P-CCNC: 47 U/L (ref 0–50)
AST SERPL W P-5'-P-CCNC: 29 U/L (ref 0–45)
BILIRUB DIRECT SERPL-MCNC: 0.2 MG/DL (ref 0–0.2)
BILIRUB SERPL-MCNC: 0.7 MG/DL (ref 0.2–1.3)
CHOLEST SERPL-MCNC: 156 MG/DL
FASTING STATUS PATIENT QL REPORTED: YES
HDLC SERPL-MCNC: 46 MG/DL
LDLC SERPL CALC-MCNC: 82 MG/DL
NONHDLC SERPL-MCNC: 110 MG/DL
PROT SERPL-MCNC: 7.4 G/DL (ref 6.8–8.8)
TRIGL SERPL-MCNC: 140 MG/DL

## 2022-04-02 LAB
ALBUMIN SERPL-MCNC: 4.5 G/DL (ref 3.4–5)
ALP SERPL-CCNC: 78 U/L (ref 40–150)
ALT SERPL W P-5'-P-CCNC: 51 U/L (ref 0–50)
ANION GAP SERPL CALCULATED.3IONS-SCNC: 10 MMOL/L (ref 3–14)
AST SERPL W P-5'-P-CCNC: 30 U/L (ref 0–45)
BILIRUB SERPL-MCNC: 0.7 MG/DL (ref 0.2–1.3)
BUN SERPL-MCNC: 27 MG/DL (ref 7–30)
CALCIUM SERPL-MCNC: 10.2 MG/DL (ref 8.5–10.1)
CHLORIDE BLD-SCNC: 108 MMOL/L (ref 94–109)
CO2 SERPL-SCNC: 20 MMOL/L (ref 20–32)
CREAT SERPL-MCNC: 1.07 MG/DL (ref 0.52–1.04)
GFR SERPL CREATININE-BSD FRML MDRD: 62 ML/MIN/1.73M2
GLUCOSE BLD-MCNC: 90 MG/DL (ref 70–99)
POTASSIUM BLD-SCNC: 4.1 MMOL/L (ref 3.4–5.3)
PROT SERPL-MCNC: 7.4 G/DL (ref 6.8–8.8)
SODIUM SERPL-SCNC: 138 MMOL/L (ref 133–144)

## 2022-04-05 ENCOUNTER — HOSPITAL ENCOUNTER (OUTPATIENT)
Dept: CT IMAGING | Facility: CLINIC | Age: 52
Discharge: HOME | End: 2022-04-05
Attending: INTERNAL MEDICINE | Admitting: INTERNAL MEDICINE
Payer: COMMERCIAL

## 2022-04-05 DIAGNOSIS — C83.32 DIFFUSE LARGE B-CELL LYMPHOMA OF INTRATHORACIC LYMPH NODES (H): ICD-10-CM

## 2022-04-05 DIAGNOSIS — R53.82 CHRONIC FATIGUE: ICD-10-CM

## 2022-04-05 DIAGNOSIS — R59.0 MEDIASTINAL ADENOPATHY: ICD-10-CM

## 2022-04-05 PROCEDURE — 250N000011 HC RX IP 250 OP 636: Performed by: INTERNAL MEDICINE

## 2022-04-05 PROCEDURE — 250N000009 HC RX 250: Performed by: INTERNAL MEDICINE

## 2022-04-05 PROCEDURE — 74177 CT ABD & PELVIS W/CONTRAST: CPT

## 2022-04-05 RX ORDER — IOPAMIDOL 755 MG/ML
500 INJECTION, SOLUTION INTRAVASCULAR ONCE
Status: COMPLETED | OUTPATIENT
Start: 2022-04-05 | End: 2022-04-05

## 2022-04-05 RX ADMIN — IOPAMIDOL 82 ML: 755 INJECTION, SOLUTION INTRAVENOUS at 14:01

## 2022-04-05 RX ADMIN — SODIUM CHLORIDE 61 ML: 9 INJECTION, SOLUTION INTRAVENOUS at 14:01

## 2022-04-13 ENCOUNTER — ANCILLARY PROCEDURE (OUTPATIENT)
Dept: MAMMOGRAPHY | Facility: CLINIC | Age: 52
End: 2022-04-13
Attending: PHYSICIAN ASSISTANT
Payer: COMMERCIAL

## 2022-04-13 DIAGNOSIS — Z12.31 VISIT FOR SCREENING MAMMOGRAM: ICD-10-CM

## 2022-04-13 PROCEDURE — 77063 BREAST TOMOSYNTHESIS BI: CPT | Mod: TC | Performed by: RADIOLOGY

## 2022-04-13 PROCEDURE — 77067 SCR MAMMO BI INCL CAD: CPT | Mod: TC | Performed by: RADIOLOGY

## 2022-04-14 NOTE — RESULT ENCOUNTER NOTE
Kassie  Here are your recent results.  They are normal.  If you have any questions please do not hesitate to contact our office via phone ([phone]) or MyChart.    Mary Alice Colón MS, PA-C  [facility]

## 2022-04-15 ENCOUNTER — ONCOLOGY VISIT (OUTPATIENT)
Dept: ONCOLOGY | Facility: CLINIC | Age: 52
End: 2022-04-15
Attending: INTERNAL MEDICINE
Payer: COMMERCIAL

## 2022-04-15 VITALS
RESPIRATION RATE: 16 BRPM | HEIGHT: 66 IN | BODY MASS INDEX: 27.48 KG/M2 | TEMPERATURE: 98 F | DIASTOLIC BLOOD PRESSURE: 84 MMHG | HEART RATE: 82 BPM | SYSTOLIC BLOOD PRESSURE: 125 MMHG | WEIGHT: 171 LBS | OXYGEN SATURATION: 96 %

## 2022-04-15 DIAGNOSIS — N83.201 OVARIAN CYST, RIGHT: ICD-10-CM

## 2022-04-15 DIAGNOSIS — I42.9 SECONDARY CARDIOMYOPATHY (H): ICD-10-CM

## 2022-04-15 DIAGNOSIS — R53.82 CHRONIC FATIGUE: ICD-10-CM

## 2022-04-15 DIAGNOSIS — C83.32 DIFFUSE LARGE B-CELL LYMPHOMA OF INTRATHORACIC LYMPH NODES (H): Primary | ICD-10-CM

## 2022-04-15 DIAGNOSIS — T45.1X5A CHEMOTHERAPY-INDUCED PERIPHERAL NEUROPATHY (H): ICD-10-CM

## 2022-04-15 DIAGNOSIS — G62.0 CHEMOTHERAPY-INDUCED PERIPHERAL NEUROPATHY (H): ICD-10-CM

## 2022-04-15 DIAGNOSIS — R59.0 MEDIASTINAL ADENOPATHY: ICD-10-CM

## 2022-04-15 PROCEDURE — 99215 OFFICE O/P EST HI 40 MIN: CPT | Performed by: INTERNAL MEDICINE

## 2022-04-15 PROCEDURE — G0463 HOSPITAL OUTPT CLINIC VISIT: HCPCS

## 2022-04-15 RX ORDER — TRAMADOL HYDROCHLORIDE 50 MG/1
50 TABLET ORAL EVERY 6 HOURS PRN
Qty: 60 TABLET | Refills: 1 | Status: SHIPPED | OUTPATIENT
Start: 2022-04-15 | End: 2022-04-18

## 2022-04-15 ASSESSMENT — PAIN SCALES - GENERAL: PAINLEVEL: SEVERE PAIN (7)

## 2022-04-15 NOTE — NURSING NOTE
"Oncology Rooming Note    April 15, 2022 3:51 PM   Kassie Ramirez is a 52 year old female who presents for:    Chief Complaint   Patient presents with     Oncology Clinic Visit     Initial Vitals: /84 (Cuff Size: Adult Regular)   Pulse 82   Temp 98  F (36.7  C) (Tympanic)   Resp 16   Ht 1.676 m (5' 6\")   Wt 77.6 kg (171 lb)   LMP 11/06/2019   SpO2 96%   BMI 27.60 kg/m   Estimated body mass index is 27.6 kg/m  as calculated from the following:    Height as of this encounter: 1.676 m (5' 6\").    Weight as of this encounter: 77.6 kg (171 lb). Body surface area is 1.9 meters squared.  Severe Pain (7) Comment: Data Unavailable   Patient's last menstrual period was 11/06/2019.  Allergies reviewed: Yes  Medications reviewed: Yes    Medications: Medication refills not needed today.  Pharmacy name entered into [facility]:    [facility] - [place], [place] - [address] [place] [address] DRUG STORE #[id number] [place], [place] - [address] [address] [address] [facility] [address] & [facility] - [place], [place] - [address] [facility] - [place], [place] - [address]    Clinical concerns: f/u       Vy Redmond, KIMMY              "

## 2022-04-15 NOTE — LETTER
4/15/2022         RE: Kassie Ramirez  [address]  [facility] [address]        Dear Colleague,    Thank you for referring your patient, Kassie Ramirez, to the [facility]. Please see a copy of my visit note below.    [facility]  HEMATOLOGY AND ONCOLOGY    FOLLOW-UP VISIT NOTE    PATIENT NAME: Kassie Ramirez MRN # [id number]  DATE OF VISIT: Apr 15, 2022 YOB: 1970    REFERRING PROVIDER: No referring provider defined for this encounter.    CANCER TYPE: DLBCL, EBV+ non-GCB, stage III.  STAGE: III    TREATMENT SUMMARY:  She presented with shortness of breath and cough which had been present since May 2019. Her imaging suggested pulmonary infiltrates which was attributed to aspiration pneumonia. CT scan of the chest 8/20/2019 showed left hilar and subcarinal mediastinal adenopathy with narrowing of the left lower lobe bronchus and a nonspecific patchy area of nodular consolidation in the medial right lower lobe.  Bronchoscopy with EBUS 8/28/2019 showed nonnecrotizing granulomatous inflammation with prominent eosinophilia, negative for malignancy.  She was diagnosed with sarcoidosis and started on prednisone. She had worsening cough and shortness of breath with tapering of the prednisone.  Repeat CT scan of the chest 11/18/2019 showed interval worsening of the bulky mediastinal and bilateral hilar lymphadenopathy, near complete resolution of the lower lobe opacities, with new interstitial opacities in the right upper lobe.   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 Large mediastinal mass causing respiratory compromise. . IPI score of 2 (low-intermediate). FISH neg for high risk translocations. She received 6 cycles of R-CHOP with intermediate dose methotrexate after cycles 2, 4 and 6 from November through April  2020.      CURRENT INTERVENTIONS:  Surveillance post MR-CHOP    SUBJECTIVE   Kassie Ramirez is being followed for DLBCL, EBV+ non-GCB, stage III intermediate risk disease    Patient was followed in person. Kassie is joined by her  at this visit. She has completed all of her planned cycles of MR-CHOP chemotherapy and is being seen with labs and restaging scans.     She had struggled with cardiomyopathy post adriamycin.     She has not been doing well since completion of her therapy for lymphoma.  She has marked fatigue and no energy.  She continues to work full-time as a  but is wiped out by the end of the day.    She is always tired.       PAST MEDICAL HISTORY     Past Medical History:   Diagnosis Date     Anxiety attack 9/16/2014     Cardiomyopathy (H)     non ishemic - 25-30% - Chemo related     Diffuse large B-cell lymphoma (H)     Diagnosed 11/2019, [facility]barran     Encounter for Essure implantation 2009     Generalized anxiety disorder 9/16/2014    zoloft = flat emotions     HFrEF (heart failure with reduced ejection fraction) (H)     new diagnosis 6/14     Menopausal disorder     started on OCPs by menopause center 3/2017 (takes active continuously)     Menstrual headache     helped by OCPs and magnesium     Paroxysmal SVT (supraventricular tachycardia) (H) 06/2020     GERTRUDE (stress urinary incontinence, female)     sling procedure 2016         CURRENT OUTPATIENT MEDICATIONS     Current Outpatient Medications   Medication Sig     carvedilol (COREG) 3.125 MG tablet Take 1 tablet (3.125 mg) by mouth 2 times daily (with meals)     cetirizine (ZYRTEC) 10 MG tablet Take 10 mg by mouth daily     lisinopril (ZESTRIL) 5 MG tablet Take 0.5 tablets (2.5 mg) by mouth daily 8/17/21 hold restart if BP > 110.     LORazepam (ATIVAN) 0.5 MG tablet TAKE 1 TABLET BY MOUTH AT BEDTIME FOR SLEEP AND ANXIETY.     rosuvastatin (CRESTOR) 10 MG tablet Take 1 tablet (10 mg) by mouth daily     sertraline  (ZOLOFT) 50 MG tablet Take 1 tablet (50 mg) by mouth daily     spironolactone (ALDACTONE) 25 MG tablet Take 1 tablet (25 mg) by mouth daily     No current facility-administered medications for this visit.        ALLERGIES      Allergies   Allergen Reactions     Cold & Flu [Cold Defense Fighter]      See pseudoephedrine     Seasonal Allergies      Sudafed Cold-Cough [Dayquil Liquicaps]      Pseudoephedrine Rash     Rash then skins peels off         REVIEW OF SYSTEMS   As above in the HPI, o/w complete 12-point ROS was negative.     PHYSICAL EXAM   LMP 11/06/2019   Limited physical exam during video visit due to COVID19 restrictions  Middle aged female in no acute distress  Breathing comfortably, no tachypnea  Speech and hearing normal  Pleasant mood and congruent affect  Moving all extremities, no focal neurologic deficits apparent       LABORATORY AND IMAGING STUDIES     Recent Labs   Lab Test 03/31/22  1014 01/10/22  1508 12/30/21  1642 09/17/21  1513 08/16/21  1227    140 139 138 138   POTASSIUM 4.1 4.1 3.8 3.8 3.6   CHLORIDE 108 109 106 107 105   CO2 20 24 26 27 28   ANIONGAP 10 7 7 4 5   BUN 27 21 17 25 21   CR 1.07* 0.96 1.05* 1.04 1.02   GLC 90 96 93 88 89   AFSHAN 10.2* 9.6 9.9 9.0 10.1     Recent Labs   Lab Test 06/19/20  1853 06/15/20  0845 06/14/20  1807 01/11/20  0615 12/01/19  1340 12/01/19  0641 11/30/19  2137 11/30/19  1341 11/28/19  0537 11/27/19  2216   MAG 2.5* 2.4* 2.1 2.0  --   --   --   --   --  2.1   PHOS  --   --   --  3.1 2.8 3.4 2.8 2.5   < > 3.1    < > = values in this interval not displayed.     Recent Labs   Lab Test 03/31/22  1014 01/10/22  1508 12/30/21  1646 06/07/21  1430 06/06/21  0929   WBC 7.7 7.9 9.7 5.4 5.0   HGB 15.3 14.6 15.0 13.9 14.3    216 220 161 162   MCV 91 91 88 90 89   NEUTROPHIL 36 38 45 43.1 52.2     Recent Labs   Lab Test 03/31/22  1014 03/31/22  1012 01/10/22  1508 12/30/21  1642   BILITOTAL 0.7 0.7 0.6 0.8   ALKPHOS 78 82 96 97   ALT 51* 47 45 43   AST 30  29 25 26   ALBUMIN 4.5 4.5 4.5 4.8     --  263* 244*     TSH   Date Value Ref Range Status   01/14/2022 2.29 0.40 - 4.00 mU/L Final   06/14/2020 3.40 0.40 - 4.00 mU/L Final   09/18/2019 2.06 0.40 - 4.00 mU/L Final   07/27/2018 2.04 0.40 - 4.00 mU/L Final     No results for input(s): CEA in the last 78595 hours.  Results for orders placed or performed in visit on 04/13/22   MA Screen Bilateral w/Jayden    Narrative    BILATERAL FULL FIELD DIGITAL SCREENING MAMMOGRAM WITH TOMOSYNTHESIS    Performed on: 4/13/22    Compared to: 09/22/2016    Technique:  This study was evaluated with the assistance of Computer-Aided   Detection.  Breast Tomosynthesis was used in interpretation.    Findings: The breasts have scattered areas of fibroglandular density.    There is no radiographic evidence of malignancy.     IMPRESSION: ACR BI-RADS Category 1: Negative    RECOMMENDED FOLLOW-UP: Annual routine screening mammogram    The results and recommendations of this examination will be communicated  to the patient.            Narrative & Impression   CT CHEST/ABDOMEN/PELVIS WITH CONTRAST  4/5/2022 2:12 PM     HISTORY:  DLBCL - surveillance post therapy; Diffuse large B-cell  lymphoma of intrathoracic lymph nodes (H); Chronic fatigue;  Mediastinal adenopathy.     TECHNIQUE: CT scan obtained of the chest, abdomen, and pelvis with IV  contrast. 82mL Isovue-370 IV injected. Radiation dose for this scan  was reduced using automated exposure control, adjustment of the mA  and/or kV according to patient size, or iterative reconstruction  technique.     COMPARISON: CT chest, abdomen, pelvis on 1/10/2022.     FINDINGS:     Chest/mediastinum: The visualized thyroid gland is unremarkable. No  cardiomegaly or significant pericardial effusion. No significant  change in the anterior mediastinal soft tissue density (series 4 image  54), as compared to 1/10/2022 exam. No significant mediastinal, hilar  or axillary lymphadenopathy. A few calcified  right hilar granulomas.  No significant atherosclerotic vascular calcification of the coronary  arteries.     Lung/pleura: No pleural effusion or pneumothorax. Scattered calcified  pulmonary granulomas. Scattered pulmonary nodules including 2 mm left  upper lobe nodule (series 10 image 90), 5 mm groundglass nodule in the  superior segment of the right lower lobe (series 10 image 89) and 4 mm  medial right upper lobe nodule (series 10 image 78), all are not  significantly changed as compared to 3/12/2021 exam.     Abdomen/pelvis:     Hepatobiliary: Scattered subcentimeter hypodense foci in the liver,  are too small to characterize. There is approximately 1.8 cm  peripherally enhancing focus in hepatic segment 7 (series 4 image  110), not significantly changed as compared to 3/12/2021 exam, likely  represent hepatic hemangioma. The gallbladder is unremarkable.     Pancreas: No main pancreatic ductal dilatation without evidence of  intrahepatic mass.     Spleen: The spleen is mildly enlarged measuring 13 cm in craniocaudal  dimension, slightly decreased in size as compared to 1/10/2022 exam  where it measured 13.4 cm in similar dimension.     Adrenal glands: No adrenal nodules.     Kidneys: No radiodense kidney/ureteral stones or hydronephrosis in the  kidney.     Bowel: No abnormally dilated bowel loops. The appendix is visualized  and appears normal.     Peritoneum: No significant free fluid in the abdomen or pelvis. No  free peritoneal portal venous gas.     Pelvic organs: There is 2.6 cm right adnexal cyst (series 4 image  255). Bilateral fallopian tube occlusion devices.     Vascular: Unremarkable.     Lymph nodes: No significant abdominopelvic lymphadenopathy.     Bones and soft tissue: No suspicious osseous lesion.                                                                      IMPRESSION: In this patient with history of diffuse large B-cell  lymphoma, there is;  1. No significant change in the anterior  mediastinal soft tissue  density as compared to 1/10/2022 exam. Otherwise, no significant  lymphadenopathy in the chest, abdomen or pelvis.  2. Mild splenomegaly measuring 13 cm in craniocaudal dimension,  slightly decreased in size as compared to 1/10/2022, when it measured  13.4 cm in similar dimension.  3. Scattered pulmonary nodules measure up to 5 mm in the superior  segment of the right lower lobe, not significantly changed as compared  to 3/12/2021 exam.  4. There is 1.8 cm peripherally enhancing focus in hepatic segment 7,  not significantly changed as compared to 3/12/2021 exam, likely  represent hepatic hemangioma. Multiple other subcentimeter hypodense  foci in the liver are too small to characterize.  5. 2.6 cm right adnexal cyst (series 4 image 255), likely ovarian, can  be further evaluated with pelvic ultrasound if clinically warranted.     ÁNGEL SALDAÑA MD         SYSTEM ID:  [id number]          ASSESSMENT AND PLAN   DLBCL, EBV+ non-GCB, stage III post 6 cycles of M-RCHOP  PJV pneumonia causing acute respiratory failure improved on bactrim and prednisone  Cardiomyopathy post adriamycin based chemotherapy likely also contributed by tachycardia    Kassie was seen in person visit and was accompanied by her , Florian. She has completed her planned chemotherapy in April 2020.     Her  points out that she has not been doing well.  She is always tired and has no energy.    I have reviewed all of the labs done prior to this clinic visit.  Labs are all completely normal including electrolytes, renal function, hepatic panel, complete blood count and differential. She does have borderline elevations in creatinine, calcium, ALT -all of which is non-specific.      I have reviewed actual images from her CT scan and there is no evidence of recurrent disease in the chest, abdomen or pelvis. She had enlarged hilar nodes and mediastinal mass, likely from reactive thymic tissue.  This has remained stable  on the current imaging study.    She has mild residual splenomegaly, though her spleen size has been receding with subsequent scans.  She has a stable 1.8 cm lesion in the hepatic segment 7 which is likely hemangioma.  She has a 2.6 cm right adnexal cyst.  I explained that it is not uncommon to have an ovarian cyst.  We could just monitor this with serial scans for lymphoma.  They felt a little more comfortable getting a pelvic ultrasound for further evaluation at this time.  While quite likely that this would be a benign ovarian cyst it is not unreasonable to have this checked.    I do not have a good explanation for her fatigue.  I did check for antinuclear antibody and dsDNA to see if she had any rheumatologic disease that would explain her fatigue.  I will check for paraneoplastic syndromes to see if any of these are positive.  Since she has already been treated for her malignancy, it is unlikely that even a positive result would change her management, though we would at least have a cause for her fatigue.    She has a follow-up in cardiology.  I will get an echocardiogram done prior to this visit.    I would continue following her every 3-4 months for now. I will have her see Raciel in 2-3 weeks to review results.    All questions for patient  were answered.  She is almost a year out from treatment completion.     45 minutes spent on the date of the encounter doing chart review, history and exam, documentation and further activities as noted above     Castro Castro    Hematologist and Medical Oncologist  [facility]         Again, thank you for allowing me to participate in the care of your patient.        Sincerely,        Castro Castro MD

## 2022-04-15 NOTE — PROGRESS NOTES
[facility]  HEMATOLOGY AND ONCOLOGY    FOLLOW-UP VISIT NOTE    PATIENT NAME: Kassie Ramirez MRN # [id number]  DATE OF VISIT: Apr 15, 2022 YOB: 1970    REFERRING PROVIDER: No referring provider defined for this encounter.    CANCER TYPE: DLBCL, EBV+ non-GCB, stage III.  STAGE: III    TREATMENT SUMMARY:  She presented with shortness of breath and cough which had been present since May 2019. Her imaging suggested pulmonary infiltrates which was attributed to aspiration pneumonia. CT scan of the chest 8/20/2019 showed left hilar and subcarinal mediastinal adenopathy with narrowing of the left lower lobe bronchus and a nonspecific patchy area of nodular consolidation in the medial right lower lobe.  Bronchoscopy with EBUS 8/28/2019 showed nonnecrotizing granulomatous inflammation with prominent eosinophilia, negative for malignancy.  She was diagnosed with sarcoidosis and started on prednisone. She had worsening cough and shortness of breath with tapering of the prednisone.  Repeat CT scan of the chest 11/18/2019 showed interval worsening of the bulky mediastinal and bilateral hilar lymphadenopathy, near complete resolution of the lower lobe opacities, with new interstitial opacities in the right upper lobe.   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 Large mediastinal mass causing respiratory compromise. . IPI score of 2 (low-intermediate). FISH neg for high risk translocations. She received 6 cycles of R-CHOP with intermediate dose methotrexate after cycles 2, 4 and 6 from November through April 2020.      CURRENT INTERVENTIONS:  Surveillance post MR-CHOP    SUBJECTIVE   Kassie Ramirez is being followed for DLBCL, EBV+ non-GCB, stage III intermediate risk disease    Patient was followed in person. Kassie is joined by her  at this visit. She has  completed all of her planned cycles of MR-CHOP chemotherapy and is being seen with labs and restaging scans.     She had struggled with cardiomyopathy post adriamycin.     She has not been doing well since completion of her therapy for lymphoma.  She has marked fatigue and no energy.  She continues to work full-time as a  but is wiped out by the end of the day.    She is always tired.       PAST MEDICAL HISTORY     Past Medical History:   Diagnosis Date     Anxiety attack 9/16/2014     Cardiomyopathy (H)     non ishemic - 25-30% - Chemo related     Diffuse large B-cell lymphoma (H)     Diagnosed 11/2019, Ronan Albarran     Encounter for Essure implantation 2009     Generalized anxiety disorder 9/16/2014    zoloft = flat emotions     HFrEF (heart failure with reduced ejection fraction) (H)     new diagnosis 6/14     Menopausal disorder     started on OCPs by menopause center 3/2017 (takes active continuously)     Menstrual headache     helped by OCPs and magnesium     Paroxysmal SVT (supraventricular tachycardia) (H) 06/2020     GERTRUDE (stress urinary incontinence, female)     sling procedure 2016         CURRENT OUTPATIENT MEDICATIONS     Current Outpatient Medications   Medication Sig     carvedilol (COREG) 3.125 MG tablet Take 1 tablet (3.125 mg) by mouth 2 times daily (with meals)     cetirizine (ZYRTEC) 10 MG tablet Take 10 mg by mouth daily     lisinopril (ZESTRIL) 5 MG tablet Take 0.5 tablets (2.5 mg) by mouth daily 8/17/21 hold restart if BP > 110.     LORazepam (ATIVAN) 0.5 MG tablet TAKE 1 TABLET BY MOUTH AT BEDTIME FOR SLEEP AND ANXIETY.     rosuvastatin (CRESTOR) 10 MG tablet Take 1 tablet (10 mg) by mouth daily     sertraline (ZOLOFT) 50 MG tablet Take 1 tablet (50 mg) by mouth daily     spironolactone (ALDACTONE) 25 MG tablet Take 1 tablet (25 mg) by mouth daily     No current facility-administered medications for this visit.        ALLERGIES      Allergies   Allergen Reactions      Cold & Flu [Cold Defense Fighter]      See pseudoephedrine     Seasonal Allergies      Sudafed Cold-Cough [Dayquil Liquicaps]      Pseudoephedrine Rash     Rash then skins peels off         REVIEW OF SYSTEMS   As above in the HPI, o/w complete 12-point ROS was negative.     PHYSICAL EXAM   LMP 11/06/2019   Limited physical exam during video visit due to COVID19 restrictions  Middle aged female in no acute distress  Breathing comfortably, no tachypnea  Speech and hearing normal  Pleasant mood and congruent affect  Moving all extremities, no focal neurologic deficits apparent       LABORATORY AND IMAGING STUDIES     Recent Labs   Lab Test 03/31/22  1014 01/10/22  1508 12/30/21  1642 09/17/21  1513 08/16/21  1227    140 139 138 138   POTASSIUM 4.1 4.1 3.8 3.8 3.6   CHLORIDE 108 109 106 107 105   CO2 20 24 26 27 28   ANIONGAP 10 7 7 4 5   BUN 27 21 17 25 21   CR 1.07* 0.96 1.05* 1.04 1.02   GLC 90 96 93 88 89   AFSHAN 10.2* 9.6 9.9 9.0 10.1     Recent Labs   Lab Test 06/19/20  1853 06/15/20  0845 06/14/20  1807 01/11/20  0615 12/01/19  1340 12/01/19  0641 11/30/19  2137 11/30/19  1341 11/28/19  0537 11/27/19  2216   MAG 2.5* 2.4* 2.1 2.0  --   --   --   --   --  2.1   PHOS  --   --   --  3.1 2.8 3.4 2.8 2.5   < > 3.1    < > = values in this interval not displayed.     Recent Labs   Lab Test 03/31/22  1014 01/10/22  1508 12/30/21  1646 06/07/21  1430 06/06/21  0929   WBC 7.7 7.9 9.7 5.4 5.0   HGB 15.3 14.6 15.0 13.9 14.3    216 220 161 162   MCV 91 91 88 90 89   NEUTROPHIL 36 38 45 43.1 52.2     Recent Labs   Lab Test 03/31/22  1014 03/31/22  1012 01/10/22  1508 12/30/21  1642   BILITOTAL 0.7 0.7 0.6 0.8   ALKPHOS 78 82 96 97   ALT 51* 47 45 43   AST 30 29 25 26   ALBUMIN 4.5 4.5 4.5 4.8     --  263* 244*     TSH   Date Value Ref Range Status   01/14/2022 2.29 0.40 - 4.00 mU/L Final   06/14/2020 3.40 0.40 - 4.00 mU/L Final   09/18/2019 2.06 0.40 - 4.00 mU/L Final   07/27/2018 2.04 0.40 - 4.00 mU/L Final      No results for input(s): CEA in the last 52077 hours.  Results for orders placed or performed in visit on 04/13/22   MA Screen Bilateral w/Jayden    Narrative    BILATERAL FULL FIELD DIGITAL SCREENING MAMMOGRAM WITH TOMOSYNTHESIS    Performed on: 4/13/22    Compared to: 09/22/2016    Technique:  This study was evaluated with the assistance of Computer-Aided   Detection.  Breast Tomosynthesis was used in interpretation.    Findings: The breasts have scattered areas of fibroglandular density.    There is no radiographic evidence of malignancy.     IMPRESSION: ACR BI-RADS Category 1: Negative    RECOMMENDED FOLLOW-UP: Annual routine screening mammogram    The results and recommendations of this examination will be communicated  to the patient.            Narrative & Impression   CT CHEST/ABDOMEN/PELVIS WITH CONTRAST  4/5/2022 2:12 PM     HISTORY:  DLBCL - surveillance post therapy; Diffuse large B-cell  lymphoma of intrathoracic lymph nodes (H); Chronic fatigue;  Mediastinal adenopathy.     TECHNIQUE: CT scan obtained of the chest, abdomen, and pelvis with IV  contrast. 82mL Isovue-370 IV injected. Radiation dose for this scan  was reduced using automated exposure control, adjustment of the mA  and/or kV according to patient size, or iterative reconstruction  technique.     COMPARISON: CT chest, abdomen, pelvis on 1/10/2022.     FINDINGS:     Chest/mediastinum: The visualized thyroid gland is unremarkable. No  cardiomegaly or significant pericardial effusion. No significant  change in the anterior mediastinal soft tissue density (series 4 image  54), as compared to 1/10/2022 exam. No significant mediastinal, hilar  or axillary lymphadenopathy. A few calcified right hilar granulomas.  No significant atherosclerotic vascular calcification of the coronary  arteries.     Lung/pleura: No pleural effusion or pneumothorax. Scattered calcified  pulmonary granulomas. Scattered pulmonary nodules including 2 mm left  upper lobe  nodule (series 10 image 90), 5 mm groundglass nodule in the  superior segment of the right lower lobe (series 10 image 89) and 4 mm  medial right upper lobe nodule (series 10 image 78), all are not  significantly changed as compared to 3/12/2021 exam.     Abdomen/pelvis:     Hepatobiliary: Scattered subcentimeter hypodense foci in the liver,  are too small to characterize. There is approximately 1.8 cm  peripherally enhancing focus in hepatic segment 7 (series 4 image  110), not significantly changed as compared to 3/12/2021 exam, likely  represent hepatic hemangioma. The gallbladder is unremarkable.     Pancreas: No main pancreatic ductal dilatation without evidence of  intrahepatic mass.     Spleen: The spleen is mildly enlarged measuring 13 cm in craniocaudal  dimension, slightly decreased in size as compared to 1/10/2022 exam  where it measured 13.4 cm in similar dimension.     Adrenal glands: No adrenal nodules.     Kidneys: No radiodense kidney/ureteral stones or hydronephrosis in the  kidney.     Bowel: No abnormally dilated bowel loops. The appendix is visualized  and appears normal.     Peritoneum: No significant free fluid in the abdomen or pelvis. No  free peritoneal portal venous gas.     Pelvic organs: There is 2.6 cm right adnexal cyst (series 4 image  255). Bilateral fallopian tube occlusion devices.     Vascular: Unremarkable.     Lymph nodes: No significant abdominopelvic lymphadenopathy.     Bones and soft tissue: No suspicious osseous lesion.                                                                      IMPRESSION: In this patient with history of diffuse large B-cell  lymphoma, there is;  1. No significant change in the anterior mediastinal soft tissue  density as compared to 1/10/2022 exam. Otherwise, no significant  lymphadenopathy in the chest, abdomen or pelvis.  2. Mild splenomegaly measuring 13 cm in craniocaudal dimension,  slightly decreased in size as compared to 1/10/2022, when it  measured  13.4 cm in similar dimension.  3. Scattered pulmonary nodules measure up to 5 mm in the superior  segment of the right lower lobe, not significantly changed as compared  to 3/12/2021 exam.  4. There is 1.8 cm peripherally enhancing focus in hepatic segment 7,  not significantly changed as compared to 3/12/2021 exam, likely  represent hepatic hemangioma. Multiple other subcentimeter hypodense  foci in the liver are too small to characterize.  5. 2.6 cm right adnexal cyst (series 4 image 255), likely ovarian, can  be further evaluated with pelvic ultrasound if clinically warranted.     ÁNGEL SALDAÑA MD         SYSTEM ID:  [id number]          ASSESSMENT AND PLAN   DLBCL, EBV+ non-GCB, stage III post 6 cycles of M-RCHOP  PJV pneumonia causing acute respiratory failure improved on bactrim and prednisone  Cardiomyopathy post adriamycin based chemotherapy likely also contributed by tachycardia    Kassie was seen in person visit and was accompanied by her , Florian. She has completed her planned chemotherapy in April 2020.     Her  points out that she has not been doing well.  She is always tired and has no energy.    I have reviewed all of the labs done prior to this clinic visit.  Labs are all completely normal including electrolytes, renal function, hepatic panel, complete blood count and differential. She does have borderline elevations in creatinine, calcium, ALT -all of which is non-specific.      I have reviewed actual images from her CT scan and there is no evidence of recurrent disease in the chest, abdomen or pelvis. She had enlarged hilar nodes and mediastinal mass, likely from reactive thymic tissue.  This has remained stable on the current imaging study.    She has mild residual splenomegaly, though her spleen size has been receding with subsequent scans.  She has a stable 1.8 cm lesion in the hepatic segment 7 which is likely hemangioma.  She has a 2.6 cm right adnexal cyst.  I  explained that it is not uncommon to have an ovarian cyst.  We could just monitor this with serial scans for lymphoma.  They felt a little more comfortable getting a pelvic ultrasound for further evaluation at this time.  While quite likely that this would be a benign ovarian cyst it is not unreasonable to have this checked.    I do not have a good explanation for her fatigue.  I did check for antinuclear antibody and dsDNA to see if she had any rheumatologic disease that would explain her fatigue.  I will check for paraneoplastic syndromes to see if any of these are positive.  Since she has already been treated for her malignancy, it is unlikely that even a positive result would change her management, though we would at least have a cause for her fatigue.    She has a follow-up in cardiology.  I will get an echocardiogram done prior to this visit.    I would continue following her every 3-4 months for now. I will have her see Raciel in 2-3 weeks to review results.    All questions for patient  were answered.  She is almost a year out from treatment completion.     45 minutes spent on the date of the encounter doing chart review, history and exam, documentation and further activities as noted above     Castro Castro    Hematologist and Medical Oncologist  [facility]

## 2022-04-25 ENCOUNTER — LAB (OUTPATIENT)
Dept: LAB | Facility: CLINIC | Age: 52
End: 2022-04-25
Payer: COMMERCIAL

## 2022-04-25 DIAGNOSIS — T45.1X5A CHEMOTHERAPY-INDUCED PERIPHERAL NEUROPATHY (H): ICD-10-CM

## 2022-04-25 DIAGNOSIS — I42.9 SECONDARY CARDIOMYOPATHY (H): ICD-10-CM

## 2022-04-25 DIAGNOSIS — R59.0 MEDIASTINAL ADENOPATHY: ICD-10-CM

## 2022-04-25 DIAGNOSIS — G62.0 CHEMOTHERAPY-INDUCED PERIPHERAL NEUROPATHY (H): ICD-10-CM

## 2022-04-25 DIAGNOSIS — C83.32 DIFFUSE LARGE B-CELL LYMPHOMA OF INTRATHORACIC LYMPH NODES (H): ICD-10-CM

## 2022-04-25 DIAGNOSIS — R53.82 CHRONIC FATIGUE: ICD-10-CM

## 2022-04-25 PROCEDURE — 36415 COLL VENOUS BLD VENIPUNCTURE: CPT

## 2022-04-25 PROCEDURE — 99000 SPECIMEN HANDLING OFFICE-LAB: CPT

## 2022-04-25 PROCEDURE — 86255 FLUORESCENT ANTIBODY SCREEN: CPT | Mod: 90

## 2022-04-27 NOTE — PATIENT INSTRUCTIONS
5/3/22 Pelvic US   6/7/22 Echo  Return visit with Raciel KELLEY TBS  7/12/22 Labs  7/15/22 Return visit with Dr. Matthew Jefferson, RN, BSN.  RN Care Coordinator     [facility]   [phone]

## 2022-04-29 LAB — PARANEOPLASTIC AB SER QL IF: NORMAL

## 2022-05-03 ENCOUNTER — HOSPITAL ENCOUNTER (OUTPATIENT)
Dept: ULTRASOUND IMAGING | Facility: CLINIC | Age: 52
Discharge: HOME | End: 2022-05-03
Attending: INTERNAL MEDICINE | Admitting: INTERNAL MEDICINE
Payer: COMMERCIAL

## 2022-05-03 DIAGNOSIS — G62.0 CHEMOTHERAPY-INDUCED PERIPHERAL NEUROPATHY (H): ICD-10-CM

## 2022-05-03 DIAGNOSIS — R53.82 CHRONIC FATIGUE: ICD-10-CM

## 2022-05-03 DIAGNOSIS — N83.201 OVARIAN CYST, RIGHT: ICD-10-CM

## 2022-05-03 DIAGNOSIS — R59.0 MEDIASTINAL ADENOPATHY: ICD-10-CM

## 2022-05-03 DIAGNOSIS — T45.1X5A CHEMOTHERAPY-INDUCED PERIPHERAL NEUROPATHY (H): ICD-10-CM

## 2022-05-03 DIAGNOSIS — I42.9 SECONDARY CARDIOMYOPATHY (H): ICD-10-CM

## 2022-05-03 DIAGNOSIS — C83.32 DIFFUSE LARGE B-CELL LYMPHOMA OF INTRATHORACIC LYMPH NODES (H): ICD-10-CM

## 2022-05-03 PROCEDURE — 76856 US EXAM PELVIC COMPLETE: CPT

## 2022-05-09 ENCOUNTER — MYC REFILL (OUTPATIENT)
Dept: FAMILY MEDICINE | Facility: CLINIC | Age: 52
End: 2022-05-09
Payer: COMMERCIAL

## 2022-05-09 DIAGNOSIS — Z79.899 CONTROLLED SUBSTANCE AGREEMENT SIGNED: ICD-10-CM

## 2022-05-09 DIAGNOSIS — C83.398 DIFFUSE LARGE B-CELL LYMPHOMA OF EXTRANODAL SITE: ICD-10-CM

## 2022-05-09 DIAGNOSIS — F41.8 SITUATIONAL ANXIETY: ICD-10-CM

## 2022-05-09 RX ORDER — LORAZEPAM 0.5 MG/1
TABLET ORAL
Qty: 30 TABLET | Refills: 0 | Status: SHIPPED | OUTPATIENT
Start: 2022-05-09 | End: 2022-11-14

## 2022-05-09 NOTE — TELEPHONE ENCOUNTER
LORazepam (ATIVAN) 0.5 MG tablet 30 tablet 0 1/22/2022  No   Sig: TAKE 1 TABLET BY MOUTH AT BEDTIME FOR SLEEP AND ANXIETY.   Sent to pharmacy as: LORazepam 0.5 MG Oral Tablet (ATIVAN)   Class: E-Prescribe   Order: [id number]   E-Prescribing Status: Receipt confirmed by pharmacy (1/22/2022         Last office visit: 12/30/2021     Future Office Visit:   Next 5 appointments (look out 90 days)    Jul 15, 2022  4:00 PM  Return Visit with Castro Castro MD  [facility] ([facility] ) [address]  [address]  [facility] [id number]  [phone]             CSA -- Patient Level:    Controlled Substance Agreement - Non - Opioid - Scan on 1/3/2022  8:51 AM: NON-OPIOID CONTROLLED SUBSTANCE AGREEMENT  Controlled Substance Agreement - Non - Opioid - Scan on 3/14/2021  8:26 AM: NON-OPIOID CONTROLLED SUBSTANCE AGREEMENT             Routing refill request to provider for review/approval because:  Drug not on the [facility] refill protocol     Amada Parekh RN, BSN  [facility]

## 2022-05-11 ENCOUNTER — OFFICE VISIT (OUTPATIENT)
Dept: FAMILY MEDICINE | Facility: CLINIC | Age: 52
End: 2022-05-11
Payer: COMMERCIAL

## 2022-05-11 VITALS
TEMPERATURE: 97.2 F | BODY MASS INDEX: 25.71 KG/M2 | HEART RATE: 97 BPM | HEIGHT: 66 IN | OXYGEN SATURATION: 96 % | WEIGHT: 160 LBS | DIASTOLIC BLOOD PRESSURE: 83 MMHG | RESPIRATION RATE: 20 BRPM | SYSTOLIC BLOOD PRESSURE: 119 MMHG

## 2022-05-11 DIAGNOSIS — H35.53 STARGARDT MACULAR DEGENERATION WITH ABSENT OR HYPOPLASTIC CORPUS CALLOSUM, INTELLECTUAL DISABILITY, AND DYSMORPHIC FEATURES (H): ICD-10-CM

## 2022-05-11 DIAGNOSIS — R05.9 COUGH: Primary | ICD-10-CM

## 2022-05-11 DIAGNOSIS — C83.398 DIFFUSE LARGE B-CELL LYMPHOMA OF EXTRANODAL SITE: ICD-10-CM

## 2022-05-11 DIAGNOSIS — I47.10 PAROXYSMAL SVT (SUPRAVENTRICULAR TACHYCARDIA) (H): ICD-10-CM

## 2022-05-11 DIAGNOSIS — Z15.1 STARGARDT MACULAR DEGENERATION WITH ABSENT OR HYPOPLASTIC CORPUS CALLOSUM, INTELLECTUAL DISABILITY, AND DYSMORPHIC FEATURES (H): ICD-10-CM

## 2022-05-11 DIAGNOSIS — Q89.7 STARGARDT MACULAR DEGENERATION WITH ABSENT OR HYPOPLASTIC CORPUS CALLOSUM, INTELLECTUAL DISABILITY, AND DYSMORPHIC FEATURES (H): ICD-10-CM

## 2022-05-11 DIAGNOSIS — F78.A9 STARGARDT MACULAR DEGENERATION WITH ABSENT OR HYPOPLASTIC CORPUS CALLOSUM, INTELLECTUAL DISABILITY, AND DYSMORPHIC FEATURES (H): ICD-10-CM

## 2022-05-11 DIAGNOSIS — Q04.0 STARGARDT MACULAR DEGENERATION WITH ABSENT OR HYPOPLASTIC CORPUS CALLOSUM, INTELLECTUAL DISABILITY, AND DYSMORPHIC FEATURES (H): ICD-10-CM

## 2022-05-11 LAB
ERYTHROCYTE [DISTWIDTH] IN BLOOD BY AUTOMATED COUNT: 12.9 % (ref 10–15)
HCT VFR BLD AUTO: 45.1 % (ref 35–47)
HGB BLD-MCNC: 16 G/DL (ref 11.7–15.7)
MCH RBC QN AUTO: 31.9 PG (ref 26.5–33)
MCHC RBC AUTO-ENTMCNC: 35.5 G/DL (ref 31.5–36.5)
MCV RBC AUTO: 90 FL (ref 78–100)
PLATELET # BLD AUTO: 153 10E3/UL (ref 150–450)
RBC # BLD AUTO: 5.02 10E6/UL (ref 3.8–5.2)
WBC # BLD AUTO: 7.7 10E3/UL (ref 4–11)

## 2022-05-11 PROCEDURE — 36415 COLL VENOUS BLD VENIPUNCTURE: CPT | Performed by: INTERNAL MEDICINE

## 2022-05-11 PROCEDURE — U0003 INFECTIOUS AGENT DETECTION BY NUCLEIC ACID (DNA OR RNA); SEVERE ACUTE RESPIRATORY SYNDROME CORONAVIRUS 2 (SARS-COV-2) (CORONAVIRUS DISEASE [COVID-19]), AMPLIFIED PROBE TECHNIQUE, MAKING USE OF HIGH THROUGHPUT TECHNOLOGIES AS DESCRIBED BY CMS-2020-01-R: HCPCS | Performed by: INTERNAL MEDICINE

## 2022-05-11 PROCEDURE — 85027 COMPLETE CBC AUTOMATED: CPT | Performed by: INTERNAL MEDICINE

## 2022-05-11 PROCEDURE — 99214 OFFICE O/P EST MOD 30 MIN: CPT | Performed by: INTERNAL MEDICINE

## 2022-05-11 PROCEDURE — U0005 INFEC AGEN DETEC AMPLI PROBE: HCPCS | Performed by: INTERNAL MEDICINE

## 2022-05-11 RX ORDER — BENZONATATE 100 MG/1
100 CAPSULE ORAL 3 TIMES DAILY PRN
Qty: 30 CAPSULE | Refills: 0 | Status: SHIPPED | OUTPATIENT
Start: 2022-05-11 | End: 2022-06-02

## 2022-05-11 ASSESSMENT — PAIN SCALES - GENERAL: PAINLEVEL: NO PAIN (0)

## 2022-05-11 NOTE — PROGRESS NOTES
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 She is not aware if she has itellectual disability. This part of the diagnosis may not be accurate.    Paroxysmal SVT (supraventricular tachycardia) (H)  Stable.     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 Matthew Márquez. Follows up with heme/onc  - CBC with platelets                 No follow-ups on file.    BOYD TAVERAS MD  [facility] EM Fernando is a 52 year old who presents for the following health issues   URI    History of Present Illness       Reason for visit:  Pneumonia risk  Symptom onset:  3-7 days ago  Symptoms include:  Dry cough, sweaty, chills, elevated heart rate 100bpm  Symptom intensity:  Moderate  Symptom progression:  Staying the same  Had these symptoms before:  Yes  Has tried/received treatment for these symptoms:  Yes  Previous treatment was successful:  Yes  Prior treatment description:  Lung xray, azithromicin  What makes it worse:  Activity leads to fatigue, sweating  What makes it better:  Sleep, but lung congestion is problematic    She eats 4 or more servings of fruits and vegetables daily.She consumes 1 sweetened beverage(s) daily.She exercises with enough effort to increase her heart rate 20 to 29 minutes per day.  She exercises with enough effort to increase her heart rate 5 days per week.   She is taking medications regularly.    "  Kassie is a 52 year old lady who presented to the clinic with dry cough for 5 days.   She didn't test for covid.  She has no other symptoms except chills and sweating.  She has SVT  She has diffuse large b cell lymphoma.  Last month she was seen by her oncologist and as she has chronic cough, she was suspected to have aspiration pneumonia.  She has macular degeneration.   No fever       Review of Systems       Objective    /83 (BP Location: Left arm, Cuff Size: Adult Regular)   Pulse 97   Temp 97.2  F (36.2  C) (Tympanic)   Resp 20   Ht 1.676 m (5' 6\")   Wt 72.6 kg (160 lb)   LMP 11/06/2019   SpO2 96%   BMI 25.82 kg/m    Body mass index is 25.82 kg/m .  Physical Exam  Vitals reviewed.   Cardiovascular:      Pulses: Normal pulses.      Heart sounds: Normal heart sounds.   Pulmonary:      Effort: No respiratory distress.      Breath sounds: Normal breath sounds. No stridor. No wheezing, rhonchi or rales.             "

## 2022-05-12 ENCOUNTER — TRANSCRIBE ORDERS (OUTPATIENT)
Dept: OTHER | Age: 52
End: 2022-05-12
Payer: COMMERCIAL

## 2022-05-12 ENCOUNTER — TELEPHONE (OUTPATIENT)
Dept: FAMILY MEDICINE | Facility: CLINIC | Age: 52
End: 2022-05-12
Payer: COMMERCIAL

## 2022-05-12 DIAGNOSIS — C85.90 NON-HODGKIN'S LYMPHOMA (H): Primary | ICD-10-CM

## 2022-05-12 LAB — SARS-COV-2 RNA RESP QL NAA+PROBE: POSITIVE

## 2022-05-12 NOTE — TELEPHONE ENCOUNTER
Spoke to patient and told her the Covid positive results.     She states she has a cold but feels fine. She understands the CDC Covid quarantine guidelines.      Lianna Solis RN  -[facility]

## 2022-05-12 NOTE — TELEPHONE ENCOUNTER
----- Message from Blossom Garcia MD sent at 5/12/2022  3:35 PM CDT -----  Tested positive for covid please triage for covid treatment.

## 2022-05-18 ENCOUNTER — APPOINTMENT (OUTPATIENT)
Dept: URBAN - METROPOLITAN AREA CLINIC 253 | Age: 52
Setting detail: DERMATOLOGY
End: 2022-05-23

## 2022-05-18 VITALS — RESPIRATION RATE: 14 BRPM | HEIGHT: 67 IN | WEIGHT: 160 LBS

## 2022-05-18 DIAGNOSIS — R21 RASH AND OTHER NONSPECIFIC SKIN ERUPTION: ICD-10-CM

## 2022-05-18 DIAGNOSIS — D49.2 NEOPLASM OF UNSPECIFIED BEHAVIOR OF BONE, SOFT TISSUE, AND SKIN: ICD-10-CM

## 2022-05-18 PROCEDURE — 88305 TISSUE EXAM BY PATHOLOGIST: CPT

## 2022-05-18 PROCEDURE — OTHER COUNSELING: OTHER

## 2022-05-18 PROCEDURE — OTHER BIOPSY BY PUNCH METHOD: OTHER

## 2022-05-18 PROCEDURE — 11104 PUNCH BX SKIN SINGLE LESION: CPT

## 2022-05-18 PROCEDURE — 99202 OFFICE O/P NEW SF 15 MIN: CPT | Mod: 25

## 2022-05-18 PROCEDURE — OTHER PRESCRIPTION: OTHER

## 2022-05-18 PROCEDURE — OTHER PATHOLOGY BILLING: OTHER

## 2022-05-18 RX ORDER — CLOBETASOL PROPIONATE 0.5 MG/G
0.05% OINTMENT TOPICAL BID
Qty: 60 | Refills: 1 | Status: ERX | COMMUNITY
Start: 2022-05-18

## 2022-05-18 RX ORDER — CLINDAMYCIN PHOSPHATE 10 MG/ML
1% LOTION TOPICAL BID
Qty: 60 | Refills: 1 | Status: ERX | COMMUNITY
Start: 2022-05-18

## 2022-05-18 ASSESSMENT — LOCATION ZONE DERM: LOCATION ZONE: LEG

## 2022-05-18 ASSESSMENT — LOCATION SIMPLE DESCRIPTION DERM: LOCATION SIMPLE: LEFT ANKLE

## 2022-05-18 ASSESSMENT — LOCATION DETAILED DESCRIPTION DERM: LOCATION DETAILED: LEFT ANKLE

## 2022-05-18 NOTE — HPI: RASH
What Type Of Note Output Would You Prefer (Optional)?: Standard Output
Is The Patient Presenting As Previously Scheduled?: Yes
How Severe Is Your Rash?: moderate
Is This A New Presentation, Or A Follow-Up?: Rash
Additional History: She completed treatment for non Hodgkin’s lymphoma in 2019. She is in remission. Chemotherapy was rough on her, she did treatment for approximately 6 months.  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 \\nShe has not had a biopsy yet. \\nNo similar rash on her hands or anywhere else on her body. \\n\\nShe does have seasonal allergies.\\n\\nHer Mom has history of plaque psoriasis and was on Humira.

## 2022-05-18 NOTE — PROCEDURE: PATHOLOGY BILLING
Immunohistochemistry (27930 and [id number]) billing is not performed here. Please use the Immunohistochemistry Stain Billing plan to accomplish this. Immunohistochemistry ([id number] and [id number]) billing is not performed here. Please use the Immunohistochemistry Stain Billing plan to accomplish this.

## 2022-05-18 NOTE — PROCEDURE: BIOPSY BY PUNCH METHOD
Render Post-Care Instructions In Note?: yes
Validate That Anesthesia Is Not 0: No
X Size Of Lesion In Cm (Optional): 0
Billing Type: Client Bill
Wound Care: Petrolatum
Punch Size In Mm: 3
Detail Level: Detailed
Anesthesia Type: 1% lidocaine with epinephrine
Notification Instructions: Patient will be notified of biopsy results. However, patient instructed to call the office if not contacted within 2 weeks.
Consent: Written consent was obtained and risks were reviewed including but not limited to scarring, infection, bleeding, scabbing, incomplete removal, nerve damage and allergy to anesthesia.
Hemostasis: None
Home Suture Removal Text: Patient was provided a home suture removal kit and will remove their sutures at home.  If they have any questions or difficulties they will call the office.
Epidermal Sutures: 4-0 Nylon
Post-Care Instructions: I reviewed with the patient in detail post-care instructions. Patient is to keep the biopsy site dry overnight, and then apply bacitracin twice daily until healed. Patient may apply hydrogen peroxide soaks to remove any crusting.
Biopsy Type: H and E
Suture Removal: 14 days
Dressing: bandage
Information: Selecting Yes will display possible errors in your note based on the variables you have selected. This validation is only offered as a suggestion for you. PLEASE NOTE THAT THE VALIDATION TEXT WILL BE REMOVED WHEN YOU FINALIZE YOUR NOTE. IF YOU WANT TO FAX A PRELIMINARY NOTE YOU WILL NEED TO TOGGLE THIS TO 'NO' IF YOU DO NOT WANT IT IN YOUR FAXED NOTE.
Number Of Epidermal Sutures (Optional): 1
Anesthesia Volume In Cc (Will Not Render If 0): 0.3

## 2022-06-02 ENCOUNTER — OFFICE VISIT (OUTPATIENT)
Dept: FAMILY MEDICINE | Facility: CLINIC | Age: 52
End: 2022-06-02
Payer: COMMERCIAL

## 2022-06-02 VITALS
BODY MASS INDEX: 27.8 KG/M2 | TEMPERATURE: 98.3 F | RESPIRATION RATE: 12 BRPM | OXYGEN SATURATION: 100 % | DIASTOLIC BLOOD PRESSURE: 82 MMHG | WEIGHT: 173 LBS | HEIGHT: 66 IN | SYSTOLIC BLOOD PRESSURE: 120 MMHG | HEART RATE: 89 BPM

## 2022-06-02 DIAGNOSIS — F33.0 MILD RECURRENT MAJOR DEPRESSION (H): ICD-10-CM

## 2022-06-02 DIAGNOSIS — R07.89 CHEST WALL PAIN: ICD-10-CM

## 2022-06-02 DIAGNOSIS — F41.8 SITUATIONAL ANXIETY: ICD-10-CM

## 2022-06-02 DIAGNOSIS — C83.398 DIFFUSE LARGE B-CELL LYMPHOMA OF EXTRANODAL SITE: Primary | ICD-10-CM

## 2022-06-02 PROCEDURE — 99214 OFFICE O/P EST MOD 30 MIN: CPT | Performed by: PHYSICIAN ASSISTANT

## 2022-06-02 RX ORDER — PERPHENAZINE 16 MG
600 TABLET ORAL DAILY
Qty: 30 CAPSULE | Refills: 0 | Status: SHIPPED | OUTPATIENT
Start: 2022-06-02 | End: 2023-07-17

## 2022-06-02 RX ORDER — DULOXETIN HYDROCHLORIDE 30 MG/1
30 CAPSULE, DELAYED RELEASE ORAL DAILY
Qty: 90 CAPSULE | Refills: 0 | Status: SHIPPED | OUTPATIENT
Start: 2022-06-02 | End: 2022-07-13 | Stop reason: DRUGHIGH

## 2022-06-02 ASSESSMENT — ANXIETY QUESTIONNAIRES
8. IF YOU CHECKED OFF ANY PROBLEMS, HOW DIFFICULT HAVE THESE MADE IT FOR YOU TO DO YOUR WORK, TAKE CARE OF THINGS AT HOME, OR GET ALONG WITH OTHER PEOPLE?: SOMEWHAT DIFFICULT
GAD7 TOTAL SCORE: 15
GAD7 TOTAL SCORE: 15
6. BECOMING EASILY ANNOYED OR IRRITABLE: MORE THAN HALF THE DAYS
7. FEELING AFRAID AS IF SOMETHING AWFUL MIGHT HAPPEN: NEARLY EVERY DAY
4. TROUBLE RELAXING: SEVERAL DAYS
1. FEELING NERVOUS, ANXIOUS, OR ON EDGE: NEARLY EVERY DAY
GAD7 TOTAL SCORE: 15
2. NOT BEING ABLE TO STOP OR CONTROL WORRYING: NEARLY EVERY DAY
3. WORRYING TOO MUCH ABOUT DIFFERENT THINGS: MORE THAN HALF THE DAYS
5. BEING SO RESTLESS THAT IT IS HARD TO SIT STILL: SEVERAL DAYS
7. FEELING AFRAID AS IF SOMETHING AWFUL MIGHT HAPPEN: NEARLY EVERY DAY

## 2022-06-02 ASSESSMENT — PAIN SCALES - GENERAL: PAINLEVEL: NO PAIN (0)

## 2022-06-02 ASSESSMENT — PATIENT HEALTH QUESTIONNAIRE - PHQ9
10. IF YOU CHECKED OFF ANY PROBLEMS, HOW DIFFICULT HAVE THESE PROBLEMS MADE IT FOR YOU TO DO YOUR WORK, TAKE CARE OF THINGS AT HOME, OR GET ALONG WITH OTHER PEOPLE: SOMEWHAT DIFFICULT
SUM OF ALL RESPONSES TO PHQ QUESTIONS 1-9: 8
SUM OF ALL RESPONSES TO PHQ QUESTIONS 1-9: 8

## 2022-06-02 NOTE — PROGRESS NOTES
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 Castro  Completely agree that having a second opinion from oncology to see if she can have a provider that is a better fit and explain things more thoroughly so as to not exacerbate her anxiety is appropriate.  I would like her to see Dr.Louis Dawn with [facility] to see what his thoughts are on continued surveillance and see if he is a better fit for her.  - Adult Oncology/Hematology Referral    Mild recurrent major depression (H)  Situational anxiety  Suboptimally controlled with sertraline.  Would like to transition from sertraline to duloxetine to see if we get dual coverage with pain relief for her chest wall pain that is likely nerve damage from her chemotherapy.  We will have follow-up in 4 to 6 weeks to ensure correct medication and dosage.  - Adult Mental Health  Referral  - DULoxetine (CYMBALTA) 30 MG capsule  Dispense: 90 capsule; Refill: 0    Chest wall pain  Poorly controlled.  Gabapentin made her very foggy.  Would recommend trying alpha lipoic acid and see if duloxetine off so offers pain relief as well as mood improvement.  - alpha-lipoic acid 600 MG capsule  Dispense: 30 capsule; Refill: 0      Return in about 11 days (around 6/13/2022) for Medication recheck.    Mary Alice Colón PA-C  [facility]   Kassie is a 52 year old who presents for the following health issues        Depression/anxiety follow-up  Patient has been on sertraline 50 mg since right around the time of her cancer diagnosis in 2019.  She states that her anxiety has been significantly worse over the last few months specifically around the time of her upcoming cancer scans.  She feels like she is \"waiting for the other shoe to drop \".  Always anticipating bad reports.  She feels that her " "oncologist is very vague and thinks this is contributing to her anxiety.  She also has chest wall pain that was responsive to gabapentin but this causes mental cloudiness and when inquiring about this his responses \"I have never had cancer \".  She also notes that she has been sleeping a lot lately and has a decreased desire to do anything.  This is becoming increasingly frustrating to her.  She has seen a therapist many years ago but not recently.  She denies any thoughts of self-harm or hurting anyone else.  She reports a good support system.    How is your mood today?  Worse    Change in symptoms since last visit: worse    New symptoms since last visit: Increased anxiety attacks, sleeping a lot    Problems taking medications: No    Who else is on your mental health care team? Primary Care Provider    +++++++++++++++++++++++++++++++++++++++++++++++++++++++++++++++    PHQ 4/28/2021 12/30/2021 6/2/2022   PHQ-9 Total Score 4 4 8   Q9: Thoughts of better off dead/self-harm past 2 weeks Not at all Not at all Not at all     BRIAN-7 SCORE 4/28/2021 12/30/2021 6/2/2022   Total Score - - -   Total Score - 0 (minimal anxiety) 15 (severe anxiety)   Total Score 2 0 15       Social History     Tobacco Use     Smoking status: Never Smoker     Smokeless tobacco: Never Used   Vaping Use     Vaping Use: Never used   Substance Use Topics     Alcohol use: No     Comment: 1 time per month     Drug use: No     PHQ 4/28/2021 12/30/2021 6/2/2022   PHQ-9 Total Score 4 4 8   Q9: Thoughts of better off dead/self-harm past 2 weeks Not at all Not at all Not at all     BRIAN-7 SCORE 4/28/2021 12/30/2021 6/2/2022   Total Score - - -   Total Score - 0 (minimal anxiety) 15 (severe anxiety)   Total Score 2 0 15     Last PHQ-9 6/2/2022   1.  Little interest or pleasure in doing things 1   2.  Feeling down, depressed, or hopeless 0   3.  Trouble falling or staying asleep, or sleeping too much 3   4.  Feeling tired or having little energy 3   5.  Poor " "appetite or overeating 1   6.  Feeling bad about yourself 0   7.  Trouble concentrating 0   8.  Moving slowly or restless 0   Q9: Thoughts of better off dead/self-harm past 2 weeks 0   PHQ-9 Total Score 8   Difficulty at work, home, or with people -     BRIAN-7  6/2/2022   1. Feeling nervous, anxious, or on edge 3   2. Not being able to stop or control worrying 3   3. Worrying too much about different things 2   4. Trouble relaxing 1   5. Being so restless that it is hard to sit still 1   6. Becoming easily annoyed or irritable 2   7. Feeling afraid, as if something awful might happen 3   BRIAN-7 Total Score 15   If you checked any problems, how difficult have they made it for you to do your work, take care of things at home, or get along with other people? -       Suicide Assessment Five-step Evaluation and Treatment (SAFE-T)        Review of Systems   Constitutional, HEENT, cardiovascular, pulmonary, GI, , musculoskeletal, neuro, skin, endocrine and psych systems are negative, except as otherwise noted.      Objective    /82   Pulse 89   Temp 98.3  F (36.8  C) (Tympanic)   Resp 12   Ht 1.676 m (5' 6\")   Wt 78.5 kg (173 lb)   LMP 11/06/2019   SpO2 100%   BMI 27.92 kg/m    Body mass index is 27.92 kg/m .  Physical Exam   GENERAL: healthy, alert and no distress  EYES: Eyes grossly normal to inspection  RESP: lungs clear to auscultation - no rales, rhonchi or wheezes  CV: regular rate and rhythm, normal S1 S2, no S3 or S4, no murmur, click or rub, no peripheral edema and peripheral pulses strong  MS: no gross musculoskeletal defects noted, no edema  SKIN: no suspicious lesions or rashes  NEURO: Normal strength and tone, mentation intact and speech normal  PSYCH: mentation appears normal, affect normal/bright                "

## 2022-06-10 ENCOUNTER — ANCILLARY PROCEDURE (OUTPATIENT)
Dept: CARDIOLOGY | Facility: CLINIC | Age: 52
End: 2022-06-10
Attending: INTERNAL MEDICINE
Payer: COMMERCIAL

## 2022-06-10 DIAGNOSIS — C83.32 DIFFUSE LARGE B-CELL LYMPHOMA OF INTRATHORACIC LYMPH NODES (H): ICD-10-CM

## 2022-06-10 DIAGNOSIS — T45.1X5A CHEMOTHERAPY-INDUCED PERIPHERAL NEUROPATHY (H): ICD-10-CM

## 2022-06-10 DIAGNOSIS — G62.0 CHEMOTHERAPY-INDUCED PERIPHERAL NEUROPATHY (H): ICD-10-CM

## 2022-06-10 DIAGNOSIS — I42.9 SECONDARY CARDIOMYOPATHY (H): ICD-10-CM

## 2022-06-10 DIAGNOSIS — R59.0 MEDIASTINAL ADENOPATHY: ICD-10-CM

## 2022-06-10 DIAGNOSIS — N83.201 OVARIAN CYST, RIGHT: ICD-10-CM

## 2022-06-10 DIAGNOSIS — R53.82 CHRONIC FATIGUE: ICD-10-CM

## 2022-06-10 LAB — LVEF ECHO: NORMAL

## 2022-06-10 PROCEDURE — 93306 TTE W/DOPPLER COMPLETE: CPT | Performed by: INTERNAL MEDICINE

## 2022-06-22 ENCOUNTER — OFFICE VISIT (OUTPATIENT)
Dept: CARDIOLOGY | Facility: CLINIC | Age: 52
End: 2022-06-22
Payer: COMMERCIAL

## 2022-06-22 VITALS
WEIGHT: 174.6 LBS | SYSTOLIC BLOOD PRESSURE: 110 MMHG | DIASTOLIC BLOOD PRESSURE: 82 MMHG | BODY MASS INDEX: 28.06 KG/M2 | HEIGHT: 66 IN | HEART RATE: 100 BPM

## 2022-06-22 DIAGNOSIS — T45.1X5A CHEMOTHERAPY-INDUCED PERIPHERAL NEUROPATHY (H): ICD-10-CM

## 2022-06-22 DIAGNOSIS — I50.22 CHRONIC SYSTOLIC CONGESTIVE HEART FAILURE (H): ICD-10-CM

## 2022-06-22 DIAGNOSIS — C83.32 DIFFUSE LARGE B-CELL LYMPHOMA OF INTRATHORACIC LYMPH NODES (H): Primary | ICD-10-CM

## 2022-06-22 DIAGNOSIS — G62.0 CHEMOTHERAPY-INDUCED PERIPHERAL NEUROPATHY (H): ICD-10-CM

## 2022-06-22 DIAGNOSIS — I42.8 NONISCHEMIC CARDIOMYOPATHY (H): ICD-10-CM

## 2022-06-22 PROCEDURE — 99215 OFFICE O/P EST HI 40 MIN: CPT | Performed by: INTERNAL MEDICINE

## 2022-06-22 RX ORDER — CARVEDILOL 6.25 MG/1
6.25 TABLET ORAL 2 TIMES DAILY WITH MEALS
Qty: 180 TABLET | Refills: 3 | Status: SHIPPED | OUTPATIENT
Start: 2022-06-22 | End: 2023-04-27

## 2022-06-22 NOTE — PATIENT INSTRUCTIONS
It was a pleasure seeing you today and thank you for allowing me to be a part of your health care team.  Should you have any questions regarding your visit or future needs please feel free to reach out to my care team for assistance.      Thank you, Dr. Trevon Pearl        **Nursing: [phone]       **Scheduling: [phone]       Go to Jardiance website to get a coupon card to reduce the cost

## 2022-06-22 NOTE — PROGRESS NOTES
Service Date: 06/22/2022    HISTORY OF PRESENT ILLNESS:  I had the opportunity to see Ms. Kassie Ramirez at Cardiology Clinic today at [facility] in [place] for reevaluation of dilated cardiomyopathy thought to be due to Adriamycin-based chemotherapy.  She was diagnosed with large B-cell lymphoma in 2019.  After discovery of a large mediastinal mass.  She underwent chemotherapy beginning in late 2019 and continuing through early 2020.  During that time, she had recurrent SVT and underwent ablation and has not had any recurrent SVT since then.  However, after completion of chemotherapy in 04/2020, she was found to have severe cardiomyopathy by echocardiogram in 06/2020 with an ejection fraction of 25%-30%.  She was started on medical therapy for cardiomyopathy and her left ventricular function has been slowly improving since then.    She tells me that her lymphoma is in remission, although she continues to have a mediastinal mass identified on CT scan.  It has not changed in size.  When I saw her last in 03/2021, I started her on carvedilol and lisinopril, but she did not feel well while taking lisinopril and stopped it.  We reduced the dose of carvedilol as well because of hypotension.  She has been taking spironolactone 25 mg daily consistently as well as rosuvastatin for severe dyslipidemia.  Fortunately, her coronary angiogram showed completely normal coronary arteries.    Her echocardiogram now shows improvement in ejection fraction up to 40%-45% without significant valvular disease.  Her left ventricular chamber size is normal and the estimate of her right atrial pressure is normal based on IVC size.  E to E-prime average ratio was 10.3, indeterminate.    Her basic metabolic panel is normal.  Hemoglobin is 16 and her LDL came down from 233 to 82 on rosuvastatin.    She has felt extremely fatigue this year.  It looks like she has had COVID twice.  She tested positive in January with upper  respiratory symptoms and then again more recently in May, tested positive by PCR.    She has had severe fatigue.  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  She typically goes to bed and naps for 2 hours after arriving home.  Otherwise, she does not have shortness of breath, lightheadedness or chest pain symptoms now.    PHYSICAL EXAMINATION:  Today, her blood pressure is 110/82, heart rate 100 and weight 174 pounds.  Her weight is up a little bit from May.  She has no edema.  Her lungs are clear.  Heart rhythm is regular.  She has no cardiac murmurs.    IMPRESSIONS:  Ms. Kassie Ramirez is a 52-year-old woman with severe cardiomyopathy with an ejection fraction of 20%-25% following chemotherapy for B-cell lymphoma using Adriamycin.  I suspect the chemotherapy caused her cardiomyopathy.  Her coronary arteries were normal.  With medical therapy, her ejection fraction has improved from 20%-25% up to 40%-45%, but she is still feeling severe fatigue and rapid heart beating.  Her heart rates are typically in the range of 100-110 and they appeared to be sinus rhythm.    I wonder if some of this fatigue could be long COVID.  Otherwise, perhaps due to the lymphoma or the chemotherapy treatment.  I would be surprised that this is due to heart disease given her significantly improved ejection fraction.    I will continue to treat her cardiomyopathy as well as possible by adding Jardiance 10 mg a day and increasing her carvedilol to 6.25 mg p.o. b.i.d.  She did not tolerate lisinopril well and I will leave her off ACE inhibitor, ARB and ARNI for now.  She will continue spironolactone.    I will see her back again in 3 months for reevaluation.    cc:   Mary Alice Colón PA-C  [address] [address]     Trevon Pearl MD, [facility]        D: 06/22/2022   T: 06/22/2022   MT: NAY    Name:     HALIMA  BABAR JOSHUA  MRN:      [id number]        Account:      [id number]   :      1970           Service Date: 2022       Document: [id number]

## 2022-06-22 NOTE — PROGRESS NOTES
HPI and Plan:   See dictation    Today's clinic visit entailed:  Review of the result(s) of each unique test - ecg, echo, cMRI, bmp, cbc  The following tests were independently interpreted by me as noted in my documentation: ecg  Ordering of each unique test  Prescription drug management  40 minutes spent on the date of the encounter doing chart review, review of test results, interpretation of tests, patient visit and documentation   Provider  Link to [facility] Help Grid     The level of medical decision making during this visit was of high complexity.      Orders Placed This Encounter   Procedures     Basic metabolic panel     Hemoglobin     Basic metabolic panel     Basic metabolic panel     Hemoglobin     Follow-Up with Cardiology     Echocardiogram Complete       Orders Placed This Encounter   Medications     carvedilol (COREG) 6.25 MG tablet     Sig: Take 1 tablet (6.25 mg) by mouth 2 times daily (with meals)     Dispense:  180 tablet     Refill:  3     empagliflozin (JARDIANCE) 10 MG TABS tablet     Sig: Take 1 tablet (10 mg) by mouth daily     Dispense:  90 tablet     Refill:  3       Medications Discontinued During This Encounter   Medication Reason     carvedilol (COREG) 3.125 MG tablet          Encounter Diagnoses   Name Primary?     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) Yes     Chemotherapy-induced peripheral neuropathy (H)      Chronic systolic congestive heart failure (H)        CURRENT MEDICATIONS:  Current Outpatient Medications   Medication Sig Dispense Refill     alpha-lipoic acid 600 MG capsule Take 1 capsule (600 mg) by mouth daily 30 capsule 0     carvedilol (COREG) 6.25 MG tablet Take 1 tablet (6.25 mg) by mouth 2 times daily (with meals) 180 tablet 3     cetirizine (ZYRTEC) 10 MG tablet Take 10 mg by mouth daily       DULoxetine (CYMBALTA) 30 MG capsule Take 1 capsule (30 mg) by mouth daily 90 capsule 0     empagliflozin (JARDIANCE) 10 MG  TABS tablet Take 1 tablet (10 mg) by mouth daily 90 tablet 3     LORazepam (ATIVAN) 0.5 MG tablet TAKE 1 TABLET BY MOUTH AT BEDTIME FOR SLEEP AND ANXIETY. 30 tablet 0     rosuvastatin (CRESTOR) 10 MG tablet Take 1 tablet (10 mg) by mouth daily 90 tablet 3     spironolactone (ALDACTONE) 25 MG tablet Take 1 tablet (25 mg) by mouth daily 90 tablet 0       ALLERGIES     Allergies   Allergen Reactions     Cold & Flu [Cold Defense Fighter]      See pseudoephedrine     Seasonal Allergies      Sudafed Cold-Cough [Dayquil Liquicaps]      Pseudoephedrine Rash     Rash then skins peels off        PAST MEDICAL HISTORY:  Past Medical History:   Diagnosis Date     Anxiety attack 09/16/2014     Cardiomyopathy (H)     non ishemic - 25-30% - Chemo related     Congestive heart failure (H) 02/2019    While in hospital for high dose Methotrexate     Depressive disorder 2003    taking Celexa since 2014     Diffuse large B-cell lymphoma (H)     Diagnosed 11/2019, Ronan Albarran     Encounter for Essure implantation 2009     Generalized anxiety disorder 09/16/2014    zoloft = flat emotions     HFrEF (heart failure with reduced ejection fraction) (H)     new diagnosis 6/14     History of blood transfusion 12/2019    Given many throughout cancer treatment     Menopausal disorder     started on OCPs by menopause center 3/2017 (takes active continuously)     Menstrual headache     helped by OCPs and magnesium     Paroxysmal SVT (supraventricular tachycardia) (H) 06/2020     GERTRUDE (stress urinary incontinence, female)     sling procedure 2016       PAST SURGICAL HISTORY:  Past Surgical History:   Procedure Laterality Date     CV CORONARY ANGIOGRAM N/A 06/15/2020    Procedure: Coronary Angiogram;  Surgeon: Luis Eduardo Phillips MD;  Location:  HEART [facility]ARD[facility] CATH LAB     CV LEFT HEART CATH N/A 06/15/2020    Procedure: Left Heart Cath;  Surgeon: Luis Eduardo Phillips MD;  Location:  HE[facility] CATH LAB     ENT SURGERY  1990    left eardrum rebuilt  due to injury     EP ABLATION SVT N/A 2020    Procedure: EP Ablation SVT;  Surgeon: Francisco Javier Bynum MD;  Location:  HEART CARDIAC CATH LAB     H KIT ESSURE  2009    essure - Dr. Cailin Raymundo      HERNIORRHAPHY UMBILICAL  1974     IR CHEST PORT PLACEMENT > 5 YRS OF AGE  2020     IR PORT REMOVAL RIGHT  2021     mediastinoscopy  2019     SLING TRANSPUBO WITHOUT ANTERIOR COLPORRHAPHY N/A 2016    Procedure: SLING TRANSPUBO WITHOUT ANTERIOR COLPORRHAPHY;  Surgeon: Hernesto Berrios MD;  Location: [facility] OR       FAMILY HISTORY:  Family History   Problem Relation Age of Onset     Hypertension Mother          Lung Cancer 2013     Depression Mother          Lung Cancer 2013     Lipids Mother      Cardiovascular Mother      Circulatory Mother      Diabetes Mother          Lung Cancer 2013     Heart Disease Mother      Cerebrovascular Disease Mother          Lung Cancer 2013     Obesity Mother          Lung Cancer 2013     C.A.D. Mother      Lung Cancer Mother         smoker     Macular Degeneration Father         [place]     Genetic Disorder Father         eye disease     Diabetes Maternal Aunt         type 2     Hypertension Maternal Aunt      Heart Disease Maternal Grandfather      Macular Degeneration Sister         [place]     Hyperlipidemia Sister         high cholesterol       Genetic Disorder Sister         eye disease     LUNG DISEASE No family hx of        SOCIAL HISTORY:  Social History     Socioeconomic History     Marital status:      Spouse name: Florian     Number of children: 2     Years of education: 16      Highest education level: None   Occupational History     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     Other Topics Concern     Parent/sibling w/ CABG, MI or angioplasty before 65F 55M? No     Caffeine Concern Yes     Comment: MOnster energy drink.   Te - 3 cups.        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      Social Determinants of Health     Intimate Partner Violence: Not At Risk     Fear of Current or Ex-Partner: No     Emotionally Abused: No     Physically Abused: No     Sexually Abused: No       Review of Systems:  Skin:  Negative       Eyes:  Negative glasses readers  ENT:  Negative   low \"rumble\" left ear recently  Respiratory:  Positive for dyspnea on exertion pt has a hard time taking a deep breath   Cardiovascular:  Negative for;palpitations;chest pain;edema;syncope or near-syncope;cyanosis;exercise intolerance;fatigue;dizziness Positive for;lightheadedness;fatigue better, but not completely gone  Gastroenterology: Negative      Genitourinary:  Negative   UTI in June 2021  Musculoskeletal:  Negative      Neurologic:  Negative numbness or tingling of feet worse in feet  Psychiatric:  Negative anxiety treated  Heme/Lymph/Imm:  Negative allergies seasonal  Endocrine:  Negative hot flashes;night sweats      Physical Exam:  Vitals: /82   Pulse 100   Ht 1.676 m (5' 6\")   Wt 79.2 kg (174 lb 9.6 oz)   LMP 11/06/2019   BMI 28.18 kg/m      Constitutional:           Skin:             Head:           Eyes:           Lymph:      ENT:           Neck:           Respiratory:            Cardiac:                                                           GI:           Extremities and Muscular Skeletal:             "     Neurological:           Psych:           CC  No referring provider defined for this encounter.

## 2022-06-22 NOTE — LETTER
6/22/2022    Mary Alice Colón PA-C  [address] [facility] [address]    RE: Kassie Ramirez       Dear Colleague,     I had the pleasure of seeing Kassie Ramirez in the [facility].  HPI and Plan:   See dictation    Today's clinic visit entailed:  Review of the result(s) of each unique test - ecg, echo, cMRI, bmp, cbc  The following tests were independently interpreted by me as noted in my documentation: ecg  Ordering of each unique test  Prescription drug management  40 minutes spent on the date of the encounter doing chart review, review of test results, interpretation of tests, patient visit and documentation   Provider  Link to [facility] Help Grid     The level of medical decision making during this visit was of high complexity.      Orders Placed This Encounter   Procedures     Basic metabolic panel     Hemoglobin     Basic metabolic panel     Basic metabolic panel     Hemoglobin     Follow-Up with Cardiology     Echocardiogram Complete       Orders Placed This Encounter   Medications     carvedilol (COREG) 6.25 MG tablet     Sig: Take 1 tablet (6.25 mg) by mouth 2 times daily (with meals)     Dispense:  180 tablet     Refill:  3     empagliflozin (JARDIANCE) 10 MG TABS tablet     Sig: Take 1 tablet (10 mg) by mouth daily     Dispense:  90 tablet     Refill:  3       Medications Discontinued During This Encounter   Medication Reason     carvedilol (COREG) 3.125 MG tablet          Encounter Diagnoses   Name Primary?     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) Yes     Chemotherapy-induced peripheral neuropathy (H)      Chronic systolic congestive heart failure (H)        CURRENT MEDICATIONS:  Current Outpatient Medications   Medication Sig Dispense Refill     alpha-lipoic acid 600 MG capsule Take 1 capsule (600 mg) by mouth daily 30 capsule 0     carvedilol (COREG) 6.25 MG tablet Take 1 tablet (6.25 mg) by mouth 2 times  daily (with meals) 180 tablet 3     cetirizine (ZYRTEC) 10 MG tablet Take 10 mg by mouth daily       DULoxetine (CYMBALTA) 30 MG capsule Take 1 capsule (30 mg) by mouth daily 90 capsule 0     empagliflozin (JARDIANCE) 10 MG TABS tablet Take 1 tablet (10 mg) by mouth daily 90 tablet 3     LORazepam (ATIVAN) 0.5 MG tablet TAKE 1 TABLET BY MOUTH AT BEDTIME FOR SLEEP AND ANXIETY. 30 tablet 0     rosuvastatin (CRESTOR) 10 MG tablet Take 1 tablet (10 mg) by mouth daily 90 tablet 3     spironolactone (ALDACTONE) 25 MG tablet Take 1 tablet (25 mg) by mouth daily 90 tablet 0       ALLERGIES     Allergies   Allergen Reactions     Cold & Flu [Cold Defense Fighter]      See pseudoephedrine     Seasonal Allergies      Sudafed Cold-Cough [Dayquil Liquicaps]      Pseudoephedrine Rash     Rash then skins peels off        PAST MEDICAL HISTORY:  Past Medical History:   Diagnosis Date     Anxiety attack 09/16/2014     Cardiomyopathy (H)     non ishemic - 25-30% - Chemo related     Congestive heart failure (H) 02/2019    While in hospital for high dose Methotrexate     Depressive disorder 2003    taking Celexa since 2014     Diffuse large B-cell lymphoma (H)     Diagnosed 11/2019, Ronan Albarran     Encounter for Essure implantation 2009     Generalized anxiety disorder 09/16/2014    zoloft = flat emotions     HFrEF (heart failure with reduced ejection fraction) (H)     new diagnosis 6/14     History of blood transfusion 12/2019    Given many throughout cancer treatment     Menopausal disorder     started on OCPs by menopause center 3/2017 (takes active continuously)     Menstrual headache     helped by OCPs and magnesium     Paroxysmal SVT (supraventricular tachycardia) (H) 06/2020     GERTRUDE (stress urinary incontinence, female)     sling procedure 2016       PAST SURGICAL HISTORY:  Past Surgical History:   Procedure Laterality Date     CV CORONARY ANGIOGRAM N/A 06/15/2020    Procedure: Coronary Angiogram;  Surgeon: Luis Eduardo Phillips,  MD;  Location:  HEART CARDIAC CATH LAB     CV LEFT HEART CATH N/A 06/15/2020    Procedure: Left Heart Cath;  Surgeon: Luis Eduardo Phillips MD;  Location:  HEART CARDIAC CATH LAB     ENT SURGERY      left eardrum rebuilt due to injury     EP ABLATION SVT N/A 2020    Procedure: EP Ablation SVT;  Surgeon: Francisco Javier Bynum MD;  Location:  HEART CARDIAC CATH LAB     H KIT ESSURE  2009    essure - Dr. Cailin Raymundo      HERNIORRHAPHY UMBILICAL  1974     IR CHEST PORT PLACEMENT > 5 YRS OF AGE  2020     IR PORT REMOVAL RIGHT  2021     mediastinoscopy  2019     SLING TRANSPUBO WITHOUT ANTERIOR COLPORRHAPHY N/A 2016    Procedure: SLING TRANSPUBO WITHOUT ANTERIOR COLPORRHAPHY;  Surgeon: Hernesto Berrios MD;  Location: [facility] OR       FAMILY HISTORY:  Family History   Problem Relation Age of Onset     Hypertension Mother          Lung Cancer      Depression Mother          Lung Cancer 2013     Lipids Mother      Cardiovascular Mother      Circulatory Mother      Diabetes Mother          Lung Cancer 2013     Heart Disease Mother      Cerebrovascular Disease Mother          Lung Cancer 2013     Obesity Mother          Lung Cancer 2013     C.A.D. Mother      Lung Cancer Mother         smoker     Macular Degeneration Father         [place]     Genetic Disorder Father         eye disease     Diabetes Maternal Aunt         type 2     Hypertension Maternal Aunt      Heart Disease Maternal Grandfather      Macular Degeneration Sister         [place]t     Hyperlipidemia Sister         high cholesterol       Genetic Disorder Sister         eye disease     LUNG DISEASE No family hx of        SOCIAL HISTORY:  Social History     Socioeconomic History     Marital status:      Spouse name: Florian     Number of children: 2     Years of education: 16      Highest education level: None   Occupational History     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     Other Topics Concern     Parent/sibling w/ CABG, MI or angioplasty before 65F 55M? No     Caffeine Concern Yes     Comment: MOnster energy drink.   Te - 3 cups.        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      Social Determinants of Health     Intimate Partner Violence: Not At Risk     Fear of Current or Ex-Partner: No     Emotionally Abused: No     Physically Abused: No     Sexually Abused: No       Review of Systems:  Skin:  Negative       Eyes:  Negative glasses readers  ENT:  Negative   low \"rumble\" left ear recently  Respiratory:  Positive for dyspnea on exertion pt has a hard time taking a deep breath   Cardiovascular:  Negative for;palpitations;chest pain;edema;syncope or near-syncope;cyanosis;exercise intolerance;fatigue;dizziness Positive for;lightheadedness;fatigue better, but not completely gone  Gastroenterology: Negative      Genitourinary:  Negative   UTI in June 2021  Musculoskeletal:  Negative      Neurologic:  Negative numbness or tingling of feet worse in feet  Psychiatric:  Negative anxiety treated  Heme/Lymph/Imm:  Negative allergies seasonal  Endocrine:  Negative hot flashes;night sweats      Physical Exam:  Vitals: /82   Pulse 100   Ht 1.676 m (5' 6\")   Wt 79.2 kg (174 lb 9.6 oz)   LMP 11/06/2019   BMI 28.18 kg/m      Constitutional:           Skin:             Head:       "     Eyes:           Lymph:      ENT:           Neck:           Respiratory:            Cardiac:                                                           GI:           Extremities and Muscular Skeletal:                 Neurological:           Psych:           CC  No referring provider defined for this encounter.                Service Date: 06/22/2022    HISTORY OF PRESENT ILLNESS:  I had the opportunity to see Ms. Kassie Ramirez at Cardiology Clinic today at [facility] in [place] for reevaluation of dilated cardiomyopathy thought to be due to Adriamycin-based chemotherapy.  She was diagnosed with large B-cell lymphoma in 2019.  After discovery of a large mediastinal mass.  She underwent chemotherapy beginning in late 2019 and continuing through early 2020.  During that time, she had recurrent SVT and underwent ablation and has not had any recurrent SVT since then.  However, after completion of chemotherapy in 04/2020, she was found to have severe cardiomyopathy by echocardiogram in 06/2020 with an ejection fraction of 25%-30%.  She was started on medical therapy for cardiomyopathy and her left ventricular function has been slowly improving since then.    She tells me that her lymphoma is in remission, although she continues to have a mediastinal mass identified on CT scan.  It has not changed in size.  When I saw her last in 03/2021, I started her on carvedilol and lisinopril, but she did not feel well while taking lisinopril and stopped it.  We reduced the dose of carvedilol as well because of hypotension.  She has been taking spironolactone 25 mg daily consistently as well as rosuvastatin for severe dyslipidemia.  Fortunately, her coronary angiogram showed completely normal coronary arteries.    Her echocardiogram now shows improvement in ejection fraction up to 40%-45% without significant valvular disease.  Her left ventricular chamber size is normal and the estimate of her right atrial pressure  is normal based on IVC size.  E to E-prime average ratio was 10.3, indeterminate.    Her basic metabolic panel is normal.  Hemoglobin is 16 and her LDL came down from 233 to 82 on rosuvastatin.    She has felt extremely fatigue this year.  It looks like she has had COVID twice.  She tested positive in January with upper respiratory symptoms and then again more recently in May, tested positive by PCR.    She has had severe fatigue.  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  She typically goes to bed and naps for 2 hours after arriving home.  Otherwise, she does not have shortness of breath, lightheadedness or chest pain symptoms now.    PHYSICAL EXAMINATION:  Today, her blood pressure is 110/82, heart rate 100 and weight 174 pounds.  Her weight is up a little bit from May.  She has no edema.  Her lungs are clear.  Heart rhythm is regular.  She has no cardiac murmurs.    IMPRESSIONS:  Ms. Kassie Ramirez is a 52-year-old woman with severe cardiomyopathy with an ejection fraction of 20%-25% following chemotherapy for B-cell lymphoma using Adriamycin.  I suspect the chemotherapy caused her cardiomyopathy.  Her coronary arteries were normal.  With medical therapy, her ejection fraction has improved from 20%-25% up to 40%-45%, but she is still feeling severe fatigue and rapid heart beating.  Her heart rates are typically in the range of 100-110 and they appeared to be sinus rhythm.    I wonder if some of this fatigue could be long COVID.  Otherwise, perhaps due to the lymphoma or the chemotherapy treatment.  I would be surprised that this is due to heart disease given her significantly improved ejection fraction.    I will continue to treat her cardiomyopathy as well as possible by adding Jardiance 10 mg a day and increasing her carvedilol to 6.25 mg p.o. b.i.d.  She did not tolerate lisinopril well and I will leave her off  ACE inhibitor, ARB and ARNI for now.  She will continue spironolactone.    I will see her back again in 3 months for reevaluation.    cc:   Mary Alice Colón PA-C  [address] [address]     Trevon Pearl MD, [facility]        D: 2022   T: 2022   MT:     Name:     BABAR VARGHESE  MRN:      [id number]        Account:      [id number]   :      1970           Service Date: 2022       Document: [id number]      Thank you for allowing me to participate in the care of your patient.      Sincerely,     TREVON PEARL MD     [facility]  cc:   No referring provider defined for this encounter.

## 2022-06-23 DIAGNOSIS — I50.22 CHRONIC SYSTOLIC CONGESTIVE HEART FAILURE (H): ICD-10-CM

## 2022-06-23 RX ORDER — SPIRONOLACTONE 25 MG/1
25 TABLET ORAL DAILY
Qty: 90 TABLET | Refills: 3 | Status: SHIPPED | OUTPATIENT
Start: 2022-06-23 | End: 2023-04-27

## 2022-06-28 ENCOUNTER — TRANSFERRED RECORDS (OUTPATIENT)
Dept: HEALTH INFORMATION MANAGEMENT | Facility: CLINIC | Age: 52
End: 2022-06-28

## 2022-07-11 ENCOUNTER — LAB (OUTPATIENT)
Dept: LAB | Facility: CLINIC | Age: 52
End: 2022-07-11
Payer: COMMERCIAL

## 2022-07-11 DIAGNOSIS — I42.8 NONISCHEMIC CARDIOMYOPATHY (H): ICD-10-CM

## 2022-07-11 LAB
ANION GAP SERPL CALCULATED.3IONS-SCNC: 10 MMOL/L (ref 3–14)
BUN SERPL-MCNC: 20 MG/DL (ref 7–30)
CALCIUM SERPL-MCNC: 10.1 MG/DL (ref 8.5–10.1)
CHLORIDE BLD-SCNC: 108 MMOL/L (ref 94–109)
CO2 SERPL-SCNC: 23 MMOL/L (ref 20–32)
CREAT SERPL-MCNC: 1.01 MG/DL (ref 0.52–1.04)
GFR SERPL CREATININE-BSD FRML MDRD: 67 ML/MIN/1.73M2
GLUCOSE BLD-MCNC: 105 MG/DL (ref 70–99)
POTASSIUM BLD-SCNC: 4 MMOL/L (ref 3.4–5.3)
SODIUM SERPL-SCNC: 141 MMOL/L (ref 133–144)

## 2022-07-11 PROCEDURE — 80048 BASIC METABOLIC PNL TOTAL CA: CPT

## 2022-07-11 PROCEDURE — 36415 COLL VENOUS BLD VENIPUNCTURE: CPT

## 2022-07-12 ENCOUNTER — HOSPITAL ENCOUNTER (OUTPATIENT)
Dept: CT IMAGING | Facility: CLINIC | Age: 52
Discharge: HOME | End: 2022-07-12
Attending: INTERNAL MEDICINE | Admitting: INTERNAL MEDICINE
Payer: COMMERCIAL

## 2022-07-12 ENCOUNTER — LAB (OUTPATIENT)
Dept: INFUSION THERAPY | Facility: CLINIC | Age: 52
End: 2022-07-12
Attending: INTERNAL MEDICINE
Payer: COMMERCIAL

## 2022-07-12 DIAGNOSIS — G62.0 CHEMOTHERAPY-INDUCED PERIPHERAL NEUROPATHY (H): ICD-10-CM

## 2022-07-12 DIAGNOSIS — C83.32 DIFFUSE LARGE B-CELL LYMPHOMA OF INTRATHORACIC LYMPH NODES (H): ICD-10-CM

## 2022-07-12 DIAGNOSIS — N83.201 OVARIAN CYST, RIGHT: ICD-10-CM

## 2022-07-12 DIAGNOSIS — C83.398 DIFFUSE LARGE B-CELL LYMPHOMA OF EXTRANODAL SITE: ICD-10-CM

## 2022-07-12 DIAGNOSIS — R59.0 MEDIASTINAL ADENOPATHY: ICD-10-CM

## 2022-07-12 DIAGNOSIS — I42.9 SECONDARY CARDIOMYOPATHY (H): ICD-10-CM

## 2022-07-12 DIAGNOSIS — T45.1X5A CHEMOTHERAPY-INDUCED PERIPHERAL NEUROPATHY (H): ICD-10-CM

## 2022-07-12 DIAGNOSIS — R53.82 CHRONIC FATIGUE: ICD-10-CM

## 2022-07-12 LAB
ALBUMIN SERPL-MCNC: 4.7 G/DL (ref 3.4–5)
ALP SERPL-CCNC: 79 U/L (ref 40–150)
ALT SERPL W P-5'-P-CCNC: 46 U/L (ref 0–50)
ANION GAP SERPL CALCULATED.3IONS-SCNC: 3 MMOL/L (ref 3–14)
AST SERPL W P-5'-P-CCNC: 22 U/L (ref 0–45)
BASOPHILS # BLD AUTO: 0.1 10E3/UL (ref 0–0.2)
BASOPHILS NFR BLD AUTO: 1 %
BILIRUB SERPL-MCNC: 1.1 MG/DL (ref 0.2–1.3)
BUN SERPL-MCNC: 22 MG/DL (ref 7–30)
CALCIUM SERPL-MCNC: 9.8 MG/DL (ref 8.5–10.1)
CHLORIDE BLD-SCNC: 107 MMOL/L (ref 94–109)
CO2 SERPL-SCNC: 27 MMOL/L (ref 20–32)
CREAT SERPL-MCNC: 1.08 MG/DL (ref 0.52–1.04)
EOSINOPHIL # BLD AUTO: 1.8 10E3/UL (ref 0–0.7)
EOSINOPHIL NFR BLD AUTO: 22 %
ERYTHROCYTE [DISTWIDTH] IN BLOOD BY AUTOMATED COUNT: 12.9 % (ref 10–15)
GFR SERPL CREATININE-BSD FRML MDRD: 62 ML/MIN/1.73M2
GLUCOSE BLD-MCNC: 102 MG/DL (ref 70–99)
HCT VFR BLD AUTO: 43.3 % (ref 35–47)
HGB BLD-MCNC: 14.8 G/DL (ref 11.7–15.7)
IMM GRANULOCYTES # BLD: 0 10E3/UL
IMM GRANULOCYTES NFR BLD: 0 %
LDH SERPL L TO P-CCNC: 215 U/L (ref 81–234)
LYMPHOCYTES # BLD AUTO: 2.4 10E3/UL (ref 0.8–5.3)
LYMPHOCYTES NFR BLD AUTO: 30 %
MCH RBC QN AUTO: 31.4 PG (ref 26.5–33)
MCHC RBC AUTO-ENTMCNC: 34.2 G/DL (ref 31.5–36.5)
MCV RBC AUTO: 92 FL (ref 78–100)
MONOCYTES # BLD AUTO: 0.5 10E3/UL (ref 0–1.3)
MONOCYTES NFR BLD AUTO: 7 %
NEUTROPHILS # BLD AUTO: 3.3 10E3/UL (ref 1.6–8.3)
NEUTROPHILS NFR BLD AUTO: 40 %
NRBC # BLD AUTO: 0 10E3/UL
NRBC BLD AUTO-RTO: 0 /100
PLATELET # BLD AUTO: 190 10E3/UL (ref 150–450)
POTASSIUM BLD-SCNC: 4.1 MMOL/L (ref 3.4–5.3)
PROT SERPL-MCNC: 7 G/DL (ref 6.8–8.8)
RBC # BLD AUTO: 4.71 10E6/UL (ref 3.8–5.2)
SODIUM SERPL-SCNC: 137 MMOL/L (ref 133–144)
WBC # BLD AUTO: 8.2 10E3/UL (ref 4–11)

## 2022-07-12 PROCEDURE — 250N000011 HC RX IP 250 OP 636: Performed by: INTERNAL MEDICINE

## 2022-07-12 PROCEDURE — 36415 COLL VENOUS BLD VENIPUNCTURE: CPT

## 2022-07-12 PROCEDURE — 250N000009 HC RX 250: Performed by: INTERNAL MEDICINE

## 2022-07-12 PROCEDURE — 74177 CT ABD & PELVIS W/CONTRAST: CPT

## 2022-07-12 PROCEDURE — 83615 LACTATE (LD) (LDH) ENZYME: CPT | Performed by: INTERNAL MEDICINE

## 2022-07-12 PROCEDURE — 85025 COMPLETE CBC W/AUTO DIFF WBC: CPT | Performed by: INTERNAL MEDICINE

## 2022-07-12 PROCEDURE — 80053 COMPREHEN METABOLIC PANEL: CPT | Performed by: INTERNAL MEDICINE

## 2022-07-12 RX ORDER — IOPAMIDOL 755 MG/ML
100 INJECTION, SOLUTION INTRAVASCULAR ONCE
Status: COMPLETED | OUTPATIENT
Start: 2022-07-12 | End: 2022-07-12

## 2022-07-12 RX ADMIN — IOPAMIDOL 85 ML: 755 INJECTION, SOLUTION INTRAVENOUS at 12:41

## 2022-07-12 RX ADMIN — SODIUM CHLORIDE 61 ML: 9 INJECTION, SOLUTION INTRAVENOUS at 12:41

## 2022-07-12 NOTE — PROGRESS NOTES
Nursing Note:  Kassie Ramirez presents today for Labs.    Patient seen by provider today: No   present during visit today: Not Applicable.    Note: N/A.    Intravenous Access:  Labs drawn without difficulty.  Peripheral IV placed.    Discharge Plan:   Patient was sent to CT for  appointment.    Estephania Earl RN

## 2022-07-13 ENCOUNTER — OFFICE VISIT (OUTPATIENT)
Dept: FAMILY MEDICINE | Facility: CLINIC | Age: 52
End: 2022-07-13
Payer: COMMERCIAL

## 2022-07-13 VITALS
BODY MASS INDEX: 27.6 KG/M2 | OXYGEN SATURATION: 97 % | HEART RATE: 86 BPM | WEIGHT: 171 LBS | TEMPERATURE: 97.5 F | SYSTOLIC BLOOD PRESSURE: 110 MMHG | DIASTOLIC BLOOD PRESSURE: 68 MMHG

## 2022-07-13 DIAGNOSIS — C83.398 DIFFUSE LARGE B-CELL LYMPHOMA OF EXTRANODAL SITE: ICD-10-CM

## 2022-07-13 DIAGNOSIS — F33.0 MILD RECURRENT MAJOR DEPRESSION (H): Primary | ICD-10-CM

## 2022-07-13 DIAGNOSIS — I42.8 NONISCHEMIC CARDIOMYOPATHY (H): ICD-10-CM

## 2022-07-13 DIAGNOSIS — R06.02 SOB (SHORTNESS OF BREATH): ICD-10-CM

## 2022-07-13 DIAGNOSIS — F41.8 SITUATIONAL ANXIETY: ICD-10-CM

## 2022-07-13 PROCEDURE — 99215 OFFICE O/P EST HI 40 MIN: CPT | Performed by: PHYSICIAN ASSISTANT

## 2022-07-13 PROCEDURE — 96127 BRIEF EMOTIONAL/BEHAV ASSMT: CPT | Performed by: PHYSICIAN ASSISTANT

## 2022-07-13 RX ORDER — ALBUTEROL SULFATE 90 UG/1
2 AEROSOL, METERED RESPIRATORY (INHALATION) EVERY 6 HOURS PRN
Qty: 18 G | Refills: 0 | Status: SHIPPED | OUTPATIENT
Start: 2022-07-13 | End: 2022-12-27

## 2022-07-13 RX ORDER — DULOXETIN HYDROCHLORIDE 60 MG/1
60 CAPSULE, DELAYED RELEASE ORAL DAILY
Qty: 90 CAPSULE | Refills: 0 | Status: SHIPPED | OUTPATIENT
Start: 2022-07-13 | End: 2022-08-18

## 2022-07-13 ASSESSMENT — ANXIETY QUESTIONNAIRES
5. BEING SO RESTLESS THAT IT IS HARD TO SIT STILL: NOT AT ALL
2. NOT BEING ABLE TO STOP OR CONTROL WORRYING: NOT AT ALL
7. FEELING AFRAID AS IF SOMETHING AWFUL MIGHT HAPPEN: SEVERAL DAYS
6. BECOMING EASILY ANNOYED OR IRRITABLE: SEVERAL DAYS
8. IF YOU CHECKED OFF ANY PROBLEMS, HOW DIFFICULT HAVE THESE MADE IT FOR YOU TO DO YOUR WORK, TAKE CARE OF THINGS AT HOME, OR GET ALONG WITH OTHER PEOPLE?: SOMEWHAT DIFFICULT
GAD7 TOTAL SCORE: 4
GAD7 TOTAL SCORE: 4
3. WORRYING TOO MUCH ABOUT DIFFERENT THINGS: SEVERAL DAYS
7. FEELING AFRAID AS IF SOMETHING AWFUL MIGHT HAPPEN: SEVERAL DAYS
GAD7 TOTAL SCORE: 4
1. FEELING NERVOUS, ANXIOUS, OR ON EDGE: SEVERAL DAYS
4. TROUBLE RELAXING: NOT AT ALL

## 2022-07-13 ASSESSMENT — PATIENT HEALTH QUESTIONNAIRE - PHQ9
10. IF YOU CHECKED OFF ANY PROBLEMS, HOW DIFFICULT HAVE THESE PROBLEMS MADE IT FOR YOU TO DO YOUR WORK, TAKE CARE OF THINGS AT HOME, OR GET ALONG WITH OTHER PEOPLE: SOMEWHAT DIFFICULT
SUM OF ALL RESPONSES TO PHQ QUESTIONS 1-9: 3
SUM OF ALL RESPONSES TO PHQ QUESTIONS 1-9: 3

## 2022-07-13 NOTE — PROGRESS NOTES
"  Assessment & Plan     Mild recurrent major depression (H)  Situational anxiety  Improvement not yet at goal.  Patient would like to increase her duloxetine further and we will do a trial of increasing from 30 mg to 60 mg once daily.  Plan to do a virtual follow-up in 1 month.  Continue plans to establish psychotherapy as well.  - DULoxetine (CYMBALTA) 60 MG capsule  Dispense: 90 capsule; Refill: 0    SOB (shortness of breath)  Nonischemic cardiomyopathy (H) - from chemotherapy - managed by Dr. Pearl  Unclear etiology.  Atelectasis noted on recent cancer CT for surveillance.  Recommend a trial of albuterol to see if this helps at all.  Encouraged her to use home incentive spirometer as well.  Patient would be interested in either pulmonary or cardiac rehabilitation if this is appropriate.  I will send a message to Dr. Pearl to see what his thoughts are on either of these options if the a forementioned home cares do not help her symptoms.  I will keep her apprised of his response.  - albuterol (PROAIR HFA/PROVENTIL HFA/VENTOLIN HFA) 108 (90 Base) MCG/ACT inhaler  Dispense: 18 g; Refill: 0    Diffuse large B-cell lymphoma of extranodal site (H) - per pathology 11/27/19 - mediastinal mass wrapped around azalea and bilateral main stem bronchi- treating with Dr. Castro  Patient is interested in establishing with a new oncologist within the [facility] system.  I have reached out to Dr. Akiko Davalos to see if she has a recommendation for an alternative oncologist.  I will keep her apprised of her recommendations.    43 minutes spent on the date of the encounter doing chart review, history and exam, documentation and further activities per the note     BMI:   Estimated body mass index is 27.6 kg/m  as calculated from the following:    Height as of 6/22/22: 1.676 m (5' 6\").    Weight as of this encounter: 77.6 kg (171 lb).   Weight management plan: Discussed healthy diet and exercise guidelines      No follow-ups on " "file.    Mary Alice Colón PA-C  [facility]    Hina Fernando is a 52 year old, presenting for the following health issues:  Recheck Medication    History of Present Illness       Mental Health Follow-up:  Patient presents to follow-up on Depression & Anxiety.Patient's depression since last visit has been:  Better  The patient is having other symptoms associated with depression.  Patient's anxiety since last visit has been:  Better  The patient is having other symptoms associated with anxiety.  Any significant life events: job concerns, grief or loss and health concerns  Patient is not feeling anxious or having panic attacks.  Patient has no concerns about alcohol or drug use.    Reason for visit:  Ongoing monitoring after cancer/chemo related pain and anxiety    She eats 4 or more servings of fruits and vegetables daily.She consumes 1 sweetened beverage(s) daily.She exercises with enough effort to increase her heart rate 30 to 60 minutes per day.  She exercises with enough effort to increase her heart rate 5 days per week.   She is taking medications regularly.    Today's PHQ-9         PHQ-9 Total Score: 3    PHQ-9 Q9 Thoughts of better off dead/self-harm past 2 weeks :   Not at all    How difficult have these problems made it for you to do your work, take care of things at home, or get along with other people: Somewhat difficult  Today's BRIAN-7 Score: 4     Depression and Anxiety Follow-Up  Transitioned from sertraline to duloxetine to see if she experienced dual coverage with pain relief for chest wall pain 6/2/2022.  Feels that within 10 days she noticed an improvement in her symptoms.  Overall she feels that her mood is better and the \"edge \"is taken off.  Did have a second opinion with [place] [facility] which reassured the current status of her cancer but was not overall a \"great \"visit.  Is interested in possibly establishing with a different oncologist within the [facility] " [facility] system that she has better communication with.  She feels that most of the visits exacerbate her anxiety around her cancer secondary to the vagueness of recommendations/information.  Patient does have an initial appointment with a therapist the 27th -is primarily doing this because her  would like her to do so.  Does feel that she would benefit from further adjustment of her medication dosage.  Social History     Tobacco Use     Smoking status: Never Smoker     Smokeless tobacco: Never Used   Vaping Use     Vaping Use: Never used   Substance Use Topics     Alcohol use: No     Comment: 1 time per month     Drug use: No     PHQ 12/30/2021 6/2/2022 7/13/2022   PHQ-9 Total Score 4 8 3   Q9: Thoughts of better off dead/self-harm past 2 weeks Not at all Not at all Not at all     BRIAN-7 SCORE 12/30/2021 6/2/2022 7/13/2022   Total Score - - -   Total Score 0 (minimal anxiety) 15 (severe anxiety) 4 (minimal anxiety)   Total Score 0 15 4     Last PHQ-9 7/13/2022   1.  Little interest or pleasure in doing things 0   2.  Feeling down, depressed, or hopeless 0   3.  Trouble falling or staying asleep, or sleeping too much 1   4.  Feeling tired or having little energy 2   5.  Poor appetite or overeating 0   6.  Feeling bad about yourself 0   7.  Trouble concentrating 0   8.  Moving slowly or restless 0   Q9: Thoughts of better off dead/self-harm past 2 weeks 0   PHQ-9 Total Score 3   Difficulty at work, home, or with people -     BRIAN-7  7/13/2022   1. Feeling nervous, anxious, or on edge 1   2. Not being able to stop or control worrying 0   3. Worrying too much about different things 1   4. Trouble relaxing 0   5. Being so restless that it is hard to sit still 0   6. Becoming easily annoyed or irritable 1   7. Feeling afraid, as if something awful might happen 1   BRIAN-7 Total Score 4   If you checked any problems, how difficult have they made it for you to do your work, take care of things at home, or get along with  other people? -     Shortness of breath  Persistent issue and cardiology was concerned that this could be from long COVID.  Patient had CT scan for surveillance of her cancer and it did show atelectasis of bilateral lower lobes.  Is wondering what this means.  Does have a incentive spirometer at home but has not been using it.  When she did have COVID she was given an albuterol inhaler but it did not help during the illness.  She does not have this any longer but would be willing to try this for her current shortness of breath.    Chest wall pain  Patient has persistent chest wall pain secondary to neuropathy from her chemotherapy.  Gabapentin has made her feel very foggy in the past.  The transition to duloxetine and addition of alpha lipoic acid seem to have helped significantly.  She does still have some tenderness and discomfort with palpation of this area but overall is quite happy with the improvement.      Review of Systems   Constitutional, HEENT, cardiovascular, pulmonary, GI, , musculoskeletal, neuro, skin, endocrine and psych systems are negative, except as otherwise noted.      Objective    /68 (BP Location: Right arm, Patient Position: Sitting, Cuff Size: Adult Regular)   Pulse 86   Temp 97.5  F (36.4  C) (Tympanic)   Wt 77.6 kg (171 lb)   LMP 11/06/2019   SpO2 97%   Breastfeeding No   BMI 27.60 kg/m    Body mass index is 27.6 kg/m .  Physical Exam   GENERAL: healthy, alert and no distress  EYES: Eyes grossly normal to inspection,  RESP: lungs clear to auscultation - no rales, rhonchi or wheezes  CV: regular rate and rhythm, normal S1 S2, no S3 or S4, no murmur, click or rub, no peripheral edema and peripheral pulses strong  MS: no gross musculoskeletal defects noted, no edema  SKIN: no suspicious lesions or rashes  NEURO: Normal strength and tone, mentation intact and speech normal  PSYCH: mentation appears normal, affect normal/bright                .  ..

## 2022-07-15 ENCOUNTER — ONCOLOGY VISIT (OUTPATIENT)
Dept: ONCOLOGY | Facility: CLINIC | Age: 52
End: 2022-07-15
Attending: INTERNAL MEDICINE
Payer: COMMERCIAL

## 2022-07-15 VITALS
HEART RATE: 85 BPM | OXYGEN SATURATION: 98 % | WEIGHT: 170 LBS | TEMPERATURE: 98 F | DIASTOLIC BLOOD PRESSURE: 79 MMHG | SYSTOLIC BLOOD PRESSURE: 118 MMHG | BODY MASS INDEX: 27.32 KG/M2 | RESPIRATION RATE: 16 BRPM | HEIGHT: 66 IN

## 2022-07-15 DIAGNOSIS — C83.32 DIFFUSE LARGE B-CELL LYMPHOMA OF INTRATHORACIC LYMPH NODES (H): Primary | ICD-10-CM

## 2022-07-15 DIAGNOSIS — I42.8 NONISCHEMIC CARDIOMYOPATHY (H): Primary | ICD-10-CM

## 2022-07-15 DIAGNOSIS — I50.22 CHRONIC SYSTOLIC CONGESTIVE HEART FAILURE (H): ICD-10-CM

## 2022-07-15 DIAGNOSIS — I42.9 SECONDARY CARDIOMYOPATHY (H): ICD-10-CM

## 2022-07-15 DIAGNOSIS — R53.82 CHRONIC FATIGUE: ICD-10-CM

## 2022-07-15 PROCEDURE — G0463 HOSPITAL OUTPT CLINIC VISIT: HCPCS

## 2022-07-15 PROCEDURE — 99214 OFFICE O/P EST MOD 30 MIN: CPT | Performed by: INTERNAL MEDICINE

## 2022-07-15 ASSESSMENT — PAIN SCALES - GENERAL: PAINLEVEL: NO PAIN (0)

## 2022-07-15 NOTE — LETTER
7/15/2022         RE: Kassie Ramirez  [address]  [place] [address]        Dear Colleague,    Thank you for referring your patient, Kassie Ramirez, to the [facility]. Please see a copy of my visit note below.    [facility]  HEMATOLOGY AND ONCOLOGY    FOLLOW-UP VISIT NOTE    PATIENT NAME: Kassie Ramirez MRN # [id number]  DATE OF VISIT: Jul 15, 2022 YOB: 1970    REFERRING PROVIDER: No referring provider defined for this encounter.    CANCER TYPE: DLBCL, EBV+ non-GCB, stage III.  STAGE: III    TREATMENT SUMMARY:  She presented with shortness of breath and cough which had been present since May 2019. Her imaging suggested pulmonary infiltrates which was attributed to aspiration pneumonia. CT scan of the chest 8/20/2019 showed left hilar and subcarinal mediastinal adenopathy with narrowing of the left lower lobe bronchus and a nonspecific patchy area of nodular consolidation in the medial right lower lobe.  Bronchoscopy with EBUS 8/28/2019 showed nonnecrotizing granulomatous inflammation with prominent eosinophilia, negative for malignancy.  She was diagnosed with sarcoidosis and started on prednisone. She had worsening cough and shortness of breath with tapering of the prednisone.  Repeat CT scan of the chest 11/18/2019 showed interval worsening of the bulky mediastinal and bilateral hilar lymphadenopathy, near complete resolution of the lower lobe opacities, with new interstitial opacities in the right upper lobe.   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 Large mediastinal mass causing respiratory compromise. . IPI score of 2 (low-intermediate). FISH neg for high risk translocations. She received 6 cycles of R-CHOP with intermediate dose methotrexate after cycles 2, 4 and 6 from November through April  2020.      CURRENT INTERVENTIONS:  Surveillance post MR-CHOP    SUBJECTIVE   Kassie Ramirez is being followed for DLBCL, EBV+ non-GCB, stage III intermediate risk disease    Patient was followed in person. Kassie is joined by her  at this visit. She has completed all of her planned cycles of MR-CHOP chemotherapy and is being seen with labs and restaging scans.     She had struggled with cardiomyopathy post adriamycin.     She had not been doing well since completion of her therapy for lymphoma.  She had marked fatigue and no energy.  She is a  and would be wiped out by the end of the day.    She is doing a lot better at this clinic visit.  She has no new complaints since last visit.  Her energy has improved.    Her peripheral neuropathy is better now.  Her medications have been adjusted.      PAST MEDICAL HISTORY     Past Medical History:   Diagnosis Date     Anxiety attack 09/16/2014     Cardiomyopathy (H)     non ishemic - 25-30% - Chemo related     Congestive heart failure (H) 02/2019    While in hospital for high dose Methotrexate     Depressive disorder 2003    taking Celexa since 2014     Diffuse large B-cell lymphoma (H)     Diagnosed 11/2019, Ronan Albarran     Encounter for Essure implantation 2009     Generalized anxiety disorder 09/16/2014    zoloft = flat emotions     HFrEF (heart failure with reduced ejection fraction) (H)     new diagnosis 6/14     History of blood transfusion 12/2019    Given many throughout cancer treatment     Menopausal disorder     started on OCPs by menopause center 3/2017 (takes active continuously)     Menstrual headache     helped by OCPs and magnesium     Paroxysmal SVT (supraventricular tachycardia) (H) 06/2020     GERTRUDE (stress urinary incontinence, female)     sling procedure 2016         CURRENT OUTPATIENT MEDICATIONS     Current Outpatient Medications   Medication Sig     albuterol (PROAIR HFA/PROVENTIL HFA/VENTOLIN HFA) 108 (90 Base)  "MCG/ACT inhaler Inhale 2 puffs into the lungs every 6 hours as needed for shortness of breath / dyspnea or wheezing     alpha-lipoic acid 600 MG capsule Take 1 capsule (600 mg) by mouth daily     carvedilol (COREG) 6.25 MG tablet Take 1 tablet (6.25 mg) by mouth 2 times daily (with meals)     cetirizine (ZYRTEC) 10 MG tablet Take 10 mg by mouth daily     DULoxetine (CYMBALTA) 60 MG capsule Take 1 capsule (60 mg) by mouth daily     empagliflozin (JARDIANCE) 10 MG TABS tablet Take 1 tablet (10 mg) by mouth daily     LORazepam (ATIVAN) 0.5 MG tablet TAKE 1 TABLET BY MOUTH AT BEDTIME FOR SLEEP AND ANXIETY.     rosuvastatin (CRESTOR) 10 MG tablet Take 1 tablet (10 mg) by mouth daily     spironolactone (ALDACTONE) 25 MG tablet Take 1 tablet (25 mg) by mouth daily     No current facility-administered medications for this visit.        ALLERGIES      Allergies   Allergen Reactions     Cold & Flu [Cold Defense Fighter]      See pseudoephedrine     Seasonal Allergies      Sudafed Cold-Cough [Dayquil Liquicaps]      Pseudoephedrine Rash     Rash then skins peels off         REVIEW OF SYSTEMS   As above in the HPI, o/w complete 12-point ROS was negative.     PHYSICAL EXAM   /79 (Cuff Size: Adult Regular)   Pulse 85   Temp 98  F (36.7  C) (Tympanic)   Resp 16   Ht 1.676 m (5' 6\")   Wt 77.1 kg (170 lb)   LMP 11/06/2019   SpO2 98%   BMI 27.44 kg/m    Limited physical exam during video visit due to COVID19 restrictions  Middle aged female in no acute distress  Breathing comfortably, no tachypnea  Speech and hearing normal  Pleasant mood and congruent affect  Moving all extremities, no focal neurologic deficits apparent       LABORATORY AND IMAGING STUDIES     Recent Labs   Lab Test 07/12/22  1223 07/11/22  0812 03/31/22  1014 01/10/22  1508 12/30/21  1642    141 138 140 139   POTASSIUM 4.1 4.0 4.1 4.1 3.8   CHLORIDE 107 108 108 109 106   CO2 27 23 20 24 26   ANIONGAP 3 10 10 7 7   BUN 22 20 27 21 17   CR 1.08* " 1.01 1.07* 0.96 1.05*   * 105* 90 96 93   AFSHAN 9.8 10.1 10.2* 9.6 9.9     Recent Labs   Lab Test 06/19/20  1853 06/15/20  0845 06/14/20  1807 01/11/20  0615 12/01/19  1340 12/01/19  0641 11/30/19  2137 11/30/19  1341 11/28/19  0537 11/27/19  2216   MAG 2.5* 2.4* 2.1 2.0  --   --   --   --   --  2.1   PHOS  --   --   --  3.1 2.8 3.4 2.8 2.5   < > 3.1    < > = values in this interval not displayed.     Recent Labs   Lab Test 07/12/22  1223 05/11/22  1351 03/31/22  1014 01/10/22  1508 12/30/21  1646 06/07/21  1430   WBC 8.2 7.7 7.7 7.9 9.7 5.4   HGB 14.8 16.0* 15.3 14.6 15.0 13.9    153 203 216 220 161   MCV 92 90 91 91 88 90   NEUTROPHIL 40  --  36 38 45 43.1     Recent Labs   Lab Test 07/12/22  1223 03/31/22  1014 03/31/22  1012 01/10/22  1508   BILITOTAL 1.1 0.7 0.7 0.6   ALKPHOS 79 78 82 96   ALT 46 51* 47 45   AST 22 30 29 25   ALBUMIN 4.7 4.5 4.5 4.5    223  --  263*     TSH   Date Value Ref Range Status   01/14/2022 2.29 0.40 - 4.00 mU/L Final   06/14/2020 3.40 0.40 - 4.00 mU/L Final   09/18/2019 2.06 0.40 - 4.00 mU/L Final   07/27/2018 2.04 0.40 - 4.00 mU/L Final     No results for input(s): CEA in the last 34477 hours.  Results for orders placed or performed during the hospital encounter of 07/12/22   CT Chest/Abdomen/Pelvis w Contrast    Narrative    CT CHEST/ABDOMEN/PELVIS WITH CONTRAST  7/12/2022 12:51 PM    CLINICAL HISTORY: Diffuse large B cell lymphoma post chemotherapy with  R-CHOP. Diffuse large B-cell lymphoma of intrathoracic lymph nodes  (H). Chronic fatigue. Mediastinal adenopathy. Chemotherapy-induced  peripheral neuropathy (H). Secondary cardiomyopathy (H). Ovarian cyst,  right.    TECHNIQUE: CT scan of the chest, abdomen, and pelvis was performed  following injection of IV contrast. Multiplanar reformats were  obtained. Dose reduction techniques were used.   CONTRAST: 85 mL Isovue-[id number]    COMPARISON: CT chest, abdomen and pelvis 4/5/2022.    FINDINGS:   LUNGS AND PLEURA: No  effusions or acute airspace disease. Stable  medial left upper lobe nodule measuring 0.3 cm, series 10 image 96.  Stable 0.1 cm left lower lobe nodule, image 129. Calcified granuloma  is stable medial right upper lobe. Mild bibasilar atelectasis.    MEDIASTINUM/AXILLAE: No enlarged thoracic lymph nodes. Stable soft  tissue along the right anterior mediastinum that is 1.9 x 0.8 cm,  series 4 image 61. No acute mediastinal abnormality.    CORONARY ARTERY CALCIFICATION: None.    HEPATOBILIARY: Several tiny hepatic cysts are stable. No new  significant hepatic lesion. Hepatic hemangioma again suggested  appearing stable medial right liver measuring 2.1 cm, image 118.  Gallbladder is unremarkable.    PANCREAS: Normal.    SPLEEN: Splenic length is 11.9 cm, previously 13 cm. No focal splenic  lesion.    ADRENAL GLANDS: Normal.    KIDNEYS/BLADDER: Normal.    BOWEL: Normal.    PELVIC ORGANS: Right ovarian/adnexal cyst is stable measuring 2.7 cm,  image 268. Uterus and left adnexa are unremarkable. Fallopian tube  clips noted.    ADDITIONAL FINDINGS: No enlarged lymph nodes identified within the  abdomen or pelvis.    MUSCULOSKELETAL: No aggressive-appearing bony lesions identified.      Impression    IMPRESSION:  1.  No new adenopathy identified. The previously described anterior  mediastinal soft tissue is stable.  2.  Smaller splenomegaly compared to 4/5/2022.  3.  Stable pulmonary nodules.    JOSUE HORTA MD         SYSTEM ID:  [id number]              ASSESSMENT AND PLAN   DLBCL, EBV+ non-GCB, stage III post 6 cycles of M-RCHOP  PJV pneumonia causing acute respiratory failure improved on bactrim and prednisone  Cardiomyopathy post adriamycin based chemotherapy likely also contributed by tachycardia    Kassie was seen in person visit and was accompanied by her , Florian. She has completed her planned chemotherapy in April 2020.     I have reviewed all of the labs done prior to this clinic visit.  Labs are all completely  normal including electrolytes, renal function, hepatic panel, complete blood count and differential except she does have borderline elevation in creatinine which is stable from the past.    I have reviewed actual images from her CT scan and there is no evidence of recurrent disease in the chest, abdomen or pelvis. She had enlarged hilar nodes and mediastinal mass, likely from reactive thymic tissue.  This has remained stable on the current imaging study.    She has mild residual splenomegaly, though her spleen size has been receding with subsequent scans including this time.  She has a stable 1.8 cm lesion in the hepatic segment 7 which is likely hemangioma.      She is feeling a lot better.  Her peripheral neuropathy has improved with adjustments to new medication including Celexa.    Her cardiac function remarkably improved from 20 to 25% ejection fraction in March to 40 to 45% in June.  Her marked fatigue is a lot better now.  She had consultations in cardiology since her last visit.  Her fatigue has been attributed to either prolonged COVID symptoms versus cardiac.  She is finally asymptomatic at this time.    All questions for patient  were answered.  She is over 2 years out from treatment completion.  We could space out her follow-up visits.  I would like to see her in 4 months with labs and restaging scans prior to clinic visit.  After the third year we will space it out every 6 months until 5 years out.    25 minutes spent on the date of the encounter doing chart review, history and exam, documentation and further activities as noted above     Castro Castro    Hematologist and Medical Oncologist  [facility]         Again, thank you for allowing me to participate in the care of your patient.        Sincerely,        Castro Castro MD

## 2022-07-15 NOTE — NURSING NOTE
"Oncology Rooming Note    July 15, 2022 3:53 PM   Kassie Ramirez is a 52 year old female who presents for:    Chief Complaint   Patient presents with     Oncology Clinic Visit     Diffuse large B-cell lymphoma of intrathoracic lymph nodes     Initial Vitals: /79 (Cuff Size: Adult Regular)   Pulse 85   Temp 98  F (36.7  C) (Tympanic)   Resp 16   Ht 1.676 m (5' 6\")   Wt 77.1 kg (170 lb)   LMP 11/06/2019   SpO2 98%   BMI 27.44 kg/m   Estimated body mass index is 27.44 kg/m  as calculated from the following:    Height as of this encounter: 1.676 m (5' 6\").    Weight as of this encounter: 77.1 kg (170 lb). Body surface area is 1.89 meters squared.  No Pain (0) Comment: Data Unavailable   Patient's last menstrual period was 11/06/2019.  Allergies reviewed: Yes  Medications reviewed: Yes    Medications: Medication refills not needed today.  Pharmacy name entered into [facility]:    [facility] - [place], [place] - [address] [place] [address] DRUG STORE #[id number] [facility] [address] [facility] [address] [facility] [address] [facility] - [place], [place] - [address]    Clinical concerns: f/u       Vy Redmond, KIMMY              "

## 2022-08-01 NOTE — PATIENT INSTRUCTIONS
11/16/22 Labs + Scans  11/18/22 Return visit with Dr. Matthew Jefferson, RN, BSN.  RN Care Coordinator     [facility]   [phone]

## 2022-08-18 ENCOUNTER — VIRTUAL VISIT (OUTPATIENT)
Dept: FAMILY MEDICINE | Facility: CLINIC | Age: 52
End: 2022-08-18
Payer: COMMERCIAL

## 2022-08-18 DIAGNOSIS — F33.0 MILD RECURRENT MAJOR DEPRESSION (H): Primary | ICD-10-CM

## 2022-08-18 DIAGNOSIS — F41.8 SITUATIONAL ANXIETY: ICD-10-CM

## 2022-08-18 PROCEDURE — 99213 OFFICE O/P EST LOW 20 MIN: CPT | Mod: GT | Performed by: PHYSICIAN ASSISTANT

## 2022-08-18 RX ORDER — DULOXETIN HYDROCHLORIDE 60 MG/1
60 CAPSULE, DELAYED RELEASE ORAL DAILY
Qty: 90 CAPSULE | Refills: 1 | Status: SHIPPED | OUTPATIENT
Start: 2022-08-18 | End: 2023-04-27

## 2022-08-18 ASSESSMENT — ANXIETY QUESTIONNAIRES
2. NOT BEING ABLE TO STOP OR CONTROL WORRYING: NOT AT ALL
IF YOU CHECKED OFF ANY PROBLEMS ON THIS QUESTIONNAIRE, HOW DIFFICULT HAVE THESE PROBLEMS MADE IT FOR YOU TO DO YOUR WORK, TAKE CARE OF THINGS AT HOME, OR GET ALONG WITH OTHER PEOPLE: NOT DIFFICULT AT ALL
1. FEELING NERVOUS, ANXIOUS, OR ON EDGE: SEVERAL DAYS
3. WORRYING TOO MUCH ABOUT DIFFERENT THINGS: NOT AT ALL
7. FEELING AFRAID AS IF SOMETHING AWFUL MIGHT HAPPEN: NOT AT ALL
6. BECOMING EASILY ANNOYED OR IRRITABLE: NOT AT ALL
5. BEING SO RESTLESS THAT IT IS HARD TO SIT STILL: SEVERAL DAYS
GAD7 TOTAL SCORE: 2
GAD7 TOTAL SCORE: 2

## 2022-08-18 ASSESSMENT — PATIENT HEALTH QUESTIONNAIRE - PHQ9
SUM OF ALL RESPONSES TO PHQ QUESTIONS 1-9: 2
5. POOR APPETITE OR OVEREATING: NOT AT ALL

## 2022-08-18 NOTE — PROGRESS NOTES
Kassie is a 52 year old who is being evaluated via a billable video visit.      How would you like to obtain your AVS? MyChart  If the video visit is dropped, the invitation should be resent by: [phone]  Will anyone else be joining your video visit? No    Assessment & Plan     Situational anxiety  Mild recurrent major depression (H)  Improved with dose adjustment of duloxetine.  Continue current regimen.  Patient will keep me apprised if further dose adjustments are needed.  - DULoxetine (CYMBALTA) 60 MG capsule  Dispense: 90 capsule; Refill: 1      Return in about 4 months (around 12/30/2022) for Physical Exam, Fasting labs, Medication recheck.    Mary Alice Colón PA-C  [facility]   Kassie is a 52 year old, presenting for the following health issues:  RECHECK (Mood)      HPI     Depression and Anxiety Follow-Up  Increase duloxetine from 30 mg to 60 mg once daily 7/13/2022.  Overall feels quite well.  Chest neuropathy has improved and is minimal.  She is very happy with this dose.  Denies any side effects.  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    How are you doing with your depression since your last visit? Improved     How are you doing with your anxiety since your last visit?  Improved     Are you having other symptoms that might be associated with depression or anxiety? No    Have you had a significant life event? No     Do you have any concerns with your use of alcohol or other drugs? No    Social History     Tobacco Use     Smoking status: Never Smoker     Smokeless tobacco: Never Used   Vaping Use     Vaping Use: Never used   Substance Use Topics     Alcohol use: No     Comment: 1 time per month     Drug use: No     PHQ 6/2/2022 7/13/2022 8/18/2022   PHQ-9 Total Score 8 3 2   Q9: Thoughts of better off dead/self-harm past 2 weeks Not at all Not at all Not at all     BRIAN-7 SCORE 6/2/2022 7/13/2022 8/18/2022   Total Score - -  -   Total Score 15 (severe anxiety) 4 (minimal anxiety) -   Total Score 15 4 2     Last PHQ-9 8/18/2022   1.  Little interest or pleasure in doing things 0   2.  Feeling down, depressed, or hopeless 0   3.  Trouble falling or staying asleep, or sleeping too much 1   4.  Feeling tired or having little energy 1   5.  Poor appetite or overeating 0   6.  Feeling bad about yourself 0   7.  Trouble concentrating 0   8.  Moving slowly or restless 0   Q9: Thoughts of better off dead/self-harm past 2 weeks 0   PHQ-9 Total Score 2   Difficulty at work, home, or with people Not difficult at all     BRIAN-7  8/18/2022   1. Feeling nervous, anxious, or on edge 1   2. Not being able to stop or control worrying 0   3. Worrying too much about different things 0   4. Trouble relaxing 0   5. Being so restless that it is hard to sit still 1   6. Becoming easily annoyed or irritable 0   7. Feeling afraid, as if something awful might happen 0   BRIAN-7 Total Score 2   If you checked any problems, how difficult have they made it for you to do your work, take care of things at home, or get along with other people? Not difficult at all       Suicide Assessment Five-step Evaluation and Treatment (SAFE-T)      How many servings of fruits and vegetables do you eat daily?  4 or more    On average, how many sweetened beverages do you drink each day (Examples: soda, juice, sweet tea, etc.  Do NOT count diet or artificially sweetened beverages)?   1    How many days per week do you exercise enough to make your heart beat faster? 5    How many minutes a day do you exercise enough to make your heart beat faster? 30 - 60    How many days per week do you miss taking your medication? 0      Review of Systems   Constitutional, HEENT, cardiovascular, pulmonary, GI, , musculoskeletal, neuro, skin, endocrine and psych systems are negative, except as otherwise noted.      Objective           Vitals:  No vitals were obtained today due to virtual  visit.    Physical Exam   GENERAL: Healthy, alert and no distress  EYES: Eyes grossly normal to inspection.  No discharge or erythema, or obvious scleral/conjunctival abnormalities.  HENT: Normal cephalic/atraumatic.  External ears, nose and mouth without ulcers or lesions.  No nasal drainage visible.  NECK: No asymmetry, visible masses or scars  RESP: No audible wheeze, cough, or visible cyanosis.  No visible retractions or increased work of breathing.    SKIN: Visible skin clear. No significant rash, abnormal pigmentation or lesions.  NEURO: Cranial nerves grossly intact.  Mentation and speech appropriate for age.  PSYCH: Mentation appears normal, affect normal/bright, judgement and insight intact, normal speech and appearance well-groomed.              Video-Visit Details    Video Start Time: 5:35 PM    Type of service:  Video Visit    Video End Time:5:55 PM    Originating Location (pt. Location): Home    Distant Location (provider location):  [facility]     Platform used for Video Visit: [email or web address]  Denise.

## 2022-09-19 ENCOUNTER — LAB (OUTPATIENT)
Dept: LAB | Facility: CLINIC | Age: 52
End: 2022-09-19
Payer: COMMERCIAL

## 2022-09-19 DIAGNOSIS — I42.8 NONISCHEMIC CARDIOMYOPATHY (H): ICD-10-CM

## 2022-09-19 LAB — HGB BLD-MCNC: 15.1 G/DL (ref 11.7–15.7)

## 2022-09-19 PROCEDURE — 36415 COLL VENOUS BLD VENIPUNCTURE: CPT

## 2022-09-19 PROCEDURE — 80048 BASIC METABOLIC PNL TOTAL CA: CPT

## 2022-09-19 PROCEDURE — 85018 HEMOGLOBIN: CPT

## 2022-09-20 ENCOUNTER — TELEPHONE (OUTPATIENT)
Dept: CARDIOLOGY | Facility: CLINIC | Age: 52
End: 2022-09-20

## 2022-09-20 LAB
ANION GAP SERPL CALCULATED.3IONS-SCNC: 10 MMOL/L (ref 3–14)
BUN SERPL-MCNC: 19 MG/DL (ref 7–30)
CALCIUM SERPL-MCNC: 9.8 MG/DL (ref 8.5–10.1)
CHLORIDE BLD-SCNC: 106 MMOL/L (ref 94–109)
CO2 SERPL-SCNC: 25 MMOL/L (ref 20–32)
CREAT SERPL-MCNC: 1.2 MG/DL (ref 0.52–1.04)
GFR SERPL CREATININE-BSD FRML MDRD: 54 ML/MIN/1.73M2
GLUCOSE BLD-MCNC: 125 MG/DL (ref 70–99)
POTASSIUM BLD-SCNC: 3.6 MMOL/L (ref 3.4–5.3)
SODIUM SERPL-SCNC: 141 MMOL/L (ref 133–144)

## 2022-09-20 NOTE — TELEPHONE ENCOUNTER
Pt's scheduling request has been forwarded to our scheduling team. Anay MALIK September 20, 2022, 1:56 PM

## 2022-09-20 NOTE — TELEPHONE ENCOUNTER
Health Call Center    Phone Message    May a detailed message be left on voicemail: yes     Reason for Call: Other: Pt called requesting to change her in clinic visit with Dr. Pearl on 9/22/22 to a telephone call. Unable to change to telephone due to schedule, please review and call pt back to discuss if able to change. Thank you!     Action Taken: Other: Cardiology    Travel Screening: Not Applicable

## 2022-09-22 ENCOUNTER — VIRTUAL VISIT (OUTPATIENT)
Dept: CARDIOLOGY | Facility: CLINIC | Age: 52
End: 2022-09-22
Attending: INTERNAL MEDICINE
Payer: COMMERCIAL

## 2022-09-22 DIAGNOSIS — I50.22 CHRONIC SYSTOLIC CONGESTIVE HEART FAILURE (H): ICD-10-CM

## 2022-09-22 DIAGNOSIS — I42.8 NONISCHEMIC CARDIOMYOPATHY (H): Primary | ICD-10-CM

## 2022-09-22 PROCEDURE — 99214 OFFICE O/P EST MOD 30 MIN: CPT | Mod: TEL | Performed by: INTERNAL MEDICINE

## 2022-09-22 NOTE — CONFIDENTIAL NOTE
"Kassie is a 52 year old who is being evaluated via a billable telephone visit.      What phone number would you like to be contacted at? [phone]  How would you like to obtain your AVS? Jose      Vitals - Patient Reported  Systolic (Patient Reported): 116  Diastolic (Patient Reported): 78  Weight (Patient Reported): 75.8 kg (167 lb)  Height (Patient Reported): 167.6 cm (5' 6\")  BMI (Based on Pt Reported Ht/Wt): 26.95          Review Of Systems  Respiratory: NEGATIVE  Cardiovascular:NEGATIVE  Gastrointestinal: Dizziness occ.  Genitourinary: Heart burn occ.      "

## 2022-09-22 NOTE — PROGRESS NOTES
HPI and Plan:   See dictation    Today's clinic visit entailed:  Review of the result(s) of each unique test - bmp, hgb, echo  Ordering of each unique test  Prescription drug management  30 minutes spent on the date of the encounter doing chart review, review of test results, patient visit and documentation   Provider  Link to [facility] Help Grid     The level of medical decision making during this visit was of moderate complexity.      No orders of the defined types were placed in this encounter.      No orders of the defined types were placed in this encounter.      There are no discontinued medications.      Encounter Diagnoses   Name Primary?     Nonischemic cardiomyopathy (H) Yes     Chronic systolic congestive heart failure (H)        CURRENT MEDICATIONS:  Current Outpatient Medications   Medication Sig Dispense Refill     albuterol (PROAIR HFA/PROVENTIL HFA/VENTOLIN HFA) 108 (90 Base) MCG/ACT inhaler Inhale 2 puffs into the lungs every 6 hours as needed for shortness of breath / dyspnea or wheezing 18 g 0     alpha-lipoic acid 600 MG capsule Take 1 capsule (600 mg) by mouth daily 30 capsule 0     carvedilol (COREG) 6.25 MG tablet Take 1 tablet (6.25 mg) by mouth 2 times daily (with meals) 180 tablet 3     cetirizine (ZYRTEC) 10 MG tablet Take 10 mg by mouth daily       DULoxetine (CYMBALTA) 60 MG capsule Take 1 capsule (60 mg) by mouth daily 90 capsule 1     empagliflozin (JARDIANCE) 10 MG TABS tablet Take 1 tablet (10 mg) by mouth daily 90 tablet 3     LORazepam (ATIVAN) 0.5 MG tablet TAKE 1 TABLET BY MOUTH AT BEDTIME FOR SLEEP AND ANXIETY. 30 tablet 0     rosuvastatin (CRESTOR) 10 MG tablet Take 1 tablet (10 mg) by mouth daily 90 tablet 3     spironolactone (ALDACTONE) 25 MG tablet Take 1 tablet (25 mg) by mouth daily 90 tablet 3       ALLERGIES     Allergies   Allergen Reactions     Cold & Flu [Cold Defense Fighter]      See pseudoephedrine     Seasonal Allergies      Sudafed Cold-Cough [Dayquil Liquicaps]       Pseudoephedrine Rash     Rash then skins peels off        PAST MEDICAL HISTORY:  Past Medical History:   Diagnosis Date     Anxiety attack 09/16/2014     Cardiomyopathy (H)     non ishemic - 25-30% - Chemo related     Congestive heart failure (H) 02/2019    While in hospital for high dose Methotrexate     Depressive disorder 2003    taking Celexa since 2014     Diffuse large B-cell lymphoma (H)     Diagnosed 11/2019, Ronan Albarran     Encounter for Essure implantation 2009     Generalized anxiety disorder 09/16/2014    zoloft = flat emotions     HFrEF (heart failure with reduced ejection fraction) (H)     new diagnosis 6/14     History of blood transfusion 12/2019    Given many throughout cancer treatment     Menopausal disorder     started on OCPs by menopause center 3/2017 (takes active continuously)     Menstrual headache     helped by OCPs and magnesium     Paroxysmal SVT (supraventricular tachycardia) (H) 06/2020     GERTRUDE (stress urinary incontinence, female)     sling procedure 2016       PAST SURGICAL HISTORY:  Past Surgical History:   Procedure Laterality Date     CV CORONARY ANGIOGRAM N/A 06/15/2020    Procedure: Coronary Angiogram;  Surgeon: Luis Eduardo Phillips MD;  Location:  HEART CARDIAC CATH LAB     CV LEFT HEART CATH N/A 06/15/2020    Procedure: Left Heart Cath;  Surgeon: Luis Eduardo Phillips MD;  Location:  HEART CARDIAC CATH LAB     ENT SURGERY  1990    left eardrum rebuilt due to injury     EP ABLATION SVT N/A 06/22/2020    Procedure: EP Ablation SVT;  Surgeon: Francisco Javier Bynum MD;  Location:  HEART CARDIAC CATH LAB      KIT ESSURE  2009    essure - Dr. Cailin Raymundo      HERNIORRHAPHY UMBILICAL  1974     IR CHEST PORT PLACEMENT > 5 YRS OF AGE  01/09/2020     IR PORT REMOVAL RIGHT  04/05/2021     mediastinoscopy  2019     SLING TRANSPUBO WITHOUT ANTERIOR COLPORRHAPHY N/A 11/21/2016    Procedure: SLING TRANSPUBO WITHOUT ANTERIOR COLPORRHAPHY;  Surgeon: Hernesto Berrios MD;  Location:  OR        FAMILY HISTORY:  Family History   Problem Relation Age of Onset     Hypertension Mother          Lung Cancer 2013     Depression Mother          Lung Cancer 2013     Lipids Mother      Cardiovascular Mother      Circulatory Mother      Diabetes Mother          Lung Cancer 2013     Heart Disease Mother      Cerebrovascular Disease Mother          Lung Cancer 2013     Obesity Mother          Lung Cancer 2013     C.A.D. Mother      Lung Cancer Mother         smoker     Macular Degeneration Father         Stargardt     Genetic Disorder Father         eye disease     Diabetes Maternal Aunt         type 2     Hypertension Maternal Aunt      Heart Disease Maternal Grandfather      Macular Degeneration Sister         Stargardt     Hyperlipidemia Sister         high cholesterol       Genetic Disorder Sister         eye disease     LUNG DISEASE No family hx of        SOCIAL HISTORY:  Social History     Socioeconomic History     Marital status:      Spouse name: Florian     Number of children: 2     Years of education: 16      Highest education level: None   Occupational History     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     Other Topics Concern     Parent/sibling w/ CABG, MI or angioplasty before 65F 55M? No     Caffeine Concern Yes     Comment: MOnster energy drink.   Te - 3 cups.        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      Social Determinants of Health     Intimate Partner Violence: Not At Risk     Fear of Current or Ex-Partner: No     Emotionally Abused: No     Physically Abused: No     Sexually Abused: No       Review of Systems:  Skin:          Eyes:         ENT:         Respiratory:          Cardiovascular:         Gastroenterology:        Genitourinary:         Musculoskeletal:         Neurologic:         Psychiatric:         Heme/Lymph/Imm:         Endocrine:           Physical Exam:  Vitals: LMP 11/06/2019     Constitutional:           Skin:             Head:           Eyes:           Lymph:      ENT:           Neck:           Respiratory:            Cardiac:                                                           GI:           Extremities and Muscular Skeletal:                 Neurological:           Psych:           CC  Trevon Pearl MD  [address]  [place] [address]

## 2022-09-22 NOTE — PROGRESS NOTES
Service Date: 09/22/2022    TELEPHONE VISIT     CARDIOLOGY OFFICE PROGRESS NOTE    PRIMARY CARE PROVIDER:  Mary Alice Colón PA-C    HISTORY OF PRESENT ILLNESS:  I had the opportunity to see Ms. Kassie Ramirez in Cardiology Clinic today at [facility] in [place] for reevaluation of nonischemic cardiomyopathy thought to be due to chemotherapy used for treating large B-cell lymphoma in 2019.  I last saw her on 06/22/2022.  I am now conducting a telephone visit with her today to reassess the medical therapy that we initiated at that time.  I will refer to my previous notes for some of the background information.      I started her on Jardiance 10 mg a day and increased her carvedilol to 6.25 mg p.o. b.i.d.  She did not tolerate lisinopril in the past due to low blood pressures, and I decided not to reconsider an ARB, ACE inhibitor or ARNI.  She continued spironolactone.    Since then, she has done well.  She has been exercising more regularly and has better stamina.  She has less fatigue as well.  She is now working full days and is tired by the end of the day, but is managing to complete the day a bit better with more energy than before.  She has a little bit of heartburn, and she is not clear why that is occurring.  She has some mild lightheadedness from time to time, but this is not consistent.  She has had no palpitations or syncope.    Her laboratory studies show a basic metabolic panel with a mildly increased creatinine of 1.20, up from her baseline of 1.0-1.1.  Her hemoglobin is normal.    PHYSICAL EXAMINATION:  Today her blood pressure is 110/68, heart rate 86 and weight 171 pounds on 07/13/2022.    IMPRESSIONS:  Ms. Kassie Ramirez is a 52-year-old woman with nonischemic cardiomyopathy thought to be due to Adriamycin toxicity used for treating lymphoma in 2019.  Her last ejection fraction was up to 40%-45% in 06/2022.  She has minimal if any congestive heart failure symptoms at this time.  She has  been increasing her exercise level and is having less fatigue.  She had 2 episodes of COVID over the last 12 months, and I have wondered whether some of her severe fatigue might be related to long COVID.  In any case, she tolerated the addition of Jardiance quite well with no additional side effects.  I also increased her carvedilol.  I have not tried to reintroduce an ACE inhibitor, ARB or ARNI due to issues with low blood pressure with this in the past.    I encouraged her to hydrate more consistently throughout the day, which should help her renal function and her episodes of lightheadedness. I will plan to meet with her in December, repeat an echocardiogram and reconsider the ACE inhibitor, ARNI or ARB class at that time.    cc:  Mary Alice Colón PA-C  [address] [address]    Trevon Pearl MD, [facility]        D: 2022   T: 2022   MT: xochilt    Name:     BABAR VARGHESEDenise  MRN:      [id number]        Account:      [id number]   :      1970           Service Date: 2022       Document: [id number]

## 2022-09-22 NOTE — LETTER
9/22/2022    Mary Alice Colón PA-C  [address] [facility] [address]    RE: Kassie Ramirez       Dear Colleague,     I had the pleasure of seeing Kassie Ramirez in the [facility].  HPI and Plan:   See dictation    Today's clinic visit entailed:  Review of the result(s) of each unique test - bmp, hgb, echo  Ordering of each unique test  Prescription drug management  30 minutes spent on the date of the encounter doing chart review, review of test results, patient visit and documentation   Provider  Link to [facility] Help Grid     The level of medical decision making during this visit was of moderate complexity.      No orders of the defined types were placed in this encounter.      No orders of the defined types were placed in this encounter.      There are no discontinued medications.      Encounter Diagnoses   Name Primary?     Nonischemic cardiomyopathy (H) Yes     Chronic systolic congestive heart failure (H)        CURRENT MEDICATIONS:  Current Outpatient Medications   Medication Sig Dispense Refill     albuterol (PROAIR HFA/PROVENTIL HFA/VENTOLIN HFA) 108 (90 Base) MCG/ACT inhaler Inhale 2 puffs into the lungs every 6 hours as needed for shortness of breath / dyspnea or wheezing 18 g 0     alpha-lipoic acid 600 MG capsule Take 1 capsule (600 mg) by mouth daily 30 capsule 0     carvedilol (COREG) 6.25 MG tablet Take 1 tablet (6.25 mg) by mouth 2 times daily (with meals) 180 tablet 3     cetirizine (ZYRTEC) 10 MG tablet Take 10 mg by mouth daily       DULoxetine (CYMBALTA) 60 MG capsule Take 1 capsule (60 mg) by mouth daily 90 capsule 1     empagliflozin (JARDIANCE) 10 MG TABS tablet Take 1 tablet (10 mg) by mouth daily 90 tablet 3     LORazepam (ATIVAN) 0.5 MG tablet TAKE 1 TABLET BY MOUTH AT BEDTIME FOR SLEEP AND ANXIETY. 30 tablet 0     rosuvastatin (CRESTOR) 10 MG tablet Take 1 tablet (10 mg) by mouth daily 90 tablet 3     spironolactone (ALDACTONE) 25 MG tablet Take 1 tablet  (25 mg) by mouth daily 90 tablet 3       ALLERGIES     Allergies   Allergen Reactions     Cold & Flu [Cold Defense Fighter]      See pseudoephedrine     Seasonal Allergies      Sudafed Cold-Cough [Dayquil Liquicaps]      Pseudoephedrine Rash     Rash then skins peels off        PAST MEDICAL HISTORY:  Past Medical History:   Diagnosis Date     Anxiety attack 09/16/2014     Cardiomyopathy (H)     non ishemic - 25-30% - Chemo related     Congestive heart failure (H) 02/2019    While in hospital for high dose Methotrexate     Depressive disorder 2003    taking Celexa since 2014     Diffuse large B-cell lymphoma (H)     Diagnosed 11/2019, Ronan Albarran     Encounter for Essure implantation 2009     Generalized anxiety disorder 09/16/2014    zoloft = flat emotions     HFrEF (heart failure with reduced ejection fraction) (H)     new diagnosis 6/14     History of blood transfusion 12/2019    Given many throughout cancer treatment     Menopausal disorder     started on OCPs by menopause center 3/2017 (takes active continuously)     Menstrual headache     helped by OCPs and magnesium     Paroxysmal SVT (supraventricular tachycardia) (H) 06/2020     GERTRUDE (stress urinary incontinence, female)     sling procedure 2016       PAST SURGICAL HISTORY:  Past Surgical History:   Procedure Laterality Date     CV CORONARY ANGIOGRAM N/A 06/15/2020    Procedure: Coronary Angiogram;  Surgeon: Luis Eduardo Phillips MD;  Location:  HEART CARDIAC CATH LAB     CV LEFT HEART CATH N/A 06/15/2020    Procedure: Left Heart Cath;  Surgeon: Luis Eduardo Phillips MD;  Location:  HEART CARDIAC CATH LAB     ENT SURGERY  1990    left eardrum rebuilt due to injury     EP ABLATION SVT N/A 06/22/2020    Procedure: EP Ablation SVT;  Surgeon: Francisco Javier Bynum MD;  Location:  HEART CARDIAC CATH LAB      KIT ESSURE  2009    essure - Dr. Cailin Raymundo      HERNIORRHAPHY UMBILICAL  1974     IR CHEST PORT PLACEMENT > 5 YRS OF AGE  01/09/2020     IR PORT REMOVAL  RIGHT  2021     mediastinoscopy       SLING TRANSPUBO WITHOUT ANTERIOR COLPORRHAPHY N/A 2016    Procedure: SLING TRANSPUBO WITHOUT ANTERIOR COLPORRHAPHY;  Surgeon: Hernesto Berrios MD;  Location: [facility] OR       FAMILY HISTORY:  Family History   Problem Relation Age of Onset     Hypertension Mother          Lung Cancer 2013     Depression Mother          Lung Cancer 2013     Lipids Mother      Cardiovascular Mother      Circulatory Mother      Diabetes Mother          Lung Cancer 2013     Heart Disease Mother      Cerebrovascular Disease Mother          Lung Cancer 2013     Obesity Mother          Lung Cancer 2013     C.A.D. Mother      Lung Cancer Mother         smoker     Macular Degeneration Father         Stargardt     Genetic Disorder Father         eye disease     Diabetes Maternal Aunt         type 2     Hypertension Maternal Aunt      Heart Disease Maternal Grandfather      Macular Degeneration Sister         Stargardt     Hyperlipidemia Sister         high cholesterol       Genetic Disorder Sister         eye disease     LUNG DISEASE No family hx of        SOCIAL HISTORY:  Social History     Socioeconomic History     Marital status:      Spouse name: Florian     Number of children: 2     Years of education: 16      Highest education level: None   Occupational History     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     Other Topics Concern     Parent/sibling w/ CABG, MI or angioplasty before 65F 55M? No      Caffeine Concern Yes     Comment: MOnster energy drink.   Te - 3 cups.        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      Social Determinants of Health     Intimate Partner Violence: Not At Risk     Fear of Current or Ex-Partner: No     Emotionally Abused: No     Physically Abused: No     Sexually Abused: No       Review of Systems:  Skin:          Eyes:         ENT:         Respiratory:          Cardiovascular:         Gastroenterology:        Genitourinary:         Musculoskeletal:         Neurologic:         Psychiatric:         Heme/Lymph/Imm:         Endocrine:           Physical Exam:  Vitals: LMP 11/06/2019     Constitutional:           Skin:             Head:           Eyes:           Lymph:      ENT:           Neck:           Respiratory:            Cardiac:                                                           GI:           Extremities and Muscular Skeletal:                 Neurological:           Psych:           CC  Trevon Pearl MD  [address]  [place]  [place] [address]                Service Date: 09/22/2022    TELEPHONE VISIT     CARDIOLOGY OFFICE PROGRESS NOTE    PRIMARY CARE PROVIDER:  Mary Alice Colón PA-C    HISTORY OF PRESENT ILLNESS:  I had the opportunity to see Ms. Kassie Ramirez in Cardiology Clinic today at [facility] in [place] for reevaluation of nonischemic cardiomyopathy thought to be due to chemotherapy used for treating large B-cell lymphoma in 2019.  I last saw her on 06/22/2022.  I am now conducting a telephone visit with her today to reassess the medical therapy that we initiated at that time.  I will refer to my previous notes for some of the background information.      I started her on Jardiance 10 mg a day and increased her carvedilol to 6.25 mg p.o. b.i.d.  She did not tolerate lisinopril in the past due to  low blood pressures, and I decided not to reconsider an ARB, ACE inhibitor or ARNI.  She continued spironolactone.    Since then, she has done well.  She has been exercising more regularly and has better stamina.  She has less fatigue as well.  She is now working full days and is tired by the end of the day, but is managing to complete the day a bit better with more energy than before.  She has a little bit of heartburn, and she is not clear why that is occurring.  She has some mild lightheadedness from time to time, but this is not consistent.  She has had no palpitations or syncope.    Her laboratory studies show a basic metabolic panel with a mildly increased creatinine of 1.20, up from her baseline of 1.0-1.1.  Her hemoglobin is normal.    PHYSICAL EXAMINATION:  Today her blood pressure is 110/68, heart rate 86 and weight 171 pounds on 07/13/2022.    IMPRESSIONS:  Ms. Kassie Ramirez is a 52-year-old woman with nonischemic cardiomyopathy thought to be due to Adriamycin toxicity used for treating lymphoma in 2019.  Her last ejection fraction was up to 40%-45% in 06/2022.  She has minimal if any congestive heart failure symptoms at this time.  She has been increasing her exercise level and is having less fatigue.  She had 2 episodes of COVID over the last 12 months, and I have wondered whether some of her severe fatigue might be related to long COVID.  In any case, she tolerated the addition of Jardiance quite well with no additional side effects.  I also increased her carvedilol.  I have not tried to reintroduce an ACE inhibitor, ARB or ARNI due to issues with low blood pressure with this in the past.    I encouraged her to hydrate more consistently throughout the day, which should help her renal function and her episodes of lightheadedness. I will plan to meet with her in December, repeat an echocardiogram and reconsider the ACE inhibitor, ARNI or ARB class at that time.    cc:  ALEXANDER Bray  [address] [address]    Ashley Pearl MD, [facility]        D: 2022   T: 2022   MT: xochilt    Name:     BABAR VARGHESE  MRN:      [id number]        Account:      [id number]   :      1970           Service Date: 2022       Document: [id number]      Thank you for allowing me to participate in the care of your patient.      Sincerely,     ASHLEY PEARL MD     [facility]  cc:   Ashley Pearl MD  [address] [place] [id number]

## 2022-11-13 DIAGNOSIS — C83.398 DIFFUSE LARGE B-CELL LYMPHOMA OF EXTRANODAL SITE: ICD-10-CM

## 2022-11-13 DIAGNOSIS — F41.8 SITUATIONAL ANXIETY: ICD-10-CM

## 2022-11-13 DIAGNOSIS — Z79.899 CONTROLLED SUBSTANCE AGREEMENT SIGNED: ICD-10-CM

## 2022-11-14 ENCOUNTER — HOSPITAL ENCOUNTER (OUTPATIENT)
Dept: CT IMAGING | Facility: CLINIC | Age: 52
Discharge: HOME | End: 2022-11-14
Attending: INTERNAL MEDICINE | Admitting: INTERNAL MEDICINE
Payer: COMMERCIAL

## 2022-11-14 ENCOUNTER — LAB (OUTPATIENT)
Dept: INFUSION THERAPY | Facility: CLINIC | Age: 52
End: 2022-11-14
Attending: INTERNAL MEDICINE
Payer: COMMERCIAL

## 2022-11-14 DIAGNOSIS — C83.32 DIFFUSE LARGE B-CELL LYMPHOMA OF INTRATHORACIC LYMPH NODES (H): ICD-10-CM

## 2022-11-14 DIAGNOSIS — I42.9 SECONDARY CARDIOMYOPATHY (H): ICD-10-CM

## 2022-11-14 DIAGNOSIS — R53.82 CHRONIC FATIGUE: ICD-10-CM

## 2022-11-14 DIAGNOSIS — C83.398 DIFFUSE LARGE B-CELL LYMPHOMA OF EXTRANODAL SITE: ICD-10-CM

## 2022-11-14 LAB
ALBUMIN SERPL BCG-MCNC: 4.8 G/DL (ref 3.5–5.2)
ALP SERPL-CCNC: 89 U/L (ref 35–104)
ALT SERPL W P-5'-P-CCNC: 40 U/L (ref 10–35)
ANION GAP SERPL CALCULATED.3IONS-SCNC: 14 MMOL/L (ref 7–15)
AST SERPL W P-5'-P-CCNC: 25 U/L (ref 10–35)
BASOPHILS # BLD AUTO: 0.1 10E3/UL (ref 0–0.2)
BASOPHILS NFR BLD AUTO: 1 %
BILIRUB SERPL-MCNC: 0.7 MG/DL
BUN SERPL-MCNC: 21.4 MG/DL (ref 6–20)
CALCIUM SERPL-MCNC: 9.8 MG/DL (ref 8.6–10)
CHLORIDE SERPL-SCNC: 104 MMOL/L (ref 98–107)
CREAT SERPL-MCNC: 1 MG/DL (ref 0.51–0.95)
DEPRECATED HCO3 PLAS-SCNC: 22 MMOL/L (ref 22–29)
EOSINOPHIL # BLD AUTO: 2.3 10E3/UL (ref 0–0.7)
EOSINOPHIL NFR BLD AUTO: 27 %
ERYTHROCYTE [DISTWIDTH] IN BLOOD BY AUTOMATED COUNT: 12.3 % (ref 10–15)
GFR SERPL CREATININE-BSD FRML MDRD: 67 ML/MIN/1.73M2
GLUCOSE SERPL-MCNC: 121 MG/DL (ref 70–99)
HCT VFR BLD AUTO: 46.3 % (ref 35–47)
HGB BLD-MCNC: 15.5 G/DL (ref 11.7–15.7)
IMM GRANULOCYTES # BLD: 0 10E3/UL
IMM GRANULOCYTES NFR BLD: 0 %
LDH SERPL L TO P-CCNC: 229 U/L (ref 0–250)
LYMPHOCYTES # BLD AUTO: 2.3 10E3/UL (ref 0.8–5.3)
LYMPHOCYTES NFR BLD AUTO: 28 %
MCH RBC QN AUTO: 31.3 PG (ref 26.5–33)
MCHC RBC AUTO-ENTMCNC: 33.5 G/DL (ref 31.5–36.5)
MCV RBC AUTO: 94 FL (ref 78–100)
MONOCYTES # BLD AUTO: 0.5 10E3/UL (ref 0–1.3)
MONOCYTES NFR BLD AUTO: 6 %
NEUTROPHILS # BLD AUTO: 3.1 10E3/UL (ref 1.6–8.3)
NEUTROPHILS NFR BLD AUTO: 38 %
NRBC # BLD AUTO: 0 10E3/UL
NRBC BLD AUTO-RTO: 0 /100
PLATELET # BLD AUTO: 178 10E3/UL (ref 150–450)
POTASSIUM SERPL-SCNC: 4.1 MMOL/L (ref 3.4–5.3)
PROT SERPL-MCNC: 6.7 G/DL (ref 6.4–8.3)
RBC # BLD AUTO: 4.95 10E6/UL (ref 3.8–5.2)
SODIUM SERPL-SCNC: 140 MMOL/L (ref 136–145)
WBC # BLD AUTO: 8.2 10E3/UL (ref 4–11)

## 2022-11-14 PROCEDURE — 83615 LACTATE (LD) (LDH) ENZYME: CPT | Performed by: INTERNAL MEDICINE

## 2022-11-14 PROCEDURE — 80053 COMPREHEN METABOLIC PANEL: CPT | Performed by: INTERNAL MEDICINE

## 2022-11-14 PROCEDURE — 36415 COLL VENOUS BLD VENIPUNCTURE: CPT

## 2022-11-14 PROCEDURE — 258N000003 HC RX IP 258 OP 636: Performed by: INTERNAL MEDICINE

## 2022-11-14 PROCEDURE — 74177 CT ABD & PELVIS W/CONTRAST: CPT

## 2022-11-14 PROCEDURE — 85025 COMPLETE CBC W/AUTO DIFF WBC: CPT | Performed by: INTERNAL MEDICINE

## 2022-11-14 PROCEDURE — 250N000011 HC RX IP 250 OP 636: Performed by: INTERNAL MEDICINE

## 2022-11-14 RX ORDER — IOPAMIDOL 755 MG/ML
500 INJECTION, SOLUTION INTRAVASCULAR ONCE
Status: COMPLETED | OUTPATIENT
Start: 2022-11-14 | End: 2022-11-14

## 2022-11-14 RX ORDER — LORAZEPAM 0.5 MG/1
TABLET ORAL
Qty: 30 TABLET | Refills: 0 | Status: SHIPPED | OUTPATIENT
Start: 2022-11-14 | End: 2023-03-06

## 2022-11-14 RX ADMIN — IOPAMIDOL 83 ML: 755 INJECTION, SOLUTION INTRAVENOUS at 08:40

## 2022-11-14 RX ADMIN — SODIUM CHLORIDE 61 ML: 9 INJECTION, SOLUTION INTRAVENOUS at 08:41

## 2022-11-14 NOTE — PROGRESS NOTES
Nursing Note:  Kassie Ramirez presents today for PIV start for scans and labs.    Patient seen by provider today: No   present during visit today: Not Applicable.    Note: N/A.    Intravenous Access:  Lab draw site RAC, Needle type angiocath, Gauge 20.  Labs drawn without difficulty.  Peripheral IV placed.    Discharge Plan:   Patient was sent to radiology for imaging appointment.    TROY THOMPSON RN

## 2022-11-18 ENCOUNTER — ONCOLOGY VISIT (OUTPATIENT)
Dept: ONCOLOGY | Facility: CLINIC | Age: 52
End: 2022-11-18
Attending: INTERNAL MEDICINE
Payer: COMMERCIAL

## 2022-11-18 VITALS
DIASTOLIC BLOOD PRESSURE: 80 MMHG | TEMPERATURE: 97 F | OXYGEN SATURATION: 98 % | HEIGHT: 66 IN | SYSTOLIC BLOOD PRESSURE: 116 MMHG | RESPIRATION RATE: 16 BRPM | WEIGHT: 172 LBS | HEART RATE: 88 BPM | BODY MASS INDEX: 27.64 KG/M2

## 2022-11-18 DIAGNOSIS — I42.9 SECONDARY CARDIOMYOPATHY (H): ICD-10-CM

## 2022-11-18 DIAGNOSIS — C83.32 DIFFUSE LARGE B-CELL LYMPHOMA OF INTRATHORACIC LYMPH NODES (H): Primary | ICD-10-CM

## 2022-11-18 DIAGNOSIS — R53.82 CHRONIC FATIGUE: ICD-10-CM

## 2022-11-18 PROCEDURE — G0463 HOSPITAL OUTPT CLINIC VISIT: HCPCS

## 2022-11-18 PROCEDURE — 99214 OFFICE O/P EST MOD 30 MIN: CPT | Performed by: INTERNAL MEDICINE

## 2022-11-18 ASSESSMENT — PAIN SCALES - GENERAL: PAINLEVEL: NO PAIN (0)

## 2022-11-18 NOTE — NURSING NOTE
"Oncology Rooming Note    November 18, 2022 3:54 PM   Kassie Ramirez is a 52 year old female who presents for:    Chief Complaint   Patient presents with     Oncology Clinic Visit     Diffuse large B-cell lymphoma of intrathoracic lymph nodes     Initial Vitals: /80 (Cuff Size: Adult Regular)   Pulse 88   Temp 97  F (36.1  C) (Tympanic)   Resp 16   Ht 1.676 m (5' 6\")   Wt 78 kg (172 lb)   LMP 11/06/2019   SpO2 98%   BMI 27.76 kg/m   Estimated body mass index is 27.76 kg/m  as calculated from the following:    Height as of this encounter: 1.676 m (5' 6\").    Weight as of this encounter: 78 kg (172 lb). Body surface area is 1.91 meters squared.  No Pain (0) Comment: Data Unavailable   Patient's last menstrual period was 11/06/2019.  Allergies reviewed: Yes  Medications reviewed: Yes    Medications: Medication refills not needed today.  Pharmacy name entered into [employer]:    [facility] - [place], [place] - [address] [place] [address]      Clinical concerns: f/u       Vy Redmond, KIMMY              "

## 2022-11-18 NOTE — LETTER
11/18/2022         RE: Kassie Ramirez  [address]  [facility] [address]        Dear Colleague,    Thank you for referring your patient, Kassie Ramirez, to the [facility]. Please see a copy of my visit note below.    [facility]  HEMATOLOGY AND ONCOLOGY    FOLLOW-UP VISIT NOTE    PATIENT NAME: Kassie Ramirez MRN # [id number]  DATE OF VISIT: Nov 18, 2022 YOB: 1970    REFERRING PROVIDER: No referring provider defined for this encounter.    CANCER TYPE: DLBCL, EBV+ non-GCB, stage III.  STAGE: III    TREATMENT SUMMARY:  She presented with shortness of breath and cough which had been present since May 2019. Her imaging suggested pulmonary infiltrates which was attributed to aspiration pneumonia. CT scan of the chest 8/20/2019 showed left hilar and subcarinal mediastinal adenopathy with narrowing of the left lower lobe bronchus and a nonspecific patchy area of nodular consolidation in the medial right lower lobe.  Bronchoscopy with EBUS 8/28/2019 showed nonnecrotizing granulomatous inflammation with prominent eosinophilia, negative for malignancy.  She was diagnosed with sarcoidosis and started on prednisone. She had worsening cough and shortness of breath with tapering of the prednisone.  Repeat CT scan of the chest 11/18/2019 showed interval worsening of the bulky mediastinal and bilateral hilar lymphadenopathy, near complete resolution of the lower lobe opacities, with new interstitial opacities in the right upper lobe.   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 Large mediastinal mass causing respiratory compromise. . IPI score of 2 (low-intermediate). FISH neg for high risk translocations. She received 6 cycles of R-CHOP with intermediate dose methotrexate after cycles 2, 4 and 6 from November through April  2020.      CURRENT INTERVENTIONS:  Surveillance post MR-CHOP    SUBJECTIVE   Kassie Ramirez is being followed for DLBCL, EBV+ non-GCB, stage III intermediate risk disease    Patient was followed in person. She has completed all of her planned cycles of MR-CHOP chemotherapy and is being seen with labs and restaging scans.     She had not been doing well since completion of her therapy for lymphoma.  She had struggled with cardiomyopathy post adriamycin. She is doing much better at this visit. She has no new complaints since last visit.  Her energy has improved.    Her peripheral neuropathy is better now.  Her medications have been adjusted.      PAST MEDICAL HISTORY     Past Medical History:   Diagnosis Date     Anxiety attack 09/16/2014     Cardiomyopathy (H)     non ishemic - 25-30% - Chemo related     Congestive heart failure (H) 02/2019    While in hospital for high dose Methotrexate     Depressive disorder 2003    taking Celexa since 2014     Diffuse large B-cell lymphoma (H)     Diagnosed 11/2019, Ronan Albarran     Encounter for Essure implantation 2009     Generalized anxiety disorder 09/16/2014    zoloft = flat emotions     HFrEF (heart failure with reduced ejection fraction) (H)     new diagnosis 6/14     History of blood transfusion 12/2019    Given many throughout cancer treatment     Menopausal disorder     started on OCPs by menopause center 3/2017 (takes active continuously)     Menstrual headache     helped by OCPs and magnesium     Paroxysmal SVT (supraventricular tachycardia) (H) 06/2020     GERTRUDE (stress urinary incontinence, female)     sling procedure 2016         CURRENT OUTPATIENT MEDICATIONS     Current Outpatient Medications   Medication Sig     albuterol (PROAIR HFA/PROVENTIL HFA/VENTOLIN HFA) 108 (90 Base) MCG/ACT inhaler Inhale 2 puffs into the lungs every 6 hours as needed for shortness of breath / dyspnea or wheezing     alpha-lipoic acid 600 MG capsule Take 1 capsule (600 mg) by mouth  "daily     carvedilol (COREG) 6.25 MG tablet Take 1 tablet (6.25 mg) by mouth 2 times daily (with meals)     cetirizine (ZYRTEC) 10 MG tablet Take 10 mg by mouth daily     DULoxetine (CYMBALTA) 60 MG capsule Take 1 capsule (60 mg) by mouth daily     empagliflozin (JARDIANCE) 10 MG TABS tablet Take 1 tablet (10 mg) by mouth daily     LORazepam (ATIVAN) 0.5 MG tablet TAKE 1 TABLET BY MOUTH AT BEDTIME FOR SLEEP AND ANXIETY.     rosuvastatin (CRESTOR) 10 MG tablet Take 1 tablet (10 mg) by mouth daily     spironolactone (ALDACTONE) 25 MG tablet Take 1 tablet (25 mg) by mouth daily     No current facility-administered medications for this visit.        ALLERGIES      Allergies   Allergen Reactions     Cold & Flu [Cold Defense Fighter]      See pseudoephedrine     Seasonal Allergies      Sudafed Cold-Cough [Dayquil Liquicaps]      Pseudoephedrine Rash     Rash then skins peels off         REVIEW OF SYSTEMS   As above in the HPI, o/w complete 12-point ROS was negative.     PHYSICAL EXAM   /80 (Cuff Size: Adult Regular)   Pulse 88   Temp 97  F (36.1  C) (Tympanic)   Resp 16   Ht 1.676 m (5' 6\")   Wt 78 kg (172 lb)   LMP 11/06/2019   SpO2 98%   BMI 27.76 kg/m    Limited physical exam during video visit due to COVID19 restrictions  Middle aged female in no acute distress  Breathing comfortably, no tachypnea  Speech and hearing normal  Pleasant mood and congruent affect  Moving all extremities, no focal neurologic deficits apparent       LABORATORY AND IMAGING STUDIES     Recent Labs   Lab Test 11/14/22  0817 09/19/22  1653 07/12/22  1223 07/11/22  0812 03/31/22  1014    141 137 141 138   POTASSIUM 4.1 3.6 4.1 4.0 4.1   CHLORIDE 104 106 107 108 108   CO2 22 25 27 23 20   ANIONGAP 14 10 3 10 10   BUN 21.4* 19 22 20 27   CR 1.00* 1.20* 1.08* 1.01 1.07*   * 125* 102* 105* 90   AFSHAN 9.8 9.8 9.8 10.1 10.2*     Recent Labs   Lab Test 06/19/20  1853 06/15/20  0845 06/14/20  1807 01/11/20  0615 12/01/19  1340 " 12/01/19  0641 11/30/19  2137 11/30/19  1341 11/28/19  0537 11/27/19  2216   MAG 2.5* 2.4* 2.1 2.0  --   --   --   --   --  2.1   PHOS  --   --   --  3.1 2.8 3.4 2.8 2.5   < > 3.1    < > = values in this interval not displayed.     Recent Labs   Lab Test 11/14/22  0817 09/19/22  1653 07/12/22  1223 05/11/22  1351 03/31/22  1014 01/10/22  1508 12/30/21  1646   WBC 8.2  --  8.2 7.7 7.7 7.9 9.7   HGB 15.5 15.1 14.8 16.0* 15.3 14.6 15.0     --  190 153 203 216 220   MCV 94  --  92 90 91 91 88   NEUTROPHIL 38  --  40  --  36 38 45     Recent Labs   Lab Test 11/14/22  0817 07/12/22  1223 03/31/22  1014   BILITOTAL 0.7 1.1 0.7   ALKPHOS 89 79 78   ALT 40* 46 51*   AST 25 22 30   ALBUMIN 4.8 4.7 4.5    215 223     TSH   Date Value Ref Range Status   01/14/2022 2.29 0.40 - 4.00 mU/L Final   06/14/2020 3.40 0.40 - 4.00 mU/L Final   09/18/2019 2.06 0.40 - 4.00 mU/L Final   07/27/2018 2.04 0.40 - 4.00 mU/L Final     No results for input(s): CEA in the last 07986 hours.  Results for orders placed or performed during the hospital encounter of 11/14/22   CT Chest/Abdomen/Pelvis w Contrast    Narrative    CT CHEST/ABDOMEN/PELVIS WITH CONTRAST 11/14/2022 8:54 AM    CLINICAL HISTORY: Diffuse large B-cell lymphoma. Status post  chemotherapy.  TECHNIQUE: CT scan of the chest, abdomen, and pelvis was performed  following injection of IV contrast. Multiplanar reformats were  obtained. Dose reduction techniques were used.   CONTRAST: 83mL Isovue-370  COMPARISON: CT of the chest, abdomen, and pelvis performed 7/12/2022.    FINDINGS:   LUNGS AND PLEURA: No pleural effusions. Calcified granuloma in the  right upper lobe is unchanged. Subsolid nodule in the superior segment  of the right lower lobe (8/107) is unchanged, measuring 0.6 cm. A 0.3  cm pleural nodule in the right upper lobe medially (8/89) is also  unchanged. A few tiny nodules along the left major fissure medially  are also unchanged. No new or enlarging pulmonary  nodules are  identified.    MEDIASTINUM/AXILLAE: No enlarged lymph nodes in the chest. Calcified  right hilar lymph nodes are unchanged. No pericardial effusion.    CORONARY ARTERY CALCIFICATION: None.    HEPATOBILIARY: A 1.7 cm hemangioma in the posterior segment of the  right hepatic lobe is unchanged. Scattered tiny hypodensities in the  liver are too small to characterize, but are unchanged, and may  represent cysts or hemangiomas. No calcified gallstones.    PANCREAS: Normal.    SPLEEN: Mild splenomegaly has increased slightly. The spleen measures  13.5 cm in length (previously 12.8 cm by my measurement). No focal  splenic lesions are identified.    ADRENAL GLANDS: Normal.    KIDNEYS/BLADDER: Unremarkable. No hydronephrosis.    BOWEL: No bowel obstruction. No convincing evidence for colitis or  diverticulitis. Unremarkable appendix.    PELVIC ORGANS: A right ovarian cystic lesion measures 2.4 cm, and is  unchanged. Essure fallopian tube occlusion devices are noted  bilaterally.    LYMPH NODES: No enlarged lymph nodes are identified in the abdomen or  pelvis.    VASCULATURE: Unremarkable.    ADDITIONAL FINDINGS: None.    MUSCULOSKELETAL: Unremarkable.      Impression    IMPRESSION:   1.  Mild splenomegaly has increased slightly compared to 7/12/2022.  2.  No other significant interval change.  3.  Stable pulmonary nodules.    RYLEE PEREZ MD         SYSTEM ID:  [id number]            ASSESSMENT AND PLAN   DLBCL, EBV+ non-GCB, stage III post 6 cycles of M-RCHOP  PJV pneumonia causing acute respiratory failure improved on bactrim and prednisone  Cardiomyopathy post adriamycin based chemotherapy likely also contributed by tachycardia    Kassie was seen in person visit and was not accompanied by her , Florian. She has completed her planned chemotherapy in April 2020.     I have reviewed all of the labs done prior to this clinic visit.  Labs are all completely normal including electrolytes, renal function,  hepatic panel, complete blood count and differential except she does have borderline elevation in creatinine which is stable from the past.    I have reviewed actual images from her CT scan and there is no evidence of recurrent disease in the chest, abdomen or pelvis. She had enlarged hilar nodes and mediastinal mass, likely from reactive thymic tissue.  This has remained stable on the current imaging study. Mild splenomegaly has been reported but a slight change in posture could account for this. We will continue to monitor this over time. She has a stable 1.7 cm lesion in the hepatic segment 7 which is likely hemangioma.      She is feeling a lot better.  Her peripheral neuropathy has improved with adjustments to new medication including Celexa.    Her cardiac function remarkably improved from 20 to 25% ejection fraction in March to 40 to 45% in June.  Her marked fatigue is a lot better now.  She had consultations in cardiology since her last visit.  Her fatigue has been attributed to either prolonged COVID symptoms versus cardiac.  She is finally asymptomatic at this time.    All questions for patient  were answered.  She is over 2 years out from treatment completion.   I would like to see her in 4 months with labs and restaging scans prior to clinic visit.  After the third year we will space it out every 6 months until 5 years out.    25 minutes spent on the date of the encounter doing chart review, history and exam, documentation and further activities as noted above     Castro Castro    Hematologist and Medical Oncologist   [facility]         Again, thank you for allowing me to participate in the care of your patient.        Sincerely,        Castro Castro MD

## 2022-11-18 NOTE — PROGRESS NOTES
[facility]  HEMATOLOGY AND ONCOLOGY    FOLLOW-UP VISIT NOTE    PATIENT NAME: Kassie Ramirez MRN # [id number]  DATE OF VISIT: Nov 18, 2022 YOB: 1970    REFERRING PROVIDER: No referring provider defined for this encounter.    CANCER TYPE: DLBCL, EBV+ non-GCB, stage III.  STAGE: III    TREATMENT SUMMARY:  She presented with shortness of breath and cough which had been present since May 2019. Her imaging suggested pulmonary infiltrates which was attributed to aspiration pneumonia. CT scan of the chest 8/20/2019 showed left hilar and subcarinal mediastinal adenopathy with narrowing of the left lower lobe bronchus and a nonspecific patchy area of nodular consolidation in the medial right lower lobe.  Bronchoscopy with EBUS 8/28/2019 showed nonnecrotizing granulomatous inflammation with prominent eosinophilia, negative for malignancy.  She was diagnosed with sarcoidosis and started on prednisone. She had worsening cough and shortness of breath with tapering of the prednisone.  Repeat CT scan of the chest 11/18/2019 showed interval worsening of the bulky mediastinal and bilateral hilar lymphadenopathy, near complete resolution of the lower lobe opacities, with new interstitial opacities in the right upper lobe.   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 Large mediastinal mass causing respiratory compromise. . IPI score of 2 (low-intermediate). FISH neg for high risk translocations. She received 6 cycles of R-CHOP with intermediate dose methotrexate after cycles 2, 4 and 6 from November through April 2020.      CURRENT INTERVENTIONS:  Surveillance post MR-CHOP    SUBJECTIVE   Kassie Ramirez is being followed for DLBCL, EBV+ non-GCB, stage III intermediate risk disease    Patient was followed in person. She has completed all of her planned cycles of MR-CHOP  chemotherapy and is being seen with labs and restaging scans.     She had not been doing well since completion of her therapy for lymphoma.  She had struggled with cardiomyopathy post adriamycin. She is doing much better at this visit. She has no new complaints since last visit.  Her energy has improved.    Her peripheral neuropathy is better now.  Her medications have been adjusted.      PAST MEDICAL HISTORY     Past Medical History:   Diagnosis Date     Anxiety attack 09/16/2014     Cardiomyopathy (H)     non ishemic - 25-30% - Chemo related     Congestive heart failure (H) 02/2019    While in hospital for high dose Methotrexate     Depressive disorder 2003    taking Celexa since 2014     Diffuse large B-cell lymphoma (H)     Diagnosed 11/2019, Ronan Albarran     Encounter for Essure implantation 2009     Generalized anxiety disorder 09/16/2014    zoloft = flat emotions     HFrEF (heart failure with reduced ejection fraction) (H)     new diagnosis 6/14     History of blood transfusion 12/2019    Given many throughout cancer treatment     Menopausal disorder     started on OCPs by menopause center 3/2017 (takes active continuously)     Menstrual headache     helped by OCPs and magnesium     Paroxysmal SVT (supraventricular tachycardia) (H) 06/2020     GERTRUDE (stress urinary incontinence, female)     sling procedure 2016         CURRENT OUTPATIENT MEDICATIONS     Current Outpatient Medications   Medication Sig     albuterol (PROAIR HFA/PROVENTIL HFA/VENTOLIN HFA) 108 (90 Base) MCG/ACT inhaler Inhale 2 puffs into the lungs every 6 hours as needed for shortness of breath / dyspnea or wheezing     alpha-lipoic acid 600 MG capsule Take 1 capsule (600 mg) by mouth daily     carvedilol (COREG) 6.25 MG tablet Take 1 tablet (6.25 mg) by mouth 2 times daily (with meals)     cetirizine (ZYRTEC) 10 MG tablet Take 10 mg by mouth daily     DULoxetine (CYMBALTA) 60 MG capsule Take 1 capsule (60 mg) by mouth daily     empagliflozin  "(JARDIANCE) 10 MG TABS tablet Take 1 tablet (10 mg) by mouth daily     LORazepam (ATIVAN) 0.5 MG tablet TAKE 1 TABLET BY MOUTH AT BEDTIME FOR SLEEP AND ANXIETY.     rosuvastatin (CRESTOR) 10 MG tablet Take 1 tablet (10 mg) by mouth daily     spironolactone (ALDACTONE) 25 MG tablet Take 1 tablet (25 mg) by mouth daily     No current facility-administered medications for this visit.        ALLERGIES      Allergies   Allergen Reactions     Cold & Flu [Cold Defense Fighter]      See pseudoephedrine     Seasonal Allergies      Sudafed Cold-Cough [Dayquil Liquicaps]      Pseudoephedrine Rash     Rash then skins peels off         REVIEW OF SYSTEMS   As above in the HPI, o/w complete 12-point ROS was negative.     PHYSICAL EXAM   /80 (Cuff Size: Adult Regular)   Pulse 88   Temp 97  F (36.1  C) (Tympanic)   Resp 16   Ht 1.676 m (5' 6\")   Wt 78 kg (172 lb)   LMP 11/06/2019   SpO2 98%   BMI 27.76 kg/m    Limited physical exam during video visit due to COVID19 restrictions  Middle aged female in no acute distress  Breathing comfortably, no tachypnea  Speech and hearing normal  Pleasant mood and congruent affect  Moving all extremities, no focal neurologic deficits apparent       LABORATORY AND IMAGING STUDIES     Recent Labs   Lab Test 11/14/22  0817 09/19/22  1653 07/12/22  1223 07/11/22  0812 03/31/22  1014    141 137 141 138   POTASSIUM 4.1 3.6 4.1 4.0 4.1   CHLORIDE 104 106 107 108 108   CO2 22 25 27 23 20   ANIONGAP 14 10 3 10 10   BUN 21.4* 19 22 20 27   CR 1.00* 1.20* 1.08* 1.01 1.07*   * 125* 102* 105* 90   AFSHAN 9.8 9.8 9.8 10.1 10.2*     Recent Labs   Lab Test 06/19/20  1853 06/15/20  0845 06/14/20  1807 01/11/20  0615 12/01/19  1340 12/01/19  0641 11/30/19  2137 11/30/19  1341 11/28/19  0537 11/27/19  2216   MAG 2.5* 2.4* 2.1 2.0  --   --   --   --   --  2.1   PHOS  --   --   --  3.1 2.8 3.4 2.8 2.5   < > 3.1    < > = values in this interval not displayed.     Recent Labs   Lab Test " 11/14/22  0817 09/19/22  1653 07/12/22  1223 05/11/22  1351 03/31/22  1014 01/10/22  1508 12/30/21  1646   WBC 8.2  --  8.2 7.7 7.7 7.9 9.7   HGB 15.5 15.1 14.8 16.0* 15.3 14.6 15.0     --  190 153 203 216 220   MCV 94  --  92 90 91 91 88   NEUTROPHIL 38  --  40  --  36 38 45     Recent Labs   Lab Test 11/14/22  0817 07/12/22  1223 03/31/22  1014   BILITOTAL 0.7 1.1 0.7   ALKPHOS 89 79 78   ALT 40* 46 51*   AST 25 22 30   ALBUMIN 4.8 4.7 4.5    215 223     TSH   Date Value Ref Range Status   01/14/2022 2.29 0.40 - 4.00 mU/L Final   06/14/2020 3.40 0.40 - 4.00 mU/L Final   09/18/2019 2.06 0.40 - 4.00 mU/L Final   07/27/2018 2.04 0.40 - 4.00 mU/L Final     No results for input(s): CEA in the last 29471 hours.  Results for orders placed or performed during the hospital encounter of 11/14/22   CT Chest/Abdomen/Pelvis w Contrast    Narrative    CT CHEST/ABDOMEN/PELVIS WITH CONTRAST 11/14/2022 8:54 AM    CLINICAL HISTORY: Diffuse large B-cell lymphoma. Status post  chemotherapy.  TECHNIQUE: CT scan of the chest, abdomen, and pelvis was performed  following injection of IV contrast. Multiplanar reformats were  obtained. Dose reduction techniques were used.   CONTRAST: 83mL Isovue-370  COMPARISON: CT of the chest, abdomen, and pelvis performed 7/12/2022.    FINDINGS:   LUNGS AND PLEURA: No pleural effusions. Calcified granuloma in the  right upper lobe is unchanged. Subsolid nodule in the superior segment  of the right lower lobe (8/107) is unchanged, measuring 0.6 cm. A 0.3  cm pleural nodule in the right upper lobe medially (8/89) is also  unchanged. A few tiny nodules along the left major fissure medially  are also unchanged. No new or enlarging pulmonary nodules are  identified.    MEDIASTINUM/AXILLAE: No enlarged lymph nodes in the chest. Calcified  right hilar lymph nodes are unchanged. No pericardial effusion.    CORONARY ARTERY CALCIFICATION: None.    HEPATOBILIARY: A 1.7 cm hemangioma in the posterior  segment of the  right hepatic lobe is unchanged. Scattered tiny hypodensities in the  liver are too small to characterize, but are unchanged, and may  represent cysts or hemangiomas. No calcified gallstones.    PANCREAS: Normal.    SPLEEN: Mild splenomegaly has increased slightly. The spleen measures  13.5 cm in length (previously 12.8 cm by my measurement). No focal  splenic lesions are identified.    ADRENAL GLANDS: Normal.    KIDNEYS/BLADDER: Unremarkable. No hydronephrosis.    BOWEL: No bowel obstruction. No convincing evidence for colitis or  diverticulitis. Unremarkable appendix.    PELVIC ORGANS: A right ovarian cystic lesion measures 2.4 cm, and is  unchanged. Essure fallopian tube occlusion devices are noted  bilaterally.    LYMPH NODES: No enlarged lymph nodes are identified in the abdomen or  pelvis.    VASCULATURE: Unremarkable.    ADDITIONAL FINDINGS: None.    MUSCULOSKELETAL: Unremarkable.      Impression    IMPRESSION:   1.  Mild splenomegaly has increased slightly compared to 7/12/2022.  2.  No other significant interval change.  3.  Stable pulmonary nodules.    RYLEE PEREZ MD         SYSTEM ID:  [id number]            ASSESSMENT AND PLAN   DLBCL, EBV+ non-GCB, stage III post 6 cycles of M-RCHOP  PJV pneumonia causing acute respiratory failure improved on bactrim and prednisone  Cardiomyopathy post adriamycin based chemotherapy likely also contributed by tachycardia    Kassie was seen in person visit and was not accompanied by her , Florian. She has completed her planned chemotherapy in April 2020.     I have reviewed all of the labs done prior to this clinic visit.  Labs are all completely normal including electrolytes, renal function, hepatic panel, complete blood count and differential except she does have borderline elevation in creatinine which is stable from the past.    I have reviewed actual images from her CT scan and there is no evidence of recurrent disease in the chest, abdomen or  pelvis. She had enlarged hilar nodes and mediastinal mass, likely from reactive thymic tissue.  This has remained stable on the current imaging study. Mild splenomegaly has been reported but a slight change in posture could account for this. We will continue to monitor this over time. She has a stable 1.7 cm lesion in the hepatic segment 7 which is likely hemangioma.      She is feeling a lot better.  Her peripheral neuropathy has improved with adjustments to new medication including Celexa.    Her cardiac function remarkably improved from 20 to 25% ejection fraction in March to 40 to 45% in June.  Her marked fatigue is a lot better now.  She had consultations in cardiology since her last visit.  Her fatigue has been attributed to either prolonged COVID symptoms versus cardiac.  She is finally asymptomatic at this time.    All questions for patient  were answered.  She is over 2 years out from treatment completion.   I would like to see her in 4 months with labs and restaging scans prior to clinic visit.  After the third year we will space it out every 6 months until 5 years out.    25 minutes spent on the date of the encounter doing chart review, history and exam, documentation and further activities as noted above     Castro Castro    Hematologist and Medical Oncologist  [facility]

## 2022-11-21 ENCOUNTER — HEALTH MAINTENANCE LETTER (OUTPATIENT)
Age: 52
End: 2022-11-21

## 2022-11-22 ENCOUNTER — TELEPHONE (OUTPATIENT)
Dept: FAMILY MEDICINE | Facility: CLINIC | Age: 52
End: 2022-11-22

## 2022-11-22 ENCOUNTER — E-VISIT (OUTPATIENT)
Dept: FAMILY MEDICINE | Facility: CLINIC | Age: 52
End: 2022-11-22
Payer: COMMERCIAL

## 2022-11-22 ENCOUNTER — MYC MEDICAL ADVICE (OUTPATIENT)
Dept: FAMILY MEDICINE | Facility: CLINIC | Age: 52
End: 2022-11-22

## 2022-11-22 ENCOUNTER — NURSE TRIAGE (OUTPATIENT)
Dept: NURSING | Facility: CLINIC | Age: 52
End: 2022-11-22

## 2022-11-22 DIAGNOSIS — R51.9 ACUTE INTRACTABLE HEADACHE, UNSPECIFIED HEADACHE TYPE: Primary | ICD-10-CM

## 2022-11-22 PROCEDURE — 99422 OL DIG E/M SVC 11-20 MIN: CPT | Performed by: PHYSICIAN ASSISTANT

## 2022-11-22 NOTE — TELEPHONE ENCOUNTER
Pt calling stating that she is pretty sure she got the flu - she started to feel very unwell - very fast   Rn advised pt to do an E visit     Patient stated an understanding and agreed with plan.      Amada Parekh RN, BSN  [facility]

## 2022-11-23 NOTE — TELEPHONE ENCOUNTER
Cough and fever yesterday. Today cough, chest pain, T 98.8. headache. Did e-visit today for severe headache and was advised to go to ED. Did not go. Tonight is in bed. Says chest pain is constant. Not only w/ coughing. Covid home test negative today.Hx lymphoma and cardiomyopathy.  Advised ED now. Pt voiced understanding and agreement.       Reason for Disposition    SEVERE or constant chest pain or pressure  (Exception: Mild central chest pain, present only when coughing.)    Additional Information    Negative: SEVERE difficulty breathing (e.g., struggling for each breath, speaks in single words)    Negative: Difficult to awaken or acting confused (e.g., disoriented, slurred speech)    Negative: Bluish (or gray) lips or face now    Negative: Shock suspected (e.g., cold/pale/clammy skin, too weak to stand, low BP, rapid pulse)    Negative: Sounds like a life-threatening emergency to the triager    Negative: [1] Diagnosed or suspected COVID-19 AND [2] symptoms lasting 3 or more weeks    Negative: [1] COVID-19 exposure AND [2] no symptoms    Negative: COVID-19 vaccine reaction suspected (e.g., fever, headache, muscle aches) occurring 1 to 3 days after getting vaccine    Negative: COVID-19 vaccine, questions about    Negative: [1] Lives with someone known to have influenza (flu test positive) AND [2] flu-like symptoms (e.g., cough, runny nose, sore throat, SOB; with or without fever)    Negative: [1] Adult with possible COVID-19 symptoms AND [2] triager concerned about severity of symptoms or other causes    Negative: COVID-19 and breastfeeding, questions about    Protocols used: CORONAVIRUS (COVID-19) DIAGNOSED OR SIMRUCLPE-D-ME 1.18.2022

## 2022-12-08 DIAGNOSIS — E78.2 MIXED HYPERLIPIDEMIA: ICD-10-CM

## 2022-12-08 DIAGNOSIS — E78.1 HYPERTRIGLYCERIDEMIA: ICD-10-CM

## 2022-12-12 RX ORDER — ROSUVASTATIN CALCIUM 10 MG/1
10 TABLET, COATED ORAL DAILY
Qty: 90 TABLET | Refills: 3 | Status: SHIPPED | OUTPATIENT
Start: 2022-12-12 | End: 2023-04-25

## 2022-12-12 NOTE — TELEPHONE ENCOUNTER
Prescription approved per [facility] Refill Protocol.    Denise Almanzar RN  [facility]

## 2022-12-17 ENCOUNTER — OFFICE VISIT (OUTPATIENT)
Dept: URGENT CARE | Facility: URGENT CARE | Age: 52
End: 2022-12-17
Payer: COMMERCIAL

## 2022-12-17 ENCOUNTER — ANCILLARY PROCEDURE (OUTPATIENT)
Dept: GENERAL RADIOLOGY | Facility: CLINIC | Age: 52
End: 2022-12-17
Attending: FAMILY MEDICINE
Payer: COMMERCIAL

## 2022-12-17 VITALS
TEMPERATURE: 97.4 F | RESPIRATION RATE: 18 BRPM | OXYGEN SATURATION: 98 % | DIASTOLIC BLOOD PRESSURE: 80 MMHG | SYSTOLIC BLOOD PRESSURE: 118 MMHG | HEART RATE: 83 BPM

## 2022-12-17 DIAGNOSIS — R05.1 ACUTE COUGH: ICD-10-CM

## 2022-12-17 DIAGNOSIS — J22 LOWER RESP. TRACT INFECTION: ICD-10-CM

## 2022-12-17 DIAGNOSIS — R05.1 ACUTE COUGH: Primary | ICD-10-CM

## 2022-12-17 PROCEDURE — 71046 X-RAY EXAM CHEST 2 VIEWS: CPT | Mod: TC | Performed by: RADIOLOGY

## 2022-12-17 PROCEDURE — 99214 OFFICE O/P EST MOD 30 MIN: CPT | Performed by: FAMILY MEDICINE

## 2022-12-17 RX ORDER — CODEINE PHOSPHATE AND GUAIFENESIN 10; 100 MG/5ML; MG/5ML
2 SOLUTION ORAL EVERY 6 HOURS PRN
Qty: 118 ML | Refills: 0 | Status: SHIPPED | OUTPATIENT
Start: 2022-12-17 | End: 2022-12-27

## 2022-12-17 RX ORDER — DOXYCYCLINE 100 MG/1
100 CAPSULE ORAL 2 TIMES DAILY
Qty: 20 CAPSULE | Refills: 0 | Status: SHIPPED | OUTPATIENT
Start: 2022-12-17 | End: 2022-12-27

## 2022-12-17 RX ORDER — CODEINE PHOSPHATE AND GUAIFENESIN 10; 100 MG/5ML; MG/5ML
2 SOLUTION ORAL EVERY 6 HOURS PRN
Qty: 118 ML | Refills: 0 | Status: SHIPPED | OUTPATIENT
Start: 2022-12-17 | End: 2022-12-17

## 2022-12-17 RX ORDER — BENZONATATE 200 MG/1
200 CAPSULE ORAL 3 TIMES DAILY PRN
Qty: 30 CAPSULE | Refills: 0 | Status: SHIPPED | OUTPATIENT
Start: 2022-12-17 | End: 2022-12-27

## 2022-12-17 NOTE — PROGRESS NOTES
SUBJECTIVE:   Kassie Ramirez is a 52 year old female presenting with a chief complaint of cough, congestion.  Settling in chest and concern for pneumonia  Onset of symptoms was 2 week(s) ago.  Course of illness is worsening.    Severity moderate  Current and Associated symptoms: cough, inermittent fever, congestion  Treatment measures tried include: Fluids and Rest.  Predisposing factors include CHF/cardiomyopathy, h/o lymphoma.    Had the flu over Thanksgiving.  This improved but now feels like cough has gotten worse    Past Medical History:   Diagnosis Date     Anxiety attack 09/16/2014     Cardiomyopathy (H)     non ishemic - 25-30% - Chemo related     Congestive heart failure (H) 02/2019    While in hospital for high dose Methotrexate     Depressive disorder 2003    taking Celexa since 2014     Diffuse large B-cell lymphoma (H)     Diagnosed 11/2019, Ronan Albarran     Encounter for Essure implantation 2009     Generalized anxiety disorder 09/16/2014    zoloft = flat emotions     HFrEF (heart failure with reduced ejection fraction) (H)     new diagnosis 6/14     History of blood transfusion 12/2019    Given many throughout cancer treatment     Menopausal disorder     started on OCPs by menopause center 3/2017 (takes active continuously)     Menstrual headache     helped by OCPs and magnesium     Paroxysmal SVT (supraventricular tachycardia) (H) 06/2020     GERTRUDE (stress urinary incontinence, female)     sling procedure 2016     Current Outpatient Medications   Medication Sig Dispense Refill     albuterol (PROAIR HFA/PROVENTIL HFA/VENTOLIN HFA) 108 (90 Base) MCG/ACT inhaler Inhale 2 puffs into the lungs every 6 hours as needed for shortness of breath / dyspnea or wheezing 18 g 0     alpha-lipoic acid 600 MG capsule Take 1 capsule (600 mg) by mouth daily 30 capsule 0     carvedilol (COREG) 6.25 MG tablet Take 1 tablet (6.25 mg) by mouth 2 times daily (with meals) 180 tablet 3     cetirizine (ZYRTEC) 10 MG tablet  Take 10 mg by mouth daily       DULoxetine (CYMBALTA) 60 MG capsule Take 1 capsule (60 mg) by mouth daily 90 capsule 1     empagliflozin (JARDIANCE) 10 MG TABS tablet Take 1 tablet (10 mg) by mouth daily 90 tablet 3     LORazepam (ATIVAN) 0.5 MG tablet TAKE 1 TABLET BY MOUTH AT BEDTIME FOR SLEEP AND ANXIETY. 30 tablet 0     rosuvastatin (CRESTOR) 10 MG tablet TAKE 1 TABLET (10 MG) BY MOUTH DAILY 90 tablet 3     spironolactone (ALDACTONE) 25 MG tablet Take 1 tablet (25 mg) by mouth daily 90 tablet 3     Social History     Tobacco Use     Smoking status: Never     Smokeless tobacco: Never   Substance Use Topics     Alcohol use: Not Currently       ROS:  Review of systems negative except as stated above.    OBJECTIVE:  /80 (BP Location: Right arm, Patient Position: Sitting, Cuff Size: Adult Regular)   Pulse 83   Temp 97.4  F (36.3  C) (Tympanic)   Resp 18   LMP 11/06/2019   SpO2 98%   GENERAL APPEARANCE: healthy, alert and no distress  EYES: EOMI,  PERRL, conjunctiva clear  RESP: lungs clear to auscultation - no rales, rhonchi or wheezes  PSYCH: mentation appears normal and affect normal/bright    CXR - no acute infiltrate, no pleural effusion, no pneumothorax personally viewed by me      ASSESSMENT/PLAN:  (R05.1) Acute cough  (primary encounter diagnosis)  Plan: XR Chest 2 Views, benzonatate (TESSALON) 200 MG        capsule, guaiFENesin-codeine (ROBITUSSIN AC)         100-10 MG/5ML solution,            (J22) Lower resp. tract infection  Plan: doxycycline hyclate (VIBRAMYCIN) 100 MG capsule            Reassurance given, reviewed symptomatic treatment with tylenol, plenty of fluids and rest.  RX tessalon perles and RX davi AC given to help with cough.  Due to prolong and worsening symptoms with high risk medical diagnosis, will start antibiotic for lower respiratory tract infection - RX Doxycycline given.  Will follow up on formal Xray report and notify if any abnormalities    Follow up with primary provider  if no improvement of symptoms in 1 week    James Nolasco MD  December 17, 2022 2:53 PM

## 2022-12-20 ENCOUNTER — LAB (OUTPATIENT)
Dept: LAB | Facility: CLINIC | Age: 52
End: 2022-12-20
Payer: COMMERCIAL

## 2022-12-20 DIAGNOSIS — I42.8 NONISCHEMIC CARDIOMYOPATHY (H): ICD-10-CM

## 2022-12-20 LAB
ANION GAP SERPL CALCULATED.3IONS-SCNC: 14 MMOL/L (ref 7–15)
BUN SERPL-MCNC: 27.2 MG/DL (ref 6–20)
CALCIUM SERPL-MCNC: 10.3 MG/DL (ref 8.6–10)
CHLORIDE SERPL-SCNC: 102 MMOL/L (ref 98–107)
CREAT SERPL-MCNC: 1.09 MG/DL (ref 0.51–0.95)
DEPRECATED HCO3 PLAS-SCNC: 24 MMOL/L (ref 22–29)
GFR SERPL CREATININE-BSD FRML MDRD: 61 ML/MIN/1.73M2
GLUCOSE SERPL-MCNC: 103 MG/DL (ref 70–99)
HGB BLD-MCNC: 15 G/DL (ref 11.7–15.7)
POTASSIUM SERPL-SCNC: 4 MMOL/L (ref 3.4–5.3)
SODIUM SERPL-SCNC: 140 MMOL/L (ref 136–145)

## 2022-12-20 PROCEDURE — 80048 BASIC METABOLIC PNL TOTAL CA: CPT

## 2022-12-20 PROCEDURE — 85018 HEMOGLOBIN: CPT

## 2022-12-20 PROCEDURE — 36415 COLL VENOUS BLD VENIPUNCTURE: CPT

## 2022-12-22 ENCOUNTER — HOSPITAL ENCOUNTER (OUTPATIENT)
Dept: CARDIOLOGY | Facility: CLINIC | Age: 52
Discharge: HOME | End: 2022-12-22
Attending: INTERNAL MEDICINE | Admitting: INTERNAL MEDICINE
Payer: COMMERCIAL

## 2022-12-22 DIAGNOSIS — I42.8 NONISCHEMIC CARDIOMYOPATHY (H): ICD-10-CM

## 2022-12-22 LAB — LVEF ECHO: NORMAL

## 2022-12-22 PROCEDURE — 93306 TTE W/DOPPLER COMPLETE: CPT | Mod: 26 | Performed by: INTERNAL MEDICINE

## 2022-12-22 PROCEDURE — 93306 TTE W/DOPPLER COMPLETE: CPT

## 2022-12-27 ENCOUNTER — OFFICE VISIT (OUTPATIENT)
Dept: CARDIOLOGY | Facility: CLINIC | Age: 52
End: 2022-12-27
Payer: COMMERCIAL

## 2022-12-27 VITALS
HEIGHT: 66 IN | SYSTOLIC BLOOD PRESSURE: 120 MMHG | DIASTOLIC BLOOD PRESSURE: 83 MMHG | HEART RATE: 85 BPM | WEIGHT: 173 LBS | OXYGEN SATURATION: 96 % | BODY MASS INDEX: 27.8 KG/M2

## 2022-12-27 DIAGNOSIS — I47.10 SVT (SUPRAVENTRICULAR TACHYCARDIA) (H): ICD-10-CM

## 2022-12-27 DIAGNOSIS — C83.32 DIFFUSE LARGE B-CELL LYMPHOMA OF INTRATHORACIC LYMPH NODES (H): ICD-10-CM

## 2022-12-27 DIAGNOSIS — I50.22 CHRONIC SYSTOLIC CONGESTIVE HEART FAILURE (H): ICD-10-CM

## 2022-12-27 DIAGNOSIS — I42.8 NONISCHEMIC CARDIOMYOPATHY (H): Primary | ICD-10-CM

## 2022-12-27 PROCEDURE — 99214 OFFICE O/P EST MOD 30 MIN: CPT | Performed by: INTERNAL MEDICINE

## 2022-12-27 RX ORDER — LISINOPRIL 5 MG/1
5 TABLET ORAL AT BEDTIME
Qty: 90 TABLET | Refills: 3 | Status: SHIPPED | OUTPATIENT
Start: 2022-12-27 | End: 2023-04-27

## 2022-12-27 NOTE — PATIENT INSTRUCTIONS
It was a pleasure seeing you today and thank you for allowing me to be a part of your health care team.  Should you have any questions regarding your visit or future needs please feel free to reach out to my care team for assistance.      Thank you, Dr. Trevon Pearl        **Nursing: [phone]       **Scheduling: [phone]

## 2022-12-27 NOTE — PROGRESS NOTES
HPI and Plan:   See dictation    Today's clinic visit entailed:  Review of the result(s) of each unique test - echo, bmp, hgb  Ordering of each unique test  Prescription drug management  30 minutes spent on the date of the encounter doing chart review, review of test results, patient visit, documentation, discussion with family and  present   Provider  Link to [facility] Help Grid     The level of medical decision making during this visit was of moderate complexity.      Orders Placed This Encounter   Procedures     Basic metabolic panel     Follow-Up with Cardiology       Orders Placed This Encounter   Medications     lisinopril (ZESTRIL) 5 MG tablet     Sig: Take 1 tablet (5 mg) by mouth At Bedtime     Dispense:  90 tablet     Refill:  3       Medications Discontinued During This Encounter   Medication Reason     doxycycline hyclate (VIBRAMYCIN) 100 MG capsule      albuterol (PROAIR HFA/PROVENTIL HFA/VENTOLIN HFA) 108 (90 Base) MCG/ACT inhaler      guaiFENesin-codeine (ROBITUSSIN AC) 100-10 MG/5ML solution      benzonatate (TESSALON) 200 MG capsule          Encounter Diagnoses   Name Primary?     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)      SVT (supraventricular tachycardia) (H)        CURRENT MEDICATIONS:  Current Outpatient Medications   Medication Sig Dispense Refill     alpha-lipoic acid 600 MG capsule Take 1 capsule (600 mg) by mouth daily 30 capsule 0     carvedilol (COREG) 6.25 MG tablet Take 1 tablet (6.25 mg) by mouth 2 times daily (with meals) 180 tablet 3     cetirizine (ZYRTEC) 10 MG tablet Take 10 mg by mouth daily       DULoxetine (CYMBALTA) 60 MG capsule Take 1 capsule (60 mg) by mouth daily 90 capsule 1     empagliflozin (JARDIANCE) 10 MG TABS tablet Take 1 tablet (10 mg) by mouth daily 90 tablet 3     lisinopril (ZESTRIL) 5 MG tablet Take 1 tablet (5 mg) by mouth At Bedtime 90 tablet 3      LORazepam (ATIVAN) 0.5 MG tablet TAKE 1 TABLET BY MOUTH AT BEDTIME FOR SLEEP AND ANXIETY. 30 tablet 0     rosuvastatin (CRESTOR) 10 MG tablet TAKE 1 TABLET (10 MG) BY MOUTH DAILY 90 tablet 3     spironolactone (ALDACTONE) 25 MG tablet Take 1 tablet (25 mg) by mouth daily 90 tablet 3       ALLERGIES     Allergies   Allergen Reactions     Cold & Flu [Cold Defense Fighter]      See pseudoephedrine     Seasonal Allergies      Sudafed Cold-Cough [Dayquil Liquicaps]      Pseudoephedrine Rash     Rash then skins peels off        PAST MEDICAL HISTORY:  Past Medical History:   Diagnosis Date     Anxiety attack 09/16/2014     Cardiomyopathy (H)     non ishemic - 25-30% - Chemo related     Congestive heart failure (H) 02/2019    While in hospital for high dose Methotrexate     Depressive disorder 2003    taking Celexa since 2014     Diffuse large B-cell lymphoma (H)     Diagnosed 11/2019, Ronan Albarran     Encounter for Essure implantation 2009     Generalized anxiety disorder 09/16/2014    zoloft = flat emotions     HFrEF (heart failure with reduced ejection fraction) (H)     new diagnosis 6/14     History of blood transfusion 12/2019    Given many throughout cancer treatment     Menopausal disorder     started on OCPs by menopause center 3/2017 (takes active continuously)     Menstrual headache     helped by OCPs and magnesium     Paroxysmal SVT (supraventricular tachycardia) (H) 06/2020     GERTRUDE (stress urinary incontinence, female)     sling procedure 2016       PAST SURGICAL HISTORY:  Past Surgical History:   Procedure Laterality Date     CV CORONARY ANGIOGRAM N/A 06/15/2020    Procedure: Coronary Angiogram;  Surgeon: Luis Eduardo Phillips MD;  Location:  [facility]ARD[facility]     CV LEFT HEART CATH N/A 06/15/2020    Procedure: Left Heart Cath;  Surgeon: Luis Eduardo Phillips MD;  Location:  [facility] LAB     ENT SURGERY  1990    left eardrum rebuilt due to injury     EP ABLATION SVT N/A 06/22/2020     Procedure: EP Ablation SVT;  Surgeon: Francisco Javier Bynum MD;  Location:  HEART CARDIAC CATH LAB     H KIT ESSURE  2009    essure - Dr. Cailin Raymundo      HERNIORRHAPHY UMBILICAL  1974     IR CHEST PORT PLACEMENT > 5 YRS OF AGE  2020     IR PORT REMOVAL RIGHT  2021     mediastinoscopy  2019     SLING TRANSPUBO WITHOUT ANTERIOR COLPORRHAPHY N/A 2016    Procedure: SLING TRANSPUBO WITHOUT ANTERIOR COLPORRHAPHY;  Surgeon: Hernesto Berrios MD;  Location: [facility] OR       FAMILY HISTORY:  Family History   Problem Relation Age of Onset     Hypertension Mother          Lung Cancer 2013     Depression Mother          Lung Cancer 2013     Lipids Mother      Cardiovascular Mother      Circulatory Mother      Diabetes Mother          Lung Cancer 2013     Heart Disease Mother      Cerebrovascular Disease Mother          Lung Cancer 2013     Obesity Mother          Lung Cancer 2013     C.A.D. Mother      Lung Cancer Mother         smoker     Macular Degeneration Father         [place]     Genetic Disorder Father         eye disease     Diabetes Maternal Aunt         type 2     Hypertension Maternal Aunt      Heart Disease Maternal Grandfather      Macular Degeneration Sister         [place]     Hyperlipidemia Sister         high cholesterol       Genetic Disorder Sister         eye disease     LUNG DISEASE No family hx of        SOCIAL HISTORY:  Social History     Socioeconomic History     Marital status:      Spouse name: Florian     Number of children: 2     Years of education: 16      Highest education level: None   Occupational History     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     Other Topics Concern     Parent/sibling w/ CABG, MI or angioplasty before 65F 55M? No     Caffeine Concern Yes     Comment: MOnster energy drink.   Te - 3 cups.        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      Social Determinants of Health     Intimate Partner Violence: Not At Risk     Fear of Current or Ex-Partner: No     Emotionally Abused: No     Physically Abused: No     Sexually Abused: No       Review of Systems:  Skin:  Positive for rash rash: both feet   Eyes:  Negative      ENT:  Negative      Respiratory:  Negative       Cardiovascular:  Negative;chest pain;palpitations;dizziness;lightheadedness;edema fatigue;Positive for fatigue: feels when dehydrated and BP is low  Gastroenterology: Negative      Genitourinary:  Negative      Musculoskeletal:  Positive for back pain back pain: lower left  Neurologic:  Positive for headaches    Psychiatric:  Negative      Heme/Lymph/Imm:  Positive for allergies    Endocrine:  Positive for   feels hot and really sweaty, will last for several hours    Physical Exam:  Vitals: /83 (BP Location: Left arm, Patient Position: Sitting)   Pulse 85   Ht 1.676 m (5' 6\")   Wt 78.5 kg (173 lb)   LMP 11/06/2019   SpO2 96%   BMI 27.92 kg/m      Constitutional:           Skin:             Head:           Eyes:           Lymph:      ENT:           Neck:           Respiratory:            Cardiac:                                                           GI:           Extremities and Muscular Skeletal:                 Neurological:           Psych:           CC  Referred Self, MD  No address on file              "

## 2022-12-27 NOTE — LETTER
12/27/2022    Mary Alice Colón PA-C  [address] [facility] [address]    RE: Kassie Ramirez       Dear Colleague,     I had the pleasure of seeing Kassie Ramirez in the [facility].  HPI and Plan:   See dictation    Today's clinic visit entailed:  Review of the result(s) of each unique test - echo, bmp, hgb  Ordering of each unique test  Prescription drug management  30 minutes spent on the date of the encounter doing chart review, review of test results, patient visit, documentation, discussion with family and  present   Provider  Link to MDM Help Grid     The level of medical decision making during this visit was of moderate complexity.      Orders Placed This Encounter   Procedures     Basic metabolic panel     Follow-Up with Cardiology       Orders Placed This Encounter   Medications     lisinopril (ZESTRIL) 5 MG tablet     Sig: Take 1 tablet (5 mg) by mouth At Bedtime     Dispense:  90 tablet     Refill:  3       Medications Discontinued During This Encounter   Medication Reason     doxycycline hyclate (VIBRAMYCIN) 100 MG capsule      albuterol (PROAIR HFA/PROVENTIL HFA/VENTOLIN HFA) 108 (90 Base) MCG/ACT inhaler      guaiFENesin-codeine (ROBITUSSIN AC) 100-10 MG/5ML solution      benzonatate (TESSALON) 200 MG capsule          Encounter Diagnoses   Name Primary?     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)      SVT (supraventricular tachycardia) (H)        CURRENT MEDICATIONS:  Current Outpatient Medications   Medication Sig Dispense Refill     alpha-lipoic acid 600 MG capsule Take 1 capsule (600 mg) by mouth daily 30 capsule 0     carvedilol (COREG) 6.25 MG tablet Take 1 tablet (6.25 mg) by mouth 2 times daily (with meals) 180 tablet 3     cetirizine (ZYRTEC) 10 MG tablet Take 10 mg by mouth daily       DULoxetine (CYMBALTA) 60 MG capsule Take 1 capsule  (60 mg) by mouth daily 90 capsule 1     empagliflozin (JARDIANCE) 10 MG TABS tablet Take 1 tablet (10 mg) by mouth daily 90 tablet 3     lisinopril (ZESTRIL) 5 MG tablet Take 1 tablet (5 mg) by mouth At Bedtime 90 tablet 3     LORazepam (ATIVAN) 0.5 MG tablet TAKE 1 TABLET BY MOUTH AT BEDTIME FOR SLEEP AND ANXIETY. 30 tablet 0     rosuvastatin (CRESTOR) 10 MG tablet TAKE 1 TABLET (10 MG) BY MOUTH DAILY 90 tablet 3     spironolactone (ALDACTONE) 25 MG tablet Take 1 tablet (25 mg) by mouth daily 90 tablet 3       ALLERGIES     Allergies   Allergen Reactions     Cold & Flu [Cold Defense Fighter]      See pseudoephedrine     Seasonal Allergies      Sudafed Cold-Cough [Dayquil Liquicaps]      Pseudoephedrine Rash     Rash then skins peels off        PAST MEDICAL HISTORY:  Past Medical History:   Diagnosis Date     Anxiety attack 09/16/2014     Cardiomyopathy (H)     non ishemic - 25-30% - Chemo related     Congestive heart failure (H) 02/2019    While in hospital for high dose Methotrexate     Depressive disorder 2003    taking Celexa since 2014     Diffuse large B-cell lymphoma (H)     Diagnosed 11/2019, Ronan Albarran     Encounter for Essure implantation 2009     Generalized anxiety disorder 09/16/2014    zoloft = flat emotions     HFrEF (heart failure with reduced ejection fraction) (H)     new diagnosis 6/14     History of blood transfusion 12/2019    Given many throughout cancer treatment     Menopausal disorder     started on OCPs by menopause center 3/2017 (takes active continuously)     Menstrual headache     helped by OCPs and magnesium     Paroxysmal SVT (supraventricular tachycardia) (H) 06/2020     GERTRUDE (stress urinary incontinence, female)     sling procedure 2016       PAST SURGICAL HISTORY:  Past Surgical History:   Procedure Laterality Date     CV CORONARY ANGIOGRAM N/A 06/15/2020    Procedure: Coronary Angiogram;  Surgeon: Luis Eduardo Phillips MD;  Location: [facility]     CV LEFT HEART CATH  N/A 06/15/2020    Procedure: Left Heart Cath;  Surgeon: Luis Eduardo Phillips MD;  Location:  HEART CARDIAC CATH LAB     ENT SURGERY      left eardrum rebuilt due to injury     EP ABLATION SVT N/A 2020    Procedure: EP Ablation SVT;  Surgeon: Francisco Javier Bynum MD;  Location:  HEART CARDIAC CATH LAB     H KIT ESSURE  2009    essure - Dr. Cailin Raymundo      HERNIORRHAPHY UMBILICAL  1974     IR CHEST PORT PLACEMENT > 5 YRS OF AGE  2020     IR PORT REMOVAL RIGHT  2021     mediastinoscopy  2019     SLING TRANSPUBO WITHOUT ANTERIOR COLPORRHAPHY N/A 2016    Procedure: SLING TRANSPUBO WITHOUT ANTERIOR COLPORRHAPHY;  Surgeon: Hernesot Berrios MD;  Location: [facility] OR       FAMILY HISTORY:  Family History   Problem Relation Age of Onset     Hypertension Mother          Lung Cancer 2013     Depression Mother          Lung Cancer 2013     Lipids Mother      Cardiovascular Mother      Circulatory Mother      Diabetes Mother          Lung Cancer 2013     Heart Disease Mother      Cerebrovascular Disease Mother          Lung Cancer      Obesity Mother          Lung Cancer 2013     C.A.D. Mother      Lung Cancer Mother         smoker     Macular Degeneration Father         [place]     Genetic Disorder Father         eye disease     Diabetes Maternal Aunt         type 2     Hypertension Maternal Aunt      Heart Disease Maternal Grandfather      Macular Degeneration Sister         St[place]ardt     Hyperlipidemia Sister         high cholesterol       Genetic Disorder Sister         eye disease     LUNG DISEASE No family hx of        SOCIAL HISTORY:  Social History     Socioeconomic History     Marital status:      Spouse name: Florian     Number of children: 2     Years of education: 16      Highest education level: None   Occupational History     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     Other Topics Concern     Parent/sibling w/ CABG, MI or angioplasty before 65F 55M? No     Caffeine Concern Yes     Comment: MOnster energy drink.   Te - 3 cups.        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      Social Determinants of Health     Intimate Partner Violence: Not At Risk     Fear of Current or Ex-Partner: No     Emotionally Abused: No     Physically Abused: No     Sexually Abused: No       Review of Systems:  Skin:  Positive for rash rash: both feet   Eyes:  Negative      ENT:  Negative      Respiratory:  Negative       Cardiovascular:  Negative;chest pain;palpitations;dizziness;lightheadedness;edema fatigue;Positive for fatigue: feels when dehydrated and BP is low  Gastroenterology: Negative      Genitourinary:  Negative      Musculoskeletal:  Positive for back pain back pain: lower left  Neurologic:  Positive for headaches    Psychiatric:  Negative      Heme/Lymph/Imm:  Positive for allergies    Endocrine:  Positive for   feels hot and really sweaty, will last for several hours    Physical Exam:  Vitals: /83 (BP Location: Left arm, Patient Position: Sitting)   Pulse 85   Ht 1.676 m (5' 6\")   Wt 78.5 kg (173 lb)   LMP 11/06/2019   SpO2 96%   BMI 27.92 kg/m      Constitutional:           Skin:             Head:           Eyes:           Lymph:      ENT:           Neck:           Respiratory:            Cardiac:                                                           GI:       "     Extremities and Muscular Skeletal:                 Neurological:           Psych:           CC  Referred Self, MD  No address on file                Service Date: 12/27/2022    HISTORY OF PRESENT ILLNESS:  I had the opportunity to see Kassie Ramirez in Cardiology Clinic today at [facility] in [place] for reevaluation of nonischemic cardiomyopathy, likely due to chemotherapy that was given for lymphoma back in 2019.      In the last year, I started her on Jardiance and increased her carvedilol.  She had not tolerated lisinopril in the past due to low blood pressures.  She remained on spironolactone.  With this combination, her left ventricular systolic function had improved significantly.  Back in 2020 and early 2021, her ejection fraction was as low as 20%-25%.  Fortunately with medical management, her ejection fraction has improved significantly.  She just had an echocardiogram performed on 12/22/2022 showing an ejection fraction up to 45%.  She has no significant valvular disease and right ventricular function looked normal.  Her strain pattern is abnormal, but that is certainly expected with chemotherapy rate-related cardiomyopathy.    She is feeling well from a cardiac standpoint.  She is having no shortness of breath, lightheadedness or syncope issues.  She continues to have some episodes of diaphoresis, which occur primarily in the morning and are unexplained.  These can last up to 3 hours at a time.  She also has a rash on her feet, which has been biopsied and reported to be an allergic type reaction.    PHYSICAL EXAMINATION:  On examination here today, her blood pressure was 120/83, heart rate 85 and weight 173 pounds and stable.  Her lungs are clear.  Heart rhythm is regular.  She has no cardiac murmurs or carotid bruits.    Her laboratory studies show a stable creatinine of 1.09, GFR 64 and normal hemoglobin of 15.0.    IMPRESSIONS:  Ms. Kassie Ramirez is a 52-year-old woman with  nonischemic cardiomyopathy, likely due to chemotherapy received for treatment of B-cell lymphoma back in 2019.  Her ejection fraction was as low as 20%-25% in  and , but after stopping chemotherapy and utilizing medications for treatment of her cardiomyopathy, her ejection fraction has improved significantly, up to about 45%.  She has no concerning congestive heart failure symptoms and is not on any loop diuretic.  She is on 3 of the 4 major classes of medications for her cardiomyopathy including beta blocker, aldosterone inhibitor and an SGLT2 inhibitor.  I will go ahead and start her on ACE inhibitor now again.  I believe she will tolerate it better.  I will start lisinopril 5 mg daily and have her watch for orthostatic hypotension issues.      I will see her back again in 6 months, repeat a basic metabolic panel at that time and make sure she is doing well with the medication.  I urged her to contact me if she has any problems with it.    Otherwise, I will continue her other medications without change.    Ashley Pearl MD, F.A.C.C.    cc:    Mary Alice Colón PA-C  [address] [address]     Ashley Pearl MD, [facility]    D: 2022   T: 2022   MT: NAY    Name:     BABAR VARGHESE  MRN:      [id number]        Account:      [id number]   :      1970           Service Date: 2022       Document: [id number]      Thank you for allowing me to participate in the care of your patient.      Sincerely,   ASHLEY PEARL MD   [facility]  cc: Referred Self

## 2022-12-27 NOTE — PROGRESS NOTES
Service Date: 12/27/2022    HISTORY OF PRESENT ILLNESS:  I had the opportunity to see Kassie Ramirez in Cardiology Clinic today at [facility] in [place] for reevaluation of nonischemic cardiomyopathy, likely due to chemotherapy that was given for lymphoma back in 2019.      In the last year, I started her on Jardiance and increased her carvedilol.  She had not tolerated lisinopril in the past due to low blood pressures.  She remained on spironolactone.  With this combination, her left ventricular systolic function had improved significantly.  Back in 2020 and early 2021, her ejection fraction was as low as 20%-25%.  Fortunately with medical management, her ejection fraction has improved significantly.  She just had an echocardiogram performed on 12/22/2022 showing an ejection fraction up to 45%.  She has no significant valvular disease and right ventricular function looked normal.  Her strain pattern is abnormal, but that is certainly expected with chemotherapy rate-related cardiomyopathy.    She is feeling well from a cardiac standpoint.  She is having no shortness of breath, lightheadedness or syncope issues.  She continues to have some episodes of diaphoresis, which occur primarily in the morning and are unexplained.  These can last up to 3 hours at a time.  She also has a rash on her feet, which has been biopsied and reported to be an allergic type reaction.    PHYSICAL EXAMINATION:  On examination here today, her blood pressure was 120/83, heart rate 85 and weight 173 pounds and stable.  Her lungs are clear.  Heart rhythm is regular.  She has no cardiac murmurs or carotid bruits.    Her laboratory studies show a stable creatinine of 1.09, GFR 64 and normal hemoglobin of 15.0.    IMPRESSIONS:  Ms. Kassie Ramirez is a 52-year-old woman with nonischemic cardiomyopathy, likely due to chemotherapy received for treatment of B-cell lymphoma back in 2019.  Her ejection fraction was as low as 20%-25%  in  and , but after stopping chemotherapy and utilizing medications for treatment of her cardiomyopathy, her ejection fraction has improved significantly, up to about 45%.  She has no concerning congestive heart failure symptoms and is not on any loop diuretic.  She is on 3 of the 4 major classes of medications for her cardiomyopathy including beta blocker, aldosterone inhibitor and an SGLT2 inhibitor.  I will go ahead and start her on ACE inhibitor now again.  I believe she will tolerate it better.  I will start lisinopril 5 mg daily and have her watch for orthostatic hypotension issues.      I will see her back again in 6 months, repeat a basic metabolic panel at that time and make sure she is doing well with the medication.  I urged her to contact me if she has any problems with it.    Otherwise, I will continue her other medications without change.    Trevon Pearl MD, F.A.C.C.    cc:    Mary Alice Colón PA-C  [address] [address]     Trevon Pearl MD, [facility]        D: 2022   T: 2022   MT: NAY    Name:     BABAR VARGHESE  MRN:      [id number]        Account:      [id number]   :      1970           Service Date: 2022       Document: [id number]

## 2023-02-06 ENCOUNTER — TELEPHONE (OUTPATIENT)
Dept: FAMILY MEDICINE | Facility: CLINIC | Age: 53
End: 2023-02-06
Payer: COMMERCIAL

## 2023-02-06 NOTE — TELEPHONE ENCOUNTER
Can someone please sign in my absence?    Thank you!      Mary Alice Colón MBA, MS, PA-C  [facility]

## 2023-02-06 NOTE — TELEPHONE ENCOUNTER
Reason for Call:  Form, our goal is to have forms completed with 72 hours, however, some forms may require a visit or additional information.    Type of letter, form or note:  PT for [facility]    Who is the form from?: PT for [facility] [facility] (if other please explain)    Where did the form come from: form was faxed in    What clinic location was the form placed at?: [facility]    Where the form was placed: Mary Alice Colón Box/Folder    What number is listed as a contact on the form?: [phone] fax           Call taken on 2/6/2023 at 11:59 AM by Juli Parr

## 2023-02-06 NOTE — TELEPHONE ENCOUNTER
Form is not in PCPs bin can someone please find this for me.        Beatriz Kowalski, MILO (covering for Mary Alice Colón-MAXX)

## 2023-02-07 ENCOUNTER — MEDICAL CORRESPONDENCE (OUTPATIENT)
Dept: HEALTH INFORMATION MANAGEMENT | Facility: CLINIC | Age: 53
End: 2023-02-07
Payer: COMMERCIAL

## 2023-02-07 NOTE — TELEPHONE ENCOUNTER
Signed in PCPs absence.  Placed in  North bin.            MUSHTAQ MossP (covering for Mary Alice Colón-MAXX)

## 2023-02-07 NOTE — TELEPHONE ENCOUNTER
Picked up completed form signed by Beatriz Kowalski for Mary Alice Colón and faxed to [phone].  And sent to [employer] and filed in drawer.  Mariana Charlton   [employer]

## 2023-02-09 NOTE — PROGRESS NOTES
Addended by: CHELI ANN on: 2/9/2023 09:47 AM     Modules accepted: Level of Service     Infusion Nursing Note:  Kassie Ramirez presents today for C6D1 RCHOP.    Patient seen by provider today: No   present during visit today: Not Applicable.    Note: Feeling much better today than she was last week. Anxious to be done with chemo. Used ice chips with last RCHOP day, and noticed improvement with mouth sores. Having some postnasal drip from seasonal allergies, and she wants to switch from Claritin to Zyrtec. OK'd by pharmacist Carmelo, and informed patient she may notice some increase in bony aches due to Neulasta.   Pt requested Padmaja be reminded to put NS orders in for her inpatient MTX plan; InBasket reminder sent.     Intravenous Access:  Labs drawn without difficulty.  Implanted Port.    Treatment Conditions:  Lab Results   Component Value Date    HGB 8.4 03/25/2020     Lab Results   Component Value Date    WBC 2.9 03/25/2020      Lab Results   Component Value Date    ANEU 1.3 03/25/2020     Lab Results   Component Value Date     03/25/2020      Results reviewed, labs MET treatment parameters, ok to proceed with treatment.    Post Infusion Assessment:  Patient tolerated infusion without incident.  Blood return noted pre and post infusion.  Blood return noted during administration every 5 ml during vesicants.  Site patent and intact, free from redness, edema or discomfort.  No evidence of extravasations.  Access discontinued per protocol.     ONPRO  Was placed on patient's: back of right arm.    Was placed at 1:25 PM    ONPRO injector device Lot number: [id number]    Patient education included: what patient can expect after application, what colored lights mean on the device, when to remove device, when and where to call with questions or issues, all patients questions answered and that Neulasta administration will occur at 4:25pm tomorrow.    Patient tolerated administration well.      Discharge Plan:   Prescription refills given for Prednisone.  Discharge instructions reviewed  with: Patient.  Patient and/or family verbalized understanding of discharge instructions and all questions answered.  AVS to patient via [employer].  Patient will return 4/23 for next appointment.   Patient discharged in stable condition accompanied by: self;  will be driving her home.  Departure Mode: Ambulatory.    Beatriz Herring RN

## 2023-02-22 ENCOUNTER — E-VISIT (OUTPATIENT)
Dept: URGENT CARE | Facility: CLINIC | Age: 53
End: 2023-02-22
Payer: COMMERCIAL

## 2023-02-22 DIAGNOSIS — J01.90 ACUTE SINUSITIS WITH SYMPTOMS > 10 DAYS: Primary | ICD-10-CM

## 2023-02-22 PROCEDURE — 99421 OL DIG E/M SVC 5-10 MIN: CPT | Performed by: INTERNAL MEDICINE

## 2023-02-22 NOTE — PATIENT INSTRUCTIONS
* Sinusitis (No Antibiotics)    The sinuses are air-filled spaces within the bones of the face. They connect to the inside of the nose. Sinusitis is an inflammation of the tissue that lines the sinuses. Sinusitis can occur during a cold. It can also happen due to allergies to pollens and other particles in the air. It can cause symptoms such as sinus congestion, headache, sore throat, facial swelling, and a feeling of fullness. It may also cause a low-grade fever. Your sinusitis does not include an infection with bacteria. Because of this, antibiotics are not used to treat this problem.  Home care    Drink plenty of water, hot tea, and other liquids. This may help thin nasal mucus. It also may help your sinuses drain fluids.    Heat may help soothe painful areas of your face. Use a towel soaked in hot water. Or,  the shower and direct the warm spray onto your face. Using a vaporizer along with a menthol rub at night may also help soothe symptoms.     An expectorant with guaifenesin may help thin nasal mucus and help your sinuses drain fluids.    You can use an over-the-counter decongestant, unless a similar medicine was prescribed to you. Nasal sprays work the fastest. Use one that contains phenylephrine or oxymetazoline. First blow your nose gently. Then use the spray. Do not use these medicines more often than directed on the label. If you do, your symptoms may get worse. You may also take pills that contain pseudoephedrine. Don t use products that combine multiple medicines. This is because side effects may be increased. Read all medicine labels. You can also ask the pharmacist for help. (People with high blood pressure should not use decongestants. They can raise blood pressure.)    Over-the-counter antihistamines may help if allergies contributed to your sinusitis.      Use acetaminophen or ibuprofen to control pain, unless another pain medicine was prescribed to you. If you have chronic liver or  kidney disease or ever had a stomach ulcer, talk with your healthcare provider before using these medicines. (Aspirin should never be taken by anyone under age 18 who is ill with a fever. It may cause severe liver damage.)    Use nasal rinses or irrigation as instructed by your healthcare provider.    Don't smoke. This can make symptoms worse.  Follow-up care  Follow up with your healthcare provider or our staff if you are NOT better in 1 week.  When to seek medical advice  Call your healthcare provider if any of these occur:    Green or yellow fluid draining from your nose or into your throat    Facial pain or headache that gets worse    Stiff neck    Unusual drowsiness or confusion    Swelling of your forehead or eyelids    Vision problems, such as blurred or double vision    Fever of 100.4 F (38 C) or higher, or as directed by your healthcare provider    Seizure    Breathing problems    Symptoms that don't go away in 10 days  For informational purposes only. Not to replace the advice of your health care provider.  Copyright   2018 [facility]. All rights reserved.        Dear Kassie Ramirez    After reviewing your responses, I've been able to diagnose you with a sinus infection.    Based on your responses and diagnosis, I have prescribed Augmentin   to treat your symptoms. I have sent this to your pharmacy.?     It is also important to stay well hydrated, get lots of rest and take over-the-counter decongestants,?tylenol?or ibuprofen if you?are able to?take those medications per your primary care provider to help relieve discomfort.?     It is important that you take?all of?your prescribed medication even if your symptoms are improving after a few doses.? Taking?all of?your medicine helps prevent the symptoms from returning.?     If your symptoms worsen, you develop severe headache, vomiting, high fever (>102), or are not improving in 7 days, please contact your primary care provider for an  appointment or visit any of our convenient Walk-in Care or Urgent Care Centers to be seen which can be found on our website?here.?     Thanks again for choosing?us?as your health care partner,?   ?  Beatriz Wright MD?

## 2023-03-03 DIAGNOSIS — F41.8 SITUATIONAL ANXIETY: ICD-10-CM

## 2023-03-03 DIAGNOSIS — C83.398 DIFFUSE LARGE B-CELL LYMPHOMA OF EXTRANODAL SITE: ICD-10-CM

## 2023-03-03 DIAGNOSIS — Z12.11 SCREEN FOR COLON CANCER: Primary | ICD-10-CM

## 2023-03-03 DIAGNOSIS — Z79.899 CONTROLLED SUBSTANCE AGREEMENT SIGNED: ICD-10-CM

## 2023-03-06 RX ORDER — LORAZEPAM 0.5 MG/1
TABLET ORAL
Qty: 30 TABLET | Refills: 0 | Status: SHIPPED | OUTPATIENT
Start: 2023-03-06 | End: 2023-04-25

## 2023-03-06 NOTE — TELEPHONE ENCOUNTER
RX refilled.  Please advise pt she is due for annual fasting PX for further fills.      Mary Alice Colón MBA, MS, PA-C  [facility]

## 2023-04-10 ENCOUNTER — LAB (OUTPATIENT)
Dept: INFUSION THERAPY | Facility: CLINIC | Age: 53
End: 2023-04-10
Attending: INTERNAL MEDICINE
Payer: COMMERCIAL

## 2023-04-10 ENCOUNTER — HOSPITAL ENCOUNTER (OUTPATIENT)
Dept: CT IMAGING | Facility: CLINIC | Age: 53
Discharge: HOME | End: 2023-04-10
Attending: INTERNAL MEDICINE | Admitting: INTERNAL MEDICINE
Payer: COMMERCIAL

## 2023-04-10 DIAGNOSIS — R53.82 CHRONIC FATIGUE: ICD-10-CM

## 2023-04-10 DIAGNOSIS — I42.9 SECONDARY CARDIOMYOPATHY (H): ICD-10-CM

## 2023-04-10 DIAGNOSIS — C83.398 DIFFUSE LARGE B-CELL LYMPHOMA OF EXTRANODAL SITE: ICD-10-CM

## 2023-04-10 DIAGNOSIS — C83.32 DIFFUSE LARGE B-CELL LYMPHOMA OF INTRATHORACIC LYMPH NODES (H): ICD-10-CM

## 2023-04-10 LAB
ALBUMIN SERPL BCG-MCNC: 5.4 G/DL (ref 3.5–5.2)
ALP SERPL-CCNC: 92 U/L (ref 35–104)
ALT SERPL W P-5'-P-CCNC: 42 U/L (ref 10–35)
ANION GAP SERPL CALCULATED.3IONS-SCNC: 15 MMOL/L (ref 7–15)
AST SERPL W P-5'-P-CCNC: 32 U/L (ref 10–35)
BASOPHILS # BLD MANUAL: 0 10E3/UL (ref 0–0.2)
BASOPHILS NFR BLD MANUAL: 0 %
BILIRUB SERPL-MCNC: 1.2 MG/DL
BUN SERPL-MCNC: 26.7 MG/DL (ref 6–20)
CALCIUM SERPL-MCNC: 10.5 MG/DL (ref 8.6–10)
CHLORIDE SERPL-SCNC: 96 MMOL/L (ref 98–107)
CREAT SERPL-MCNC: 1.29 MG/DL (ref 0.51–0.95)
DEPRECATED HCO3 PLAS-SCNC: 28 MMOL/L (ref 22–29)
EOSINOPHIL # BLD MANUAL: 3.2 10E3/UL (ref 0–0.7)
EOSINOPHIL NFR BLD MANUAL: 27 %
ERYTHROCYTE [DISTWIDTH] IN BLOOD BY AUTOMATED COUNT: 12.4 % (ref 10–15)
GFR SERPL CREATININE-BSD FRML MDRD: 49 ML/MIN/1.73M2
GLUCOSE SERPL-MCNC: 110 MG/DL (ref 70–99)
HCT VFR BLD AUTO: 52 % (ref 35–47)
HGB BLD-MCNC: 17.9 G/DL (ref 11.7–15.7)
LYMPHOCYTES # BLD MANUAL: 3.3 10E3/UL (ref 0.8–5.3)
LYMPHOCYTES NFR BLD MANUAL: 28 %
MCH RBC QN AUTO: 31.3 PG (ref 26.5–33)
MCHC RBC AUTO-ENTMCNC: 34.4 G/DL (ref 31.5–36.5)
MCV RBC AUTO: 91 FL (ref 78–100)
MONOCYTES # BLD MANUAL: 0.8 10E3/UL (ref 0–1.3)
MONOCYTES NFR BLD MANUAL: 7 %
NEUTROPHILS # BLD MANUAL: 4.4 10E3/UL (ref 1.6–8.3)
NEUTROPHILS NFR BLD MANUAL: 38 %
PLAT MORPH BLD: ABNORMAL
PLATELET # BLD AUTO: 243 10E3/UL (ref 150–450)
POTASSIUM SERPL-SCNC: 4 MMOL/L (ref 3.4–5.3)
PROT SERPL-MCNC: 7.9 G/DL (ref 6.4–8.3)
RBC # BLD AUTO: 5.72 10E6/UL (ref 3.8–5.2)
RBC MORPH BLD: ABNORMAL
SODIUM SERPL-SCNC: 139 MMOL/L (ref 136–145)
WBC # BLD AUTO: 11.7 10E3/UL (ref 4–11)

## 2023-04-10 PROCEDURE — 250N000009 HC RX 250: Performed by: INTERNAL MEDICINE

## 2023-04-10 PROCEDURE — 80053 COMPREHEN METABOLIC PANEL: CPT | Performed by: INTERNAL MEDICINE

## 2023-04-10 PROCEDURE — 74177 CT ABD & PELVIS W/CONTRAST: CPT

## 2023-04-10 PROCEDURE — 85007 BL SMEAR W/DIFF WBC COUNT: CPT | Performed by: INTERNAL MEDICINE

## 2023-04-10 PROCEDURE — 36415 COLL VENOUS BLD VENIPUNCTURE: CPT

## 2023-04-10 PROCEDURE — 250N000011 HC RX IP 250 OP 636: Performed by: INTERNAL MEDICINE

## 2023-04-10 PROCEDURE — 85014 HEMATOCRIT: CPT | Performed by: INTERNAL MEDICINE

## 2023-04-10 RX ORDER — IOPAMIDOL 755 MG/ML
500 INJECTION, SOLUTION INTRAVASCULAR ONCE
Status: COMPLETED | OUTPATIENT
Start: 2023-04-10 | End: 2023-04-10

## 2023-04-10 RX ADMIN — SODIUM CHLORIDE 61 ML: 9 INJECTION, SOLUTION INTRAVENOUS at 08:48

## 2023-04-10 RX ADMIN — IOPAMIDOL 84 ML: 755 INJECTION, SOLUTION INTRAVENOUS at 08:48

## 2023-04-10 NOTE — PROGRESS NOTES
Nursing Note:  Kassie Ramirez presents today for PIV start for labs and scans.    Patient seen by provider today: No   present during visit today: Not Applicable.    Note: N/A.    Intravenous Access:  Lab draw site RAC, Needle type angiocath, Gauge 20.  Labs drawn without difficulty.  Peripheral IV placed.    Discharge Plan:   Patient was sent to radiology for imaging appointment.    TROY THOMPSON RN

## 2023-04-14 ENCOUNTER — ONCOLOGY VISIT (OUTPATIENT)
Dept: ONCOLOGY | Facility: CLINIC | Age: 53
End: 2023-04-14
Attending: INTERNAL MEDICINE
Payer: COMMERCIAL

## 2023-04-14 VITALS
RESPIRATION RATE: 16 BRPM | HEART RATE: 97 BPM | DIASTOLIC BLOOD PRESSURE: 80 MMHG | BODY MASS INDEX: 28.06 KG/M2 | HEIGHT: 66 IN | SYSTOLIC BLOOD PRESSURE: 111 MMHG | TEMPERATURE: 97.1 F | WEIGHT: 174.6 LBS | OXYGEN SATURATION: 98 %

## 2023-04-14 DIAGNOSIS — C83.32 DIFFUSE LARGE B-CELL LYMPHOMA OF INTRATHORACIC LYMPH NODES (H): Primary | ICD-10-CM

## 2023-04-14 DIAGNOSIS — R61 NIGHT SWEATS: ICD-10-CM

## 2023-04-14 DIAGNOSIS — E83.52 HYPERCALCEMIA: ICD-10-CM

## 2023-04-14 PROCEDURE — 99215 OFFICE O/P EST HI 40 MIN: CPT | Performed by: INTERNAL MEDICINE

## 2023-04-14 PROCEDURE — G0463 HOSPITAL OUTPT CLINIC VISIT: HCPCS | Performed by: INTERNAL MEDICINE

## 2023-04-14 ASSESSMENT — PAIN SCALES - GENERAL: PAINLEVEL: NO PAIN (0)

## 2023-04-14 NOTE — LETTER
"    4/14/2023         RE: Kassie Ramirez  [address] [facility] [address]        Dear Colleague,    Thank you for referring your patient, Kassie Ramirez, to the [facility]. Please see a copy of my visit note below.    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 Castro     Initial Vitals: /80   Pulse 97   Temp 97.1  F (36.2  C) (Oral)   Resp 16   Ht 1.676 m (5' 6\")   Wt 79.2 kg (174 lb 9.6 oz)   LMP 11/06/2019   SpO2 98%   BMI 28.18 kg/m   Estimated body mass index is 28.18 kg/m  as calculated from the following:    Height as of this encounter: 1.676 m (5' 6\").    Weight as of this encounter: 79.2 kg (174 lb 9.6 oz). Body surface area is 1.92 meters squared.  No Pain (0) Comment: Data Unavailable   Patient's last menstrual period was 11/06/2019.  Allergies reviewed: Yes  Medications reviewed: Yes    Medications: Medication refills not needed today.  Pharmacy name entered into [facility]:    [facility] - [place], [place] - [address] [place] [address] DRUG STORE #[id number] [facility] [place] [address] [address] [address] [facility] [address] & [facility]AC[facility] - [place], [place] - [address] [facility] - [place], [place] - [address]    Clinical concerns: None       Fang Blanco MA                [facility]  HEMATOLOGY AND ONCOLOGY    FOLLOW-UP VISIT NOTE    PATIENT NAME: Kassie Ramirez MRN # [id number]  DATE OF VISIT: Apr 14, 2023 YOB: 1970    REFERRING PROVIDER: No referring provider defined for this encounter.    CANCER TYPE: DLBCL, EBV+ " non-GCB, stage III.  STAGE: III    TREATMENT SUMMARY:  She presented with shortness of breath and cough which had been present since May 2019. Her imaging suggested pulmonary infiltrates which was attributed to aspiration pneumonia. CT scan of the chest 8/20/2019 showed left hilar and subcarinal mediastinal adenopathy with narrowing of the left lower lobe bronchus and a nonspecific patchy area of nodular consolidation in the medial right lower lobe.  Bronchoscopy with EBUS 8/28/2019 showed nonnecrotizing granulomatous inflammation with prominent eosinophilia, negative for malignancy.  She was diagnosed with sarcoidosis and started on prednisone. She had worsening cough and shortness of breath with tapering of the prednisone.  Repeat CT scan of the chest 11/18/2019 showed interval worsening of the bulky mediastinal and bilateral hilar lymphadenopathy, near complete resolution of the lower lobe opacities, with new interstitial opacities in the right upper lobe.   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 Large mediastinal mass causing respiratory compromise. . IPI score of 2 (low-intermediate). FISH neg for high risk translocations. She received 6 cycles of R-CHOP with intermediate dose methotrexate after cycles 2, 4 and 6 from November through April 2020.      CURRENT INTERVENTIONS:  Surveillance post MR-CHOP    SUBJECTIVE   Kassie Ramirez is being followed for DLBCL, EBV+ non-GCB, stage III intermediate risk disease    Patient was followed in person. She has completed all of her planned cycles of MR-CHOP chemotherapy and is being seen with labs and restaging scans.     She had not been doing well since completion of her therapy for lymphoma.  She had struggled with cardiomyopathy post adriamycin.     She is still not the same. She continues to have marked fatigue. She has pain in  multiple joints. She has shortness of breath /dyspnea on exertion.     Her peripheral neuropathy is better now.  Her medications have been adjusted.      PAST MEDICAL HISTORY     Past Medical History:   Diagnosis Date     Anxiety attack 09/16/2014     Cardiomyopathy (H)     non ishemic - 25-30% - Chemo related     Congestive heart failure (H) 02/2019    While in hospital for high dose Methotrexate     Depressive disorder 2003    taking Celexa since 2014     Diffuse large B-cell lymphoma (H)     Diagnosed 11/2019, Ronan Albarran     Encounter for Essure implantation 2009     Generalized anxiety disorder 09/16/2014    zoloft = flat emotions     HFrEF (heart failure with reduced ejection fraction) (H)     new diagnosis 6/14     History of blood transfusion 12/2019    Given many throughout cancer treatment     Menopausal disorder     started on OCPs by menopause center 3/2017 (takes active continuously)     Menstrual headache     helped by OCPs and magnesium     Paroxysmal SVT (supraventricular tachycardia) (H) 06/2020     GERTRUDE (stress urinary incontinence, female)     sling procedure 2016         CURRENT OUTPATIENT MEDICATIONS     Current Outpatient Medications   Medication Sig     alpha-lipoic acid 600 MG capsule Take 1 capsule (600 mg) by mouth daily     carvedilol (COREG) 6.25 MG tablet Take 1 tablet (6.25 mg) by mouth 2 times daily (with meals)     cetirizine (ZYRTEC) 10 MG tablet Take 10 mg by mouth daily     DULoxetine (CYMBALTA) 60 MG capsule Take 1 capsule (60 mg) by mouth daily     empagliflozin (JARDIANCE) 10 MG TABS tablet Take 1 tablet (10 mg) by mouth daily     lisinopril (ZESTRIL) 5 MG tablet Take 1 tablet (5 mg) by mouth At Bedtime     LORazepam (ATIVAN) 0.5 MG tablet TAKE 1 TABLET BY MOUTH AT BEDTIME FOR SLEEP AND ANXIETY.     rosuvastatin (CRESTOR) 10 MG tablet TAKE 1 TABLET (10 MG) BY MOUTH DAILY     spironolactone (ALDACTONE) 25 MG tablet Take 1 tablet (25 mg) by mouth daily     No current  "facility-administered medications for this visit.        ALLERGIES      Allergies   Allergen Reactions     Cold & Flu [Cold Defense Fighter]      See pseudoephedrine     Seasonal Allergies      Sudafed Cold-Cough [Dayquil Liquicaps]      Pseudoephedrine Rash     Rash then skins peels off         REVIEW OF SYSTEMS   As above in the HPI, o/w complete 12-point ROS was negative.     PHYSICAL EXAM   /80   Pulse 97   Temp 97.1  F (36.2  C) (Oral)   Resp 16   Ht 1.676 m (5' 6\")   Wt 79.2 kg (174 lb 9.6 oz)   LMP 11/06/2019   SpO2 98%   BMI 28.18 kg/m    Limited physical exam during video visit due to COVID19 restrictions  Middle aged female in no acute distress  Breathing comfortably, no tachypnea  Speech and hearing normal  Pleasant mood and congruent affect  Moving all extremities, no focal neurologic deficits apparent       LABORATORY AND IMAGING STUDIES     Recent Labs   Lab Test 04/10/23  0810 12/20/22  0809 11/14/22  0817 09/19/22  1653 07/12/22  1223    140 140 141 137   POTASSIUM 4.0 4.0 4.1 3.6 4.1   CHLORIDE 96* 102 104 106 107   CO2 28 24 22 25 27   ANIONGAP 15 14 14 10 3   BUN 26.7* 27.2* 21.4* 19 22   CR 1.29* 1.09* 1.00* 1.20* 1.08*   * 103* 121* 125* 102*   AFSHAN 10.5* 10.3* 9.8 9.8 9.8     Recent Labs   Lab Test 06/19/20  1853 06/15/20  0845 06/14/20  1807 01/11/20  0615 12/01/19  1340 12/01/19  0641 11/30/19  2137 11/30/19  1341 11/28/19  0537 11/27/19  2216   MAG 2.5* 2.4* 2.1 2.0  --   --   --   --   --  2.1   PHOS  --   --   --  3.1 2.8 3.4 2.8 2.5   < > 3.1    < > = values in this interval not displayed.     Recent Labs   Lab Test 04/10/23  0810 12/20/22  0809 11/14/22  0817 09/19/22  1653 07/12/22  1223 05/11/22  1351 03/31/22  1014 01/10/22  1508   WBC 11.7*  --  8.2  --  8.2 7.7 7.7 7.9   HGB 17.9* 15.0 15.5 15.1 14.8 16.0* 15.3 14.6     --  178  --  190 153 203 216   MCV 91  --  94  --  92 90 91 91   NEUTROPHIL 38  --  38  --  40  --  36 38     Recent Labs   Lab Test " 04/10/23  0810 11/14/22  0817 07/12/22  1223 03/31/22  1014   BILITOTAL 1.2 0.7 1.1 0.7   ALKPHOS 92 89 79 78   ALT 42* 40* 46 51*   AST 32 25 22 30   ALBUMIN 5.4* 4.8 4.7 4.5   LDH  --  229 215 223     TSH   Date Value Ref Range Status   01/14/2022 2.29 0.40 - 4.00 mU/L Final   06/14/2020 3.40 0.40 - 4.00 mU/L Final   09/18/2019 2.06 0.40 - 4.00 mU/L Final   07/27/2018 2.04 0.40 - 4.00 mU/L Final     No results for input(s): CEA in the last 69929 hours.  Results for orders placed or performed during the hospital encounter of 04/10/23   CT Chest/Abdomen/Pelvis w Contrast    Narrative    CT CHEST/ABDOMEN/PELVIS WITH CONTRAST 4/10/2023 8:54 AM    CLINICAL HISTORY: Diffuse large B-cell lymphoma of intrathoracic lymph  nodes (H). Chronic fatigue. Secondary cardiomyopathy (H).  TECHNIQUE: CT scan of the chest, abdomen, and pelvis was performed  following injection of IV contrast. Multiplanar reformats were  obtained. Dose reduction techniques were used.   CONTRAST: 84mL Isovue-370  COMPARISON: CT of the chest, abdomen, and pelvis performed 11/14/2022.    FINDINGS:   LUNGS AND PLEURA: A 0.6 cm nodule in the superior segment of the right  lower lobe is unchanged. A 0.3 cm pleural nodule in the left upper  lobe medially (series 10 image 90) is also unchanged. Right upper lobe  calcified granuloma is unchanged. No new or enlarging pulmonary  nodules. No pleural effusions.    MEDIASTINUM/AXILLAE: No enlarged lymph nodes in the chest. No  pericardial effusion.    CORONARY ARTERY CALCIFICATION: None.    HEPATOBILIARY: Small hemangioma in the posterior segment of the right  hepatic lobe is unchanged. Scattered tiny hypodensities in the liver  remain too small to characterize, but unchanged, and may represent  cysts or hemangiomas.    PANCREAS: Normal.    SPLEEN: Mild splenomegaly is not significantly changed. The spleen  measures 13.8 cm in length.    ADRENAL GLANDS: Normal.    KIDNEYS/BLADDER: Unremarkable. No  hydronephrosis.    BOWEL: No bowel obstruction. No convincing evidence for colitis or  diverticulitis. Unremarkable appendix.    PELVIC ORGANS: A 2.4 cm right ovarian cystic lesion is unchanged.  Essure fallopian tube occlusion devices are again noted bilaterally.    LYMPH NODES: No enlarged lymph nodes are identified in the abdomen or  pelvis.    VASCULATURE: Unremarkable.    ADDITIONAL FINDINGS: None.    MUSCULOSKELETAL: Unremarkable.      Impression    IMPRESSION:   1.  No significant interval change compared to 11/14/2022. No evidence  for progression of lymphoma.  2.  Mild splenomegaly.      RYLEE PEREZ MD         SYSTEM ID:  [id number]          ASSESSMENT AND PLAN   DLBCL, EBV+ non-GCB, stage III post 6 cycles of M-RCHOP  PJV pneumonia causing acute respiratory failure improved on bactrim and prednisone  Cardiomyopathy post adriamycin based chemotherapy likely also contributed by tachycardia    Kassie was seen in person visit and was accompanied by her , Florian. She has completed her planned chemotherapy in April 2020.     She continues to feel poorly overall. This has not changed from her treatment completion. She continues to have fatigue, poor energy, joint aches and pains, shortness of breath  / dyspnea on exertion. She even wondered if she really had sarcoidosis which was initial diagnosis until we realized that she had DLBCL. We do not have any radiographic findings consistent with sarcoid. We had checked for ZABRINA and rheumatoid factor which did not suggest rheumatologic disorder/ autoimmune disease.     I have reviewed all of the labs done prior to this clinic visit.  She has elevated BUN/creatinine, calcium, AST, total WBC, hemoglobin and hematocrit. I do not have a good explanation for this. I suspect that hypercalcemia would cause relative dehydration and we could have increased BUN/creatinine, calcium. Her hemoglobin has not been elevated in the past. I am little concerned with the elevated  creatinine and calcium. I will have the labs repeated next week.     I have reviewed actual images from her CT scan and there is no evidence of recurrent disease in the chest, abdomen or pelvis. She had enlarged hilar nodes and mediastinal mass, likely from reactive thymic tissue.  This has remained stable on the current imaging study. Mild splenomegaly has been reported but a slight change in posture could account for this. We will continue to monitor this over time. She has a stable 1.7 cm lesion in the hepatic segment 7 which is likely hemangioma.      All questions for patient  were answered.  She is over 3 years out from treatment completion.   I would like to space out follow up to every 6 months until 5 years out. I will however have her recheck labs next week for all the abnormalities and will follow it by mychart or scheduled a visit with NP/PA as needed.     45 minutes spent on the date of the encounter doing chart review, history and exam, documentation and further activities as noted above     Castro Castro    Hematologist and Medical Oncologist  [facility]         Again, thank you for allowing me to participate in the care of your patient.        Sincerely,        Castro Castro MD

## 2023-04-14 NOTE — PROGRESS NOTES
"Oncology Rooming Note    April 14, 2023 4:26 PM   Kassie Ramirez is a 53 year old female who presents for:    Chief Complaint   Patient presents with     Oncology Clinic Visit     Diffuse large B-cell lymphoma of extranodal site (H) - per pathology 11/27/19 - mediastinal mass wrapped around azalea and bilateral main stem bronchi- treating with Dr. Castro     Initial Vitals: /80   Pulse 97   Temp 97.1  F (36.2  C) (Oral)   Resp 16   Ht 1.676 m (5' 6\")   Wt 79.2 kg (174 lb 9.6 oz)   LMP 11/06/2019   SpO2 98%   BMI 28.18 kg/m   Estimated body mass index is 28.18 kg/m  as calculated from the following:    Height as of this encounter: 1.676 m (5' 6\").    Weight as of this encounter: 79.2 kg (174 lb 9.6 oz). Body surface area is 1.92 meters squared.  No Pain (0) Comment: Data Unavailable   Patient's last menstrual period was 11/06/2019.  Allergies reviewed: Yes  Medications reviewed: Yes    Medications: Medication refills not needed today.  Pharmacy name entered into [facility]:    [facility] - [place], [place] - [address] [place] [address] DRUG STORE #[id number] [address] [place] [address] AT [facility] [address] & [facility] - [place], [place] - [address] [facility] [place], [place] - [address]    Clinical concerns: None       Fang Blanco MA              "

## 2023-04-16 ENCOUNTER — LAB (OUTPATIENT)
Dept: LAB | Facility: CLINIC | Age: 53
End: 2023-04-16
Payer: COMMERCIAL

## 2023-04-16 ENCOUNTER — HEALTH MAINTENANCE LETTER (OUTPATIENT)
Age: 53
End: 2023-04-16

## 2023-04-16 DIAGNOSIS — R61 NIGHT SWEATS: ICD-10-CM

## 2023-04-16 DIAGNOSIS — C83.32 DIFFUSE LARGE B-CELL LYMPHOMA OF INTRATHORACIC LYMPH NODES (H): ICD-10-CM

## 2023-04-16 DIAGNOSIS — E83.52 HYPERCALCEMIA: ICD-10-CM

## 2023-04-16 PROCEDURE — 85025 COMPLETE CBC W/AUTO DIFF WBC: CPT

## 2023-04-16 PROCEDURE — 83970 ASSAY OF PARATHORMONE: CPT

## 2023-04-16 PROCEDURE — 84300 ASSAY OF URINE SODIUM: CPT

## 2023-04-16 PROCEDURE — 82570 ASSAY OF URINE CREATININE: CPT

## 2023-04-16 PROCEDURE — 80053 COMPREHEN METABOLIC PANEL: CPT

## 2023-04-16 PROCEDURE — 82670 ASSAY OF TOTAL ESTRADIOL: CPT

## 2023-04-16 PROCEDURE — 83001 ASSAY OF GONADOTROPIN (FSH): CPT

## 2023-04-16 PROCEDURE — 84295 ASSAY OF SERUM SODIUM: CPT | Mod: 59

## 2023-04-16 PROCEDURE — 83002 ASSAY OF GONADOTROPIN (LH): CPT

## 2023-04-16 PROCEDURE — 99000 SPECIMEN HANDLING OFFICE-LAB: CPT

## 2023-04-16 PROCEDURE — 82542 COL CHROMOTOGRAPHY QUAL/QUAN: CPT | Mod: 90

## 2023-04-16 PROCEDURE — 36415 COLL VENOUS BLD VENIPUNCTURE: CPT

## 2023-04-16 NOTE — PROGRESS NOTES
[facility]  HEMATOLOGY AND ONCOLOGY    FOLLOW-UP VISIT NOTE    PATIENT NAME: Kassie Ramirez MRN # [id number]  DATE OF VISIT: Apr 14, 2023 YOB: 1970    REFERRING PROVIDER: No referring provider defined for this encounter.    CANCER TYPE: DLBCL, EBV+ non-GCB, stage III.  STAGE: III    TREATMENT SUMMARY:  She presented with shortness of breath and cough which had been present since May 2019. Her imaging suggested pulmonary infiltrates which was attributed to aspiration pneumonia. CT scan of the chest 8/20/2019 showed left hilar and subcarinal mediastinal adenopathy with narrowing of the left lower lobe bronchus and a nonspecific patchy area of nodular consolidation in the medial right lower lobe.  Bronchoscopy with EBUS 8/28/2019 showed nonnecrotizing granulomatous inflammation with prominent eosinophilia, negative for malignancy.  She was diagnosed with sarcoidosis and started on prednisone. She had worsening cough and shortness of breath with tapering of the prednisone.  Repeat CT scan of the chest 11/18/2019 showed interval worsening of the bulky mediastinal and bilateral hilar lymphadenopathy, near complete resolution of the lower lobe opacities, with new interstitial opacities in the right upper lobe.   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 Large mediastinal mass causing respiratory compromise. . IPI score of 2 (low-intermediate). FISH neg for high risk translocations. She received 6 cycles of R-CHOP with intermediate dose methotrexate after cycles 2, 4 and 6 from November through April 2020.      CURRENT INTERVENTIONS:  Surveillance post MR-CHOP    SUBJECTIVE   Kassie Ramirez is being followed for DLBCL, EBV+ non-GCB, stage III intermediate risk disease    Patient was followed in person. She has completed all of her planned cycles of MR-CHOP  chemotherapy and is being seen with labs and restaging scans.     She had not been doing well since completion of her therapy for lymphoma.  She had struggled with cardiomyopathy post adriamycin.     She is still not the same. She continues to have marked fatigue. She has pain in multiple joints. She has shortness of breath /dyspnea on exertion.     Her peripheral neuropathy is better now.  Her medications have been adjusted.      PAST MEDICAL HISTORY     Past Medical History:   Diagnosis Date     Anxiety attack 09/16/2014     Cardiomyopathy (H)     non ishemic - 25-30% - Chemo related     Congestive heart failure (H) 02/2019    While in hospital for high dose Methotrexate     Depressive disorder 2003    taking Celexa since 2014     Diffuse large B-cell lymphoma (H)     Diagnosed 11/2019, Ronan Albarran     Encounter for Essure implantation 2009     Generalized anxiety disorder 09/16/2014    zoloft = flat emotions     HFrEF (heart failure with reduced ejection fraction) (H)     new diagnosis 6/14     History of blood transfusion 12/2019    Given many throughout cancer treatment     Menopausal disorder     started on OCPs by menopause center 3/2017 (takes active continuously)     Menstrual headache     helped by OCPs and magnesium     Paroxysmal SVT (supraventricular tachycardia) (H) 06/2020     GERTRUDE (stress urinary incontinence, female)     sling procedure 2016         CURRENT OUTPATIENT MEDICATIONS     Current Outpatient Medications   Medication Sig     alpha-lipoic acid 600 MG capsule Take 1 capsule (600 mg) by mouth daily     carvedilol (COREG) 6.25 MG tablet Take 1 tablet (6.25 mg) by mouth 2 times daily (with meals)     cetirizine (ZYRTEC) 10 MG tablet Take 10 mg by mouth daily     DULoxetine (CYMBALTA) 60 MG capsule Take 1 capsule (60 mg) by mouth daily     empagliflozin (JARDIANCE) 10 MG TABS tablet Take 1 tablet (10 mg) by mouth daily     lisinopril (ZESTRIL) 5 MG tablet Take 1 tablet (5 mg) by mouth At  "Bedtime     LORazepam (ATIVAN) 0.5 MG tablet TAKE 1 TABLET BY MOUTH AT BEDTIME FOR SLEEP AND ANXIETY.     rosuvastatin (CRESTOR) 10 MG tablet TAKE 1 TABLET (10 MG) BY MOUTH DAILY     spironolactone (ALDACTONE) 25 MG tablet Take 1 tablet (25 mg) by mouth daily     No current facility-administered medications for this visit.        ALLERGIES      Allergies   Allergen Reactions     Cold & Flu [Cold Defense Fighter]      See pseudoephedrine     Seasonal Allergies      Sudafed Cold-Cough [Dayquil Liquicaps]      Pseudoephedrine Rash     Rash then skins peels off         REVIEW OF SYSTEMS   As above in the HPI, o/w complete 12-point ROS was negative.     PHYSICAL EXAM   /80   Pulse 97   Temp 97.1  F (36.2  C) (Oral)   Resp 16   Ht 1.676 m (5' 6\")   Wt 79.2 kg (174 lb 9.6 oz)   LMP 11/06/2019   SpO2 98%   BMI 28.18 kg/m    Limited physical exam during video visit due to COVID19 restrictions  Middle aged female in no acute distress  Breathing comfortably, no tachypnea  Speech and hearing normal  Pleasant mood and congruent affect  Moving all extremities, no focal neurologic deficits apparent       LABORATORY AND IMAGING STUDIES     Recent Labs   Lab Test 04/10/23  0810 12/20/22  0809 11/14/22  0817 09/19/22  1653 07/12/22  1223    140 140 141 137   POTASSIUM 4.0 4.0 4.1 3.6 4.1   CHLORIDE 96* 102 104 106 107   CO2 28 24 22 25 27   ANIONGAP 15 14 14 10 3   BUN 26.7* 27.2* 21.4* 19 22   CR 1.29* 1.09* 1.00* 1.20* 1.08*   * 103* 121* 125* 102*   AFSHAN 10.5* 10.3* 9.8 9.8 9.8     Recent Labs   Lab Test 06/19/20  1853 06/15/20  0845 06/14/20  1807 01/11/20  0615 12/01/19  1340 12/01/19  0641 11/30/19  2137 11/30/19  1341 11/28/19  0537 11/27/19  2216   MAG 2.5* 2.4* 2.1 2.0  --   --   --   --   --  2.1   PHOS  --   --   --  3.1 2.8 3.4 2.8 2.5   < > 3.1    < > = values in this interval not displayed.     Recent Labs   Lab Test 04/10/23  0810 12/20/22  0809 11/14/22  0817 09/19/22  1653 07/12/22  1223 " 05/11/22  1351 03/31/22  1014 01/10/22  1508   WBC 11.7*  --  8.2  --  8.2 7.7 7.7 7.9   HGB 17.9* 15.0 15.5 15.1 14.8 16.0* 15.3 14.6     --  178  --  190 153 203 216   MCV 91  --  94  --  92 90 91 91   NEUTROPHIL 38  --  38  --  40  --  36 38     Recent Labs   Lab Test 04/10/23  0810 11/14/22  0817 07/12/22  1223 03/31/22  1014   BILITOTAL 1.2 0.7 1.1 0.7   ALKPHOS 92 89 79 78   ALT 42* 40* 46 51*   AST 32 25 22 30   ALBUMIN 5.4* 4.8 4.7 4.5   LDH  --  229 215 223     TSH   Date Value Ref Range Status   01/14/2022 2.29 0.40 - 4.00 mU/L Final   06/14/2020 3.40 0.40 - 4.00 mU/L Final   09/18/2019 2.06 0.40 - 4.00 mU/L Final   07/27/2018 2.04 0.40 - 4.00 mU/L Final     No results for input(s): CEA in the last 09167 hours.  Results for orders placed or performed during the hospital encounter of 04/10/23   CT Chest/Abdomen/Pelvis w Contrast    Narrative    CT CHEST/ABDOMEN/PELVIS WITH CONTRAST 4/10/2023 8:54 AM    CLINICAL HISTORY: Diffuse large B-cell lymphoma of intrathoracic lymph  nodes (H). Chronic fatigue. Secondary cardiomyopathy (H).  TECHNIQUE: CT scan of the chest, abdomen, and pelvis was performed  following injection of IV contrast. Multiplanar reformats were  obtained. Dose reduction techniques were used.   CONTRAST: 84mL Isovue-370  COMPARISON: CT of the chest, abdomen, and pelvis performed 11/14/2022.    FINDINGS:   LUNGS AND PLEURA: A 0.6 cm nodule in the superior segment of the right  lower lobe is unchanged. A 0.3 cm pleural nodule in the left upper  lobe medially (series 10 image 90) is also unchanged. Right upper lobe  calcified granuloma is unchanged. No new or enlarging pulmonary  nodules. No pleural effusions.    MEDIASTINUM/AXILLAE: No enlarged lymph nodes in the chest. No  pericardial effusion.    CORONARY ARTERY CALCIFICATION: None.    HEPATOBILIARY: Small hemangioma in the posterior segment of the right  hepatic lobe is unchanged. Scattered tiny hypodensities in the liver  remain too  small to characterize, but unchanged, and may represent  cysts or hemangiomas.    PANCREAS: Normal.    SPLEEN: Mild splenomegaly is not significantly changed. The spleen  measures 13.8 cm in length.    ADRENAL GLANDS: Normal.    KIDNEYS/BLADDER: Unremarkable. No hydronephrosis.    BOWEL: No bowel obstruction. No convincing evidence for colitis or  diverticulitis. Unremarkable appendix.    PELVIC ORGANS: A 2.4 cm right ovarian cystic lesion is unchanged.  Essure fallopian tube occlusion devices are again noted bilaterally.    LYMPH NODES: No enlarged lymph nodes are identified in the abdomen or  pelvis.    VASCULATURE: Unremarkable.    ADDITIONAL FINDINGS: None.    MUSCULOSKELETAL: Unremarkable.      Impression    IMPRESSION:   1.  No significant interval change compared to 11/14/2022. No evidence  for progression of lymphoma.  2.  Mild splenomegaly.      RYLEE PEREZ MD         SYSTEM ID:  [id number]          ASSESSMENT AND PLAN   DLBCL, EBV+ non-GCB, stage III post 6 cycles of M-RCHOP  PJV pneumonia causing acute respiratory failure improved on bactrim and prednisone  Cardiomyopathy post adriamycin based chemotherapy likely also contributed by tachycardia    Kassie was seen in person visit and was accompanied by her , Florian. She has completed her planned chemotherapy in April 2020.     She continues to feel poorly overall. This has not changed from her treatment completion. She continues to have fatigue, poor energy, joint aches and pains, shortness of breath  / dyspnea on exertion. She even wondered if she really had sarcoidosis which was initial diagnosis until we realized that she had DLBCL. We do not have any radiographic findings consistent with sarcoid. We had checked for ZABRINA and rheumatoid factor which did not suggest rheumatologic disorder/ autoimmune disease.     I have reviewed all of the labs done prior to this clinic visit.  She has elevated BUN/creatinine, calcium, AST, total WBC, hemoglobin  and hematocrit. I do not have a good explanation for this. I suspect that hypercalcemia would cause relative dehydration and we could have increased BUN/creatinine, calcium. Her hemoglobin has not been elevated in the past. I am little concerned with the elevated creatinine and calcium. I will have the labs repeated next week.     I have reviewed actual images from her CT scan and there is no evidence of recurrent disease in the chest, abdomen or pelvis. She had enlarged hilar nodes and mediastinal mass, likely from reactive thymic tissue.  This has remained stable on the current imaging study. Mild splenomegaly has been reported but a slight change in posture could account for this. We will continue to monitor this over time. She has a stable 1.7 cm lesion in the hepatic segment 7 which is likely hemangioma.      All questions for patient  were answered.  She is over 3 years out from treatment completion.   I would like to space out follow up to every 6 months until 5 years out. I will however have her recheck labs next week for all the abnormalities and [facility] or scheduled a visit with NP/PA as needed.     45 minutes spent on the date of the encounter doing chart review, history and exam, documentation and further activities as noted above     Castro Castro    Hematologist and Medical Oncologist  [facility]

## 2023-04-17 LAB
ALBUMIN SERPL BCG-MCNC: 4.8 G/DL (ref 3.5–5.2)
ALP SERPL-CCNC: 77 U/L (ref 35–104)
ALT SERPL W P-5'-P-CCNC: 37 U/L (ref 10–35)
ANION GAP SERPL CALCULATED.3IONS-SCNC: 13 MMOL/L (ref 7–15)
AST SERPL W P-5'-P-CCNC: 33 U/L (ref 10–35)
BASOPHILS # BLD AUTO: 0.1 10E3/UL (ref 0–0.2)
BASOPHILS NFR BLD AUTO: 1 %
BILIRUB SERPL-MCNC: 0.7 MG/DL
BUN SERPL-MCNC: 18.4 MG/DL (ref 6–20)
CALCIUM SERPL-MCNC: 10.5 MG/DL (ref 8.6–10)
CHLORIDE SERPL-SCNC: 103 MMOL/L (ref 98–107)
CREAT SERPL-MCNC: 1.08 MG/DL (ref 0.51–0.95)
CREAT SERPL-MCNC: 1.08 MG/DL (ref 0.51–0.95)
CREAT UR-MCNC: 167 MG/DL
DEPRECATED HCO3 PLAS-SCNC: 24 MMOL/L (ref 22–29)
EOSINOPHIL # BLD AUTO: 2.3 10E3/UL (ref 0–0.7)
EOSINOPHIL NFR BLD AUTO: 28 %
ERYTHROCYTE [DISTWIDTH] IN BLOOD BY AUTOMATED COUNT: 11.9 % (ref 10–15)
ESTRADIOL SERPL-MCNC: <5 PG/ML
FRACT EXCRET NA UR+SERPL-RTO: 0.5 %
FSH SERPL IRP2-ACNC: 146 MIU/ML
GFR SERPL CREATININE-BSD FRML MDRD: 61 ML/MIN/1.73M2
GLUCOSE SERPL-MCNC: 86 MG/DL (ref 70–99)
HCT VFR BLD AUTO: 43.8 % (ref 35–47)
HGB BLD-MCNC: 15 G/DL (ref 11.7–15.7)
IMM GRANULOCYTES # BLD: 0 10E3/UL
IMM GRANULOCYTES NFR BLD: 0 %
LH SERPL-ACNC: 48.1 MIU/ML
LYMPHOCYTES # BLD AUTO: 2.1 10E3/UL (ref 0.8–5.3)
LYMPHOCYTES NFR BLD AUTO: 25 %
MCH RBC QN AUTO: 31.5 PG (ref 26.5–33)
MCHC RBC AUTO-ENTMCNC: 34.2 G/DL (ref 31.5–36.5)
MCV RBC AUTO: 92 FL (ref 78–100)
MONOCYTES # BLD AUTO: 0.5 10E3/UL (ref 0–1.3)
MONOCYTES NFR BLD AUTO: 6 %
NEUTROPHILS # BLD AUTO: 3.3 10E3/UL (ref 1.6–8.3)
NEUTROPHILS NFR BLD AUTO: 40 %
NRBC # BLD AUTO: 0 10E3/UL
NRBC BLD AUTO-RTO: 0 /100
PLATELET # BLD AUTO: 173 10E3/UL (ref 150–450)
POTASSIUM SERPL-SCNC: 4.4 MMOL/L (ref 3.4–5.3)
PROT SERPL-MCNC: 6.6 G/DL (ref 6.4–8.3)
PTH-INTACT SERPL-MCNC: 39 PG/ML (ref 15–65)
RBC # BLD AUTO: 4.76 10E6/UL (ref 3.8–5.2)
SODIUM SERPL-SCNC: 140 MMOL/L (ref 136–145)
SODIUM SERPL-SCNC: 140 MMOL/L (ref 136–145)
SODIUM UR-SCNC: 100 MMOL/L
WBC # BLD AUTO: 8.3 10E3/UL (ref 4–11)

## 2023-04-22 LAB — PTH RELATED PROT SERPL-SCNC: 2.3 PMOL/L

## 2023-04-25 DIAGNOSIS — F41.8 SITUATIONAL ANXIETY: ICD-10-CM

## 2023-04-25 DIAGNOSIS — C83.398 DIFFUSE LARGE B-CELL LYMPHOMA OF EXTRANODAL SITE: ICD-10-CM

## 2023-04-25 DIAGNOSIS — Z79.899 CONTROLLED SUBSTANCE AGREEMENT SIGNED: ICD-10-CM

## 2023-04-25 DIAGNOSIS — E78.1 HYPERTRIGLYCERIDEMIA: ICD-10-CM

## 2023-04-25 DIAGNOSIS — E78.2 MIXED HYPERLIPIDEMIA: ICD-10-CM

## 2023-04-25 RX ORDER — ROSUVASTATIN CALCIUM 10 MG/1
10 TABLET, COATED ORAL DAILY
Qty: 90 TABLET | Refills: 0 | Status: SHIPPED | OUTPATIENT
Start: 2023-04-25 | End: 2023-07-03

## 2023-04-25 RX ORDER — LORAZEPAM 0.5 MG/1
TABLET ORAL
Qty: 30 TABLET | Refills: 0 | Status: SHIPPED | OUTPATIENT
Start: 2023-04-25 | End: 2023-07-17

## 2023-04-25 NOTE — TELEPHONE ENCOUNTER
Routing refill request to provider for review/approval because:   Statins Protocol Failed 04/25/2023 02:08 PM   Protocol Details  LDL on file in past 12 months        Amada Parekh RN, BSN  [facility]

## 2023-04-25 NOTE — TELEPHONE ENCOUNTER
LORazepam (ATIVAN) 0.5 MG tablet            Last Written Prescription Date:  3.6.23  Last Fill Quantity: 30 TABLET,  # refills: 0   Last office visit: 8/18/2022 with prescribing provider:  WARREN Bray       Future Office Visit:            Routing refill request to provider for review/approval because:  Drug not on the [facility], [facility] or [facility] refill protocol or controlled substance

## 2023-04-25 NOTE — TELEPHONE ENCOUNTER
90 days sent to pharmacy - fasting physical due for further fills.  Please assist with scheduling      Mary Alice Colón MBA, MS, PA-C  [facility]

## 2023-04-27 ENCOUNTER — VIRTUAL VISIT (OUTPATIENT)
Dept: FAMILY MEDICINE | Facility: CLINIC | Age: 53
End: 2023-04-27
Payer: COMMERCIAL

## 2023-04-27 DIAGNOSIS — E78.2 MIXED HYPERLIPIDEMIA: ICD-10-CM

## 2023-04-27 DIAGNOSIS — F41.8 SITUATIONAL ANXIETY: ICD-10-CM

## 2023-04-27 DIAGNOSIS — D72.10 EOSINOPHILIA, UNSPECIFIED TYPE: ICD-10-CM

## 2023-04-27 DIAGNOSIS — F33.0 MILD RECURRENT MAJOR DEPRESSION (H): ICD-10-CM

## 2023-04-27 DIAGNOSIS — N95.1 MENOPAUSAL SYNDROME (HOT FLASHES): Primary | ICD-10-CM

## 2023-04-27 DIAGNOSIS — I42.8 NONISCHEMIC CARDIOMYOPATHY (H): ICD-10-CM

## 2023-04-27 DIAGNOSIS — I50.22 CHRONIC SYSTOLIC CONGESTIVE HEART FAILURE (H): ICD-10-CM

## 2023-04-27 DIAGNOSIS — C83.398 DIFFUSE LARGE B-CELL LYMPHOMA OF EXTRANODAL SITE: ICD-10-CM

## 2023-04-27 PROCEDURE — 96127 BRIEF EMOTIONAL/BEHAV ASSMT: CPT | Performed by: PHYSICIAN ASSISTANT

## 2023-04-27 PROCEDURE — 99215 OFFICE O/P EST HI 40 MIN: CPT | Mod: VID | Performed by: PHYSICIAN ASSISTANT

## 2023-04-27 RX ORDER — SPIRONOLACTONE 25 MG/1
25 TABLET ORAL DAILY
Qty: 90 TABLET | Refills: 1 | Status: SHIPPED | OUTPATIENT
Start: 2023-04-27 | End: 2023-10-06

## 2023-04-27 RX ORDER — DULOXETIN HYDROCHLORIDE 30 MG/1
30 CAPSULE, DELAYED RELEASE ORAL 2 TIMES DAILY
Qty: 180 CAPSULE | Refills: 0 | Status: SHIPPED | OUTPATIENT
Start: 2023-04-27 | End: 2023-07-05

## 2023-04-27 RX ORDER — CARVEDILOL 6.25 MG/1
6.25 TABLET ORAL 2 TIMES DAILY WITH MEALS
Qty: 180 TABLET | Refills: 1 | Status: SHIPPED | OUTPATIENT
Start: 2023-04-27 | End: 2023-10-06

## 2023-04-27 RX ORDER — LISINOPRIL 5 MG/1
5 TABLET ORAL AT BEDTIME
Qty: 90 TABLET | Refills: 1 | Status: SHIPPED | OUTPATIENT
Start: 2023-04-27 | End: 2023-10-06

## 2023-04-27 ASSESSMENT — ANXIETY QUESTIONNAIRES
6. BECOMING EASILY ANNOYED OR IRRITABLE: NOT AT ALL
8. IF YOU CHECKED OFF ANY PROBLEMS, HOW DIFFICULT HAVE THESE MADE IT FOR YOU TO DO YOUR WORK, TAKE CARE OF THINGS AT HOME, OR GET ALONG WITH OTHER PEOPLE?: NOT DIFFICULT AT ALL
5. BEING SO RESTLESS THAT IT IS HARD TO SIT STILL: NOT AT ALL
1. FEELING NERVOUS, ANXIOUS, OR ON EDGE: SEVERAL DAYS
3. WORRYING TOO MUCH ABOUT DIFFERENT THINGS: NOT AT ALL
2. NOT BEING ABLE TO STOP OR CONTROL WORRYING: NOT AT ALL
7. FEELING AFRAID AS IF SOMETHING AWFUL MIGHT HAPPEN: NOT AT ALL
7. FEELING AFRAID AS IF SOMETHING AWFUL MIGHT HAPPEN: NOT AT ALL
GAD7 TOTAL SCORE: 1
IF YOU CHECKED OFF ANY PROBLEMS ON THIS QUESTIONNAIRE, HOW DIFFICULT HAVE THESE PROBLEMS MADE IT FOR YOU TO DO YOUR WORK, TAKE CARE OF THINGS AT HOME, OR GET ALONG WITH OTHER PEOPLE: NOT DIFFICULT AT ALL
4. TROUBLE RELAXING: NOT AT ALL

## 2023-04-27 ASSESSMENT — PATIENT HEALTH QUESTIONNAIRE - PHQ9
SUM OF ALL RESPONSES TO PHQ QUESTIONS 1-9: 3
SUM OF ALL RESPONSES TO PHQ QUESTIONS 1-9: 3
10. IF YOU CHECKED OFF ANY PROBLEMS, HOW DIFFICULT HAVE THESE PROBLEMS MADE IT FOR YOU TO DO YOUR WORK, TAKE CARE OF THINGS AT HOME, OR GET ALONG WITH OTHER PEOPLE: SOMEWHAT DIFFICULT

## 2023-04-27 NOTE — TELEPHONE ENCOUNTER
[facility] Refill Guideline reviewed.  Medications meet criteria for refill.

## 2023-04-27 NOTE — PROGRESS NOTES
Kassie is a 53 year old who is being evaluated via a billable video visit.      How would you like to obtain your AVS? MyChart  If the video visit is dropped, the invitation should be resent by: Text to cell phone: [phone]  Will anyone else be joining your video visit? No          Assessment & Plan     Menopausal syndrome (hot flashes)  Situational anxiety  Mild recurrent major depression (H)  Unclear etiology.  This seems to appear to correlate with the start of the duloxetine.  Recommend changing dosing to 30 mg twice daily rather than 60 mg once daily.  She will keep me apprised of her symptom response.  Additionally, did start Jardiance around that same time, however, this is typically not associated with hot flash side effects.  Labs to evaluate for any other underlying metabolic pathology including 24-hour urine serotonin levels and catecholamines.  - DULoxetine (CYMBALTA) 30 MG capsule  Dispense: 180 capsule; Refill: 0  - TSH with free T4 reflex  - Vitamin D Deficiency  - Catecholamines fractioned free urine  - 5-HIAA quantitative urine  - UA with Microscopic - lab collect    Eosinophilia, unspecified type  Diffuse large B-cell lymphoma of extranodal site (H) - per pathology 11/27/19 - mediastinal mass wrapped around azalea and bilateral main stem bronchi- treating with Dr. Castro  It appears that eosinophilia is often found after radiation exposure of which the patient has had quite a bit of.  Additionally, lymphoma can have an influence on this as well.  Recommend that we discussed with hematologist to see if the increased level with her last blood draw is of any concern.  Patient will keep me apprised of her oncologist recommendations.    Mixed hyperlipidemia - started rosuvastatin 1/2022  On rosuvastatin.  Labs for surveillance.  - Lipid panel reflex to direct LDL Fasting      45 minutes spent by me on the date of the encounter doing chart review, history and exam, documentation and further activities  "per the note    BMI:   Estimated body mass index is 28.18 kg/m  as calculated from the following:    Height as of 4/14/23: 1.676 m (5' 6\").    Weight as of 4/14/23: 79.2 kg (174 lb 9.6 oz).   Weight management plan: Deferred, virtual visit      Mary Alice ALEXANDER [facility] CHAPIN Fernando is a 53 year old, presenting for the following health issues:  RECHECK        4/27/2023     4:30 PM   Additional Questions   Roomed by Kenya Hung CMA   Accompanied by Self     History of Present Illness       Reason for visit:  Review bloodwork, discuss \"hot flashes\"    She eats 4 or more servings of fruits and vegetables daily.She consumes 1 sweetened beverage(s) daily.She exercises with enough effort to increase her heart rate 20 to 29 minutes per day.  She exercises with enough effort to increase her heart rate 4 days per week.   She is taking medications regularly.    Today's PHQ-9         PHQ-9 Total Score: 3    PHQ-9 Q9 Thoughts of better off dead/self-harm past 2 weeks :   Not at all    How difficult have these problems made it for you to do your work, take care of things at home, or get along with other people: Somewhat difficult  Today's BRIAN-7 Score: 1     Hot flashes  About the last year.  Long/severe hot flashes.  Last for 3+ hours.  Head sweats.  Did start 30 mg duloxetine 6/2/2022 due to suboptimally controlled depression/anxiety and hope that it would help with chest wall pain after (from radiation nerve.  Mood was improved July 2022 but not yet controlled well.  We further increased to 60 mg at that 7/2022 visit.  Additionally, began Jardiance around that same time (June 2022) by cardiology.    Eosinophilia  Pt inquiring about elevated levels.      Absolute Eosinophils   Date Value Ref Range Status   04/10/2023 3.2 (H) 0.0 - 0.7 10e3/uL Final   06/07/2021 1.0 (H) 0.0 - 0.7 10e9/L Final   06/06/2021 0.5 0.0 - 0.7 10e9/L Final   03/12/2021 0.9 (H) 0.0 - 0.7 10e9/L Final "   12/15/2020 1.1 (H) 0.0 - 0.7 10e9/L Final   11/18/2020 0.6 0.0 - 0.7 10e9/L Final   09/11/2020 0.6 0.0 - 0.7 10e9/L Final       Review of Systems   Constitutional, HEENT, cardiovascular, pulmonary, gi and gu systems are negative, except as otherwise noted.      Objective           Vitals:  No vitals were obtained today due to virtual visit.    Physical Exam   GENERAL: Healthy, alert and no distress  EYES: Eyes grossly normal to inspection.  No discharge or erythema, or obvious scleral/conjunctival abnormalities.  HENT: Normal cephalic/atraumatic.  External ears, nose and mouth without ulcers or lesions.  No nasal drainage visible.  NECK: No asymmetry, visible masses or scars  RESP: No audible wheeze, cough, or visible cyanosis.  No visible retractions or increased work of breathing.    SKIN: Visible skin clear. No significant rash, abnormal pigmentation or lesions.  NEURO: Cranial nerves grossly intact.  Mentation and speech appropriate for age.  PSYCH: Mentation appears normal, affect normal/bright, judgement and insight intact, normal speech and appearance well-groomed.    No results found for any visits on 04/27/23.          Video-Visit Details    Type of service:  Video Visit     Originating Location (pt. Location): Home    Distant Location (provider location):  On-site  Platform used for Video Visit: Zextit

## 2023-04-28 DIAGNOSIS — I50.22 CHRONIC SYSTOLIC CONGESTIVE HEART FAILURE (H): ICD-10-CM

## 2023-04-28 DIAGNOSIS — I42.8 NONISCHEMIC CARDIOMYOPATHY (H): ICD-10-CM

## 2023-05-01 PROBLEM — F33.0 MILD RECURRENT MAJOR DEPRESSION (H): Status: ACTIVE | Noted: 2023-05-01

## 2023-05-04 ENCOUNTER — APPOINTMENT (OUTPATIENT)
Dept: LAB | Facility: CLINIC | Age: 53
End: 2023-05-04
Payer: COMMERCIAL

## 2023-05-08 PROBLEM — C83.30 DLBCL (DIFFUSE LARGE B CELL LYMPHOMA) (H): Status: RESOLVED | Noted: 2020-02-21 | Resolved: 2023-05-08

## 2023-05-11 ENCOUNTER — LAB (OUTPATIENT)
Dept: LAB | Facility: CLINIC | Age: 53
End: 2023-05-11
Payer: COMMERCIAL

## 2023-05-11 DIAGNOSIS — E78.2 MIXED HYPERLIPIDEMIA: ICD-10-CM

## 2023-05-11 DIAGNOSIS — N95.1 MENOPAUSAL SYNDROME (HOT FLASHES): ICD-10-CM

## 2023-05-11 LAB
ALBUMIN UR-MCNC: NEGATIVE MG/DL
APPEARANCE UR: CLEAR
BACTERIA #/AREA URNS HPF: ABNORMAL /HPF
BILIRUB UR QL STRIP: NEGATIVE
CHOLEST SERPL-MCNC: 265 MG/DL
COLOR UR AUTO: YELLOW
GLUCOSE UR STRIP-MCNC: NEGATIVE MG/DL
HDLC SERPL-MCNC: 40 MG/DL
HGB UR QL STRIP: NEGATIVE
KETONES UR STRIP-MCNC: 15 MG/DL
LDLC SERPL CALC-MCNC: 158 MG/DL
LEUKOCYTE ESTERASE UR QL STRIP: ABNORMAL
NITRATE UR QL: NEGATIVE
NONHDLC SERPL-MCNC: 225 MG/DL
PH UR STRIP: 6 [PH] (ref 5–7)
RBC #/AREA URNS AUTO: ABNORMAL /HPF
SP GR UR STRIP: 1.02 (ref 1–1.03)
SQUAMOUS #/AREA URNS AUTO: ABNORMAL /LPF
TRIGL SERPL-MCNC: 333 MG/DL
TSH SERPL DL<=0.005 MIU/L-ACNC: 2.11 UIU/ML (ref 0.3–4.2)
UROBILINOGEN UR STRIP-ACNC: 0.2 E.U./DL
WBC #/AREA URNS AUTO: ABNORMAL /HPF

## 2023-05-11 PROCEDURE — 82306 VITAMIN D 25 HYDROXY: CPT

## 2023-05-11 PROCEDURE — 36415 COLL VENOUS BLD VENIPUNCTURE: CPT

## 2023-05-11 PROCEDURE — 81001 URINALYSIS AUTO W/SCOPE: CPT

## 2023-05-11 PROCEDURE — 84443 ASSAY THYROID STIM HORMONE: CPT

## 2023-05-11 PROCEDURE — 80061 LIPID PANEL: CPT

## 2023-05-12 LAB — DEPRECATED CALCIDIOL+CALCIFEROL SERPL-MC: 24 UG/L (ref 20–75)

## 2023-05-13 PROCEDURE — 82384 ASSAY THREE CATECHOLAMINES: CPT | Mod: 90

## 2023-05-13 PROCEDURE — 99000 SPECIMEN HANDLING OFFICE-LAB: CPT

## 2023-05-13 PROCEDURE — 83497 ASSAY OF 5-HIAA: CPT | Mod: 90

## 2023-05-15 PROBLEM — E55.9 HYPOVITAMINOSIS D: Status: ACTIVE | Noted: 2023-05-15

## 2023-05-15 NOTE — RESULT ENCOUNTER NOTE
Kassie  I have reviewed your recent test results:    -Your cholesterol panel is not looking great - are you taking your rosuvastatin?  If not, I'd recommend restarting this right away.    -TSH (thyroid stimulating hormone) level is normal which indicates normal thyroid function.  -Your Vitamin D level is low-normal (despite reference ranges, optimal levels are closer to 50-70) and daily supplementation is recommended.  Please start a Vitamin D3 supplement of 5000 international unit(s) daily.  Typically this supplement is recommended to continue indefinitely as our sun exposure is quite limited (and sunscreen use limits what we absorb naturally in the summer months).   -Urine is normal.    For additional lab test information, [email or web address] is an excellent reference.     If you have any questions please do not hesitate to contact our office via phone ([phone]) or MyChart.    Healthy regards,     Mary Alice Colón MBA, MS, PA-C  [facility]

## 2023-05-17 LAB
5HIAA & CREATININE UR-IMP: NORMAL
5OH-INDOLEACETATE 24H UR-MCNC: 3.1 MG/L
5OH-INDOLEACETATE 24H UR-MRATE: 10 MG/D
5OH-INDOLEACETATE/CREAT 24H UR: 7 MG/GCR
CREAT 24H UR-MRATE: 1368 MG/D
CREAT UR-MCNC: 44 MG/DL

## 2023-05-18 LAB
CATECHOLS UR-IMP: NORMAL
CREAT 24H UR-MRATE: 1368 MG/D
CREAT UR-MCNC: 44 MG/DL
DOPAMINE 24H UR-MRATE: 131 UG/D
DOPAMINE UR-MCNC: 42 UG/L
DOPAMINE/CREAT UR: 95 UG/G CRT
EPINEPH 24H UR-MRATE: <3 UG/D
EPINEPH UR-MCNC: <1 UG/L
EPINEPH/CREAT UR: <2 UG/G CRT
NOREPINEPH 24H UR-MRATE: 37 UG/D
NOREPINEPH UR-MCNC: 12 UG/L
NOREPINEPH/CREAT UR: 27 UG/G CRT

## 2023-05-22 NOTE — RESULT ENCOUNTER NOTE
Kassie  I have reviewed your recent test results:    -The 24 hour urine labs for somewhat atypical sources of your hot flashes are normal.     Please send me an update on how you are doing with the change in dosing of the duloxetine.  If not improved, we can do a video visit to talk about a change in medication to see if we can improve this (if it isn't improving by now it likely isn't going to go away).  We can also wait to discuss at your physical but I don't want you to suffer any longer than you have to!    For additional lab test information, [email or web address] is an excellent reference.     If you have any questions please do not hesitate to contact our office via phone ([phone]) or [employer].    Healthy regards,     Mary Alice Colón MBA, MS, PA-C  [facility]

## 2023-05-23 DIAGNOSIS — C83.32 DIFFUSE LARGE B-CELL LYMPHOMA OF INTRATHORACIC LYMPH NODES (H): Primary | ICD-10-CM

## 2023-05-23 PROCEDURE — 99207 PR NO BILLABLE SERVICE THIS VISIT: CPT | Performed by: INTERNAL MEDICINE

## 2023-05-31 ENCOUNTER — TELEPHONE (OUTPATIENT)
Dept: GASTROENTEROLOGY | Facility: CLINIC | Age: 53
End: 2023-05-31
Payer: COMMERCIAL

## 2023-05-31 NOTE — TELEPHONE ENCOUNTER
Screening Questions  BLUE  KIND OF PREP RED  LOCATION [review exclusion criteria] GREEN  SEDATION TYPE        y Are you active on mychart?       Mary Alice Colón PA-C in  FAMILY PRACTICE Ordering/Referring Provider?        [facility] What type of coverage do you have?      n Have you had a positive covid test in the last 14 days?     27.4 1. BMI  [BMI 40+ - review exclusion criteria& smart-phrase document]    y  2. Are you able to give consent for your medical care? [IF NO,RN REVIEW]          n  3. Are you taking any prescription pain medications on a routine schedule   (ex narcotics: oxycodone, roxicodone, oxycontin,  and percocet)? [RN Review]        n  3a. EXTENDED PREP What kind of prescription?     n 4. Do you have any chemical dependencies such as alcohol, street drugs, or methadone?        **If yes 3- 5 , please schedule with MAC sedation.**          IF YES TO ANY 6 - 10 - HOSPITAL SETTING ONLY.     n 6.   Do you need assistance transferring?     n 7.   Have you had a heart or lung transplant?    n 8.   Are you currently on dialysis?   n 9.   Do you use daily home oxygen?   n 10. Do you take nitroglycerin?   10a. n If yes, how often?     n 11. Are you currently pregnant?    11a. n If yes, how many weeks? [ Greater than 12 weeks, OR NEEDED]    n 12. Do you have Pulmonary Hypertension? *NEED PAC APPT AT [facility] w/ [facility]*     n 13. [review exclusion criteria]  Do you have any implantable devices in your body (pacemaker, defib, LVAD)?    n 14. In the past 6 months, have you had any heart related issues including cardiomyopathy or heart attack?     14a. n If yes, did it require cardiac stenting if so when?     n 15. Have you had a stroke or Transient ischemic attack (TIA - aka  mini stroke ) within 6 months?      n 16. Do you have mod to severe Obstructive Sleep Apnea?  [Hospital only]    n 17. Do you have SEVERE AND UNCONTROLLED asthma? *NEED PAC APPT AT [facility] w/[facility]*     18.Do you take blood thinners?  No    n 19.  "Do you take any of the following medications?    nPhentermine    nOzempic    nWegovy (Semaglutide)      19a. If yes, \"Hold for 7 days before procedure.  Please consult your prescribing provider if you have questions about holding this medication.\"     n  20. Do you have chronic kidney disease?      n  21. Do you have a diagnosis of diabetes?     n  22. On a regular basis do you go 3-5 days between bowel movements?      23. Preferred [facility] for Pre Prescription         [facility] - [place], [place] - [address] [place] [address] [place]          - CLOSING REMINDERS -    You will receive a call from a Nurse to review instructions and health history.  This assessment must be completed prior to your procedure.  Failure to complete the Nurse assessment may result in the procedure being cancelled.      On the day of your procedure, please designatean adult(s) who can drive you home stay with you for the next 24 hours. The medicines used in the exam will make you sleepy. You will not be able to drive.      You cannot take public transportation, ride share services, or non-medical taxi service without a responsible caregiver.  Medical transport services are allowed with the requirement that a responsible caregiver will receive you at your destination.  We require that drivers and caregivers are confirmed prior to your procedure.      - SCHEDULING DETAILS -  n & n Hospital Setting Required & If yes, what is the exclusion?   Alma  Surgeon    07/27/2023  Date of Procedure  Lower Endoscopy [Colonoscopy]  Type of Procedure Scheduled  The Medical Center Location   MIRALAX GATORADE WITHOUT MAGNEISUM CITRATE Which Colonoscopy Prep was Sent?     moderate Sedation Type     n PAC / Pre-op Required                 "

## 2023-06-26 NOTE — PROGRESS NOTES
Routed CMR 21 results to Dr. Pearl for review/comment as decrease in LVEF reported. Pt has  NICK visit with Cirilo scheduled. Daxa Ly RN on 2021 at 1:23 PM       CMR Report       MRN:                  [id number]                                  Name:         BABAR VARGHESE                                  :                  1970                                  Scan Date:   2021 09:50:52                                   Electronically signed by Flaco Hunt 2021-Aug-13 12:29:05    Clinical history: Cardiomyopathy  Comparison CMR: 10/09/2019     1. The LV is normal in cavity size and wall thickness. The global systolic function is moderately reduced.  The LVEF is 39 %. There is moderate global hypokinesia.     2. The RV is normal in cavity size. The global systolic function is normal. The RVEF is 55 %.      3. Late gadolinium enhancement imaging shows no MI, fibrosis or infiltrative disease.      4. There is no pericardial effusion.     5. There is no intracardiac thrombus.     6. T2 star not available for iron overload evaluation- have requested [facility] to bring the patient back for  T2 star evaluation.           CONCLUSIONS:   1. Moderately reduced LV systolic function with LVEF 39%.  2. Late gadolinium enhancement imaging shows no MI, fibrosis or infiltrative disease.     CMR dated 10/09/2019 LVEF was 59%.   Pt presents to the ED c/o allergic reaction. Pt has hx of Myelofibrosis and myelodysplastic syndrome. Pt was sent in by [facility] for possible reaction to allopurinol or Revlimid. Pt endorses swelling, rashes, itching, and fever x2 days. Pt denies SOB, chest pain, dizziness, palpitations, n/v/d, or any urinary symptoms. Airway patent, pt is speaking in clear and complete sentences. Swelling noted to B/L hands. IV established in right arm with a 20G, labs drawn and sent, call bell in reach, warm blanket provided, bed in lowest position, side rails up x2, MD evaluation in progress.

## 2023-06-29 ENCOUNTER — MYC MEDICAL ADVICE (OUTPATIENT)
Dept: FAMILY MEDICINE | Facility: CLINIC | Age: 53
End: 2023-06-29

## 2023-07-01 DIAGNOSIS — E78.2 MIXED HYPERLIPIDEMIA: ICD-10-CM

## 2023-07-01 DIAGNOSIS — E78.1 HYPERTRIGLYCERIDEMIA: ICD-10-CM

## 2023-07-03 RX ORDER — ROSUVASTATIN CALCIUM 10 MG/1
TABLET, COATED ORAL
Qty: 90 TABLET | Refills: 3 | Status: SHIPPED | OUTPATIENT
Start: 2023-07-03 | End: 2023-09-13

## 2023-07-09 ENCOUNTER — HEALTH MAINTENANCE LETTER (OUTPATIENT)
Age: 53
End: 2023-07-09

## 2023-07-12 ENCOUNTER — OFFICE VISIT (OUTPATIENT)
Dept: FAMILY MEDICINE | Facility: CLINIC | Age: 53
End: 2023-07-12
Payer: COMMERCIAL

## 2023-07-12 VITALS
TEMPERATURE: 96.6 F | HEIGHT: 66 IN | OXYGEN SATURATION: 97 % | BODY MASS INDEX: 28.9 KG/M2 | WEIGHT: 179.8 LBS | RESPIRATION RATE: 18 BRPM | DIASTOLIC BLOOD PRESSURE: 64 MMHG | SYSTOLIC BLOOD PRESSURE: 106 MMHG | HEART RATE: 96 BPM

## 2023-07-12 DIAGNOSIS — F78.A9 STARGARDT MACULAR DEGENERATION WITH ABSENT OR HYPOPLASTIC CORPUS CALLOSUM, INTELLECTUAL DISABILITY, AND DYSMORPHIC FEATURES (H): ICD-10-CM

## 2023-07-12 DIAGNOSIS — H35.53 STARGARDT MACULAR DEGENERATION WITH ABSENT OR HYPOPLASTIC CORPUS CALLOSUM, INTELLECTUAL DISABILITY, AND DYSMORPHIC FEATURES (H): ICD-10-CM

## 2023-07-12 DIAGNOSIS — E55.9 HYPOVITAMINOSIS D: ICD-10-CM

## 2023-07-12 DIAGNOSIS — Q04.0 STARGARDT MACULAR DEGENERATION WITH ABSENT OR HYPOPLASTIC CORPUS CALLOSUM, INTELLECTUAL DISABILITY, AND DYSMORPHIC FEATURES (H): ICD-10-CM

## 2023-07-12 DIAGNOSIS — E83.52 HYPERCALCEMIA: ICD-10-CM

## 2023-07-12 DIAGNOSIS — F33.0 MILD RECURRENT MAJOR DEPRESSION (H): ICD-10-CM

## 2023-07-12 DIAGNOSIS — E78.2 MIXED HYPERLIPIDEMIA: ICD-10-CM

## 2023-07-12 DIAGNOSIS — N95.1 MENOPAUSAL SYNDROME (HOT FLASHES): ICD-10-CM

## 2023-07-12 DIAGNOSIS — Z79.899 CONTROLLED SUBSTANCE AGREEMENT SIGNED: ICD-10-CM

## 2023-07-12 DIAGNOSIS — G62.0 CHEMOTHERAPY-INDUCED PERIPHERAL NEUROPATHY (H): ICD-10-CM

## 2023-07-12 DIAGNOSIS — Z15.1 STARGARDT MACULAR DEGENERATION WITH ABSENT OR HYPOPLASTIC CORPUS CALLOSUM, INTELLECTUAL DISABILITY, AND DYSMORPHIC FEATURES (H): ICD-10-CM

## 2023-07-12 DIAGNOSIS — C83.398 DIFFUSE LARGE B-CELL LYMPHOMA OF EXTRANODAL SITE: ICD-10-CM

## 2023-07-12 DIAGNOSIS — Z00.00 ROUTINE GENERAL MEDICAL EXAMINATION AT A HEALTH CARE FACILITY: Primary | ICD-10-CM

## 2023-07-12 DIAGNOSIS — F41.8 SITUATIONAL ANXIETY: ICD-10-CM

## 2023-07-12 DIAGNOSIS — I47.10 PAROXYSMAL SVT (SUPRAVENTRICULAR TACHYCARDIA) (H): ICD-10-CM

## 2023-07-12 DIAGNOSIS — Z12.31 VISIT FOR SCREENING MAMMOGRAM: ICD-10-CM

## 2023-07-12 DIAGNOSIS — Q89.7 STARGARDT MACULAR DEGENERATION WITH ABSENT OR HYPOPLASTIC CORPUS CALLOSUM, INTELLECTUAL DISABILITY, AND DYSMORPHIC FEATURES (H): ICD-10-CM

## 2023-07-12 DIAGNOSIS — I42.8 NONISCHEMIC CARDIOMYOPATHY (H): ICD-10-CM

## 2023-07-12 DIAGNOSIS — T45.1X5A CHEMOTHERAPY-INDUCED PERIPHERAL NEUROPATHY (H): ICD-10-CM

## 2023-07-12 DIAGNOSIS — Z12.4 CERVICAL CANCER SCREENING: ICD-10-CM

## 2023-07-12 DIAGNOSIS — R07.89 CHEST WALL PAIN: ICD-10-CM

## 2023-07-12 LAB
ALBUMIN SERPL BCG-MCNC: 5.1 G/DL (ref 3.5–5.2)
ALP SERPL-CCNC: 76 U/L (ref 35–104)
ALT SERPL W P-5'-P-CCNC: 43 U/L (ref 0–50)
ANION GAP SERPL CALCULATED.3IONS-SCNC: 13 MMOL/L (ref 7–15)
AST SERPL W P-5'-P-CCNC: 30 U/L (ref 0–45)
BILIRUB SERPL-MCNC: 0.6 MG/DL
BUN SERPL-MCNC: 20.5 MG/DL (ref 6–20)
CA-I BLD-MCNC: 5.1 MG/DL (ref 4.4–5.2)
CALCIUM SERPL-MCNC: 10.3 MG/DL (ref 8.6–10)
CHLORIDE SERPL-SCNC: 101 MMOL/L (ref 98–107)
CREAT SERPL-MCNC: 1.1 MG/DL (ref 0.51–0.95)
DEPRECATED HCO3 PLAS-SCNC: 26 MMOL/L (ref 22–29)
GFR SERPL CREATININE-BSD FRML MDRD: 60 ML/MIN/1.73M2
GLUCOSE SERPL-MCNC: 98 MG/DL (ref 70–99)
POTASSIUM SERPL-SCNC: 4.1 MMOL/L (ref 3.4–5.3)
PROT SERPL-MCNC: 6.9 G/DL (ref 6.4–8.3)
SODIUM SERPL-SCNC: 140 MMOL/L (ref 136–145)

## 2023-07-12 PROCEDURE — 36415 COLL VENOUS BLD VENIPUNCTURE: CPT | Performed by: PHYSICIAN ASSISTANT

## 2023-07-12 PROCEDURE — 87624 HPV HI-RISK TYP POOLED RSLT: CPT | Performed by: PHYSICIAN ASSISTANT

## 2023-07-12 PROCEDURE — G0145 SCR C/V CYTO,THINLAYER,RESCR: HCPCS | Performed by: PHYSICIAN ASSISTANT

## 2023-07-12 PROCEDURE — 82306 VITAMIN D 25 HYDROXY: CPT | Performed by: PHYSICIAN ASSISTANT

## 2023-07-12 PROCEDURE — 82330 ASSAY OF CALCIUM: CPT | Performed by: PHYSICIAN ASSISTANT

## 2023-07-12 PROCEDURE — 80053 COMPREHEN METABOLIC PANEL: CPT | Performed by: PHYSICIAN ASSISTANT

## 2023-07-12 PROCEDURE — 99396 PREV VISIT EST AGE 40-64: CPT | Performed by: PHYSICIAN ASSISTANT

## 2023-07-12 PROCEDURE — 99214 OFFICE O/P EST MOD 30 MIN: CPT | Mod: 25 | Performed by: PHYSICIAN ASSISTANT

## 2023-07-12 RX ORDER — DULOXETIN HYDROCHLORIDE 30 MG/1
30 CAPSULE, DELAYED RELEASE ORAL DAILY
Qty: 180 CAPSULE | Refills: 1
Start: 2023-07-12 | End: 2023-09-12

## 2023-07-12 ASSESSMENT — ANXIETY QUESTIONNAIRES
GAD7 TOTAL SCORE: 2
2. NOT BEING ABLE TO STOP OR CONTROL WORRYING: NOT AT ALL
6. BECOMING EASILY ANNOYED OR IRRITABLE: NOT AT ALL
GAD7 TOTAL SCORE: 2
3. WORRYING TOO MUCH ABOUT DIFFERENT THINGS: NOT AT ALL
IF YOU CHECKED OFF ANY PROBLEMS ON THIS QUESTIONNAIRE, HOW DIFFICULT HAVE THESE PROBLEMS MADE IT FOR YOU TO DO YOUR WORK, TAKE CARE OF THINGS AT HOME, OR GET ALONG WITH OTHER PEOPLE: SOMEWHAT DIFFICULT
1. FEELING NERVOUS, ANXIOUS, OR ON EDGE: SEVERAL DAYS
5. BEING SO RESTLESS THAT IT IS HARD TO SIT STILL: NOT AT ALL
7. FEELING AFRAID AS IF SOMETHING AWFUL MIGHT HAPPEN: SEVERAL DAYS
4. TROUBLE RELAXING: NOT AT ALL

## 2023-07-12 ASSESSMENT — PATIENT HEALTH QUESTIONNAIRE - PHQ9
10. IF YOU CHECKED OFF ANY PROBLEMS, HOW DIFFICULT HAVE THESE PROBLEMS MADE IT FOR YOU TO DO YOUR WORK, TAKE CARE OF THINGS AT HOME, OR GET ALONG WITH OTHER PEOPLE: SOMEWHAT DIFFICULT
SUM OF ALL RESPONSES TO PHQ QUESTIONS 1-9: 2
SUM OF ALL RESPONSES TO PHQ QUESTIONS 1-9: 2

## 2023-07-12 ASSESSMENT — ENCOUNTER SYMPTOMS
JOINT SWELLING: 0
ARTHRALGIAS: 0
NAUSEA: 0
BREAST MASS: 0
FREQUENCY: 0
HEADACHES: 0
CONSTIPATION: 0
DYSURIA: 0
COUGH: 0
MYALGIAS: 0
DIZZINESS: 0
PARESTHESIAS: 0
NERVOUS/ANXIOUS: 0
SORE THROAT: 0
DIARRHEA: 0
ABDOMINAL PAIN: 0
EYE PAIN: 0
PALPITATIONS: 0
WEAKNESS: 0
HEMATURIA: 0
FEVER: 0
CHILLS: 0
SHORTNESS OF BREATH: 0
HEARTBURN: 0
HEMATOCHEZIA: 0

## 2023-07-12 NOTE — LETTER
[facility] [place]  07/12/23  Patient: Kassie Ramirez  YOB: 1970  Medical Record Number: [id number]                                                                                  Non-Opioid Controlled Substance Agreement    This is an agreement between you and your provider regarding safe and appropriate use of controlled substances prescribed by your care team. Controlled substances are?medicines that can cause physical and mental dependence (abuse).     There are strict laws about having and using these medicines. We here at [facility] are  committed to working with you in your efforts to get better. To support you in this work, we'll help you schedule regular office appointments for medicine refills. If we must cancel or change your appointment for any reason, we'll make sure you have enough medicine to last until your next appointment.     As a Provider, I will:   Listen carefully to your concerns while treating you with respect.   Recommend a treatment plan that I believe is in your best interest and may involve therapies other than medicine.    Talk with you often about the possible benefits and the risk of harm of any medicine that we prescribe for you.  Assess the safety of this medicine and check how well it works.    Provide a plan on how to taper (discontinue or go off) using this medicine if the decision is made to stop its use.      ::  As a Patient, I understand controlled substances:     Are prescribed by my care provider to help me function or work and manage my condition(s).?  Are strong medicines and can cause serious side effects.     Need to be taken exactly as prescribed.?Combining controlled substances with certain medicines or chemicals (such as illegal drugs, alcohol, sedatives, sleeping pills, and benzodiazepines) can be dangerous or even fatal.? If I stop taking my medicines suddenly, I may have severe withdrawal symptoms.     The risks, benefits,  and side effects of these medicine(s) were explained to me. I agree that:    I will take part in other treatments as advised by my care team. This may be psychiatry or counseling, physical therapy, behavioral therapy, group treatment or a referral to specialist.    I will keep all my appointments and understand this is part of the monitoring of controlled substances.?My care team may require an office visit for EVERY controlled substance refill. If I miss appointments or don t follow instructions, my care team may stop my medicine    I will take my medicines as prescribed. I will not change the dose or schedule unless my care team tells me to. There will be no refills if I run out early.      I may be asked to come to the clinic and complete a urine drug test or complete a pill count. If I don t give a urine sample or participate in a pill count, the care team may stop my medicine.    I will only receive controlled substance prescriptions from this clinic. If I am treated by another provider, I will tell them that I am taking controlled substances and that I have a treatment agreement with this provider. I will inform my [facility] care team within one business day if I am given a prescription for any controlled substance by another healthcare provider. My [facility] care team can contact other providers and pharmacists about my use of any medicines.    It is up to me to make sure that I don't run out of my medicines on weekends or holidays.?If my care team is willing to refill my prescription without a visit, I must request refills only during office hours. Refills may take up to 3 business days to process. I will use one pharmacy to fill all my controlled substance prescriptions. I will notify the clinic about any changes to my insurance or medicine availability.    I am responsible for my prescriptions. If the medicine/prescription is lost, stolen or destroyed, it will not be replaced.?I also agree  not to share controlled substance medicines with anyone.     I am aware I should not use any illegal or recreational drugs. I agree not to drink alcohol unless my care team says I can.     If I enroll in the [place] [facility] Cannabis program, I will tell my care team before my next refill.    I will tell my care team right away if I become pregnant, have a new medical problem treated outside of my regular clinic, or have a change in my medicines.     I understand that this medicine can affect my thinking, judgment and reaction time.? Alcohol and drugs affect the brain and body, which can affect the safety of my driving. Being under the influence of alcohol or drugs can affect my decision-making, behaviors, personal safety and the safety of others. Driving while impaired (DWI) can occur if a person is driving, operating or in physical control of a car, motorcycle, boat, snowmobile, ATV, motorbike, off-road vehicle or any other motor vehicle ([place] Statute 169A.20). I understand the risk if I choose to drive or operate any vehicle or machinery.    I understand that if I do not follow any of the conditions above, my prescriptions or treatment may be stopped or changed.   I agree that my provider, clinic care team and pharmacy may work with any city, state or federal law enforcement agency that investigates the misuse, sale or other diversion of my controlled medicine. I will allow my provider to discuss my care with, or share a copy of, this agreement with any other treating provider, pharmacy or emergency room where I receive care.     I have read this agreement and have asked questions about anything I did not understand.    ________________________________________________________  Patient Signature - Kassie Ramirez     ___________________                   Date     ________________________________________________________  Provider Signature - Mary Alice Colón PA-C       ___________________                   Date      ________________________________________________________  Witness Signature (required if provider not present while patient signing)          ___________________                   Date

## 2023-07-12 NOTE — PROGRESS NOTES
SUBJECTIVE:   CC: Kassie is an 53 year old who presents for preventive health visit.       7/12/2023     9:03 AM   Additional Questions   Roomed by Karie KIMMY   Accompanied by Self     Healthy Habits:     Getting at least 3 servings of Calcium per day:  Yes    Bi-annual eye exam:  Yes    Dental care twice a year:  Yes    Sleep apnea or symptoms of sleep apnea:  None    Diet:  Low salt    Frequency of exercise:  2-3 days/week    Duration of exercise:  15-30 minutes    Taking medications regularly:  Yes    Medication side effects:  None    Additional concerns today:  No      Hot flashes  We had a virtual visit to discuss this 4/27/2023 and it was suspected to be due to the start of duloxetine.  Attempted a change of dosing from 60 mg once daily to 30 mg twice daily.  Unfortunately, this dose adjustment/medication administration timing adjustment did not help the hot flash symptoms.  Additionally, she did start Jardiance around that same time, however, this is not a typical adverse reaction/side effect with this medication.  Labs to rule out underlying metabolic etiologies were unremarkable including TSH, vitamin D, catecholamines, and serotonin levels.  Of note, the initial reason for switching to duloxetine 6/2/2022 was for dual purpose, 1. Suboptimally controlled depression/anxiety and 2. hope that it would help with chest wall pain after (from radiation nerve.  Mood was improved July 2022 but not yet controlled well.  We further increased to 60 mg at that 7/2022 visit.  Additionally, began Jardiance around that same time (June 2022) by cardiology.  Alpha lipoic acid has helped chest wall pain and leg discomfort.  Would be willing to trial going back on sertraline to see if hot flashes go away/mood stays well controlled.    Hypercalcemia  Normal PTH and PTH related hormone level.  Somewhat low Vit D level 5/2023 - recommended Vit D 5000 international unit(s) daily.      Calcium   Date Value Ref Range Status    07/12/2023 10.3 (H) 8.6 - 10.0 mg/dL Final   04/16/2023 10.5 (H) 8.6 - 10.0 mg/dL Final   04/10/2023 10.5 (H) 8.6 - 10.0 mg/dL Final   12/20/2022 10.3 (H) 8.6 - 10.0 mg/dL Final   06/07/2021 9.5 8.5 - 10.1 mg/dL Final   06/06/2021 9.8 8.5 - 10.1 mg/dL Final   04/22/2021 9.2 8.5 - 10.1 mg/dL Final   04/07/2021 10.0 8.5 - 10.1 mg/dL Final     SOB (shortness of breath)  Nonischemic cardiomyopathy (H) - from chemotherapy - managed by Dr. Pearl  Paroxysmal SVT (supraventricular tachycardia) (H) - s/p ablation 6/22/2020  Pt has nonischemic cardiomyopathy likely due to chemotherapy from 2019.  Follows with Dr. Pearl.  Last visit 12/27/2022 w/ stable echocardiogram.  Historically tried pulmonary rehab - this was not helpful.  Works out on her own and feels like things are okay at present.   Currently on jardiance, carvedilol, spironolactone, with recent addition of low dose lisinopril 5 mg (12/2022).  With this combination, her left ventricular systolic function had improved significantly.  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 Castro  Initially presented with shortness of breath and cough which had been present since May 2019. Her imaging suggested pulmonary infiltrates which was attributed to aspiration pneumonia. CT scan of the chest 8/20/2019 showed left hilar and subcarinal mediastinal adenopathy with narrowing of the left lower lobe bronchus and a nonspecific patchy area of nodular consolidation in the medial right lower lobe.  Bronchoscopy with EBUS 8/28/2019 showed nonnecrotizing granulomatous inflammation with prominent eosinophilia, negative for malignancy.  She was diagnosed with sarcoidosis and started on prednisone. She had worsening cough and shortness of breath with tapering of the prednisone.  Repeat CT scan of the chest 11/18/2019 showed interval  worsening of the bulky mediastinal and bilateral hilar lymphadenopathy, near complete resolution of the lower lobe opacities, with new interstitial opacities in the right upper lobe.   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 Large mediastinal mass causing respiratory compromise. . IPI score of 2 (low-intermediate). FISH neg for high risk translocations. She received 6 cycles of R-CHOP with intermediate dose methotrexate after cycles 2, 4 and 6 from November through April 2020.  Follows with Dr. Castro of oncology every 6-9 months.  Next OV 10/2023.    Stargardt macular degeneration  Bilaterally.  Both sister and father had/have condition.  Kassie's is very slowly progressive.  Follows with [facility].    Hyperlipidemia Follow-Up  Rosuvastatin 10 mg    Are you regularly taking any medication or supplement to lower your cholesterol?   Yes- Rosuvatatin 10mg    Are you having muscle aches or other side effects that you think could be caused by your cholesterol lowering medication?  No  Recent Labs   Lab Test 05/11/23  1139 03/31/22  1012 07/27/18  0921 09/24/15  1033   CHOL 265* 156   < > 206*   HDL 40* 46*   < > 55   * 82   < > 120   TRIG 333* 140   < > 123   CHOLHDLRATIO  --   --   --  3.6    < > = values in this interval not displayed.       Depression and Anxiety Follow-Up  Duloxetine 30 mg twice daily, lorazepam 0.5 mg as needed.  Hot flashes (see note above)    How are you doing with your depression since your last visit? No change    How are you doing with your anxiety since your last visit?  No change    Are you having other symptoms that might be associated with depression or anxiety? No    Have you had a significant life event? No     Do you have any concerns with your use of alcohol or other drugs? No    Social History     Tobacco Use     Smoking status:  Never     Smokeless tobacco: Never   Vaping Use     Vaping Use: Never used   Substance Use Topics     Alcohol use: Not Currently     Drug use: No         8/18/2022     5:04 PM 4/27/2023     9:24 AM 7/12/2023     9:05 AM   PHQ   PHQ-9 Total Score 2 3 2   Q9: Thoughts of better off dead/self-harm past 2 weeks Not at all Not at all Not at all         8/18/2022     5:04 PM 4/27/2023     9:25 AM 7/12/2023     9:05 AM   BRIAN-7 SCORE   Total Score  1 (minimal anxiety) 2 (minimal anxiety)   Total Score 2 1 2         7/12/2023     9:05 AM   Last PHQ-9   1.  Little interest or pleasure in doing things 0   2.  Feeling down, depressed, or hopeless 0   3.  Trouble falling or staying asleep, or sleeping too much 1   4.  Feeling tired or having little energy 1   5.  Poor appetite or overeating 0   6.  Feeling bad about yourself 0   7.  Trouble concentrating 0   8.  Moving slowly or restless 0   Q9: Thoughts of better off dead/self-harm past 2 weeks 0   PHQ-9 Total Score 2         7/12/2023     9:05 AM   BRIAN-7    1. Feeling nervous, anxious, or on edge 1   2. Not being able to stop or control worrying 0   3. Worrying too much about different things 0   4. Trouble relaxing 0   5. Being so restless that it is hard to sit still 0   6. Becoming easily annoyed or irritable 0   7. Feeling afraid, as if something awful might happen 1   BRIAN-7 Total Score 2   If you checked any problems, how difficult have they made it for you to do your work, take care of things at home, or get along with other people? Somewhat difficult       Suicide Assessment Five-step Evaluation and Treatment (SAFE-T)      Social History     Tobacco Use     Smoking status: Never     Smokeless tobacco: Never   Substance Use Topics     Alcohol use: Not Currently             7/12/2023     9:09 AM   Alcohol Use   Prescreen: >3 drinks/day or >7 drinks/week? Not Applicable          No data to display              Reviewed orders with patient.  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 Castro     Chemotherapy adverse reaction     Paroxysmal SVT (supraventricular tachycardia) (H) - s/p ablation 6/22/2020     Nonischemic cardiomyopathy (H) - from chemotherapy - managed by Dr. Ernst Stargardt macular degeneration (H) bilateral - [facility] - very slow progression     Mixed hyperlipidemia - started rosuvastatin 1/2022     Chemotherapy-induced peripheral neuropathy (H)     Mild recurrent major depression (H)     Hypovitaminosis D     Chest wall pain     CSA signed 7/12/2023 - lorazepam 0.5 mg #30 every 2 months, 6 month OV     Hypercalcemia     Menopausal syndrome (hot flashes)     Past Surgical History:   Procedure Laterality Date     CV CORONARY ANGIOGRAM N/A 06/15/2020    Procedure: Coronary Angiogram;  Surgeon: Luis Eduardo Phillips MD;  Location:  [facility]ARD[facility]     CV LEFT HEART CATH N/A 06/15/2020    Procedure: Left Heart Cath;  Surgeon: Luis Eduardo Phillips MD;  Location:  HEART [facility]ARDI[facility] CATH LAB     ENT SURGERY  1990    left eardrum rebuilt due to injury     EP ABLATION SVT N/A 06/22/2020    Procedure: EP Ablation SVT;  Surgeon: Francisco Javier Bynum MD;  Location:  HEART CARDIAC CATH LAB      KIT ESSURE  2009    essrickey - Dr. Cailin Raymundo      HERNIORRHAPHY UMBILICAL  1974     IR CHEST  PORT PLACEMENT > 5 YRS OF AGE  2020     IR PORT REMOVAL RIGHT  2021     mediastinoscopy  2019     SLING TRANSPUBO WITHOUT ANTERIOR COLPORRHAPHY N/A 2016    Procedure: SLING TRANSPUBO WITHOUT ANTERIOR COLPORRHAPHY;  Surgeon: Hernesto Berrios MD;  Location: [facility] OR       Social History     Tobacco Use     Smoking status: Never     Smokeless tobacco: Never   Substance Use Topics     Alcohol use: Not Currently     Family History   Problem Relation Age of Onset     Hypertension Mother          Lung Cancer      Depression Mother          Lung Cancer 2013     Lipids Mother      Cardiovascular Mother      Circulatory Mother      Diabetes Mother          Lung Cancer 2013     Heart Disease Mother      Cerebrovascular Disease Mother          Lung Cancer 2013     Obesity Mother          Lung Cancer 2013     C.A.D. Mother      Lung Cancer Mother         smoker     Macular Degeneration Father         Stargardt     Genetic Disorder Father         eye disease     Macular Degeneration Sister         Stargardt     Hyperlipidemia Sister         high cholesterol       Heart Disease Maternal Grandfather      Diabetes Maternal Aunt         type 2     Hypertension Maternal Aunt      LUNG DISEASE No family hx of          Current Outpatient Medications   Medication Sig Dispense Refill     alpha-lipoic acid 600 MG capsule Take 1 capsule (600 mg) by mouth daily 30 capsule 0     carvedilol (COREG) 6.25 MG tablet Take 1 tablet (6.25 mg) by mouth 2 times daily (with meals) 180 tablet 1     cetirizine (ZYRTEC) 10 MG tablet Take 10 mg by mouth daily       DULoxetine (CYMBALTA) 30 MG capsule Take 1 capsule (30 mg) by mouth daily for 7 days Then discontinue 180 capsule 1     empagliflozin (JARDIANCE) 10 MG TABS tablet Take 1 tablet (10 mg) by mouth daily 90 tablet 1     lisinopril (ZESTRIL) 5 MG tablet Take 1 tablet (5 mg) by mouth At Bedtime 90 tablet 1     LORazepam (ATIVAN) 0.5 MG tablet TAKE 1 TABLET  BY MOUTH AT BEDTIME FOR SLEEP AND ANXIETY. 30 tablet 0     rosuvastatin (CRESTOR) 10 MG tablet TAKE 1 TABLET BY MOUTH DAILY 90 tablet 3     sertraline (ZOLOFT) 50 MG tablet Take 0.5 tablets (25 mg) by mouth daily for 7 days, THEN 1 tablet (50 mg) daily. 90 tablet 0     spironolactone (ALDACTONE) 25 MG tablet Take 1 tablet (25 mg) by mouth daily 90 tablet 1       Breast Cancer Screening:    FHS-7:       4/13/2022     3:55 PM   Breast CA Risk Assessment (FHS-7)   Did any of your first-degree relatives have breast or ovarian cancer? No   Did any of your relatives have bilateral breast cancer? No   Did any man in your family have breast cancer? No   Did any woman in your family have breast and ovarian cancer? No   Did any woman in your family have breast cancer before age 50 y? No   Do you have 2 or more relatives with breast and/or ovarian cancer? No   Do you have 2 or more relatives with breast and/or bowel cancer? No     Mammogram Screening: Recommended annual mammography  Pertinent mammograms are reviewed under the imaging tab.    History of abnormal Pap smear: NO - age 30-65 PAP every 5 years with negative HPV co-testing recommended      Latest Ref Rng & Units 7/25/2018    12:06 PM 7/25/2018    11:57 AM 9/16/2014    12:00 AM   PAP / HPV   PAP (Historical)   NIL  NIL    HPV 16 DNA NEG^Negative Negative      HPV 18 DNA NEG^Negative Negative      Other HR HPV NEG^Negative Negative       Answers for HPI/ROS submitted by the patient on 7/12/2023  If you checked off any problems, how difficult have these problems made it for you to do your work, take care of things at home, or get along with other people?: Somewhat difficult  PHQ9 TOTAL SCORE: 2  BRIAN 7 TOTAL SCORE: 2      Reviewed and updated as needed this visit by clinical staff   Tobacco  Allergies  Meds  Problems  Med Hx  Surg Hx  Fam Hx  Soc   Hx        Reviewed and updated as needed this visit by Provider   Tobacco  Allergies  Meds  Problems  Med Hx   "Surg Hx  Fam Hx  Soc   Hx           Review of Systems   Constitutional: Negative for chills and fever.   HENT: Negative for congestion, ear pain, hearing loss and sore throat.    Eyes: Negative for pain and visual disturbance.   Respiratory: Negative for cough and shortness of breath.    Cardiovascular: Negative for chest pain, palpitations and peripheral edema.   Gastrointestinal: Negative for abdominal pain, constipation, diarrhea, heartburn, hematochezia and nausea.   Breasts:  Negative for tenderness, breast mass and discharge.   Genitourinary: Negative for dysuria, frequency, genital sores, hematuria, pelvic pain, urgency, vaginal bleeding and vaginal discharge.   Musculoskeletal: Negative for arthralgias, joint swelling and myalgias.   Skin: Negative for rash.   Neurological: Negative for dizziness, weakness, headaches and paresthesias.   Psychiatric/Behavioral: Negative for mood changes. The patient is not nervous/anxious.           OBJECTIVE:   /64   Pulse 96   Temp (!) 96.6  F (35.9  C) (Tympanic)   Resp 18   Ht 1.676 m (5' 6\")   Wt 81.6 kg (179 lb 12.8 oz)   LMP 11/06/2019   SpO2 97%   BMI 29.02 kg/m    Physical Exam  GENERAL APPEARANCE: healthy, alert and no distress  EYES: Eyes grossly normal to inspection, PERRL and conjunctivae and sclerae normal  HENT: ear canals and TM's normal, nose and mouth without ulcers or lesions, oropharynx clear and oral mucous membranes moist  NECK: no adenopathy, no asymmetry, masses, or scars and thyroid normal to palpation  RESP: lungs clear to auscultation - no rales, rhonchi or wheezes  BREAST: normal without masses, tenderness or nipple discharge and no palpable axillary masses or adenopathy  CV: regular rate and rhythm, normal S1 S2, no S3 or S4, no murmur, click or rub, no peripheral edema and peripheral pulses strong  ABDOMEN: soft, nontender, no hepatosplenomegaly, no masses and bowel sounds normal   (female): normal female external genitalia, " normal urethral meatus, vaginal mucosal atrophy noted, normal cervix, adnexae, and uterus without masses or abnormal discharge  MS: no musculoskeletal defects are noted and gait is age appropriate without ataxia  SKIN: no suspicious lesions or rashes  NEURO: Normal strength and tone, sensory exam grossly normal, mentation intact and speech normal  PSYCH: mentation appears normal and affect normal/bright    Diagnostic Test Results:  Results for orders placed or performed in visit on 07/12/23   Comprehensive metabolic panel (BMP + Alb, Alk Phos, ALT, AST, Total. Bili, TP)     Status: Abnormal   Result Value Ref Range    Sodium 140 136 - 145 mmol/L    Potassium 4.1 3.4 - 5.3 mmol/L    Chloride 101 98 - 107 mmol/L    Carbon Dioxide (CO2) 26 22 - 29 mmol/L    Anion Gap 13 7 - 15 mmol/L    Urea Nitrogen 20.5 (H) 6.0 - 20.0 mg/dL    Creatinine 1.10 (H) 0.51 - 0.95 mg/dL    Calcium 10.3 (H) 8.6 - 10.0 mg/dL    Glucose 98 70 - 99 mg/dL    Alkaline Phosphatase 76 35 - 104 U/L    AST 30 0 - 45 U/L    ALT 43 0 - 50 U/L    Protein Total 6.9 6.4 - 8.3 g/dL    Albumin 5.1 3.5 - 5.2 g/dL    Bilirubin Total 0.6 <=1.2 mg/dL    GFR Estimate 60 (L) >60 mL/min/1.73m2   Ionized Calcium     Status: Normal   Result Value Ref Range    Calcium Ionized 5.1 4.4 - 5.2 mg/dL   25 OH Vit D therapy monitoring     Status: None   Result Value Ref Range    25 OH Vitamin D2 <5 ug/L    25 OH Vitamin D3 61 ug/L    25 OH Vit D Total <66 20 - 75 ug/L    Narrative    This test was developed and its performance characteristics determined by the [facility],  Special Chemistry Laboratory. It has not been cleared or approved by the FDA. The laboratory is regulated under CLIA as qualified to perform high-complexity testing. This test is used for clinical purposes. It should not be regarded as investigational or for research.       ASSESSMENT/PLAN:   Kassie was seen today for physical.    Diagnoses and all orders for this  visit:    Routine general medical examination at a health care facility  -     REVIEW OF HEALTH MAINTENANCE PROTOCOL ORDERS    Stargardt macular degeneration (H) bilateral - follows with [facility] Annually - very slow progression  Stable.  Continue annual surveillance with ophthalmology.    Paroxysmal SVT (supraventricular tachycardia) (H) - s/p ablation 6/22/2020  Nonischemic cardiomyopathy (H)  Mixed hyperlipidemia - started rosuvastatin 1/2022  Doing well.  Continue follow up with cardiology as recommended.    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 Castro  Chemotherapy-induced peripheral neuropathy (H)  Chest wall pain  Doing well.  Continue current regimen and surveillance imaging/follow up with oncology as recommended.  -     LORazepam (ATIVAN) 0.5 MG tablet; TAKE 1 TABLET BY MOUTH AT BEDTIME FOR SLEEP AND ANXIETY.  -     alpha-lipoic acid 600 MG capsule; Take 1 capsule (600 mg) by mouth daily    Mild recurrent major depression (H)  Situational anxiety  CSA signed 7/12/2023 - lorazepam 0.5 mg #30 every 2 months, 6 month OV   Menopausal syndrome (hot flashes)  Concern that hot flashes are a result of duloxetine.  Recommend cross taper to discontinue duloxetine and start sertraline.  CSA resigned today.  Pt send [facility] message for ~4 weeks from now to update me on mood control, hot flashes, and if chest wall pain has worsened with this medication regimen change.  -     DULoxetine (CYMBALTA) 30 MG capsule; Take 1 capsule (30 mg) by mouth daily for 7 days Then discontinue  -     sertraline (ZOLOFT) 50 MG tablet; Take 0.5 tablets (25 mg) by mouth daily for 7 days, THEN 1 tablet (50 mg) daily.  -     LORazepam (ATIVAN) 0.5 MG tablet; TAKE 1 TABLET BY MOUTH AT BEDTIME FOR SLEEP AND ANXIETY.    Hypercalcemia  Hypovitaminosis D  Normal PTH and PTH-related hormone.  Ionized  calcium to be checked today.  Historically low-normal Vit D - labs for surveillance.    -     Comprehensive metabolic panel (BMP + Alb, Alk Phos, ALT, AST, Total. Bili, TP)  -     Ionized Calcium  -     25 OH Vit D therapy monitoring  -     Phosphorus; Future    Visit for screening mammogram  Routine screening  -     MA SCREENING DIGITAL BILAT - Future  (s+30); Future    Cervical cancer screening  Routine screening  -     Pap Screen with HPV - recommended age 30 - 65 years  -     HPV Hold (Lab Only)        Patient has been advised of split billing requirements and indicates understanding: Yes      COUNSELING:  Reviewed preventive health counseling, as reflected in patient instructions       Regular exercise       Healthy diet/nutrition        She reports that she has never smoked. She has never used smokeless tobacco.      Mary Alice Colón PA-C  [facility] [place]

## 2023-07-15 DIAGNOSIS — C83.398 DIFFUSE LARGE B-CELL LYMPHOMA OF EXTRANODAL SITE: ICD-10-CM

## 2023-07-15 DIAGNOSIS — Z79.899 CONTROLLED SUBSTANCE AGREEMENT SIGNED: ICD-10-CM

## 2023-07-15 DIAGNOSIS — F41.8 SITUATIONAL ANXIETY: ICD-10-CM

## 2023-07-16 LAB
DEPRECATED CALCIDIOL+CALCIFEROL SERPL-MC: <66 UG/L (ref 20–75)
VITAMIN D2 SERPL-MCNC: <5 UG/L
VITAMIN D3 SERPL-MCNC: 61 UG/L

## 2023-07-17 PROBLEM — N95.1 MENOPAUSAL SYNDROME (HOT FLASHES): Status: ACTIVE | Noted: 2023-07-17

## 2023-07-17 PROBLEM — E83.52 HYPERCALCEMIA: Status: ACTIVE | Noted: 2023-07-17

## 2023-07-17 PROBLEM — R07.89 CHEST WALL PAIN: Status: ACTIVE | Noted: 2023-07-17

## 2023-07-17 PROBLEM — Z79.899 CONTROLLED SUBSTANCE AGREEMENT SIGNED: Status: ACTIVE | Noted: 2023-07-17

## 2023-07-17 PROBLEM — R05.9 COUGH: Status: RESOLVED | Noted: 2022-05-11 | Resolved: 2023-07-17

## 2023-07-17 PROBLEM — E78.1 HYPERTRIGLYCERIDEMIA: Status: RESOLVED | Noted: 2020-09-29 | Resolved: 2023-07-17

## 2023-07-17 RX ORDER — PERPHENAZINE 16 MG
600 TABLET ORAL DAILY
Qty: 30 CAPSULE | Refills: 0
Start: 2023-07-17

## 2023-07-17 RX ORDER — LORAZEPAM 0.5 MG/1
TABLET ORAL
Qty: 30 TABLET | Refills: 0 | Status: SHIPPED | OUTPATIENT
Start: 2023-07-17 | End: 2023-10-30

## 2023-07-17 RX ORDER — LORAZEPAM 0.5 MG/1
TABLET ORAL
Qty: 30 TABLET | OUTPATIENT
Start: 2023-07-17

## 2023-07-17 NOTE — RESULT ENCOUNTER NOTE
Kassie  I have reviewed your recent test results:    -Liver and gallbladder tests (ALT,AST, Alk phos,bilirubin) are normal.  -Kidney function (GFR) is decreased but stable.  ADVISE: avoid frequent/regular use of NSAID medications, stay well hydrated, and keep your blood pressure within normal range (not likely a problem for you!)  -Sodium is normal.  -Potassium is normal.  -Calcium remains elevated, but stable.  Ionized calcium levels are normal.  This is reassuring and we will continue to monitor.  I have added on a phosphate level too and will keep you apprised of those results.  -Glucose (diabetic screening test) is normal.  -Vitamin D level is normal and getting 1000 IU daily in your diet or supplements is recommended.     For additional lab test information, www.testing.com is an excellent reference.     If you have any questions please do not hesitate to contact our office via phone ([phone]) or MyChart.    Healthy regards,     Mary Alice Colón MBA, MS, PA-C  [facility]

## 2023-07-19 LAB
BKR LAB AP GYN ADEQUACY: NORMAL
BKR LAB AP GYN INTERPRETATION: NORMAL
BKR LAB AP HPV REFLEX: NORMAL
BKR LAB AP PREVIOUS ABNORMAL: NORMAL
PATH REPORT.COMMENTS IMP SPEC: NORMAL
PATH REPORT.COMMENTS IMP SPEC: NORMAL
PATH REPORT.RELEVANT HX SPEC: NORMAL

## 2023-07-20 LAB
HUMAN PAPILLOMA VIRUS 16 DNA: NEGATIVE
HUMAN PAPILLOMA VIRUS 18 DNA: NEGATIVE
HUMAN PAPILLOMA VIRUS FINAL DIAGNOSIS: ABNORMAL
HUMAN PAPILLOMA VIRUS OTHER HR: POSITIVE

## 2023-07-20 NOTE — PLAN OF CARE
Patient  alert and oriented x4. Up independent in room. Urine pH level 8.0. Needing stool sample. Sodium Bicarb infusing. Ativan given x1, relief reported. Potassium recheck 3.8. Discharge pending.    Orthopedic (Pediatric)

## 2023-07-21 ENCOUNTER — PATIENT OUTREACH (OUTPATIENT)
Dept: FAMILY MEDICINE | Facility: CLINIC | Age: 53
End: 2023-07-21
Payer: COMMERCIAL

## 2023-07-21 DIAGNOSIS — R87.810 CERVICAL HIGH RISK HPV (HUMAN PAPILLOMAVIRUS) TEST POSITIVE: Primary | ICD-10-CM

## 2023-07-27 ENCOUNTER — HOSPITAL ENCOUNTER (OUTPATIENT)
Facility: CLINIC | Age: 53
Discharge: HOME | End: 2023-07-27
Attending: INTERNAL MEDICINE | Admitting: INTERNAL MEDICINE
Payer: COMMERCIAL

## 2023-07-27 VITALS
WEIGHT: 179 LBS | SYSTOLIC BLOOD PRESSURE: 125 MMHG | HEART RATE: 107 BPM | HEIGHT: 66 IN | RESPIRATION RATE: 16 BRPM | DIASTOLIC BLOOD PRESSURE: 95 MMHG | BODY MASS INDEX: 28.77 KG/M2 | OXYGEN SATURATION: 97 %

## 2023-07-27 LAB — COLONOSCOPY: NORMAL

## 2023-07-27 PROCEDURE — G0500 MOD SEDAT ENDO SERVICE >5YRS: HCPCS | Performed by: INTERNAL MEDICINE

## 2023-07-27 PROCEDURE — G0121 COLON CA SCRN NOT HI RSK IND: HCPCS | Performed by: INTERNAL MEDICINE

## 2023-07-27 PROCEDURE — 250N000011 HC RX IP 250 OP 636: Performed by: INTERNAL MEDICINE

## 2023-07-27 PROCEDURE — 45378 DIAGNOSTIC COLONOSCOPY: CPT | Performed by: INTERNAL MEDICINE

## 2023-07-27 RX ORDER — ONDANSETRON 2 MG/ML
4 INJECTION INTRAMUSCULAR; INTRAVENOUS EVERY 6 HOURS PRN
Status: DISCONTINUED | OUTPATIENT
Start: 2023-07-27 | End: 2023-07-27 | Stop reason: HOSPADM

## 2023-07-27 RX ORDER — NALOXONE HYDROCHLORIDE 0.4 MG/ML
0.2 INJECTION, SOLUTION INTRAMUSCULAR; INTRAVENOUS; SUBCUTANEOUS
Status: DISCONTINUED | OUTPATIENT
Start: 2023-07-27 | End: 2023-07-27 | Stop reason: HOSPADM

## 2023-07-27 RX ORDER — DIPHENHYDRAMINE HYDROCHLORIDE 50 MG/ML
25-50 INJECTION INTRAMUSCULAR; INTRAVENOUS
Status: DISCONTINUED | OUTPATIENT
Start: 2023-07-27 | End: 2023-07-27 | Stop reason: HOSPADM

## 2023-07-27 RX ORDER — FLUMAZENIL 0.1 MG/ML
0.2 INJECTION, SOLUTION INTRAVENOUS
Status: DISCONTINUED | OUTPATIENT
Start: 2023-07-27 | End: 2023-07-27 | Stop reason: HOSPADM

## 2023-07-27 RX ORDER — FENTANYL CITRATE 50 UG/ML
50-100 INJECTION, SOLUTION INTRAMUSCULAR; INTRAVENOUS EVERY 5 MIN PRN
Status: DISCONTINUED | OUTPATIENT
Start: 2023-07-27 | End: 2023-07-27 | Stop reason: HOSPADM

## 2023-07-27 RX ORDER — ONDANSETRON 4 MG/1
4 TABLET, ORALLY DISINTEGRATING ORAL EVERY 6 HOURS PRN
Status: DISCONTINUED | OUTPATIENT
Start: 2023-07-27 | End: 2023-07-27 | Stop reason: HOSPADM

## 2023-07-27 RX ORDER — NALOXONE HYDROCHLORIDE 0.4 MG/ML
0.4 INJECTION, SOLUTION INTRAMUSCULAR; INTRAVENOUS; SUBCUTANEOUS
Status: DISCONTINUED | OUTPATIENT
Start: 2023-07-27 | End: 2023-07-27 | Stop reason: HOSPADM

## 2023-07-27 RX ORDER — LIDOCAINE 40 MG/G
CREAM TOPICAL
Status: DISCONTINUED | OUTPATIENT
Start: 2023-07-27 | End: 2023-07-27 | Stop reason: HOSPADM

## 2023-07-27 RX ORDER — ATROPINE SULFATE 0.1 MG/ML
1 INJECTION INTRAVENOUS
Status: DISCONTINUED | OUTPATIENT
Start: 2023-07-27 | End: 2023-07-27 | Stop reason: HOSPADM

## 2023-07-27 RX ORDER — ONDANSETRON 2 MG/ML
4 INJECTION INTRAMUSCULAR; INTRAVENOUS
Status: DISCONTINUED | OUTPATIENT
Start: 2023-07-27 | End: 2023-07-27 | Stop reason: HOSPADM

## 2023-07-27 RX ORDER — SIMETHICONE 40MG/0.6ML
133 SUSPENSION, DROPS(FINAL DOSAGE FORM)(ML) ORAL
Status: DISCONTINUED | OUTPATIENT
Start: 2023-07-27 | End: 2023-07-27 | Stop reason: HOSPADM

## 2023-07-27 RX ORDER — EPINEPHRINE 1 MG/ML
0.1 INJECTION, SOLUTION INTRAMUSCULAR; SUBCUTANEOUS
Status: DISCONTINUED | OUTPATIENT
Start: 2023-07-27 | End: 2023-07-27 | Stop reason: HOSPADM

## 2023-07-27 RX ORDER — PROCHLORPERAZINE MALEATE 10 MG
10 TABLET ORAL EVERY 6 HOURS PRN
Status: DISCONTINUED | OUTPATIENT
Start: 2023-07-27 | End: 2023-07-27 | Stop reason: HOSPADM

## 2023-07-27 RX ADMIN — MIDAZOLAM 1 MG: 1 INJECTION INTRAMUSCULAR; INTRAVENOUS at 13:08

## 2023-07-27 RX ADMIN — MIDAZOLAM 1 MG: 1 INJECTION INTRAMUSCULAR; INTRAVENOUS at 13:10

## 2023-07-27 RX ADMIN — MIDAZOLAM 2 MG: 1 INJECTION INTRAMUSCULAR; INTRAVENOUS at 13:02

## 2023-07-27 RX ADMIN — FENTANYL CITRATE 100 MCG: 50 INJECTION, SOLUTION INTRAMUSCULAR; INTRAVENOUS at 13:02

## 2023-07-27 RX ADMIN — FENTANYL CITRATE 50 MCG: 50 INJECTION, SOLUTION INTRAMUSCULAR; INTRAVENOUS at 13:11

## 2023-07-27 RX ADMIN — FENTANYL CITRATE 50 MCG: 50 INJECTION, SOLUTION INTRAMUSCULAR; INTRAVENOUS at 13:08

## 2023-07-27 ASSESSMENT — ACTIVITIES OF DAILY LIVING (ADL): ADLS_ACUITY_SCORE: 35

## 2023-07-27 NOTE — RESULT ENCOUNTER NOTE
Dear Kassie,    Here is a summary of your recent test results:    Colonoscopy normal repeat in 10 years.     For additional lab test information, labtestsonline.org is an excellent reference.    In addition, here is a list of due or overdue Health Maintenance reminders:    Heart Failure Action Plan Never done  Depression Action Plan Never done  Zoster (Shingles) Vaccine(1 of 2) Never done  Mammogram due on 04/13/2023    Please call us at [phone] (or use [facility]) to address the above recommendations if needed.    Thank you for choosing [facility].  It was an honor and a privilege to participate in your care.       Healthy regards,    MILO Moss (covering for Mary Alice Colón-PAC)   [facility]

## 2023-07-27 NOTE — H&P
Pre-Endoscopy History and Physical     Kassie Ramirez MRN# [id number]   YOB: 1970 Age: 53 year old     Date of Procedure: 7/27/2023  Primary care provider: Mary Alice Colón  Type of Endoscopy: Colonoscopy with possible biopsy, possible polypectomy  Reason for Procedure: screen  Type of Anesthesia Anticipated: Conscious Sedation    HPI:    Kassie is a 53 year old female who will be undergoing the above procedure.      A history and physical has been performed. The patient's medications and allergies have been reviewed. The risks and benefits of the procedure and the sedation options and risks were discussed with the patient.  All questions were answered and informed consent was obtained.      She denies a personal or family history of anesthesia complications or bleeding disorders.     Patient Active Problem List   Diagnosis    Situational anxiety    GERTRUDE (stress urinary incontinence, female)    Intractable migraine without aura and with status migrainosus    Menstrual headache    Menopausal disorder    Diffuse large B-cell lymphoma of extranodal site (H) - per pathology 11/27/19 - mediastinal mass wrapped around azalea and bilateral main stem bronchi- treating with Dr. Castro    Chemotherapy adverse reaction    Paroxysmal SVT (supraventricular tachycardia) (H) - s/p ablation 6/22/2020    Nonischemic cardiomyopathy (H) - from chemotherapy - managed by Dr. Ernst Stargardt macular degeneration (H) bilateral - follows [facility] - very slow progression    Mixed hyperlipidemia - started rosuvastatin 1/2022    Chemotherapy-induced peripheral neuropathy (H)    Mild recurrent major depression (H)    Hypovitaminosis D    Chest wall pain    CSA signed 7/12/2023 - lorazepam 0.5 mg #30 every 2 months, 6 month OV    Hypercalcemia    Menopausal syndrome (hot flashes)    Cervical high risk HPV (human papillomavirus) test positive        Past Medical History:   Diagnosis Date     Anxiety attack 09/16/2014    Cardiomyopathy (H)     non ishemic - 25-30% - Chemo related    Congestive heart failure (H) 02/2019    While in hospital for high dose Methotrexate    Depressive disorder 2003    taking Celexa since 2014    Diffuse large B-cell lymphoma (H)     Diagnosed 11/2019, Ronan Albarran    Encounter for Essure implantation 2009    Generalized anxiety disorder 09/16/2014    zoloft = flat emotions    HFrEF (heart failure with reduced ejection fraction) (H)     new diagnosis 6/14    History of blood transfusion 12/2019    Given many throughout cancer treatment    Menopausal disorder     started on OCPs by menopause center 3/2017 (takes active continuously)    Menstrual headache     helped by OCPs and magnesium    Paroxysmal SVT (supraventricular tachycardia) (H) 06/2020    GERTRUDE (stress urinary incontinence, female)     sling procedure 2016        Past Surgical History:   Procedure Laterality Date    CV CORONARY ANGIOGRAM N/A 06/15/2020    Procedure: Coronary Angiogram;  Surgeon: Luis Eduardo Phillips MD;  Location:  HEART CARDIAC CATH LAB    CV LEFT HEART CATH N/A 06/15/2020    Procedure: Left Heart Cath;  Surgeon: Luis Eduardo Phillips MD;  Location:  HEART CARDIAC CATH LAB    ENT SURGERY  1990    left eardrum rebuilt due to injury    EP ABLATION SVT N/A 06/22/2020    Procedure: EP Ablation SVT;  Surgeon: Francisco Javier Bynum MD;  Location:  HEART CARDIAC CATH LAB     KIT ESSURE  2009    essure - Dr. Cailin Raymundo     HERNIORRHAPHY UMBILICAL  1974    IR CHEST PORT PLACEMENT > 5 YRS OF AGE  01/09/2020    IR PORT REMOVAL RIGHT  04/05/2021    mediastinoscopy  2019    SLING TRANSPUBO WITHOUT ANTERIOR COLPORRHAPHY N/A 11/21/2016    Procedure: SLING TRANSPUBO WITHOUT ANTERIOR COLPORRHAPHY;  Surgeon: Hernesto Berrios MD;  Location:  OR       Social History     Tobacco Use    Smoking status: Never    Smokeless tobacco: Never   Substance Use Topics    Alcohol use: Not Currently       Family History    Problem Relation Age of Onset    Hypertension Mother          Lung Cancer     Depression Mother          Lung Cancer     Lipids Mother     Cardiovascular Mother     Circulatory Mother     Diabetes Mother          Lung Cancer     Heart Disease Mother     Cerebrovascular Disease Mother          Lung Cancer     Obesity Mother          Lung Cancer 2013    C.A.D. Mother     Lung Cancer Mother         smoker    Macular Degeneration Father         Stargardt    Genetic Disorder Father         eye disease    Heart Disease Maternal Grandfather     Macular Degeneration Sister         Stargardt    Hyperlipidemia Sister         high cholesterol      Diabetes Maternal Aunt         type 2    Hypertension Maternal Aunt     LUNG DISEASE No family hx of     Colon Cancer No family hx of        Prior to Admission medications    Medication Sig Start Date End Date Taking? Authorizing Provider   alpha-lipoic acid 600 MG capsule Take 1 capsule (600 mg) by mouth daily 23   Mary Alice Colón PA-C   carvedilol (COREG) 6.25 MG tablet Take 1 tablet (6.25 mg) by mouth 2 times daily (with meals) 23   Trevon Pearl MD   cetirizine (ZYRTEC) 10 MG tablet Take 10 mg by mouth daily    Reported, Patient   DULoxetine (CYMBALTA) 30 MG capsule Take 1 capsule (30 mg) by mouth daily for 7 days Then discontinue 23  Mary Alice Colón PA-C   empagliflozin (JARDIANCE) 10 MG TABS tablet Take 1 tablet (10 mg) by mouth daily 23   Trevon Pearl MD   lisinopril (ZESTRIL) 5 MG tablet Take 1 tablet (5 mg) by mouth At Bedtime 23   Trevon Pearl MD   LORazepam (ATIVAN) 0.5 MG tablet TAKE 1 TABLET BY MOUTH AT BEDTIME FOR SLEEP AND ANXIETY. 23   Mary Alice Colón PA-C   rosuvastatin (CRESTOR) 10 MG tablet TAKE 1 TABLET BY MOUTH DAILY 7/3/23   Mary Alice Colón PA-C   sertraline (ZOLOFT) 50 MG tablet Take 0.5 tablets (25 mg) by mouth daily for 7 days, THEN 1 tablet (50 mg)  "daily. 7/12/23 7/18/24  Mary Alice Colón PA-C   spironolactone (ALDACTONE) 25 MG tablet Take 1 tablet (25 mg) by mouth daily 4/27/23   Trevon Pearl MD       Allergies   Allergen Reactions    Cold & Flu [Germanium]      See pseudoephedrine    Seasonal Allergies     Sudafed Cold-Cough [Pseudoephedrine-Dm-Gg-Apap]     Pseudoephedrine Rash     Rash then skins peels off         REVIEW OF SYSTEMS:   5 point ROS negative except as noted above in HPI, including Gen., Resp., CV, GI &  system review.    PHYSICAL EXAM:   /89   Pulse 90   Resp 12   Ht 1.676 m (5' 6\")   Wt 81.2 kg (179 lb)   LMP 11/06/2019   SpO2 96%   BMI 28.89 kg/m   Estimated body mass index is 28.89 kg/m  as calculated from the following:    Height as of this encounter: 1.676 m (5' 6\").    Weight as of this encounter: 81.2 kg (179 lb).   GENERAL APPEARANCE: alert, and oriented  MENTAL STATUS: alert  AIRWAY EXAM: Mallampatti Class I (visualization of the soft palate, fauces, uvula, anterior and posterior pillars)  RESP: lungs clear to auscultation - no rales, rhonchi or wheezes  CV: regular rates and rhythm  DIAGNOSTICS:    Not indicated    IMPRESSION   ASA Class 3 - Severe systemic disease, but not incapacitating    PLAN:   Plan for Colonoscopy with possible biopsy, possible polypectomy. We discussed the risks, benefits and alternatives and the patient wished to proceed.    The above has been forwarded to the consulting provider.      Signed Electronically by: Juan Marquez MD  July 27, 2023          "

## 2023-09-06 DIAGNOSIS — F41.8 SITUATIONAL ANXIETY: ICD-10-CM

## 2023-09-06 DIAGNOSIS — F33.0 MILD RECURRENT MAJOR DEPRESSION (H): ICD-10-CM

## 2023-09-11 ENCOUNTER — TELEPHONE (OUTPATIENT)
Dept: FAMILY MEDICINE | Facility: CLINIC | Age: 53
End: 2023-09-11

## 2023-09-11 ENCOUNTER — LAB (OUTPATIENT)
Dept: LAB | Facility: CLINIC | Age: 53
End: 2023-09-11
Payer: COMMERCIAL

## 2023-09-11 DIAGNOSIS — I42.8 NONISCHEMIC CARDIOMYOPATHY (H): ICD-10-CM

## 2023-09-11 DIAGNOSIS — E83.52 HYPERCALCEMIA: ICD-10-CM

## 2023-09-11 LAB
ANION GAP SERPL CALCULATED.3IONS-SCNC: 15 MMOL/L (ref 7–15)
BUN SERPL-MCNC: 19.5 MG/DL (ref 6–20)
CALCIUM SERPL-MCNC: 10.4 MG/DL (ref 8.6–10)
CHLORIDE SERPL-SCNC: 103 MMOL/L (ref 98–107)
CREAT SERPL-MCNC: 1.03 MG/DL (ref 0.51–0.95)
DEPRECATED HCO3 PLAS-SCNC: 23 MMOL/L (ref 22–29)
EGFRCR SERPLBLD CKD-EPI 2021: 65 ML/MIN/1.73M2
GLUCOSE SERPL-MCNC: 81 MG/DL (ref 70–99)
PHOSPHATE SERPL-MCNC: 3.9 MG/DL (ref 2.5–4.5)
POTASSIUM SERPL-SCNC: 4.3 MMOL/L (ref 3.4–5.3)
SODIUM SERPL-SCNC: 141 MMOL/L (ref 136–145)

## 2023-09-11 PROCEDURE — 80048 BASIC METABOLIC PNL TOTAL CA: CPT

## 2023-09-11 PROCEDURE — 84100 ASSAY OF PHOSPHORUS: CPT

## 2023-09-11 PROCEDURE — 36415 COLL VENOUS BLD VENIPUNCTURE: CPT

## 2023-09-11 NOTE — TELEPHONE ENCOUNTER
Forms/Letter Request    Type of form/letter:  [facility] Clarification Request for SERTRALINE HCL Tab 50MG & DULoxetine EC/DR CAP 30MG  Order # [id number]    Have you been seen for this request: N/A    Do we have the form/letter: Yes: Provider's Bin    Who is the form from? [facility] (if other please explain)  Fax: [phone]    Where did/will the form come from? form was faxed in

## 2023-09-12 NOTE — TELEPHONE ENCOUNTER
Form completed and placed in [facility] inbox      Mary Alice Colón MBA, MS, PAIRIS  [facility]

## 2023-09-12 NOTE — TELEPHONE ENCOUNTER
Faxed to [facility]     Order  #[id number]    Discontinue Duloxxetine    Copied to [facility]    Filed in North / Karie K/ South

## 2023-09-12 NOTE — RESULT ENCOUNTER NOTE
Kassie  I have reviewed your recent test results:    Your phosphorus level is normal.    For additional lab test information, [email or web address] is an excellent reference.     If you have any questions please do not hesitate to contact our office via phone ([phone]) or [email or web address].    Healthy regards,     Mary Alice Colón MBA, MS, PA-C  [facility]

## 2023-09-13 ENCOUNTER — OFFICE VISIT (OUTPATIENT)
Dept: CARDIOLOGY | Facility: CLINIC | Age: 53
End: 2023-09-13
Payer: COMMERCIAL

## 2023-09-13 VITALS
HEIGHT: 66 IN | OXYGEN SATURATION: 95 % | DIASTOLIC BLOOD PRESSURE: 76 MMHG | HEART RATE: 94 BPM | SYSTOLIC BLOOD PRESSURE: 102 MMHG | WEIGHT: 183.2 LBS | BODY MASS INDEX: 29.44 KG/M2

## 2023-09-13 DIAGNOSIS — E78.2 MIXED HYPERLIPIDEMIA: ICD-10-CM

## 2023-09-13 DIAGNOSIS — I50.22 CHRONIC SYSTOLIC CONGESTIVE HEART FAILURE (H): Primary | ICD-10-CM

## 2023-09-13 DIAGNOSIS — I42.8 NONISCHEMIC CARDIOMYOPATHY (H): ICD-10-CM

## 2023-09-13 DIAGNOSIS — E78.1 HYPERTRIGLYCERIDEMIA: ICD-10-CM

## 2023-09-13 PROCEDURE — 99214 OFFICE O/P EST MOD 30 MIN: CPT | Performed by: INTERNAL MEDICINE

## 2023-09-13 RX ORDER — ROSUVASTATIN CALCIUM 20 MG/1
20 TABLET, COATED ORAL DAILY
Qty: 90 TABLET | Refills: 3 | Status: SHIPPED | OUTPATIENT
Start: 2023-09-13 | End: 2024-03-29

## 2023-09-13 RX ORDER — FUROSEMIDE 20 MG
20 TABLET ORAL DAILY
Qty: 90 TABLET | Refills: 3 | Status: SHIPPED | OUTPATIENT
Start: 2023-09-13 | End: 2024-03-27

## 2023-09-13 NOTE — LETTER
9/13/2023    Mary Alice Colón PA-C  [address] [facility] [address]    RE: Kassie Aburtoter       Dear Colleague,     I had the pleasure of seeing Kassie Ramirez in the [facility].    General Cardiology Clinic Progress Note  Kassie Ramirez MRN# [id number]   YOB: 1970 Age: 53 year old       Reason for visit: Chronic systolic heart failure    History of presenting illness:     I had the opportunity to see Kassie Ramirez in Cardiology Clinic today at [facility] in [place] for reevaluation of nonischemic cardiomyopathy, likely due to chemotherapy that was given for lymphoma back in 2019.       In the last year, I started her on Jardiance and increased her carvedilol.  She had not tolerated lisinopril in the past due to low blood pressures.  She remained on spironolactone.  With this combination, her left ventricular systolic function had improved significantly.  Back in 2020 and early 2021, her ejection fraction was as low as 20%-25%.  Fortunately with medical management, her ejection fraction has improved significantly.  She just had an echocardiogram performed on 12/22/2022 showing an ejection fraction up to 45%.  She has no significant valvular disease and right ventricular function looked normal.  Her strain pattern is abnormal, but that is certainly expected with chemotherapy related cardiomyopathy.    I was able to start her on lisinopril 5 mg a day beginning in December 2022 and she has been tolerating it well.     She is feeling well from a cardiac standpoint although she is having some mild shortness of breath with exertion such as carrying a basket of laundry up a flight of stairs.  Her  notes that she gets more winded with strenuous exercise than she used to.  She does not seem to convinced of that.  She has no chest pain symptoms, lightheadedness, or syncope.   She continues to have some episodes of diaphoresis, which occur  primarily in the morning and are unexplained.  These can last up to 3 hours at a time.  This has improved slightly since switching duloxetine to sertraline.    She indicates that she occasionally wakes up feeling a bit swollen, primarily with facial puffiness.  She wonders if she can take some diuretic at those times.  She does not weigh herself daily and does not take any loop diuretic.    Her basic metabolic panel looks normal.  Her cholesterol shows persistently high LDL levels.    Her examination is unremarkable.  Her blood pressure is 102/76 and heart rate 94.  Her lungs sound quite clear              Assessment and Plan:     ASSESSMENT:    Ms. Sadia Ramirez is a 53-year-old woman with nonischemic cardiomyopathy likely due to chemotherapy for treatment of lymphoma in 2019.  Her left ventricular function has improved significantly with medical therapy.  Her ejection fraction was as low as 20 to 25% initially and is now up to 45% by her most recent echocardiogram in December 2022.  She has dyslipidemia as well, with elevated cholesterol numbers on low-dose rosuvastatin.  I will increase her rosuvastatin to 20 mg a day.    I suggested that she could take a low-dose of furosemide 20 mg daily as needed for fluid retention problems when she feels the need to do so.  I encouraged her to contact me if she is using it more than several times a week.    She has continued to have sweating episodes.  These remain unexplained.  I suggested it could be the spironolactone.  I recommended that she take a week or 2 holiday from spironolactone to see if her symptoms improve.  If not, I encouraged her to restart it.    I will plan to see her back again in 6 months for reevaluation and repeat laboratory testing including a fasting lipid panel as well as an echocardiogram prior to that visit.    Trevon Pearl MD           Orders this Visit:  Orders Placed This Encounter   Procedures    Basic metabolic panel    Lipid Profile    ALT  "   Follow-Up with Cardiology    Echocardiogram Complete     Orders Placed This Encounter   Medications    rosuvastatin (CRESTOR) 20 MG tablet     Sig: Take 1 tablet (20 mg) by mouth daily     Dispense:  90 tablet     Refill:  3    furosemide (LASIX) 20 MG tablet     Sig: Take 1 tablet (20 mg) by mouth daily     Dispense:  90 tablet     Refill:  3     Medications Discontinued During This Encounter   Medication Reason    rosuvastatin (CRESTOR) 10 MG tablet        Today's clinic visit entailed:  Review of the result(s) of each unique test - echocardiogram, fasting lipid panel, basic metabolic panel, ALT  Ordering of each unique test  Prescription drug management  30 minutes spent by me on the date of the encounter doing chart review, history and exam, documentation and further activities per the note  Provider  Link to [facility] Help Grid     The level of medical decision making during this visit was of high complexity.           Review of Systems:     Review of Systems:  Skin:  Negative     Eyes:  Negative    ENT:  Negative    Respiratory:  Negative    Cardiovascular:  chest pain;palpitations;Negative;dizziness;lightheadedness Positive for;edema;fatigue  Gastroenterology: Negative    Genitourinary:  Negative    Musculoskeletal:  Negative    Neurologic:  Negative    Psychiatric:  Negative    Heme/Lymph/Imm:  Positive for allergies  Endocrine:  Negative              Physical Exam:     Vitals: /76 (BP Location: Left arm, Patient Position: Sitting)   Pulse 94   Ht 1.676 m (5' 6\")   Wt 83.1 kg (183 lb 3.2 oz)   LMP 11/06/2019   SpO2 95%   BMI 29.57 kg/m    Constitutional: Well nourished and in no apparent distress.  Eyes: Pupils equal, round. Sclerae anicteric.   HEENT: Normocephalic, atraumatic.   Neck: Supple. JVD   Respiratory: Breathing non-labored. Lungs clear to auscultation bilaterally. No crackles, wheezes, rhonchi, or rales.  Cardiovascular:  Regular rate and rhythm, normal S1 and S2. No murmur, rub, or " gallop.  Skin: Warm, dry. No rashes, cyanosis, or xanthelasma.  Extremities: No edema.  Neurologic: No gross motor deficits. Alert, awake, and oriented to person, place and time.  Psychiatric: Affect appropriate.             Medications:     Current Outpatient Medications   Medication Sig Dispense Refill    alpha-lipoic acid 600 MG capsule Take 1 capsule (600 mg) by mouth daily 30 capsule 0    carvedilol (COREG) 6.25 MG tablet Take 1 tablet (6.25 mg) by mouth 2 times daily (with meals) 180 tablet 1    cetirizine (ZYRTEC) 10 MG tablet Take 10 mg by mouth daily      empagliflozin (JARDIANCE) 10 MG TABS tablet Take 1 tablet (10 mg) by mouth daily 90 tablet 1    furosemide (LASIX) 20 MG tablet Take 1 tablet (20 mg) by mouth daily 90 tablet 3    lisinopril (ZESTRIL) 5 MG tablet Take 1 tablet (5 mg) by mouth At Bedtime 90 tablet 1    LORazepam (ATIVAN) 0.5 MG tablet TAKE 1 TABLET BY MOUTH AT BEDTIME FOR SLEEP AND ANXIETY. 30 tablet 0    rosuvastatin (CRESTOR) 20 MG tablet Take 1 tablet (20 mg) by mouth daily 90 tablet 3    sertraline (ZOLOFT) 50 MG tablet Take 1 tablet (50 mg) by mouth daily 90 tablet 3    spironolactone (ALDACTONE) 25 MG tablet Take 1 tablet (25 mg) by mouth daily 90 tablet 1       Family History   Problem Relation Age of Onset    Hypertension Mother          Lung Cancer     Depression Mother          Lung Cancer     Lipids Mother     Cardiovascular Mother     Circulatory Mother     Diabetes Mother          Lung Cancer 2013    Heart Disease Mother     Cerebrovascular Disease Mother          Lung Cancer 2013    Obesity Mother          Lung Cancer 2013    C.A.D. Mother     Lung Cancer Mother         smoker    Macular Degeneration Father         Stargardt    Genetic Disorder Father         eye disease    Heart Disease Maternal Grandfather     Macular Degeneration Sister         Stargardt    Hyperlipidemia Sister         high cholesterol      Diabetes Maternal Aunt         type 2     Hypertension Maternal Aunt     LUNG DISEASE No family hx of     Colon Cancer No family hx of        Social History     Socioeconomic History    Marital status:      Spouse name: Florian    Number of children: 2    Years of education: 16     Highest education level: Not on file   Occupational History    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      Comment: 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 No     Service Not Asked    Blood Transfusions Not Asked    Caffeine Concern Yes     Comment: MOnster energy drink.   Te - 3 cups.       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      Social Determinants of Health     Financial Resource Strain: Not on file   Food Insecurity: Not on file   Transportation Needs: Not on file   Physical Activity: Not on file   Stress: Not on file   Social Connections: Not on file   Intimate Partner Violence: Not At Risk (11/18/2022)    Humiliation, Afraid, Rape, and Kick questionnaire     Fear of Current or Ex-Partner: No     Emotionally Abused: No     Physically Abused: No     Sexually Abused: No   Housing Stability: Not on file             Past Medical History:     Past Medical History:   Diagnosis Date    Anxiety attack 09/16/2014    Cardiomyopathy (H)     non ishemic - 25-30% - Chemo related    Congestive heart failure (H) 02/2019    While in hospital for high dose Methotrexate    Depressive disorder 2003    taking Celexa since 2014    Diffuse large B-cell lymphoma (H)     Diagnosed 11/2019, Ronan Albarran    Encounter for Essure implantation 2009    Generalized anxiety disorder 09/16/2014    zoloft = flat emotions    HFrEF (heart failure with reduced ejection fraction) (H)     new diagnosis 6/14    History of blood transfusion 12/2019    Given many throughout cancer treatment    Menopausal disorder     started on OCPs by menopause center 3/2017 (takes active continuously)    Menstrual headache     helped by OCPs and magnesium    Paroxysmal SVT (supraventricular tachycardia) (H) 06/2020    GERTRUDE (stress urinary incontinence, female)     sling procedure 2016              Past Surgical History:     Past Surgical History:   Procedure Laterality Date    COLONOSCOPY N/A 7/27/2023    Procedure: Colonoscopy;  Surgeon: Juan Marquez MD;  Location:  GI    CV CORONARY ANGIOGRAM N/A 06/15/2020    Procedure: Coronary Angiogram;  Surgeon: Luis Eduardo Phillips MD;  Location:  HEART CARDIAC CATH LAB    CV LEFT HEART CATH N/A 06/15/2020    Procedure: Left Heart Cath;  Surgeon: Luis Eduardo Phillips MD;  Location:  HEART CARDIAC CATH LAB    ENT SURGERY  1990    left eardrum rebuilt due to injury    EP ABLATION SVT N/A 06/22/2020    Procedure: EP Ablation SVT;  Surgeon: Francisco Javier Bynum MD;  Location:  HEART CARDIAC CATH LAB     KIT ESSURE  2009    essure - Dr. Simeon Block     HERNIORRHAPHY UMBILICAL  1974    IR CHEST PORT PLACEMENT > 5 YRS OF AGE  01/09/2020    IR PORT REMOVAL RIGHT  04/05/2021    mediastinoscopy  2019    SLING TRANSPUBO WITHOUT ANTERIOR COLPORRHAPHY N/A 11/21/2016    Procedure: SLING TRANSPUBO WITHOUT ANTERIOR COLPORRHAPHY;   Surgeon: Hernesto Berrios MD;  Location: [facility] OR              Allergies:   Cold & flu [germanium], Seasonal allergies, Sudafed cold-cough [pseudoephedrine-dm-gg-apap], and Pseudoephedrine       Data:   All laboratory data reviewed:    Recent Labs   Lab Test 05/11/23  1139 03/31/22  1012 01/14/22  1623 12/30/21  1642 09/17/21  1513 10/15/20  0805 09/17/20  1052 06/14/20  1807 12/16/19  0804   * 82  --  233*  --    < >  --   --   --    HDL 40* 46*  --  50  --    < >  --   --   --    NHDL 225* 110  --  279*  --    < >  --   --   --    CHOL 265* 156  --  329*  --    < >  --   --   --    TRIG 333* 140  --  230*  --    < >  --   --   --    TSH 2.11  --  2.29  --   --   --   --  3.40  --    NTBNP  --   --   --   --  296*  --  659*  --   --    IRON  --   --   --   --   --   --   --   --  39   FEB  --   --   --   --   --   --   --   --  190*   IRONSAT  --   --   --   --   --   --   --   --  21   KEITH  --   --   --   --   --   --   --   --  885*    < > = values in this interval not displayed.       Lab Results   Component Value Date    WBC 8.3 04/16/2023    WBC 5.4 06/07/2021    RBC 4.76 04/16/2023    RBC 4.52 06/07/2021    HGB 15.0 04/16/2023    HGB 13.9 06/07/2021    HCT 43.8 04/16/2023    HCT 40.8 06/07/2021    MCV 92 04/16/2023    MCV 90 06/07/2021    MCH 31.5 04/16/2023    MCH 30.8 06/07/2021    MCHC 34.2 04/16/2023    MCHC 34.1 06/07/2021    RDW 11.9 04/16/2023    RDW 12.6 06/07/2021     04/16/2023     06/07/2021       Lab Results   Component Value Date     09/11/2023     06/07/2021    POTASSIUM 4.3 09/11/2023    POTASSIUM 3.6 09/19/2022    POTASSIUM 3.6 06/07/2021    CHLORIDE 103 09/11/2023    CHLORIDE 106 09/19/2022    CHLORIDE 106 06/07/2021    CO2 23 09/11/2023    CO2 25 09/19/2022    CO2 27 06/07/2021    ANIONGAP 15 09/11/2023    ANIONGAP 10 09/19/2022    ANIONGAP 6 06/07/2021    GLC 81 09/11/2023     (H) 09/19/2022    GLC 97 06/07/2021    BUN 19.5 09/11/2023    BUN 19  09/19/2022    BUN 17 06/07/2021    CR 1.03 (H) 09/11/2023    CR 1.00 06/07/2021    GFRESTIMATED 65 09/11/2023    GFRESTIMATED 65 06/07/2021    GFRESTBLACK 75 06/07/2021    AFSHAN 10.4 (H) 09/11/2023    AFSHAN 9.5 06/07/2021      Lab Results   Component Value Date    AST 30 07/12/2023    AST 26 06/07/2021    ALT 43 07/12/2023    ALT 39 06/07/2021       No results found for: A1C    Lab Results   Component Value Date    INR 1.07 06/22/2020    INR 1.18 (H) 12/01/2019         TREVON PEARL MD  [facility]    Thank you for allowing me to participate in the care of your patient.      Sincerely,     TREVON PEARL MD     [facility]  cc:   Trevon Pearl MD  [address]  [place] [address]

## 2023-09-13 NOTE — PROGRESS NOTES
General Cardiology Clinic Progress Note  Kassie Ramirez MRN# [id number]   YOB: 1970 Age: 53 year old       Reason for visit: Chronic systolic heart failure    History of presenting illness:     I had the opportunity to see Kassie Ramirez in Cardiology Clinic today at [facility] in [place] for reevaluation of nonischemic cardiomyopathy, likely due to chemotherapy that was given for lymphoma back in 2019.       In the last year, I started her on Jardiance and increased her carvedilol.  She had not tolerated lisinopril in the past due to low blood pressures.  She remained on spironolactone.  With this combination, her left ventricular systolic function had improved significantly.  Back in 2020 and early 2021, her ejection fraction was as low as 20%-25%.  Fortunately with medical management, her ejection fraction has improved significantly.  She just had an echocardiogram performed on 12/22/2022 showing an ejection fraction up to 45%.  She has no significant valvular disease and right ventricular function looked normal.  Her strain pattern is abnormal, but that is certainly expected with chemotherapy related cardiomyopathy.    I was able to start her on lisinopril 5 mg a day beginning in December 2022 and she has been tolerating it well.     She is feeling well from a cardiac standpoint although she is having some mild shortness of breath with exertion such as carrying a basket of laundry up a flight of stairs.  Her  notes that she gets more winded with strenuous exercise than she used to.  She does not seem to convinced of that.  She has no chest pain symptoms, lightheadedness, or syncope.   She continues to have some episodes of diaphoresis, which occur primarily in the morning and are unexplained.  These can last up to 3 hours at a time.  This has improved slightly since switching duloxetine to sertraline.    She indicates that she occasionally wakes up feeling a bit  swollen, primarily with facial puffiness.  She wonders if she can take some diuretic at those times.  She does not weigh herself daily and does not take any loop diuretic.    Her basic metabolic panel looks normal.  Her cholesterol shows persistently high LDL levels.    Her examination is unremarkable.  Her blood pressure is 102/76 and heart rate 94.  Her lungs sound quite clear              Assessment and Plan:     ASSESSMENT:    Ms. Sadia Ramirez is a 53-year-old woman with nonischemic cardiomyopathy likely due to chemotherapy for treatment of lymphoma in 2019.  Her left ventricular function has improved significantly with medical therapy.  Her ejection fraction was as low as 20 to 25% initially and is now up to 45% by her most recent echocardiogram in December 2022.  She has dyslipidemia as well, with elevated cholesterol numbers on low-dose rosuvastatin.  I will increase her rosuvastatin to 20 mg a day.    I suggested that she could take a low-dose of furosemide 20 mg daily as needed for fluid retention problems when she feels the need to do so.  I encouraged her to contact me if she is using it more than several times a week.    She has continued to have sweating episodes.  These remain unexplained.  I suggested it could be the spironolactone.  I recommended that she take a week or 2 holiday from spironolactone to see if her symptoms improve.  If not, I encouraged her to restart it.    I will plan to see her back again in 6 months for reevaluation and repeat laboratory testing including a fasting lipid panel as well as an echocardiogram prior to that visit.    Trevon Pearl MD           Orders this Visit:  Orders Placed This Encounter   Procedures    Basic metabolic panel    Lipid Profile    ALT    Follow-Up with Cardiology    Echocardiogram Complete     Orders Placed This Encounter   Medications    rosuvastatin (CRESTOR) 20 MG tablet     Sig: Take 1 tablet (20 mg) by mouth daily     Dispense:  90 tablet      "Refill:  3    furosemide (LASIX) 20 MG tablet     Sig: Take 1 tablet (20 mg) by mouth daily     Dispense:  90 tablet     Refill:  3     Medications Discontinued During This Encounter   Medication Reason    rosuvastatin (CRESTOR) 10 MG tablet        Today's clinic visit entailed:  Review of the result(s) of each unique test - echocardiogram, fasting lipid panel, basic metabolic panel, ALT  Ordering of each unique test  Prescription drug management  30 minutes spent by me on the date of the encounter doing chart review, history and exam, documentation and further activities per the note  Provider  Link to [facility] Help Grid     The level of medical decision making during this visit was of high complexity.           Review of Systems:     Review of Systems:  Skin:  Negative     Eyes:  Negative    ENT:  Negative    Respiratory:  Negative    Cardiovascular:  chest pain;palpitations;Negative;dizziness;lightheadedness Positive for;edema;fatigue  Gastroenterology: Negative    Genitourinary:  Negative    Musculoskeletal:  Negative    Neurologic:  Negative    Psychiatric:  Negative    Heme/Lymph/Imm:  Positive for allergies  Endocrine:  Negative              Physical Exam:     Vitals: /76 (BP Location: Left arm, Patient Position: Sitting)   Pulse 94   Ht 1.676 m (5' 6\")   Wt 83.1 kg (183 lb 3.2 oz)   LMP 11/06/2019   SpO2 95%   BMI 29.57 kg/m    Constitutional: Well nourished and in no apparent distress.  Eyes: Pupils equal, round. Sclerae anicteric.   HEENT: Normocephalic, atraumatic.   Neck: Supple. JVD   Respiratory: Breathing non-labored. Lungs clear to auscultation bilaterally. No crackles, wheezes, rhonchi, or rales.  Cardiovascular:  Regular rate and rhythm, normal S1 and S2. No murmur, rub, or gallop.  Skin: Warm, dry. No rashes, cyanosis, or xanthelasma.  Extremities: No edema.  Neurologic: No gross motor deficits. Alert, awake, and oriented to person, place and time.  Psychiatric: Affect appropriate.         "     Medications:     Current Outpatient Medications   Medication Sig Dispense Refill    alpha-lipoic acid 600 MG capsule Take 1 capsule (600 mg) by mouth daily 30 capsule 0    carvedilol (COREG) 6.25 MG tablet Take 1 tablet (6.25 mg) by mouth 2 times daily (with meals) 180 tablet 1    cetirizine (ZYRTEC) 10 MG tablet Take 10 mg by mouth daily      empagliflozin (JARDIANCE) 10 MG TABS tablet Take 1 tablet (10 mg) by mouth daily 90 tablet 1    furosemide (LASIX) 20 MG tablet Take 1 tablet (20 mg) by mouth daily 90 tablet 3    lisinopril (ZESTRIL) 5 MG tablet Take 1 tablet (5 mg) by mouth At Bedtime 90 tablet 1    LORazepam (ATIVAN) 0.5 MG tablet TAKE 1 TABLET BY MOUTH AT BEDTIME FOR SLEEP AND ANXIETY. 30 tablet 0    rosuvastatin (CRESTOR) 20 MG tablet Take 1 tablet (20 mg) by mouth daily 90 tablet 3    sertraline (ZOLOFT) 50 MG tablet Take 1 tablet (50 mg) by mouth daily 90 tablet 3    spironolactone (ALDACTONE) 25 MG tablet Take 1 tablet (25 mg) by mouth daily 90 tablet 1       Family History   Problem Relation Age of Onset    Hypertension Mother          Lung Cancer     Depression Mother          Lung Cancer     Lipids Mother     Cardiovascular Mother     Circulatory Mother     Diabetes Mother          Lung Cancer     Heart Disease Mother     Cerebrovascular Disease Mother          Lung Cancer     Obesity Mother          Lung Cancer 2013    C.A.D. Mother     Lung Cancer Mother         smoker    Macular Degeneration Father         [place]t    Genetic Disorder Father         eye disease    Heart Disease Maternal Grandfather     Macular Degeneration Sister         Stargardt    Hyperlipidemia Sister         high cholesterol      Diabetes Maternal Aunt         type 2    Hypertension Maternal Aunt     LUNG DISEASE No family hx of     Colon Cancer No family hx of        Social History     Socioeconomic History    Marital status:      Spouse name: Florian    Number of children: 2     Years of education: 16     Highest education level: Not on file   Occupational History    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      Comment: 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      Comment: BA in Education     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 No     Service Not Asked    Blood Transfusions Not Asked    Caffeine Concern Yes     Comment: MOnster energy drink.   Te - 3 cups.       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      Social Determinants of Health     Financial Resource Strain: Not on file   Food Insecurity: Not on file   Transportation Needs: Not on file   Physical Activity: Not on file   Stress: Not on file   Social Connections: Not on file   Intimate Partner Violence: Not At Risk (11/18/2022)    Humiliation, Afraid, Rape, and Kick questionnaire     Fear of Current or Ex-Partner: No     Emotionally Abused: No     Physically Abused: No     Sexually Abused: No   Housing Stability: Not on file            Past Medical History:     Past Medical History:   Diagnosis Date    Anxiety attack 09/16/2014    Cardiomyopathy (H)     non ishemic - 25-30% - Chemo related    Congestive heart failure (H) 02/2019    While in  hospital for high dose Methotrexate    Depressive disorder 2003    taking Celexa since 2014    Diffuse large B-cell lymphoma (H)     Diagnosed 11/2019, Ronan Albarran    Encounter for Essure implantation 2009    Generalized anxiety disorder 09/16/2014    zoloft = flat emotions    HFrEF (heart failure with reduced ejection fraction) (H)     new diagnosis 6/14    History of blood transfusion 12/2019    Given many throughout cancer treatment    Menopausal disorder     started on OCPs by menopause center 3/2017 (takes active continuously)    Menstrual headache     helped by OCPs and magnesium    Paroxysmal SVT (supraventricular tachycardia) (H) 06/2020    GERTRUDE (stress urinary incontinence, female)     sling procedure 2016              Past Surgical History:     Past Surgical History:   Procedure Laterality Date    COLONOSCOPY N/A 7/27/2023    Procedure: Colonoscopy;  Surgeon: Juan Marquez MD;  Location:  GI    CV CORONARY ANGIOGRAM N/A 06/15/2020    Procedure: Coronary Angiogram;  Surgeon: Luis Eduardo Phillips MD;  Location:  HEART CARDIAC CATH LAB    CV LEFT HEART CATH N/A 06/15/2020    Procedure: Left Heart Cath;  Surgeon: Luis Eduardo Phillips MD;  Location:  HEART CARDIAC CATH LAB    ENT SURGERY  1990    left eardrum rebuilt due to injury    EP ABLATION SVT N/A 06/22/2020    Procedure: EP Ablation SVT;  Surgeon: Francisco Javier Bynum MD;  Location:  HEART CARDIAC CATH LAB     KIT ESSURE  2009    essure - Dr. Cailin Raymundo     HERNIORRHAPHY UMBILICAL  1974    IR CHEST PORT PLACEMENT > 5 YRS OF AGE  01/09/2020    IR PORT REMOVAL RIGHT  04/05/2021    mediastinoscopy  2019    SLING TRANSPUBO WITHOUT ANTERIOR COLPORRHAPHY N/A 11/21/2016    Procedure: SLING TRANSPUBO WITHOUT ANTERIOR COLPORRHAPHY;  Surgeon: Hernesto Berrios MD;  Location:  OR              Allergies:   Cold & flu [germanium], Seasonal allergies, Sudafed cold-cough [pseudoephedrine-[id number]], and Pseudoephedrine       Data:   All laboratory  data reviewed:    Recent Labs   Lab Test 05/11/23  1139 03/31/22  1012 01/14/22  1623 12/30/21  1642 09/17/21  1513 10/15/20  0805 09/17/20  1052 06/14/20  1807 12/16/19  0804   * 82  --  233*  --    < >  --   --   --    HDL 40* 46*  --  50  --    < >  --   --   --    NHDL 225* 110  --  279*  --    < >  --   --   --    CHOL 265* 156  --  329*  --    < >  --   --   --    TRIG 333* 140  --  230*  --    < >  --   --   --    TSH 2.11  --  2.29  --   --   --   --  3.40  --    NTBNP  --   --   --   --  296*  --  659*  --   --    IRON  --   --   --   --   --   --   --   --  39   FEB  --   --   --   --   --   --   --   --  190*   IRONSAT  --   --   --   --   --   --   --   --  21   KEITH  --   --   --   --   --   --   --   --  885*    < > = values in this interval not displayed.       Lab Results   Component Value Date    WBC 8.3 04/16/2023    WBC 5.4 06/07/2021    RBC 4.76 04/16/2023    RBC 4.52 06/07/2021    HGB 15.0 04/16/2023    HGB 13.9 06/07/2021    HCT 43.8 04/16/2023    HCT 40.8 06/07/2021    MCV 92 04/16/2023    MCV 90 06/07/2021    MCH 31.5 04/16/2023    MCH 30.8 06/07/2021    MCHC 34.2 04/16/2023    MCHC 34.1 06/07/2021    RDW 11.9 04/16/2023    RDW 12.6 06/07/2021     04/16/2023     06/07/2021       Lab Results   Component Value Date     09/11/2023     06/07/2021    POTASSIUM 4.3 09/11/2023    POTASSIUM 3.6 09/19/2022    POTASSIUM 3.6 06/07/2021    CHLORIDE 103 09/11/2023    CHLORIDE 106 09/19/2022    CHLORIDE 106 06/07/2021    CO2 23 09/11/2023    CO2 25 09/19/2022    CO2 27 06/07/2021    ANIONGAP 15 09/11/2023    ANIONGAP 10 09/19/2022    ANIONGAP 6 06/07/2021    GLC 81 09/11/2023     (H) 09/19/2022    GLC 97 06/07/2021    BUN 19.5 09/11/2023    BUN 19 09/19/2022    BUN 17 06/07/2021    CR 1.03 (H) 09/11/2023    CR 1.00 06/07/2021    GFRESTIMATED 65 09/11/2023    GFRESTIMATED 65 06/07/2021    GFRESTBLACK 75 06/07/2021    AFSHAN 10.4 (H) 09/11/2023    AFSHAN 9.5 06/07/2021       Lab Results   Component Value Date    AST 30 07/12/2023    AST 26 06/07/2021    ALT 43 07/12/2023    ALT 39 06/07/2021       No results found for: A1C    Lab Results   Component Value Date    INR 1.07 06/22/2020    INR 1.18 (H) 12/01/2019         ASHLEY GORDON MD  [facility]

## 2023-09-13 NOTE — PATIENT INSTRUCTIONS
It was a pleasure seeing you today and thank you for allowing me to be a part of your health care team.  Should you have any questions regarding your visit or future needs please feel free to reach out to my care team for assistance.      Thank you, Dr. Trevon Pearl        **Nursing: [phone]       **Scheduling: [phone]

## 2023-10-02 ENCOUNTER — TELEPHONE (OUTPATIENT)
Dept: FAMILY MEDICINE | Facility: CLINIC | Age: 53
End: 2023-10-02
Payer: COMMERCIAL

## 2023-10-02 DIAGNOSIS — F33.0 MILD RECURRENT MAJOR DEPRESSION (H): ICD-10-CM

## 2023-10-02 DIAGNOSIS — F41.8 SITUATIONAL ANXIETY: ICD-10-CM

## 2023-10-02 NOTE — TELEPHONE ENCOUNTER
New Medication Request    Order #[id number]      What medication are you requesting?: Sertraline HCL Tab    Put in providers in box to view/sign    Reason for medication request: [facility] is requesting    Have you taken this medication before?: No    Controlled Substance Agreement on file:     CSA -- Patient Level:     [Media Unavailable] Controlled Substance Agreement - Non - Opioid - Scan on 1/3/2022  8:51 AM: NON-OPIOID CONTROLLED SUBSTANCE AGREEMENT   [Media Unavailable] Controlled Substance Agreement - Non - Opioid - Scan on 3/14/2021  8:26 AM: NON-OPIOID CONTROLLED SUBSTANCE AGREEMENT         Patient offered an appointment? No    Preferred Pharmacy:     [facility] - [place], [place] - [address]  [address]  [address] [address]  Phone: [phone] Fax: [phone]      Could we send this information to you in [employer] or would you prefer to receive a phone call?:   manuela FAX TO 1 [phone]    Karie MELTON

## 2023-10-03 NOTE — TELEPHONE ENCOUNTER
Some point - filled the rx and the patient  9/7/23 asked  the local pharmacy to fill, not them, was transferred out to another  pharmacy - so its not in their hands  any longer.  Never dispensed.  [facility] has  it.    If Patient needs to fill, she needs to do it through [facility] - all she  has to do is request to have it back.  Patient authorized them to transfer it.    Karie K/Edgardo    I LM with  patient to call us back - Please explain, she  can put it back in the hands of [facility], but she had it transferred so it can be filled at [facility] here also.

## 2023-10-03 NOTE — TELEPHONE ENCOUNTER
Kassie calling back says it was sent to [facility] in Aug and it was out of stock and they released it  to  pharmacy and it was filled in [place]. Kassie says now she wants to continue to fill at  pharmacy but [facility] is trying to get it back.     Call to  pharmacy s/w Kye we do not need to do anything further they can continue to fill this and he will work on it now so it can be dispensed.     Rx removed as new order not needed.       Returned call to Kassie to let her know. No further concerns at this time. Did let her know that in one year if reordered will need to put in desired pharmacy as current order still shows [facility] in Epic since it was pharmacy to pharmacy transfer. .     Snehal Soriano R.N.

## 2023-10-04 ENCOUNTER — HOSPITAL ENCOUNTER (OUTPATIENT)
Dept: CT IMAGING | Facility: CLINIC | Age: 53
Discharge: HOME | End: 2023-10-04
Attending: INTERNAL MEDICINE | Admitting: INTERNAL MEDICINE
Payer: COMMERCIAL

## 2023-10-04 ENCOUNTER — LAB (OUTPATIENT)
Dept: INFUSION THERAPY | Facility: CLINIC | Age: 53
End: 2023-10-04
Attending: INTERNAL MEDICINE
Payer: COMMERCIAL

## 2023-10-04 DIAGNOSIS — C83.32 DIFFUSE LARGE B-CELL LYMPHOMA OF INTRATHORACIC LYMPH NODES (H): ICD-10-CM

## 2023-10-04 DIAGNOSIS — I42.9 SECONDARY CARDIOMYOPATHY (H): ICD-10-CM

## 2023-10-04 DIAGNOSIS — R53.82 CHRONIC FATIGUE: ICD-10-CM

## 2023-10-04 LAB
ALBUMIN SERPL BCG-MCNC: 5 G/DL (ref 3.5–5.2)
ALP SERPL-CCNC: 80 U/L (ref 35–104)
ALT SERPL W P-5'-P-CCNC: 44 U/L (ref 0–50)
ANION GAP SERPL CALCULATED.3IONS-SCNC: 11 MMOL/L (ref 7–15)
AST SERPL W P-5'-P-CCNC: 33 U/L (ref 0–45)
BASO+EOS+MONOS # BLD AUTO: ABNORMAL 10*3/UL
BASO+EOS+MONOS NFR BLD AUTO: ABNORMAL %
BASOPHILS # BLD AUTO: 0.1 10E3/UL (ref 0–0.2)
BASOPHILS NFR BLD AUTO: 1 %
BILIRUB SERPL-MCNC: 0.8 MG/DL
BUN SERPL-MCNC: 20.3 MG/DL (ref 6–20)
CALCIUM SERPL-MCNC: 10 MG/DL (ref 8.6–10)
CHLORIDE SERPL-SCNC: 103 MMOL/L (ref 98–107)
CREAT SERPL-MCNC: 0.98 MG/DL (ref 0.51–0.95)
DEPRECATED HCO3 PLAS-SCNC: 26 MMOL/L (ref 22–29)
EGFRCR SERPLBLD CKD-EPI 2021: 69 ML/MIN/1.73M2
EOSINOPHIL # BLD AUTO: 2.2 10E3/UL (ref 0–0.7)
EOSINOPHIL NFR BLD AUTO: 29 %
ERYTHROCYTE [DISTWIDTH] IN BLOOD BY AUTOMATED COUNT: 12.4 % (ref 10–15)
GLUCOSE SERPL-MCNC: 91 MG/DL (ref 70–99)
HCT VFR BLD AUTO: 44.4 % (ref 35–47)
HGB BLD-MCNC: 15.2 G/DL (ref 11.7–15.7)
IMM GRANULOCYTES # BLD: 0 10E3/UL
IMM GRANULOCYTES NFR BLD: 0 %
LYMPHOCYTES # BLD AUTO: 1.9 10E3/UL (ref 0.8–5.3)
LYMPHOCYTES NFR BLD AUTO: 24 %
MCH RBC QN AUTO: 32.1 PG (ref 26.5–33)
MCHC RBC AUTO-ENTMCNC: 34.2 G/DL (ref 31.5–36.5)
MCV RBC AUTO: 94 FL (ref 78–100)
MONOCYTES # BLD AUTO: 0.6 10E3/UL (ref 0–1.3)
MONOCYTES NFR BLD AUTO: 8 %
NEUTROPHILS # BLD AUTO: 3 10E3/UL (ref 1.6–8.3)
NEUTROPHILS NFR BLD AUTO: 38 %
NRBC # BLD AUTO: 0 10E3/UL
NRBC BLD AUTO-RTO: 0 /100
PLATELET # BLD AUTO: 158 10E3/UL (ref 150–450)
POTASSIUM SERPL-SCNC: 4 MMOL/L (ref 3.4–5.3)
PROT SERPL-MCNC: 6.9 G/DL (ref 6.4–8.3)
RBC # BLD AUTO: 4.74 10E6/UL (ref 3.8–5.2)
SODIUM SERPL-SCNC: 140 MMOL/L (ref 135–145)
WBC # BLD AUTO: 7.8 10E3/UL (ref 4–11)

## 2023-10-04 PROCEDURE — 36415 COLL VENOUS BLD VENIPUNCTURE: CPT

## 2023-10-04 PROCEDURE — 85025 COMPLETE CBC W/AUTO DIFF WBC: CPT | Performed by: INTERNAL MEDICINE

## 2023-10-04 PROCEDURE — 74177 CT ABD & PELVIS W/CONTRAST: CPT

## 2023-10-04 PROCEDURE — 80053 COMPREHEN METABOLIC PANEL: CPT | Performed by: INTERNAL MEDICINE

## 2023-10-04 PROCEDURE — 250N000011 HC RX IP 250 OP 636: Performed by: INTERNAL MEDICINE

## 2023-10-04 PROCEDURE — 250N000009 HC RX 250: Performed by: INTERNAL MEDICINE

## 2023-10-04 RX ORDER — IOPAMIDOL 755 MG/ML
500 INJECTION, SOLUTION INTRAVASCULAR ONCE
Status: COMPLETED | OUTPATIENT
Start: 2023-10-04 | End: 2023-10-04

## 2023-10-04 RX ADMIN — SODIUM CHLORIDE 62 ML: 9 INJECTION, SOLUTION INTRAVENOUS at 15:47

## 2023-10-04 RX ADMIN — IOPAMIDOL 90 ML: 755 INJECTION, SOLUTION INTRAVENOUS at 15:47

## 2023-10-04 NOTE — PROGRESS NOTES
Nursing Note:  Kassie Ramirez presents today for labs/PIV placement.    Patient seen by provider today: No   present during visit today: Not Applicable.    Note: N/A.    Intravenous Access:  Labs drawn without difficulty.  Peripheral IV placed.    Discharge Plan:   Patient was sent to imaging for CT appointment.    Kassie Ramires RN

## 2023-10-06 ENCOUNTER — ONCOLOGY VISIT (OUTPATIENT)
Dept: ONCOLOGY | Facility: CLINIC | Age: 53
End: 2023-10-06
Attending: INTERNAL MEDICINE
Payer: COMMERCIAL

## 2023-10-06 VITALS
WEIGHT: 187.1 LBS | DIASTOLIC BLOOD PRESSURE: 79 MMHG | BODY MASS INDEX: 30.2 KG/M2 | OXYGEN SATURATION: 97 % | RESPIRATION RATE: 16 BRPM | SYSTOLIC BLOOD PRESSURE: 120 MMHG | TEMPERATURE: 97.7 F | HEART RATE: 81 BPM

## 2023-10-06 DIAGNOSIS — I42.8 NONISCHEMIC CARDIOMYOPATHY (H): ICD-10-CM

## 2023-10-06 DIAGNOSIS — J90 PLEURAL EFFUSION: ICD-10-CM

## 2023-10-06 DIAGNOSIS — R53.82 CHRONIC FATIGUE: ICD-10-CM

## 2023-10-06 DIAGNOSIS — R06.02 SOB (SHORTNESS OF BREATH): ICD-10-CM

## 2023-10-06 DIAGNOSIS — I50.22 CHRONIC SYSTOLIC CONGESTIVE HEART FAILURE (H): ICD-10-CM

## 2023-10-06 DIAGNOSIS — C83.32 DIFFUSE LARGE B-CELL LYMPHOMA OF INTRATHORACIC LYMPH NODES (H): Primary | ICD-10-CM

## 2023-10-06 DIAGNOSIS — R61 NIGHT SWEATS: ICD-10-CM

## 2023-10-06 PROCEDURE — 99215 OFFICE O/P EST HI 40 MIN: CPT | Performed by: INTERNAL MEDICINE

## 2023-10-06 PROCEDURE — 99214 OFFICE O/P EST MOD 30 MIN: CPT | Performed by: INTERNAL MEDICINE

## 2023-10-06 RX ORDER — SPIRONOLACTONE 25 MG/1
25 TABLET ORAL DAILY
Qty: 90 TABLET | Refills: 1 | Status: SHIPPED | OUTPATIENT
Start: 2023-10-06 | End: 2024-03-29

## 2023-10-06 RX ORDER — CARVEDILOL 6.25 MG/1
6.25 TABLET ORAL 2 TIMES DAILY WITH MEALS
Qty: 180 TABLET | Refills: 1 | Status: SHIPPED | OUTPATIENT
Start: 2023-10-06 | End: 2024-03-27

## 2023-10-06 RX ORDER — LISINOPRIL 5 MG/1
5 TABLET ORAL AT BEDTIME
Qty: 90 TABLET | Refills: 1 | Status: SHIPPED | OUTPATIENT
Start: 2023-10-06 | End: 2024-03-29

## 2023-10-06 RX ORDER — ALBUTEROL SULFATE 90 UG/1
2 AEROSOL, METERED RESPIRATORY (INHALATION) EVERY 6 HOURS PRN
Qty: 18 G | Refills: 4 | Status: SHIPPED | OUTPATIENT
Start: 2023-10-06 | End: 2023-11-02

## 2023-10-06 ASSESSMENT — PAIN SCALES - GENERAL: PAINLEVEL: NO PAIN (0)

## 2023-10-06 NOTE — PROGRESS NOTES
[facility]  HEMATOLOGY AND ONCOLOGY    FOLLOW-UP VISIT NOTE    PATIENT NAME: Kassie Ramirez MRN # [id number]  DATE OF VISIT: Oct 6, 2023 YOB: 1970    REFERRING PROVIDER: No referring provider defined for this encounter.    CANCER TYPE: DLBCL, EBV+ non-GCB, stage III.  STAGE: III    TREATMENT SUMMARY:  She presented with shortness of breath and cough which had been present since May 2019. Her imaging suggested pulmonary infiltrates which was attributed to aspiration pneumonia. CT scan of the chest 8/20/2019 showed left hilar and subcarinal mediastinal adenopathy with narrowing of the left lower lobe bronchus and a nonspecific patchy area of nodular consolidation in the medial right lower lobe.  Bronchoscopy with EBUS 8/28/2019 showed nonnecrotizing granulomatous inflammation with prominent eosinophilia, negative for malignancy.  She was diagnosed with sarcoidosis and started on prednisone. She had worsening cough and shortness of breath with tapering of the prednisone.  Repeat CT scan of the chest 11/18/2019 showed interval worsening of the bulky mediastinal and bilateral hilar lymphadenopathy, near complete resolution of the lower lobe opacities, with new interstitial opacities in the right upper lobe.   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 Large mediastinal mass causing respiratory compromise. . IPI score of 2 (low-intermediate). FISH neg for high risk translocations. She received 6 cycles of R-CHOP with intermediate dose methotrexate after cycles 2, 4 and 6 from November through April 2020.      CURRENT INTERVENTIONS:  Surveillance post MR-CHOP    SUBJECTIVE   Kassie Ramirez is being followed for DLBCL, EBV+ non-GCB, stage III intermediate risk disease    Patient was followed in person. She has completed all of her planned cycles of MR-CHOP  chemotherapy and is being seen with labs and restaging scans.     She had not been doing well since completion of her therapy for lymphoma.  She had struggled with cardiomyopathy post adriamycin.     She is still not the same. She continues to have marked fatigue.     She has cough and shortness of breath /dyspnea on exertion. She is fine at rest but gets short of breath with minimal activity.     Her peripheral neuropathy is better now.  Her medications have been adjusted.      PAST MEDICAL HISTORY     Past Medical History:   Diagnosis Date    Anxiety attack 09/16/2014    Cardiomyopathy (H)     non ishemic - 25-30% - Chemo related    Congestive heart failure (H) 02/2019    While in hospital for high dose Methotrexate    Depressive disorder 2003    taking Celexa since 2014    Diffuse large B-cell lymphoma (H)     Diagnosed 11/2019, Dr Castro [facility]    Encounter for Essure implantation 2009    Generalized anxiety disorder 09/16/2014    zoloft = flat emotions    HFrEF (heart failure with reduced ejection fraction) (H)     new diagnosis 6/14    History of blood transfusion 12/2019    Given many throughout cancer treatment    Menopausal disorder     started on OCPs by menopause center 3/2017 (takes active continuously)    Menstrual headache     helped by OCPs and magnesium    Paroxysmal SVT (supraventricular tachycardia) 06/2020    GERTRUDE (stress urinary incontinence, female)     sling procedure 2016         CURRENT OUTPATIENT MEDICATIONS     Current Outpatient Medications   Medication Sig    alpha-lipoic acid 600 MG capsule Take 1 capsule (600 mg) by mouth daily    carvedilol (COREG) 6.25 MG tablet Take 1 tablet (6.25 mg) by mouth 2 times daily (with meals)    cetirizine (ZYRTEC) 10 MG tablet Take 10 mg by mouth daily    empagliflozin (JARDIANCE) 10 MG TABS tablet Take 1 tablet (10 mg) by mouth daily    furosemide (LASIX) 20 MG tablet Take 1 tablet (20 mg) by mouth daily    lisinopril (ZESTRIL) 5 MG tablet Take 1 tablet (5  mg) by mouth at bedtime    LORazepam (ATIVAN) 0.5 MG tablet TAKE 1 TABLET BY MOUTH AT BEDTIME FOR SLEEP AND ANXIETY.    rosuvastatin (CRESTOR) 20 MG tablet Take 1 tablet (20 mg) by mouth daily    sertraline (ZOLOFT) 50 MG tablet Take 1 tablet (50 mg) by mouth daily    spironolactone (ALDACTONE) 25 MG tablet Take 1 tablet (25 mg) by mouth daily     No current facility-administered medications for this visit.        ALLERGIES      Allergies   Allergen Reactions    Cold & Flu [Germanium]      See pseudoephedrine    Seasonal Allergies     Sudafed Cold-Cough [Pseudoephedrine-Dm-Gg-Apap]     Pseudoephedrine Rash     Rash then skins peels off         REVIEW OF SYSTEMS   As above in the HPI, o/w complete 12-point ROS was negative.     PHYSICAL EXAM   /79   Pulse 81   Temp 97.7  F (36.5  C) (Oral)   Resp 16   Wt 84.9 kg (187 lb 1.6 oz)   LMP 11/06/2019   SpO2 97%   BMI 30.20 kg/m    Limited physical exam during video visit due to COVID19 restrictions  Middle aged female in no acute distress  Breathing comfortably, no tachypnea  Speech and hearing normal  Pleasant mood and congruent affect  Moving all extremities, no focal neurologic deficits apparent       LABORATORY AND IMAGING STUDIES     Recent Labs   Lab Test 10/04/23  1532 09/11/23  1139 07/12/23  1012 04/16/23  1402 04/10/23  0810    141 140 140  140 139   POTASSIUM 4.0 4.3 4.1 4.4 4.0   CHLORIDE 103 103 101 103 96*   CO2 26 23 26 24 28   ANIONGAP 11 15 13 13 15   BUN 20.3* 19.5 20.5* 18.4 26.7*   CR 0.98* 1.03* 1.10* 1.08*  1.08* 1.29*   GLC 91 81 98 86 110*   AFSHAN 10.0 10.4* 10.3* 10.5* 10.5*     Recent Labs   Lab Test 09/11/23  1139 06/19/20  1853 06/15/20  0845 06/14/20  1807 01/11/20  0615 12/01/19  1340 12/01/19  0641 11/30/19  2137 11/28/19  0537 11/27/19  2216   MAG  --  2.5* 2.4* 2.1 2.0  --   --   --   --  2.1   PHOS 3.9  --   --   --  3.1 2.8 3.4 2.8   < > 3.1    < > = values in this interval not displayed.     Recent Labs   Lab Test  10/04/23  1532 04/16/23  1402 04/10/23  0810 12/20/22  0809 11/14/22  0817 09/19/22  1653 07/12/22  1223   WBC 7.8 8.3 11.7*  --  8.2  --  8.2   HGB 15.2 15.0 17.9* 15.0 15.5   < > 14.8    173 243  --  178  --  190   MCV 94 92 91  --  94  --  92   NEUTROPHIL 38 40 38  --  38  --  40    < > = values in this interval not displayed.     Recent Labs   Lab Test 10/04/23  1532 07/12/23  1012 04/16/23  1402 04/10/23  0810 11/14/22  0817 07/12/22  1223 03/31/22  1014   BILITOTAL 0.8 0.6 0.7   < > 0.7 1.1 0.7   ALKPHOS 80 76 77   < > 89 79 78   ALT 44 43 37*   < > 40* 46 51*   AST 33 30 33   < > 25 22 30   ALBUMIN 5.0 5.1 4.8   < > 4.8 4.7 4.5   LDH  --   --   --   --  229 215 223    < > = values in this interval not displayed.     TSH   Date Value Ref Range Status   05/11/2023 2.11 0.30 - 4.20 uIU/mL Final   01/14/2022 2.29 0.40 - 4.00 mU/L Final   06/14/2020 3.40 0.40 - 4.00 mU/L Final   09/18/2019 2.06 0.40 - 4.00 mU/L Final   07/27/2018 2.04 0.40 - 4.00 mU/L Final     No results for input(s): CEA in the last 73419 hours.  Results for orders placed or performed during the hospital encounter of 10/04/23   CT Chest/Abdomen/Pelvis w Contrast    Narrative    CT CHEST/ABDOMEN/PELVIS W CONTRAST 10/4/2023 4:02 PM    CLINICAL HISTORY: Diffuse large B-cell lymphoma of intrathoracic lymph  nodes (H)    TECHNIQUE: CT scan of the chest, abdomen, and pelvis was performed  following injection of IV contrast. Multiplanar reformats were  obtained. Dose reduction techniques were used.   CONTRAST: 90mL Isovue-370    COMPARISON: CT 4/10/2023    FINDINGS:   LUNGS AND PLEURA: Stable size of 6 mm nodule in the superior segment  of the right lower lobe (series 12 image 110). Scattered calcified  granulomas are again noted. No new nodules or focal airspace  consolidation. No airspace consolidation. Likely trace bilateral  pleural effusions.    MEDIASTINUM/AXILLAE: No suspicious lymphadenopathy. Unchanged  calcified mediastinal and right  hilar lymph nodes, compatible with  prior granulomatous disease.    CORONARY ARTERY CALCIFICATION: None.    HEPATOBILIARY: Diffuse hepatic steatosis. Likely scattered cysts and  hemangiomas are not significantly changed from prior exam, no specific  follow-up recommended. No definite new or enlarging liver lesions.    PANCREAS: Normal.    SPLEEN: Stable enlargement of the spleen measuring up to approximately  14 cm.    ADRENAL GLANDS: Normal.    KIDNEYS/BLADDER: No hydronephrosis. Urinary bladder is unremarkable.    BOWEL: No obstruction or inflammatory change. Normal appendix.    PELVIC ORGANS: Minimal increase in size of right adnexal cyst,  measuring 2.6 cm, previously 2.4 cm (series 4 image 265), no specific  follow-up recommended given size. Essure tubal occlusion device is  again noted.    ADDITIONAL FINDINGS: No suspicious lymphadenopathy throughout the  abdomen or pelvis. No significant free fluid.    MUSCULOSKELETAL: No acute bony abnormality or suspicious osseous  lesions. Sequela of prior bone marrow aspiration in the left iliac  bone.      Impression    IMPRESSION:  1.  No evidence of new disease involvement in the chest, abdomen or pelvis.  2.  Stable mild splenomegaly.  3.  Likely trace bilateral pleural effusions.    HARDIK LU MD         SYSTEM ID:  [id number]        ASSESSMENT AND PLAN   DLBCL, EBV+ non-GCB, stage III post 6 cycles of M-RCHOP  PJV pneumonia causing acute respiratory failure improved on bactrim and prednisone  Cardiomyopathy post adriamycin based chemotherapy likely also contributed by tachycardia    Kassie was seen in person visit and was accompanied by her , Florian. She has completed her planned chemotherapy in April 2020.     She continues to feel poorly overall. This has not changed from her treatment completion. She continues to have fatigue, poor energy, joint aches and pains, shortness of breath  / dyspnea on exertion. She even wondered if she really had sarcoidosis  which was initial diagnosis until we realized that she had DLBCL. We do not have any radiographic findings consistent with sarcoid. We had checked for ZABRINA and rheumatoid factor which did not suggest rheumatologic disorder/ autoimmune disease. She has progressive cough. She has elevated eosinophils. I would get a lung function test done and refer her to a pulmonologist. I have prescribed her budesonide and albuterol inhalers.     I have reviewed all of the labs done prior to this clinic visit.  She has elevated BUN/creatinine, calcium, AST, total WBC, hemoglobin and hematocrit. I do not have a good explanation for this. I suspect that hypercalcemia would cause relative dehydration and we could have increased BUN/creatinine, calcium. Her hemoglobin has not been elevated in the past. I am little concerned with the elevated creatinine and calcium. I will have the labs repeated next week.     I have reviewed actual images from her CT scan and there is no evidence of recurrent disease in the chest, abdomen or pelvis. She had enlarged hilar nodes and mediastinal mass, likely from reactive thymic tissue.  This has remained stable on the current imaging study. Mild splenomegaly has been reported but a slight change in posture could account for this. We will continue to monitor this over time. She has a stable 1.7 cm lesion in the hepatic segment 7 which is likely hemangioma.      All questions for patient  were answered.  She is over 3 years out from treatment completion.   I would like to space out follow up to every 6 months until 5 years out. I will however have her recheck labs next week for all the abnormalities and [facility] or scheduled a visit with NP/PA as needed.     45 minutes spent on the date of the encounter doing chart review, history and exam, documentation and further activities as noted above     Castro Castro    Hematologist and Medical Oncologist  [facility]

## 2023-10-06 NOTE — NURSING NOTE
"Oncology Rooming Note    October 6, 2023 4:03 PM   Kassie Ramirez is a 53 year old female who presents for:    Chief Complaint   Patient presents with    Oncology Clinic Visit     Initial Vitals: /79   Pulse 81   Temp 97.7  F (36.5  C) (Oral)   Resp 16   Wt 84.9 kg (187 lb 1.6 oz)   LMP 11/06/2019   SpO2 97%   BMI 30.20 kg/m   Estimated body mass index is 30.2 kg/m  as calculated from the following:    Height as of 9/13/23: 1.676 m (5' 6\").    Weight as of this encounter: 84.9 kg (187 lb 1.6 oz). Body surface area is 1.99 meters squared.  No Pain (0) Comment: Data Unavailable   Patient's last menstrual period was 11/06/2019.  Allergies reviewed: Yes  Medications reviewed: Yes    Medications: Medication refills not needed today.  Pharmacy name entered into [facility]:    [facility] - [place], [place] - [address] [place] [address] DRUG STORE #[id number] [facility] [place] [address] [address] [address] [address] [address] [facility] - [place], [place] - [address] [facility] - [place], [place] - [address]  [facility] - [place], [place] - [address]    Clinical concerns: follow up        Ernestina Lechuga            "

## 2023-10-06 NOTE — LETTER
10/6/2023         RE: Kassie Ramirez  [address]  [facility] [id number]        Dear Colleague,    Thank you for referring your patient, Kassie Ramirez, to the [facility]. Please see a copy of my visit note below.    [facility]  HEMATOLOGY AND ONCOLOGY    FOLLOW-UP VISIT NOTE    PATIENT NAME: Kassie Ramirez MRN # [id number]  DATE OF VISIT: Oct 6, 2023 YOB: 1970    REFERRING PROVIDER: No referring provider defined for this encounter.    CANCER TYPE: DLBCL, EBV+ non-GCB, stage III.  STAGE: III    TREATMENT SUMMARY:  She presented with shortness of breath and cough which had been present since May 2019. Her imaging suggested pulmonary infiltrates which was attributed to aspiration pneumonia. CT scan of the chest 8/20/2019 showed left hilar and subcarinal mediastinal adenopathy with narrowing of the left lower lobe bronchus and a nonspecific patchy area of nodular consolidation in the medial right lower lobe.  Bronchoscopy with EBUS 8/28/2019 showed nonnecrotizing granulomatous inflammation with prominent eosinophilia, negative for malignancy.  She was diagnosed with sarcoidosis and started on prednisone. She had worsening cough and shortness of breath with tapering of the prednisone.  Repeat CT scan of the chest 11/18/2019 showed interval worsening of the bulky mediastinal and bilateral hilar lymphadenopathy, near complete resolution of the lower lobe opacities, with new interstitial opacities in the right upper lobe.   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 Large mediastinal mass causing respiratory compromise. . IPI score of 2 (low-intermediate). FISH neg for high risk translocations. She received 6 cycles of R-CHOP with intermediate dose methotrexate after cycles 2, 4 and 6 from November through April  2020.      CURRENT INTERVENTIONS:  Surveillance post MR-CHOP    SUBJECTIVE   Kassie Ramirez is being followed for DLBCL, EBV+ non-GCB, stage III intermediate risk disease    Patient was followed in person. She has completed all of her planned cycles of MR-CHOP chemotherapy and is being seen with labs and restaging scans.     She had not been doing well since completion of her therapy for lymphoma.  She had struggled with cardiomyopathy post adriamycin.     She is still not the same. She continues to have marked fatigue.     She has cough and shortness of breath /dyspnea on exertion. She is fine at rest but gets short of breath with minimal activity.     Her peripheral neuropathy is better now.  Her medications have been adjusted.      PAST MEDICAL HISTORY     Past Medical History:   Diagnosis Date     Anxiety attack 09/16/2014     Cardiomyopathy (H)     non ishemic - 25-30% - Chemo related     Congestive heart failure (H) 02/2019    While in hospital for high dose Methotrexate     Depressive disorder 2003    taking Celexa since 2014     Diffuse large B-cell lymphoma (H)     Diagnosed 11/2019, Ronan Albarran     Encounter for Essure implantation 2009     Generalized anxiety disorder 09/16/2014    zoloft = flat emotions     HFrEF (heart failure with reduced ejection fraction) (H)     new diagnosis 6/14     History of blood transfusion 12/2019    Given many throughout cancer treatment     Menopausal disorder     started on OCPs by menopause center 3/2017 (takes active continuously)     Menstrual headache     helped by OCPs and magnesium     Paroxysmal SVT (supraventricular tachycardia) 06/2020     GERTRUDE (stress urinary incontinence, female)     sling procedure 2016         CURRENT OUTPATIENT MEDICATIONS     Current Outpatient Medications   Medication Sig     alpha-lipoic acid 600 MG capsule Take 1 capsule (600 mg) by mouth daily     carvedilol (COREG) 6.25 MG tablet Take 1 tablet (6.25 mg) by mouth 2 times daily  (with meals)     cetirizine (ZYRTEC) 10 MG tablet Take 10 mg by mouth daily     empagliflozin (JARDIANCE) 10 MG TABS tablet Take 1 tablet (10 mg) by mouth daily     furosemide (LASIX) 20 MG tablet Take 1 tablet (20 mg) by mouth daily     lisinopril (ZESTRIL) 5 MG tablet Take 1 tablet (5 mg) by mouth at bedtime     LORazepam (ATIVAN) 0.5 MG tablet TAKE 1 TABLET BY MOUTH AT BEDTIME FOR SLEEP AND ANXIETY.     rosuvastatin (CRESTOR) 20 MG tablet Take 1 tablet (20 mg) by mouth daily     sertraline (ZOLOFT) 50 MG tablet Take 1 tablet (50 mg) by mouth daily     spironolactone (ALDACTONE) 25 MG tablet Take 1 tablet (25 mg) by mouth daily     No current facility-administered medications for this visit.        ALLERGIES      Allergies   Allergen Reactions     Cold & Flu [Germanium]      See pseudoephedrine     Seasonal Allergies      Sudafed Cold-Cough [Pseudoephedrine-Dm-Gg-Apap]      Pseudoephedrine Rash     Rash then skins peels off         REVIEW OF SYSTEMS   As above in the HPI, o/w complete 12-point ROS was negative.     PHYSICAL EXAM   /79   Pulse 81   Temp 97.7  F (36.5  C) (Oral)   Resp 16   Wt 84.9 kg (187 lb 1.6 oz)   LMP 11/06/2019   SpO2 97%   BMI 30.20 kg/m    Limited physical exam during video visit due to COVID19 restrictions  Middle aged female in no acute distress  Breathing comfortably, no tachypnea  Speech and hearing normal  Pleasant mood and congruent affect  Moving all extremities, no focal neurologic deficits apparent       LABORATORY AND IMAGING STUDIES     Recent Labs   Lab Test 10/04/23  1532 09/11/23  1139 07/12/23  1012 04/16/23  1402 04/10/23  0810    141 140 140  140 139   POTASSIUM 4.0 4.3 4.1 4.4 4.0   CHLORIDE 103 103 101 103 96*   CO2 26 23 26 24 28   ANIONGAP 11 15 13 13 15   BUN 20.3* 19.5 20.5* 18.4 26.7*   CR 0.98* 1.03* 1.10* 1.08*  1.08* 1.29*   GLC 91 81 98 86 110*   AFSHAN 10.0 10.4* 10.3* 10.5* 10.5*     Recent Labs   Lab Test 09/11/23  1139 06/19/20  1855  06/15/20  0845 06/14/20  1807 01/11/20  0615 12/01/19  1340 12/01/19  0641 11/30/19  2137 11/28/19  0537 11/27/19  2216   MAG  --  2.5* 2.4* 2.1 2.0  --   --   --   --  2.1   PHOS 3.9  --   --   --  3.1 2.8 3.4 2.8   < > 3.1    < > = values in this interval not displayed.     Recent Labs   Lab Test 10/04/23  1532 04/16/23  1402 04/10/23  0810 12/20/22  0809 11/14/22  0817 09/19/22  1653 07/12/22  1223   WBC 7.8 8.3 11.7*  --  8.2  --  8.2   HGB 15.2 15.0 17.9* 15.0 15.5   < > 14.8    173 243  --  178  --  190   MCV 94 92 91  --  94  --  92   NEUTROPHIL 38 40 38  --  38  --  40    < > = values in this interval not displayed.     Recent Labs   Lab Test 10/04/23  1532 07/12/23  1012 04/16/23  1402 04/10/23  0810 11/14/22  0817 07/12/22  1223 03/31/22  1014   BILITOTAL 0.8 0.6 0.7   < > 0.7 1.1 0.7   ALKPHOS 80 76 77   < > 89 79 78   ALT 44 43 37*   < > 40* 46 51*   AST 33 30 33   < > 25 22 30   ALBUMIN 5.0 5.1 4.8   < > 4.8 4.7 4.5   LDH  --   --   --   --  229 215 223    < > = values in this interval not displayed.     TSH   Date Value Ref Range Status   05/11/2023 2.11 0.30 - 4.20 uIU/mL Final   01/14/2022 2.29 0.40 - 4.00 mU/L Final   06/14/2020 3.40 0.40 - 4.00 mU/L Final   09/18/2019 2.06 0.40 - 4.00 mU/L Final   07/27/2018 2.04 0.40 - 4.00 mU/L Final     No results for input(s): CEA in the last 60805 hours.  Results for orders placed or performed during the hospital encounter of 10/04/23   CT Chest/Abdomen/Pelvis w Contrast    Narrative    CT CHEST/ABDOMEN/PELVIS W CONTRAST 10/4/2023 4:02 PM    CLINICAL HISTORY: Diffuse large B-cell lymphoma of intrathoracic lymph  nodes (H)    TECHNIQUE: CT scan of the chest, abdomen, and pelvis was performed  following injection of IV contrast. Multiplanar reformats were  obtained. Dose reduction techniques were used.   CONTRAST: 90mL Isovue-370    COMPARISON: CT 4/10/2023    FINDINGS:   LUNGS AND PLEURA: Stable size of 6 mm nodule in the superior segment  of the right  lower lobe (series 12 image 110). Scattered calcified  granulomas are again noted. No new nodules or focal airspace  consolidation. No airspace consolidation. Likely trace bilateral  pleural effusions.    MEDIASTINUM/AXILLAE: No suspicious lymphadenopathy. Unchanged  calcified mediastinal and right hilar lymph nodes, compatible with  prior granulomatous disease.    CORONARY ARTERY CALCIFICATION: None.    HEPATOBILIARY: Diffuse hepatic steatosis. Likely scattered cysts and  hemangiomas are not significantly changed from prior exam, no specific  follow-up recommended. No definite new or enlarging liver lesions.    PANCREAS: Normal.    SPLEEN: Stable enlargement of the spleen measuring up to approximately  14 cm.    ADRENAL GLANDS: Normal.    KIDNEYS/BLADDER: No hydronephrosis. Urinary bladder is unremarkable.    BOWEL: No obstruction or inflammatory change. Normal appendix.    PELVIC ORGANS: Minimal increase in size of right adnexal cyst,  measuring 2.6 cm, previously 2.4 cm (series 4 image 265), no specific  follow-up recommended given size. Essure tubal occlusion device is  again noted.    ADDITIONAL FINDINGS: No suspicious lymphadenopathy throughout the  abdomen or pelvis. No significant free fluid.    MUSCULOSKELETAL: No acute bony abnormality or suspicious osseous  lesions. Sequela of prior bone marrow aspiration in the left iliac  bone.      Impression    IMPRESSION:  1.  No evidence of new disease involvement in the chest, abdomen or pelvis.  2.  Stable mild splenomegaly.  3.  Likely trace bilateral pleural effusions.    HARDIK LU MD         SYSTEM ID:  [id number]        ASSESSMENT AND PLAN   DLBCL, EBV+ non-GCB, stage III post 6 cycles of M-RCHOP  PJV pneumonia causing acute respiratory failure improved on bactrim and prednisone  Cardiomyopathy post adriamycin based chemotherapy likely also contributed by tachycardia    Kassie was seen in person visit and was accompanied by her , Florian. She has  completed her planned chemotherapy in April 2020.     She continues to feel poorly overall. This has not changed from her treatment completion. She continues to have fatigue, poor energy, joint aches and pains, shortness of breath  / dyspnea on exertion. She even wondered if she really had sarcoidosis which was initial diagnosis until we realized that she had DLBCL. We do not have any radiographic findings consistent with sarcoid. We had checked for ZABRINA and rheumatoid factor which did not suggest rheumatologic disorder/ autoimmune disease. She has progressive cough. She has elevated eosinophils. I would get a lung function test done and refer her to a pulmonologist. I have prescribed her budesonide and albuterol inhalers.     I have reviewed all of the labs done prior to this clinic visit.  She has elevated BUN/creatinine, calcium, AST, total WBC, hemoglobin and hematocrit. I do not have a good explanation for this. I suspect that hypercalcemia would cause relative dehydration and we could have increased BUN/creatinine, calcium. Her hemoglobin has not been elevated in the past. I am little concerned with the elevated creatinine and calcium. I will have the labs repeated next week.     I have reviewed actual images from her CT scan and there is no evidence of recurrent disease in the chest, abdomen or pelvis. She had enlarged hilar nodes and mediastinal mass, likely from reactive thymic tissue.  This has remained stable on the current imaging study. Mild splenomegaly has been reported but a slight change in posture could account for this. We will continue to monitor this over time. She has a stable 1.7 cm lesion in the hepatic segment 7 which is likely hemangioma.      All questions for patient  were answered.  She is over 3 years out from treatment completion.   I would like to space out follow up to every 6 months until 5 years out. I will however have her recheck labs next week for all the abnormalities and will  follow it by mychart or scheduled a visit with NP/PA as needed.     45 minutes spent on the date of the encounter doing chart review, history and exam, documentation and further activities as noted above     Castro Castro    Hematologist and Medical Oncologist  [facility]       Again, thank you for allowing me to participate in the care of your patient.        Sincerely,        Castro Castro MD

## 2023-10-09 DIAGNOSIS — C83.32 DIFFUSE LARGE B-CELL LYMPHOMA OF INTRATHORACIC LYMPH NODES (H): Primary | ICD-10-CM

## 2023-10-09 DIAGNOSIS — J90 PLEURAL EFFUSION: ICD-10-CM

## 2023-10-13 ENCOUNTER — TELEPHONE (OUTPATIENT)
Dept: PULMONOLOGY | Facility: CLINIC | Age: 53
End: 2023-10-13
Payer: COMMERCIAL

## 2023-10-13 NOTE — TELEPHONE ENCOUNTER
pt  is going to have PT call us back.  We are going to offer 10/20 with Dr Norman. Pt needs to have PFT done prior to appt. Please either help schedule or give PFT number to schedule

## 2023-10-15 ENCOUNTER — E-VISIT (OUTPATIENT)
Dept: URGENT CARE | Facility: CLINIC | Age: 53
End: 2023-10-15

## 2023-10-15 ENCOUNTER — OFFICE VISIT (OUTPATIENT)
Dept: URGENT CARE | Facility: URGENT CARE | Age: 53
End: 2023-10-15
Payer: COMMERCIAL

## 2023-10-15 VITALS
SYSTOLIC BLOOD PRESSURE: 106 MMHG | TEMPERATURE: 97.6 F | BODY MASS INDEX: 29.49 KG/M2 | WEIGHT: 182.7 LBS | DIASTOLIC BLOOD PRESSURE: 74 MMHG | HEART RATE: 86 BPM | RESPIRATION RATE: 16 BRPM | OXYGEN SATURATION: 97 %

## 2023-10-15 DIAGNOSIS — Z53.9 DIAGNOSIS NOT YET DEFINED: Primary | ICD-10-CM

## 2023-10-15 DIAGNOSIS — R05.3 PERSISTENT COUGH FOR 3 WEEKS OR LONGER: Primary | ICD-10-CM

## 2023-10-15 PROCEDURE — 99207 PR NON-BILLABLE SERV PER CHARTING: CPT | Performed by: FAMILY MEDICINE

## 2023-10-15 PROCEDURE — 99213 OFFICE O/P EST LOW 20 MIN: CPT | Performed by: PHYSICIAN ASSISTANT

## 2023-10-15 RX ORDER — AZITHROMYCIN 250 MG/1
TABLET, FILM COATED ORAL
Qty: 6 TABLET | Refills: 0 | Status: SHIPPED | OUTPATIENT
Start: 2023-10-15 | End: 2023-10-20

## 2023-10-15 NOTE — PATIENT INSTRUCTIONS
Dear Kassie Ramirez,    We are sorry you are not feeling well. Based on the responses you provided, it is recommended that you be seen in-person in urgent care so we can better evaluate your symptoms. Please click here to find the nearest urgent care location to you.   You will not be charged for this Visit. Thank you for trusting us with your care.    Aniket Hoskins MD

## 2023-10-15 NOTE — PROGRESS NOTES
Assessment & Plan     Persistent cough for 3 weeks or longer  On exam today patient is in no acute respiratory distress.  Vitals are stable.  Recent CT chest from 10 days ago revealed trace bilateral pleural effusions, no focal consolidation.  Patient has an upcoming appointment coming up with pulmonology in 5 days.  We have elected to treat today for presumed bacterial bronchitis today.  Zithromax is prescribed.  Continue inhalers as scheduled.  Follow-up with pulmonology for recheck.  Sooner if any worsening symptoms.  Patient agrees with the plan.  - azithromycin (ZITHROMAX) 250 MG tablet  Dispense: 6 tablet; Refill: 0       Return in about 5 days (around 10/20/2023) for Recheck.    Seble Palomino PA-C  [facility] [place] is a 53 year old female who presents to clinic today for the following health issues:  Chief Complaint   Patient presents with    Cough     54 yo F presents with the following dry cough throughout the day productive in the morning  some cracking    tx- delysum, albuterol   onset 3 weeks       HPI  Patient with medical history significant for chronic fatigue, large B-cell lymphoma, CHF, nonischemic cardiomyopathy, hyperlipidemia, among others presenting to urgent care today with a complaint of a productive cough.  Ongoing symptoms for the past 3 weeks.  Treatment tried: Albuterol inhaler, OTC cough medication, budesonide inhaler.  Of note, patient is followed by pulmonology.  Last CT chest from 10 days ago revealed trace bilateral pleural effusion, no consolidations.  Patient reports a negative home COVID test.  No known sick contacts. No bilateral LE edema.        Review of Systems  Constitutional, HEENT, cardiovascular, pulmonary, GI, , musculoskeletal, neuro, skin, endocrine and psych systems are negative, except as otherwise noted.      Objective    /74   Pulse 86   Temp 97.6  F (36.4  C)   Resp 16   Wt 82.9 kg (182 lb 11.2 oz)    LMP 11/06/2019   SpO2 97%   BMI 29.49 kg/m    Physical Exam   GENERAL: healthy, alert and no distress  HENT: ear canals and TM's normal, mouth without ulcers or lesions  RESP: lungs clear to auscultation - no rales, rhonchi or wheezes  CV: regular rate and rhythm, normal S1 S2  MS: no gross musculoskeletal defects noted, no edema

## 2023-10-17 ENCOUNTER — OFFICE VISIT (OUTPATIENT)
Dept: PULMONOLOGY | Facility: CLINIC | Age: 53
End: 2023-10-17
Attending: STUDENT IN AN ORGANIZED HEALTH CARE EDUCATION/TRAINING PROGRAM
Payer: COMMERCIAL

## 2023-10-17 DIAGNOSIS — C83.32 DIFFUSE LARGE B-CELL LYMPHOMA OF INTRATHORACIC LYMPH NODES (H): ICD-10-CM

## 2023-10-17 DIAGNOSIS — J90 PLEURAL EFFUSION: ICD-10-CM

## 2023-10-17 PROCEDURE — 94726 PLETHYSMOGRAPHY LUNG VOLUMES: CPT | Performed by: INTERNAL MEDICINE

## 2023-10-17 PROCEDURE — 94729 DIFFUSING CAPACITY: CPT | Performed by: INTERNAL MEDICINE

## 2023-10-17 PROCEDURE — 94375 RESPIRATORY FLOW VOLUME LOOP: CPT | Performed by: INTERNAL MEDICINE

## 2023-10-18 LAB
DLCOCOR-%PRED-PRE: 85 %
DLCOCOR-PRE: 18.18 ML/MIN/MMHG
DLCOUNC-%PRED-PRE: 89 %
DLCOUNC-PRE: 19.11 ML/MIN/MMHG
DLCOUNC-PRED: 21.34 ML/MIN/MMHG
ERV-%PRED-PRE: 30 %
ERV-PRE: 0.37 L
ERV-PRED: 1.22 L
EXPTIME-PRE: 7.36 SEC
FEF2575-%PRED-PRE: 128 %
FEF2575-PRE: 3.26 L/SEC
FEF2575-PRED: 2.54 L/SEC
FEFMAX-%PRED-PRE: 101 %
FEFMAX-PRE: 6.99 L/SEC
FEFMAX-PRED: 6.91 L/SEC
FEV1-%PRED-PRE: 104 %
FEV1-PRE: 2.78 L
FEV1FEV6-PRE: 84 %
FEV1FEV6-PRED: 82 %
FEV1FVC-PRE: 84 %
FEV1FVC-PRED: 81 %
FEV1SVC-PRE: 84 %
FEV1SVC-PRED: 75 %
FIFMAX-PRE: 5.77 L/SEC
FRCPLETH-%PRED-PRE: 69 %
FRCPLETH-PRE: 1.96 L
FRCPLETH-PRED: 2.81 L
FVC-%PRED-PRE: 100 %
FVC-PRE: 3.31 L
FVC-PRED: 3.31 L
IC-%PRED-PRE: 119 %
IC-PRE: 2.94 L
IC-PRED: 2.45 L
RVPLETH-%PRED-PRE: 84 %
RVPLETH-PRE: 1.59 L
RVPLETH-PRED: 1.89 L
TLCPLETH-%PRED-PRE: 92 %
TLCPLETH-PRE: 4.9 L
TLCPLETH-PRED: 5.27 L
VA-%PRED-PRE: 89 %
VA-PRE: 4.56 L
VC-%PRED-PRE: 93 %
VC-PRE: 3.3 L
VC-PRED: 3.54 L

## 2023-10-19 NOTE — PROGRESS NOTES
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 Her pleural effusion on imaging are tiny and not of concern to me. This may be 2/2 her HFrEF. There are no parenchymal abnormalities and a stable RLL nodule. Her pulmonary function tests are normal. Her clinical history is consistent with asthma. Of note, she does have profound eosinophilia, this may be related to her lymphoma history, but I would like to rule out alternative asthma mimics such as eosinophilic granulomatosis w/ polyangiitis or allergic bronchopulmonary aspergillosis. Importantly, there is not CT evidence of the aforementioned diseases nor is there evidence of drug-induced lung injury from her prior chemotherapy.     - stop Pulmicort  - start ICS-LABA (Symbicort) twice daily, rinse mouth out after use  - check ANCA, IgE, and Aspergillus IgE  - OK to substitute medications based on formulary     History:  53F DLBCL (s/p 6 cycles R-CHOP w/ methotrexate cycles 2, 4, 6, completed 2020), HFrEF (2/2 chemo), prior PJP PNA being seen for dyspnea, cough, and pleural effusions. She is prescribed ICS (Pulmicort) and prn albuterol. Recently seen by medicine and given azithromycin for cough. Has been doing well. Currently, no LORENZ or SOB at rest. Continues to cough, but improved. Cough productive of yellow-clear sputum. Does wake from sleep. Does have chest tightness. Occasionally hears wheezing/crackling. No orthopnea or PND. No LE edema. She thinks Pulmicort has improved her condition. Using albuterol a few times per day, it is helpful. At times feels like it's hard to get air in. No throat tightness. Occasional  hoarseness w/ exertion. She does feel triggered by strong odors. Does have seasonal allergies, controlled w/ cetirizine.     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    /71 (BP Location: Left arm, Patient Position: Sitting, Cuff Size: Adult Regular)   Wt 83.2 kg (183 lb 8 oz)   LMP 11/06/2019   BMI 29.62 kg/m    Gen: well-appearing  HEENT: Mallampati III  Card: RRR  Pulm: clear bilaterally   Abd: soft  MSK: no edema, no acute joint abnormality   Skin: no obvious rash  Psych: normal affect  Neuro: alert and oriented     Labs:  Personally reviewed  Abs eos (10/2023) - 2200   Abs eos (4/2023) - 3200    Imaging/Studies: Personally reviewed  PFTs (10/2023) - normal pulmonary function   CT C/A/P (10/2023) - stable 6mm nodule RLL, scattered calcified granulomas, likely trace b/l pleural effusions, no suspicious LAD    TTE (12/2022) - EF 45%, normal RV function and size    Past Medical History:   Diagnosis Date    Anxiety attack 09/16/2014    Cardiomyopathy (H)     non ishemic - 25-30% - Chemo related    Congestive heart failure (H) 02/2019    While in hospital for high dose Methotrexate    Depressive disorder 2003    taking Celexa since 2014    Diffuse large B-cell lymphoma (H)     Diagnosed 11/2019, Ronan Albarran    Encounter for Essure implantation 2009    Generalized anxiety disorder 09/16/2014    zoloft = flat emotions    HFrEF (heart failure with reduced ejection fraction) (H)     new diagnosis 6/14    History of blood transfusion 12/2019    Given many throughout cancer treatment    Menopausal disorder     started on OCPs by menopause center 3/2017 (takes active continuously)    Menstrual headache     helped by OCPs and magnesium    Paroxysmal SVT (supraventricular tachycardia) 06/2020    GERTRUDE (stress urinary  incontinence, female)     sling procedure      Past Surgical History:   Procedure Laterality Date    COLONOSCOPY N/A 2023    Procedure: Colonoscopy;  Surgeon: Juan Marquez MD;  Location: [facility]    CV CORONARY ANGIOGRAM N/A 06/15/2020    Procedure: Coronary Angiogram;  Surgeon: Luis Eduardo Phillips MD;  Location:  HEART CARDIAC CATH LAB    CV LEFT HEART CATH N/A 06/15/2020    Procedure: Left Heart Cath;  Surgeon: Luis Eduardo Phillips MD;  Location:  HEART CARDIAC CATH LAB    ENT SURGERY      left eardrum rebuilt due to injury    EP ABLATION SVT N/A 2020    Procedure: EP Ablation SVT;  Surgeon: Francisco Javier Bynum MD;  Location:  HEART CARDIAC CATH LAB    H KIT ESSURE      essure - Dr. Cailin Raymundo     HERNIORRHAPHY UMBILICAL  1974    IR CHEST PORT PLACEMENT > 5 YRS OF AGE  2020    IR PORT REMOVAL RIGHT  2021    mediastinoscopy      SLING TRANSPUBO WITHOUT ANTERIOR COLPORRHAPHY N/A 2016    Procedure: SLING TRANSPUBO WITHOUT ANTERIOR COLPORRHAPHY;  Surgeon: Hernesto Berrios MD;  Location: [facility] OR     Family History   Problem Relation Age of Onset    Hypertension Mother          Lung Cancer     Depression Mother          Lung Cancer     Lipids Mother     Cardiovascular Mother     Circulatory Mother     Diabetes Mother          Lung Cancer 2013    Heart Disease Mother     Cerebrovascular Disease Mother          Lung Cancer     Obesity Mother          Lung Cancer 2013    C.A.D. Mother     Lung Cancer Mother         smoker    Macular Degeneration Father         [place]    Genetic Disorder Father         eye disease    Heart Disease Maternal Grandfather     Macular Degeneration Sister         [place]t    Hyperlipidemia Sister         high cholesterol      Diabetes Maternal Aunt         type 2    Hypertension Maternal Aunt     LUNG DISEASE No family hx of     Colon Cancer No family hx of      Social History     Socioeconomic History     Marital status:      Spouse name: Florian    Number of children: 2    Years of education: 16     Highest education level: Not on file   Occupational History    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 No     Service Not Asked    Blood Transfusions Not Asked    Caffeine Concern Yes     Comment: M[employer]ster energy drink.   Te - 3 cups.       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      Social Determinants of Health     Financial Resource Strain: Not on file   Food Insecurity: Not on file   Transportation Needs: Not on file   Physical Activity: Not on file   Stress: Not on file   Social Connections: Not on file   Interpersonal Safety: Not At Risk (11/18/2022)    Humiliation, Afraid, Rape, and Kick questionnaire     Fear of Current or Ex-Partner: No     Emotionally Abused: No     Physically Abused: No     Sexually Abused: No   Housing Stability: Not on file       50 minutes spent reviewing chart, reviewing test results, talking with and examining patient, formulating plan, and documentation on the day of the  encounter.    Bharath Norman MD  Pulmonary and Critical Care Medicine  [facility]      Former smoker

## 2023-10-20 ENCOUNTER — OFFICE VISIT (OUTPATIENT)
Dept: PULMONOLOGY | Facility: CLINIC | Age: 53
End: 2023-10-20
Attending: INTERNAL MEDICINE
Payer: COMMERCIAL

## 2023-10-20 ENCOUNTER — LAB (OUTPATIENT)
Dept: LAB | Facility: CLINIC | Age: 53
End: 2023-10-20
Payer: COMMERCIAL

## 2023-10-20 VITALS — SYSTOLIC BLOOD PRESSURE: 102 MMHG | DIASTOLIC BLOOD PRESSURE: 71 MMHG | WEIGHT: 183.5 LBS | BODY MASS INDEX: 29.62 KG/M2

## 2023-10-20 DIAGNOSIS — C83.32 DIFFUSE LARGE B-CELL LYMPHOMA OF INTRATHORACIC LYMPH NODES (H): ICD-10-CM

## 2023-10-20 DIAGNOSIS — J45.40 MODERATE PERSISTENT ASTHMA WITHOUT COMPLICATION: Primary | ICD-10-CM

## 2023-10-20 PROCEDURE — 86036 ANCA SCREEN EACH ANTIBODY: CPT

## 2023-10-20 PROCEDURE — 86037 ANCA TITER EACH ANTIBODY: CPT

## 2023-10-20 PROCEDURE — 99204 OFFICE O/P NEW MOD 45 MIN: CPT | Performed by: STUDENT IN AN ORGANIZED HEALTH CARE EDUCATION/TRAINING PROGRAM

## 2023-10-20 PROCEDURE — 82785 ASSAY OF IGE: CPT

## 2023-10-20 PROCEDURE — 36415 COLL VENOUS BLD VENIPUNCTURE: CPT

## 2023-10-20 PROCEDURE — 86003 ALLG SPEC IGE CRUDE XTRC EA: CPT

## 2023-10-20 RX ORDER — BUDESONIDE AND FORMOTEROL FUMARATE DIHYDRATE 160; 4.5 UG/1; UG/1
2 AEROSOL RESPIRATORY (INHALATION) 2 TIMES DAILY
Qty: 10.2 G | Refills: 3 | Status: SHIPPED | OUTPATIENT
Start: 2023-10-20 | End: 2023-11-03

## 2023-10-20 ASSESSMENT — PAIN SCALES - GENERAL: PAINLEVEL: NO PAIN (0)

## 2023-10-20 NOTE — PATIENT INSTRUCTIONS
Thank you for coming to pulmonary clinic. Your pulmonary function tests are normal. Your chest imaging shows no acute process. I would like you to start Symbicort twice daily, rinse your mouth out after use. Continue as needed albuterol. I will see you back in 3 months.

## 2023-10-20 NOTE — LETTER
10/20/2023         RE: Kassie Ramirez  [address]  [place] [address]        Dear Colleague,    Thank you for referring your patient, Kassie Ramirez, to the [facility] [place]. Please see a copy of my visit note below.    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 Her pleural effusion on imaging are tiny and not of concern to me. This may be 2/2 her HFrEF. There are no parenchymal abnormalities and a stable RLL nodule. Her pulmonary function tests are normal. Her clinical history is consistent with asthma. Of note, she does have profound eosinophilia, this may be related to her lymphoma history, but I would like to rule out alternative asthma mimics such as eosinophilic granulomatosis w/ polyangiitis or allergic bronchopulmonary aspergillosis. Importantly, there is not CT evidence of the aforementioned diseases nor is there evidence of drug-induced lung injury from her prior chemotherapy.     - stop Pulmicort  - start ICS-LABA (Symbicort) twice daily, rinse mouth out after use  - check ANCA, IgE, and Aspergillus IgE  - OK to substitute medications based on formulary     History:  53F DLBCL (s/p 6 cycles R-CHOP w/ methotrexate cycles 2, 4, 6, completed 2020), HFrEF (2/2 chemo), prior PJP PNA being seen for dyspnea, cough, and pleural effusions. She is prescribed ICS (Pulmicort) and prn albuterol. Recently seen by medicine and given azithromycin for cough. Has been doing well. Currently, no LORENZ or SOB at rest. Continues to cough, but improved. Cough productive of yellow-clear sputum. Does wake from sleep. Does  have chest tightness. Occasionally hears wheezing/crackling. No orthopnea or PND. No LE edema. She thinks Pulmicort has improved her condition. Using albuterol a few times per day, it is helpful. At times feels like it's hard to get air in. No throat tightness. Occasional hoarseness w/ exertion. She does feel triggered by strong odors. Does have seasonal allergies, controlled w/ cetirizine.     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    /71 (BP Location: Left arm, Patient Position: Sitting, Cuff Size: Adult Regular)   Wt 83.2 kg (183 lb 8 oz)   LMP 11/06/2019   BMI 29.62 kg/m    Gen: well-appearing  HEENT: Mallampati III  Card: RRR  Pulm: clear bilaterally   Abd: soft  MSK: no edema, no acute joint abnormality   Skin: no obvious rash  Psych: normal affect  Neuro: alert and oriented     Labs:  Personally reviewed  Abs eos (10/2023) - 2200   Abs eos (4/2023) - 3200    Imaging/Studies: Personally reviewed  PFTs (10/2023) - normal pulmonary function   CT C/A/P (10/2023) - stable 6mm nodule RLL, scattered calcified granulomas, likely trace b/l pleural effusions, no suspicious LAD    TTE (12/2022) - EF 45%, normal RV function and size    Past Medical History:   Diagnosis Date    Anxiety attack 09/16/2014    Cardiomyopathy (H)     non ishemic - 25-30% - Chemo related    Congestive heart failure (H) 02/2019    While in hospital for high dose Methotrexate    Depressive disorder 2003    taking Celexa since 2014    Diffuse large B-cell lymphoma (H)     Diagnosed 11/2019, Ronan Albarran    Encounter for Essure implantation 2009    Generalized anxiety disorder 09/16/2014    zoloft = flat emotions    HFrEF (heart failure with reduced ejection fraction) (H)     new diagnosis 6/14    History of blood transfusion 12/2019     Given many throughout cancer treatment    Menopausal disorder     started on OCPs by menopause center 3/2017 (takes active continuously)    Menstrual headache     helped by OCPs and magnesium    Paroxysmal SVT (supraventricular tachycardia) 2020    GERTRUDE (stress urinary incontinence, female)     sling procedure      Past Surgical History:   Procedure Laterality Date    COLONOSCOPY N/A 2023    Procedure: Colonoscopy;  Surgeon: Juan Marquez MD;  Location: [facility]    CV CORONARY ANGIOGRAM N/A 06/15/2020    Procedure: Coronary Angiogram;  Surgeon: Luis Eduardo Phillips MD;  Location:  HEART CARDIAC CATH LAB    CV LEFT HEART CATH N/A 06/15/2020    Procedure: Left Heart Cath;  Surgeon: Luis Eduardo Phillips MD;  Location:  HEART CARDIAC CATH LAB    ENT SURGERY      left eardrum rebuilt due to injury    EP ABLATION SVT N/A 2020    Procedure: EP Ablation SVT;  Surgeon: Francisco Javier Bynum MD;  Location:  HEART CARDIAC CATH LAB    H KIT ESSURE      essrickey - Dr. Simeon Block     HERNIORRHAPHY UMBILICAL  1974    IR CHEST PORT PLACEMENT > 5 YRS OF AGE  2020    IR PORT REMOVAL RIGHT  2021    mediastinoscopy  2019    SLING TRANSPUBO WITHOUT ANTERIOR COLPORRHAPHY N/A 2016    Procedure: SLING TRANSPUBO WITHOUT ANTERIOR COLPORRHAPHY;  Surgeon: Hernesto Berrios MD;  Location: [facility] OR     Family History   Problem Relation Age of Onset    Hypertension Mother          Lung Cancer     Depression Mother          Lung Cancer 2013    Lipids Mother     Cardiovascular Mother     Circulatory Mother     Diabetes Mother          Lung Cancer 2013    Heart Disease Mother     Cerebrovascular Disease Mother          Lung Cancer 2013    Obesity Mother          Lung Cancer 2013    C.A.D. Mother     Lung Cancer Mother         smoker    Macular Degeneration Father         Stargardt    Genetic Disorder Father         eye disease    Heart Disease Maternal Grandfather     Macular  Degeneration Sister         Stargardt    Hyperlipidemia Sister         high cholesterol      Diabetes Maternal Aunt         type 2    Hypertension Maternal Aunt     LUNG DISEASE No family hx of     Colon Cancer No family hx of      Social History     Socioeconomic History    Marital status:      Spouse name: Florian    Number of children: 2    Years of education: 16     Highest education level: Not on file   Occupational History    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    Occupation: [employer]   Tobacco Use    Smoking status: Never    Smokeless tobacco: Never   Vaping Use    Vaping Use: Never used   Substance and Sexual Activity    Alcohol use: Not Currently    Drug use: No    Sexual activity: Yes     Partners: Male     Birth control/protection: Surgical     Comment: Essure coil   Other Topics Concern    Parent/sibling w/ CABG, MI or angioplasty before 65F 55M? No     Service Not Asked    Blood Transfusions Not Asked    Caffeine Concern Yes     Comment: MOnster energy drink.   Te - 3 cups.       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      Social Determinants of Health     Financial Resource Strain: Not on file   Food Insecurity: Not on file   Transportation Needs: Not on file   Physical Activity: Not on file   Stress: Not on file   Social Connections: Not on file   Interpersonal Safety: Not At Risk (11/18/2022)    Humiliation, Afraid, Rape, and Kick questionnaire     Fear of Current or  Ex-Partner: No     Emotionally Abused: No     Physically Abused: No     Sexually Abused: No   Housing Stability: Not on file       50 minutes spent reviewing chart, reviewing test results, talking with and examining patient, formulating plan, and documentation on the day of the encounter.    Bharath Norman MD  Pulmonary and Critical Care Medicine  [facility]

## 2023-10-20 NOTE — NURSING NOTE
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      Barrington Sloan, [facility]

## 2023-10-23 LAB
A FUMIGATUS IGE QN: <0.1 KU(A)/L
ANCA AB PATTERN SER IF-IMP: NORMAL
C-ANCA TITR SER IF: NORMAL {TITER}
IGE SERPL-ACNC: <2 KU/L (ref 0–114)

## 2023-10-28 DIAGNOSIS — C83.398 DIFFUSE LARGE B-CELL LYMPHOMA OF EXTRANODAL SITE: ICD-10-CM

## 2023-10-28 DIAGNOSIS — Z79.899 CONTROLLED SUBSTANCE AGREEMENT SIGNED: ICD-10-CM

## 2023-10-28 DIAGNOSIS — F41.8 SITUATIONAL ANXIETY: ICD-10-CM

## 2023-10-30 DIAGNOSIS — I50.22 CHRONIC SYSTOLIC CONGESTIVE HEART FAILURE (H): ICD-10-CM

## 2023-10-30 DIAGNOSIS — I42.8 NONISCHEMIC CARDIOMYOPATHY (H): ICD-10-CM

## 2023-10-30 RX ORDER — LORAZEPAM 0.5 MG/1
TABLET ORAL
Qty: 30 TABLET | Refills: 0 | Status: SHIPPED | OUTPATIENT
Start: 2023-10-30 | End: 2024-07-17

## 2023-11-02 DIAGNOSIS — C83.32 DIFFUSE LARGE B-CELL LYMPHOMA OF INTRATHORACIC LYMPH NODES (H): ICD-10-CM

## 2023-11-02 DIAGNOSIS — J90 PLEURAL EFFUSION: ICD-10-CM

## 2023-11-02 DIAGNOSIS — R61 NIGHT SWEATS: ICD-10-CM

## 2023-11-02 DIAGNOSIS — R53.82 CHRONIC FATIGUE: ICD-10-CM

## 2023-11-02 RX ORDER — ALBUTEROL SULFATE 90 UG/1
2 AEROSOL, METERED RESPIRATORY (INHALATION) EVERY 6 HOURS PRN
Qty: 18 G | Refills: 4 | Status: SHIPPED | OUTPATIENT
Start: 2023-11-02

## 2023-11-02 NOTE — CONFIDENTIAL NOTE
18 [facility] made aware SBAR is ready. Room 215, Sydney Harrell RN to be the nurse. Signed Prescriptions:                        Disp   Refills    albuterol (PROAIR HFA/PROVENTIL HFA/VENTOL*18 g   4        Sig: Inhale 2 puffs into the lungs every 6 hours as needed           for shortness of breath, wheezing or cough  Authorizing Provider: ORION RYAN            Last Written Prescription Date:  10/6/23  Last Fill Quantity: 18 g,   # refills: 4  Last Office Visit: 10/6/23  Future Office visit: Not yet scheduled.     Routing refill request to provider for review/approval.     Fareed Jefferson, RN, BSN.  RN Care Coordinator     [facility]   [phone]

## 2023-11-03 DIAGNOSIS — J45.40 MODERATE PERSISTENT ASTHMA WITHOUT COMPLICATION: Primary | ICD-10-CM

## 2023-11-03 RX ORDER — BUDESONIDE AND FORMOTEROL FUMARATE DIHYDRATE 160; 4.5 UG/1; UG/1
2 AEROSOL RESPIRATORY (INHALATION) 2 TIMES DAILY
Qty: 10.2 G | Refills: 3 | COMMUNITY
Start: 2023-11-03 | End: 2024-01-22

## 2023-11-03 NOTE — TELEPHONE ENCOUNTER
Received refill request for Breyna inhaler. Okay to send refill per Dr Norman. Updated med list to reflect correct inhaler.    Requested Prescriptions   Pending Prescriptions Disp Refills    budesonide-formoterol (SYMBICORT) 160-4.5 MCG/ACT Inhaler 10.2 g 3     Sig: Inhale 2 puffs into the lungs 2 times daily       There is no refill protocol information for this order        Last Written Prescription Date:  10/20/23  Last Fill Quantity: 10.2g,  # refills: 3   Last office visit: 10/20/2023 ; last virtual visit: Visit date not found with prescribing provider:  Dr Norman   Future Office Visit:  1/24/24    Marty Clay RN

## 2023-12-29 NOTE — PATIENT INSTRUCTIONS
4/5/24 Labs/Scans  4/12/24 Return visit with Dr. Matthew Jefferson, RN, BSN.  RN Care Coordinator     [facility]   [phone]

## 2024-01-02 ENCOUNTER — TELEPHONE (OUTPATIENT)
Dept: PULMONOLOGY | Facility: CLINIC | Age: 54
End: 2024-01-02
Payer: COMMERCIAL

## 2024-01-02 NOTE — TELEPHONE ENCOUNTER
EVERTONM to reschedule appointment on 1/24/24 with Dr. Norman. Can schedule with MODESTO Schofield as well.

## 2024-01-17 ENCOUNTER — OFFICE VISIT (OUTPATIENT)
Dept: PULMONOLOGY | Facility: CLINIC | Age: 54
End: 2024-01-17
Payer: COMMERCIAL

## 2024-01-17 DIAGNOSIS — R06.02 SOB (SHORTNESS OF BREATH): ICD-10-CM

## 2024-01-17 DIAGNOSIS — J90 PLEURAL EFFUSION: ICD-10-CM

## 2024-01-17 PROCEDURE — 94726 PLETHYSMOGRAPHY LUNG VOLUMES: CPT | Performed by: INTERNAL MEDICINE

## 2024-01-17 PROCEDURE — 94375 RESPIRATORY FLOW VOLUME LOOP: CPT | Performed by: INTERNAL MEDICINE

## 2024-01-17 PROCEDURE — 94729 DIFFUSING CAPACITY: CPT | Performed by: INTERNAL MEDICINE

## 2024-01-17 NOTE — PROGRESS NOTES
Kassie Ramirez comes into clinic today at the request of Dr. Castro Headley , Ordering Provider for PFT    This service provided today was under the supervising provider of the day Dr. Norman, who was available if needed.    Santos Toth, RT

## 2024-01-19 LAB
DLCOUNC-%PRED-PRE: 103 %
DLCOUNC-PRE: 22 ML/MIN/MMHG
DLCOUNC-PRED: 21.32 ML/MIN/MMHG
ERV-%PRED-PRE: 17 %
ERV-PRE: 0.22 L
ERV-PRED: 1.22 L
EXPTIME-PRE: 6.03 SEC
FEF2575-%PRED-PRE: 166 %
FEF2575-PRE: 4.2 L/SEC
FEF2575-PRED: 2.53 L/SEC
FEFMAX-%PRED-PRE: 124 %
FEFMAX-PRE: 8.57 L/SEC
FEFMAX-PRED: 6.9 L/SEC
FEV1-%PRED-PRE: 114 %
FEV1-PRE: 3.03 L
FEV1FEV6-PRE: 87 %
FEV1FEV6-PRED: 82 %
FEV1FVC-PRE: 87 %
FEV1FVC-PRED: 81 %
FEV1SVC-PRE: 84 %
FEV1SVC-PRED: 75 %
FIFMAX-PRE: 6.39 L/SEC
FRCPLETH-%PRED-PRE: 63 %
FRCPLETH-PRE: 1.8 L
FRCPLETH-PRED: 2.81 L
FVC-%PRED-PRE: 105 %
FVC-PRE: 3.48 L
FVC-PRED: 3.3 L
IC-%PRED-PRE: 139 %
IC-PRE: 3.41 L
IC-PRED: 2.45 L
RVPLETH-%PRED-PRE: 83 %
RVPLETH-PRE: 1.58 L
RVPLETH-PRED: 1.9 L
TLCPLETH-%PRED-PRE: 98 %
TLCPLETH-PRE: 5.21 L
TLCPLETH-PRED: 5.27 L
VA-%PRED-PRE: 97 %
VA-PRE: 4.97 L
VC-%PRED-PRE: 102 %
VC-PRE: 3.63 L
VC-PRED: 3.54 L

## 2024-01-22 DIAGNOSIS — J45.40 MODERATE PERSISTENT ASTHMA WITHOUT COMPLICATION: ICD-10-CM

## 2024-01-22 RX ORDER — BUDESONIDE AND FORMOTEROL FUMARATE DIHYDRATE 160; 4.5 UG/1; UG/1
2 AEROSOL RESPIRATORY (INHALATION) 2 TIMES DAILY
Qty: 10.2 G | Refills: 3 | Status: SHIPPED | OUTPATIENT
Start: 2024-01-22 | End: 2024-03-13

## 2024-01-22 NOTE — TELEPHONE ENCOUNTER
"Requested Prescriptions   Pending Prescriptions Disp Refills    budesonide-formoterol (SYMBICORT) 160-4.5 MCG/ACT Inhaler 10.2 g 3     Sig: Inhale 2 puffs into the lungs 2 times daily       Inhaled Steroids Protocol Failed - 1/22/2024  2:28 PM        Failed - Asthma control assessment score within normal limits in last 6 months     Please review ACT score.           Passed - Patient is age 12 or older        Passed - Medication is active on med list        Passed - Recent (6 mo) or future (30 days) visit within the authorizing provider's specialty     Patient had office visit in the last 6 months or has a visit in the next 30 days with authorizing provider or within the authorizing provider's specialty.  See \"Patient Info\" tab in inbasket, or \"Choose Columns\" in Meds & Orders section of the refill encounter.           Long-Acting Beta Agonist Inhalers Protocol  Failed - 1/22/2024  2:28 PM        Failed - Asthma control assessment score within normal limits in last 6 months     Please review ACT score.           Passed - Patient is age 12 or older        Passed - Order for Serevent, Striverdi, or Foradil and pt has steroid inhaler        Passed - Medication is active on med list        Passed - Recent (6 mo) or future (30 days) visit within the authorizing provider's specialty     Patient had office visit in the last 6 months or has a visit in the next 30 days with authorizing provider or within the authorizing provider's specialty.  See \"Patient Info\" tab in inbasket, or \"Choose Columns\" in Meds & Orders section of the refill encounter.               Last Written Prescription Date:  11/3/23  Last Fill Quantity: 10.2g,  # refills: 3   Last office visit: 1/17/2024 ; last virtual visit: Visit date not found with prescribing provider:  Dr Norman   Future Office Visit: 3/13/24    Refill sent  Marty Clay RN          "

## 2024-01-22 NOTE — TELEPHONE ENCOUNTER
Requested Prescriptions   Pending Prescriptions Disp Refills    budesonide-formoterol (SYMBICORT) 160-4.5 MCG/ACT Inhaler 10.2 g 3     Sig: Inhale 2 puffs into the lungs 2 times daily       There is no refill protocol information for this order            Last Written Prescription Date:  11/3/2023  Last Fill Quantity: 10.2 g,  # refills: 3   Last office visit: 1/17/2024 ; last virtual visit: Visit date not found with prescribing provider:  Bharath Norman MD   Future Office Visit: 3/13/2024  Next 5 appointments (look out 90 days)      Mar 11, 2024  7:45 AM  Lab visit with RV LAB  [facility] ([facility] ) [address] [place] [id number] [phone]

## 2024-03-11 ENCOUNTER — LAB (OUTPATIENT)
Dept: LAB | Facility: CLINIC | Age: 54
End: 2024-03-11
Payer: COMMERCIAL

## 2024-03-11 DIAGNOSIS — E78.2 MIXED HYPERLIPIDEMIA: ICD-10-CM

## 2024-03-11 DIAGNOSIS — I50.22 CHRONIC SYSTOLIC CONGESTIVE HEART FAILURE (H): ICD-10-CM

## 2024-03-11 LAB
ALT SERPL W P-5'-P-CCNC: 50 U/L (ref 0–50)
ANION GAP SERPL CALCULATED.3IONS-SCNC: 11 MMOL/L (ref 7–15)
BUN SERPL-MCNC: 19.8 MG/DL (ref 6–20)
CALCIUM SERPL-MCNC: 10.1 MG/DL (ref 8.6–10)
CHLORIDE SERPL-SCNC: 103 MMOL/L (ref 98–107)
CHOLEST SERPL-MCNC: 151 MG/DL
CREAT SERPL-MCNC: 1.05 MG/DL (ref 0.51–0.95)
DEPRECATED HCO3 PLAS-SCNC: 27 MMOL/L (ref 22–29)
EGFRCR SERPLBLD CKD-EPI 2021: 63 ML/MIN/1.73M2
FASTING STATUS PATIENT QL REPORTED: YES
GLUCOSE SERPL-MCNC: 106 MG/DL (ref 70–99)
HDLC SERPL-MCNC: 40 MG/DL
LDLC SERPL CALC-MCNC: 69 MG/DL
NONHDLC SERPL-MCNC: 111 MG/DL
POTASSIUM SERPL-SCNC: 4.2 MMOL/L (ref 3.4–5.3)
SODIUM SERPL-SCNC: 141 MMOL/L (ref 135–145)
TRIGL SERPL-MCNC: 212 MG/DL

## 2024-03-11 PROCEDURE — 84460 ALANINE AMINO (ALT) (SGPT): CPT

## 2024-03-11 PROCEDURE — 36415 COLL VENOUS BLD VENIPUNCTURE: CPT

## 2024-03-11 PROCEDURE — 80061 LIPID PANEL: CPT

## 2024-03-11 PROCEDURE — 80048 BASIC METABOLIC PNL TOTAL CA: CPT

## 2024-03-13 ENCOUNTER — OFFICE VISIT (OUTPATIENT)
Dept: PULMONOLOGY | Facility: CLINIC | Age: 54
End: 2024-03-13
Payer: COMMERCIAL

## 2024-03-13 VITALS
RESPIRATION RATE: 18 BRPM | HEART RATE: 84 BPM | BODY MASS INDEX: 29.7 KG/M2 | DIASTOLIC BLOOD PRESSURE: 81 MMHG | OXYGEN SATURATION: 95 % | WEIGHT: 184 LBS | SYSTOLIC BLOOD PRESSURE: 117 MMHG

## 2024-03-13 DIAGNOSIS — J45.30 MILD PERSISTENT ASTHMA WITHOUT COMPLICATION: Primary | ICD-10-CM

## 2024-03-13 PROCEDURE — 99214 OFFICE O/P EST MOD 30 MIN: CPT | Performed by: STUDENT IN AN ORGANIZED HEALTH CARE EDUCATION/TRAINING PROGRAM

## 2024-03-13 RX ORDER — BUDESONIDE AND FORMOTEROL FUMARATE DIHYDRATE 160; 4.5 UG/1; UG/1
2 AEROSOL RESPIRATORY (INHALATION) 2 TIMES DAILY
Qty: 10.2 G | Refills: 11 | Status: SHIPPED | OUTPATIENT
Start: 2024-03-13 | End: 2024-06-10

## 2024-03-13 ASSESSMENT — ASTHMA QUESTIONNAIRES
QUESTION_3 LAST FOUR WEEKS HOW OFTEN DID YOUR ASTHMA SYMPTOMS (WHEEZING, COUGHING, SHORTNESS OF BREATH, CHEST TIGHTNESS OR PAIN) WAKE YOU UP AT NIGHT OR EARLIER THAN USUAL IN THE MORNING: NOT AT ALL
QUESTION_5 LAST FOUR WEEKS HOW WOULD YOU RATE YOUR ASTHMA CONTROL: WELL CONTROLLED
QUESTION_4 LAST FOUR WEEKS HOW OFTEN HAVE YOU USED YOUR RESCUE INHALER OR NEBULIZER MEDICATION (SUCH AS ALBUTEROL): NOT AT ALL
ACT_TOTALSCORE: 22
QUESTION_1 LAST FOUR WEEKS HOW MUCH OF THE TIME DID YOUR ASTHMA KEEP YOU FROM GETTING AS MUCH DONE AT WORK, SCHOOL OR AT HOME: A LITTLE OF THE TIME
QUESTION_2 LAST FOUR WEEKS HOW OFTEN HAVE YOU HAD SHORTNESS OF BREATH: ONCE OR TWICE A WEEK
ACT_TOTALSCORE: 22

## 2024-03-13 NOTE — PATIENT INSTRUCTIONS
Continue Symbicort twice daily, rinse your mouth out after use. Continue as needed albuterol for worsening shortness of breath.

## 2024-03-13 NOTE — PROGRESS NOTES
Pulmonary Clinic Note    Date of Service: 3/13/2024     Chief Complaint   Patient presents with    Follow Up     asthma       A/P:  54F DLBCL (s/p 6 cycles R-CHOP w/ methotrexate cycles 2, 4, 6, completed 2020), HFrEF (2/2 chemo), prior PJP PNA being seen for follow up asthma. She is doing very well today. No recent exacerbations. We discussed that we could trial step-down therapy to ICS monotherapy, she would prefer to stay on current regimen, which is reasonable.     - continue ICS-LABA (Symbicort) twice daily, rinse mouth out after use  - continue prn albuterol   - OK to substitute medications based on formulary     History:  54F DLBCL (s/p 6 cycles R-CHOP w/ methotrexate cycles 2, 4, 6, completed 2020), HFrEF (2/2 chemo), prior PJP PNA being seen for follow up asthma. Last seen 10/2023. She is prescribed ICS-LABA (Symbicort) and prn albuterol. Has been doing very well. Rare dyspnea, unclear trigger, not necessarily related to activity, may be stress related. No SOB at rest. No chest pain or tightness. No wheezing. No cough. No nocturnal cough. Some seasonal allergies, OTC meds control this. Using Symbicort. Does not need albuterol.     Smoking: never  Bird exposure: no             Animal exposure: cat         Inhalation exposure: grew up on a farm    Occupation:                          10 point review of systems negative, aside from that mentioned in HPI.    /81   Pulse 84   Resp 18   Wt 83.5 kg (184 lb)   LMP 11/06/2019   SpO2 95%   BMI 29.70 kg/m    Gen: well-appearing  HEENT: Mallampati II  Card: RRR  Pulm: clear bilaterally   Abd: soft  MSK: no edema, no acute joint abnormality   Skin: no obvious rash  Psych: normal affect  Neuro: alert and oriented     Labs:  Personally reviewed  Aspergillus IgE (10/2023) - neg  IgE (10/2023) - neg  ANCA (10/2023) - neg  Abs eos (10/2023) - 2200   Abs eos (4/2023) - 3200     Imaging/Studies: Personally reviewed  PFTs (1/2024) - normal pulmonary  function  PFTs (10/2023) - normal pulmonary function   CT C/A/P (10/2023) - stable 6mm nodule RLL, scattered calcified granulomas, likely trace b/l pleural effusions, no suspicious LAD     TTE (12/2022) - EF 45%, normal RV function and size    Past Medical History:   Diagnosis Date    Anxiety attack 09/16/2014    Cardiomyopathy (H)     non ishemic - 25-30% - Chemo related    Congestive heart failure (H) 02/2019    While in hospital for high dose Methotrexate    Depressive disorder 2003    taking Celexa since 2014    Diffuse large B-cell lymphoma (H)     Diagnosed 11/2019, Ronan Albarran    Encounter for Essure implantation 2009    Generalized anxiety disorder 09/16/2014    zoloft = flat emotions    HFrEF (heart failure with reduced ejection fraction) (H)     new diagnosis 6/14    History of blood transfusion 12/2019    Given many throughout cancer treatment    Menopausal disorder     started on OCPs by menopause center 3/2017 (takes active continuously)    Menstrual headache     helped by OCPs and magnesium    Paroxysmal SVT (supraventricular tachycardia) 06/2020    GERTRUDE (stress urinary incontinence, female)     sling procedure 2016     Past Surgical History:   Procedure Laterality Date    COLONOSCOPY N/A 7/27/2023    Procedure: Colonoscopy;  Surgeon: Juan Marquez MD;  Location:  GI    CV CORONARY ANGIOGRAM N/A 06/15/2020    Procedure: Coronary Angiogram;  Surgeon: Luis Eduardo Phillips MD;  Location:  HEART CARDIAC CATH LAB    CV LEFT HEART CATH N/A 06/15/2020    Procedure: Left Heart Cath;  Surgeon: Luis Eduardo Phillips MD;  Location:  HEART CARDIAC CATH LAB    ENT SURGERY  1990    left eardrum rebuilt due to injury    EP ABLATION SVT N/A 06/22/2020    Procedure: EP Ablation SVT;  Surgeon: Francisco Javier Bynum MD;  Location:  HEART CARDIAC CATH LAB    H KIT ESSURE  2009    essure - Dr. Cailin Raymundo     HERNIORRHAPHY UMBILICAL  1974    IR CHEST PORT PLACEMENT > 5 YRS OF AGE  01/09/2020    IR PORT REMOVAL  RIGHT  2021    mediastinoscopy      SLING TRANSPUBO WITHOUT ANTERIOR COLPORRHAPHY N/A 2016    Procedure: SLING TRANSPUBO WITHOUT ANTERIOR COLPORRHAPHY;  Surgeon: Hernesto Berrios MD;  Location: [facility] OR     Family History   Problem Relation Age of Onset    Hypertension Mother          Lung Cancer     Depression Mother          Lung Cancer 2013    Lipids Mother     Cardiovascular Mother     Circulatory Mother     Diabetes Mother          Lung Cancer 2013    Heart Disease Mother     Cerebrovascular Disease Mother          Lung Cancer 2013    Obesity Mother          Lung Cancer 2013    C.A.D. Mother     Lung Cancer Mother         smoker    Macular Degeneration Father         Stargardt    Genetic Disorder Father         eye disease    Heart Disease Maternal Grandfather     Macular Degeneration Sister         Stargardt    Hyperlipidemia Sister         high cholesterol      Diabetes Maternal Aunt         type 2    Hypertension Maternal Aunt     LUNG DISEASE No family hx of     Colon Cancer No family hx of      Social History     Socioeconomic History    Marital status:      Spouse name: Florian    Number of children: 2    Years of education: 16     Highest education level: Not on file   Occupational History    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 No     Service Not Asked    Blood Transfusions Not Asked    Caffeine  Concern Yes     Comment: MOnster energy drink.   Te - 3 cups.       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      Social Determinants of Health     Financial Resource Strain: Not on file   Food Insecurity: Not on file   Transportation Needs: Not on file   Physical Activity: Not on file   Stress: Not on file   Social Connections: Not on file   Interpersonal Safety: Not At Risk (11/18/2022)    Humiliation, Afraid, Rape, and Kick questionnaire     Fear of Current or Ex-Partner: No     Emotionally Abused: No     Physically Abused: No     Sexually Abused: No   Housing Stability: Not on file       35 minutes spent reviewing chart, reviewing test results, talking with and examining patient, formulating plan, and documentation on the day of the encounter.    Bharath Norman MD  Pulmonary and Critical Care Medicine  [facility]

## 2024-03-13 NOTE — NURSING NOTE
Chief Complaint   Patient presents with    Follow Up     asthma       Vitals:    03/13/24 1529   BP: 117/81   Pulse: 84   Resp: 18   SpO2: 95%   Weight: 83.5 kg (184 lb)       Body mass index is 29.7 kg/m .

## 2024-03-13 NOTE — LETTER
3/13/2024         RE: Kassie Ramirez  [address]  [place] [address]        Dear Colleague,    Thank you for referring your patient, Kassie Ramirez, to the [facility] [place]. Please see a copy of my visit note below.    Pulmonary Clinic Note    Date of Service: 3/13/2024     Chief Complaint   Patient presents with     Follow Up     asthma       A/P:  54F DLBCL (s/p 6 cycles R-CHOP w/ methotrexate cycles 2, 4, 6, completed 2020), HFrEF (2/2 chemo), prior [facility] PNA being seen for follow up asthma. She is doing very well today. No recent exacerbations. We discussed that we could trial step-down therapy to ICS monotherapy, she would prefer to stay on current regimen, which is reasonable.     - continue ICS-LABA (Symbicort) twice daily, rinse mouth out after use  - continue prn albuterol   - OK to substitute medications based on formulary     History:  54F DLBCL (s/p 6 cycles R-CHOP w/ methotrexate cycles 2, 4, 6, completed 2020), HFrEF (2/2 chemo), prior [facility] PNA being seen for follow up asthma. Last seen 10/2023. She is prescribed ICS-LABA (Symbicort) and prn albuterol. Has been doing very well. Rare dyspnea, unclear trigger, not necessarily related to activity, may be stress related. No SOB at rest. No chest pain or tightness. No wheezing. No cough. No nocturnal cough. Some seasonal allergies, OTC meds control this. Using Symbicort. Does not need albuterol.     Smoking: never  Bird exposure: no             Animal exposure: cat         Inhalation exposure: [place] farm    Occupation:                          10 point review of systems negative, aside from that mentioned in HPI.    /81   Pulse 84   Resp 18   Wt 83.5 kg (184 lb)   LMP 11/06/2019   SpO2 95%   BMI 29.70 kg/m    Gen: well-appearing  HEENT: Mallampati II  Card: RRR  Pulm: clear bilaterally   Abd: soft  MSK: no edema, no acute joint abnormality   Skin: no obvious rash  Psych: normal  affect  Neuro: alert and oriented     Labs:  Personally reviewed  Aspergillus IgE (10/2023) - neg  IgE (10/2023) - neg  ANCA (10/2023) - neg  Abs eos (10/2023) - 2200   Abs eos (4/2023) - 3200     Imaging/Studies: Personally reviewed  PFTs (1/2024) - normal pulmonary function  PFTs (10/2023) - normal pulmonary function   CT C/A/P (10/2023) - stable 6mm nodule RLL, scattered calcified granulomas, likely trace b/l pleural effusions, no suspicious LAD     TTE (12/2022) - EF 45%, normal RV function and size    Past Medical History:   Diagnosis Date     Anxiety attack 09/16/2014     Cardiomyopathy (H)     non ishemic - 25-30% - Chemo related     Congestive heart failure (H) 02/2019    While in hospital for high dose Methotrexate     Depressive disorder 2003    taking Celexa since 2014     Diffuse large B-cell lymphoma (H)     Diagnosed 11/2019, Ronan Albarran     Encounter for Essure implantation 2009     Generalized anxiety disorder 09/16/2014    zoloft = flat emotions     HFrEF (heart failure with reduced ejection fraction) (H)     new diagnosis 6/14     History of blood transfusion 12/2019    Given many throughout cancer treatment     Menopausal disorder     started on OCPs by menopause center 3/2017 (takes active continuously)     Menstrual headache     helped by OCPs and magnesium     Paroxysmal SVT (supraventricular tachycardia) 06/2020     GERTRUDE (stress urinary incontinence, female)     sling procedure 2016     Past Surgical History:   Procedure Laterality Date     COLONOSCOPY N/A 7/27/2023    Procedure: Colonoscopy;  Surgeon: Juan Marquez MD;  Location:  GI     CV CORONARY ANGIOGRAM N/A 06/15/2020    Procedure: Coronary Angiogram;  Surgeon: Luis Eduardo Phillips MD;  Location:  HEART CARDIAC CATH LAB     CV LEFT HEART CATH N/A 06/15/2020    Procedure: Left Heart Cath;  Surgeon: Luis Eduardo Phillips MD;  Location:  HEART CARDIAC CATH LAB     ENT SURGERY  1990    left eardrum rebuilt due to injury     EP  ABLATION SVT N/A 2020    Procedure: EP Ablation SVT;  Surgeon: Francisco Javier Bynum MD;  Location:  HEART CARDIAC CATH LAB     H KIT ESSURE  2009    essure - Dr. Cailin Raymundo      HERNIORRHAPHY UMBILICAL  1974     IR CHEST PORT PLACEMENT > 5 YRS OF AGE  2020     IR PORT REMOVAL RIGHT  2021     mediastinoscopy  2019     SLING TRANSPUBO WITHOUT ANTERIOR COLPORRHAPHY N/A 2016    Procedure: SLING TRANSPUBO WITHOUT ANTERIOR COLPORRHAPHY;  Surgeon: Hernesto Berrios MD;  Location: [facility] OR     Family History   Problem Relation Age of Onset     Hypertension Mother          Lung Cancer 2013     Depression Mother          Lung Cancer 2013     Lipids Mother      Cardiovascular Mother      Circulatory Mother      Diabetes Mother          Lung Cancer 2013     Heart Disease Mother      Cerebrovascular Disease Mother          Lung Cancer 2013     Obesity Mother          Lung Cancer 2013     C.A.D. Mother      Lung Cancer Mother         smoker     Macular Degeneration Father         Stargardt     Genetic Disorder Father         eye disease     Heart Disease Maternal Grandfather      Macular Degeneration Sister         Stargardt     Hyperlipidemia Sister         high cholesterol       Diabetes Maternal Aunt         type 2     Hypertension Maternal Aunt      LUNG DISEASE No family hx of      Colon Cancer No family hx of      Social History     Socioeconomic History     Marital status:      Spouse name: Florian     Number of children: 2     Years of education: 16      Highest education level: Not on file   Occupational History     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      Comment: 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 No      Service Not Asked     Blood Transfusions Not Asked     Caffeine Concern Yes     Comment: MOnster energy drink.   Te - 3 cups.        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      Social Determinants of Health     Financial Resource Strain: Not on file   Food Insecurity: Not on file   Transportation Needs: Not on file   Physical Activity: Not on file   Stress: Not on file   Social Connections: Not on file   Interpersonal Safety: Not At Risk (11/18/2022)    Humiliation, Afraid, Rape, and Kick questionnaire      Fear of Current or Ex-Partner: No      Emotionally Abused: No      Physically Abused: No      Sexually Abused: No   Housing Stability: Not on file       35 minutes spent reviewing chart, reviewing test results, talking with and examining patient, formulating plan, and documentation on the day of the encounter.    Bharath Norman MD  Pulmonary and Critical Care Medicine  [facility]

## 2024-03-20 ENCOUNTER — HOSPITAL ENCOUNTER (OUTPATIENT)
Dept: CARDIOLOGY | Facility: CLINIC | Age: 54
Discharge: HOME | End: 2024-03-20
Attending: INTERNAL MEDICINE | Admitting: INTERNAL MEDICINE
Payer: COMMERCIAL

## 2024-03-20 DIAGNOSIS — I50.22 CHRONIC SYSTOLIC CONGESTIVE HEART FAILURE (H): ICD-10-CM

## 2024-03-20 LAB — LVEF ECHO: NORMAL

## 2024-03-20 PROCEDURE — 93306 TTE W/DOPPLER COMPLETE: CPT

## 2024-03-20 PROCEDURE — 93306 TTE W/DOPPLER COMPLETE: CPT | Mod: 26 | Performed by: INTERNAL MEDICINE

## 2024-03-27 DIAGNOSIS — I50.22 CHRONIC SYSTOLIC CONGESTIVE HEART FAILURE (H): ICD-10-CM

## 2024-03-27 DIAGNOSIS — I42.8 NONISCHEMIC CARDIOMYOPATHY (H): ICD-10-CM

## 2024-03-27 RX ORDER — CARVEDILOL 6.25 MG/1
6.25 TABLET ORAL 2 TIMES DAILY WITH MEALS
Qty: 180 TABLET | Refills: 0 | Status: SHIPPED | OUTPATIENT
Start: 2024-03-27 | End: 2024-03-29

## 2024-03-27 RX ORDER — FUROSEMIDE 20 MG
20 TABLET ORAL DAILY
Qty: 90 TABLET | Refills: 0 | Status: SHIPPED | OUTPATIENT
Start: 2024-03-27 | End: 2024-03-29

## 2024-03-29 ENCOUNTER — OFFICE VISIT (OUTPATIENT)
Dept: CARDIOLOGY | Facility: CLINIC | Age: 54
End: 2024-03-29
Attending: INTERNAL MEDICINE
Payer: COMMERCIAL

## 2024-03-29 VITALS
BODY MASS INDEX: 29.99 KG/M2 | SYSTOLIC BLOOD PRESSURE: 110 MMHG | DIASTOLIC BLOOD PRESSURE: 65 MMHG | WEIGHT: 186.6 LBS | OXYGEN SATURATION: 94 % | HEIGHT: 66 IN | HEART RATE: 89 BPM

## 2024-03-29 DIAGNOSIS — I50.22 CHRONIC SYSTOLIC CONGESTIVE HEART FAILURE (H): ICD-10-CM

## 2024-03-29 DIAGNOSIS — E78.2 MIXED HYPERLIPIDEMIA: ICD-10-CM

## 2024-03-29 DIAGNOSIS — I42.8 NONISCHEMIC CARDIOMYOPATHY (H): ICD-10-CM

## 2024-03-29 PROCEDURE — 99214 OFFICE O/P EST MOD 30 MIN: CPT | Performed by: INTERNAL MEDICINE

## 2024-03-29 RX ORDER — CARVEDILOL 6.25 MG/1
6.25 TABLET ORAL 2 TIMES DAILY WITH MEALS
Qty: 180 TABLET | Refills: 3 | Status: SHIPPED | OUTPATIENT
Start: 2024-03-29 | End: 2024-06-11

## 2024-03-29 RX ORDER — LISINOPRIL 5 MG/1
5 TABLET ORAL AT BEDTIME
Qty: 90 TABLET | Refills: 3 | Status: SHIPPED | OUTPATIENT
Start: 2024-03-29 | End: 2024-06-11

## 2024-03-29 RX ORDER — FUROSEMIDE 20 MG
20 TABLET ORAL DAILY PRN
Qty: 90 TABLET | Refills: 3 | Status: SHIPPED | OUTPATIENT
Start: 2024-03-29

## 2024-03-29 RX ORDER — SPIRONOLACTONE 25 MG/1
25 TABLET ORAL DAILY
Qty: 90 TABLET | Refills: 3 | Status: SHIPPED | OUTPATIENT
Start: 2024-03-29

## 2024-03-29 RX ORDER — ROSUVASTATIN CALCIUM 20 MG/1
20 TABLET, COATED ORAL DAILY
Qty: 90 TABLET | Refills: 3 | Status: SHIPPED | OUTPATIENT
Start: 2024-03-29

## 2024-03-29 NOTE — PROGRESS NOTES
General Cardiology Clinic Progress Note  Kassie Ramirez MRN# [id number]   YOB: 1970 Age: 54 year old       Reason for visit: Chronic systolic heart failure    History of presenting illness:    I had the opportunity to see Kassie Ramirez at [facility] today for reevaluation of nonischemic cardiomyopathy likely due to chemotherapy given for lymphoma back in 2019.    Her cardiomyopathy was diagnosed in late 2020 in early 2021 with an ejection fraction as low as 20 to 25%.  With medical therapy we have seen significant improvement left ventricular function.  Her ejection fraction increased to 45 to 50% and has remained stable since last year based on the current echocardiogram.  She has no significant valvular disease.  She seems to be doing very well without symptoms of shortness of breath, lightheadedness, or syncope.    Last year she was having a problem with some mild shortness of breath with exertion and diaphoresis.  She also had high levels of eosinophils.  This led to a diagnosis of asthma, and after treating her asthma, her diaphoresis has resolved and her breathing has improved.  She seems to be feeling very well now.    Her blood pressure today is 110/65, heart rate 89, and weight 186 pounds.  Her lungs are clear.  Heart rhythm is regular.  He has no cardiac murmurs or carotid bruits    Her cholesterol numbers are excellent with an LDL of 69 and HDL of 40.  Her creatinine is normal at 1.05 BUN 20 and potassium 4.2.  ALT is normal.  Hemoglobin is normal.              Assessment and Plan:     ASSESSMENT:    Ms. Sadia Ramirez is a 54-year-old woman with nonischemic cardiomyopathy, probably due to chemotherapy used to treat lymphoma.  Her ejection fraction has improved significantly from 20 to 25%, now up to 45 to 50%.  She has no ongoing symptoms of congestive heart failure.  She was recently diagnosed with asthma and after treatment of that condition, she has been feeling much  better with less sweating and less shortness of breath.  Her medication program looks good.  She has been taking the Lasix only as needed and has not needed it for quite a long time.  I will make that change in the notes.  Otherwise we will continue the same medication program and plan on seeing her back again in 1 year for reevaluation.    Trevon Pearl MD           Orders this Visit:  Orders Placed This Encounter   Procedures    Basic metabolic panel    CBC with platelets    Lipid Profile    Follow-Up with Cardiology    Echocardiogram Complete     Orders Placed This Encounter   Medications    carvedilol (COREG) 6.25 MG tablet     Sig: Take 1 tablet (6.25 mg) by mouth 2 times daily (with meals)     Dispense:  180 tablet     Refill:  3    furosemide (LASIX) 20 MG tablet     Sig: Take 1 tablet (20 mg) by mouth daily as needed (shortness of breath, swelling)     Dispense:  90 tablet     Refill:  3    lisinopril (ZESTRIL) 5 MG tablet     Sig: Take 1 tablet (5 mg) by mouth at bedtime     Dispense:  90 tablet     Refill:  3    rosuvastatin (CRESTOR) 20 MG tablet     Sig: Take 1 tablet (20 mg) by mouth daily     Dispense:  90 tablet     Refill:  3    spironolactone (ALDACTONE) 25 MG tablet     Sig: Take 1 tablet (25 mg) by mouth daily     Dispense:  90 tablet     Refill:  3     Medications Discontinued During This Encounter   Medication Reason    rosuvastatin (CRESTOR) 20 MG tablet Reorder (No AVS)    spironolactone (ALDACTONE) 25 MG tablet Reorder (No AVS)    lisinopril (ZESTRIL) 5 MG tablet Reorder (No AVS)    carvedilol (COREG) 6.25 MG tablet Reorder (No AVS)    furosemide (LASIX) 20 MG tablet Reorder (No AVS)       Today's clinic visit entailed:    30 minutes spent by me on the date of the encounter doing chart review, history and exam, documentation and further activities per the note  Provider  Link to [facility] Help Grid     The level of medical decision making during this visit was of high complexity.           Review of  "Systems:     Review of Systems:  Skin:  Negative     Eyes:  Positive for contacts;glasses  ENT:  Negative    Respiratory:  Negative    Cardiovascular:  Negative    Gastroenterology: Negative    Genitourinary:  Negative    Musculoskeletal:  Positive for nocturnal cramping  Neurologic:  Negative    Psychiatric:  Negative    Heme/Lymph/Imm:  Negative    Endocrine:  Positive for night sweats            Physical Exam:     Vitals: /65   Pulse 89   Ht 1.676 m (5' 6\")   Wt 84.6 kg (186 lb 9.6 oz)   LMP 11/06/2019   SpO2 94%   BMI 30.12 kg/m    Constitutional: Well nourished and in no apparent distress.  Eyes: Pupils equal, round. Sclerae anicteric.   HEENT: Normocephalic, atraumatic.   Neck: Supple. JVD   Respiratory: Breathing non-labored. Lungs clear to auscultation bilaterally. No crackles, wheezes, rhonchi, or rales.  Cardiovascular:  Regular rate and rhythm, normal S1 and S2. No murmur, rub, or gallop.  Skin: Warm, dry. No rashes, cyanosis, or xanthelasma.  Extremities: No edema.  Neurologic: No gross motor deficits. Alert, awake, and oriented to person, place and time.  Psychiatric: Affect appropriate.             Medications:     Current Outpatient Medications   Medication Sig Dispense Refill    alpha-lipoic acid 600 MG capsule Take 1 capsule (600 mg) by mouth daily 30 capsule 0    budesonide-formoterol (SYMBICORT) 160-4.5 MCG/ACT Inhaler Inhale 2 puffs into the lungs 2 times daily 10.2 g 11    carvedilol (COREG) 6.25 MG tablet Take 1 tablet (6.25 mg) by mouth 2 times daily (with meals) 180 tablet 3    cetirizine (ZYRTEC) 10 MG tablet Take 10 mg by mouth daily      empagliflozin (JARDIANCE) 10 MG TABS tablet Take 1 tablet (10 mg) by mouth daily 90 tablet 3    furosemide (LASIX) 20 MG tablet Take 1 tablet (20 mg) by mouth daily as needed (shortness of breath, swelling) 90 tablet 3    lisinopril (ZESTRIL) 5 MG tablet Take 1 tablet (5 mg) by mouth at bedtime 90 tablet 3    LORazepam (ATIVAN) 0.5 MG tablet " TAKE 1 TABLET BY MOUTH AT  BEDTIME FOR SLEEP AND ANXIETY 30 tablet 0    rosuvastatin (CRESTOR) 20 MG tablet Take 1 tablet (20 mg) by mouth daily 90 tablet 3    sertraline (ZOLOFT) 50 MG tablet Take 1 tablet (50 mg) by mouth daily 90 tablet 3    spironolactone (ALDACTONE) 25 MG tablet Take 1 tablet (25 mg) by mouth daily 90 tablet 3    albuterol (PROAIR HFA/PROVENTIL HFA/VENTOLIN HFA) 108 (90 Base) MCG/ACT inhaler Inhale 2 puffs into the lungs every 6 hours as needed for shortness of breath, wheezing or cough 18 g 4       Family History   Problem Relation Age of Onset    Hypertension Mother          Lung Cancer     Depression Mother          Lung Cancer     Lipids Mother     Cardiovascular Mother     Circulatory Mother     Diabetes Mother          Lung Cancer     Heart Disease Mother     Cerebrovascular Disease Mother          Lung Cancer 2013    Obesity Mother          Lung Cancer 2013    C.A.D. Mother     Lung Cancer Mother         smoker    Macular Degeneration Father         Stargardt    Genetic Disorder Father         eye disease    Heart Disease Maternal Grandfather     Macular Degeneration Sister         Stargardt    Hyperlipidemia Sister         high cholesterol      Diabetes Maternal Aunt         type 2    Hypertension Maternal Aunt     LUNG DISEASE No family hx of     Colon Cancer No family hx of        Social History     Socioeconomic History    Marital status:      Spouse name: Florian    Number of children: 2    Years of education: 16     Highest education level: Not on file   Occupational History    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      Comment: 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 No     Service Not Asked    Blood Transfusions Not Asked    Caffeine Concern Yes     Comment: MOnster energy drink.   Te - 3 cups.       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      Social Determinants of Health     Financial Resource Strain: Not on file   Food Insecurity: Not on file   Transportation Needs: Not on file   Physical Activity: Not on file   Stress: Not on file   Social Connections: Not on file   Interpersonal Safety: Not At Risk (11/18/2022)    Humiliation, Afraid, Rape, and Kick questionnaire     Fear of Current or Ex-Partner: No     Emotionally Abused: No     Physically Abused: No     Sexually Abused: No   Housing Stability: Not on file            Past Medical History:     Past Medical History:   Diagnosis Date    Anxiety attack 09/16/2014    Cardiomyopathy (H)     non ishemic - 25-30% - Chemo related    Congestive heart failure (H) 02/2019    While in hospital for high dose Methotrexate    Depressive disorder 2003    taking Celexa since 2014    Diffuse large B-cell lymphoma (H)     Diagnosed 11/2019, Ronan Albarran    Encounter for Essure implantation 2009    Generalized anxiety disorder 09/16/2014    zoloft = flat emotions    HFrEF (heart failure with reduced ejection fraction) (H)     new diagnosis 6/14    History of blood transfusion 12/2019    Given many throughout cancer treatment    Menopausal disorder      started on OCPs by menopause center 3/2017 (takes active continuously)    Menstrual headache     helped by OCPs and magnesium    Paroxysmal SVT (supraventricular tachycardia) 06/2020    GERTRUDE (stress urinary incontinence, female)     sling procedure 2016              Past Surgical History:     Past Surgical History:   Procedure Laterality Date    COLONOSCOPY N/A 7/27/2023    Procedure: Colonoscopy;  Surgeon: Juan Marquez MD;  Location:  GI    CV CORONARY ANGIOGRAM N/A 06/15/2020    Procedure: Coronary Angiogram;  Surgeon: Luis Eduardo Phillips MD;  Location:  HEART CARDIAC CATH LAB    CV LEFT HEART CATH N/A 06/15/2020    Procedure: Left Heart Cath;  Surgeon: Luis Eduardo Phillips MD;  Location:  HEART CARDIAC CATH LAB    ENT SURGERY  1990    left eardrum rebuilt due to injury    EP ABLATION SVT N/A 06/22/2020    Procedure: EP Ablation SVT;  Surgeon: Francisco Javier Bynum MD;  Location:  HEART CARDIAC CATH LAB    H KIT ESSURE  2009    essrickey - Dr. Cailin Raymundo     HERNIORRHAPHY UMBILICAL  1974    IR CHEST PORT PLACEMENT > 5 YRS OF AGE  01/09/2020    IR PORT REMOVAL RIGHT  04/05/2021    mediastinoscopy  2019    SLING TRANSPUBO WITHOUT ANTERIOR COLPORRHAPHY N/A 11/21/2016    Procedure: SLING TRANSPUBO WITHOUT ANTERIOR COLPORRHAPHY;  Surgeon: Hernesto Berrios MD;  Location:  OR              Allergies:   Cold & flu [germanium], Seasonal allergies, Sudafed cold-cough [pseudoephedrine-[id number]], and Pseudoephedrine       Data:   All laboratory data reviewed:    Recent Labs   Lab Test 03/11/24  0748 05/11/23  1139 03/31/22  1012 01/14/22  1623 12/30/21  1642 09/17/21  1513 10/15/20  0805 09/17/20  1052 06/14/20  1807 12/16/19  0804   LDL 69 158* 82  --    < >  --    < >  --   --   --    HDL 40* 40* 46*  --    < >  --    < >  --   --   --    NHDL 111 225* 110  --    < >  --    < >  --   --   --    CHOL 151 265* 156  --    < >  --    < >  --   --   --    TRIG 212* 333* 140  --    < >  --    < >  --   --   --    TSH  " --  2.11  --  2.29  --   --   --   --  3.40  --    NTBNP  --   --   --   --   --  296*  --  659*  --   --    IRON  --   --   --   --   --   --   --   --   --  39   FEB  --   --   --   --   --   --   --   --   --  190*   IRONSAT  --   --   --   --   --   --   --   --   --  21   KEITH  --   --   --   --   --   --   --   --   --  885*    < > = values in this interval not displayed.       Lab Results   Component Value Date    WBC 7.8 10/04/2023    WBC 5.4 06/07/2021    RBC 4.74 10/04/2023    RBC 4.52 06/07/2021    HGB 15.2 10/04/2023    HGB 13.9 06/07/2021    HCT 44.4 10/04/2023    HCT 40.8 06/07/2021    MCV 94 10/04/2023    MCV 90 06/07/2021    MCH 32.1 10/04/2023    MCH 30.8 06/07/2021    MCHC 34.2 10/04/2023    MCHC 34.1 06/07/2021    RDW 12.4 10/04/2023    RDW 12.6 06/07/2021     10/04/2023     06/07/2021       Lab Results   Component Value Date     03/11/2024     06/07/2021    POTASSIUM 4.2 03/11/2024    POTASSIUM 3.6 09/19/2022    POTASSIUM 3.6 06/07/2021    CHLORIDE 103 03/11/2024    CHLORIDE 106 09/19/2022    CHLORIDE 106 06/07/2021    CO2 27 03/11/2024    CO2 25 09/19/2022    CO2 27 06/07/2021    ANIONGAP 11 03/11/2024    ANIONGAP 10 09/19/2022    ANIONGAP 6 06/07/2021     (H) 03/11/2024     (H) 09/19/2022    GLC 97 06/07/2021    BUN 19.8 03/11/2024    BUN 19 09/19/2022    BUN 17 06/07/2021    CR 1.05 (H) 03/11/2024    CR 1.00 06/07/2021    GFRESTIMATED 63 03/11/2024    GFRESTIMATED 65 06/07/2021    GFRESTBLACK 75 06/07/2021    AFSHAN 10.1 (H) 03/11/2024    AFSHAN 9.5 06/07/2021      Lab Results   Component Value Date    AST 33 10/04/2023    AST 26 06/07/2021    ALT 50 03/11/2024    ALT 39 06/07/2021       No results found for: \"A1C\"    Lab Results   Component Value Date    INR 1.07 06/22/2020    INR 1.18 (H) 12/01/2019         ASHLEY GORDON MD  [facility]  "

## 2024-03-29 NOTE — LETTER
3/29/2024    Mary Alice Colón PA-C  [address] [facility] [address]    RE: Kassie Aburtoter       Dear Colleague,     I had the pleasure of seeing Kassie Ramirez in the [facility].    General Cardiology Clinic Progress Note  Kassie Ramirez MRN# [id number]   YOB: 1970 Age: 54 year old       Reason for visit: Chronic systolic heart failure    History of presenting illness:    I had the opportunity to see Kassie Ramirez at [facility] today for reevaluation of nonischemic cardiomyopathy likely due to chemotherapy given for lymphoma back in 2019.    Her cardiomyopathy was diagnosed in late 2020 in early 2021 with an ejection fraction as low as 20 to 25%.  With medical therapy we have seen significant improvement left ventricular function.  Her ejection fraction increased to 45 to 50% and has remained stable since last year based on the current echocardiogram.  She has no significant valvular disease.  She seems to be doing very well without symptoms of shortness of breath, lightheadedness, or syncope.    Last year she was having a problem with some mild shortness of breath with exertion and diaphoresis.  She also had high levels of eosinophils.  This led to a diagnosis of asthma, and after treating her asthma, her diaphoresis has resolved and her breathing has improved.  She seems to be feeling very well now.    Her blood pressure today is 110/65, heart rate 89, and weight 186 pounds.  Her lungs are clear.  Heart rhythm is regular.  He has no cardiac murmurs or carotid bruits    Her cholesterol numbers are excellent with an LDL of 69 and HDL of 40.  Her creatinine is normal at 1.05 BUN 20 and potassium 4.2.  ALT is normal.  Hemoglobin is normal.              Assessment and Plan:     ASSESSMENT:    Ms. Sadia Ramirez is a 54-year-old woman with nonischemic cardiomyopathy, probably due to chemotherapy used to treat lymphoma.  Her ejection fraction has  improved significantly from 20 to 25%, now up to 45 to 50%.  She has no ongoing symptoms of congestive heart failure.  She was recently diagnosed with asthma and after treatment of that condition, she has been feeling much better with less sweating and less shortness of breath.  Her medication program looks good.  She has been taking the Lasix only as needed and has not needed it for quite a long time.  I will make that change in the notes.  Otherwise we will continue the same medication program and plan on seeing her back again in 1 year for reevaluation.    Trevon Pearl MD           Orders this Visit:  Orders Placed This Encounter   Procedures    Basic metabolic panel    CBC with platelets    Lipid Profile    Follow-Up with Cardiology    Echocardiogram Complete     Orders Placed This Encounter   Medications    carvedilol (COREG) 6.25 MG tablet     Sig: Take 1 tablet (6.25 mg) by mouth 2 times daily (with meals)     Dispense:  180 tablet     Refill:  3    furosemide (LASIX) 20 MG tablet     Sig: Take 1 tablet (20 mg) by mouth daily as needed (shortness of breath, swelling)     Dispense:  90 tablet     Refill:  3    lisinopril (ZESTRIL) 5 MG tablet     Sig: Take 1 tablet (5 mg) by mouth at bedtime     Dispense:  90 tablet     Refill:  3    rosuvastatin (CRESTOR) 20 MG tablet     Sig: Take 1 tablet (20 mg) by mouth daily     Dispense:  90 tablet     Refill:  3    spironolactone (ALDACTONE) 25 MG tablet     Sig: Take 1 tablet (25 mg) by mouth daily     Dispense:  90 tablet     Refill:  3     Medications Discontinued During This Encounter   Medication Reason    rosuvastatin (CRESTOR) 20 MG tablet Reorder (No AVS)    spironolactone (ALDACTONE) 25 MG tablet Reorder (No AVS)    lisinopril (ZESTRIL) 5 MG tablet Reorder (No AVS)    carvedilol (COREG) 6.25 MG tablet Reorder (No AVS)    furosemide (LASIX) 20 MG tablet Reorder (No AVS)       Today's clinic visit entailed:    30 minutes spent by me on the date of the encounter  "doing chart review, history and exam, documentation and further activities per the note  Provider  Link to [facility] Help Grid     The level of medical decision making during this visit was of high complexity.           Review of Systems:     Review of Systems:  Skin:  Negative     Eyes:  Positive for contacts;glasses  ENT:  Negative    Respiratory:  Negative    Cardiovascular:  Negative    Gastroenterology: Negative    Genitourinary:  Negative    Musculoskeletal:  Positive for nocturnal cramping  Neurologic:  Negative    Psychiatric:  Negative    Heme/Lymph/Imm:  Negative    Endocrine:  Positive for night sweats            Physical Exam:     Vitals: /65   Pulse 89   Ht 1.676 m (5' 6\")   Wt 84.6 kg (186 lb 9.6 oz)   LMP 11/06/2019   SpO2 94%   BMI 30.12 kg/m    Constitutional: Well nourished and in no apparent distress.  Eyes: Pupils equal, round. Sclerae anicteric.   HEENT: Normocephalic, atraumatic.   Neck: Supple. JVD   Respiratory: Breathing non-labored. Lungs clear to auscultation bilaterally. No crackles, wheezes, rhonchi, or rales.  Cardiovascular:  Regular rate and rhythm, normal S1 and S2. No murmur, rub, or gallop.  Skin: Warm, dry. No rashes, cyanosis, or xanthelasma.  Extremities: No edema.  Neurologic: No gross motor deficits. Alert, awake, and oriented to person, place and time.  Psychiatric: Affect appropriate.             Medications:     Current Outpatient Medications   Medication Sig Dispense Refill    alpha-lipoic acid 600 MG capsule Take 1 capsule (600 mg) by mouth daily 30 capsule 0    budesonide-formoterol (SYMBICORT) 160-4.5 MCG/ACT Inhaler Inhale 2 puffs into the lungs 2 times daily 10.2 g 11    carvedilol (COREG) 6.25 MG tablet Take 1 tablet (6.25 mg) by mouth 2 times daily (with meals) 180 tablet 3    cetirizine (ZYRTEC) 10 MG tablet Take 10 mg by mouth daily      empagliflozin (JARDIANCE) 10 MG TABS tablet Take 1 tablet (10 mg) by mouth daily 90 tablet 3    furosemide (LASIX) 20 MG " tablet Take 1 tablet (20 mg) by mouth daily as needed (shortness of breath, swelling) 90 tablet 3    lisinopril (ZESTRIL) 5 MG tablet Take 1 tablet (5 mg) by mouth at bedtime 90 tablet 3    LORazepam (ATIVAN) 0.5 MG tablet TAKE 1 TABLET BY MOUTH AT  BEDTIME FOR SLEEP AND ANXIETY 30 tablet 0    rosuvastatin (CRESTOR) 20 MG tablet Take 1 tablet (20 mg) by mouth daily 90 tablet 3    sertraline (ZOLOFT) 50 MG tablet Take 1 tablet (50 mg) by mouth daily 90 tablet 3    spironolactone (ALDACTONE) 25 MG tablet Take 1 tablet (25 mg) by mouth daily 90 tablet 3    albuterol (PROAIR HFA/PROVENTIL HFA/VENTOLIN HFA) 108 (90 Base) MCG/ACT inhaler Inhale 2 puffs into the lungs every 6 hours as needed for shortness of breath, wheezing or cough 18 g 4       Family History   Problem Relation Age of Onset    Hypertension Mother          Lung Cancer     Depression Mother          Lung Cancer     Lipids Mother     Cardiovascular Mother     Circulatory Mother     Diabetes Mother          Lung Cancer 2013    Heart Disease Mother     Cerebrovascular Disease Mother          Lung Cancer     Obesity Mother          Lung Cancer 2013    C.A.D. Mother     Lung Cancer Mother         smoker    Macular Degeneration Father         [place]t    Genetic Disorder Father         eye disease    Heart Disease Maternal Grandfather     Macular Degeneration Sister         Stargardt    Hyperlipidemia Sister         high cholesterol      Diabetes Maternal Aunt         type 2    Hypertension Maternal Aunt     LUNG DISEASE No family hx of     Colon Cancer No family hx of        Social History     Socioeconomic History    Marital status:      Spouse name: Florian    Number of children: 2    Years of education: 16     Highest education level: Not on file   Occupational History    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      Comment: 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      Comment: BA in Education     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 No     Service Not Asked    Blood Transfusions Not Asked    Caffeine Concern Yes     Comment: MOnster energy drink.   Te - 3 cups.       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      Social Determinants of Health     Financial Resource Strain: Not on file   Food Insecurity: Not on file   Transportation Needs: Not on file   Physical Activity: Not on file   Stress: Not on file   Social Connections: Not on file   Interpersonal Safety: Not At Risk (11/18/2022)    Humiliation, Afraid, Rape, and Kick questionnaire     Fear of Current or Ex-Partner: No     Emotionally Abused: No     Physically Abused: No     Sexually Abused: No   Housing Stability: Not on file            Past Medical History:     Past Medical History:   Diagnosis Date    Anxiety attack 09/16/2014    Cardiomyopathy (H)     non ishemic - 25-30% - Chemo related    Congestive heart failure (H) 02/2019    While in hospital for high dose Methotrexate    Depressive disorder 2003    taking Celexa since 2014    Diffuse large B-cell lymphoma (H)     Diagnosed 11/2019, Ronan Albarran    Encounter for Essure implantation 2009    Generalized anxiety disorder 09/16/2014     zoloft = flat emotions    HFrEF (heart failure with reduced ejection fraction) (H)     new diagnosis 6/14    History of blood transfusion 12/2019    Given many throughout cancer treatment    Menopausal disorder     started on OCPs by menopause center 3/2017 (takes active continuously)    Menstrual headache     helped by OCPs and magnesium    Paroxysmal SVT (supraventricular tachycardia) 06/2020    GERTRUDE (stress urinary incontinence, female)     sling procedure 2016              Past Surgical History:     Past Surgical History:   Procedure Laterality Date    COLONOSCOPY N/A 7/27/2023    Procedure: Colonoscopy;  Surgeon: Juan Marquez MD;  Location:  GI    CV CORONARY ANGIOGRAM N/A 06/15/2020    Procedure: Coronary Angiogram;  Surgeon: Luis Eduardo Phillips MD;  Location:  HEART CARDIAC CATH LAB    CV LEFT HEART CATH N/A 06/15/2020    Procedure: Left Heart Cath;  Surgeon: Luis Eduardo Phillips MD;  Location:  HEART CARDIAC CATH LAB    ENT SURGERY  1990    left eardrum rebuilt due to injury    EP ABLATION SVT N/A 06/22/2020    Procedure: EP Ablation SVT;  Surgeon: Francisco Javier Bynum MD;  Location:  HEART CARDIAC CATH LAB    H KIT ESSURE  2009    essrickey - Dr. Cailin Raymundo     HERNIORRHAPHY UMBILICAL  1974    IR CHEST PORT PLACEMENT > 5 YRS OF AGE  01/09/2020    IR PORT REMOVAL RIGHT  04/05/2021    mediastinoscopy  2019    SLING TRANSPUBO WITHOUT ANTERIOR COLPORRHAPHY N/A 11/21/2016    Procedure: SLING TRANSPUBO WITHOUT ANTERIOR COLPORRHAPHY;  Surgeon: Hernesto Berrios MD;  Location: [facility] OR              Allergies:   Cold & flu [germanium], Seasonal allergies, Sudafed cold-cough [pseudoephedrine-[id number]], and Pseudoephedrine       Data:   All laboratory data reviewed:    Recent Labs   Lab Test 03/11/24  0748 05/11/23  1139 03/31/22  1012 01/14/22  1623 12/30/21  1642 09/17/21  1513 10/15/20  0805 09/17/20  1052 06/14/20  1807 12/16/19  0804   LDL 69 158* 82  --    < >  --    < >  --   --   --    HDL 40* 40*  "46*  --    < >  --    < >  --   --   --    NHDL 111 225* 110  --    < >  --    < >  --   --   --    CHOL 151 265* 156  --    < >  --    < >  --   --   --    TRIG 212* 333* 140  --    < >  --    < >  --   --   --    TSH  --  2.11  --  2.29  --   --   --   --  3.40  --    NTBNP  --   --   --   --   --  296*  --  659*  --   --    IRON  --   --   --   --   --   --   --   --   --  39   FEB  --   --   --   --   --   --   --   --   --  190*   IRONSAT  --   --   --   --   --   --   --   --   --  21   KEITH  --   --   --   --   --   --   --   --   --  885*    < > = values in this interval not displayed.       Lab Results   Component Value Date    WBC 7.8 10/04/2023    WBC 5.4 06/07/2021    RBC 4.74 10/04/2023    RBC 4.52 06/07/2021    HGB 15.2 10/04/2023    HGB 13.9 06/07/2021    HCT 44.4 10/04/2023    HCT 40.8 06/07/2021    MCV 94 10/04/2023    MCV 90 06/07/2021    MCH 32.1 10/04/2023    MCH 30.8 06/07/2021    MCHC 34.2 10/04/2023    MCHC 34.1 06/07/2021    RDW 12.4 10/04/2023    RDW 12.6 06/07/2021     10/04/2023     06/07/2021       Lab Results   Component Value Date     03/11/2024     06/07/2021    POTASSIUM 4.2 03/11/2024    POTASSIUM 3.6 09/19/2022    POTASSIUM 3.6 06/07/2021    CHLORIDE 103 03/11/2024    CHLORIDE 106 09/19/2022    CHLORIDE 106 06/07/2021    CO2 27 03/11/2024    CO2 25 09/19/2022    CO2 27 06/07/2021    ANIONGAP 11 03/11/2024    ANIONGAP 10 09/19/2022    ANIONGAP 6 06/07/2021     (H) 03/11/2024     (H) 09/19/2022    GLC 97 06/07/2021    BUN 19.8 03/11/2024    BUN 19 09/19/2022    BUN 17 06/07/2021    CR 1.05 (H) 03/11/2024    CR 1.00 06/07/2021    GFRESTIMATED 63 03/11/2024    GFRESTIMATED 65 06/07/2021    GFRESTBLACK 75 06/07/2021    AFSHAN 10.1 (H) 03/11/2024    AFSHAN 9.5 06/07/2021      Lab Results   Component Value Date    AST 33 10/04/2023    AST 26 06/07/2021    ALT 50 03/11/2024    ALT 39 06/07/2021       No results found for: \"A1C\"    Lab Results   Component " Value Date    INR 1.07 06/22/2020    INR 1.18 (H) 12/01/2019         TREVON PEARL MD  [facility]    Thank you for allowing me to participate in the care of your patient.      Sincerely,     TREVON PEARL MD     [facility]  cc:   Trevon Pearl MD  [address] [place] [id number]

## 2024-03-29 NOTE — PATIENT INSTRUCTIONS
It was a pleasure seeing you today and thank you for allowing me to be a part of your health care team.  Should you have any questions regarding your visit or future needs please feel free to reach out to my care team for assistance.      Thank you, Dr. Trevon Pearl        **Nursing: [phone]       **Scheduling: [phone]

## 2024-03-30 ENCOUNTER — OFFICE VISIT (OUTPATIENT)
Dept: URGENT CARE | Facility: URGENT CARE | Age: 54
End: 2024-03-30
Payer: COMMERCIAL

## 2024-03-30 VITALS
DIASTOLIC BLOOD PRESSURE: 78 MMHG | HEART RATE: 80 BPM | WEIGHT: 185.5 LBS | SYSTOLIC BLOOD PRESSURE: 113 MMHG | BODY MASS INDEX: 29.94 KG/M2 | TEMPERATURE: 98.3 F | RESPIRATION RATE: 16 BRPM | OXYGEN SATURATION: 96 %

## 2024-03-30 DIAGNOSIS — B02.9 HERPES ZOSTER WITHOUT COMPLICATION: Primary | ICD-10-CM

## 2024-03-30 PROCEDURE — 99213 OFFICE O/P EST LOW 20 MIN: CPT | Performed by: FAMILY MEDICINE

## 2024-03-30 RX ORDER — ACYCLOVIR 800 MG/1
800 TABLET ORAL
Qty: 35 TABLET | Refills: 0 | Status: SHIPPED | OUTPATIENT
Start: 2024-03-30 | End: 2024-04-12

## 2024-03-30 NOTE — PATIENT INSTRUCTIONS
Aloe Vera cream may help minor local pain.  Complete course of antiviral meds    Becki Iglesias MD

## 2024-03-30 NOTE — PROGRESS NOTES
Chief Complaint   Patient presents with    Derm Problem     53 yo F presents with the following painful rash lower right side onset 4days seems to be spreading shingles        aKssie was seen today for derm problem.    Diagnoses and all orders for this visit:    Herpes zoster without complication  -     acyclovir (ZOVIRAX) 800 MG tablet; Take 1 tablet (800 mg) by mouth 5 times daily for 7 days      ASSESSMENT:  Herpes Zoster (shingles)    PLAN:  The nature of herpes zoster is explained carefully. Lesions should be compressed/soaked with saline, topical antibiotic ointment to any infected lesions; Aloe Vera cream may help minor local pain. Intervention with antiviral agents is extremely helpful early in the course of the disease, less helpful after 2-3 days of symptoms. Postherpetic neuralgia is explained; this may occur especially in the elderly despite every attempt at prevention. Prescription for acyclovir (Zovirax), which may shorten the course of acute symptoms and reduce the incidence of later neuralgia. The patient understands these issues, and will call as needed for further care.      SUBJECTIVE:   The patient has a 4 day history of a painful rash on the right waist and and right anterior chest . PMH: generally healthy. Has not had herpes zoster in the past.    OBJECTIVE:  Vital signs are normal, she appears well. Typical zoster lesions noted; vesicles on erythematous bases in clusters

## 2024-04-05 ENCOUNTER — HOSPITAL ENCOUNTER (OUTPATIENT)
Dept: CT IMAGING | Facility: CLINIC | Age: 54
Discharge: HOME | End: 2024-04-05
Attending: INTERNAL MEDICINE | Admitting: INTERNAL MEDICINE
Payer: COMMERCIAL

## 2024-04-05 ENCOUNTER — LAB (OUTPATIENT)
Dept: INFUSION THERAPY | Facility: CLINIC | Age: 54
End: 2024-04-05
Attending: INTERNAL MEDICINE
Payer: COMMERCIAL

## 2024-04-05 DIAGNOSIS — R61 NIGHT SWEATS: ICD-10-CM

## 2024-04-05 DIAGNOSIS — C83.32 DIFFUSE LARGE B-CELL LYMPHOMA OF INTRATHORACIC LYMPH NODES (H): ICD-10-CM

## 2024-04-05 DIAGNOSIS — J90 PLEURAL EFFUSION: ICD-10-CM

## 2024-04-05 DIAGNOSIS — R06.02 SOB (SHORTNESS OF BREATH): ICD-10-CM

## 2024-04-05 DIAGNOSIS — R53.82 CHRONIC FATIGUE: ICD-10-CM

## 2024-04-05 LAB
ALBUMIN SERPL BCG-MCNC: 4.8 G/DL (ref 3.5–5.2)
ALP SERPL-CCNC: 85 U/L (ref 40–150)
ALT SERPL W P-5'-P-CCNC: 42 U/L (ref 0–50)
ANION GAP SERPL CALCULATED.3IONS-SCNC: 14 MMOL/L (ref 7–15)
AST SERPL W P-5'-P-CCNC: 34 U/L (ref 0–45)
BASOPHILS # BLD AUTO: ABNORMAL 10*3/UL
BASOPHILS NFR BLD AUTO: ABNORMAL %
BILIRUB SERPL-MCNC: 0.8 MG/DL
BUN SERPL-MCNC: 17.4 MG/DL (ref 6–20)
CALCIUM SERPL-MCNC: 9.9 MG/DL (ref 8.6–10)
CHLORIDE SERPL-SCNC: 103 MMOL/L (ref 98–107)
CREAT SERPL-MCNC: 0.97 MG/DL (ref 0.51–0.95)
DEPRECATED HCO3 PLAS-SCNC: 23 MMOL/L (ref 22–29)
EGFRCR SERPLBLD CKD-EPI 2021: 69 ML/MIN/1.73M2
EOSINOPHIL # BLD AUTO: ABNORMAL 10*3/UL
EOSINOPHIL NFR BLD AUTO: ABNORMAL %
ERYTHROCYTE [DISTWIDTH] IN BLOOD BY AUTOMATED COUNT: 12.6 % (ref 10–15)
GLUCOSE SERPL-MCNC: 109 MG/DL (ref 70–99)
HCT VFR BLD AUTO: 45.2 % (ref 35–47)
HGB BLD-MCNC: 15.9 G/DL (ref 11.7–15.7)
IMM GRANULOCYTES # BLD: ABNORMAL 10*3/UL
IMM GRANULOCYTES NFR BLD: ABNORMAL %
LYMPHOCYTES # BLD AUTO: ABNORMAL 10*3/UL
LYMPHOCYTES NFR BLD AUTO: ABNORMAL %
MCH RBC QN AUTO: 31.4 PG (ref 26.5–33)
MCHC RBC AUTO-ENTMCNC: 35.2 G/DL (ref 31.5–36.5)
MCV RBC AUTO: 89 FL (ref 78–100)
MONOCYTES # BLD AUTO: ABNORMAL 10*3/UL
MONOCYTES NFR BLD AUTO: ABNORMAL %
NEUTROPHILS # BLD AUTO: ABNORMAL 10*3/UL
NEUTROPHILS NFR BLD AUTO: ABNORMAL %
NRBC # BLD AUTO: 0 10E3/UL
NRBC BLD AUTO-RTO: 0 /100
PLATELET # BLD AUTO: 181 10E3/UL (ref 150–450)
POTASSIUM SERPL-SCNC: 4.3 MMOL/L (ref 3.4–5.3)
PROT SERPL-MCNC: 6.7 G/DL (ref 6.4–8.3)
RBC # BLD AUTO: 5.06 10E6/UL (ref 3.8–5.2)
SODIUM SERPL-SCNC: 140 MMOL/L (ref 135–145)
WBC # BLD AUTO: 9.2 10E3/UL (ref 4–11)

## 2024-04-05 PROCEDURE — 85027 COMPLETE CBC AUTOMATED: CPT | Performed by: INTERNAL MEDICINE

## 2024-04-05 PROCEDURE — 36415 COLL VENOUS BLD VENIPUNCTURE: CPT

## 2024-04-05 PROCEDURE — 250N000009 HC RX 250: Performed by: INTERNAL MEDICINE

## 2024-04-05 PROCEDURE — 71260 CT THORAX DX C+: CPT

## 2024-04-05 PROCEDURE — 250N000011 HC RX IP 250 OP 636: Performed by: INTERNAL MEDICINE

## 2024-04-05 PROCEDURE — 80053 COMPREHEN METABOLIC PANEL: CPT | Performed by: INTERNAL MEDICINE

## 2024-04-05 PROCEDURE — 85007 BL SMEAR W/DIFF WBC COUNT: CPT | Performed by: INTERNAL MEDICINE

## 2024-04-05 RX ORDER — IOPAMIDOL 755 MG/ML
500 INJECTION, SOLUTION INTRAVASCULAR ONCE
Status: COMPLETED | OUTPATIENT
Start: 2024-04-05 | End: 2024-04-05

## 2024-04-05 RX ADMIN — SODIUM CHLORIDE 64 ML: 9 INJECTION, SOLUTION INTRAVENOUS at 09:04

## 2024-04-05 RX ADMIN — IOPAMIDOL 91 ML: 755 INJECTION, SOLUTION INTRAVENOUS at 09:04

## 2024-04-05 NOTE — PROGRESS NOTES
Nursing Note:  Kassie Ramirez presents today for labs and IV start for CT scan.    Patient seen by provider today: No   present during visit today: Not Applicable.    Note: Received call from lab that the LDH specimen hemolyzed so they can't run it.  Notified Dr. Castro and Fareed Jefferson RNCC to see if it should be drawn on 4/12 at follow up appointment with Dr. Castro.    Intravenous Access:  Labs drawn without difficulty.  Peripheral IV placed.    Discharge Plan:   Patient was sent to radiology for CT scan appointment.    Flores Cheatham RN

## 2024-04-08 LAB
BASOPHILS # BLD MANUAL: 0 10E3/UL (ref 0–0.2)
BASOPHILS NFR BLD MANUAL: 0 %
EOSINOPHIL # BLD MANUAL: 2.8 10E3/UL (ref 0–0.7)
EOSINOPHIL NFR BLD MANUAL: 30 %
LYMPHOCYTES # BLD MANUAL: 2.5 10E3/UL (ref 0.8–5.3)
LYMPHOCYTES NFR BLD MANUAL: 27 %
MONOCYTES # BLD MANUAL: 0.6 10E3/UL (ref 0–1.3)
MONOCYTES NFR BLD MANUAL: 7 %
NEUTROPHILS # BLD MANUAL: 3.3 10E3/UL (ref 1.6–8.3)
NEUTROPHILS NFR BLD MANUAL: 36 %
PATH REV: ABNORMAL
PLAT MORPH BLD: ABNORMAL
RBC MORPH BLD: ABNORMAL

## 2024-04-12 ENCOUNTER — ONCOLOGY VISIT (OUTPATIENT)
Dept: ONCOLOGY | Facility: CLINIC | Age: 54
End: 2024-04-12
Attending: INTERNAL MEDICINE
Payer: COMMERCIAL

## 2024-04-12 VITALS
WEIGHT: 182.4 LBS | HEART RATE: 84 BPM | RESPIRATION RATE: 16 BRPM | BODY MASS INDEX: 29.44 KG/M2 | DIASTOLIC BLOOD PRESSURE: 82 MMHG | OXYGEN SATURATION: 98 % | SYSTOLIC BLOOD PRESSURE: 124 MMHG | TEMPERATURE: 98.1 F

## 2024-04-12 DIAGNOSIS — C83.32 DIFFUSE LARGE B-CELL LYMPHOMA OF INTRATHORACIC LYMPH NODES (H): Primary | ICD-10-CM

## 2024-04-12 DIAGNOSIS — R53.82 CHRONIC FATIGUE: ICD-10-CM

## 2024-04-12 PROCEDURE — 99215 OFFICE O/P EST HI 40 MIN: CPT | Performed by: INTERNAL MEDICINE

## 2024-04-12 PROCEDURE — 99214 OFFICE O/P EST MOD 30 MIN: CPT | Performed by: INTERNAL MEDICINE

## 2024-04-12 ASSESSMENT — PAIN SCALES - GENERAL: PAINLEVEL: NO PAIN (0)

## 2024-04-12 NOTE — NURSING NOTE
"Oncology Rooming Note    April 12, 2024 9:36 AM   Kassie Ramirez is a 54 year old female who presents for:    Chief Complaint   Patient presents with    Oncology Clinic Visit     Initial Vitals: /82   Pulse 84   Temp 98.1  F (36.7  C) (Tympanic)   Resp 16   Wt 82.7 kg (182 lb 6.4 oz)   LMP 11/06/2019   SpO2 98%   BMI 29.44 kg/m   Estimated body mass index is 29.44 kg/m  as calculated from the following:    Height as of 3/29/24: 1.676 m (5' 6\").    Weight as of this encounter: 82.7 kg (182 lb 6.4 oz). Body surface area is 1.96 meters squared.  No Pain (0) Comment: Data Unavailable   Patient's last menstrual period was 11/06/2019.  Allergies reviewed: Yes  Medications reviewed: Yes    Medications: Medication refills not needed today.  Pharmacy name entered into [facility]:    [facility] - [place], [place] - [address] [place] [address] DRUG STORE #[id number] [facility] [place] [address] [address] [address] [facility] [address] [facility] [place], [place] - [address] [facility] - [place], [place] - [address]  [facility] - [place] [id number]    Frailty Screening:   Is the patient here for a new oncology consult visit in cancer care? 2. No      Clinical concerns: f/u       Lynne Webster CMA              "

## 2024-04-12 NOTE — LETTER
4/12/2024         RE: Kassie Ramirez  [address]  [facility] [address]        Dear Colleague,    Thank you for referring your patient, Kassie Ramirez, to the [facility]. Please see a copy of my visit note below.    [facility]  HEMATOLOGY AND ONCOLOGY    FOLLOW-UP VISIT NOTE    PATIENT NAME: Kassie Ramirez MRN # [id number]  DATE OF VISIT: Apr 12, 2024 YOB: 1970    REFERRING PROVIDER: No referring provider defined for this encounter.    CANCER TYPE: DLBCL, EBV+ non-GCB, stage III.  STAGE: III    TREATMENT SUMMARY:  She presented with shortness of breath and cough which had been present since May 2019. Her imaging suggested pulmonary infiltrates which was attributed to aspiration pneumonia. CT scan of the chest 8/20/2019 showed left hilar and subcarinal mediastinal adenopathy with narrowing of the left lower lobe bronchus and a nonspecific patchy area of nodular consolidation in the medial right lower lobe.  Bronchoscopy with EBUS 8/28/2019 showed nonnecrotizing granulomatous inflammation with prominent eosinophilia, negative for malignancy.  She was diagnosed with sarcoidosis and started on prednisone. She had worsening cough and shortness of breath with tapering of the prednisone.  Repeat CT scan of the chest 11/18/2019 showed interval worsening of the bulky mediastinal and bilateral hilar lymphadenopathy, near complete resolution of the lower lobe opacities, with new interstitial opacities in the right upper lobe.   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 Large mediastinal mass causing respiratory compromise. . IPI score of 2 (low-intermediate). FISH neg for high risk translocations. She received 6 cycles of R-CHOP with intermediate dose methotrexate after cycles 2, 4 and 6 from November through April  2020.      CURRENT INTERVENTIONS:  Surveillance post MR-CHOP    SUBJECTIVE   Kassie Ramirez is being followed for DLBCL, EBV+ non-GCB, stage III intermediate risk disease    Patient was followed in person. She has completed all of her planned cycles of MR-CHOP chemotherapy and is being seen with labs and restaging scans.     She had not been doing well since completion of her therapy for lymphoma.  She had struggled with cardiomyopathy post adriamycin.     She is still not recovered back to baseline from prior to start of therapy. She continues to have fatigue, cough and shortness of breath.  However these have stabilized and she is doing better at this visit.    Her peripheral neuropathy is better now.  Her medications have been adjusted.      PAST MEDICAL HISTORY     Past Medical History:   Diagnosis Date     Anxiety attack 09/16/2014     Cardiomyopathy (H)     non ishemic - 25-30% - Chemo related     Congestive heart failure (H) 02/2019    While in hospital for high dose Methotrexate     Depressive disorder 2003    taking Celexa since 2014     Diffuse large B-cell lymphoma (H)     Diagnosed 11/2019, [facility]     Encounter for Essure implantation 2009     Generalized anxiety disorder 09/16/2014    zoloft = flat emotions     HFrEF (heart failure with reduced ejection fraction) (H)     new diagnosis 6/14     History of blood transfusion 12/2019    Given many throughout cancer treatment     Menopausal disorder     started on OCPs by menopause center 3/2017 (takes active continuously)     Menstrual headache     helped by OCPs and magnesium     Paroxysmal SVT (supraventricular tachycardia) (H24) 06/2020     GERTRUDE (stress urinary incontinence, female)     sling procedure 2016         CURRENT OUTPATIENT MEDICATIONS     Current Outpatient Medications   Medication Sig Dispense Refill     alpha-lipoic acid 600 MG capsule Take 1 capsule (600 mg) by mouth daily 30 capsule 0     budesonide-formoterol (SYMBICORT)  160-4.5 MCG/ACT Inhaler Inhale 2 puffs into the lungs 2 times daily 10.2 g 11     carvedilol (COREG) 6.25 MG tablet Take 1 tablet (6.25 mg) by mouth 2 times daily (with meals) 180 tablet 3     cetirizine (ZYRTEC) 10 MG tablet Take 10 mg by mouth daily       lisinopril (ZESTRIL) 5 MG tablet Take 1 tablet (5 mg) by mouth at bedtime 90 tablet 3     LORazepam (ATIVAN) 0.5 MG tablet TAKE 1 TABLET BY MOUTH AT  BEDTIME FOR SLEEP AND ANXIETY 30 tablet 0     rosuvastatin (CRESTOR) 20 MG tablet Take 1 tablet (20 mg) by mouth daily 90 tablet 3     sertraline (ZOLOFT) 50 MG tablet Take 1 tablet (50 mg) by mouth daily 90 tablet 3     spironolactone (ALDACTONE) 25 MG tablet Take 1 tablet (25 mg) by mouth daily 90 tablet 3     albuterol (PROAIR HFA/PROVENTIL HFA/VENTOLIN HFA) 108 (90 Base) MCG/ACT inhaler Inhale 2 puffs into the lungs every 6 hours as needed for shortness of breath, wheezing or cough 18 g 4     empagliflozin (JARDIANCE) 10 MG TABS tablet Take 1 tablet (10 mg) by mouth daily 90 tablet 3     furosemide (LASIX) 20 MG tablet Take 1 tablet (20 mg) by mouth daily as needed (shortness of breath, swelling) 90 tablet 3     No current facility-administered medications for this visit.        ALLERGIES      Allergies   Allergen Reactions     Cold & Flu [Germanium]      See pseudoephedrine     Seasonal Allergies      Sudafed Cold-Cough [Pseudoephedrine-Dm-Gg-Apap]      Pseudoephedrine Rash     Rash then skins peels off         REVIEW OF SYSTEMS   As above in the HPI, o/w complete 12-point ROS was negative.     PHYSICAL EXAM   /82   Pulse 84   Temp 98.1  F (36.7  C) (Tympanic)   Resp 16   Wt 82.7 kg (182 lb 6.4 oz)   LMP 11/06/2019   SpO2 98%   BMI 29.44 kg/m    Limited physical exam during video visit due to COVID19 restrictions  Middle aged female in no acute distress  Breathing comfortably, no tachypnea  Speech and hearing normal  Pleasant mood and congruent affect  Moving all extremities, no focal neurologic  "deficits apparent       LABORATORY AND IMAGING STUDIES     Recent Labs   Lab Test 04/05/24  0829 03/11/24  0748 10/04/23  1532 09/11/23  1139 07/12/23  1012    141 140 141 140   POTASSIUM 4.3 4.2 4.0 4.3 4.1   CHLORIDE 103 103 103 103 101   CO2 23 27 26 23 26   ANIONGAP 14 11 11 15 13   BUN 17.4 19.8 20.3* 19.5 20.5*   CR 0.97* 1.05* 0.98* 1.03* 1.10*   * 106* 91 81 98   AFSHAN 9.9 10.1* 10.0 10.4* 10.3*     Recent Labs   Lab Test 09/11/23  1139 06/19/20  1853 06/15/20  0845 06/14/20  1807 01/11/20  0615 12/01/19  1340 12/01/19  0641 11/30/19  2137 11/28/19  0537 11/27/19  2216   MAG  --  2.5* 2.4* 2.1 2.0  --   --   --   --  2.1   PHOS 3.9  --   --   --  3.1 2.8 3.4 2.8   < > 3.1    < > = values in this interval not displayed.     Recent Labs   Lab Test 04/05/24  0829 10/04/23  1532 04/16/23  1402 04/10/23  0810 12/20/22  0809 11/14/22  0817   WBC 9.2 7.8 8.3 11.7*  --  8.2   HGB 15.9* 15.2 15.0 17.9* 15.0 15.5    158 173 243  --  178   MCV 89 94 92 91  --  94   NEUTROPHIL 36 38 40 38  --  38     Recent Labs   Lab Test 04/05/24  0829 03/11/24  0748 10/04/23  1532 07/12/23  1012 04/10/23  0810 11/14/22  0817 07/12/22  1223 03/31/22  1014   BILITOTAL 0.8  --  0.8 0.6   < > 0.7 1.1 0.7   ALKPHOS 85  --  80 76   < > 89 79 78   ALT 42 50 44 43   < > 40* 46 51*   AST 34  --  33 30   < > 25 22 30   ALBUMIN 4.8  --  5.0 5.1   < > 4.8 4.7 4.5   LDH  --   --   --   --   --  229 215 223    < > = values in this interval not displayed.     TSH   Date Value Ref Range Status   05/11/2023 2.11 0.30 - 4.20 uIU/mL Final   01/14/2022 2.29 0.40 - 4.00 mU/L Final   06/14/2020 3.40 0.40 - 4.00 mU/L Final   09/18/2019 2.06 0.40 - 4.00 mU/L Final   07/27/2018 2.04 0.40 - 4.00 mU/L Final     No results for input(s): \"CEA\" in the last 20300 hours.  Results for orders placed or performed during the hospital encounter of 04/05/24   CT Chest/Abdomen/Pelvis w Contrast    Narrative    CT CHEST/ABDOMEN/PELVIS WITH CONTRAST " 4/5/2024 9:15 AM    CLINICAL HISTORY: DLBCL, EBV+ non-GCB, stage III post 6 cycles of  M-RCHOP post treatment completion in April 2020 being followed for  surveillance. Diffuse large B-cell lymphoma of intrathoracic lymph  nodes (H). Chronic fatigue. Night sweats. Pleural effusion. SOB  (shortness of breath).    TECHNIQUE: CT scan of the chest, abdomen, and pelvis was performed  following injection of IV contrast. Multiplanar reformats were  obtained. Dose reduction techniques were used.   CONTRAST: 91mL Isovue-370    COMPARISON: October 4, 2023    FINDINGS:   LUNGS AND PLEURA: Benign granulomatous change. Stable 6 mm nodule in  the superior segment of the right lower lobe image 101 series 12. No  infiltrates. Trace pleural fluid similar to previous.    MEDIASTINUM/AXILLAE: No lymphadenopathy. No thoracic aortic aneurysms.    CORONARY ARTERY CALCIFICATIONS: None.    HEPATOBILIARY: No significant mass or bile duct dilatation. No  calcified gallstones. Stable low dense lesions too small to  characterize.    PANCREAS: No significant mass, duct dilatation, or inflammatory  change.    SPLEEN: Enlarged at 14.5 cm, not significantly changed from previous.    ADRENAL GLANDS: No significant nodules.    KIDNEYS/BLADDER: No significant mass, stones, or hydronephrosis.    BOWEL: No obstruction or inflammatory change.    PELVIC ORGANS: No pelvic masses.    ADDITIONAL FINDINGS: No abdominopelvic adenopathy. Normal caliber  aorta. No free fluid.    MUSCULOSKELETAL: No frankly destructive bony lesions.      Impression    IMPRESSION:  1.  No significant change in splenomegaly.  2.  No adenopathy or new lesions.     ROBYN PLATT MD         SYSTEM ID:  [id number]        ASSESSMENT AND PLAN   DLBCL, EBV+ non-GCB, stage III post 6 cycles of M-RCHOP  PJV pneumonia causing acute respiratory failure improved on bactrim and prednisone  Cardiomyopathy post adriamycin based chemotherapy likely also contributed by tachycardia    Kassie was  seen in person visit and was accompanied by her , Florian. She has completed her planned chemotherapy in April 2020.     She has been struggling with shortness of breath/dyspnea on exertion, cough and has been diagnosed with asthma.  She has been started on inhalers including Symbicort and albuterol.  She is already doing better.  She has borderline elevated eosinophils that would be consistent with allergy or possibly hyperactive airway disease.    I have reviewed all of the labs done prior to this clinic visit.  I have reviewed all of the labs done prior to this clinic visit.  Labs are all completely normal including electrolytes, renal function, hepatic panel, complete blood count and differential except for borderline elevation in her creatinine, eosinophils and hemoglobin.  These are only marginally elevated and not clinically significant.    I have reviewed actual images from her CT scan and there is no evidence of recurrent disease in the chest, abdomen or pelvis. She had enlarged hilar nodes and mediastinal mass, likely from reactive thymic tissue.  This has remained stable on the current imaging study.     All questions for patient  were answered.  She is 4 years out from treatment completion.   I would like to continue follow ups every 6 months until 5 years out.  She would have to more follow-up in oncology in the absence of new findings or concerns.        45 minutes spent on the date of the encounter doing chart review, history and exam, documentation and further activities as noted above     Castro Castro    Hematologist and Medical Oncologist  [facility]       Again, thank you for allowing me to participate in the care of your patient.        Sincerely,        Castro Castro MD

## 2024-04-12 NOTE — PROGRESS NOTES
[facility]  HEMATOLOGY AND ONCOLOGY    FOLLOW-UP VISIT NOTE    PATIENT NAME: Kassie Ramirez MRN # [id number]  DATE OF VISIT: Apr 12, 2024 YOB: 1970    REFERRING PROVIDER: No referring provider defined for this encounter.    CANCER TYPE: DLBCL, EBV+ non-GCB, stage III.  STAGE: III    TREATMENT SUMMARY:  She presented with shortness of breath and cough which had been present since May 2019. Her imaging suggested pulmonary infiltrates which was attributed to aspiration pneumonia. CT scan of the chest 8/20/2019 showed left hilar and subcarinal mediastinal adenopathy with narrowing of the left lower lobe bronchus and a nonspecific patchy area of nodular consolidation in the medial right lower lobe.  Bronchoscopy with EBUS 8/28/2019 showed nonnecrotizing granulomatous inflammation with prominent eosinophilia, negative for malignancy.  She was diagnosed with sarcoidosis and started on prednisone. She had worsening cough and shortness of breath with tapering of the prednisone.  Repeat CT scan of the chest 11/18/2019 showed interval worsening of the bulky mediastinal and bilateral hilar lymphadenopathy, near complete resolution of the lower lobe opacities, with new interstitial opacities in the right upper lobe.   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 Large mediastinal mass causing respiratory compromise. . IPI score of 2 (low-intermediate). FISH neg for high risk translocations. She received 6 cycles of R-CHOP with intermediate dose methotrexate after cycles 2, 4 and 6 from November through April 2020.      CURRENT INTERVENTIONS:  Surveillance post MR-CHOP    SUBJECTIVE   Kassie Ramirez is being followed for DLBCL, EBV+ non-GCB, stage III intermediate risk disease    Patient was followed in person. She has completed all of her planned cycles of MR-CHOP  chemotherapy and is being seen with labs and restaging scans.     She had not been doing well since completion of her therapy for lymphoma.  She had struggled with cardiomyopathy post adriamycin.     She is still not recovered back to baseline from prior to start of therapy. She continues to have fatigue, cough and shortness of breath.  However these have stabilized and she is doing better at this visit.    Her peripheral neuropathy is better now.  Her medications have been adjusted.      PAST MEDICAL HISTORY     Past Medical History:   Diagnosis Date    Anxiety attack 09/16/2014    Cardiomyopathy (H)     non ishemic - 25-30% - Chemo related    Congestive heart failure (H) 02/2019    While in hospital for high dose Methotrexate    Depressive disorder 2003    taking Celexa since 2014    Diffuse large B-cell lymphoma (H)     Diagnosed 11/2019, Dr Castro [facility]    Encounter for Essure implantation 2009    Generalized anxiety disorder 09/16/2014    zoloft = flat emotions    HFrEF (heart failure with reduced ejection fraction) (H)     new diagnosis 6/14    History of blood transfusion 12/2019    Given many throughout cancer treatment    Menopausal disorder     started on OCPs by menopause center 3/2017 (takes active continuously)    Menstrual headache     helped by OCPs and magnesium    Paroxysmal SVT (supraventricular tachycardia) (H24) 06/2020    GERTRUDE (stress urinary incontinence, female)     sling procedure 2016         CURRENT OUTPATIENT MEDICATIONS     Current Outpatient Medications   Medication Sig Dispense Refill    alpha-lipoic acid 600 MG capsule Take 1 capsule (600 mg) by mouth daily 30 capsule 0    budesonide-formoterol (SYMBICORT) 160-4.5 MCG/ACT Inhaler Inhale 2 puffs into the lungs 2 times daily 10.2 g 11    carvedilol (COREG) 6.25 MG tablet Take 1 tablet (6.25 mg) by mouth 2 times daily (with meals) 180 tablet 3    cetirizine (ZYRTEC) 10 MG tablet Take 10 mg by mouth daily      lisinopril (ZESTRIL) 5 MG  tablet Take 1 tablet (5 mg) by mouth at bedtime 90 tablet 3    LORazepam (ATIVAN) 0.5 MG tablet TAKE 1 TABLET BY MOUTH AT  BEDTIME FOR SLEEP AND ANXIETY 30 tablet 0    rosuvastatin (CRESTOR) 20 MG tablet Take 1 tablet (20 mg) by mouth daily 90 tablet 3    sertraline (ZOLOFT) 50 MG tablet Take 1 tablet (50 mg) by mouth daily 90 tablet 3    spironolactone (ALDACTONE) 25 MG tablet Take 1 tablet (25 mg) by mouth daily 90 tablet 3    albuterol (PROAIR HFA/PROVENTIL HFA/VENTOLIN HFA) 108 (90 Base) MCG/ACT inhaler Inhale 2 puffs into the lungs every 6 hours as needed for shortness of breath, wheezing or cough 18 g 4    empagliflozin (JARDIANCE) 10 MG TABS tablet Take 1 tablet (10 mg) by mouth daily 90 tablet 3    furosemide (LASIX) 20 MG tablet Take 1 tablet (20 mg) by mouth daily as needed (shortness of breath, swelling) 90 tablet 3     No current facility-administered medications for this visit.        ALLERGIES      Allergies   Allergen Reactions    Cold & Flu [Germanium]      See pseudoephedrine    Seasonal Allergies     Sudafed Cold-Cough [Pseudoephedrine-Dm-Gg-Apap]     Pseudoephedrine Rash     Rash then skins peels off         REVIEW OF SYSTEMS   As above in the HPI, o/w complete 12-point ROS was negative.     PHYSICAL EXAM   /82   Pulse 84   Temp 98.1  F (36.7  C) (Tympanic)   Resp 16   Wt 82.7 kg (182 lb 6.4 oz)   LMP 11/06/2019   SpO2 98%   BMI 29.44 kg/m    Limited physical exam during video visit due to COVID19 restrictions  Middle aged female in no acute distress  Breathing comfortably, no tachypnea  Speech and hearing normal  Pleasant mood and congruent affect  Moving all extremities, no focal neurologic deficits apparent       LABORATORY AND IMAGING STUDIES     Recent Labs   Lab Test 04/05/24  0829 03/11/24  0748 10/04/23  1532 09/11/23  1139 07/12/23  1012    141 140 141 140   POTASSIUM 4.3 4.2 4.0 4.3 4.1   CHLORIDE 103 103 103 103 101   CO2 23 27 26 23 26   ANIONGAP 14 11 11 15 13   BUN  "17.4 19.8 20.3* 19.5 20.5*   CR 0.97* 1.05* 0.98* 1.03* 1.10*   * 106* 91 81 98   AFSHAN 9.9 10.1* 10.0 10.4* 10.3*     Recent Labs   Lab Test 09/11/23  1139 06/19/20  1853 06/15/20  0845 06/14/20  1807 01/11/20  0615 12/01/19  1340 12/01/19  0641 11/30/19  2137 11/28/19  0537 11/27/19  2216   MAG  --  2.5* 2.4* 2.1 2.0  --   --   --   --  2.1   PHOS 3.9  --   --   --  3.1 2.8 3.4 2.8   < > 3.1    < > = values in this interval not displayed.     Recent Labs   Lab Test 04/05/24  0829 10/04/23  1532 04/16/23  1402 04/10/23  0810 12/20/22  0809 11/14/22  0817   WBC 9.2 7.8 8.3 11.7*  --  8.2   HGB 15.9* 15.2 15.0 17.9* 15.0 15.5    158 173 243  --  178   MCV 89 94 92 91  --  94   NEUTROPHIL 36 38 40 38  --  38     Recent Labs   Lab Test 04/05/24  0829 03/11/24  0748 10/04/23  1532 07/12/23  1012 04/10/23  0810 11/14/22  0817 07/12/22  1223 03/31/22  1014   BILITOTAL 0.8  --  0.8 0.6   < > 0.7 1.1 0.7   ALKPHOS 85  --  80 76   < > 89 79 78   ALT 42 50 44 43   < > 40* 46 51*   AST 34  --  33 30   < > 25 22 30   ALBUMIN 4.8  --  5.0 5.1   < > 4.8 4.7 4.5   LDH  --   --   --   --   --  229 215 223    < > = values in this interval not displayed.     TSH   Date Value Ref Range Status   05/11/2023 2.11 0.30 - 4.20 uIU/mL Final   01/14/2022 2.29 0.40 - 4.00 mU/L Final   06/14/2020 3.40 0.40 - 4.00 mU/L Final   09/18/2019 2.06 0.40 - 4.00 mU/L Final   07/27/2018 2.04 0.40 - 4.00 mU/L Final     No results for input(s): \"CEA\" in the last 43111 hours.  Results for orders placed or performed during the hospital encounter of 04/05/24   CT Chest/Abdomen/Pelvis w Contrast    Narrative    CT CHEST/ABDOMEN/PELVIS WITH CONTRAST 4/5/2024 9:15 AM    CLINICAL HISTORY: DLBCL, EBV+ non-GCB, stage III post 6 cycles of  M-RCHOP post treatment completion in April 2020 being followed for  surveillance. Diffuse large B-cell lymphoma of intrathoracic lymph  nodes (H). Chronic fatigue. Night sweats. Pleural effusion. SOB  (shortness of " breath).    TECHNIQUE: CT scan of the chest, abdomen, and pelvis was performed  following injection of IV contrast. Multiplanar reformats were  obtained. Dose reduction techniques were used.   CONTRAST: 91mL Isovue-370    COMPARISON: October 4, 2023    FINDINGS:   LUNGS AND PLEURA: Benign granulomatous change. Stable 6 mm nodule in  the superior segment of the right lower lobe image 101 series 12. No  infiltrates. Trace pleural fluid similar to previous.    MEDIASTINUM/AXILLAE: No lymphadenopathy. No thoracic aortic aneurysms.    CORONARY ARTERY CALCIFICATIONS: None.    HEPATOBILIARY: No significant mass or bile duct dilatation. No  calcified gallstones. Stable low dense lesions too small to  characterize.    PANCREAS: No significant mass, duct dilatation, or inflammatory  change.    SPLEEN: Enlarged at 14.5 cm, not significantly changed from previous.    ADRENAL GLANDS: No significant nodules.    KIDNEYS/BLADDER: No significant mass, stones, or hydronephrosis.    BOWEL: No obstruction or inflammatory change.    PELVIC ORGANS: No pelvic masses.    ADDITIONAL FINDINGS: No abdominopelvic adenopathy. Normal caliber  aorta. No free fluid.    MUSCULOSKELETAL: No frankly destructive bony lesions.      Impression    IMPRESSION:  1.  No significant change in splenomegaly.  2.  No adenopathy or new lesions.     ROBYN PLATT MD         SYSTEM ID:  [id number]        ASSESSMENT AND PLAN   DLBCL, EBV+ non-GCB, stage III post 6 cycles of M-RCHOP  PJV pneumonia causing acute respiratory failure improved on bactrim and prednisone  Cardiomyopathy post adriamycin based chemotherapy likely also contributed by tachycardia    Kassie was seen in person visit and was accompanied by her , Florian. She has completed her planned chemotherapy in April 2020.     She has been struggling with shortness of breath/dyspnea on exertion, cough and has been diagnosed with asthma.  She has been started on inhalers including Symbicort and albuterol.   She is already doing better.  She has borderline elevated eosinophils that would be consistent with allergy or possibly hyperactive airway disease.    I have reviewed all of the labs done prior to this clinic visit.  I have reviewed all of the labs done prior to this clinic visit.  Labs are all completely normal including electrolytes, renal function, hepatic panel, complete blood count and differential except for borderline elevation in her creatinine, eosinophils and hemoglobin.  These are only marginally elevated and not clinically significant.    I have reviewed actual images from her CT scan and there is no evidence of recurrent disease in the chest, abdomen or pelvis. She had enlarged hilar nodes and mediastinal mass, likely from reactive thymic tissue.  This has remained stable on the current imaging study.     All questions for patient  were answered.  She is 4 years out from treatment completion.   I would like to continue follow ups every 6 months until 5 years out.  She would have to more follow-up in oncology in the absence of new findings or concerns.        45 minutes spent on the date of the encounter doing chart review, history and exam, documentation and further activities as noted above     Castro Castro    Hematologist and Medical Oncologist  [facility] [place]

## 2024-05-07 NOTE — PROGRESS NOTES
"[facility]        Patient left a message letting us know she would like to enroll in the [facility].     Returned her call. Made her aware of her Enrollment appointment for 7/7/20 with Cirilo Vo CNP. She forgot it was already made.    She also stated she was told to not to take her Metoprolol if her blood pressure was to low. I do not see any documentation of the parameters she was given. Encouraged her to buy a BP monitor and she stated she has one that will be delivered tomorrow. She did not take her Metoprolol yesterday since she didn't know what her BP was. This morning she woke up with a \"Really fast heart beat.\" She took her medication and is now feeling better. I instructed her to take her Metoprolol daily unless she feels symptomatic. Such as, dizziness, lightheadedness, excessive fatigue. She stated understanding. Instructed her to call 911 or our on call physician if her heart rate is rapid again. Whichever, she feels is appropriate for her symptoms. Gave her the number for our after hours answering service [phone].    Lexi Rod RN    [facility]  06/18/20 11:21 AM       "
How Severe Is Your Psoriasis?: mild
Is This A New Presentation, Or A Follow-Up?: Rash

## 2024-06-10 DIAGNOSIS — J45.30 MILD PERSISTENT ASTHMA WITHOUT COMPLICATION: ICD-10-CM

## 2024-06-10 RX ORDER — BUDESONIDE AND FORMOTEROL FUMARATE DIHYDRATE 160; 4.5 UG/1; UG/1
2 AEROSOL RESPIRATORY (INHALATION) 2 TIMES DAILY
Qty: 10.2 G | Refills: 8 | Status: SHIPPED | OUTPATIENT
Start: 2024-06-10

## 2024-06-10 NOTE — TELEPHONE ENCOUNTER
Requested Prescriptions   Pending Prescriptions Disp Refills    budesonide-formoterol (SYMBICORT) 160-4.5 MCG/ACT Inhaler 10.2 g 11     Sig: Inhale 2 puffs into the lungs 2 times daily       There is no refill protocol information for this order            Last Written Prescription Date:  3/13/2024  Last Fill Quantity: 10.2 g,  # refills: 11   Last office visit: 3/13/2024 ; last virtual visit: Visit date not found with prescribing provider:  Bharath Norman MD    Future Office Visit:  None

## 2024-06-10 NOTE — TELEPHONE ENCOUNTER
Requested Prescriptions   Pending Prescriptions Disp Refills    budesonide-formoterol (SYMBICORT) 160-4.5 MCG/ACT Inhaler 10.2 g 11     Sig: Inhale 2 puffs into the lungs 2 times daily       Inhaled Steroids Protocol Passed - 6/10/2024  9:46 AM        Passed - Patient is age 12 or older        Passed - Asthma control assessment score within normal limits in last 6 months     Please review ACT score.           Passed - Medication is active on med list        Passed - Recent (6 mo) or future (90 days) visit within the authorizing provider's specialty     The patient must have completed an in-person or virtual visit within the past 6 months or has a future visit scheduled within the next 90 days with the authorizing provider s specialty.  Urgent care and e-visits do not quality as an office visit for this protocol.          Passed - Medication indicated for associated diagnosis     Medication is associated with one or more of the following diagnoses:    Allergies   Asthma   COPD   Nasal Congestion   Nasal Polyps   Rhinitis             Last Written Prescription Date:  3/13/24  Last Fill Quantity: 10.2g,  # refills: 11   Last office visit: 3/13/2024 ; last virtual visit: Visit date not found with prescribing provider:  Dr Norman   Future Office Visit:  No scheduled      Called patient who confirmed that Symbicort should be sent to \[facility]\"" Home Delivery pharmacy as inhaler is cheaper. Refill sent.    Marty Clay RN

## 2024-06-11 DIAGNOSIS — I42.8 NONISCHEMIC CARDIOMYOPATHY (H): ICD-10-CM

## 2024-06-11 RX ORDER — LISINOPRIL 5 MG/1
5 TABLET ORAL AT BEDTIME
Qty: 90 TABLET | Refills: 3 | Status: SHIPPED | OUTPATIENT
Start: 2024-06-11

## 2024-06-11 RX ORDER — CARVEDILOL 6.25 MG/1
6.25 TABLET ORAL 2 TIMES DAILY WITH MEALS
Qty: 180 TABLET | Refills: 3 | Status: SHIPPED | OUTPATIENT
Start: 2024-06-11

## 2024-06-24 PROBLEM — R87.810 CERVICAL HIGH RISK HPV (HUMAN PAPILLOMAVIRUS) TEST POSITIVE: Status: ACTIVE | Noted: 2023-07-12

## 2024-07-17 ENCOUNTER — OFFICE VISIT (OUTPATIENT)
Dept: FAMILY MEDICINE | Facility: CLINIC | Age: 54
End: 2024-07-17
Attending: PHYSICIAN ASSISTANT
Payer: COMMERCIAL

## 2024-07-17 VITALS
RESPIRATION RATE: 14 BRPM | HEIGHT: 66 IN | WEIGHT: 185.1 LBS | DIASTOLIC BLOOD PRESSURE: 82 MMHG | HEART RATE: 101 BPM | OXYGEN SATURATION: 98 % | TEMPERATURE: 96.2 F | SYSTOLIC BLOOD PRESSURE: 120 MMHG | BODY MASS INDEX: 29.75 KG/M2

## 2024-07-17 DIAGNOSIS — Q89.7 STARGARDT MACULAR DEGENERATION WITH ABSENT OR HYPOPLASTIC CORPUS CALLOSUM, INTELLECTUAL DISABILITY, AND DYSMORPHIC FEATURES (H): ICD-10-CM

## 2024-07-17 DIAGNOSIS — H35.53 STARGARDT MACULAR DEGENERATION WITH ABSENT OR HYPOPLASTIC CORPUS CALLOSUM, INTELLECTUAL DISABILITY, AND DYSMORPHIC FEATURES (H): ICD-10-CM

## 2024-07-17 DIAGNOSIS — E55.9 HYPOVITAMINOSIS D: ICD-10-CM

## 2024-07-17 DIAGNOSIS — T45.1X5A CHEMOTHERAPY-INDUCED PERIPHERAL NEUROPATHY (H): ICD-10-CM

## 2024-07-17 DIAGNOSIS — C83.398 DIFFUSE LARGE B-CELL LYMPHOMA OF EXTRANODAL SITE: ICD-10-CM

## 2024-07-17 DIAGNOSIS — I47.10 PAROXYSMAL SVT (SUPRAVENTRICULAR TACHYCARDIA) (H): ICD-10-CM

## 2024-07-17 DIAGNOSIS — J45.30 MILD PERSISTENT ASTHMA WITHOUT COMPLICATION: ICD-10-CM

## 2024-07-17 DIAGNOSIS — Z12.4 CERVICAL CANCER SCREENING: ICD-10-CM

## 2024-07-17 DIAGNOSIS — Q04.0 STARGARDT MACULAR DEGENERATION WITH ABSENT OR HYPOPLASTIC CORPUS CALLOSUM, INTELLECTUAL DISABILITY, AND DYSMORPHIC FEATURES (H): ICD-10-CM

## 2024-07-17 DIAGNOSIS — F33.0 MILD RECURRENT MAJOR DEPRESSION (H): ICD-10-CM

## 2024-07-17 DIAGNOSIS — I42.8 NONISCHEMIC CARDIOMYOPATHY (H): ICD-10-CM

## 2024-07-17 DIAGNOSIS — Z12.31 VISIT FOR SCREENING MAMMOGRAM: ICD-10-CM

## 2024-07-17 DIAGNOSIS — F78.A9 STARGARDT MACULAR DEGENERATION WITH ABSENT OR HYPOPLASTIC CORPUS CALLOSUM, INTELLECTUAL DISABILITY, AND DYSMORPHIC FEATURES (H): ICD-10-CM

## 2024-07-17 DIAGNOSIS — Z00.00 ROUTINE GENERAL MEDICAL EXAMINATION AT A HEALTH CARE FACILITY: Primary | ICD-10-CM

## 2024-07-17 DIAGNOSIS — E78.2 MIXED HYPERLIPIDEMIA: ICD-10-CM

## 2024-07-17 DIAGNOSIS — Z15.1 STARGARDT MACULAR DEGENERATION WITH ABSENT OR HYPOPLASTIC CORPUS CALLOSUM, INTELLECTUAL DISABILITY, AND DYSMORPHIC FEATURES (H): ICD-10-CM

## 2024-07-17 DIAGNOSIS — R87.810 CERVICAL HIGH RISK HPV (HUMAN PAPILLOMAVIRUS) TEST POSITIVE: ICD-10-CM

## 2024-07-17 DIAGNOSIS — Z23 NEED FOR TDAP VACCINATION: ICD-10-CM

## 2024-07-17 DIAGNOSIS — F41.8 SITUATIONAL ANXIETY: ICD-10-CM

## 2024-07-17 DIAGNOSIS — G62.0 CHEMOTHERAPY-INDUCED PERIPHERAL NEUROPATHY (H): ICD-10-CM

## 2024-07-17 PROBLEM — G43.011 INTRACTABLE MIGRAINE WITHOUT AURA AND WITH STATUS MIGRAINOSUS: Status: RESOLVED | Noted: 2017-04-25 | Resolved: 2024-07-17

## 2024-07-17 PROCEDURE — 87624 HPV HI-RISK TYP POOLED RSLT: CPT | Performed by: PHYSICIAN ASSISTANT

## 2024-07-17 PROCEDURE — 99396 PREV VISIT EST AGE 40-64: CPT | Mod: 25 | Performed by: PHYSICIAN ASSISTANT

## 2024-07-17 PROCEDURE — 90471 IMMUNIZATION ADMIN: CPT | Performed by: PHYSICIAN ASSISTANT

## 2024-07-17 PROCEDURE — 99213 OFFICE O/P EST LOW 20 MIN: CPT | Mod: 25 | Performed by: PHYSICIAN ASSISTANT

## 2024-07-17 PROCEDURE — 90715 TDAP VACCINE 7 YRS/> IM: CPT | Performed by: PHYSICIAN ASSISTANT

## 2024-07-17 PROCEDURE — G0145 SCR C/V CYTO,THINLAYER,RESCR: HCPCS | Performed by: PHYSICIAN ASSISTANT

## 2024-07-17 SDOH — HEALTH STABILITY: PHYSICAL HEALTH: ON AVERAGE, HOW MANY DAYS PER WEEK DO YOU ENGAGE IN MODERATE TO STRENUOUS EXERCISE (LIKE A BRISK WALK)?: 3 DAYS

## 2024-07-17 ASSESSMENT — ANXIETY QUESTIONNAIRES
1. FEELING NERVOUS, ANXIOUS, OR ON EDGE: SEVERAL DAYS
6. BECOMING EASILY ANNOYED OR IRRITABLE: SEVERAL DAYS
7. FEELING AFRAID AS IF SOMETHING AWFUL MIGHT HAPPEN: SEVERAL DAYS
GAD7 TOTAL SCORE: 5
3. WORRYING TOO MUCH ABOUT DIFFERENT THINGS: SEVERAL DAYS
7. FEELING AFRAID AS IF SOMETHING AWFUL MIGHT HAPPEN: SEVERAL DAYS
IF YOU CHECKED OFF ANY PROBLEMS ON THIS QUESTIONNAIRE, HOW DIFFICULT HAVE THESE PROBLEMS MADE IT FOR YOU TO DO YOUR WORK, TAKE CARE OF THINGS AT HOME, OR GET ALONG WITH OTHER PEOPLE: NOT DIFFICULT AT ALL
2. NOT BEING ABLE TO STOP OR CONTROL WORRYING: SEVERAL DAYS
4. TROUBLE RELAXING: NOT AT ALL
8. IF YOU CHECKED OFF ANY PROBLEMS, HOW DIFFICULT HAVE THESE MADE IT FOR YOU TO DO YOUR WORK, TAKE CARE OF THINGS AT HOME, OR GET ALONG WITH OTHER PEOPLE?: NOT DIFFICULT AT ALL
GAD7 TOTAL SCORE: 5
5. BEING SO RESTLESS THAT IT IS HARD TO SIT STILL: NOT AT ALL
GAD7 TOTAL SCORE: 5

## 2024-07-17 ASSESSMENT — PATIENT HEALTH QUESTIONNAIRE - PHQ9
10. IF YOU CHECKED OFF ANY PROBLEMS, HOW DIFFICULT HAVE THESE PROBLEMS MADE IT FOR YOU TO DO YOUR WORK, TAKE CARE OF THINGS AT HOME, OR GET ALONG WITH OTHER PEOPLE: NOT DIFFICULT AT ALL
SUM OF ALL RESPONSES TO PHQ QUESTIONS 1-9: 0
SUM OF ALL RESPONSES TO PHQ QUESTIONS 1-9: 0

## 2024-07-17 ASSESSMENT — ASTHMA QUESTIONNAIRES
QUESTION_4 LAST FOUR WEEKS HOW OFTEN HAVE YOU USED YOUR RESCUE INHALER OR NEBULIZER MEDICATION (SUCH AS ALBUTEROL): NOT AT ALL
ACT_TOTALSCORE: 21
QUESTION_5 LAST FOUR WEEKS HOW WOULD YOU RATE YOUR ASTHMA CONTROL: WELL CONTROLLED
QUESTION_3 LAST FOUR WEEKS HOW OFTEN DID YOUR ASTHMA SYMPTOMS (WHEEZING, COUGHING, SHORTNESS OF BREATH, CHEST TIGHTNESS OR PAIN) WAKE YOU UP AT NIGHT OR EARLIER THAN USUAL IN THE MORNING: ONCE OR TWICE
QUESTION_1 LAST FOUR WEEKS HOW MUCH OF THE TIME DID YOUR ASTHMA KEEP YOU FROM GETTING AS MUCH DONE AT WORK, SCHOOL OR AT HOME: A LITTLE OF THE TIME
QUESTION_2 LAST FOUR WEEKS HOW OFTEN HAVE YOU HAD SHORTNESS OF BREATH: ONCE OR TWICE A WEEK
ACT_TOTALSCORE: 21

## 2024-07-17 ASSESSMENT — SOCIAL DETERMINANTS OF HEALTH (SDOH): HOW OFTEN DO YOU GET TOGETHER WITH FRIENDS OR RELATIVES?: ONCE A WEEK

## 2024-07-17 NOTE — LETTER
My Depression Action Plan  Name: Kassie Ramirez   Date of Birth 1970  Date: 7/17/2024    My doctor: Mary Alice Colón   My clinic: [address] [address] [phone]            GREEN    ZONE   Good Control    What it looks like:   Things are going generally well. You have normal ups and downs. You may even feel depressed from time to time, but bad moods usually last less than a day.   What you need to do:  Continue to care for yourself (see self care plan)  Check your depression survival kit and update it as needed  Follow your physician s recommendations including any medication.  Do not stop taking medication unless you consult with your physician first.             YELLOW         ZONE Getting Worse    What it looks like:   Depression is starting to interfere with your life.   It may be hard to get out of bed; you may be starting to isolate yourself from others.  Symptoms of depression are starting to last most all day and this has happened for several days.   You may have suicidal thoughts but they are not constant.   What you need to do:     Call your care team. Your response to treatment will improve if you keep your care team informed of your progress. Yellow periods are signs an adjustment may need to be made.     Continue your self-care.  Just get dressed and ready for the day.  Don't give yourself time to talk yourself out of it.    Talk to someone in your support network.    Open up your Depression Self-Care Plan/Wellness Kit.             RED    ZONE Medical Alert - Get Help    What it looks like:   Depression is seriously interfering with your life.   You may experience these or other symptoms: You can t get out of bed most days, can t work or engage in other necessary activities, you have trouble taking care of basic hygiene, or basic responsibilities, thoughts of suicide or death that will not go away,  self-injurious behavior.     What you need to do:  Call your care team and request a same-day appointment. If they are not available (weekends or after hours) call your local crisis line, emergency room or 911.          Depression Self-Care Plan / Wellness Kit    Many people find that medication and therapy are helpful treatments for managing depression. In addition, making small changes to your everyday life can help to boost your mood and improve your wellbeing. Below are some tips for you to consider. Be sure to talk with your medical provider and/or behavioral health consultant if your symptoms are worsening or not improving.     Sleep   Sleep hygiene  means all of the habits that support good, restful sleep. It includes maintaining a consistent bedtime and wake time, using your bedroom only for sleeping or sex, and keeping the bedroom dark and free of distractions like a computer, smartphone, or television.     Develop a Healthy Routine  Maintain good hygiene. Get out of bed in the morning, make your bed, brush your teeth, take a shower, and get dressed. Don t spend too much time viewing media that makes you feel stressed. Find time to relax each day.    Exercise  Get some form of exercise every day. This will help reduce pain and release endorphins, the  feel good  chemicals in your brain. It can be as simple as just going for a walk or doing some gardening, anything that will get you moving.      Diet  Strive to eat healthy foods, including fruits and vegetables. Drink plenty of water. Avoid excessive sugar, caffeine, alcohol, and other mood-altering substances.     Stay Connected with Others  Stay in touch with friends and family members.    Manage Your Mood  Try deep breathing, massage therapy, biofeedback, or meditation. Take part in fun activities when you can. Try to find something to smile about each day.     Psychotherapy  Be open to working with a therapist if your provider recommends it.      Medication  Be sure to take your medication as prescribed. Most anti-depressants need to be taken every day. It usually takes several weeks for medications to work. Not all medicines work for all people. It is important to follow-up with your provider to make sure you have a treatment plan that is working for you. Do not stop your medication abruptly without first discussing it with your provider.    Crisis Resources   These hotlines are for both adults and children. They and are open 24 hours a day, 7 days a week unless noted otherwise.    National Suicide Prevention Lifeline   988 or 1-388-525-GXYO (7458)    Crisis Text Line    www.crisistextline.org  Text HOME to 847932 from anywhere in the [place], anytime, about any type of crisis. A live, trained crisis counselor will receive the text and respond quickly.    [facility] Lifeline for LGBTQ Youth  A national crisis intervention and suicide lifeline for LGBTQ youth under 25. Provides a safe place to talk without judgement. Call 1-161.831.2433; text START to 976063 or visit www.thetrevorproject.org to talk to a trained counselor.    For Critical access hospital crisis numbers, visit the [facility] website at:  https://[place].gov/dhs/people-we-serve/adults/health-care/mental-health/resources/crisis-contacts.jsp

## 2024-07-17 NOTE — PATIENT INSTRUCTIONS
Patient Education   Preventive Care Advice   This is general advice given by our system to help you stay healthy. However, your care team may have specific advice just for you. Please talk to your care team about your preventive care needs.  Nutrition  Eat 5 or more servings of fruits and vegetables each day.  Try wheat bread, brown rice and whole grain pasta (instead of white bread, rice, and pasta).  Get enough calcium and vitamin D. Check the label on foods and aim for 100% of the RDA (recommended daily allowance).  Lifestyle  Exercise at least 150 minutes each week  (30 minutes a day, 5 days a week).  Do muscle strengthening activities 2 days a week. These help control your weight and prevent disease.  No smoking.  Wear sunscreen to prevent skin cancer.  Have a dental exam and cleaning every 6 months.  Yearly exams  See your health care team every year to talk about:  Any changes in your health.  Any medicines your care team has prescribed.  Preventive care, family planning, and ways to prevent chronic diseases.  Shots (vaccines)   HPV shots (up to age 26), if you've never had them before.  Hepatitis B shots (up to age 59), if you've never had them before.  COVID-19 shot: Get this shot when it's due.  Flu shot: Get a flu shot every year.  Tetanus shot: Get a tetanus shot every 10 years.  Pneumococcal, hepatitis A, and RSV shots: Ask your care team if you need these based on your risk.  Shingles shot (for age 50 and up)  General health tests  Diabetes screening:  Starting at age 35, Get screened for diabetes at least every 3 years.  If you are younger than age 35, ask your care team if you should be screened for diabetes.  Cholesterol test: At age 39, start having a cholesterol test every 5 years, or more often if advised.  Bone density scan (DEXA): At age 50, ask your care team if you should have this scan for osteoporosis (brittle bones).  Hepatitis C: Get tested at least once in your life.  STIs (sexually  transmitted infections)  Before age 24: Ask your care team if you should be screened for STIs.  After age 24: Get screened for STIs if you're at risk. You are at risk for STIs (including HIV) if:  You are sexually active with more than one person.  You don't use condoms every time.  You or a partner was diagnosed with a sexually transmitted infection.  If you are at risk for HIV, ask about PrEP medicine to prevent HIV.  Get tested for HIV at least once in your life, whether you are at risk for HIV or not.  Cancer screening tests  Cervical cancer screening: If you have a cervix, begin getting regular cervical cancer screening tests starting at age 21.  Breast cancer scan (mammogram): If you've ever had breasts, begin having regular mammograms starting at age 40. This is a scan to check for breast cancer.  Colon cancer screening: It is important to start screening for colon cancer at age 45.  Have a colonoscopy test every 10 years (or more often if you're at risk) Or, ask your provider about stool tests like a FIT test every year or Cologuard test every 3 years.  To learn more about your testing options, visit:   .  For help making a decision, visit:   https://bit.ly/wn87906.  Prostate cancer screening test: If you have a prostate, ask your care team if a prostate cancer screening test (PSA) at age 55 is right for you.  Lung cancer screening: If you are a current or former smoker ages 50 to 80, ask your care team if ongoing lung cancer screenings are right for you.  For informational purposes only. Not to replace the advice of your health care provider. Copyright   2023 [employer]. All rights reserved. Clinically reviewed by the [facility]. [employer] 015731 - REV 01/24.

## 2024-07-17 NOTE — LETTER
My Asthma Action Plan    Name: Kassie Ramirez   YOB: 1970  Date: 7/17/2024   My doctor: Mary Alice Colón PA-C   My clinic: [facility] [place]        My Control Medicine: Budesonide + formoterol (Symbicort HFA) -  160/4.5 mcg 2 puffs 1-2 times daily  My Rescue Medicine: Albuterol (Proair/Ventolin/Proventil HFA) 2-4 puffs EVERY 4 HOURS as needed. Use a spacer if recommended by your provider.   My Asthma Severity:   Moderate Persistent  Know your asthma triggers:   dust mites            GREEN ZONE   Good Control  I feel good  No cough or wheeze  Can work, sleep and play without asthma symptoms       Take your asthma control medicine every day.     If exercise triggers your asthma, take your rescue medication  15 minutes before exercise or sports, and  During exercise if you have asthma symptoms  Spacer to use with inhaler: If you have a spacer, make sure to use it with your inhaler             YELLOW ZONE Getting Worse  I have ANY of these:  I do not feel good  Cough or wheeze  Chest feels tight  Wake up at night   Keep taking your Green Zone medications  Start taking your rescue medicine:  every 20 minutes for up to 1 hour. Then every 4 hours for 24-48 hours.  If you stay in the Yellow Zone for more than 12-24 hours, contact your doctor.  If you do not return to the Green Zone in 12-24 hours or you get worse, start taking your oral steroid medicine if prescribed by your provider.           RED ZONE Medical Alert - Get Help  I have ANY of these:  I feel awful  Medicine is not helping  Breathing getting harder  Trouble walking or talking  Nose opens wide to breathe       Take your rescue medicine NOW  If your provider has prescribed an oral steroid medicine, start taking it NOW  Call your doctor NOW  If you are still in the Red Zone after 20 minutes and you have not reached your doctor:  Take your rescue medicine again and  Call 911 or go to the emergency room right away    See your  regular doctor within 2 weeks of an Emergency Room or Urgent Care visit for follow-up treatment.          Annual Reminders:  Meet with Asthma Educator,  Flu Shot in the Fall, consider Pneumonia Vaccination for patients with asthma (aged 19 and older).    Pharmacy:    [facility] - [place], [place] - [address] [id number] - [place], [place] - [address] [facility] [address] & [facility] - [place], [place] - [address] [facility] - [place], [place] - [address]  [facility] - [facility] [address]    Electronically signed by Mary Alice Colón PA-C   Date: 07/17/24                      Asthma Triggers  How To Control Things That Make Your Asthma Worse    Triggers are things that make your asthma worse.  Look at the list below to help you find your triggers and what you can do about them.  You can help prevent asthma flare-ups by staying away from your triggers.      Trigger                                                          What you can do   Cigarette Smoke  Tobacco smoke can make asthma worse. Do not allow smoking in your home, car or around you.  Be sure no one smokes at a child s day care or school.  If you smoke, ask your health care provider for ways to help you quit.  Ask family members to quit too.  Ask your health care provider for a referral to Quit Plan to help you quit smoking, or call 6-273-794-PLAN.     Colds, Flu, Bronchitis  These are common triggers of asthma. Wash your hands often.  Don t touch your eyes, nose or mouth.  Get a flu shot every year.     Dust Mites  These are tiny bugs that live in cloth or carpet. They are too small to see. Wash sheets and blankets in hot water every week.   Encase pillows and mattress in dust mite proof covers.  Avoid having carpet if you can. If you have carpet, vacuum weekly.   Use a dust mask and HEPA  vacuum.   Pollen and Outdoor Mold  Some people are allergic to trees, grass, or weed pollen, or molds. Try to keep your windows closed.  Limit time out doors when pollen count is high.   Ask you health care provider about taking medicine during allergy season.     Animal Dander  Some people are allergic to skin flakes, urine or saliva from pets with fur or feathers. Keep pets with fur or feathers out of your home.    If you can t keep the pet outdoors, then keep the pet out of your bedroom.  Keep the bedroom door closed.  Keep pets off cloth furniture and away from stuffed toys.     Mice, Rats, and Cockroaches   Some people are allergic to the waste from these pests.   Cover food and garbage.  Clean up spills and food crumbs.  Store grease in the refrigerator.   Keep food out of the bedroom.   Indoor Mold  This can be a trigger if your home has high moisture. Fix leaking faucets, pipes, or other sources of water.   Clean moldy surfaces.  Dehumidify basement if it is damp and smelly.   Smoke, Strong Odors, and Sprays  These can reduce air quality. Stay away from strong odors and sprays, such as perfume, powder, hair spray, paints, smoke incense, paint, cleaning products, candles and new carpet.   Exercise or Sports  Some people with asthma have this trigger. Be active!  Ask your doctor about taking medicine before sports or exercise to prevent symptoms.    Warm up for 5-10 minutes before and after sports or exercise.     Other Triggers of Asthma  Cold air:  Cover your nose and mouth with a scarf.  Sometimes laughing or crying can be a trigger.  Some medicines and food can trigger asthma.

## 2024-07-17 NOTE — PROGRESS NOTES
Preventive Care Visit  [facility] [place]  Mary Alice Colón PA-C, Family Medicine  Jul 17, 2024      Assessment & Plan     Routine general medical examination at a health care facility  - PRIMARY CARE FOLLOW-UP SCHEDULING  - REVIEW OF HEALTH MAINTENANCE PROTOCOL ORDERS  - PRIMARY CARE FOLLOW-UP SCHEDULING    Diffuse large B-cell lymphoma of extranodal site (H) - per pathology 11/27/19 - mediastinal mass wrapped around azalea and bilateral main stem bronchi- treating with Dr. Castro  Doing very well.  Following with oncology annually.    Stargardt macular degeneration (H) bilateral - follows with [facility] - very slow progression  Continued monitoring with ophthalmology    Chemotherapy-induced peripheral neuropathy (H24)  Stable.  Improved with alpha lipoic acid.    Paroxysmal SVT (supraventricular tachycardia) (H) - s/p ablation 6/22/2020  Nonischemic cardiomyopathy (H) - from chemotherapy - managed by Dr. Pearl  Mixed hyperlipidemia - started rosuvastatin 1/2022  Doing very well from a cardiac perspective.  Annual follow-up recommended.    Hypovitaminosis D  Labs for surveillance.  Will complete this with next routine labs for oncology or cardiology.  - Vitamin D Deficiency    Mild recurrent major depression (H24)  Situational anxiety  Well-controlled.  Continue current regimen.  - sertraline (ZOLOFT) 50 MG tablet  Dispense: 90 tablet; Refill: 3    Visit for screening mammogram  Overdue for screening  - MA Screening Bilateral w/ Jayden    Cervical high risk HPV (human papillomavirus) test positive   Cervical cancer screening  Routine screening  - Pap Screen with HPV - Recommended Age 30 - 65 Years    Need for Tdap vaccination  Vaccinated today  - TDAP 7+ (ADACEL,BOOSTRIX)      Patient has been advised of split billing requirements and indicates understanding: Yes        BMI  Estimated body mass index is 29.88 kg/m  as calculated from the following:    Height as of this  "encounter: 1.676 m (5' 6\").    Weight as of this encounter: 84 kg (185 lb 1.6 oz).   Weight management plan: Discussed healthy diet and exercise guidelines    Counseling  Appropriate preventive services were addressed with this patient via screening, questionnaire, or discussion as appropriate for fall prevention, nutrition, physical activity, Tobacco-use cessation, weight loss and cognition.  Checklist reviewing preventive services available has been given to the patient.  Reviewed patient's diet, addressing concerns and/or questions.   She is at risk for lack of exercise and has been provided with information to increase physical activity for the benefit of her well-being.       No follow-ups on file.      Mary Alice Colón MBA, MS, PA-C  [facility] [place]      Hina Fernando is a 54 year old, presenting for the following:  Physical        7/17/2024     7:28 AM   Additional Questions   Roomed by Karie KIMMY   Accompanied by Self        Via the Health Maintenance questionnaire, the patient has reported the following services have been completed -Mammogram: [place] 2023-07-15, this information has not been sent to the abstraction team.  Health Care Directive  Patient does not have a Health Care Directive or Living Will: Discussed advance care planning with patient; however, patient declined at this time.    HPI    Cervical HPV  2014 NIL, Neg HPV  2018 NIL, Neg HPV  7/12/23 NIL pap, +HR HPV, not 16/18. Plan 1 yr co-test    Hot flashes  We had a virtual visit to discuss this 4/27/2023 and it was suspected to be due to the start of duloxetine.  Attempted a change of dosing from 60 mg once daily to 30 mg twice daily, and ultimately discontinuation of duloxetine and replacement with sertraline.  Unfortunately, this dose adjustment/timing adjustment/med change did not help the hot flash symptoms.  Labs to rule out underlying metabolic etiologies were unremarkable including TSH, vitamin D, catecholamines, " and serotonin levels.      Chest wall pain   The initial reason for switching to duloxetine 6/2/2022 was for dual purpose, 1. Suboptimally controlled depression/anxiety and 2. hope that it would help with suspected nerve chest wall pain from radiation.  The duloxetine and alpha lipoic acid both provided improvement in alpha lipoic chest wall pain and leg discomfort which, thankfully has not returned with discontinuation of duloxetine    Asthma  New diagnosis by pulmonology (Dr. Norman) October 2023 after being seen for dyspnea/cough/pleural effusions.  Doing markedly better on Symbicort once daily with twice daily use for flares and rarely if ever using albuterol.      3/13/2024     3:30 PM 7/17/2024     7:36 AM   ACT Total Scores   ACT TOTAL SCORE (Goal Greater than or Equal to 20) 22 21   In the past 12 months, how many times did you visit the emergency room for your asthma without being admitted to the hospital? 0 0   In the past 12 months, how many times were you hospitalized overnight because of your asthma? 0 0   How many days per week do you miss taking your asthma controller medication?  0  Please describe any recent triggers for your asthma: dust mites  Have you had any Emergency Room Visits, Urgent Care Visits, or Hospital Admissions since your last office visit?  No       Hypercalcemia  Normal PTH and PTH related hormone level.  Somewhat low Vit D level 5/2023 - recommended Vit D 5000 international unit(s) daily.    SOB (shortness of breath)  Nonischemic cardiomyopathy (H) - from chemotherapy - managed by Dr. Pearl  Paroxysmal SVT (supraventricular tachycardia) (H) - s/p ablation 6/22/2020  Pt has nonischemic cardiomyopathy likely due to chemotherapy from 2019.  Follows with Dr. Pearl.  Last visit 12/27/2022 w/ stable echocardiogram.  Historically tried pulmonary rehab - this was not helpful.  Works out on her own and feels like things are okay at present.   Currently on lisinopril, carvedilol,  spironolactone.  Stopped Jardiance due to cost.   Last visit March 2024.Her ejection fraction has improved significantly from 20 to 25%, now up to 45 to 50%.  1 year follow-up recommended.         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 Castro  Initially presented with shortness of breath and cough which had been present since May 2019. Her imaging suggested pulmonary infiltrates which was attributed to aspiration pneumonia. CT scan of the chest 8/20/2019 showed left hilar and subcarinal mediastinal adenopathy with narrowing of the left lower lobe bronchus and a nonspecific patchy area of nodular consolidation in the medial right lower lobe.  Bronchoscopy with EBUS 8/28/2019 showed nonnecrotizing granulomatous inflammation with prominent eosinophilia, negative for malignancy.  She was diagnosed with sarcoidosis and started on prednisone. She had worsening cough and shortness of breath with tapering of the prednisone.  Repeat CT scan of the chest 11/18/2019 showed interval worsening of the bulky mediastinal and bilateral hilar lymphadenopathy, near complete resolution of the lower lobe opacities, with new interstitial opacities in the right upper lobe.   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 Large mediastinal mass causing respiratory compromise. . IPI score of 2 (low-intermediate). FISH neg for high risk translocations. She received 6 cycles of R-CHOP with intermediate dose methotrexate after cycles 2, 4 and 6 from November through April 2020.  Follows with Dr. Castro of oncology every 6-9 months.  Next OV 10/2023.    Stargardt macular degeneration  Bilaterally.  Both sister and father had/have condition.  Kassie's is very slowly progressive.  [facility].     Hyperlipidemia Follow-Up  Rosuvastatin 20 mg  Are you regularly taking any medication or supplement to lower your cholesterol?   Yes- Rosuvastatin 20 mg  Are you having muscle aches or other side effects that you think could be caused by your cholesterol lowering medication?  No  Recent Labs   Lab Test 03/11/24  0748 05/11/23  1139   CHOL 151 265*   HDL 40* 40*   LDL 69 158*   TRIG 212* 333*       Depression and Anxiety   Sertraline 50 mg.  No longer needing lorazepam.  How are you doing with your anxiety since your last visit?  No change  Are you having other symptoms that might be associated with depression or anxiety? No  Have you had a significant life event? No   Do you have any concerns with your use of alcohol or other drugs? No    Social History     Tobacco Use    Smoking status: Never    Smokeless tobacco: Never   Vaping Use    Vaping status: Never Used   Substance Use Topics    Alcohol use: Not Currently    Drug use: No         4/27/2023     9:24 AM 7/12/2023     9:05 AM 7/17/2024     7:30 AM   PHQ   PHQ-9 Total Score 3 2 0   Q9: Thoughts of better off dead/self-harm past 2 weeks Not at all Not at all Not at all         4/27/2023     9:25 AM 7/12/2023     9:05 AM 7/17/2024     7:31 AM   BRIAN-7 SCORE   Total Score 1 (minimal anxiety) 2 (minimal anxiety) 5 (mild anxiety)   Total Score 1 2 5         7/17/2024     7:30 AM   Last PHQ-9   1.  Little interest or pleasure in doing things 0   2.  Feeling down, depressed, or hopeless 0   3.  Trouble falling or staying asleep, or sleeping too much 0   4.  Feeling tired or having little energy 0   5.  Poor appetite or overeating 0   6.  Feeling bad about yourself 0   7.  Trouble concentrating 0   8.  Moving slowly or restless 0   Q9: Thoughts of better off dead/self-harm past 2 weeks 0   PHQ-9 Total Score 0         7/17/2024     7:31 AM   BRIAN-7    1. Feeling nervous, anxious, or on edge 1   2. Not being able to stop or control  worrying 1   3. Worrying too much about different things 1   4. Trouble relaxing 0   5. Being so restless that it is hard to sit still 0   6. Becoming easily annoyed or irritable 1   7. Feeling afraid, as if something awful might happen 1   BRIAN-7 Total Score 5   If you checked any problems, how difficult have they made it for you to do your work, take care of things at home, or get along with other people? Not difficult at all     Was ACT completed today?  Yes             7/17/2024   General Health   How would you rate your overall physical health? Good   Feel stress (tense, anxious, or unable to sleep) To some extent      (!) STRESS CONCERN      7/17/2024   Nutrition   Three or more servings of calcium each day? Yes   Diet: Low salt   How many servings of fruit and vegetables per day? 4 or more   How many sweetened beverages each day? (!) 2            7/17/2024   Exercise   Days per week of moderate/strenous exercise 3 days            7/17/2024   Social Factors   Frequency of gathering with friends or relatives Once a week   Worry food won't last until get money to buy more No   Food not last or not have enough money for food? No   Do you have housing? (Housing is defined as stable permanent housing and does not include staying ouside in a car, in a tent, in an abandoned building, in an overnight shelter, or couch-surfing.) Yes   Are you worried about losing your housing? No   Lack of transportation? No   Unable to get utilities (heat,electricity)? No            7/17/2024   Fall Risk   Fallen 2 or more times in the past year? No   Trouble with walking or balance? No             7/17/2024   Dental   Dentist two times every year? Yes            7/17/2024   TB Screening   Were you born outside of the [place]? No          Today's PHQ-9 Score:       7/17/2024     7:30 AM   PHQ-9 SCORE   PHQ-9 Total Score MyChart 0   PHQ-9 Total Score 0         7/17/2024   Substance Use   Alcohol more than 3/day or more than 7/wk Not  Applicable   Do you use any other substances recreationally? No        Social History     Tobacco Use    Smoking status: Never    Smokeless tobacco: Never   Vaping Use    Vaping status: Never Used   Substance Use Topics    Alcohol use: Not Currently    Drug use: No           2022   LAST FHS-7 RESULTS   1st degree relative breast or ovarian cancer No   Any relative bilateral breast cancer No   Any male have breast cancer No   Any ONE woman have BOTH breast AND ovarian cancer No   Any woman with breast cancer before 50yrs No   2 or more relatives with breast AND/OR ovarian cancer No   2 or more relatives with breast AND/OR bowel cancer No           Mammogram Screening - Mammogram every 1-2 years updated in Health Maintenance based on mutual decision making        2024   STI Screening   New sexual partner(s) since last STI/HIV test? No        History of abnormal Pap smear: YES - reflected in Problem List and Health Maintenance accordingly        Latest Ref Rng & Units 2023     9:32 AM 2018    12:06 PM 2018    11:57 AM   PAP / HPV   PAP  Negative for Intraepithelial Lesion or Malignancy (NILM)      PAP (Historical)    NIL    HPV 16 DNA Negative Negative  Negative     HPV 18 DNA Negative Negative  Negative     Other HR HPV Negative Positive  Negative       ASCVD Risk   The 10-year ASCVD risk score (Lizzy DENG, et al., 2019) is: 1.6%    Values used to calculate the score:      Age: 54 years      Sex: Female      Is Non- : No      Diabetic: No      Tobacco smoker: No      Systolic Blood Pressure: 120 mmHg      Is BP treated: No      HDL Cholesterol: 40 mg/dL      Total Cholesterol: 151 mg/dL    Fracture Risk Assessment Tool  Link to Frax Calculator  Use the information below to complete the Frax calculator  : 1970  Sex: female  Weight (kg): 84 kg (actual weight)  Height (cm): 167.6 cm  Previous Fragility Fracture:  No  History of parent with fractured hip:   No  Current Smoking:  No  Patient has been on glucocorticoids for more than 3 months (5mg/day or more): No  Rheumatoid Arthritis on Problem List:  No  Secondary Osteoporosis on Problem List:  No  Consumes 3 or more units of alcohol per day: No  Femoral Neck BMD (g/cm2)           Reviewed and updated as needed this visit by Provider   Tobacco     Med Hx    Soc Hx Sexual Activity          Past Medical History:   Diagnosis Date    Anxiety attack 09/16/2014    Cardiomyopathy (H)     non ishemic - 25-30% - Chemo related    Congestive heart failure (H) 02/2019    While in hospital for high dose Methotrexate    Depressive disorder 2003    taking Celexa since 2014    Diffuse large B-cell lymphoma (H)     Diagnosed 11/2019, Ronan Albarran    Encounter for Essure implantation 2009    Generalized anxiety disorder 09/16/2014    zoloft = flat emotions    HFrEF (heart failure with reduced ejection fraction) (H)     new diagnosis 6/14    History of blood transfusion 12/2019    Given many throughout cancer treatment    Menopausal disorder     started on OCPs by menopause center 3/2017 (takes active continuously)    Menstrual headache     helped by OCPs and magnesium    Paroxysmal SVT (supraventricular tachycardia) (H24) 06/2020    GERTRUDE (stress urinary incontinence, female)     sling procedure 2016     Past Surgical History:   Procedure Laterality Date    COLONOSCOPY N/A 7/27/2023    Procedure: Colonoscopy;  Surgeon: Juan Marquez MD;  Location:  GI    CV CORONARY ANGIOGRAM N/A 06/15/2020    Procedure: Coronary Angiogram;  Surgeon: Luis Eduardo Phillips MD;  Location:  HEART CARDIAC CATH LAB    CV LEFT HEART CATH N/A 06/15/2020    Procedure: Left Heart Cath;  Surgeon: Luis Eduardo Phillips MD;  Location:  HEART CARDIAC CATH LAB    ENT SURGERY  1990    left eardrum rebuilt due to injury    EP ABLATION SVT N/A 06/22/2020    Procedure: EP Ablation SVT;  Surgeon: Francisco Javier Bynum MD;  Location:  HEART CARDIAC CATH LAB      "KIT CHRISTIANO  2009    christiano Raymundo     HERNIORRHAPHY UMBILICAL  1974    IR CHEST PORT PLACEMENT > 5 YRS OF AGE  01/09/2020    IR PORT REMOVAL RIGHT  04/05/2021    mediastinoscopy  2019    SLING TRANSPUBO WITHOUT ANTERIOR COLPORRHAPHY N/A 11/21/2016    Procedure: SLING TRANSPUBO WITHOUT ANTERIOR COLPORRHAPHY;  Surgeon: Hernesto Berrios MD;  Location: [facility] OR         Review of Systems  Constitutional, HEENT, cardiovascular, pulmonary, GI, , musculoskeletal, neuro, skin, endocrine and psych systems are negative, except as otherwise noted.     Objective    Exam  /82   Pulse 101   Temp (!) 96.2  F (35.7  C) (Tympanic)   Resp 14   Ht 1.676 m (5' 6\")   Wt 84 kg (185 lb 1.6 oz)   LMP 11/06/2019   SpO2 98%   BMI 29.88 kg/m     Estimated body mass index is 29.88 kg/m  as calculated from the following:    Height as of this encounter: 1.676 m (5' 6\").    Weight as of this encounter: 84 kg (185 lb 1.6 oz).    Physical Exam  GENERAL: alert and no distress  EYES: Eyes grossly normal to inspection, PERRL and conjunctivae and sclerae normal  HENT: ear canals and TM's normal, nose and mouth without ulcers or lesions  NECK: no adenopathy, no asymmetry, masses, or scars  RESP: lungs clear to auscultation - no rales, rhonchi or wheezes  BREAST: normal without masses, tenderness or nipple discharge and no palpable axillary masses or adenopathy  CV: regular rate and rhythm, normal S1 S2, no S3 or S4, no murmur, click or rub, no peripheral edema  ABDOMEN: soft, nontender, no hepatosplenomegaly, no masses and bowel sounds normal   (female) w/bimanual: normal female external genitalia, normal urethral meatus, normal vaginal mucosa, and normal cervix/adnexa/uterus without masses or discharge  MS: no gross musculoskeletal defects noted, no edema  SKIN: no suspicious lesions or rashes  NEURO: Normal strength and tone, mentation intact and speech normal  PSYCH: mentation appears normal, affect " normal/bright        Signed Electronically by: Mary Alice Colón PA-C

## 2024-07-17 NOTE — LETTER
My Heart Failure Action Plan  Name: Kassie Ramirez   YOB: 1970  Date: 7/17/2024   My doctor:   Mary Alice Colón [address] [address] [phone] My Diagnosis: HF-pEF (EF > 40%)  My Ejection Fraction:   Lab Results   Component Value Date    LVEF 45-50% 03/20/2024       My Exercise Goal: Start exercise slowly - to begin, do a few minutes of exercise, several times a day. Increase your time and speed [id number] to build tolerance, with a goal of 30 minutes of exercise daily. Steady, slow, and consistent exercise is both safe and healthy. Stop and rest when you feel tired or become short of breath. Do not push yourself on days when you don t feel well.       My Weight Plan:   Wt Readings from Last 2 Encounters:   07/17/24 84 kg (185 lb 1.6 oz)   04/12/24 82.7 kg (182 lb 6.4 oz)     Weigh yourself daily using the same scale. If you gain more than 2 pounds in 24 hours or 5 pounds in 7 days. itake your Lasix (Furosemide) and call cardiology    My Diet Goal: No added salt    Emergency Room Visits:    Our goal is to improve your quality of life and help you avoid a visit to the emergency room or hospital.  If we work together, we can achieve this goal. But, if you feel you need to call 911 or go to the emergency room, please do so.  If you go to the emergency room, please bring your list of medicines and your daily weight chart with you.    Each day ask yourself:  Is my weight up?  Do I have swelling?  Do I have trouble breathing?  How did I sleep?  Other problems?       GREEN ZONE     Weight gained is no more than 2 pounds a day or 5 pounds a in 7 days.  No swelling in feet, ankles, legs or stomach.  No more swelling than usual.  No more trouble breathing than usual.  No change in my sleep.  No other problems. What should I do?  I am doing fine. I will take my medicine, follow my diet, see my doctor, exercise, and watch  for symptoms.           YELLOW ZONE         Weight gain of more than 2 pounds in one day or 5 pounds in 7 days.  New swelling in ankle, leg, knee or thigh.  Bloating in belly, pants feel tighter.  Swelling in hands or face.  Coughing or trouble breathing while walking or talking.  Harder to breathe last night.  Have trouble sleeping, wake up short of breath.  Unusually tired.  Not eating.  Nausea, vomiting, or diarrhea  Pain in my chest or bad  leg cramps.  Feel weak or dizzy. What should I do?  I need to take action and call my doctor or nurse today.                 RED ZONE         Weight gain of 5 pounds overnight.  Chest pain or pressure that does not go away.  Feel less alert.  Wheezing or have trouble breathing when at rest.  Cannot sleep lying down.  Cannot take my medicines.  Pass out or faint. What should I do?  I need to call my doctor or nurse now!  Call 911 if I have chest pain or cannot breathe.

## 2024-07-18 LAB
HPV HR 12 DNA CVX QL NAA+PROBE: POSITIVE
HPV16 DNA CVX QL NAA+PROBE: NEGATIVE
HPV18 DNA CVX QL NAA+PROBE: NEGATIVE
HUMAN PAPILLOMA VIRUS FINAL DIAGNOSIS: ABNORMAL

## 2024-07-22 LAB
BKR LAB AP GYN ADEQUACY: NORMAL
BKR LAB AP GYN INTERPRETATION: NORMAL
BKR LAB AP PREVIOUS ABNL DX: NORMAL
BKR LAB AP PREVIOUS ABNORMAL: NORMAL
PATH REPORT.COMMENTS IMP SPEC: NORMAL
PATH REPORT.COMMENTS IMP SPEC: NORMAL
PATH REPORT.RELEVANT HX SPEC: NORMAL

## 2024-07-23 ENCOUNTER — PATIENT OUTREACH (OUTPATIENT)
Dept: FAMILY MEDICINE | Facility: CLINIC | Age: 54
End: 2024-07-23
Payer: COMMERCIAL

## 2024-08-07 ENCOUNTER — ANCILLARY PROCEDURE (OUTPATIENT)
Dept: MAMMOGRAPHY | Facility: CLINIC | Age: 54
End: 2024-08-07
Attending: PHYSICIAN ASSISTANT
Payer: COMMERCIAL

## 2024-08-07 DIAGNOSIS — Z12.31 VISIT FOR SCREENING MAMMOGRAM: ICD-10-CM

## 2024-08-07 PROCEDURE — 77067 SCR MAMMO BI INCL CAD: CPT | Mod: TC | Performed by: RADIOLOGY

## 2024-08-07 PROCEDURE — 77063 BREAST TOMOSYNTHESIS BI: CPT | Mod: TC | Performed by: RADIOLOGY

## 2024-09-09 NOTE — PROGRESS NOTES
INDICATIONS:                                                    Is a pregnancy test required: No.  Was a consent obtained?  Yes    Today's PHQ-2 Score:       8/18/2022     5:04 PM   PHQ-2 ( 1999 Pfizer)   Q1: Little interest or pleasure in doing things 0   Q2: Feeling down, depressed or hopeless 0   PHQ-2 Score 0     Today's PHQ-9 Score:        7/17/2024     7:30 AM   PHQ-9 SCORE   PHQ-9 Total Score MyChart 0   PHQ-9 Total Score 0       Kassie Ramirez, is a 54 year old female, who had a recent NILM pap.  HPV Other positive Yes prior history of abnormal pap. Here today for colposcopy. Discussed indication, risks of infection and bleeding.    Her last pap was   Lab Results   Component Value Date    GYNINTERP  07/17/2024     Negative for Intraepithelial Lesion or Malignancy (NILM)    PAP NIL 07/25/2018    .    PROCEDURE:                                                      Cervix is stained with acetic acid and viewed colposcopically. Squamocolumnar junction is visualized in it's entirety. no visible lesions, no mosaicism, no punctation, and no abnormal vasculature . Biopsy done No. Endocervical curretage Done         POST PROCEDURE:                                                      IMPRESSION: Patient tolerated procedure well, colposcopy adequate, and Normal cervix    PLAN : Await the results of the biopsies.  She tolerated the procedure well. There were no complications. Patient was discharged in stable condition.    Patient advised to call the clinic if excessive bleeding, pelvic pain, or fever.     Follow-up based on pathology results.  Robbi Arora MD

## 2024-09-11 ENCOUNTER — OFFICE VISIT (OUTPATIENT)
Dept: OBGYN | Facility: CLINIC | Age: 54
End: 2024-09-11
Payer: COMMERCIAL

## 2024-09-11 VITALS
DIASTOLIC BLOOD PRESSURE: 80 MMHG | WEIGHT: 185 LBS | BODY MASS INDEX: 29.73 KG/M2 | SYSTOLIC BLOOD PRESSURE: 126 MMHG | HEIGHT: 66 IN

## 2024-09-11 DIAGNOSIS — R87.618 PAP SMEAR ABNORMALITY OF CERVIX/HUMAN PAPILLOMAVIRUS (HPV) POSITIVE: Primary | ICD-10-CM

## 2024-09-11 PROCEDURE — 88305 TISSUE EXAM BY PATHOLOGIST: CPT | Performed by: PATHOLOGY

## 2024-09-11 PROCEDURE — 57456 ENDOCERV CURETTAGE W/SCOPE: CPT | Performed by: OBSTETRICS & GYNECOLOGY

## 2024-09-11 NOTE — NURSING NOTE
"Chief Complaint   Patient presents with    Colposcopy       Initial /80   Ht 1.676 m (5' 6\")   Wt 83.9 kg (185 lb)   LMP 2019   BMI 29.86 kg/m   Estimated body mass index is 29.86 kg/m  as calculated from the following:    Height as of this encounter: 1.676 m (5' 6\").    Weight as of this encounter: 83.9 kg (185 lb).  BP completed using cuff size: regular    Questioned patient about current smoking habits.  Pt. has never smoked.        Lyric Lizama CMA on 2024 at 10:43 AM    "

## 2024-09-13 LAB
PATH REPORT.COMMENTS IMP SPEC: NORMAL
PATH REPORT.FINAL DX SPEC: NORMAL
PATH REPORT.GROSS SPEC: NORMAL
PATH REPORT.MICROSCOPIC SPEC OTHER STN: NORMAL
PATH REPORT.RELEVANT HX SPEC: NORMAL
PHOTO IMAGE: NORMAL

## 2024-09-20 ENCOUNTER — PATIENT OUTREACH (OUTPATIENT)
Dept: FAMILY MEDICINE | Facility: CLINIC | Age: 54
End: 2024-09-20
Payer: COMMERCIAL

## 2024-10-10 ENCOUNTER — TELEPHONE (OUTPATIENT)
Dept: PULMONOLOGY | Facility: CLINIC | Age: 54
End: 2024-10-10
Payer: COMMERCIAL

## 2024-10-10 NOTE — TELEPHONE ENCOUNTER
Reviewed LOV with Dr Norman from 3/13/24. Recommendation was to follow-up with Dr Norman in 1 year (3/2025.) Patient updated and will schedule return visit when able.    Marty Clay RN

## 2024-10-10 NOTE — TELEPHONE ENCOUNTER
[facility] Call Center    Phone Message    May a detailed message be left on voicemail: yes     Reason for Call: Appointment Intake    Referring Provider Name: Bharath Norman MD   Diagnosis and/or Symptoms: Follow up request, writer called pt and pt said she thought she did not have to schedule a follow up that this would be turned over to her pcc, please review and call pt to discuss, thank you     Action Taken: Message routed to:  [facility] pulm     Travel Screening: Not Applicable     Date of Service:

## 2024-10-25 ENCOUNTER — LAB (OUTPATIENT)
Dept: INFUSION THERAPY | Facility: CLINIC | Age: 54
End: 2024-10-25
Attending: INTERNAL MEDICINE
Payer: COMMERCIAL

## 2024-10-25 ENCOUNTER — HOSPITAL ENCOUNTER (OUTPATIENT)
Dept: CT IMAGING | Facility: CLINIC | Age: 54
Discharge: HOME | End: 2024-10-25
Attending: INTERNAL MEDICINE | Admitting: INTERNAL MEDICINE
Payer: COMMERCIAL

## 2024-10-25 DIAGNOSIS — J90 PLEURAL EFFUSION: ICD-10-CM

## 2024-10-25 DIAGNOSIS — C83.32 DIFFUSE LARGE B-CELL LYMPHOMA OF INTRATHORACIC LYMPH NODES (H): ICD-10-CM

## 2024-10-25 DIAGNOSIS — R06.02 SOB (SHORTNESS OF BREATH): ICD-10-CM

## 2024-10-25 DIAGNOSIS — R53.82 CHRONIC FATIGUE: ICD-10-CM

## 2024-10-25 DIAGNOSIS — R61 NIGHT SWEATS: ICD-10-CM

## 2024-10-25 LAB
ALBUMIN SERPL BCG-MCNC: 4.8 G/DL (ref 3.5–5.2)
ALP SERPL-CCNC: 84 U/L (ref 40–150)
ALT SERPL W P-5'-P-CCNC: 43 U/L (ref 0–50)
ANION GAP SERPL CALCULATED.3IONS-SCNC: 14 MMOL/L (ref 7–15)
AST SERPL W P-5'-P-CCNC: 29 U/L (ref 0–45)
BASOPHILS # BLD AUTO: 0.1 10E3/UL (ref 0–0.2)
BASOPHILS NFR BLD AUTO: 1 %
BILIRUB SERPL-MCNC: 0.7 MG/DL
BUN SERPL-MCNC: 20.9 MG/DL (ref 6–20)
CALCIUM SERPL-MCNC: 9.9 MG/DL (ref 8.8–10.4)
CHLORIDE SERPL-SCNC: 104 MMOL/L (ref 98–107)
CREAT SERPL-MCNC: 1.09 MG/DL (ref 0.51–0.95)
EGFRCR SERPLBLD CKD-EPI 2021: 60 ML/MIN/1.73M2
EOSINOPHIL # BLD AUTO: 2 10E3/UL (ref 0–0.7)
EOSINOPHIL NFR BLD AUTO: 26 %
ERYTHROCYTE [DISTWIDTH] IN BLOOD BY AUTOMATED COUNT: 12.2 % (ref 10–15)
GLUCOSE SERPL-MCNC: 107 MG/DL (ref 70–99)
HCO3 SERPL-SCNC: 22 MMOL/L (ref 22–29)
HCT VFR BLD AUTO: 44.7 % (ref 35–47)
HGB BLD-MCNC: 15.6 G/DL (ref 11.7–15.7)
IMM GRANULOCYTES # BLD: 0 10E3/UL
IMM GRANULOCYTES NFR BLD: 0 %
LDH SERPL L TO P-CCNC: 255 U/L (ref 0–250)
LYMPHOCYTES # BLD AUTO: 2.2 10E3/UL (ref 0.8–5.3)
LYMPHOCYTES NFR BLD AUTO: 29 %
MCH RBC QN AUTO: 30.9 PG (ref 26.5–33)
MCHC RBC AUTO-ENTMCNC: 34.9 G/DL (ref 31.5–36.5)
MCV RBC AUTO: 89 FL (ref 78–100)
MONOCYTES # BLD AUTO: 0.6 10E3/UL (ref 0–1.3)
MONOCYTES NFR BLD AUTO: 7 %
NEUTROPHILS # BLD AUTO: 2.9 10E3/UL (ref 1.6–8.3)
NEUTROPHILS NFR BLD AUTO: 37 %
NRBC # BLD AUTO: 0 10E3/UL
NRBC BLD AUTO-RTO: 0 /100
PLATELET # BLD AUTO: 193 10E3/UL (ref 150–450)
POTASSIUM SERPL-SCNC: 3.7 MMOL/L (ref 3.4–5.3)
PROT SERPL-MCNC: 6.7 G/DL (ref 6.4–8.3)
RBC # BLD AUTO: 5.05 10E6/UL (ref 3.8–5.2)
SODIUM SERPL-SCNC: 140 MMOL/L (ref 135–145)
WBC # BLD AUTO: 7.7 10E3/UL (ref 4–11)

## 2024-10-25 PROCEDURE — 84132 ASSAY OF SERUM POTASSIUM: CPT | Performed by: INTERNAL MEDICINE

## 2024-10-25 PROCEDURE — 83615 LACTATE (LD) (LDH) ENZYME: CPT | Performed by: INTERNAL MEDICINE

## 2024-10-25 PROCEDURE — 250N000009 HC RX 250: Performed by: INTERNAL MEDICINE

## 2024-10-25 PROCEDURE — 85004 AUTOMATED DIFF WBC COUNT: CPT | Performed by: INTERNAL MEDICINE

## 2024-10-25 PROCEDURE — 36415 COLL VENOUS BLD VENIPUNCTURE: CPT

## 2024-10-25 PROCEDURE — 82947 ASSAY GLUCOSE BLOOD QUANT: CPT | Performed by: INTERNAL MEDICINE

## 2024-10-25 PROCEDURE — 74177 CT ABD & PELVIS W/CONTRAST: CPT

## 2024-10-25 PROCEDURE — 250N000011 HC RX IP 250 OP 636: Performed by: INTERNAL MEDICINE

## 2024-10-25 RX ORDER — IOPAMIDOL 755 MG/ML
500 INJECTION, SOLUTION INTRAVASCULAR ONCE
Status: COMPLETED | OUTPATIENT
Start: 2024-10-25 | End: 2024-10-25

## 2024-10-25 RX ADMIN — IOPAMIDOL 91 ML: 755 INJECTION, SOLUTION INTRAVENOUS at 08:24

## 2024-10-25 RX ADMIN — SODIUM CHLORIDE 63 ML: 9 INJECTION, SOLUTION INTRAVENOUS at 08:24

## 2024-10-25 NOTE — PROGRESS NOTES
Nursing Note:  Kassie Ramirez presents today for Labs and IV start for CT.    Patient seen by provider today: No   present during visit today: Not Applicable.    Note: N/A.    Intravenous Access:  Labs drawn without difficulty.  Peripheral IV placed.    Discharge Plan:   Patient was sent to CT for appointment.    Estephania Earl RN

## 2024-11-01 ENCOUNTER — ONCOLOGY VISIT (OUTPATIENT)
Dept: ONCOLOGY | Facility: CLINIC | Age: 54
End: 2024-11-01
Attending: INTERNAL MEDICINE
Payer: COMMERCIAL

## 2024-11-01 VITALS
OXYGEN SATURATION: 98 % | DIASTOLIC BLOOD PRESSURE: 83 MMHG | WEIGHT: 186.2 LBS | TEMPERATURE: 97.6 F | RESPIRATION RATE: 18 BRPM | HEART RATE: 78 BPM | SYSTOLIC BLOOD PRESSURE: 124 MMHG | BODY MASS INDEX: 30.05 KG/M2

## 2024-11-01 DIAGNOSIS — R53.82 CHRONIC FATIGUE: ICD-10-CM

## 2024-11-01 DIAGNOSIS — C83.32 DIFFUSE LARGE B-CELL LYMPHOMA OF INTRATHORACIC LYMPH NODES (H): Primary | ICD-10-CM

## 2024-11-01 DIAGNOSIS — I42.9 SECONDARY CARDIOMYOPATHY (H): ICD-10-CM

## 2024-11-01 PROCEDURE — G2211 COMPLEX E/M VISIT ADD ON: HCPCS | Performed by: INTERNAL MEDICINE

## 2024-11-01 PROCEDURE — 99213 OFFICE O/P EST LOW 20 MIN: CPT | Performed by: INTERNAL MEDICINE

## 2024-11-01 PROCEDURE — 99214 OFFICE O/P EST MOD 30 MIN: CPT | Performed by: INTERNAL MEDICINE

## 2024-11-01 ASSESSMENT — PAIN SCALES - GENERAL: PAINLEVEL_OUTOF10: NO PAIN (0)

## 2024-11-01 NOTE — LETTER
11/1/2024      Kassie Ramirez  [address]  [facility] [address]      Dear Colleague,    Thank you for referring your patient, Kassie Ramirez, to the [facility]. Please see a copy of my visit note below.    [facility]  HEMATOLOGY AND ONCOLOGY    FOLLOW-UP VISIT NOTE    PATIENT NAME: Kassie Ramirez MRN # [id number]  DATE OF VISIT: Nov 1, 2024 YOB: 1970    REFERRING PROVIDER: No referring provider defined for this encounter.    CANCER TYPE: DLBCL, EBV+ non-GCB, stage III.  STAGE: III    TREATMENT SUMMARY:  She presented with shortness of breath and cough which had been present since May 2019. Her imaging suggested pulmonary infiltrates which was attributed to aspiration pneumonia. CT scan of the chest 8/20/2019 showed left hilar and subcarinal mediastinal adenopathy with narrowing of the left lower lobe bronchus and a nonspecific patchy area of nodular consolidation in the medial right lower lobe.  Bronchoscopy with EBUS 8/28/2019 showed nonnecrotizing granulomatous inflammation with prominent eosinophilia, negative for malignancy.  She was diagnosed with sarcoidosis and started on prednisone. She had worsening cough and shortness of breath with tapering of the prednisone.  Repeat CT scan of the chest 11/18/2019 showed interval worsening of the bulky mediastinal and bilateral hilar lymphadenopathy, near complete resolution of the lower lobe opacities, with new interstitial opacities in the right upper lobe.   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 Large mediastinal mass causing respiratory compromise. . IPI score of 2 (low-intermediate). FISH neg for high risk translocations. She received 6 cycles of R-CHOP with intermediate dose methotrexate after cycles 2, 4 and 6 from November through April 2020.       CURRENT INTERVENTIONS:  Surveillance post MR-CHOP    SUBJECTIVE   Kassie Ramirez is being followed for DLBCL, EBV+ non-GCB, stage III intermediate risk disease    Patient was followed in person and is again accompanied by her . She has completed all of her planned cycles of MR-CHOP chemotherapy and is being seen with labs and restaging scans.     She had not been doing well since completion of her therapy for lymphoma.  She had struggled with cardiomyopathy post adriamycin.     She is still not recovered back to baseline from prior to start of therapy. She continues to have fatigue, cough and shortness of breath.  However these have stabilized and she is doing better at this visit. She denied any change in her health.     Her peripheral neuropathy is better now.  Her medications have been adjusted.      PAST MEDICAL HISTORY     Past Medical History:   Diagnosis Date     Anxiety attack 09/16/2014     Cardiomyopathy (H)     non ishemic - 25-30% - Chemo related     Congestive heart failure (H) 02/2019    While in hospital for high dose Methotrexate     Depressive disorder 2003    taking Celexa since 2014     Diffuse large B-cell lymphoma (H)     Diagnosed 11/2019, Ronan Albarran     Encounter for Essure implantation 2009     Generalized anxiety disorder 09/16/2014    zoloft = flat emotions     HFrEF (heart failure with reduced ejection fraction) (H)     new diagnosis 6/14     History of blood transfusion 12/2019    Given many throughout cancer treatment     Menopausal disorder     started on OCPs by menopause center 3/2017 (takes active continuously)     Menstrual headache     helped by OCPs and magnesium     Mild persistent asthma without complication 7/17/2024     Paroxysmal SVT (supraventricular tachycardia) (H) 06/2020     GERTRUDE (stress urinary incontinence, female)     sling procedure 2016         CURRENT OUTPATIENT MEDICATIONS     Current Outpatient Medications   Medication Sig     albuterol (PROAIR  HFA/PROVENTIL HFA/VENTOLIN HFA) 108 (90 Base) MCG/ACT inhaler Inhale 2 puffs into the lungs every 6 hours as needed for shortness of breath, wheezing or cough     alpha-lipoic acid 600 MG capsule Take 1 capsule (600 mg) by mouth daily     budesonide-formoterol (SYMBICORT) 160-4.5 MCG/ACT Inhaler Inhale 2 puffs into the lungs 2 times daily     carvedilol (COREG) 6.25 MG tablet Take 1 tablet (6.25 mg) by mouth 2 times daily (with meals)     cetirizine (ZYRTEC) 10 MG tablet Take 10 mg by mouth daily     furosemide (LASIX) 20 MG tablet Take 1 tablet (20 mg) by mouth daily as needed (shortness of breath, swelling)     lisinopril (ZESTRIL) 5 MG tablet Take 1 tablet (5 mg) by mouth at bedtime     rosuvastatin (CRESTOR) 20 MG tablet Take 1 tablet (20 mg) by mouth daily     sertraline (ZOLOFT) 50 MG tablet Take 1 tablet (50 mg) by mouth daily     spironolactone (ALDACTONE) 25 MG tablet Take 1 tablet (25 mg) by mouth daily     No current facility-administered medications for this visit.        ALLERGIES      Allergies   Allergen Reactions     Cold & Flu [Germanium]      See pseudoephedrine     Seasonal Allergies      Sudafed Cold-Cough [Pseudoephedrine-Dm-Gg-Apap]      Pseudoephedrine Rash     Rash then skins peels off         REVIEW OF SYSTEMS   As above in the HPI, o/w complete 12-point ROS was negative.     PHYSICAL EXAM   /83   Pulse 78   Temp 97.6  F (36.4  C) (Temporal)   Resp 18   Wt 84.5 kg (186 lb 3.2 oz)   LMP 11/06/2019   SpO2 98%   BMI 30.05 kg/m    Limited physical exam during video visit due to COVID19 restrictions  Middle aged female in no acute distress  Breathing comfortably, no tachypnea  Speech and hearing normal  Pleasant mood and congruent affect  Moving all extremities, no focal neurologic deficits apparent       LABORATORY AND IMAGING STUDIES     Recent Labs   Lab Test 10/25/24  0809 04/05/24  0829 03/11/24  0748 10/04/23  1532 09/11/23  1139    140 141 140 141   POTASSIUM 3.7 4.3 4.2  "4.0 4.3   CHLORIDE 104 103 103 103 103   CO2 22 23 27 26 23   ANIONGAP 14 14 11 11 15   BUN 20.9* 17.4 19.8 20.3* 19.5   CR 1.09* 0.97* 1.05* 0.98* 1.03*   * 109* 106* 91 81   AFSHAN 9.9 9.9 10.1* 10.0 10.4*     Recent Labs   Lab Test 09/11/23  1139 06/19/20  1853 06/15/20  0845 06/14/20  1807 01/11/20  0615 12/01/19  1340 12/01/19  0641 11/30/19  2137 11/28/19  0537 11/27/19  2216   MAG  --  2.5* 2.4* 2.1 2.0  --   --   --   --  2.1   PHOS 3.9  --   --   --  3.1 2.8 3.4 2.8   < > 3.1    < > = values in this interval not displayed.     Recent Labs   Lab Test 10/25/24  0809 04/05/24  0829 10/04/23  1532 04/16/23  1402 04/10/23  0810   WBC 7.7 9.2 7.8 8.3 11.7*   HGB 15.6 15.9* 15.2 15.0 17.9*    181 158 173 243   MCV 89 89 94 92 91   NEUTROPHIL 37 36 38 40 38     Recent Labs   Lab Test 10/25/24  0809 04/05/24  0829 03/11/24  0748 10/04/23  1532 04/10/23  0810 11/14/22  0817 07/12/22  1223   BILITOTAL 0.7 0.8  --  0.8   < > 0.7 1.1   ALKPHOS 84 85  --  80   < > 89 79   ALT 43 42 50 44   < > 40* 46   AST 29 34  --  33   < > 25 22   ALBUMIN 4.8 4.8  --  5.0   < > 4.8 4.7   *  --   --   --   --  229 215    < > = values in this interval not displayed.     TSH   Date Value Ref Range Status   05/11/2023 2.11 0.30 - 4.20 uIU/mL Final   01/14/2022 2.29 0.40 - 4.00 mU/L Final   06/14/2020 3.40 0.40 - 4.00 mU/L Final   09/18/2019 2.06 0.40 - 4.00 mU/L Final   07/27/2018 2.04 0.40 - 4.00 mU/L Final     No results for input(s): \"CEA\" in the last 25185 hours.  Results for orders placed or performed during the hospital encounter of 10/25/24   CT Chest/Abdomen/Pelvis w Contrast    Narrative    CT CHEST/ABDOMEN/PELVIS WITH CONTRAST 10/25/2024 8:39 AM    CLINICAL HISTORY: DLBCL, EBV+ non-GCB, stage III post 6 cycles of  M-RCHOP post treatment completion in April 2020 being followed for  surveillance. Diffuse large B-cell lymphoma of intrathoracic lymph  nodes (H). Chronic fatigue.    TECHNIQUE: CT scan of the chest, " abdomen, and pelvis was performed  following injection of IV contrast. Multiplanar reformats were  obtained. Dose reduction techniques were used.   CONTRAST: 91mL Isovue-370    COMPARISON: CT chest, abdomen and pelvis 4/5/2024, 10/4/2023.    FINDINGS:   LOWER NECK: Unremarkable.    LUNGS AND PLEURA: No focal consolidation or pleural effusion.  Calcified granuloma. Similar right lower lobe fissural 5 mm nodule  (12/101).    AIRWAYS: Patent.    HEART: No pericardial effusion.  No coronary calcifications. No  thoracic aortic aneurysm.    PULMONARY ARTERIES: Unremarkable.    MEDIASTINUM AND GRACE: Small right paratracheal 0.7 cm lymph node. No  enlarging lymph node.    HEPATOBILIARY: Hepatic steatosis. Similar subcentimeter hypodensities  and probable medial right hepatic lobe hemangioma. Gallbladder is  present without gallstones.    SPLEEN: Similar splenomegaly measuring 14.4 cm.    PANCREAS: Unremarkable. Similar pancreatic body low-attenuation  structure that may reflect small lipoma or interdigitated fat.    ADRENAL GLANDS: Unremarkable.    KIDNEYS/URETERS/BLADDER: Unremarkable.    BOWEL: No bowel dilatation or wall thickening.    LYMPH NODES AND PERITONEUM: Similar right distal external iliac artery  lymph node measuring 1.3 cm (4/275) since 2022. No enlarging lymph  node.    VASCULATURE: Nonaneurysmal abdominal aorta.    PELVIS: Tubal ligation devices. Right ovarian 2.4 cm cyst.    MUSCULOSKELETAL: Small supraumbilical fat-containing hernia. No  aggressive osseous lesion.    ADDITIONAL FINDINGS: None.      Impression    IMPRESSION:  1.  Since 4/5/2024, no evidence of progressive metastatic disease  within the chest, abdomen or pelvis.  2.  Similar splenomegaly.     JAJA BINGHAM MD         SYSTEM ID:  [id number]          ASSESSMENT AND PLAN   DLBCL, EBV+ non-GCB, stage III post 6 cycles of M-RCHOP  PJV pneumonia causing acute respiratory failure improved on bactrim and prednisone  Cardiomyopathy post  adriamycin based chemotherapy likely also contributed by tachycardia    Kassie was seen in person visit and was accompanied by her , Florian. She has completed her planned chemotherapy in April 2020.     She has been struggling with shortness of breath/dyspnea on exertion, cough and has been diagnosed with asthma.  She has been on inhalers including Symbicort and albuterol. She has borderline elevated eosinophils that would be consistent with allergy or possibly hyperactive airway disease.    I have reviewed all of the labs done prior to this clinic visit.  I have reviewed all of the labs done prior to this clinic visit.  Labs are all completely normal including electrolytes, renal function, hepatic panel, complete blood count and differential except for borderline elevation in her creatinine and eosinophils.  These are only marginally elevated and not clinically significant.    I have reviewed actual images from her CT scan and there is no evidence of recurrent disease in the chest, abdomen or pelvis. She had enlarged hilar nodes and mediastinal mass, likely from reactive thymic tissue.  This has remained stable on the current imaging study. She has borderline splenomegaly which has been stable.     All questions for patient  were answered.  She is 4.5 years out from treatment completion.   I would like to follow up in 6 months at which she would be 5 years out from treatment completion after which she would not have any more follow-up in oncology in the absence of new findings or concerns.      The longitudinal plan of care for the diagnosis(es)/condition(s) as documented were addressed during this visit. Due to the added complexity in care, I will continue to support Kassie in the subsequent management and with ongoing continuity of care.    30 minutes spent on the date of the encounter doing chart review, history and exam, documentation and further activities as noted above     Castro Castro    Hematologist  and Medical Oncologist  [facility]       Again, thank you for allowing me to participate in the care of your patient.        Sincerely,        Castro Castro MD

## 2024-11-01 NOTE — PROGRESS NOTES
[facility]  HEMATOLOGY AND ONCOLOGY    FOLLOW-UP VISIT NOTE    PATIENT NAME: Kassie Ramirez MRN # [id number]  DATE OF VISIT: Nov 1, 2024 YOB: 1970    REFERRING PROVIDER: No referring provider defined for this encounter.    CANCER TYPE: DLBCL, EBV+ non-GCB, stage III.  STAGE: III    TREATMENT SUMMARY:  She presented with shortness of breath and cough which had been present since May 2019. Her imaging suggested pulmonary infiltrates which was attributed to aspiration pneumonia. CT scan of the chest 8/20/2019 showed left hilar and subcarinal mediastinal adenopathy with narrowing of the left lower lobe bronchus and a nonspecific patchy area of nodular consolidation in the medial right lower lobe.  Bronchoscopy with EBUS 8/28/2019 showed nonnecrotizing granulomatous inflammation with prominent eosinophilia, negative for malignancy.  She was diagnosed with sarcoidosis and started on prednisone. She had worsening cough and shortness of breath with tapering of the prednisone.  Repeat CT scan of the chest 11/18/2019 showed interval worsening of the bulky mediastinal and bilateral hilar lymphadenopathy, near complete resolution of the lower lobe opacities, with new interstitial opacities in the right upper lobe.   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 Large mediastinal mass causing respiratory compromise. . IPI score of 2 (low-intermediate). FISH neg for high risk translocations. She received 6 cycles of R-CHOP with intermediate dose methotrexate after cycles 2, 4 and 6 from November through April 2020.      CURRENT INTERVENTIONS:  Surveillance post MR-CHOP    SUBJECTIVE   Kassie Ramirez is being followed for DLBCL, EBV+ non-GCB, stage III intermediate risk disease    Patient was followed in person and is again accompanied by her . She has completed all of  her planned cycles of MR-CHOP chemotherapy and is being seen with labs and restaging scans.     She had not been doing well since completion of her therapy for lymphoma.  She had struggled with cardiomyopathy post adriamycin.     She is still not recovered back to baseline from prior to start of therapy. She continues to have fatigue, cough and shortness of breath.  However these have stabilized and she is doing better at this visit. She denied any change in her health.     Her peripheral neuropathy is better now.  Her medications have been adjusted.      PAST MEDICAL HISTORY     Past Medical History:   Diagnosis Date    Anxiety attack 09/16/2014    Cardiomyopathy (H)     non ishemic - 25-30% - Chemo related    Congestive heart failure (H) 02/2019    While in hospital for high dose Methotrexate    Depressive disorder 2003    taking Celexa since 2014    Diffuse large B-cell lymphoma (H)     Diagnosed 11/2019, Rnoan Albarran    Encounter for Essure implantation 2009    Generalized anxiety disorder 09/16/2014    zoloft = flat emotions    HFrEF (heart failure with reduced ejection fraction) (H)     new diagnosis 6/14    History of blood transfusion 12/2019    Given many throughout cancer treatment    Menopausal disorder     started on OCPs by menopause center 3/2017 (takes active continuously)    Menstrual headache     helped by OCPs and magnesium    Mild persistent asthma without complication 7/17/2024    Paroxysmal SVT (supraventricular tachycardia) (H) 06/2020    GERTRUDE (stress urinary incontinence, female)     sling procedure 2016         CURRENT OUTPATIENT MEDICATIONS     Current Outpatient Medications   Medication Sig    albuterol (PROAIR HFA/PROVENTIL HFA/VENTOLIN HFA) 108 (90 Base) MCG/ACT inhaler Inhale 2 puffs into the lungs every 6 hours as needed for shortness of breath, wheezing or cough    alpha-lipoic acid 600 MG capsule Take 1 capsule (600 mg) by mouth daily    budesonide-formoterol (SYMBICORT) 160-4.5  MCG/ACT Inhaler Inhale 2 puffs into the lungs 2 times daily    carvedilol (COREG) 6.25 MG tablet Take 1 tablet (6.25 mg) by mouth 2 times daily (with meals)    cetirizine (ZYRTEC) 10 MG tablet Take 10 mg by mouth daily    furosemide (LASIX) 20 MG tablet Take 1 tablet (20 mg) by mouth daily as needed (shortness of breath, swelling)    lisinopril (ZESTRIL) 5 MG tablet Take 1 tablet (5 mg) by mouth at bedtime    rosuvastatin (CRESTOR) 20 MG tablet Take 1 tablet (20 mg) by mouth daily    sertraline (ZOLOFT) 50 MG tablet Take 1 tablet (50 mg) by mouth daily    spironolactone (ALDACTONE) 25 MG tablet Take 1 tablet (25 mg) by mouth daily     No current facility-administered medications for this visit.        ALLERGIES      Allergies   Allergen Reactions    Cold & Flu [Germanium]      See pseudoephedrine    Seasonal Allergies     Sudafed Cold-Cough [Pseudoephedrine-Dm-Gg-Apap]     Pseudoephedrine Rash     Rash then skins peels off         REVIEW OF SYSTEMS   As above in the HPI, o/w complete 12-point ROS was negative.     PHYSICAL EXAM   /83   Pulse 78   Temp 97.6  F (36.4  C) (Temporal)   Resp 18   Wt 84.5 kg (186 lb 3.2 oz)   LMP 11/06/2019   SpO2 98%   BMI 30.05 kg/m    Limited physical exam during video visit due to COVID19 restrictions  Middle aged female in no acute distress  Breathing comfortably, no tachypnea  Speech and hearing normal  Pleasant mood and congruent affect  Moving all extremities, no focal neurologic deficits apparent       LABORATORY AND IMAGING STUDIES     Recent Labs   Lab Test 10/25/24  0809 04/05/24  0829 03/11/24  0748 10/04/23  1532 09/11/23  1139    140 141 140 141   POTASSIUM 3.7 4.3 4.2 4.0 4.3   CHLORIDE 104 103 103 103 103   CO2 22 23 27 26 23   ANIONGAP 14 14 11 11 15   BUN 20.9* 17.4 19.8 20.3* 19.5   CR 1.09* 0.97* 1.05* 0.98* 1.03*   * 109* 106* 91 81   AFSHAN 9.9 9.9 10.1* 10.0 10.4*     Recent Labs   Lab Test 09/11/23  1139 06/19/20  1853 06/15/20  0815  "06/14/20  1807 01/11/20  0615 12/01/19  1340 12/01/19  0641 11/30/19  2137 11/28/19  0537 11/27/19  2216   MAG  --  2.5* 2.4* 2.1 2.0  --   --   --   --  2.1   PHOS 3.9  --   --   --  3.1 2.8 3.4 2.8   < > 3.1    < > = values in this interval not displayed.     Recent Labs   Lab Test 10/25/24  0809 04/05/24  0829 10/04/23  1532 04/16/23  1402 04/10/23  0810   WBC 7.7 9.2 7.8 8.3 11.7*   HGB 15.6 15.9* 15.2 15.0 17.9*    181 158 173 243   MCV 89 89 94 92 91   NEUTROPHIL 37 36 38 40 38     Recent Labs   Lab Test 10/25/24  0809 04/05/24  0829 03/11/24  0748 10/04/23  1532 04/10/23  0810 11/14/22  0817 07/12/22  1223   BILITOTAL 0.7 0.8  --  0.8   < > 0.7 1.1   ALKPHOS 84 85  --  80   < > 89 79   ALT 43 42 50 44   < > 40* 46   AST 29 34  --  33   < > 25 22   ALBUMIN 4.8 4.8  --  5.0   < > 4.8 4.7   *  --   --   --   --  229 215    < > = values in this interval not displayed.     TSH   Date Value Ref Range Status   05/11/2023 2.11 0.30 - 4.20 uIU/mL Final   01/14/2022 2.29 0.40 - 4.00 mU/L Final   06/14/2020 3.40 0.40 - 4.00 mU/L Final   09/18/2019 2.06 0.40 - 4.00 mU/L Final   07/27/2018 2.04 0.40 - 4.00 mU/L Final     No results for input(s): \"CEA\" in the last 19466 hours.  Results for orders placed or performed during the hospital encounter of 10/25/24   CT Chest/Abdomen/Pelvis w Contrast    Narrative    CT CHEST/ABDOMEN/PELVIS WITH CONTRAST 10/25/2024 8:39 AM    CLINICAL HISTORY: DLBCL, EBV+ non-GCB, stage III post 6 cycles of  M-RCHOP post treatment completion in April 2020 being followed for  surveillance. Diffuse large B-cell lymphoma of intrathoracic lymph  nodes (H). Chronic fatigue.    TECHNIQUE: CT scan of the chest, abdomen, and pelvis was performed  following injection of IV contrast. Multiplanar reformats were  obtained. Dose reduction techniques were used.   CONTRAST: 91mL Isovue-370    COMPARISON: CT chest, abdomen and pelvis 4/5/2024, 10/4/2023.    FINDINGS:   LOWER NECK: " Unremarkable.    LUNGS AND PLEURA: No focal consolidation or pleural effusion.  Calcified granuloma. Similar right lower lobe fissural 5 mm nodule  (12/101).    AIRWAYS: Patent.    HEART: No pericardial effusion.  No coronary calcifications. No  thoracic aortic aneurysm.    PULMONARY ARTERIES: Unremarkable.    MEDIASTINUM AND GRACE: Small right paratracheal 0.7 cm lymph node. No  enlarging lymph node.    HEPATOBILIARY: Hepatic steatosis. Similar subcentimeter hypodensities  and probable medial right hepatic lobe hemangioma. Gallbladder is  present without gallstones.    SPLEEN: Similar splenomegaly measuring 14.4 cm.    PANCREAS: Unremarkable. Similar pancreatic body low-attenuation  structure that may reflect small lipoma or interdigitated fat.    ADRENAL GLANDS: Unremarkable.    KIDNEYS/URETERS/BLADDER: Unremarkable.    BOWEL: No bowel dilatation or wall thickening.    LYMPH NODES AND PERITONEUM: Similar right distal external iliac artery  lymph node measuring 1.3 cm (4/275) since 2022. No enlarging lymph  node.    VASCULATURE: Nonaneurysmal abdominal aorta.    PELVIS: Tubal ligation devices. Right ovarian 2.4 cm cyst.    MUSCULOSKELETAL: Small supraumbilical fat-containing hernia. No  aggressive osseous lesion.    ADDITIONAL FINDINGS: None.      Impression    IMPRESSION:  1.  Since 4/5/2024, no evidence of progressive metastatic disease  within the chest, abdomen or pelvis.  2.  Similar splenomegaly.     JAJA BINGHAM MD         SYSTEM ID:  [id number]          ASSESSMENT AND PLAN   DLBCL, EBV+ non-GCB, stage III post 6 cycles of M-RCHOP  PJV pneumonia causing acute respiratory failure improved on bactrim and prednisone  Cardiomyopathy post adriamycin based chemotherapy likely also contributed by tachycardia    Kassie was seen in person visit and was accompanied by her , Florian. She has completed her planned chemotherapy in April 2020.     She has been struggling with shortness of breath/dyspnea on  exertion, cough and has been diagnosed with asthma.  She has been on inhalers including Symbicort and albuterol. She has borderline elevated eosinophils that would be consistent with allergy or possibly hyperactive airway disease.    I have reviewed all of the labs done prior to this clinic visit.  I have reviewed all of the labs done prior to this clinic visit.  Labs are all completely normal including electrolytes, renal function, hepatic panel, complete blood count and differential except for borderline elevation in her creatinine and eosinophils.  These are only marginally elevated and not clinically significant.    I have reviewed actual images from her CT scan and there is no evidence of recurrent disease in the chest, abdomen or pelvis. She had enlarged hilar nodes and mediastinal mass, likely from reactive thymic tissue.  This has remained stable on the current imaging study. She has borderline splenomegaly which has been stable.     All questions for patient  were answered.  She is 4.5 years out from treatment completion.   I would like to follow up in 6 months at which she would be 5 years out from treatment completion after which she would not have any more follow-up in oncology in the absence of new findings or concerns.      The longitudinal plan of care for the diagnosis(es)/condition(s) as documented were addressed during this visit. Due to the added complexity in care, I will continue to support Kassie in the subsequent management and with ongoing continuity of care.    30 minutes spent on the date of the encounter doing chart review, history and exam, documentation and further activities as noted above     Castro Castro    Hematologist and Medical Oncologist  [facility]

## 2024-11-01 NOTE — NURSING NOTE
"Oncology Rooming Note    November 1, 2024 7:59 AM   Kassie Ramirez is a 54 year old female who presents for:    Chief Complaint   Patient presents with    Oncology Clinic Visit     Initial Vitals: /83   Pulse 78   Temp 97.6  F (36.4  C) (Temporal)   Resp 18   Wt 84.5 kg (186 lb 3.2 oz)   LMP 11/06/2019   SpO2 98%   BMI 30.05 kg/m   Estimated body mass index is 30.05 kg/m  as calculated from the following:    Height as of 9/11/24: 1.676 m (5' 6\").    Weight as of this encounter: 84.5 kg (186 lb 3.2 oz). Body surface area is 1.98 meters squared.  No Pain (0) Comment: Data Unavailable   Patient's last menstrual period was 11/06/2019.  Allergies reviewed: Yes  Medications reviewed: Yes    Medications: Medication refills not needed today.  Pharmacy name entered into EPIC:    [facility] - [place], [place] - [address] [place] [address] DRUG STORE #[id number] [facility] [address] [place] [address] AT [place] [id number] & [facility] - [place], [place] - [address] [facility] - [place], [place] - [address]    Frailty Screening:   Is the patient here for a new oncology consult visit in cancer care? 2. No      Clinical concerns: f/u       Lynne Webster CMA              "

## 2025-03-07 NOTE — PROVIDER NOTIFICATION
Page to oncologist to talk about the blood transfusion. Pt currently has single IV access of the Port a Cath which has sodium bicarbonate continuous for the high dose chemo recovery phase. Pt states that she has been having difficulty with peripheral lines due to the chemo and steroids. Mid-morning Hgb 7.3 up from 6.8. By does not have any symptoms of low hemoglobin. Oncologist felt at this time to hold on the blood transfusion, recheck tomorrow. If symptoms of low Hgb emerge to notify MD.     Call to hospitalist service to update and verify the plan. Dr Quintero agrees with Oncology recommendation.    08-Mar-2025 04:59

## 2025-04-22 ENCOUNTER — HOSPITAL ENCOUNTER (OUTPATIENT)
Dept: CT IMAGING | Facility: CLINIC | Age: 55
Discharge: HOME | End: 2025-04-22
Attending: INTERNAL MEDICINE | Admitting: INTERNAL MEDICINE
Payer: COMMERCIAL

## 2025-04-22 ENCOUNTER — LAB (OUTPATIENT)
Dept: INFUSION THERAPY | Facility: CLINIC | Age: 55
End: 2025-04-22
Attending: INTERNAL MEDICINE
Payer: COMMERCIAL

## 2025-04-22 DIAGNOSIS — C83.32 DIFFUSE LARGE B-CELL LYMPHOMA OF INTRATHORACIC LYMPH NODES (H): ICD-10-CM

## 2025-04-22 DIAGNOSIS — I50.22 CHRONIC SYSTOLIC CONGESTIVE HEART FAILURE (H): ICD-10-CM

## 2025-04-22 DIAGNOSIS — I42.9 SECONDARY CARDIOMYOPATHY (H): ICD-10-CM

## 2025-04-22 DIAGNOSIS — R53.82 CHRONIC FATIGUE: ICD-10-CM

## 2025-04-22 LAB
ALBUMIN SERPL BCG-MCNC: 4.6 G/DL (ref 3.5–5.2)
ALP SERPL-CCNC: 76 U/L (ref 40–150)
ALT SERPL W P-5'-P-CCNC: 42 U/L (ref 0–50)
ANION GAP SERPL CALCULATED.3IONS-SCNC: 14 MMOL/L (ref 7–15)
AST SERPL W P-5'-P-CCNC: 36 U/L (ref 0–45)
BASOPHILS # BLD MANUAL: 0.1 10E3/UL (ref 0–0.2)
BASOPHILS NFR BLD MANUAL: 1 %
BILIRUB SERPL-MCNC: 0.9 MG/DL
BUN SERPL-MCNC: 22.6 MG/DL (ref 6–20)
CALCIUM SERPL-MCNC: 10 MG/DL (ref 8.8–10.4)
CHLORIDE SERPL-SCNC: 106 MMOL/L (ref 98–107)
CHOLEST SERPL-MCNC: 268 MG/DL
CREAT SERPL-MCNC: 0.98 MG/DL (ref 0.51–0.95)
EGFRCR SERPLBLD CKD-EPI 2021: 68 ML/MIN/1.73M2
EOSINOPHIL # BLD MANUAL: 2 10E3/UL (ref 0–0.7)
EOSINOPHIL NFR BLD MANUAL: 23 %
ERYTHROCYTE [DISTWIDTH] IN BLOOD BY AUTOMATED COUNT: 12.6 % (ref 10–15)
FASTING STATUS PATIENT QL REPORTED: YES
GLUCOSE SERPL-MCNC: 106 MG/DL (ref 70–99)
HCO3 SERPL-SCNC: 22 MMOL/L (ref 22–29)
HCT VFR BLD AUTO: 43.1 % (ref 35–47)
HDLC SERPL-MCNC: 36 MG/DL
HGB BLD-MCNC: 15.3 G/DL (ref 11.7–15.7)
LDH SERPL L TO P-CCNC: 266 U/L (ref 0–250)
LDLC SERPL CALC-MCNC: 158 MG/DL
LYMPHOCYTES # BLD MANUAL: 3.3 10E3/UL (ref 0.8–5.3)
LYMPHOCYTES NFR BLD MANUAL: 37 %
MCH RBC QN AUTO: 30.6 PG (ref 26.5–33)
MCHC RBC AUTO-ENTMCNC: 35.5 G/DL (ref 31.5–36.5)
MCV RBC AUTO: 86 FL (ref 78–100)
MONOCYTES # BLD MANUAL: 0.1 10E3/UL (ref 0–1.3)
MONOCYTES NFR BLD MANUAL: 1 %
NEUTROPHILS # BLD MANUAL: 3.4 10E3/UL (ref 1.6–8.3)
NEUTROPHILS NFR BLD MANUAL: 38 %
NONHDLC SERPL-MCNC: 232 MG/DL
PLAT MORPH BLD: NORMAL
PLATELET # BLD AUTO: 202 10E3/UL (ref 150–450)
POTASSIUM SERPL-SCNC: 4.1 MMOL/L (ref 3.4–5.3)
PROT SERPL-MCNC: 6.5 G/DL (ref 6.4–8.3)
RBC # BLD AUTO: 5 10E6/UL (ref 3.8–5.2)
RBC MORPH BLD: NORMAL
SODIUM SERPL-SCNC: 142 MMOL/L (ref 135–145)
TRIGL SERPL-MCNC: 369 MG/DL
WBC # BLD AUTO: 8.9 10E3/UL (ref 4–11)

## 2025-04-22 PROCEDURE — 84155 ASSAY OF PROTEIN SERUM: CPT | Performed by: INTERNAL MEDICINE

## 2025-04-22 PROCEDURE — 36415 COLL VENOUS BLD VENIPUNCTURE: CPT

## 2025-04-22 PROCEDURE — 85007 BL SMEAR W/DIFF WBC COUNT: CPT | Performed by: INTERNAL MEDICINE

## 2025-04-22 PROCEDURE — 71260 CT THORAX DX C+: CPT

## 2025-04-22 PROCEDURE — 250N000009 HC RX 250: Performed by: INTERNAL MEDICINE

## 2025-04-22 PROCEDURE — 85041 AUTOMATED RBC COUNT: CPT | Performed by: INTERNAL MEDICINE

## 2025-04-22 PROCEDURE — 85018 HEMOGLOBIN: CPT | Performed by: INTERNAL MEDICINE

## 2025-04-22 PROCEDURE — 83615 LACTATE (LD) (LDH) ENZYME: CPT | Performed by: INTERNAL MEDICINE

## 2025-04-22 PROCEDURE — 82947 ASSAY GLUCOSE BLOOD QUANT: CPT | Performed by: INTERNAL MEDICINE

## 2025-04-22 PROCEDURE — 250N000011 HC RX IP 250 OP 636: Performed by: INTERNAL MEDICINE

## 2025-04-22 PROCEDURE — 82465 ASSAY BLD/SERUM CHOLESTEROL: CPT | Performed by: INTERNAL MEDICINE

## 2025-04-22 RX ORDER — IOPAMIDOL 755 MG/ML
500 INJECTION, SOLUTION INTRAVASCULAR ONCE
Status: COMPLETED | OUTPATIENT
Start: 2025-04-22 | End: 2025-04-22

## 2025-04-22 RX ADMIN — IOPAMIDOL 91 ML: 755 INJECTION, SOLUTION INTRAVENOUS at 08:17

## 2025-04-22 RX ADMIN — SODIUM CHLORIDE 63 ML: 9 INJECTION, SOLUTION INTRAVENOUS at 08:17

## 2025-04-22 NOTE — PROGRESS NOTES
Nursing Note:  Kassie OLVIN Ramirez presents today for Labs + PIV insertion for CT scan.    Patient seen by provider today: No   present during visit today: Not Applicable.    Note: Patient was overdue for lipid panel for cardiology, so she requested this be drawn today as she is fasting.    Intravenous Access:  Labs drawn without difficulty.  Peripheral IV placed.    Discharge Plan:   Patient was sent to imaging department for CT appointment.  Imaging department to discontinue PIV after scan is complete.      Cirilo Hidalgo RN

## (undated) DEVICE — INTRODUCER SHEATH GREEN 6.5FRX11CM .038IN PSI-6F-11-038ACT

## (undated) DEVICE — CATH DIAGNOSTIC RADIAL 5FR TIG 4.0

## (undated) DEVICE — KIT ENDO TURNOVER/PROCEDURE W/CLEAN A SCOPE LINERS 103888

## (undated) DEVICE — CATH EP EZ STEER NAV 4MMX115CM 2-5-2 F-J CURVE BN7TCFJ4L

## (undated) DEVICE — SLEEVE TR BAND RADIAL COMPRESSION DEVICE 24CM TRB24-REG

## (undated) DEVICE — 4IN X 6IN PADPRO RADIOTRANSPARENT ELECTRODE DEFRIBILLATOR ADHESIVE PAD, ADULT

## (undated) DEVICE — KIT HAND CONTROL ANGIOTOUCH ACIST 65CM AT-P65

## (undated) DEVICE — GUIDEWIRE VASC 0.035INX150CM INQWIRE J TIP IQ35F150J3F/A

## (undated) DEVICE — INTRO CATH 12CM 8.5FR FST-CATH

## (undated) DEVICE — MANIFOLD KIT ANGIO AUTOMATED 014613

## (undated) DEVICE — INTRO SHEALTH 8.5FRX63CM SRO 406853

## (undated) DEVICE — PACK EP SRG PROC LF DISP SAN32EPFSR

## (undated) DEVICE — MEDITRACE MULTIFUNTION ADULT RADIOTRANSPARENT ELECTRODE FOR ZOLL

## (undated) DEVICE — RAD INTRODUCER KIT MICRO 5FRX10CM .018 NITINOL G/W

## (undated) DEVICE — CATH EP 6FR 2MM TIP 2-8-2 115C

## (undated) DEVICE — INTRO GLIDESHEATH SLENDER 6FR 10X45CM 60-1060

## (undated) DEVICE — PATCH CARTO 3 EXTERNAL REFERENCE 3D MAPPING CREFP6

## (undated) DEVICE — CATH EP CATH EP REPRO DAIG RSPN FX CRV DX EP C

## (undated) DEVICE — CATH DIAG 4FR ANG PIG 538453S

## (undated) RX ORDER — FENTANYL CITRATE 50 UG/ML
INJECTION, SOLUTION INTRAMUSCULAR; INTRAVENOUS
Status: DISPENSED
Start: 2023-07-27

## (undated) RX ORDER — HEPARIN SODIUM 200 [USP'U]/100ML
INJECTION, SOLUTION INTRAVENOUS
Status: DISPENSED
Start: 2020-06-22

## (undated) RX ORDER — FENTANYL CITRATE 50 UG/ML
INJECTION, SOLUTION INTRAMUSCULAR; INTRAVENOUS
Status: DISPENSED
Start: 2020-06-22

## (undated) RX ORDER — LIDOCAINE HYDROCHLORIDE 10 MG/ML
INJECTION, SOLUTION EPIDURAL; INFILTRATION; INTRACAUDAL; PERINEURAL
Status: DISPENSED
Start: 2020-06-22

## (undated) RX ORDER — LIDOCAINE HYDROCHLORIDE 10 MG/ML
INJECTION, SOLUTION EPIDURAL; INFILTRATION; INTRACAUDAL; PERINEURAL
Status: DISPENSED
Start: 2019-11-28

## (undated) RX ORDER — HEPARIN SODIUM 1000 [USP'U]/ML
INJECTION, SOLUTION INTRAVENOUS; SUBCUTANEOUS
Status: DISPENSED
Start: 2020-06-22